# Patient Record
Sex: MALE | Race: OTHER | NOT HISPANIC OR LATINO | URBAN - METROPOLITAN AREA
[De-identification: names, ages, dates, MRNs, and addresses within clinical notes are randomized per-mention and may not be internally consistent; named-entity substitution may affect disease eponyms.]

---

## 2016-03-03 RX ORDER — CLONAZEPAM 1 MG
1 TABLET ORAL
Qty: 0 | Refills: 0 | COMMUNITY
Start: 2016-03-03

## 2017-01-03 ENCOUNTER — OUTPATIENT (OUTPATIENT)
Dept: OUTPATIENT SERVICES | Facility: HOSPITAL | Age: 72
LOS: 1 days | Discharge: HOME | End: 2017-01-03

## 2017-01-03 ENCOUNTER — RESULT REVIEW (OUTPATIENT)
Age: 72
End: 2017-01-03

## 2017-01-03 DIAGNOSIS — C91.11 CHRONIC LYMPHOCYTIC LEUKEMIA OF B-CELL TYPE IN REMISSION: ICD-10-CM

## 2017-01-03 DIAGNOSIS — Z98.89 OTHER SPECIFIED POSTPROCEDURAL STATES: Chronic | ICD-10-CM

## 2017-01-04 ENCOUNTER — LABORATORY RESULT (OUTPATIENT)
Age: 72
End: 2017-01-04

## 2017-01-04 ENCOUNTER — APPOINTMENT (OUTPATIENT)
Dept: HEMATOLOGY ONCOLOGY | Facility: CLINIC | Age: 72
End: 2017-01-04

## 2017-01-04 VITALS
BODY MASS INDEX: 30.3 KG/M2 | SYSTOLIC BLOOD PRESSURE: 149 MMHG | OXYGEN SATURATION: 97 % | TEMPERATURE: 98.3 F | DIASTOLIC BLOOD PRESSURE: 88 MMHG | RESPIRATION RATE: 16 BRPM | HEART RATE: 71 BPM | WEIGHT: 236 LBS

## 2017-01-04 LAB
BASOPHILS # BLD AUTO: 0.1 K/UL — SIGNIFICANT CHANGE UP (ref 0–0.2)
BASOPHILS NFR BLD AUTO: 0.8 % — SIGNIFICANT CHANGE UP (ref 0–2)
EOSINOPHIL # BLD AUTO: 0.2 K/UL — SIGNIFICANT CHANGE UP (ref 0–0.5)
EOSINOPHIL NFR BLD AUTO: 2.5 % — SIGNIFICANT CHANGE UP (ref 0–6)
HCT VFR BLD CALC: 39.1 % — SIGNIFICANT CHANGE UP (ref 39–50)
HGB BLD-MCNC: 12.6 G/DL — LOW (ref 13–17)
LYMPHOCYTES # BLD AUTO: 1.8 K/UL — SIGNIFICANT CHANGE UP (ref 1–3.3)
LYMPHOCYTES # BLD AUTO: 22.3 % — SIGNIFICANT CHANGE UP (ref 13–44)
MCHC RBC-ENTMCNC: 31.3 PG — SIGNIFICANT CHANGE UP (ref 27–34)
MCHC RBC-ENTMCNC: 32.1 GM/DL — SIGNIFICANT CHANGE UP (ref 32–36)
MCV RBC AUTO: 97.4 FL — SIGNIFICANT CHANGE UP (ref 80–100)
MONOCYTES # BLD AUTO: 0.6 K/UL — SIGNIFICANT CHANGE UP (ref 0–0.9)
MONOCYTES NFR BLD AUTO: 7.8 % — SIGNIFICANT CHANGE UP (ref 2–14)
NEUTROPHILS # BLD AUTO: 5.2 K/UL — SIGNIFICANT CHANGE UP (ref 1.8–7.4)
NEUTROPHILS NFR BLD AUTO: 66.5 % — SIGNIFICANT CHANGE UP (ref 43–77)
PLATELET # BLD AUTO: 191 K/UL — SIGNIFICANT CHANGE UP (ref 150–400)
RBC # BLD: 4.02 M/UL — LOW (ref 4.2–5.8)
RBC # FLD: 12.3 % — SIGNIFICANT CHANGE UP (ref 10.3–14.5)
WBC # BLD: 7.9 K/UL — SIGNIFICANT CHANGE UP (ref 3.8–10.5)
WBC # FLD AUTO: 7.9 K/UL — SIGNIFICANT CHANGE UP (ref 3.8–10.5)

## 2017-01-30 ENCOUNTER — RESULT REVIEW (OUTPATIENT)
Age: 72
End: 2017-01-30

## 2017-01-31 ENCOUNTER — LABORATORY RESULT (OUTPATIENT)
Age: 72
End: 2017-01-31

## 2017-01-31 ENCOUNTER — APPOINTMENT (OUTPATIENT)
Dept: HEMATOLOGY ONCOLOGY | Facility: CLINIC | Age: 72
End: 2017-01-31

## 2017-01-31 ENCOUNTER — APPOINTMENT (OUTPATIENT)
Dept: INFUSION THERAPY | Facility: HOSPITAL | Age: 72
End: 2017-01-31

## 2017-01-31 VITALS
WEIGHT: 236 LBS | BODY MASS INDEX: 30.3 KG/M2 | RESPIRATION RATE: 16 BRPM | OXYGEN SATURATION: 95 % | HEART RATE: 63 BPM | TEMPERATURE: 98.4 F | DIASTOLIC BLOOD PRESSURE: 75 MMHG | SYSTOLIC BLOOD PRESSURE: 139 MMHG

## 2017-01-31 LAB
BASOPHILS # BLD AUTO: 0.1 K/UL — SIGNIFICANT CHANGE UP (ref 0–0.2)
BASOPHILS NFR BLD AUTO: 0.7 % — SIGNIFICANT CHANGE UP (ref 0–2)
EOSINOPHIL # BLD AUTO: 0.2 K/UL — SIGNIFICANT CHANGE UP (ref 0–0.5)
EOSINOPHIL NFR BLD AUTO: 2.5 % — SIGNIFICANT CHANGE UP (ref 0–6)
HCT VFR BLD CALC: 42.3 % — SIGNIFICANT CHANGE UP (ref 39–50)
HGB BLD-MCNC: 13.7 G/DL — SIGNIFICANT CHANGE UP (ref 13–17)
LYMPHOCYTES # BLD AUTO: 2.1 K/UL — SIGNIFICANT CHANGE UP (ref 1–3.3)
LYMPHOCYTES # BLD AUTO: 22.6 % — SIGNIFICANT CHANGE UP (ref 13–44)
MCHC RBC-ENTMCNC: 31.3 PG — SIGNIFICANT CHANGE UP (ref 27–34)
MCHC RBC-ENTMCNC: 32.4 GM/DL — SIGNIFICANT CHANGE UP (ref 32–36)
MCV RBC AUTO: 96.8 FL — SIGNIFICANT CHANGE UP (ref 80–100)
MONOCYTES # BLD AUTO: 0.9 K/UL — SIGNIFICANT CHANGE UP (ref 0–0.9)
MONOCYTES NFR BLD AUTO: 10 % — SIGNIFICANT CHANGE UP (ref 2–14)
NEUTROPHILS # BLD AUTO: 6 K/UL — SIGNIFICANT CHANGE UP (ref 1.8–7.4)
NEUTROPHILS NFR BLD AUTO: 64.2 % — SIGNIFICANT CHANGE UP (ref 43–77)
PLATELET # BLD AUTO: 200 K/UL — SIGNIFICANT CHANGE UP (ref 150–400)
RBC # BLD: 4.37 M/UL — SIGNIFICANT CHANGE UP (ref 4.2–5.8)
RBC # FLD: 12.1 % — SIGNIFICANT CHANGE UP (ref 10.3–14.5)
WBC # BLD: 9.3 K/UL — SIGNIFICANT CHANGE UP (ref 3.8–10.5)
WBC # FLD AUTO: 9.3 K/UL — SIGNIFICANT CHANGE UP (ref 3.8–10.5)

## 2017-02-01 ENCOUNTER — APPOINTMENT (OUTPATIENT)
Dept: INFUSION THERAPY | Facility: HOSPITAL | Age: 72
End: 2017-02-01

## 2017-02-01 ENCOUNTER — OUTPATIENT (OUTPATIENT)
Dept: OUTPATIENT SERVICES | Facility: HOSPITAL | Age: 72
LOS: 1 days | Discharge: HOME | End: 2017-02-01

## 2017-02-01 DIAGNOSIS — C88.0 WALDENSTROM MACROGLOBULINEMIA: ICD-10-CM

## 2017-02-01 DIAGNOSIS — C91.11 CHRONIC LYMPHOCYTIC LEUKEMIA OF B-CELL TYPE IN REMISSION: ICD-10-CM

## 2017-02-01 DIAGNOSIS — Z98.89 OTHER SPECIFIED POSTPROCEDURAL STATES: Chronic | ICD-10-CM

## 2017-02-01 DIAGNOSIS — R11.2 NAUSEA WITH VOMITING, UNSPECIFIED: ICD-10-CM

## 2017-02-01 DIAGNOSIS — Z51.11 ENCOUNTER FOR ANTINEOPLASTIC CHEMOTHERAPY: ICD-10-CM

## 2017-02-02 DIAGNOSIS — R11.2 NAUSEA WITH VOMITING, UNSPECIFIED: ICD-10-CM

## 2017-02-02 DIAGNOSIS — Z51.11 ENCOUNTER FOR ANTINEOPLASTIC CHEMOTHERAPY: ICD-10-CM

## 2017-02-06 ENCOUNTER — RESULT REVIEW (OUTPATIENT)
Age: 72
End: 2017-02-06

## 2017-02-07 ENCOUNTER — APPOINTMENT (OUTPATIENT)
Dept: INFUSION THERAPY | Facility: HOSPITAL | Age: 72
End: 2017-02-07

## 2017-02-07 LAB
BASOPHILS # BLD AUTO: 0 K/UL — SIGNIFICANT CHANGE UP (ref 0–0.2)
BASOPHILS NFR BLD AUTO: 0.3 % — SIGNIFICANT CHANGE UP (ref 0–2)
EOSINOPHIL # BLD AUTO: 0.5 K/UL — SIGNIFICANT CHANGE UP (ref 0–0.5)
EOSINOPHIL NFR BLD AUTO: 4.8 % — SIGNIFICANT CHANGE UP (ref 0–6)
HCT VFR BLD CALC: 40 % — SIGNIFICANT CHANGE UP (ref 39–50)
HGB BLD-MCNC: 12.9 G/DL — LOW (ref 13–17)
LYMPHOCYTES # BLD AUTO: 1.6 K/UL — SIGNIFICANT CHANGE UP (ref 1–3.3)
LYMPHOCYTES # BLD AUTO: 17.7 % — SIGNIFICANT CHANGE UP (ref 13–44)
MCHC RBC-ENTMCNC: 31.3 PG — SIGNIFICANT CHANGE UP (ref 27–34)
MCHC RBC-ENTMCNC: 32.3 GM/DL — SIGNIFICANT CHANGE UP (ref 32–36)
MCV RBC AUTO: 96.8 FL — SIGNIFICANT CHANGE UP (ref 80–100)
MONOCYTES # BLD AUTO: 0.9 K/UL — SIGNIFICANT CHANGE UP (ref 0–0.9)
MONOCYTES NFR BLD AUTO: 9.3 % — SIGNIFICANT CHANGE UP (ref 2–14)
NEUTROPHILS # BLD AUTO: 6.3 K/UL — SIGNIFICANT CHANGE UP (ref 1.8–7.4)
NEUTROPHILS NFR BLD AUTO: 67.9 % — SIGNIFICANT CHANGE UP (ref 43–77)
PLATELET # BLD AUTO: 203 K/UL — SIGNIFICANT CHANGE UP (ref 150–400)
RBC # BLD: 4.14 M/UL — LOW (ref 4.2–5.8)
RBC # FLD: 12.3 % — SIGNIFICANT CHANGE UP (ref 10.3–14.5)
WBC # BLD: 9.3 K/UL — SIGNIFICANT CHANGE UP (ref 3.8–10.5)
WBC # FLD AUTO: 9.3 K/UL — SIGNIFICANT CHANGE UP (ref 3.8–10.5)

## 2017-02-08 DIAGNOSIS — C88.0 WALDENSTROM MACROGLOBULINEMIA: ICD-10-CM

## 2017-02-13 ENCOUNTER — RESULT REVIEW (OUTPATIENT)
Age: 72
End: 2017-02-13

## 2017-02-14 ENCOUNTER — APPOINTMENT (OUTPATIENT)
Dept: INFUSION THERAPY | Facility: HOSPITAL | Age: 72
End: 2017-02-14

## 2017-02-14 LAB
BASOPHILS # BLD AUTO: 0 K/UL — SIGNIFICANT CHANGE UP (ref 0–0.2)
BASOPHILS NFR BLD AUTO: 0.4 % — SIGNIFICANT CHANGE UP (ref 0–2)
EOSINOPHIL # BLD AUTO: 0.3 K/UL — SIGNIFICANT CHANGE UP (ref 0–0.5)
EOSINOPHIL NFR BLD AUTO: 2.5 % — SIGNIFICANT CHANGE UP (ref 0–6)
HCT VFR BLD CALC: 42.4 % — SIGNIFICANT CHANGE UP (ref 39–50)
HGB BLD-MCNC: 13.8 G/DL — SIGNIFICANT CHANGE UP (ref 13–17)
LYMPHOCYTES # BLD AUTO: 1.7 K/UL — SIGNIFICANT CHANGE UP (ref 1–3.3)
LYMPHOCYTES # BLD AUTO: 15.2 % — SIGNIFICANT CHANGE UP (ref 13–44)
MCHC RBC-ENTMCNC: 31.7 PG — SIGNIFICANT CHANGE UP (ref 27–34)
MCHC RBC-ENTMCNC: 32.6 G/DL — SIGNIFICANT CHANGE UP (ref 32–36)
MCV RBC AUTO: 97.2 FL — SIGNIFICANT CHANGE UP (ref 80–100)
MONOCYTES # BLD AUTO: 0.8 K/UL — SIGNIFICANT CHANGE UP (ref 0–0.9)
MONOCYTES NFR BLD AUTO: 7.2 % — SIGNIFICANT CHANGE UP (ref 2–14)
NEUTROPHILS # BLD AUTO: 8.2 K/UL — HIGH (ref 1.8–7.4)
NEUTROPHILS NFR BLD AUTO: 74.7 % — SIGNIFICANT CHANGE UP (ref 43–77)
PLATELET # BLD AUTO: 184 K/UL — SIGNIFICANT CHANGE UP (ref 150–400)
RBC # BLD: 4.36 M/UL — SIGNIFICANT CHANGE UP (ref 4.2–5.8)
RBC # FLD: 12.3 % — SIGNIFICANT CHANGE UP (ref 10.3–14.5)
WBC # BLD: 10.9 K/UL — HIGH (ref 3.8–10.5)
WBC # FLD AUTO: 10.9 K/UL — HIGH (ref 3.8–10.5)

## 2017-02-20 ENCOUNTER — RESULT REVIEW (OUTPATIENT)
Age: 72
End: 2017-02-20

## 2017-02-21 ENCOUNTER — APPOINTMENT (OUTPATIENT)
Dept: INFUSION THERAPY | Facility: HOSPITAL | Age: 72
End: 2017-02-21

## 2017-02-21 LAB
BASOPHILS # BLD AUTO: 0 K/UL — SIGNIFICANT CHANGE UP (ref 0–0.2)
BASOPHILS NFR BLD AUTO: 0.5 % — SIGNIFICANT CHANGE UP (ref 0–2)
EOSINOPHIL # BLD AUTO: 0.3 K/UL — SIGNIFICANT CHANGE UP (ref 0–0.5)
EOSINOPHIL NFR BLD AUTO: 3.6 % — SIGNIFICANT CHANGE UP (ref 0–6)
HCT VFR BLD CALC: 42 % — SIGNIFICANT CHANGE UP (ref 39–50)
HGB BLD-MCNC: 13.8 G/DL — SIGNIFICANT CHANGE UP (ref 13–17)
LYMPHOCYTES # BLD AUTO: 2.1 K/UL — SIGNIFICANT CHANGE UP (ref 1–3.3)
LYMPHOCYTES # BLD AUTO: 22.5 % — SIGNIFICANT CHANGE UP (ref 13–44)
MCHC RBC-ENTMCNC: 31.9 PG — SIGNIFICANT CHANGE UP (ref 27–34)
MCHC RBC-ENTMCNC: 32.7 G/DL — SIGNIFICANT CHANGE UP (ref 32–36)
MCV RBC AUTO: 97.5 FL — SIGNIFICANT CHANGE UP (ref 80–100)
MONOCYTES # BLD AUTO: 0.6 K/UL — SIGNIFICANT CHANGE UP (ref 0–0.9)
MONOCYTES NFR BLD AUTO: 7 % — SIGNIFICANT CHANGE UP (ref 2–14)
NEUTROPHILS # BLD AUTO: 6.1 K/UL — SIGNIFICANT CHANGE UP (ref 1.8–7.4)
NEUTROPHILS NFR BLD AUTO: 66.3 % — SIGNIFICANT CHANGE UP (ref 43–77)
PLATELET # BLD AUTO: 154 K/UL — SIGNIFICANT CHANGE UP (ref 150–400)
RBC # BLD: 4.31 M/UL — SIGNIFICANT CHANGE UP (ref 4.2–5.8)
RBC # FLD: 12.4 % — SIGNIFICANT CHANGE UP (ref 10.3–14.5)
WBC # BLD: 9.2 K/UL — SIGNIFICANT CHANGE UP (ref 3.8–10.5)
WBC # FLD AUTO: 9.2 K/UL — SIGNIFICANT CHANGE UP (ref 3.8–10.5)

## 2017-02-27 ENCOUNTER — RESULT REVIEW (OUTPATIENT)
Age: 72
End: 2017-02-27

## 2017-02-28 ENCOUNTER — LABORATORY RESULT (OUTPATIENT)
Age: 72
End: 2017-02-28

## 2017-02-28 ENCOUNTER — APPOINTMENT (OUTPATIENT)
Dept: INFUSION THERAPY | Facility: HOSPITAL | Age: 72
End: 2017-02-28

## 2017-02-28 LAB
BASOPHILS # BLD AUTO: 0 K/UL — SIGNIFICANT CHANGE UP (ref 0–0.2)
BASOPHILS NFR BLD AUTO: 0.2 % — SIGNIFICANT CHANGE UP (ref 0–2)
EOSINOPHIL # BLD AUTO: 0.3 K/UL — SIGNIFICANT CHANGE UP (ref 0–0.5)
EOSINOPHIL NFR BLD AUTO: 3.1 % — SIGNIFICANT CHANGE UP (ref 0–6)
HCT VFR BLD CALC: 40.7 % — SIGNIFICANT CHANGE UP (ref 39–50)
HGB BLD-MCNC: 13.8 G/DL — SIGNIFICANT CHANGE UP (ref 13–17)
LYMPHOCYTES # BLD AUTO: 1.7 K/UL — SIGNIFICANT CHANGE UP (ref 1–3.3)
LYMPHOCYTES # BLD AUTO: 18.1 % — SIGNIFICANT CHANGE UP (ref 13–44)
MCHC RBC-ENTMCNC: 32.5 PG — SIGNIFICANT CHANGE UP (ref 27–34)
MCHC RBC-ENTMCNC: 33.9 G/DL — SIGNIFICANT CHANGE UP (ref 32–36)
MCV RBC AUTO: 96 FL — SIGNIFICANT CHANGE UP (ref 80–100)
MONOCYTES # BLD AUTO: 0.7 K/UL — SIGNIFICANT CHANGE UP (ref 0–0.9)
MONOCYTES NFR BLD AUTO: 7.3 % — SIGNIFICANT CHANGE UP (ref 2–14)
NEUTROPHILS # BLD AUTO: 6.5 K/UL — SIGNIFICANT CHANGE UP (ref 1.8–7.4)
NEUTROPHILS NFR BLD AUTO: 71.3 % — SIGNIFICANT CHANGE UP (ref 43–77)
PLATELET # BLD AUTO: 139 K/UL — LOW (ref 150–400)
RBC # BLD: 4.24 M/UL — SIGNIFICANT CHANGE UP (ref 4.2–5.8)
RBC # FLD: 12.2 % — SIGNIFICANT CHANGE UP (ref 10.3–14.5)
WBC # BLD: 9.2 K/UL — SIGNIFICANT CHANGE UP (ref 3.8–10.5)
WBC # FLD AUTO: 9.2 K/UL — SIGNIFICANT CHANGE UP (ref 3.8–10.5)

## 2017-03-07 ENCOUNTER — APPOINTMENT (OUTPATIENT)
Dept: ELECTROPHYSIOLOGY | Facility: CLINIC | Age: 72
End: 2017-03-07

## 2017-03-07 ENCOUNTER — APPOINTMENT (OUTPATIENT)
Dept: CARDIOLOGY | Facility: CLINIC | Age: 72
End: 2017-03-07

## 2017-03-07 ENCOUNTER — NON-APPOINTMENT (OUTPATIENT)
Age: 72
End: 2017-03-07

## 2017-03-07 VITALS
HEART RATE: 81 BPM | BODY MASS INDEX: 30.29 KG/M2 | WEIGHT: 236 LBS | DIASTOLIC BLOOD PRESSURE: 77 MMHG | HEIGHT: 74 IN | SYSTOLIC BLOOD PRESSURE: 126 MMHG

## 2017-03-17 ENCOUNTER — APPOINTMENT (OUTPATIENT)
Dept: PULMONOLOGY | Facility: CLINIC | Age: 72
End: 2017-03-17

## 2017-04-25 ENCOUNTER — OUTPATIENT (OUTPATIENT)
Dept: OUTPATIENT SERVICES | Facility: HOSPITAL | Age: 72
LOS: 1 days | Discharge: HOME | End: 2017-04-25

## 2017-04-25 DIAGNOSIS — C91.11 CHRONIC LYMPHOCYTIC LEUKEMIA OF B-CELL TYPE IN REMISSION: ICD-10-CM

## 2017-04-25 DIAGNOSIS — Z98.89 OTHER SPECIFIED POSTPROCEDURAL STATES: Chronic | ICD-10-CM

## 2017-04-27 ENCOUNTER — RESULT REVIEW (OUTPATIENT)
Age: 72
End: 2017-04-27

## 2017-04-28 ENCOUNTER — LABORATORY RESULT (OUTPATIENT)
Age: 72
End: 2017-04-28

## 2017-04-28 ENCOUNTER — APPOINTMENT (OUTPATIENT)
Dept: HEMATOLOGY ONCOLOGY | Facility: CLINIC | Age: 72
End: 2017-04-28

## 2017-04-28 VITALS
RESPIRATION RATE: 16 BRPM | TEMPERATURE: 98.5 F | BODY MASS INDEX: 29.79 KG/M2 | WEIGHT: 231.99 LBS | SYSTOLIC BLOOD PRESSURE: 140 MMHG | DIASTOLIC BLOOD PRESSURE: 81 MMHG | HEART RATE: 90 BPM | OXYGEN SATURATION: 97 %

## 2017-04-28 LAB
ALBUMIN SERPL ELPH-MCNC: 4.4 G/DL
ALP BLD-CCNC: 147 U/L
ALT SERPL-CCNC: 18 U/L
ANION GAP SERPL CALC-SCNC: 18 MMOL/L
AST SERPL-CCNC: 17 U/L
BASOPHILS # BLD AUTO: 0.1 K/UL — SIGNIFICANT CHANGE UP (ref 0–0.2)
BASOPHILS NFR BLD AUTO: 0.9 % — SIGNIFICANT CHANGE UP (ref 0–2)
BILIRUB SERPL-MCNC: 0.6 MG/DL
BUN SERPL-MCNC: 45 MG/DL
CALCIUM SERPL-MCNC: 9.6 MG/DL
CHLORIDE SERPL-SCNC: 99 MMOL/L
CO2 SERPL-SCNC: 27 MMOL/L
CREAT SERPL-MCNC: 3.44 MG/DL
EOSINOPHIL # BLD AUTO: 0.2 K/UL — SIGNIFICANT CHANGE UP (ref 0–0.5)
EOSINOPHIL NFR BLD AUTO: 3 % — SIGNIFICANT CHANGE UP (ref 0–6)
GLUCOSE SERPL-MCNC: 134 MG/DL
HCT VFR BLD CALC: 42.3 % — SIGNIFICANT CHANGE UP (ref 39–50)
HGB BLD-MCNC: 14.3 G/DL — SIGNIFICANT CHANGE UP (ref 13–17)
INR PPP: 3.43 RATIO
LDH SERPL-CCNC: 153 U/L
LYMPHOCYTES # BLD AUTO: 1.8 K/UL — SIGNIFICANT CHANGE UP (ref 1–3.3)
LYMPHOCYTES # BLD AUTO: 22.6 % — SIGNIFICANT CHANGE UP (ref 13–44)
MCHC RBC-ENTMCNC: 31.7 PG — SIGNIFICANT CHANGE UP (ref 27–34)
MCHC RBC-ENTMCNC: 33.8 G/DL — SIGNIFICANT CHANGE UP (ref 32–36)
MCV RBC AUTO: 94 FL — SIGNIFICANT CHANGE UP (ref 80–100)
MONOCYTES # BLD AUTO: 0.7 K/UL — SIGNIFICANT CHANGE UP (ref 0–0.9)
MONOCYTES NFR BLD AUTO: 8.3 % — SIGNIFICANT CHANGE UP (ref 2–14)
NEUTROPHILS # BLD AUTO: 5.3 K/UL — SIGNIFICANT CHANGE UP (ref 1.8–7.4)
NEUTROPHILS NFR BLD AUTO: 65.3 % — SIGNIFICANT CHANGE UP (ref 43–77)
PLATELET # BLD AUTO: 157 K/UL — SIGNIFICANT CHANGE UP (ref 150–400)
POTASSIUM SERPL-SCNC: 4 MMOL/L
PROT SERPL-MCNC: 7.2 G/DL
PT BLD: 39.8 SEC
RBC # BLD: 4.5 M/UL — SIGNIFICANT CHANGE UP (ref 4.2–5.8)
RBC # FLD: 12.1 % — SIGNIFICANT CHANGE UP (ref 10.3–14.5)
SODIUM SERPL-SCNC: 144 MMOL/L
T3RU NFR SERPL: 0.76 INDEX
T4 SERPL-MCNC: 11.8 UG/DL
TSH SERPL-ACNC: 0.56 UIU/ML
WBC # BLD: 8.2 K/UL — SIGNIFICANT CHANGE UP (ref 3.8–10.5)
WBC # FLD AUTO: 8.2 K/UL — SIGNIFICANT CHANGE UP (ref 3.8–10.5)

## 2017-04-29 LAB
ALBUMIN MFR SERPL ELPH: 57.5 %
ALBUMIN SERPL-MCNC: 4.1 G/DL
ALBUMIN/GLOB SERPL: 1.3 RATIO
ALPHA1 GLOB MFR SERPL ELPH: 6.1 %
ALPHA1 GLOB SERPL ELPH-MCNC: 0.4 G/DL
ALPHA2 GLOB MFR SERPL ELPH: 11.3 %
ALPHA2 GLOB SERPL ELPH-MCNC: 0.8 G/DL
B-GLOBULIN MFR SERPL ELPH: 9.1 %
B-GLOBULIN SERPL ELPH-MCNC: 0.7 G/DL
GAMMA GLOB FLD ELPH-MCNC: 1.2 G/DL
GAMMA GLOB MFR SERPL ELPH: 16 %
INTERPRETATION SERPL IEP-IMP: NORMAL
M PROTEIN MFR SERPL ELPH: 11.2 %
MONOCLON BAND OBS SERPL: 0.8 G/DL
PROT SERPL-MCNC: 7.2 G/DL
PROT SERPL-MCNC: 7.2 G/DL

## 2017-04-30 LAB
DEPRECATED KAPPA LC FREE/LAMBDA SER: 0.02 RATIO
DEPRECATED KAPPA LC FREE/LAMBDA SER: 0.02 RATIO
IGA SER QL IEP: 32 MG/DL
IGG SER QL IEP: 629 MG/DL
IGM SER QL IEP: 927 MG/DL
KAPPA LC CSF-MCNC: 73.7 MG/DL
KAPPA LC CSF-MCNC: 73.7 MG/DL
KAPPA LC SERPL-MCNC: 1.14 MG/DL
KAPPA LC SERPL-MCNC: 1.14 MG/DL

## 2017-07-01 ENCOUNTER — INPATIENT (INPATIENT)
Facility: HOSPITAL | Age: 72
LOS: 9 days | Discharge: HOME | DRG: 92 | End: 2017-07-11
Attending: INTERNAL MEDICINE | Admitting: INTERNAL MEDICINE
Payer: COMMERCIAL

## 2017-07-01 VITALS
HEART RATE: 103 BPM | OXYGEN SATURATION: 100 % | WEIGHT: 216.05 LBS | SYSTOLIC BLOOD PRESSURE: 106 MMHG | DIASTOLIC BLOOD PRESSURE: 69 MMHG | HEIGHT: 74 IN | RESPIRATION RATE: 18 BRPM | TEMPERATURE: 99 F

## 2017-07-01 DIAGNOSIS — R47.81 SLURRED SPEECH: ICD-10-CM

## 2017-07-01 DIAGNOSIS — Z98.89 OTHER SPECIFIED POSTPROCEDURAL STATES: Chronic | ICD-10-CM

## 2017-07-01 LAB
ALBUMIN SERPL ELPH-MCNC: 4 G/DL — SIGNIFICANT CHANGE UP (ref 3.3–5)
ALP SERPL-CCNC: 106 U/L — SIGNIFICANT CHANGE UP (ref 40–120)
ALT FLD-CCNC: 53 U/L RC — HIGH (ref 10–45)
ANION GAP SERPL CALC-SCNC: 13 MMOL/L — SIGNIFICANT CHANGE UP (ref 5–17)
APPEARANCE UR: CLEAR — SIGNIFICANT CHANGE UP
APTT BLD: 61.3 SEC — HIGH (ref 27.5–37.4)
AST SERPL-CCNC: 27 U/L — SIGNIFICANT CHANGE UP (ref 10–40)
BASOPHILS # BLD AUTO: 0.1 K/UL — SIGNIFICANT CHANGE UP (ref 0–0.2)
BASOPHILS NFR BLD AUTO: 0.7 % — SIGNIFICANT CHANGE UP (ref 0–2)
BILIRUB SERPL-MCNC: 0.5 MG/DL — SIGNIFICANT CHANGE UP (ref 0.2–1.2)
BILIRUB UR-MCNC: NEGATIVE — SIGNIFICANT CHANGE UP
BLD GP AB SCN SERPL QL: NEGATIVE — SIGNIFICANT CHANGE UP
BUN SERPL-MCNC: 41 MG/DL — HIGH (ref 7–23)
CALCIUM SERPL-MCNC: 9.6 MG/DL — SIGNIFICANT CHANGE UP (ref 8.4–10.5)
CHLORIDE SERPL-SCNC: 105 MMOL/L — SIGNIFICANT CHANGE UP (ref 96–108)
CO2 SERPL-SCNC: 26 MMOL/L — SIGNIFICANT CHANGE UP (ref 22–31)
COLOR SPEC: YELLOW — SIGNIFICANT CHANGE UP
CREAT SERPL-MCNC: 3.27 MG/DL — HIGH (ref 0.5–1.3)
DIFF PNL FLD: NEGATIVE — SIGNIFICANT CHANGE UP
EOSINOPHIL # BLD AUTO: 0.1 K/UL — SIGNIFICANT CHANGE UP (ref 0–0.5)
EOSINOPHIL NFR BLD AUTO: 1.6 % — SIGNIFICANT CHANGE UP (ref 0–6)
GLUCOSE SERPL-MCNC: 109 MG/DL — HIGH (ref 70–99)
GLUCOSE UR QL: NEGATIVE — SIGNIFICANT CHANGE UP
HCT VFR BLD CALC: 37.8 % — LOW (ref 39–50)
HGB BLD-MCNC: 13.1 G/DL — SIGNIFICANT CHANGE UP (ref 13–17)
INR BLD: 9.44 RATIO — CRITICAL HIGH (ref 0.88–1.16)
KETONES UR-MCNC: NEGATIVE — SIGNIFICANT CHANGE UP
LEUKOCYTE ESTERASE UR-ACNC: NEGATIVE — SIGNIFICANT CHANGE UP
LYMPHOCYTES # BLD AUTO: 1.8 K/UL — SIGNIFICANT CHANGE UP (ref 1–3.3)
LYMPHOCYTES # BLD AUTO: 23.9 % — SIGNIFICANT CHANGE UP (ref 13–44)
MCHC RBC-ENTMCNC: 34 PG — SIGNIFICANT CHANGE UP (ref 27–34)
MCHC RBC-ENTMCNC: 34.7 GM/DL — SIGNIFICANT CHANGE UP (ref 32–36)
MCV RBC AUTO: 98 FL — SIGNIFICANT CHANGE UP (ref 80–100)
MONOCYTES # BLD AUTO: 0.7 K/UL — SIGNIFICANT CHANGE UP (ref 0–0.9)
MONOCYTES NFR BLD AUTO: 10.1 % — SIGNIFICANT CHANGE UP (ref 2–14)
NEUTROPHILS # BLD AUTO: 4.6 K/UL — SIGNIFICANT CHANGE UP (ref 1.8–7.4)
NEUTROPHILS NFR BLD AUTO: 63.7 % — SIGNIFICANT CHANGE UP (ref 43–77)
NITRITE UR-MCNC: NEGATIVE — SIGNIFICANT CHANGE UP
PH UR: 6 — SIGNIFICANT CHANGE UP (ref 5–8)
PLATELET # BLD AUTO: 154 K/UL — SIGNIFICANT CHANGE UP (ref 150–400)
POTASSIUM SERPL-MCNC: 3.7 MMOL/L — SIGNIFICANT CHANGE UP (ref 3.5–5.3)
POTASSIUM SERPL-SCNC: 3.7 MMOL/L — SIGNIFICANT CHANGE UP (ref 3.5–5.3)
PROT SERPL-MCNC: 7 G/DL — SIGNIFICANT CHANGE UP (ref 6–8.3)
PROT UR-MCNC: 30 MG/DL
PROTHROM AB SERPL-ACNC: 107.7 SEC — HIGH (ref 9.8–12.7)
RBC # BLD: 3.86 M/UL — LOW (ref 4.2–5.8)
RBC # FLD: 12.1 % — SIGNIFICANT CHANGE UP (ref 10.3–14.5)
RH IG SCN BLD-IMP: POSITIVE — SIGNIFICANT CHANGE UP
SODIUM SERPL-SCNC: 144 MMOL/L — SIGNIFICANT CHANGE UP (ref 135–145)
SP GR SPEC: 1.02 — SIGNIFICANT CHANGE UP (ref 1.01–1.02)
UROBILINOGEN FLD QL: NEGATIVE — SIGNIFICANT CHANGE UP
WBC # BLD: 7.3 K/UL — SIGNIFICANT CHANGE UP (ref 3.8–10.5)
WBC # FLD AUTO: 7.3 K/UL — SIGNIFICANT CHANGE UP (ref 3.8–10.5)
WBC UR QL: SIGNIFICANT CHANGE UP /HPF (ref 0–5)

## 2017-07-01 PROCEDURE — 70450 CT HEAD/BRAIN W/O DYE: CPT | Mod: 26

## 2017-07-01 PROCEDURE — 93010 ELECTROCARDIOGRAM REPORT: CPT

## 2017-07-01 PROCEDURE — 71010: CPT | Mod: 26

## 2017-07-01 PROCEDURE — 99285 EMERGENCY DEPT VISIT HI MDM: CPT | Mod: 25

## 2017-07-01 RX ORDER — LEVOTHYROXINE SODIUM 125 MCG
125 TABLET ORAL DAILY
Qty: 0 | Refills: 0 | Status: DISCONTINUED | OUTPATIENT
Start: 2017-07-01 | End: 2017-07-11

## 2017-07-01 RX ORDER — PHYTONADIONE (VIT K1) 5 MG
10 TABLET ORAL ONCE
Qty: 0 | Refills: 0 | Status: COMPLETED | OUTPATIENT
Start: 2017-07-01 | End: 2017-07-01

## 2017-07-01 RX ORDER — FUROSEMIDE 40 MG
40 TABLET ORAL DAILY
Qty: 0 | Refills: 0 | Status: DISCONTINUED | OUTPATIENT
Start: 2017-07-03 | End: 2017-07-11

## 2017-07-01 RX ORDER — SODIUM CHLORIDE 9 MG/ML
1000 INJECTION INTRAMUSCULAR; INTRAVENOUS; SUBCUTANEOUS
Qty: 0 | Refills: 0 | Status: DISCONTINUED | OUTPATIENT
Start: 2017-07-01 | End: 2017-07-02

## 2017-07-01 RX ORDER — DONEPEZIL HYDROCHLORIDE 10 MG/1
5 TABLET, FILM COATED ORAL AT BEDTIME
Qty: 0 | Refills: 0 | Status: DISCONTINUED | OUTPATIENT
Start: 2017-07-01 | End: 2017-07-11

## 2017-07-01 RX ORDER — FUROSEMIDE 40 MG
40 TABLET ORAL DAILY
Qty: 0 | Refills: 0 | Status: DISCONTINUED | OUTPATIENT
Start: 2017-07-01 | End: 2017-07-01

## 2017-07-01 RX ORDER — MEMANTINE HYDROCHLORIDE 10 MG/1
10 TABLET ORAL DAILY
Qty: 0 | Refills: 0 | Status: DISCONTINUED | OUTPATIENT
Start: 2017-07-01 | End: 2017-07-07

## 2017-07-01 RX ORDER — ALLOPURINOL 300 MG
100 TABLET ORAL DAILY
Qty: 0 | Refills: 0 | Status: DISCONTINUED | OUTPATIENT
Start: 2017-07-01 | End: 2017-07-11

## 2017-07-01 RX ORDER — TAMSULOSIN HYDROCHLORIDE 0.4 MG/1
0.4 CAPSULE ORAL AT BEDTIME
Qty: 0 | Refills: 0 | Status: DISCONTINUED | OUTPATIENT
Start: 2017-07-01 | End: 2017-07-11

## 2017-07-01 RX ORDER — MIRTAZAPINE 45 MG/1
15 TABLET, ORALLY DISINTEGRATING ORAL AT BEDTIME
Qty: 0 | Refills: 0 | Status: DISCONTINUED | OUTPATIENT
Start: 2017-07-01 | End: 2017-07-11

## 2017-07-01 RX ORDER — SODIUM BICARBONATE 1 MEQ/ML
650 SYRINGE (ML) INTRAVENOUS
Qty: 0 | Refills: 0 | Status: DISCONTINUED | OUTPATIENT
Start: 2017-07-01 | End: 2017-07-11

## 2017-07-01 RX ORDER — AMIODARONE HYDROCHLORIDE 400 MG/1
200 TABLET ORAL DAILY
Qty: 0 | Refills: 0 | Status: DISCONTINUED | OUTPATIENT
Start: 2017-07-01 | End: 2017-07-11

## 2017-07-01 RX ADMIN — SODIUM CHLORIDE 70 MILLILITER(S): 9 INJECTION INTRAMUSCULAR; INTRAVENOUS; SUBCUTANEOUS at 18:57

## 2017-07-01 RX ADMIN — SODIUM CHLORIDE 70 MILLILITER(S): 9 INJECTION INTRAMUSCULAR; INTRAVENOUS; SUBCUTANEOUS at 22:03

## 2017-07-01 RX ADMIN — Medication 102 MILLIGRAM(S): at 15:43

## 2017-07-01 NOTE — H&P ADULT - PSH
History of laparoscopic cholecystectomy  4/2014  Meniscus tear  s/p removal of Meniscus 8 months ago  S/P hernia repair  x2

## 2017-07-01 NOTE — ED PROVIDER NOTE - OBJECTIVE STATEMENT
Pt is 72M PMH dementia, hypothyroid, GERD, a-fib on coumadin, CKD4, SSS, CLL MDD p/w weakness slurred speech sent to ED by PMD after INR found to be 8.5. Pt's wife reports that pt has had slurred speech for three days, Pt is 72M PMH dementia, hypothyroid, GERD, a-fib on coumadin, CKD4, SSS, CLL MDD p/w weakness slurred speech sent to ED by PMD after INR found to be 8.5. Pt's wife reports that pt has had slurred speech for three days and generalized weakness.

## 2017-07-01 NOTE — CONSULT NOTE ADULT - ASSESSMENT
73 yearold man PMH dementia, hypothyroidism, GERD, Afib on coumadin, CKD stage 4, CLL, PPM presents with weakness and slurred speech for the past 3 days. Was sent to ED by PMD after INR found to be 8.5. Developed slurred speech after starting Lexapro and Abilify for depression. Low suspicion of stroke.    Plan:  Cannot do MRI brain due to PPM  Repeat CTH in 24 hours  stabilize INR level as per primary team  r/o infection with UA, CXR

## 2017-07-01 NOTE — ED ADULT NURSE NOTE - PMH
Anxiety disorder    Atrial fibrillation    Bradycardia, drug induced    CLL (chronic lymphocytic leukemia)  in remission  Diverticulitis    GERD (gastroesophageal reflux disease)    Waldenstrom macroglobulinemia

## 2017-07-01 NOTE — H&P ADULT - ASSESSMENT
pt  with  slurred  speech  for  past  few  days, per  wife,  seen in e r,  speech normal now, pt  coherent,    seen by  house  neuro, recommended  rpt  ct  head in am  afib on  coumadin, coagulopathy,  vitk,  and  ffp, inr  in  am   ppm, card  called   CLL,   not  on  chemo now pt  with  slurred  speech  for  past  few  days, per  wife,  seen in e r,  speech normal now, pt  coherent,    seen by  [place]  neuro, recommended  rpt  ct  head in am  afib on  coumadin, coagulopathy,  vitk,  and  ffp, inr  in  am   ppm, card  called   CLL,   not  on  chemo now,  CKD  4,  on iv n saline,  start Lasix on monday,  follow  crt in  am pt  with  slurred  speech  for  past  few  days, per  wife,  seen in e r,  speech normal now, pt  coherent,    seen by  house  neuro, recommended  rpt  ct  head in am  afib on  coumadin, coagulopathy,  vitk,  and  ffp, inr  in  am   ppm, card  called   CLL,   not  on  chemo now,  CKD  4,  now  with  LOUISE  ON CKD,   on iv n saline,  start Lasix on monday,  follow  crt in  am

## 2017-07-01 NOTE — H&P ADULT - HISTORY OF PRESENT ILLNESS
: Pt is 72M PMH dementia, hypothyroid, GERD, a-fib on coumadin, CKD4, SSS, CLL ,  p/w weakness slurred speech sent to ED by PMD after INR found to be 8.5. Pt's wife reports that pt has had slurred speech for three days and generalized weakness.,  also  it  appears  this  happened  after  starting  new  psych  drug for  depression	  ,  seen by  neuro  in  er,  no  acute  intervention,  in  er,  pt  with  normal  speech.  very  coherent,  wife  at bedside

## 2017-07-01 NOTE — ED PROVIDER NOTE - NOTES
Called neuro for slurred speech after negative CT head. Per neuro unable to perform MRI 2/2 PPM but will evaluate pt and provide further recs

## 2017-07-01 NOTE — H&P ADULT - NSHPLABSRESULTS_GEN_ALL_CORE
LABS:                        13.1   7.3   )-----------( 154      ( 2017 13:53 )             37.8         144  |  105  |  41<H>  ----------------------------<  109<H>  3.7   |  26  |  3.27<H>    Ca    9.6      2017 13:53    TPro  7.0  /  Alb  4.0  /  TBili  0.5  /  DBili  x   /  AST  27  /  ALT  53<H>  /  AlkPhos  106      PT/INR - ( 2017 13:53 )   PT: 107.7 sec;   INR: 9.44 ratio         PTT - ( 2017 13:53 )  PTT:61.3 sec  Urinalysis Basic - ( 2017 15:03 )    Color: Yellow / Appearance: Clear / S.017 / pH: x  Gluc: x / Ketone: Negative  / Bili: Negative / Urobili: Negative   Blood: x / Protein: 30 mg/dL / Nitrite: Negative   Leuk Esterase: Negative / RBC: x / WBC 0-2 /HPF   Sq Epi: x / Non Sq Epi: x / Bacteria: x          RADIOLOGY & ADDITIONAL TESTS:

## 2017-07-01 NOTE — ED PROVIDER NOTE - MEDICAL DECISION MAKING DETAILS
72M PMH CLL, Waldenstrom's macroglobulinemia, a-fib on coumadin, CAD s/p PPM, DMII, presenting to ED with weakness, fatigue, slurred speech, elevated INR 8 as outpt. Pt denies h/a, N/V, blood in stool, blood in urine. Will obtain bloodwork, repeat INR, CT head to r/o bleed and re-evaluate. ZR

## 2017-07-01 NOTE — ED ADULT NURSE NOTE - OBJECTIVE STATEMENT
71yo male walked into ED a+ox3, c/o elevated INR, slurred speech, and worsening generalized weakness over past 3 days. 71yo male walked into ED a+ox3, c/o elevated INR, slurred speech, and worsening generalized weakness over past 3 days. Pt had his monthly INR checked yesterday, and was told to come to ED today because INR was 8.5. Pt takes coumadin for Pacemaker. As per wife at bedside, pt has had slurred speech for past 3 days. Pt has generalized weakness, malaise for past 3 days. Pt had recent psych med change, and lowered his dosage of Clonazepam. Pt denies SOB, CP, HA, fevers, chills, n/v/d, abd pain, urinary burning/pain/frequency/hematuria, changes in vision, numbness/tingling to extremities. Pt denies abnormal bruising, recent falls/trauma/hitting head. Lung sounds clear. Abd soft nontender nondistended. Pupils 2mm bilaterally, had cataract surgery on both.

## 2017-07-01 NOTE — CONSULT NOTE ADULT - SUBJECTIVE AND OBJECTIVE BOX
Neurology Consult    Name  NICOLAS CIFUENTES    HPI:  73 yearold man PMH dementia, hypothyroidism, GERD, Afib on coumadin, CKD stage 4, CLL, PPM presents with weakness and slurred speech for the past 3 days. Was sent to ED by PMD after INR found to be 8.5. Pt's wife reports that pt has had slurred speech for three days and generalized weakness. Patient reports that he was on Abilify and Lexapro and developed slurred speech and other symptoms  such as tongue twitching and was subsequently taken off of those meds.    NIHSS-1 , MRS-0      MEDICATIONS  (STANDING):  amiodarone    Tablet 200 milliGRAM(s) Oral daily  memantine 10 milliGRAM(s) Oral daily  allopurinol 100 milliGRAM(s) Oral daily  sodium bicarbonate 650 milliGRAM(s) Oral two times a day  levothyroxine 125 MICROGram(s) Oral daily  tamsulosin 0.4 milliGRAM(s) Oral at bedtime  donepezil 5 milliGRAM(s) Oral at bedtime  mirtazapine 15 milliGRAM(s) Oral at bedtime  sodium chloride 0.9%. 1000 milliLiter(s) (70 mL/Hr) IV Continuous <Continuous>    MEDICATIONS  (PRN):      Allergies    amoxicillin (Rash)    Intolerances        Objective:   Vital Signs Last 24 Hrs  T(C): 36.8 (01 Jul 2017 18:55), Max: 37.3 (01 Jul 2017 13:03)  T(F): 98.3 (01 Jul 2017 18:55), Max: 99.2 (01 Jul 2017 13:03)  HR: 50 (01 Jul 2017 18:55) (50 - 103)  BP: 126/70 (01 Jul 2017 18:55) (106/69 - 126/70)  BP(mean): --  RR: 16 (01 Jul 2017 18:55) (16 - 18)  SpO2: 98% (01 Jul 2017 18:55) (98% - 100%)    General Exam:   General appearance: No acute distress                   Neurological Exam:  Mental Status: AAOx3, fluent speech, follows commands    Cranial Nerves: EOMI,  V1-V3 intact, facial symmetry intact, moderate dysarthria, tongue midline    Motor: No drift x4    Sensation: Intact to LT throughout    Coordination: FTN intact b/l    Labs:    07-01    144  |  105  |  41<H>  ----------------------------<  109<H>  3.7   |  26  |  3.27<H>    Ca    9.6      01 Jul 2017 13:53    TPro  7.0  /  Alb  4.0  /  TBili  0.5  /  DBili  x   /  AST  27  /  ALT  53<H>  /  AlkPhos  106  07-01    LIVER FUNCTIONS - ( 01 Jul 2017 13:53 )  Alb: 4.0 g/dL / Pro: 7.0 g/dL / ALK PHOS: 106 U/L / ALT: 53 U/L RC / AST: 27 U/L / GGT: x           CBC Full  -  ( 01 Jul 2017 13:53 )  WBC Count : 7.3 K/uL  Hemoglobin : 13.1 g/dL  Hematocrit : 37.8 %  Platelet Count - Automated : 154 K/uL  Mean Cell Volume : 98.0 fl  Mean Cell Hemoglobin : 34.0 pg  Mean Cell Hemoglobin Concentration : 34.7 gm/dL  Auto Neutrophil # : 4.6 K/uL  Auto Lymphocyte # : 1.8 K/uL  Auto Monocyte # : 0.7 K/uL  Auto Eosinophil # : 0.1 K/uL  Auto Basophil # : 0.1 K/uL  Auto Neutrophil % : 63.7 %  Auto Lymphocyte % : 23.9 %  Auto Monocyte % : 10.1 %  Auto Eosinophil % : 1.6 %  Auto Basophil % : 0.7 %      Radiology  CTH: No intracranial hemorrhage. No acute major distribution infarct.

## 2017-07-01 NOTE — H&P ADULT - NSHPPHYSICALEXAM_GEN_ALL_CORE
PHYSICAL EXAMINATION:  Vital Signs Last 24 Hrs  T(C): 36.9 (01 Jul 2017 13:40), Max: 37.3 (01 Jul 2017 13:03)  T(F): 98.4 (01 Jul 2017 13:40), Max: 99.2 (01 Jul 2017 13:03)  HR: 50 (01 Jul 2017 15:01) (50 - 103)  BP: 121/68 (01 Jul 2017 15:01) (106/69 - 121/68)  BP(mean): --  RR: 16 (01 Jul 2017 15:01) (16 - 18)  SpO2: 99% (01 Jul 2017 15:01) (99% - 100%)  CAPILLARY BLOOD GLUCOSE            GENERAL: NAD, well-groomed, well-developed  HEAD:  atraumatic, normocephalic  EYES: sclera anicteric  ENMT: mucous membranes moist  NECK: supple, No JVD  CHEST/LUNG: clear to auscultation bilaterally; no rales, rhonchi, or wheezing b/l  HEART: normal S1, S2  ABDOMEN: BS+, soft, ND, NT   EXTREMITIES:  pulses palpable; no clubbing, cyanosis, or edema b/l LEs  NEURO: awake, alert, interactive; moves all extremities  SKIN: no rashes or lesions

## 2017-07-02 LAB
ALBUMIN SERPL ELPH-MCNC: 3.4 G/DL — SIGNIFICANT CHANGE UP (ref 3.3–5)
ALP SERPL-CCNC: 99 U/L — SIGNIFICANT CHANGE UP (ref 40–120)
ALT FLD-CCNC: 52 U/L — HIGH (ref 10–45)
ANION GAP SERPL CALC-SCNC: 16 MMOL/L — SIGNIFICANT CHANGE UP (ref 5–17)
APTT BLD: 29.3 SEC — SIGNIFICANT CHANGE UP (ref 27.5–37.4)
AST SERPL-CCNC: 51 U/L — HIGH (ref 10–40)
BILIRUB DIRECT SERPL-MCNC: 0.2 MG/DL — SIGNIFICANT CHANGE UP (ref 0–0.2)
BILIRUB INDIRECT FLD-MCNC: 0.6 MG/DL — SIGNIFICANT CHANGE UP (ref 0.2–1)
BILIRUB SERPL-MCNC: 0.8 MG/DL — SIGNIFICANT CHANGE UP (ref 0.2–1.2)
BUN SERPL-MCNC: 40 MG/DL — HIGH (ref 7–23)
CALCIUM SERPL-MCNC: 9.4 MG/DL — SIGNIFICANT CHANGE UP (ref 8.4–10.5)
CHLORIDE SERPL-SCNC: 106 MMOL/L — SIGNIFICANT CHANGE UP (ref 96–108)
CO2 SERPL-SCNC: 22 MMOL/L — SIGNIFICANT CHANGE UP (ref 22–31)
CREAT SERPL-MCNC: 3.03 MG/DL — HIGH (ref 0.5–1.3)
GLUCOSE SERPL-MCNC: 76 MG/DL — SIGNIFICANT CHANGE UP (ref 70–99)
HCT VFR BLD CALC: 33.5 % — LOW (ref 39–50)
HGB BLD-MCNC: 11.1 G/DL — LOW (ref 13–17)
INR BLD: 1.39 RATIO — HIGH (ref 0.88–1.16)
INR BLD: 2.03 RATIO — HIGH (ref 0.88–1.16)
MCHC RBC-ENTMCNC: 31.5 PG — SIGNIFICANT CHANGE UP (ref 27–34)
MCHC RBC-ENTMCNC: 33.1 GM/DL — SIGNIFICANT CHANGE UP (ref 32–36)
MCV RBC AUTO: 95.2 FL — SIGNIFICANT CHANGE UP (ref 80–100)
PLATELET # BLD AUTO: 154 K/UL — SIGNIFICANT CHANGE UP (ref 150–400)
POTASSIUM SERPL-MCNC: 3.8 MMOL/L — SIGNIFICANT CHANGE UP (ref 3.5–5.3)
POTASSIUM SERPL-SCNC: 3.8 MMOL/L — SIGNIFICANT CHANGE UP (ref 3.5–5.3)
PROT SERPL-MCNC: 6.2 G/DL — SIGNIFICANT CHANGE UP (ref 6–8.3)
PROTHROM AB SERPL-ACNC: 15.8 SEC — HIGH (ref 10–13.1)
PROTHROM AB SERPL-ACNC: 22.5 SEC — HIGH (ref 9.8–12.7)
RBC # BLD: 3.52 M/UL — LOW (ref 4.2–5.8)
RBC # FLD: 13.8 % — SIGNIFICANT CHANGE UP (ref 10.3–14.5)
SODIUM SERPL-SCNC: 144 MMOL/L — SIGNIFICANT CHANGE UP (ref 135–145)
WBC # BLD: 6.09 K/UL — SIGNIFICANT CHANGE UP (ref 3.8–10.5)
WBC # FLD AUTO: 6.09 K/UL — SIGNIFICANT CHANGE UP (ref 3.8–10.5)

## 2017-07-02 PROCEDURE — 70450 CT HEAD/BRAIN W/O DYE: CPT | Mod: 26

## 2017-07-02 RX ORDER — WARFARIN SODIUM 2.5 MG/1
5 TABLET ORAL ONCE
Qty: 0 | Refills: 0 | Status: COMPLETED | OUTPATIENT
Start: 2017-07-02 | End: 2017-07-02

## 2017-07-02 RX ADMIN — Medication 100 MILLIGRAM(S): at 11:38

## 2017-07-02 RX ADMIN — MEMANTINE HYDROCHLORIDE 10 MILLIGRAM(S): 10 TABLET ORAL at 11:38

## 2017-07-02 RX ADMIN — AMIODARONE HYDROCHLORIDE 200 MILLIGRAM(S): 400 TABLET ORAL at 08:46

## 2017-07-02 RX ADMIN — SODIUM CHLORIDE 70 MILLILITER(S): 9 INJECTION INTRAMUSCULAR; INTRAVENOUS; SUBCUTANEOUS at 11:40

## 2017-07-02 RX ADMIN — DONEPEZIL HYDROCHLORIDE 5 MILLIGRAM(S): 10 TABLET, FILM COATED ORAL at 22:44

## 2017-07-02 RX ADMIN — MIRTAZAPINE 15 MILLIGRAM(S): 45 TABLET, ORALLY DISINTEGRATING ORAL at 00:45

## 2017-07-02 RX ADMIN — TAMSULOSIN HYDROCHLORIDE 0.4 MILLIGRAM(S): 0.4 CAPSULE ORAL at 00:06

## 2017-07-02 RX ADMIN — Medication 650 MILLIGRAM(S): at 11:38

## 2017-07-02 RX ADMIN — WARFARIN SODIUM 5 MILLIGRAM(S): 2.5 TABLET ORAL at 22:43

## 2017-07-02 RX ADMIN — Medication 650 MILLIGRAM(S): at 00:06

## 2017-07-02 RX ADMIN — MIRTAZAPINE 15 MILLIGRAM(S): 45 TABLET, ORALLY DISINTEGRATING ORAL at 22:44

## 2017-07-02 RX ADMIN — Medication 125 MICROGRAM(S): at 05:58

## 2017-07-02 RX ADMIN — Medication 650 MILLIGRAM(S): at 23:01

## 2017-07-02 RX ADMIN — DONEPEZIL HYDROCHLORIDE 5 MILLIGRAM(S): 10 TABLET, FILM COATED ORAL at 00:45

## 2017-07-02 RX ADMIN — TAMSULOSIN HYDROCHLORIDE 0.4 MILLIGRAM(S): 0.4 CAPSULE ORAL at 22:44

## 2017-07-02 NOTE — PROGRESS NOTE ADULT - ASSESSMENT
awaiting  rpt   ct head, today  per  neuro    speech  normal    dementia   will not   restart  trazadone,  symptoms  started  after this  drug,   pt    has  trouble  voiding   may  need  walker awaiting  rpt   ct head, today  per  neuro    speech  normal    dementia   will not   restart  trazadone,  symptoms  started  after this  drug,   pt    has  trouble  voiding   may  need  walker, ckd 4, chronic,  afib on  coumadin,  labs, pending

## 2017-07-02 NOTE — CONSULT NOTE ADULT - SUBJECTIVE AND OBJECTIVE BOX
HPI:  Mr. Olvera is a 72 year-old man, well-known to me, with history of multiple medical problems including dementia, CLL/Waldenstrom's macroglobulinemia, atrial fibrillation, and stage 4 chronic kidney disease. He underwent renal biopsy back in 2015 - the biopsy was notable for infiltration of his kidney tissue by CLL. I have followed him as an outpatient since then. His renal function since then has generally been stable, with fluctuations of his creatinine from 2.7-3.5mg/dL. He has a known history of nephrolithiasis, and has not suffered from renal colic of late.     Mr. Olvera was sent by his PMD to the [facility] ER yesterday, after being noted to have an INR of 8.5.     Per patient's wife, he was suffering from dysarthria and generalized weakness for 3 days prior to admission. Also of note, he recently started a new psychiatric medication.      PAST MEDICAL & SURGICAL HISTORY:  Bradycardia, drug induced  Waldenstrom macroglobulinemia  Diverticulitis  Atrial fibrillation  GERD (gastroesophageal reflux disease)  Anxiety disorder  CLL (chronic lymphocytic leukemia): in remission  History of laparoscopic cholecystectomy: 2014  S/P hernia repair: x2  Meniscus tear: s/p removal of Meniscus 8 months ago    Allergies  amoxicillin (Rash)    SOCIAL HISTORY:  Denies ETOh,Smoking,     FAMILY HISTORY:  No pertinent family history in first degree relatives      REVIEW OF SYSTEMS:  CONSTITUTIONAL: (+)generalized weakness; (+)fatigue; no fevers or chills  EYES/ENT: No visual changes;  No vertigo or throat pain   NECK: No pain or stiffness  RESPIRATORY: No cough, wheezing, hemoptysis; No shortness of breath  CARDIOVASCULAR: No chest pain or palpitations  GASTROINTESTINAL: No abdominal or epigastric pain. No nausea, vomiting, or hematemesis; No diarrhea or constipation. No melena or hematochezia.  GENITOURINARY: No dysuria, frequency or hematuria  NEUROLOGICAL: (+)dysarthria  SKIN: No itching, burning, rashes, or lesions   All other review of systems is negative unless indicated above.    VITAL:  T(C): , Max: 37.7 (17 @ 21:06)  T(F): , Max: 99.9 (17 @ 21:06)  HR: 59 (17 @ 11:51)  BP: 115/58 (17 @ 11:51)  BP(mean): --  RR: 18 (17 @ 11:51)  SpO2: 96% (17 @ 11:51)      PHYSICAL EXAM:  Constitutional: NAD, Alert  HEENT: NCAT, MMM  Neck: Supple, No JVD  Respiratory: CTA-b/l  Cardiovascular: RRR s1s2, no m/r/g  Gastrointestinal: BS+, soft, NT/ND  Extremities: No peripheral edema b/l  Neurological: no focal deficits; strength grossly intact  Psychiatric: Normal mood, normal affect  Back: no CVAT b/l  Skin: No rashes, no nevi    LABS:                        11.1   6.09  )-----------( 154      ( 2017 08:41 )             33.5     Na(144)/K(3.8)/Cl(106)/HCO3(22)/BUN(40)/Cr(3.03)Glu(76)/Ca(9.4)/Mg(--)/PO4(--)     @ 08:37  Na(144)/K(3.7)/Cl(105)/HCO3(26)/BUN(41)/Cr(3.27)Glu(109)/Ca(9.6)/Mg(--)/PO4(--)     @ 13:53    Urinalysis Basic - ( 2017 15:03 )    Color: Yellow / Appearance: Clear / S.017 / pH: x  Gluc: x / Ketone: Negative  / Bili: Negative / Urobili: Negative   Blood: x / Protein: 30 mg/dL / Nitrite: Negative   Leuk Esterase: Negative / RBC: x / WBC 0-2 /HPF   Sq Epi: x / Non Sq Epi: x / Bacteria: x        IMPRESSION:    (1)Renal - CKD stage 4 - GFR ~20ml/min. Stable function. 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     (2)Lytes - acceptable    (3)CV - normotensive/euvolemic    (4)Supratherapeutic INR    (5)Dysarthria -     (6)Generalized weakness - not from uremia.    RECOMMEND:    (1) HPI:  Mr. Olvera is a 72 year-old man, well-known to me, with history of multiple medical problems including dementia, CLL/Waldenstrom's macroglobulinemia, atrial fibrillation, and stage 4 chronic kidney disease. He underwent renal biopsy back in 2015 - the biopsy was notable for infiltration of his kidney tissue by CLL. I have followed him as an outpatient since then. His renal function since then has generally been stable, with fluctuations of his creatinine from 2.7-3.5mg/dL. He has a known history of nephrolithiasis, and has not suffered from renal colic of late.     Mr. Olvera was sent by his PMD to the [facility] ER yesterday, after being noted to have an INR of 8.5.     Per patient's wife, he was suffering from dysarthria and generalized weakness for 3 days prior to admission. Also of note, he was placed on Trazodone only a few days prior to admission.    In addition, he was noted to be retaining urine on admission. A walker was placed, with immediate return of 1000cc.    PAST MEDICAL & SURGICAL HISTORY:  Bradycardia, drug induced  Waldenstrom macroglobulinemia  Diverticulitis  Atrial fibrillation  GERD (gastroesophageal reflux disease)  Anxiety disorder  CLL (chronic lymphocytic leukemia): in remission  History of laparoscopic cholecystectomy: 2014  S/P hernia repair: x2  Meniscus tear: s/p removal of Meniscus 8 months ago    Allergies  amoxicillin (Rash)    SOCIAL HISTORY:  Denies ETOh,Smoking,     FAMILY HISTORY:  No pertinent family history in first degree relatives      REVIEW OF SYSTEMS:  CONSTITUTIONAL: (+)generalized weakness; (+)fatigue; no fevers or chills  EYES/ENT: No visual changes;  No vertigo or throat pain   NECK: No pain or stiffness  RESPIRATORY: No cough, wheezing, hemoptysis; No shortness of breath  CARDIOVASCULAR: No chest pain or palpitations  GASTROINTESTINAL: No abdominal or epigastric pain. No nausea, vomiting, or hematemesis; No diarrhea or constipation. No melena or hematochezia.  GENITOURINARY: No dysuria, frequency or hematuria  NEUROLOGICAL: (+)slurred speech  SKIN: No itching, burning, rashes, or lesions   All other review of systems is negative unless indicated above.    VITAL:  T(C): , Max: 37.7 (17 @ 21:06)  T(F): , Max: 99.9 (17 @ 21:06)  HR: 59 (17 @ 11:51)  BP: 115/58 (17 @ 11:51)  BP(mean): --  RR: 18 (17 @ 11:51)  SpO2: 96% (17 @ 11:51)      PHYSICAL EXAM:  Constitutional: NAD, Alert  HEENT: NCAT, MMM  Neck: Supple, No JVD  Respiratory: CTA-b/l  Cardiovascular: RRR s1s2, no m/r/g  Gastrointestinal: BS+, soft, NT/ND  Extremities: No peripheral edema b/l  Neurological: no focal deficits; strength grossly intact  Psychiatric: Normal mood, normal affect  Back: no CVAT b/l  Skin: No rashes, no nevi    LABS:                        11.1   6.09  )-----------( 154      ( 2017 08:41 )             33.5     Na(144)/K(3.8)/Cl(106)/HCO3(22)/BUN(40)/Cr(3.03)Glu(76)/Ca(9.4)/Mg(--)/PO4(--)     @ 08:37  Na(144)/K(3.7)/Cl(105)/HCO3(26)/BUN(41)/Cr(3.27)Glu(109)/Ca(9.6)/Mg(--)/PO4(--)     @ 13:53    Urinalysis Basic - ( 2017 15:03 )    Color: Yellow / Appearance: Clear / S.017 / pH: x  Gluc: x / Ketone: Negative  / Bili: Negative / Urobili: Negative   Blood: x / Protein: 30 mg/dL / Nitrite: Negative   Leuk Esterase: Negative / RBC: x / WBC 0-2 /HPF   Sq Epi: x / Non Sq Epi: x / Bacteria: x        IMPRESSION:    (1)Renal - CKD stage 4 - GFR ~20ml/min. Stable function. 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     (2)Lytes - acceptable    (3)CV - normotensive/euvolemic    (4)Supratherapeutic INR - resolved    (5)Dysarthria - Likely related to trazodone    (6)Generalized weakness - not from uremia. Likely due to Trazodone    (7) - urinary retention on admission - now with walker in place.      RECOMMEND:    (1)Meds as ordered; dose new meds for GFR 20ml/min  (2)Walker management per primary team  (3)No HD for now HPI:  Mr. Olvera is a 72 year-old man, well-known to me, with history of multiple medical problems including dementia, CLL/Waldenstrom's macroglobulinemia, atrial fibrillation, and stage 4 chronic kidney disease. He underwent renal biopsy back in 2015 - the biopsy was notable for infiltration of his kidney tissue by CLL. I have followed him as an outpatient since then. His renal function since then has generally been stable, with fluctuations of his creatinine from 2.7-3.5mg/dL. He has a known history of nephrolithiasis, and has not suffered from renal colic of late.     Mr. Olvera was sent by his PMD to the [facility] ER yesterday, after being noted to have an INR of 8.5.     Per patient's wife, he was suffering from dysarthria and generalized weakness for 3 days prior to admission. Also of note, he was placed on Trazodone only a few days prior to admission.    In addition, he was noted to be retaining urine on admission. A walker was placed, with immediate return of 1000cc.    PAST MEDICAL & SURGICAL HISTORY:  Bradycardia, drug induced  Waldenstrom macroglobulinemia  Diverticulitis  Atrial fibrillation  GERD (gastroesophageal reflux disease)  Anxiety disorder  CLL (chronic lymphocytic leukemia): in remission  History of laparoscopic cholecystectomy: 2014  S/P hernia repair: x2  Meniscus tear: s/p removal of Meniscus 8 months ago    Allergies  amoxicillin (Rash)    SOCIAL HISTORY:  Denies ETOh,Smoking,     FAMILY HISTORY:  No pertinent family history in first degree relatives      REVIEW OF SYSTEMS:  CONSTITUTIONAL: (+)generalized weakness; (+)fatigue; no fevers or chills  EYES/ENT: No visual changes;  No vertigo or throat pain   NECK: No pain or stiffness  RESPIRATORY: No cough, wheezing, hemoptysis; No shortness of breath  CARDIOVASCULAR: No chest pain or palpitations  GASTROINTESTINAL: No abdominal or epigastric pain. No nausea, vomiting, or hematemesis; No diarrhea or constipation. No melena or hematochezia.  GENITOURINARY: No dysuria, frequency or hematuria  NEUROLOGICAL: (+)slurred speech, (+)spasms of tongue  SKIN: No itching, burning, rashes, or lesions   All other review of systems is negative unless indicated above.    VITAL:  T(C): , Max: 37.7 (17 @ 21:06)  T(F): , Max: 99.9 (17 @ 21:06)  HR: 59 (17 @ 11:51)  BP: 115/58 (17 @ 11:51)  BP(mean): --  RR: 18 (17 @ 11:51)  SpO2: 96% (17 @ 11:51)      PHYSICAL EXAM:  Constitutional: NAD, Alert  HEENT: NCAT, MMM  Neck: Supple, No JVD  Respiratory: CTA-b/l  Cardiovascular: RRR s1s2, no m/r/g  Gastrointestinal: BS+, soft, NT/ND  Extremities: No peripheral edema b/l  Neurological: no focal deficits; strength grossly intact  Psychiatric: Normal mood, normal affect  Back: no CVAT b/l  Skin: No rashes, no nevi    LABS:                        11.1   6.09  )-----------( 154      ( 2017 08:41 )             33.5     Na(144)/K(3.8)/Cl(106)/HCO3(22)/BUN(40)/Cr(3.03)Glu(76)/Ca(9.4)/Mg(--)/PO4(--)     @ 08:37  Na(144)/K(3.7)/Cl(105)/HCO3(26)/BUN(41)/Cr(3.27)Glu(109)/Ca(9.6)/Mg(--)/PO4(--)     @ 13:53    Urinalysis Basic - ( 2017 15:03 )    Color: Yellow / Appearance: Clear / S.017 / pH: x  Gluc: x / Ketone: Negative  / Bili: Negative / Urobili: Negative   Blood: x / Protein: 30 mg/dL / Nitrite: Negative   Leuk Esterase: Negative / RBC: x / WBC 0-2 /HPF   Sq Epi: x / Non Sq Epi: x / Bacteria: x        IMPRESSION:    (1)Renal - CKD stage 4 - GFR ~20ml/min. Stable function. 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     (2)Lytes - acceptable    (3)CV - normotensive/euvolemic    (4)Supratherapeutic INR - resolved    (5)Dysarthria - Likely related to trazodone    (6)Generalized weakness - not from uremia. Likely due to Trazodone    (7) - urinary retention on admission - now with walker in place.      RECOMMEND:    (1)Meds as ordered; dose new meds for GFR 20ml/min  (2)Walker management per primary team  (3)No HD for now  (4)No IVF/No diuretics for now  (5)[place] diet.

## 2017-07-02 NOTE — PROGRESS NOTE ADULT - SUBJECTIVE AND OBJECTIVE BOX
SUBJECTIVE / OVERNIGHT EVENTS: No nausea, vomiting or diarrhea, no fever or chills, no dizziness or chest pain, no dysuria or hematuria .    Vital Signs Last 24 Hrs  T(C): 37.2 (17 @ 05:55), Max: 37.7 (17 @ 21:06)  HR: 54 (17 @ 05:55) (50 - 103)  BP: 124/73 (17 @ 05:55) (106/69 - 149/72)  RR: 18 (17 @ 05:55) (16 - 18)  SpO2: 95% (17 @ 05:55) (94% - 100%)  CAPILLARY BLOOD GLUCOSE        I&O's Summary      PHYSICAL EXAM:  HEAD:  Atraumatic, Normocephalic  EYES: EOMI, PERRLA, conjunctiva and sclera clear  NECK: Supple, No JVD  CHEST/LUNG: Clear to auscultation bilaterally; No wheeze  HEART: Regular rate and rhythm; No murmurs, rubs, or gallops  ABDOMEN: Soft, Nontender, Nondistended; Bowel sounds present  EXTREMITIES:  2+ Peripheral Pulses, No clubbing, cyanosis, or edema  PSYCH:  dementia  NEUROLOGY: non-focal,  speech normal  SKIN: No rashes or lesions    MEDICATIONS  (STANDING):  amiodarone    Tablet 200 milliGRAM(s) Oral daily  memantine 10 milliGRAM(s) Oral daily  allopurinol 100 milliGRAM(s) Oral daily  sodium bicarbonate 650 milliGRAM(s) Oral two times a day  levothyroxine 125 MICROGram(s) Oral daily  tamsulosin 0.4 milliGRAM(s) Oral at bedtime  donepezil 5 milliGRAM(s) Oral at bedtime  mirtazapine 15 milliGRAM(s) Oral at bedtime  sodium chloride 0.9%. 1000 milliLiter(s) (70 mL/Hr) IV Continuous <Continuous>    MEDICATIONS  (PRN):      LABS:                        13.1   7.3   )-----------( 154      ( 2017 13:53 )             37.8         144  |  105  |  41<H>  ----------------------------<  109<H>  3.7   |  26  |  3.27<H>    Ca    9.6      2017 13:53    TPro  7.0  /  Alb  4.0  /  TBili  0.5  /  DBili  x   /  AST  27  /  ALT  53<H>  /  AlkPhos  106  07-01    PT/INR - ( 2017 23:47 )   PT: 22.5 sec;   INR: 2.03 ratio         PTT - ( 2017 13:53 )  PTT:61.3 sec      Urinalysis Basic - ( 2017 15:03 )    Color: Yellow / Appearance: Clear / S.017 / pH: x  Gluc: x / Ketone: Negative  / Bili: Negative / Urobili: Negative   Blood: x / Protein: 30 mg/dL / Nitrite: Negative   Leuk Esterase: Negative / RBC: x / WBC 0-2 /HPF   Sq Epi: x / Non Sq Epi: x / Bacteria: x                  Cultures:    EKG:    Radiological Studies:    Consultant(s) Notes Reviewed:      Care Discussed with Consultants/Other Providers:

## 2017-07-02 NOTE — PROVIDER CONTACT NOTE (OTHER) - ASSESSMENT
pt is A + O x 4. pt has not voided since 2130 since 7/1/17. pt is running on continuous fluids. pt bladder scanned and 596 ml found. pt denies urge to void at this moment. pt refusing walker at this moment and requests " more time to go".
Bladder scan 590. distended abdomen, unable to pass Tom catheter
Patient alert x4. Patient feels the urge to go to the bathroom but cannot void.

## 2017-07-02 NOTE — PROVIDER CONTACT NOTE (OTHER) - SITUATION
abdomen distended, unable to void,needs Tom
pt refusing walker
Patient has not voided since he came to floor around 10:40 pm.

## 2017-07-02 NOTE — PROCEDURE NOTE - NSURITECHNIQUE_GU_A_CORE
A sterile drape was used to cover all adjacent areas/Proper hand hygiene was performed/The site was cleaned with soap/water and sterile solution (betadine)/The catheter was secured with a securement device (e.g. StatLock)/All applicable medical record documentation is completed/The collection bag is below the level of the patient and urinary bladder/The catheter was appropriately lubricated

## 2017-07-02 NOTE — PROVIDER CONTACT NOTE (OTHER) - RECOMMENDATIONS
pt be educated on risk/benefit of walker insertion and risks of fluid retention. pt verbalized understanding and continues to say no.
Continue plan of care.
Urology to place Tom

## 2017-07-02 NOTE — PROVIDER CONTACT NOTE (OTHER) - ACTION/TREATMENT ORDERED:
No orders at this time. will notify provider. will have RN reattempt at a later time.
Bladder scan patient.
Urology to place Tom

## 2017-07-03 LAB
APTT BLD: 125.8 SEC — CRITICAL HIGH (ref 27.5–37.4)
APTT BLD: 63.3 SEC — HIGH (ref 27.5–37.4)
HCT VFR BLD CALC: 37.9 % — LOW (ref 39–50)
HGB BLD-MCNC: 12.9 G/DL — LOW (ref 13–17)
INR BLD: 1.31 RATIO — HIGH (ref 0.88–1.16)
MCHC RBC-ENTMCNC: 33 PG — SIGNIFICANT CHANGE UP (ref 27–34)
MCHC RBC-ENTMCNC: 34 GM/DL — SIGNIFICANT CHANGE UP (ref 32–36)
MCV RBC AUTO: 97.3 FL — SIGNIFICANT CHANGE UP (ref 80–100)
PLATELET # BLD AUTO: 141 K/UL — LOW (ref 150–400)
PROTHROM AB SERPL-ACNC: 14.9 SEC — HIGH (ref 10–13.1)
RBC # BLD: 3.89 M/UL — LOW (ref 4.2–5.8)
RBC # FLD: 11.8 % — SIGNIFICANT CHANGE UP (ref 10.3–14.5)
WBC # BLD: 7.8 K/UL — SIGNIFICANT CHANGE UP (ref 3.8–10.5)
WBC # FLD AUTO: 7.8 K/UL — SIGNIFICANT CHANGE UP (ref 3.8–10.5)

## 2017-07-03 RX ORDER — HEPARIN SODIUM 5000 [USP'U]/ML
4000 INJECTION INTRAVENOUS; SUBCUTANEOUS EVERY 6 HOURS
Qty: 0 | Refills: 0 | Status: DISCONTINUED | OUTPATIENT
Start: 2017-07-03 | End: 2017-07-04

## 2017-07-03 RX ORDER — HEPARIN SODIUM 5000 [USP'U]/ML
8000 INJECTION INTRAVENOUS; SUBCUTANEOUS EVERY 6 HOURS
Qty: 0 | Refills: 0 | Status: DISCONTINUED | OUTPATIENT
Start: 2017-07-03 | End: 2017-07-04

## 2017-07-03 RX ORDER — HEPARIN SODIUM 5000 [USP'U]/ML
8000 INJECTION INTRAVENOUS; SUBCUTANEOUS ONCE
Qty: 0 | Refills: 0 | Status: COMPLETED | OUTPATIENT
Start: 2017-07-03 | End: 2017-07-03

## 2017-07-03 RX ORDER — WARFARIN SODIUM 2.5 MG/1
7 TABLET ORAL ONCE
Qty: 0 | Refills: 0 | Status: COMPLETED | OUTPATIENT
Start: 2017-07-03 | End: 2017-07-03

## 2017-07-03 RX ORDER — HEPARIN SODIUM 5000 [USP'U]/ML
INJECTION INTRAVENOUS; SUBCUTANEOUS
Qty: 25000 | Refills: 0 | Status: DISCONTINUED | OUTPATIENT
Start: 2017-07-03 | End: 2017-07-04

## 2017-07-03 RX ADMIN — Medication 40 MILLIGRAM(S): at 05:57

## 2017-07-03 RX ADMIN — Medication 650 MILLIGRAM(S): at 12:11

## 2017-07-03 RX ADMIN — HEPARIN SODIUM 1600 UNIT(S)/HR: 5000 INJECTION INTRAVENOUS; SUBCUTANEOUS at 16:37

## 2017-07-03 RX ADMIN — Medication 650 MILLIGRAM(S): at 23:30

## 2017-07-03 RX ADMIN — HEPARIN SODIUM 8000 UNIT(S): 5000 INJECTION INTRAVENOUS; SUBCUTANEOUS at 09:33

## 2017-07-03 RX ADMIN — TAMSULOSIN HYDROCHLORIDE 0.4 MILLIGRAM(S): 0.4 CAPSULE ORAL at 22:07

## 2017-07-03 RX ADMIN — HEPARIN SODIUM 1800 UNIT(S)/HR: 5000 INJECTION INTRAVENOUS; SUBCUTANEOUS at 09:33

## 2017-07-03 RX ADMIN — WARFARIN SODIUM 7 MILLIGRAM(S): 2.5 TABLET ORAL at 22:07

## 2017-07-03 RX ADMIN — MIRTAZAPINE 15 MILLIGRAM(S): 45 TABLET, ORALLY DISINTEGRATING ORAL at 22:07

## 2017-07-03 RX ADMIN — AMIODARONE HYDROCHLORIDE 200 MILLIGRAM(S): 400 TABLET ORAL at 06:24

## 2017-07-03 RX ADMIN — HEPARIN SODIUM 1600 UNIT(S)/HR: 5000 INJECTION INTRAVENOUS; SUBCUTANEOUS at 23:34

## 2017-07-03 RX ADMIN — Medication 125 MICROGRAM(S): at 05:57

## 2017-07-03 RX ADMIN — MEMANTINE HYDROCHLORIDE 10 MILLIGRAM(S): 10 TABLET ORAL at 12:11

## 2017-07-03 RX ADMIN — Medication 100 MILLIGRAM(S): at 12:11

## 2017-07-03 RX ADMIN — DONEPEZIL HYDROCHLORIDE 5 MILLIGRAM(S): 10 TABLET, FILM COATED ORAL at 22:07

## 2017-07-03 NOTE — PROGRESS NOTE ADULT - ASSESSMENT
afib, on coumadin,  subtherapeutic,  needs  bridging   with  heparin/  coumadin    ckd4,  seen by renal    bph,  with retention, walker placed  by  uro    rpt  ct  head, no  change,  doubt  pt had cva

## 2017-07-03 NOTE — PROGRESS NOTE ADULT - SUBJECTIVE AND OBJECTIVE BOX
No pain, no shortness of breath      MEDICATIONS  (STANDING):  amiodarone    Tablet 200 milliGRAM(s) Oral daily  memantine 10 milliGRAM(s) Oral daily  allopurinol 100 milliGRAM(s) Oral daily  sodium bicarbonate 650 milliGRAM(s) Oral two times a day  levothyroxine 125 MICROGram(s) Oral daily  tamsulosin 0.4 milliGRAM(s) Oral at bedtime  donepezil 5 milliGRAM(s) Oral at bedtime  mirtazapine 15 milliGRAM(s) Oral at bedtime  furosemide    Tablet 40 milliGRAM(s) Oral daily  warfarin 7 milliGRAM(s) Oral once  heparin  Infusion.  Unit(s)/Hr (18 mL/Hr) IV Continuous <Continuous>      VITAL:  T(C): , Max: 36.8 (07-03-17 @ 05:51)  T(F): , Max: 98.2 (07-03-17 @ 05:51)  HR: 52 (07-03-17 @ 05:51)  BP: 114/69 (07-03-17 @ 05:51)  RR: 18 (07-03-17 @ 05:51)  SpO2: 96% (07-03-17 @ 05:51)    PHYSICAL EXAM:  HEENTN: supple, no JVD  CHEST/LUNG: CTA-b/l  HEART:  RRR S1S2  ABDOMEN: soft NTND  EXTREMITIES:  no c/c/e    LABS:                        11.1   6.09  )-----------( 154      ( 02 Jul 2017 08:41 )             33.5     Na(144)/K(3.8)/Cl(106)/HCO3(22)/BUN(40)/Cr(3.03)Glu(76)/Ca(9.4)/Mg(--)/PO4(--)    07-02 @ 08:37  Na(144)/K(3.7)/Cl(105)/HCO3(26)/BUN(41)/Cr(3.27)Glu(109)/Ca(9.6)/Mg(--)/PO4(--)    07-01 @ 13:53        IMPRESSION:    (1)Renal - CKD stage 4 - GFR ~20ml/min. Stable function. 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     (2)Lytes - acceptable    (3)CV - normotensive/euvolemic    (4) - urinary retention on admission - now with walker in place.      RECOMMEND:    (1)Meds as ordered; dose new meds for GFR 20ml/min  (2)Walker management per primary team  (3)No IVF/No diuretics for now              Norman Ascencio MD  [facility]  [phone] No pain, no shortness of breath      MEDICATIONS  (STANDING):  amiodarone    Tablet 200 milliGRAM(s) Oral daily  memantine 10 milliGRAM(s) Oral daily  allopurinol 100 milliGRAM(s) Oral daily  sodium bicarbonate 650 milliGRAM(s) Oral two times a day  levothyroxine 125 MICROGram(s) Oral daily  tamsulosin 0.4 milliGRAM(s) Oral at bedtime  donepezil 5 milliGRAM(s) Oral at bedtime  mirtazapine 15 milliGRAM(s) Oral at bedtime  furosemide    Tablet 40 milliGRAM(s) Oral daily  warfarin 7 milliGRAM(s) Oral once  heparin  Infusion.  Unit(s)/Hr (18 mL/Hr) IV Continuous <Continuous>      VITAL:  T(C): , Max: 36.8 (07-03-17 @ 05:51)  T(F): , Max: 98.2 (07-03-17 @ 05:51)  HR: 52 (07-03-17 @ 05:51)  BP: 114/69 (07-03-17 @ 05:51)  RR: 18 (07-03-17 @ 05:51)  SpO2: 96% (07-03-17 @ 05:51)    PHYSICAL EXAM:  HEENTN: supple, no JVD  CHEST/LUNG: CTA-b/l  HEART:  RRR S1S2  ABDOMEN: soft NTND  EXTREMITIES:  no c/c/e    LABS:                        11.1   6.09  )-----------( 154      ( 02 Jul 2017 08:41 )             33.5     Na(144)/K(3.8)/Cl(106)/HCO3(22)/BUN(40)/Cr(3.03)Glu(76)/Ca(9.4)/Mg(--)/PO4(--)    07-02 @ 08:37  Na(144)/K(3.7)/Cl(105)/HCO3(26)/BUN(41)/Cr(3.27)Glu(109)/Ca(9.6)/Mg(--)/PO4(--)    07-01 @ 13:53        IMPRESSION:    (1)Renal - CKD stage 4 - GFR ~20ml/min. Stable function. 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     (2)Lytes - acceptable    (3)CV - normotensive/euvolemic    (4) - urinary retention on admission - now with walker in place.    (5)Afib - INR now subtherapeutic - on heparin gtt/coumadin    RECOMMEND:    (1)Meds as ordered; dose new meds for GFR 20ml/min  (2)Walker management per primary team  (3)No IVF/No diuretics for now  (4)A/C per primary team            Norman Ascencio MD  [facility]  [phone] Complains of tongue pain, complains of fatigue, complains of myalgias      VITAL:  T(C): , Max: 36.8 (07-03-17 @ 05:51)  T(F): , Max: 98.2 (07-03-17 @ 05:51)  HR: 52 (07-03-17 @ 05:51)  BP: 114/69 (07-03-17 @ 05:51)  RR: 18 (07-03-17 @ 05:51)  SpO2: 96% (07-03-17 @ 05:51)    PHYSICAL EXAM:  General: lethargic but alert, NAD  HEENTN: supple, no JVD  CHEST/LUNG: CTA-b/l  HEART:  RRR S1S2  ABDOMEN: soft NTND, (+)walker  EXTREMITIES:  no c/c/e    LABS:                        11.1   6.09  )-----------( 154      ( 02 Jul 2017 08:41 )             33.5     Na(144)/K(3.8)/Cl(106)/HCO3(22)/BUN(40)/Cr(3.03)Glu(76)/Ca(9.4)/Mg(--)/PO4(--)    07-02 @ 08:37  Na(144)/K(3.7)/Cl(105)/HCO3(26)/BUN(41)/Cr(3.27)Glu(109)/Ca(9.6)/Mg(--)/PO4(--)    07-01 @ 13:53        IMPRESSION:    (1)Renal - CKD stage 4 - GFR ~20ml/min. Stable function. 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     (2)Lytes - acceptable    (3)CV - normotensive/euvolemic    (4) - urinary retention on admission - now with walker in place.    (5)Afib - INR now subtherapeutic - on heparin gtt/coumadin    RECOMMEND:    (1)Meds as ordered; dose new meds for GFR 20ml/min  (2)Walker management per primary team- could get  invovled - I have no objection to a trial of void.  (3)A/C per primary team  (4)PT  (5)Follow up at my office 1-2 months after discharge          Norman Ascencio MD  [facility],   [phone]

## 2017-07-04 LAB
APTT BLD: 81.7 SEC — HIGH (ref 27.5–37.4)
HCT VFR BLD CALC: 35.5 % — LOW (ref 39–50)
HGB BLD-MCNC: 11.8 G/DL — LOW (ref 13–17)
INR BLD: 1.94 RATIO — HIGH (ref 0.88–1.16)
MCHC RBC-ENTMCNC: 31.1 PG — SIGNIFICANT CHANGE UP (ref 27–34)
MCHC RBC-ENTMCNC: 33.2 GM/DL — SIGNIFICANT CHANGE UP (ref 32–36)
MCV RBC AUTO: 93.7 FL — SIGNIFICANT CHANGE UP (ref 80–100)
PLATELET # BLD AUTO: 146 K/UL — LOW (ref 150–400)
PROTHROM AB SERPL-ACNC: 22.2 SEC — HIGH (ref 10–13.1)
RBC # BLD: 3.79 M/UL — LOW (ref 4.2–5.8)
RBC # FLD: 13.3 % — SIGNIFICANT CHANGE UP (ref 10.3–14.5)
WBC # BLD: 6.48 K/UL — SIGNIFICANT CHANGE UP (ref 3.8–10.5)
WBC # FLD AUTO: 6.48 K/UL — SIGNIFICANT CHANGE UP (ref 3.8–10.5)

## 2017-07-04 RX ORDER — WARFARIN SODIUM 2.5 MG/1
3 TABLET ORAL ONCE
Qty: 0 | Refills: 0 | Status: COMPLETED | OUTPATIENT
Start: 2017-07-04 | End: 2017-07-04

## 2017-07-04 RX ADMIN — AMIODARONE HYDROCHLORIDE 200 MILLIGRAM(S): 400 TABLET ORAL at 06:05

## 2017-07-04 RX ADMIN — Medication 650 MILLIGRAM(S): at 23:27

## 2017-07-04 RX ADMIN — HEPARIN SODIUM 1600 UNIT(S)/HR: 5000 INJECTION INTRAVENOUS; SUBCUTANEOUS at 06:46

## 2017-07-04 RX ADMIN — TAMSULOSIN HYDROCHLORIDE 0.4 MILLIGRAM(S): 0.4 CAPSULE ORAL at 21:18

## 2017-07-04 RX ADMIN — Medication 650 MILLIGRAM(S): at 12:23

## 2017-07-04 RX ADMIN — Medication 125 MICROGRAM(S): at 06:05

## 2017-07-04 RX ADMIN — DONEPEZIL HYDROCHLORIDE 5 MILLIGRAM(S): 10 TABLET, FILM COATED ORAL at 21:18

## 2017-07-04 RX ADMIN — Medication 40 MILLIGRAM(S): at 06:05

## 2017-07-04 RX ADMIN — MIRTAZAPINE 15 MILLIGRAM(S): 45 TABLET, ORALLY DISINTEGRATING ORAL at 21:18

## 2017-07-04 RX ADMIN — WARFARIN SODIUM 3 MILLIGRAM(S): 2.5 TABLET ORAL at 21:18

## 2017-07-04 RX ADMIN — Medication 100 MILLIGRAM(S): at 12:23

## 2017-07-04 RX ADMIN — MEMANTINE HYDROCHLORIDE 10 MILLIGRAM(S): 10 TABLET ORAL at 12:23

## 2017-07-04 NOTE — PROGRESS NOTE ADULT - SUBJECTIVE AND OBJECTIVE BOX
SUBJECTIVE / OVERNIGHT EVENTS: No nausea, vomiting or diarrhea, no fever or chills, no dizziness or chest pain, no dysuria or hematuria .    Vital Signs Last 24 Hrs  T(C): 37.3 (07-04-17 @ 05:24), Max: 37.4 (07-03-17 @ 20:12)  HR: 58 (07-04-17 @ 05:24) (52 - 65)  BP: 127/73 (07-04-17 @ 05:24) (116/66 - 130/71)  RR: 18 (07-04-17 @ 05:24) (18 - 18)  SpO2: 95% (07-04-17 @ 05:24) (94% - 97%)  CAPILLARY BLOOD GLUCOSE        I&O's Summary    03 Jul 2017 07:01  -  04 Jul 2017 07:00  --------------------------------------------------------  IN: 952 mL / OUT: 2100 mL / NET: -1148 mL        PHYSICAL EXAM:  HEAD:  Atraumatic, Normocephalic  EYES: EOMI, PERRLA, conjunctiva and sclera clear  NECK: Supple, No JVD  CHEST/LUNG: Clear to auscultation bilaterally; No wheeze  HEART: Regular rate and rhythm; No murmurs, rubs, or gallops  ABDOMEN: Soft, Nontender, Nondistended; Bowel sounds present  EXTREMITIES:  2+ Peripheral Pulses, No clubbing, cyanosis, or edema  PSYCH: AAOx3  NEUROLOGY: non-focal  SKIN: No rashes or lesions    MEDICATIONS  (STANDING):  amiodarone    Tablet 200 milliGRAM(s) Oral daily  memantine 10 milliGRAM(s) Oral daily  allopurinol 100 milliGRAM(s) Oral daily  sodium bicarbonate 650 milliGRAM(s) Oral two times a day  levothyroxine 125 MICROGram(s) Oral daily  tamsulosin 0.4 milliGRAM(s) Oral at bedtime  donepezil 5 milliGRAM(s) Oral at bedtime  mirtazapine 15 milliGRAM(s) Oral at bedtime  furosemide    Tablet 40 milliGRAM(s) Oral daily  heparin  Infusion.  Unit(s)/Hr (18 mL/Hr) IV Continuous <Continuous>    MEDICATIONS  (PRN):  heparin  Injectable 8000 Unit(s) IV Push every 6 hours PRN For aPTT less than 40  heparin  Injectable 4000 Unit(s) IV Push every 6 hours PRN For aPTT between 40 - 57      LABS:                        12.9   7.8   )-----------( 141      ( 03 Jul 2017 16:03 )             37.9           PT/INR - ( 03 Jul 2017 07:19 )   PT: 14.9 sec;   INR: 1.31 ratio         PTT - ( 04 Jul 2017 06:12 )  PTT:81.7 sec                    Cultures:    EKG:    Radiological Studies:    Consultant(s) Notes Reviewed:      Care Discussed with Consultants/Other Providers:

## 2017-07-04 NOTE — PHYSICAL THERAPY INITIAL EVALUATION ADULT - PERTINENT HX OF CURRENT PROBLEM, REHAB EVAL
Pt p/w weakness slurred speech sent to ED by PMD after INR found to be 8.5. Pt's wife reports that pt has had slurred speech for three days and generalized weakness.,  also  it  appears  this  happened  after  starting  new  psych  drug for  depression. CT head negative, CXR negative.

## 2017-07-04 NOTE — PHYSICAL THERAPY INITIAL EVALUATION ADULT - GAIT DEVIATIONS NOTED, PT EVAL
steady with walker, no LOB. reports B UE weakness/decreased step length/decreased eduardo/decreased stride length

## 2017-07-04 NOTE — PROGRESS NOTE ADULT - ASSESSMENT
dementia,  afib, awaiting  inr/  on  bridging  heparin/ coumadin       gout/  hypothyroid  htn,  inr  in  am

## 2017-07-04 NOTE — PHYSICAL THERAPY INITIAL EVALUATION ADULT - ADDITIONAL COMMENTS
pt lives in an apt with 13 steps and 1 railing. Pt was ambulatory at home with a cane occasionally PTA. Walks about 1/2 block, drives pt lives in an apt with 13 steps and 1 railing. Pt was ambulatory at home with a cane occasionally PTA. Walks about 1/2 block, drives. pt states there is a "hoarding situation" at home

## 2017-07-05 ENCOUNTER — TRANSCRIPTION ENCOUNTER (OUTPATIENT)
Age: 72
End: 2017-07-05

## 2017-07-05 DIAGNOSIS — R79.1 ABNORMAL COAGULATION PROFILE: ICD-10-CM

## 2017-07-05 DIAGNOSIS — I48.0 PAROXYSMAL ATRIAL FIBRILLATION: ICD-10-CM

## 2017-07-05 LAB
ANION GAP SERPL CALC-SCNC: 15 MMOL/L — SIGNIFICANT CHANGE UP (ref 5–17)
APTT BLD: 34.7 SEC — SIGNIFICANT CHANGE UP (ref 27.5–37.4)
BUN SERPL-MCNC: 38 MG/DL — HIGH (ref 7–23)
CALCIUM SERPL-MCNC: 9.6 MG/DL — SIGNIFICANT CHANGE UP (ref 8.4–10.5)
CHLORIDE SERPL-SCNC: 105 MMOL/L — SIGNIFICANT CHANGE UP (ref 96–108)
CO2 SERPL-SCNC: 23 MMOL/L — SIGNIFICANT CHANGE UP (ref 22–31)
CREAT SERPL-MCNC: 2.74 MG/DL — HIGH (ref 0.5–1.3)
GLUCOSE SERPL-MCNC: 95 MG/DL — SIGNIFICANT CHANGE UP (ref 70–99)
HCT VFR BLD CALC: 35.1 % — LOW (ref 39–50)
HGB BLD-MCNC: 11.6 G/DL — LOW (ref 13–17)
INR BLD: 3.21 RATIO — HIGH (ref 0.88–1.16)
MCHC RBC-ENTMCNC: 31.1 PG — SIGNIFICANT CHANGE UP (ref 27–34)
MCHC RBC-ENTMCNC: 33 GM/DL — SIGNIFICANT CHANGE UP (ref 32–36)
MCV RBC AUTO: 94.1 FL — SIGNIFICANT CHANGE UP (ref 80–100)
PLATELET # BLD AUTO: 154 K/UL — SIGNIFICANT CHANGE UP (ref 150–400)
POTASSIUM SERPL-MCNC: 3.9 MMOL/L — SIGNIFICANT CHANGE UP (ref 3.5–5.3)
POTASSIUM SERPL-SCNC: 3.9 MMOL/L — SIGNIFICANT CHANGE UP (ref 3.5–5.3)
PROTHROM AB SERPL-ACNC: 37.1 SEC — HIGH (ref 10–13.1)
RBC # BLD: 3.73 M/UL — LOW (ref 4.2–5.8)
RBC # FLD: 13.4 % — SIGNIFICANT CHANGE UP (ref 10.3–14.5)
SODIUM SERPL-SCNC: 143 MMOL/L — SIGNIFICANT CHANGE UP (ref 135–145)
WBC # BLD: 6.23 K/UL — SIGNIFICANT CHANGE UP (ref 3.8–10.5)
WBC # FLD AUTO: 6.23 K/UL — SIGNIFICANT CHANGE UP (ref 3.8–10.5)

## 2017-07-05 PROCEDURE — 99223 1ST HOSP IP/OBS HIGH 75: CPT | Mod: GC

## 2017-07-05 RX ORDER — WARFARIN SODIUM 2.5 MG/1
1 TABLET ORAL
Qty: 14 | Refills: 0 | COMMUNITY

## 2017-07-05 RX ORDER — MEGESTROL ACETATE 40 MG/ML
40 SUSPENSION ORAL
Qty: 0 | Refills: 0 | COMMUNITY

## 2017-07-05 RX ORDER — TRAZODONE HCL 50 MG
0.5 TABLET ORAL
Qty: 0 | Refills: 0 | COMMUNITY

## 2017-07-05 RX ORDER — MIRTAZAPINE 45 MG/1
0.5 TABLET, ORALLY DISINTEGRATING ORAL
Qty: 30 | Refills: 0 | COMMUNITY

## 2017-07-05 RX ORDER — L.ACIDOPH/B.ANIMALIS/B.LONGUM 15B CELL
1 CAPSULE ORAL
Qty: 0 | Refills: 0 | COMMUNITY

## 2017-07-05 RX ORDER — WARFARIN SODIUM 2.5 MG/1
2 TABLET ORAL ONCE
Qty: 0 | Refills: 0 | Status: DISCONTINUED | OUTPATIENT
Start: 2017-07-06 | End: 2017-07-06

## 2017-07-05 RX ORDER — WARFARIN SODIUM 2.5 MG/1
1 TABLET ORAL
Qty: 0 | Refills: 0 | COMMUNITY
Start: 2017-07-05

## 2017-07-05 RX ORDER — DONEPEZIL HYDROCHLORIDE 10 MG/1
1 TABLET, FILM COATED ORAL
Qty: 0 | Refills: 0 | COMMUNITY
Start: 2017-07-05

## 2017-07-05 RX ORDER — RAMELTEON 8 MG
1 TABLET ORAL
Qty: 0 | Refills: 0 | COMMUNITY

## 2017-07-05 RX ADMIN — Medication 100 MILLIGRAM(S): at 13:16

## 2017-07-05 RX ADMIN — Medication 125 MICROGRAM(S): at 05:43

## 2017-07-05 RX ADMIN — Medication 40 MILLIGRAM(S): at 05:43

## 2017-07-05 RX ADMIN — AMIODARONE HYDROCHLORIDE 200 MILLIGRAM(S): 400 TABLET ORAL at 05:43

## 2017-07-05 RX ADMIN — MEMANTINE HYDROCHLORIDE 10 MILLIGRAM(S): 10 TABLET ORAL at 13:17

## 2017-07-05 RX ADMIN — Medication 650 MILLIGRAM(S): at 13:16

## 2017-07-05 RX ADMIN — TAMSULOSIN HYDROCHLORIDE 0.4 MILLIGRAM(S): 0.4 CAPSULE ORAL at 19:48

## 2017-07-05 RX ADMIN — MIRTAZAPINE 15 MILLIGRAM(S): 45 TABLET, ORALLY DISINTEGRATING ORAL at 19:48

## 2017-07-05 RX ADMIN — DONEPEZIL HYDROCHLORIDE 5 MILLIGRAM(S): 10 TABLET, FILM COATED ORAL at 19:48

## 2017-07-05 NOTE — PROGRESS NOTE ADULT - PROBLEM SELECTOR PLAN 1
-S/P vitamin K and FFP upon admission   -Patient admits to change in his diet due to lack of appetite, decreased intake of green leafy vegetables   -INR 3.21 today, holding Coumadin today  -Given history of dementia, patient's wife advised to observe/ assist with medication intake

## 2017-07-05 NOTE — DISCHARGE NOTE ADULT - MEDICATION SUMMARY - MEDICATIONS TO CHANGE
I will SWITCH the dose or number of times a day I take the medications listed below when I get home from the hospital:    mirtazapine 45 mg oral tablet  -- 0.5 tab(s) by mouth once a day (in the evening)    warfarin 1 mg oral tablet  -- 1 tab(s) by mouth once a day I will SWITCH the dose or number of times a day I take the medications listed below when I get home from the hospital:    mirtazapine 45 mg oral tablet  -- 0.5 tab(s) by mouth once a day (in the evening)    warfarin 1 mg oral tablet  -- 1 tab(s) by mouth once a day    warfarin 2 mg oral tablet  -- 1 tab(s) by mouth once a day (at bedtime)  -- Do not take any coumadin tonight.  You will need to have your PT/INR drawn by friday to correctly dose your coumadin

## 2017-07-05 NOTE — PROCEDURE NOTE - ADDITIONAL PROCEDURE DETAILS
Patient with BPH, urinary retention, Nurses unable to place walker. 16F Coude placed
Normal pacemaker function. Not pacemaker dependent.   Normal sensing and pacing thresholds  Review of stored data did not reveal any episodes for review since last interrogation on 3/7/17  AT/AF [place]: 0%  A Paced 17%, V Paced <1%

## 2017-07-05 NOTE — DISCHARGE NOTE ADULT - HOME CARE AGENCY
[facility] [phone]  RN will call prior to visit  Services to start day following discharge  Physical therapy to follow

## 2017-07-05 NOTE — PROGRESS NOTE ADULT - ASSESSMENT
ptb  with  afib, inr  pending today   per  PT , PT CAN GO  HOME, HOPEFULLY  DC  HOME  TODAY, PMD,  2 DAYS

## 2017-07-05 NOTE — DISCHARGE NOTE ADULT - ADDITIONAL INSTRUCTIONS
You will need your PT/INR checked by Friday July8; Call your doctor for an appointment.  You have declined home physical therapy and request out-patient PT; A script has been given for out-patient PT  Follow up with your primary healthcare providers as instructed above. You will need your PT/INR checked by 7/13/17 Call your doctor for an appointment.  You have declined home physical therapy and request out-patient PT; A script has been given for out-patient PT  Follow up with your primary healthcare providers as instructed above.

## 2017-07-05 NOTE — CONSULT NOTE ADULT - SUBJECTIVE AND OBJECTIVE BOX
Date of Admission:    CHIEF COMPLAINT:  slurred speech and elevated INR     HISTORY OF PRESENT ILLNESS:  72 years old male with PMH of dementia, hypothyroidism, GERD, a-fib on coumadin, SSS, CLL(not on chemotherapy) presented with slurred speech , sent to ED by PMD after INR found to be 8.5. Pt's wife reports that pt had slurred speech for 3 days and generalized weakness. Patient was evaluated by neurology in ED, no acute intervention was recommended. Curently patient has normal speech. Cardiology evaluation was asked for anticoagulation     Allergies    amoxicillin (Rash)    Intolerances    	    MEDICATIONS:  amiodarone    Tablet 200 milliGRAM(s) Oral daily  tamsulosin 0.4 milliGRAM(s) Oral at bedtime  furosemide    Tablet 40 milliGRAM(s) Oral daily        memantine 10 milliGRAM(s) Oral daily  donepezil 5 milliGRAM(s) Oral at bedtime  mirtazapine 15 milliGRAM(s) Oral at bedtime      allopurinol 100 milliGRAM(s) Oral daily  levothyroxine 125 MICROGram(s) Oral daily    sodium bicarbonate 650 milliGRAM(s) Oral two times a day      PAST MEDICAL & SURGICAL HISTORY:  Bradycardia, drug induced  Waldenstrom macroglobulinemia  Diverticulitis  Atrial fibrillation  GERD (gastroesophageal reflux disease)  Anxiety disorder  CLL (chronic lymphocytic leukemia): in remission  History of laparoscopic cholecystectomy: 4/2014  S/P hernia repair: x2  Meniscus tear: s/p removal of Meniscus 8 months ago      FAMILY HISTORY:  No pertinent family history in first degree relatives      SOCIAL HISTORY:    [ ] Non-smoker  [ ] Smoker  [ ] Alcohol      REVIEW OF SYSTEMS:  General: no fatigue/malaise, weight loss/gain.  Skin: no rashes.  Ophthalmologic: no blurred vision, no loss of vision. 	  ENT: no sore throat, rhinorrhea, sinus congestion.  Respiratory: no SOB, cough or wheeze.  Gastrointestinal:  no N/V/D, no melena/hematemesis/hematochezia.  Genitourinary: no dysuria/hesitancy or hematuria.  Musculoskeletal: no myalgias or arthralgias.  Neurological: no changes in vision or hearing, no lightheadedness/dizziness, no syncope/near syncope	  Psychiatric: no unusual stress/anxiety.   Hematology/Lymphatics: no unusual bleeding, bruising and no lymphadenopathy.  Endocrine: no unusual thirst.   All others negative except as stated above and in HPI.    PHYSICAL EXAM:  T(C): 37 (07-05-17 @ 12:32), Max: 37.4 (07-04-17 @ 19:46)  HR: 56 (07-05-17 @ 12:32) (51 - 58)  BP: 152/82 (07-05-17 @ 12:32) (110/65 - 152/82)  RR: 18 (07-05-17 @ 12:32) (18 - 18)  SpO2: 99% (07-05-17 @ 12:32) (95% - 99%)  Wt(kg): --  I&O's Summary    04 Jul 2017 07:01  -  05 Jul 2017 07:00  --------------------------------------------------------  IN: 1225 mL / OUT: 1000 mL / NET: 225 mL    05 Jul 2017 07:01  -  05 Jul 2017 14:16  --------------------------------------------------------  IN: 240 mL / OUT: 600 mL / NET: -360 mL        Appearance: Normal	  HEENT:   Normal oral mucosa, PERRL, EOMI	  Lymphatic: No lymphadenopathy  Cardiovascular: Normal S1 S2, No JVD, No murmurs, No edema  Respiratory: Lungs clear to auscultation	  Psychiatry: A & O x 3, Mood & affect appropriate  Gastrointestinal:  Soft, Non-tender, + BS	  Skin: No rashes, No ecchymoses, No cyanosis	  Neurologic: Non-focal  Extremities: Normal range of motion, No clubbing, cyanosis or edema  Vascular: Peripheral pulses palpable 2+ bilaterally        LABS:	 	    CBC Full  -  ( 05 Jul 2017 08:50 )  WBC Count : 6.23 K/uL  Hemoglobin : 11.6 g/dL  Hematocrit : 35.1 %  Platelet Count - Automated : 154 K/uL  Mean Cell Volume : 94.1 fl  Mean Cell Hemoglobin : 31.1 pg  Mean Cell Hemoglobin Concentration : 33.0 gm/dL  Auto Neutrophil # : x  Auto Lymphocyte # : x  Auto Monocyte # : x  Auto Eosinophil # : x  Auto Basophil # : x  Auto Neutrophil % : x  Auto Lymphocyte % : x  Auto Monocyte % : x  Auto Eosinophil % : x  Auto Basophil % : x    07-05    143  |  105  |  38<H>  ----------------------------<  95  3.9   |  23  |  2.74<H>    Ca    9.6      05 Jul 2017 08:46        proBNP:   Lipid Profile:   HgA1c:   TSH:       CARDIAC MARKERS:            TELEMETRY: 	    ECG:  	  RADIOLOGY:  OTHER: 	    PREVIOUS DIAGNOSTIC TESTING:    [ ] Echocardiogram:  [ ]  Catheterization:  [ ] Stress Test:  	  	  ASSESSMENT/PLAN: Date of Admission:    CHIEF COMPLAINT:  slurred speech and elevated INR     HISTORY OF PRESENT ILLNESS:  72 years old male with PMH of dementia, hypothyroidism, GERD, a-fib on coumadin, SSS, CLL(not on chemotherapy) presented with slurred speech , sent to ED by PMD after INR found to be 9.1. Pt's wife reports that pt had slurred speech for 3 days and generalized weakness. Patient was evaluated by neurology in ED, no acute intervention was recommended. Currently patient has normal speech. Cardiology evaluation was asked for anticoagulation recommendation. Patient was on coumadin 1 mg daily as per patient but EMR show patient has been on 2mg daily. Patient received vitamin K while in patient and INR dropped to 1.3 .Later patient received 7,5 and 3 mg coumadin . INR today is 3.3 .  Patient denied any symptoms of chest pain, SOB, dizziness, palpitations , headache, leg edema or bleeding from any site of his body. However patient reported of having loss of appetite and was recently prescribed megos for appetite boost.    Allergies    amoxicillin (Rash)      MEDICATIONS:  amiodarone    Tablet 200 milliGRAM(s) Oral daily  tamsulosin 0.4 milliGRAM(s) Oral at bedtime  furosemide    Tablet 40 milliGRAM(s) Oral daily  memantine 10 milliGRAM(s) Oral daily  donepezil 5 milliGRAM(s) Oral at bedtime  mirtazapine 15 milliGRAM(s) Oral at bedtime  allopurinol 100 milliGRAM(s) Oral daily  levothyroxine 125 MICROGram(s) Oral daily  sodium bicarbonate 650 milliGRAM(s) Oral two times a day      PAST MEDICAL & SURGICAL HISTORY:  Bradycardia, drug induced  Waldenstrom macroglobulinemia  Diverticulitis  Atrial fibrillation  GERD (gastroesophageal reflux disease)  Anxiety disorder  CLL (chronic lymphocytic leukemia): in remission  History of laparoscopic cholecystectomy: 4/2014  S/P hernia repair: x2  Meniscus tear: s/p removal of Meniscus 8 months ago    FAMILY HISTORY:  No pertinent family history in first degree relatives    SOCIAL HISTORY:    [ ] Non-smoker  [ ] Smoker  [ ] Alcohol    REVIEW OF SYSTEMS:  General: no fatigue/malaise, weight loss/gain.  Skin: no rashes.  Ophthalmologic: no blurred vision, no loss of vision. 	  ENT: no sore throat, rhinorrhea, sinus congestion.  Respiratory: no SOB, cough or wheeze.  Gastrointestinal:  no N/V/D, no melena/hematemesis/hematochezia.  Genitourinary: no dysuria/hesitancy or hematuria.  Musculoskeletal: no myalgias or arthralgias.  Neurological: no changes in vision or hearing, no lightheadedness/dizziness, no syncope/near syncope	  Psychiatric: no unusual stress/anxiety.   Hematology/Lymphatics: no unusual bleeding, bruising and no lymphadenopathy.  Endocrine: no unusual thirst.   All others negative except as stated above and in HPI.    PHYSICAL EXAM:  T(C): 37 (07-05-17 @ 12:32), Max: 37.4 (07-04-17 @ 19:46)  HR: 56 (07-05-17 @ 12:32) (51 - 58)  BP: 152/82 (07-05-17 @ 12:32) (110/65 - 152/82)  RR: 18 (07-05-17 @ 12:32) (18 - 18)  SpO2: 99% (07-05-17 @ 12:32) (95% - 99%)  Wt(kg): --  I&O's Summary    04 Jul 2017 07:01  -  05 Jul 2017 07:00  --------------------------------------------------------  IN: 1225 mL / OUT: 1000 mL / NET: 225 mL    05 Jul 2017 07:01  -  05 Jul 2017 14:16  --------------------------------------------------------  IN: 240 mL / OUT: 600 mL / NET: -360 mL      Appearance: Normal	  HEENT:   Normal oral mucosa, PERRL, EOMI	  Lymphatic: No lymphadenopathy  Cardiovascular: Normal S1 S2, No JVD, No murmurs, No edema  Respiratory: Lungs clear to auscultation	  Psychiatry: A & O x 3, Mood & affect appropriate  Gastrointestinal:  Soft, Non-tender, + BS	  Skin: No rashes, No ecchymoses, No cyanosis	  Neurologic: Non-focal  Extremities: Normal range of motion, No clubbing, cyanosis or edema  Vascular: Peripheral pulses palpable 2+ bilaterally      LABS:	 	    CBC Full  -  ( 05 Jul 2017 08:50 )  WBC Count : 6.23 K/uL  Hemoglobin : 11.6 g/dL  Hematocrit : 35.1 %  Platelet Count - Automated : 154 K/uL  Mean Cell Volume : 94.1 fl  Mean Cell Hemoglobin : 31.1 pg  Mean Cell Hemoglobin Concentration : 33.0 gm/dL  Auto Neutrophil # : x  Auto Lymphocyte # : x  Auto Monocyte # : x  Auto Eosinophil # : x  Auto Basophil # : x  Auto Neutrophil % : x  Auto Lymphocyte % : x  Auto Monocyte % : x  Auto Eosinophil % : x  Auto Basophil % : x    07-05    143  |  105  |  38<H>  ----------------------------<  95  3.9   |  23  |  2.74<H>    Ca    9.6      05 Jul 2017 08:46        ASSESSMENT/PLAN: 	  72 years old male with PMH of dementia, hypothyroidism, GERD, a-fib on coumadin, SSS, CLL(not on chemotherapy) presented with slurred speech , sent to ED by PMD after INR found to be 9.1. Patient received 5,7 and 3 mg coumadin respectively in last 3 days . As patient has received higher doses of coumadin over last 3 days and INR is still fluctuating , patient will be evaluated inpatient till a stable dose of coumadin and with in therapeutic range INR has been maintained for atleast 24 -48 hours. CHIEF COMPLAINT:  slurred speech and elevated INR     HISTORY OF PRESENT ILLNESS:  72 years old male with PMH of dementia, hypothyroidism, GERD, a-fib on coumadinamio with SSS sp [place] sci dual chamber PPM, CLL(not on chemotherapy) presented with slurred speech , sent to ED by PMD after INR found to be 9.1.  At home, patient was on coumadin 1 mg daily per patient (despite EMR showing patient has been on 2mg daily.). Pt's wife reports that pt had slurred speech for 3 days and generalized weakness prior to presentation. Patient was evaluated by neurology in ED, had normal speech, no acute intervention was recommended and patient was admitted to medicine, and received vitamin K. INR dropped to 1.3 and then restarted on coumadin- 7,5 and 3 mg coumadin . INR today is 3.3 and is uprising.  Cardiology evaluation was asked for anticoagulation recommendation.   Patient denied any symptoms of chest pain, SOB, dizziness, palpitations , headache, leg edema or bleeding from any site of his body. However patient reported of having loss of appetite and was recently prescribed megos for appetite boost.    Allergies    amoxicillin (Rash)      MEDICATIONS:  amiodarone    Tablet 200 milliGRAM(s) Oral daily  tamsulosin 0.4 milliGRAM(s) Oral at bedtime  furosemide    Tablet 40 milliGRAM(s) Oral daily  memantine 10 milliGRAM(s) Oral daily  donepezil 5 milliGRAM(s) Oral at bedtime  mirtazapine 15 milliGRAM(s) Oral at bedtime  allopurinol 100 milliGRAM(s) Oral daily  levothyroxine 125 MICROGram(s) Oral daily  sodium bicarbonate 650 milliGRAM(s) Oral two times a day      PAST MEDICAL & SURGICAL HISTORY:  Bradycardia, drug induced  Waldenstrom macroglobulinemia  Diverticulitis  Atrial fibrillation  GERD (gastroesophageal reflux disease)  Anxiety disorder  CLL (chronic lymphocytic leukemia): in remission  History of laparoscopic cholecystectomy: 4/2014  S/P hernia repair: x2  Meniscus tear: s/p removal of Meniscus 8 months ago    FAMILY HISTORY:  No pertinent family history in first degree relatives    SOCIAL HISTORY:    [ ] Non-smoker  [ ] Smoker  [ ] Alcohol    REVIEW OF SYSTEMS:  General: no fatigue/malaise, weight loss/gain.  Skin: no rashes.  Ophthalmologic: no blurred vision, no loss of vision. 	  ENT: no sore throat, rhinorrhea, sinus congestion.  Respiratory: no SOB, cough or wheeze.  Gastrointestinal:  no N/V/D, no melena/hematemesis/hematochezia.  Genitourinary: no dysuria/hesitancy or hematuria.  Musculoskeletal: no myalgias or arthralgias.  Neurological: no changes in vision or hearing, no lightheadedness/dizziness, no syncope/near syncope	  Psychiatric: no unusual stress/anxiety.   Hematology/Lymphatics: no unusual bleeding, bruising and no lymphadenopathy.  Endocrine: no unusual thirst.   All others negative except as stated above and in HPI.    PHYSICAL EXAM:  T(C): 37 (07-05-17 @ 12:32), Max: 37.4 (07-04-17 @ 19:46)  HR: 56 (07-05-17 @ 12:32) (51 - 58)  BP: 152/82 (07-05-17 @ 12:32) (110/65 - 152/82)  RR: 18 (07-05-17 @ 12:32) (18 - 18)  SpO2: 99% (07-05-17 @ 12:32) (95% - 99%)  Wt(kg): --  I&O's Summary    04 Jul 2017 07:01  -  05 Jul 2017 07:00  --------------------------------------------------------  IN: 1225 mL / OUT: 1000 mL / NET: 225 mL    05 Jul 2017 07:01  -  05 Jul 2017 14:16  --------------------------------------------------------  IN: 240 mL / OUT: 600 mL / NET: -360 mL      Appearance: Normal	  HEENT:   Normal oral mucosa, PERRL, EOMI	  Lymphatic: No lymphadenopathy  Cardiovascular: Normal S1 S2, No JVD, No murmurs, No edema  Respiratory: Lungs clear to auscultation	  Psychiatry: A & O x 3, Mood & affect appropriate  Gastrointestinal:  Soft, Non-tender, + BS	  Skin: No rashes, No ecchymoses, No cyanosis	  Neurologic: Non-focal  Extremities: Normal range of motion, No clubbing, cyanosis or edema  Vascular: Peripheral pulses palpable 2+ bilaterally      LABS:	 	    CBC Full  -  ( 05 Jul 2017 08:50 )  WBC Count : 6.23 K/uL  Hemoglobin : 11.6 g/dL  Hematocrit : 35.1 %  Platelet Count - Automated : 154 K/uL  Mean Cell Volume : 94.1 fl  Mean Cell Hemoglobin : 31.1 pg  Mean Cell Hemoglobin Concentration : 33.0 gm/dL  Auto Neutrophil # : x  Auto Lymphocyte # : x  Auto Monocyte # : x  Auto Eosinophil # : x  Auto Basophil # : x  Auto Neutrophil % : x  Auto Lymphocyte % : x  Auto Monocyte % : x  Auto Eosinophil % : x  Auto Basophil % : x    07-05    143  |  105  |  38<H>  ----------------------------<  95  3.9   |  23  |  2.74<H>    Ca    9.6      05 Jul 2017 08:46        ASSESSMENT/PLAN: 	  72 years old male with PMH of dementia, hypothyroidism, GERD, a-fib on coumadinamio with SSS sp [place] sci dual chamber PPM, CLL(not on chemotherapy) presented with slurred speech , sent to ED by PMD after INR found to be 9.1.  INR was high likely 2/2 decreased PO intake and change in diet with vit K ingestion. At this time, since patient's baseline inr dosing is 1mg qd, and patient  received 5,7 and 3 mg coumadin respectively in last 3 days, we would expect INR to continues to rise tomorrow and likely the day afterwards. Would recommend inpatient monitoring of INR until a more stable regiment can be started. Agree with stoppping coumadin and monitoring for the next day. Unfortunately not a candidate for NOACS given CKD. Can discuss with EP re: watchman if patient interested as outpatient.     eli Steele MD [address] [place] [address] cardiology

## 2017-07-05 NOTE — PROGRESS NOTE ADULT - ASSESSMENT
72 year old Male with PMHX significant for SSS, s/p BSCI PPM (2016), dementia, hypothyroid, GERD, a-fib on coumadin, CKD4, Waldenstrom Macroglobulinemia, CLL, admitted with weakness, slurred speech sent to ED by PMD after INR found to be 8.5. Pt's wife reports that pt has had slurred speech for three days and generalized weakness which began after starting new psych drug for depression.

## 2017-07-05 NOTE — DISCHARGE NOTE ADULT - MEDICATION SUMMARY - MEDICATIONS TO TAKE
I will START or STAY ON the medications listed below when I get home from the hospital:    sodium bicarbonate 650 mg oral tablet  -- 1 tab(s) by mouth 2 times a day  -- Indication: For Chronic kidney disease stage 4    tamsulosin 0.4 mg oral capsule  -- 1 cap(s) by mouth once a day (at bedtime)  -- Indication: For Enlarged prostate    amiodarone 200 mg oral tablet  -- 1 tab(s) by mouth once a day  -- Indication: For Cardiac arrythmias    warfarin 2 mg oral tablet  -- 1 tab(s) by mouth once a day (at bedtime)  -- Do not take any coumadin tonight.  You will need to have your PT/INR drawn by friday to correctly dose your coumadin  -- Indication: For Atrial fibrillation    mirtazapine 15 mg oral tablet  -- 1 tab(s) by mouth once a day (at bedtime)  -- Indication: For Depression    allopurinol 100 mg oral tablet  -- 1 tab(s) by mouth once a day  -- Indication: For Gout    donepezil 5 mg oral tablet  -- 1 tab(s) by mouth once a day (at bedtime)  -- Indication: For Dementia    furosemide 40 mg oral tablet  -- 1 tab(s) by mouth once a day  -- Indication: For undefined    memantine 10 mg oral tablet  -- 1 tab(s) by mouth every 12 hours  -- Indication: For Dementia    levothyroxine 125 mcg (0.125 mg) oral tablet  -- 1 tab(s) by mouth once a day  -- Indication: For Hypothyroid I will START or STAY ON the medications listed below when I get home from the hospital:    predniSONE 5 mg oral tablet  -- 5 tab(s) by mouth once a day, then 4 tabs daily for 3 days,then  3 tabs daily for 3 days, 2 tabs daily for 3 days, then 1 tab daily for 3 days  -- It is very important that you take or use this exactly as directed.  Do not skip doses or discontinue unless directed by your doctor.  Obtain medical advice before taking any non-prescription drugs as some may affect the action of this medication.  Take with food or milk.    -- Indication: For Steroid    sodium bicarbonate 650 mg oral tablet  -- 1 tab(s) by mouth 2 times a day  -- Indication: For Supplement    tamsulosin 0.4 mg oral capsule  -- 1 cap(s) by mouth once a day (at bedtime)  -- Indication: For urinary retention    amiodarone 200 mg oral tablet  -- 1 tab(s) by mouth once a day  -- Indication: For Atrial fibrillation    Coumadin 1 mg oral tablet  -- 1 tab(s) by mouth every other day  -- follow up blood work- INR on 7/13/17 with your primary doctor  -- Indication: For Atrial fibrillation    clonazePAM 1 mg oral tablet  -- 1 tab(s) by mouth once a day (at bedtime)  -- Indication: For mood    mirtazapine 15 mg oral tablet  -- 1 tab(s) by mouth once a day (at bedtime)  -- Indication: For mood    allopurinol 100 mg oral tablet  -- 1 tab(s) by mouth once a day  -- Indication: For Antigout    donepezil 5 mg oral tablet  -- 1 tab(s) by mouth once a day (at bedtime)  -- Indication: For mood    furosemide 40 mg oral tablet  -- 1 tab(s) by mouth once a day  -- Indication: For diuretic    memantine 10 mg oral tablet  -- 1 tab(s) by mouth every 12 hours  -- Indication: For mood    levothyroxine 125 mcg (0.125 mg) oral tablet  -- 1 tab(s) by mouth once a day  -- Indication: For hypothyroid I will START or STAY ON the medications listed below when I get home from the hospital:    predniSONE 5 mg oral tablet  -- 5 tab(s) by mouth once a day for 2 days, 4 tabs daily for 3 days 3 tabs daily for 3 days, 2 tabs daily for 3 days, then 1 tab daily for 3 days  -- It is very important that you take or use this exactly as directed.  Do not skip doses or discontinue unless directed by your doctor.  Obtain medical advice before taking any non-prescription drugs as some may affect the action of this medication.  Take with food or milk.    -- Indication: For Steroids    sodium bicarbonate 650 mg oral tablet  -- 1 tab(s) by mouth 2 times a day  -- Indication: For Supplement    tamsulosin 0.4 mg oral capsule  -- 1 cap(s) by mouth once a day (at bedtime)  -- Indication: For urinary retention    amiodarone 200 mg oral tablet  -- 1 tab(s) by mouth once a day  -- Indication: For Atrial fibrillation    Coumadin 1 mg oral tablet  -- 1 tab(s) by mouth every other day  -- follow up blood work- INR on 7/13/17 with your primary doctor  -- Indication: For Atrial fibrillation    clonazePAM 1 mg oral tablet  -- 1 tab(s) by mouth once a day (at bedtime)  -- Indication: For mood    mirtazapine 15 mg oral tablet  -- 1 tab(s) by mouth once a day (at bedtime)  -- Indication: For mood    allopurinol 100 mg oral tablet  -- 1 tab(s) by mouth once a day  -- Indication: For Antigout    donepezil 5 mg oral tablet  -- 1 tab(s) by mouth once a day (at bedtime)  -- Indication: For mood    furosemide 40 mg oral tablet  -- 1 tab(s) by mouth once a day  -- Indication: For diuretic    memantine 10 mg oral tablet  -- 1 tab(s) by mouth every 12 hours  -- Indication: For mood    levothyroxine 125 mcg (0.125 mg) oral tablet  -- 1 tab(s) by mouth once a day  -- Indication: For hypothyroid

## 2017-07-05 NOTE — PROGRESS NOTE ADULT - SUBJECTIVE AND OBJECTIVE BOX
No pain, no shortness of breath      MEDICATIONS  (STANDING):  amiodarone    Tablet 200 milliGRAM(s) Oral daily  memantine 10 milliGRAM(s) Oral daily  allopurinol 100 milliGRAM(s) Oral daily  sodium bicarbonate 650 milliGRAM(s) Oral two times a day  levothyroxine 125 MICROGram(s) Oral daily  tamsulosin 0.4 milliGRAM(s) Oral at bedtime  donepezil 5 milliGRAM(s) Oral at bedtime  mirtazapine 15 milliGRAM(s) Oral at bedtime  furosemide    Tablet 40 milliGRAM(s) Oral daily      VITAL:  T(C): , Max: 37.4 (07-04-17 @ 19:46)  T(F): , Max: 99.3 (07-04-17 @ 19:46)  HR: 56 (07-05-17 @ 12:32)  BP: 152/82 (07-05-17 @ 12:32)  BP(mean): --  RR: 18 (07-05-17 @ 12:32)  SpO2: 99% (07-05-17 @ 12:32)      PHYSICAL EXAM:  General: lethargic but alert, NAD  HEENTN: supple, no JVD  CHEST/LUNG: CTA-b/l  HEART:  RRR S1S2  ABDOMEN: soft NTND, (+)walker  EXTREMITIES:  no c/c/e    LABS:                        11.6   6.23  )-----------( 154      ( 05 Jul 2017 08:50 )             35.1     Na(143)/K(3.9)/Cl(105)/HCO3(23)/BUN(38)/Cr(2.74)Glu(95)/Ca(9.6)/Mg(--)/PO4(--)    07-05 @ 08:46        IMPRESSION:    (1)Renal - CKD stage 4 - GFR ~20ml/min. Stable function. 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     (2) - urinary retention on admission - now with walker in place.    (3)Afib - INR now subtherapeutic - on heparin gtt/coumadin    RECOMMEND:    (1)Meds as ordered; dose new meds for GFR 20ml/min  (2)Walker management per primary team  (3)A/C per primary team  (4)PT  (5)Follow up at my office 1-2 months after discharge                Norman Ascencio MD  [facility]  [phone] No pain, no shortness of breath      MEDICATIONS  (STANDING):  amiodarone    Tablet 200 milliGRAM(s) Oral daily  memantine 10 milliGRAM(s) Oral daily  allopurinol 100 milliGRAM(s) Oral daily  sodium bicarbonate 650 milliGRAM(s) Oral two times a day  levothyroxine 125 MICROGram(s) Oral daily  tamsulosin 0.4 milliGRAM(s) Oral at bedtime  donepezil 5 milliGRAM(s) Oral at bedtime  mirtazapine 15 milliGRAM(s) Oral at bedtime  furosemide    Tablet 40 milliGRAM(s) Oral daily      VITAL:  T(C): , Max: 37.4 (07-04-17 @ 19:46)  T(F): , Max: 99.3 (07-04-17 @ 19:46)  HR: 56 (07-05-17 @ 12:32)  BP: 152/82 (07-05-17 @ 12:32)  BP(mean): --  RR: 18 (07-05-17 @ 12:32)  SpO2: 99% (07-05-17 @ 12:32)      PHYSICAL EXAM:  General: alert, pleasant, NAD  HEENTN: supple, no JVD  CHEST/LUNG: CTA-b/l  HEART:  RRR S1S2  ABDOMEN: soft NTND, no walker  EXTREMITIES:  no c/c/e    LABS:                        11.6   6.23  )-----------( 154      ( 05 Jul 2017 08:50 )             35.1     Na(143)/K(3.9)/Cl(105)/HCO3(23)/BUN(38)/Cr(2.74)Glu(95)/Ca(9.6)/Mg(--)/PO4(--)    07-05 @ 08:46        IMPRESSION:    (1)Renal - CKD stage 4 - GFR ~20ml/min. Stable function. 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     (2) - urinary retention on admission - now improved; stable renal function s/p removal of walker, and voiding well    (3)Afib - INR now subtherapeutic - on heparin gtt/coumadin    RECOMMEND:    (1)Meds as ordered; dose new meds for GFR 20-30ml/min  (2)A/C per primary team  (3)PT  (4)Follow up at my office 1-2 months after discharge                Norman Ascencio MD  [facility]  [phone]

## 2017-07-05 NOTE — PROGRESS NOTE ADULT - SUBJECTIVE AND OBJECTIVE BOX
INTERVAL HPI/OVERNIGHT EVENTS: Patient resting comfortably in bed, denies c/o CP, palpitations or SOB.     MEDICATIONS  (STANDING):  amiodarone    Tablet 200 milliGRAM(s) Oral daily  memantine 10 milliGRAM(s) Oral daily  allopurinol 100 milliGRAM(s) Oral daily  sodium bicarbonate 650 milliGRAM(s) Oral two times a day  levothyroxine 125 MICROGram(s) Oral daily  tamsulosin 0.4 milliGRAM(s) Oral at bedtime  donepezil 5 milliGRAM(s) Oral at bedtime  mirtazapine 15 milliGRAM(s) Oral at bedtime  furosemide    Tablet 40 milliGRAM(s) Oral daily    MEDICATIONS  (PRN):      Allergies    amoxicillin (Rash)      ROS:  General: Pt denies fever/chills    Cardiovascular: denies chest pain/palpitations/leg edema    Respiratory and Thorax: denies SOB/cough/wheezing    Gastrointestinal: denies abdominal pain/diarrhea/constipation/bloody stool    Musculoskeletal: denies joint pain or swelling, denies restricted motion    Skin: denies rashes/sores    Hematologic: denies abnormal bleeding    Vital Signs Last 24 Hrs  T(C): 37 (05 Jul 2017 12:32), Max: 37.4 (04 Jul 2017 19:46)  T(F): 98.6 (05 Jul 2017 12:32), Max: 99.3 (04 Jul 2017 19:46)  HR: 56 (05 Jul 2017 12:32) (51 - 58)  BP: 152/82 (05 Jul 2017 12:32) (110/65 - 152/82)  RR: 18 (05 Jul 2017 12:32) (18 - 18)  SpO2: 99% (05 Jul 2017 12:32) (95% - 99%)    Physical Exam:  Constitutional: well developed, well nourished,  and no acute distress    Neurological: Alert & Oriented x 3,  no focal deficits    HEENT:   Neck supple.    Respiratory: CTA B/L, No wheezing/crackles/rhonchi    Cardiovascular: (+) S1 & S2, RRR    Gastrointestinal: soft, NT, nondistended, (+) BS    Genitourinary: non distended bladder, voiding freely    Extremities: No pedal edema, No clubbing, No cyanosis    Skin:  normal skin color and pigmentation, no skin lesions      LABS:                        11.6   6.23  )-----------( 154      ( 05 Jul 2017 08:50 )             35.1     07-05    143  |  105  |  38<H>  ----------------------------<  95  3.9   |  23  |  2.74<H>    Ca    9.6      05 Jul 2017 08:46      PT/INR - ( 05 Jul 2017 08:52 )   PT: 37.1 sec;   INR: 3.21 ratio         PTT - ( 05 Jul 2017 08:52 )  PTT:34.7 sec      TELE: No tele

## 2017-07-05 NOTE — CONSULT NOTE ADULT - ATTENDING COMMENTS
VASCULAR NEUROLOGY ATTENDING  The patient is seen and examined the history and imaging are reviewed. I agree with the resident note unless otherwise noted. Doubt cerebrovascular etiology likely transient medication effect. Continue AC. No further inpatient neuro-work-up required.
Admitted with supra-therapeutic INR, recent decreasing warfarin requirement, possibly related to poor appetite, decreased nutritional status. INR declined, but observe INR again rising rapidly and likely supra therapeutic tomorrow after 2 doses of warfarin likely greater than his need. Observe in hospital, no Warfarin tonight and seek optimal dose which may be as little as 1 mg two or three days per week..

## 2017-07-05 NOTE — DISCHARGE NOTE ADULT - HOSPITAL COURSE
*** TO BE COMPLETED BY ATTENDING PHYSICIAN *** pt  admitted  with  slurred  speech per  wife,  none  noted  in  hospital,  had  ct scan head times 2m  no  cva,  seen by  neurology    afib, on  coumadin, pt  very  sensitive  to  coumadin, needs  close  f/p for inr,  s/p  left  olecranon bursitis  from  gourt, resolving, on prednisone,  htn,  mild  dementia,  lazheomers pt  admitted  with  slurred  speech per  wife,  none  noted  in  hospital,  had  ct scan head times 2m  no  cva,  seen by  neurology    afib, on  coumadin, pt  very  sensitive  to  coumadin, needs  close  f/p for inr,  s/p  left  olecranon bursitis  from  gourt, resolving, on prednisone,  htn,  mild  dementia,  lazheomers,  dc  today  on  coumadin  every  other day,  starting tomorrow,  needs  close  f/p  of inr

## 2017-07-05 NOTE — DISCHARGE NOTE ADULT - CARE PROVIDER_API CALL
PCP,   Phone: (   )    -  Fax: (   )    - PCP,   Phone: (   )    -  Fax: (   )    -    rheumatology clinic,   [phone]  Phone: (   )    -  Fax: (   )    -    Norman Ascencio), Internal Medicine; Nephrology  [address] [place] [id number]  Phone: [phone]  Fax: [phone] Norman Ascencio), Internal Medicine; Nephrology  [address] [facility] [address]  [place] [address]  Phone: [phone]  Fax: [phone]    rheumatology clinic,   [phone]  Phone: (   )    -  Fax: (   )    -    BROOKE lima  [phone]  Phone: (   )    -  Fax: (   )    -

## 2017-07-05 NOTE — DISCHARGE NOTE ADULT - PLAN OF CARE
Able to communicate appropriately You were evaluated by Neurology and no abnormalities found on CT scan of the brain.  Condition may have been related to medication or infection. However no infection found.  Some of your medications have been stopped as this may have played a role in the slurred speech that your had.  Follow up with your primary healthcare provider and discuss which medications have been stopped. Restart coumadin tomorrow.  Your INR was severely elevated on admission.  You will need blood work by Friday to check your PT/INR. Today's PT/INR 3.21/34.7 Continue steroid taper  If pain persists or worsens after discharge, please call [phone] to schedule a follow-up appointment in the Rheumatology Clinic You were evaluated by Neurology and no abnormalities found on CT scan of the brain.  Condition may have been related to medication or infection. However no infection found.  Follow up with your primary healthcare provider and discuss which medications have been stopped. Restart coumadin as prescribed  Your INR was severely elevated on admission.  You will need blood work by Friday 7/13/17 to check your PT/INR. Today's PT/INR 2.58/29.7 You were evaluated by Neurology and no abnormalities found on CT scan of the brain.  Condition may have been related to medication or infection. However no infection found.  Follow up with your primary healthcare provider and discuss which medications have been changed Restart coumadin as prescribed  Your INR was severely elevated on admission.  You will need blood work by Thursday 7/13/17 to check your PT/INR. Today's PT/INR 2.58/29.7

## 2017-07-05 NOTE — DISCHARGE NOTE ADULT - CARE PLAN
Principal Discharge DX:	Slurred speech  Goal:	Able to communicate appropriately  Instructions for follow-up, activity and diet:	You were evaluated by Neurology and no abnormalities found on CT scan of the brain.  Condition may have been related to medication or infection. However no infection found.  Some of your medications have been stopped as this may have played a role in the slurred speech that your had.  Follow up with your primary healthcare provider and discuss which medications have been stopped.  Secondary Diagnosis:	Atrial fibrillation  Instructions for follow-up, activity and diet:	Restart coumadin tomorrow.  Your INR was severely elevated on admission.  You will need blood work by Friday to check your PT/INR. Today's PT/INR 3.21/34.7 Principal Discharge DX:	Slurred speech  Goal:	Able to communicate appropriately  Instructions for follow-up, activity and diet:	You were evaluated by Neurology and no abnormalities found on CT scan of the brain.  Condition may have been related to medication or infection. However no infection found.  Some of your medications have been stopped as this may have played a role in the slurred speech that your had.  Follow up with your primary healthcare provider and discuss which medications have been stopped.  Secondary Diagnosis:	Atrial fibrillation  Instructions for follow-up, activity and diet:	Restart coumadin tomorrow.  Your INR was severely elevated on admission.  You will need blood work by Friday to check your PT/INR. Today's PT/INR 3.21/34.7  Secondary Diagnosis:	Olecranon bursitis  Instructions for follow-up, activity and diet:	Continue steroid taper  If pain persists or worsens after discharge, please call [phone] to schedule a follow-up appointment in the Rheumatology Clinic Principal Discharge DX:	Slurred speech  Goal:	Able to communicate appropriately  Instructions for follow-up, activity and diet:	You were evaluated by Neurology and no abnormalities found on CT scan of the brain.  Condition may have been related to medication or infection. However no infection found.  Some of your medications have been stopped as this may have played a role in the slurred speech that your had.  Follow up with your primary healthcare provider and discuss which medications have been stopped.  Secondary Diagnosis:	Atrial fibrillation  Instructions for follow-up, activity and diet:	Restart coumadin tomorrow.  Your INR was severely elevated on admission.  You will need blood work by Friday to check your PT/INR. Today's PT/INR 3.21/34.7  Secondary Diagnosis:	Olecranon bursitis  Instructions for follow-up, activity and diet:	Continue steroid taper  If pain persists or worsens after discharge, please call [phone] to schedule a follow-up appointment in the Rheumatology Clinic Principal Discharge DX:	Slurred speech  Goal:	Able to communicate appropriately  Instructions for follow-up, activity and diet:	You were evaluated by Neurology and no abnormalities found on CT scan of the brain.  Condition may have been related to medication or infection. However no infection found.  Follow up with your primary healthcare provider and discuss which medications have been stopped.  Secondary Diagnosis:	Atrial fibrillation  Instructions for follow-up, activity and diet:	Restart coumadin as prescribed  Your INR was severely elevated on admission.  You will need blood work by Friday 7/13/17 to check your PT/INR. Today's PT/INR 2.58/29.7  Secondary Diagnosis:	Olecranon bursitis  Instructions for follow-up, activity and diet:	Continue steroid taper  If pain persists or worsens after discharge, please call [phone] to schedule a follow-up appointment in the Rheumatology Clinic Principal Discharge DX:	Slurred speech  Goal:	Able to communicate appropriately  Instructions for follow-up, activity and diet:	You were evaluated by Neurology and no abnormalities found on CT scan of the brain.  Condition may have been related to medication or infection. However no infection found.  Follow up with your primary healthcare provider and discuss which medications have been stopped.  Secondary Diagnosis:	Atrial fibrillation  Instructions for follow-up, activity and diet:	Restart coumadin as prescribed  Your INR was severely elevated on admission.  You will need blood work by Friday 7/13/17 to check your PT/INR. Today's PT/INR 2.58/29.7  Secondary Diagnosis:	Olecranon bursitis  Instructions for follow-up, activity and diet:	Continue steroid taper  If pain persists or worsens after discharge, please call [phone] to schedule a follow-up appointment in the Rheumatology Clinic Principal Discharge DX:	Slurred speech  Goal:	Able to communicate appropriately  Instructions for follow-up, activity and diet:	You were evaluated by Neurology and no abnormalities found on CT scan of the brain.  Condition may have been related to medication or infection. However no infection found.  Follow up with your primary healthcare provider and discuss which medications have been stopped.  Secondary Diagnosis:	Atrial fibrillation  Instructions for follow-up, activity and diet:	Restart coumadin as prescribed  Your INR was severely elevated on admission.  You will need blood work by Friday 7/13/17 to check your PT/INR. Today's PT/INR 2.58/29.7  Secondary Diagnosis:	Olecranon bursitis  Instructions for follow-up, activity and diet:	Continue steroid taper  If pain persists or worsens after discharge, please call [phone] to schedule a follow-up appointment in the Rheumatology Clinic Principal Discharge DX:	Slurred speech  Goal:	Able to communicate appropriately  Instructions for follow-up, activity and diet:	You were evaluated by Neurology and no abnormalities found on CT scan of the brain.  Condition may have been related to medication or infection. However no infection found.  Follow up with your primary healthcare provider and discuss which medications have been changed  Secondary Diagnosis:	Atrial fibrillation  Instructions for follow-up, activity and diet:	Restart coumadin as prescribed  Your INR was severely elevated on admission.  You will need blood work by Thursday 7/13/17 to check your PT/INR. Today's PT/INR 2.58/29.7  Secondary Diagnosis:	Olecranon bursitis  Instructions for follow-up, activity and diet:	Continue steroid taper  If pain persists or worsens after discharge, please call [phone] to schedule a follow-up appointment in the Rheumatology Clinic Principal Discharge DX:	Slurred speech  Goal:	Able to communicate appropriately  Instructions for follow-up, activity and diet:	You were evaluated by Neurology and no abnormalities found on CT scan of the brain.  Condition may have been related to medication or infection. However no infection found.  Follow up with your primary healthcare provider and discuss which medications have been changed  Secondary Diagnosis:	Atrial fibrillation  Instructions for follow-up, activity and diet:	Restart coumadin as prescribed  Your INR was severely elevated on admission.  You will need blood work by Thursday 7/13/17 to check your PT/INR. Today's PT/INR 2.58/29.7  Secondary Diagnosis:	Olecranon bursitis  Instructions for follow-up, activity and diet:	Continue steroid taper  If pain persists or worsens after discharge, please call [phone] to schedule a follow-up appointment in the Rheumatology Clinic Principal Discharge DX:	Slurred speech  Goal:	Able to communicate appropriately  Instructions for follow-up, activity and diet:	You were evaluated by Neurology and no abnormalities found on CT scan of the brain.  Condition may have been related to medication or infection. However no infection found.  Follow up with your primary healthcare provider and discuss which medications have been changed  Secondary Diagnosis:	Atrial fibrillation  Instructions for follow-up, activity and diet:	Restart coumadin as prescribed  Your INR was severely elevated on admission.  You will need blood work by Thursday 7/13/17 to check your PT/INR. Today's PT/INR 2.58/29.7  Secondary Diagnosis:	Olecranon bursitis  Instructions for follow-up, activity and diet:	Continue steroid taper  If pain persists or worsens after discharge, please call [phone] to schedule a follow-up appointment in the Rheumatology Clinic

## 2017-07-05 NOTE — DISCHARGE NOTE ADULT - PROVIDER TOKENS
FREE:[LAST:[PCP],PHONE:[(   )    -],FAX:[(   )    -]] FREE:[LAST:[PCP],PHONE:[(   )    -],FAX:[(   )    -]],FREE:[LAST:[rheumatology clinic],PHONE:[(   )    -],FAX:[(   )    -],ADDRESS:[[phone]]],TOKEN:'[id number]' TOKEN:'[id number]',FREE:[LAST:[rheumatology clinic],PHONE:[(   )    -],FAX:[(   )    -],ADDRESS:[[phone]]],FREE:[LAST:[haidary],PHONE:[(   )    -],FAX:[(   )    -],ADDRESS:[[facility]  [phone]]]

## 2017-07-05 NOTE — PROGRESS NOTE ADULT - SUBJECTIVE AND OBJECTIVE BOX
SUBJECTIVE / OVERNIGHT EVENTS: No nausea, vomiting or diarrhea, no fever or chills, no dizziness or chest pain, no dysuria or hematuria .    Vital Signs Last 24 Hrs  T(C): 37.2 (07-05-17 @ 05:39), Max: 37.4 (07-04-17 @ 12:10)  HR: 54 (07-05-17 @ 05:39) (51 - 78)  BP: 110/65 (07-05-17 @ 05:39) (104/58 - 134/70)  RR: 18 (07-05-17 @ 05:39) (18 - 18)  SpO2: 95% (07-05-17 @ 05:39) (95% - 97%)  CAPILLARY BLOOD GLUCOSE        I&O's Summary    04 Jul 2017 07:01  -  05 Jul 2017 07:00  --------------------------------------------------------  IN: 1225 mL / OUT: 1000 mL / NET: 225 mL    05 Jul 2017 07:01  -  05 Jul 2017 08:46  --------------------------------------------------------  IN: 0 mL / OUT: 600 mL / NET: -600 mL        PHYSICAL EXAM:  HEAD:  Atraumatic, Normocephalic  EYES: EOMI, PERRLA, conjunctiva and sclera clear  NECK: Supple, No JVD  CHEST/LUNG: Clear to auscultation bilaterally; No wheeze  HEART: Regular rate and rhythm; No murmurs, rubs, or gallops  ABDOMEN: Soft, Nontender, Nondistended; Bowel sounds present  EXTREMITIES:  2+ Peripheral Pulses, No clubbing, cyanosis, or edema  PSYCH: AAOx3  NEUROLOGY: non-focal  SKIN: No rashes or lesions    MEDICATIONS  (STANDING):  amiodarone    Tablet 200 milliGRAM(s) Oral daily  memantine 10 milliGRAM(s) Oral daily  allopurinol 100 milliGRAM(s) Oral daily  sodium bicarbonate 650 milliGRAM(s) Oral two times a day  levothyroxine 125 MICROGram(s) Oral daily  tamsulosin 0.4 milliGRAM(s) Oral at bedtime  donepezil 5 milliGRAM(s) Oral at bedtime  mirtazapine 15 milliGRAM(s) Oral at bedtime  furosemide    Tablet 40 milliGRAM(s) Oral daily    MEDICATIONS  (PRN):      LABS:                        11.8   6.48  )-----------( 146      ( 04 Jul 2017 10:08 )             35.5           PT/INR - ( 04 Jul 2017 10:08 )   PT: 22.2 sec;   INR: 1.94 ratio         PTT - ( 04 Jul 2017 06:12 )  PTT:81.7 sec                    Cultures:    EKG:    Radiological Studies:    Consultant(s) Notes Reviewed:      Care Discussed with Consultants/Other Providers:

## 2017-07-05 NOTE — DISCHARGE NOTE ADULT - MEDICATION SUMMARY - MEDICATIONS TO STOP TAKING
I will STOP taking the medications listed below when I get home from the hospital:    acyclovir 400 mg oral tablet  -- 1 tab(s) by mouth once a day; prophylactic therapy while on chemo   -- follow up with your doctor duration    pantoprazole 40 mg oral delayed release tablet  -- 1 tab(s) by mouth once a day (before a meal)    clonazePAM 1 mg oral tablet  -- 1 tab(s) by mouth once a day (in the evening)    megestrol 40 mg/mL oral suspension  -- 40 milligram(s) by mouth 2 times a day    Probiotic Formula oral capsule  -- 1 cap(s) by mouth once a day    Percocet 10/325 oral tablet  -- 1 tab(s) by mouth , As Needed    traZODone 150 mg oral tablet  -- 0.5 tab(s) by mouth once a day (in the evening)    Rozerem 8 mg oral tablet  -- 1 tab(s) by mouth once a day (at bedtime) I will STOP taking the medications listed below when I get home from the hospital:    acyclovir 400 mg oral tablet  -- 1 tab(s) by mouth once a day; prophylactic therapy while on chemo   -- follow up with your doctor duration    megestrol 40 mg/mL oral suspension  -- 40 milligram(s) by mouth 2 times a day    Probiotic Formula oral capsule  -- 1 cap(s) by mouth once a day    Percocet 10/325 oral tablet  -- 1 tab(s) by mouth , As Needed    traZODone 150 mg oral tablet  -- 0.5 tab(s) by mouth once a day (in the evening)    Rozerem 8 mg oral tablet  -- 1 tab(s) by mouth once a day (at bedtime)

## 2017-07-06 LAB
ANION GAP SERPL CALC-SCNC: 14 MMOL/L — SIGNIFICANT CHANGE UP (ref 5–17)
BUN SERPL-MCNC: 40 MG/DL — HIGH (ref 7–23)
CALCIUM SERPL-MCNC: 9 MG/DL — SIGNIFICANT CHANGE UP (ref 8.4–10.5)
CHLORIDE SERPL-SCNC: 106 MMOL/L — SIGNIFICANT CHANGE UP (ref 96–108)
CO2 SERPL-SCNC: 22 MMOL/L — SIGNIFICANT CHANGE UP (ref 22–31)
CREAT SERPL-MCNC: 3.03 MG/DL — HIGH (ref 0.5–1.3)
GLUCOSE SERPL-MCNC: 96 MG/DL — SIGNIFICANT CHANGE UP (ref 70–99)
HCT VFR BLD CALC: 35.7 % — LOW (ref 39–50)
HGB BLD-MCNC: 11.8 G/DL — LOW (ref 13–17)
INR BLD: 3.92 RATIO — HIGH (ref 0.88–1.16)
MCHC RBC-ENTMCNC: 30.5 PG — SIGNIFICANT CHANGE UP (ref 27–34)
MCHC RBC-ENTMCNC: 33.1 GM/DL — SIGNIFICANT CHANGE UP (ref 32–36)
MCV RBC AUTO: 92.2 FL — SIGNIFICANT CHANGE UP (ref 80–100)
PLATELET # BLD AUTO: 164 K/UL — SIGNIFICANT CHANGE UP (ref 150–400)
POTASSIUM SERPL-MCNC: 3.9 MMOL/L — SIGNIFICANT CHANGE UP (ref 3.5–5.3)
POTASSIUM SERPL-SCNC: 3.9 MMOL/L — SIGNIFICANT CHANGE UP (ref 3.5–5.3)
PROTHROM AB SERPL-ACNC: 45.5 SEC — HIGH (ref 10–13.1)
RBC # BLD: 3.87 M/UL — LOW (ref 4.2–5.8)
RBC # FLD: 13.4 % — SIGNIFICANT CHANGE UP (ref 10.3–14.5)
SODIUM SERPL-SCNC: 142 MMOL/L — SIGNIFICANT CHANGE UP (ref 135–145)
URATE SERPL-MCNC: 8.7 MG/DL — SIGNIFICANT CHANGE UP (ref 3.4–8.8)
WBC # BLD: 6.55 K/UL — SIGNIFICANT CHANGE UP (ref 3.8–10.5)
WBC # FLD AUTO: 6.55 K/UL — SIGNIFICANT CHANGE UP (ref 3.8–10.5)

## 2017-07-06 PROCEDURE — 99232 SBSQ HOSP IP/OBS MODERATE 35: CPT | Mod: GC

## 2017-07-06 PROCEDURE — 99231 SBSQ HOSP IP/OBS SF/LOW 25: CPT

## 2017-07-06 PROCEDURE — 99222 1ST HOSP IP/OBS MODERATE 55: CPT | Mod: GC

## 2017-07-06 PROCEDURE — 73070 X-RAY EXAM OF ELBOW: CPT | Mod: 26,LT

## 2017-07-06 RX ORDER — ACETAMINOPHEN 500 MG
650 TABLET ORAL ONCE
Qty: 0 | Refills: 0 | Status: COMPLETED | OUTPATIENT
Start: 2017-07-06 | End: 2017-07-06

## 2017-07-06 RX ADMIN — MIRTAZAPINE 15 MILLIGRAM(S): 45 TABLET, ORALLY DISINTEGRATING ORAL at 22:12

## 2017-07-06 RX ADMIN — Medication 650 MILLIGRAM(S): at 01:50

## 2017-07-06 RX ADMIN — Medication 650 MILLIGRAM(S): at 22:12

## 2017-07-06 RX ADMIN — Medication 40 MILLIGRAM(S): at 06:46

## 2017-07-06 RX ADMIN — TAMSULOSIN HYDROCHLORIDE 0.4 MILLIGRAM(S): 0.4 CAPSULE ORAL at 22:12

## 2017-07-06 RX ADMIN — Medication 30 MILLIGRAM(S): at 18:27

## 2017-07-06 RX ADMIN — Medication 650 MILLIGRAM(S): at 00:50

## 2017-07-06 RX ADMIN — Medication 125 MICROGRAM(S): at 06:45

## 2017-07-06 RX ADMIN — AMIODARONE HYDROCHLORIDE 200 MILLIGRAM(S): 400 TABLET ORAL at 06:46

## 2017-07-06 RX ADMIN — Medication 650 MILLIGRAM(S): at 11:57

## 2017-07-06 RX ADMIN — Medication 100 MILLIGRAM(S): at 11:57

## 2017-07-06 RX ADMIN — DONEPEZIL HYDROCHLORIDE 5 MILLIGRAM(S): 10 TABLET, FILM COATED ORAL at 22:12

## 2017-07-06 RX ADMIN — MEMANTINE HYDROCHLORIDE 10 MILLIGRAM(S): 10 TABLET ORAL at 11:56

## 2017-07-06 RX ADMIN — Medication 650 MILLIGRAM(S): at 22:42

## 2017-07-06 NOTE — PROGRESS NOTE ADULT - SUBJECTIVE AND OBJECTIVE BOX
SUBJECTIVE / OVERNIGHT EVENTS: No nausea, vomiting or diarrhea, no fever or chills, no dizziness or chest pain, no dysuria or hematuria .    Vital Signs Last 24 Hrs  T(C): 37.3 (07-06-17 @ 06:42), Max: 37.3 (07-06-17 @ 06:42)  HR: 62 (07-06-17 @ 06:42) (56 - 62)  BP: 129/66 (07-06-17 @ 06:42) (121/72 - 152/82)  RR: 18 (07-06-17 @ 06:42) (18 - 18)  SpO2: 97% (07-06-17 @ 06:42) (96% - 99%)  CAPILLARY BLOOD GLUCOSE        I&O's Summary    05 Jul 2017 07:01  -  06 Jul 2017 07:00  --------------------------------------------------------  IN: 720 mL / OUT: 1700 mL / NET: -980 mL        PHYSICAL EXAM:  HEAD:  Atraumatic, Normocephalic  EYES: EOMI, PERRLA, conjunctiva and sclera clear  NECK: Supple, No JVD  CHEST/LUNG: Clear to auscultation bilaterally; No wheeze  HEART: Regular rate and rhythm; No murmurs, rubs, or gallops  ABDOMEN: Soft, Nontender, Nondistended; Bowel sounds present  EXTREMITIES:  2+ Peripheral Pulses, No clubbing, cyanosis, or edema  PSYCH: AAOx3  NEUROLOGY: non-focal  SKIN: No rashes or lesions    MEDICATIONS  (STANDING):  amiodarone    Tablet 200 milliGRAM(s) Oral daily  memantine 10 milliGRAM(s) Oral daily  allopurinol 100 milliGRAM(s) Oral daily  sodium bicarbonate 650 milliGRAM(s) Oral two times a day  levothyroxine 125 MICROGram(s) Oral daily  tamsulosin 0.4 milliGRAM(s) Oral at bedtime  donepezil 5 milliGRAM(s) Oral at bedtime  mirtazapine 15 milliGRAM(s) Oral at bedtime  furosemide    Tablet 40 milliGRAM(s) Oral daily    MEDICATIONS  (PRN):      LABS:                        11.6   6.23  )-----------( 154      ( 05 Jul 2017 08:50 )             35.1     07-05    143  |  105  |  38<H>  ----------------------------<  95  3.9   |  23  |  2.74<H>    Ca    9.6      05 Jul 2017 08:46      PT/INR - ( 05 Jul 2017 08:52 )   PT: 37.1 sec;   INR: 3.21 ratio         PTT - ( 05 Jul 2017 08:52 )  PTT:34.7 sec                    Cultures:    EKG:    Radiological Studies:    Consultant(s) Notes Reviewed:      Care Discussed with Consultants/Other Providers: SUBJECTIVE / OVERNIGHT EVENTS: No nausea, vomiting or diarrhea, no fever or chills, no dizziness or chest pain, no dysuria or hematuria .    Vital Signs Last 24 Hrs  T(C): 37.3 (07-06-17 @ 06:42), Max: 37.3 (07-06-17 @ 06:42)  HR: 62 (07-06-17 @ 06:42) (56 - 62)  BP: 129/66 (07-06-17 @ 06:42) (121/72 - 152/82)  RR: 18 (07-06-17 @ 06:42) (18 - 18)  SpO2: 97% (07-06-17 @ 06:42) (96% - 99%)  CAPILLARY BLOOD GLUCOSE        I&O's Summary    05 Jul 2017 07:01  -  06 Jul 2017 07:00  --------------------------------------------------------  IN: 720 mL / OUT: 1700 mL / NET: -980 mL        PHYSICAL EXAM:  HEAD:  Atraumatic, Normocephalic  EYES: EOMI, PERRLA, conjunctiva and sclera clear  NECK: Supple, No JVD  CHEST/LUNG: Clear to auscultation bilaterally; No wheeze  HEART: Regular rate and rhythm; No murmurs, rubs, or gallops  ABDOMEN: Soft, Nontender, Nondistended; Bowel sounds present  EXTREMITIES:  2+ Peripheral Pulses, No clubbing, cyanosis, or edema  PSYCH: AAOx3  NEUROLOGY: non-focal  , left elbow  with  swelling, mild  redness,  pt states  he ysually gets  it  and  it goes  away,  ,ild tenederness on toch  SKIN: No rashes or lesions    MEDICATIONS  (STANDING):  amiodarone    Tablet 200 milliGRAM(s) Oral daily  memantine 10 milliGRAM(s) Oral daily  allopurinol 100 milliGRAM(s) Oral daily  sodium bicarbonate 650 milliGRAM(s) Oral two times a day  levothyroxine 125 MICROGram(s) Oral daily  tamsulosin 0.4 milliGRAM(s) Oral at bedtime  donepezil 5 milliGRAM(s) Oral at bedtime  mirtazapine 15 milliGRAM(s) Oral at bedtime  furosemide    Tablet 40 milliGRAM(s) Oral daily    MEDICATIONS  (PRN):      LABS:                        11.6   6.23  )-----------( 154      ( 05 Jul 2017 08:50 )             35.1     07-05    143  |  105  |  38<H>  ----------------------------<  95  3.9   |  23  |  2.74<H>    Ca    9.6      05 Jul 2017 08:46      PT/INR - ( 05 Jul 2017 08:52 )   PT: 37.1 sec;   INR: 3.21 ratio         PTT - ( 05 Jul 2017 08:52 )  PTT:34.7 sec                    Cultures:    EKG:    Radiological Studies:    Consultant(s) Notes Reviewed:      Care Discussed with Consultants/Other Providers:

## 2017-07-06 NOTE — CONSULT NOTE ADULT - SUBJECTIVE AND OBJECTIVE BOX
HISTORY OF PRESENT ILLNESS:    PAST MEDICAL & SURGICAL HISTORY:  Bradycardia, drug induced  Waldenstrom macroglobulinemia  Diverticulitis  Atrial fibrillation  GERD (gastroesophageal reflux disease)  Anxiety disorder  CLL (chronic lymphocytic leukemia): in remission  History of laparoscopic cholecystectomy: 4/2014  S/P hernia repair: x2  Meniscus tear: s/p removal of Meniscus 8 months ago      REVIEW OF SYSTEMS      General:	    Skin/Breast:  	  Ophthalmologic:  	  ENMT:	    Respiratory and Thorax:  	  Cardiovascular:	    Gastrointestinal:	    Genitourinary:	    Musculoskeletal:	    Neurological:	    Psychiatric:	    Hematology/Lymphatics:	    Endocrine:	    Allergic/Immunologic:	    MEDICATIONS  (STANDING):  amiodarone    Tablet 200 milliGRAM(s) Oral daily  memantine 10 milliGRAM(s) Oral daily  allopurinol 100 milliGRAM(s) Oral daily  sodium bicarbonate 650 milliGRAM(s) Oral two times a day  levothyroxine 125 MICROGram(s) Oral daily  tamsulosin 0.4 milliGRAM(s) Oral at bedtime  donepezil 5 milliGRAM(s) Oral at bedtime  mirtazapine 15 milliGRAM(s) Oral at bedtime  furosemide    Tablet 40 milliGRAM(s) Oral daily    MEDICATIONS  (PRN):      Allergies    amoxicillin (Rash)    Intolerances        PERTINENT MEDICATION HISTORY:    SOCIAL HISTORY:    FAMILY HISTORY:  No pertinent family history in first degree relatives      Vital Signs Last 24 Hrs  T(C): 37.3 (06 Jul 2017 06:42), Max: 37.3 (06 Jul 2017 06:42)  T(F): 99.1 (06 Jul 2017 06:42), Max: 99.1 (06 Jul 2017 06:42)  HR: 62 (06 Jul 2017 06:42) (57 - 62)  BP: 129/66 (06 Jul 2017 06:42) (121/72 - 129/66)  BP(mean): --  RR: 18 (06 Jul 2017 06:42) (18 - 18)  SpO2: 97% (06 Jul 2017 06:42) (96% - 97%)    Daily     Daily     PHYSICAL EXAM:      Constitutional:    Eyes:    ENMT:    Neck:    Breasts:    Back:    Respiratory:    Cardiovascular:    Gastrointestinal:    Genitourinary:    Rectal:    Extremities:    Vascular:    Neurological:    Skin:    Lymph Nodes:    Musculoskeletal:    Psychiatric:        LABS:                        11.8   6.55  )-----------( 164      ( 06 Jul 2017 08:49 )             35.7     07-06    142  |  106  |  40<H>  ----------------------------<  96  3.9   |  22  |  3.03<H>    Ca    9.0      06 Jul 2017 08:49      PT/INR - ( 06 Jul 2017 08:49 )   PT: 45.5 sec;   INR: 3.92 ratio         PTT - ( 05 Jul 2017 08:52 )  PTT:34.7 sec      RADIOLOGY & ADDITIONAL STUDIES: HISTORY OF PRESENT ILLNESS:  72 M presenting on 7/1/17 with 3d h/o slurred speech, for TIA/CVA workup. Pt also with supratherapeutic INR (8.5) at PCP's office. On admission pt monitored, neuroimaging r/o acute events, no ICH. Slurred speech resolved on own and INR reversed.   Pt with 1 day h/o L elbow pain, swelling, warmth, redness and limitation of motion. Pt denies trauma or falls, however reports propping himself on his elbows while in bed. Pt reports that pain started shortly after eating shrimp for lunch yesterday. Pt with no other joint involvement, denies ever having similar symptoms in elbow. No fevers, chills, recent procudures, sick contacts.    Pt with long-standing h/o gout, manifesting only as podagra of R 1st MTP, managed in the past with NSAIDS. Last gout attack several years ago. No attacks since on ULT (allopurinol 100mg qd). Gout managed by PCP; pt never seen by rheumatologist. No arthrocentesis in past. Pt with h/o nephrolithiasis. Pt on chronic diuretic (Lasix); no recent medication changes.    Pt with dg CLL and Waldenstroms macroglobulinemia since 10/2015, managed with RTX/Velcade/Decadron, tx completed in 01/2017. Pt follows with hemonc and has been in remission.    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)  CLL  Waldenstrom macroglobulinemia  Diverticulitis  Atrial fibrillation on AC  GERD (gastroesophageal reflux disease)  Anxiety disorder    History of laparoscopic cholecystectomy: 4/2014  S/P hernia repair: x2  Meniscus tear: s/p removal of Meniscus 8 months ago    MEDICATIONS  (STANDING):  amiodarone    Tablet 200 milliGRAM(s) Oral daily  memantine 10 milliGRAM(s) Oral daily  allopurinol 100 milliGRAM(s) Oral daily  sodium bicarbonate 650 milliGRAM(s) Oral two times a day  levothyroxine 125 MICROGram(s) Oral daily  tamsulosin 0.4 milliGRAM(s) Oral at bedtime  donepezil 5 milliGRAM(s) Oral at bedtime  mirtazapine 15 milliGRAM(s) Oral at bedtime  furosemide    Tablet 40 milliGRAM(s) Oral daily    Allergies  amoxicillin (Rash)    PERTINENT MEDICATION HISTORY: Lasix 40mg qd, Allopurinol 100mg qd    SOCIAL HISTORY: NC    FAMILY HISTORY:  No pertinent family history in first degree relatives    Vital Signs Last 24 Hrs  T(C): 37.3 (06 Jul 2017 06:42), Max: 37.3 (06 Jul 2017 06:42)  T(F): 99.1 (06 Jul 2017 06:42), Max: 99.1 (06 Jul 2017 06:42)  HR: 62 (06 Jul 2017 06:42) (57 - 62)  BP: 129/66 (06 Jul 2017 06:42) (121/72 - 129/66)  BP(mean): --  RR: 18 (06 Jul 2017 06:42) (18 - 18)  SpO2: 97% (06 Jul 2017 06:42) (96% - 97%)     PHYSICAL EXAM:  Constitutional: NAD  Respiratory: CTA bl  Cardiovascular: RRR, S1 S2 normal  Musculoskeletal: olecranon bursitis of L elbow with overlying erythema and warmth, limited ROM; tophi within olecranon bursa    LABS:                        11.8   6.55  )-----------( 164      ( 06 Jul 2017 08:49 )             35.7     07-06    142  |  106  |  40<H>  ----------------------------<  96  3.9   |  22  |  3.03<H> (baseline)    Ca    9.0      06 Jul 2017 08:49    Uric acid 8.7    PT/INR - ( 06 Jul 2017 08:49 )   PT: 45.5 sec;   INR: 3.92 ratio    PTT - ( 05 Jul 2017 08:52 )  PTT:34.7 sec

## 2017-07-06 NOTE — PROGRESS NOTE ADULT - PROBLEM SELECTOR PLAN 1
-Awaiting PT/INR results today  -Dose coumadin to maintain goal INR 2.0- 3.0 (caution with dosing secondary to Coumadin/ Amiodarone interaction)

## 2017-07-06 NOTE — CONSULT NOTE ADULT - ASSESSMENT
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 Other differentials include hemarthrosis (given supratherapeutic INR) vs cellulitis/infection    Plan:  -Pt refusing diagnostic aspiration of bursa  -trial of Solumedrol 30mg today and tomorrow morning. Will reassess. If no improvement in symptoms will readdress bursa aspiration.  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 Other differentials include hemarthrosis (given supratherapeutic INR) vs cellulitis/infection    Plan:  -Pt refusing diagnostic aspiration of bursa  -trial of Solumedrol 30mg today and tomorrow morning. Will reassess. If no improvement in symptoms will readdress bursa aspiration.  -Once acute flare resolves, pt will need allopurinol to be uptitrated to goal uric acid < 6  -[facility] attending Dr Ramírez

## 2017-07-06 NOTE — DOWNTIME INTERRUPTION NOTE - WHICH MANUAL FORMS INITIATED?
paper documentation on chart, EMAR, Doctor's orders Vital signs; intake and output; Downtime Lab sheet; Medication administration; doctors orders; eMAR

## 2017-07-06 NOTE — PROGRESS NOTE ADULT - ASSESSMENT
INR has increased further from yesterday, but not severely elevated. Has painful olecranon bursitis, concerned that has bled into bursa. Necessary to hold warfarin at least one more day, consider dosing tomorrow if declining, but only 1 mg..

## 2017-07-06 NOTE — PROGRESS NOTE ADULT - SUBJECTIVE AND OBJECTIVE BOX
No pain, no shortness of breath      MEDICATIONS  (STANDING):  amiodarone    Tablet 200 milliGRAM(s) Oral daily  memantine 10 milliGRAM(s) Oral daily  allopurinol 100 milliGRAM(s) Oral daily  sodium bicarbonate 650 milliGRAM(s) Oral two times a day  levothyroxine 125 MICROGram(s) Oral daily  tamsulosin 0.4 milliGRAM(s) Oral at bedtime  donepezil 5 milliGRAM(s) Oral at bedtime  mirtazapine 15 milliGRAM(s) Oral at bedtime  furosemide    Tablet 40 milliGRAM(s) Oral daily      VITAL:  T(C): , Max: 37.3 (07-06-17 @ 06:42)  T(F): , Max: 99.1 (07-06-17 @ 06:42)  HR: 62 (07-06-17 @ 06:42)  BP: 129/66 (07-06-17 @ 06:42)  BP(mean): --  RR: 18 (07-06-17 @ 06:42)  SpO2: 97% (07-06-17 @ 06:42)        PHYSICAL EXAM:  General: alert, pleasant, NAD  HEENTN: supple, no JVD  CHEST/LUNG: CTA-b/l  HEART:  RRR S1S2  ABDOMEN: soft NTND, no walker  EXTREMITIES:  no c/c/e      LABS:                        11.8   6.55  )-----------( 164      ( 06 Jul 2017 08:49 )             35.7     Na(142)/K(3.9)/Cl(106)/HCO3(22)/BUN(40)/Cr(3.03)Glu(96)/Ca(9.0)/Mg(--)/PO4(--)    07-06 @ 08:49  Na(143)/K(3.9)/Cl(105)/HCO3(23)/BUN(38)/Cr(2.74)Glu(95)/Ca(9.6)/Mg(--)/PO4(--)    07-05 @ 08:46      IMPRESSION:    (1)Renal - CKD stage 4 - GFR ~20ml/min. Reasonably stable function. 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     (2) - urinary retention on admission; stable renal function s/p removal of walker    (3)Afib - on coumadin    RECOMMEND:    (1)Meds as ordered; dose new meds for GFR 20-30ml/min  (2)A/C per primary team  (3)PT  (4)Follow up at my office 1-2 months after discharge                      Norman Ascencio MD  [facility]  [phone] (+)L elbow pain      MEDICATIONS  (STANDING):  amiodarone    Tablet 200 milliGRAM(s) Oral daily  memantine 10 milliGRAM(s) Oral daily  allopurinol 100 milliGRAM(s) Oral daily  sodium bicarbonate 650 milliGRAM(s) Oral two times a day  levothyroxine 125 MICROGram(s) Oral daily  tamsulosin 0.4 milliGRAM(s) Oral at bedtime  donepezil 5 milliGRAM(s) Oral at bedtime  mirtazapine 15 milliGRAM(s) Oral at bedtime  furosemide    Tablet 40 milliGRAM(s) Oral daily      VITAL:  T(C): , Max: 37.3 (07-06-17 @ 06:42)  T(F): , Max: 99.1 (07-06-17 @ 06:42)  HR: 62 (07-06-17 @ 06:42)  BP: 129/66 (07-06-17 @ 06:42)  BP(mean): --  RR: 18 (07-06-17 @ 06:42)  SpO2: 97% (07-06-17 @ 06:42)        PHYSICAL EXAM:  General: alert, pleasant, NAD  HEENTN: supple, no JVD  CHEST/LUNG: CTA-b/l  HEART:  RRR S1S2  ABDOMEN: soft NTND, no walker  EXTREMITIES:  L elbow tender      LABS:                        11.8   6.55  )-----------( 164      ( 06 Jul 2017 08:49 )             35.7     Na(142)/K(3.9)/Cl(106)/HCO3(22)/BUN(40)/Cr(3.03)Glu(96)/Ca(9.0)/Mg(--)/PO4(--)    07-06 @ 08:49  Na(143)/K(3.9)/Cl(105)/HCO3(23)/BUN(38)/Cr(2.74)Glu(95)/Ca(9.6)/Mg(--)/PO4(--)    07-05 @ 08:46      IMPRESSION:    (1)Renal - CKD stage 4 - GFR ~20ml/min. Reasonably stable function. 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     (2) - urinary retention on admission; stable renal function s/p removal of walker    (3)Afib - on coumadin    (4)L elbow pain - gout?    RECOMMEND:    (1)Meds as ordered; dose new meds for GFR 20-30ml/min  (2)A/C per primary team  (3)Steroids as ordered  (4)Check serum uric acid                      Norman Ascencio MD  [facility]  [phone]

## 2017-07-06 NOTE — PROGRESS NOTE ADULT - ASSESSMENT
afib on coumadin, inr  pending   SEEN BY CARD   LEFT  ELBOW  SWELLING  NOTED    MILD  DEMENTIA, STABLE afib on coumadin, inr  pending   SEEN BY CARD   LEFT  ELBOW  SWELLING  NOTED;  left  olecranon bursitis ,   rheum called,  may  need  drainage    MILD  DEMENTIA, STABLE

## 2017-07-06 NOTE — PROGRESS NOTE ADULT - SUBJECTIVE AND OBJECTIVE BOX
INTERVAL HPI/OVERNIGHT EVENTS: Patient resting comfortably in chair, denies c/o CP, palpitations or SOB.     MEDICATIONS  (STANDING):  amiodarone    Tablet 200 milliGRAM(s) Oral daily  memantine 10 milliGRAM(s) Oral daily  allopurinol 100 milliGRAM(s) Oral daily  sodium bicarbonate 650 milliGRAM(s) Oral two times a day  levothyroxine 125 MICROGram(s) Oral daily  tamsulosin 0.4 milliGRAM(s) Oral at bedtime  donepezil 5 milliGRAM(s) Oral at bedtime  mirtazapine 15 milliGRAM(s) Oral at bedtime  furosemide    Tablet 40 milliGRAM(s) Oral daily    MEDICATIONS  (PRN):      Allergies    amoxicillin (Rash)      ROS:  General: Pt denies fever/chills    Cardiovascular: denies chest pain/palpitations/leg edema    Respiratory and Thorax: denies SOB/cough/wheezing    Gastrointestinal: denies abdominal pain/diarrhea/constipation/bloody stool    Musculoskeletal: denies joint pain or swelling, denies restricted motion    Skin: denies rashes/sores    Hematologic: denies abnormal bleeding    Vital Signs Last 24 Hrs  T(C): 37.3 (06 Jul 2017 06:42), Max: 37.3 (06 Jul 2017 06:42)  T(F): 99.1 (06 Jul 2017 06:42), Max: 99.1 (06 Jul 2017 06:42)  HR: 62 (06 Jul 2017 06:42) (57 - 62)  BP: 129/66 (06 Jul 2017 06:42) (121/72 - 129/66)  RR: 18 (06 Jul 2017 06:42) (18 - 18)  SpO2: 97% (06 Jul 2017 06:42) (96% - 97%)    Physical Exam:  Constitutional: well developed, well nourished,  and no acute distress    Neurological: Alert & Oriented x 2-3,  no focal deficits    HEENT:   Neck supple.    Respiratory: CTA B/L, No wheezing/crackles/rhonchi    Cardiovascular: (+) S1 & S2, RRR    Gastrointestinal: soft, NT, nondistended, (+) BS    Genitourinary: non distended bladder, voiding freely    Extremities: No pedal edema, No clubbing, No cyanosis    Skin:  normal skin color and pigmentation, no skin lesions          LABS:                        11.6   6.23  )-----------( 154      ( 05 Jul 2017 08:50 )             35.1     07-05    143  |  105  |  38<H>  ----------------------------<  95  3.9   |  23  |  2.74<H>    Ca    9.6      05 Jul 2017 08:46      PT/INR - ( 05 Jul 2017 08:52 )   PT: 37.1 sec;   INR: 3.21 ratio         PTT - ( 05 Jul 2017 08:52 )  PTT:34.7 sec      TELE: No tele

## 2017-07-06 NOTE — PROGRESS NOTE ADULT - SUBJECTIVE AND OBJECTIVE BOX
Anson Saeed  Progress note   Subjective:  72 years old male with PMH of dementia, hypothyroidism, GERD, a-fib on Coumadin/amio with SSS sp [place] sci dual chamber PPM, CLL(not on chemotherapy) presented with slurred speech , sent to ED by PMD after INR found to be 9.1. Monitoring while in patient was advised as INR was rising and a stable dose of Coumadin has yet to be established for a safe discharge. Patient was seen and evaluated at bedside today. Patient denied any symptoms. Denied any chest pain,SOB , abdominal pain, nausea, vomiting, dizziness, headache or bleeding from any site of the body. INR today is 3.92, patient did not receive any Coumadin last night.    Objective:    Vitals:    /66  Pulse 62 b/min  RR 18  Temp 99.1 F  O2 Sat: 97% on room air     Physical examination :    AXOX3, not in any acute distress    PERRLA, EOMI    No JVD    Chest b/l clear , no wheezing or crackles     S1 S2 no MGR     ABD soft NT BS+    No pedal edema     Assessment and plan:  72 years old male with PMH of dementia, hypothyroidism, GERD, a-fib on Coumadin/amio with SSS sp [place] sci dual chamber PPM, CLL(not on chemotherapy) presented with slurred speech , sent to ED by PMD after INR found to be 9.1. As INR is still elevated and Coumadin dose still needs adjustment , patient will benefit from in patient monitoring for atleast another day. Anson Saeed  Progress note   Subjective:  72 years old male with PMH of dementia, hypothyroidism, GERD, a-fib on Coumadin/amio with SSS sp [place] sci dual chamber PPM, CLL(not on chemotherapy) presented with slurred speech , sent to ED by PMD after INR found to be 9.1. Monitoring while in patient was advised as INR was rising and a stable dose of Coumadin has yet to be established for a safe discharge. Patient was seen and evaluated at bedside today. Patient denied any symptoms. Denied any chest pain,SOB , abdominal pain, nausea, vomiting, dizziness, headache or bleeding from any site of the body. INR today is 3.92, patient did not receive any Coumadin last night.    Objective:    Vitals:    /66  Pulse 62 b/min  RR 18  Temp 99.1 F  O2 Sat: 97% on room air     Physical examination :    AXOX3, not in any acute distress    PERRLA, EOMI    No JVD    Chest b/l clear , no wheezing or crackles     S1 S2 no MGR     ABD soft NT BS+  Left elbow tender, inflamed olecranon bursitis with possible bleed into sac.  No pedal edema     Assessment and plan:  72 years old male with PMH of dementia, hypothyroidism, GERD, a-fib on Coumadin/amio with SSS sp [place] sci dual chamber PPM, CLL(not on chemotherapy) presented with slurred speech , sent to ED by PMD after INR found to be 9.1. As INR is still elevated and Coumadin dose still needs adjustment , patient will benefit from in patient monitoring for atleast another day.

## 2017-07-06 NOTE — PROGRESS NOTE ADULT - SUBJECTIVE AND OBJECTIVE BOX
HPI:  72 years old male with mild dementia, hypothyroidism, GERD, afib on Coumadin/amio, SSS, dual chamber PPM, CLL(not on chemotherapy) presented with slurred speech, INR 9.1. Monitoring while in patient was advised as INR was rising and a stable dose of Coumadin has yet to be established. Today complains of severe pain at left elbow.    Medications:  amiodarone    Tablet 200 milliGRAM(s) Oral daily  memantine 10 milliGRAM(s) Oral daily  allopurinol 100 milliGRAM(s) Oral daily  sodium bicarbonate 650 milliGRAM(s) Oral two times a day  levothyroxine 125 MICROGram(s) Oral daily  tamsulosin 0.4 milliGRAM(s) Oral at bedtime  donepezil 5 milliGRAM(s) Oral at bedtime  mirtazapine 15 milliGRAM(s) Oral at bedtime  furosemide    Tablet 40 milliGRAM(s) Oral daily    PMH/PSH/FH/SH:  Unchanged    ROS:  General: no fatigue/malaise, weight loss/gain.  Skin: no rashes.  ENMT: no sore throat, rhinorrhea, sinus congestion.  Respiratory: no SOB, cough or wheeze.  Cardiovascular: no chest pain, dyspnea, palpitations, orthopnea or paroxysmal nocturnal dyspnea.  Gastrointestinal: no N/V/D, no melena/hematemesis/hematochezia.  Genitourinary: no dysuria/hesitancy or hematuria.  Musculoskeletal: inflamed left olecranon bursa.  Neurological: no changes in vision or hearing, no lightheadedness/dizziness, no syncope/near syncope	  Hematology/Lymphatics: no unusual bleeding, bruising and no lymphadenopathy.  Endocrine: no unusual thirst.   All others negative except as stated above and in HPI.     Vitals:  T(C): 37.3 (07-06-17 @ 06:42), Max: 37.3 (07-06-17 @ 06:42)  HR: 62 (07-06-17 @ 06:42) (57 - 62)  BP: 129/66 (07-06-17 @ 06:42) (121/72 - 129/66)  BP(mean): --  RR: 18 (07-06-17 @ 06:42) (18 - 18)  SpO2: 97% (07-06-17 @ 06:42) (96% - 97%)  Wt(kg): --  Daily     Daily     Physical exam:  AXOX3, not in any acute distress  PERRLA, EOMI  No JVD  Chest clear , no wheezing or crackles   S1 S2 no MGR   ABD soft NT BS+  Left elbow tender, inflamed olecranon bursitis with possible bleed into sac.  No pedal edema       I&O's Summary    05 Jul 2017 07:01  -  06 Jul 2017 07:00  --------------------------------------------------------  IN: 720 mL / OUT: 1700 mL / NET: -980 mL          07-06    142  |  106  |  40<H>  ----------------------------<  96  3.9   |  22  |  3.03<H>    Ca    9.0      06 Jul 2017 08:49      PT/INR - ( 06 Jul 2017 08:49 )   PT: 45.5 sec;   INR: 3.92 ratio         PTT - ( 05 Jul 2017 08:52 )  PTT:34.7 sec

## 2017-07-07 LAB
ANION GAP SERPL CALC-SCNC: 18 MMOL/L — HIGH (ref 5–17)
BUN SERPL-MCNC: 45 MG/DL — HIGH (ref 7–23)
CALCIUM SERPL-MCNC: 9.3 MG/DL — SIGNIFICANT CHANGE UP (ref 8.4–10.5)
CHLORIDE SERPL-SCNC: 104 MMOL/L — SIGNIFICANT CHANGE UP (ref 96–108)
CO2 SERPL-SCNC: 20 MMOL/L — LOW (ref 22–31)
CREAT SERPL-MCNC: 2.88 MG/DL — HIGH (ref 0.5–1.3)
GLUCOSE SERPL-MCNC: 144 MG/DL — HIGH (ref 70–99)
INR BLD: 4.33 RATIO — HIGH (ref 0.88–1.16)
POTASSIUM SERPL-MCNC: 4.9 MMOL/L — SIGNIFICANT CHANGE UP (ref 3.5–5.3)
POTASSIUM SERPL-SCNC: 4.9 MMOL/L — SIGNIFICANT CHANGE UP (ref 3.5–5.3)
PROTHROM AB SERPL-ACNC: 50.4 SEC — HIGH (ref 10–13.1)
SODIUM SERPL-SCNC: 142 MMOL/L — SIGNIFICANT CHANGE UP (ref 135–145)

## 2017-07-07 PROCEDURE — 99232 SBSQ HOSP IP/OBS MODERATE 35: CPT | Mod: GC

## 2017-07-07 PROCEDURE — 99231 SBSQ HOSP IP/OBS SF/LOW 25: CPT | Mod: GC

## 2017-07-07 RX ORDER — CLONAZEPAM 1 MG
1 TABLET ORAL AT BEDTIME
Qty: 0 | Refills: 0 | Status: DISCONTINUED | OUTPATIENT
Start: 2017-07-07 | End: 2017-07-11

## 2017-07-07 RX ORDER — MEMANTINE HYDROCHLORIDE 10 MG/1
10 TABLET ORAL
Qty: 0 | Refills: 0 | Status: DISCONTINUED | OUTPATIENT
Start: 2017-07-07 | End: 2017-07-11

## 2017-07-07 RX ADMIN — Medication 40 MILLIGRAM(S): at 05:06

## 2017-07-07 RX ADMIN — Medication 100 MILLIGRAM(S): at 11:49

## 2017-07-07 RX ADMIN — Medication 650 MILLIGRAM(S): at 23:04

## 2017-07-07 RX ADMIN — MIRTAZAPINE 15 MILLIGRAM(S): 45 TABLET, ORALLY DISINTEGRATING ORAL at 22:40

## 2017-07-07 RX ADMIN — Medication 650 MILLIGRAM(S): at 11:50

## 2017-07-07 RX ADMIN — DONEPEZIL HYDROCHLORIDE 5 MILLIGRAM(S): 10 TABLET, FILM COATED ORAL at 22:40

## 2017-07-07 RX ADMIN — MEMANTINE HYDROCHLORIDE 10 MILLIGRAM(S): 10 TABLET ORAL at 11:50

## 2017-07-07 RX ADMIN — Medication 30 MILLIGRAM(S): at 05:06

## 2017-07-07 RX ADMIN — Medication 1 MILLIGRAM(S): at 22:40

## 2017-07-07 RX ADMIN — AMIODARONE HYDROCHLORIDE 200 MILLIGRAM(S): 400 TABLET ORAL at 05:07

## 2017-07-07 RX ADMIN — MEMANTINE HYDROCHLORIDE 10 MILLIGRAM(S): 10 TABLET ORAL at 22:40

## 2017-07-07 RX ADMIN — Medication 125 MICROGRAM(S): at 05:06

## 2017-07-07 RX ADMIN — TAMSULOSIN HYDROCHLORIDE 0.4 MILLIGRAM(S): 0.4 CAPSULE ORAL at 22:40

## 2017-07-07 NOTE — PROGRESS NOTE ADULT - SUBJECTIVE AND OBJECTIVE BOX
VANE VALENZUELA     PROGRESS NOTE:      Subjective :  72 years old male with PMH of dementia, hypothyroidism, GERD, a-fib on Coumadin/amio with SSS sp [place] sci dual chamber PPM, CLL(not on chemotherapy) presented with slurred speech , sent to ED by PMD after INR found to be 9.1. Monitoring while in patient was advised as INR was rising and a stable dose of coumadin has yet to be established for a safe discharge. Patient was seen and evaluated at bedside today. Patient denied any symptoms. Denied any chest pain,SOB , abdominal pain, nausea, vomiting, dizziness, headache or bleeding from any site of the body. INR today is 4.33, patient did not receive any coumadin last night as well. As per patient his left elbow pain is much better than before and swelling has decreased .Patient was evaluated by rheumatology and was given methylprednisolone injections 30 mg IVP x2.       Objective:    /78  Pulse 56  RR 18  Temp 98.5  O2 saturation 96%    AXOX3, not in any acute distress    No JVD    Chest b/l clear, no wheezing or crackles    ABD soft NT BS+    No pedal edema     Decreased swelling , tenderness and erythema of the left elbow compared to yesterday

## 2017-07-07 NOTE — PROGRESS NOTE ADULT - ASSESSMENT
72 years old male with PMH of dementia, hypothyroidism, GERD, a-fib on Coumadin/amio with SSS sp [place] sci dual chamber PPM, CLL(not on chemotherapy) presented with slurred speech , sent to ED by PMD after INR found to be 9.1. Patient's INR is still rising and today's INR is higher than yesterday; 4.33 vs 3.92 and has been off coumadin for last 2 days.     - Continue to monitor INR   - Continue to hold coumadin for atleast another day   - Elbow swelling and erythema improved , less likely due to bleed and more likely due to gout associated bursitis.

## 2017-07-07 NOTE — PROGRESS NOTE ADULT - SUBJECTIVE AND OBJECTIVE BOX
24H hour events:   improved left elbow pain seen by rheum and started on steroids  states havnig more vivid dreams now off his benzo (which he is on for depression and anxiety) and sleep deprivations from his neighbor. Requesting a room change or dispo      MEDICATIONS:  amiodarone    Tablet 200 milliGRAM(s) Oral daily  tamsulosin 0.4 milliGRAM(s) Oral at bedtime  furosemide    Tablet 40 milliGRAM(s) Oral daily        memantine 10 milliGRAM(s) Oral daily  donepezil 5 milliGRAM(s) Oral at bedtime  mirtazapine 15 milliGRAM(s) Oral at bedtime      allopurinol 100 milliGRAM(s) Oral daily  levothyroxine 125 MICROGram(s) Oral daily    sodium bicarbonate 650 milliGRAM(s) Oral two times a day      REVIEW OF SYSTEMS:  General: no fatigue/malaise, weight loss/gain.  Skin: no rashes.  Ophthalmologic: no blurred vision, no loss of vision. 	  ENT: no sore throat, rhinorrhea, sinus congestion.  Respiratory: no SOB, cough or wheeze.  Gastrointestinal:  no N/V/D, no melena/hematemesis/hematochezia.  Genitourinary: no dysuria/hesitancy or hematuria.  Musculoskeletal: no myalgias or arthralgias.  Neurological: no changes in vision or hearing, no lightheadedness/dizziness, no syncope/near syncope	  Psychiatric: no unusual stress/anxiety.   Hematology/Lymphatics: no unusual bleeding, bruising and no lymphadenopathy.  Endocrine: no unusual thirst.   All others negative except as stated above and in HPI.    PHYSICAL EXAM:  T(C): 36.9 (07-07-17 @ 05:05), Max: 37.4 (07-06-17 @ 20:09)  HR: 56 (07-07-17 @ 05:05) (56 - 66)  BP: 134/78 (07-07-17 @ 05:05) (124/71 - 138/74)  RR: 18 (07-07-17 @ 05:05) (18 - 18)  SpO2: 96% (07-07-17 @ 05:05) (96% - 97%)  Wt(kg): --  I&O's Summary    06 Jul 2017 07:01  -  07 Jul 2017 07:00  --------------------------------------------------------  IN: 1440 mL / OUT: 2050 mL / NET: -610 mL    07 Jul 2017 07:01  -  07 Jul 2017 10:51  --------------------------------------------------------  IN: 360 mL / OUT: 0 mL / NET: 360 mL        Appearance: Normal	  HEENT:   Normal oral mucosa, PERRL, EOMI	  Lymphatic: No lymphadenopathy  Cardiovascular: Normal S1 S2, No JVD, No murmurs, No edema  Respiratory: Lungs clear to auscultation	  Psychiatry: A & O x 3, Mood & affect appropriate  Gastrointestinal:  Soft, Non-tender, + BS	  Skin: No rashes, No ecchymoses, No cyanosis	  Neurologic: Non-focal  Extremities: Normal range of motion, No clubbing, cyanosis or edema  Vascular: Peripheral pulses palpable 2+ bilaterally        LABS:	 	    CBC Full  -  ( 06 Jul 2017 08:49 )  WBC Count : 6.55 K/uL  Hemoglobin : 11.8 g/dL  Hematocrit : 35.7 %  Platelet Count - Automated : 164 K/uL  Mean Cell Volume : 92.2 fl  Mean Cell Hemoglobin : 30.5 pg  Mean Cell Hemoglobin Concentration : 33.1 gm/dL  Auto Neutrophil # : x  Auto Lymphocyte # : x  Auto Monocyte # : x  Auto Eosinophil # : x  Auto Basophil # : x  Auto Neutrophil % : x  Auto Lymphocyte % : x  Auto Monocyte % : x  Auto Eosinophil % : x  Auto Basophil % : x    07-07    142  |  104  |  45<H>  ----------------------------<  144<H>  4.9   |  20<L>  |  2.88<H>  07-06    142  |  106  |  40<H>  ----------------------------<  96  3.9   |  22  |  3.03<H>    Ca    9.3      07 Jul 2017 09:12  Ca    9.0      06 Jul 2017 08:49      72 years old male with PMH of dementia, hypothyroidism, GERD, a-fib on Coumadin/amio with SSS sp [place] sci dual chamber PPM, CLL(not on chemotherapy) presented with INr of 9.1 and slurred speech , CTH neg.  - home dose of coumadin is 1mg qd.   - INR is uptrending to 4.33. cont holding coumadin, when downtrends restart on 1mg qd      Lesley Steele MD [id number]  [facility] 24H hour events:   improved left elbow pain seen by rheum and started on steroids  states havnig more vivid dreams now off his benzo (which he is on for depression and anxiety) and sleep deprivations from his neighbor. Requesting a room change or dispo      MEDICATIONS:  amiodarone    Tablet 200 milliGRAM(s) Oral daily  tamsulosin 0.4 milliGRAM(s) Oral at bedtime  furosemide    Tablet 40 milliGRAM(s) Oral daily        memantine 10 milliGRAM(s) Oral daily  donepezil 5 milliGRAM(s) Oral at bedtime  mirtazapine 15 milliGRAM(s) Oral at bedtime      allopurinol 100 milliGRAM(s) Oral daily  levothyroxine 125 MICROGram(s) Oral daily    sodium bicarbonate 650 milliGRAM(s) Oral two times a day      REVIEW OF SYSTEMS:  General: no fatigue/malaise, weight loss/gain.  Skin: no rashes.  Ophthalmologic: no blurred vision, no loss of vision. 	  ENT: no sore throat, rhinorrhea, sinus congestion.  Respiratory: no SOB, cough or wheeze.  Gastrointestinal:  no N/V/D, no melena/hematemesis/hematochezia.  Genitourinary: no dysuria/hesitancy or hematuria.  Musculoskeletal: no myalgias or arthralgias.  Neurological: no changes in vision or hearing, no lightheadedness/dizziness, no syncope/near syncope	  Psychiatric: no unusual stress/anxiety.   Hematology/Lymphatics: no unusual bleeding, bruising and no lymphadenopathy.  Endocrine: no unusual thirst.   All others negative except as stated above and in HPI.    PHYSICAL EXAM:  T(C): 36.9 (07-07-17 @ 05:05), Max: 37.4 (07-06-17 @ 20:09)  HR: 56 (07-07-17 @ 05:05) (56 - 66)  BP: 134/78 (07-07-17 @ 05:05) (124/71 - 138/74)  RR: 18 (07-07-17 @ 05:05) (18 - 18)  SpO2: 96% (07-07-17 @ 05:05) (96% - 97%)  Wt(kg): --  I&O's Summary    06 Jul 2017 07:01  -  07 Jul 2017 07:00  --------------------------------------------------------  IN: 1440 mL / OUT: 2050 mL / NET: -610 mL    07 Jul 2017 07:01  -  07 Jul 2017 10:51  --------------------------------------------------------  IN: 360 mL / OUT: 0 mL / NET: 360 mL        Appearance: Normal	  HEENT:   Normal oral mucosa, PERRL, EOMI	  Lymphatic: No lymphadenopathy  Cardiovascular: Normal S1 S2, No JVD, No murmurs, No edema  Respiratory: Lungs clear to auscultation	  Psychiatry: A & O x 3, Mood & affect appropriate  Gastrointestinal:  Soft, Non-tender, + BS	  Skin: No rashes, No ecchymoses, No cyanosis	  Neurologic: Non-focal  Extremities: Normal range of motion, No clubbing, cyanosis or edema  Vascular: Peripheral pulses palpable 2+ bilaterally        LABS:	 	    CBC Full  -  ( 06 Jul 2017 08:49 )  WBC Count : 6.55 K/uL  Hemoglobin : 11.8 g/dL  Hematocrit : 35.7 %  Platelet Count - Automated : 164 K/uL  Mean Cell Volume : 92.2 fl  Mean Cell Hemoglobin : 30.5 pg  Mean Cell Hemoglobin Concentration : 33.1 gm/dL  Auto Neutrophil # : x  Auto Lymphocyte # : x  Auto Monocyte # : x  Auto Eosinophil # : x  Auto Basophil # : x  Auto Neutrophil % : x  Auto Lymphocyte % : x  Auto Monocyte % : x  Auto Eosinophil % : x  Auto Basophil % : x    07-07    142  |  104  |  45<H>  ----------------------------<  144<H>  4.9   |  20<L>  |  2.88<H>  07-06    142  |  106  |  40<H>  ----------------------------<  96  3.9   |  22  |  3.03<H>    Ca    9.3      07 Jul 2017 09:12  Ca    9.0      06 Jul 2017 08:49      72 years old man mild dementia, hypothyroidism, GERD, a-fib on Coumadin/amio with SSS [place] sci dual chamber PPM, CLL(not on chemotherapy) presented with INr of 9.1 and slurred speech , CTH neg.  - home dose of coumadin is 1mg qd.   - INR is uptrending to 4.33. cont holding coumadin, when downtrends restart on 1mg qd      Lesley Steele MD [id number]  [facility]

## 2017-07-07 NOTE — PROGRESS NOTE ADULT - SUBJECTIVE AND OBJECTIVE BOX
No pain, no shortness of breath        VITAL:  T(C): , Max: 37.4 (07-06-17 @ 20:09)  T(F): , Max: 99.3 (07-06-17 @ 20:09)  HR: 56 (07-07-17 @ 05:05)  BP: 134/78 (07-07-17 @ 05:05)  BP(mean): --  RR: 18 (07-07-17 @ 05:05)  SpO2: 96% (07-07-17 @ 05:05)    PHYSICAL EXAM:  General: alert, pleasant, NAD  HEENTN: supple, no JVD  CHEST/LUNG: CTA-b/l  HEART:  RRR S1S2  ABDOMEN: soft NTND, no walker  EXTREMITIES:  L elbow tender      LABS:                        11.8   6.55  )-----------( 164      ( 06 Jul 2017 08:49 )             35.7     Na(142)/K(4.9)/Cl(104)/HCO3(20)/BUN(45)/Cr(2.88)Glu(144)/Ca(9.3)/Mg(--)/PO4(--)    07-07 @ 09:12  Na(142)/K(3.9)/Cl(106)/HCO3(22)/BUN(40)/Cr(3.03)Glu(96)/Ca(9.0)/Mg(--)/PO4(--)    07-06 @ 08:49  Na(143)/K(3.9)/Cl(105)/HCO3(23)/BUN(38)/Cr(2.74)Glu(95)/Ca(9.6)/Mg(--)/PO4(--)    07-05 @ 08:46      IMPRESSION:    (1)Renal - CKD stage 4 - GFR ~20ml/min. Reasonably stable function. 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     (2) - urinary retention on admission; stable renal function s/p removal of walker    (3)Afib - on coumadin    (4)L elbow pain - gout? Rheum on board; on steroids    RECOMMEND:    (1)Meds as ordered; dose new meds for GFR 20-30ml/min  (2)A/C per primary team  (3)management of elbow pain per primary team/Rheum                Norman Ascencio MD  [facility]  [phone]

## 2017-07-07 NOTE — DIETITIAN INITIAL EVALUATION ADULT. - OTHER INFO
verbal nutrition request by lead  RN, Mili, to see patienyt about food preferences. Pt requesting spincah a high vitamin K food secondary to elevated INR. Pt is on coumadin at home, now on hold.  Pt requesting spinach lunch and dinner, consistent vitamin K intake to be maintained.

## 2017-07-07 NOTE — PROGRESS NOTE ADULT - ASSESSMENT
left  olecraonon  bursitis, improved  with  steroid,   still  with coagulopathy,  despite  holding  coumadin.  follow  inr  in  am,  seen by  rheum,  ,  inr  in  am

## 2017-07-07 NOTE — PROGRESS NOTE ADULT - ASSESSMENT
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     Plan:  - Left elbow pain is improved today on steroids. Starting tomorrow, can switch steroid regimen to Prednisone 30mg QD. Would perform steroid taper as follows:       1). Prednisone 30mg QD x3 days (starting tomorrow (7/8-7/10)       2). Prednisone 25mg QD x3 days (7/11-7/13)       3). Prednisone 20mg QD x3 days (7/14-7/16)       4). Prednisone 15mg QD x3 days (7/17-7/19)       5). Prednisone 10mg QD x3 days (7/20-7/22)       6). Prednisone 5mg QD x3 days (7/23-7/25)  - Would consider surgical evaluation for possible removal of L elbow soft tissue swelling.  - If pain persists or worsens after discharge, patient can call [phone] to schedule a follow-up appointment in the Rheumatology Clinic

## 2017-07-07 NOTE — PROGRESS NOTE ADULT - SUBJECTIVE AND OBJECTIVE BOX
SUBJECTIVE / OVERNIGHT EVENTS: No nausea, vomiting or diarrhea, no fever or chills, no dizziness or chest pain, no dysuria or hematuria .    Vital Signs Last 24 Hrs  T(C): 37.1 (07-07-17 @ 13:18), Max: 37.4 (07-06-17 @ 20:09)  HR: 76 (07-07-17 @ 13:18) (56 - 76)  BP: 138/77 (07-07-17 @ 13:18) (124/71 - 138/77)  RR: 18 (07-07-17 @ 13:18) (18 - 18)  SpO2: 96% (07-07-17 @ 13:18) (96% - 97%)  CAPILLARY BLOOD GLUCOSE        I&O's Summary    06 Jul 2017 07:01  -  07 Jul 2017 07:00  --------------------------------------------------------  IN: 1440 mL / OUT: 2050 mL / NET: -610 mL    07 Jul 2017 07:01  -  07 Jul 2017 13:20  --------------------------------------------------------  IN: 360 mL / OUT: 400 mL / NET: -40 mL        PHYSICAL EXAM:  HEAD:  Atraumatic, Normocephalic  EYES: EOMI, PERRLA, conjunctiva and sclera clear  NECK: Supple, No JVD  CHEST/LUNG: Clear to auscultation bilaterally; No wheeze  HEART: Regular rate and rhythm; No murmurs, rubs, or gallops  ABDOMEN: Soft, Nontender, Nondistended; Bowel sounds present  EXTREMITIES:  2+ Peripheral Pulses, No clubbing, cyanosis, or edema  PSYCH: AAOx3  NEUROLOGY: non-focal  SKIN: No rashes or lesions    MEDICATIONS  (STANDING):  amiodarone    Tablet 200 milliGRAM(s) Oral daily  allopurinol 100 milliGRAM(s) Oral daily  sodium bicarbonate 650 milliGRAM(s) Oral two times a day  levothyroxine 125 MICROGram(s) Oral daily  tamsulosin 0.4 milliGRAM(s) Oral at bedtime  donepezil 5 milliGRAM(s) Oral at bedtime  mirtazapine 15 milliGRAM(s) Oral at bedtime  furosemide    Tablet 40 milliGRAM(s) Oral daily  memantine 10 milliGRAM(s) Oral two times a day    MEDICATIONS  (PRN):      LABS:                        11.8   6.55  )-----------( 164      ( 06 Jul 2017 08:49 )             35.7     07-07    142  |  104  |  45<H>  ----------------------------<  144<H>  4.9   |  20<L>  |  2.88<H>    Ca    9.3      07 Jul 2017 09:12      PT/INR - ( 07 Jul 2017 07:32 )   PT: 50.4 sec;   INR: 4.33 ratio                             Cultures:    EKG:    Radiological Studies:    Consultant(s) Notes Reviewed:      Care Discussed with Consultants/Other Providers:

## 2017-07-07 NOTE — PROGRESS NOTE ADULT - SUBJECTIVE AND OBJECTIVE BOX
INTERVAL HPI/OVERNIGHT EVENTS:    MEDICATIONS  (STANDING):  amiodarone    Tablet 200 milliGRAM(s) Oral daily  memantine 10 milliGRAM(s) Oral daily  allopurinol 100 milliGRAM(s) Oral daily  sodium bicarbonate 650 milliGRAM(s) Oral two times a day  levothyroxine 125 MICROGram(s) Oral daily  tamsulosin 0.4 milliGRAM(s) Oral at bedtime  donepezil 5 milliGRAM(s) Oral at bedtime  mirtazapine 15 milliGRAM(s) Oral at bedtime  furosemide    Tablet 40 milliGRAM(s) Oral daily    Vital Signs Last 24 Hrs  T(C): 36.9 (07 Jul 2017 05:05), Max: 37.4 (06 Jul 2017 20:09)  T(F): 98.5 (07 Jul 2017 05:05), Max: 99.3 (06 Jul 2017 20:09)  HR: 56 (07 Jul 2017 05:05) (56 - 66)  BP: 134/78 (07 Jul 2017 05:05) (124/71 - 138/74)  BP(mean): --  RR: 18 (07 Jul 2017 05:05) (18 - 18)  SpO2: 96% (07 Jul 2017 05:05) (96% - 97%)    PHYSICAL EXAM:          LABS:                        11.8   6.55  )-----------( 164      ( 06 Jul 2017 08:49 )             35.7     07-06    142  |  106  |  40<H>  ----------------------------<  96  3.9   |  22  |  3.03<H>    Ca    9.0      06 Jul 2017 08:49      PT/INR - ( 07 Jul 2017 07:32 )   PT: 50.4 sec;   INR: 4.33 ratio        RADIOLOGY & ADDITIONAL TESTS: INTERVAL HPI/OVERNIGHT EVENTS:    MEDICATIONS  (STANDING):  Solumedrol 30mg     amiodarone    Tablet 200 milliGRAM(s) Oral daily  memantine 10 milliGRAM(s) Oral daily  allopurinol 100 milliGRAM(s) Oral daily  sodium bicarbonate 650 milliGRAM(s) Oral two times a day  levothyroxine 125 MICROGram(s) Oral daily  tamsulosin 0.4 milliGRAM(s) Oral at bedtime  donepezil 5 milliGRAM(s) Oral at bedtime  mirtazapine 15 milliGRAM(s) Oral at bedtime  furosemide    Tablet 40 milliGRAM(s) Oral daily    Vital Signs Last 24 Hrs  T(C): 36.9 (07 Jul 2017 05:05), Max: 37.4 (06 Jul 2017 20:09)  T(F): 98.5 (07 Jul 2017 05:05), Max: 99.3 (06 Jul 2017 20:09)  HR: 56 (07 Jul 2017 05:05) (56 - 66)  BP: 134/78 (07 Jul 2017 05:05) (124/71 - 138/74)  BP(mean): --  RR: 18 (07 Jul 2017 05:05) (18 - 18)  SpO2: 96% (07 Jul 2017 05:05) (96% - 97%)    PHYSICAL EXAM:          LABS:                        11.8   6.55  )-----------( 164      ( 06 Jul 2017 08:49 )             35.7     07-06    142  |  106  |  40<H>  ----------------------------<  96  3.9   |  22  |  3.03<H>    Ca    9.0      06 Jul 2017 08:49      PT/INR - ( 07 Jul 2017 07:32 )   PT: 50.4 sec;   INR: 4.33 ratio        RADIOLOGY & ADDITIONAL TESTS:  Elbow xray  No evidence of acute fracture or dislocation. There is marked soft tissue   swelling along the posterior aspect of the elbow over the olecranon   process. There are interspersed foci of high density raising the   possibility of gout. There is no elbow joint effusion. The joint spaces   are preserved. Enthesopathic change at the insertion of the triceps on   the olecranon.    IMPRESSION:     Marked soft tissue swelling overlying the olecranon consistent with   olecranon bursitis, possibly related to gout. INTERVAL HPI/OVERNIGHT EVENTS:  Patient reports that his left elbow feels better compared to yesterday, explaining that his pain improved from 6/10 yesterday to 4/10 today. He denies any pain present in his other joints. No fever or chills overnight.    MEDICATIONS  (STANDING):  Solumedrol 30mg     amiodarone    Tablet 200 milliGRAM(s) Oral daily  memantine 10 milliGRAM(s) Oral daily  allopurinol 100 milliGRAM(s) Oral daily  sodium bicarbonate 650 milliGRAM(s) Oral two times a day  levothyroxine 125 MICROGram(s) Oral daily  tamsulosin 0.4 milliGRAM(s) Oral at bedtime  donepezil 5 milliGRAM(s) Oral at bedtime  mirtazapine 15 milliGRAM(s) Oral at bedtime  furosemide    Tablet 40 milliGRAM(s) Oral daily    Vital Signs Last 24 Hrs  T(C): 36.9 (07 Jul 2017 05:05), Max: 37.4 (06 Jul 2017 20:09)  T(F): 98.5 (07 Jul 2017 05:05), Max: 99.3 (06 Jul 2017 20:09)  HR: 56 (07 Jul 2017 05:05) (56 - 66)  BP: 134/78 (07 Jul 2017 05:05) (124/71 - 138/74)  BP(mean): --  RR: 18 (07 Jul 2017 05:05) (18 - 18)  SpO2: 96% (07 Jul 2017 05:05) (96% - 97%)    PHYSICAL EXAM:    Constitutional: No acute distress  HEENT: NC/AT  Respiratory: CTAB  Cardiovascular: RRR  Gastrointestinal: + BS, Soft, Nontender  Genitourinary: No Tom  Musculoskeletal: Swelling, erythema, and some warmth of L olecranon bursa which is less tender to palpation, ROM of L elbow is slightly improved, Tophi within olecranon bursa is present  Skin: Erythema and some warmth of L olecranon bursa  Psych: Normal mood/affect    LABS:                        11.8   6.55  )-----------( 164      ( 06 Jul 2017 08:49 )             35.7     07-06    142  |  106  |  40<H>  ----------------------------<  96  3.9   |  22  |  3.03<H>    Ca    9.0      06 Jul 2017 08:49      PT/INR - ( 07 Jul 2017 07:32 )   PT: 50.4 sec;   INR: 4.33 ratio      Uric acid (7/6): 8.7    RADIOLOGY & ADDITIONAL TESTS:  Elbow xray  No evidence of acute fracture or dislocation. There is marked soft tissue   swelling along the posterior aspect of the elbow over the olecranon   process. There are interspersed foci of high density raising the   possibility of gout. There is no elbow joint effusion. The joint spaces   are preserved. Enthesopathic change at the insertion of the triceps on   the olecranon.    IMPRESSION:     Marked soft tissue swelling overlying the olecranon consistent with   olecranon bursitis, possibly related to gout.

## 2017-07-08 LAB
ANION GAP SERPL CALC-SCNC: 16 MMOL/L — SIGNIFICANT CHANGE UP (ref 5–17)
BUN SERPL-MCNC: 54 MG/DL — HIGH (ref 7–23)
CALCIUM SERPL-MCNC: 9.8 MG/DL — SIGNIFICANT CHANGE UP (ref 8.4–10.5)
CHLORIDE SERPL-SCNC: 105 MMOL/L — SIGNIFICANT CHANGE UP (ref 96–108)
CO2 SERPL-SCNC: 21 MMOL/L — LOW (ref 22–31)
CREAT SERPL-MCNC: 2.83 MG/DL — HIGH (ref 0.5–1.3)
GLUCOSE SERPL-MCNC: 104 MG/DL — HIGH (ref 70–99)
HCT VFR BLD CALC: 34.1 % — LOW (ref 39–50)
HGB BLD-MCNC: 11.3 G/DL — LOW (ref 13–17)
INR BLD: 4.39 RATIO — HIGH (ref 0.88–1.16)
MCHC RBC-ENTMCNC: 31 PG — SIGNIFICANT CHANGE UP (ref 27–34)
MCHC RBC-ENTMCNC: 33.1 GM/DL — SIGNIFICANT CHANGE UP (ref 32–36)
MCV RBC AUTO: 93.4 FL — SIGNIFICANT CHANGE UP (ref 80–100)
PLATELET # BLD AUTO: 154 K/UL — SIGNIFICANT CHANGE UP (ref 150–400)
POTASSIUM SERPL-MCNC: 4.2 MMOL/L — SIGNIFICANT CHANGE UP (ref 3.5–5.3)
POTASSIUM SERPL-SCNC: 4.2 MMOL/L — SIGNIFICANT CHANGE UP (ref 3.5–5.3)
PROTHROM AB SERPL-ACNC: 51.1 SEC — HIGH (ref 10–13.1)
RBC # BLD: 3.65 M/UL — LOW (ref 4.2–5.8)
RBC # FLD: 13.4 % — SIGNIFICANT CHANGE UP (ref 10.3–14.5)
SODIUM SERPL-SCNC: 142 MMOL/L — SIGNIFICANT CHANGE UP (ref 135–145)
WBC # BLD: 8.83 K/UL — SIGNIFICANT CHANGE UP (ref 3.8–10.5)
WBC # FLD AUTO: 8.83 K/UL — SIGNIFICANT CHANGE UP (ref 3.8–10.5)

## 2017-07-08 PROCEDURE — 99232 SBSQ HOSP IP/OBS MODERATE 35: CPT

## 2017-07-08 RX ADMIN — Medication 100 MILLIGRAM(S): at 12:54

## 2017-07-08 RX ADMIN — Medication 1 MILLIGRAM(S): at 21:17

## 2017-07-08 RX ADMIN — TAMSULOSIN HYDROCHLORIDE 0.4 MILLIGRAM(S): 0.4 CAPSULE ORAL at 21:17

## 2017-07-08 RX ADMIN — Medication 125 MICROGRAM(S): at 05:42

## 2017-07-08 RX ADMIN — MEMANTINE HYDROCHLORIDE 10 MILLIGRAM(S): 10 TABLET ORAL at 05:42

## 2017-07-08 RX ADMIN — Medication 650 MILLIGRAM(S): at 23:04

## 2017-07-08 RX ADMIN — DONEPEZIL HYDROCHLORIDE 5 MILLIGRAM(S): 10 TABLET, FILM COATED ORAL at 21:17

## 2017-07-08 RX ADMIN — MIRTAZAPINE 15 MILLIGRAM(S): 45 TABLET, ORALLY DISINTEGRATING ORAL at 21:17

## 2017-07-08 RX ADMIN — Medication 40 MILLIGRAM(S): at 05:42

## 2017-07-08 RX ADMIN — AMIODARONE HYDROCHLORIDE 200 MILLIGRAM(S): 400 TABLET ORAL at 05:42

## 2017-07-08 RX ADMIN — Medication 30 MILLIGRAM(S): at 05:42

## 2017-07-08 RX ADMIN — MEMANTINE HYDROCHLORIDE 10 MILLIGRAM(S): 10 TABLET ORAL at 17:58

## 2017-07-08 RX ADMIN — Medication 650 MILLIGRAM(S): at 12:54

## 2017-07-08 NOTE — PROGRESS NOTE ADULT - SUBJECTIVE AND OBJECTIVE BOX
24H hour events: No acute events overnight. Denies any bleeding, echymosis.     MEDICATIONS:  amiodarone    Tablet 200 milliGRAM(s) Oral daily  tamsulosin 0.4 milliGRAM(s) Oral at bedtime  furosemide    Tablet 40 milliGRAM(s) Oral daily        donepezil 5 milliGRAM(s) Oral at bedtime  mirtazapine 15 milliGRAM(s) Oral at bedtime  memantine 10 milliGRAM(s) Oral two times a day  clonazePAM Tablet 1 milliGRAM(s) Oral at bedtime      allopurinol 100 milliGRAM(s) Oral daily  levothyroxine 125 MICROGram(s) Oral daily  predniSONE   Tablet 30 milliGRAM(s) Oral daily    sodium bicarbonate 650 milliGRAM(s) Oral two times a day      REVIEW OF SYSTEMS:  Complete 10point ROS negative.    PHYSICAL EXAM:  T(C): 37 (07-08-17 @ 05:41), Max: 37.2 (07-07-17 @ 19:50)  HR: 60 (07-08-17 @ 05:41) (58 - 76)  BP: 123/64 (07-08-17 @ 05:41) (123/64 - 140/74)  RR: 16 (07-08-17 @ 05:41) (16 - 18)  SpO2: 95% (07-07-17 @ 19:50) (95% - 96%)  Wt(kg): --  I&O's Summary    07 Jul 2017 07:01  -  08 Jul 2017 07:00  --------------------------------------------------------  IN: 1480 mL / OUT: 1475 mL / NET: 5 mL        Appearance: Normal	  HEENT:   Normal oral mucosa, PERRL, EOMI	  Lymphatic: No lymphadenopathy  Cardiovascular: Normal S1 S2, No JVD, No murmurs, No edema  Respiratory: Lungs clear to auscultation	  Psychiatry: A & O x 3, Mood & affect appropriate  Gastrointestinal:  Soft, Non-tender, + BS	  Skin: No rashes, No ecchymoses, No cyanosis	  Neurologic: Non-focal  Extremities: Normal range of motion, No clubbing, cyanosis or edema  Vascular: Peripheral pulses palpable 2+ bilaterally        LABS:	 	    CBC Full  -  ( 08 Jul 2017 08:35 )  WBC Count : 8.83 K/uL  Hemoglobin : 11.3 g/dL  Hematocrit : 34.1 %  Platelet Count - Automated : 154 K/uL  Mean Cell Volume : 93.4 fl  Mean Cell Hemoglobin : 31.0 pg  Mean Cell Hemoglobin Concentration : 33.1 gm/dL  Auto Neutrophil # : x  Auto Lymphocyte # : x  Auto Monocyte # : x  Auto Eosinophil # : x  Auto Basophil # : x  Auto Neutrophil % : x  Auto Lymphocyte % : x  Auto Monocyte % : x  Auto Eosinophil % : x  Auto Basophil % : x    07-07    142  |  104  |  45<H>  ----------------------------<  144<H>  4.9   |  20<L>  |  2.88<H>    Ca    9.3      07 Jul 2017 09:12          TELEMETRY: 	  off tele    	  ASSESSMENT/PLAN: 	  72 year old man mild dementia, hypothyroidism, GERD, a-fib on Coumadin/amio with SSS [place] sci dual chamber PPM, CLL(not on chemotherapy) presented with INr of 9.1 and slurred speech , CTH neg.    - INR maintains supratherapeutic, cont to hold for now  - home dose of coumadin is 1mg qd  - restart when INR <3 (goal 2-3)    [id number]

## 2017-07-08 NOTE — PROGRESS NOTE ADULT - ASSESSMENT
htn, AFIB ON COUMADIN, STILL WITH  HIGH INR,  FOLLOW  INR  IN AM   LEFT  OLECRANON  BURSITIS  FROM  GOUT,  SEEN BY  RHEUM, ON TAPERING  PREDNISONE,  PT  STATES  HE  WILL  GO, WHEN INR  BRITO S   BEEN THERAPEUTIC  FOR  2  DAYS  IN A ROW

## 2017-07-08 NOTE — PROGRESS NOTE ADULT - SUBJECTIVE AND OBJECTIVE BOX
SUBJECTIVE / OVERNIGHT EVENTS: No nausea, vomiting or diarrhea, no fever or chills, no dizziness or chest pain, no dysuria or hematuria .    Vital Signs Last 24 Hrs  T(C): 37.2 (07-08-17 @ 10:34), Max: 37.2 (07-07-17 @ 19:50)  HR: 82 (07-08-17 @ 10:34) (58 - 82)  BP: 148/78 (07-08-17 @ 10:34) (123/64 - 148/78)  RR: 18 (07-08-17 @ 10:34) (16 - 18)  SpO2: 94% (07-08-17 @ 10:34) (94% - 96%)  CAPILLARY BLOOD GLUCOSE        I&O's Summary    07 Jul 2017 07:01  -  08 Jul 2017 07:00  --------------------------------------------------------  IN: 1480 mL / OUT: 1475 mL / NET: 5 mL    08 Jul 2017 07:01  -  08 Jul 2017 11:12  --------------------------------------------------------  IN: 360 mL / OUT: 0 mL / NET: 360 mL        PHYSICAL EXAM:  HEAD:  Atraumatic, Normocephalic  EYES: EOMI, PERRLA, conjunctiva and sclera clear  NECK: Supple, No JVD  CHEST/LUNG: Clear to auscultation bilaterally; No wheeze  HEART: Regular rate and rhythm; No murmurs, rubs, or gallops  ABDOMEN: Soft, Nontender, Nondistended; Bowel sounds present  EXTREMITIES:  2+ Peripheral Pulses, No clubbing, cyanosis, or edema.  left  olecranon bursistis  from goutm resolving  PSYCH: AAOx3  NEUROLOGY: non-focal  SKIN: No rashes or lesions    MEDICATIONS  (STANDING):  amiodarone    Tablet 200 milliGRAM(s) Oral daily  allopurinol 100 milliGRAM(s) Oral daily  sodium bicarbonate 650 milliGRAM(s) Oral two times a day  levothyroxine 125 MICROGram(s) Oral daily  tamsulosin 0.4 milliGRAM(s) Oral at bedtime  donepezil 5 milliGRAM(s) Oral at bedtime  mirtazapine 15 milliGRAM(s) Oral at bedtime  furosemide    Tablet 40 milliGRAM(s) Oral daily  memantine 10 milliGRAM(s) Oral two times a day  clonazePAM Tablet 1 milliGRAM(s) Oral at bedtime  predniSONE   Tablet 30 milliGRAM(s) Oral daily    MEDICATIONS  (PRN):      LABS:                        11.3   8.83  )-----------( 154      ( 08 Jul 2017 08:35 )             34.1     07-08    142  |  105  |  54<H>  ----------------------------<  104<H>  4.2   |  21<L>  |  2.83<H>    Ca    9.8      08 Jul 2017 08:46      PT/INR - ( 08 Jul 2017 08:35 )   PT: 51.1 sec;   INR: 4.39 ratio                             Cultures:    EKG:    Radiological Studies:    Consultant(s) Notes Reviewed:      Care Discussed with Consultants/Other Providers:

## 2017-07-09 LAB
ANION GAP SERPL CALC-SCNC: 14 MMOL/L — SIGNIFICANT CHANGE UP (ref 5–17)
APTT BLD: 33.1 SEC — SIGNIFICANT CHANGE UP (ref 27.5–37.4)
BUN SERPL-MCNC: 56 MG/DL — HIGH (ref 7–23)
CALCIUM SERPL-MCNC: 9.4 MG/DL — SIGNIFICANT CHANGE UP (ref 8.4–10.5)
CHLORIDE SERPL-SCNC: 105 MMOL/L — SIGNIFICANT CHANGE UP (ref 96–108)
CO2 SERPL-SCNC: 22 MMOL/L — SIGNIFICANT CHANGE UP (ref 22–31)
CREAT SERPL-MCNC: 2.83 MG/DL — HIGH (ref 0.5–1.3)
GLUCOSE SERPL-MCNC: 76 MG/DL — SIGNIFICANT CHANGE UP (ref 70–99)
HCT VFR BLD CALC: 34.8 % — LOW (ref 39–50)
HGB BLD-MCNC: 11.6 G/DL — LOW (ref 13–17)
INR BLD: 3.11 RATIO — HIGH (ref 0.88–1.16)
MCHC RBC-ENTMCNC: 31.4 PG — SIGNIFICANT CHANGE UP (ref 27–34)
MCHC RBC-ENTMCNC: 33.3 GM/DL — SIGNIFICANT CHANGE UP (ref 32–36)
MCV RBC AUTO: 94.1 FL — SIGNIFICANT CHANGE UP (ref 80–100)
PLATELET # BLD AUTO: 161 K/UL — SIGNIFICANT CHANGE UP (ref 150–400)
POTASSIUM SERPL-MCNC: 4.4 MMOL/L — SIGNIFICANT CHANGE UP (ref 3.5–5.3)
POTASSIUM SERPL-SCNC: 4.4 MMOL/L — SIGNIFICANT CHANGE UP (ref 3.5–5.3)
PROTHROM AB SERPL-ACNC: 36 SEC — HIGH (ref 10–13.1)
RBC # BLD: 3.7 M/UL — LOW (ref 4.2–5.8)
RBC # FLD: 13.5 % — SIGNIFICANT CHANGE UP (ref 10.3–14.5)
SODIUM SERPL-SCNC: 141 MMOL/L — SIGNIFICANT CHANGE UP (ref 135–145)
WBC # BLD: 8.52 K/UL — SIGNIFICANT CHANGE UP (ref 3.8–10.5)
WBC # FLD AUTO: 8.52 K/UL — SIGNIFICANT CHANGE UP (ref 3.8–10.5)

## 2017-07-09 RX ADMIN — Medication 1 MILLIGRAM(S): at 20:04

## 2017-07-09 RX ADMIN — MEMANTINE HYDROCHLORIDE 10 MILLIGRAM(S): 10 TABLET ORAL at 05:19

## 2017-07-09 RX ADMIN — Medication 650 MILLIGRAM(S): at 13:55

## 2017-07-09 RX ADMIN — Medication 650 MILLIGRAM(S): at 20:04

## 2017-07-09 RX ADMIN — Medication 100 MILLIGRAM(S): at 13:55

## 2017-07-09 RX ADMIN — TAMSULOSIN HYDROCHLORIDE 0.4 MILLIGRAM(S): 0.4 CAPSULE ORAL at 20:04

## 2017-07-09 RX ADMIN — MIRTAZAPINE 15 MILLIGRAM(S): 45 TABLET, ORALLY DISINTEGRATING ORAL at 20:05

## 2017-07-09 RX ADMIN — Medication 40 MILLIGRAM(S): at 05:19

## 2017-07-09 RX ADMIN — Medication 30 MILLIGRAM(S): at 05:19

## 2017-07-09 RX ADMIN — AMIODARONE HYDROCHLORIDE 200 MILLIGRAM(S): 400 TABLET ORAL at 05:19

## 2017-07-09 RX ADMIN — DONEPEZIL HYDROCHLORIDE 5 MILLIGRAM(S): 10 TABLET, FILM COATED ORAL at 20:04

## 2017-07-09 RX ADMIN — MEMANTINE HYDROCHLORIDE 10 MILLIGRAM(S): 10 TABLET ORAL at 18:04

## 2017-07-09 RX ADMIN — Medication 125 MICROGRAM(S): at 05:19

## 2017-07-09 NOTE — PROGRESS NOTE ADULT - ASSESSMENT
AFIB ON COUMADIN,  INR  STILL ABOVE  3'  CHECK  INR IN AM    LEFT OLECRANON BURSITIS ,  WITH MARKED  IMPROVEMENT, ON PREDNISONE,  CHECK INR  IN  AM

## 2017-07-09 NOTE — PROGRESS NOTE ADULT - SUBJECTIVE AND OBJECTIVE BOX
SUBJECTIVE / OVERNIGHT EVENTS: No nausea, vomiting or diarrhea, no fever or chills, no dizziness or chest pain, no dysuria or hematuria .    Vital Signs Last 24 Hrs  T(C): 37.1 (07-09-17 @ 05:16), Max: 37.4 (07-08-17 @ 13:53)  HR: 54 (07-09-17 @ 05:16) (54 - 82)  BP: 132/70 (07-09-17 @ 05:16) (132/70 - 148/78)  RR: 18 (07-09-17 @ 05:16) (18 - 18)  SpO2: 94% (07-09-17 @ 05:16) (94% - 98%)  CAPILLARY BLOOD GLUCOSE        I&O's Summary    08 Jul 2017 07:01  -  09 Jul 2017 07:00  --------------------------------------------------------  IN: 1080 mL / OUT: 2650 mL / NET: -1570 mL        PHYSICAL EXAM:  HEAD:  Atraumatic, Normocephalic  EYES: EOMI, PERRLA, conjunctiva and sclera clear  NECK: Supple, No JVD  CHEST/LUNG: Clear to auscultation bilaterally; No wheeze  HEART: Regular rate and rhythm; No murmurs, rubs, or gallops  ABDOMEN: Soft, Nontender, Nondistended; Bowel sounds present  EXTREMITIES:  2+ Peripheral Pulses, No clubbing, cyanosis, or edema  PSYCH: AAOx3  NEUROLOGY: non-focal  SKIN: No rashes or lesions    MEDICATIONS  (STANDING):  amiodarone    Tablet 200 milliGRAM(s) Oral daily  allopurinol 100 milliGRAM(s) Oral daily  sodium bicarbonate 650 milliGRAM(s) Oral two times a day  levothyroxine 125 MICROGram(s) Oral daily  tamsulosin 0.4 milliGRAM(s) Oral at bedtime  donepezil 5 milliGRAM(s) Oral at bedtime  mirtazapine 15 milliGRAM(s) Oral at bedtime  furosemide    Tablet 40 milliGRAM(s) Oral daily  memantine 10 milliGRAM(s) Oral two times a day  clonazePAM Tablet 1 milliGRAM(s) Oral at bedtime  predniSONE   Tablet 30 milliGRAM(s) Oral daily    MEDICATIONS  (PRN):      LABS:                        11.6   8.52  )-----------( 161      ( 09 Jul 2017 08:26 )             34.8     07-09    141  |  105  |  56<H>  ----------------------------<  76  4.4   |  22  |  2.83<H>    Ca    9.4      09 Jul 2017 08:55      PT/INR - ( 09 Jul 2017 08:18 )   PT: 36.0 sec;   INR: 3.11 ratio         PTT - ( 09 Jul 2017 08:18 )  PTT:33.1 sec                    Cultures:    EKG:    Radiological Studies:    Consultant(s) Notes Reviewed:      Care Discussed with Consultants/Other Providers:

## 2017-07-10 LAB
INR BLD: 2.67 RATIO — HIGH (ref 0.88–1.16)
PROTHROM AB SERPL-ACNC: 30.8 SEC — HIGH (ref 10–13.1)

## 2017-07-10 PROCEDURE — 99232 SBSQ HOSP IP/OBS MODERATE 35: CPT | Mod: GC

## 2017-07-10 RX ADMIN — MEMANTINE HYDROCHLORIDE 10 MILLIGRAM(S): 10 TABLET ORAL at 05:48

## 2017-07-10 RX ADMIN — Medication 125 MICROGRAM(S): at 05:48

## 2017-07-10 RX ADMIN — Medication 30 MILLIGRAM(S): at 05:48

## 2017-07-10 RX ADMIN — Medication 650 MILLIGRAM(S): at 20:58

## 2017-07-10 RX ADMIN — Medication 40 MILLIGRAM(S): at 05:48

## 2017-07-10 RX ADMIN — AMIODARONE HYDROCHLORIDE 200 MILLIGRAM(S): 400 TABLET ORAL at 05:48

## 2017-07-10 RX ADMIN — Medication 650 MILLIGRAM(S): at 12:01

## 2017-07-10 RX ADMIN — Medication 100 MILLIGRAM(S): at 12:01

## 2017-07-10 RX ADMIN — MEMANTINE HYDROCHLORIDE 10 MILLIGRAM(S): 10 TABLET ORAL at 18:47

## 2017-07-10 RX ADMIN — DONEPEZIL HYDROCHLORIDE 5 MILLIGRAM(S): 10 TABLET, FILM COATED ORAL at 20:58

## 2017-07-10 RX ADMIN — MIRTAZAPINE 15 MILLIGRAM(S): 45 TABLET, ORALLY DISINTEGRATING ORAL at 20:58

## 2017-07-10 RX ADMIN — TAMSULOSIN HYDROCHLORIDE 0.4 MILLIGRAM(S): 0.4 CAPSULE ORAL at 20:59

## 2017-07-10 RX ADMIN — Medication 1 MILLIGRAM(S): at 20:59

## 2017-07-10 NOTE — PROGRESS NOTE ADULT - PROBLEM SELECTOR PLAN 1
Awaiting PT/INR results today  Dose coumadin to maintain goal INR 2.0- 3.0 (caution with dosing secondary to Coumadin/ Amiodarone interaction) Awaiting PT/ INR results today  Dose coumadin to maintain goal INR 2.0- 3.0 (caution with dosing secondary to Coumadin/ Amiodarone interaction)

## 2017-07-10 NOTE — PROGRESS NOTE ADULT - PROBLEM SELECTOR PROBLEM 2
Paroxysmal atrial fibrillation
Supratherapeutic INR

## 2017-07-10 NOTE — PROGRESS NOTE ADULT - SUBJECTIVE AND OBJECTIVE BOX
Date of Admission:      24H hour events:       MEDICATIONS:  amiodarone    Tablet 200 milliGRAM(s) Oral daily  tamsulosin 0.4 milliGRAM(s) Oral at bedtime  furosemide    Tablet 40 milliGRAM(s) Oral daily        donepezil 5 milliGRAM(s) Oral at bedtime  mirtazapine 15 milliGRAM(s) Oral at bedtime  memantine 10 milliGRAM(s) Oral two times a day  clonazePAM Tablet 1 milliGRAM(s) Oral at bedtime      allopurinol 100 milliGRAM(s) Oral daily  levothyroxine 125 MICROGram(s) Oral daily  predniSONE   Tablet   Oral     sodium bicarbonate 650 milliGRAM(s) Oral two times a day      REVIEW OF SYSTEMS:  General: no fatigue/malaise, weight loss/gain.  Skin: no rashes.  Ophthalmologic: no blurred vision, no loss of vision. 	  ENT: no sore throat, rhinorrhea, sinus congestion.  Respiratory: no SOB, cough or wheeze.  Gastrointestinal:  no N/V/D, no melena/hematemesis/hematochezia.  Genitourinary: no dysuria/hesitancy or hematuria.  Musculoskeletal: no myalgias or arthralgias.  Neurological: no changes in vision or hearing, no lightheadedness/dizziness, no syncope/near syncope	  Psychiatric: no unusual stress/anxiety.   Hematology/Lymphatics: no unusual bleeding, bruising and no lymphadenopathy.  Endocrine: no unusual thirst.   All others negative except as stated above and in HPI.    PHYSICAL EXAM:  T(C): 36.9 (07-10-17 @ 05:47), Max: 37.2 (07-09-17 @ 16:36)  HR: 61 (07-10-17 @ 05:47) (53 - 61)  BP: 121/64 (07-10-17 @ 05:47) (121/64 - 131/76)  RR: 18 (07-10-17 @ 05:47) (18 - 18)  SpO2: 96% (07-10-17 @ 05:47) (92% - 98%)  Wt(kg): --  I&O's Summary    09 Jul 2017 07:01  -  10 Jul 2017 07:00  --------------------------------------------------------  IN: 1200 mL / OUT: 1375 mL / NET: -175 mL        Appearance: Normal	  HEENT:   Normal oral mucosa, PERRL, EOMI	  Lymphatic: No lymphadenopathy  Cardiovascular: Normal S1 S2, No JVD, No murmurs, No edema  Respiratory: Lungs clear to auscultation	  Psychiatry: A & O x 3, Mood & affect appropriate  Gastrointestinal:  Soft, Non-tender, + BS	  Skin: No rashes, No ecchymoses, No cyanosis	  Neurologic: Non-focal  Extremities: Normal range of motion, No clubbing, cyanosis or edema  Vascular: Peripheral pulses palpable 2+ bilaterally        LABS:	 	    CBC Full  -  ( 09 Jul 2017 08:26 )  WBC Count : 8.52 K/uL  Hemoglobin : 11.6 g/dL  Hematocrit : 34.8 %  Platelet Count - Automated : 161 K/uL  Mean Cell Volume : 94.1 fl  Mean Cell Hemoglobin : 31.4 pg  Mean Cell Hemoglobin Concentration : 33.3 gm/dL  Auto Neutrophil # : x  Auto Lymphocyte # : x  Auto Monocyte # : x  Auto Eosinophil # : x  Auto Basophil # : x  Auto Neutrophil % : x  Auto Lymphocyte % : x  Auto Monocyte % : x  Auto Eosinophil % : x  Auto Basophil % : x    07-09    141  |  105  |  56<H>  ----------------------------<  76  4.4   |  22  |  2.83<H>    Ca    9.4      09 Jul 2017 08:55      	  ASSESSMENT/PLAN: 	        Lesley Steele MD [id number]  [facility] follow up note:    Date of Admission:  7/1/2017    24H hour events:   No over night and 24 hours events reported . Patient was seen and evaluated at bedside. Denied any active complaints.     MEDICATIONS:  amiodarone    Tablet 200 milliGRAM(s) Oral daily  tamsulosin 0.4 milliGRAM(s) Oral at bedtime  furosemide    Tablet 40 milliGRAM(s) Oral daily        donepezil 5 milliGRAM(s) Oral at bedtime  mirtazapine 15 milliGRAM(s) Oral at bedtime  memantine 10 milliGRAM(s) Oral two times a day  clonazePAM Tablet 1 milliGRAM(s) Oral at bedtime      allopurinol 100 milliGRAM(s) Oral daily  levothyroxine 125 MICROGram(s) Oral daily  predniSONE   Tablet   Oral     sodium bicarbonate 650 milliGRAM(s) Oral two times a day      REVIEW OF SYSTEMS:  General: no fatigue/malaise, weight loss/gain.  Skin: no rashes.  Ophthalmologic: no blurred vision, no loss of vision. 	  ENT: no sore throat, rhinorrhea, sinus congestion.  Respiratory: no SOB, cough or wheeze.  Gastrointestinal:  no N/V/D, no melena/hematemesis/hematochezia.  Genitourinary: no dysuria/hesitancy or hematuria.  Musculoskeletal: no myalgias or arthralgias.  Neurological: no changes in vision or hearing, no lightheadedness/dizziness, no syncope/near syncope	  Psychiatric: no unusual stress/anxiety.   Hematology/Lymphatics: no unusual bleeding, bruising and no lymphadenopathy.  Endocrine: no unusual thirst.   All others negative except as stated above and in HPI.    PHYSICAL EXAM:  T(C): 36.9 (07-10-17 @ 05:47), Max: 37.2 (07-09-17 @ 16:36)  HR: 61 (07-10-17 @ 05:47) (53 - 61)  BP: 121/64 (07-10-17 @ 05:47) (121/64 - 131/76)  RR: 18 (07-10-17 @ 05:47) (18 - 18)  SpO2: 96% (07-10-17 @ 05:47) (92% - 98%)  Wt(kg): --  I&O's Summary    09 Jul 2017 07:01  -  10 Jul 2017 07:00  --------------------------------------------------------  IN: 1200 mL / OUT: 1375 mL / NET: -175 mL        Appearance: Normal	  HEENT:   Normal oral mucosa, PERRL, EOMI	  Lymphatic: No lymphadenopathy  Cardiovascular: Normal S1 S2, No JVD, No murmurs, No edema  Respiratory: Lungs clear to auscultation	  Psychiatry: A & O x 3, Mood & affect appropriate  Gastrointestinal:  Soft, Non-tender, + BS	  Skin: No rashes, No ecchymoses, No cyanosis	  Neurologic: Non-focal  Extremities: Normal range of motion, No clubbing, cyanosis or edema  Vascular: Peripheral pulses palpable 2+ bilaterally        LABS:	 	    CBC Full  -  ( 09 Jul 2017 08:26 )  WBC Count : 8.52 K/uL  Hemoglobin : 11.6 g/dL  Hematocrit : 34.8 %  Platelet Count - Automated : 161 K/uL  Mean Cell Volume : 94.1 fl  Mean Cell Hemoglobin : 31.4 pg  Mean Cell Hemoglobin Concentration : 33.3 gm/dL  Auto Neutrophil # : x  Auto Lymphocyte # : x  Auto Monocyte # : x  Auto Eosinophil # : x  Auto Basophil # : x  Auto Neutrophil % : x  Auto Lymphocyte % : x  Auto Monocyte % : x  Auto Eosinophil % : x  Auto Basophil % : x    07-09    141  |  105  |  56<H>  ----------------------------<  76  4.4   |  22  |  2.83<H>    Ca    9.4      09 Jul 2017 08:55      	  ASSESSMENT/PLAN: 	  72 years old male with PMH of dementia, hypothyroidism, GERD, a-fib on Coumadin/amio with SSS sp [place] sci dual chamber PPM, CLL(not on chemotherapy) presented with slurred speech , sent to ED by PMD after INR found to be 9.1. INR down trending from 4.39 to 4.33 and 3.11 today.     - start on coumadin 1mg tonight   - follow INR tomorrow   - can be discharged with coumadin 1mg 3 days a week with monitoring of INR with primary cardiologist as outpatient.        Lesley Steele MD [id number]  [facility] follow up note:    Date of Admission:  7/1/2017    24H hour events:   No over night and 24 hours events reported . Patient was seen and evaluated at bedside. Denied any active complaints.     MEDICATIONS:  amiodarone    Tablet 200 milliGRAM(s) Oral daily  tamsulosin 0.4 milliGRAM(s) Oral at bedtime  furosemide    Tablet 40 milliGRAM(s) Oral daily    donepezil 5 milliGRAM(s) Oral at bedtime  mirtazapine 15 milliGRAM(s) Oral at bedtime  memantine 10 milliGRAM(s) Oral two times a day  clonazePAM Tablet 1 milliGRAM(s) Oral at bedtime    allopurinol 100 milliGRAM(s) Oral daily  levothyroxine 125 MICROGram(s) Oral daily  predniSONE   Tablet   Oral     sodium bicarbonate 650 milliGRAM(s) Oral two times a day      REVIEW OF SYSTEMS:  General: no fatigue/malaise, weight loss/gain.  Skin: no rashes.  Ophthalmologic: no blurred vision, no loss of vision. 	  ENT: no sore throat, rhinorrhea, sinus congestion.  Respiratory: no SOB, cough or wheeze.  Gastrointestinal:  no N/V/D, no melena/hematemesis/hematochezia.  Genitourinary: no dysuria/hesitancy or hematuria.  Musculoskeletal: no myalgias or arthralgias.  Neurological: no changes in vision or hearing, no lightheadedness/dizziness, no syncope/near syncope	  Psychiatric: no unusual stress/anxiety.   Hematology/Lymphatics: no unusual bleeding, bruising and no lymphadenopathy.  Endocrine: no unusual thirst.   All others negative except as stated above and in HPI.    PHYSICAL EXAM:  T(C): 36.9 (07-10-17 @ 05:47), Max: 37.2 (07-09-17 @ 16:36)  HR: 61 (07-10-17 @ 05:47) (53 - 61)  BP: 121/64 (07-10-17 @ 05:47) (121/64 - 131/76)  RR: 18 (07-10-17 @ 05:47) (18 - 18)  SpO2: 96% (07-10-17 @ 05:47) (92% - 98%)  Wt(kg): --  I&O's Summary    09 Jul 2017 07:01  -  10 Jul 2017 07:00  --------------------------------------------------------  IN: 1200 mL / OUT: 1375 mL / NET: -175 mL        Appearance: Normal	  HEENT:   Normal oral mucosa, PERRL, EOMI	  Lymphatic: No lymphadenopathy  Cardiovascular: Normal S1 S2, No JVD, No murmurs, No edema  Respiratory: Lungs clear to auscultation	  Psychiatry: A & O x 3, Mood & affect appropriate  Gastrointestinal:  Soft, Non-tender, + BS	  Skin: No rashes, No ecchymoses, No cyanosis	  Neurologic: Non-focal  Extremities: Normal range of motion, No clubbing, cyanosis or edema  Vascular: Peripheral pulses palpable 2+ bilaterally        LABS:	 	    CBC Full  -  ( 09 Jul 2017 08:26 )  WBC Count : 8.52 K/uL  Hemoglobin : 11.6 g/dL  Hematocrit : 34.8 %  Platelet Count - Automated : 161 K/uL  Mean Cell Volume : 94.1 fl  Mean Cell Hemoglobin : 31.4 pg  Mean Cell Hemoglobin Concentration : 33.3 gm/dL  Auto Neutrophil # : x  Auto Lymphocyte # : x  Auto Monocyte # : x  Auto Eosinophil # : x  Auto Basophil # : x  Auto Neutrophil % : x  Auto Lymphocyte % : x  Auto Monocyte % : x  Auto Eosinophil % : x  Auto Basophil % : x    07-09    141  |  105  |  56<H>  ----------------------------<  76  4.4   |  22  |  2.83<H>    Ca    9.4      09 Jul 2017 08:55      	  ASSESSMENT/PLAN: 	  72 years old male with PMH of dementia, hypothyroidism, GERD, a-fib on Coumadin/amio with SSS sp [place] sci dual chamber PPM, CLL(not on chemotherapy) presented with slurred speech , sent to ED by PMD after INR found to be 9.1. INR down trending from 4.39 to 4.33 and 3.11 today.     - start on coumadin 1mg tonight   - follow INR tomorrow   - can be discharged with coumadin 1mg 3 days a week with monitoring of INR with primary cardiologist as outpatient.        Lesley Steele MD [id number]  [facility]

## 2017-07-10 NOTE — PROGRESS NOTE ADULT - SUBJECTIVE AND OBJECTIVE BOX
INTERVAL HPI/OVERNIGHT EVENTS: Resting comfortably in bed     MEDICATIONS  (STANDING):  amiodarone    Tablet 200 milliGRAM(s) Oral daily  allopurinol 100 milliGRAM(s) Oral daily  sodium bicarbonate 650 milliGRAM(s) Oral two times a day  levothyroxine 125 MICROGram(s) Oral daily  tamsulosin 0.4 milliGRAM(s) Oral at bedtime  donepezil 5 milliGRAM(s) Oral at bedtime  mirtazapine 15 milliGRAM(s) Oral at bedtime  furosemide    Tablet 40 milliGRAM(s) Oral daily  memantine 10 milliGRAM(s) Oral two times a day  clonazePAM Tablet 1 milliGRAM(s) Oral at bedtime  predniSONE   Tablet   Oral     MEDICATIONS  (PRN):      Allergies    amoxicillin (Rash)    Intolerances      ROS  General: Pt denies fever/chills  Cardiovascular: denies chest pain/palpitations/leg edema  Respiratory and Thorax: denies SOB/cough/wheezing  Gastrointestinal: denies abdominal pain/diarrhea/constipation/bloody stool  Musculoskeletal: + Left elbow tenderness  Skin: denies rashes/sores  Hematologic: denies abnormal bleeding    Vital Signs Last 24 Hrs  T(C): 36.9 (10 Jul 2017 05:47), Max: 37.2 (09 Jul 2017 16:36)  T(F): 98.5 (10 Jul 2017 05:47), Max: 98.9 (09 Jul 2017 16:36)  HR: 61 (10 Jul 2017 05:47) (53 - 61)  BP: 121/64 (10 Jul 2017 05:47) (121/64 - 131/76)  BP(mean): --  RR: 18 (10 Jul 2017 05:47) (18 - 18)  SpO2: 96% (10 Jul 2017 05:47) (92% - 98%)    Constitutional: well developed, well nourished, no deformities and no acute distress  Neurological: Alert & Oriented x 3, CARRIZALES, no focal deficits  Respiratory: CTA B/L, No wheezing/crackles/rhonchi  Cardiovascular: (+) S1 & S2, RRR  Gastrointestinal: soft, NT, nondistended, (+) BS  Extremities: No pedal edema, No clubbing, No cyanosis  Skin:  normal skin color and pigmentation, no skin lesions    LABS:                        11.6   8.52  )-----------( 161      ( 09 Jul 2017 08:26 )             34.8     07-09    141  |  105  |  56<H>  ----------------------------<  76  4.4   |  22  |  2.83<H>    Ca    9.4      09 Jul 2017 08:55      PT/INR - ( 09 Jul 2017 08:18 )   PT: 36.0 sec;   INR: 3.11 ratio         PTT - ( 09 Jul 2017 08:18 )  PTT:33.1 sec INTERVAL HPI/OVERNIGHT EVENTS: Resting comfortably in bed     MEDICATIONS  (STANDING):  amiodarone    Tablet 200 milliGRAM(s) Oral daily  allopurinol 100 milliGRAM(s) Oral daily  sodium bicarbonate 650 milliGRAM(s) Oral two times a day  levothyroxine 125 MICROGram(s) Oral daily  tamsulosin 0.4 milliGRAM(s) Oral at bedtime  donepezil 5 milliGRAM(s) Oral at bedtime  mirtazapine 15 milliGRAM(s) Oral at bedtime  furosemide    Tablet 40 milliGRAM(s) Oral daily  memantine 10 milliGRAM(s) Oral two times a day  clonazePAM Tablet 1 milliGRAM(s) Oral at bedtime  predniSONE   Tablet   Oral     MEDICATIONS  (PRN):      Allergies  amoxicillin (Rash)  Intolerances      ROS  General: Pt denies fever/chills  Cardiovascular: denies chest pain/palpitations/leg edema  Respiratory and Thorax: denies SOB/cough/wheezing  Gastrointestinal: denies abdominal pain/diarrhea/constipation/bloody stool  Musculoskeletal: + Left elbow tenderness  Skin: denies rashes/sores  Hematologic: denies abnormal bleeding    Vital Signs Last 24 Hrs  T(C): 36.9 (10 Jul 2017 05:47), Max: 37.2 (09 Jul 2017 16:36)  T(F): 98.5 (10 Jul 2017 05:47), Max: 98.9 (09 Jul 2017 16:36)  HR: 61 (10 Jul 2017 05:47) (53 - 61)  BP: 121/64 (10 Jul 2017 05:47) (121/64 - 131/76)  BP(mean): --  RR: 18 (10 Jul 2017 05:47) (18 - 18)  SpO2: 96% (10 Jul 2017 05:47) (92% - 98%)    Constitutional: well developed, well nourished, no deformities and no acute distress  Neurological: Alert & Oriented x 3, CARRIZALES, no focal deficits  Respiratory: CTA B/L, No wheezing/crackles/rhonchi  Cardiovascular: (+) S1 & S2, RRR  Gastrointestinal: soft, NT, nondistended, (+) BS  Extremities: No pedal edema, No clubbing, No cyanosis  Skin:  normal skin color and pigmentation, no skin lesions    LABS:                        11.6   8.52  )-----------( 161      ( 09 Jul 2017 08:26 )             34.8     07-09    141  |  105  |  56<H>  ----------------------------<  76  4.4   |  22  |  2.83<H>    Ca    9.4      09 Jul 2017 08:55      PT/INR - ( 09 Jul 2017 08:18 )   PT: 36.0 sec;   INR: 3.11 ratio         PTT - ( 09 Jul 2017 08:18 )  PTT:33.1 sec INTERVAL HPI/OVERNIGHT EVENTS: Resting comfortably in bed     MEDICATIONS  (STANDING):  amiodarone    Tablet 200 milliGRAM(s) Oral daily  allopurinol 100 milliGRAM(s) Oral daily  sodium bicarbonate 650 milliGRAM(s) Oral two times a day  levothyroxine 125 MICROGram(s) Oral daily  tamsulosin 0.4 milliGRAM(s) Oral at bedtime  donepezil 5 milliGRAM(s) Oral at bedtime  mirtazapine 15 milliGRAM(s) Oral at bedtime  furosemide    Tablet 40 milliGRAM(s) Oral daily  memantine 10 milliGRAM(s) Oral two times a day  clonazePAM Tablet 1 milliGRAM(s) Oral at bedtime  predniSONE   Tablet   Oral     MEDICATIONS  (PRN):      Allergies  amoxicillin (Rash)  Intolerances      ROS  General: Pt denies fever/chills  Cardiovascular: denies chest pain/palpitations/leg edema  Respiratory and Thorax: denies SOB/cough/wheezing  Gastrointestinal: denies abdominal pain/diarrhea/constipation/bloody stool  Musculoskeletal: + Left elbow tenderness  Skin: denies rashes/sores  Hematologic: denies abnormal bleeding    Vital Signs Last 24 Hrs  T(C): 36.9 (10 Jul 2017 05:47), Max: 37.2 (09 Jul 2017 16:36)  T(F): 98.5 (10 Jul 2017 05:47), Max: 98.9 (09 Jul 2017 16:36)  HR: 61 (10 Jul 2017 05:47) (53 - 61)  BP: 121/64 (10 Jul 2017 05:47) (121/64 - 131/76)  BP(mean): --  RR: 18 (10 Jul 2017 05:47) (18 - 18)  SpO2: 96% (10 Jul 2017 05:47) (92% - 98%)    Constitutional: well developed, well nourished, no deformities and no acute distress  Neurological: Alert & Oriented x 3, CARRIZALES, no focal deficits  Respiratory: CTA B/L, No wheezing/crackles/rhonchi  Cardiovascular: (+) S1 & S2, RRR  Gastrointestinal: soft, NT, nondistended, (+) BS  Extremities: No pedal edema, No clubbing, No cyanosis  Skin:  Left elbow slight swelling, no erythema  noted.     LABS:                        11.6   8.52  )-----------( 161      ( 09 Jul 2017 08:26 )             34.8     07-09    141  |  105  |  56<H>  ----------------------------<  76  4.4   |  22  |  2.83<H>    Ca    9.4      09 Jul 2017 08:55      PT/INR - ( 09 Jul 2017 08:18 )   PT: 36.0 sec;   INR: 3.11 ratio         PTT - ( 09 Jul 2017 08:18 )  PTT:33.1 sec

## 2017-07-10 NOTE — PROGRESS NOTE ADULT - SUBJECTIVE AND OBJECTIVE BOX
No pain, no shortness of breath      MEDICATIONS  (STANDING):  amiodarone    Tablet 200 milliGRAM(s) Oral daily  allopurinol 100 milliGRAM(s) Oral daily  sodium bicarbonate 650 milliGRAM(s) Oral two times a day  levothyroxine 125 MICROGram(s) Oral daily  tamsulosin 0.4 milliGRAM(s) Oral at bedtime  donepezil 5 milliGRAM(s) Oral at bedtime  mirtazapine 15 milliGRAM(s) Oral at bedtime  furosemide    Tablet 40 milliGRAM(s) Oral daily  memantine 10 milliGRAM(s) Oral two times a day  clonazePAM Tablet 1 milliGRAM(s) Oral at bedtime  predniSONE   Tablet   Oral       VITAL:  T(C): , Max: 37.2 (07-09-17 @ 16:36)  T(F): , Max: 98.9 (07-09-17 @ 16:36)  HR: 61 (07-10-17 @ 05:47)  BP: 121/64 (07-10-17 @ 05:47)  RR: 18 (07-10-17 @ 05:47)  SpO2: 96% (07-10-17 @ 05:47)      PHYSICAL EXAM:  General: alert, pleasant, NAD  HEENTN: supple, no JVD  CHEST/LUNG: CTA-b/l  HEART:  RRR S1S2  ABDOMEN: soft NTND, no walker  EXTREMITIES:  L elbow tender        LABS:                        11.6   8.52  )-----------( 161      ( 09 Jul 2017 08:26 )             34.8     Na(141)/K(4.4)/Cl(105)/HCO3(22)/BUN(56)/Cr(2.83)Glu(76)/Ca(9.4)/Mg(--)/PO4(--)    07-09 @ 08:55  Na(142)/K(4.2)/Cl(105)/HCO3(21)/BUN(54)/Cr(2.83)Glu(104)/Ca(9.8)/Mg(--)/PO4(--)    07-08 @ 08:46    IMPRESSION:    (1)Renal - CKD stage 4 - GFR ~20ml/min.  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 Stable.    (2)Afib - on coumadin    (3)L elbow pain - gout - improving - on prednisone taper    RECOMMEND:    (1)Meds as ordered; dose new meds for GFR 20-30ml/min  (2)No renal objection to discharge with f/u at my office in 1-2 months                Norman Ascencio MD  [facility]  [phone] Admits to malodorous urine; denies fever, chills, or dysuria. LUE pain much improved.      VITAL:  T(C): , Max: 37.2 (07-09-17 @ 16:36)  T(F): , Max: 98.9 (07-09-17 @ 16:36)  HR: 61 (07-10-17 @ 05:47)  BP: 121/64 (07-10-17 @ 05:47)  RR: 18 (07-10-17 @ 05:47)  SpO2: 96% (07-10-17 @ 05:47)      PHYSICAL EXAM:  General: alert, pleasant, NAD  HEENTN: supple, no JVD  CHEST/LUNG: CTA-b/l  HEART:  RRR S1S2  ABDOMEN: soft NTND, no walker  EXTREMITIES:  no edema; left elbow nontender        LABS:                        11.6   8.52  )-----------( 161      ( 09 Jul 2017 08:26 )             34.8     Na(141)/K(4.4)/Cl(105)/HCO3(22)/BUN(56)/Cr(2.83)Glu(76)/Ca(9.4)/Mg(--)/PO4(--)    07-09 @ 08:55  Na(142)/K(4.2)/Cl(105)/HCO3(21)/BUN(54)/Cr(2.83)Glu(104)/Ca(9.8)/Mg(--)/PO4(--)    07-08 @ 08:46    IMPRESSION:    (1)Renal - CKD stage 4 - GFR ~20ml/min.  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 Stable.    (2)Afib - on coumadin    (3)L elbow pain - gout - improved- on prednisone taper    (4)Malodorous urine -no other concerning symptoms/signs to suggest infection. No workup for UTI needed  RECOMMEND:    (1)Meds as ordered; dose new meds for GFR 20-30ml/min  (2)No renal objection to discharge with f/u at my office in 1-2 months                Norman Ascencio MD  [facility]  [phone]

## 2017-07-10 NOTE — PROGRESS NOTE ADULT - PROBLEM SELECTOR PLAN 2
-AT/AF [place] 0% on PPM interrogation  -Continue on Amiodarone 200mg daily
Continue on Amiodarone 200mg daily
Requires markedly reduced dose of warfarin from past. INR above range, slightly higher than prior day. Continue to hold warfarin until declining. May need to hold longer if inflamed left olecranon bursa proves to be hemorrhagic.
-AT/AF [place] 0% on PPM interrogation  -On Amiodarone 200mg daily  -Currently in NSR

## 2017-07-10 NOTE — PROGRESS NOTE ADULT - ASSESSMENT
left  olecranon bursitis, resolving on tapering prednisone   AFIB,  AWIATING  INR    ON AMIODARONE  SYNTHROID LASIX

## 2017-07-10 NOTE — PROGRESS NOTE ADULT - SUBJECTIVE AND OBJECTIVE BOX
SUBJECTIVE / OVERNIGHT EVENTS: No nausea, vomiting or diarrhea, no fever or chills, no dizziness or chest pain, no dysuria or hematuria .    Vital Signs Last 24 Hrs  T(C): 36.9 (07-10-17 @ 05:47), Max: 37.2 (07-09-17 @ 16:36)  HR: 61 (07-10-17 @ 05:47) (53 - 61)  BP: 121/64 (07-10-17 @ 05:47) (121/64 - 131/76)  RR: 18 (07-10-17 @ 05:47) (18 - 18)  SpO2: 96% (07-10-17 @ 05:47) (92% - 98%)  CAPILLARY BLOOD GLUCOSE        I&O's Summary    09 Jul 2017 07:01  -  10 Jul 2017 07:00  --------------------------------------------------------  IN: 1200 mL / OUT: 1375 mL / NET: -175 mL        PHYSICAL EXAM:  HEAD:  Atraumatic, Normocephalic  EYES: EOMI, PERRLA, conjunctiva and sclera clear  NECK: Supple, No JVD  CHEST/LUNG: Clear to auscultation bilaterally; No wheeze  HEART: Regular rate and rhythm; No murmurs, rubs, or gallops  ABDOMEN: Soft, Nontender, Nondistended; Bowel sounds present  EXTREMITIES:  2+ Peripheral Pulses, No clubbing, cyanosis, or edema  PSYCH: AAOx3  NEUROLOGY: non-focal  SKIN: No rashes or lesions    MEDICATIONS  (STANDING):  amiodarone    Tablet 200 milliGRAM(s) Oral daily  allopurinol 100 milliGRAM(s) Oral daily  sodium bicarbonate 650 milliGRAM(s) Oral two times a day  levothyroxine 125 MICROGram(s) Oral daily  tamsulosin 0.4 milliGRAM(s) Oral at bedtime  donepezil 5 milliGRAM(s) Oral at bedtime  mirtazapine 15 milliGRAM(s) Oral at bedtime  furosemide    Tablet 40 milliGRAM(s) Oral daily  memantine 10 milliGRAM(s) Oral two times a day  clonazePAM Tablet 1 milliGRAM(s) Oral at bedtime  predniSONE   Tablet   Oral     MEDICATIONS  (PRN):      LABS:                        11.6   8.52  )-----------( 161      ( 09 Jul 2017 08:26 )             34.8     07-09    141  |  105  |  56<H>  ----------------------------<  76  4.4   |  22  |  2.83<H>    Ca    9.4      09 Jul 2017 08:55      PT/INR - ( 09 Jul 2017 08:18 )   PT: 36.0 sec;   INR: 3.11 ratio         PTT - ( 09 Jul 2017 08:18 )  PTT:33.1 sec                    Cultures:    EKG:    Radiological Studies:    Consultant(s) Notes Reviewed:      Care Discussed with Consultants/Other Providers:

## 2017-07-11 ENCOUNTER — APPOINTMENT (OUTPATIENT)
Dept: ELECTROPHYSIOLOGY | Facility: CLINIC | Age: 72
End: 2017-07-11

## 2017-07-11 ENCOUNTER — APPOINTMENT (OUTPATIENT)
Dept: CARDIOLOGY | Facility: CLINIC | Age: 72
End: 2017-07-11

## 2017-07-11 VITALS
OXYGEN SATURATION: 98 % | SYSTOLIC BLOOD PRESSURE: 158 MMHG | HEART RATE: 68 BPM | RESPIRATION RATE: 18 BRPM | TEMPERATURE: 99 F | DIASTOLIC BLOOD PRESSURE: 82 MMHG

## 2017-07-11 LAB
INR BLD: 2.58 RATIO — HIGH (ref 0.88–1.16)
PROTHROM AB SERPL-ACNC: 29.7 SEC — HIGH (ref 10–13.1)

## 2017-07-11 PROCEDURE — 86850 RBC ANTIBODY SCREEN: CPT

## 2017-07-11 PROCEDURE — 99285 EMERGENCY DEPT VISIT HI MDM: CPT | Mod: 25

## 2017-07-11 PROCEDURE — 97116 GAIT TRAINING THERAPY: CPT

## 2017-07-11 PROCEDURE — 86901 BLOOD TYPING SEROLOGIC RH(D): CPT

## 2017-07-11 PROCEDURE — 73070 X-RAY EXAM OF ELBOW: CPT

## 2017-07-11 PROCEDURE — 93005 ELECTROCARDIOGRAM TRACING: CPT

## 2017-07-11 PROCEDURE — 85027 COMPLETE CBC AUTOMATED: CPT

## 2017-07-11 PROCEDURE — 97162 PT EVAL MOD COMPLEX 30 MIN: CPT

## 2017-07-11 PROCEDURE — 85730 THROMBOPLASTIN TIME PARTIAL: CPT

## 2017-07-11 PROCEDURE — 36430 TRANSFUSION BLD/BLD COMPNT: CPT

## 2017-07-11 PROCEDURE — 97530 THERAPEUTIC ACTIVITIES: CPT

## 2017-07-11 PROCEDURE — 81001 URINALYSIS AUTO W/SCOPE: CPT

## 2017-07-11 PROCEDURE — P9059: CPT

## 2017-07-11 PROCEDURE — 96374 THER/PROPH/DIAG INJ IV PUSH: CPT

## 2017-07-11 PROCEDURE — 70450 CT HEAD/BRAIN W/O DYE: CPT

## 2017-07-11 PROCEDURE — 80076 HEPATIC FUNCTION PANEL: CPT

## 2017-07-11 PROCEDURE — 80048 BASIC METABOLIC PNL TOTAL CA: CPT

## 2017-07-11 PROCEDURE — 85610 PROTHROMBIN TIME: CPT

## 2017-07-11 PROCEDURE — 71045 X-RAY EXAM CHEST 1 VIEW: CPT

## 2017-07-11 PROCEDURE — 80053 COMPREHEN METABOLIC PANEL: CPT

## 2017-07-11 PROCEDURE — 86900 BLOOD TYPING SEROLOGIC ABO: CPT

## 2017-07-11 PROCEDURE — 99232 SBSQ HOSP IP/OBS MODERATE 35: CPT | Mod: GC

## 2017-07-11 PROCEDURE — 84550 ASSAY OF BLOOD/URIC ACID: CPT

## 2017-07-11 RX ORDER — WARFARIN SODIUM 2.5 MG/1
1 TABLET ORAL
Qty: 0 | Refills: 0 | COMMUNITY

## 2017-07-11 RX ORDER — CLONAZEPAM 1 MG
1 TABLET ORAL
Qty: 0 | Refills: 0 | COMMUNITY
Start: 2017-07-11

## 2017-07-11 RX ADMIN — AMIODARONE HYDROCHLORIDE 200 MILLIGRAM(S): 400 TABLET ORAL at 05:21

## 2017-07-11 RX ADMIN — Medication 650 MILLIGRAM(S): at 09:29

## 2017-07-11 RX ADMIN — MEMANTINE HYDROCHLORIDE 10 MILLIGRAM(S): 10 TABLET ORAL at 05:21

## 2017-07-11 RX ADMIN — Medication 100 MILLIGRAM(S): at 12:22

## 2017-07-11 RX ADMIN — Medication 25 MILLIGRAM(S): at 05:21

## 2017-07-11 RX ADMIN — Medication 40 MILLIGRAM(S): at 05:21

## 2017-07-11 RX ADMIN — Medication 125 MICROGRAM(S): at 05:21

## 2017-07-11 NOTE — CHART NOTE - NSCHARTNOTEFT_GEN_A_CORE
Asked by attending MD to discharge pt home; d/w MD discharge medication; INR 2.58 today; reviewed with pt; spoke to [t PMD- DR. Salcedo [phone] and updated hospital course and pt to follow up with him on 7/13/17 for follow up blood work; pt will call for appointment and office will follow up on appointment;  Roxanna Gannon(NP)  3 [facility], [phone] /[id number]

## 2017-07-11 NOTE — PROGRESS NOTE ADULT - NSHPATTENDINGPLANDISCUSS_GEN_ALL_CORE
Medicine NP on providing warfarin information to internist. to reach Cardiology Attending call during weekdays Spectra [phone].
to reach Cardiology Attending call during weekdays Spectra [phone].
to reach Cardiology Attending call during weekdays Spectra [phone].
to reach Cardiology Attending call during weekdays Spectra [phone].
Nurse Practitioner. to reach Cardiology Attending call during weekdays Spectra [phone].

## 2017-07-11 NOTE — PROGRESS NOTE ADULT - PROVIDER SPECIALTY LIST ADULT
Cardiology
Electrophysiology
Internal Medicine
Nephrology
Rheumatology
Cardiology

## 2017-07-11 NOTE — PROGRESS NOTE ADULT - ASSESSMENT
on TAPERING PREDNISONE FOR  GOUT/ OLECRANON BURSITIS   AFIB ON COUMADIN,  AWAITING  INR TODAY, PT  DID NOT  WANT TO GO HOME  YESTERDAY.  HOPEFULLY DC PT  TODAY, ONCE INR  AVAILABLE

## 2017-07-11 NOTE — PROGRESS NOTE ADULT - SUBJECTIVE AND OBJECTIVE BOX
Cardiology daily progress note:     24H hour events:   Patient was seen and examined by the bedside. No events reported over last 24 hours. Patient waiting for dispo.    MEDICATIONS:  amiodarone    Tablet 200 milliGRAM(s) Oral daily  tamsulosin 0.4 milliGRAM(s) Oral at bedtime  furosemide    Tablet 40 milliGRAM(s) Oral daily        donepezil 5 milliGRAM(s) Oral at bedtime  mirtazapine 15 milliGRAM(s) Oral at bedtime  memantine 10 milliGRAM(s) Oral two times a day  clonazePAM Tablet 1 milliGRAM(s) Oral at bedtime      allopurinol 100 milliGRAM(s) Oral daily  levothyroxine 125 MICROGram(s) Oral daily  predniSONE   Tablet   Oral   predniSONE   Tablet 25 milliGRAM(s) Oral daily    sodium bicarbonate 650 milliGRAM(s) Oral two times a day      REVIEW OF SYSTEMS:  General: no fatigue/malaise, weight loss/gain.  Skin: no rashes.  Ophthalmologic: no blurred vision, no loss of vision. 	  ENT: no sore throat, rhinorrhea, sinus congestion.  Respiratory: no SOB, cough or wheeze.  Gastrointestinal:  no N/V/D, no melena/hematemesis/hematochezia.  Genitourinary: no dysuria/hesitancy or hematuria.  Musculoskeletal: no myalgias or arthralgias.  Neurological: no changes in vision or hearing, no lightheadedness/dizziness, no syncope/near syncope	  Psychiatric: no unusual stress/anxiety.   Hematology/Lymphatics: no unusual bleeding, bruising and no lymphadenopathy.  Endocrine: no unusual thirst.   All others negative except as stated above and in HPI.    PHYSICAL EXAM:  T(C): 36.8 (07-11-17 @ 05:13), Max: 37.2 (07-10-17 @ 16:07)  HR: 60 (07-11-17 @ 05:13) (55 - 93)  BP: 122/74 (07-11-17 @ 05:13) (120/68 - 134/77)  RR: 18 (07-11-17 @ 05:13) (18 - 18)  SpO2: 96% (07-11-17 @ 05:13) (94% - 98%)  Wt(kg): --  I&O's Summary    10 Jul 2017 07:01  -  11 Jul 2017 07:00  --------------------------------------------------------  IN: 360 mL / OUT: 1950 mL / NET: -1590 mL      Appearance: Normal	  HEENT:   Normal oral mucosa, PERRL, EOMI	  Lymphatic: No lymphadenopathy  Cardiovascular: Normal S1 S2, No JVD, No murmurs, No edema  Respiratory: Lungs clear to auscultation	  Psychiatry: A & O x 3, Mood & affect appropriate  Gastrointestinal:  Soft, Non-tender, + BS	  Skin: No rashes, No ecchymoses, No cyanosis	  Neurologic: Non-focal  Extremities: Normal range of motion, No clubbing, cyanosis or edema  Vascular: Peripheral pulses palpable 2+ bilaterally, no pedal edema       ASSESSMENT/PLAN: 	  72 years old man mild dementia, hypothyroidism, GERD, a-fib on Coumadin/amio with SSS Bernie sci dual chamber PPM, CLL(not on chemotherapy) presented with INr of 9.1 and slurred speech , CTH neg.   - INR continues to down trend, INR 2.58 today   - Can be discharged home on warfarin 1mg , 3 times a week , with follow up with primary cardiologist or PMD to monitor INR.      Lesley Steele [place] [id number]  [facility] Cardiology daily progress note:     24H hour events:   Patient was seen and examined by the bedside. No events reported over last 24 hours. Patient waiting for dispo.    MEDICATIONS:  amiodarone    Tablet 200 milliGRAM(s) Oral daily  tamsulosin 0.4 milliGRAM(s) Oral at bedtime  furosemide    Tablet 40 milliGRAM(s) Oral daily    donepezil 5 milliGRAM(s) Oral at bedtime  mirtazapine 15 milliGRAM(s) Oral at bedtime  memantine 10 milliGRAM(s) Oral two times a day  clonazePAM Tablet 1 milliGRAM(s) Oral at bedtime    allopurinol 100 milliGRAM(s) Oral daily  levothyroxine 125 MICROGram(s) Oral daily  predniSONE   Tablet   Oral   predniSONE   Tablet 25 milliGRAM(s) Oral daily    sodium bicarbonate 650 milliGRAM(s) Oral two times a day      REVIEW OF SYSTEMS:  General: no fatigue/malaise, weight loss/gain.  Skin: no rashes.  Ophthalmologic: no blurred vision, no loss of vision. 	  ENT: no sore throat, rhinorrhea, sinus congestion.  Respiratory: no SOB, cough or wheeze.  Gastrointestinal:  no N/V/D, no melena/hematemesis/hematochezia.  Genitourinary: no dysuria/hesitancy or hematuria.  Musculoskeletal: no myalgias or arthralgias.  Neurological: no changes in vision or hearing, no lightheadedness/dizziness, no syncope/near syncope	  Psychiatric: no unusual stress/anxiety.   Hematology/Lymphatics: no unusual bleeding, bruising and no lymphadenopathy.  Endocrine: no unusual thirst.   All others negative except as stated above and in HPI.    PHYSICAL EXAM:  T(C): 36.8 (07-11-17 @ 05:13), Max: 37.2 (07-10-17 @ 16:07)  HR: 60 (07-11-17 @ 05:13) (55 - 93)  BP: 122/74 (07-11-17 @ 05:13) (120/68 - 134/77)  RR: 18 (07-11-17 @ 05:13) (18 - 18)  SpO2: 96% (07-11-17 @ 05:13) (94% - 98%)  Wt(kg): --  I&O's Summary    10 Jul 2017 07:01  -  11 Jul 2017 07:00  --------------------------------------------------------  IN: 360 mL / OUT: 1950 mL / NET: -1590 mL      Appearance: Normal	  HEENT:   Normal oral mucosa, PERRL, EOMI	  Lymphatic: No lymphadenopathy  Cardiovascular: Normal S1 S2, No JVD, No murmurs, No edema  Respiratory: Lungs clear to auscultation	  Psychiatry: A & O x 3, Mood & affect appropriate  Gastrointestinal:  Soft, Non-tender, + BS	  Skin: No rashes, No ecchymoses, No cyanosis	  Neurologic: Non-focal  Extremities: Normal range of motion, No clubbing, cyanosis or edema  Vascular: Peripheral pulses palpable 2+ bilaterally, no pedal edema       ASSESSMENT/PLAN: 	  72 years old man mild dementia, hypothyroidism, GERD, a-fib on Coumadin/amio with SSS Matador sci dual chamber PPM, CLL(not on chemotherapy) presented with INr of 9.1 and slurred speech , CTH neg.   - INR continues to down trend, INR 2.58 today   - Can be discharged home on warfarin 1mg , 3 times a week , with follow up with primary cardiologist or PMD to monitor INR.      Lesley Steele MD [id number]  [facility]

## 2017-08-15 ENCOUNTER — NON-APPOINTMENT (OUTPATIENT)
Age: 72
End: 2017-08-15

## 2017-08-15 ENCOUNTER — APPOINTMENT (OUTPATIENT)
Dept: CARDIOLOGY | Facility: CLINIC | Age: 72
End: 2017-08-15
Payer: COMMERCIAL

## 2017-08-15 VITALS
HEART RATE: 61 BPM | SYSTOLIC BLOOD PRESSURE: 112 MMHG | DIASTOLIC BLOOD PRESSURE: 73 MMHG | BODY MASS INDEX: 27.6 KG/M2 | WEIGHT: 215 LBS | OXYGEN SATURATION: 99 %

## 2017-08-15 PROCEDURE — 99215 OFFICE O/P EST HI 40 MIN: CPT

## 2017-08-15 PROCEDURE — 93000 ELECTROCARDIOGRAM COMPLETE: CPT

## 2017-08-15 RX ORDER — VORTIOXETINE 5 MG/1
5 TABLET, FILM COATED ORAL
Refills: 0 | Status: DISCONTINUED | COMMUNITY
Start: 2017-04-28 | End: 2017-08-15

## 2017-09-15 ENCOUNTER — OUTPATIENT (OUTPATIENT)
Dept: OUTPATIENT SERVICES | Facility: HOSPITAL | Age: 72
LOS: 1 days | Discharge: HOME | End: 2017-09-15

## 2017-09-15 DIAGNOSIS — C91.11 CHRONIC LYMPHOCYTIC LEUKEMIA OF B-CELL TYPE IN REMISSION: ICD-10-CM

## 2017-09-15 DIAGNOSIS — Z98.89 OTHER SPECIFIED POSTPROCEDURAL STATES: Chronic | ICD-10-CM

## 2017-09-19 ENCOUNTER — APPOINTMENT (OUTPATIENT)
Dept: HEMATOLOGY ONCOLOGY | Facility: CLINIC | Age: 72
End: 2017-09-19
Payer: COMMERCIAL

## 2017-09-19 ENCOUNTER — RESULT REVIEW (OUTPATIENT)
Age: 72
End: 2017-09-19

## 2017-09-19 VITALS
DIASTOLIC BLOOD PRESSURE: 70 MMHG | HEART RATE: 78 BPM | WEIGHT: 216.05 LBS | RESPIRATION RATE: 16 BRPM | TEMPERATURE: 99.1 F | OXYGEN SATURATION: 98 % | SYSTOLIC BLOOD PRESSURE: 110 MMHG | BODY MASS INDEX: 27.74 KG/M2

## 2017-09-19 LAB
BASOPHILS # BLD AUTO: 0 K/UL — SIGNIFICANT CHANGE UP (ref 0–0.2)
BASOPHILS NFR BLD AUTO: 0.1 % — SIGNIFICANT CHANGE UP (ref 0–2)
EOSINOPHIL # BLD AUTO: 0.2 K/UL — SIGNIFICANT CHANGE UP (ref 0–0.5)
EOSINOPHIL NFR BLD AUTO: 1.8 % — SIGNIFICANT CHANGE UP (ref 0–6)
HCT VFR BLD CALC: 37.8 % — LOW (ref 39–50)
HGB BLD-MCNC: 12.7 G/DL — LOW (ref 13–17)
LYMPHOCYTES # BLD AUTO: 1.8 K/UL — SIGNIFICANT CHANGE UP (ref 1–3.3)
LYMPHOCYTES # BLD AUTO: 16.6 % — SIGNIFICANT CHANGE UP (ref 13–44)
MCHC RBC-ENTMCNC: 31.5 PG — SIGNIFICANT CHANGE UP (ref 27–34)
MCHC RBC-ENTMCNC: 33.5 G/DL — SIGNIFICANT CHANGE UP (ref 32–36)
MCV RBC AUTO: 94.1 FL — SIGNIFICANT CHANGE UP (ref 80–100)
MONOCYTES # BLD AUTO: 0.8 K/UL — SIGNIFICANT CHANGE UP (ref 0–0.9)
MONOCYTES NFR BLD AUTO: 7.6 % — SIGNIFICANT CHANGE UP (ref 2–14)
NEUTROPHILS # BLD AUTO: 8 K/UL — HIGH (ref 1.8–7.4)
NEUTROPHILS NFR BLD AUTO: 74 % — SIGNIFICANT CHANGE UP (ref 43–77)
PLATELET # BLD AUTO: 204 K/UL — SIGNIFICANT CHANGE UP (ref 150–400)
RBC # BLD: 4.02 M/UL — LOW (ref 4.2–5.8)
RBC # FLD: 12.2 % — SIGNIFICANT CHANGE UP (ref 10.3–14.5)
WBC # BLD: 10.8 K/UL — HIGH (ref 3.8–10.5)
WBC # FLD AUTO: 10.8 K/UL — HIGH (ref 3.8–10.5)

## 2017-09-19 PROCEDURE — 99214 OFFICE O/P EST MOD 30 MIN: CPT

## 2017-09-19 RX ORDER — ALLOPURINOL 300 MG/1
300 TABLET ORAL
Qty: 30 | Refills: 0 | Status: DISCONTINUED | COMMUNITY
Start: 2017-08-15

## 2017-10-09 LAB
ALBUMIN MFR SERPL ELPH: 49.1 %
ALBUMIN SERPL ELPH-MCNC: 4.3 G/DL
ALBUMIN SERPL-MCNC: 3.8 G/DL
ALBUMIN/GLOB SERPL: 1 RATIO
ALP BLD-CCNC: 93 U/L
ALPHA1 GLOB MFR SERPL ELPH: 7 %
ALPHA1 GLOB SERPL ELPH-MCNC: 0.5 G/DL
ALPHA2 GLOB MFR SERPL ELPH: 12.8 %
ALPHA2 GLOB SERPL ELPH-MCNC: 1 G/DL
ALT SERPL-CCNC: 9 U/L
ANION GAP SERPL CALC-SCNC: 19 MMOL/L
AST SERPL-CCNC: 10 U/L
B-GLOBULIN MFR SERPL ELPH: 9 %
B-GLOBULIN SERPL ELPH-MCNC: 0.7 G/DL
BILIRUB SERPL-MCNC: 0.6 MG/DL
BUN SERPL-MCNC: 29 MG/DL
CALCIUM SERPL-MCNC: 9.3 MG/DL
CHLORIDE SERPL-SCNC: 101 MMOL/L
CO2 SERPL-SCNC: 24 MMOL/L
CREAT SERPL-MCNC: 3.28 MG/DL
DEPRECATED KAPPA LC FREE/LAMBDA SER: 0.01 RATIO
DEPRECATED KAPPA LC FREE/LAMBDA SER: 0.01 RATIO
GAMMA GLOB FLD ELPH-MCNC: 1.7 G/DL
GAMMA GLOB MFR SERPL ELPH: 22.1 %
GLUCOSE SERPL-MCNC: 110 MG/DL
IGA SER QL IEP: 28 MG/DL
IGG SER QL IEP: 604 MG/DL
IGM SER QL IEP: 1610 MG/DL
INR PPP: 4.47 RATIO
INTERPRETATION SERPL IEP-IMP: NORMAL
KAPPA LC CSF-MCNC: 145 MG/DL
KAPPA LC CSF-MCNC: 145 MG/DL
KAPPA LC SERPL-MCNC: 1.35 MG/DL
KAPPA LC SERPL-MCNC: 1.35 MG/DL
LDH SERPL-CCNC: 171 U/L
M PROTEIN MFR SERPL ELPH: 17.1 %
MONOCLON BAND OBS SERPL: 1.3 G/DL
POTASSIUM SERPL-SCNC: 4.9 MMOL/L
PROT SERPL-MCNC: 7.7 G/DL
PT BLD: 52.1 SEC
SODIUM SERPL-SCNC: 144 MMOL/L
URATE SERPL-MCNC: 7.3 MG/DL

## 2017-11-06 ENCOUNTER — OUTPATIENT (OUTPATIENT)
Dept: OUTPATIENT SERVICES | Facility: HOSPITAL | Age: 72
LOS: 1 days | Discharge: HOME | End: 2017-11-06

## 2017-11-06 DIAGNOSIS — Z98.89 OTHER SPECIFIED POSTPROCEDURAL STATES: Chronic | ICD-10-CM

## 2017-11-06 DIAGNOSIS — C91.11 CHRONIC LYMPHOCYTIC LEUKEMIA OF B-CELL TYPE IN REMISSION: ICD-10-CM

## 2017-11-08 ENCOUNTER — RESULT REVIEW (OUTPATIENT)
Age: 72
End: 2017-11-08

## 2017-11-08 ENCOUNTER — APPOINTMENT (OUTPATIENT)
Dept: HEMATOLOGY ONCOLOGY | Facility: CLINIC | Age: 72
End: 2017-11-08
Payer: COMMERCIAL

## 2017-11-08 VITALS
DIASTOLIC BLOOD PRESSURE: 66 MMHG | BODY MASS INDEX: 27.46 KG/M2 | SYSTOLIC BLOOD PRESSURE: 110 MMHG | WEIGHT: 213.84 LBS | TEMPERATURE: 98.4 F | HEART RATE: 54 BPM | RESPIRATION RATE: 16 BRPM | OXYGEN SATURATION: 97 %

## 2017-11-08 LAB
BASOPHILS # BLD AUTO: 0.1 K/UL — SIGNIFICANT CHANGE UP (ref 0–0.2)
BASOPHILS NFR BLD AUTO: 1.2 % — SIGNIFICANT CHANGE UP (ref 0–2)
EOSINOPHIL # BLD AUTO: 0.2 K/UL — SIGNIFICANT CHANGE UP (ref 0–0.5)
EOSINOPHIL NFR BLD AUTO: 2.9 % — SIGNIFICANT CHANGE UP (ref 0–6)
HCT VFR BLD CALC: 37.3 % — LOW (ref 39–50)
HGB BLD-MCNC: 12.3 G/DL — LOW (ref 13–17)
LYMPHOCYTES # BLD AUTO: 1.8 K/UL — SIGNIFICANT CHANGE UP (ref 1–3.3)
LYMPHOCYTES # BLD AUTO: 26 % — SIGNIFICANT CHANGE UP (ref 13–44)
MCHC RBC-ENTMCNC: 30.8 PG — SIGNIFICANT CHANGE UP (ref 27–34)
MCHC RBC-ENTMCNC: 32.9 G/DL — SIGNIFICANT CHANGE UP (ref 32–36)
MCV RBC AUTO: 93.8 FL — SIGNIFICANT CHANGE UP (ref 80–100)
MONOCYTES # BLD AUTO: 0.7 K/UL — SIGNIFICANT CHANGE UP (ref 0–0.9)
MONOCYTES NFR BLD AUTO: 9.7 % — SIGNIFICANT CHANGE UP (ref 2–14)
NEUTROPHILS # BLD AUTO: 4.2 K/UL — SIGNIFICANT CHANGE UP (ref 1.8–7.4)
NEUTROPHILS NFR BLD AUTO: 60.3 % — SIGNIFICANT CHANGE UP (ref 43–77)
PLATELET # BLD AUTO: 189 K/UL — SIGNIFICANT CHANGE UP (ref 150–400)
RBC # BLD: 3.98 M/UL — LOW (ref 4.2–5.8)
RBC # FLD: 12.2 % — SIGNIFICANT CHANGE UP (ref 10.3–14.5)
WBC # BLD: 7 K/UL — SIGNIFICANT CHANGE UP (ref 3.8–10.5)
WBC # FLD AUTO: 7 K/UL — SIGNIFICANT CHANGE UP (ref 3.8–10.5)

## 2017-11-08 PROCEDURE — 99214 OFFICE O/P EST MOD 30 MIN: CPT

## 2017-11-08 RX ORDER — AMOXICILLIN AND CLAVULANATE POTASSIUM 500; 125 MG/1; 1/1
500-125 TABLET, FILM COATED ORAL
Refills: 0 | Status: DISCONTINUED | COMMUNITY
Start: 2017-09-19 | End: 2017-11-08

## 2017-11-11 ENCOUNTER — FORM ENCOUNTER (OUTPATIENT)
Age: 72
End: 2017-11-11

## 2017-11-12 ENCOUNTER — OUTPATIENT (OUTPATIENT)
Dept: OUTPATIENT SERVICES | Facility: HOSPITAL | Age: 72
LOS: 1 days | End: 2017-11-12
Payer: COMMERCIAL

## 2017-11-12 ENCOUNTER — APPOINTMENT (OUTPATIENT)
Dept: CT IMAGING | Facility: IMAGING CENTER | Age: 72
End: 2017-11-12
Payer: COMMERCIAL

## 2017-11-12 DIAGNOSIS — Z98.89 OTHER SPECIFIED POSTPROCEDURAL STATES: Chronic | ICD-10-CM

## 2017-11-12 DIAGNOSIS — C88.0 WALDENSTROM MACROGLOBULINEMIA: ICD-10-CM

## 2017-11-12 PROCEDURE — 74176 CT ABD & PELVIS W/O CONTRAST: CPT | Mod: 26

## 2017-11-12 PROCEDURE — 74176 CT ABD & PELVIS W/O CONTRAST: CPT

## 2017-12-12 ENCOUNTER — NON-APPOINTMENT (OUTPATIENT)
Age: 72
End: 2017-12-12

## 2017-12-12 ENCOUNTER — APPOINTMENT (OUTPATIENT)
Dept: CARDIOLOGY | Facility: CLINIC | Age: 72
End: 2017-12-12
Payer: COMMERCIAL

## 2017-12-12 ENCOUNTER — APPOINTMENT (OUTPATIENT)
Dept: ELECTROPHYSIOLOGY | Facility: CLINIC | Age: 72
End: 2017-12-12
Payer: COMMERCIAL

## 2017-12-12 VITALS — SYSTOLIC BLOOD PRESSURE: 125 MMHG | OXYGEN SATURATION: 98 % | HEART RATE: 71 BPM | DIASTOLIC BLOOD PRESSURE: 75 MMHG

## 2017-12-12 PROCEDURE — 99214 OFFICE O/P EST MOD 30 MIN: CPT

## 2017-12-12 PROCEDURE — 93280 PM DEVICE PROGR EVAL DUAL: CPT

## 2017-12-14 ENCOUNTER — RX RENEWAL (OUTPATIENT)
Age: 72
End: 2017-12-14

## 2018-01-09 ENCOUNTER — MEDICATION RENEWAL (OUTPATIENT)
Age: 73
End: 2018-01-09

## 2018-01-09 ENCOUNTER — INPATIENT (INPATIENT)
Facility: HOSPITAL | Age: 73
LOS: 8 days | Discharge: NURSING, REHAB OR LONG-TERM CARE | DRG: 392 | End: 2018-01-18
Attending: SURGERY | Admitting: SURGERY
Payer: MEDICARE

## 2018-01-09 VITALS
DIASTOLIC BLOOD PRESSURE: 62 MMHG | RESPIRATION RATE: 19 BRPM | OXYGEN SATURATION: 99 % | TEMPERATURE: 99 F | HEART RATE: 75 BPM | SYSTOLIC BLOOD PRESSURE: 110 MMHG

## 2018-01-09 DIAGNOSIS — C88.0 WALDENSTROM MACROGLOBULINEMIA: ICD-10-CM

## 2018-01-09 DIAGNOSIS — Z98.89 OTHER SPECIFIED POSTPROCEDURAL STATES: Chronic | ICD-10-CM

## 2018-01-09 DIAGNOSIS — K57.32 DIVERTICULITIS OF LARGE INTESTINE WITHOUT PERFORATION OR ABSCESS WITHOUT BLEEDING: ICD-10-CM

## 2018-01-09 LAB
ALBUMIN SERPL ELPH-MCNC: 4.1 G/DL — SIGNIFICANT CHANGE UP (ref 3.3–5)
ALP SERPL-CCNC: 102 U/L — SIGNIFICANT CHANGE UP (ref 40–120)
ALT FLD-CCNC: 10 U/L RC — SIGNIFICANT CHANGE UP (ref 10–45)
ANION GAP SERPL CALC-SCNC: 12 MMOL/L — SIGNIFICANT CHANGE UP (ref 5–17)
APTT BLD: 49.3 SEC — HIGH (ref 27.5–37.4)
AST SERPL-CCNC: 11 U/L — SIGNIFICANT CHANGE UP (ref 10–40)
BASE EXCESS BLDV CALC-SCNC: -1.2 MMOL/L — SIGNIFICANT CHANGE UP (ref -2–2)
BASOPHILS # BLD AUTO: 0 K/UL — SIGNIFICANT CHANGE UP (ref 0–0.2)
BASOPHILS NFR BLD AUTO: 0.2 % — SIGNIFICANT CHANGE UP (ref 0–2)
BILIRUB SERPL-MCNC: 0.7 MG/DL — SIGNIFICANT CHANGE UP (ref 0.2–1.2)
BUN SERPL-MCNC: 30 MG/DL — HIGH (ref 7–23)
CA-I SERPL-SCNC: 1.21 MMOL/L — SIGNIFICANT CHANGE UP (ref 1.12–1.3)
CALCIUM SERPL-MCNC: 9.4 MG/DL — SIGNIFICANT CHANGE UP (ref 8.4–10.5)
CHLORIDE BLDV-SCNC: 111 MMOL/L — HIGH (ref 96–108)
CHLORIDE SERPL-SCNC: 105 MMOL/L — SIGNIFICANT CHANGE UP (ref 96–108)
CO2 BLDV-SCNC: 26 MMOL/L — SIGNIFICANT CHANGE UP (ref 22–30)
CO2 SERPL-SCNC: 24 MMOL/L — SIGNIFICANT CHANGE UP (ref 22–31)
CREAT SERPL-MCNC: 3.27 MG/DL — HIGH (ref 0.5–1.3)
EOSINOPHIL # BLD AUTO: 0 K/UL — SIGNIFICANT CHANGE UP (ref 0–0.5)
EOSINOPHIL NFR BLD AUTO: 0.3 % — SIGNIFICANT CHANGE UP (ref 0–6)
GAS PNL BLDV: 137 MMOL/L — SIGNIFICANT CHANGE UP (ref 136–145)
GAS PNL BLDV: SIGNIFICANT CHANGE UP
GAS PNL BLDV: SIGNIFICANT CHANGE UP
GLUCOSE BLDV-MCNC: 126 MG/DL — HIGH (ref 70–99)
GLUCOSE SERPL-MCNC: 132 MG/DL — HIGH (ref 70–99)
HCO3 BLDV-SCNC: 25 MMOL/L — SIGNIFICANT CHANGE UP (ref 21–29)
HCT VFR BLD CALC: 37.6 % — LOW (ref 39–50)
HCT VFR BLDA CALC: 38 % — LOW (ref 39–50)
HGB BLD CALC-MCNC: 12.5 G/DL — LOW (ref 13–17)
HGB BLD-MCNC: 12.3 G/DL — LOW (ref 13–17)
INR BLD: 3.46 RATIO — HIGH (ref 0.88–1.16)
LACTATE BLDV-MCNC: 2.1 MMOL/L — HIGH (ref 0.7–2)
LYMPHOCYTES # BLD AUTO: 1.8 K/UL — SIGNIFICANT CHANGE UP (ref 1–3.3)
LYMPHOCYTES # BLD AUTO: 14.5 % — SIGNIFICANT CHANGE UP (ref 13–44)
MCHC RBC-ENTMCNC: 30.6 PG — SIGNIFICANT CHANGE UP (ref 27–34)
MCHC RBC-ENTMCNC: 32.8 GM/DL — SIGNIFICANT CHANGE UP (ref 32–36)
MCV RBC AUTO: 93.5 FL — SIGNIFICANT CHANGE UP (ref 80–100)
MONOCYTES # BLD AUTO: 0.8 K/UL — SIGNIFICANT CHANGE UP (ref 0–0.9)
MONOCYTES NFR BLD AUTO: 6.7 % — SIGNIFICANT CHANGE UP (ref 2–14)
NEUTROPHILS # BLD AUTO: 9.7 K/UL — HIGH (ref 1.8–7.4)
NEUTROPHILS NFR BLD AUTO: 78.3 % — HIGH (ref 43–77)
PCO2 BLDV: 48 MMHG — SIGNIFICANT CHANGE UP (ref 35–50)
PH BLDV: 7.33 — LOW (ref 7.35–7.45)
PLATELET # BLD AUTO: 166 K/UL — SIGNIFICANT CHANGE UP (ref 150–400)
PO2 BLDV: 33 MMHG — SIGNIFICANT CHANGE UP (ref 25–45)
POTASSIUM BLDV-SCNC: 5.1 MMOL/L — HIGH (ref 3.5–5)
POTASSIUM SERPL-MCNC: 4.9 MMOL/L — SIGNIFICANT CHANGE UP (ref 3.5–5.3)
POTASSIUM SERPL-SCNC: 4.9 MMOL/L — SIGNIFICANT CHANGE UP (ref 3.5–5.3)
PROT SERPL-MCNC: 7.7 G/DL — SIGNIFICANT CHANGE UP (ref 6–8.3)
PROTHROM AB SERPL-ACNC: 38.3 SEC — HIGH (ref 9.8–12.7)
RBC # BLD: 4.02 M/UL — LOW (ref 4.2–5.8)
RBC # FLD: 13.1 % — SIGNIFICANT CHANGE UP (ref 10.3–14.5)
SAO2 % BLDV: 57 % — LOW (ref 67–88)
SODIUM SERPL-SCNC: 141 MMOL/L — SIGNIFICANT CHANGE UP (ref 135–145)
WBC # BLD: 12.4 K/UL — HIGH (ref 3.8–10.5)
WBC # FLD AUTO: 12.4 K/UL — HIGH (ref 3.8–10.5)

## 2018-01-09 PROCEDURE — 99254 IP/OBS CNSLTJ NEW/EST MOD 60: CPT | Mod: GC

## 2018-01-09 PROCEDURE — 74176 CT ABD & PELVIS W/O CONTRAST: CPT | Mod: 26

## 2018-01-09 PROCEDURE — 99284 EMERGENCY DEPT VISIT MOD MDM: CPT | Mod: GC

## 2018-01-09 RX ORDER — CLONAZEPAM 1 MG
1 TABLET ORAL AT BEDTIME
Qty: 0 | Refills: 0 | Status: DISCONTINUED | OUTPATIENT
Start: 2018-01-09 | End: 2018-01-16

## 2018-01-09 RX ORDER — PIPERACILLIN AND TAZOBACTAM 4; .5 G/20ML; G/20ML
3.38 INJECTION, POWDER, LYOPHILIZED, FOR SOLUTION INTRAVENOUS EVERY 12 HOURS
Qty: 0 | Refills: 0 | Status: DISCONTINUED | OUTPATIENT
Start: 2018-01-09 | End: 2018-01-18

## 2018-01-09 RX ORDER — LEVOTHYROXINE SODIUM 125 MCG
125 TABLET ORAL DAILY
Qty: 0 | Refills: 0 | Status: DISCONTINUED | OUTPATIENT
Start: 2018-01-09 | End: 2018-01-18

## 2018-01-09 RX ORDER — MORPHINE SULFATE 50 MG/1
2 CAPSULE, EXTENDED RELEASE ORAL ONCE
Qty: 0 | Refills: 0 | Status: DISCONTINUED | OUTPATIENT
Start: 2018-01-09 | End: 2018-01-09

## 2018-01-09 RX ORDER — MEMANTINE HYDROCHLORIDE 10 MG/1
10 TABLET ORAL
Qty: 0 | Refills: 0 | Status: DISCONTINUED | OUTPATIENT
Start: 2018-01-09 | End: 2018-01-18

## 2018-01-09 RX ORDER — TAMSULOSIN HYDROCHLORIDE 0.4 MG/1
0.4 CAPSULE ORAL AT BEDTIME
Qty: 0 | Refills: 0 | Status: DISCONTINUED | OUTPATIENT
Start: 2018-01-09 | End: 2018-01-18

## 2018-01-09 RX ORDER — ALLOPURINOL 300 MG
200 TABLET ORAL DAILY
Qty: 0 | Refills: 0 | Status: DISCONTINUED | OUTPATIENT
Start: 2018-01-09 | End: 2018-01-18

## 2018-01-09 RX ORDER — PROPRANOLOL HCL 160 MG
10 CAPSULE, EXTENDED RELEASE 24HR ORAL
Qty: 0 | Refills: 0 | Status: DISCONTINUED | OUTPATIENT
Start: 2018-01-09 | End: 2018-01-18

## 2018-01-09 RX ORDER — DONEPEZIL HYDROCHLORIDE 10 MG/1
10 TABLET, FILM COATED ORAL AT BEDTIME
Qty: 0 | Refills: 0 | Status: DISCONTINUED | OUTPATIENT
Start: 2018-01-09 | End: 2018-01-18

## 2018-01-09 RX ORDER — SODIUM BICARBONATE 1 MEQ/ML
650 SYRINGE (ML) INTRAVENOUS
Qty: 0 | Refills: 0 | Status: DISCONTINUED | OUTPATIENT
Start: 2018-01-09 | End: 2018-01-18

## 2018-01-09 RX ORDER — PANTOPRAZOLE SODIUM 20 MG/1
40 TABLET, DELAYED RELEASE ORAL
Qty: 0 | Refills: 0 | Status: DISCONTINUED | OUTPATIENT
Start: 2018-01-09 | End: 2018-01-18

## 2018-01-09 RX ORDER — SODIUM CHLORIDE 9 MG/ML
1000 INJECTION INTRAMUSCULAR; INTRAVENOUS; SUBCUTANEOUS
Qty: 0 | Refills: 0 | Status: DISCONTINUED | OUTPATIENT
Start: 2018-01-09 | End: 2018-01-10

## 2018-01-09 RX ORDER — PIPERACILLIN AND TAZOBACTAM 4; .5 G/20ML; G/20ML
3.38 INJECTION, POWDER, LYOPHILIZED, FOR SOLUTION INTRAVENOUS ONCE
Qty: 0 | Refills: 0 | Status: COMPLETED | OUTPATIENT
Start: 2018-01-09 | End: 2018-01-09

## 2018-01-09 RX ORDER — SODIUM CHLORIDE 9 MG/ML
1000 INJECTION INTRAMUSCULAR; INTRAVENOUS; SUBCUTANEOUS ONCE
Qty: 0 | Refills: 0 | Status: COMPLETED | OUTPATIENT
Start: 2018-01-09 | End: 2018-01-09

## 2018-01-09 RX ORDER — MORPHINE SULFATE 50 MG/1
2 CAPSULE, EXTENDED RELEASE ORAL ONCE
Qty: 0 | Refills: 0 | Status: DISCONTINUED | OUTPATIENT
Start: 2018-01-09 | End: 2018-01-10

## 2018-01-09 RX ORDER — MIRTAZAPINE 45 MG/1
15 TABLET, ORALLY DISINTEGRATING ORAL AT BEDTIME
Qty: 0 | Refills: 0 | Status: DISCONTINUED | OUTPATIENT
Start: 2018-01-09 | End: 2018-01-18

## 2018-01-09 RX ORDER — AMIODARONE HYDROCHLORIDE 400 MG/1
200 TABLET ORAL DAILY
Qty: 0 | Refills: 0 | Status: DISCONTINUED | OUTPATIENT
Start: 2018-01-09 | End: 2018-01-18

## 2018-01-09 RX ADMIN — TAMSULOSIN HYDROCHLORIDE 0.4 MILLIGRAM(S): 0.4 CAPSULE ORAL at 23:20

## 2018-01-09 RX ADMIN — SODIUM CHLORIDE 1000 MILLILITER(S): 9 INJECTION INTRAMUSCULAR; INTRAVENOUS; SUBCUTANEOUS at 12:42

## 2018-01-09 RX ADMIN — MORPHINE SULFATE 2 MILLIGRAM(S): 50 CAPSULE, EXTENDED RELEASE ORAL at 17:55

## 2018-01-09 RX ADMIN — MORPHINE SULFATE 2 MILLIGRAM(S): 50 CAPSULE, EXTENDED RELEASE ORAL at 15:21

## 2018-01-09 RX ADMIN — SODIUM CHLORIDE 50 MILLILITER(S): 9 INJECTION INTRAMUSCULAR; INTRAVENOUS; SUBCUTANEOUS at 20:12

## 2018-01-09 RX ADMIN — Medication 200 MILLIGRAM(S): at 23:19

## 2018-01-09 RX ADMIN — PIPERACILLIN AND TAZOBACTAM 200 GRAM(S): 4; .5 INJECTION, POWDER, LYOPHILIZED, FOR SOLUTION INTRAVENOUS at 17:54

## 2018-01-09 RX ADMIN — MIRTAZAPINE 15 MILLIGRAM(S): 45 TABLET, ORALLY DISINTEGRATING ORAL at 23:19

## 2018-01-09 RX ADMIN — Medication 1 MILLIGRAM(S): at 23:19

## 2018-01-09 NOTE — H&P ADULT - ASSESSMENT
Assessment: Anson is a pleasant 72 year old male with a medical history significant for chronic diastolic heart failure, atrial fibrillation with PPM (on Coumadin), CLL with Waldenstrom's macroglobulinemia last chemotherapy was in Jan, 2017, CKD 4, multiple episodes of acute diverticulitis presents with acute diverticulitis.     Plan:   - Admit to the [facility] Team (Colorectal Surgery) under the care of Dr. Dulce Cabral  - Administer intravenous antibiotics   - Keep him Nil Per Os. Will plan to manage this episode conservatively. Since the patient had recurrent episodes of diverticulitis, he will require an elective colon resection after the inflammation subsides.    - Appreciate gastroenterology input   - Appreciate nephrology input   - Appreciate internal medicine input   - Appreciate infectious disease input   - Hold Coumadin, check coagulation profile tomorrow. Will continue home cardiac medication for rate control. Will hold Lasix. Will administer normal saline at rate of 50cc/h to prevent dehydration.   - Full support in place   - Discussed with Dr. Dulce Cabral Assessment: Anson is a pleasant 72 year old male with a medical history significant for chronic diastolic heart failure, atrial fibrillation with PPM (on Coumadin), CLL with Waldenstrom's macroglobulinemia last chemotherapy was in Jan, 2017, CKD 4, multiple episodes of acute diverticulitis presents with acute diverticulitis.     Plan:   - Admit to the [facility] Team (Colorectal Surgery) under the care of Dr. Dulce Cabral  - Administer intravenous antibiotics   - Keep him Nil Per Os. Will plan to manage this episode conservatively. Since the patient had recurrent episodes of diverticulitis, he will require an elective colon resection after the inflammation subsides.    - Appreciate gastroenterology input   - Appreciate nephrology input   - Appreciate internal medicine input   - Appreciate infectious disease input   - Appreciate hematology/oncology input   - Appreciate emergency medicine input   - Hold Coumadin, check coagulation profile tomorrow. Will continue home cardiac medication for rate control. Will hold Lasix. Will administer normal saline at rate of 50cc/h to prevent dehydration.   - Full support in place   - Discussed with Dr. Dulce Cabral

## 2018-01-09 NOTE — CONSULT NOTE ADULT - PROBLEM SELECTOR RECOMMENDATION 9
History and labs reviewed.  Patient known to hematology service, presenting with recurrent acute diverticulitis.  Agree with abx per primary team with likely colonic resection this admission.  At this time no acute hematologic issues.  Trend CBC daily.  Can repeat SPEP, Immunofix, Quant Immunoglobulins once acute episode resolved. History and labs reviewed.  Patient known to hematology service, presenting with recurrent acute diverticulitis.  Agree with abx per primary team with likely colonic resection this admission.  At this time no acute hematologic issues.  Trend CBC daily.  Can repeat SPEP, Immunofix, Quant Immunoglobulins with next blood draw to follow the IgM protein.

## 2018-01-09 NOTE — ED ADULT NURSE NOTE - OBJECTIVE STATEMENT
71 yo male reporting to ED complaining of abdominal pain. A&Ox3. Pt reporting to ED for intermittent 7/10 left lower quadrant pain. PMH of diverticulitis, Afib, CLL, GERD, Waldenstrom macroglobinemia, & anxiety.  PSH of appendectomy, gall bladder removal, & hernia repair. Pt has had diverticulitis over the past seven months and has been treated successfully with oral antibiotics. Pt was on ten day antibiotic for diverticulitis was getting better until he had increased left lower quadrant pain last night. Pt abdomen soft, non-distended, tender on LLQ on palpation. Pt last bowel movement, diarrhea, was three days ago. Pt has decreased oral intake and appetite over the past few days. Pt denies nausea or vomiting. Pt denies bloody in the stool. Pt denies any urinary symptoms (hematuria, increased frequency, dysuria, & burning on urination). Pt denies chest pain, SOB, fevers at home, and chills. Wife at the bedside. Will continue to monitor.

## 2018-01-09 NOTE — CONSULT NOTE ADULT - SUBJECTIVE AND OBJECTIVE BOX
HPI:  Mr. Olvera is a 72 year-old man, well-known to me, with history of multiple medical problems including mild dementia, atrial fibrillation, diverticulitis, nephrolithiasis, Waldenstrom's macroglobulinemia, and stage 4 chronic kidney disease. He had a renal biopsy a few years ago to determine the etiology of his CKD; his CKD was determined to be largely due to infiltration of his kidneys by Waldenstrom's. His creatinine has been fairly stable over the past few years, with creatinine fluctuating between 2.7-3.3mg/dL. He presented to the ER today with left lower quadrant pain for the past few days.    PAST MEDICAL & SURGICAL HISTORY:  Bradycardia, drug induced  Waldenstrom macroglobulinemia  Diverticulitis  Atrial fibrillation  GERD (gastroesophageal reflux disease)  Anxiety disorder  CLL (chronic lymphocytic leukemia): in remission  History of laparoscopic cholecystectomy: 4/2014  S/P hernia repair: x2  Meniscus tear: s/p removal of Meniscus 8 months ago    Allergies  No Known Allergies    SOCIAL HISTORY:  Denies ETOh,Smoking,     FAMILY HISTORY:  No pertinent family history in first degree relatives    REVIEW OF SYSTEMS:  CONSTITUTIONAL: No weakness, fevers or chills  EYES/ENT: No visual changes;  No vertigo or throat pain   NECK: No pain or stiffness  RESPIRATORY: No cough, wheezing, hemoptysis; No shortness of breath  CARDIOVASCULAR: No chest pain or palpitations  GASTROINTESTINAL: No abdominal or epigastric pain. No nausea, vomiting, or hematemesis; No diarrhea or constipation. No melena or hematochezia.  GENITOURINARY: No dysuria, frequency or hematuria  NEUROLOGICAL: No numbness or weakness  SKIN: No itching, burning, rashes, or lesions   All other review of systems is negative unless indicated above.    VITAL:  T(C): , Max: 37.3 (01-09-18 @ 11:51)  T(F): , Max: 99.1 (01-09-18 @ 11:51)  HR: 58 (01-09-18 @ 11:51)  BP: 123/63 (01-09-18 @ 11:51)  BP(mean): --  RR: 19 (01-09-18 @ 11:51)  SpO2: 100% (01-09-18 @ 11:51)  Wt(kg): --    PHYSICAL EXAM:  Constitutional: NAD, Alert  HEENT: NCAT, MMM  Neck: Supple, No JVD  Respiratory: CTA-b/l  Cardiovascular: RRR s1s2, no m/r/g  Gastrointestinal: BS+, soft, NT/ND  Extremities: No peripheral edema b/l  Neurological: no focal deficits; strength grossly intact  Psychiatric: Normal mood, normal affect  Back: no CVAT b/l  Skin: No rashes, no nevi    LABS:                        12.3   12.4  )-----------( 166      ( 09 Jan 2018 12:53 )             37.6     Na(141)/K(4.9)/Cl(105)/HCO3(24)/BUN(30)/Cr(3.27)Glu(132)/Ca(9.4)/Mg(--)/PO4(--)    01-09 @ 12:53      IMAGING:     < from: CT Abdomen and Pelvis w/ Oral Cont (11.12.17 @ 15:59) >  Acute uncomplicated diverticulitis.  Retrocrural, retroperitoneal, mesenteric, and pelvic lymphadenopathy, not   significantly changed 8/1/2016, and is consistent with the patient's   known history of chronic lymphocytic leukemia.      ASSESSMENT:  (1)Renal - CKD4 - stable function with GFR 17-22ml/min  (2)Lytes - acceptable at present  (3)CV - BP/volume acceptable for now  (4)Abdominal pain - likely from diverticulitis    RECOMMEND:      Thank you for involving [facility] in this patient's care.    With warm regards,    Norman Ascencio MD   [facility]  [phone] HPI:  Mr. Olvera is a 72 year-old man, well-known to me, with history of multiple medical problems including mild dementia, atrial fibrillation, diverticulitis, nephrolithiasis, Waldenstrom's macroglobulinemia, and stage 4 chronic kidney disease. He had a renal biopsy a few years ago to determine the etiology of his CKD; his CKD was determined to be largely due to infiltration of his kidneys by Waldenstrom's. His creatinine has been fairly stable over the past few years, with creatinine fluctuating between 2.7-3.3mg/dL. He presented to the ER today with left lower quadrant pain for the past few days. He also admits to an episode of diarrhea 3 days ago. He denies fever, chills, nausea, vomiting, or shortness of breath. He denies recent urinary changes.    PAST MEDICAL & SURGICAL HISTORY:  Bradycardia, drug induced  Waldenstrom macroglobulinemia  Diverticulitis  Atrial fibrillation  GERD (gastroesophageal reflux disease)  Anxiety disorder  CLL (chronic lymphocytic leukemia): in remission  History of laparoscopic cholecystectomy: 4/2014  S/P hernia repair: x2  Meniscus tear: s/p removal of Meniscus 8 months ago    Allergies  No Known Allergies    SOCIAL HISTORY:  Denies ETOh,Smoking,     FAMILY HISTORY:  No pertinent family history in first degree relatives    REVIEW OF SYSTEMS:  CONSTITUTIONAL: No weakness, fevers or chills  EYES/ENT: No visual changes;  No vertigo or throat pain   NECK: No pain or stiffness  RESPIRATORY: No cough, wheezing, hemoptysis; No shortness of breath  CARDIOVASCULAR: No chest pain or palpitations  GASTROINTESTINAL: (+)abdominal pain; (+)diarrhea; no nausea, no vomiting  GENITOURINARY: No dysuria, frequency or hematuria  NEUROLOGICAL: No numbness or weakness  SKIN: No itching, burning, rashes, or lesions   All other review of systems is negative unless indicated above.    VITAL:  T(C): , Max: 37.3 (01-09-18 @ 11:51)  T(F): , Max: 99.1 (01-09-18 @ 11:51)  HR: 55   BP: 123/63 (01-09-18 @ 11:51)  BP(mean): --  RR: 19 (01-09-18 @ 11:51)  SpO2: 100% (01-09-18 @ 11:51)  Wt(kg): --    PHYSICAL EXAM:  Constitutional: NAD, Alert  HEENT: NCAT, DMM  Neck: Supple, No JVD  Respiratory: CTA-b/l  Cardiovascular: sabina, reg s1s2  Gastrointestinal: BS+, soft, NT/ND  Extremities: No peripheral edema b/l  Neurological: no focal deficits; strength grossly intact  Psychiatric: Normal mood, normal affect  Back: no CVAT b/l  Skin: No rashes, no nevi    LABS:                        12.3   12.4  )-----------( 166      ( 09 Jan 2018 12:53 )             37.6     Na(141)/K(4.9)/Cl(105)/HCO3(24)/BUN(30)/Cr(3.27)Glu(132)/Ca(9.4)/Mg(--)/PO4(--)    01-09 @ 12:53      IMAGING:     < from: CT Abdomen and Pelvis w/ Oral Cont (11.12.17 @ 15:59) >  Acute uncomplicated diverticulitis.  Retrocrural, retroperitoneal, mesenteric, and pelvic lymphadenopathy, not   significantly changed 8/1/2016, and is consistent with the patient's   known history of chronic lymphocytic leukemia.      ASSESSMENT:  (1)Renal - CKD4 - stable function with GFR 17-22ml/min  (2)Lytes - acceptable at present  (3)CV - euvolemic to mildly hypovolemic  (4)Abdominal pain - likely from diverticulitis    RECOMMEND:  (1)No IV contrast with CT A/P  (2)Antibiotics for GFR 17-22ml/min  (3)Could give gentle NS - 50cc/h  (4)Pain control per primary team; no NSAIDs      Thank you for involving [facility] in this patient's care.    With warm regards,    Norman Ascencio MD   [facility]  [phone]

## 2018-01-09 NOTE — CONSULT NOTE ADULT - SUBJECTIVE AND OBJECTIVE BOX
HPI:  72M multiple comorbidities, hx CLL with transformation to Waldenstroms, currently in remission off therapy, recent hospitalization for acute diverticulitis presenting with worsening LLQ pain.  Patient reports multiple episodes of diverticulitis in the past and has been on augmentin.  States symptoms started a couple days ago and became severe last night.  Denies fevers, chills, nausea, vomitting, bloody stools.      MEDICATIONS  (STANDING):  allopurinol 200 milliGRAM(s) Oral daily  amiodarone    Tablet 200 milliGRAM(s) Oral daily  donepezil 10 milliGRAM(s) Oral at bedtime  levothyroxine 125 MICROGram(s) Oral daily  memantine 10 milliGRAM(s) Oral two times a day  pantoprazole    Tablet 40 milliGRAM(s) Oral before breakfast  piperacillin/tazobactam IVPB. 3.375 Gram(s) IV Intermittent once  piperacillin/tazobactam IVPB. 3.375 Gram(s) IV Intermittent every 12 hours  propranolol 10 milliGRAM(s) Oral two times a day  sodium bicarbonate 650 milliGRAM(s) Oral two times a day  sodium chloride 0.9%. 1000 milliLiter(s) (50 mL/Hr) IV Continuous <Continuous>  tamsulosin 0.4 milliGRAM(s) Oral at bedtime    MEDICATIONS  (PRN):      PAST MEDICAL & SURGICAL HISTORY:  Bradycardia, drug induced  Waldenstrom macroglobulinemia  Diverticulitis  Atrial fibrillation  GERD (gastroesophageal reflux disease)  Anxiety disorder  CLL (chronic lymphocytic leukemia): in remission  History of laparoscopic cholecystectomy: 4/2014  S/P hernia repair: x2  Meniscus tear: s/p removal of Meniscus 8 months ago      FAMILY HISTORY:  No pertinent family history in first degree relatives      SOCIAL HISTORY: No EtOH, no tobacco    REVIEW OF SYSTEMS:    CONSTITUTIONAL: No weakness, fevers or chills  EYES/ENT: No visual changes;  No vertigo or throat pain   NECK: No pain or stiffness  RESPIRATORY: No cough, wheezing, hemoptysis; No shortness of breath  CARDIOVASCULAR: No chest pain or palpitations  GASTROINTESTINAL: No abdominal or epigastric pain. No nausea, vomiting, or hematemesis; No diarrhea or constipation. No melena or hematochezia.  GENITOURINARY: No dysuria, frequency or hematuria  NEUROLOGICAL: No numbness or weakness  SKIN: No itching, burning, rashes, or lesions   All other review of systems is negative unless indicated above.        T(F): 99.1 (01-09-18 @ 11:51), Max: 99.1 (01-09-18 @ 11:51)  HR: 58 (01-09-18 @ 11:51)  BP: 123/63 (01-09-18 @ 11:51)  RR: 19 (01-09-18 @ 11:51)  SpO2: 100% (01-09-18 @ 11:51)  Wt(kg): --    GENERAL: NAD, well-developed  HEAD:  Atraumatic, Normocephalic  EYES: EOMI, PERRLA, conjunctiva and sclera clear  NECK: Supple, No JVD  CHEST/LUNG: Clear to auscultation bilaterally; No wheeze  HEART: Regular rate and rhythm; No murmurs, rubs, or gallops  ABDOMEN: Soft, LLQ tenderness, no rebound/guarding, Nondistended; Bowel sounds present  EXTREMITIES:  2+ Peripheral Pulses, No clubbing, cyanosis, or edema  NEUROLOGY: non-focal  SKIN: No rashes or lesions                          12.3   12.4  )-----------( 166      ( 09 Jan 2018 12:53 )             37.6       01-09    141  |  105  |  30<H>  ----------------------------<  132<H>  4.9   |  24  |  3.27<H>    Ca    9.4      09 Jan 2018 12:53    TPro  7.7  /  Alb  4.1  /  TBili  0.7  /  DBili  x   /  AST  11  /  ALT  10  /  AlkPhos  102  01-09

## 2018-01-09 NOTE — CONSULT NOTE ADULT - ASSESSMENT
72 male with CKD stage 4, CLL, Afib on coumadin, PPM, hypothyroidism, GERD here with abdominal pain. He had diverticulitis in Nov 2017.      1- Abdominal pain  - check CT abdomen/ pelvis  - check blood cultures  - 72 male with CKD stage 4, CLL, Afib on coumadin, PPM, hypothyroidism, GERD here with abdominal pain. He had diverticulitis in Nov 2017.      1- Abdominal pain  - concern for diverticulitis  - check CT abdomen/ pelvis  - check blood cultures  - start zosyn 72 male with CKD stage 4, CLL, Afib on coumadin, PPM, hypothyroidism, GERD here with abdominal pain. Recurrent diverticulitis, leucocytosis, s/p courses of abx.      Plan:  - check blood cultures  - started  zosyn 3.375 gm iv q12h, cr cl < 20  - surgical plan as per primary team.

## 2018-01-09 NOTE — CONSULT NOTE ADULT - ASSESSMENT
71 yo male well known to me from multiple prior admissions, with h/o CLL, waldenstroms macroglobulinemia, ckd, afib s/p ppm on AC with coumadin, diverticulitis, anxiety a/w abd pain and recurrent diverticulitis    pt with elevated wbc and very tender abd with rebound  possible perf diverticulitis +/- abscess  CT scan pending  ID consulted for abx  GI eval noted  check blood cultures  plan d/w Dr. Cabral from surg. pt to be admitted to surg svc    further plans pending CT scan    afib   pt with supratheraputic INR 3.4  hold coumadin tonight in anticipation of possible surgical intervention  cont home cardiac meds for rate control    CKD  with mild LOUISE likely prerenal  hold lasix  Dr. Ascencio contacted. to see pt  gentle iv hydration.    CLL/waldenstroms  stable  outpt onc f/u    anxiety  cont home psy meds    will follow

## 2018-01-09 NOTE — ED ADULT NURSE NOTE - CHPI ED SYMPTOMS NEG
no hematuria/no nausea/no blood in stool/no dysuria/no burning urination/no abdominal distension/no chills

## 2018-01-09 NOTE — CONSULT NOTE ADULT - ASSESSMENT
72 year old male known to our practice with multiple prior episodes of diverticulitis presents to ER on advice of primary GI attending (Shilpa).  Pt was started on PO Augmentin around [place] time by Dr Simons for presumed diverticulitis (similar presentation to multiple prior episodes) - reports initial improvement in pain then after ~6 days, developed worsening pain. LAst night reports "excruciating pain" in LLQ, states he was unable to walk due to pain    Given physical exam and clinical picture, high suspicion for possible perf. diverticulitis    PLAN  -check labs as ordered (including coags)  -urgent CT scan  -Surgery evaluating pt  -ID to see pt    Discussed with ER attending, pt and family, primary GI attending and surgical resident    Thank you for the courtesy of this consult.  Abdullahi Goncalves PA-C    [facility]  [phone] 72 year old male known to our practice with multiple prior episodes of diverticulitis presents to ER on advice of primary GI attending (Shilpa).  Pt was started on PO Augmentin around [place] time by Dr Simons for presumed diverticulitis (similar presentation to multiple prior episodes) - reports initial improvement in pain then after ~6 days, developed worsening pain.  Last night reports "excruciating pain" in LLQ, states he was unable to walk due to pain    Given physical exam and clinical picture, high suspicion for possible perf. diverticulitis    PLAN  -check labs as ordered (including coags)  -urgent CT scan  -Empiric antibiotics  -Surgery evaluating pt  -ID to see pt    Discussed with ER attending, pt and family, primary GI attending and surgical resident    Thank you for the courtesy of this consult.    Abdullahi Goncalves PA-C    [facility]  [phone]

## 2018-01-09 NOTE — ED PROVIDER NOTE - MEDICAL DECISION MAKING DETAILS
72M, PMH of recurrent diverticulitis presenting with worsening abdominal pain. concern for complicated diverticulitis vs failed treatment. plan for cbc, cmp, ct abdomen, vbg. likely admission.

## 2018-01-09 NOTE — CONSULT NOTE ADULT - SUBJECTIVE AND OBJECTIVE BOX
NICOLAS CIFUENTES  72y Male  MRN:[id number]    Patient is a 72y old  Male who presents with a chief complaint of abd pain    HPI:  72 male with  CKD stage 4, CLL, Afib on coumadin, PPM, hypothyroidism, GERD here with abdominal pain. He had diverticulitis in Nov 2017 and has had several recurrences of diverticulitis since that time. Patient was started on Augmentin by GI and has been taking it for about 10 days with no improvement.  reports +diarrhea. no vomiting. low grade fevers at home.  Last night reports "excruciating pain" in LLQ, states he was unable to walk due to pain.  Patient has been afebrile this admission. No sick contacts. No recent travel.     Patient seen and evaluated at bedside in ER.     PAST MEDICAL & SURGICAL HISTORY:  Bradycardia, s/p ppm  Waldenstrom macroglobulinemia  Diverticulitis  Atrial fibrillation on coumadin  GERD (gastroesophageal reflux disease)  Anxiety disorder  CLL (chronic lymphocytic leukemia): in remission  History of laparoscopic cholecystectomy: 4/2014  S/P hernia repair: x2  Meniscus tear: s/p removal of Meniscus 8 months ago    SOC:  non smoker  no alcohol abuse  no drug abuse  lives with wife    Fam Hx:  non cont    REVIEW OF SYSTEMS:  Constitutional: yes fever, chills, fatigue. no weight loss.  Skin: No rash.  Eyes: No recent vision problems or eye pain.  ENT: No congestion, ear pain, or sore throat.  Endocrine: No thyroid problems.  Cardiovascular: No chest pain or palpation.  Respiratory: No cough, shortness of breath, congestion, or wheezing.  Gastrointestinal: as per hpi  Genitourinary: No dysuria.  Musculoskeletal: No joint swelling.  Neurologic: No headache.    VITALS:  Vital Signs Last 24 Hrs  T(C): 37.3 (09 Jan 2018 11:51), Max: 37.3 (09 Jan 2018 11:51)  T(F): 99.1 (09 Jan 2018 11:51), Max: 99.1 (09 Jan 2018 11:51)  HR: 58 (09 Jan 2018 11:51) (58 - 75)  BP: 123/63 (09 Jan 2018 11:51) (110/62 - 123/63)  BP(mean): --  RR: 19 (09 Jan 2018 11:51) (19 - 19)  SpO2: 100% (09 Jan 2018 11:51) (99% - 100%)  CAPILLARY BLOOD GLUCOSE          PHYSICAL EXAM:  GENERAL: NAD, well-developed  HEAD:  Atraumatic, Normocephalic  EYES: EOMI, PERRLA, conjunctiva and sclera clear  NECK: Supple, No JVD  CHEST/LUNG: Clear to auscultation bilaterally; No wheeze  HEART: S1, S2; No murmurs, rubs, or gallops  ABDOMEN: Soft, very tender LLQ, Nondistended; Bowel sounds present  EXTREMITIES:  2+ Peripheral Pulses, No clubbing, cyanosis, or edema  PSYCH: Normal affect  NEUROLOGY: AAOX3; non-focal  SKIN: No rashes or lesions    Consultant(s) Notes Reviewed:  [x ] YES  [ ] NO  Care Discussed with Consultants/Other Providers [ x] YES  [ ] NO    MEDS:  home meds reviewed    ALLERGIES:  No Known Allergies      LABS:                        12.3   12.4  )-----------( 166      ( 09 Jan 2018 12:53 )             37.6     01-09    141  |  105  |  30<H>  ----------------------------<  132<H>  4.9   |  24  |  3.27<H>    Ca    9.4      09 Jan 2018 12:53    TPro  7.7  /  Alb  4.1  /  TBili  0.7  /  DBili  x   /  AST  11  /  ALT  10  /  AlkPhos  102  01-09    PT/INR - ( 09 Jan 2018 12:53 )   PT: 38.3 sec;   INR: 3.46 ratio         PTT - ( 09 Jan 2018 12:53 )  PTT:49.3 sec      LIVER FUNCTIONS - ( 09 Jan 2018 12:53 )  Alb: 4.1 g/dL / Pro: 7.7 g/dL / ALK PHOS: 102 U/L / ALT: 10 U/L RC / AST: 11 U/L / GGT: x             CT scan: pending

## 2018-01-09 NOTE — ED ADULT NURSE NOTE - CADM POA CENTRAL LINE
Provided patient with lunchbox and diet beverage per Dr. Ferreira.     Radha Vázquez, RN  09/20/17 2021     No

## 2018-01-09 NOTE — ED PROVIDER NOTE - PROGRESS NOTE DETAILS
ATTG: patient initially refusing a ct scan, stating he wants to get an MRI. I explained at length that this test takes longer and may not be appropriate for the differential diagnosis we are considering. Surgery was consulted and came to bedside and evaluated patient. GI discussed with patient as well and will agree to Ct a/p. patient and family updated on lab and imaging results. agree with plan for admission. - resident Arnold Roland

## 2018-01-09 NOTE — ED ADULT NURSE REASSESSMENT NOTE - NS ED NURSE REASSESS COMMENT FT1
Received report from previous shift RN. Patient laying in bed in hallway, reporting generalized abd pain, 7/10 and requesting pain medication and food.  Denies CO, SOB, n/v/d, fever, chills. Pt aware of plan  of care for admission, awaiting bed assignment. VSS. Received report from previous shift RN. Patient laying in bed in hallway, reporting generalized abd pain, 7/10 and requesting pain medication and food.  Denies CO, SOB, n/v/d, fever, chills. Pt aware of plan  of care for admission, awaiting bed assignment. VSS. Paged admitting team (#[id number]) about pain medication and PM home medications and diet order. Awaiting call  back

## 2018-01-09 NOTE — CONSULT NOTE ADULT - SUBJECTIVE AND OBJECTIVE BOX
Patient is a 72y old  Male who presents with a chief complaint of   HPI:    Tmax=    WBC=     ESR=   CRP=    Patient has been afebrile this admission with no leukocytosis, no hypotension and no tachycardia. Patient's UA has 5-10 cells, large leukocyte esterase and negative nitrate. RVP is negative. Urine legionella is negative. Normal Lactate. Normal Liver function tests. Normal renal clearance. CXR shows clear lungs. Blood cultures show no growth to date. Urine cultures show now growth to date. Sputum cultures showed no growth to date. Wounds cultures showed no growth to date.   Patient was started on   No sick contacts. No recent travel. No recent antibiotics.     REVIEW OF SYSTEMS  All as below unless noted otherwise    General:  Denies fever and chills. 	  Ophthalmologic: Denies any visual complaints. 	  ENMT: No throat pain.  Respiratory: No cough, sputum. No shortness of breath.   Cardiovascular: No chest pain.   Gastrointestinal: No nausea or vomiting. No abdominal pain or diarrhea.   Genitourinary: No burning urine, no frequency. No flank pain  Musculoskeletal: No joint swelling or pain.   Neurological: No confusion. 	  Skin: No rash    PAST MEDICAL & SURGICAL HISTORY:  Bradycardia, drug induced  Waldenstrom macroglobulinemia  Diverticulitis  Atrial fibrillation  GERD (gastroesophageal reflux disease)  Anxiety disorder  CLL (chronic lymphocytic leukemia): in remission  History of laparoscopic cholecystectomy: 4/2014  S/P hernia repair: x2  Meniscus tear: s/p removal of Meniscus 8 months ago    FAMILY HISTORY:  No pertinent family history in first degree relatives    SOCIAL HISTORY:  non smoker      ANTIMICROBIALS:      OTHER MEDS:      Allergies        Vital Signs Last 24 Hrs  T(C): 37.2 (09 Jan 2018 11:12), Max: 37.2 (09 Jan 2018 11:12)  T(F): 98.9 (09 Jan 2018 11:12), Max: 98.9 (09 Jan 2018 11:12)  HR: 75 (09 Jan 2018 11:12) (75 - 75)  BP: 110/62 (09 Jan 2018 11:12) (110/62 - 110/62)  RR: 19 (09 Jan 2018 11:12) (19 - 19)  SpO2: 99% (09 Jan 2018 11:12) (99% - 99%)      PHYSICAL EXAM:  patient in no acute respiratory distress.  Constitutional: Comfortable. Awake and alert  Eyes: PERRL EOMI. No discharge.  ENMT: No sinus tenderness.  No pharyngeal erythema.   Neck: Supple. No thyromegaly   Respiratory: Good air entry bilaterally, no wheezes, rhonchi, or crackles  Cardiovascular:S1 S2 wnl, No murmurs  Gastrointestinal: Soft, no tenderness , bowel sounds present,   Genitourinary: No suprapubic tenderness. no CVA tenderness   Musculoskeletal: No joint swelling. No edema.  Vascular: peripheral pulses felt  Neurological: No grossly focal deficits  Skin: No rash   Lymph Nodes: No palpable Lymphadenopathy   Psychiatric: Affect normal  Lines:                               MICROBIOLOGY:      none new    RADIOLOGY:    none new Patient is a 72y old  Male who presents with a chief complaint of   HPI:  72 male with  CKD stage 4, CLL, Afib on coumadin, PPM, hypothyroidism, GERD here with abdominal pain. He had diverticulitis in Nov 2017.    Tmax=    WBC=     ESR=   CRP=    Patient has been afebrile this admission with no leukocytosis, no hypotension and no tachycardia. Patient's UA has 5-10 cells, large leukocyte esterase and negative nitrate. RVP is negative. Urine legionella is negative. Normal Lactate. Normal Liver function tests. Normal renal clearance. CXR shows clear lungs. Blood cultures show no growth to date. Urine cultures show now growth to date. Sputum cultures showed no growth to date. Wounds cultures showed no growth to date.   Patient was started on   No sick contacts. No recent travel. No recent antibiotics.     REVIEW OF SYSTEMS  All as below unless noted otherwise    General:  Denies fever and chills. 	  Ophthalmologic: Denies any visual complaints. 	  ENMT: No throat pain.  Respiratory: No cough, sputum. No shortness of breath.   Cardiovascular: No chest pain.   Gastrointestinal: No nausea or vomiting. No abdominal pain or diarrhea.   Genitourinary: No burning urine, no frequency. No flank pain  Musculoskeletal: No joint swelling or pain.   Neurological: No confusion. 	  Skin: No rash    PAST MEDICAL & SURGICAL HISTORY:  Bradycardia, drug induced  Waldenstrom macroglobulinemia  Diverticulitis  Atrial fibrillation  GERD (gastroesophageal reflux disease)  Anxiety disorder  CLL (chronic lymphocytic leukemia): in remission  History of laparoscopic cholecystectomy: 4/2014  S/P hernia repair: x2  Meniscus tear: s/p removal of Meniscus 8 months ago    FAMILY HISTORY:  No pertinent family history in first degree relatives    SOCIAL HISTORY:  non smoker      ANTIMICROBIALS:      OTHER MEDS:      Allergies        Vital Signs Last 24 Hrs  T(C): 37.2 (09 Jan 2018 11:12), Max: 37.2 (09 Jan 2018 11:12)  T(F): 98.9 (09 Jan 2018 11:12), Max: 98.9 (09 Jan 2018 11:12)  HR: 75 (09 Jan 2018 11:12) (75 - 75)  BP: 110/62 (09 Jan 2018 11:12) (110/62 - 110/62)  RR: 19 (09 Jan 2018 11:12) (19 - 19)  SpO2: 99% (09 Jan 2018 11:12) (99% - 99%)      PHYSICAL EXAM:  patient in no acute respiratory distress.  Constitutional: Comfortable. Awake and alert  Eyes: PERRL EOMI. No discharge.  ENMT: No sinus tenderness.  No pharyngeal erythema.   Neck: Supple. No thyromegaly   Respiratory: Good air entry bilaterally, no wheezes, rhonchi, or crackles  Cardiovascular:S1 S2 wnl, No murmurs  Gastrointestinal: Soft, no tenderness , bowel sounds present,   Genitourinary: No suprapubic tenderness. no CVA tenderness   Musculoskeletal: No joint swelling. No edema.  Vascular: peripheral pulses felt  Neurological: No grossly focal deficits  Skin: No rash   Lymph Nodes: No palpable Lymphadenopathy   Psychiatric: Affect normal  Lines:                               MICROBIOLOGY:      none new    RADIOLOGY:    none new Patient is a 72y old  Male who presents with a chief complaint of   HPI:  72 male with  CKD stage 4, CLL, Afib on coumadin, PPM, hypothyroidism, GERD here with abdominal pain. He had diverticulitis in Nov 2017 and has had several recurrences of diverticulitis since that time. Patient was started on Augmentin in the last week of December 2017.   Patient has been afebrile this admission. No sick contacts. No recent travel.     REVIEW OF SYSTEMS  All as below unless noted otherwise    General:  Denies fever and chills. 	  Ophthalmologic: Denies any visual complaints. 	  ENMT: No throat pain.  Respiratory: No cough, sputum. No shortness of breath.   Cardiovascular: No chest pain.   Gastrointestinal: No nausea or vomiting. Has LLQ abdominal pain or diarrhea.   Genitourinary: No burning urine, no frequency. No flank pain  Musculoskeletal: No joint swelling or pain.   Neurological: No confusion. 	  Skin: No rash    PAST MEDICAL & SURGICAL HISTORY:  Bradycardia, drug induced  Waldenstrom macroglobulinemia  Diverticulitis  Atrial fibrillation  GERD (gastroesophageal reflux disease)  Anxiety disorder  CLL (chronic lymphocytic leukemia): in remission  History of laparoscopic cholecystectomy: 4/2014  S/P hernia repair: x2  Meniscus tear: s/p removal of Meniscus 8 months ago    FAMILY HISTORY:  No pertinent family history in first degree relatives    SOCIAL HISTORY:  non smoker      ANTIMICROBIALS:  none    OTHER MEDS:      Allergies        Vital Signs Last 24 Hrs  T(C): 37.2 (09 Jan 2018 11:12), Max: 37.2 (09 Jan 2018 11:12)  T(F): 98.9 (09 Jan 2018 11:12), Max: 98.9 (09 Jan 2018 11:12)  HR: 75 (09 Jan 2018 11:12) (75 - 75)  BP: 110/62 (09 Jan 2018 11:12) (110/62 - 110/62)  RR: 19 (09 Jan 2018 11:12) (19 - 19)  SpO2: 99% (09 Jan 2018 11:12) (99% - 99%)      PHYSICAL EXAM:  patient in no acute respiratory distress.  Constitutional: Comfortable. Awake and alert  Eyes: PERRL EOMI. No discharge.  ENMT: No sinus tenderness.  No pharyngeal erythema.   Neck: Supple. No thyromegaly   Respiratory: Good air entry bilaterally, no wheezes, rhonchi, or crackles  Cardiovascular:S1 S2 wnl, No murmurs  Gastrointestinal: Soft, no tenderness , bowel sounds present,   Genitourinary: No suprapubic tenderness. no CVA tenderness   Musculoskeletal: No joint swelling. No edema.  Vascular: peripheral pulses felt  Neurological: No grossly focal deficits  Skin: No rash   Lymph Nodes: No palpable Lymphadenopathy   Psychiatric: Affect normal  Lines:                               MICROBIOLOGY:  none new    RADIOLOGY:  none new Patient is a 72y old  Male who presents with a chief complaint of   HPI:  72 male with  CKD stage 4, CLL, Afib on coumadin, PPM, hypothyroidism, GERD here with LLQ abdominal pain. He had diverticulitis in Nov 2017 and has had several recurrences of diverticulitis since that time-= 7 times in th elast 8 months, has not always gotten antibiotics for these occurances. Patient was started on Augmentin in the last week of December 2017. Three days ago he had 3 bouts of loose bowel movements. he is often constipated. no fever or chills.   Patient has been afebrile this admission. No sick contacts. No recent travel.     REVIEW OF SYSTEMS  All as below unless noted otherwise    General:  Denies fever and chills. 	  Ophthalmologic: Denies any visual complaints. 	  ENMT: No throat pain.  Respiratory: No cough, sputum. No shortness of breath.   Cardiovascular: No chest pain.   Gastrointestinal: No nausea or vomiting. Has LLQ abdominal pain or diarrhea.   Genitourinary: No burning urine, no frequency. No flank pain  Musculoskeletal: No joint swelling or pain.   Neurological: No confusion. 	  Skin: No rash    PAST MEDICAL & SURGICAL HISTORY:  Bradycardia, drug induced  Waldenstrom macroglobulinemia  Diverticulitis  Atrial fibrillation  GERD (gastroesophageal reflux disease)  Anxiety disorder  CLL (chronic lymphocytic leukemia): in remission  History of laparoscopic cholecystectomy: 4/2014  S/P hernia repair: x2  Meniscus tear: s/p removal of Meniscus 8 months ago    FAMILY HISTORY:  No pertinent family history in first degree relatives    SOCIAL HISTORY:  non smoker      ANTIMICROBIALS:  none    OTHER MEDS:      Allergies        Vital Signs Last 24 Hrs  T(C): 37.2 (09 Jan 2018 11:12), Max: 37.2 (09 Jan 2018 11:12)  T(F): 98.9 (09 Jan 2018 11:12), Max: 98.9 (09 Jan 2018 11:12)  HR: 75 (09 Jan 2018 11:12) (75 - 75)  BP: 110/62 (09 Jan 2018 11:12) (110/62 - 110/62)  RR: 19 (09 Jan 2018 11:12) (19 - 19)  SpO2: 99% (09 Jan 2018 11:12) (99% - 99%)      PHYSICAL EXAM:  patient in no acute respiratory distress.  Constitutional: Comfortable. Awake and alert  Eyes: PERRL EOMI. No discharge.  ENMT: No sinus tenderness.  No pharyngeal erythema.   Neck: Supple. No thyromegaly   Respiratory: Good air entry bilaterally, no wheezes, rhonchi, or crackles  Cardiovascular:S1 S2 wnl, No murmurs  Gastrointestinal: Soft, LLQ and left flank tenderness , bowel sounds present,   Genitourinary: No suprapubic tenderness. no CVA tenderness   Musculoskeletal: No joint swelling. No edema.  Vascular: peripheral pulses felt  Neurological: No grossly focal deficits  Skin: No rash   Lymph Nodes: No palpable Lymphadenopathy   Psychiatric: Affect normal  Lines:                               12.3   12.4  )-----------( 166      ( 09 Jan 2018 12:53 )             37.6     01-09    141  |  105  |  30<H>  ----------------------------<  132<H>  4.9   |  24  |  3.27<H>    Ca    9.4      09 Jan 2018 12:53    TPro  7.7  /  Alb  4.1  /  TBili  0.7  /  DBili  x   /  AST  11  /  ALT  10  /  AlkPhos  102  01-09      MICROBIOLOGY:  none new    RADIOLOGY:  none new Patient is a 72y old  Male who presents with a chief complaint of   HPI:  72 male with  CKD stage 4, CLL, Afib on coumadin, PPM, hypothyroidism, GERD here with LLQ abdominal pain. He had diverticulitis in Nov 2017 and has had several recurrences of diverticulitis since that time,  7 times in the last 8 months, has not always gotten antibiotics for these occurrences. Patient was started on Augmentin in the last week of December 2017.  He felt better in the beginning but then three days ago he had 3 bouts of loose bowel movements and since then started having pain again and last night was the worse so came in to ER. He is often constipated, no fever or chills.   Patient has been afebrile this admission. No sick contacts. No recent travel.     REVIEW OF SYSTEMS  All as below unless noted otherwise    General:  Denies fever and chills. 	  Ophthalmologic: Denies any visual complaints. 	  ENMT: No throat pain.  Respiratory: No cough, sputum. No shortness of breath.   Cardiovascular: No chest pain.   Gastrointestinal: No nausea or vomiting. Has LLQ abdominal pain and diarrhea.   Genitourinary: No burning urine, no frequency. No flank pain  Musculoskeletal: No joint swelling or pain.   Neurological: No confusion. 	  Skin: No rash    PAST MEDICAL & SURGICAL HISTORY:  Bradycardia, drug induced  Waldenstrom macroglobulinemia  Diverticulitis  Atrial fibrillation  GERD (gastroesophageal reflux disease)  Anxiety disorder  CLL (chronic lymphocytic leukemia): in remission  History of laparoscopic cholecystectomy: 4/2014  S/P hernia repair: x2  Meniscus tear: s/p removal of Meniscus 8 months ago    FAMILY HISTORY:  No pertinent family history in first degree relatives    SOCIAL HISTORY:  non smoker, , lives with spouse.     ANTIMICROBIALS:  none      Allergies  NKDA.       Vital Signs Last 24 Hrs  T(C): 37.2 (09 Jan 2018 11:12), Max: 37.2 (09 Jan 2018 11:12)  T(F): 98.9 (09 Jan 2018 11:12), Max: 98.9 (09 Jan 2018 11:12)  HR: 75 (09 Jan 2018 11:12) (75 - 75)  BP: 110/62 (09 Jan 2018 11:12) (110/62 - 110/62)  RR: 19 (09 Jan 2018 11:12) (19 - 19)  SpO2: 99% (09 Jan 2018 11:12) (99% - 99%)      PHYSICAL EXAM:  patient in no acute respiratory distress.  Constitutional: Comfortable. Awake and alert  Eyes: PERRL EOMI. No discharge.  ENT: No sinus tenderness.  No pharyngeal erythema.   Neck: Supple. No thyromegaly   Respiratory: Good air entry bilaterally, no wheezes, rhonchi, or crackles  Cardiovascular:S1 S2 wnl, + murmur. lt sided ppm.   Gastrointestinal: Soft, LLQ and left flank tenderness , bowel sounds present,   Genitourinary: No suprapubic tenderness. no CVA tenderness   Musculoskeletal: No joint swelling. No edema.  Vascular: peripheral pulses felt  Neurological: No grossly focal deficits  Skin: No rash    Psychiatric: Affect normal                            12.3   12.4  )-----------( 166      ( 09 Jan 2018 12:53 )             37.6     01-09    141  |  105  |  30<H>  ----------------------------<  132<H>  4.9   |  24  |  3.27<H>    Ca    9.4      09 Jan 2018 12:53    TPro  7.7  /  Alb  4.1  /  TBili  0.7  /  DBili  x   /  AST  11  /  ALT  10  /  AlkPhos  102  01-09      MICROBIOLOGY:  none new    RADIOLOGY: Reviewed by me.   < from: CT Abdomen and Pelvis w/ Oral Cont (01.09.18 @ 15:51) >  IMPRESSION:   Uncomplicated diverticulitis involving the distal descending and proximal   sigmoid colon with progression of the pericolonic inflammatory change. No   formed abscess. This inflammatory process lies adjacent to the left   lateral urinary bladder wall. Continued follow-up can be performed.    Unchanged retrocrural, retroperitoneal, and pelvic lymphadenopathy.

## 2018-01-09 NOTE — H&P ADULT - ATTENDING COMMENTS
I saw and examined the pt and discussed the tx plan with the House Staff. I agree with the exam and plan as documented in the above H&P.  Pt with smoldering diverticulitis for more than one month. On exam, abdomen is soft, obese, with tenderness in the LLQ, no peritoneal signs.   Admit, continue IV ABX. He will need home IV ABX, as he failed more than 2 courses of po ABX.   Dulce Cabral MD

## 2018-01-09 NOTE — ED PROVIDER NOTE - OBJECTIVE STATEMENT
72M, PMH of Waldenstrom macroglobulinemia, AFIB, CLL, diverticulitis presenting with worsening abdominal pain. Patient is currently taking amoxicillin from previous diverticulitis exacerbation but has had increasing abdominal pain over the past few days. Denies any fever, dysuria, blood in urine or stool, chest pain or difficulty breathing.

## 2018-01-09 NOTE — ED PROVIDER NOTE - PHYSICAL EXAMINATION
General: well appearing male in no acute distress  Respiratory: normal work of breathing, lungs clear to auscultation bilaterally   Cardiac: regular rate and rhythm   Abdomen: soft, LLQ tenderness to palpation   Neuro: A&Ox3

## 2018-01-09 NOTE — CONSULT NOTE ADULT - ASSESSMENT
72M multiple comorbidities, hx CLL with transformation to Waldenstroms, currently in remission off therapy, recent hospitalization for acute diverticulitis presenting with worsening LLQ pain.

## 2018-01-09 NOTE — CONSULT NOTE ADULT - SUBJECTIVE AND OBJECTIVE BOX
Patient is a 72y old  Male who presents with a chief complaint of severe abdominal pain    HPI:  72 year old male known to our practice with multiple prior episodes of diverticulitis presents to ER on advice of primary GI attending (Shilpa).  Pt was started on PO Augmentin around [place] time by Dr Simons for presumed diverticulitis (similar presentation to multiple prior episodes) - reports initial improvement in pain then after ~6 days, developed worsening pain. LAst night reports "excruciating pain" in LLQ, states he was unable to walk due to pain    no reported fever, chills, nausea or vomiting  Pt is on AC - last dose last night  no dizziness, syncope, LOC, focal weakness    PAST MEDICAL & SURGICAL HISTORY:  Bradycardia, drug induced  Waldenstrom macroglobulinemia  Diverticulitis  Atrial fibrillation  GERD (gastroesophageal reflux disease)  Anxiety disorder  CLL (chronic lymphocytic leukemia): in remission  History of laparoscopic cholecystectomy: 4/2014  S/P hernia repair: x2  Meniscus tear: s/p removal of Meniscus 8 months ago  s/p petty  s/p PPM      Allergies  No Known Allergies        MEDICATIONS  (STANDING):  (see pt meds list - brought from home)  MEDICATIONS  (PRN):      Social History:    Marital Status:  ( X  )    (   ) Single    (   )    (  )   Lives with: (  ) alone  (  ) children   ( X ) spouse   (  ) parents  (  ) other    no ETOH or smoking    Family History   IBD (  ) Yes   (X  ) No  GI Malignancy (  )  Yes    ( X ) No    Health Managemen  Last Colonoscopy -  recent sigmoidoscopy - 2017      Advanced Directives: (   X  ) None    (      ) DNR    (     ) DNI    (     ) Health Care Proxy:     Review of Systems:    General:  No wt loss, fevers, chills, night sweats ,fatigue  CV:  No pain, palpitatioins, AF, s/p PPM  Resp:  No dyspnea, cough, tachypnea, wheezing  GI:  see HPI  :  No pain, bleeding, incontinence, nocturia  Muscle:  No pain, weakness  Neuro:  No weakness, tingling, memory problems  Psych:  No fatigue, insomnia, mood problems, depression  Endocrine:  No polyuria, polydypsia, cold/heat intolerance  Heme: +Waldenstrom Macroglobulinemia, on AC (AF)  Skin:  No rash, tattoos, scars, edema      Vital Signs Last 24 Hrs  T(C): 37.3 (09 Jan 2018 11:51), Max: 37.3 (09 Jan 2018 11:51)  T(F): 99.1 (09 Jan 2018 11:51), Max: 99.1 (09 Jan 2018 11:51)  HR: 58 (09 Jan 2018 11:51) (58 - 75)  BP: 123/63 (09 Jan 2018 11:51) (110/62 - 123/63)  BP(mean): --  RR: 19 (09 Jan 2018 11:51) (19 - 19)  SpO2: 100% (09 Jan 2018 11:51) (99% - 100%)    PHYSICAL EXAM:    Constitutional: Non-toxic appearing, appears uncomfortable lying in ER stretcher  Neck: No LAD, supple, no JVD  Respiratory: Clear anteriorly, Left CW PPM site clean and dry  Cardiovascular: S1 and S2, RRR  Gastrointestinal: BS+, soft ++tenderness LLQ +rebound no rigidity  Extremities: No peripheral edema, neg clubbing, cyanosis  Vascular: 2+ peripheral pulses  Neurological: A/O x 3, no focal deficits  Psychiatric: Normal mood, normal affect  Skin: No rashes        LABS:  Pending        RADIOLOGY & ADDITIONAL TESTS: Patient is a 72y old  Male who presents with severe abdominal pain    HPI:  72 year old male known to our practice with multiple prior episodes of diverticulitis presents to ER on advice of primary GI attending (Sami Massey).  Pt was started on PO Augmentin around [place] time by Dr Simons for presumed diverticulitis (similar presentation to multiple prior episodes) - reports initial improvement in pain then after ~6 days, developed worsening pain.  Last night reports "excruciating pain" in LLQ, states he was unable to walk due to pain.    No reported fever, chills, nausea or vomiting  Pt is on AC - last dose last night  No dizziness, syncope, LOC, focal weakness    PAST MEDICAL & SURGICAL HISTORY:  Bradycardia, drug induced  Waldenstrom macroglobulinemia  Diverticulitis  Atrial fibrillation  GERD (gastroesophageal reflux disease)  Anxiety disorder  CLL (chronic lymphocytic leukemia): in remission  History of laparoscopic cholecystectomy: 4/2014  S/P hernia repair: x2  Meniscus tear: s/p removal of Meniscus 8 months ago  s/p petty  s/p PPM    Allergies  No Known Allergies    MEDICATIONS  (STANDING):  (see pt meds list - brought from home)  MEDICATIONS  (PRN):    Social History:  Marital Status:  ( X  )    (   ) Single    (   )    (  )   Lives with: (  ) alone  (  ) children   ( X ) spouse   (  ) parents  (  ) other  no ETOH or smoking    Family History   IBD (  ) Yes   (X  ) No  GI Malignancy (  )  Yes    ( X ) No    Health Managemen  Last Colonoscopy -  recent sigmoidoscopy - 2017    Advanced Directives: (   X  ) None    (      ) DNR    (     ) DNI    (     ) Health Care Proxy:     Review of Systems:  General:  No wt loss, fevers, chills, night sweats ,fatigue  CV:  No pain, palpitatioins, AF, s/p PPM  Resp:  No dyspnea, cough, tachypnea, wheezing  GI:  see HPI  :  No pain, bleeding, incontinence, nocturia  Muscle:  No pain, weakness  Neuro:  No weakness, tingling, memory problems  Psych:  No fatigue, insomnia, mood problems, depression  Endocrine:  No polyuria, polydypsia, cold/heat intolerance  Heme: +Waldenstrom Macroglobulinemia, on AC (AF)  Skin:  No rash, tattoos, scars, edema    Vital Signs Last 24 Hrs  T(C): 37.3 (09 Jan 2018 11:51), Max: 37.3 (09 Jan 2018 11:51)  T(F): 99.1 (09 Jan 2018 11:51), Max: 99.1 (09 Jan 2018 11:51)  HR: 58 (09 Jan 2018 11:51) (58 - 75)  BP: 123/63 (09 Jan 2018 11:51) (110/62 - 123/63)  BP(mean): --  RR: 19 (09 Jan 2018 11:51) (19 - 19)  SpO2: 100% (09 Jan 2018 11:51) (99% - 100%)    PHYSICAL EXAM:  Constitutional: Non-toxic appearing, appears uncomfortable lying in ER stretcher  Neck: No LAD, supple, no JVD  Respiratory: Clear anteriorly, Left CW PPM site clean and dry  Cardiovascular: S1 and S2, RRR  Gastrointestinal: BS+, soft ++tenderness LLQ +rebound no rigidity  Extremities: No peripheral edema, neg clubbing, cyanosis  Vascular: 2+ peripheral pulses  Neurological: A/O x 3, no focal deficits  Psychiatric: Normal mood, normal affect  Skin: No rashes    LABS:  Pending    RADIOLOGY & ADDITIONAL TESTS:

## 2018-01-09 NOTE — H&P ADULT - HISTORY OF PRESENT ILLNESS
[facility] Colorectal Surgery (Green Team) Admission Note     HPI: Anson is a pleasant 72 year old male with a medical history significant for chronic diastolic heart failure AF with PPM (on Coumadin), CLL with Waldenstrom's macroglobulinemia last chemotherapy was in Jan, 2017, CKD 4, multiple episodes of acute diverticulitis presents with lower abdominal pain for 2 weeks. He was his gastroenterologist 2 weeks ago when his abdominal pain started, he was started on Augmentin but his pain persisted. His pain is in the LLQ, not associated with nausea, vomiting, diarrhea or fevers. His last episodes was a few months ago. Patient states he had 7 episodes in the last year but not all of them required hospitalization His last colonoscopy in November 2017, no masses seen.   OF note, the patient had CLL evolved to Waldenstrom's macroglobulinemia complicated by chronic renal failure (CKD 4) and paroxysmal AF (has PPM and on Coumadin). Patient has completed maintenance Rituxan/Velcade/decadron.      PAST MEDICAL & SURGICAL HISTORY:  Bradycardia, drug induced  Waldenstrom macroglobulinemia  Diverticulitis  Atrial fibrillation  GERD (gastroesophageal reflux disease)  Anxiety disorder  CLL (chronic lymphocytic leukemia): in remission  History of laparoscopic cholecystectomy: 4/2014  S/P hernia repair: x2  Open appendectomy   Meniscus tear: s/p removal of Meniscus 8 months ago  chronic diastolic heart failure  FAMILY HISTORY:  No pertinent family history in first degree relatives     SOCIAL HISTORY:  Former smoker, No alcohol use, Two children     MEDICATIONS (home):   Allopurinol 100 MG Oral 2 TABLETS DAILY  Amiodarone HCl - 200 MG Oral Tablet once a day   Propranolol 10 mg two times a day   Trazodone 50 mg Daily   Aricept 10 MG Oral Tablet DAILY   ClonazePAM 1 MG, 2 TABLETS AT BEDTIME  Levothyroxine Sodium 125 MCG 1 TABLET DAILY  Mirtazapine 15 MG Oral Tablet; TAKE 1 TABLET AT BEDTIME  Namenda 10 MG Oral Table TWICE DAILY   Pantoprazole 40 mg daily   Probiotic Acidophilus Oral Capsule; USE AS DIRECTED  Rozerem 8 MG Oral Tablet; 1 TABLET AT BEDTIME  Sodium Bicarbonate 650 MG Oral Tablet; 1 TABLET 2 TIMES DAILY WITH MEALS  Tamsulosin HCl - 0.4 MG Oral Capsule; TAKE 1 CAPSULE Daily  Warfarin Sodium 1.5 MG Oral Tablet; 1 TABLET DAILY   Memantine 10 mg twice a day      Allergies: No Known Allergies     Vital Signs Last 24 Hrs  T(C): 37.3 (09 Jan 2018 11:51), Max: 37.3 (09 Jan 2018 11:51)  T(F): 99.1 (09 Jan 2018 11:51), Max: 99.1 (09 Jan 2018 11:51)  HR: 58 (09 Jan 2018 11:51) (58 - 75)  BP: 123/63 (09 Jan 2018 11:51) (110/62 - 123/63)  BP(mean): --  RR: 19 (09 Jan 2018 11:51) (19 - 19)  SpO2: 100% (09 Jan 2018 11:51) (99% - 100%)    Gen: NAD  Resp: CTA b/l   CV: RRR  Abd: soft, not distended, mildly focally tender in LLQ, no guarding, no generalized peritonitis.   Ext: warm      Labs:               12.3   12.4  )-----------( 166      ( 09 Jan 2018 12:53 )             37.6     01-09    141  |  105  |  30<H>  ----------------------------<  132<H>  4.9   |  24  |  3.27<H>    Ca    9.4      09 Jan 2018 12:53  TPro  7.7  /  Alb  4.1  /  TBili  0.7  /  DBili  x   /  AST  11  /  ALT  10  /  AlkPhos  102  01-09  PT/INR - ( 09 Jan 2018 12:53 )   PT: 38.3 sec;   INR: 3.46 ratio    PTT - ( 09 Jan 2018 12:53 )  PTT:49.3 sec       IMAGING STUDIES:  CT abdomen/pelvis 01/09/2018:   Acute uncomplicated diverticulitis.       CT abdomen /pelvis 11/12/2017 : Acute uncomplicated diverticulitis. [facility] Colorectal Surgery (Green Team) Admission Note     HPI: Anson is a pleasant 72 year old male with a medical history significant for chronic diastolic heart failure AF with PPM (on Coumadin), CLL with Waldenstrom's macroglobulinemia last chemotherapy was in Jan, 2017, CKD 4, multiple episodes of acute diverticulitis presents with lower abdominal pain for 2 weeks. He was his gastroenterologist 2 weeks ago when his abdominal pain started, he was started on Augmentin but his pain persisted. His pain is in the LLQ, not associated with nausea, vomiting, diarrhea or fevers. His last episodes was a few months ago. Patient states he had 7 episodes in the last year but not all of them required hospitalization His last colonoscopy in November 2017, no masses seen.   OF note, the patient had CLL evolved to Waldenstrom's macroglobulinemia complicated by chronic renal failure (CKD 4) and paroxysmal AF (has PPM and on Coumadin). Patient has completed maintenance Rituxan/Velcade/decadron.      PAST MEDICAL & SURGICAL HISTORY:  Bradycardia, drug induced  Waldenstrom macroglobulinemia  Diverticulitis  Atrial fibrillation  GERD (gastroesophageal reflux disease)  Anxiety disorder  CLL (chronic lymphocytic leukemia): in remission  History of laparoscopic cholecystectomy: 4/2014  S/P hernia repair: x2  Open appendectomy   Meniscus tear: s/p removal of Meniscus 8 months ago  chronic diastolic heart failure  FAMILY HISTORY:  No pertinent family history in first degree relatives     SOCIAL HISTORY:  Former smoker, No alcohol use, Two children     MEDICATIONS (home):   Allopurinol 100 MG Oral 2 TABLETS DAILY  Amiodarone HCl - 200 MG Oral Tablet once a day   Propranolol 10 mg two times a day   Trazodone 50 mg Daily   Aricept 10 MG Oral Tablet DAILY   ClonazePAM 1 MG, 2 TABLETS AT BEDTIME  Levothyroxine Sodium 125 MCG 1 TABLET DAILY  Mirtazapine 15 MG Oral Tablet; TAKE 1 TABLET AT BEDTIME  Namenda 10 MG Oral Table TWICE DAILY   Pantoprazole 40 mg daily   Probiotic Acidophilus Oral Capsule; USE AS DIRECTED  Rozerem 8 MG Oral Tablet; 1 TABLET AT BEDTIME  Sodium Bicarbonate 650 MG Oral Tablet; 1 TABLET 2 TIMES DAILY WITH MEALS  Tamsulosin HCl - 0.4 MG Oral Capsule; TAKE 1 CAPSULE Daily  Warfarin Sodium 1.5 MG Oral Tablet; 1 TABLET DAILY   Memantine 10 mg twice a day      Allergies: No Known Allergies     Vital Signs Last 24 Hrs  T(C): 37.3 (09 Jan 2018 11:51), Max: 37.3 (09 Jan 2018 11:51)  T(F): 99.1 (09 Jan 2018 11:51), Max: 99.1 (09 Jan 2018 11:51)  HR: 58 (09 Jan 2018 11:51) (58 - 75)  BP: 123/63 (09 Jan 2018 11:51) (110/62 - 123/63)  BP(mean): --  RR: 19 (09 Jan 2018 11:51) (19 - 19)  SpO2: 100% (09 Jan 2018 11:51) (99% - 100%)    Gen: NAD  Resp: CTA b/l   CV: RRR  Abd: soft, not distended, mildly focally tender in LLQ, no guarding, no generalized peritonitis.   Ext: warm      Labs:               12.3   12.4  )-----------( 166      ( 09 Jan 2018 12:53 )             37.6     01-09    141  |  105  |  30<H>  ----------------------------<  132<H>  4.9   |  24  |  3.27<H>    Ca    9.4      09 Jan 2018 12:53  TPro  7.7  /  Alb  4.1  /  TBili  0.7  /  DBili  x   /  AST  11  /  ALT  10  /  AlkPhos  102  01-09  PT/INR - ( 09 Jan 2018 12:53 )   PT: 38.3 sec;   INR: 3.46 ratio    PTT - ( 09 Jan 2018 12:53 )  PTT:49.3 sec       IMAGING STUDIES:  CT abdomen/pelvis 01/09/2018:   Acute uncomplicated diverticulitis.       CT abdomen /pelvis 11/12/2017 : Acute uncomplicated diverticulitis. [facility] Colorectal Surgery (Green Team) Admission Note     HPI: Anson is a pleasant 72 year old male with a medical history significant for chronic diastolic heart failure AF with PPM (on Coumadin), CLL with Waldenstrom's macroglobulinemia last chemotherapy was in Jan, 2017, CKD 4, multiple episodes of acute diverticulitis presents with lower abdominal pain for 2 weeks. He was his gastroenterologist 2 weeks ago when his abdominal pain started, he was started on Augmentin but his pain persisted. His pain is in the LLQ, not associated with nausea, vomiting, diarrhea or fevers. His last episodes was a few months ago. Patient states he had 7 episodes in the last year but not all of them required hospitalization His last colonoscopy in November 2017, no masses seen.   OF note, the patient had CLL evolved to Waldenstrom's macroglobulinemia complicated by chronic renal failure (CKD 4) and paroxysmal AF (has PPM and on Coumadin). Patient has completed maintenance Rituxan/Velcade/decadron.      PAST MEDICAL & SURGICAL HISTORY:  Bradycardia, drug induced  Waldenstrom macroglobulinemia  Diverticulitis  Atrial fibrillation  GERD (gastroesophageal reflux disease)  Anxiety disorder  CLL (chronic lymphocytic leukemia): in remission  History of laparoscopic cholecystectomy: 4/2014  S/P hernia repair: x2  Open appendectomy   Meniscus tear: s/p removal of Meniscus 8 months ago  chronic diastolic heart failure  FAMILY HISTORY:  No pertinent family history in first degree relatives     SOCIAL HISTORY:  Former smoker, No alcohol use, Two children     MEDICATIONS (home):   Allopurinol 100 MG Oral 2 TABLETS DAILY  Amiodarone HCl - 200 MG Oral Tablet once a day   Propranolol 10 mg two times a day   Trazodone 50 mg Daily   Aricept 10 MG Oral Tablet DAILY   ClonazePAM 1 MG, 2 TABLETS AT BEDTIME  Levothyroxine Sodium 125 MCG 1 TABLET DAILY  Mirtazapine 15 MG Oral Tablet; TAKE 1 TABLET AT BEDTIME  Namenda 10 MG Oral Table TWICE DAILY   Pantoprazole 40 mg daily   Probiotic Acidophilus Oral Capsule; USE AS DIRECTED  Rozerem 8 MG Oral Tablet; 1 TABLET AT BEDTIME  Sodium Bicarbonate 650 MG Oral Tablet; 1 TABLET 2 TIMES DAILY WITH MEALS  Tamsulosin HCl - 0.4 MG Oral Capsule; TAKE 1 CAPSULE Daily  Warfarin Sodium 1.5 MG Oral Tablet; 1 TABLET DAILY   Memantine 10 mg twice a day      Allergies: No Known Allergies     Vital Signs Last 24 Hrs  T(C): 37.3 (09 Jan 2018 11:51), Max: 37.3 (09 Jan 2018 11:51)  T(F): 99.1 (09 Jan 2018 11:51), Max: 99.1 (09 Jan 2018 11:51)  HR: 58 (09 Jan 2018 11:51) (58 - 75)  BP: 123/63 (09 Jan 2018 11:51) (110/62 - 123/63)  BP(mean): --  RR: 19 (09 Jan 2018 11:51) (19 - 19)  SpO2: 100% (09 Jan 2018 11:51) (99% - 100%)    Gen: NAD  Resp: CTA b/l   CV: RRR  Abd: soft, not distended, mildly focally tender in LLQ, no guarding, no generalized peritonitis.   Ext: warm      Labs:               12.3   12.4  )-----------( 166      ( 09 Jan 2018 12:53 )             37.6     01-09    141  |  105  |  30<H>  ----------------------------<  132<H>  4.9   |  24  |  3.27<H>    Ca    9.4      09 Jan 2018 12:53  TPro  7.7  /  Alb  4.1  /  TBili  0.7  /  DBili  x   /  AST  11  /  ALT  10  /  AlkPhos  102  01-09  PT/INR - ( 09 Jan 2018 12:53 )   PT: 38.3 sec;   INR: 3.46 ratio    PTT - ( 09 Jan 2018 12:53 )  PTT:49.3 sec       IMAGING STUDIES:  CT abdomen/pelvis 01/09/2018:   Acute uncomplicated diverticulitis.       CT abdomen /pelvis 11/12/2017 : Acute uncomplicated diverticulitis.

## 2018-01-09 NOTE — ED ADULT TRIAGE NOTE - CHIEF COMPLAINT QUOTE
co abdominal pain was dx with diverticulitis in the past and this pain feels similar to his previous episodes

## 2018-01-09 NOTE — ED PROVIDER NOTE - ATTENDING CONTRIBUTION TO CARE
73 y/o m pmhx a fib, CLL, diverticulitis, GERD, Waldenstrom Macroglobulinemia presents for continued llq pain that began a few days ago. still on abx from last exacerbation. no fever no chills no bleeding. pain typical of prior exacerbation.

## 2018-01-10 LAB
ANION GAP SERPL CALC-SCNC: 11 MMOL/L — SIGNIFICANT CHANGE UP (ref 5–17)
APTT BLD: 42.1 SEC — HIGH (ref 27.5–37.4)
BUN SERPL-MCNC: 29 MG/DL — HIGH (ref 7–23)
CALCIUM SERPL-MCNC: 8.7 MG/DL — SIGNIFICANT CHANGE UP (ref 8.4–10.5)
CHLORIDE SERPL-SCNC: 107 MMOL/L — SIGNIFICANT CHANGE UP (ref 96–108)
CO2 SERPL-SCNC: 23 MMOL/L — SIGNIFICANT CHANGE UP (ref 22–31)
CREAT SERPL-MCNC: 3.13 MG/DL — HIGH (ref 0.5–1.3)
GLUCOSE SERPL-MCNC: 104 MG/DL — HIGH (ref 70–99)
HCT VFR BLD CALC: 31.5 % — LOW (ref 39–50)
HGB BLD-MCNC: 10.6 G/DL — LOW (ref 13–17)
INR BLD: 3.7 RATIO — HIGH (ref 0.88–1.16)
LACTATE SERPL-SCNC: 1.4 MMOL/L — SIGNIFICANT CHANGE UP (ref 0.7–2)
MCHC RBC-ENTMCNC: 31.4 PG — SIGNIFICANT CHANGE UP (ref 27–34)
MCHC RBC-ENTMCNC: 33.8 GM/DL — SIGNIFICANT CHANGE UP (ref 32–36)
MCV RBC AUTO: 92.9 FL — SIGNIFICANT CHANGE UP (ref 80–100)
PLATELET # BLD AUTO: 127 K/UL — LOW (ref 150–400)
POTASSIUM SERPL-MCNC: 4.2 MMOL/L — SIGNIFICANT CHANGE UP (ref 3.5–5.3)
POTASSIUM SERPL-SCNC: 4.2 MMOL/L — SIGNIFICANT CHANGE UP (ref 3.5–5.3)
PROT SERPL-MCNC: 6.3 G/DL — SIGNIFICANT CHANGE UP (ref 6–8.3)
PROT SERPL-MCNC: 6.3 G/DL — SIGNIFICANT CHANGE UP (ref 6–8.3)
PROTHROM AB SERPL-ACNC: 41.4 SEC — HIGH (ref 9.8–12.7)
RBC # BLD: 3.38 M/UL — LOW (ref 4.2–5.8)
RBC # FLD: 13 % — SIGNIFICANT CHANGE UP (ref 10.3–14.5)
SODIUM SERPL-SCNC: 141 MMOL/L — SIGNIFICANT CHANGE UP (ref 135–145)
WBC # BLD: 7.5 K/UL — SIGNIFICANT CHANGE UP (ref 3.8–10.5)
WBC # FLD AUTO: 7.5 K/UL — SIGNIFICANT CHANGE UP (ref 3.8–10.5)

## 2018-01-10 PROCEDURE — 99232 SBSQ HOSP IP/OBS MODERATE 35: CPT

## 2018-01-10 PROCEDURE — 99255 IP/OBS CONSLTJ NEW/EST HI 80: CPT | Mod: GC

## 2018-01-10 RX ORDER — SODIUM CHLORIDE 9 MG/ML
1000 INJECTION, SOLUTION INTRAVENOUS
Qty: 0 | Refills: 0 | Status: DISCONTINUED | OUTPATIENT
Start: 2018-01-10 | End: 2018-01-13

## 2018-01-10 RX ORDER — ACETAMINOPHEN 500 MG
1000 TABLET ORAL ONCE
Qty: 0 | Refills: 0 | Status: COMPLETED | OUTPATIENT
Start: 2018-01-10 | End: 2018-01-10

## 2018-01-10 RX ADMIN — Medication 650 MILLIGRAM(S): at 10:57

## 2018-01-10 RX ADMIN — PIPERACILLIN AND TAZOBACTAM 25 GRAM(S): 4; .5 INJECTION, POWDER, LYOPHILIZED, FOR SOLUTION INTRAVENOUS at 19:37

## 2018-01-10 RX ADMIN — Medication 1000 MILLIGRAM(S): at 22:42

## 2018-01-10 RX ADMIN — PIPERACILLIN AND TAZOBACTAM 25 GRAM(S): 4; .5 INJECTION, POWDER, LYOPHILIZED, FOR SOLUTION INTRAVENOUS at 05:23

## 2018-01-10 RX ADMIN — Medication 10 MILLIGRAM(S): at 18:55

## 2018-01-10 RX ADMIN — Medication 125 MICROGRAM(S): at 05:23

## 2018-01-10 RX ADMIN — MIRTAZAPINE 15 MILLIGRAM(S): 45 TABLET, ORALLY DISINTEGRATING ORAL at 21:34

## 2018-01-10 RX ADMIN — TAMSULOSIN HYDROCHLORIDE 0.4 MILLIGRAM(S): 0.4 CAPSULE ORAL at 21:34

## 2018-01-10 RX ADMIN — Medication 650 MILLIGRAM(S): at 18:55

## 2018-01-10 RX ADMIN — Medication 650 MILLIGRAM(S): at 00:16

## 2018-01-10 RX ADMIN — Medication 200 MILLIGRAM(S): at 12:55

## 2018-01-10 RX ADMIN — MEMANTINE HYDROCHLORIDE 10 MILLIGRAM(S): 10 TABLET ORAL at 05:23

## 2018-01-10 RX ADMIN — MEMANTINE HYDROCHLORIDE 10 MILLIGRAM(S): 10 TABLET ORAL at 18:55

## 2018-01-10 RX ADMIN — DONEPEZIL HYDROCHLORIDE 10 MILLIGRAM(S): 10 TABLET, FILM COATED ORAL at 21:34

## 2018-01-10 RX ADMIN — Medication 1 MILLIGRAM(S): at 21:35

## 2018-01-10 RX ADMIN — DONEPEZIL HYDROCHLORIDE 10 MILLIGRAM(S): 10 TABLET, FILM COATED ORAL at 00:16

## 2018-01-10 RX ADMIN — MEMANTINE HYDROCHLORIDE 10 MILLIGRAM(S): 10 TABLET ORAL at 00:16

## 2018-01-10 RX ADMIN — AMIODARONE HYDROCHLORIDE 200 MILLIGRAM(S): 400 TABLET ORAL at 05:23

## 2018-01-10 RX ADMIN — Medication 400 MILLIGRAM(S): at 22:12

## 2018-01-10 NOTE — PROGRESS NOTE ADULT - ASSESSMENT
73 yo male well known to me from multiple prior admissions, with h/o CLL, waldenstroms macroglobulinemia, ckd, afib s/p ppm on AC with coumadin, diverticulitis, anxiety a/w abd pain and recurrent diverticulitis    pt being followed by surg  reports improved abd pain today  wbc improved  cont abx as per ID  clears diet  possible OR once inflammation improves    afib   pt with supratheraputic INR 3.4  hold coumadin in anticipation of possible surgical intervention  no need for bridging as per cards once suptheraputic  cont home cardiac meds for rate control    CKD  renal following  monitor creat    CLL/waldenstroms  stable  onc eval noted    anxiety  cont home psy meds    will follow

## 2018-01-10 NOTE — PROGRESS NOTE ADULT - SUBJECTIVE AND OBJECTIVE BOX
F/u: Recurrent diverticulitis     Interval History/ROS:  no fever or chills.  no chest pain. no shortness of breath. no cough.   no nausea or vomiting.   no abdominal pain. no diarrhea. no burning urine.   no headache.     Allergies  No Known Allergies      ANTIMICROBIALS:    piperacillin/tazobactam IVPB. 3.375 every 12 hours    allopurinol 200 milliGRAM(s) Oral daily  amiodarone    Tablet 200 milliGRAM(s) Oral daily  clonazePAM Tablet 1 milliGRAM(s) Oral at bedtime  donepezil 10 milliGRAM(s) Oral at bedtime  levothyroxine 125 MICROGram(s) Oral daily  memantine 10 milliGRAM(s) Oral two times a day  mirtazapine 15 milliGRAM(s) Oral at bedtime  pantoprazole    Tablet 40 milliGRAM(s) Oral before breakfast  piperacillin/tazobactam IVPB. 3.375 Gram(s) IV Intermittent every 12 hours  propranolol 10 milliGRAM(s) Oral two times a day  sodium bicarbonate 650 milliGRAM(s) Oral two times a day  tamsulosin 0.4 milliGRAM(s) Oral at bedtime    Vital Signs Last 24 Hrs  T(C): 36.3 (10 Josh 2018 09:51), Max: 37.3 (09 Jan 2018 18:35)  T(F): 97.4 (10 Josh 2018 09:51), Max: 99.1 (09 Jan 2018 18:35)  HR: 51 (10 Josh 2018 09:51) (50 - 52)  BP: 122/69 (10 Josh 2018 09:51) (102/60 - 122/69)  RR: 18 (10 Josh 2018 09:51) (18 - 20)  SpO2: 95% (10 Josh 2018 09:51) (95% - 99%)    PHYSICAL EXAM:  General:  non-toxic  HEAD/EYES:  PERRL  white sclera  ENT:  normal  supple  Cardiovascular:   no murmur   Respiratory:   clear to ausculation bilaterally  GI:   soft, non-tender, normal bowel sounds  :   no CVA tenderness   Musculoskeletal:  no synovitis  Neurologic:   non-focal exam   Skin:   no rash  Lymph:  no lymphadenopathy  Psychiatric:   appropriate affect, alert & oriented  Lines:  no phlebitis                          10.6   7.5   )-----------( 127      ( 10 Josh 2018 07:07 )             31.5     01-10    141  |  107  |  29<H>  ----------------------------<  104<H>  4.2   |  23  |  3.13<H>    Ca    8.7      10 Josh 2018 07:08    TPro  7.7  /  Alb  4.1  /  TBili  0.7  /  DBili  x   /  AST  11  /  ALT  10  /  AlkPhos  102  01-09    Microbiology:     Blood cultures pending.    RADIOLOGY:    < from: CT Abdomen and Pelvis w/ Oral Cont (01.09.18 @ 15:51) >    LOWER CHEST: Unchanged retrocrural lymph nodes measuring up to 1.2 cm in   short axis. Tiny bilateral cardiophrenic lymph nodes.    LIVER: Within normal limits.  BILE DUCTS: Normal caliber.  GALLBLADDER: Status post cholecystectomy.  SPLEEN: Within normal limits. Splenule.  PANCREAS: Within normal limits.  ADRENALS: Within normal limits.  KIDNEYS/URETERS: Unchanged nonobstructing 7 mm right upper pole calculus   and bilateral renal cysts, largest measuring up to 4.9 cm and with a thin   peripheral calcification.    BLADDER: Within normal limits.  REPRODUCTIVE ORGANS: The prostate is within normal limits.    BOWEL: Colonic diverticulosis with short segment mural thickening and   pericolonic fat stranding at the distal descending to proximal sigmoid   consistent with diverticulitis. The pericolonic inflammatory change has   increased when compared with the previous study dated November 11, 2017.   This inflammatory process lies adjacent to the left lateral wall of the   urinary bladder. Appendix is not visualized. Unchanged multiple   subcentimeter mesenteric lymph nodes and haziness of the small bowel   mesentery.  PERITONEUM: No ascites. Denise mesentery with prominent mesenteric lymph   nodes. No free intraperitoneal air.  VESSELS:  Atheromatous changes.  RETROPERITONEUM: Unchanged periceliac lymphadenopathy. Unchanged   retroperitoneal and pelvic lymphadenopathy including an enlarged left   external iliac lymph node measuring 2.3 cm x 1.1 cm.  ABDOMINAL WALL: Within normal limits.  BONES: Degenerative spinal changes with unchanged area and bilateral   neural foraminal narrowing of L5-S1.    IMPRESSION:   Uncomplicated diverticulitis involving the distal descending and proximal   sigmoid colon with progression of the pericolonic inflammatory change. No   formed abscess. This inflammatory process lies adjacent to the left   lateral urinary bladder wall. Continued follow-up can be performed.    Unchanged retrocrural, retroperitoneal, and pelvic lymphadenopathy. F/u: Recurrent diverticulitis     Interval History/ROS:  no fever or chills.  no chest pain. no shortness of breath. no cough.   no nausea or vomiting.   abdominal pain improved, but persists. + diarrhea.   no burning urine.   no headache.     Allergies  No Known Allergies      ANTIMICROBIALS:    piperacillin/tazobactam IVPB. 3.375 every 12 hours    allopurinol 200 milliGRAM(s) Oral daily  amiodarone    Tablet 200 milliGRAM(s) Oral daily  clonazePAM Tablet 1 milliGRAM(s) Oral at bedtime  donepezil 10 milliGRAM(s) Oral at bedtime  levothyroxine 125 MICROGram(s) Oral daily  memantine 10 milliGRAM(s) Oral two times a day  mirtazapine 15 milliGRAM(s) Oral at bedtime  pantoprazole    Tablet 40 milliGRAM(s) Oral before breakfast  piperacillin/tazobactam IVPB. 3.375 Gram(s) IV Intermittent every 12 hours  propranolol 10 milliGRAM(s) Oral two times a day  sodium bicarbonate 650 milliGRAM(s) Oral two times a day  tamsulosin 0.4 milliGRAM(s) Oral at bedtime    Vital Signs Last 24 Hrs  T(C): 36.3 (10 Josh 2018 09:51), Max: 37.3 (09 Jan 2018 18:35)  T(F): 97.4 (10 Josh 2018 09:51), Max: 99.1 (09 Jan 2018 18:35)  HR: 51 (10 Josh 2018 09:51) (50 - 52)  BP: 122/69 (10 Josh 2018 09:51) (102/60 - 122/69)  RR: 18 (10 Josh 2018 09:51) (18 - 20)  SpO2: 95% (10 Josh 2018 09:51) (95% - 99%)    PHYSICAL EXAM:  General:  non-toxic  HEAD/EYES:  PERRL  white sclera  Cardiovascular:   + murmur , +ppm  Respiratory:   clear to ausculation bilaterally  GI:   soft, LLQ tenderness  :   no CVA tenderness   Skin:   no rash  Psychiatric:   appropriate affect, alert & oriented  Lines:  no phlebitis                          10.6   7.5   )-----------( 127      ( 10 Josh 2018 07:07 )             31.5     01-10    141  |  107  |  29<H>  ----------------------------<  104<H>  4.2   |  23  |  3.13<H>    Ca    8.7      10 Josh 2018 07:08    TPro  7.7  /  Alb  4.1  /  TBili  0.7  /  DBili  x   /  AST  11  /  ALT  10  /  AlkPhos  102  01-09    Microbiology:     Blood cultures pending.    RADIOLOGY:    < from: CT Abdomen and Pelvis w/ Oral Cont (01.09.18 @ 15:51) >    LOWER CHEST: Unchanged retrocrural lymph nodes measuring up to 1.2 cm in   short axis. Tiny bilateral cardiophrenic lymph nodes.    LIVER: Within normal limits.  BILE DUCTS: Normal caliber.  GALLBLADDER: Status post cholecystectomy.  SPLEEN: Within normal limits. Splenule.  PANCREAS: Within normal limits.  ADRENALS: Within normal limits.  KIDNEYS/URETERS: Unchanged nonobstructing 7 mm right upper pole calculus   and bilateral renal cysts, largest measuring up to 4.9 cm and with a thin   peripheral calcification.    BLADDER: Within normal limits.  REPRODUCTIVE ORGANS: The prostate is within normal limits.    BOWEL: Colonic diverticulosis with short segment mural thickening and   pericolonic fat stranding at the distal descending to proximal sigmoid   consistent with diverticulitis. The pericolonic inflammatory change has   increased when compared with the previous study dated November 11, 2017.   This inflammatory process lies adjacent to the left lateral wall of the   urinary bladder. Appendix is not visualized. Unchanged multiple   subcentimeter mesenteric lymph nodes and haziness of the small bowel   mesentery.  PERITONEUM: No ascites. Denise mesentery with prominent mesenteric lymph   nodes. No free intraperitoneal air.  VESSELS:  Atheromatous changes.  RETROPERITONEUM: Unchanged periceliac lymphadenopathy. Unchanged   retroperitoneal and pelvic lymphadenopathy including an enlarged left   external iliac lymph node measuring 2.3 cm x 1.1 cm.  ABDOMINAL WALL: Within normal limits.  BONES: Degenerative spinal changes with unchanged area and bilateral   neural foraminal narrowing of L5-S1.    IMPRESSION:   Uncomplicated diverticulitis involving the distal descending and proximal   sigmoid colon with progression of the pericolonic inflammatory change. No   formed abscess. This inflammatory process lies adjacent to the left   lateral urinary bladder wall. Continued follow-up can be performed.    Unchanged retrocrural, retroperitoneal, and pelvic lymphadenopathy.

## 2018-01-10 NOTE — PROGRESS NOTE ADULT - SUBJECTIVE AND OBJECTIVE BOX
Patient is a 72y old  Male who presented with a chief complaint of abdominal pain (09 Jan 2018 17:09)      INTERVAL HPI/OVERNIGHT EVENTS:  feeling "better"  no nausea or vomiting  feels hungry/thirsty  abdominal pain improved, now more localized in LLQ    MEDICATIONS  (STANDING):  allopurinol 200 milliGRAM(s) Oral daily  amiodarone    Tablet 200 milliGRAM(s) Oral daily  clonazePAM Tablet 1 milliGRAM(s) Oral at bedtime  dextrose 5% + sodium chloride 0.45%. 1000 milliLiter(s) (50 mL/Hr) IV Continuous <Continuous>  donepezil 10 milliGRAM(s) Oral at bedtime  levothyroxine 125 MICROGram(s) Oral daily  memantine 10 milliGRAM(s) Oral two times a day  mirtazapine 15 milliGRAM(s) Oral at bedtime  pantoprazole    Tablet 40 milliGRAM(s) Oral before breakfast  piperacillin/tazobactam IVPB. 3.375 Gram(s) IV Intermittent every 12 hours  propranolol 10 milliGRAM(s) Oral two times a day  sodium bicarbonate 650 milliGRAM(s) Oral two times a day  tamsulosin 0.4 milliGRAM(s) Oral at bedtime    MEDICATIONS  (PRN):      Allergies  No Known Allergies        Review of Systems:  General:  No wt loss, fevers, chills, night sweats ,fatigue  CV:  No pain, palpitatioins, AF, s/p PPM  Resp:  No dyspnea, cough, tachypnea, wheezing  :  No pain, bleeding, incontinence, nocturia  Muscle:  No pain, weakness  Neuro:  No weakness, tingling, memory problems  Heme: +Waldenstrom Macroglobulinemia, on AC (AF)  Skin:  No rash, tattoos, scars, edema    Vital Signs Last 24 Hrs  T(C): 36.3 (10 Josh 2018 09:51), Max: 37.3 (09 Jan 2018 11:51)  T(F): 97.4 (10 Josh 2018 09:51), Max: 99.1 (09 Jan 2018 11:51)  HR: 51 (10 Josh 2018 09:51) (50 - 75)  BP: 122/69 (10 Josh 2018 09:51) (102/60 - 123/63)  BP(mean): --  RR: 18 (10 Josh 2018 09:51) (18 - 20)  SpO2: 95% (10 Josh 2018 09:51) (95% - 100%)    PHYSICAL EXAM:  Constitutional: Non-toxic appearing, appears comfortable, AOx3  Neck: No LAD, supple, no JVD  Respiratory: Clear anteriorly, Left CW PPM site clean and dry  Cardiovascular: S1 and S2, RRR  Gastrointestinal: BS+, soft +tenderness LLQ (improved from prior exam)  NO rebound no rigidity  Extremities: No peripheral edema, neg clubbing, cyanosis  Vascular: 2+ peripheral pulses  Neurological: A/O x 3, no focal deficits  Psychiatric: Normal mood, normal affect  Skin: No rashes    LABS:                        10.6   7.5   )-----------( 127      ( 10 Josh 2018 07:07 )             31.5     01-10    141  |  107  |  29<H>  ----------------------------<  104<H>  4.2   |  23  |  3.13<H>    Ca    8.7      10 Josh 2018 07:08    TPro  7.7  /  Alb  4.1  /  TBili  0.7  /  DBili  x   /  AST  11  /  ALT  10  /  AlkPhos  102  01-09    PT/INR - ( 09 Jan 2018 12:53 )   PT: 38.3 sec;   INR: 3.46 ratio         PTT - ( 09 Jan 2018 12:53 )  PTT:49.3 sec        RADIOLOGY & ADDITIONAL TESTS:    EXAM:  CT ABDOMEN AND PELVIS OC                        PROCEDURE DATE:  01/09/2018      INTERPRETATION:  CLINICAL INFORMATION: Left-sided abdominal pain for 3   days. History of diverticulitis and chronic lymphocytic leukemia.    COMPARISON: CT abdomen pelvis 11/12/2017    PROCEDURE:   CT of the Abdomen and Pelvis was performed without intravenous contrast.   Intravenous contrast: None.  Oral contrast: positive contrast was administered.  Sagittal and coronal reformats were performed.    FINDINGS:    LOWER CHEST: Unchanged retrocrural lymph nodes measuring up to 1.2 cm in   short axis. Tiny bilateral cardiophrenic lymph nodes.    LIVER: Within normal limits.  BILE DUCTS: Normal caliber.  GALLBLADDER: Status post cholecystectomy.  SPLEEN: Within normal limits. Splenule.  PANCREAS: Within normal limits.  ADRENALS: Within normal limits.  KIDNEYS/URETERS: Unchanged nonobstructing 7 mm right upper pole calculus   and bilateral renal cysts, largest measuring up to 4.9 cm and with a thin   peripheral calcification.    BLADDER: Within normal limits.  REPRODUCTIVE ORGANS: The prostate is within normal limits.    BOWEL: Colonic diverticulosis with short segment mural thickening and   pericolonic fat stranding at the distal descending to proximal sigmoid   consistent with diverticulitis. The pericolonic inflammatory change has   increased when compared with the previous study dated November 11, 2017.   This inflammatory process lies adjacent to the left lateral wall of the   urinary bladder. Appendix is not visualized. Unchanged multiple   subcentimeter mesenteric lymph nodes and haziness of the small bowel   mesentery.  PERITONEUM: No ascites. Denise mesentery with prominent mesenteric lymph   nodes. No free intraperitoneal air.  VESSELS:  Atheromatous changes.  RETROPERITONEUM: Unchanged periceliac lymphadenopathy. Unchanged   retroperitoneal and pelvic lymphadenopathy including an enlarged left   external iliac lymph node measuring 2.3 cm x 1.1 cm.  ABDOMINAL WALL: Within normal limits.  BONES: Degenerative spinal changes with unchanged area and bilateral   neural foraminal narrowing of L5-S1.    IMPRESSION:   Uncomplicated diverticulitis involving the distal descending and proximal   sigmoid colon with progression of the pericolonic inflammatory change. No   formed abscess. This inflammatory process lies adjacent to the left   lateral urinary bladder wall. Continued follow-up can be performed.    Unchanged retrocrural, retroperitoneal, and pelvic lymphadenopathy.

## 2018-01-10 NOTE — CONSULT NOTE ADULT - CONSULT REQUESTED DATE/TIME
09-Jan-2018 11:40
09-Jan-2018 12:22
09-Jan-2018 14:01
09-Jan-2018 17:45
10-Josh-2018 13:04
09-Jan-2018

## 2018-01-10 NOTE — PROGRESS NOTE ADULT - SUBJECTIVE AND OBJECTIVE BOX
NICOLAS CIFUENTES  72y Male  MRN:[id number]    Patient is a 72y old  Male who presents with a chief complaint of abd pain    HPI:  72 male with  CKD stage 4, CLL, Afib on coumadin, PPM, hypothyroidism, GERD here with abdominal pain. He had diverticulitis in Nov 2017 and has had several recurrences of diverticulitis since that time. Patient was started on Augmentin by GI and has been taking it for about 10 days with no improvement.  reports +diarrhea. no vomiting. low grade fevers at home.  Last night reports "excruciating pain" in LLQ, states he was unable to walk due to pain.  Patient has been afebrile this admission. No sick contacts. No recent travel.     Patient seen and evaluated at bedside. no acute events o/n    Interval HPI:  pt reports dec abd pain.  +diarrhea      PAST MEDICAL & SURGICAL HISTORY:  Bradycardia, s/p ppm  Waldenstrom macroglobulinemia  Diverticulitis  Atrial fibrillation on coumadin  GERD (gastroesophageal reflux disease)  Anxiety disorder  CLL (chronic lymphocytic leukemia): in remission  History of laparoscopic cholecystectomy: 4/2014  S/P hernia repair: x2  Meniscus tear: s/p removal of Meniscus 8 months ago    SOC:  non smoker  no alcohol abuse  no drug abuse  lives with wife    Fam Hx:  non cont    REVIEW OF SYSTEMS:  Constitutional: yes fever, chills, fatigue. no weight loss.  Skin: No rash.  Eyes: No recent vision problems or eye pain.  ENT: No congestion, ear pain, or sore throat.  Endocrine: No thyroid problems.  Cardiovascular: No chest pain or palpation.  Respiratory: No cough, shortness of breath, congestion, or wheezing.  Gastrointestinal: as per hpi  Genitourinary: No dysuria.  Musculoskeletal: No joint swelling.  Neurologic: No headache.    VITALS:  Vital Signs Last 24 Hrs  T(C): 36.8 (10 Josh 2018 16:50), Max: 37.3 (09 Jan 2018 18:35)  T(F): 98.3 (10 Josh 2018 16:50), Max: 99.1 (09 Jan 2018 18:35)  HR: 58 (10 Josh 2018 16:50) (50 - 58)  BP: 133/67 (10 Josh 2018 16:50) (102/60 - 133/67)  BP(mean): --  RR: 18 (10 Josh 2018 16:50) (18 - 20)  SpO2: 96% (10 Josh 2018 16:50) (95% - 99%)      PHYSICAL EXAM:  GENERAL: NAD, well-developed  HEAD:  Atraumatic, Normocephalic  EYES: EOMI, PERRLA, conjunctiva and sclera clear  NECK: Supple, No JVD  CHEST/LUNG: Clear to auscultation bilaterally; No wheeze  HEART: S1, S2; No murmurs, rubs, or gallops  ABDOMEN: Soft, mild tender LLQ, Nondistended; Bowel sounds present  EXTREMITIES:  2+ Peripheral Pulses, No clubbing, cyanosis, or edema  PSYCH: Normal affect  NEUROLOGY: AAOX3; non-focal  SKIN: No rashes or lesions    Consultant(s) Notes Reviewed:  [x ] YES  [ ] NO  Care Discussed with Consultants/Other Providers [ x] YES  [ ] NO    MEDS:  MEDICATIONS  (STANDING):  allopurinol 200 milliGRAM(s) Oral daily  amiodarone    Tablet 200 milliGRAM(s) Oral daily  clonazePAM Tablet 1 milliGRAM(s) Oral at bedtime  dextrose 5% + sodium chloride 0.45%. 1000 milliLiter(s) (50 mL/Hr) IV Continuous <Continuous>  donepezil 10 milliGRAM(s) Oral at bedtime  levothyroxine 125 MICROGram(s) Oral daily  memantine 10 milliGRAM(s) Oral two times a day  mirtazapine 15 milliGRAM(s) Oral at bedtime  pantoprazole    Tablet 40 milliGRAM(s) Oral before breakfast  piperacillin/tazobactam IVPB. 3.375 Gram(s) IV Intermittent every 12 hours  propranolol 10 milliGRAM(s) Oral two times a day  sodium bicarbonate 650 milliGRAM(s) Oral two times a day  tamsulosin 0.4 milliGRAM(s) Oral at bedtime    MEDICATIONS  (PRN):      ALLERGIES:  No Known Allergies      LABS:                                       10.6   7.5   )-----------( 127      ( 10 Josh 2018 07:07 )             31.5   01-10    141  |  107  |  29<H>  ----------------------------<  104<H>  4.2   |  23  |  3.13<H>    Ca    8.7      10 Josh 2018 07:08    TPro  6.3  /  Alb  x   /  TBili  x   /  DBili  x   /  AST  x   /  ALT  x   /  AlkPhos  x   01-10    < from: CT Abdomen and Pelvis w/ Oral Cont (01.09.18 @ 15:51) >  IMPRESSION:   Uncomplicated diverticulitis involving the distal descending and proximal   sigmoid colon with progression of the pericolonic inflammatory change. No   formed abscess. This inflammatory process lies adjacent to the left   lateral urinary bladder wall. Continued follow-up can be performed.    Unchanged retrocrural, retroperitoneal, and pelvic lymphadenopathy.    < end of copied text >

## 2018-01-10 NOTE — PROGRESS NOTE ADULT - ASSESSMENT
A/P: 72M w/ hx of chronic diastolic heart failure, AFIB w/ PPM (on coumadin), CLL w/ Waldenstrom's macroglobulinemia, CKD Stage 4, and hx of multiple episodes of acute diverticulitis presenting w/ acute diverticulitis. Uncomplicated diverticulitis involving the distal descending and proximal sigmoid colon with progression of the pericolonic inflammatory change on CT.    - Cont IV abx  - Diet: NPO, will plan to advance if stable  - No chemical DVT prophylaxis indicated at this time as patient has supratherapeutic INR  - Appreciate care from consulted medical services  - OR planning once inflammation subsides  - Dispo: Floor

## 2018-01-10 NOTE — PROGRESS NOTE ADULT - SUBJECTIVE AND OBJECTIVE BOX
Green Team Progress Note    S: No acute events overnight. Patient resting comfortably in bed. Pain well controlled.    O:  T(C): 36.8 (01-10-18 @ 00:49), Max: 37.3 (01-09-18 @ 11:51)  HR: 51 (01-10-18 @ 00:49) (51 - 75)  BP: 102/60 (01-10-18 @ 00:49) (102/60 - 123/63)  RR: 18 (01-10-18 @ 00:49) (18 - 20)  SpO2: 95% (01-10-18 @ 00:49) (95% - 100%)  Wt(kg): --    CBC Full  -  ( 09 Jan 2018 12:53 )  WBC Count : 12.4 K/uL  Hemoglobin : 12.3 g/dL  Hematocrit : 37.6 %  Platelet Count - Automated : 166 K/uL  Mean Cell Volume : 93.5 fl  Mean Cell Hemoglobin : 30.6 pg  Mean Cell Hemoglobin Concentration : 32.8 gm/dL  Auto Neutrophil # : 9.7 K/uL  Auto Lymphocyte # : 1.8 K/uL  Auto Monocyte # : 0.8 K/uL  Auto Eosinophil # : 0.0 K/uL  Auto Basophil # : 0.0 K/uL  Auto Neutrophil % : 78.3 %  Auto Lymphocyte % : 14.5 %  Auto Monocyte % : 6.7 %  Auto Eosinophil % : 0.3 %  Auto Basophil % : 0.2 %    CAPILLARY BLOOD GLUCOSE        PHYSICAL EXAM:  General: NAD  Neurology: follows commands  Respiratory: Normal effort, no acute respiratory distress  CV: Normal rate regular rhythm  Abdominal: Soft, ND, mild focal tenderness in LLQ; no guarding, no generalized peritonitis  Extremities: No edema, + peripheral pulses, MAEx4

## 2018-01-10 NOTE — CONSULT NOTE ADULT - ASSESSMENT
78M with paroxsymal afib on coumadin, chronic bradycardia with PPM, HFpEF, CLL, Waldenstrom macroglobulinemia, CKD4, mild dementia, diverticulitis, presents with LLQ abdominal pain x2 weeks and admitted for uncomplicated diverticulitis. Cardiology consulted for management of anticoagulation for paroxysmal afib and need for bridging with a heparin drip in case surgery is needed on this admission. Pt has a [id number] score of 4 and does not require bridging with a heparin drip.    Paroxysmal afib: coumadin being held due to possible surgery and supratherapeutic INR  - Daily INR  - No need for bridging with heparin drip  - Continue amiodarone for rhythm control     HFpEF  - Continue to hold beta blocker for bradycardia. Beta blockade per home regimen with metoprolol tartrate 12.5 mg BID rather than propranolol. 78M with paroxsymal afib on coumadin, chronic bradycardia with PPM, HFpEF, CLL, Waldenstrom macroglobulinemia, CKD4, mild dementia, diverticulitis, presents with LLQ abdominal pain x2 weeks and admitted for uncomplicated diverticulitis. Cardiology consulted for management of anticoagulation for paroxysmal afib and need for bridging with a heparin drip in case surgery is needed on this admission. Pt has a [id number] score of 3 and does not require bridging with a heparin drip.    Paroxysmal afib: coumadin being held due to possible surgery and supratherapeutic INR  - Daily INR  - No need for bridging with heparin drip  - Continue amiodarone for rhythm control 78M with paroxsymal afib on coumadin, chronic bradycardia with PPM, HFpEF, CLL, Waldenstrom macroglobulinemia, CKD4, mild dementia, diverticulitis, presents with LLQ abdominal pain x2 weeks and admitted for uncomplicated diverticulitis. Cardiology consulted for management of anticoagulation for paroxysmal afib and need for bridging with a heparin drip in case surgery is needed on this admission. Pt has a [id number] score of 3 and does not require bridging with a heparin drip. INR still currently supratherapeutic. He will not require further cardiac testing prior to surgery, approximately 6.6% risk of major cardiac event based on RCRI.     Paroxysmal afib: coumadin being held due to possible surgery and supratherapeutic INR  - Daily INR  - No need for bridging with heparin drip  - Continue amiodarone for rhythm control

## 2018-01-10 NOTE — CONSULT NOTE ADULT - ATTENDING COMMENTS
Fely Serrato MD, FACP, FACG, AGAF  [facility]  [phone]
Patient interviewed, examined and chart reviewed.  Case discussed with fellow.  Agree w/ Assessment and Plan as outlined.  Will review recent PMI    Dragan Shore MD [facility]  P: [phone]  Spectra:  [phone]  Office: [phone]
Pt has been in remission but slowly increasing IgM. Currently no neurologic symptoms with stable blood count. Please repeat SPEP with next blood draw.
Remington Carrasco  Pager: [phone]. If no response or past 5 pm call [phone].

## 2018-01-10 NOTE — PROGRESS NOTE ADULT - ASSESSMENT
72 year old male known to our practice with multiple prior episodes of diverticulitis presents to ER on advice of primary GI attending (Shilpa).  Pt was started on PO Augmentin around [place] time by Dr Simons for presumed diverticulitis (similar presentation to multiple prior episodes) - reports initial improvement in pain then after ~6 days, developed worsening pain. prior to ER presentation, reports "excruciating pain" in LLQ, states he was unable to walk due to pain    CT - acute uncomplicated diverticulitis, distal Descending colon and proximal Sigmoid colon- no abscess    PLAN  -Continue IV antibiotics, IV fluids  -eventual OR as per Surgery team  -? possible trial of clears today, will discuss with Surgery team  - Supportive care  -Currently supra-therapeutic INR, monitor labs    Abdullahi Goncalves PA-C    [facility]  [phone]

## 2018-01-10 NOTE — PROGRESS NOTE ADULT - SUBJECTIVE AND OBJECTIVE BOX
No pain, no shortness of breath      VITAL:  T(C): , Max: 37.3 (01-09-18 @ 11:51)  T(F): , Max: 99.1 (01-09-18 @ 11:51)  HR: 50 (01-10-18 @ 05:14)  BP: 109/55 (01-10-18 @ 05:14)  BP(mean): --  RR: 18 (01-10-18 @ 05:14)  SpO2: 95% (01-10-18 @ 05:14)      PHYSICAL EXAM:  Constitutional: NAD, Alert  HEENT: NCAT, DMM  Neck: Supple, No JVD  Respiratory: CTA-b/l  Cardiovascular: sabina, reg s1s2  Gastrointestinal: BS+, soft, NT/ND  Extremities: No peripheral edema b/l  Neurological: no focal deficits; strength grossly intact  Psychiatric: Normal mood, normal affect  Back: no CVAT b/l  Skin: No rashes, no nevi    IMAGING:      LABS:                        10.6   7.5   )-----------( 127      ( 10 Josh 2018 07:07 )             31.5     Na(141)/K(4.2)/Cl(107)/HCO3(23)/BUN(29)/Cr(3.13)Glu(104)/Ca(8.7)/Mg(--)/PO4(--)    01-10 @ 07:08  Na(141)/K(4.9)/Cl(105)/HCO3(24)/BUN(30)/Cr(3.27)Glu(132)/Ca(9.4)/Mg(--)/PO4(--)    01-09 @ 12:53       72M w/ h/o dementia, atrial fibrillation, diverticulitis, nephrolithiasis, Waldenstrom's macroglobulinemia, and stage 4 chronic kidney disease, 1/9/18     ASSESSMENT:  (1)Renal - CKD4 - stable function with GFR 17-22ml/min  (2)Lytes - acceptable at present  (3)CV - euvolemic to mildly hypovolemic  (4)Abdominal pain - likely from diverticulitis    RECOMMEND:  (1)No IV contrast with CT A/P  (2)Antibiotics for GFR 17-22ml/min  (3)Could give gentle NS - 50cc/h  (4)Pain control per primary team; no NSAIDs            Norman Ascencio MD  [facility]  [phone] No pain, no shortness of breath      VITAL:  T(C): , Max: 37.3 (01-09-18 @ 11:51)  T(F): , Max: 99.1 (01-09-18 @ 11:51)  HR: 50 (01-10-18 @ 05:14)  BP: 109/55 (01-10-18 @ 05:14)  BP(mean): --  RR: 18 (01-10-18 @ 05:14)  SpO2: 95% (01-10-18 @ 05:14)      PHYSICAL EXAM:  Constitutional: NAD, Alert  HEENT: NCAT, DMM  Neck: Supple, No JVD  Respiratory: CTA-b/l  Cardiovascular: sabina, reg s1s2  Gastrointestinal: BS+, soft, NT/ND  Extremities: No peripheral edema b/l  Neurological: no focal deficits; strength grossly intact  Psychiatric: Normal mood, normal affect  Back: no CVAT b/l  Skin: No rashes, no nevi      IMAGING:  < from: CT Abdomen and Pelvis w/ Oral Cont (01.09.18 @ 15:51) >  KIDNEYS/URETERS: Unchanged nonobstructing 7 mm right upper pole calculus   and bilateral renal cysts, largest measuring up to 4.9 cm and with a thin   peripheral calcification.   Uncomplicated diverticulitis involving the distal descending and proximal   sigmoid colon with progression of the pericolonic inflammatory change. No   formed abscess. This inflammatory process lies adjacent to the left   lateral urinary bladder wall. Continued follow-up can be performed.      LABS:                        10.6   7.5   )-----------( 127      ( 10 Josh 2018 07:07 )             31.5     Na(141)/K(4.2)/Cl(107)/HCO3(23)/BUN(29)/Cr(3.13)Glu(104)/Ca(8.7)/Mg(--)/PO4(--)    01-10 @ 07:08  Na(141)/K(4.9)/Cl(105)/HCO3(24)/BUN(30)/Cr(3.27)Glu(132)/Ca(9.4)/Mg(--)/PO4(--)    01-09 @ 12:53          ASSESSMENT: 72M w/ h/o dementia, atrial fibrillation, diverticulitis, nephrolithiasis, Waldenstrom's macroglobulinemia, and stage 4 chronic kidney disease, 1/9/18 a/w acute diverticulitis    (1)Renal - CKD4 - stable function with GFR 17-22ml/min  (2)Lytes - acceptable at present  (3)CV - euvolemic to mildly hypovolemic  (4)Surgery - acute diverticulitis - Surgery input appreciated - planned for eventual surgical repair      RECOMMEND:  (1)Antibiotics for GFR 17-22ml/min  (2)IVF as ordered  (3)Pain control per primary team; no NSAIDs  (4)No renal objection to OR          Norman Ascencio MD  [facility]  [phone] (+)mild-moderate left abdominal pain. No fever; no shortness of breath      VITAL:  T(C): , Max: 37.3 (01-09-18 @ 11:51)  T(F): , Max: 99.1 (01-09-18 @ 11:51)  HR: 50 (01-10-18 @ 05:14)  BP: 109/55 (01-10-18 @ 05:14)  BP(mean): --  RR: 18 (01-10-18 @ 05:14)  SpO2: 95% (01-10-18 @ 05:14)      PHYSICAL EXAM:  Constitutional: NAD, Alert  HEENT: NCAT, DMM  Neck: Supple, No JVD  Respiratory: CTA-b/l  Cardiovascular: sabina, reg s1s2  Gastrointestinal: BS+, soft, NT/ND  Extremities: No peripheral edema b/l  Neurological: no focal deficits; strength grossly intact  Psychiatric: Normal mood, normal affect  Back: no CVAT b/l  Skin: No rashes, no nevi      IMAGING:  < from: CT Abdomen and Pelvis w/ Oral Cont (01.09.18 @ 15:51) >  KIDNEYS/URETERS: Unchanged nonobstructing 7 mm right upper pole calculus   and bilateral renal cysts, largest measuring up to 4.9 cm and with a thin   peripheral calcification.   Uncomplicated diverticulitis involving the distal descending and proximal   sigmoid colon with progression of the pericolonic inflammatory change. No   formed abscess. This inflammatory process lies adjacent to the left   lateral urinary bladder wall. Continued follow-up can be performed.      LABS:                        10.6   7.5   )-----------( 127      ( 10 Josh 2018 07:07 )             31.5     Na(141)/K(4.2)/Cl(107)/HCO3(23)/BUN(29)/Cr(3.13)Glu(104)/Ca(8.7)/Mg(--)/PO4(--)    01-10 @ 07:08  Na(141)/K(4.9)/Cl(105)/HCO3(24)/BUN(30)/Cr(3.27)Glu(132)/Ca(9.4)/Mg(--)/PO4(--)    01-09 @ 12:53          ASSESSMENT: 72M w/ h/o dementia, atrial fibrillation, diverticulitis, nephrolithiasis, Waldenstrom's macroglobulinemia, and stage 4 chronic kidney disease, 1/9/18 a/w acute diverticulitis    (1)Renal - CKD4 - stable function with GFR 17-22ml/min  (2)Lytes - acceptable at present  (3)CV - euvolemic to mildly hypovolemic  (4)Surgery - acute diverticulitis - Surgery input appreciated - planned for eventual surgical repair      RECOMMEND:  (1)Antibiotics for GFR 17-22ml/min  (2)IVF as ordered  (3)Pain control per primary team; no NSAIDs  (4)No renal objection to OR          Norman Ascencio MD  [facility]  [phone]

## 2018-01-10 NOTE — PROGRESS NOTE ADULT - ASSESSMENT
72 male with CKD stage 4, CLL, Afib on coumadin, PPM, hypothyroidism, GERD here with abdominal pain. Recurrent diverticulitis, leucocytosis, s/p courses of abx.  CT A/P shows diverticulitis involving the distal descending and proximal sigmoid colon with progression of the pericolonic inflammatory change. No formed abscess. This inflammatory process lies adjacent to the left lateral urinary bladder wall.     Plan:  - f/u blood cultures  - on zosyn 3.375 gm iv q12h, cr cl < 20- day 2  - surgical plan as per primary team 72 male with CKD stage 4, CLL, Afib on coumadin, PPM, hypothyroidism, GERD here with abdominal pain. Recurrent diverticulitis, leucocytosis, s/p courses of abx.  Resolved leucocytosis, clinically improved.     Plan:  - f/u blood cultures  - on zosyn 3.375 gm iv q12h, cr cl < 20- day 2  - surgical plan as per primary team

## 2018-01-10 NOTE — CONSULT NOTE ADULT - SUBJECTIVE AND OBJECTIVE BOX
Patient seen and evaluated at bedside    Reason for consult: Management of anticoagulation for paroxysmal afib    Chief Complaint: LLQ pain    HPI: 78M with paroxsymal afib on coumadin, chronic bradycardia with PPM, HFpEF, CLL, Waldenstrom macroglobulinemia, CKD4, mild dementia, diverticulitis, presents with LLQ abdominal pain x2 weeks and admitted for uncomplicated diverticulitis. Cardiology consulted for management of paroxysmal afib and need for bridging with a heparin drip in setting of possible surgery for diverticulitis. Per surgery team, pt is currently not planned for surgery at the present time but they are holding coumadin in case pt's abdominal exam worsens and a need for emergency surgery develops. Pt's INR is currently supratherapeutic to 3.46 but he denies any bleeding. No fever, chills, dizziness, nausea, vomiting, palpitations, chest pain, or dyspnea. Pt says his abdominal pain is much improved from yesterday.       PAST MEDICAL & SURGICAL HISTORY:  Bradycardia, drug induced  Waldenstrom macroglobulinemia  Diverticulitis  Atrial fibrillation  GERD (gastroesophageal reflux disease)  Anxiety disorder  CLL (chronic lymphocytic leukemia): in remission  History of laparoscopic cholecystectomy: 4/2014  S/P hernia repair: x2  Open appendectomy   Meniscus tear: s/p removal of Meniscus 8 months ago  chronic diastolic heart failure  FAMILY HISTORY:  No pertinent family history in first degree relatives     SOCIAL HISTORY:  Former smoker 5 py, No alcohol use, No illicit drug use Two children     MEDICATIONS (home):   Allopurinol 100 MG Oral 2 TABLETS DAILY  Amiodarone HCl - 200 MG Oral Tablet once a day   Propranolol 10 mg two times a day   Trazodone 50 mg Daily   Aricept 10 MG Oral Tablet DAILY   ClonazePAM 1 MG, 2 TABLETS AT BEDTIME  Levothyroxine Sodium 125 MCG 1 TABLET DAILY  Mirtazapine 15 MG Oral Tablet; TAKE 1 TABLET AT BEDTIME  Namenda 10 MG Oral Table TWICE DAILY   Pantoprazole 40 mg daily   Probiotic Acidophilus Oral Capsule; USE AS DIRECTED  Rozerem 8 MG Oral Tablet; 1 TABLET AT BEDTIME  Sodium Bicarbonate 650 MG Oral Tablet; 1 TABLET 2 TIMES DAILY WITH MEALS  Tamsulosin HCl - 0.4 MG Oral Capsule; TAKE 1 CAPSULE Daily  Warfarin Sodium 1.5 MG Oral Tablet; 1 TABLET DAILY   Memantine 10 mg twice a day      Allergies: No Known Allergies     Vital Signs Last 24 Hrs  T(C): 37.3 (09 Jan 2018 11:51), Max: 37.3 (09 Jan 2018 11:51)  T(F): 99.1 (09 Jan 2018 11:51), Max: 99.1 (09 Jan 2018 11:51)  HR: 58 (09 Jan 2018 11:51) (58 - 75)  BP: 123/63 (09 Jan 2018 11:51) (110/62 - 123/63)  BP(mean): --  RR: 19 (09 Jan 2018 11:51) (19 - 19)  SpO2: 100% (09 Jan 2018 11:51) (99% - 100%)    Gen: NAD  Resp: CTA b/l   CV: RRR  Abd: soft, not distended, mildly focally tender in LLQ, no guarding, no generalized peritonitis.   Ext: warm      Labs:               12.3   12.4  )-----------( 166      ( 09 Jan 2018 12:53 )             37.6     01-09    141  |  105  |  30<H>  ----------------------------<  132<H>  4.9   |  24  |  3.27<H>    Ca    9.4      09 Jan 2018 12:53  TPro  7.7  /  Alb  4.1  /  TBili  0.7  /  DBili  x   /  AST  11  /  ALT  10  /  AlkPhos  102  01-09  PT/INR - ( 09 Jan 2018 12:53 )   PT: 38.3 sec;   INR: 3.46 ratio    PTT - ( 09 Jan 2018 12:53 )  PTT:49.3 sec       IMAGING STUDIES:  CT abdomen/pelvis 01/09/2018:   Acute uncomplicated diverticulitis.       CT abdomen /pelvis 11/12/2017 : Acute uncomplicated diverticulitis. (09 Jan 2018 17:09)      PMHx:   Bradycardia, drug induced  Waldenstrom macroglobulinemia  Diverticulitis  Atrial fibrillation  GERD (gastroesophageal reflux disease)  Anxiety disorder  CLL (chronic lymphocytic leukemia)      PSHx:   History of laparoscopic cholecystectomy  S/P hernia repair  Meniscus tear      Allergies:  No Known Allergies      Home Meds:    Current Medications:   allopurinol 200 milliGRAM(s) Oral daily  amiodarone    Tablet 200 milliGRAM(s) Oral daily  clonazePAM Tablet 1 milliGRAM(s) Oral at bedtime  dextrose 5% + sodium chloride 0.45%. 1000 milliLiter(s) IV Continuous <Continuous>  donepezil 10 milliGRAM(s) Oral at bedtime  levothyroxine 125 MICROGram(s) Oral daily  memantine 10 milliGRAM(s) Oral two times a day  mirtazapine 15 milliGRAM(s) Oral at bedtime  pantoprazole    Tablet 40 milliGRAM(s) Oral before breakfast  piperacillin/tazobactam IVPB. 3.375 Gram(s) IV Intermittent every 12 hours  propranolol 10 milliGRAM(s) Oral two times a day  sodium bicarbonate 650 milliGRAM(s) Oral two times a day  tamsulosin 0.4 milliGRAM(s) Oral at bedtime      FAMILY HISTORY:  No pertinent family history in first degree relatives      Social History:  Smoking History:  Alcohol Use:  Drug Use:    Review of Systems:    CONSTITUTIONAL: No fever or chills  EYES/ENT: No visual changes, no dysphagia  NECK: No pain or stiffness  RESPIRATORY: No dyspnea or cough  CARDIOVASCULAR: No chest pain or palpitations  GASTROINTESTINAL: +LLQ pain. No nausea, vomiting, diarrhea, melena, or hematochezia  BACK: No back pain  GENITOURINARY: No dysuria, frequency, or hematuria  NEUROLOGICAL: No numbness or weakness  SKIN: No itching, burning, or rashes  All other review of systems is negative unless indicated above.    Physical Exam:  T(F): 97.4 (01-10), Max: 99.1 (01-09)  HR: 51 (01-10) (50 - 52)  BP: 122/69 (01-10) (102/60 - 122/69)  RR: 18 (01-10)  SpO2: 95% (01-10)    GENERAL: No acute distress  HEAD:  Atraumatic, normocephalic  ENT: EOMI, PERRLA, conjunctiva and sclera clear  NECK: Supple, No JVD  CHEST/LUNG: Clear to auscultation bilaterally, no wheezes, rales, or rhonchi  BACK: No spinal tenderness  HEART: Regular rhythm, no murmurs  ABDOMEN: Soft, nontender, nondistended, normoactive bowel sounds  EXTREMITIES: No clubbing, cyanosis, or edema  PSYCH: Normal mood and affect  NEUROLOGY: AAOx3, non-focal  SKIN: No rashes     LINES:    Cardiovascular Diagnostic Testing:    ECG: Personally reviewed:    Echo: Personally reviewed:    Stress Testing:    Cath:    Imaging:    CXR: Personally reviewed    Labs: Personally reviewed                        10.6   7.5   )-----------( 127      ( 10 Josh 2018 07:07 )             31.5     01-10    141  |  107  |  29<H>  ----------------------------<  104<H>  4.2   |  23  |  3.13<H>    Ca    8.7      10 Josh 2018 07:08    TPro  7.7  /  Alb  4.1  /  TBili  0.7  /  DBili  x   /  AST  11  /  ALT  10  /  AlkPhos  102  01-09    PT/INR - ( 09 Jan 2018 12:53 )   PT: 38.3 sec;   INR: 3.46 ratio         PTT - ( 09 Jan 2018 12:53 )  PTT:49.3 sec Patient seen and evaluated at bedside    Reason for consult: Management of anticoagulation for paroxysmal afib    Chief Complaint: LLQ pain    HPI: 78M with paroxsymal afib on coumadin, chronic bradycardia with PPM, HFpEF, CLL, Waldenstrom macroglobulinemia, CKD4, mild dementia, diverticulitis, presents with LLQ abdominal pain x2 weeks and admitted for uncomplicated diverticulitis. Cardiology consulted for management of anticoagulation for paroxysmal afib and need for bridging with a heparin drip in setting of possible surgery for diverticulitis. Per surgery team, pt is currently not planned for surgery at the present time but they are holding coumadin in case pt's abdominal exam worsens and a need for emergency surgery develops. Pt's INR is currently supratherapeutic to 3.46 but he denies any bleeding. No fever, chills, dizziness, nausea, vomiting, palpitations, chest pain, or dyspnea. Pt says his abdominal pain is much improved from yesterday.       PAST MEDICAL & SURGICAL HISTORY:  Bradycardia, drug induced  Waldenstrom macroglobulinemia  Diverticulitis  Atrial fibrillation  GERD (gastroesophageal reflux disease)  Anxiety disorder  CLL (chronic lymphocytic leukemia): in remission  History of laparoscopic cholecystectomy: 4/2014  S/P hernia repair: x2  Open appendectomy   Meniscus tear: s/p removal of Meniscus 8 months ago  chronic diastolic heart failure  FAMILY HISTORY:  No pertinent family history in first degree relatives     SOCIAL HISTORY:  Former smoker 5 py, No alcohol use, No illicit drug use Two children     MEDICATIONS (home):   Allopurinol 100 MG Oral 2 TABLETS DAILY  Amiodarone HCl - 200 MG Oral Tablet once a day   Propranolol 10 mg two times a day   Trazodone 50 mg Daily   Aricept 10 MG Oral Tablet DAILY   ClonazePAM 1 MG, 2 TABLETS AT BEDTIME  Levothyroxine Sodium 125 MCG 1 TABLET DAILY  Mirtazapine 15 MG Oral Tablet; TAKE 1 TABLET AT BEDTIME  Namenda 10 MG Oral Table TWICE DAILY   Pantoprazole 40 mg daily   Probiotic Acidophilus Oral Capsule; USE AS DIRECTED  Rozerem 8 MG Oral Tablet; 1 TABLET AT BEDTIME  Sodium Bicarbonate 650 MG Oral Tablet; 1 TABLET 2 TIMES DAILY WITH MEALS  Tamsulosin HCl - 0.4 MG Oral Capsule; TAKE 1 CAPSULE Daily  Warfarin Sodium 1.5 MG Oral Tablet; 1 TABLET DAILY   Memantine 10 mg twice a day      Allergies: No Known Allergies     Vital Signs Last 24 Hrs  T(C): 37.3 (09 Jan 2018 11:51), Max: 37.3 (09 Jan 2018 11:51)  T(F): 99.1 (09 Jan 2018 11:51), Max: 99.1 (09 Jan 2018 11:51)  HR: 58 (09 Jan 2018 11:51) (58 - 75)  BP: 123/63 (09 Jan 2018 11:51) (110/62 - 123/63)  BP(mean): --  RR: 19 (09 Jan 2018 11:51) (19 - 19)  SpO2: 100% (09 Jan 2018 11:51) (99% - 100%)    Gen: NAD  Resp: CTA b/l   CV: RRR  Abd: soft, not distended, mildly focally tender in LLQ, no guarding, no generalized peritonitis.   Ext: warm      Labs:               12.3   12.4  )-----------( 166      ( 09 Jan 2018 12:53 )             37.6     01-09    141  |  105  |  30<H>  ----------------------------<  132<H>  4.9   |  24  |  3.27<H>    Ca    9.4      09 Jan 2018 12:53  TPro  7.7  /  Alb  4.1  /  TBili  0.7  /  DBili  x   /  AST  11  /  ALT  10  /  AlkPhos  102  01-09  PT/INR - ( 09 Jan 2018 12:53 )   PT: 38.3 sec;   INR: 3.46 ratio    PTT - ( 09 Jan 2018 12:53 )  PTT:49.3 sec       IMAGING STUDIES:  CT abdomen/pelvis 01/09/2018:   Acute uncomplicated diverticulitis.       CT abdomen /pelvis 11/12/2017 : Acute uncomplicated diverticulitis. (09 Jan 2018 17:09)      PMHx:   Bradycardia, drug induced  Waldenstrom macroglobulinemia  Diverticulitis  Atrial fibrillation  GERD (gastroesophageal reflux disease)  Anxiety disorder  CLL (chronic lymphocytic leukemia)      PSHx:   History of laparoscopic cholecystectomy  S/P hernia repair  Meniscus tear      Allergies:  No Known Allergies      Home Meds:    Current Medications:   allopurinol 200 milliGRAM(s) Oral daily  amiodarone    Tablet 200 milliGRAM(s) Oral daily  clonazePAM Tablet 1 milliGRAM(s) Oral at bedtime  dextrose 5% + sodium chloride 0.45%. 1000 milliLiter(s) IV Continuous <Continuous>  donepezil 10 milliGRAM(s) Oral at bedtime  levothyroxine 125 MICROGram(s) Oral daily  memantine 10 milliGRAM(s) Oral two times a day  mirtazapine 15 milliGRAM(s) Oral at bedtime  pantoprazole    Tablet 40 milliGRAM(s) Oral before breakfast  piperacillin/tazobactam IVPB. 3.375 Gram(s) IV Intermittent every 12 hours  propranolol 10 milliGRAM(s) Oral two times a day  sodium bicarbonate 650 milliGRAM(s) Oral two times a day  tamsulosin 0.4 milliGRAM(s) Oral at bedtime      FAMILY HISTORY:  No pertinent family history in first degree relatives      Social History:  Smoking History:  Alcohol Use:  Drug Use:    Review of Systems:    CONSTITUTIONAL: No fever or chills  EYES/ENT: No visual changes, no dysphagia  NECK: No pain or stiffness  RESPIRATORY: No dyspnea or cough  CARDIOVASCULAR: No chest pain or palpitations  GASTROINTESTINAL: +LLQ pain. No nausea, vomiting, diarrhea, melena, or hematochezia  BACK: No back pain  GENITOURINARY: No dysuria, frequency, or hematuria  NEUROLOGICAL: No numbness or weakness  SKIN: No itching, burning, or rashes  All other review of systems is negative unless indicated above.      Physical Exam:  T(F): 97.4 (01-10), Max: 99.1 (01-09)  HR: 51 (01-10) (50 - 52)  BP: 122/69 (01-10) (102/60 - 122/69)  RR: 18 (01-10)  SpO2: 95% (01-10)      GENERAL: No acute distress  HEAD:  Atraumatic, normocephalic  ENT: EOMI, PERRLA, conjunctiva and sclera clear  NECK: Supple, No JVD  CHEST/LUNG: Clear to auscultation bilaterally, no wheezes, rales, or rhonchi  BACK: No spinal tenderness  HEART: Regular rhythm, no murmurs  ABDOMEN: Soft, nontender, nondistended, normoactive bowel sounds  EXTREMITIES: No clubbing, cyanosis, or edema  PSYCH: Normal mood and affect  NEUROLOGY: AAOx3, non-focal  SKIN: No rashes       Cardiovascular Diagnostic Testing:    ECG: NA    Echo: Personally reviewed:    Patient name: NICOLAS CIFUENTES  YOB: 1945   Age: 71 (M)   MR#: [id number]  Study Date: 8/2/2016  [facility] RDCS  Study quality: Technically fair  Referring Physician: Som Alexandra MD  Blood Pressure: 107/65 mmHg  Height: 188 cm  Weight: 107 kg  BSA: 2.3 m2  ------------------------------------------------------------------------  PROCEDURE: Transthoracic echocardiogram with 2-D, M-Mode  and complete spectral and color flow Doppler.  INDICATION: Abnormal electrocardiogram (ECG) (EKG) (R94.31)  ------------------------------------------------------------------------  Dimensions:    Normal Values:  LA:     4.1    2.0 - 4.0 cm  Ao:     3.8    2.0 - 3.8 cm  SEPTUM: 1.0    0.6 - 1.2 cm  PWT:    1.0    0.6 - 1.1 cm  LVIDd:  5.8    3.0 - 5.6 cm  LVIDs:  4.1    1.8 - 4.0 cm  Derived variables:  LVMI: 100 g/m2  RWT: 0.34  Fractional short: 29 %  EF (Visual Estimate): 65 %  Doppler Peak Velocity (m/sec): AoV=2.8  ------------------------------------------------------------------------  Observations:  Mitral Valve: Mitral annular calcification, otherwise  normal mitral valve. Minimal mitral regurgitation.  Aortic Valve/Aorta: Calcified trileaflet aortic valve with  a mildly decreased opening. Peak transaortic valve gradient  equals 31 mm Hg, mean transaortic valve gradient equals 16  mm Hg, estimated aortic valve area equals 1.9 sqcm (by  continuity equation), consistent with mild aortic stenosis.  Minimal aortic regurgitation.  Normal aortic root (Ao: 3.8 cm at the sinuses of Valsalva).  Left Atrium: Normal left atrium.  LA volume index = 28  cc/m2.  Left Ventricle: Endocardium not well visualized; grossly  normal left ventricular systolic function. Normal left  ventricular internal dimensions and wall thicknesses. Mild  diastolic dysfunction (Stage I).  Right Heart: Normal right atrium. Normal right ventricular  size and function. Normal tricuspid valve. Minimal  tricuspid regurgitation. Normal pulmonic valve. Minimal  pulmonic regurgitation.  Pericardium/Pleura: Normal pericardium with no pericardial  effusion.  Hemodynamic: Estimated right atrial pressure is 8 mm Hg.  Estimated right ventricular systolic pressure equals 26 mm  Hg, assuming right atrial pressure equals 8 mm Hg,  consistent with normal pulmonary pressures.  ------------------------------------------------------------------------  Conclusions:  1. Calcified trileaflet aortic valve with a mildly  decreased opening. Peak transaortic valve gradient equals  31 mm Hg, mean transaortic valve gradient equals 16 mm Hg,  estimated aortic valve area equals 1.9 sqcm (by continuity  equation), consistent with mild aortic stenosis. Minimal  aortic regurgitation.  2. Normal left ventricular internal dimensions and wall  thicknesses.  3. Endocardium not well visualized; grossly normal left  ventricular systolic function.  4. Mild diastolic dysfunction (Stage I).  5. Normal right ventricular size and function.  6. Estimated pulmonary artery systolic pressure equals 26  mm Hg, assuming right atrial pressure equals 8  mm Hg,  consistent with normal pulmonary pressures.  ------------------------------------------------------------------------  Confirmed on  8/2/2016 - 17:29:12 by Gwen Melton M.D.      Stress Testing:    Cath:    Imaging:     CT abdomen 1/9/17  Uncomplicated diverticulitis involving the distal descending and proximal   sigmoid colon with progression of the pericolonic inflammatory change. No   formed abscess. This inflammatory process lies adjacent to the left   lateral urinary bladder wall. Continued follow-up can be performed.    Unchanged retrocrural, retroperitoneal, and pelvic lymphadenopathy.    CXR: NA    Labs: Personally reviewed                        10.6   7.5   )-----------( 127      ( 10 Josh 2018 07:07 )             31.5     01-10    141  |  107  |  29<H>  ----------------------------<  104<H>  4.2   |  23  |  3.13<H>    Ca    8.7      10 Josh 2018 07:08    TPro  7.7  /  Alb  4.1  /  TBili  0.7  /  DBili  x   /  AST  11  /  ALT  10  /  AlkPhos  102  01-09    PT/INR - ( 09 Jan 2018 12:53 )   PT: 38.3 sec;   INR: 3.46 ratio         PTT - ( 09 Jan 2018 12:53 )  PTT:49.3 sec Patient seen and evaluated at bedside    Reason for consult: Management of anticoagulation for paroxysmal afib    Chief Complaint: LLQ pain    HPI: 78M with paroxsymal afib on coumadin, chronic bradycardia with PPM, HFpEF, CLL, Waldenstrom macroglobulinemia, CKD4, mild dementia, diverticulitis, presents with LLQ abdominal pain x2 weeks and admitted for uncomplicated diverticulitis. Cardiology consulted for management of anticoagulation for paroxysmal afib and need for bridging with a heparin drip in setting of possible surgery for diverticulitis. Per surgery team, pt is currently not planned for surgery at the present time but they are holding coumadin in case pt's abdominal exam worsens and a need for emergency surgery develops. Pt's INR is currently supratherapeutic to 3.46 but he denies any bleeding. No fever, chills, dizziness, nausea, vomiting, palpitations, chest pain, or dyspnea. Pt says his abdominal pain is much improved from yesterday.       PAST MEDICAL & SURGICAL HISTORY:  Bradycardia, drug induced  Waldenstrom macroglobulinemia  Diverticulitis  Atrial fibrillation  GERD (gastroesophageal reflux disease)  Anxiety disorder  CLL (chronic lymphocytic leukemia): in remission  History of laparoscopic cholecystectomy: 4/2014  S/P hernia repair: x2  Open appendectomy   Meniscus tear: s/p removal of Meniscus 8 months ago  chronic diastolic heart failure  FAMILY HISTORY:  No pertinent family history in first degree relatives     SOCIAL HISTORY:  Former smoker 5 py, No alcohol use, No illicit drug use Two children     MEDICATIONS (home):   Allopurinol 100 MG Oral 2 TABLETS DAILY  Amiodarone HCl - 200 MG Oral Tablet once a day   Propranolol 10 mg two times a day   Trazodone 50 mg Daily   Aricept 10 MG Oral Tablet DAILY   ClonazePAM 1 MG, 2 TABLETS AT BEDTIME  Levothyroxine Sodium 125 MCG 1 TABLET DAILY  Mirtazapine 15 MG Oral Tablet; TAKE 1 TABLET AT BEDTIME  Namenda 10 MG Oral Table TWICE DAILY   Pantoprazole 40 mg daily   Probiotic Acidophilus Oral Capsule; USE AS DIRECTED  Rozerem 8 MG Oral Tablet; 1 TABLET AT BEDTIME  Sodium Bicarbonate 650 MG Oral Tablet; 1 TABLET 2 TIMES DAILY WITH MEALS  Tamsulosin HCl - 0.4 MG Oral Capsule; TAKE 1 CAPSULE Daily  Warfarin Sodium 1.5 MG Oral Tablet; 1 TABLET DAILY   Memantine 10 mg twice a day      Allergies: No Known Allergies     Vital Signs Last 24 Hrs  T(C): 37.3 (09 Jan 2018 11:51), Max: 37.3 (09 Jan 2018 11:51)  T(F): 99.1 (09 Jan 2018 11:51), Max: 99.1 (09 Jan 2018 11:51)  HR: 58 (09 Jan 2018 11:51) (58 - 75)  BP: 123/63 (09 Jan 2018 11:51) (110/62 - 123/63)  BP(mean): --  RR: 19 (09 Jan 2018 11:51) (19 - 19)  SpO2: 100% (09 Jan 2018 11:51) (99% - 100%)    Gen: NAD  Resp: CTA b/l   CV: RRR  Abd: soft, not distended, mildly focally tender in LLQ, no guarding, no generalized peritonitis.   Ext: warm      Labs:               12.3   12.4  )-----------( 166      ( 09 Jan 2018 12:53 )             37.6     01-09    141  |  105  |  30<H>  ----------------------------<  132<H>  4.9   |  24  |  3.27<H>    Ca    9.4      09 Jan 2018 12:53  TPro  7.7  /  Alb  4.1  /  TBili  0.7  /  DBili  x   /  AST  11  /  ALT  10  /  AlkPhos  102  01-09  PT/INR - ( 09 Jan 2018 12:53 )   PT: 38.3 sec;   INR: 3.46 ratio    PTT - ( 09 Jan 2018 12:53 )  PTT:49.3 sec       IMAGING STUDIES:  CT abdomen/pelvis 01/09/2018:   Acute uncomplicated diverticulitis.       CT abdomen /pelvis 11/12/2017 : Acute uncomplicated diverticulitis. (09 Jan 2018 17:09)      PMHx:   Bradycardia, drug induced  Waldenstrom macroglobulinemia  Diverticulitis  Atrial fibrillation  GERD (gastroesophageal reflux disease)  Anxiety disorder  CLL (chronic lymphocytic leukemia)      PSHx:   History of laparoscopic cholecystectomy  S/P hernia repair  Meniscus tear      Allergies:  No Known Allergies      Home Meds:    Current Medications:   allopurinol 200 milliGRAM(s) Oral daily  amiodarone    Tablet 200 milliGRAM(s) Oral daily  clonazePAM Tablet 1 milliGRAM(s) Oral at bedtime  dextrose 5% + sodium chloride 0.45%. 1000 milliLiter(s) IV Continuous <Continuous>  donepezil 10 milliGRAM(s) Oral at bedtime  levothyroxine 125 MICROGram(s) Oral daily  memantine 10 milliGRAM(s) Oral two times a day  mirtazapine 15 milliGRAM(s) Oral at bedtime  pantoprazole    Tablet 40 milliGRAM(s) Oral before breakfast  piperacillin/tazobactam IVPB. 3.375 Gram(s) IV Intermittent every 12 hours  propranolol 10 milliGRAM(s) Oral two times a day  sodium bicarbonate 650 milliGRAM(s) Oral two times a day  tamsulosin 0.4 milliGRAM(s) Oral at bedtime      FAMILY HISTORY:  No pertinent family history in first degree relatives      Social History:  Smoking History:  Alcohol Use:  Drug Use:    Review of Systems:    CONSTITUTIONAL: No fever or chills  EYES/ENT: No visual changes, no dysphagia  NECK: No pain or stiffness  RESPIRATORY: No dyspnea or cough  CARDIOVASCULAR: No chest pain or palpitations  GASTROINTESTINAL: +LLQ pain. No nausea, vomiting, diarrhea, melena, or hematochezia  BACK: No back pain  GENITOURINARY: No dysuria, frequency, or hematuria  NEUROLOGICAL: No numbness or weakness  SKIN: No itching, burning, or rashes  All other review of systems is negative unless indicated above.      Physical Exam:  T(F): 97.4 (01-10), Max: 99.1 (01-09)  HR: 51 (01-10) (50 - 52)  BP: 122/69 (01-10) (102/60 - 122/69)  RR: 18 (01-10)  SpO2: 95% (01-10)      GENERAL: No acute distress  HEAD:  Atraumatic, normocephalic  ENT: EOMI, PERRLA, conjunctiva and sclera clear  NECK: Supple, No JVD  CHEST/LUNG: Clear to auscultation bilaterally, no wheezes, rales, or rhonchi  BACK: No spinal tenderness  HEART: Regular rhythm, no murmurs  ABDOMEN: Mild LLQ tenderness, soft, nondistended, normoactive bowel sounds  EXTREMITIES: No clubbing, cyanosis, or edema  PSYCH: Normal mood and affect  NEUROLOGY: AAOx3, non-focal  SKIN: No rashes       Cardiovascular Diagnostic Testing:    ECG: NA    Echo: Personally reviewed:    Patient name: NICOLAS CIFUENTES  YOB: 1945   Age: 71 (M)   MR#: [id number]  Study Date: 8/2/2016  [facility] RDCS  Study quality: Technically fair  Referring Physician: Som Alexandra MD  Blood Pressure: 107/65 mmHg  Height: 188 cm  Weight: 107 kg  BSA: 2.3 m2  ------------------------------------------------------------------------  PROCEDURE: Transthoracic echocardiogram with 2-D, M-Mode  and complete spectral and color flow Doppler.  INDICATION: Abnormal electrocardiogram (ECG) (EKG) (R94.31)  ------------------------------------------------------------------------  Dimensions:    Normal Values:  LA:     4.1    2.0 - 4.0 cm  Ao:     3.8    2.0 - 3.8 cm  SEPTUM: 1.0    0.6 - 1.2 cm  PWT:    1.0    0.6 - 1.1 cm  LVIDd:  5.8    3.0 - 5.6 cm  LVIDs:  4.1    1.8 - 4.0 cm  Derived variables:  LVMI: 100 g/m2  RWT: 0.34  Fractional short: 29 %  EF (Visual Estimate): 65 %  Doppler Peak Velocity (m/sec): AoV=2.8  ------------------------------------------------------------------------  Observations:  Mitral Valve: Mitral annular calcification, otherwise  normal mitral valve. Minimal mitral regurgitation.  Aortic Valve/Aorta: Calcified trileaflet aortic valve with  a mildly decreased opening. Peak transaortic valve gradient  equals 31 mm Hg, mean transaortic valve gradient equals 16  mm Hg, estimated aortic valve area equals 1.9 sqcm (by  continuity equation), consistent with mild aortic stenosis.  Minimal aortic regurgitation.  Normal aortic root (Ao: 3.8 cm at the sinuses of Valsalva).  Left Atrium: Normal left atrium.  LA volume index = 28  cc/m2.  Left Ventricle: Endocardium not well visualized; grossly  normal left ventricular systolic function. Normal left  ventricular internal dimensions and wall thicknesses. Mild  diastolic dysfunction (Stage I).  Right Heart: Normal right atrium. Normal right ventricular  size and function. Normal tricuspid valve. Minimal  tricuspid regurgitation. Normal pulmonic valve. Minimal  pulmonic regurgitation.  Pericardium/Pleura: Normal pericardium with no pericardial  effusion.  Hemodynamic: Estimated right atrial pressure is 8 mm Hg.  Estimated right ventricular systolic pressure equals 26 mm  Hg, assuming right atrial pressure equals 8 mm Hg,  consistent with normal pulmonary pressures.  ------------------------------------------------------------------------  Conclusions:  1. Calcified trileaflet aortic valve with a mildly  decreased opening. Peak transaortic valve gradient equals  31 mm Hg, mean transaortic valve gradient equals 16 mm Hg,  estimated aortic valve area equals 1.9 sqcm (by continuity  equation), consistent with mild aortic stenosis. Minimal  aortic regurgitation.  2. Normal left ventricular internal dimensions and wall  thicknesses.  3. Endocardium not well visualized; grossly normal left  ventricular systolic function.  4. Mild diastolic dysfunction (Stage I).  5. Normal right ventricular size and function.  6. Estimated pulmonary artery systolic pressure equals 26  mm Hg, assuming right atrial pressure equals 8  mm Hg,  consistent with normal pulmonary pressures.  ------------------------------------------------------------------------  Confirmed on  8/2/2016 - 17:29:12 by Gwen Melton M.D.      Stress Testing:    Cath:    Imaging:     CT abdomen 1/9/17  Uncomplicated diverticulitis involving the distal descending and proximal   sigmoid colon with progression of the pericolonic inflammatory change. No   formed abscess. This inflammatory process lies adjacent to the left   lateral urinary bladder wall. Continued follow-up can be performed.    Unchanged retrocrural, retroperitoneal, and pelvic lymphadenopathy.    CXR: NA    Labs: Personally reviewed                        10.6   7.5   )-----------( 127      ( 10 Josh 2018 07:07 )             31.5     01-10    141  |  107  |  29<H>  ----------------------------<  104<H>  4.2   |  23  |  3.13<H>    Ca    8.7      10 Josh 2018 07:08    TPro  7.7  /  Alb  4.1  /  TBili  0.7  /  DBili  x   /  AST  11  /  ALT  10  /  AlkPhos  102  01-09    PT/INR - ( 09 Jan 2018 12:53 )   PT: 38.3 sec;   INR: 3.46 ratio         PTT - ( 09 Jan 2018 12:53 )  PTT:49.3 sec

## 2018-01-11 LAB
ANION GAP SERPL CALC-SCNC: 11 MMOL/L — SIGNIFICANT CHANGE UP (ref 5–17)
APTT BLD: 43 SEC — HIGH (ref 27.5–37.4)
BUN SERPL-MCNC: 24 MG/DL — HIGH (ref 7–23)
CALCIUM SERPL-MCNC: 8.5 MG/DL — SIGNIFICANT CHANGE UP (ref 8.4–10.5)
CHLORIDE SERPL-SCNC: 107 MMOL/L — SIGNIFICANT CHANGE UP (ref 96–108)
CO2 SERPL-SCNC: 25 MMOL/L — SIGNIFICANT CHANGE UP (ref 22–31)
CREAT SERPL-MCNC: 2.76 MG/DL — HIGH (ref 0.5–1.3)
GLUCOSE SERPL-MCNC: 90 MG/DL — SIGNIFICANT CHANGE UP (ref 70–99)
HCT VFR BLD CALC: 31.1 % — LOW (ref 39–50)
HGB BLD-MCNC: 9.8 G/DL — LOW (ref 13–17)
INR BLD: 3.36 RATIO — HIGH (ref 0.88–1.16)
MAGNESIUM SERPL-MCNC: 1.8 MG/DL — SIGNIFICANT CHANGE UP (ref 1.6–2.6)
MCHC RBC-ENTMCNC: 28.8 PG — SIGNIFICANT CHANGE UP (ref 27–34)
MCHC RBC-ENTMCNC: 31.5 GM/DL — LOW (ref 32–36)
MCV RBC AUTO: 91.5 FL — SIGNIFICANT CHANGE UP (ref 80–100)
PHOSPHATE SERPL-MCNC: 2.7 MG/DL — SIGNIFICANT CHANGE UP (ref 2.5–4.5)
PLATELET # BLD AUTO: 150 K/UL — SIGNIFICANT CHANGE UP (ref 150–400)
POTASSIUM SERPL-MCNC: 4 MMOL/L — SIGNIFICANT CHANGE UP (ref 3.5–5.3)
POTASSIUM SERPL-SCNC: 4 MMOL/L — SIGNIFICANT CHANGE UP (ref 3.5–5.3)
PROTHROM AB SERPL-ACNC: 37.5 SEC — HIGH (ref 9.8–12.7)
RBC # BLD: 3.4 M/UL — LOW (ref 4.2–5.8)
RBC # FLD: 14.4 % — SIGNIFICANT CHANGE UP (ref 10.3–14.5)
SODIUM SERPL-SCNC: 143 MMOL/L — SIGNIFICANT CHANGE UP (ref 135–145)
WBC # BLD: 5.36 K/UL — SIGNIFICANT CHANGE UP (ref 3.8–10.5)
WBC # FLD AUTO: 5.36 K/UL — SIGNIFICANT CHANGE UP (ref 3.8–10.5)

## 2018-01-11 PROCEDURE — 93010 ELECTROCARDIOGRAM REPORT: CPT

## 2018-01-11 PROCEDURE — 99232 SBSQ HOSP IP/OBS MODERATE 35: CPT

## 2018-01-11 RX ORDER — MAGNESIUM SULFATE 500 MG/ML
2 VIAL (ML) INJECTION ONCE
Qty: 0 | Refills: 0 | Status: COMPLETED | OUTPATIENT
Start: 2018-01-11 | End: 2018-01-11

## 2018-01-11 RX ADMIN — PANTOPRAZOLE SODIUM 40 MILLIGRAM(S): 20 TABLET, DELAYED RELEASE ORAL at 05:34

## 2018-01-11 RX ADMIN — Medication 125 MICROGRAM(S): at 05:33

## 2018-01-11 RX ADMIN — MEMANTINE HYDROCHLORIDE 10 MILLIGRAM(S): 10 TABLET ORAL at 05:34

## 2018-01-11 RX ADMIN — PIPERACILLIN AND TAZOBACTAM 25 GRAM(S): 4; .5 INJECTION, POWDER, LYOPHILIZED, FOR SOLUTION INTRAVENOUS at 05:34

## 2018-01-11 RX ADMIN — Medication 650 MILLIGRAM(S): at 17:06

## 2018-01-11 RX ADMIN — PIPERACILLIN AND TAZOBACTAM 25 GRAM(S): 4; .5 INJECTION, POWDER, LYOPHILIZED, FOR SOLUTION INTRAVENOUS at 17:06

## 2018-01-11 RX ADMIN — MEMANTINE HYDROCHLORIDE 10 MILLIGRAM(S): 10 TABLET ORAL at 17:06

## 2018-01-11 RX ADMIN — Medication 650 MILLIGRAM(S): at 05:34

## 2018-01-11 RX ADMIN — Medication 50 GRAM(S): at 13:11

## 2018-01-11 RX ADMIN — Medication 1 MILLIGRAM(S): at 21:25

## 2018-01-11 RX ADMIN — MIRTAZAPINE 15 MILLIGRAM(S): 45 TABLET, ORALLY DISINTEGRATING ORAL at 21:25

## 2018-01-11 RX ADMIN — Medication 200 MILLIGRAM(S): at 13:11

## 2018-01-11 RX ADMIN — TAMSULOSIN HYDROCHLORIDE 0.4 MILLIGRAM(S): 0.4 CAPSULE ORAL at 21:25

## 2018-01-11 RX ADMIN — AMIODARONE HYDROCHLORIDE 200 MILLIGRAM(S): 400 TABLET ORAL at 06:47

## 2018-01-11 RX ADMIN — DONEPEZIL HYDROCHLORIDE 10 MILLIGRAM(S): 10 TABLET, FILM COATED ORAL at 21:24

## 2018-01-11 NOTE — PROGRESS NOTE ADULT - ASSESSMENT
72 year old male known to our practice with multiple prior episodes of diverticulitis presents to ER on advice of primary GI attending (Shilpa).  Pt was started on PO Augmentin around [place] time by Dr Simons for presumed diverticulitis (similar presentation to multiple prior episodes) - reports initial improvement in pain then after ~6 days, developed worsening pain. prior to ER presentation, reports "excruciating pain" in LLQ, states he was unable to walk due to pain    CT - acute uncomplicated diverticulitis, distal Descending colon and proximal Sigmoid colon- no abscess    PLAN  -Continue IV antibiotics, IV fluids  -eventual OR as per Surgery team  -? possible trial of clears today, will discuss with Surgery team  - Supportive care

## 2018-01-11 NOTE — PROGRESS NOTE ADULT - SUBJECTIVE AND OBJECTIVE BOX
72y old  Male who presents with a chief complaint of abdominal pain     Interval history:  Afebrile, abdominal pain better, feels weak as on clears only.       Antimicrobials:    piperacillin/tazobactam IVPB. 3.375 Gram(s) IV Intermittent every 12 hours    REVIEW OF SYSTEMS:    No chest pain or palpitations  No cough, no SOB  No N/V, + diarrhea.   No dysuria or frequency  No rash.     Vital Signs Last 24 Hrs  T(C): 36.7 (01-11-18 @ 10:25), Max: 37 (01-10-18 @ 22:40)  T(F): 98 (01-11-18 @ 10:25), Max: 98.6 (01-10-18 @ 22:40)  HR: 53 (01-11-18 @ 10:25) (49 - 58)  BP: 106/57 (01-11-18 @ 10:25) (106/57 - 136/70)  RR: 18 (01-11-18 @ 10:25) (18 - 18)  SpO2: 96% (01-11-18 @ 10:25) (95% - 97%)    PHYSICAL EXAM:  Patient in no acute distress. AAOX3. [employer]   No icterus, no oral ulcers.  Cardiovascular: S1S2 normal. + murmur, + PPM   Lungs: + air entry B/L lung fields.  Gastrointestinal: soft, mild discomfort LLQ  Extremities: no edema.  IV sites not inflamed.                           9.8    5.36  )-----------( 150      ( 11 Jan 2018 07:39 )             31.1   01-11    143  |  107  |  24<H>  ----------------------------<  90  4.0   |  25  |  2.76<H>    Ca    8.5      11 Jan 2018 09:12  Phos  2.7     01-11  Mg     1.8     01-11    TPro  6.3  /  Alb  x   /  TBili  x   /  DBili  x   /  AST  x   /  ALT  x   /  AlkPhos  x   01-10      LIVER FUNCTIONS - ( 10 Josh 2018 09:19 )  Alb: x     / Pro: 6.3 g/dL / ALK PHOS: x     / ALT: x     / AST: x     / GGT: x               Culture - Blood (collected 10 Josh 2018 00:35)  Source: .Blood Blood-Venous  Preliminary Report (11 Jan 2018 01:02):    No growth to date.    Culture - Blood (collected 10 Josh 2018 00:35)  Source: .Blood Blood-Peripheral  Preliminary Report (11 Jan 2018 01:02):    No growth to date. Statement Selected

## 2018-01-11 NOTE — PROGRESS NOTE ADULT - ATTENDING COMMENTS
I saw and examined the pt and discussed the tx plan with the House Staff. I agree with the exam and plan as documented in the surgery resident's note from today which I edited as indicated.  Pt with smoldering diverticulitis, failed oral ABX, he will need home IV ABX and picc.  Trial of clears.   Dulce Cabral MD

## 2018-01-11 NOTE — PROGRESS NOTE ADULT - ASSESSMENT
72 male with CKD stage 4, CLL, Afib on coumadin, PPM, hypothyroidism, GERD here with abdominal pain. Recurrent diverticulitis, leucocytosis, s/p courses of abx.  Resolved leucocytosis, clinically improved.     Plan:  - prelim blood cultures NTD   - on zosyn 3.375 gm iv q12h, cr cl < 20- day 3   - surgical plan as per primary team

## 2018-01-11 NOTE — PROGRESS NOTE ADULT - SUBJECTIVE AND OBJECTIVE BOX
NICOLAS CIFUENTES  72y Male  MRN:[id number]    Patient is a 72y old  Male who presents with a chief complaint of abd pain    HPI:  72 male with  CKD stage 4, CLL, Afib on coumadin, PPM, hypothyroidism, GERD here with abdominal pain. He had diverticulitis in Nov 2017 and has had several recurrences of diverticulitis since that time. Patient was started on Augmentin by GI and has been taking it for about 10 days with no improvement.  reports +diarrhea. no vomiting. low grade fevers at home.  Last night reports "excruciating pain" in LLQ, states he was unable to walk due to pain.  Patient has been afebrile this admission. No sick contacts. No recent travel.     Patient seen and evaluated at bedside. no acute events o/n    Interval HPI:  pt reports dec abd pain.  +diarrhea      PAST MEDICAL & SURGICAL HISTORY:  Bradycardia, s/p ppm  [place]denstrom macroglobulinemia  Diverticulitis  Atrial fibrillation on coumadin  GERD (gastroesophageal reflux disease)  Anxiety disorder  CLL (chronic lymphocytic leukemia): in remission  History of laparoscopic cholecystectomy: 4/2014  S/P hernia repair: x2  Meniscus tear: s/p removal of Meniscus 8 months ago    SOC:  non smoker  no alcohol abuse  no drug abuse  lives with wife    Fam Hx:  non cont    REVIEW OF SYSTEMS:  Constitutional: yes fever, chills, fatigue. no weight loss.  Skin: No rash.  Eyes: No recent vision problems or eye pain.  ENT: No congestion, ear pain, or sore throat.  Endocrine: No thyroid problems.  Cardiovascular: No chest pain or palpation.  Respiratory: No cough, shortness of breath, congestion, or wheezing.  Gastrointestinal: as per hpi  Genitourinary: No dysuria.  Musculoskeletal: No joint swelling.  Neurologic: No headache.    VITALS:  Vital Signs Last 24 Hrs  T(C): 36.9 (11 Jan 2018 14:06), Max: 37 (10 Josh 2018 22:40)  T(F): 98.4 (11 Jan 2018 14:06), Max: 98.6 (10 Josh 2018 22:40)  HR: 59 (11 Jan 2018 14:06) (49 - 59)  BP: 124/70 (11 Jan 2018 14:06) (106/57 - 136/70)  BP(mean): --  RR: 18 (11 Jan 2018 14:06) (18 - 18)  SpO2: 97% (11 Jan 2018 14:06) (96% - 97%)    PHYSICAL EXAM:  GENERAL: NAD, well-developed  HEAD:  Atraumatic, Normocephalic  EYES: EOMI, PERRLA, conjunctiva and sclera clear  NECK: Supple, No JVD  CHEST/LUNG: Clear to auscultation bilaterally; No wheeze  HEART: S1, S2; No murmurs, rubs, or gallops  ABDOMEN: Soft, mild tender LLQ, Nondistended; Bowel sounds present  EXTREMITIES:  2+ Peripheral Pulses, No clubbing, cyanosis, or edema  PSYCH: Normal affect  NEUROLOGY: AAOX3; non-focal  SKIN: No rashes or lesions    Consultant(s) Notes Reviewed:  [x ] YES  [ ] NO  Care Discussed with Consultants/Other Providers [ x] YES  [ ] NO    MEDS:  MEDICATIONS  (STANDING):  allopurinol 200 milliGRAM(s) Oral daily  amiodarone    Tablet 200 milliGRAM(s) Oral daily  clonazePAM Tablet 1 milliGRAM(s) Oral at bedtime  dextrose 5% + sodium chloride 0.45%. 1000 milliLiter(s) (50 mL/Hr) IV Continuous <Continuous>  donepezil 10 milliGRAM(s) Oral at bedtime  levothyroxine 125 MICROGram(s) Oral daily  memantine 10 milliGRAM(s) Oral two times a day  mirtazapine 15 milliGRAM(s) Oral at bedtime  pantoprazole    Tablet 40 milliGRAM(s) Oral before breakfast  piperacillin/tazobactam IVPB. 3.375 Gram(s) IV Intermittent every 12 hours  propranolol 10 milliGRAM(s) Oral two times a day  sodium bicarbonate 650 milliGRAM(s) Oral two times a day  tamsulosin 0.4 milliGRAM(s) Oral at bedtime    MEDICATIONS  (PRN):      ALLERGIES:  No Known Allergies      LABS:                                                  9.8    5.36  )-----------( 150      ( 11 Jan 2018 07:39 )             31.1   01-11    143  |  107  |  24<H>  ----------------------------<  90  4.0   |  25  |  2.76<H>    Ca    8.5      11 Jan 2018 09:12  Phos  2.7     01-11  Mg     1.8     01-11    TPro  6.3  /  Alb  x   /  TBili  x   /  DBili  x   /  AST  x   /  ALT  x   /  AlkPhos  x   01-10  PT/INR - ( 11 Jan 2018 06:51 )   PT: 37.5 sec;   INR: 3.36 ratio         PTT - ( 11 Jan 2018 06:51 )  PTT:43.0 sec      < from: CT Abdomen and Pelvis w/ Oral Cont (01.09.18 @ 15:51) >  IMPRESSION:   Uncomplicated diverticulitis involving the distal descending and proximal   sigmoid colon with progression of the pericolonic inflammatory change. No   formed abscess. This inflammatory process lies adjacent to the left   lateral urinary bladder wall. Continued follow-up can be performed.    Unchanged retrocrural, retroperitoneal, and pelvic lymphadenopathy.    < end of copied text >

## 2018-01-11 NOTE — PROGRESS NOTE ADULT - SUBJECTIVE AND OBJECTIVE BOX
Patient seen and examined at bedside.    Overnight Events: No events overnight. Pt says his abdominal pain is improving.    Review Of Systems: No chest pain, shortness of breath, or palpitations.            Current Meds:  allopurinol 200 milliGRAM(s) Oral daily  amiodarone    Tablet 200 milliGRAM(s) Oral daily  clonazePAM Tablet 1 milliGRAM(s) Oral at bedtime  dextrose 5% + sodium chloride 0.45%. 1000 milliLiter(s) IV Continuous <Continuous>  donepezil 10 milliGRAM(s) Oral at bedtime  levothyroxine 125 MICROGram(s) Oral daily  magnesium sulfate  IVPB 2 Gram(s) IV Intermittent once  memantine 10 milliGRAM(s) Oral two times a day  mirtazapine 15 milliGRAM(s) Oral at bedtime  pantoprazole    Tablet 40 milliGRAM(s) Oral before breakfast  piperacillin/tazobactam IVPB. 3.375 Gram(s) IV Intermittent every 12 hours  propranolol 10 milliGRAM(s) Oral two times a day  sodium bicarbonate 650 milliGRAM(s) Oral two times a day  tamsulosin 0.4 milliGRAM(s) Oral at bedtime      PAST MEDICAL & SURGICAL HISTORY:  Bradycardia, drug induced  Waldenstrom macroglobulinemia  Diverticulitis  Atrial fibrillation  GERD (gastroesophageal reflux disease)  Anxiety disorder  CLL (chronic lymphocytic leukemia): in remission  History of laparoscopic cholecystectomy: 4/2014  S/P hernia repair: x2  Meniscus tear: s/p removal of Meniscus 8 months ago      Vitals:  T(F): 98 (01-11), Max: 98.6 (01-10)  HR: 53 (01-11) (49 - 58)  BP: 106/57 (01-11) (106/57 - 136/70)  RR: 18 (01-11)  SpO2: 96% (01-11)  I&O's Summary    10 Josh 2018 07:01  -  11 Jan 2018 07:00  --------------------------------------------------------  IN: 2160 mL / OUT: 950 mL / NET: 1210 mL    11 Jan 2018 07:01  -  11 Jan 2018 13:01  --------------------------------------------------------  IN: 240 mL / OUT: 300 mL / NET: -60 mL        Physical Exam:  GENERAL: No acute distress  HEENT: EOMI, PERRLA, conjunctiva and sclera clear  NECK: Supple, No JVD  CHEST/LUNG: Clear to auscultation bilaterally, no wheezes, rales, or rhonchi  HEART: Regular rhythm, mild systolic murmur radiating to the carotids   ABDOMEN: Mild LLQ tenderness, soft, nondistended, no guarding or rebound  EXTREMITIES: No clubbing, cyanosis, or edema  PSYCH: Normal mood and affect  NEUROLOGY: AAOx3, non-focal  SKIN: warm and dry                             9.8    5.36  )-----------( 150      ( 11 Jan 2018 07:39 )             31.1     01-11    143  |  107  |  24<H>  ----------------------------<  90  4.0   |  25  |  2.76<H>    Ca    8.5      11 Jan 2018 09:12  Phos  2.7     01-11  Mg     1.8     01-11    TPro  6.3  /  Alb  x   /  TBili  x   /  DBili  x   /  AST  x   /  ALT  x   /  AlkPhos  x   01-10    PT/INR - ( 11 Jan 2018 06:51 )   PT: 37.5 sec;   INR: 3.36 ratio         PTT - ( 11 Jan 2018 06:51 )  PTT:43.0 sec      New ECG(s): Personally reviewed    Cardiovascular Diagnostic Testing:    ECG: NA    Echo: Personally reviewed:    Patient name: NICOLAS CIFUENTES  YOB: 1945   Age: 71 (M)   MR#: [id number]  Study Date: 8/2/2016  [facility] J[facility] RDCS  Study quality: Technically fair  Referring Physician: Som Alexandra MD  Blood Pressure: 107/65 mmHg  Height: 188 cm  Weight: 107 kg  BSA: 2.3 m2  ------------------------------------------------------------------------  PROCEDURE: Transthoracic echocardiogram with 2-D, M-Mode  and complete spectral and color flow Doppler.  INDICATION: Abnormal electrocardiogram (ECG) (EKG) (R94.31)  ------------------------------------------------------------------------  Dimensions:    Normal Values:  LA:     4.1    2.0 - 4.0 cm  Ao:     3.8    2.0 - 3.8 cm  SEPTUM: 1.0    0.6 - 1.2 cm  PWT:    1.0    0.6 - 1.1 cm  LVIDd:  5.8    3.0 - 5.6 cm  LVIDs:  4.1    1.8 - 4.0 cm  Derived variables:  LVMI: 100 g/m2  RWT: 0.34  Fractional short: 29 %  EF (Visual Estimate): 65 %  Doppler Peak Velocity (m/sec): AoV=2.8  ------------------------------------------------------------------------  Observations:  Mitral Valve: Mitral annular calcification, otherwise  normal mitral valve. Minimal mitral regurgitation.  Aortic Valve/Aorta: Calcified trileaflet aortic valve with  a mildly decreased opening. Peak transaortic valve gradient  equals 31 mm Hg, mean transaortic valve gradient equals 16  mm Hg, estimated aortic valve area equals 1.9 sqcm (by  continuity equation), consistent with mild aortic stenosis.  Minimal aortic regurgitation.  Normal aortic root (Ao: 3.8 cm at the sinuses of Valsalva).  Left Atrium: Normal left atrium.  LA volume index = 28  cc/m2.  Left Ventricle: Endocardium not well visualized; grossly  normal left ventricular systolic function. Normal left  ventricular internal dimensions and wall thicknesses. Mild  diastolic dysfunction (Stage I).  Right Heart: Normal right atrium. Normal right ventricular  size and function. Normal tricuspid valve. Minimal  tricuspid regurgitation. Normal pulmonic valve. Minimal  pulmonic regurgitation.  Pericardium/Pleura: Normal pericardium with no pericardial  effusion.  Hemodynamic: Estimated right atrial pressure is 8 mm Hg.  Estimated right ventricular systolic pressure equals 26 mm  Hg, assuming right atrial pressure equals 8 mm Hg,  consistent with normal pulmonary pressures.  ------------------------------------------------------------------------  Conclusions:  1. Calcified trileaflet aortic valve with a mildly  decreased opening. Peak transaortic valve gradient equals  31 mm Hg, mean transaortic valve gradient equals 16 mm Hg,  estimated aortic valve area equals 1.9 sqcm (by continuity  equation), consistent with mild aortic stenosis. Minimal  aortic regurgitation.  2. Normal left ventricular internal dimensions and wall  thicknesses.  3. Endocardium not well visualized; grossly normal left  ventricular systolic function.  4. Mild diastolic dysfunction (Stage I).  5. Normal right ventricular size and function.  6. Estimated pulmonary artery systolic pressure equals 26  mm Hg, assuming right atrial pressure equals 8  mm Hg,  consistent with normal pulmonary pressures.  ------------------------------------------------------------------------  Confirmed on  8/2/2016 - 17:29:12 by Gwen Melton M.D. Patient seen and examined at bedside.    Overnight Events: No events overnight. Pt says his abdominal pain is improving.    Review Of Systems: No chest pain, shortness of breath, or palpitations.            Current Meds:  allopurinol 200 milliGRAM(s) Oral daily  amiodarone    Tablet 200 milliGRAM(s) Oral daily  clonazePAM Tablet 1 milliGRAM(s) Oral at bedtime  dextrose 5% + sodium chloride 0.45%. 1000 milliLiter(s) IV Continuous <Continuous>  donepezil 10 milliGRAM(s) Oral at bedtime  levothyroxine 125 MICROGram(s) Oral daily  magnesium sulfate  IVPB 2 Gram(s) IV Intermittent once  memantine 10 milliGRAM(s) Oral two times a day  mirtazapine 15 milliGRAM(s) Oral at bedtime  pantoprazole    Tablet 40 milliGRAM(s) Oral before breakfast  piperacillin/tazobactam IVPB. 3.375 Gram(s) IV Intermittent every 12 hours  propranolol 10 milliGRAM(s) Oral two times a day  sodium bicarbonate 650 milliGRAM(s) Oral two times a day  tamsulosin 0.4 milliGRAM(s) Oral at bedtime      PAST MEDICAL & SURGICAL HISTORY:  Bradycardia, drug induced  Waldenstrom macroglobulinemia  Diverticulitis  Atrial fibrillation  GERD (gastroesophageal reflux disease)  Anxiety disorder  CLL (chronic lymphocytic leukemia): in remission  History of laparoscopic cholecystectomy: 4/2014  S/P hernia repair: x2  Meniscus tear: s/p removal of Meniscus 8 months ago      Vitals:  T(F): 98 (01-11), Max: 98.6 (01-10)  HR: 53 (01-11) (49 - 58)  BP: 106/57 (01-11) (106/57 - 136/70)  RR: 18 (01-11)  SpO2: 96% (01-11)  I&O's Summary    10 Josh 2018 07:01  -  11 Jan 2018 07:00  --------------------------------------------------------  IN: 2160 mL / OUT: 950 mL / NET: 1210 mL    11 Jan 2018 07:01  -  11 Jan 2018 13:01  --------------------------------------------------------  IN: 240 mL / OUT: 300 mL / NET: -60 mL        Physical Exam:  GENERAL: No acute distress  HEENT: EOMI, PERRLA, conjunctiva and sclera clear  NECK: Supple, No JVD  CHEST/LUNG: Clear to auscultation bilaterally, no wheezes, rales, or rhonchi  HEART: Regular rhythm, mild systolic murmur radiating to the carotids   ABDOMEN: Mild LLQ tenderness, soft, nondistended, no guarding or rebound  EXTREMITIES: No clubbing, cyanosis, or edema  PSYCH: Normal mood and affect  NEUROLOGY: AAOx3, non-focal  SKIN: warm and dry                             9.8    5.36  )-----------( 150      ( 11 Jan 2018 07:39 )             31.1     01-11    143  |  107  |  24<H>  ----------------------------<  90  4.0   |  25  |  2.76<H>    Ca    8.5      11 Jan 2018 09:12  Phos  2.7     01-11  Mg     1.8     01-11    TPro  6.3  /  Alb  x   /  TBili  x   /  DBili  x   /  AST  x   /  ALT  x   /  AlkPhos  x   01-10    PT/INR - ( 11 Jan 2018 06:51 )   PT: 37.5 sec;   INR: 3.36 ratio         PTT - ( 11 Jan 2018 06:51 )  PTT:43.0 sec      Cardiovascular Diagnostic Testing:    ECG: NA    Echo: Personally reviewed:    Patient name: NICOLAS CIFUENTES  YOB: 1945   Age: 71 (M)   MR#: [id number]  Study Date: 8/2/2016  [facility] J[facility] RDCS  Study quality: Technically fair  Referring Physician: Som Alexandra MD  Blood Pressure: 107/65 mmHg  Height: 188 cm  Weight: 107 kg  BSA: 2.3 m2  ------------------------------------------------------------------------  PROCEDURE: Transthoracic echocardiogram with 2-D, M-Mode  and complete spectral and color flow Doppler.  INDICATION: Abnormal electrocardiogram (ECG) (EKG) (R94.31)  ------------------------------------------------------------------------  Dimensions:    Normal Values:  LA:     4.1    2.0 - 4.0 cm  Ao:     3.8    2.0 - 3.8 cm  SEPTUM: 1.0    0.6 - 1.2 cm  PWT:    1.0    0.6 - 1.1 cm  LVIDd:  5.8    3.0 - 5.6 cm  LVIDs:  4.1    1.8 - 4.0 cm  Derived variables:  LVMI: 100 g/m2  RWT: 0.34  Fractional short: 29 %  EF (Visual Estimate): 65 %  Doppler Peak Velocity (m/sec): AoV=2.8  ------------------------------------------------------------------------  Observations:  Mitral Valve: Mitral annular calcification, otherwise  normal mitral valve. Minimal mitral regurgitation.  Aortic Valve/Aorta: Calcified trileaflet aortic valve with  a mildly decreased opening. Peak transaortic valve gradient  equals 31 mm Hg, mean transaortic valve gradient equals 16  mm Hg, estimated aortic valve area equals 1.9 sqcm (by  continuity equation), consistent with mild aortic stenosis.  Minimal aortic regurgitation.  Normal aortic root (Ao: 3.8 cm at the sinuses of Valsalva).  Left Atrium: Normal left atrium.  LA volume index = 28  cc/m2.  Left Ventricle: Endocardium not well visualized; grossly  normal left ventricular systolic function. Normal left  ventricular internal dimensions and wall thicknesses. Mild  diastolic dysfunction (Stage I).  Right Heart: Normal right atrium. Normal right ventricular  size and function. Normal tricuspid valve. Minimal  tricuspid regurgitation. Normal pulmonic valve. Minimal  pulmonic regurgitation.  Pericardium/Pleura: Normal pericardium with no pericardial  effusion.  Hemodynamic: Estimated right atrial pressure is 8 mm Hg.  Estimated right ventricular systolic pressure equals 26 mm  Hg, assuming right atrial pressure equals 8 mm Hg,  consistent with normal pulmonary pressures.  ------------------------------------------------------------------------  Conclusions:  1. Calcified trileaflet aortic valve with a mildly  decreased opening. Peak transaortic valve gradient equals  31 mm Hg, mean transaortic valve gradient equals 16 mm Hg,  estimated aortic valve area equals 1.9 sqcm (by continuity  equation), consistent with mild aortic stenosis. Minimal  aortic regurgitation.  2. Normal left ventricular internal dimensions and wall  thicknesses.  3. Endocardium not well visualized; grossly normal left  ventricular systolic function.  4. Mild diastolic dysfunction (Stage I).  5. Normal right ventricular size and function.  6. Estimated pulmonary artery systolic pressure equals 26  mm Hg, assuming right atrial pressure equals 8  mm Hg,  consistent with normal pulmonary pressures.  ------------------------------------------------------------------------  Confirmed on  8/2/2016 - 17:29:12 by Gwen Melton M.D. Patient seen and examined at bedside.    Overnight Events: No events overnight. Pt says his abdominal pain is improving.    Review Of Systems: No chest pain, shortness of breath, or palpitations.            Current Meds:  allopurinol 200 milliGRAM(s) Oral daily  amiodarone    Tablet 200 milliGRAM(s) Oral daily  clonazePAM Tablet 1 milliGRAM(s) Oral at bedtime  dextrose 5% + sodium chloride 0.45%. 1000 milliLiter(s) IV Continuous <Continuous>  donepezil 10 milliGRAM(s) Oral at bedtime  levothyroxine 125 MICROGram(s) Oral daily  magnesium sulfate  IVPB 2 Gram(s) IV Intermittent once  memantine 10 milliGRAM(s) Oral two times a day  mirtazapine 15 milliGRAM(s) Oral at bedtime  pantoprazole    Tablet 40 milliGRAM(s) Oral before breakfast  piperacillin/tazobactam IVPB. 3.375 Gram(s) IV Intermittent every 12 hours  propranolol 10 milliGRAM(s) Oral two times a day  sodium bicarbonate 650 milliGRAM(s) Oral two times a day  tamsulosin 0.4 milliGRAM(s) Oral at bedtime      PAST MEDICAL & SURGICAL HISTORY:  Bradycardia, drug induced  Waldenstrom macroglobulinemia  Diverticulitis  Atrial fibrillation  GERD (gastroesophageal reflux disease)  Anxiety disorder  CLL (chronic lymphocytic leukemia): in remission  History of laparoscopic cholecystectomy: 4/2014  S/P hernia repair: x2  Meniscus tear: s/p removal of Meniscus 8 months ago      Vitals:  T(F): 98 (01-11), Max: 98.6 (01-10)  HR: 53 (01-11) (49 - 58)  BP: 106/57 (01-11) (106/57 - 136/70)  RR: 18 (01-11)  SpO2: 96% (01-11)  I&O's Summary    10 Josh 2018 07:01  -  11 Jan 2018 07:00  --------------------------------------------------------  IN: 2160 mL / OUT: 950 mL / NET: 1210 mL    11 Jan 2018 07:01  -  11 Jan 2018 13:01  --------------------------------------------------------  IN: 240 mL / OUT: 300 mL / NET: -60 mL        Physical Exam:  GENERAL: No acute distress  HEENT: EOMI, PERRLA, conjunctiva and sclera clear  NECK: Supple, No JVD  CHEST/LUNG: Clear to auscultation bilaterally, no wheezes, rales, or rhonchi  HEART: Regular rhythm, mild systolic murmur radiating to the carotids   ABDOMEN: Mild LLQ tenderness, soft, nondistended, no guarding or rebound  EXTREMITIES: No clubbing, cyanosis, or edema  PSYCH: Normal mood and affect  NEUROLOGY: AAOx3, non-focal  SKIN: warm and dry                             9.8    5.36  )-----------( 150      ( 11 Jan 2018 07:39 )             31.1     01-11    143  |  107  |  24<H>  ----------------------------<  90  4.0   |  25  |  2.76<H>    Ca    8.5      11 Jan 2018 09:12  Phos  2.7     01-11  Mg     1.8     01-11    TPro  6.3  /  Alb  x   /  TBili  x   /  DBili  x   /  AST  x   /  ALT  x   /  AlkPhos  x   01-10    PT/INR - ( 11 Jan 2018 06:51 )   PT: 37.5 sec;   INR: 3.36 ratio         PTT - ( 11 Jan 2018 06:51 )  PTT:43.0 sec      Cardiovascular Diagnostic Testing:    ECG: Sinus bradycardia, right axis,    Echo: Personally reviewed:    Patient name: NICOLAS CIFUENTES  YOB: 1945   Age: 71 (M)   MR#: [id number]  Study Date: 8/2/2016  [facility] RDCS  Study quality: Technically fair  Referring Physician: Som Alexandra MD  Blood Pressure: 107/65 mmHg  Height: 188 cm  Weight: 107 kg  BSA: 2.3 m2  ------------------------------------------------------------------------  PROCEDURE: Transthoracic echocardiogram with 2-D, M-Mode  and complete spectral and color flow Doppler.  INDICATION: Abnormal electrocardiogram (ECG) (EKG) (R94.31)  ------------------------------------------------------------------------  Dimensions:    Normal Values:  LA:     4.1    2.0 - 4.0 cm  Ao:     3.8    2.0 - 3.8 cm  SEPTUM: 1.0    0.6 - 1.2 cm  PWT:    1.0    0.6 - 1.1 cm  LVIDd:  5.8    3.0 - 5.6 cm  LVIDs:  4.1    1.8 - 4.0 cm  Derived variables:  LVMI: 100 g/m2  RWT: 0.34  Fractional short: 29 %  EF (Visual Estimate): 65 %  Doppler Peak Velocity (m/sec): AoV=2.8  ------------------------------------------------------------------------  Observations:  Mitral Valve: Mitral annular calcification, otherwise  normal mitral valve. Minimal mitral regurgitation.  Aortic Valve/Aorta: Calcified trileaflet aortic valve with  a mildly decreased opening. Peak transaortic valve gradient  equals 31 mm Hg, mean transaortic valve gradient equals 16  mm Hg, estimated aortic valve area equals 1.9 sqcm (by  continuity equation), consistent with mild aortic stenosis.  Minimal aortic regurgitation.  Normal aortic root (Ao: 3.8 cm at the sinuses of Valsalva).  Left Atrium: Normal left atrium.  LA volume index = 28  cc/m2.  Left Ventricle: Endocardium not well visualized; grossly  normal left ventricular systolic function. Normal left  ventricular internal dimensions and wall thicknesses. Mild  diastolic dysfunction (Stage I).  Right Heart: Normal right atrium. Normal right ventricular  size and function. Normal tricuspid valve. Minimal  tricuspid regurgitation. Normal pulmonic valve. Minimal  pulmonic regurgitation.  Pericardium/Pleura: Normal pericardium with no pericardial  effusion.  Hemodynamic: Estimated right atrial pressure is 8 mm Hg.  Estimated right ventricular systolic pressure equals 26 mm  Hg, assuming right atrial pressure equals 8 mm Hg,  consistent with normal pulmonary pressures.  ------------------------------------------------------------------------  Conclusions:  1. Calcified trileaflet aortic valve with a mildly  decreased opening. Peak transaortic valve gradient equals  31 mm Hg, mean transaortic valve gradient equals 16 mm Hg,  estimated aortic valve area equals 1.9 sqcm (by continuity  equation), consistent with mild aortic stenosis. Minimal  aortic regurgitation.  2. Normal left ventricular internal dimensions and wall  thicknesses.  3. Endocardium not well visualized; grossly normal left  ventricular systolic function.  4. Mild diastolic dysfunction (Stage I).  5. Normal right ventricular size and function.  6. Estimated pulmonary artery systolic pressure equals 26  mm Hg, assuming right atrial pressure equals 8  mm Hg,  consistent with normal pulmonary pressures.  ------------------------------------------------------------------------  Confirmed on  8/2/2016 - 17:29:12 by Gwen Melton M.D. Patient seen and examined at bedside.    Overnight Events: No events overnight. Pt says his abdominal pain is improving.    Review Of Systems: No chest pain, shortness of breath, or palpitations.            Current Meds:  allopurinol 200 milliGRAM(s) Oral daily  amiodarone    Tablet 200 milliGRAM(s) Oral daily  clonazePAM Tablet 1 milliGRAM(s) Oral at bedtime  dextrose 5% + sodium chloride 0.45%. 1000 milliLiter(s) IV Continuous <Continuous>  donepezil 10 milliGRAM(s) Oral at bedtime  levothyroxine 125 MICROGram(s) Oral daily  magnesium sulfate  IVPB 2 Gram(s) IV Intermittent once  memantine 10 milliGRAM(s) Oral two times a day  mirtazapine 15 milliGRAM(s) Oral at bedtime  pantoprazole    Tablet 40 milliGRAM(s) Oral before breakfast  piperacillin/tazobactam IVPB. 3.375 Gram(s) IV Intermittent every 12 hours  propranolol 10 milliGRAM(s) Oral two times a day  sodium bicarbonate 650 milliGRAM(s) Oral two times a day  tamsulosin 0.4 milliGRAM(s) Oral at bedtime      PAST MEDICAL & SURGICAL HISTORY:  Bradycardia, drug induced  Waldenstrom macroglobulinemia  Diverticulitis  Atrial fibrillation  GERD (gastroesophageal reflux disease)  Anxiety disorder  CLL (chronic lymphocytic leukemia): in remission  History of laparoscopic cholecystectomy: 4/2014  S/P hernia repair: x2  Meniscus tear: s/p removal of Meniscus 8 months ago      Vitals:  T(F): 98 (01-11), Max: 98.6 (01-10)  HR: 53 (01-11) (49 - 58)  BP: 106/57 (01-11) (106/57 - 136/70)  RR: 18 (01-11)  SpO2: 96% (01-11)  I&O's Summary    10 Josh 2018 07:01  -  11 Jan 2018 07:00  --------------------------------------------------------  IN: 2160 mL / OUT: 950 mL / NET: 1210 mL    11 Jan 2018 07:01  -  11 Jan 2018 13:01  --------------------------------------------------------  IN: 240 mL / OUT: 300 mL / NET: -60 mL        Physical Exam:  GENERAL: No acute distress  HEENT: EOMI, PERRLA, conjunctiva and sclera clear  NECK: Supple, No JVD  CHEST/LUNG: Clear to auscultation bilaterally, no wheezes, rales, or rhonchi  HEART: Regular rhythm, mild systolic murmur radiating to the carotids   ABDOMEN: Mild LLQ tenderness, soft, nondistended, no guarding or rebound  EXTREMITIES: No clubbing, cyanosis, or edema  PSYCH: Normal mood and affect  NEUROLOGY: AAOx3, non-focal  SKIN: warm and dry                             9.8    5.36  )-----------( 150      ( 11 Jan 2018 07:39 )             31.1     01-11    143  |  107  |  24<H>  ----------------------------<  90  4.0   |  25  |  2.76<H>    Ca    8.5      11 Jan 2018 09:12  Phos  2.7     01-11  Mg     1.8     01-11    TPro  6.3  /  Alb  x   /  TBili  x   /  DBili  x   /  AST  x   /  ALT  x   /  AlkPhos  x   01-10    PT/INR - ( 11 Jan 2018 06:51 )   PT: 37.5 sec;   INR: 3.36 ratio         PTT - ( 11 Jan 2018 06:51 )  PTT:43.0 sec      Cardiovascular Diagnostic Testing:    ECG: Sinus bradycardia, no ST segment abnormalities    Echo: Personally reviewed:    Patient name: NICOLAS CIFUENTES  YOB: 1945   Age: 71 (M)   MR#: [id number]  Study Date: 8/2/2016  [facility]CS  Study quality: Technically fair  Referring Physician: Som Alexandra MD  Blood Pressure: 107/65 mmHg  Height: 188 cm  Weight: 107 kg  BSA: 2.3 m2  ------------------------------------------------------------------------  PROCEDURE: Transthoracic echocardiogram with 2-D, M-Mode  and complete spectral and color flow Doppler.  INDICATION: Abnormal electrocardiogram (ECG) (EKG) (R94.31)  ------------------------------------------------------------------------  Dimensions:    Normal Values:  LA:     4.1    2.0 - 4.0 cm  Ao:     3.8    2.0 - 3.8 cm  SEPTUM: 1.0    0.6 - 1.2 cm  PWT:    1.0    0.6 - 1.1 cm  LVIDd:  5.8    3.0 - 5.6 cm  LVIDs:  4.1    1.8 - 4.0 cm  Derived variables:  LVMI: 100 g/m2  RWT: 0.34  Fractional short: 29 %  EF (Visual Estimate): 65 %  Doppler Peak Velocity (m/sec): AoV=2.8  ------------------------------------------------------------------------  Observations:  Mitral Valve: Mitral annular calcification, otherwise  normal mitral valve. Minimal mitral regurgitation.  Aortic Valve/Aorta: Calcified trileaflet aortic valve with  a mildly decreased opening. Peak transaortic valve gradient  equals 31 mm Hg, mean transaortic valve gradient equals 16  mm Hg, estimated aortic valve area equals 1.9 sqcm (by  continuity equation), consistent with mild aortic stenosis.  Minimal aortic regurgitation.  Normal aortic root (Ao: 3.8 cm at the sinuses of Valsalva).  Left Atrium: Normal left atrium.  LA volume index = 28  cc/m2.  Left Ventricle: Endocardium not well visualized; grossly  normal left ventricular systolic function. Normal left  ventricular internal dimensions and wall thicknesses. Mild  diastolic dysfunction (Stage I).  Right Heart: Normal right atrium. Normal right ventricular  size and function. Normal tricuspid valve. Minimal  tricuspid regurgitation. Normal pulmonic valve. Minimal  pulmonic regurgitation.  Pericardium/Pleura: Normal pericardium with no pericardial  effusion.  Hemodynamic: Estimated right atrial pressure is 8 mm Hg.  Estimated right ventricular systolic pressure equals 26 mm  Hg, assuming right atrial pressure equals 8 mm Hg,  consistent with normal pulmonary pressures.  ------------------------------------------------------------------------  Conclusions:  1. Calcified trileaflet aortic valve with a mildly  decreased opening. Peak transaortic valve gradient equals  31 mm Hg, mean transaortic valve gradient equals 16 mm Hg,  estimated aortic valve area equals 1.9 sqcm (by continuity  equation), consistent with mild aortic stenosis. Minimal  aortic regurgitation.  2. Normal left ventricular internal dimensions and wall  thicknesses.  3. Endocardium not well visualized; grossly normal left  ventricular systolic function.  4. Mild diastolic dysfunction (Stage I).  5. Normal right ventricular size and function.  6. Estimated pulmonary artery systolic pressure equals 26  mm Hg, assuming right atrial pressure equals 8  mm Hg,  consistent with normal pulmonary pressures.  ------------------------------------------------------------------------  Confirmed on  8/2/2016 - 17:29:12 by Gwen Melton M.D.

## 2018-01-11 NOTE — PROGRESS NOTE ADULT - SUBJECTIVE AND OBJECTIVE BOX
(+)mild-moderate left abdominal pain. No fever; no shortness of breath      VITAL:  T(C): , Max: 37 (01-10-18 @ 22:40)  T(F): , Max: 98.6 (01-10-18 @ 22:40)  HR: 51 (01-11-18 @ 05:22)  BP: 136/70 (01-11-18 @ 05:22)  BP(mean): --  RR: 18 (01-11-18 @ 05:22)  SpO2: 97% (01-11-18 @ 05:22)  Wt(kg): --      PHYSICAL EXAM:  Constitutional: NAD, Alert  HEENT: NCAT, DMM  Neck: Supple, No JVD  Respiratory: CTA-b/l  Cardiovascular: sabina, reg s1s2  Gastrointestinal: BS+, soft, NT/ND  Extremities: No peripheral edema b/l  Neurological: no focal deficits; strength grossly intact  Psychiatric: Normal mood, normal affect  Back: no CVAT b/l  Skin: No rashes, no nevi      LABS:                        10.6   7.5   )-----------( 127      ( 10 Josh 2018 07:07 )             31.5     Na(141)/K(4.2)/Cl(107)/HCO3(23)/BUN(29)/Cr(3.13)Glu(104)/Ca(8.7)/Mg(--)/PO4(--)    01-10 @ 07:08  Na(141)/K(4.9)/Cl(105)/HCO3(24)/BUN(30)/Cr(3.27)Glu(132)/Ca(9.4)/Mg(--)/PO4(--)    01-09 @ 12:53      ASSESSMENT: 72M w/ h/o dementia, atrial fibrillation, diverticulitis, nephrolithiasis, Waldenstrom's macroglobulinemia, and stage 4 chronic kidney disease, 1/9/18 a/w acute diverticulitis    (1)Renal - CKD4 - stable function with GFR 17-22ml/min  (2)Lytes - acceptable at present. On standing PO NaHCO3  (3)CV - euvolemic to mildly hypovolemic - on gentle IVF for now  (4)Surgery - acute diverticulitis - on Zosyn      RECOMMEND:  (1)Antibiotics for GFR 17-22ml/min  (2)IVF as ordered  (3)Pain control per primary team; no NSAIDs              oNrman Ascencio MD  [facility]  [phone] (+)mild-left abdominal pain. (+)diarrhea. On a clear diet.      VITAL:  T(C): , Max: 37 (01-10-18 @ 22:40)  T(F): , Max: 98.6 (01-10-18 @ 22:40)  HR: 51 (01-11-18 @ 05:22)  BP: 136/70 (01-11-18 @ 05:22)  BP(mean): --  RR: 18 (01-11-18 @ 05:22)  SpO2: 97% (01-11-18 @ 05:22)  Wt(kg): --      PHYSICAL EXAM:  Constitutional: NAD; lethargic but alert  HEENT: NCAT, DMM  Neck: Supple, No JVD  Respiratory: CTA-b/l  Cardiovascular: sabina, reg s1s2  Gastrointestinal: BS+, soft, NT/ND  Extremities: No peripheral edema b/l  Neurological: no focal deficits; strength grossly intact  Psychiatric: Normal mood, normal affect  Back: no CVAT b/l  Skin: No rashes, no nevi      LABS:                        10.6   7.5   )-----------( 127      ( 10 Josh 2018 07:07 )             31.5     Na(141)/K(4.2)/Cl(107)/HCO3(23)/BUN(29)/Cr(3.13)Glu(104)/Ca(8.7)/Mg(--)/PO4(--)    01-10 @ 07:08  Na(141)/K(4.9)/Cl(105)/HCO3(24)/BUN(30)/Cr(3.27)Glu(132)/Ca(9.4)/Mg(--)/PO4(--)    01-09 @ 12:53      ASSESSMENT: 72M w/ h/o dementia, atrial fibrillation, diverticulitis, nephrolithiasis, Waldenstrom's macroglobulinemia, and stage 4 chronic kidney disease, 1/9/18 a/w acute diverticulitis    (1)Renal - CKD4 - stable function with GFR 17-22ml/min  (2)Lytes - acceptable at present. On standing PO NaHCO3  (3)CV - euvolemic to mildly hypovolemic - on gentle IVF for now  (4)Surgery - acute diverticulitis - on Zosyn      RECOMMEND:  (1)Antibiotics for GFR 17-22ml/min  (2)IVF as ordered  (3)Pain control per primary team; no NSAIDs              Norman Ascencio MD  [facility]  [phone]

## 2018-01-11 NOTE — PROGRESS NOTE ADULT - ASSESSMENT
78M with paroxsymal afib on coumadin, chronic bradycardia with PPM, HFpEF, CLL, Waldenstrom macroglobulinemia, CKD4, mild dementia, diverticulitis, presents with LLQ abdominal pain x2 weeks and admitted for uncomplicated diverticulitis. Cardiology consulted for management of anticoagulation for paroxysmal afib and need for bridging with a heparin drip in case surgery is needed on this admission. Pt has a [id number] score of 3 and does not require bridging with a heparin drip. INR still currently supratherapeutic. He will not require further cardiac testing prior to surgery if needed for diverticulitis, approximately 6.6% risk of major cardiac event based on RCRI.     Paroxysmal afib: coumadin being held due to possible surgery and supratherapeutic INR  - Daily INR  - No need for bridging with heparin drip, restart   - Continue amiodarone for rhythm control     Preoperative Clearance: Pending normalization of INR, pt's cardiovascular status is optimized for colonic resection if needed for diverticulosis.     Please page with any questions.     Terrance Day, PGY2  Pager: [phone] 78M with paroxsymal afib on coumadin, chronic bradycardia with PPM, HFpEF, CLL, Waldenstrom macroglobulinemia, CKD4, mild dementia, diverticulitis, presents with LLQ abdominal pain x2 weeks and admitted for uncomplicated diverticulitis. Cardiology consulted for management of anticoagulation for paroxysmal afib and need for bridging with a heparin drip in case surgery is needed on this admission. Pt has a [id number] score of 3 and does not require bridging with a heparin drip. INR still currently supratherapeutic. He will not require further cardiac testing prior to surgery if needed for diverticulitis, approximately 6.6% risk of major cardiac event based on RCRI.     Paroxysmal afib: coumadin being held due to possible surgery and supratherapeutic INR  - Daily INR  - No need for bridging with heparin drip, restart once no longer planning possible surgery and INR not supratherapeutic  - Continue amiodarone for rhythm control     Preoperative Clearance: Once INR is normalized, pt's cardiovascular status is optimized for colonic resection if needed for diverticulosis.     Please page with any questions.     Terrance Day, PGY2  Pager: [phone]

## 2018-01-11 NOTE — PROGRESS NOTE ADULT - SUBJECTIVE AND OBJECTIVE BOX
Patient is a 72y old  Male who presented with a chief complaint of abdominal pain (09 Jan 2018 17:09)    INTERVAL HPI/OVERNIGHT EVENTS:  feeling "better"  no nausea or vomiting  feels hungry/thirsty  abdominal pain improved, now more localized in LLQ    MEDICATIONS  (STANDING):  allopurinol 200 milliGRAM(s) Oral daily  amiodarone    Tablet 200 milliGRAM(s) Oral daily  clonazePAM Tablet 1 milliGRAM(s) Oral at bedtime  dextrose 5% + sodium chloride 0.45%. 1000 milliLiter(s) (50 mL/Hr) IV Continuous <Continuous>  donepezil 10 milliGRAM(s) Oral at bedtime  levothyroxine 125 MICROGram(s) Oral daily  memantine 10 milliGRAM(s) Oral two times a day  mirtazapine 15 milliGRAM(s) Oral at bedtime  pantoprazole    Tablet 40 milliGRAM(s) Oral before breakfast  piperacillin/tazobactam IVPB. 3.375 Gram(s) IV Intermittent every 12 hours  propranolol 10 milliGRAM(s) Oral two times a day  sodium bicarbonate 650 milliGRAM(s) Oral two times a day  tamsulosin 0.4 milliGRAM(s) Oral at bedtime    MEDICATIONS  (PRN):    Allergies  No Known Allergies    Review of Systems:  General:  No wt loss, fevers, chills, night sweats ,fatigue  CV:  No pain, palpitatioins, AF, s/p PPM  Resp:  No dyspnea, cough, tachypnea, wheezing  :  No pain, bleeding, incontinence, nocturia  Muscle:  No pain, weakness  Neuro:  No weakness, tingling, memory problems  Heme: +Waldenstrom Macroglobulinemia, on AC (AF)  Skin:  No rash, tattoos, scars, edema    T(F): 98 (01-11-18 @ 05:22), Max: 98.6 (01-10-18 @ 22:40)  HR: 51 (01-11-18 @ 05:22) (49 - 58)  BP: 136/70 (01-11-18 @ 05:22) (111/66 - 136/70)  RR: 18 (01-11-18 @ 05:22) (18 - 18)  SpO2: 97% (01-11-18 @ 05:22) (95% - 97%)  Wt(kg): --  ,   I&O's Summary    10 Josh 2018 07:01  -  11 Jan 2018 07:00  --------------------------------------------------------  IN: 2160 mL / OUT: 950 mL / NET: 1210 mL    PHYSICAL EXAM:  Constitutional: Non-toxic appearing, appears comfortable, AOx3  Neck: No LAD, supple, no JVD  Respiratory: Clear anteriorly, Left CW PPM site clean and dry  Cardiovascular: S1 and S2, RRR  Gastrointestinal: BS+, soft +tenderness LLQ (improved from prior exam)  NO rebound no rigidity  Extremities: No peripheral edema, neg clubbing, cyanosis  Vascular: 2+ peripheral pulses  Neurological: A/O x 3, no focal deficits  Psychiatric: Normal mood, normal affect  Skin: No rashes    LABS:                      10.6   7.5   )-----------( 127      ( 10 Josh 2018 07:07 )             31.5     01-10    141  |  107  |  29<H>  ----------------------------<  104<H>  4.2   |  23  |  3.13<H>    Ca    8.7      10 Josh 2018 07:08    TPro  7.7  /  Alb  4.1  /  TBili  0.7  /  DBili  x   /  AST  11  /  ALT  10  /  AlkPhos  102  01-09    PT/INR - ( 09 Jan 2018 12:53 )   PT: 38.3 sec;   INR: 3.46 ratio    PTT - ( 09 Jan 2018 12:53 )  PTT:49.3 sec    RADIOLOGY & ADDITIONAL TESTS:    EXAM:  CT ABDOMEN AND PELVIS OC                        PROCEDURE DATE:  01/09/2018      INTERPRETATION:  CLINICAL INFORMATION: Left-sided abdominal pain for 3   days. History of diverticulitis and chronic lymphocytic leukemia.    COMPARISON: CT abdomen pelvis 11/12/2017    PROCEDURE:   CT of the Abdomen and Pelvis was performed without intravenous contrast.   Intravenous contrast: None.  Oral contrast: positive contrast was administered.  Sagittal and coronal reformats were performed.    FINDINGS:    LOWER CHEST: Unchanged retrocrural lymph nodes measuring up to 1.2 cm in   short axis. Tiny bilateral cardiophrenic lymph nodes.    LIVER: Within normal limits.  BILE DUCTS: Normal caliber.  GALLBLADDER: Status post cholecystectomy.  SPLEEN: Within normal limits. Splenule.  PANCREAS: Within normal limits.  ADRENALS: Within normal limits.  KIDNEYS/URETERS: Unchanged nonobstructing 7 mm right upper pole calculus   and bilateral renal cysts, largest measuring up to 4.9 cm and with a thin   peripheral calcification.    BLADDER: Within normal limits.  REPRODUCTIVE ORGANS: The prostate is within normal limits.    BOWEL: Colonic diverticulosis with short segment mural thickening and   pericolonic fat stranding at the distal descending to proximal sigmoid   consistent with diverticulitis. The pericolonic inflammatory change has   increased when compared with the previous study dated November 11, 2017.   This inflammatory process lies adjacent to the left lateral wall of the   urinary bladder. Appendix is not visualized. Unchanged multiple   subcentimeter mesenteric lymph nodes and haziness of the small bowel   mesentery.  PERITONEUM: No ascites. Denise mesentery with prominent mesenteric lymph   nodes. No free intraperitoneal air.  VESSELS:  Atheromatous changes.  RETROPERITONEUM: Unchanged periceliac lymphadenopathy. Unchanged   retroperitoneal and pelvic lymphadenopathy including an enlarged left   external iliac lymph node measuring 2.3 cm x 1.1 cm.  ABDOMINAL WALL: Within normal limits.  BONES: Degenerative spinal changes with unchanged area and bilateral   neural foraminal narrowing of L5-S1.    IMPRESSION:   Uncomplicated diverticulitis involving the distal descending and proximal   sigmoid colon with progression of the pericolonic inflammatory change. No   formed abscess. This inflammatory process lies adjacent to the left   lateral urinary bladder wall. Continued follow-up can be performed.    Unchanged retrocrural, retroperitoneal, and pelvic lymphadenopathy.

## 2018-01-11 NOTE — PROCEDURE NOTE - ADDITIONAL PROCEDURE DETAILS
Indication: to assess AT/AF burden prior to GI surgery   - presenting rhythm; Sinus bradycardia at rate 50s   - measured data; WNL, normal PM function.   - pt is not  PM dependent   - A-paced: 68%, V-paced:< 1%, AT/AF burden: 0%, since last interrogation on 12/12/2017  - changes made: none     RONN Smith [facility] #[id number]

## 2018-01-11 NOTE — PROGRESS NOTE ADULT - ASSESSMENT
73 yo male well known to me from multiple prior admissions, with h/o CLL, waldenstroms macroglobulinemia, ckd, afib s/p ppm on AC with coumadin, diverticulitis, anxiety a/w abd pain and recurrent diverticulitis    pt being followed by surg  reports improved abd pain today  wbc improved  cont abx as per ID  clears diet  as per ID no plans for surg on this admission. will need long term abx first  pt states unable to get abx at home.   will need f/u with SW    afib   pt with supratheraputic INR 3.4  hold coumadin in anticipation of possible surgical intervention  no need for bridging as per cards once suptheraputic  cont home cardiac meds for rate control    CKD  renal following  monitor creat    CLL/waldenstroms  stable  onc eval noted    anxiety  cont home psy meds    will follow

## 2018-01-11 NOTE — PROGRESS NOTE ADULT - SUBJECTIVE AND OBJECTIVE BOX
[facility] PROGRESS NOTE    SUBJECTIVE:   Patient was seen and examined this morning. There was no acute event overnight. He is resting comfortably in bed. Pain is well controlled. He does not report pain, fever, n/v, chest pain, or shortness of breath.     Vital Signs Last 24 Hrs  T(C): 36.8 (11 Jan 2018 00:25), Max: 37 (10 Josh 2018 22:40)  T(F): 98.3 (11 Jan 2018 00:25), Max: 98.6 (10 Josh 2018 22:40)  HR: 54 (11 Jan 2018 01:23) (49 - 58)  BP: 111/66 (11 Jan 2018 00:25) (111/66 - 133/67)  BP(mean): --  RR: 18 (11 Jan 2018 00:25) (18 - 18)  SpO2: 97% (11 Jan 2018 00:25) (95% - 97%)    PHYSICAL EXAM:  Gen: Well-nourished, well-developed, A&O x3, resting in bed in no acute distress  CV: RRR  Resp: Patent airways, unlabored   Abdomen: Soft, nontender, LLQ tenderness  Extremities: All 4 extremities warm and well perfused, no edema     I&O's Summary    09 Jan 2018 07:01  -  10 Josh 2018 07:00  --------------------------------------------------------  IN: 550 mL / OUT: 0 mL / NET: 550 mL    10 Josh 2018 07:01  -  11 Jan 2018 05:19  --------------------------------------------------------  IN: 1460 mL / OUT: 550 mL / NET: 910 mL      I&O's Detail    09 Jan 2018 07:01  -  10 Josh 2018 07:00  --------------------------------------------------------  IN:    sodium chloride 0.9%: 450 mL    Solution: 100 mL  Total IN: 550 mL    OUT:  Total OUT: 0 mL    Total NET: 550 mL      10 Josh 2018 07:01  -  11 Jan 2018 05:19  --------------------------------------------------------  IN:    Oral Fluid: 1460 mL  Total IN: 1460 mL    OUT:    Voided: 550 mL  Total OUT: 550 mL    Total NET: 910 mL          MEDICATIONS  (STANDING):  allopurinol 200 milliGRAM(s) Oral daily  amiodarone    Tablet 200 milliGRAM(s) Oral daily  clonazePAM Tablet 1 milliGRAM(s) Oral at bedtime  dextrose 5% + sodium chloride 0.45%. 1000 milliLiter(s) (50 mL/Hr) IV Continuous <Continuous>  donepezil 10 milliGRAM(s) Oral at bedtime  levothyroxine 125 MICROGram(s) Oral daily  memantine 10 milliGRAM(s) Oral two times a day  mirtazapine 15 milliGRAM(s) Oral at bedtime  pantoprazole    Tablet 40 milliGRAM(s) Oral before breakfast  piperacillin/tazobactam IVPB. 3.375 Gram(s) IV Intermittent every 12 hours  propranolol 10 milliGRAM(s) Oral two times a day  sodium bicarbonate 650 milliGRAM(s) Oral two times a day  tamsulosin 0.4 milliGRAM(s) Oral at bedtime    MEDICATIONS  (PRN):      LABS:                        10.6   7.5   )-----------( 127      ( 10 Josh 2018 07:07 )             31.5     01-10    141  |  107  |  29<H>  ----------------------------<  104<H>  4.2   |  23  |  3.13<H>    Ca    8.7      10 Josh 2018 07:08    TPro  6.3  /  Alb  x   /  TBili  x   /  DBili  x   /  AST  x   /  ALT  x   /  AlkPhos  x   01-10    PT/INR - ( 10 Josh 2018 13:11 )   PT: 41.4 sec;   INR: 3.70 ratio         PTT - ( 10 Josh 2018 13:11 )  PTT:42.1 sec      RADIOLOGY & ADDITIONAL STUDIES:    [facility] #[id number] [facility] PROGRESS NOTE    SUBJECTIVE:   Patient was seen and examined this morning. There was no acute event overnight. He is resting comfortably in bed. He had abdominal pain which was well controlled with IV Tylenol. He does not report pain, fever, n/v, chest pain, or shortness of breath.     Vital Signs Last 24 Hrs  T(C): 36.8 (11 Jan 2018 00:25), Max: 37 (10 Josh 2018 22:40)  T(F): 98.3 (11 Jan 2018 00:25), Max: 98.6 (10 Josh 2018 22:40)  HR: 54 (11 Jan 2018 01:23) (49 - 58)  BP: 111/66 (11 Jan 2018 00:25) (111/66 - 133/67)  BP(mean): --  RR: 18 (11 Jan 2018 00:25) (18 - 18)  SpO2: 97% (11 Jan 2018 00:25) (95% - 97%)    PHYSICAL EXAM:  Gen: Well-nourished, well-developed, A&O x3, resting in bed in no acute distress  CV: RRR  Resp: Patent airways, unlabored   Abdomen: Soft, nontender, LLQ tenderness  Extremities: All 4 extremities warm and well perfused, no edema     I&O's Summary    09 Jan 2018 07:01  -  10 Josh 2018 07:00  --------------------------------------------------------  IN: 550 mL / OUT: 0 mL / NET: 550 mL    10 Josh 2018 07:01  -  11 Jan 2018 05:19  --------------------------------------------------------  IN: 1460 mL / OUT: 550 mL / NET: 910 mL      I&O's Detail    09 Jan 2018 07:01  -  10 Josh 2018 07:00  --------------------------------------------------------  IN:    sodium chloride 0.9%: 450 mL    Solution: 100 mL  Total IN: 550 mL    OUT:  Total OUT: 0 mL    Total NET: 550 mL      10 Josh 2018 07:01  -  11 Jan 2018 05:19  --------------------------------------------------------  IN:    Oral Fluid: 1460 mL  Total IN: 1460 mL    OUT:    Voided: 550 mL  Total OUT: 550 mL    Total NET: 910 mL          MEDICATIONS  (STANDING):  allopurinol 200 milliGRAM(s) Oral daily  amiodarone    Tablet 200 milliGRAM(s) Oral daily  clonazePAM Tablet 1 milliGRAM(s) Oral at bedtime  dextrose 5% + sodium chloride 0.45%. 1000 milliLiter(s) (50 mL/Hr) IV Continuous <Continuous>  donepezil 10 milliGRAM(s) Oral at bedtime  levothyroxine 125 MICROGram(s) Oral daily  memantine 10 milliGRAM(s) Oral two times a day  mirtazapine 15 milliGRAM(s) Oral at bedtime  pantoprazole    Tablet 40 milliGRAM(s) Oral before breakfast  piperacillin/tazobactam IVPB. 3.375 Gram(s) IV Intermittent every 12 hours  propranolol 10 milliGRAM(s) Oral two times a day  sodium bicarbonate 650 milliGRAM(s) Oral two times a day  tamsulosin 0.4 milliGRAM(s) Oral at bedtime    MEDICATIONS  (PRN):      LABS:                        10.6   7.5   )-----------( 127      ( 10 Josh 2018 07:07 )             31.5     01-10    141  |  107  |  29<H>  ----------------------------<  104<H>  4.2   |  23  |  3.13<H>    Ca    8.7      10 Josh 2018 07:08    TPro  6.3  /  Alb  x   /  TBili  x   /  DBili  x   /  AST  x   /  ALT  x   /  AlkPhos  x   01-10    PT/INR - ( 10 Josh 2018 13:11 )   PT: 41.4 sec;   INR: 3.70 ratio         PTT - ( 10 Josh 2018 13:11 )  PTT:42.1 sec      RADIOLOGY & ADDITIONAL STUDIES:    [facility] #[id number]

## 2018-01-11 NOTE — PROGRESS NOTE ADULT - ASSESSMENT
72M w/ hx of chronic diastolic heart failure, AFIB w/ PPM (on coumadin), CLL w/ Waldenstrom's macroglobulinemia, CKD Stage 4, and hx of multiple episodes of acute diverticulitis presenting w/ acute diverticulitis. Uncomplicated diverticulitis involving the distal descending and proximal sigmoid colon with progression of the pericolonic inflammatory change on CT He is HD3. He is hemodynamically stable. Labs are pending.    - DVT ppx: INR >3 , supratherapeutic, Itwas held for now  - Diet: NPO  - ABx regimen: Zosyn  - ordered home medication  - Monitor GI function  - will f/u with labs and replete electrolyte as necessary  - Activity: OOb as tolerated  - OR planning once inflammation subsides  - Dispo: Floor 72M w/ hx of chronic diastolic heart failure, AFIB w/ PPM (on coumadin), CLL w/ Waldenstrom's macroglobulinemia, CKD Stage 4, and hx of multiple episodes of acute diverticulitis presenting w/ acute diverticulitis. Uncomplicated diverticulitis involving the distal descending and proximal sigmoid colon with progression of the pericolonic inflammatory change on CT He is HD3. He is hemodynamically stable. Labs are pending.    - a-fib: INR >3 , supratherapeutic INR, coumadin isheld for now, will order daily INR  - appreciate cardiology recommendation: c/w amidarone  - Diet: NPO  - ABx regimen: Zosyn  - ordered home medication  - Monitor GI function  - f/u with blood cx  - will f/u with labs and replete electrolyte as necessary  - Activity: OOb as tolerated  - OR planning once inflammation subsides  - Dispo: Floor 72M w/ hx of chronic diastolic heart failure, AFIB w/ PPM (on coumadin), CLL w/ Waldenstrom's macroglobulinemia, CKD Stage 4, and hx of multiple episodes of acute diverticulitis presenting w/ acute diverticulitis. Uncomplicated diverticulitis involving the distal descending and proximal sigmoid colon with progression of the pericolonic inflammatory change on CT He is HD3. He is hemodynamically stable. Labs are pending.    - a-fib: INR >3 , supratherapeutic INR, coumadin isheld for now, will order daily INR  - appreciate cardiology recommendation: c/w amidarone  - Diet: NPO  - ABx regimen: Zosyn  - ordered home medication  - Monitor GI function  - f/u with blood cx  - will f/u with labs and replete electrolyte as necessary  - Activity: OOb as tolerated  - Dispo: Floor

## 2018-01-12 LAB
% ALBUMIN: 48 % — SIGNIFICANT CHANGE UP
% ALPHA 1: 7.4 % — SIGNIFICANT CHANGE UP
% ALPHA 2: 11.8 % — SIGNIFICANT CHANGE UP
% BETA: 8.5 % — SIGNIFICANT CHANGE UP
% GAMMA: 24.3 % — SIGNIFICANT CHANGE UP
% M SPIKE: SIGNIFICANT CHANGE UP %
ALBUMIN SERPL ELPH-MCNC: 3 G/DL — LOW (ref 3.6–5.5)
ALBUMIN/GLOB SERPL ELPH: 0.9 RATIO — SIGNIFICANT CHANGE UP
ALPHA1 GLOB SERPL ELPH-MCNC: 0.5 G/DL — HIGH (ref 0.1–0.4)
ALPHA2 GLOB SERPL ELPH-MCNC: 0.7 G/DL — SIGNIFICANT CHANGE UP (ref 0.5–1)
ANION GAP SERPL CALC-SCNC: 10 MMOL/L — SIGNIFICANT CHANGE UP (ref 5–17)
APTT BLD: 39.3 SEC — HIGH (ref 27.5–37.4)
B-GLOBULIN SERPL ELPH-MCNC: 0.5 G/DL — SIGNIFICANT CHANGE UP (ref 0.5–1)
BUN SERPL-MCNC: 20 MG/DL — SIGNIFICANT CHANGE UP (ref 7–23)
CALCIUM SERPL-MCNC: 8.5 MG/DL — SIGNIFICANT CHANGE UP (ref 8.4–10.5)
CHLORIDE SERPL-SCNC: 111 MMOL/L — HIGH (ref 96–108)
CO2 SERPL-SCNC: 25 MMOL/L — SIGNIFICANT CHANGE UP (ref 22–31)
CREAT SERPL-MCNC: 3.14 MG/DL — HIGH (ref 0.5–1.3)
GAMMA GLOBULIN: 1.5 G/DL — SIGNIFICANT CHANGE UP (ref 0.6–1.6)
GLUCOSE SERPL-MCNC: 88 MG/DL — SIGNIFICANT CHANGE UP (ref 70–99)
HCT VFR BLD CALC: 29.5 % — LOW (ref 39–50)
HGB BLD-MCNC: 9.5 G/DL — LOW (ref 13–17)
INR BLD: 3.19 RATIO — HIGH (ref 0.88–1.16)
INTERPRETATION SERPL IFE-IMP: SIGNIFICANT CHANGE UP
KAPPA LC SER QL IFE: 0.99 MG/DL — SIGNIFICANT CHANGE UP (ref 0.33–1.94)
KAPPA/LAMBDA FREE LIGHT CHAIN RATIO, SERUM: 0.01 RATIO — LOW (ref 0.26–1.65)
LAMBDA LC SER QL IFE: 127 MG/DL — HIGH (ref 0.57–2.63)
M-SPIKE: SIGNIFICANT CHANGE UP G/DL (ref 0–0)
MAGNESIUM SERPL-MCNC: 2.1 MG/DL — SIGNIFICANT CHANGE UP (ref 1.6–2.6)
MCHC RBC-ENTMCNC: 29.1 PG — SIGNIFICANT CHANGE UP (ref 27–34)
MCHC RBC-ENTMCNC: 32.2 GM/DL — SIGNIFICANT CHANGE UP (ref 32–36)
MCV RBC AUTO: 90.5 FL — SIGNIFICANT CHANGE UP (ref 80–100)
PHOSPHATE SERPL-MCNC: 2.6 MG/DL — SIGNIFICANT CHANGE UP (ref 2.5–4.5)
PLATELET # BLD AUTO: 161 K/UL — SIGNIFICANT CHANGE UP (ref 150–400)
POTASSIUM SERPL-MCNC: 4 MMOL/L — SIGNIFICANT CHANGE UP (ref 3.5–5.3)
POTASSIUM SERPL-SCNC: 4 MMOL/L — SIGNIFICANT CHANGE UP (ref 3.5–5.3)
PROT PATTERN SERPL ELPH-IMP: SIGNIFICANT CHANGE UP
PROT PATTERN SERPL ELPH-IMP: SIGNIFICANT CHANGE UP
PROTHROM AB SERPL-ACNC: 36.9 SEC — HIGH (ref 10–13.1)
RBC # BLD: 3.26 M/UL — LOW (ref 4.2–5.8)
RBC # FLD: 14.3 % — SIGNIFICANT CHANGE UP (ref 10.3–14.5)
SODIUM SERPL-SCNC: 146 MMOL/L — HIGH (ref 135–145)
WBC # BLD: 5.6 K/UL — SIGNIFICANT CHANGE UP (ref 3.8–10.5)
WBC # FLD AUTO: 5.6 K/UL — SIGNIFICANT CHANGE UP (ref 3.8–10.5)

## 2018-01-12 PROCEDURE — 99232 SBSQ HOSP IP/OBS MODERATE 35: CPT

## 2018-01-12 RX ORDER — LANOLIN ALCOHOL/MO/W.PET/CERES
3 CREAM (GRAM) TOPICAL AT BEDTIME
Qty: 0 | Refills: 0 | Status: DISCONTINUED | OUTPATIENT
Start: 2018-01-12 | End: 2018-01-13

## 2018-01-12 RX ORDER — LACTOBACILLUS ACIDOPHILUS 100MM CELL
1 CAPSULE ORAL DAILY
Qty: 0 | Refills: 0 | Status: DISCONTINUED | OUTPATIENT
Start: 2018-01-12 | End: 2018-01-18

## 2018-01-12 RX ADMIN — DONEPEZIL HYDROCHLORIDE 10 MILLIGRAM(S): 10 TABLET, FILM COATED ORAL at 21:46

## 2018-01-12 RX ADMIN — Medication 650 MILLIGRAM(S): at 18:33

## 2018-01-12 RX ADMIN — Medication 1 TABLET(S): at 18:33

## 2018-01-12 RX ADMIN — PANTOPRAZOLE SODIUM 40 MILLIGRAM(S): 20 TABLET, DELAYED RELEASE ORAL at 05:44

## 2018-01-12 RX ADMIN — PIPERACILLIN AND TAZOBACTAM 25 GRAM(S): 4; .5 INJECTION, POWDER, LYOPHILIZED, FOR SOLUTION INTRAVENOUS at 18:34

## 2018-01-12 RX ADMIN — Medication 1 MILLIGRAM(S): at 21:46

## 2018-01-12 RX ADMIN — MEMANTINE HYDROCHLORIDE 10 MILLIGRAM(S): 10 TABLET ORAL at 18:33

## 2018-01-12 RX ADMIN — Medication 125 MICROGRAM(S): at 05:45

## 2018-01-12 RX ADMIN — PIPERACILLIN AND TAZOBACTAM 25 GRAM(S): 4; .5 INJECTION, POWDER, LYOPHILIZED, FOR SOLUTION INTRAVENOUS at 05:45

## 2018-01-12 RX ADMIN — TAMSULOSIN HYDROCHLORIDE 0.4 MILLIGRAM(S): 0.4 CAPSULE ORAL at 21:46

## 2018-01-12 RX ADMIN — Medication 200 MILLIGRAM(S): at 12:25

## 2018-01-12 RX ADMIN — MIRTAZAPINE 15 MILLIGRAM(S): 45 TABLET, ORALLY DISINTEGRATING ORAL at 21:46

## 2018-01-12 RX ADMIN — SODIUM CHLORIDE 50 MILLILITER(S): 9 INJECTION, SOLUTION INTRAVENOUS at 05:49

## 2018-01-12 RX ADMIN — AMIODARONE HYDROCHLORIDE 200 MILLIGRAM(S): 400 TABLET ORAL at 06:56

## 2018-01-12 RX ADMIN — Medication 650 MILLIGRAM(S): at 05:45

## 2018-01-12 RX ADMIN — Medication 10 MILLIGRAM(S): at 18:33

## 2018-01-12 RX ADMIN — MEMANTINE HYDROCHLORIDE 10 MILLIGRAM(S): 10 TABLET ORAL at 05:45

## 2018-01-12 NOTE — PROGRESS NOTE ADULT - SUBJECTIVE AND OBJECTIVE BOX
No pain, no shortness of breath      VITAL:  T(C): , Max: 37.2 (01-11-18 @ 17:26)  T(F): , Max: 99 (01-11-18 @ 17:26)  HR: 60 (01-12-18 @ 09:10)  BP: 138/75 (01-12-18 @ 09:10)  BP(mean): --  RR: 18 (01-12-18 @ 09:10)  SpO2: 97% (01-12-18 @ 09:10)      PHYSICAL EXAM:  Constitutional: NAD; lethargic but alert  HEENT: NCAT, DMM  Neck: Supple, No JVD  Respiratory: CTA-b/l  Cardiovascular: sabina, reg s1s2  Gastrointestinal: BS+, soft, NT/ND  Extremities: No peripheral edema b/l  Neurological: no focal deficits; strength grossly intact  Psychiatric: Normal mood, normal affect  Back: no CVAT b/l  Skin: No rashes, no nevi      LABS:                        9.5    5.60  )-----------( 161      ( 12 Jan 2018 09:19 )             29.5     Na(143)/K(4.0)/Cl(107)/HCO3(25)/BUN(24)/Cr(2.76)Glu(90)/Ca(8.5)/Mg(1.8)/PO4(2.7)    01-11 @ 09:12  Na(141)/K(4.2)/Cl(107)/HCO3(23)/BUN(29)/Cr(3.13)Glu(104)/Ca(8.7)/Mg(--)/PO4(--)    01-10 @ 07:08  Na(141)/K(4.9)/Cl(105)/HCO3(24)/BUN(30)/Cr(3.27)Glu(132)/Ca(9.4)/Mg(--)/PO4(--)    01-09 @ 12:53      ASSESSMENT: 72M w/ h/o dementia, atrial fibrillation, diverticulitis, nephrolithiasis, Waldenstrom's macroglobulinemia, and stage 4 chronic kidney disease, 1/9/18 a/w acute diverticulitis    (1)Renal - CKD4 - resolved mild LOUISE from prerenal azotemia, in association with volume repletion  (2)Lytes - acceptable at present. On standing PO NaHCO3  (3)CV - euvolemic to mildly hypovolemic - on gentle IVF for now  (4)Surgery - acute diverticulitis - on Zosyn. Clinically improving      RECOMMEND:  (1)Antibiotics for GFR 20-25ml/min; could increase Zosyn to q8 hour dosing  (2)Could d/c IVF at this point  (3)Pain control per primary team; no NSAIDs            Norman Ascencio MD  [facility]  [phone] LLQ pain improving. No shortness of breath    VITAL:  T(C): , Max: 37.2 (01-11-18 @ 17:26)  T(F): , Max: 99 (01-11-18 @ 17:26)  HR: 60 (01-12-18 @ 09:10)  BP: 138/75 (01-12-18 @ 09:10)  BP(mean): --  RR: 18 (01-12-18 @ 09:10)  SpO2: 97% (01-12-18 @ 09:10)      PHYSICAL EXAM:  Constitutional: NAD; lethargic but alert  HEENT: NCAT, DMM  Neck: Supple, No JVD  Respiratory: CTA-b/l  Cardiovascular: sabina, reg s1s2  Gastrointestinal: BS+, soft, NT/ND  Extremities: No peripheral edema b/l  Neurological: no focal deficits; strength grossly intact  Psychiatric: Normal mood, normal affect  Back: no CVAT b/l  Skin: No rashes, no nevi      LABS:                        9.5    5.60  )-----------( 161      ( 12 Jan 2018 09:19 )             29.5     Na(143)/K(4.0)/Cl(107)/HCO3(25)/BUN(24)/Cr(2.76)Glu(90)/Ca(8.5)/Mg(1.8)/PO4(2.7)    01-11 @ 09:12  Na(141)/K(4.2)/Cl(107)/HCO3(23)/BUN(29)/Cr(3.13)Glu(104)/Ca(8.7)/Mg(--)/PO4(--)    01-10 @ 07:08  Na(141)/K(4.9)/Cl(105)/HCO3(24)/BUN(30)/Cr(3.27)Glu(132)/Ca(9.4)/Mg(--)/PO4(--)    01-09 @ 12:53      ASSESSMENT: 72M w/ h/o dementia, atrial fibrillation, diverticulitis, nephrolithiasis, Waldenstrom's macroglobulinemia, and stage 4 chronic kidney disease, 1/9/18 a/w acute diverticulitis    (1)Renal - CKD4 - resolved mild LOUISE from prerenal azotemia, in association with volume repletion  (2)Lytes - acceptable at present. On standing PO NaHCO3  (3)CV - euvolemic to mildly hypovolemic - on gentle IVF for now  (4)Surgery - acute diverticulitis - on Zosyn. Clinically improving      RECOMMEND:  (1)Antibiotics for GFR 20-25ml/min; could increase Zosyn to q8 hour dosing  (2)Could d/c IVF at this point  (3)Pain control per primary team; no NSAIDs            Norman Ascencio MD  [facility]  [phone]

## 2018-01-12 NOTE — PROGRESS NOTE ADULT - SUBJECTIVE AND OBJECTIVE BOX
72y old  Male who presents with a chief complaint of abdominal pain (09 Jan 2018 17:09)      Interval history:        Antimicrobials:    piperacillin/tazobactam IVPB. 3.375 Gram(s) IV Intermittent every 12 hours    REVIEW OF SYSTEMS:    No chest pain or palpitations  No cough, no SOB  No N/V/D, no abdominal pain  No dysuria or frequency  No rash.     Vital Signs Last 24 Hrs  T(C): 36.7 (01-12-18 @ 09:10), Max: 37.2 (01-11-18 @ 17:26)  T(F): 98 (01-12-18 @ 09:10), Max: 99 (01-11-18 @ 17:26)  HR: 60 (01-12-18 @ 09:10) (52 - 60)  BP: 138/75 (01-12-18 @ 09:10) (108/62 - 152/74)  BP(mean): --  RR: 18 (01-12-18 @ 09:10) (18 - 18)  SpO2: 97% (01-12-18 @ 09:10) (95% - 98%)    PHYSICAL EXAM:                            9.5    5.60  )-----------( 161      ( 12 Jan 2018 09:19 )             29.5   01-12    146<H>  |  111<H>  |  20  ----------------------------<  88  4.0   |  25  |  3.14<H>    Ca    8.5      12 Jan 2018 09:25  Phos  2.6     01-12  Mg     2.1     01-12              Culture - Blood (collected 10 Josh 2018 00:35)  Source: .Blood Blood-Venous  Preliminary Report (11 Jan 2018 01:02):    No growth to date.    Culture - Blood (collected 10 Josh 2018 00:35)  Source: .Blood Blood-Peripheral  Preliminary Report (11 Jan 2018 01:02):    No growth to date.        Radiology: 72y old  Male who presents with a chief complaint of abdominal pain     Interval history:  Afebrile, abdominal pain better, still on clears.       Antimicrobials:    piperacillin/tazobactam IVPB. 3.375 Gram(s) IV Intermittent every 12 hours    REVIEW OF SYSTEMS:  No N/V, still diarrhea  No cough or SOB  No dysuria.     Vital Signs Last 24 Hrs  T(C): 36.7 (01-12-18 @ 09:10), Max: 37.2 (01-11-18 @ 17:26)  T(F): 98 (01-12-18 @ 09:10), Max: 99 (01-11-18 @ 17:26)  HR: 60 (01-12-18 @ 09:10) (52 - 60)  BP: 138/75 (01-12-18 @ 09:10) (108/62 - 152/74)  RR: 18 (01-12-18 @ 09:10) (18 - 18)  SpO2: 97% (01-12-18 @ 09:10) (95% - 98%)    PHYSICAL EXAM:  Patient in no acute distress. Alert, oriented.   No icterus, no oral ulcers.  Cardiovascular: S1S2 normal. + ppm  Lungs: + air entry B/L lung fields.  Gastrointestinal: soft, nondistended, minimal tenderness on palpation.  Extremities: no edema.  IV sites not inflamed.                           9.5    5.60  )-----------( 161      ( 12 Jan 2018 09:19 )             29.5   01-12    146<H>  |  111<H>  |  20  ----------------------------<  88  4.0   |  25  |  3.14<H>    Ca    8.5      12 Jan 2018 09:25  Phos  2.6     01-12  Mg     2.1     01-12          Culture - Blood (collected 10 Josh 2018 00:35)  Source: .Blood Blood-Venous  Preliminary Report (11 Jan 2018 01:02):    No growth to date.    Culture - Blood (collected 10 Josh 2018 00:35)  Source: .Blood Blood-Peripheral  Preliminary Report (11 Jan 2018 01:02):    No growth to date.

## 2018-01-12 NOTE — PROGRESS NOTE ADULT - SUBJECTIVE AND OBJECTIVE BOX
NICOLAS CIFUENTES  72y Male  MRN:[id number]    Patient is a 72y old  Male who presents with a chief complaint of abd pain    HPI:  72 male with  CKD stage 4, CLL, Afib on coumadin, PPM, hypothyroidism, GERD here with abdominal pain. He had diverticulitis in Nov 2017 and has had several recurrences of diverticulitis since that time. Patient was started on Augmentin by GI and has been taking it for about 10 days with no improvement.  reports +diarrhea. no vomiting. low grade fevers at home.  Last night reports "excruciating pain" in LLQ, states he was unable to walk due to pain.  Patient has been afebrile this admission. No sick contacts. No recent travel.     Patient seen and evaluated at bedside. no acute events o/n    Interval HPI:  reports pain improved. still with diarrhea      PAST MEDICAL & SURGICAL HISTORY:  Bradycardia, s/p ppm  Waldenstrom macroglobulinemia  Diverticulitis  Atrial fibrillation on coumadin  GERD (gastroesophageal reflux disease)  Anxiety disorder  CLL (chronic lymphocytic leukemia): in remission  History of laparoscopic cholecystectomy: 4/2014  S/P hernia repair: x2  Meniscus tear: s/p removal of Meniscus 8 months ago    SOC:  non smoker  no alcohol abuse  no drug abuse  lives with wife    Fam Hx:  non cont    REVIEW OF SYSTEMS:  Constitutional: yes fever, chills, fatigue. no weight loss.  Skin: No rash.  Eyes: No recent vision problems or eye pain.  ENT: No congestion, ear pain, or sore throat.  Endocrine: No thyroid problems.  Cardiovascular: No chest pain or palpation.  Respiratory: No cough, shortness of breath, congestion, or wheezing.  Gastrointestinal: as per hpi  Genitourinary: No dysuria.  Musculoskeletal: No joint swelling.  Neurologic: No headache.    VITALS:  Vital Signs Last 24 Hrs  T(C): 37.2 (12 Jan 2018 14:22), Max: 37.2 (11 Jan 2018 17:26)  T(F): 99 (12 Jan 2018 14:22), Max: 99 (11 Jan 2018 17:26)  HR: 61 (12 Jan 2018 14:22) (52 - 61)  BP: 139/65 (12 Jan 2018 14:22) (108/62 - 152/74)  BP(mean): --  RR: 18 (12 Jan 2018 14:22) (18 - 18)  SpO2: 96% (12 Jan 2018 14:22) (95% - 98%)    PHYSICAL EXAM:  GENERAL: NAD, well-developed  HEAD:  Atraumatic, Normocephalic  EYES: EOMI, PERRLA, conjunctiva and sclera clear  NECK: Supple, No JVD  CHEST/LUNG: Clear to auscultation bilaterally; No wheeze  HEART: S1, S2; No murmurs, rubs, or gallops  ABDOMEN: Soft, mild tender LLQ, Nondistended; Bowel sounds present  EXTREMITIES:  2+ Peripheral Pulses, No clubbing, cyanosis, or edema  PSYCH: Normal affect  NEUROLOGY: AAOX3; non-focal  SKIN: No rashes or lesions    Consultant(s) Notes Reviewed:  [x ] YES  [ ] NO  Care Discussed with Consultants/Other Providers [ x] YES  [ ] NO    MEDS:  MEDICATIONS  (STANDING):  allopurinol 200 milliGRAM(s) Oral daily  amiodarone    Tablet 200 milliGRAM(s) Oral daily  clonazePAM Tablet 1 milliGRAM(s) Oral at bedtime  dextrose 5% + sodium chloride 0.45%. 1000 milliLiter(s) (50 mL/Hr) IV Continuous <Continuous>  donepezil 10 milliGRAM(s) Oral at bedtime  lactobacillus acidophilus 1 Tablet(s) Oral daily  levothyroxine 125 MICROGram(s) Oral daily  memantine 10 milliGRAM(s) Oral two times a day  mirtazapine 15 milliGRAM(s) Oral at bedtime  pantoprazole    Tablet 40 milliGRAM(s) Oral before breakfast  piperacillin/tazobactam IVPB. 3.375 Gram(s) IV Intermittent every 12 hours  propranolol 10 milliGRAM(s) Oral two times a day  sodium bicarbonate 650 milliGRAM(s) Oral two times a day  tamsulosin 0.4 milliGRAM(s) Oral at bedtime    MEDICATIONS  (PRN):        ALLERGIES:  No Known Allergies      LABS:                                           9.5    5.60  )-----------( 161      ( 12 Jan 2018 09:19 )             29.5   01-12    146<H>  |  111<H>  |  20  ----------------------------<  88  4.0   |  25  |  3.14<H>    Ca    8.5      12 Jan 2018 09:25  Phos  2.6     01-12  Mg     2.1     01-12    PT/INR - ( 12 Jan 2018 09:19 )   PT: 36.9 sec;   INR: 3.19 ratio         PTT - ( 12 Jan 2018 09:19 )  PTT:39.3 sec    < from: CT Abdomen and Pelvis w/ Oral Cont (01.09.18 @ 15:51) >  IMPRESSION:   Uncomplicated diverticulitis involving the distal descending and proximal   sigmoid colon with progression of the pericolonic inflammatory change. No   formed abscess. This inflammatory process lies adjacent to the left   lateral urinary bladder wall. Continued follow-up can be performed.    Unchanged retrocrural, retroperitoneal, and pelvic lymphadenopathy.    < end of copied text >

## 2018-01-12 NOTE — PROGRESS NOTE ADULT - ASSESSMENT
71 yo male well known to me from multiple prior admissions, with h/o CLL, waldenstroms macroglobulinemia, ckd, afib s/p ppm on AC with coumadin, diverticulitis, anxiety a/w abd pain and recurrent diverticulitis    pt being followed by surg  reports improved abd pain    wbc improved  cont abx as per ID  clears diet  as per ID no plans for surg on this admission. will need long term abx first  pt states unable to get abx at home.   will need f/u with SW    afib   pt with supratheraputic INR  cont to hold coumadin for now  no need for bridging as per cards once suptheraputic  cont home cardiac meds for rate control    CKD  renal following  monitor creat    CLL/waldenstroms  stable  onc eval noted    anxiety  cont home psy meds    will follow

## 2018-01-12 NOTE — PROGRESS NOTE ADULT - ATTENDING COMMENTS
I saw and examined the pt and discussed the tx plan with the House Staff. I agree with the exam and plan as documented in the surgery resident's note from today.  Continues with LLQ tenderness, improved, continue clears and ABX.   Monitor clinical progress.  Dulce Cabral MD I saw and examined the pt and discussed the tx plan with the House Staff. I agree with the exam and plan as documented in the surgery resident's note from today.  Continues with LLQ tenderness, improved, continue clears and ABX.   Monitor clinical progress.  If pain resolves, will plan for home IV ABX and return to office to discuss elective surgery down the road.  If pain persists and he does not tolerate po, we will plan for surgery in this admission after short course of IV ABX.  Dulce Cabral MD

## 2018-01-12 NOTE — PROGRESS NOTE ADULT - ASSESSMENT
72 male with CKD stage 4, CLL, Afib on coumadin, PPM, hypothyroidism, GERD here with abdominal pain. Recurrent diverticulitis, leucocytosis, s/p courses of abx.  Resolved leucocytosis, clinically improved.     Plan:  - prelim blood cultures NTD   - Continue with zosyn 3.375 gm iv q12h, cr cl < 20- day 4   - will need PICC to complete 14 days therapy, day 4/14 of therapy today.   - surgical plan as per primary team

## 2018-01-12 NOTE — PROGRESS NOTE ADULT - SUBJECTIVE AND OBJECTIVE BOX
[facility] PROGRESS NOTE    SUBJECTIVE:  Patient was seen and examined this morning. There was no acute event overnight. He is resting comfortably in bed. He has mild LLQ pain which is improved compare to yeaterday . He does not report fever, n/v, chest pain, or shortness of breath.     Vital Signs Last 24 Hrs  T(C): 36.8 (12 Jan 2018 01:10), Max: 37.2 (11 Jan 2018 17:26)  T(F): 98.2 (12 Jan 2018 01:10), Max: 99 (11 Jan 2018 17:26)  HR: 52 (12 Jan 2018 01:10) (51 - 59)  BP: 150/71 (12 Jan 2018 01:10) (106/57 - 152/74)  BP(mean): --  RR: 18 (12 Jan 2018 01:10) (18 - 18)  SpO2: 98% (12 Jan 2018 01:10) (96% - 98%)    PHYSICAL EXAM:  Gen: Well-nourished, well-developed, A&O x3, resting in bed in no acute distress  CV: RRR  Resp: Patent airways, unlabored   Abdomen: Soft, nontender, LLQ tenderness  Extremities: All 4 extremities warm and well perfused, no edema     I&O's Summary    10 Josh 2018 07:01  -  11 Jan 2018 07:00  --------------------------------------------------------  IN: 2160 mL / OUT: 950 mL / NET: 1210 mL    11 Jan 2018 07:01  -  12 Jan 2018 03:30  --------------------------------------------------------  IN: 360 mL / OUT: 950 mL / NET: -590 mL      I&O's Detail    10 Josh 2018 07:01  -  11 Jan 2018 07:00  --------------------------------------------------------  IN:    dextrose 5% + sodium chloride 0.45%.: 600 mL    Oral Fluid: 1460 mL    Solution: 100 mL  Total IN: 2160 mL    OUT:    Voided: 950 mL  Total OUT: 950 mL    Total NET: 1210 mL      11 Jan 2018 07:01  -  12 Jan 2018 03:30  --------------------------------------------------------  IN:    Oral Fluid: 360 mL  Total IN: 360 mL    OUT:    Voided: 950 mL  Total OUT: 950 mL    Total NET: -590 mL          MEDICATIONS  (STANDING):  allopurinol 200 milliGRAM(s) Oral daily  amiodarone    Tablet 200 milliGRAM(s) Oral daily  clonazePAM Tablet 1 milliGRAM(s) Oral at bedtime  dextrose 5% + sodium chloride 0.45%. 1000 milliLiter(s) (50 mL/Hr) IV Continuous <Continuous>  donepezil 10 milliGRAM(s) Oral at bedtime  levothyroxine 125 MICROGram(s) Oral daily  memantine 10 milliGRAM(s) Oral two times a day  mirtazapine 15 milliGRAM(s) Oral at bedtime  pantoprazole    Tablet 40 milliGRAM(s) Oral before breakfast  piperacillin/tazobactam IVPB. 3.375 Gram(s) IV Intermittent every 12 hours  propranolol 10 milliGRAM(s) Oral two times a day  sodium bicarbonate 650 milliGRAM(s) Oral two times a day  tamsulosin 0.4 milliGRAM(s) Oral at bedtime    MEDICATIONS  (PRN):      LABS:                        9.8    5.36  )-----------( 150      ( 11 Jan 2018 07:39 )             31.1     01-11    143  |  107  |  24<H>  ----------------------------<  90  4.0   |  25  |  2.76<H>    Ca    8.5      11 Jan 2018 09:12  Phos  2.7     01-11  Mg     1.8     01-11    TPro  6.3  /  Alb  x   /  TBili  x   /  DBili  x   /  AST  x   /  ALT  x   /  AlkPhos  x   01-10    PT/INR - ( 11 Jan 2018 06:51 )   PT: 37.5 sec;   INR: 3.36 ratio         PTT - ( 11 Jan 2018 06:51 )  PTT:43.0 sec      RADIOLOGY & ADDITIONAL STUDIES:    [facility] #[id number]

## 2018-01-12 NOTE — PROGRESS NOTE ADULT - ASSESSMENT
72M w/ hx of chronic diastolic heart failure, AFIB w/ PPM (on coumadin), CLL w/ Waldenstrom's macroglobulinemia, CKD Stage 4, and hx of multiple episodes of acute diverticulitis presenting w/ acute diverticulitis. Uncomplicated diverticulitis involving the distal descending and proximal sigmoid colon with progression of the pericolonic inflammatory change on CT He is HD4. He is hemodynamically stable. Labs are pending.    - a-fib: INR >3 , supratherapeutic INR, coumadin is held for now, will order daily INR  - PICC placement tiday per ID recommendation  - appreciate GI, nephrology, and ID recommendations   - appreciate cardiology recommendation: c/w amidarone  - Diet: CLD, tolerating well  - ABx regimen: Zosyn  - ordered home medication  - Monitor GI function  - f/u with blood cx, prelim results show no growth to date,   - will f/u with labs and replete electrolyte as necessary  - Activity: OOb as tolerated  - Dispo: Floor

## 2018-01-12 NOTE — PROGRESS NOTE ADULT - SUBJECTIVE AND OBJECTIVE BOX
Patient is a 72y old  Male who presented with a chief complaint of abdominal pain (09 Jan 2018 17:09)    INTERVAL HPI/OVERNIGHT EVENTS:  feeling "better"  no nausea or vomiting  Tolerating PO without pain    MEDICATIONS  (STANDING):  allopurinol 200 milliGRAM(s) Oral daily  amiodarone    Tablet 200 milliGRAM(s) Oral daily  clonazePAM Tablet 1 milliGRAM(s) Oral at bedtime  dextrose 5% + sodium chloride 0.45%. 1000 milliLiter(s) (50 mL/Hr) IV Continuous <Continuous>  donepezil 10 milliGRAM(s) Oral at bedtime  levothyroxine 125 MICROGram(s) Oral daily  memantine 10 milliGRAM(s) Oral two times a day  mirtazapine 15 milliGRAM(s) Oral at bedtime  pantoprazole    Tablet 40 milliGRAM(s) Oral before breakfast  piperacillin/tazobactam IVPB. 3.375 Gram(s) IV Intermittent every 12 hours  propranolol 10 milliGRAM(s) Oral two times a day  sodium bicarbonate 650 milliGRAM(s) Oral two times a day  tamsulosin 0.4 milliGRAM(s) Oral at bedtime    MEDICATIONS  (PRN):    Allergies  No Known Allergies    Review of Systems:  General:  No wt loss, fevers, chills, night sweats ,fatigue  CV:  No pain, palpitatioins, AF, s/p PPM  Resp:  No dyspnea, cough, tachypnea, wheezing  :  No pain, bleeding, incontinence, nocturia  Muscle:  No pain, weakness  Neuro:  No weakness, tingling, memory problems  Heme: +Waldenstrom Macroglobulinemia, on AC (AF)  Skin:  No rash, tattoos, scars, edema    T(F): 98 (01-12-18 @ 09:10), Max: 99 (01-11-18 @ 17:26)  HR: 60 (01-12-18 @ 09:10) (52 - 60)  BP: 138/75 (01-12-18 @ 09:10) (108/62 - 152/74)  RR: 18 (01-12-18 @ 09:10) (18 - 18)  SpO2: 97% (01-12-18 @ 09:10) (95% - 98%)  Wt(kg): --  ,   I&O's Summary    11 Jan 2018 07:01  -  12 Jan 2018 07:00  --------------------------------------------------------  IN: 1060 mL / OUT: 1350 mL / NET: -290 mL    12 Jan 2018 07:01  -  12 Jan 2018 11:30  --------------------------------------------------------  IN: 640 mL / OUT: 200 mL / NET: 440 mL    PHYSICAL EXAM:  Constitutional: Non-toxic appearing, appears comfortable, AOx3  Neck: No LAD, supple, no JVD  Respiratory: Clear anteriorly, Left CW PPM site clean and dry  Cardiovascular: S1 and S2, RRR  Gastrointestinal: BS+, soft +tenderness LLQ (improved from prior exam)  NO rebound no rigidity  Extremities: No peripheral edema, neg clubbing, cyanosis  Vascular: 2+ peripheral pulses  Neurological: A/O x 3, no focal deficits  Psychiatric: Normal mood, normal affect  Skin: No rashes    LABS:                               9.5    5.60  )-----------( 161      ( 12 Jan 2018 09:19 )             29.5               01-12    146<H>  |  111<H>  |  20  ----------------------------<  88  4.0   |  25  |  3.14<H>    Ca    8.5      12 Jan 2018 09:25  Phos  2.6     01-12  Mg     2.1     01-12    PT/INR - ( 12 Jan 2018 09:19 )   PT: 36.9 sec;   INR: 3.19 ratio    PTT - ( 12 Jan 2018 09:19 )  PTT:39.3 sec         RADIOLOGY & ADDITIONAL TESTS:    EXAM:  CT ABDOMEN AND PELVIS OC                        PROCEDURE DATE:  01/09/2018      INTERPRETATION:  CLINICAL INFORMATION: Left-sided abdominal pain for 3   days. History of diverticulitis and chronic lymphocytic leukemia.    COMPARISON: CT abdomen pelvis 11/12/2017    PROCEDURE:   CT of the Abdomen and Pelvis was performed without intravenous contrast.   Intravenous contrast: None.  Oral contrast: positive contrast was administered.  Sagittal and coronal reformats were performed.    FINDINGS:    LOWER CHEST: Unchanged retrocrural lymph nodes measuring up to 1.2 cm in   short axis. Tiny bilateral cardiophrenic lymph nodes.    LIVER: Within normal limits.  BILE DUCTS: Normal caliber.  GALLBLADDER: Status post cholecystectomy.  SPLEEN: Within normal limits. Splenule.  PANCREAS: Within normal limits.  ADRENALS: Within normal limits.  KIDNEYS/URETERS: Unchanged nonobstructing 7 mm right upper pole calculus   and bilateral renal cysts, largest measuring up to 4.9 cm and with a thin   peripheral calcification.    BLADDER: Within normal limits.  REPRODUCTIVE ORGANS: The prostate is within normal limits.    BOWEL: Colonic diverticulosis with short segment mural thickening and   pericolonic fat stranding at the distal descending to proximal sigmoid   consistent with diverticulitis. The pericolonic inflammatory change has   increased when compared with the previous study dated November 11, 2017.   This inflammatory process lies adjacent to the left lateral wall of the   urinary bladder. Appendix is not visualized. Unchanged multiple   subcentimeter mesenteric lymph nodes and haziness of the small bowel   mesentery.  PERITONEUM: No ascites. Denise mesentery with prominent mesenteric lymph   nodes. No free intraperitoneal air.  VESSELS:  Atheromatous changes.  RETROPERITONEUM: Unchanged periceliac lymphadenopathy. Unchanged   retroperitoneal and pelvic lymphadenopathy including an enlarged left   external iliac lymph node measuring 2.3 cm x 1.1 cm.  ABDOMINAL WALL: Within normal limits.  BONES: Degenerative spinal changes with unchanged area and bilateral   neural foraminal narrowing of L5-S1.    IMPRESSION:   Uncomplicated diverticulitis involving the distal descending and proximal   sigmoid colon with progression of the pericolonic inflammatory change. No   formed abscess. This inflammatory process lies adjacent to the left   lateral urinary bladder wall. Continued follow-up can be performed.    Unchanged retrocrural, retroperitoneal, and pelvic lymphadenopathy.

## 2018-01-12 NOTE — PROGRESS NOTE ADULT - ASSESSMENT
72 year old male known to our practice with multiple prior episodes of diverticulitis presents to ER on advice of primary GI attending (Shilpa).  Pt was started on PO Augmentin around Leonore time by Dr Simons for presumed diverticulitis (similar presentation to multiple prior episodes) - reports initial improvement in pain then after ~6 days, developed worsening pain. prior to ER presentation, reports "excruciating pain" in LLQ, states he was unable to walk due to pain    CT - acute uncomplicated diverticulitis, distal Descending colon and proximal Sigmoid colon- no abscess    PLAN  -Continue IV antibiotics, IV fluids  -Eventual OR as per Surgery team  -Advance diet as tolerated.

## 2018-01-13 LAB
ANION GAP SERPL CALC-SCNC: 4 MMOL/L — LOW (ref 5–17)
APTT BLD: 36.3 SEC — SIGNIFICANT CHANGE UP (ref 27.5–37.4)
BUN SERPL-MCNC: 11 MG/DL — SIGNIFICANT CHANGE UP (ref 7–23)
CALCIUM SERPL-MCNC: 8.5 MG/DL — SIGNIFICANT CHANGE UP (ref 8.4–10.5)
CHLORIDE SERPL-SCNC: 108 MMOL/L — SIGNIFICANT CHANGE UP (ref 96–108)
CO2 SERPL-SCNC: 29 MMOL/L — SIGNIFICANT CHANGE UP (ref 22–31)
CREAT SERPL-MCNC: 2.72 MG/DL — HIGH (ref 0.5–1.3)
GLUCOSE SERPL-MCNC: 78 MG/DL — SIGNIFICANT CHANGE UP (ref 70–99)
HCT VFR BLD CALC: 29.4 % — LOW (ref 39–50)
HGB BLD-MCNC: 9.2 G/DL — LOW (ref 13–17)
INR BLD: 2.82 RATIO — HIGH (ref 0.88–1.16)
MCHC RBC-ENTMCNC: 27.9 PG — SIGNIFICANT CHANGE UP (ref 27–34)
MCHC RBC-ENTMCNC: 31.3 GM/DL — LOW (ref 32–36)
MCV RBC AUTO: 89.1 FL — SIGNIFICANT CHANGE UP (ref 80–100)
PLATELET # BLD AUTO: 164 K/UL — SIGNIFICANT CHANGE UP (ref 150–400)
POTASSIUM SERPL-MCNC: 4 MMOL/L — SIGNIFICANT CHANGE UP (ref 3.5–5.3)
POTASSIUM SERPL-SCNC: 4 MMOL/L — SIGNIFICANT CHANGE UP (ref 3.5–5.3)
PROTHROM AB SERPL-ACNC: 32.6 SEC — HIGH (ref 10–13.1)
RBC # BLD: 3.3 M/UL — LOW (ref 4.2–5.8)
RBC # FLD: 14.1 % — SIGNIFICANT CHANGE UP (ref 10.3–14.5)
SODIUM SERPL-SCNC: 141 MMOL/L — SIGNIFICANT CHANGE UP (ref 135–145)
WBC # BLD: 5.77 K/UL — SIGNIFICANT CHANGE UP (ref 3.8–10.5)
WBC # FLD AUTO: 5.77 K/UL — SIGNIFICANT CHANGE UP (ref 3.8–10.5)

## 2018-01-13 RX ORDER — LANOLIN ALCOHOL/MO/W.PET/CERES
3 CREAM (GRAM) TOPICAL AT BEDTIME
Qty: 0 | Refills: 0 | Status: DISCONTINUED | OUTPATIENT
Start: 2018-01-13 | End: 2018-01-18

## 2018-01-13 RX ADMIN — Medication 650 MILLIGRAM(S): at 05:30

## 2018-01-13 RX ADMIN — AMIODARONE HYDROCHLORIDE 200 MILLIGRAM(S): 400 TABLET ORAL at 05:30

## 2018-01-13 RX ADMIN — Medication 3 MILLIGRAM(S): at 01:11

## 2018-01-13 RX ADMIN — PIPERACILLIN AND TAZOBACTAM 25 GRAM(S): 4; .5 INJECTION, POWDER, LYOPHILIZED, FOR SOLUTION INTRAVENOUS at 05:31

## 2018-01-13 RX ADMIN — MIRTAZAPINE 15 MILLIGRAM(S): 45 TABLET, ORALLY DISINTEGRATING ORAL at 21:53

## 2018-01-13 RX ADMIN — TAMSULOSIN HYDROCHLORIDE 0.4 MILLIGRAM(S): 0.4 CAPSULE ORAL at 21:53

## 2018-01-13 RX ADMIN — MEMANTINE HYDROCHLORIDE 10 MILLIGRAM(S): 10 TABLET ORAL at 05:34

## 2018-01-13 RX ADMIN — Medication 1 TABLET(S): at 12:37

## 2018-01-13 RX ADMIN — Medication 3 MILLIGRAM(S): at 21:53

## 2018-01-13 RX ADMIN — PIPERACILLIN AND TAZOBACTAM 25 GRAM(S): 4; .5 INJECTION, POWDER, LYOPHILIZED, FOR SOLUTION INTRAVENOUS at 18:54

## 2018-01-13 RX ADMIN — PANTOPRAZOLE SODIUM 40 MILLIGRAM(S): 20 TABLET, DELAYED RELEASE ORAL at 05:30

## 2018-01-13 RX ADMIN — Medication 650 MILLIGRAM(S): at 18:55

## 2018-01-13 RX ADMIN — MEMANTINE HYDROCHLORIDE 10 MILLIGRAM(S): 10 TABLET ORAL at 18:55

## 2018-01-13 RX ADMIN — DONEPEZIL HYDROCHLORIDE 10 MILLIGRAM(S): 10 TABLET, FILM COATED ORAL at 21:53

## 2018-01-13 RX ADMIN — Medication 125 MICROGRAM(S): at 05:31

## 2018-01-13 RX ADMIN — Medication 1 MILLIGRAM(S): at 21:53

## 2018-01-13 RX ADMIN — Medication 200 MILLIGRAM(S): at 12:37

## 2018-01-13 NOTE — PHYSICAL THERAPY INITIAL EVALUATION ADULT - PERTINENT HX OF CURRENT PROBLEM, REHAB EVAL
2M w/ hx of chronic diastolic heart failure, AFIB w/ PPM (on coumadin), CLL w/ Waldenstrom's macroglobulinemia, CKD Stage 4, and hx of multiple episodes of acute diverticulitis presenting w/ acute diverticulitis. Uncomplicated diverticulitis involving the distal descending and proximal sigmoid colon with progression of the pericolonic inflammatory change on CT suprainterparietal INR before PICC line placement.

## 2018-01-13 NOTE — PROGRESS NOTE ADULT - ASSESSMENT
73 yo male well known to me from multiple prior admissions, with h/o CLL, waldenstroms macroglobulinemia, ckd, afib s/p ppm on AC with coumadin, diverticulitis, anxiety a/w abd pain and recurrent diverticulitis    pt being followed by surg  reports improved abd pain    wbc improved  cont abx as per ID.  plan for picc line as per surg   advance diet as tolerated  as per ID no plans for surg on this admission. will need long term abx first  pt states unable to get abx at home.   will need f/u with SW    afib   cont to hold coumadin for now  monitor INR daily  no need for bridging as per cards once suptheraputic  cont home cardiac meds for rate control    CKD  renal following  monitor creat    CLL/waldenstroms  stable  onc eval noted    anxiety  cont home psy meds    will follow

## 2018-01-13 NOTE — PHYSICAL THERAPY INITIAL EVALUATION ADULT - ADDITIONAL COMMENTS
PTA pt was living in an apartment supposed cluttered 2/2 hoarding and was independent in all functional mobility and ADL's. no AD for gait.. wife works during the day and pt was independent in BADL's and IADL's.

## 2018-01-13 NOTE — PROGRESS NOTE ADULT - ATTENDING COMMENTS
Patient seen/examined.  Agree w above note and plan and have discussed plan w house staff.  Feels better, passing gas, start LRD    Sami Briceno MD

## 2018-01-13 NOTE — PROGRESS NOTE ADULT - SUBJECTIVE AND OBJECTIVE BOX
General Surgery Progress Note  NICOLAS CIFUENTES    S: No acute events overnight. Patient has adequate pain control. Denies N/V and fevers/chills. Requesting vitamins    O:  T(C): 36.8 (01-13-18 @ 05:26), Max: 37.2 (01-12-18 @ 14:22)  HR: 54 (01-13-18 @ 05:26) (51 - 61)  BP: 114/70 (01-13-18 @ 05:26) (113/63 - 139/65)  RR: 16 (01-13-18 @ 05:26) (16 - 18)  SpO2: 97% (01-13-18 @ 05:26) (96% - 97%)        PHYSICAL EXAM:  Gen: Well-nourished, well-developed, A&O x3, resting in bed in no acute distress  CV: RRR  Resp: Patent airways, unlabored   Abdomen: Soft, nontender, LLQ tenderness  Extremities: All 4 extremities warm and well perfused, no edema       72M w/ hx of chronic diastolic heart failure, AFIB w/ PPM (on coumadin), CLL w/ Waldenstrom's macroglobulinemia, CKD Stage 4, and hx of multiple episodes of acute diverticulitis presenting w/ acute diverticulitis. Uncomplicated diverticulitis involving the distal descending and proximal sigmoid colon with progression of the pericolonic inflammatory change on CT He is HD4. He is hemodynamically stable. Labs are pending.    - a-fib: INR >3 , supratherapeutic INR, coumadin is held for now, will order daily INR  - PICC placement pending  - appreciate cardiology recommendation: c/w amidarone  - Diet: CLD, tolerating well  - ABx regimen: Zosyn  - will f/u with labs and replete electrolyte as necessary  - Activity: OOb as tolerated  - Dispo: Floor General Surgery Progress Note  NICOLAS CIFUENTES    S: No acute events overnight. Patient has adequate pain control. Denies N/V and fevers/chills. Requesting vitamins    O:  T(C): 36.8 (01-13-18 @ 05:26), Max: 37.2 (01-12-18 @ 14:22)  HR: 54 (01-13-18 @ 05:26) (51 - 61)  BP: 114/70 (01-13-18 @ 05:26) (113/63 - 139/65)  RR: 16 (01-13-18 @ 05:26) (16 - 18)  SpO2: 97% (01-13-18 @ 05:26) (96% - 97%)        PHYSICAL EXAM:  Gen: Well-nourished, well-developed, A&O x3, resting in bed in no acute distress  CV: RRR  Resp: Patent airways, unlabored   Abdomen: Soft, nontender, LLQ tenderness  Extremities: All 4 extremities warm and well perfused, no edema       72M w/ hx of chronic diastolic heart failure, AFIB w/ PPM (on coumadin), CLL w/ Waldenstrom's macroglobulinemia, CKD Stage 4, and hx of multiple episodes of acute diverticulitis presenting w/ acute diverticulitis. Uncomplicated diverticulitis involving the distal descending and proximal sigmoid colon with progression of the pericolonic inflammatory change on CT     - AFIB: Supratherapeutic INR, coumadin is held for now, will order daily INR  - PICC placement pending INR  - D/c abx plan pending  - c/w amidarone  - Diet: CLD, tolerating well  - ABx regimen: Zosyn  - Dispo: Floor

## 2018-01-13 NOTE — PROGRESS NOTE ADULT - SUBJECTIVE AND OBJECTIVE BOX
NICOLAS CIFUENTES  72y Male  MRN:[id number]    Patient is a 72y old  Male who presents with a chief complaint of abd pain    HPI:  72 male with  CKD stage 4, CLL, Afib on coumadin, PPM, hypothyroidism, GERD here with abdominal pain. He had diverticulitis in Nov 2017 and has had several recurrences of diverticulitis since that time. Patient was started on Augmentin by GI and has been taking it for about 10 days with no improvement.  reports +diarrhea. no vomiting. low grade fevers at home.  Last night reports "excruciating pain" in LLQ, states he was unable to walk due to pain.  Patient has been afebrile this admission. No sick contacts. No recent travel.     Patient seen and evaluated at bedside. no acute events o/n    Interval HPI:  reports pain improved. still with diarrhea      PAST MEDICAL & SURGICAL HISTORY:  Bradycardia, s/p ppm  Waldenstrom macroglobulinemia  Diverticulitis  Atrial fibrillation on coumadin  GERD (gastroesophageal reflux disease)  Anxiety disorder  CLL (chronic lymphocytic leukemia): in remission  History of laparoscopic cholecystectomy: 4/2014  S/P hernia repair: x2  Meniscus tear: s/p removal of Meniscus 8 months ago    SOC:  non smoker  no alcohol abuse  no drug abuse  lives with wife    Fam Hx:  non cont    REVIEW OF SYSTEMS:  Constitutional: yes fever, chills, fatigue. no weight loss.  Skin: No rash.  Eyes: No recent vision problems or eye pain.  ENT: No congestion, ear pain, or sore throat.  Endocrine: No thyroid problems.  Cardiovascular: No chest pain or palpation.  Respiratory: No cough, shortness of breath, congestion, or wheezing.  Gastrointestinal: as per hpi  Genitourinary: No dysuria.  Musculoskeletal: No joint swelling.  Neurologic: No headache.    VITALS:  Vital Signs Last 24 Hrs  T(C): 37 (13 Jan 2018 17:16), Max: 37.1 (12 Jan 2018 21:30)  T(F): 98.6 (13 Jan 2018 17:16), Max: 98.8 (12 Jan 2018 21:30)  HR: 56 (13 Jan 2018 17:16) (51 - 60)  BP: 132/68 (13 Jan 2018 17:16) (113/63 - 150/74)  BP(mean): --  RR: 18 (13 Jan 2018 17:16) (16 - 18)  SpO2: 95% (13 Jan 2018 17:16) (95% - 97%)    PHYSICAL EXAM:  GENERAL: NAD, well-developed  HEAD:  Atraumatic, Normocephalic  EYES: EOMI, PERRLA, conjunctiva and sclera clear  NECK: Supple, No JVD  CHEST/LUNG: Clear to auscultation bilaterally; No wheeze  HEART: S1, S2; No murmurs, rubs, or gallops  ABDOMEN: Soft, minimal tender LLQ, Nondistended; Bowel sounds present  EXTREMITIES:  2+ Peripheral Pulses, No clubbing, cyanosis, or edema  PSYCH: Normal affect  NEUROLOGY: AAOX3; non-focal  SKIN: No rashes or lesions    Consultant(s) Notes Reviewed:  [x ] YES  [ ] NO  Care Discussed with Consultants/Other Providers [ x] YES  [ ] NO    MEDS:  MEDICATIONS  (STANDING):  allopurinol 200 milliGRAM(s) Oral daily  amiodarone    Tablet 200 milliGRAM(s) Oral daily  clonazePAM Tablet 1 milliGRAM(s) Oral at bedtime  donepezil 10 milliGRAM(s) Oral at bedtime  lactobacillus acidophilus 1 Tablet(s) Oral daily  levothyroxine 125 MICROGram(s) Oral daily  melatonin 3 milliGRAM(s) Oral at bedtime  memantine 10 milliGRAM(s) Oral two times a day  mirtazapine 15 milliGRAM(s) Oral at bedtime  multivitamin 1 Tablet(s) Oral daily  pantoprazole    Tablet 40 milliGRAM(s) Oral before breakfast  piperacillin/tazobactam IVPB. 3.375 Gram(s) IV Intermittent every 12 hours  propranolol 10 milliGRAM(s) Oral two times a day  sodium bicarbonate 650 milliGRAM(s) Oral two times a day  tamsulosin 0.4 milliGRAM(s) Oral at bedtime    MEDICATIONS  (PRN):          ALLERGIES:  No Known Allergies      LABS:                                           9.2    5.77  )-----------( 164      ( 13 Jan 2018 08:37 )             29.4   01-13    141  |  108  |  11  ----------------------------<  78  4.0   |  29  |  2.72<H>    Ca    8.5      13 Jan 2018 08:55  Phos  2.6     01-12  Mg     2.1     01-12    PT/INR - ( 13 Jan 2018 08:37 )   PT: 32.6 sec;   INR: 2.82 ratio         PTT - ( 13 Jan 2018 08:37 )  PTT:36.3 sec    < from: CT Abdomen and Pelvis w/ Oral Cont (01.09.18 @ 15:51) >  IMPRESSION:   Uncomplicated diverticulitis involving the distal descending and proximal   sigmoid colon with progression of the pericolonic inflammatory change. No   formed abscess. This inflammatory process lies adjacent to the left   lateral urinary bladder wall. Continued follow-up can be performed.    Unchanged retrocrural, retroperitoneal, and pelvic lymphadenopathy.    < end of copied text >

## 2018-01-13 NOTE — PROGRESS NOTE ADULT - ASSESSMENT
ASSESSMENT: 72M w/ h/o dementia, atrial fibrillation, diverticulitis, nephrolithiasis, Waldenstrom's macroglobulinemia, and stage 4 chronic kidney disease, 1/9/18 a/w acute diverticulitis    (1)Renal - CKD4 - resolved mild LOUISE from prerenal azotemia, in association with volume repletion  (2)GI- tolerating PO diet but with episode of diarrhea  (3)CV - appears euvolemic   (4)Surgery - acute diverticulitis - on Zosyn.     RECOMMEND:  (1)Antibiotics for GFR 20-25ml/min. No NSAIDs.  (2)Encourage PO LRD   (3)BMP in AM

## 2018-01-13 NOTE — PHYSICAL THERAPY INITIAL EVALUATION ADULT - CRITERIA FOR SKILLED THERAPEUTIC INTERVENTIONS
rehab potential/impairments found/predicted duration of therapy intervention/anticipated equipment needs at discharge/functional limitations in following categories/risk reduction/prevention/therapy frequency/anticipated discharge recommendation

## 2018-01-13 NOTE — PROGRESS NOTE ADULT - SUBJECTIVE AND OBJECTIVE BOX
[facility]-Desire Luna NP- PROGRESS NOTE    Patient seen and examined while in bed. Patient c/o diarrhea earlier today after eating breakfast.      Hospital Medications:   MEDICATIONS  (STANDING):  allopurinol 200 milliGRAM(s) Oral daily  amiodarone    Tablet 200 milliGRAM(s) Oral daily  clonazePAM Tablet 1 milliGRAM(s) Oral at bedtime  donepezil 10 milliGRAM(s) Oral at bedtime  lactobacillus acidophilus 1 Tablet(s) Oral daily  levothyroxine 125 MICROGram(s) Oral daily  melatonin 3 milliGRAM(s) Oral at bedtime  memantine 10 milliGRAM(s) Oral two times a day  mirtazapine 15 milliGRAM(s) Oral at bedtime  multivitamin 1 Tablet(s) Oral daily  pantoprazole    Tablet 40 milliGRAM(s) Oral before breakfast  piperacillin/tazobactam IVPB. 3.375 Gram(s) IV Intermittent every 12 hours  propranolol 10 milliGRAM(s) Oral two times a day  sodium bicarbonate 650 milliGRAM(s) Oral two times a day  tamsulosin 0.4 milliGRAM(s) Oral at bedtime      REVIEW OF SYSTEMS:  As per HPI, 8 out of 10 ROS were unremarkable    VITALS:  T(F): 98.5 (01-13-18 @ 21:23), Max: 98.8 (01-12-18 @ 21:30)  HR: 56 (01-13-18 @ 21:23)  BP: 124/64 (01-13-18 @ 21:23)  RR: 18 (01-13-18 @ 21:23)  SpO2: 96% (01-13-18 @ 21:23)  Wt(kg): --    01-12 @ 07:01 - 01-13 @ 07:00  --------------------------------------------------------  IN: 2640 mL / OUT: 1300 mL / NET: 1340 mL    01-13 @ 07:01 - 01-13 @ 21:26  --------------------------------------------------------  IN: 1180 mL / OUT: 600 mL / NET: 580 mL          PHYSICAL EXAM:  Constitutional: NAD, awake and appears more energetic  HEENT: Normocephalic  Neck: No JVD  Respiratory: CTAB, no wheezes, rales or rhonchi  Cardiovascular: S1, S2, RRR  Gastrointestinal: BS+, soft, NT/ND  Extremities: No peripheral edema  Neurological: A/O x 3, no focal deficits  Psychiatric: Normal mood, normal affect  : No CVA tenderness. No walker.   Skin: No rashes      LABS:  eGFR if Non : 22: Interpretative comment  The units for eGFR are ml/min/1.73m2 (normalized body surface area). The  eGFR is calculated from a serum creatinine using the CKD-EPI equation.  Other variables required for calculation are race, age and sex. Among  patients with chronic kidney disease (CKD), the eGFR is useful in  determining the stage of disease according to KDOQI CKD classification.  All eGFR results are reported numerically with the following  interpretation.          GFR                    With                 Without     (ml/min/1.73 m2)    Kidney Damage       Kidney Damage        >= 90                    Stage 1                     Normal        60-89                    Stage 2                     Decreased GFR        30-59     Stage 3                     Stage 3        15-29                    Stage 4                     Stage 4        < 15                      Stage 5                     Stage 5  Each stage of CKD assumes that the associated GFR level has been in  effect for at least 3 months. Determination of stages one and two (with  eGFR > 59 ml/min/m2) requires estimation of kidney damage for at least 3  months as defined by structural or functional abnormalities.  Limitations: All estimates of GFR will be less accurate for patients at  extremes of muscle mass (including but not limited to frail elderly,  critically ill, or cancer patients), those with unusual diets, and those  with conditions associated with reduced secretion or extrarenal  elimination of creatinine. The eGFR equation is not recommended for use  in patients with unstable creatinine levels. mL/min/1.73M2 (01.13.18 @ 08:55)        01-13    141  |  108  |  11  ----------------------------<  78  4.0   |  29  |  2.72<H>  01-12    146<H>  |  111<H>  |  20  ----------------------------<  88  4.0   |  25  |  3.14<H>  01-11    143  |  107  |  24<H>  ----------------------------<  90  4.0   |  25  |  2.76<H>    Ca    8.5      13 Jan 2018 08:55  Phos  2.6     01-12  Mg     2.1     01-12  Mg     1.8     01-11                              9.2    5.77  )-----------( 164      ( 13 Jan 2018 08:37 )             29.4

## 2018-01-14 LAB
ANION GAP SERPL CALC-SCNC: 8 MMOL/L — SIGNIFICANT CHANGE UP (ref 5–17)
APTT BLD: 36.1 SEC — SIGNIFICANT CHANGE UP (ref 27.5–37.4)
APTT BLD: > 200 SEC (ref 27.5–37.4)
BUN SERPL-MCNC: 15 MG/DL — SIGNIFICANT CHANGE UP (ref 7–23)
CALCIUM SERPL-MCNC: 8.6 MG/DL — SIGNIFICANT CHANGE UP (ref 8.4–10.5)
CHLORIDE SERPL-SCNC: 106 MMOL/L — SIGNIFICANT CHANGE UP (ref 96–108)
CO2 SERPL-SCNC: 27 MMOL/L — SIGNIFICANT CHANGE UP (ref 22–31)
CREAT SERPL-MCNC: 2.83 MG/DL — HIGH (ref 0.5–1.3)
GLUCOSE SERPL-MCNC: 74 MG/DL — SIGNIFICANT CHANGE UP (ref 70–99)
INR BLD: 2.19 RATIO — HIGH (ref 0.88–1.16)
INR BLD: 2.48 RATIO — HIGH (ref 0.88–1.16)
POTASSIUM SERPL-MCNC: 4.3 MMOL/L — SIGNIFICANT CHANGE UP (ref 3.5–5.3)
POTASSIUM SERPL-SCNC: 4.3 MMOL/L — SIGNIFICANT CHANGE UP (ref 3.5–5.3)
PROTHROM AB SERPL-ACNC: 24.3 SEC — HIGH (ref 9.8–12.7)
PROTHROM AB SERPL-ACNC: 28.5 SEC — HIGH (ref 10–13.1)
SODIUM SERPL-SCNC: 141 MMOL/L — SIGNIFICANT CHANGE UP (ref 135–145)

## 2018-01-14 RX ADMIN — MEMANTINE HYDROCHLORIDE 10 MILLIGRAM(S): 10 TABLET ORAL at 05:44

## 2018-01-14 RX ADMIN — TAMSULOSIN HYDROCHLORIDE 0.4 MILLIGRAM(S): 0.4 CAPSULE ORAL at 21:40

## 2018-01-14 RX ADMIN — PIPERACILLIN AND TAZOBACTAM 25 GRAM(S): 4; .5 INJECTION, POWDER, LYOPHILIZED, FOR SOLUTION INTRAVENOUS at 19:27

## 2018-01-14 RX ADMIN — Medication 1 TABLET(S): at 13:03

## 2018-01-14 RX ADMIN — Medication 650 MILLIGRAM(S): at 05:44

## 2018-01-14 RX ADMIN — DONEPEZIL HYDROCHLORIDE 10 MILLIGRAM(S): 10 TABLET, FILM COATED ORAL at 21:40

## 2018-01-14 RX ADMIN — PANTOPRAZOLE SODIUM 40 MILLIGRAM(S): 20 TABLET, DELAYED RELEASE ORAL at 06:58

## 2018-01-14 RX ADMIN — MIRTAZAPINE 15 MILLIGRAM(S): 45 TABLET, ORALLY DISINTEGRATING ORAL at 21:40

## 2018-01-14 RX ADMIN — Medication 125 MICROGRAM(S): at 05:44

## 2018-01-14 RX ADMIN — Medication 3 MILLIGRAM(S): at 21:40

## 2018-01-14 RX ADMIN — AMIODARONE HYDROCHLORIDE 200 MILLIGRAM(S): 400 TABLET ORAL at 05:44

## 2018-01-14 RX ADMIN — Medication 10 MILLIGRAM(S): at 19:16

## 2018-01-14 RX ADMIN — Medication 650 MILLIGRAM(S): at 19:16

## 2018-01-14 RX ADMIN — MEMANTINE HYDROCHLORIDE 10 MILLIGRAM(S): 10 TABLET ORAL at 19:16

## 2018-01-14 RX ADMIN — PIPERACILLIN AND TAZOBACTAM 25 GRAM(S): 4; .5 INJECTION, POWDER, LYOPHILIZED, FOR SOLUTION INTRAVENOUS at 05:44

## 2018-01-14 RX ADMIN — Medication 200 MILLIGRAM(S): at 13:03

## 2018-01-14 RX ADMIN — Medication 1 MILLIGRAM(S): at 21:40

## 2018-01-14 NOTE — PROGRESS NOTE ADULT - SUBJECTIVE AND OBJECTIVE BOX
NICOLAS CIFUENTES  72y Male  MRN:[id number]    Patient is a 72y old  Male who presents with a chief complaint of abd pain    HPI:  72 male with  CKD stage 4, CLL, Afib on coumadin, PPM, hypothyroidism, GERD here with abdominal pain. He had diverticulitis in Nov 2017 and has had several recurrences of diverticulitis since that time. Patient was started on Augmentin by GI and has been taking it for about 10 days with no improvement.  reports +diarrhea. no vomiting. low grade fevers at home.  Last night reports "excruciating pain" in LLQ, states he was unable to walk due to pain.  Patient has been afebrile this admission. No sick contacts. No recent travel.     Patient seen and evaluated at bedside. no acute events o/n    Interval HPI:  reports pain improved. still with diarrhea      PAST MEDICAL & SURGICAL HISTORY:  Bradycardia, s/p ppm  Waldenstrom macroglobulinemia  Diverticulitis  Atrial fibrillation on coumadin  GERD (gastroesophageal reflux disease)  Anxiety disorder  CLL (chronic lymphocytic leukemia): in remission  History of laparoscopic cholecystectomy: 4/2014  S/P hernia repair: x2  Meniscus tear: s/p removal of Meniscus 8 months ago    SOC:  non smoker  no alcohol abuse  no drug abuse  lives with wife    Fam Hx:  non cont    REVIEW OF SYSTEMS:  Constitutional: yes fever, chills, fatigue. no weight loss.  Skin: No rash.  Eyes: No recent vision problems or eye pain.  ENT: No congestion, ear pain, or sore throat.  Endocrine: No thyroid problems.  Cardiovascular: No chest pain or palpation.  Respiratory: No cough, shortness of breath, congestion, or wheezing.  Gastrointestinal: as per hpi  Genitourinary: No dysuria.  Musculoskeletal: No joint swelling.  Neurologic: No headache.    VITALS:  Vital Signs Last 24 Hrs  T(C): 36.8 (14 Jan 2018 14:13), Max: 37.1 (14 Jan 2018 00:33)  T(F): 98.3 (14 Jan 2018 14:13), Max: 98.7 (14 Jan 2018 00:33)  HR: 55 (14 Jan 2018 14:13) (52 - 58)  BP: 146/72 (14 Jan 2018 14:13) (124/64 - 146/72)  BP(mean): --  RR: 18 (14 Jan 2018 14:13) (18 - 18)  SpO2: 98% (14 Jan 2018 14:13) (95% - 98%)    PHYSICAL EXAM:  GENERAL: NAD, well-developed  HEAD:  Atraumatic, Normocephalic  EYES: EOMI, PERRLA, conjunctiva and sclera clear  NECK: Supple, No JVD  CHEST/LUNG: Clear to auscultation bilaterally; No wheeze  HEART: S1, S2; No murmurs, rubs, or gallops  ABDOMEN: Soft, minimal tender LLQ, Nondistended; Bowel sounds present  EXTREMITIES:  2+ Peripheral Pulses, No clubbing, cyanosis, or edema  PSYCH: Normal affect  NEUROLOGY: AAOX3; non-focal  SKIN: No rashes or lesions    Consultant(s) Notes Reviewed:  [x ] YES  [ ] NO  Care Discussed with Consultants/Other Providers [ x] YES  [ ] NO    MEDS:  MEDICATIONS  (STANDING):  allopurinol 200 milliGRAM(s) Oral daily  amiodarone    Tablet 200 milliGRAM(s) Oral daily  clonazePAM Tablet 1 milliGRAM(s) Oral at bedtime  donepezil 10 milliGRAM(s) Oral at bedtime  lactobacillus acidophilus 1 Tablet(s) Oral daily  levothyroxine 125 MICROGram(s) Oral daily  melatonin 3 milliGRAM(s) Oral at bedtime  memantine 10 milliGRAM(s) Oral two times a day  mirtazapine 15 milliGRAM(s) Oral at bedtime  multivitamin 1 Tablet(s) Oral daily  pantoprazole    Tablet 40 milliGRAM(s) Oral before breakfast  piperacillin/tazobactam IVPB. 3.375 Gram(s) IV Intermittent every 12 hours  propranolol 10 milliGRAM(s) Oral two times a day  sodium bicarbonate 650 milliGRAM(s) Oral two times a day  tamsulosin 0.4 milliGRAM(s) Oral at bedtime            ALLERGIES:  No Known Allergies      LABS:                                                   9.2    5.77  )-----------( 164      ( 13 Jan 2018 08:37 )             29.4   01-14    141  |  106  |  15  ----------------------------<  74  4.3   |  27  |  2.83<H>    Ca    8.6      14 Jan 2018 09:47    PT/INR - ( 14 Jan 2018 09:37 )   PT: 28.5 sec;   INR: 2.48 ratio         PTT - ( 14 Jan 2018 09:37 )  PTT:> 200 sec    < from: CT Abdomen and Pelvis w/ Oral Cont (01.09.18 @ 15:51) >  IMPRESSION:   Uncomplicated diverticulitis involving the distal descending and proximal   sigmoid colon with progression of the pericolonic inflammatory change. No   formed abscess. This inflammatory process lies adjacent to the left   lateral urinary bladder wall. Continued follow-up can be performed.    Unchanged retrocrural, retroperitoneal, and pelvic lymphadenopathy.    < end of copied text >

## 2018-01-14 NOTE — PROGRESS NOTE ADULT - SUBJECTIVE AND OBJECTIVE BOX
[facility] PROGRESS NOTE    SUBJECTIVE:   Patient was seen and examined this morning. There was no acute event overnight. He is resting comfortably in bed. Pain is well controlled. He does not report pain, fever, n/v, chest pain, or shortness of breath.     Vital Signs Last 24 Hrs  T(C): 36.9 (14 Jan 2018 06:12), Max: 37.1 (14 Jan 2018 00:33)  T(F): 98.5 (14 Jan 2018 06:12), Max: 98.7 (14 Jan 2018 00:33)  HR: 58 (14 Jan 2018 06:12) (52 - 60)  BP: 125/68 (14 Jan 2018 06:12) (124/64 - 150/74)  BP(mean): --  RR: 18 (14 Jan 2018 06:12) (18 - 18)  SpO2: 97% (14 Jan 2018 06:12) (95% - 97%)    PHYSICAL EXAM:  Gen: Well-nourished, well-developed, A&O x3, resting in bed in no acute distress  CV: RRR  Resp: Patent airways, unlabored   Abdomen: Soft, nontender, LLQ tenderness  Extremities: All 4 extremities warm and well perfused, no edema     I&O's Summary    12 Jan 2018 07:01  -  13 Jan 2018 07:00  --------------------------------------------------------  IN: 2640 mL / OUT: 1300 mL / NET: 1340 mL    13 Jan 2018 07:01  -  14 Jan 2018 06:41  --------------------------------------------------------  IN: 1280 mL / OUT: 1250 mL / NET: 30 mL      I&O's Detail    12 Jan 2018 07:01  -  13 Jan 2018 07:00  --------------------------------------------------------  IN:    dextrose 5% + sodium chloride 0.45%.: 1200 mL    Oral Fluid: 1440 mL  Total IN: 2640 mL    OUT:    Voided: 1300 mL  Total OUT: 1300 mL    Total NET: 1340 mL      13 Jan 2018 07:01  -  14 Jan 2018 06:41  --------------------------------------------------------  IN:    Oral Fluid: 1080 mL    Solution: 200 mL  Total IN: 1280 mL    OUT:    Voided: 1250 mL  Total OUT: 1250 mL    Total NET: 30 mL          MEDICATIONS  (STANDING):  allopurinol 200 milliGRAM(s) Oral daily  amiodarone    Tablet 200 milliGRAM(s) Oral daily  clonazePAM Tablet 1 milliGRAM(s) Oral at bedtime  donepezil 10 milliGRAM(s) Oral at bedtime  lactobacillus acidophilus 1 Tablet(s) Oral daily  levothyroxine 125 MICROGram(s) Oral daily  melatonin 3 milliGRAM(s) Oral at bedtime  memantine 10 milliGRAM(s) Oral two times a day  mirtazapine 15 milliGRAM(s) Oral at bedtime  multivitamin 1 Tablet(s) Oral daily  pantoprazole    Tablet 40 milliGRAM(s) Oral before breakfast  piperacillin/tazobactam IVPB. 3.375 Gram(s) IV Intermittent every 12 hours  propranolol 10 milliGRAM(s) Oral two times a day  sodium bicarbonate 650 milliGRAM(s) Oral two times a day  tamsulosin 0.4 milliGRAM(s) Oral at bedtime    MEDICATIONS  (PRN):      LABS:                        9.2    5.77  )-----------( 164      ( 13 Jan 2018 08:37 )             29.4     01-13    141  |  108  |  11  ----------------------------<  78  4.0   |  29  |  2.72<H>    Ca    8.5      13 Jan 2018 08:55  Phos  2.6     01-12  Mg     2.1     01-12      PT/INR - ( 13 Jan 2018 08:37 )   PT: 32.6 sec;   INR: 2.82 ratio         PTT - ( 13 Jan 2018 08:37 )  PTT:36.3 sec      RADIOLOGY & ADDITIONAL STUDIES:    [facility][id number] [facility] PROGRESS NOTE    SUBJECTIVE:   Patient was seen and examined this morning. There was no acute event overnight. He is resting comfortably in bed. Pain is well controlled. He does not report pain, fever, n/v, chest pain, or shortness of breath.     Vital Signs Last 24 Hrs  T(C): 36.9 (14 Jan 2018 06:12), Max: 37.1 (14 Jan 2018 00:33)  T(F): 98.5 (14 Jan 2018 06:12), Max: 98.7 (14 Jan 2018 00:33)  HR: 58 (14 Jan 2018 06:12) (52 - 60)  BP: 125/68 (14 Jan 2018 06:12) (124/64 - 150/74)  BP(mean): --  RR: 18 (14 Jan 2018 06:12) (18 - 18)  SpO2: 97% (14 Jan 2018 06:12) (95% - 97%)    PHYSICAL EXAM:  Gen: Well-nourished, well-developed, A&O x3, resting in bed in no acute distress  CV: RRR  Resp: Patent airways, unlabored   Abdomen: Soft, nondistended, mild LLQ tenderness  Extremities: All 4 extremities warm and well perfused, no edema     I&O's Summary    12 Jan 2018 07:01  -  13 Jan 2018 07:00  --------------------------------------------------------  IN: 2640 mL / OUT: 1300 mL / NET: 1340 mL    13 Jan 2018 07:01  -  14 Jan 2018 06:41  --------------------------------------------------------  IN: 1280 mL / OUT: 1250 mL / NET: 30 mL      I&O's Detail    12 Jan 2018 07:01  -  13 Jan 2018 07:00  --------------------------------------------------------  IN:    dextrose 5% + sodium chloride 0.45%.: 1200 mL    Oral Fluid: 1440 mL  Total IN: 2640 mL    OUT:    Voided: 1300 mL  Total OUT: 1300 mL    Total NET: 1340 mL      13 Jan 2018 07:01  -  14 Jan 2018 06:41  --------------------------------------------------------  IN:    Oral Fluid: 1080 mL    Solution: 200 mL  Total IN: 1280 mL    OUT:    Voided: 1250 mL  Total OUT: 1250 mL    Total NET: 30 mL          MEDICATIONS  (STANDING):  allopurinol 200 milliGRAM(s) Oral daily  amiodarone    Tablet 200 milliGRAM(s) Oral daily  clonazePAM Tablet 1 milliGRAM(s) Oral at bedtime  donepezil 10 milliGRAM(s) Oral at bedtime  lactobacillus acidophilus 1 Tablet(s) Oral daily  levothyroxine 125 MICROGram(s) Oral daily  melatonin 3 milliGRAM(s) Oral at bedtime  memantine 10 milliGRAM(s) Oral two times a day  mirtazapine 15 milliGRAM(s) Oral at bedtime  multivitamin 1 Tablet(s) Oral daily  pantoprazole    Tablet 40 milliGRAM(s) Oral before breakfast  piperacillin/tazobactam IVPB. 3.375 Gram(s) IV Intermittent every 12 hours  propranolol 10 milliGRAM(s) Oral two times a day  sodium bicarbonate 650 milliGRAM(s) Oral two times a day  tamsulosin 0.4 milliGRAM(s) Oral at bedtime    MEDICATIONS  (PRN):      LABS:                        9.2    5.77  )-----------( 164      ( 13 Jan 2018 08:37 )             29.4     01-13    141  |  108  |  11  ----------------------------<  78  4.0   |  29  |  2.72<H>    Ca    8.5      13 Jan 2018 08:55  Phos  2.6     01-12  Mg     2.1     01-12      PT/INR - ( 13 Jan 2018 08:37 )   PT: 32.6 sec;   INR: 2.82 ratio         PTT - ( 13 Jan 2018 08:37 )  PTT:36.3 sec      RADIOLOGY & ADDITIONAL STUDIES:    [facility][id number]

## 2018-01-14 NOTE — PROGRESS NOTE ADULT - ASSESSMENT
INCOMPLETE:  72M w/ hx of chronic diastolic heart failure, AFIB w/ PPM (on coumadin), CLL w/ Waldenstrom's macroglobulinemia, CKD Stage 4, and hx of multiple episodes of acute diverticulitis presenting w/ acute diverticulitis. Uncomplicated diverticulitis involving the distal descending and proximal sigmoid colon with progression of the pericolonic inflammatory change on CT     - AFIB: Supratherapeutic INR, coumadin is held for now, will order daily INR  - PICC placement pending INR  - D/c abx plan pending  - c/w amidarone  - Diet: CLD, tolerating well  - ABx regimen: Zosyn  - Dispo: Floor Patient is a 72-year-old man  w/ hx of chronic diastolic heart failure, AFIB w/ PPM (on coumadin), CLL w/ Waldenstrom's macroglobulinemia, CKD Stage 4, and hx of multiple episodes of acute diverticulitis presenting w/ acute diverticulitis, HD6. Uncomplicated diverticulitis involving the distal descending and proximal sigmoid colon with progression of the pericolonic inflammatory change on CT. He is hemodynamically stable. Labs are pendiong.     - AFIB: Supratherapeutic INR, coumadin is held for now, will order daily INR  - PICC placement pending INR  - c/w amidarone  - Diet: on LRD, tolerating well  - ABx regimen: Zosyn  - on home med  - Dispo: Floor

## 2018-01-14 NOTE — PROGRESS NOTE ADULT - ASSESSMENT
71 yo male well known to me from multiple prior admissions, with h/o CLL, waldenstroms macroglobulinemia, ckd, afib s/p ppm on AC with coumadin, diverticulitis, anxiety a/w abd pain and recurrent diverticulitis    pt being followed by surg  reports improved abd pain    wbc improved  cont abx as per ID.  plan for picc line as per surg   advance diet as tolerated  as per ID no plans for surg on this admission. will need long term abx first  pt states unable to get abx at home.   will need f/u with SW    afib   cont to hold coumadin for now  monitor INR daily  no need for bridging as per cards once suptheraputic  cont home cardiac meds for rate control    CKD  renal following  monitor creat    CLL/waldenstroms  stable  onc eval noted    anxiety  cont home psy meds    will follow

## 2018-01-15 LAB
ANION GAP SERPL CALC-SCNC: 7 MMOL/L — SIGNIFICANT CHANGE UP (ref 5–17)
APTT BLD: 33.3 SEC — SIGNIFICANT CHANGE UP (ref 27.5–37.4)
BUN SERPL-MCNC: 14 MG/DL — SIGNIFICANT CHANGE UP (ref 7–23)
CALCIUM SERPL-MCNC: 8.5 MG/DL — SIGNIFICANT CHANGE UP (ref 8.4–10.5)
CHLORIDE SERPL-SCNC: 108 MMOL/L — SIGNIFICANT CHANGE UP (ref 96–108)
CO2 SERPL-SCNC: 27 MMOL/L — SIGNIFICANT CHANGE UP (ref 22–31)
CREAT SERPL-MCNC: 2.62 MG/DL — HIGH (ref 0.5–1.3)
CULTURE RESULTS: SIGNIFICANT CHANGE UP
CULTURE RESULTS: SIGNIFICANT CHANGE UP
GLUCOSE SERPL-MCNC: 80 MG/DL — SIGNIFICANT CHANGE UP (ref 70–99)
HCT VFR BLD CALC: 31.8 % — LOW (ref 39–50)
HGB BLD-MCNC: 9.8 G/DL — LOW (ref 13–17)
INR BLD: 1.79 RATIO — HIGH (ref 0.88–1.16)
MAGNESIUM SERPL-MCNC: 1.7 MG/DL — SIGNIFICANT CHANGE UP (ref 1.6–2.6)
MCHC RBC-ENTMCNC: 28.2 PG — SIGNIFICANT CHANGE UP (ref 27–34)
MCHC RBC-ENTMCNC: 30.8 GM/DL — LOW (ref 32–36)
MCV RBC AUTO: 91.6 FL — SIGNIFICANT CHANGE UP (ref 80–100)
PHOSPHATE SERPL-MCNC: 3 MG/DL — SIGNIFICANT CHANGE UP (ref 2.5–4.5)
PLATELET # BLD AUTO: 158 K/UL — SIGNIFICANT CHANGE UP (ref 150–400)
POTASSIUM SERPL-MCNC: 4.1 MMOL/L — SIGNIFICANT CHANGE UP (ref 3.5–5.3)
POTASSIUM SERPL-SCNC: 4.1 MMOL/L — SIGNIFICANT CHANGE UP (ref 3.5–5.3)
PROTHROM AB SERPL-ACNC: 20.5 SEC — HIGH (ref 10–13.1)
RBC # BLD: 3.47 M/UL — LOW (ref 4.2–5.8)
RBC # FLD: 14.7 % — HIGH (ref 10.3–14.5)
SODIUM SERPL-SCNC: 142 MMOL/L — SIGNIFICANT CHANGE UP (ref 135–145)
SPECIMEN SOURCE: SIGNIFICANT CHANGE UP
SPECIMEN SOURCE: SIGNIFICANT CHANGE UP
WBC # BLD: 5.86 K/UL — SIGNIFICANT CHANGE UP (ref 3.8–10.5)
WBC # FLD AUTO: 5.86 K/UL — SIGNIFICANT CHANGE UP (ref 3.8–10.5)

## 2018-01-15 RX ORDER — HEPARIN SODIUM 5000 [USP'U]/ML
5000 INJECTION INTRAVENOUS; SUBCUTANEOUS EVERY 12 HOURS
Qty: 0 | Refills: 0 | Status: DISCONTINUED | OUTPATIENT
Start: 2018-01-15 | End: 2018-01-18

## 2018-01-15 RX ORDER — MAGNESIUM SULFATE 500 MG/ML
2 VIAL (ML) INJECTION ONCE
Qty: 0 | Refills: 0 | Status: COMPLETED | OUTPATIENT
Start: 2018-01-15 | End: 2018-01-15

## 2018-01-15 RX ADMIN — PIPERACILLIN AND TAZOBACTAM 25 GRAM(S): 4; .5 INJECTION, POWDER, LYOPHILIZED, FOR SOLUTION INTRAVENOUS at 18:29

## 2018-01-15 RX ADMIN — MIRTAZAPINE 15 MILLIGRAM(S): 45 TABLET, ORALLY DISINTEGRATING ORAL at 21:08

## 2018-01-15 RX ADMIN — Medication 200 MILLIGRAM(S): at 14:11

## 2018-01-15 RX ADMIN — Medication 1 TABLET(S): at 14:10

## 2018-01-15 RX ADMIN — DONEPEZIL HYDROCHLORIDE 10 MILLIGRAM(S): 10 TABLET, FILM COATED ORAL at 21:08

## 2018-01-15 RX ADMIN — MEMANTINE HYDROCHLORIDE 10 MILLIGRAM(S): 10 TABLET ORAL at 06:22

## 2018-01-15 RX ADMIN — Medication 3 MILLIGRAM(S): at 21:09

## 2018-01-15 RX ADMIN — MEMANTINE HYDROCHLORIDE 10 MILLIGRAM(S): 10 TABLET ORAL at 18:30

## 2018-01-15 RX ADMIN — AMIODARONE HYDROCHLORIDE 200 MILLIGRAM(S): 400 TABLET ORAL at 06:22

## 2018-01-15 RX ADMIN — PANTOPRAZOLE SODIUM 40 MILLIGRAM(S): 20 TABLET, DELAYED RELEASE ORAL at 06:22

## 2018-01-15 RX ADMIN — TAMSULOSIN HYDROCHLORIDE 0.4 MILLIGRAM(S): 0.4 CAPSULE ORAL at 21:08

## 2018-01-15 RX ADMIN — Medication 125 MICROGRAM(S): at 06:22

## 2018-01-15 RX ADMIN — HEPARIN SODIUM 5000 UNIT(S): 5000 INJECTION INTRAVENOUS; SUBCUTANEOUS at 18:29

## 2018-01-15 RX ADMIN — Medication 650 MILLIGRAM(S): at 18:34

## 2018-01-15 RX ADMIN — Medication 1 MILLIGRAM(S): at 21:08

## 2018-01-15 RX ADMIN — PIPERACILLIN AND TAZOBACTAM 25 GRAM(S): 4; .5 INJECTION, POWDER, LYOPHILIZED, FOR SOLUTION INTRAVENOUS at 06:22

## 2018-01-15 RX ADMIN — Medication 650 MILLIGRAM(S): at 06:21

## 2018-01-15 RX ADMIN — Medication 50 GRAM(S): at 18:29

## 2018-01-15 NOTE — PROGRESS NOTE ADULT - SUBJECTIVE AND OBJECTIVE BOX
[facility] PROGRESS NOTE    SUBJECTIVE:  Patient was seen and examined this morning. There was no acute event overnight. He is resting comfortably in bed. Pain is well controlled. He does not report pain, fever, n/v, chest pain, or shortness of breath. He ambulates and voides with no difficulty. patient has flatus and bowel movement.     Vital Signs Last 24 Hrs  T(C): 36.8 (15 Josh 2018 01:19), Max: 36.9 (14 Jan 2018 06:12)  T(F): 98.3 (15 Josh 2018 01:19), Max: 98.5 (14 Jan 2018 06:12)  HR: 55 (15 Josh 2018 01:19) (52 - 61)  BP: 117/60 (15 Josh 2018 01:19) (117/60 - 163/78)  BP(mean): --  RR: 18 (15 Josh 2018 01:19) (18 - 18)  SpO2: 97% (15 Josh 2018 01:19) (96% - 99%)    PHYSICAL EXAM:  Gen: Well-nourished, well-developed, A&O x3, resting in bed in no acute distress  CV: RRR  Resp: Patent airways, unlabored   Abdomen: Soft, nondistended, mild LLQ tenderness  Extremities: All 4 extremities warm and well perfused, no edema     I&O's Summary    13 Jan 2018 07:01  -  14 Jan 2018 07:00  --------------------------------------------------------  IN: 1280 mL / OUT: 1250 mL / NET: 30 mL    14 Jan 2018 07:01  -  15 Josh 2018 04:06  --------------------------------------------------------  IN: 1360 mL / OUT: 1100 mL / NET: 260 mL      I&O's Detail    13 Jan 2018 07:01  -  14 Jan 2018 07:00  --------------------------------------------------------  IN:    Oral Fluid: 1080 mL    Solution: 200 mL  Total IN: 1280 mL    OUT:    Voided: 1250 mL  Total OUT: 1250 mL    Total NET: 30 mL      14 Jan 2018 07:01  -  15 Josh 2018 04:06  --------------------------------------------------------  IN:    Oral Fluid: 1260 mL    Solution: 100 mL  Total IN: 1360 mL    OUT:    Voided: 1100 mL  Total OUT: 1100 mL    Total NET: 260 mL          MEDICATIONS  (STANDING):  allopurinol 200 milliGRAM(s) Oral daily  amiodarone    Tablet 200 milliGRAM(s) Oral daily  clonazePAM Tablet 1 milliGRAM(s) Oral at bedtime  donepezil 10 milliGRAM(s) Oral at bedtime  lactobacillus acidophilus 1 Tablet(s) Oral daily  levothyroxine 125 MICROGram(s) Oral daily  melatonin 3 milliGRAM(s) Oral at bedtime  memantine 10 milliGRAM(s) Oral two times a day  mirtazapine 15 milliGRAM(s) Oral at bedtime  multivitamin 1 Tablet(s) Oral daily  pantoprazole    Tablet 40 milliGRAM(s) Oral before breakfast  piperacillin/tazobactam IVPB. 3.375 Gram(s) IV Intermittent every 12 hours  propranolol 10 milliGRAM(s) Oral two times a day  sodium bicarbonate 650 milliGRAM(s) Oral two times a day  tamsulosin 0.4 milliGRAM(s) Oral at bedtime    MEDICATIONS  (PRN):      LABS:                        9.2    5.77  )-----------( 164      ( 13 Jan 2018 08:37 )             29.4     01-14    141  |  106  |  15  ----------------------------<  74  4.3   |  27  |  2.83<H>    Ca    8.6      14 Jan 2018 09:47      PT/INR - ( 14 Jan 2018 19:12 )   PT: 24.3 sec;   INR: 2.19 ratio         PTT - ( 14 Jan 2018 19:12 )  PTT:36.1 sec      RADIOLOGY & ADDITIONAL STUDIES:    [facility] #[id number]

## 2018-01-15 NOTE — PROGRESS NOTE ADULT - ASSESSMENT
71 yo male well known to me from multiple prior admissions, with h/o CLL, waldenstroms macroglobulinemia, ckd, afib s/p ppm on AC with coumadin, diverticulitis, anxiety a/w abd pain and recurrent diverticulitis    pt being followed by surg  reports improved abd pain    wbc improved  cont abx as per ID.  awaiting picc line  advance diet as tolerated  as per ID no plans for surg on this admission. will need long term abx first  pt states unable to get abx at home.  plan to dc to rehab   will need f/u with SW    afib   cont to hold coumadin for now  monitor INR daily  no need for bridging as per cards   ok to place picc line now as INR <2  cont home cardiac meds for rate control    CKD  renal following  monitor creat    CLL/waldenstroms  stable  onc eval noted    anxiety  cont home psy meds    will follow

## 2018-01-15 NOTE — PROGRESS NOTE ADULT - ASSESSMENT
72 year old male known to our practice with multiple prior episodes of diverticulitis presents to ER on advice of primary GI attending (Shilpa).  Pt was started on PO Augmentin around [place] time by Dr Simons for presumed diverticulitis (similar presentation to multiple prior episodes) - reports initial improvement in pain then after ~6 days, developed worsening pain. prior to ER presentation, reports "excruciating pain" in LLQ, states he was unable to walk due to pain    CT - acute uncomplicated diverticulitis, distal Descending colon and proximal Sigmoid colon- no abscess    PLAN  -Continue IV antibiotics, IV fluids  -Eventual OR as per Surgery team  -Awaiting PICC line     Discussed with Surgery attending  Abdullahi Goncalves PA-C    [facility]  [phone]

## 2018-01-15 NOTE — PROGRESS NOTE ADULT - ASSESSMENT
Patient is a 72-year-old man  w/ hx of chronic diastolic heart failure, AFIB w/ PPM (on coumadin), CLL w/ Waldenstrom's macroglobulinemia, CKD Stage 4, and hx of multiple episodes of acute diverticulitis presenting w/ acute diverticulitis, HD7. Uncomplicated diverticulitis involving the distal descending and proximal sigmoid colon with progression of the pericolonic inflammatory change on CT. He is hemodynamically stable. Labs are pendiong.     - AFIB: Supratherapeutic INR, coumadin is held for now, will order daily INR ( yesterday INR: 2.19 trending down)  - PICC placement pending INR  - c/w amidarone  - Diet: on LRD, tolerating well  - ABx regimen: Zosyn  - on home med  - Dispo: [facility]

## 2018-01-15 NOTE — PROGRESS NOTE ADULT - SUBJECTIVE AND OBJECTIVE BOX
NICOLAS CIFUENTES  72y Male  MRN:[id number]    Patient is a 72y old  Male who presents with a chief complaint of abd pain    HPI:  72 male with  CKD stage 4, CLL, Afib on coumadin, PPM, hypothyroidism, GERD here with abdominal pain. He had diverticulitis in Nov 2017 and has had several recurrences of diverticulitis since that time. Patient was started on Augmentin by GI and has been taking it for about 10 days with no improvement.  reports +diarrhea. no vomiting. low grade fevers at home.  Last night reports "excruciating pain" in LLQ, states he was unable to walk due to pain.  Patient has been afebrile this admission. No sick contacts. No recent travel.     Patient seen and evaluated at bedside. no acute events o/n    Interval HPI:  reports pain improved. still with diarrhea      PAST MEDICAL & SURGICAL HISTORY:  Bradycardia, s/p ppm  Waldenstrom macroglobulinemia  Diverticulitis  Atrial fibrillation on coumadin  GERD (gastroesophageal reflux disease)  Anxiety disorder  CLL (chronic lymphocytic leukemia): in remission  History of laparoscopic cholecystectomy: 4/2014  S/P hernia repair: x2  Meniscus tear: s/p removal of Meniscus 8 months ago    SOC:  non smoker  no alcohol abuse  no drug abuse  lives with wife    Fam Hx:  non cont    REVIEW OF SYSTEMS:  Constitutional: yes fever, chills, fatigue. no weight loss.  Skin: No rash.  Eyes: No recent vision problems or eye pain.  ENT: No congestion, ear pain, or sore throat.  Endocrine: No thyroid problems.  Cardiovascular: No chest pain or palpation.  Respiratory: No cough, shortness of breath, congestion, or wheezing.  Gastrointestinal: as per hpi  Genitourinary: No dysuria.  Musculoskeletal: No joint swelling.  Neurologic: No headache.    VITALS:  Vital Signs Last 24 Hrs  T(C): 36.7 (15 Josh 2018 14:03), Max: 36.9 (14 Jan 2018 20:59)  T(F): 98.1 (15 Josh 2018 14:03), Max: 98.5 (14 Jan 2018 20:59)  HR: 59 (15 Josh 2018 14:03) (50 - 61)  BP: 157/74 (15 Josh 2018 14:03) (117/60 - 163/78)  BP(mean): --  RR: 18 (15 Josh 2018 14:03) (18 - 18)  SpO2: 97% (15 Josh 2018 14:03) (96% - 99%)    PHYSICAL EXAM:  GENERAL: NAD, well-developed  HEAD:  Atraumatic, Normocephalic  EYES: EOMI, PERRLA, conjunctiva and sclera clear  NECK: Supple, No JVD  CHEST/LUNG: Clear to auscultation bilaterally; No wheeze  HEART: S1, S2; No murmurs, rubs, or gallops  ABDOMEN: Soft, minimal tender LLQ, Nondistended; Bowel sounds present  EXTREMITIES:  2+ Peripheral Pulses, No clubbing, cyanosis, or edema  PSYCH: Normal affect  NEUROLOGY: AAOX3; non-focal  SKIN: No rashes or lesions    Consultant(s) Notes Reviewed:  [x ] YES  [ ] NO  Care Discussed with Consultants/Other Providers [ x] YES  [ ] NO    MEDS:  MEDICATIONS  (STANDING):  allopurinol 200 milliGRAM(s) Oral daily  amiodarone    Tablet 200 milliGRAM(s) Oral daily  clonazePAM Tablet 1 milliGRAM(s) Oral at bedtime  donepezil 10 milliGRAM(s) Oral at bedtime  lactobacillus acidophilus 1 Tablet(s) Oral daily  levothyroxine 125 MICROGram(s) Oral daily  melatonin 3 milliGRAM(s) Oral at bedtime  memantine 10 milliGRAM(s) Oral two times a day  mirtazapine 15 milliGRAM(s) Oral at bedtime  multivitamin 1 Tablet(s) Oral daily  pantoprazole    Tablet 40 milliGRAM(s) Oral before breakfast  piperacillin/tazobactam IVPB. 3.375 Gram(s) IV Intermittent every 12 hours  propranolol 10 milliGRAM(s) Oral two times a day  sodium bicarbonate 650 milliGRAM(s) Oral two times a day  tamsulosin 0.4 milliGRAM(s) Oral at bedtime    MEDICATIONS  (PRN):          ALLERGIES:  No Known Allergies      LABS:                                                            9.8    5.86  )-----------( 158      ( 15 Josh 2018 08:55 )             31.8   01-15    142  |  108  |  14  ----------------------------<  80  4.1   |  27  |  2.62<H>    Ca    8.5      15 Josh 2018 08:46  Phos  3.0     01-15  Mg     1.7     01-15    PT/INR - ( 15 Josh 2018 08:51 )   PT: 20.5 sec;   INR: 1.79 ratio         PTT - ( 15 Josh 2018 08:51 )  PTT:33.3 sec    < from: CT Abdomen and Pelvis w/ Oral Cont (01.09.18 @ 15:51) >  IMPRESSION:   Uncomplicated diverticulitis involving the distal descending and proximal   sigmoid colon with progression of the pericolonic inflammatory change. No   formed abscess. This inflammatory process lies adjacent to the left   lateral urinary bladder wall. Continued follow-up can be performed.    Unchanged retrocrural, retroperitoneal, and pelvic lymphadenopathy.    < end of copied text >

## 2018-01-15 NOTE — PROGRESS NOTE ADULT - SUBJECTIVE AND OBJECTIVE BOX
Patient is a 72y old  Male who presents with a chief complaint of abdominal pain (09 Jan 2018 17:09)      INTERVAL HPI/OVERNIGHT EVENTS:  1 BM today  no fever or chills  tolerating PO diet    MEDICATIONS  (STANDING):  allopurinol 200 milliGRAM(s) Oral daily  amiodarone    Tablet 200 milliGRAM(s) Oral daily  clonazePAM Tablet 1 milliGRAM(s) Oral at bedtime  donepezil 10 milliGRAM(s) Oral at bedtime  lactobacillus acidophilus 1 Tablet(s) Oral daily  levothyroxine 125 MICROGram(s) Oral daily  melatonin 3 milliGRAM(s) Oral at bedtime  memantine 10 milliGRAM(s) Oral two times a day  mirtazapine 15 milliGRAM(s) Oral at bedtime  multivitamin 1 Tablet(s) Oral daily  pantoprazole    Tablet 40 milliGRAM(s) Oral before breakfast  piperacillin/tazobactam IVPB. 3.375 Gram(s) IV Intermittent every 12 hours  propranolol 10 milliGRAM(s) Oral two times a day  sodium bicarbonate 650 milliGRAM(s) Oral two times a day  tamsulosin 0.4 milliGRAM(s) Oral at bedtime    MEDICATIONS  (PRN):      Allergies  No Known Allergies      Review of Systems:  General:  No wt loss, fevers, chills, night sweats ,fatigue  CV:  No pain, palpitations, AF, s/p PPM  Resp:  No dyspnea, cough, tachypnea, wheezing  :  No pain, bleeding, incontinence, nocturia  Muscle:  No pain, weakness  Neuro:  No weakness, tingling, memory problems  Heme: +Waldenstrom Macroglobulinemia, on AC (AF)  Skin:  No rash, tattoos, scars, edema    Vital Signs Last 24 Hrs  T(C): 36.6 (15 Josh 2018 06:21), Max: 36.9 (14 Jan 2018 20:59)  T(F): 97.8 (15 Josh 2018 06:21), Max: 98.5 (14 Jan 2018 20:59)  HR: 50 (15 Josh 2018 06:21) (50 - 61)  BP: 122/67 (15 Josh 2018 06:21) (117/60 - 163/78)  BP(mean): --  RR: 18 (15 Josh 2018 06:21) (18 - 18)  SpO2: 96% (15 Josh 2018 06:21) (96% - 99%)    PHYSICAL EXAM:  Constitutional: Non-toxic appearing, appears comfortable, AOx3  Neck: No LAD, supple, no JVD  Respiratory: Clear anteriorly, Left CW PPM site clean and dry  Cardiovascular: S1 and S2, RRR  Gastrointestinal: BS+, soft +tenderness LLQ (improved from prior exam)  NO rebound no rigidity  Extremities: No peripheral edema, neg clubbing, cyanosis  Vascular: 2+ peripheral pulses  Neurological: A/O x 3, no focal deficits  Psychiatric: Normal mood, normal affect  Skin: No rashes    LABS:                        9.8    5.86  )-----------( 158      ( 15 Josh 2018 08:55 )             31.8     01-15    142  |  108  |  14  ----------------------------<  80  4.1   |  27  |  2.62<H>    Ca    8.5      15 Josh 2018 08:46  Phos  3.0     01-15  Mg     1.7     01-15      PT/INR - ( 15 Josh 2018 08:51 )   PT: 20.5 sec;   INR: 1.79 ratio      PTT - ( 15 Josh 2018 08:51 )  PTT:33.3 sec        RADIOLOGY & ADDITIONAL TESTS:

## 2018-01-16 LAB
ANION GAP SERPL CALC-SCNC: 9 MMOL/L — SIGNIFICANT CHANGE UP (ref 5–17)
APTT BLD: 32.3 SEC — SIGNIFICANT CHANGE UP (ref 27.5–37.4)
BUN SERPL-MCNC: 16 MG/DL — SIGNIFICANT CHANGE UP (ref 7–23)
CALCIUM SERPL-MCNC: 8.3 MG/DL — LOW (ref 8.4–10.5)
CHLORIDE SERPL-SCNC: 105 MMOL/L — SIGNIFICANT CHANGE UP (ref 96–108)
CO2 SERPL-SCNC: 28 MMOL/L — SIGNIFICANT CHANGE UP (ref 22–31)
CREAT SERPL-MCNC: 2.84 MG/DL — HIGH (ref 0.5–1.3)
GLUCOSE SERPL-MCNC: 84 MG/DL — SIGNIFICANT CHANGE UP (ref 70–99)
HCT VFR BLD CALC: 29.1 % — LOW (ref 39–50)
HGB BLD-MCNC: 9.3 G/DL — LOW (ref 13–17)
INR BLD: 1.47 RATIO — HIGH (ref 0.88–1.16)
MAGNESIUM SERPL-MCNC: 2.2 MG/DL — SIGNIFICANT CHANGE UP (ref 1.6–2.6)
MCHC RBC-ENTMCNC: 29 PG — SIGNIFICANT CHANGE UP (ref 27–34)
MCHC RBC-ENTMCNC: 32 GM/DL — SIGNIFICANT CHANGE UP (ref 32–36)
MCV RBC AUTO: 90.7 FL — SIGNIFICANT CHANGE UP (ref 80–100)
PHOSPHATE SERPL-MCNC: 2.9 MG/DL — SIGNIFICANT CHANGE UP (ref 2.5–4.5)
PLATELET # BLD AUTO: 163 K/UL — SIGNIFICANT CHANGE UP (ref 150–400)
POTASSIUM SERPL-MCNC: 3.8 MMOL/L — SIGNIFICANT CHANGE UP (ref 3.5–5.3)
POTASSIUM SERPL-SCNC: 3.8 MMOL/L — SIGNIFICANT CHANGE UP (ref 3.5–5.3)
PROTHROM AB SERPL-ACNC: 16.8 SEC — HIGH (ref 10–13.1)
RBC # BLD: 3.21 M/UL — LOW (ref 4.2–5.8)
RBC # FLD: 14.4 % — SIGNIFICANT CHANGE UP (ref 10.3–14.5)
SODIUM SERPL-SCNC: 142 MMOL/L — SIGNIFICANT CHANGE UP (ref 135–145)
WBC # BLD: 6.34 K/UL — SIGNIFICANT CHANGE UP (ref 3.8–10.5)
WBC # FLD AUTO: 6.34 K/UL — SIGNIFICANT CHANGE UP (ref 3.8–10.5)

## 2018-01-16 PROCEDURE — 99232 SBSQ HOSP IP/OBS MODERATE 35: CPT

## 2018-01-16 RX ORDER — SODIUM CHLORIDE 9 MG/ML
1000 INJECTION INTRAMUSCULAR; INTRAVENOUS; SUBCUTANEOUS
Qty: 0 | Refills: 0 | Status: DISCONTINUED | OUTPATIENT
Start: 2018-01-16 | End: 2018-01-18

## 2018-01-16 RX ADMIN — HEPARIN SODIUM 5000 UNIT(S): 5000 INJECTION INTRAVENOUS; SUBCUTANEOUS at 06:04

## 2018-01-16 RX ADMIN — HEPARIN SODIUM 5000 UNIT(S): 5000 INJECTION INTRAVENOUS; SUBCUTANEOUS at 17:41

## 2018-01-16 RX ADMIN — Medication 200 MILLIGRAM(S): at 13:53

## 2018-01-16 RX ADMIN — MEMANTINE HYDROCHLORIDE 10 MILLIGRAM(S): 10 TABLET ORAL at 21:59

## 2018-01-16 RX ADMIN — Medication 1 TABLET(S): at 13:52

## 2018-01-16 RX ADMIN — DONEPEZIL HYDROCHLORIDE 10 MILLIGRAM(S): 10 TABLET, FILM COATED ORAL at 21:59

## 2018-01-16 RX ADMIN — Medication 3 MILLIGRAM(S): at 21:59

## 2018-01-16 RX ADMIN — MEMANTINE HYDROCHLORIDE 10 MILLIGRAM(S): 10 TABLET ORAL at 06:04

## 2018-01-16 RX ADMIN — Medication 1 MILLIGRAM(S): at 22:03

## 2018-01-16 RX ADMIN — PIPERACILLIN AND TAZOBACTAM 25 GRAM(S): 4; .5 INJECTION, POWDER, LYOPHILIZED, FOR SOLUTION INTRAVENOUS at 06:04

## 2018-01-16 RX ADMIN — AMIODARONE HYDROCHLORIDE 200 MILLIGRAM(S): 400 TABLET ORAL at 06:04

## 2018-01-16 RX ADMIN — Medication 125 MICROGRAM(S): at 06:04

## 2018-01-16 RX ADMIN — PIPERACILLIN AND TAZOBACTAM 25 GRAM(S): 4; .5 INJECTION, POWDER, LYOPHILIZED, FOR SOLUTION INTRAVENOUS at 17:43

## 2018-01-16 RX ADMIN — Medication 10 MILLIGRAM(S): at 17:42

## 2018-01-16 RX ADMIN — TAMSULOSIN HYDROCHLORIDE 0.4 MILLIGRAM(S): 0.4 CAPSULE ORAL at 21:59

## 2018-01-16 RX ADMIN — Medication 650 MILLIGRAM(S): at 17:42

## 2018-01-16 RX ADMIN — Medication 650 MILLIGRAM(S): at 06:04

## 2018-01-16 RX ADMIN — PANTOPRAZOLE SODIUM 40 MILLIGRAM(S): 20 TABLET, DELAYED RELEASE ORAL at 06:04

## 2018-01-16 RX ADMIN — MIRTAZAPINE 15 MILLIGRAM(S): 45 TABLET, ORALLY DISINTEGRATING ORAL at 21:59

## 2018-01-16 RX ADMIN — Medication 1 TABLET(S): at 13:53

## 2018-01-16 NOTE — PROGRESS NOTE ADULT - SUBJECTIVE AND OBJECTIVE BOX
NICOLAS CIFUENTES  72y Male  MRN:[id number]    Patient is a 72y old  Male who presents with a chief complaint of abd pain    HPI:  72 male with  CKD stage 4, CLL, Afib on coumadin, PPM, hypothyroidism, GERD here with abdominal pain. He had diverticulitis in Nov 2017 and has had several recurrences of diverticulitis since that time. Patient was started on Augmentin by GI and has been taking it for about 10 days with no improvement.  reports +diarrhea. no vomiting. low grade fevers at home.  Last night reports "excruciating pain" in LLQ, states he was unable to walk due to pain.  Patient has been afebrile this admission. No sick contacts. No recent travel.     Patient seen and evaluated at bedside. no acute events o/n    Interval HPI:  no new events     PAST MEDICAL & SURGICAL HISTORY:  Bradycardia, s/p ppm  Waldenstrom macroglobulinemia  Diverticulitis  Atrial fibrillation on coumadin  GERD (gastroesophageal reflux disease)  Anxiety disorder  CLL (chronic lymphocytic leukemia): in remission  History of laparoscopic cholecystectomy: 4/2014  S/P hernia repair: x2  Meniscus tear: s/p removal of Meniscus 8 months ago    SOC:  non smoker  no alcohol abuse  no drug abuse  lives with wife    Fam Hx:  non cont    REVIEW OF SYSTEMS:  Constitutional: yes fever, chills, fatigue. no weight loss.  Skin: No rash.  Eyes: No recent vision problems or eye pain.  ENT: No congestion, ear pain, or sore throat.  Endocrine: No thyroid problems.  Cardiovascular: No chest pain or palpation.  Respiratory: No cough, shortness of breath, congestion, or wheezing.  Gastrointestinal: as per hpi  Genitourinary: No dysuria.  Musculoskeletal: No joint swelling.  Neurologic: No headache.    VITALS:  Vital Signs Last 24 Hrs  T(C): 37.3 (16 Jan 2018 22:29), Max: 37.3 (16 Jan 2018 22:29)  T(F): 99.2 (16 Jan 2018 22:29), Max: 99.2 (16 Jan 2018 22:29)  HR: 54 (16 Jan 2018 22:29) (53 - 61)  BP: 124/67 (16 Jan 2018 22:29) (112/60 - 148/79)  BP(mean): --  RR: 18 (16 Jan 2018 22:29) (16 - 18)  SpO2: 96% (16 Jan 2018 22:29) (96% - 98%)    PHYSICAL EXAM:  GENERAL: NAD, well-developed  HEAD:  Atraumatic, Normocephalic  EYES: EOMI, PERRLA, conjunctiva and sclera clear  NECK: Supple, No JVD  CHEST/LUNG: Clear to auscultation bilaterally; No wheeze  HEART: S1, S2; No murmurs, rubs, or gallops  ABDOMEN: Soft, minimal tender LLQ, Nondistended; Bowel sounds present  EXTREMITIES:  2+ Peripheral Pulses, No clubbing, cyanosis, or edema  PSYCH: Normal affect  NEUROLOGY: AAOX3; non-focal  SKIN: No rashes or lesions    Consultant(s) Notes Reviewed:  [x ] YES  [ ] NO  Care Discussed with Consultants/Other Providers [ x] YES  [ ] NO    MEDS:  MEDICATIONS  (STANDING):  allopurinol 200 milliGRAM(s) Oral daily  amiodarone    Tablet 200 milliGRAM(s) Oral daily  clonazePAM Tablet 1 milliGRAM(s) Oral at bedtime  donepezil 10 milliGRAM(s) Oral at bedtime  lactobacillus acidophilus 1 Tablet(s) Oral daily  levothyroxine 125 MICROGram(s) Oral daily  melatonin 3 milliGRAM(s) Oral at bedtime  memantine 10 milliGRAM(s) Oral two times a day  mirtazapine 15 milliGRAM(s) Oral at bedtime  multivitamin 1 Tablet(s) Oral daily  pantoprazole    Tablet 40 milliGRAM(s) Oral before breakfast  piperacillin/tazobactam IVPB. 3.375 Gram(s) IV Intermittent every 12 hours  propranolol 10 milliGRAM(s) Oral two times a day  sodium bicarbonate 650 milliGRAM(s) Oral two times a day  tamsulosin 0.4 milliGRAM(s) Oral at bedtime    MEDICATIONS  (PRN):          ALLERGIES:  No Known Allergies      LABS:                                                                       9.3    6.34  )-----------( 163      ( 16 Jan 2018 08:56 )             29.1          01-16    142  |  105  |  16  ----------------------------<  84  3.8   |  28  |  2.84<H>    Ca    8.3<L>      16 Jan 2018 09:07  PT/INR - ( 16 Jan 2018 08:56 )   PT: 16.8 sec;   INR: 1.47 ratio         PTT - ( 16 Jan 2018 08:56 )  PTT:32.3 secPhos  2.9     01-16  Mg     2.2     01-16      < from: CT Abdomen and Pelvis w/ Oral Cont (01.09.18 @ 15:51) >  IMPRESSION:   Uncomplicated diverticulitis involving the distal descending and proximal   sigmoid colon with progression of the pericolonic inflammatory change. No   formed abscess. This inflammatory process lies adjacent to the left   lateral urinary bladder wall. Continued follow-up can be performed.    Unchanged retrocrural, retroperitoneal, and pelvic lymphadenopathy.    < end of copied text >

## 2018-01-16 NOTE — PROGRESS NOTE ADULT - ASSESSMENT
72 year old male known to our practice with multiple prior episodes of diverticulitis presents to ER on advice of primary GI attending (Shilpa).  Pt was started on PO Augmentin around [place] time by Dr Simons for presumed diverticulitis (similar presentation to multiple prior episodes) - reports initial improvement in pain then after ~6 days, developed worsening pain. prior to ER presentation, reports "excruciating pain" in LLQ, states he was unable to walk due to pain    CT - acute uncomplicated diverticulitis, distal Descending colon and proximal Sigmoid colon- no abscess    PLAN  -Continue IV antibiotics, IV fluids  -Eventual OR as per Surgery team  -Awaiting PICC line

## 2018-01-16 NOTE — PROGRESS NOTE ADULT - SUBJECTIVE AND OBJECTIVE BOX
No pain, no shortness of breath      VITAL:  T(C): , Max: 37.1 (01-15-18 @ 21:10)  T(F): , Max: 98.8 (01-15-18 @ 21:10)  HR: 60 (01-16-18 @ 09:24)  BP: 114/68 (01-16-18 @ 09:24)  BP(mean): --  RR: 17 (01-16-18 @ 09:24)  SpO2: 96% (01-16-18 @ 09:24)  PHYSICAL EXAM:  Constitutional: NAD; lethargic but alert  HEENT: NCAT, DMM  Neck: Supple, No JVD  Respiratory: CTA-b/l  Cardiovascular: sabina, reg s1s2  Gastrointestinal: BS+, soft, NT/ND  Extremities: No peripheral edema b/l  Neurological: no focal deficits; strength grossly intact  Psychiatric: Normal mood, normal affect  Back: no CVAT b/l  Skin: No rashes, no nevi      LABS:                        9.3    6.34  )-----------( 163      ( 16 Jan 2018 08:56 )             29.1     Na(142)/K(3.8)/Cl(105)/HCO3(28)/BUN(16)/Cr(2.84)Glu(84)/Ca(8.3)/Mg(2.2)/PO4(2.9)    01-16 @ 09:07  Na(142)/K(4.1)/Cl(108)/HCO3(27)/BUN(14)/Cr(2.62)Glu(80)/Ca(8.5)/Mg(1.7)/PO4(3.0)    01-15 @ 08:46  Na(141)/K(4.3)/Cl(106)/HCO3(27)/BUN(15)/Cr(2.83)Glu(74)/Ca(8.6)/Mg(--)/PO4(--)    01-14 @ 09:47        ASSESSMENT: 72M w/ h/o dementia, atrial fibrillation, diverticulitis, nephrolithiasis, Waldenstrom's macroglobulinemia, and stage 4 chronic kidney disease, 1/9/18 a/w acute diverticulitis    (1)Renal - CKD4 - resolved mild LOUISE from prerenal azotemia, in association with volume repletion  (2)Lytes - acceptable at present. On standing PO NaHCO3  (3)CV - euvolemic to mildly hypovolemic - on gentle IVF for now  (4)Surgery - acute diverticulitis - on Zosyn. Clinically improving      RECOMMEND:  (1)Antibiotics as ordered  (2)D/C planning per primary team              Norman Ascencio MD  [facility]  [phone] No pain, no shortness of breath      VITAL:  T(C): , Max: 37.1 (01-15-18 @ 21:10)  T(F): , Max: 98.8 (01-15-18 @ 21:10)  HR: 60 (01-16-18 @ 09:24)  BP: 114/68 (01-16-18 @ 09:24)  BP(mean): --  RR: 17 (01-16-18 @ 09:24)  SpO2: 96% (01-16-18 @ 09:24)      PHYSICAL EXAM:  Constitutional: A+Ox3, NAD  HEENT: NCAT, DMM  Neck: Supple, No JVD  Respiratory: CTA-b/l  Cardiovascular: RRR s1s2  Gastrointestinal: BS+, soft, NT/ND  Extremities: No peripheral edema b/l  Neurological: no focal deficits; strength grossly intact  Psychiatric: Normal mood, normal affect  Back: no CVAT b/l  Skin: No rashes, no nevi      LABS:                        9.3    6.34  )-----------( 163      ( 16 Jan 2018 08:56 )             29.1     Na(142)/K(3.8)/Cl(105)/HCO3(28)/BUN(16)/Cr(2.84)Glu(84)/Ca(8.3)/Mg(2.2)/PO4(2.9)    01-16 @ 09:07  Na(142)/K(4.1)/Cl(108)/HCO3(27)/BUN(14)/Cr(2.62)Glu(80)/Ca(8.5)/Mg(1.7)/PO4(3.0)    01-15 @ 08:46  Na(141)/K(4.3)/Cl(106)/HCO3(27)/BUN(15)/Cr(2.83)Glu(74)/Ca(8.6)/Mg(--)/PO4(--)    01-14 @ 09:47        ASSESSMENT: 72M w/ h/o dementia, atrial fibrillation, diverticulitis, nephrolithiasis, Waldenstrom's macroglobulinemia, and stage 4 chronic kidney disease, 1/9/18 a/w acute diverticulitis    (1)Renal - CKD4 - resolved mild LOUISE from prerenal azotemia, in association with volume repletion  (2)Lytes - acceptable at present. On standing PO NaHCO3  (3)CV - euvolemic to mildly hypovolemic - on gentle IVF for now  (4)Surgery - acute diverticulitis - on Zosyn. Clinically improving. Needs IV access for outpatient antibiotics. Mr. Olvera has had stage 4 CKD with a GFR of ~20ml/min for the past 3 years (ie for as long as I have known him). He is not likely to need HD anytime soon. Even if he were to need HD, the nondominant left arm could be used for AV access. I believe benefit of RUE PICC placement outweighs the risks.      RECOMMEND:  (1)No objection to placement of RUE PICC.              Norman Ascencio MD  [facility]  [phone]

## 2018-01-16 NOTE — PROGRESS NOTE ADULT - SUBJECTIVE AND OBJECTIVE BOX
General Surgery Progress Note  NICOLAS CIFUENTES    S: No acute events overnight.     O:  T(C): 37.1 (01-15-18 @ 21:10), Max: 37.1 (01-15-18 @ 21:10)  HR: 53 (01-15-18 @ 21:10) (50 - 59)  BP: 123/64 (01-15-18 @ 21:10) (117/60 - 157/74)  RR: 18 (01-15-18 @ 21:10) (18 - 18)  SpO2: 96% (01-15-18 @ 21:10) (96% - 98%)        PHYSICAL EXAM:  Gen: Well-nourished, well-developed, A&O x3, resting in bed in no acute distress  CV: RRR  Resp: Patent airways, unlabored   Abdomen: Soft, nondistended, mild LLQ tenderness  Extremities: All 4 extremities warm and well perfused, no edema       Patient is a 72-year-old man  w/ hx of chronic diastolic heart failure, AFIB w/ PPM (on coumadin), CLL w/ Waldenstrom's macroglobulinemia, CKD Stage 4, and hx of multiple episodes of acute diverticulitis presenting w/ acute diverticulitis, HD7. Uncomplicated diverticulitis involving the distal descending and proximal sigmoid colon with progression of the pericolonic inflammatory change on CT. He is hemodynamically stable.    - AFIB: Patient has had supratherapeutic INR. Now within appropriate range for PICC  - PICC placement pending INR  - c/w amidarone  - Diet: on LRD, tolerating well  - ABx regimen: Zosyn  - On home med  - Dispo: [facility] General Surgery Progress Note  NICOLAS CIFUENTES    S: No acute events overnight. Pt sill pending PICC placement with 1 more week of abx. Pt does not wish to go home with a PICC.     O:  T(C): 37.1 (01-15-18 @ 21:10), Max: 37.1 (01-15-18 @ 21:10)  HR: 53 (01-15-18 @ 21:10) (50 - 59)  BP: 123/64 (01-15-18 @ 21:10) (117/60 - 157/74)  RR: 18 (01-15-18 @ 21:10) (18 - 18)  SpO2: 96% (01-15-18 @ 21:10) (96% - 98%)        PHYSICAL EXAM:  Gen: Well-nourished, well-developed, A&O x3, resting in bed in no acute distress  CV: RRR  Resp: Patent airways, unlabored   Abdomen: Soft, nondistended, mild LLQ tenderness  Extremities: All 4 extremities warm and well perfused, no edema       Patient is a 72-year-old man  w/ hx of chronic diastolic heart failure, AFIB w/ PPM (on coumadin), CLL w/ Waldenstrom's macroglobulinemia, CKD Stage 4, and hx of multiple episodes of acute diverticulitis presenting w/ acute diverticulitis, HD7. Uncomplicated diverticulitis involving the distal descending and proximal sigmoid colon with progression of the pericolonic inflammatory change on CT. He is hemodynamically stable.    - AFIB: Patient has had supratherapeutic INR. Now within appropriate range for PICC  - PICC placement pending INR  - c/w amiodarone  - Diet: on LRD, tolerating well  - ABx regimen: Zosyn  - On home med  - Dispo: order PT, pt wishes to go to [facility] General Surgery Progress Note  NICOLAS CIFUENTES    S: No acute events overnight. Pt sill pending PICC placement with 7-14 days of IV abx. Pt does not wish to go home with a PICC.     O:  T(C): 37.1 (01-15-18 @ 21:10), Max: 37.1 (01-15-18 @ 21:10)  HR: 53 (01-15-18 @ 21:10) (50 - 59)  BP: 123/64 (01-15-18 @ 21:10) (117/60 - 157/74)  RR: 18 (01-15-18 @ 21:10) (18 - 18)  SpO2: 96% (01-15-18 @ 21:10) (96% - 98%)        PHYSICAL EXAM:  Gen: Well-nourished, well-developed, A&O x3, resting in bed in no acute distress  CV: RRR  Resp: Patent airways, unlabored   Abdomen: Soft, nondistended, mild LLQ tenderness  Extremities: All 4 extremities warm and well perfused, no edema       Patient is a 72-year-old man  w/ hx of chronic diastolic heart failure, AFIB w/ PPM (on coumadin), CLL w/ Waldenstrom's macroglobulinemia, CKD Stage 4, and hx of multiple episodes of acute diverticulitis presenting w/ acute diverticulitis, HD7. Uncomplicated diverticulitis involving the distal descending and proximal sigmoid colon with progression of the pericolonic inflammatory change on CT. He is hemodynamically stable.    - AFIB: Patient has had supratherapeutic INR. Now within appropriate range for PICC  - PICC placement pending INR  - c/w amiodarone  - Diet: on LRD, tolerating well  - ABx regimen: Zosyn  - On home med  - Dispo: order PT, pt wishes to go to [facility]

## 2018-01-16 NOTE — PROGRESS NOTE ADULT - SUBJECTIVE AND OBJECTIVE BOX
72y old  Male who presents with a chief complaint of abdominal pain       Interval history:  Afebrile, abdominal pain resolved, tolerating regular food.       Antimicrobials:    piperacillin/tazobactam IVPB. 3.375 Gram(s) IV Intermittent every 12 hours    REVIEW OF SYSTEMS:    No chest pain or palpitations  No cough, no SOB  No N/V/D, no abdominal pain  No dysuria or frequency  No rash.     Vital Signs Last 24 Hrs  T(C): 36.9 (01-16-18 @ 17:02), Max: 37.1 (01-15-18 @ 21:10)  T(F): 98.4 (01-16-18 @ 17:02), Max: 98.8 (01-15-18 @ 21:10)  HR: 57 (01-16-18 @ 17:02) (53 - 61)  BP: 134/72 (01-16-18 @ 17:02) (112/60 - 148/79)  RR: 18 (01-16-18 @ 17:02) (16 - 18)  SpO2: 96% (01-16-18 @ 17:02) (96% - 98%)    PHYSICAL EXAM:  Patient in no acute distress. AAOX3.  No icterus, no oral ulcers.  Cardiovascular: S1S2 normal.  Lungs: + air entry B/L lung fields.  Gastrointestinal: soft, nontender, nondistended.  Extremities: no edema.  IV sites not inflamed.                           9.3    6.34  )-----------( 163      ( 16 Jan 2018 08:56 )             29.1   01-16    142  |  105  |  16  ----------------------------<  84  3.8   |  28  |  2.84<H>    Ca    8.3<L>      16 Jan 2018 09:07  Phos  2.9     01-16  Mg     2.2     01-16

## 2018-01-16 NOTE — PROGRESS NOTE ADULT - ASSESSMENT
72 male with CKD stage 4, CLL, Afib on coumadin, PPM, hypothyroidism, GERD here with abdominal pain. Recurrent diverticulitis, leucocytosis, s/p courses of abx.  Resolved leucocytosis, clinically improved.     Plan:  - blood cultures NTD   - Continue with zosyn 3.375 gm iv q12h, cr cl < 20- day 7   - will need PICC to complete 14 days therapy, day 7/14 of therapy today until 1/23/18.   - surgical plan as per primary team

## 2018-01-16 NOTE — PROGRESS NOTE ADULT - SUBJECTIVE AND OBJECTIVE BOX
Patient is a 72y old  Male who presented with abdominal pain (09 Jan 2018 17:09)    INTERVAL HPI/OVERNIGHT EVENTS:  Tolerating PO without abd pain    MEDICATIONS  (STANDING):  allopurinol 200 milliGRAM(s) Oral daily  amiodarone    Tablet 200 milliGRAM(s) Oral daily  clonazePAM Tablet 1 milliGRAM(s) Oral at bedtime  donepezil 10 milliGRAM(s) Oral at bedtime  lactobacillus acidophilus 1 Tablet(s) Oral daily  levothyroxine 125 MICROGram(s) Oral daily  melatonin 3 milliGRAM(s) Oral at bedtime  memantine 10 milliGRAM(s) Oral two times a day  mirtazapine 15 milliGRAM(s) Oral at bedtime  multivitamin 1 Tablet(s) Oral daily  pantoprazole    Tablet 40 milliGRAM(s) Oral before breakfast  piperacillin/tazobactam IVPB. 3.375 Gram(s) IV Intermittent every 12 hours  propranolol 10 milliGRAM(s) Oral two times a day  sodium bicarbonate 650 milliGRAM(s) Oral two times a day  tamsulosin 0.4 milliGRAM(s) Oral at bedtime    MEDICATIONS  (PRN):    Allergies  No Known Allergies    Review of Systems:  General:  No wt loss, fevers, chills, night sweats ,fatigue  CV:  No pain, palpitations, AF, s/p PPM  Resp:  No dyspnea, cough, tachypnea, wheezing  :  No pain, bleeding, incontinence, nocturia  Muscle:  No pain, weakness  Neuro:  No weakness, tingling, memory problems  Heme: +Waldenstrom Macroglobulinemia, on AC (AF)  Skin:  No rash, tattoos, scars, edema    T(F): 97.9 (01-16-18 @ 05:51), Max: 98.8 (01-15-18 @ 21:10)  HR: 53 (01-16-18 @ 05:51) (51 - 59)  BP: 112/60 (01-16-18 @ 05:51) (112/60 - 157/74)  RR: 16 (01-16-18 @ 05:51) (16 - 18)  SpO2: 96% (01-16-18 @ 05:51) (96% - 98%)  Wt(kg): --  ,   I&O's Summary    15 Josh 2018 07:01  -  16 Jan 2018 07:00  --------------------------------------------------------  IN: 1520 mL / OUT: 1725 mL / NET: -205 mL    PHYSICAL EXAM:  Constitutional: Non-toxic appearing, appears comfortable, AOx3  Neck: No LAD, supple, no JVD  Respiratory: Clear anteriorly, Left CW PPM site clean and dry  Cardiovascular: S1 and S2, RRR  Gastrointestinal: BS+, soft minimal tenderness LLQ (improved from prior exams)  NO rebound no rigidity  Extremities: No peripheral edema, neg clubbing, cyanosis  Vascular: 2+ peripheral pulses  Neurological: A/O x 3, no focal deficits  Psychiatric: Normal mood, normal affect  Skin: No rashes    LABS:                      9.8    5.86  )-----------( 158      ( 15 Josh 2018 08:55 )             31.8     01-15    142  |  108  |  14  ----------------------------<  80  4.1   |  27  |  2.62<H>    Ca    8.5      15 Josh 2018 08:46  Phos  3.0     01-15  Mg     1.7     01-15    PT/INR - ( 15 Josh 2018 08:51 )   PT: 20.5 sec;   INR: 1.79 ratio    PTT - ( 15 Josh 2018 08:51 )  PTT:33.3 sec    RADIOLOGY & ADDITIONAL TESTS:

## 2018-01-16 NOTE — PROGRESS NOTE ADULT - ATTENDING COMMENTS
I saw and examined the pt and discussed the tx plan with the House Staff. I agree with the exam and plan as documented in the surgery resident's note from today which I edited as indicated.  D/c planning.  Picc.  Will d/w ID total duration of ABX.  Dulce Cabral MD

## 2018-01-17 LAB
ANION GAP SERPL CALC-SCNC: 9 MMOL/L — SIGNIFICANT CHANGE UP (ref 5–17)
APTT BLD: 30.2 SEC — SIGNIFICANT CHANGE UP (ref 27.5–37.4)
BUN SERPL-MCNC: 21 MG/DL — SIGNIFICANT CHANGE UP (ref 7–23)
CALCIUM SERPL-MCNC: 8 MG/DL — LOW (ref 8.4–10.5)
CHLORIDE SERPL-SCNC: 109 MMOL/L — HIGH (ref 96–108)
CO2 SERPL-SCNC: 26 MMOL/L — SIGNIFICANT CHANGE UP (ref 22–31)
CREAT SERPL-MCNC: 2.74 MG/DL — HIGH (ref 0.5–1.3)
GLUCOSE SERPL-MCNC: 98 MG/DL — SIGNIFICANT CHANGE UP (ref 70–99)
HCT VFR BLD CALC: 29.7 % — LOW (ref 39–50)
HGB BLD-MCNC: 9.3 G/DL — LOW (ref 13–17)
INR BLD: 1.47 RATIO — HIGH (ref 0.88–1.16)
MAGNESIUM SERPL-MCNC: 2.1 MG/DL — SIGNIFICANT CHANGE UP (ref 1.6–2.6)
MCHC RBC-ENTMCNC: 28.9 PG — SIGNIFICANT CHANGE UP (ref 27–34)
MCHC RBC-ENTMCNC: 31.3 GM/DL — LOW (ref 32–36)
MCV RBC AUTO: 92.2 FL — SIGNIFICANT CHANGE UP (ref 80–100)
PHOSPHATE SERPL-MCNC: 3.5 MG/DL — SIGNIFICANT CHANGE UP (ref 2.5–4.5)
PLATELET # BLD AUTO: 167 K/UL — SIGNIFICANT CHANGE UP (ref 150–400)
POTASSIUM SERPL-MCNC: 4 MMOL/L — SIGNIFICANT CHANGE UP (ref 3.5–5.3)
POTASSIUM SERPL-SCNC: 4 MMOL/L — SIGNIFICANT CHANGE UP (ref 3.5–5.3)
PROTHROM AB SERPL-ACNC: 16.7 SEC — HIGH (ref 10–13.1)
RBC # BLD: 3.22 M/UL — LOW (ref 4.2–5.8)
RBC # FLD: 14.9 % — HIGH (ref 10.3–14.5)
SODIUM SERPL-SCNC: 144 MMOL/L — SIGNIFICANT CHANGE UP (ref 135–145)
WBC # BLD: 6.52 K/UL — SIGNIFICANT CHANGE UP (ref 3.8–10.5)
WBC # FLD AUTO: 6.52 K/UL — SIGNIFICANT CHANGE UP (ref 3.8–10.5)

## 2018-01-17 PROCEDURE — 36558 INSERT TUNNELED CV CATH: CPT | Mod: 53

## 2018-01-17 PROCEDURE — 93010 ELECTROCARDIOGRAM REPORT: CPT

## 2018-01-17 PROCEDURE — 76937 US GUIDE VASCULAR ACCESS: CPT | Mod: 26

## 2018-01-17 PROCEDURE — 99232 SBSQ HOSP IP/OBS MODERATE 35: CPT

## 2018-01-17 PROCEDURE — 77001 FLUOROGUIDE FOR VEIN DEVICE: CPT | Mod: 26

## 2018-01-17 RX ADMIN — PIPERACILLIN AND TAZOBACTAM 25 GRAM(S): 4; .5 INJECTION, POWDER, LYOPHILIZED, FOR SOLUTION INTRAVENOUS at 05:59

## 2018-01-17 RX ADMIN — Medication 3 MILLIGRAM(S): at 21:34

## 2018-01-17 RX ADMIN — MEMANTINE HYDROCHLORIDE 10 MILLIGRAM(S): 10 TABLET ORAL at 05:58

## 2018-01-17 RX ADMIN — TAMSULOSIN HYDROCHLORIDE 0.4 MILLIGRAM(S): 0.4 CAPSULE ORAL at 21:34

## 2018-01-17 RX ADMIN — PIPERACILLIN AND TAZOBACTAM 25 GRAM(S): 4; .5 INJECTION, POWDER, LYOPHILIZED, FOR SOLUTION INTRAVENOUS at 18:37

## 2018-01-17 RX ADMIN — Medication 125 MICROGRAM(S): at 05:58

## 2018-01-17 RX ADMIN — Medication 650 MILLIGRAM(S): at 05:58

## 2018-01-17 RX ADMIN — AMIODARONE HYDROCHLORIDE 200 MILLIGRAM(S): 400 TABLET ORAL at 05:58

## 2018-01-17 RX ADMIN — MIRTAZAPINE 15 MILLIGRAM(S): 45 TABLET, ORALLY DISINTEGRATING ORAL at 21:34

## 2018-01-17 RX ADMIN — DONEPEZIL HYDROCHLORIDE 10 MILLIGRAM(S): 10 TABLET, FILM COATED ORAL at 21:34

## 2018-01-17 RX ADMIN — Medication 200 MILLIGRAM(S): at 21:33

## 2018-01-17 RX ADMIN — MEMANTINE HYDROCHLORIDE 10 MILLIGRAM(S): 10 TABLET ORAL at 18:21

## 2018-01-17 RX ADMIN — HEPARIN SODIUM 5000 UNIT(S): 5000 INJECTION INTRAVENOUS; SUBCUTANEOUS at 05:59

## 2018-01-17 RX ADMIN — Medication 650 MILLIGRAM(S): at 18:21

## 2018-01-17 RX ADMIN — HEPARIN SODIUM 5000 UNIT(S): 5000 INJECTION INTRAVENOUS; SUBCUTANEOUS at 18:21

## 2018-01-17 RX ADMIN — PANTOPRAZOLE SODIUM 40 MILLIGRAM(S): 20 TABLET, DELAYED RELEASE ORAL at 05:59

## 2018-01-17 NOTE — PROCEDURE NOTE - NSPOSTPRCRAD_GEN_A_CORE
central line located in the superior vena cava/post-procedure radiography performed/line adjusted to depth of insertion

## 2018-01-17 NOTE — PROGRESS NOTE ADULT - ASSESSMENT
72 year old male known to our practice with multiple prior episodes of diverticulitis presents to ER on advice of primary GI attending (Shilpa).  Pt was started on PO Augmentin around Brush time by Dr Simons for presumed diverticulitis (similar presentation to multiple prior episodes) - reports initial improvement in pain then after ~6 days, developed worsening pain. prior to ER presentation, reports "excruciating pain" in LLQ, states he was unable to walk due to pain    CT - acute uncomplicated diverticulitis, distal Descending colon and proximal Sigmoid colon- no abscess    PLAN  -Continue IV antibiotics, IV fluids  -Eventual OR as per Surgery team  -Awaiting PICC line    Abdullahi Goncalves PA-C    [facility]  [phone]

## 2018-01-17 NOTE — PROGRESS NOTE ADULT - SUBJECTIVE AND OBJECTIVE BOX
pt s/p tunneled CVC placement. Called to see pt for chest pain. Reports 3/10 chest pressure non radiating started after the procedure.  Also, reports shallow breathing. v/s BP :140/72. HR: 50. 3 L NC with 100% O 2 saturation. Pt is resting in bed without any discomfort. EKG looks unchanged from previous EKG. Primary team called and made aware. Primary team to follow. Pt reports improvement in pressure after the interview. D/w Dr. Hernandez.

## 2018-01-17 NOTE — DIETITIAN INITIAL EVALUATION ADULT. - PERTINENT LABORATORY DATA
01-17 Na144 mmol/L Glu 98 mg/dL K+ 4.0 mmol/L Cr  2.74 mg/dL<H> BUN 21 mg/dL Phos 3.5 mg/dL, 1/17 calcium 8.0, 1/17 GFR=22

## 2018-01-17 NOTE — DIETITIAN INITIAL EVALUATION ADULT. - PHYSICAL APPEARANCE
BMI=24.0; pt appears to weigh closer to his stated az=[phone] pounds; recommend reweigh. Mild to moderate temporal muscle wasting/overweight

## 2018-01-17 NOTE — DIETITIAN INITIAL EVALUATION ADULT. - ORAL INTAKE PTA
good/Pt reports good appetite and intake PTA; consumes 2x/day, usually skips lunch. Pt takes MVI PTA. Pt on Coumadin at home; currently on hold in house.

## 2018-01-17 NOTE — DIETITIAN INITIAL EVALUATION ADULT. - NS AS NUTRI INTERV MEDICAL AND FOOD SUPPLEMENTS
1) Ensure Enlive x1/day (provides 350 kcals, 20 g protein)/Commercial beverage 1) Ensure Enlive x1/day (provides 350 kcals, 20 g protein) with diet advancement/Commercial beverage

## 2018-01-17 NOTE — DIETITIAN INITIAL EVALUATION ADULT. - NS AS NUTRI INTERV MEALS SNACK
Fiber - modified diet/1) Consider advancing to low fiber diet as medically appropriate 2) pt food preferences provided for when pt is no longer NPO

## 2018-01-17 NOTE — PROGRESS NOTE ADULT - ASSESSMENT
72 male with CKD stage 4, CLL, Afib on coumadin, PPM, hypothyroidism, GERD here with abdominal pain. Recurrent diverticulitis, leucocytosis, s/p courses of abx.  Resolved leucocytosis, clinically improved.     Plan:  - blood cultures NTD   - Continue with zosyn 3.375 gm iv q12h, cr cl < 20- day 8  - Plan for valles catheter   - Need to complete 14 days therapy, day 8/14 of therapy today until 1/23/18.   - surgical plan as per primary team

## 2018-01-17 NOTE — PROGRESS NOTE ADULT - SUBJECTIVE AND OBJECTIVE BOX
72y old  Male who presents with a chief complaint of abdominal pain      Interval history:  Afebrile, no abdominal pain, no N/V, wants to eat.       Antimicrobials:    piperacillin/tazobactam IVPB. 3.375 Gram(s) IV Intermittent every 12 hours    REVIEW OF SYSTEMS:    No chest pain or palpitations  No cough, no SOB  No N/V/D  No dysuria or frequency  No rash.     Vital Signs Last 24 Hrs  T(C): 36.9 (01-17-18 @ 13:16), Max: 37.3 (01-16-18 @ 22:29)  T(F): 98.5 (01-17-18 @ 13:16), Max: 99.2 (01-16-18 @ 22:29)  HR: 58 (01-17-18 @ 13:16) (51 - 58)  BP: 150/75 (01-17-18 @ 13:16) (104/56 - 150/75)  RR: 18 (01-17-18 @ 13:16) (18 - 18)  SpO2: 97% (01-17-18 @ 13:16) (96% - 97%)    PHYSICAL EXAM:  Patient in no acute distress. Alert, oriented.  No icterus, no oral ulcers.  Cardiovascular: S1S2 normal, + PPM  Lungs: + air entry B/L lung fields.  Gastrointestinal: soft, nontender, nondistended.  Extremities: no edema.  IV sites not inflamed.                           9.3    6.52  )-----------( 167      ( 17 Jan 2018 09:02 )             29.7   01-17    144  |  109<H>  |  21  ----------------------------<  98  4.0   |  26  |  2.74<H>    Ca    8.0<L>      17 Jan 2018 09:06  Phos  3.5     01-17  Mg     2.1     01-17

## 2018-01-17 NOTE — PROGRESS NOTE ADULT - ATTENDING COMMENTS
I saw and examined the pt and discussed the tx plan with the House Staff. I agree with the exam and plan as documented in the surgery resident's note from today which I edited as indicated.  D/c planning.  Picc.  Will d/w ID total duration of ABX.  Dulce Cabral MD I saw and examined the pt and discussed the tx plan with the House Staff. I agree with the exam and plan as documented in the surgery resident's note from today.  D/c planning.  Expect he will need closer to 3 weeks total of IV ABX given the chronicity of his symptoms.  To get White.  He will need f/u apt with me 5-7 days after he completes his ABX course to confirm he remains symptom free and decide on future surgical tx as needed (he needs to keep the White until his apt with me).  Dulce Cabral MD

## 2018-01-17 NOTE — PROGRESS NOTE ADULT - SUBJECTIVE AND OBJECTIVE BOX
Patient is a 72y old  Male who presented with a chief complaint of abdominal pain (09 Jan 2018 17:09)      INTERVAL HPI/OVERNIGHT EVENTS:  no overnight events  awaiting PICC line    MEDICATIONS  (STANDING):  allopurinol 200 milliGRAM(s) Oral daily  amiodarone    Tablet 200 milliGRAM(s) Oral daily  donepezil 10 milliGRAM(s) Oral at bedtime  heparin  Injectable 5000 Unit(s) SubCutaneous every 12 hours  lactobacillus acidophilus 1 Tablet(s) Oral daily  levothyroxine 125 MICROGram(s) Oral daily  melatonin 3 milliGRAM(s) Oral at bedtime  memantine 10 milliGRAM(s) Oral two times a day  mirtazapine 15 milliGRAM(s) Oral at bedtime  multivitamin 1 Tablet(s) Oral daily  pantoprazole    Tablet 40 milliGRAM(s) Oral before breakfast  piperacillin/tazobactam IVPB. 3.375 Gram(s) IV Intermittent every 12 hours  propranolol 10 milliGRAM(s) Oral two times a day  sodium bicarbonate 650 milliGRAM(s) Oral two times a day  sodium chloride 0.9%. 1000 milliLiter(s) (75 mL/Hr) IV Continuous <Continuous>  tamsulosin 0.4 milliGRAM(s) Oral at bedtime        Allergies  No Known Allergies      Review of Systems:  General:  No wt loss, fevers, chills, night sweats ,fatigue  CV:  No pain, palpitations, AF, s/p PPM  Resp:  No dyspnea, cough, tachypnea, wheezing  :  No pain, bleeding, incontinence, nocturia  Muscle:  No pain, weakness  Neuro:  No weakness, tingling, memory problems  Heme: +Waldenstrom Macroglobulinemia  Skin:  No rash, tattoos, scars, edema    Vital Signs Last 24 Hrs  T(C): 36.9 (17 Jan 2018 13:16), Max: 37.3 (16 Jan 2018 22:29)  T(F): 98.5 (17 Jan 2018 13:16), Max: 99.2 (16 Jan 2018 22:29)  HR: 58 (17 Jan 2018 13:16) (51 - 58)  BP: 150/75 (17 Jan 2018 13:16) (104/56 - 150/75)  BP(mean): --  RR: 18 (17 Jan 2018 13:16) (18 - 18)  SpO2: 97% (17 Jan 2018 13:16) (96% - 97%)    PHYSICAL EXAM:  Constitutional: Non-toxic appearing, appears comfortable, AOx3  Neck: No LAD, supple, no JVD  Respiratory: Clear anteriorly, Left CW PPM site clean and dry  Cardiovascular: S1 and S2, RRR  Gastrointestinal: BS+, soft minimal tenderness LLQ to deep palpation NO rebound no rigidity  Extremities: No peripheral edema, neg clubbing, cyanosis  Vascular: 2+ peripheral pulses  Neurological: A/O x 3, no focal deficits  Psychiatric: Normal mood, normal affect  Skin: No rashes    LABS:                        9.3    6.52  )-----------( 167      ( 17 Jan 2018 09:02 )             29.7     01-17    144  |  109<H>  |  21  ----------------------------<  98  4.0   |  26  |  2.74<H>    Ca    8.0<L>      17 Jan 2018 09:06  Phos  3.5     01-17  Mg     2.1     01-17      PT/INR - ( 17 Jan 2018 09:01 )   PT: 16.7 sec;   INR: 1.47 ratio         PTT - ( 17 Jan 2018 09:01 )  PTT:30.2 sec        RADIOLOGY & ADDITIONAL TESTS:

## 2018-01-17 NOTE — CHART NOTE - NSCHARTNOTEFT_GEN_A_CORE
Surgery Post-Op Note    Pre-Op Dx: perforated diverticulitis   Procedure: White placement   Surgeon: David    Subjective:   Pt seen and examined at the bedside. Pt w/ no complaints. He complained of CP earlier, no resolved. EKG unchanged. Denies fever, SOB and NV.     Vital Signs Last 24 Hrs  T(C): 36.8 (17 Jan 2018 19:00), Max: 37.3 (16 Jan 2018 22:29)  T(F): 98.2 (17 Jan 2018 19:00), Max: 99.2 (16 Jan 2018 22:29)  HR: 55 (17 Jan 2018 19:00) (51 - 58)  BP: 148/67 (17 Jan 2018 19:00) (104/56 - 159/77)  BP(mean): --  RR: 18 (17 Jan 2018 19:00) (18 - 18)  SpO2: 98% (17 Jan 2018 19:00) (96% - 98%)    Physical Exam:  General: NAD, resting comfortably in bed  Neuro: A/O x 3, no focal deficits  Pulmonary: Nonlabored breathing, no respiratory distress  Cardiovascular: NSR  Abdominal: soft, ATTP, ND  Incision: right chest C/D/I   Extremities: WWP        LABS:                        9.3    6.52  )-----------( 167      ( 17 Jan 2018 09:02 )             29.7     01-17    144  |  109<H>  |  21  ----------------------------<  98  4.0   |  26  |  2.74<H>    Ca    8.0<L>      17 Jan 2018 09:06  Phos  3.5     01-17  Mg     2.1     01-17      PT/INR - ( 17 Jan 2018 09:01 )   PT: 16.7 sec;   INR: 1.47 ratio         PTT - ( 17 Jan 2018 09:01 )  PTT:30.2 sec  CAPILLARY BLOOD GLUCOSE                  Radiology and Additional Studies:    Assessment:72y Male s/p above procedure White placed in right IJ    Plan:  IVF, Diet: LRD  Pain/nausea control PRN  Incentive spirometer/OOB/Ambulate

## 2018-01-17 NOTE — DIETITIAN INITIAL EVALUATION ADULT. - ENERGY NEEDS
ht: 74 inches, wt: ?186.5 pounds, BMI: 24.0, IBW: 190 pounds (+/- 10%),  %IBW: 98%  Edema: no edema noted Skin: intact, no pressure ulcers noted  Other pertinent information: Pt admitted for abdominal pain x2 weeks and acute diverticulitis. PMHx: chronic diastolic CHF, Afib with PPM on Coumadin, CLL with Waldenstrom's macroglobulinemia; last chemo 1/2017; in remission. CKD stage 4, and multiple episodes of acute diverticulitis. ht: 74 inches, wt: ?186.5 pounds, BMI: 24.0, IBW: 190 pounds (+/- 10%),  %IBW: 98%  Edema: no edema noted Skin: intact, no pressure ulcers noted  Other pertinent information: Pt admitted for abdominal pain x2 weeks and acute diverticulitis. PMHx: chronic diastolic CHF, Afib with PPM on Coumadin, CLL with Waldenstrom's macroglobulinemia; last chemo 1/2017; in remission. CKD stage 4, GERD, anxiety, and multiple episodes of acute diverticulitis.

## 2018-01-17 NOTE — PROGRESS NOTE ADULT - ASSESSMENT
73 yo male well known to me from multiple prior admissions, with h/o CLL, waldenstroms macroglobulinemia, ckd, afib s/p ppm on AC with coumadin, diverticulitis, anxiety a/w abd pain and recurrent diverticulitis    pt being followed by surg  reports improved abd pain    wbc improved  cont abx as per ID.  to get picc line today by IR  advance diet as tolerated  as per ID no plans for surg on this admission. will need long term abx first  pt states unable to get abx at home.  plan to dc to rehab   will need f/u with SW    afib   would restart coumadin tonight if no plans for surg on this admission  monitor INR daily  no need for bridging as per cards   cont home cardiac meds for rate control    CKD  renal following  monitor creat    CLL/waldenstroms  stable  onc eval noted    anxiety  cont home psy meds    will follow

## 2018-01-17 NOTE — PROGRESS NOTE ADULT - SUBJECTIVE AND OBJECTIVE BOX
NICOLAS CIFUENTES  72y Male  MRN:[id number]    Patient is a 72y old  Male who presents with a chief complaint of abd pain    HPI:  72 male with  CKD stage 4, CLL, Afib on coumadin, PPM, hypothyroidism, GERD here with abdominal pain. He had diverticulitis in Nov 2017 and has had several recurrences of diverticulitis since that time. Patient was started on Augmentin by GI and has been taking it for about 10 days with no improvement.  reports +diarrhea. no vomiting. low grade fevers at home.  Last night reports "excruciating pain" in LLQ, states he was unable to walk due to pain.  Patient has been afebrile this admission. No sick contacts. No recent travel.     Patient seen and evaluated at bedside. no acute events o/n    Interval HPI:  no new events     PAST MEDICAL & SURGICAL HISTORY:  Bradycardia, s/p ppm  Waldenstrom macroglobulinemia  Diverticulitis  Atrial fibrillation on coumadin  GERD (gastroesophageal reflux disease)  Anxiety disorder  CLL (chronic lymphocytic leukemia): in remission  History of laparoscopic cholecystectomy: 4/2014  S/P hernia repair: x2  Meniscus tear: s/p removal of Meniscus 8 months ago    SOC:  non smoker  no alcohol abuse  no drug abuse  lives with wife    Fam Hx:  non cont    REVIEW OF SYSTEMS:  Constitutional: yes fever, chills, fatigue. no weight loss.  Skin: No rash.  Eyes: No recent vision problems or eye pain.  ENT: No congestion, ear pain, or sore throat.  Endocrine: No thyroid problems.  Cardiovascular: No chest pain or palpation.  Respiratory: No cough, shortness of breath, congestion, or wheezing.  Gastrointestinal: as per hpi  Genitourinary: No dysuria.  Musculoskeletal: No joint swelling.  Neurologic: No headache.    VITALS:  Vital Signs Last 24 Hrs  T(C): 36.9 (17 Jan 2018 13:16), Max: 37.3 (16 Jan 2018 22:29)  T(F): 98.5 (17 Jan 2018 13:16), Max: 99.2 (16 Jan 2018 22:29)  HR: 58 (17 Jan 2018 13:16) (51 - 58)  BP: 150/75 (17 Jan 2018 13:16) (104/56 - 150/75)  BP(mean): --  RR: 18 (17 Jan 2018 13:16) (18 - 18)  SpO2: 97% (17 Jan 2018 13:16) (96% - 97%)    PHYSICAL EXAM:  GENERAL: NAD, well-developed  HEAD:  Atraumatic, Normocephalic  EYES: EOMI, PERRLA, conjunctiva and sclera clear  NECK: Supple, No JVD  CHEST/LUNG: Clear to auscultation bilaterally; No wheeze  HEART: S1, S2; No murmurs, rubs, or gallops  ABDOMEN: Soft, minimal tender LLQ, Nondistended; Bowel sounds present  EXTREMITIES:  2+ Peripheral Pulses, No clubbing, cyanosis, or edema  PSYCH: Normal affect  NEUROLOGY: AAOX3; non-focal  SKIN: No rashes or lesions    Consultant(s) Notes Reviewed:  [x ] YES  [ ] NO  Care Discussed with Consultants/Other Providers [ x] YES  [ ] NO    MEDS:  MEDICATIONS  (STANDING):  allopurinol 200 milliGRAM(s) Oral daily  amiodarone    Tablet 200 milliGRAM(s) Oral daily  donepezil 10 milliGRAM(s) Oral at bedtime  heparin  Injectable 5000 Unit(s) SubCutaneous every 12 hours  lactobacillus acidophilus 1 Tablet(s) Oral daily  levothyroxine 125 MICROGram(s) Oral daily  melatonin 3 milliGRAM(s) Oral at bedtime  memantine 10 milliGRAM(s) Oral two times a day  mirtazapine 15 milliGRAM(s) Oral at bedtime  multivitamin 1 Tablet(s) Oral daily  pantoprazole    Tablet 40 milliGRAM(s) Oral before breakfast  piperacillin/tazobactam IVPB. 3.375 Gram(s) IV Intermittent every 12 hours  propranolol 10 milliGRAM(s) Oral two times a day  sodium bicarbonate 650 milliGRAM(s) Oral two times a day  sodium chloride 0.9%. 1000 milliLiter(s) (75 mL/Hr) IV Continuous <Continuous>  tamsulosin 0.4 milliGRAM(s) Oral at bedtime    MEDICATIONS  (PRN):            ALLERGIES:  No Known Allergies      LABS:                                                                                  9.3    6.52  )-----------( 167      ( 17 Jan 2018 09:02 )             29.7   01-17    144  |  109<H>  |  21  ----------------------------<  98  4.0   |  26  |  2.74<H>    Ca    8.0<L>      17 Jan 2018 09:06  Phos  3.5     01-17  Mg     2.1     01-17    PT/INR - ( 17 Jan 2018 09:01 )   PT: 16.7 sec;   INR: 1.47 ratio         PTT - ( 17 Jan 2018 09:01 )  PTT:30.2 sec    < from: CT Abdomen and Pelvis w/ Oral Cont (01.09.18 @ 15:51) >  IMPRESSION:   Uncomplicated diverticulitis involving the distal descending and proximal   sigmoid colon with progression of the pericolonic inflammatory change. No   formed abscess. This inflammatory process lies adjacent to the left   lateral urinary bladder wall. Continued follow-up can be performed.    Unchanged retrocrural, retroperitoneal, and pelvic lymphadenopathy.    < end of copied text >

## 2018-01-17 NOTE — PROGRESS NOTE ADULT - SUBJECTIVE AND OBJECTIVE BOX
Interventional Radiology     72y Male with PMH Bradycardia, Waldenstrom macroglobulinemia, Diverticulitis, Atrial fibrillation, GERD (gastroesophageal reflux disease), Anxiety disorder, CLL (chronic lymphocytic leukemia): in remission, with stage 4 CKD now with recurrent Diverticulitis requiring long term IV Antibiotics.  IR requested to place CV catheter for long term antibiotics.	    PSH:  History of laparoscopic cholecystectomy: 4/2014  S/P hernia repair: x2  Meniscus tear: s/p removal of Meniscus 8 months ago    Allergies    No Known Allergies      Labs:                        9.3    6.52  )-----------( 167      ( 17 Jan 2018 09:02 )             29.7     01-17    144  |  109<H>  |  21  ----------------------------<  98  4.0   |  26  |  2.74<H>    Ca    8.0<L>      17 Jan 2018 09:06  Phos  3.5     01-17  Mg     2.1     01-17      PT/INR - ( 17 Jan 2018 09:01 )   PT: 16.7 sec;   INR: 1.47 ratio    PTT - ( 17 Jan 2018 09:01 )  PTT:30.2 sec    Culture - Blood (01.10.18 @ 00:35)    Specimen Source: .Blood Blood-Venous    Culture Results:   No growth at 5 days.    Culture - Blood (01.10.18 @ 00:35)    Specimen Source: .Blood Blood-Peripheral    Culture Results:   No growth at 5 days.    73 y/o M with recurrent diverticulitis requiring long term IV antibiotics    After risks, benefits, alternatives discussion the pt verbalized understanding and signed informed consent.  Will plan for Tunnelled CV catheter placement.    REGINALDO Guillermo  [facility] [id number]  Ext [phone]

## 2018-01-17 NOTE — PROGRESS NOTE ADULT - SUBJECTIVE AND OBJECTIVE BOX
General Surgery Progress Note  NICOLAS HIGGINBOTHAMO    S: No acute events overnight. Pt sill pending PICC placement with 7-14 days of IV abx. Pt does not wish to go home with a PICC.     O:  PHYSICAL EXAM:  Gen: Well-nourished, well-developed, A&O x3, resting in bed in no acute distress  CV: RRR  Resp: Patent airways, unlabored   Abdomen: Soft, nondistended, mild LLQ tenderness  Extremities: All 4 extremities warm and well perfused, no edema       Vital Signs Last 24 Hrs  T(C): 37 (17 Jan 2018 00:16), Max: 37.3 (16 Jan 2018 22:29)  T(F): 98.6 (17 Jan 2018 00:16), Max: 99.2 (16 Jan 2018 22:29)  HR: 51 (17 Jan 2018 00:16) (51 - 61)  BP: 104/58 (17 Jan 2018 00:16) (104/58 - 148/79)  BP(mean): --  RR: 18 (17 Jan 2018 00:16) (16 - 18)  SpO2: 96% (17 Jan 2018 00:16) (96% - 98%)    I&O's Detail    15 Josh 2018 07:01  -  16 Jan 2018 07:00  --------------------------------------------------------  IN:    Oral Fluid: 1420 mL    Solution: 100 mL  Total IN: 1520 mL    OUT:    Voided: 1725 mL  Total OUT: 1725 mL    Total NET: -205 mL      16 Jan 2018 07:01  -  17 Jan 2018 03:18  --------------------------------------------------------  IN:    Oral Fluid: 1160 mL    sodium chloride 0.9%.: 900 mL    Solution: 100 mL  Total IN: 2160 mL    OUT:    Voided: 1450 mL  Total OUT: 1450 mL    Total NET: 710 mL          MEDICATIONS  (STANDING):  allopurinol 200 milliGRAM(s) Oral daily  amiodarone    Tablet 200 milliGRAM(s) Oral daily  donepezil 10 milliGRAM(s) Oral at bedtime  heparin  Injectable 5000 Unit(s) SubCutaneous every 12 hours  lactobacillus acidophilus 1 Tablet(s) Oral daily  levothyroxine 125 MICROGram(s) Oral daily  melatonin 3 milliGRAM(s) Oral at bedtime  memantine 10 milliGRAM(s) Oral two times a day  mirtazapine 15 milliGRAM(s) Oral at bedtime  multivitamin 1 Tablet(s) Oral daily  pantoprazole    Tablet 40 milliGRAM(s) Oral before breakfast  piperacillin/tazobactam IVPB. 3.375 Gram(s) IV Intermittent every 12 hours  propranolol 10 milliGRAM(s) Oral two times a day  sodium bicarbonate 650 milliGRAM(s) Oral two times a day  sodium chloride 0.9%. 1000 milliLiter(s) (75 mL/Hr) IV Continuous <Continuous>  tamsulosin 0.4 milliGRAM(s) Oral at bedtime    MEDICATIONS  (PRN):      LABS:                        9.3    6.34  )-----------( 163      ( 16 Jan 2018 08:56 )             29.1       01-16    142  |  105  |  16  ----------------------------<  84  3.8   |  28  |  2.84<H>    Ca    8.3<L>      16 Jan 2018 09:07  Phos  2.9     01-16  Mg     2.2     01-16 General Surgery Progress Note  NICOLAS VANE    S: No acute events overnight. Pt sill pending central line placement. Tolerating PO. Denies pain. Passing flatus and BM. No fevers or chills overnight. Pending [facility] placement.     O:  PHYSICAL EXAM:  Gen: Well-nourished, well-developed, A&O x3, resting in bed in no acute distress  CV: RRR  Resp: Patent airways, unlabored   Abdomen: Soft, nondistended, mild LLQ tenderness  Extremities: All 4 extremities warm and well perfused, no edema       Vital Signs Last 24 Hrs  T(C): 37 (17 Jan 2018 00:16), Max: 37.3 (16 Jan 2018 22:29)  T(F): 98.6 (17 Jan 2018 00:16), Max: 99.2 (16 Jan 2018 22:29)  HR: 51 (17 Jan 2018 00:16) (51 - 61)  BP: 104/58 (17 Jan 2018 00:16) (104/58 - 148/79)  BP(mean): --  RR: 18 (17 Jan 2018 00:16) (16 - 18)  SpO2: 96% (17 Jan 2018 00:16) (96% - 98%)    I&O's Detail    15 Josh 2018 07:01  -  16 Jan 2018 07:00  --------------------------------------------------------  IN:    Oral Fluid: 1420 mL    Solution: 100 mL  Total IN: 1520 mL    OUT:    Voided: 1725 mL  Total OUT: 1725 mL    Total NET: -205 mL      16 Jan 2018 07:01  -  17 Jan 2018 03:18  --------------------------------------------------------  IN:    Oral Fluid: 1160 mL    sodium chloride 0.9%.: 900 mL    Solution: 100 mL  Total IN: 2160 mL    OUT:    Voided: 1450 mL  Total OUT: 1450 mL    Total NET: 710 mL          MEDICATIONS  (STANDING):  allopurinol 200 milliGRAM(s) Oral daily  amiodarone    Tablet 200 milliGRAM(s) Oral daily  donepezil 10 milliGRAM(s) Oral at bedtime  heparin  Injectable 5000 Unit(s) SubCutaneous every 12 hours  lactobacillus acidophilus 1 Tablet(s) Oral daily  levothyroxine 125 MICROGram(s) Oral daily  melatonin 3 milliGRAM(s) Oral at bedtime  memantine 10 milliGRAM(s) Oral two times a day  mirtazapine 15 milliGRAM(s) Oral at bedtime  multivitamin 1 Tablet(s) Oral daily  pantoprazole    Tablet 40 milliGRAM(s) Oral before breakfast  piperacillin/tazobactam IVPB. 3.375 Gram(s) IV Intermittent every 12 hours  propranolol 10 milliGRAM(s) Oral two times a day  sodium bicarbonate 650 milliGRAM(s) Oral two times a day  sodium chloride 0.9%. 1000 milliLiter(s) (75 mL/Hr) IV Continuous <Continuous>  tamsulosin 0.4 milliGRAM(s) Oral at bedtime    MEDICATIONS  (PRN):      LABS:                        9.3    6.34  )-----------( 163      ( 16 Jan 2018 08:56 )             29.1       01-16    142  |  105  |  16  ----------------------------<  84  3.8   |  28  |  2.84<H>    Ca    8.3<L>      16 Jan 2018 09:07  Phos  2.9     01-16  Mg     2.2     01-16

## 2018-01-17 NOTE — DIETITIAN INITIAL EVALUATION ADULT. - OTHER INFO
Pt seen for LOS. Pt reports good appetite and intake in house on low fiber diet 1/14-1/16; reports eating >75% meals; consistent with documentation (1/15). Pt's highest qr=655 pounds and reported wt loss over 4 months 2/2 diarrhea and decreased PO intake. ?Documented ha=308.5 pounds, however pt stated wt NGG=039-053 pounds; recommend reweigh. As per previous RD eval, [id number], stated ZWJ=473 pounds and 7pound wt loss in 11 months (7/2017). Will monitor wt trends. Pt denies N/V constipation, or chewing/swallowing difficulties. C/o diarrhea x for weeks PTA and in house until 2 days ago; currently resolved. NFKA. Education not appropriate at this time 2/2 pt is NPO. Will follow up with diet education as diet advances. Pt requests additional nutritional supplement when diet advances. Pt seen for LOS. Pt reports good appetite and intake in house on low fiber diet 1/14-1/16; reports eating >75% meals; consistent with documentation (1/15). Pt's highest th=598 pounds and reported wt loss over 4 months 2/2 diarrhea and decreased PO intake. ?Documented xm=464.5 pounds, however pt stated wt KJA=911-625 pounds; recommend reweigh. As per previous RD eval, [id number], stated DIM=218 pounds and 7pound wt loss in 11 months (7/2017). Will monitor wt trends. Pt denies N/V constipation, or chewing/swallowing difficulties. C/o diarrhea for weeks PTA and in house until 2 days ago; currently resolved. NFKA. Education not appropriate at this time 2/2 pt is NPO. Will follow up with diet education as diet advances. Pt requests additional nutritional supplement when diet advances.

## 2018-01-17 NOTE — DIETITIAN INITIAL EVALUATION ADULT. - NS FNS REASON FOR WEIGHT CHANG
other (specify)/reported decreased PO intake and diarrhea for prolonged period of time; currently resolved

## 2018-01-17 NOTE — PROGRESS NOTE ADULT - ASSESSMENT
ASSESSMENT: Patient is a 72-year-old man  w/ hx of chronic diastolic heart failure, AFIB w/ PPM (on coumadin), CLL w/ Waldenstrom's macroglobulinemia, CKD Stage 4, and hx of multiple episodes of acute diverticulitis presenting w/ acute diverticulitis, HD7. Uncomplicated diverticulitis involving the distal descending and proximal sigmoid colon with progression of the pericolonic inflammatory change on CT. He is hemodynamically stable.    PLAN:  - AFIB: Patient has had supratherapeutic INR. Now within appropriate range for PICC  - White to be placed today  - c/w amiodarone  - Diet: on LRD, tolerating well  - ABx regimen: Zosyn  - On home med  - Dispo: order PT, pt wishes to go to [facility] ASSESSMENT: Patient is a 72-year-old man  w/ hx of chronic diastolic heart failure, AFIB w/ PPM (on coumadin), CLL w/ Waldenstrom's macroglobulinemia, CKD Stage 4, and hx of multiple episodes of acute diverticulitis presenting w/ acute diverticulitis, HD7. Uncomplicated diverticulitis involving the distal descending and proximal sigmoid colon with progression of the pericolonic inflammatory change on CT. He is hemodynamically stable.    PLAN:  - AFIB: Hold coumadin until line placement performed  - White to be placed today  - c/w amiodarone  - Diet: on LRD, tolerating well  - ABx regimen: Zosyn  -FU ID regarding duration  - On home med  - Dispo: order PT, pt wishes to go to [facility]

## 2018-01-17 NOTE — PROCEDURE NOTE - NSPROCDETAILS_GEN_ALL_CORE
guidewire recovered/lumen(s) aspirated and flushed/sterile technique, catheter placed/ultrasound guidance/sterile dressing applied

## 2018-01-18 ENCOUNTER — TRANSCRIPTION ENCOUNTER (OUTPATIENT)
Age: 73
End: 2018-01-18

## 2018-01-18 VITALS
OXYGEN SATURATION: 97 % | RESPIRATION RATE: 18 BRPM | DIASTOLIC BLOOD PRESSURE: 65 MMHG | TEMPERATURE: 99 F | HEART RATE: 64 BPM | SYSTOLIC BLOOD PRESSURE: 149 MMHG

## 2018-01-18 LAB
ANION GAP SERPL CALC-SCNC: 10 MMOL/L — SIGNIFICANT CHANGE UP (ref 5–17)
APTT BLD: 29.5 SEC — SIGNIFICANT CHANGE UP (ref 27.5–37.4)
BUN SERPL-MCNC: 20 MG/DL — SIGNIFICANT CHANGE UP (ref 7–23)
CALCIUM SERPL-MCNC: 7.5 MG/DL — LOW (ref 8.4–10.5)
CHLORIDE SERPL-SCNC: 109 MMOL/L — HIGH (ref 96–108)
CO2 SERPL-SCNC: 25 MMOL/L — SIGNIFICANT CHANGE UP (ref 22–31)
CREAT SERPL-MCNC: 2.57 MG/DL — HIGH (ref 0.5–1.3)
GLUCOSE SERPL-MCNC: 103 MG/DL — HIGH (ref 70–99)
HCT VFR BLD CALC: 26 % — LOW (ref 39–50)
HGB BLD-MCNC: 8.4 G/DL — LOW (ref 13–17)
IGA FLD-MCNC: 21 MG/DL — LOW (ref 68–378)
IGG FLD-MCNC: 477 MG/DL — LOW (ref 694–1618)
IGM SERPL-MCNC: 1350 MG/DL — HIGH (ref 40–230)
INR BLD: 1.33 RATIO — HIGH (ref 0.88–1.16)
KAPPA LC SER QL IFE: 0.83 MG/DL — SIGNIFICANT CHANGE UP (ref 0.33–1.94)
KAPPA/LAMBDA FREE LIGHT CHAIN RATIO, SERUM: 0.01 RATIO — LOW (ref 0.26–1.65)
LAMBDA LC SER QL IFE: 120 MG/DL — HIGH (ref 0.57–2.63)
MAGNESIUM SERPL-MCNC: 1.8 MG/DL — SIGNIFICANT CHANGE UP (ref 1.6–2.6)
MCHC RBC-ENTMCNC: 29.6 PG — SIGNIFICANT CHANGE UP (ref 27–34)
MCHC RBC-ENTMCNC: 32.3 GM/DL — SIGNIFICANT CHANGE UP (ref 32–36)
MCV RBC AUTO: 91.5 FL — SIGNIFICANT CHANGE UP (ref 80–100)
PHOSPHATE SERPL-MCNC: 2.9 MG/DL — SIGNIFICANT CHANGE UP (ref 2.5–4.5)
PLATELET # BLD AUTO: 153 K/UL — SIGNIFICANT CHANGE UP (ref 150–400)
POTASSIUM SERPL-MCNC: 3.8 MMOL/L — SIGNIFICANT CHANGE UP (ref 3.5–5.3)
POTASSIUM SERPL-SCNC: 3.8 MMOL/L — SIGNIFICANT CHANGE UP (ref 3.5–5.3)
PROTHROM AB SERPL-ACNC: 15.1 SEC — HIGH (ref 10–13.1)
RBC # BLD: 2.84 M/UL — LOW (ref 4.2–5.8)
RBC # FLD: 14.8 % — HIGH (ref 10.3–14.5)
SODIUM SERPL-SCNC: 144 MMOL/L — SIGNIFICANT CHANGE UP (ref 135–145)
WBC # BLD: 6.63 K/UL — SIGNIFICANT CHANGE UP (ref 3.8–10.5)
WBC # FLD AUTO: 6.63 K/UL — SIGNIFICANT CHANGE UP (ref 3.8–10.5)

## 2018-01-18 PROCEDURE — 82435 ASSAY OF BLOOD CHLORIDE: CPT

## 2018-01-18 PROCEDURE — 84132 ASSAY OF SERUM POTASSIUM: CPT

## 2018-01-18 PROCEDURE — 97116 GAIT TRAINING THERAPY: CPT

## 2018-01-18 PROCEDURE — 84100 ASSAY OF PHOSPHORUS: CPT

## 2018-01-18 PROCEDURE — 80048 BASIC METABOLIC PNL TOTAL CA: CPT

## 2018-01-18 PROCEDURE — 85014 HEMATOCRIT: CPT

## 2018-01-18 PROCEDURE — 87040 BLOOD CULTURE FOR BACTERIA: CPT

## 2018-01-18 PROCEDURE — 96374 THER/PROPH/DIAG INJ IV PUSH: CPT

## 2018-01-18 PROCEDURE — 85610 PROTHROMBIN TIME: CPT

## 2018-01-18 PROCEDURE — 99285 EMERGENCY DEPT VISIT HI MDM: CPT | Mod: 25

## 2018-01-18 PROCEDURE — 83605 ASSAY OF LACTIC ACID: CPT

## 2018-01-18 PROCEDURE — 86334 IMMUNOFIX E-PHORESIS SERUM: CPT

## 2018-01-18 PROCEDURE — 77001 FLUOROGUIDE FOR VEIN DEVICE: CPT

## 2018-01-18 PROCEDURE — C1751: CPT

## 2018-01-18 PROCEDURE — 85027 COMPLETE CBC AUTOMATED: CPT

## 2018-01-18 PROCEDURE — C1769: CPT

## 2018-01-18 PROCEDURE — 82784 ASSAY IGA/IGD/IGG/IGM EACH: CPT

## 2018-01-18 PROCEDURE — 74176 CT ABD & PELVIS W/O CONTRAST: CPT

## 2018-01-18 PROCEDURE — 99232 SBSQ HOSP IP/OBS MODERATE 35: CPT

## 2018-01-18 PROCEDURE — 84165 PROTEIN E-PHORESIS SERUM: CPT

## 2018-01-18 PROCEDURE — 84295 ASSAY OF SERUM SODIUM: CPT

## 2018-01-18 PROCEDURE — 97530 THERAPEUTIC ACTIVITIES: CPT

## 2018-01-18 PROCEDURE — C1894: CPT

## 2018-01-18 PROCEDURE — 83521 IG LIGHT CHAINS FREE EACH: CPT

## 2018-01-18 PROCEDURE — 96375 TX/PRO/DX INJ NEW DRUG ADDON: CPT

## 2018-01-18 PROCEDURE — 84155 ASSAY OF PROTEIN SERUM: CPT

## 2018-01-18 PROCEDURE — 76937 US GUIDE VASCULAR ACCESS: CPT

## 2018-01-18 PROCEDURE — 85730 THROMBOPLASTIN TIME PARTIAL: CPT

## 2018-01-18 PROCEDURE — 82803 BLOOD GASES ANY COMBINATION: CPT

## 2018-01-18 PROCEDURE — 80053 COMPREHEN METABOLIC PANEL: CPT

## 2018-01-18 PROCEDURE — 82947 ASSAY GLUCOSE BLOOD QUANT: CPT

## 2018-01-18 PROCEDURE — 97161 PT EVAL LOW COMPLEX 20 MIN: CPT

## 2018-01-18 PROCEDURE — 83735 ASSAY OF MAGNESIUM: CPT

## 2018-01-18 PROCEDURE — 82330 ASSAY OF CALCIUM: CPT

## 2018-01-18 PROCEDURE — 93005 ELECTROCARDIOGRAM TRACING: CPT

## 2018-01-18 PROCEDURE — 36558 INSERT TUNNELED CV CATH: CPT

## 2018-01-18 RX ORDER — PROPRANOLOL HCL 160 MG
1 CAPSULE, EXTENDED RELEASE 24HR ORAL
Qty: 0 | Refills: 0 | COMMUNITY
Start: 2018-01-18

## 2018-01-18 RX ORDER — WARFARIN SODIUM 2.5 MG/1
1 TABLET ORAL
Qty: 0 | Refills: 0 | COMMUNITY
Start: 2018-01-18

## 2018-01-18 RX ORDER — PIPERACILLIN AND TAZOBACTAM 4; .5 G/20ML; G/20ML
3.38 INJECTION, POWDER, LYOPHILIZED, FOR SOLUTION INTRAVENOUS
Qty: 0 | Refills: 0 | COMMUNITY
Start: 2018-01-18

## 2018-01-18 RX ORDER — WARFARIN SODIUM 2.5 MG/1
1 TABLET ORAL
Qty: 0 | Refills: 0 | COMMUNITY

## 2018-01-18 RX ORDER — WARFARIN SODIUM 2.5 MG/1
3 TABLET ORAL
Qty: 0 | Refills: 0 | COMMUNITY
Start: 2018-01-18

## 2018-01-18 RX ORDER — PANTOPRAZOLE SODIUM 20 MG/1
1 TABLET, DELAYED RELEASE ORAL
Qty: 0 | Refills: 0 | COMMUNITY
Start: 2018-01-18

## 2018-01-18 RX ADMIN — Medication 1 TABLET(S): at 11:33

## 2018-01-18 RX ADMIN — PIPERACILLIN AND TAZOBACTAM 25 GRAM(S): 4; .5 INJECTION, POWDER, LYOPHILIZED, FOR SOLUTION INTRAVENOUS at 05:19

## 2018-01-18 RX ADMIN — MEMANTINE HYDROCHLORIDE 10 MILLIGRAM(S): 10 TABLET ORAL at 05:15

## 2018-01-18 RX ADMIN — Medication 650 MILLIGRAM(S): at 05:15

## 2018-01-18 RX ADMIN — Medication 200 MILLIGRAM(S): at 11:33

## 2018-01-18 RX ADMIN — Medication 125 MICROGRAM(S): at 05:15

## 2018-01-18 RX ADMIN — HEPARIN SODIUM 5000 UNIT(S): 5000 INJECTION INTRAVENOUS; SUBCUTANEOUS at 05:18

## 2018-01-18 RX ADMIN — AMIODARONE HYDROCHLORIDE 200 MILLIGRAM(S): 400 TABLET ORAL at 05:15

## 2018-01-18 RX ADMIN — PANTOPRAZOLE SODIUM 40 MILLIGRAM(S): 20 TABLET, DELAYED RELEASE ORAL at 05:15

## 2018-01-18 NOTE — PROGRESS NOTE ADULT - SUBJECTIVE AND OBJECTIVE BOX
Patient is a 72y old  Male who presented with a chief complaint of abdominal pain (09 Jan 2018 17:09)    INTERVAL HPI/OVERNIGHT EVENTS:  no overnight events    MEDICATIONS  (STANDING):  allopurinol 200 milliGRAM(s) Oral daily  amiodarone    Tablet 200 milliGRAM(s) Oral daily  donepezil 10 milliGRAM(s) Oral at bedtime  heparin  Injectable 5000 Unit(s) SubCutaneous every 12 hours  lactobacillus acidophilus 1 Tablet(s) Oral daily  levothyroxine 125 MICROGram(s) Oral daily  melatonin 3 milliGRAM(s) Oral at bedtime  memantine 10 milliGRAM(s) Oral two times a day  mirtazapine 15 milliGRAM(s) Oral at bedtime  multivitamin 1 Tablet(s) Oral daily  pantoprazole    Tablet 40 milliGRAM(s) Oral before breakfast  piperacillin/tazobactam IVPB. 3.375 Gram(s) IV Intermittent every 12 hours  propranolol 10 milliGRAM(s) Oral two times a day  sodium bicarbonate 650 milliGRAM(s) Oral two times a day  sodium chloride 0.9%. 1000 milliLiter(s) (75 mL/Hr) IV Continuous <Continuous>  tamsulosin 0.4 milliGRAM(s) Oral at bedtime    Allergies  No Known Allergies    Review of Systems:  General:  No wt loss, fevers, chills, night sweats ,fatigue  CV:  No pain, palpitations, AF, s/p PPM  Resp:  No dyspnea, cough, tachypnea, wheezing  :  No pain, bleeding, incontinence, nocturia  Muscle:  No pain, weakness  Neuro:  No weakness, tingling, memory problems  Heme: +Waldenstrom Macroglobulinemia  Skin:  No rash, tattoos, scars, edema    T(F): 98.9 (01-18-18 @ 05:01), Max: 99 (01-17-18 @ 21:27)  HR: 58 (01-18-18 @ 05:01) (52 - 58)  BP: 107/55 (01-18-18 @ 05:01) (91/49 - 159/77)  RR: 16 (01-18-18 @ 05:01) (16 - 18)  SpO2: 95% (01-18-18 @ 05:01) (95% - 98%)  Wt(kg): --  ,   I&O's Summary    17 Jan 2018 07:01  -  18 Jan 2018 07:00  --------------------------------------------------------  IN: 1140 mL / OUT: 1550 mL / NET: -410 mL    PHYSICAL EXAM:  Constitutional: Non-toxic appearing, appears comfortable, AOx3  Neck: No LAD, supple, no JVD  Respiratory: Clear anteriorly, Left CW PPM site clean and dry  Cardiovascular: S1 and S2, RRR  Gastrointestinal: BS+, soft minimal tenderness LLQ to deep palpation NO rebound no rigidity  Extremities: No peripheral edema, neg clubbing, cyanosis  Vascular: 2+ peripheral pulses  Neurological: A/O x 3, no focal deficits  Psychiatric: Normal mood, normal affect  Skin: No rashes    LABS:                      9.3    6.52  )-----------( 167      ( 17 Jan 2018 09:02 )             29.7   144  |  109<H>  |  21  ----------------------------<  98  4.0   |  26  |  2.74<H>    Ca    8.0<L>      17 Jan 2018 09:06  Phos  3.5     01-17  Mg     2.1     01-17    PT/INR - ( 17 Jan 2018 09:01 )   PT: 16.7 sec;   INR: 1.47 ratio    PTT - ( 17 Jan 2018 09:01 )  PTT:30.2 sec    RADIOLOGY & ADDITIONAL TESTS:

## 2018-01-18 NOTE — DISCHARGE NOTE ADULT - MEDICATION SUMMARY - MEDICATIONS TO TAKE
I will START or STAY ON the medications listed below when I get home from the hospital:    sodium bicarbonate 650 mg oral tablet  -- 1 tab(s) by mouth 2 times a day  -- Indication: For Reflux    tamsulosin 0.4 mg oral capsule  -- 1 cap(s) by mouth once a day (at bedtime)  -- Indication: For BPH    amiodarone 200 mg oral tablet  -- 1 tab(s) by mouth once a day  -- Indication: For A fib    propranolol 10 mg oral tablet  -- 1 tab(s) by mouth 2 times a day (Hold for HR <60 and SBP <100)  -- Indication: For A fib    warfarin 1 mg oral tablet  -- 1 tab(s) by mouth every other day  -- Indication: For A fib    clonazePAM 1 mg oral tablet  -- 1 tab(s) by mouth once a day (at bedtime)  -- Indication: For Anxiety    mirtazapine 15 mg oral tablet  -- 1 tab(s) by mouth once a day (at bedtime)  -- Indication: For Depression    allopurinol 100 mg oral tablet  -- 1 tab(s) by mouth once a day  -- Indication: For Gout    donepezil 5 mg oral tablet  -- 1 tab(s) by mouth once a day (at bedtime)  -- Indication: For Insomnia    furosemide 40 mg oral tablet  -- 1 tab(s) by mouth once a day  -- Indication: For Monitor BMP, HTN    memantine 10 mg oral tablet  -- 1 tab(s) by mouth every 12 hours  -- Indication: For Home med    piperacillin-tazobactam 2 g-0.25 g intravenous injection  -- 3.375 gram(s) intravenous every 12 hours through 1/29/18  -- Indication: For Diverticulitis large intestine    lactobacillus acidophilus oral capsule  -- 1 tab(s) by mouth once a day  -- Indication: For probiotic    pantoprazole 40 mg oral delayed release tablet  -- 1 tab(s) by mouth once a day (before a meal)  -- Indication: For Reflux    levothyroxine 125 mcg (0.125 mg) oral tablet  -- 1 tab(s) by mouth once a day  -- Indication: For hypothyroidism    Multiple Vitamins oral tablet  -- 1 tab(s) by mouth once a day  -- Indication: For nutrition

## 2018-01-18 NOTE — PROGRESS NOTE ADULT - NSHPATTENDINGPLANDISCUSS_GEN_ALL_CORE
GI, surg, renal
surgery team
GI, surg, renal
surgery pa

## 2018-01-18 NOTE — DISCHARGE NOTE ADULT - INSTRUCTIONS
Low fiber diet  Activity as tolerated  White catheter care, please flush with 10cc sterile normal saline daily.   Zosyn through 1/29/18  Monitor BMP on home dose lasix  NOTIFY YOUR SURGEON IF: You have any bleeding that does not stop, any fever (over 100.4 F) or chills, persistent nausea/vomiting, persistent diarrhea, or if your pain is not controlled on your discharge pain medications.

## 2018-01-18 NOTE — DISCHARGE NOTE ADULT - HOSPITAL COURSE
72 year old male with a medical history significant for chronic diastolic heart failure AF with PPM (on Coumadin), CLL with Waldenstrom's macroglobulinemia last chemotherapy was in Jan, 2017, CKD 4, multiple episodes of acute diverticulitis presents with lower abdominal pain for 2 weeks. He was his gastroenterologist 2 weeks ago when his abdominal pain started, he was started on Augmentin but his pain persisted. His pain is in the LLQ, not associated with nausea, vomiting, diarrhea or fevers. His last episodes was a few months ago. Patient states he had 7 episodes in the last year but not all of them required hospitalization His last colonoscopy in November 2017, no masses seen. OF note, the patient had CLL evolved to Waldenstrom's macroglobulinemia complicated by chronic renal failure (CKD 4) and paroxysmal AF (has PPM and on Coumadin). Patient has completed maintenance Rituxan/Velcade/decadron.     CT A/P revealed Uncomplicated diverticulitis involving the distal descending and proximal sigmoid colon with progression of the pericolonic inflammatory change. No   formed abscess. This inflammatory process lies adjacent to the left lateral urinary bladder wall. Continued follow-up can be performed.Unchanged retrocrural, retroperitoneal, and pelvic lymphadenopathy. Blood cultures negative.     Patient was admitted for medical management. Treated with IVF, IV ABX (Zosyn), and kept NPO. As ABD exam improved, diet was advanced as tolerated. Patient improving on IV ABX. ID followed. Surgery extended the total days of ABX recommended to 20 days. IR placed White catheter for access as outpatient. PICC was not able to be placed due to GFR <40. PT recommended [facility] and patient stable for DC HD # 10. Nephrology followed. GI followed. Patient supratherapeutic on admission, so coumadin was held for line placement. EP interrogated PPM ad cards cleared him in the even he needed surgery.     At the time of discharge, the patient was hemodynamically stable, was tolerating PO diet, was voiding urine and passing stool, was ambulating, and was comfortable with adequate pain control. The patient was instructed to follow up with Dr. Cabral within 1-2 weeks after discharge from the hospital. The patient felt comfortable with discharge. The patient was discharged to rehab. The patient had no other issues.

## 2018-01-18 NOTE — DISCHARGE NOTE ADULT - CARE PROVIDERS DIRECT ADDRESSES
,DirectAddress_Unknown,[email or web address]

## 2018-01-18 NOTE — PROGRESS NOTE ADULT - ASSESSMENT
ASSESSMENT: Patient is a 72-year-old man  w/ hx of chronic diastolic heart failure, AFIB w/ PPM (on coumadin), CLL w/ Waldenstrom's macroglobulinemia, CKD Stage 4, and hx of multiple episodes of acute diverticulitis presenting w/ acute diverticulitis, HD9. Uncomplicated diverticulitis involving the distal descending and proximal sigmoid colon with progression of the pericolonic inflammatory change on CT. He is hemodynamically stable.    PLAN:  - AFIB: Hold coumadin until line placement performed  - c/w amiodarone  - Diet: on LRD, tolerating well  - ABx regimen: Zosyn  - FU ID regarding duration (so far rec is for 14 days, but a longer course might be necessary)  - On home med  - Dispo: order PT, pt wishes to go to [facility] ASSESSMENT: Patient is a 72-year-old man  w/ hx of chronic diastolic heart failure, AFIB w/ PPM (on coumadin), CLL w/ Waldenstrom's macroglobulinemia, CKD Stage 4, and hx of multiple episodes of acute diverticulitis presenting w/ acute diverticulitis, HD9. Uncomplicated diverticulitis involving the distal descending and proximal sigmoid colon with progression of the pericolonic inflammatory change on CT. He is hemodynamically stable.    PLAN:  - AFIB: Hold coumadin until line placement performed  - c/w amiodarone  - Diet: on LRD, tolerating well  - ABx regimen: Zosyn  - FU ID regarding duration (so far rec is for 14 days, but a longer course might be necessary)  - On home med  - Dispo: pending [facility] placement with IV abx through White catheter

## 2018-01-18 NOTE — PROGRESS NOTE ADULT - SUBJECTIVE AND OBJECTIVE BOX
General Surgery Progress Note  NICOLAS CIFUENTES    SUBJECTIVE: Had White placed yesterday. No acute events overnight. Tolerating PO.   Denies pain passing flatus and BM. No fevers or chills overnight. Pending [facility] placement.     OBJECTIVE:    Vital Signs Last 24 Hrs  T(C): 37.2 (18 Jan 2018 05:01), Max: 37.2 (17 Jan 2018 21:27)  T(F): 98.9 (18 Jan 2018 05:01), Max: 99 (17 Jan 2018 21:27)  HR: 58 (18 Jan 2018 05:01) (52 - 58)  BP: 107/55 (18 Jan 2018 05:01) (91/49 - 159/77)  BP(mean): --  RR: 16 (18 Jan 2018 05:01) (16 - 18)  SpO2: 95% (18 Jan 2018 05:01) (95% - 98%)    I&O's Detail    16 Jan 2018 07:01  -  17 Jan 2018 07:00  --------------------------------------------------------  IN:    Oral Fluid: 1160 mL    sodium chloride 0.9%.: 1425 mL    Solution: 200 mL  Total IN: 2785 mL    OUT:    Voided: 1450 mL  Total OUT: 1450 mL    Total NET: 1335 mL      17 Jan 2018 07:01  -  18 Jan 2018 05:48  --------------------------------------------------------  IN:    Oral Fluid: 240 mL    sodium chloride 0.9%.: 800 mL    Solution: 100 mL  Total IN: 1140 mL    OUT:    Voided: 1550 mL  Total OUT: 1550 mL    Total NET: -410 mL    PHYSICAL EXAM:  Gen: Well-nourished, well-developed, A&O x3, resting in bed in no acute distress  Resp: Patent airways, unlabored   Abdomen: Soft, nondistended, mild LLQ tenderness  Extremities: All 4 extremities warm and well perfused, no edema       MEDICATIONS  (STANDING):  allopurinol 200 milliGRAM(s) Oral daily  amiodarone    Tablet 200 milliGRAM(s) Oral daily  donepezil 10 milliGRAM(s) Oral at bedtime  heparin  Injectable 5000 Unit(s) SubCutaneous every 12 hours  lactobacillus acidophilus 1 Tablet(s) Oral daily  levothyroxine 125 MICROGram(s) Oral daily  melatonin 3 milliGRAM(s) Oral at bedtime  memantine 10 milliGRAM(s) Oral two times a day  mirtazapine 15 milliGRAM(s) Oral at bedtime  multivitamin 1 Tablet(s) Oral daily  pantoprazole    Tablet 40 milliGRAM(s) Oral before breakfast  piperacillin/tazobactam IVPB. 3.375 Gram(s) IV Intermittent every 12 hours  propranolol 10 milliGRAM(s) Oral two times a day  sodium bicarbonate 650 milliGRAM(s) Oral two times a day  sodium chloride 0.9%. 1000 milliLiter(s) (75 mL/Hr) IV Continuous <Continuous>  tamsulosin 0.4 milliGRAM(s) Oral at bedtime    MEDICATIONS  (PRN):      LABS:                        9.3    6.52  )-----------( 167      ( 17 Jan 2018 09:02 )             29.7       01-17    144  |  109<H>  |  21  ----------------------------<  98  4.0   |  26  |  2.74<H>    Ca    8.0<L>      17 Jan 2018 09:06  Phos  3.5     01-17  Mg     2.1     01-17 General Surgery Progress Note  NICOLAS CIFUENTES    SUBJECTIVE: Had White placed yesterday. No acute events overnight. Tolerating PO.   No fevers or chills overnight. Pending TWYLA placement.     OBJECTIVE:    Vital Signs Last 24 Hrs  T(C): 37.2 (18 Jan 2018 05:01), Max: 37.2 (17 Jan 2018 21:27)  T(F): 98.9 (18 Jan 2018 05:01), Max: 99 (17 Jan 2018 21:27)  HR: 58 (18 Jan 2018 05:01) (52 - 58)  BP: 107/55 (18 Jan 2018 05:01) (91/49 - 159/77)  BP(mean): --  RR: 16 (18 Jan 2018 05:01) (16 - 18)  SpO2: 95% (18 Jan 2018 05:01) (95% - 98%)    I&O's Detail    16 Jan 2018 07:01  -  17 Jan 2018 07:00  --------------------------------------------------------  IN:    Oral Fluid: 1160 mL    sodium chloride 0.9%.: 1425 mL    Solution: 200 mL  Total IN: 2785 mL    OUT:    Voided: 1450 mL  Total OUT: 1450 mL    Total NET: 1335 mL      17 Jan 2018 07:01  -  18 Jan 2018 05:48  --------------------------------------------------------  IN:    Oral Fluid: 240 mL    sodium chloride 0.9%.: 800 mL    Solution: 100 mL  Total IN: 1140 mL    OUT:    Voided: 1550 mL  Total OUT: 1550 mL    Total NET: -410 mL    PHYSICAL EXAM:  Gen: Well-nourished, well-developed, A&O x3, resting in bed in no acute distress  Resp: Patent airways, unlabored   Abdomen: Soft, nondistended, mild LLQ tenderness  Extremities: All 4 extremities warm and well perfused, no edema       MEDICATIONS  (STANDING):  allopurinol 200 milliGRAM(s) Oral daily  amiodarone    Tablet 200 milliGRAM(s) Oral daily  donepezil 10 milliGRAM(s) Oral at bedtime  heparin  Injectable 5000 Unit(s) SubCutaneous every 12 hours  lactobacillus acidophilus 1 Tablet(s) Oral daily  levothyroxine 125 MICROGram(s) Oral daily  melatonin 3 milliGRAM(s) Oral at bedtime  memantine 10 milliGRAM(s) Oral two times a day  mirtazapine 15 milliGRAM(s) Oral at bedtime  multivitamin 1 Tablet(s) Oral daily  pantoprazole    Tablet 40 milliGRAM(s) Oral before breakfast  piperacillin/tazobactam IVPB. 3.375 Gram(s) IV Intermittent every 12 hours  propranolol 10 milliGRAM(s) Oral two times a day  sodium bicarbonate 650 milliGRAM(s) Oral two times a day  sodium chloride 0.9%. 1000 milliLiter(s) (75 mL/Hr) IV Continuous <Continuous>  tamsulosin 0.4 milliGRAM(s) Oral at bedtime    MEDICATIONS  (PRN):      LABS:                        9.3    6.52  )-----------( 167      ( 17 Jan 2018 09:02 )             29.7       01-17    144  |  109<H>  |  21  ----------------------------<  98  4.0   |  26  |  2.74<H>    Ca    8.0<L>      17 Jan 2018 09:06  Phos  3.5     01-17  Mg     2.1     01-17

## 2018-01-18 NOTE — DISCHARGE NOTE ADULT - MEDICATION SUMMARY - MEDICATIONS TO STOP TAKING
I will STOP taking the medications listed below when I get home from the hospital:    predniSONE 5 mg oral tablet  -- 5 tab(s) by mouth once a day for 2 days, 4 tabs daily for 3 days 3 tabs daily for 3 days, 2 tabs daily for 3 days, then 1 tab daily for 3 days  -- It is very important that you take or use this exactly as directed.  Do not skip doses or discontinue unless directed by your doctor.  Obtain medical advice before taking any non-prescription drugs as some may affect the action of this medication.  Take with food or milk.

## 2018-01-18 NOTE — DISCHARGE NOTE ADULT - CARE PLAN
Principal Discharge DX:	Diverticulitis of large intestine without perforation or abscess without bleeding  Goal:	improving on IV ABX.  Assessment and plan of treatment:	Zosyn as prescribed. Please follow up with Dr. Cabral in 1-2 weeks after antibiotics completed. Dr. Castrejon will further determine operative plan from there, and if further antibiotics are needed. Based on that she will inform you if catheter can be removed. If needed you may follow up with Dr. Hernandez for your catheter. If needed you may follow up with your infectious disease doctor Dr. Gustafson in 2 weeks. Contact info below.  Secondary Diagnosis:	Atrial fibrillation, unspecified type  Assessment and plan of treatment:	Continue home medications and follow up with your primary care doctor.  Secondary Diagnosis:	Waldenstrom macroglobulinemia  Assessment and plan of treatment:	Monitor Cr. Hold lasix if needed. Please follow up with your nephrologist Stacie Ascencio in 1-2 weeks.

## 2018-01-18 NOTE — DISCHARGE NOTE ADULT - PATIENT PORTAL LINK FT
“You can access the Follow[facility] Patient Portal, offered by [facility], by registering with the following website: [email or web address]”

## 2018-01-18 NOTE — PROGRESS NOTE ADULT - ASSESSMENT
72 year old male known to our practice with multiple prior episodes of diverticulitis presents to ER on advice of primary GI attending (Shilpa).  Pt was started on PO Augmentin around Goodman time by Dr Simons for presumed diverticulitis (similar presentation to multiple prior episodes) - reports initial improvement in pain then after ~6 days, developed worsening pain. prior to ER presentation, reports "excruciating pain" in LLQ, states he was unable to walk due to pain    CT - acute uncomplicated diverticulitis, distal Descending colon and proximal Sigmoid colon- no abscess    PLAN  -Continue IV antibiotics, IV fluids  -Eventual OR as per Surgery team

## 2018-01-18 NOTE — PROGRESS NOTE ADULT - ASSESSMENT
72 male with CKD stage 4, CLL, Afib on coumadin, PPM, hypothyroidism, GERD here with abdominal pain. Recurrent diverticulitis, leucocytosis, s/p courses of abx.  Clinically improved.     Plan:  - blood cultures Negative.  - Continue with zosyn 3.375 gm iv q12h, cr cl < 20- day 9/14 of therapy today until 1/23/18.   - surgical plan as per primary team

## 2018-01-18 NOTE — PROGRESS NOTE ADULT - PROVIDER SPECIALTY LIST ADULT
Cardiology
Gastroenterology
Infectious Disease
Internal Medicine
Intervent Radiology
Intervent Radiology
Nephrology
Surgery
Gastroenterology
Internal Medicine

## 2018-01-18 NOTE — DISCHARGE NOTE ADULT - CARE PROVIDER_API CALL
Norman Ascencio), Internal Medicine; Nephrology  [address]  [place] [address]  Phone: [phone]  Fax: [phone]    Dulce Cabral), ColonRectal Surgery; Surgery  [address]  [address]  [place] [address]  Phone: [phone]  Fax: [phone]    Kit Hernandez), Diagnostic Radiology; VascularIntervent Radiology  [address] [place] [address]  Phone: [phone]  Fax: [phone]    Remington Shields), Infectious Disease; Internal Medicine  [address] [place] [address]  Phone: [phone]  Fax: [phone]

## 2018-01-18 NOTE — PROGRESS NOTE ADULT - SUBJECTIVE AND OBJECTIVE BOX
72y old  Male who presents with a chief complaint of abdominal pain      Interval history:  Afebrile, no abdominal pain, tolerating regular diet.       Antimicrobials:    piperacillin/tazobactam IVPB. 3.375 Gram(s) IV Intermittent every 12 hours    REVIEW OF SYSTEMS:    No chest pain or palpitations  No cough, no SOB  No N/V/D, no abdominal pain  No dysuria or frequency  No rash.     Vital Signs Last 24 Hrs  T(C): 37.1 (01-18-18 @ 13:45), Max: 37.2 (01-17-18 @ 21:27)  T(F): 98.7 (01-18-18 @ 13:45), Max: 99 (01-17-18 @ 21:27)  HR: 64 (01-18-18 @ 13:45) (54 - 64)  BP: 149/65 (01-18-18 @ 13:45) (91/49 - 159/77)  RR: 18 (01-18-18 @ 13:45) (16 - 18)  SpO2: 97% (01-18-18 @ 13:45) (95% - 98%)    PHYSICAL EXAM:  Patient in no acute distress. AAOX3.  Rt chest valles catheter.  Cardiovascular: S1S2 normal, + murmur, lt sided chest PPM.   Lungs: + air entry B/L lung fields.  Gastrointestinal: soft, nontender, nondistended.  Extremities: no edema.                     8.4    6.63  )-----------( 153      ( 18 Jan 2018 09:02 )             26.0   01-18    144  |  109<H>  |  20  ----------------------------<  103<H>  3.8   |  25  |  2.57<H>    Ca    7.5<L>      18 Jan 2018 08:57  Phos  2.9     01-18  Mg     1.8     01-18

## 2018-01-18 NOTE — PROGRESS NOTE ADULT - ATTENDING COMMENTS
I saw and examined the pt and discussed the tx plan with the House Staff. I agree with the exam and plan as documented in the surgery resident's note from today.  Today he reports LLQ "soreness" after a BM. Not significant pain as before.  Abdomen soft, ND, with mild LLQ tenderness.  Pt with smoldering diverticulitis, some minimal persistent symptoms but overall better, I feel that the total duration of ABX would be best 20 days given overall picture. Expect he will need surgery down the road, most likely shortly after the completion of the ABX course. The ABX will help to optimize him for the surgery as well. He will also benefit from PT as he is overall quite debilitated.   Dulce Cabral MD I saw and examined the pt and discussed the tx plan with the House Staff. I agree with the exam and plan as documented in the surgery resident's note from today which I edited as indicated.  Today he reports LLQ "soreness" after a BM. Not significant pain as before.  Abdomen soft, ND, with mild LLQ tenderness.  Pt with smoldering diverticulitis, some minimal persistent symptoms but overall better, I feel that the total duration of ABX would be best 20 days given overall picture. Expect he will need surgery down the road, most likely shortly after the completion of the ABX course. The ABX will help to optimize him for the surgery as well. He will also benefit from PT as he is overall quite debilitated.   Dulce Cabral MD

## 2018-01-18 NOTE — DISCHARGE NOTE ADULT - PLAN OF CARE
improving on IV ABX. Zosyn as prescribed. Please follow up with Dr. Cabral in 1-2 weeks after antibiotics completed. Dr. Castrejon will further determine operative plan from there, and if further antibiotics are needed. Based on that she will inform you if catheter can be removed. If needed you may follow up with Dr. Hernandez for your catheter. If needed you may follow up with your infectious disease doctor Dr. Gustafson in 2 weeks. Contact info below. Continue home medications and follow up with your primary care doctor. Monitor Cr. Hold lasix if needed. Please follow up with your nephrologist Stacie Ascencio in 1-2 weeks.

## 2018-01-18 NOTE — PROGRESS NOTE ADULT - SUBJECTIVE AND OBJECTIVE BOX
NICOLAS CIFUENTES  72y Male  MRN:[id number]    Patient is a 72y old  Male who presents with a chief complaint of abd pain    HPI:  72 male with  CKD stage 4, CLL, Afib on coumadin, PPM, hypothyroidism, GERD here with abdominal pain. He had diverticulitis in Nov 2017 and has had several recurrences of diverticulitis since that time. Patient was started on Augmentin by GI and has been taking it for about 10 days with no improvement.  reports +diarrhea. no vomiting. low grade fevers at home.  Last night reports "excruciating pain" in LLQ, states he was unable to walk due to pain.  Patient has been afebrile this admission. No sick contacts. No recent travel.     Patient seen and evaluated at bedside. no acute events o/n    Interval HPI:  s/p juanitakmann    PAST MEDICAL & SURGICAL HISTORY:  Bradycardia, s/p ppm  Waldenstrom macroglobulinemia  Diverticulitis  Atrial fibrillation on coumadin  GERD (gastroesophageal reflux disease)  Anxiety disorder  CLL (chronic lymphocytic leukemia): in remission  History of laparoscopic cholecystectomy: 4/2014  S/P hernia repair: x2  Meniscus tear: s/p removal of Meniscus 8 months ago    SOC:  non smoker  no alcohol abuse  no drug abuse  lives with wife    Fam Hx:  non cont    REVIEW OF SYSTEMS:  Constitutional: yes fever, chills, fatigue. no weight loss.  Skin: No rash.  Eyes: No recent vision problems or eye pain.  ENT: No congestion, ear pain, or sore throat.  Endocrine: No thyroid problems.  Cardiovascular: No chest pain or palpation.  Respiratory: No cough, shortness of breath, congestion, or wheezing.  Gastrointestinal: as per hpi  Genitourinary: No dysuria.  Musculoskeletal: No joint swelling.  Neurologic: No headache.    VITALS:  Vital Signs Last 24 Hrs  T(C): 37.1 (18 Jan 2018 13:45), Max: 37.2 (17 Jan 2018 21:27)  T(F): 98.7 (18 Jan 2018 13:45), Max: 99 (17 Jan 2018 21:27)  HR: 64 (18 Jan 2018 13:45) (54 - 64)  BP: 149/65 (18 Jan 2018 13:45) (91/49 - 159/77)  BP(mean): --  RR: 18 (18 Jan 2018 13:45) (16 - 18)  SpO2: 97% (18 Jan 2018 13:45) (95% - 98%)      PHYSICAL EXAM:  GENERAL: NAD, well-developed  HEAD:  Atraumatic, Normocephalic  EYES: EOMI, PERRLA, conjunctiva and sclera clear  NECK: Supple, No JVD  CHEST/LUNG: Clear to auscultation bilaterally; No wheeze.  +valles in place  HEART: S1, S2; No murmurs, rubs, or gallops  ABDOMEN: Soft, minimal tender LLQ, Nondistended; Bowel sounds present  EXTREMITIES:  2+ Peripheral Pulses, No clubbing, cyanosis, or edema  PSYCH: Normal affect  NEUROLOGY: AAOX3; non-focal  SKIN: No rashes or lesions    Consultant(s) Notes Reviewed:  [x ] YES  [ ] NO  Care Discussed with Consultants/Other Providers [ x] YES  [ ] NO    MEDS:  MEDICATIONS  (STANDING):  allopurinol 200 milliGRAM(s) Oral daily  amiodarone    Tablet 200 milliGRAM(s) Oral daily  donepezil 10 milliGRAM(s) Oral at bedtime  heparin  Injectable 5000 Unit(s) SubCutaneous every 12 hours  lactobacillus acidophilus 1 Tablet(s) Oral daily  levothyroxine 125 MICROGram(s) Oral daily  melatonin 3 milliGRAM(s) Oral at bedtime  memantine 10 milliGRAM(s) Oral two times a day  mirtazapine 15 milliGRAM(s) Oral at bedtime  multivitamin 1 Tablet(s) Oral daily  pantoprazole    Tablet 40 milliGRAM(s) Oral before breakfast  piperacillin/tazobactam IVPB. 3.375 Gram(s) IV Intermittent every 12 hours  propranolol 10 milliGRAM(s) Oral two times a day  sodium bicarbonate 650 milliGRAM(s) Oral two times a day  sodium chloride 0.9%. 1000 milliLiter(s) (75 mL/Hr) IV Continuous <Continuous>  tamsulosin 0.4 milliGRAM(s) Oral at bedtime    MEDICATIONS  (PRN):          ALLERGIES:  No Known Allergies      LABS:                                                                                  8.4    6.63  )-----------( 153      ( 18 Jan 2018 09:02 )             26.0   01-18    144  |  109<H>  |  20  ----------------------------<  103<H>  3.8   |  25  |  2.57<H>    Ca    7.5<L>      18 Jan 2018 08:57  Phos  2.9     01-18  Mg     1.8     01-18    PT/INR - ( 18 Jan 2018 09:04 )   PT: 15.1 sec;   INR: 1.33 ratio         PTT - ( 18 Jan 2018 09:04 )  PTT:29.5 sec    < from: CT Abdomen and Pelvis w/ Oral Cont (01.09.18 @ 15:51) >  IMPRESSION:   Uncomplicated diverticulitis involving the distal descending and proximal   sigmoid colon with progression of the pericolonic inflammatory change. No   formed abscess. This inflammatory process lies adjacent to the left   lateral urinary bladder wall. Continued follow-up can be performed.    Unchanged retrocrural, retroperitoneal, and pelvic lymphadenopathy.    < end of copied text >

## 2018-01-18 NOTE — PROGRESS NOTE ADULT - ASSESSMENT
71 yo male well known to me from multiple prior admissions, with h/o CLL, waldenstroms macroglobulinemia, ckd, afib s/p ppm on AC with coumadin, diverticulitis, anxiety a/w abd pain and recurrent diverticulitis    pt being followed by surg  reports improved abd pain    wbc improved  cont abx as per ID.  s/p valles cath  advance diet as tolerated  as per ID no plans for surg on this admission. will need long term abx first  pt states unable to get abx at home.  plan to dc to rehab     afib   on coumadin  monitor inr dialy  no need for bridging as per cards   cont home cardiac meds for rate control    CKD  renal following  monitor creat    CLL/waldenstroms  stable       anxiety  cont home psy meds    will follow

## 2018-02-05 ENCOUNTER — APPOINTMENT (OUTPATIENT)
Dept: SURGERY | Facility: CLINIC | Age: 73
End: 2018-02-05
Payer: COMMERCIAL

## 2018-02-05 VITALS
DIASTOLIC BLOOD PRESSURE: 65 MMHG | HEIGHT: 74 IN | RESPIRATION RATE: 16 BRPM | TEMPERATURE: 98.6 F | SYSTOLIC BLOOD PRESSURE: 107 MMHG | WEIGHT: 204 LBS | BODY MASS INDEX: 26.18 KG/M2 | HEART RATE: 78 BPM | OXYGEN SATURATION: 97 %

## 2018-02-05 DIAGNOSIS — Z83.3 FAMILY HISTORY OF DIABETES MELLITUS: ICD-10-CM

## 2018-02-05 DIAGNOSIS — Z82.49 FAMILY HISTORY OF ISCHEMIC HEART DISEASE AND OTHER DISEASES OF THE CIRCULATORY SYSTEM: ICD-10-CM

## 2018-02-05 DIAGNOSIS — Z83.79 FAMILY HISTORY OF OTHER DISEASES OF THE DIGESTIVE SYSTEM: ICD-10-CM

## 2018-02-05 DIAGNOSIS — F32.9 MAJOR DEPRESSIVE DISORDER, SINGLE EPISODE, UNSPECIFIED: ICD-10-CM

## 2018-02-05 PROCEDURE — 99214 OFFICE O/P EST MOD 30 MIN: CPT

## 2018-02-09 ENCOUNTER — OUTPATIENT (OUTPATIENT)
Dept: OUTPATIENT SERVICES | Facility: HOSPITAL | Age: 73
LOS: 1 days | End: 2018-02-09
Payer: COMMERCIAL

## 2018-02-09 DIAGNOSIS — N18.9 CHRONIC KIDNEY DISEASE, UNSPECIFIED: ICD-10-CM

## 2018-02-09 DIAGNOSIS — Z98.89 OTHER SPECIFIED POSTPROCEDURAL STATES: Chronic | ICD-10-CM

## 2018-02-09 PROCEDURE — 36593 DECLOT VASCULAR DEVICE: CPT

## 2018-02-09 PROCEDURE — 36598 INJ W/FLUOR EVAL CV DEVICE: CPT

## 2018-02-13 ENCOUNTER — NON-APPOINTMENT (OUTPATIENT)
Age: 73
End: 2018-02-13

## 2018-02-13 ENCOUNTER — APPOINTMENT (OUTPATIENT)
Dept: CARDIOLOGY | Facility: CLINIC | Age: 73
End: 2018-02-13
Payer: COMMERCIAL

## 2018-02-13 VITALS
HEIGHT: 74 IN | SYSTOLIC BLOOD PRESSURE: 125 MMHG | HEART RATE: 62 BPM | WEIGHT: 207 LBS | OXYGEN SATURATION: 97 % | BODY MASS INDEX: 26.56 KG/M2 | DIASTOLIC BLOOD PRESSURE: 73 MMHG

## 2018-02-13 PROCEDURE — 93000 ELECTROCARDIOGRAM COMPLETE: CPT

## 2018-02-13 PROCEDURE — 99215 OFFICE O/P EST HI 40 MIN: CPT

## 2018-02-13 RX ORDER — TRAZODONE HYDROCHLORIDE 50 MG/1
50 TABLET ORAL
Refills: 0 | Status: DISCONTINUED | COMMUNITY
Start: 2017-09-19 | End: 2018-02-13

## 2018-02-13 RX ORDER — ELECTROLYTES/DEXTROSE
SOLUTION, ORAL ORAL
Refills: 0 | Status: ACTIVE | COMMUNITY

## 2018-02-13 RX ORDER — RAMELTEON 8 MG/1
8 TABLET, FILM COATED ORAL
Refills: 0 | Status: DISCONTINUED | COMMUNITY
Start: 2017-09-19 | End: 2018-02-13

## 2018-02-14 ENCOUNTER — APPOINTMENT (OUTPATIENT)
Dept: UROLOGY | Facility: CLINIC | Age: 73
End: 2018-02-14
Payer: COMMERCIAL

## 2018-02-14 DIAGNOSIS — T82.898A OTHER SPECIFIED COMPLICATION OF VASCULAR PROSTHETIC DEVICES, IMPLANTS AND GRAFTS, INITIAL ENCOUNTER: ICD-10-CM

## 2018-02-14 PROCEDURE — 99214 OFFICE O/P EST MOD 30 MIN: CPT

## 2018-02-15 ENCOUNTER — OUTPATIENT (OUTPATIENT)
Dept: OUTPATIENT SERVICES | Facility: HOSPITAL | Age: 73
LOS: 1 days | End: 2018-02-15
Payer: COMMERCIAL

## 2018-02-15 ENCOUNTER — OUTPATIENT (OUTPATIENT)
Dept: OUTPATIENT SERVICES | Facility: HOSPITAL | Age: 73
LOS: 1 days | Discharge: HOME | End: 2018-02-15

## 2018-02-15 VITALS
WEIGHT: 205.25 LBS | HEART RATE: 59 BPM | HEIGHT: 74 IN | OXYGEN SATURATION: 97 % | SYSTOLIC BLOOD PRESSURE: 118 MMHG | RESPIRATION RATE: 16 BRPM | DIASTOLIC BLOOD PRESSURE: 75 MMHG | TEMPERATURE: 97 F

## 2018-02-15 DIAGNOSIS — Z98.41 CATARACT EXTRACTION STATUS, RIGHT EYE: Chronic | ICD-10-CM

## 2018-02-15 DIAGNOSIS — N40.0 BENIGN PROSTATIC HYPERPLASIA WITHOUT LOWER URINARY TRACT SYMPTOMS: ICD-10-CM

## 2018-02-15 DIAGNOSIS — K57.32 DIVERTICULITIS OF LARGE INTESTINE WITHOUT PERFORATION OR ABSCESS WITHOUT BLEEDING: ICD-10-CM

## 2018-02-15 DIAGNOSIS — Z98.890 OTHER SPECIFIED POSTPROCEDURAL STATES: Chronic | ICD-10-CM

## 2018-02-15 DIAGNOSIS — C91.11 CHRONIC LYMPHOCYTIC LEUKEMIA OF B-CELL TYPE IN REMISSION: ICD-10-CM

## 2018-02-15 DIAGNOSIS — F03.90 UNSPECIFIED DEMENTIA WITHOUT BEHAVIORAL DISTURBANCE: ICD-10-CM

## 2018-02-15 DIAGNOSIS — M10.9 GOUT, UNSPECIFIED: ICD-10-CM

## 2018-02-15 DIAGNOSIS — K57.92 DIVERTICULITIS OF INTESTINE, PART UNSPECIFIED, WITHOUT PERFORATION OR ABSCESS WITHOUT BLEEDING: ICD-10-CM

## 2018-02-15 DIAGNOSIS — C88.0 WALDENSTROM MACROGLOBULINEMIA: ICD-10-CM

## 2018-02-15 DIAGNOSIS — Z98.89 OTHER SPECIFIED POSTPROCEDURAL STATES: Chronic | ICD-10-CM

## 2018-02-15 DIAGNOSIS — Z95.0 PRESENCE OF CARDIAC PACEMAKER: Chronic | ICD-10-CM

## 2018-02-15 DIAGNOSIS — Z01.818 ENCOUNTER FOR OTHER PREPROCEDURAL EXAMINATION: ICD-10-CM

## 2018-02-15 DIAGNOSIS — I48.91 UNSPECIFIED ATRIAL FIBRILLATION: ICD-10-CM

## 2018-02-15 DIAGNOSIS — K21.9 GASTRO-ESOPHAGEAL REFLUX DISEASE WITHOUT ESOPHAGITIS: ICD-10-CM

## 2018-02-15 LAB
ANION GAP SERPL CALC-SCNC: 9 MMOL/L — SIGNIFICANT CHANGE UP (ref 5–17)
BLD GP AB SCN SERPL QL: NEGATIVE — SIGNIFICANT CHANGE UP
BUN SERPL-MCNC: 43 MG/DL — HIGH (ref 7–23)
CALCIUM SERPL-MCNC: 9.1 MG/DL — SIGNIFICANT CHANGE UP (ref 8.4–10.5)
CHLORIDE SERPL-SCNC: 105 MMOL/L — SIGNIFICANT CHANGE UP (ref 96–108)
CO2 SERPL-SCNC: 26 MMOL/L — SIGNIFICANT CHANGE UP (ref 22–31)
CREAT SERPL-MCNC: 3.69 MG/DL — HIGH (ref 0.5–1.3)
GLUCOSE SERPL-MCNC: 91 MG/DL — SIGNIFICANT CHANGE UP (ref 70–99)
HBA1C BLD-MCNC: 5.1 % — SIGNIFICANT CHANGE UP (ref 4–5.6)
HCT VFR BLD CALC: 35.7 % — LOW (ref 39–50)
HGB BLD-MCNC: 11.3 G/DL — LOW (ref 13–17)
MCHC RBC-ENTMCNC: 29 PG — SIGNIFICANT CHANGE UP (ref 27–34)
MCHC RBC-ENTMCNC: 31.7 GM/DL — LOW (ref 32–36)
MCV RBC AUTO: 91.8 FL — SIGNIFICANT CHANGE UP (ref 80–100)
PLATELET # BLD AUTO: 195 K/UL — SIGNIFICANT CHANGE UP (ref 150–400)
POTASSIUM SERPL-MCNC: 5.3 MMOL/L — SIGNIFICANT CHANGE UP (ref 3.5–5.3)
POTASSIUM SERPL-SCNC: 5.3 MMOL/L — SIGNIFICANT CHANGE UP (ref 3.5–5.3)
RBC # BLD: 3.89 M/UL — LOW (ref 4.2–5.8)
RBC # FLD: 15.2 % — HIGH (ref 10.3–14.5)
RH IG SCN BLD-IMP: POSITIVE — SIGNIFICANT CHANGE UP
SODIUM SERPL-SCNC: 140 MMOL/L — SIGNIFICANT CHANGE UP (ref 135–145)
WBC # BLD: 8.82 K/UL — SIGNIFICANT CHANGE UP (ref 3.8–10.5)
WBC # FLD AUTO: 8.82 K/UL — SIGNIFICANT CHANGE UP (ref 3.8–10.5)

## 2018-02-15 PROCEDURE — 86901 BLOOD TYPING SEROLOGIC RH(D): CPT

## 2018-02-15 PROCEDURE — 86900 BLOOD TYPING SEROLOGIC ABO: CPT

## 2018-02-15 PROCEDURE — 86850 RBC ANTIBODY SCREEN: CPT

## 2018-02-15 PROCEDURE — 87086 URINE CULTURE/COLONY COUNT: CPT

## 2018-02-15 PROCEDURE — 85027 COMPLETE CBC AUTOMATED: CPT

## 2018-02-15 PROCEDURE — 83036 HEMOGLOBIN GLYCOSYLATED A1C: CPT

## 2018-02-15 PROCEDURE — G0463: CPT

## 2018-02-15 PROCEDURE — 80048 BASIC METABOLIC PNL TOTAL CA: CPT

## 2018-02-15 RX ORDER — FUROSEMIDE 40 MG
1 TABLET ORAL
Qty: 0 | Refills: 0 | COMMUNITY

## 2018-02-15 RX ORDER — GABAPENTIN 400 MG/1
600 CAPSULE ORAL ONCE
Qty: 0 | Refills: 0 | Status: COMPLETED | OUTPATIENT
Start: 2018-02-22 | End: 2018-02-22

## 2018-02-15 RX ORDER — CEFOTETAN DISODIUM 1 G
2 VIAL (EA) INJECTION ONCE
Qty: 0 | Refills: 0 | Status: DISCONTINUED | OUTPATIENT
Start: 2018-02-22 | End: 2018-02-22

## 2018-02-15 NOTE — H&P PST ADULT - PMH
Anxiety disorder    Atrial fibrillation    Bradycardia, drug induced    CKD (chronic kidney disease)    CLL (chronic lymphocytic leukemia)  in remission  Dementia    Diverticulitis    GERD (gastroesophageal reflux disease)    Gout    [place] (hard of hearing)    Hypothyroid    Nephrolithiasis    Waldenstrom macroglobulinemia Anxiety disorder    Atrial fibrillation    BPH (benign prostatic hyperplasia)    Bradycardia, drug induced    CKD (chronic kidney disease)    CLL (chronic lymphocytic leukemia)  in remission  Dementia    Diverticulitis    GERD (gastroesophageal reflux disease)    Gout    [place] (hard of hearing)    Hypothyroid    Nephrolithiasis    Waldenstrom macroglobulinemia

## 2018-02-15 NOTE — H&P PST ADULT - NSANTHOSAYNRD_GEN_A_CORE
No. CHARLINE screening performed.  STOP BANG Legend: 0-2 = LOW Risk; 3-4 = INTERMEDIATE Risk; 5-8 = HIGH Risk

## 2018-02-15 NOTE — H&P PST ADULT - NS MD HP INPLANTS MED DEV
central line right chest wall inserted Jan 2018/Lens implant Lens implant/Pacemaker/central line right chest wall inserted Jan 2018; PPM [employer] [id number]

## 2018-02-15 NOTE — H&P PST ADULT - HISTORY OF PRESENT ILLNESS
72 year old male PMH of Afib on coumadin, gout, hypothyroid, 72 year old male PMH of Afib on coumadin, PPM  gout, hypothyroid, Waldenstrom's macroglobinemia ( recent hem-onc visit Nov 2017 suggest possible relapse) ,s/p chemo, GERD, dementia , CLL; Pt known to have diverticulitis however pt getting frequent exacerbations; Pt recently hospitalized in January 2018  for diverticulitis; Pt was then discharge to rehab with Single lumen right chest wall central line for Zosyn infusion x 10 days; Pt sill has catheter; He presents to PST today for laparoscopic sigmoid resection possible open cystoscopy insertion of ureteral catheters 72 year old male PMH of Afib on coumadin, PPM  gout, hypothyroid, Waldenstrom's macroglobinemia ( recent hem-onc visit Nov 2017 suggest possible relapse; Pt seeing hem-onc 2-16-18) ,s/p chemo, GERD, dementia , CLL; Pt known to have diverticulitis however pt getting frequent exacerbations; Pt recently hospitalized in January 2018  for diverticulitis; Pt was then discharge to rehab with Single lumen right chest wall central line for Zosyn infusion x 10 days; Pt sill has catheter; He presents to PST today for laparoscopic sigmoid resection possible open cystoscopy insertion of ureteral catheters

## 2018-02-15 NOTE — H&P PST ADULT - PROBLEM SELECTOR PLAN 2
hem-onc note in nov 2017 suggested possible relapse   wife awaiting call back from Dr Simons and informing him of upcoming surgery  will e-mail Dr Simons

## 2018-02-15 NOTE — H&P PST ADULT - PSH
History of appendectomy    History of cataract surgery, right  IOL  History of laparoscopic cholecystectomy  4/2014  Meniscus tear  s/p removal of Meniscus 8 months ago  S/P hernia repair  x2 History of appendectomy    History of cardiac pacemaker in situ    History of cataract surgery, right  IOL  History of laparoscopic cholecystectomy  4/2014  Meniscus tear  s/p removal of Meniscus 8 months ago  S/P hernia repair  x2

## 2018-02-16 ENCOUNTER — LABORATORY RESULT (OUTPATIENT)
Age: 73
End: 2018-02-16

## 2018-02-16 ENCOUNTER — RESULT REVIEW (OUTPATIENT)
Age: 73
End: 2018-02-16

## 2018-02-16 ENCOUNTER — APPOINTMENT (OUTPATIENT)
Dept: HEMATOLOGY ONCOLOGY | Facility: CLINIC | Age: 73
End: 2018-02-16
Payer: COMMERCIAL

## 2018-02-16 VITALS
DIASTOLIC BLOOD PRESSURE: 72 MMHG | SYSTOLIC BLOOD PRESSURE: 114 MMHG | WEIGHT: 206.99 LBS | TEMPERATURE: 98.1 F | OXYGEN SATURATION: 97 % | HEART RATE: 55 BPM | BODY MASS INDEX: 26.58 KG/M2 | RESPIRATION RATE: 16 BRPM

## 2018-02-16 LAB
BASOPHILS # BLD AUTO: 0.1 K/UL — SIGNIFICANT CHANGE UP (ref 0–0.2)
BASOPHILS NFR BLD AUTO: 0.8 % — SIGNIFICANT CHANGE UP (ref 0–2)
CULTURE RESULTS: NO GROWTH — SIGNIFICANT CHANGE UP
EOSINOPHIL # BLD AUTO: 0.2 K/UL — SIGNIFICANT CHANGE UP (ref 0–0.5)
EOSINOPHIL NFR BLD AUTO: 3 % — SIGNIFICANT CHANGE UP (ref 0–6)
HCT VFR BLD CALC: 33.6 % — LOW (ref 39–50)
HGB BLD-MCNC: 11.4 G/DL — LOW (ref 13–17)
LYMPHOCYTES # BLD AUTO: 2.3 K/UL — SIGNIFICANT CHANGE UP (ref 1–3.3)
LYMPHOCYTES # BLD AUTO: 29.5 % — SIGNIFICANT CHANGE UP (ref 13–44)
MCHC RBC-ENTMCNC: 30.8 PG — SIGNIFICANT CHANGE UP (ref 27–34)
MCHC RBC-ENTMCNC: 33.8 G/DL — SIGNIFICANT CHANGE UP (ref 32–36)
MCV RBC AUTO: 91.1 FL — SIGNIFICANT CHANGE UP (ref 80–100)
MONOCYTES # BLD AUTO: 0.8 K/UL — SIGNIFICANT CHANGE UP (ref 0–0.9)
MONOCYTES NFR BLD AUTO: 9.5 % — SIGNIFICANT CHANGE UP (ref 2–14)
NEUTROPHILS # BLD AUTO: 4.5 K/UL — SIGNIFICANT CHANGE UP (ref 1.8–7.4)
NEUTROPHILS NFR BLD AUTO: 57.1 % — SIGNIFICANT CHANGE UP (ref 43–77)
PLATELET # BLD AUTO: 163 K/UL — SIGNIFICANT CHANGE UP (ref 150–400)
RBC # BLD: 3.69 M/UL — LOW (ref 4.2–5.8)
RBC # FLD: 13.3 % — SIGNIFICANT CHANGE UP (ref 10.3–14.5)
SPECIMEN SOURCE: SIGNIFICANT CHANGE UP
WBC # BLD: 7.9 K/UL — SIGNIFICANT CHANGE UP (ref 3.8–10.5)
WBC # FLD AUTO: 7.9 K/UL — SIGNIFICANT CHANGE UP (ref 3.8–10.5)

## 2018-02-16 PROCEDURE — 99214 OFFICE O/P EST MOD 30 MIN: CPT

## 2018-02-21 ENCOUNTER — TRANSCRIPTION ENCOUNTER (OUTPATIENT)
Age: 73
End: 2018-02-21

## 2018-02-22 ENCOUNTER — APPOINTMENT (OUTPATIENT)
Dept: SURGERY | Facility: HOSPITAL | Age: 73
End: 2018-02-22
Payer: COMMERCIAL

## 2018-02-22 ENCOUNTER — TRANSCRIPTION ENCOUNTER (OUTPATIENT)
Age: 73
End: 2018-02-22

## 2018-02-22 ENCOUNTER — INPATIENT (INPATIENT)
Facility: HOSPITAL | Age: 73
LOS: 5 days | Discharge: HOME | DRG: 330 | End: 2018-02-28
Attending: SURGERY | Admitting: SURGERY
Payer: COMMERCIAL

## 2018-02-22 ENCOUNTER — RESULT REVIEW (OUTPATIENT)
Age: 73
End: 2018-02-22

## 2018-02-22 VITALS
WEIGHT: 205.25 LBS | DIASTOLIC BLOOD PRESSURE: 71 MMHG | RESPIRATION RATE: 16 BRPM | HEART RATE: 81 BPM | OXYGEN SATURATION: 98 % | HEIGHT: 74 IN | SYSTOLIC BLOOD PRESSURE: 121 MMHG | TEMPERATURE: 98 F

## 2018-02-22 DIAGNOSIS — Z98.41 CATARACT EXTRACTION STATUS, RIGHT EYE: Chronic | ICD-10-CM

## 2018-02-22 DIAGNOSIS — Z98.890 OTHER SPECIFIED POSTPROCEDURAL STATES: Chronic | ICD-10-CM

## 2018-02-22 DIAGNOSIS — K57.32 DIVERTICULITIS OF LARGE INTESTINE WITHOUT PERFORATION OR ABSCESS WITHOUT BLEEDING: ICD-10-CM

## 2018-02-22 DIAGNOSIS — Z95.0 PRESENCE OF CARDIAC PACEMAKER: Chronic | ICD-10-CM

## 2018-02-22 DIAGNOSIS — Z98.89 OTHER SPECIFIED POSTPROCEDURAL STATES: Chronic | ICD-10-CM

## 2018-02-22 LAB
ANION GAP SERPL CALC-SCNC: 12 MMOL/L — SIGNIFICANT CHANGE UP (ref 5–17)
APTT BLD: 32.1 SEC — SIGNIFICANT CHANGE UP (ref 27.5–37.4)
BASE EXCESS BLDA CALC-SCNC: -5.7 MMOL/L — LOW (ref -2–2)
BLD GP AB SCN SERPL QL: NEGATIVE — SIGNIFICANT CHANGE UP
BUN SERPL-MCNC: 30 MG/DL — HIGH (ref 7–23)
CALCIUM SERPL-MCNC: 8.1 MG/DL — LOW (ref 8.4–10.5)
CHLORIDE SERPL-SCNC: 107 MMOL/L — SIGNIFICANT CHANGE UP (ref 96–108)
CO2 BLDA-SCNC: 20 MMOL/L — LOW (ref 22–30)
CO2 SERPL-SCNC: 18 MMOL/L — LOW (ref 22–31)
CREAT SERPL-MCNC: 3.1 MG/DL — HIGH (ref 0.5–1.3)
GAS PNL BLDA: SIGNIFICANT CHANGE UP
GLUCOSE BLDC GLUCOMTR-MCNC: 139 MG/DL — HIGH (ref 70–99)
GLUCOSE SERPL-MCNC: 160 MG/DL — HIGH (ref 70–99)
HCO3 BLDA-SCNC: 19 MMOL/L — LOW (ref 21–29)
HCT VFR BLD CALC: 32.9 % — LOW (ref 39–50)
HGB BLD-MCNC: 10.4 G/DL — LOW (ref 13–17)
INR BLD: 1.55 RATIO — HIGH (ref 0.88–1.16)
INR BLD: 1.58 RATIO — HIGH (ref 0.88–1.16)
INR BLD: 1.7 RATIO — HIGH (ref 0.88–1.16)
MAGNESIUM SERPL-MCNC: 1.5 MG/DL — LOW (ref 1.6–2.6)
MCHC RBC-ENTMCNC: 29.2 PG — SIGNIFICANT CHANGE UP (ref 27–34)
MCHC RBC-ENTMCNC: 31.5 GM/DL — LOW (ref 32–36)
MCV RBC AUTO: 92.6 FL — SIGNIFICANT CHANGE UP (ref 80–100)
PCO2 BLDA: 37 MMHG — SIGNIFICANT CHANGE UP (ref 32–46)
PH BLDA: 7.34 — LOW (ref 7.35–7.45)
PHOSPHATE SERPL-MCNC: 3.5 MG/DL — SIGNIFICANT CHANGE UP (ref 2.5–4.5)
PLATELET # BLD AUTO: 136 K/UL — LOW (ref 150–400)
PO2 BLDA: 143 MMHG — HIGH (ref 74–108)
POTASSIUM SERPL-MCNC: 5.4 MMOL/L — HIGH (ref 3.5–5.3)
POTASSIUM SERPL-SCNC: 5.4 MMOL/L — HIGH (ref 3.5–5.3)
PROTHROM AB SERPL-ACNC: 17 SEC — HIGH (ref 9.8–12.7)
PROTHROM AB SERPL-ACNC: 17.4 SEC — HIGH (ref 9.8–12.7)
PROTHROM AB SERPL-ACNC: 18.6 SEC — HIGH (ref 9.8–12.7)
RBC # BLD: 3.55 M/UL — LOW (ref 4.2–5.8)
RBC # FLD: 13.1 % — SIGNIFICANT CHANGE UP (ref 10.3–14.5)
RH IG SCN BLD-IMP: POSITIVE — SIGNIFICANT CHANGE UP
SAO2 % BLDA: 99 % — HIGH (ref 92–96)
SODIUM SERPL-SCNC: 137 MMOL/L — SIGNIFICANT CHANGE UP (ref 135–145)
WBC # BLD: 11.5 K/UL — HIGH (ref 3.8–10.5)
WBC # FLD AUTO: 11.5 K/UL — HIGH (ref 3.8–10.5)

## 2018-02-22 PROCEDURE — 52005 CYSTO W/URTRL CATHJ: CPT

## 2018-02-22 PROCEDURE — 44207 L COLECTOMY/COLOPROCTOSTOMY: CPT | Mod: 22

## 2018-02-22 PROCEDURE — 93010 ELECTROCARDIOGRAM REPORT: CPT

## 2018-02-22 PROCEDURE — 44213 LAP MOBIL SPLENIC FL ADD-ON: CPT

## 2018-02-22 PROCEDURE — 74018 RADEX ABDOMEN 1 VIEW: CPT | Mod: 26

## 2018-02-22 RX ORDER — ONDANSETRON 8 MG/1
4 TABLET, FILM COATED ORAL EVERY 6 HOURS
Qty: 0 | Refills: 0 | Status: DISCONTINUED | OUTPATIENT
Start: 2018-02-22 | End: 2018-02-25

## 2018-02-22 RX ORDER — MAGNESIUM SULFATE 500 MG/ML
2 VIAL (ML) INJECTION ONCE
Qty: 0 | Refills: 0 | Status: COMPLETED | OUTPATIENT
Start: 2018-02-22 | End: 2018-02-23

## 2018-02-22 RX ORDER — TAMSULOSIN HYDROCHLORIDE 0.4 MG/1
0.4 CAPSULE ORAL AT BEDTIME
Qty: 0 | Refills: 0 | Status: DISCONTINUED | OUTPATIENT
Start: 2018-02-22 | End: 2018-02-28

## 2018-02-22 RX ORDER — MIRTAZAPINE 45 MG/1
15 TABLET, ORALLY DISINTEGRATING ORAL AT BEDTIME
Qty: 0 | Refills: 0 | Status: DISCONTINUED | OUTPATIENT
Start: 2018-02-22 | End: 2018-02-28

## 2018-02-22 RX ORDER — ALLOPURINOL 300 MG
100 TABLET ORAL DAILY
Qty: 0 | Refills: 0 | Status: DISCONTINUED | OUTPATIENT
Start: 2018-02-22 | End: 2018-02-24

## 2018-02-22 RX ORDER — NALOXONE HYDROCHLORIDE 4 MG/.1ML
0.1 SPRAY NASAL
Qty: 0 | Refills: 0 | Status: DISCONTINUED | OUTPATIENT
Start: 2018-02-22 | End: 2018-02-26

## 2018-02-22 RX ORDER — ALVIMOPAN 12 MG/1
12 CAPSULE ORAL
Qty: 0 | Refills: 0 | Status: DISCONTINUED | OUTPATIENT
Start: 2018-02-22 | End: 2018-02-25

## 2018-02-22 RX ORDER — LIDOCAINE HCL 20 MG/ML
0.2 VIAL (ML) INJECTION ONCE
Qty: 0 | Refills: 0 | Status: DISCONTINUED | OUTPATIENT
Start: 2018-02-22 | End: 2018-02-22

## 2018-02-22 RX ORDER — ACETAMINOPHEN 500 MG
1000 TABLET ORAL ONCE
Qty: 0 | Refills: 0 | Status: COMPLETED | OUTPATIENT
Start: 2018-02-23 | End: 2018-02-23

## 2018-02-22 RX ORDER — SODIUM CHLORIDE 9 MG/ML
1000 INJECTION, SOLUTION INTRAVENOUS
Qty: 0 | Refills: 0 | Status: DISCONTINUED | OUTPATIENT
Start: 2018-02-22 | End: 2018-02-23

## 2018-02-22 RX ORDER — ALVIMOPAN 12 MG/1
12 CAPSULE ORAL ONCE
Qty: 0 | Refills: 0 | Status: COMPLETED | OUTPATIENT
Start: 2018-02-22 | End: 2018-02-22

## 2018-02-22 RX ORDER — PROPRANOLOL HCL 160 MG
10 CAPSULE, EXTENDED RELEASE 24HR ORAL
Qty: 0 | Refills: 0 | Status: DISCONTINUED | OUTPATIENT
Start: 2018-02-22 | End: 2018-02-23

## 2018-02-22 RX ORDER — SODIUM CHLORIDE 9 MG/ML
3 INJECTION INTRAMUSCULAR; INTRAVENOUS; SUBCUTANEOUS EVERY 8 HOURS
Qty: 0 | Refills: 0 | Status: DISCONTINUED | OUTPATIENT
Start: 2018-02-22 | End: 2018-02-22

## 2018-02-22 RX ORDER — SODIUM BICARBONATE 1 MEQ/ML
650 SYRINGE (ML) INTRAVENOUS
Qty: 0 | Refills: 0 | Status: DISCONTINUED | OUTPATIENT
Start: 2018-02-22 | End: 2018-02-25

## 2018-02-22 RX ORDER — ONDANSETRON 8 MG/1
4 TABLET, FILM COATED ORAL ONCE
Qty: 0 | Refills: 0 | Status: DISCONTINUED | OUTPATIENT
Start: 2018-02-22 | End: 2018-02-23

## 2018-02-22 RX ORDER — DONEPEZIL HYDROCHLORIDE 10 MG/1
10 TABLET, FILM COATED ORAL AT BEDTIME
Qty: 0 | Refills: 0 | Status: DISCONTINUED | OUTPATIENT
Start: 2018-02-22 | End: 2018-02-28

## 2018-02-22 RX ORDER — HYDROMORPHONE HYDROCHLORIDE 2 MG/ML
0.25 INJECTION INTRAMUSCULAR; INTRAVENOUS; SUBCUTANEOUS
Qty: 0 | Refills: 0 | Status: DISCONTINUED | OUTPATIENT
Start: 2018-02-22 | End: 2018-02-23

## 2018-02-22 RX ORDER — HYDROMORPHONE HYDROCHLORIDE 2 MG/ML
30 INJECTION INTRAMUSCULAR; INTRAVENOUS; SUBCUTANEOUS
Qty: 0 | Refills: 0 | Status: DISCONTINUED | OUTPATIENT
Start: 2018-02-22 | End: 2018-02-24

## 2018-02-22 RX ORDER — PANTOPRAZOLE SODIUM 20 MG/1
40 TABLET, DELAYED RELEASE ORAL DAILY
Qty: 0 | Refills: 0 | Status: DISCONTINUED | OUTPATIENT
Start: 2018-02-22 | End: 2018-02-23

## 2018-02-22 RX ORDER — LEVOTHYROXINE SODIUM 125 MCG
125 TABLET ORAL DAILY
Qty: 0 | Refills: 0 | Status: DISCONTINUED | OUTPATIENT
Start: 2018-02-22 | End: 2018-02-24

## 2018-02-22 RX ORDER — RAMELTEON 8 MG
1 TABLET ORAL
Qty: 0 | Refills: 0 | COMMUNITY

## 2018-02-22 RX ORDER — MEPERIDINE HYDROCHLORIDE 50 MG/ML
12.5 INJECTION INTRAMUSCULAR; INTRAVENOUS; SUBCUTANEOUS ONCE
Qty: 0 | Refills: 0 | Status: DISCONTINUED | OUTPATIENT
Start: 2018-02-22 | End: 2018-02-22

## 2018-02-22 RX ORDER — AMIODARONE HYDROCHLORIDE 400 MG/1
200 TABLET ORAL DAILY
Qty: 0 | Refills: 0 | Status: DISCONTINUED | OUTPATIENT
Start: 2018-02-22 | End: 2018-02-28

## 2018-02-22 RX ORDER — ACETAMINOPHEN 500 MG
1000 TABLET ORAL ONCE
Qty: 0 | Refills: 0 | Status: COMPLETED | OUTPATIENT
Start: 2018-02-22 | End: 2018-02-22

## 2018-02-22 RX ORDER — HEPARIN SODIUM 5000 [USP'U]/ML
5000 INJECTION INTRAVENOUS; SUBCUTANEOUS ONCE
Qty: 0 | Refills: 0 | Status: COMPLETED | OUTPATIENT
Start: 2018-02-22 | End: 2018-02-22

## 2018-02-22 RX ORDER — HEPARIN SODIUM 5000 [USP'U]/ML
5000 INJECTION INTRAVENOUS; SUBCUTANEOUS EVERY 8 HOURS
Qty: 0 | Refills: 0 | Status: DISCONTINUED | OUTPATIENT
Start: 2018-02-22 | End: 2018-02-28

## 2018-02-22 RX ORDER — MEMANTINE HYDROCHLORIDE 10 MG/1
10 TABLET ORAL EVERY 12 HOURS
Qty: 0 | Refills: 0 | Status: DISCONTINUED | OUTPATIENT
Start: 2018-02-22 | End: 2018-02-24

## 2018-02-22 RX ADMIN — HYDROMORPHONE HYDROCHLORIDE 30 MILLILITER(S): 2 INJECTION INTRAMUSCULAR; INTRAVENOUS; SUBCUTANEOUS at 23:52

## 2018-02-22 RX ADMIN — HYDROMORPHONE HYDROCHLORIDE 0.25 MILLIGRAM(S): 2 INJECTION INTRAMUSCULAR; INTRAVENOUS; SUBCUTANEOUS at 20:45

## 2018-02-22 RX ADMIN — Medication 400 MILLIGRAM(S): at 22:45

## 2018-02-22 RX ADMIN — HEPARIN SODIUM 5000 UNIT(S): 5000 INJECTION INTRAVENOUS; SUBCUTANEOUS at 06:06

## 2018-02-22 RX ADMIN — MIRTAZAPINE 15 MILLIGRAM(S): 45 TABLET, ORALLY DISINTEGRATING ORAL at 22:45

## 2018-02-22 RX ADMIN — DONEPEZIL HYDROCHLORIDE 10 MILLIGRAM(S): 10 TABLET, FILM COATED ORAL at 22:45

## 2018-02-22 RX ADMIN — HYDROMORPHONE HYDROCHLORIDE 0.25 MILLIGRAM(S): 2 INJECTION INTRAMUSCULAR; INTRAVENOUS; SUBCUTANEOUS at 20:30

## 2018-02-22 RX ADMIN — MEPERIDINE HYDROCHLORIDE 12.5 MILLIGRAM(S): 50 INJECTION INTRAMUSCULAR; INTRAVENOUS; SUBCUTANEOUS at 21:00

## 2018-02-22 RX ADMIN — ALVIMOPAN 12 MILLIGRAM(S): 12 CAPSULE ORAL at 07:28

## 2018-02-22 RX ADMIN — Medication 1000 MILLIGRAM(S): at 23:15

## 2018-02-22 RX ADMIN — HYDROMORPHONE HYDROCHLORIDE 30 MILLILITER(S): 2 INJECTION INTRAMUSCULAR; INTRAVENOUS; SUBCUTANEOUS at 22:47

## 2018-02-22 RX ADMIN — MEMANTINE HYDROCHLORIDE 10 MILLIGRAM(S): 10 TABLET ORAL at 22:45

## 2018-02-22 RX ADMIN — TAMSULOSIN HYDROCHLORIDE 0.4 MILLIGRAM(S): 0.4 CAPSULE ORAL at 22:45

## 2018-02-22 RX ADMIN — SODIUM CHLORIDE 75 MILLILITER(S): 9 INJECTION, SOLUTION INTRAVENOUS at 19:28

## 2018-02-22 RX ADMIN — GABAPENTIN 600 MILLIGRAM(S): 400 CAPSULE ORAL at 06:06

## 2018-02-22 RX ADMIN — MEPERIDINE HYDROCHLORIDE 12.5 MILLIGRAM(S): 50 INJECTION INTRAMUSCULAR; INTRAVENOUS; SUBCUTANEOUS at 21:15

## 2018-02-22 RX ADMIN — HEPARIN SODIUM 5000 UNIT(S): 5000 INJECTION INTRAVENOUS; SUBCUTANEOUS at 22:46

## 2018-02-22 RX ADMIN — SODIUM CHLORIDE 3 MILLILITER(S): 9 INJECTION INTRAMUSCULAR; INTRAVENOUS; SUBCUTANEOUS at 06:16

## 2018-02-22 NOTE — PATIENT PROFILE ADULT. - NS MD HP INPLANTS MED DEV
central line right chest wall inserted Jan 2018; PPM [employer] [id number]/Lens implant/Pacemaker

## 2018-02-22 NOTE — PROGRESS NOTE ADULT - ASSESSMENT
73 yo gentleman s/p lap assisted sigmoid resection, cystoscopy and ucath placement:  - NPO/IVF  - Pain control w PCA  - Strict I/O's; Tom in place  - F/U GI fxn  - OOB/DVT ppx  - F/u labs; f/u post OP EKG  - Holding coumadin for now  - SQH

## 2018-02-22 NOTE — PATIENT PROFILE ADULT. - PSH
History of appendectomy    History of cardiac pacemaker in situ    History of cataract surgery, right  IOL  History of laparoscopic cholecystectomy  4/2014  Meniscus tear  s/p removal of Meniscus 8 months ago  S/P hernia repair  x2

## 2018-02-22 NOTE — BRIEF OPERATIVE NOTE - PROCEDURE
<<-----Click on this checkbox to enter Procedure Laparoscopic assisted sigmoid colectomy  02/22/2018    Active  [id number]

## 2018-02-22 NOTE — PROGRESS NOTE ADULT - SUBJECTIVE AND OBJECTIVE BOX
[facility] GENERAL SURGERY POST-OP NOTE    SUBJECTIVE: Pt seen and evaluated at bedside. Resting comfortably in bed. Pain controlled w PCA. Denies nausea/vomiting, CP, palpitations, SOB, lightheaded, dizziness. Voiding to walker. NPO.    Objective:  Gen: NAD   Pulm: b/l chest rise. No work on breathing  Card: RRR  Abd: soft, appropriately tender, ND. Dressings CDI   : Walker in place    Vital Signs Last 24 Hrs  T(C): 36.6 (22 Feb 2018 18:35), Max: 36.6 (22 Feb 2018 06:17)  T(F): 97.9 (22 Feb 2018 18:35), Max: 97.9 (22 Feb 2018 06:17)  HR: 71 (22 Feb 2018 20:30) (69 - 81)  BP: 148/68 (22 Feb 2018 20:30) (121/71 - 162/76)  BP(mean): 98 (22 Feb 2018 20:30) (90 - 114)  RR: 18 (22 Feb 2018 20:15) (16 - 18)  SpO2: 100% (22 Feb 2018 20:30) (98% - 100%)  I&O's Summary    22 Feb 2018 07:01  -  22 Feb 2018 22:19  --------------------------------------------------------  IN: 225 mL / OUT: 275 mL / NET: -50 mL      I&O's Detail    22 Feb 2018 07:01  -  22 Feb 2018 22:19  --------------------------------------------------------  IN:    lactated ringers.: 225 mL  Total IN: 225 mL    OUT:    Indwelling Catheter - Urethral: 275 mL  Total OUT: 275 mL    Total NET: -50 mL          MEDICATIONS  (STANDING):  acetaminophen  IVPB. 1000 milliGRAM(s) IV Intermittent once  allopurinol 100 milliGRAM(s) Oral daily  alvimopan 12 milliGRAM(s) Oral two times a day  amiodarone    Tablet 200 milliGRAM(s) Oral daily  donepezil 10 milliGRAM(s) Oral at bedtime  heparin  Injectable 5000 Unit(s) SubCutaneous every 8 hours  HYDROmorphone PCA (1 mG/mL) 30 milliLiter(s) PCA Continuous PCA Continuous  lactated ringers. 1000 milliLiter(s) (75 mL/Hr) IV Continuous <Continuous>  levothyroxine 125 MICROGram(s) Oral daily  memantine 10 milliGRAM(s) Oral every 12 hours  meperidine     Injectable 12.5 milliGRAM(s) IV Push once  mirtazapine 15 milliGRAM(s) Oral at bedtime  pantoprazole  Injectable 40 milliGRAM(s) IV Push daily  propranolol 10 milliGRAM(s) Oral two times a day  sodium bicarbonate 650 milliGRAM(s) Oral two times a day  tamsulosin 0.4 milliGRAM(s) Oral at bedtime    MEDICATIONS  (PRN):  HYDROmorphone  Injectable 0.25 milliGRAM(s) IV Push every 10 minutes PRN Severe Pain (7 - 10)  naloxone Injectable 0.1 milliGRAM(s) IV Push every 3 minutes PRN For ANY of the following changes in patient status:  A. RR LESS THAN 10 breaths per minute, B. Oxygen saturation LESS THAN 90%, C. Sedation score of 6  ondansetron Injectable 4 milliGRAM(s) IV Push every 6 hours PRN Nausea  ondansetron Injectable 4 milliGRAM(s) IV Push once PRN Nausea and/or Vomiting      LABS:                        10.4   11.5  )-----------( 136      ( 22 Feb 2018 19:52 )             32.9     02-22    137  |  107  |  30<H>  ----------------------------<  160<H>  5.4<H>   |  18<L>  |  3.10<H>    Ca    8.1<L>      22 Feb 2018 19:52  Phos  3.5     02-22  Mg     1.5     02-22      PT/INR - ( 22 Feb 2018 19:52 )   PT: 17.0 sec;   INR: 1.55 ratio         PTT - ( 22 Feb 2018 19:52 )  PTT:32.1 sec      RADIOLOGY & ADDITIONAL STUDIES:

## 2018-02-22 NOTE — PATIENT PROFILE ADULT. - PMH
Anxiety disorder    Atrial fibrillation    BPH (benign prostatic hyperplasia)    Bradycardia, drug induced    CKD (chronic kidney disease)    CLL (chronic lymphocytic leukemia)  in remission  Dementia    Diverticulitis    GERD (gastroesophageal reflux disease)    Gout    Robinson (hard of hearing)    Hypothyroid    Nephrolithiasis    Waldenstrom macroglobulinemia

## 2018-02-22 NOTE — AIRWAY REMOVAL NOTE  ADULT & PEDS - ARTIFICAL AIRWAY REMOVAL COMMENTS
Pt on CPAp 5 PS 5 40% ETCO2 32mmhg ABG after 1 hours on CPAP7.34/37/143/20/99% D/W Dr Gallagher. AFter giving 10  airway recruitment breaths and suctioning out oropharynx pt extubated to 40% AM. ETco2  30-34 P2 sat 100% Pt talks easily resting comfortably

## 2018-02-22 NOTE — BRIEF OPERATIVE NOTE - OPERATION/FINDINGS
omentum was stuck to the umbilical hernia repair mesh, requiring lysis of adhesions. A lower midline handport was used after adhesions were lysed. The splenic flexure was taken down and the colon was mobilized laparoscopically. The sigmoid was resected with purple load, multple gold loads for the mesentery, and a contour for the distal margin. The left branch of the middle colic was taken to mobilize the colon down to the rectum. The initial anastomosis (31 EEA) had a leak test omentum was stuck to the umbilical hernia repair mesh, requiring lysis of adhesions. A lower midline handport was used after adhesions were lysed. The splenic flexure was taken down and the colon was mobilized laparoscopically. The sigmoid was resected with purple load, multple gold loads for the mesentery, and a contour for the distal margin. The left branch of the middle colic was taken to mobilize the colon down to the rectum. The initial anastomosis (31 EEA) had a positive leak test upon insuflation. The was oversewn with 4 3-0 silks. A second leak test was negative. There was no tension on the anastomosis. It was pink upon closing the abdomen (vicryl for the port sites and looped PDS for the midline, staples for the skin, one wick in the hand port)

## 2018-02-23 DIAGNOSIS — D68.9 COAGULATION DEFECT, UNSPECIFIED: ICD-10-CM

## 2018-02-23 LAB
ANION GAP SERPL CALC-SCNC: 10 MMOL/L — SIGNIFICANT CHANGE UP (ref 5–17)
ANION GAP SERPL CALC-SCNC: 14 MMOL/L — SIGNIFICANT CHANGE UP (ref 5–17)
APTT BLD: 31.7 SEC — SIGNIFICANT CHANGE UP (ref 27.5–37.4)
BACTERIA UR CULT: NORMAL
BASOPHILS # BLD AUTO: 0 K/UL — SIGNIFICANT CHANGE UP (ref 0–0.2)
BASOPHILS NFR BLD AUTO: 0.1 % — SIGNIFICANT CHANGE UP (ref 0–2)
BUN SERPL-MCNC: 29 MG/DL — HIGH (ref 7–23)
BUN SERPL-MCNC: 30 MG/DL — HIGH (ref 7–23)
CALCIUM SERPL-MCNC: 8 MG/DL — LOW (ref 8.4–10.5)
CALCIUM SERPL-MCNC: 8.1 MG/DL — LOW (ref 8.4–10.5)
CHLORIDE SERPL-SCNC: 104 MMOL/L — SIGNIFICANT CHANGE UP (ref 96–108)
CHLORIDE SERPL-SCNC: 108 MMOL/L — SIGNIFICANT CHANGE UP (ref 96–108)
CO2 SERPL-SCNC: 18 MMOL/L — LOW (ref 22–31)
CO2 SERPL-SCNC: 19 MMOL/L — LOW (ref 22–31)
CREAT SERPL-MCNC: 3.11 MG/DL — HIGH (ref 0.5–1.3)
CREAT SERPL-MCNC: 3.21 MG/DL — HIGH (ref 0.5–1.3)
EOSINOPHIL # BLD AUTO: 0 K/UL — SIGNIFICANT CHANGE UP (ref 0–0.5)
EOSINOPHIL NFR BLD AUTO: 0.2 % — SIGNIFICANT CHANGE UP (ref 0–6)
GLUCOSE SERPL-MCNC: 134 MG/DL — HIGH (ref 70–99)
GLUCOSE SERPL-MCNC: 153 MG/DL — HIGH (ref 70–99)
HCT VFR BLD CALC: 29 % — LOW (ref 39–50)
HCT VFR BLD CALC: 29.5 % — LOW (ref 39–50)
HGB BLD-MCNC: 9.6 G/DL — LOW (ref 13–17)
HGB BLD-MCNC: 9.7 G/DL — LOW (ref 13–17)
INR BLD: 1.52 RATIO — HIGH (ref 0.88–1.16)
LYMPHOCYTES # BLD AUTO: 1.2 K/UL — SIGNIFICANT CHANGE UP (ref 1–3.3)
LYMPHOCYTES # BLD AUTO: 13 % — SIGNIFICANT CHANGE UP (ref 13–44)
MAGNESIUM SERPL-MCNC: 2 MG/DL — SIGNIFICANT CHANGE UP (ref 1.6–2.6)
MCHC RBC-ENTMCNC: 30.4 PG — SIGNIFICANT CHANGE UP (ref 27–34)
MCHC RBC-ENTMCNC: 31.1 PG — SIGNIFICANT CHANGE UP (ref 27–34)
MCHC RBC-ENTMCNC: 32.6 GM/DL — SIGNIFICANT CHANGE UP (ref 32–36)
MCHC RBC-ENTMCNC: 33.3 GM/DL — SIGNIFICANT CHANGE UP (ref 32–36)
MCV RBC AUTO: 93.1 FL — SIGNIFICANT CHANGE UP (ref 80–100)
MCV RBC AUTO: 93.5 FL — SIGNIFICANT CHANGE UP (ref 80–100)
MONOCYTES # BLD AUTO: 0.5 K/UL — SIGNIFICANT CHANGE UP (ref 0–0.9)
MONOCYTES NFR BLD AUTO: 5.7 % — SIGNIFICANT CHANGE UP (ref 2–14)
NEUTROPHILS # BLD AUTO: 7.2 K/UL — SIGNIFICANT CHANGE UP (ref 1.8–7.4)
NEUTROPHILS NFR BLD AUTO: 81.1 % — HIGH (ref 43–77)
PHOSPHATE SERPL-MCNC: 3.4 MG/DL — SIGNIFICANT CHANGE UP (ref 2.5–4.5)
PLATELET # BLD AUTO: 127 K/UL — LOW (ref 150–400)
PLATELET # BLD AUTO: 129 K/UL — LOW (ref 150–400)
POTASSIUM SERPL-MCNC: 4.4 MMOL/L — SIGNIFICANT CHANGE UP (ref 3.5–5.3)
POTASSIUM SERPL-MCNC: 5.1 MMOL/L — SIGNIFICANT CHANGE UP (ref 3.5–5.3)
POTASSIUM SERPL-SCNC: 4.4 MMOL/L — SIGNIFICANT CHANGE UP (ref 3.5–5.3)
POTASSIUM SERPL-SCNC: 5.1 MMOL/L — SIGNIFICANT CHANGE UP (ref 3.5–5.3)
PROTHROM AB SERPL-ACNC: 16.7 SEC — HIGH (ref 9.8–12.7)
RBC # BLD: 3.11 M/UL — LOW (ref 4.2–5.8)
RBC # BLD: 3.17 M/UL — LOW (ref 4.2–5.8)
RBC # FLD: 13.1 % — SIGNIFICANT CHANGE UP (ref 10.3–14.5)
RBC # FLD: 13.2 % — SIGNIFICANT CHANGE UP (ref 10.3–14.5)
SODIUM SERPL-SCNC: 136 MMOL/L — SIGNIFICANT CHANGE UP (ref 135–145)
SODIUM SERPL-SCNC: 137 MMOL/L — SIGNIFICANT CHANGE UP (ref 135–145)
WBC # BLD: 8.1 K/UL — SIGNIFICANT CHANGE UP (ref 3.8–10.5)
WBC # BLD: 8.9 K/UL — SIGNIFICANT CHANGE UP (ref 3.8–10.5)
WBC # FLD AUTO: 8.1 K/UL — SIGNIFICANT CHANGE UP (ref 3.8–10.5)
WBC # FLD AUTO: 8.9 K/UL — SIGNIFICANT CHANGE UP (ref 3.8–10.5)

## 2018-02-23 PROCEDURE — 71045 X-RAY EXAM CHEST 1 VIEW: CPT | Mod: 26

## 2018-02-23 PROCEDURE — 99222 1ST HOSP IP/OBS MODERATE 55: CPT | Mod: GC

## 2018-02-23 RX ORDER — PROPRANOLOL HCL 160 MG
10 CAPSULE, EXTENDED RELEASE 24HR ORAL DAILY
Qty: 0 | Refills: 0 | Status: DISCONTINUED | OUTPATIENT
Start: 2018-02-23 | End: 2018-02-23

## 2018-02-23 RX ORDER — SODIUM CHLORIDE 9 MG/ML
1000 INJECTION, SOLUTION INTRAVENOUS
Qty: 0 | Refills: 0 | Status: DISCONTINUED | OUTPATIENT
Start: 2018-02-23 | End: 2018-02-26

## 2018-02-23 RX ORDER — PROPRANOLOL HCL 160 MG
1 CAPSULE, EXTENDED RELEASE 24HR ORAL
Qty: 0 | Refills: 0 | COMMUNITY

## 2018-02-23 RX ORDER — PANTOPRAZOLE SODIUM 20 MG/1
40 TABLET, DELAYED RELEASE ORAL
Qty: 0 | Refills: 0 | Status: DISCONTINUED | OUTPATIENT
Start: 2018-02-23 | End: 2018-02-24

## 2018-02-23 RX ADMIN — HYDROMORPHONE HYDROCHLORIDE 30 MILLILITER(S): 2 INJECTION INTRAMUSCULAR; INTRAVENOUS; SUBCUTANEOUS at 07:42

## 2018-02-23 RX ADMIN — MEMANTINE HYDROCHLORIDE 10 MILLIGRAM(S): 10 TABLET ORAL at 05:26

## 2018-02-23 RX ADMIN — PANTOPRAZOLE SODIUM 40 MILLIGRAM(S): 20 TABLET, DELAYED RELEASE ORAL at 11:34

## 2018-02-23 RX ADMIN — Medication 125 MICROGRAM(S): at 05:26

## 2018-02-23 RX ADMIN — Medication 50 GRAM(S): at 00:04

## 2018-02-23 RX ADMIN — HEPARIN SODIUM 5000 UNIT(S): 5000 INJECTION INTRAVENOUS; SUBCUTANEOUS at 21:12

## 2018-02-23 RX ADMIN — Medication 100 MILLIGRAM(S): at 11:34

## 2018-02-23 RX ADMIN — Medication 1000 MILLIGRAM(S): at 06:00

## 2018-02-23 RX ADMIN — Medication 650 MILLIGRAM(S): at 05:26

## 2018-02-23 RX ADMIN — Medication 1000 MILLIGRAM(S): at 12:03

## 2018-02-23 RX ADMIN — HEPARIN SODIUM 5000 UNIT(S): 5000 INJECTION INTRAVENOUS; SUBCUTANEOUS at 13:39

## 2018-02-23 RX ADMIN — Medication 1000 MILLIGRAM(S): at 18:11

## 2018-02-23 RX ADMIN — HYDROMORPHONE HYDROCHLORIDE 30 MILLILITER(S): 2 INJECTION INTRAMUSCULAR; INTRAVENOUS; SUBCUTANEOUS at 19:42

## 2018-02-23 RX ADMIN — DONEPEZIL HYDROCHLORIDE 10 MILLIGRAM(S): 10 TABLET, FILM COATED ORAL at 21:12

## 2018-02-23 RX ADMIN — MIRTAZAPINE 15 MILLIGRAM(S): 45 TABLET, ORALLY DISINTEGRATING ORAL at 21:12

## 2018-02-23 RX ADMIN — TAMSULOSIN HYDROCHLORIDE 0.4 MILLIGRAM(S): 0.4 CAPSULE ORAL at 23:49

## 2018-02-23 RX ADMIN — HYDROMORPHONE HYDROCHLORIDE 30 MILLILITER(S): 2 INJECTION INTRAMUSCULAR; INTRAVENOUS; SUBCUTANEOUS at 02:27

## 2018-02-23 RX ADMIN — HYDROMORPHONE HYDROCHLORIDE 30 MILLILITER(S): 2 INJECTION INTRAMUSCULAR; INTRAVENOUS; SUBCUTANEOUS at 23:53

## 2018-02-23 RX ADMIN — Medication 400 MILLIGRAM(S): at 17:41

## 2018-02-23 RX ADMIN — HEPARIN SODIUM 5000 UNIT(S): 5000 INJECTION INTRAVENOUS; SUBCUTANEOUS at 05:26

## 2018-02-23 RX ADMIN — MEMANTINE HYDROCHLORIDE 10 MILLIGRAM(S): 10 TABLET ORAL at 17:42

## 2018-02-23 RX ADMIN — Medication 400 MILLIGRAM(S): at 05:26

## 2018-02-23 RX ADMIN — Medication 650 MILLIGRAM(S): at 17:42

## 2018-02-23 RX ADMIN — HYDROMORPHONE HYDROCHLORIDE 30 MILLILITER(S): 2 INJECTION INTRAMUSCULAR; INTRAVENOUS; SUBCUTANEOUS at 03:13

## 2018-02-23 RX ADMIN — SODIUM CHLORIDE 75 MILLILITER(S): 9 INJECTION, SOLUTION INTRAVENOUS at 13:40

## 2018-02-23 RX ADMIN — ALVIMOPAN 12 MILLIGRAM(S): 12 CAPSULE ORAL at 17:42

## 2018-02-23 RX ADMIN — ALVIMOPAN 12 MILLIGRAM(S): 12 CAPSULE ORAL at 05:26

## 2018-02-23 RX ADMIN — Medication 400 MILLIGRAM(S): at 11:33

## 2018-02-23 NOTE — PHYSICAL THERAPY INITIAL EVALUATION ADULT - PERTINENT HX OF CURRENT PROBLEM, REHAB EVAL
Pt is a 72 y.o. female with PMH of Afib on coumadin, PPM  gout, hypothyroid, Waldenstrom's macroglobinemia, s/p chemo, GERD, dementia , CLL; Pt known to have diverticulitis. Pt recently hospitalized in Jan 2018  for diverticulitis; Pt was then D/C to rehab with Single lumen right chest wall central line for Zosyn infusion x 10 days; Pt still has catheter; He presents to PST for laparoscopic sigmoid resection possible open cystoscopy insertion of ureteral catheters. +CXR 2/23/18: Pulmonary edema

## 2018-02-23 NOTE — PROVIDER CONTACT NOTE (OTHER) - ACTION/TREATMENT ORDERED:
PA aware & stated that dizziness & mucus is expected since he was under anesthesia/intubated for 9hrs yesterday, encouraged incentive spirometer & hydration, will continue to monitor.

## 2018-02-23 NOTE — PROGRESS NOTE ADULT - SUBJECTIVE AND OBJECTIVE BOX
BLUE TEAM GENERAL SURGERY DAILY PROGRESS NOTE:       Subjective:  Patient seen this AM. POD1 s/p lap assisted sigmoid colectomy, cystoscopy and Ucath         Objective:    PE:  Gen: NAD  Resp: Respirations unlabored  CVS: RRR  Abd:        Vital Signs Last 24 Hrs  T(C): 37.1 (23 Feb 2018 03:06), Max: 37.4 (22 Feb 2018 21:00)  T(F): 98.7 (23 Feb 2018 03:06), Max: 99.3 (22 Feb 2018 21:00)  HR: 63 (23 Feb 2018 03:06) (59 - 81)  BP: 110/64 (23 Feb 2018 03:06) (102/53 - 162/76)  BP(mean): 74 (23 Feb 2018 02:00) (74 - 114)  RR: 18 (23 Feb 2018 03:06) (16 - 18)  SpO2: 97% (23 Feb 2018 03:06) (93% - 100%)    I&O's Detail    22 Feb 2018 07:01  -  23 Feb 2018 03:59  --------------------------------------------------------  IN:    IV PiggyBack: 50 mL    lactated ringers.: 675 mL  Total IN: 725 mL    OUT:    Indwelling Catheter - Urethral: 660 mL  Total OUT: 660 mL    Total NET: 65 mL          Daily Height in cm: 187.96 (22 Feb 2018 06:17)    Daily     MEDICATIONS  (STANDING):  acetaminophen  IVPB. 1000 milliGRAM(s) IV Intermittent once  acetaminophen  IVPB. 1000 milliGRAM(s) IV Intermittent once  acetaminophen  IVPB. 1000 milliGRAM(s) IV Intermittent once  allopurinol 100 milliGRAM(s) Oral daily  alvimopan 12 milliGRAM(s) Oral two times a day  amiodarone    Tablet 200 milliGRAM(s) Oral daily  donepezil 10 milliGRAM(s) Oral at bedtime  heparin  Injectable 5000 Unit(s) SubCutaneous every 8 hours  HYDROmorphone PCA (1 mG/mL) 30 milliLiter(s) PCA Continuous PCA Continuous  lactated ringers. 1000 milliLiter(s) (75 mL/Hr) IV Continuous <Continuous>  levothyroxine 125 MICROGram(s) Oral daily  memantine 10 milliGRAM(s) Oral every 12 hours  mirtazapine 15 milliGRAM(s) Oral at bedtime  pantoprazole  Injectable 40 milliGRAM(s) IV Push daily  propranolol 10 milliGRAM(s) Oral two times a day  sodium bicarbonate 650 milliGRAM(s) Oral two times a day  tamsulosin 0.4 milliGRAM(s) Oral at bedtime    MEDICATIONS  (PRN):  naloxone Injectable 0.1 milliGRAM(s) IV Push every 3 minutes PRN For ANY of the following changes in patient status:  A. RR LESS THAN 10 breaths per minute, B. Oxygen saturation LESS THAN 90%, C. Sedation score of 6  ondansetron Injectable 4 milliGRAM(s) IV Push every 6 hours PRN Nausea      LABS:                        9.6    8.9   )-----------( 129      ( 23 Feb 2018 02:47 )             29.5     02-23    136  |  108  |  29<H>  ----------------------------<  134<H>  5.1   |  18<L>  |  3.21<H>    Ca    8.0<L>      23 Feb 2018 02:47  Phos  3.5     02-22  Mg     1.5     02-22      PT/INR - ( 23 Feb 2018 02:47 )   PT: 16.7 sec;   INR: 1.52 ratio         PTT - ( 23 Feb 2018 02:47 )  PTT:31.7 sec      RADIOLOGY & ADDITIONAL STUDIES: BLUE TEAM GENERAL SURGERY DAILY PROGRESS NOTE:       Subjective:  Patient seen this AM. POD1 s/p lap assisted sigmoid colectomy, cystoscopy and Ucath. Pt  reports minimal pain, controlled with IV tylenol and PCA. Denies nausea. Has not passed flatus or BM yet.       Objective:    PE:  Gen: NAD  Resp: Respirations unlabored  CVS: RRR  Abd:  Soft, ND mild tenderness around incisions  Incisions C/D/I with dressing intact.       Vital Signs Last 24 Hrs  T(C): 37.1 (23 Feb 2018 03:06), Max: 37.4 (22 Feb 2018 21:00)  T(F): 98.7 (23 Feb 2018 03:06), Max: 99.3 (22 Feb 2018 21:00)  HR: 63 (23 Feb 2018 03:06) (59 - 81)  BP: 110/64 (23 Feb 2018 03:06) (102/53 - 162/76)  BP(mean): 74 (23 Feb 2018 02:00) (74 - 114)  RR: 18 (23 Feb 2018 03:06) (16 - 18)  SpO2: 97% (23 Feb 2018 03:06) (93% - 100%)    I&O's Detail    22 Feb 2018 07:01  -  23 Feb 2018 03:59  --------------------------------------------------------  IN:    IV PiggyBack: 50 mL    lactated ringers.: 675 mL  Total IN: 725 mL    OUT:    Indwelling Catheter - Urethral: 660 mL  Total OUT: 660 mL    Total NET: 65 mL          Daily Height in cm: 187.96 (22 Feb 2018 06:17)    Daily     MEDICATIONS  (STANDING):  acetaminophen  IVPB. 1000 milliGRAM(s) IV Intermittent once  acetaminophen  IVPB. 1000 milliGRAM(s) IV Intermittent once  acetaminophen  IVPB. 1000 milliGRAM(s) IV Intermittent once  allopurinol 100 milliGRAM(s) Oral daily  alvimopan 12 milliGRAM(s) Oral two times a day  amiodarone    Tablet 200 milliGRAM(s) Oral daily  donepezil 10 milliGRAM(s) Oral at bedtime  heparin  Injectable 5000 Unit(s) SubCutaneous every 8 hours  HYDROmorphone PCA (1 mG/mL) 30 milliLiter(s) PCA Continuous PCA Continuous  lactated ringers. 1000 milliLiter(s) (75 mL/Hr) IV Continuous <Continuous>  levothyroxine 125 MICROGram(s) Oral daily  memantine 10 milliGRAM(s) Oral every 12 hours  mirtazapine 15 milliGRAM(s) Oral at bedtime  pantoprazole  Injectable 40 milliGRAM(s) IV Push daily  propranolol 10 milliGRAM(s) Oral two times a day  sodium bicarbonate 650 milliGRAM(s) Oral two times a day  tamsulosin 0.4 milliGRAM(s) Oral at bedtime    MEDICATIONS  (PRN):  naloxone Injectable 0.1 milliGRAM(s) IV Push every 3 minutes PRN For ANY of the following changes in patient status:  A. RR LESS THAN 10 breaths per minute, B. Oxygen saturation LESS THAN 90%, C. Sedation score of 6  ondansetron Injectable 4 milliGRAM(s) IV Push every 6 hours PRN Nausea      LABS:                        9.6    8.9   )-----------( 129      ( 23 Feb 2018 02:47 )             29.5     02-23    136  |  108  |  29<H>  ----------------------------<  134<H>  5.1   |  18<L>  |  3.21<H>    Ca    8.0<L>      23 Feb 2018 02:47  Phos  3.5     02-22  Mg     1.5     02-22      PT/INR - ( 23 Feb 2018 02:47 )   PT: 16.7 sec;   INR: 1.52 ratio         PTT - ( 23 Feb 2018 02:47 )  PTT:31.7 sec      RADIOLOGY & ADDITIONAL STUDIES: BLUE TEAM GENERAL SURGERY DAILY PROGRESS NOTE:       Subjective:  Patient seen this AM. POD1 s/p lap assisted sigmoid colectomy, cystoscopy and Ucath. Pt  reports pain that worsens with cough and dizziness. Denies nausea. Has not passed flatus or BM yet.       Objective:    PE:  Gen: NAD  Resp: Respirations unlabored  CVS: RRR  Abd:  Soft, ND appropriate tenderness around incisions  Incisions C/D/I with dressing intact, midline dressing with minimal soilage and changed on rounds this AM      Vital Signs Last 24 Hrs  T(C): 37.1 (23 Feb 2018 03:06), Max: 37.4 (22 Feb 2018 21:00)  T(F): 98.7 (23 Feb 2018 03:06), Max: 99.3 (22 Feb 2018 21:00)  HR: 63 (23 Feb 2018 03:06) (59 - 81)  BP: 110/64 (23 Feb 2018 03:06) (102/53 - 162/76)  BP(mean): 74 (23 Feb 2018 02:00) (74 - 114)  RR: 18 (23 Feb 2018 03:06) (16 - 18)  SpO2: 97% (23 Feb 2018 03:06) (93% - 100%)    I&O's Detail    22 Feb 2018 07:01  -  23 Feb 2018 03:59  --------------------------------------------------------  IN:    IV PiggyBack: 50 mL    lactated ringers.: 675 mL  Total IN: 725 mL    OUT:    Indwelling Catheter - Urethral: 660 mL  Total OUT: 660 mL    Total NET: 65 mL          Daily Height in cm: 187.96 (22 Feb 2018 06:17)    Daily     MEDICATIONS  (STANDING):  acetaminophen  IVPB. 1000 milliGRAM(s) IV Intermittent once  acetaminophen  IVPB. 1000 milliGRAM(s) IV Intermittent once  acetaminophen  IVPB. 1000 milliGRAM(s) IV Intermittent once  allopurinol 100 milliGRAM(s) Oral daily  alvimopan 12 milliGRAM(s) Oral two times a day  amiodarone    Tablet 200 milliGRAM(s) Oral daily  donepezil 10 milliGRAM(s) Oral at bedtime  heparin  Injectable 5000 Unit(s) SubCutaneous every 8 hours  HYDROmorphone PCA (1 mG/mL) 30 milliLiter(s) PCA Continuous PCA Continuous  lactated ringers. 1000 milliLiter(s) (75 mL/Hr) IV Continuous <Continuous>  levothyroxine 125 MICROGram(s) Oral daily  memantine 10 milliGRAM(s) Oral every 12 hours  mirtazapine 15 milliGRAM(s) Oral at bedtime  pantoprazole  Injectable 40 milliGRAM(s) IV Push daily  propranolol 10 milliGRAM(s) Oral two times a day  sodium bicarbonate 650 milliGRAM(s) Oral two times a day  tamsulosin 0.4 milliGRAM(s) Oral at bedtime    MEDICATIONS  (PRN):  naloxone Injectable 0.1 milliGRAM(s) IV Push every 3 minutes PRN For ANY of the following changes in patient status:  A. RR LESS THAN 10 breaths per minute, B. Oxygen saturation LESS THAN 90%, C. Sedation score of 6  ondansetron Injectable 4 milliGRAM(s) IV Push every 6 hours PRN Nausea      LABS:                        9.6    8.9   )-----------( 129      ( 23 Feb 2018 02:47 )             29.5     02-23    136  |  108  |  29<H>  ----------------------------<  134<H>  5.1   |  18<L>  |  3.21<H>    Ca    8.0<L>      23 Feb 2018 02:47  Phos  3.5     02-22  Mg     1.5     02-22      PT/INR - ( 23 Feb 2018 02:47 )   PT: 16.7 sec;   INR: 1.52 ratio         PTT - ( 23 Feb 2018 02:47 )  PTT:31.7 sec      RADIOLOGY & ADDITIONAL STUDIES: [facility] TEAM GENERAL SURGERY DAILY PROGRESS NOTE:       Subjective:  Patient seen this AM. POD1 s/p lap assisted sigmoid colectomy, cystoscopy and Ucath. Pt  reports pain that worsens with cough and dizziness. Denies nausea. Has not passed flatus or BM yet.       Objective:    PE:  Gen: NAD  Resp: Respirations unlabored  CVS: RRR  Abd:  Soft, ND appropriate tenderness around incisions  Incisions C/D/I with dressing intact, midline dressing with minimal soilage and changed on rounds this AM      Vital Signs Last 24 Hrs  T(C): 37.1 (23 Feb 2018 03:06), Max: 37.4 (22 Feb 2018 21:00)  T(F): 98.7 (23 Feb 2018 03:06), Max: 99.3 (22 Feb 2018 21:00)  HR: 63 (23 Feb 2018 03:06) (59 - 81)  BP: 110/64 (23 Feb 2018 03:06) (102/53 - 162/76)  BP(mean): 74 (23 Feb 2018 02:00) (74 - 114)  RR: 18 (23 Feb 2018 03:06) (16 - 18)  SpO2: 97% (23 Feb 2018 03:06) (93% - 100%)    I&O's Detail    22 Feb 2018 07:01  -  23 Feb 2018 03:59  --------------------------------------------------------  IN:    IV PiggyBack: 50 mL    lactated ringers.: 675 mL  Total IN: 725 mL    OUT:    Indwelling Catheter - Urethral: 660 mL  Total OUT: 660 mL    Total NET: 65 mL          Daily Height in cm: 187.96 (22 Feb 2018 06:17)    Daily     MEDICATIONS  (STANDING):  acetaminophen  IVPB. 1000 milliGRAM(s) IV Intermittent once  acetaminophen  IVPB. 1000 milliGRAM(s) IV Intermittent once  acetaminophen  IVPB. 1000 milliGRAM(s) IV Intermittent once  allopurinol 100 milliGRAM(s) Oral daily  alvimopan 12 milliGRAM(s) Oral two times a day  amiodarone    Tablet 200 milliGRAM(s) Oral daily  donepezil 10 milliGRAM(s) Oral at bedtime  heparin  Injectable 5000 Unit(s) SubCutaneous every 8 hours  HYDROmorphone PCA (1 mG/mL) 30 milliLiter(s) PCA Continuous PCA Continuous  lactated ringers. 1000 milliLiter(s) (75 mL/Hr) IV Continuous <Continuous>  levothyroxine 125 MICROGram(s) Oral daily  memantine 10 milliGRAM(s) Oral every 12 hours  mirtazapine 15 milliGRAM(s) Oral at bedtime  pantoprazole  Injectable 40 milliGRAM(s) IV Push daily  propranolol 10 milliGRAM(s) Oral two times a day  sodium bicarbonate 650 milliGRAM(s) Oral two times a day  tamsulosin 0.4 milliGRAM(s) Oral at bedtime    MEDICATIONS  (PRN):  naloxone Injectable 0.1 milliGRAM(s) IV Push every 3 minutes PRN For ANY of the following changes in patient status:  A. RR LESS THAN 10 breaths per minute, B. Oxygen saturation LESS THAN 90%, C. Sedation score of 6  ondansetron Injectable 4 milliGRAM(s) IV Push every 6 hours PRN Nausea      LABS:                        9.6    8.9   )-----------( 129      ( 23 Feb 2018 02:47 )             29.5     02-23    136  |  108  |  29<H>  ----------------------------<  134<H>  5.1   |  18<L>  |  3.21<H>    Ca    8.0<L>      23 Feb 2018 02:47  Phos  3.5     02-22  Mg     1.5     02-22      PT/INR - ( 23 Feb 2018 02:47 )   PT: 16.7 sec;   INR: 1.52 ratio         PTT - ( 23 Feb 2018 02:47 )  PTT:31.7 sec      RADIOLOGY & ADDITIONAL STUDIES: [facility] TEAM GENERAL SURGERY DAILY PROGRESS NOTE:       Subjective:  Patient seen this AM. POD1 s/p lap assisted left colectomy, cystoscopy and Ucaths. Pt  reports pain that worsens with cough and dizziness. Denies nausea. Has not passed flatus or BM yet.       Objective:    PE:  Gen: NAD  Resp: Respirations unlabored  CVS: RRR  Abd:  Soft, ND appropriate tenderness around incisions  Incisions C/D/I with dressing intact, midline dressing with minimal soilage and changed on rounds this AM      Vital Signs Last 24 Hrs  T(C): 37.1 (23 Feb 2018 03:06), Max: 37.4 (22 Feb 2018 21:00)  T(F): 98.7 (23 Feb 2018 03:06), Max: 99.3 (22 Feb 2018 21:00)  HR: 63 (23 Feb 2018 03:06) (59 - 81)  BP: 110/64 (23 Feb 2018 03:06) (102/53 - 162/76)  BP(mean): 74 (23 Feb 2018 02:00) (74 - 114)  RR: 18 (23 Feb 2018 03:06) (16 - 18)  SpO2: 97% (23 Feb 2018 03:06) (93% - 100%)    I&O's Detail    22 Feb 2018 07:01  -  23 Feb 2018 03:59  --------------------------------------------------------  IN:    IV PiggyBack: 50 mL    lactated ringers.: 675 mL  Total IN: 725 mL    OUT:    Indwelling Catheter - Urethral: 660 mL  Total OUT: 660 mL    Total NET: 65 mL          Daily Height in cm: 187.96 (22 Feb 2018 06:17)    Daily     MEDICATIONS  (STANDING):  acetaminophen  IVPB. 1000 milliGRAM(s) IV Intermittent once  acetaminophen  IVPB. 1000 milliGRAM(s) IV Intermittent once  acetaminophen  IVPB. 1000 milliGRAM(s) IV Intermittent once  allopurinol 100 milliGRAM(s) Oral daily  alvimopan 12 milliGRAM(s) Oral two times a day  amiodarone    Tablet 200 milliGRAM(s) Oral daily  donepezil 10 milliGRAM(s) Oral at bedtime  heparin  Injectable 5000 Unit(s) SubCutaneous every 8 hours  HYDROmorphone PCA (1 mG/mL) 30 milliLiter(s) PCA Continuous PCA Continuous  lactated ringers. 1000 milliLiter(s) (75 mL/Hr) IV Continuous <Continuous>  levothyroxine 125 MICROGram(s) Oral daily  memantine 10 milliGRAM(s) Oral every 12 hours  mirtazapine 15 milliGRAM(s) Oral at bedtime  pantoprazole  Injectable 40 milliGRAM(s) IV Push daily  propranolol 10 milliGRAM(s) Oral two times a day  sodium bicarbonate 650 milliGRAM(s) Oral two times a day  tamsulosin 0.4 milliGRAM(s) Oral at bedtime    MEDICATIONS  (PRN):  naloxone Injectable 0.1 milliGRAM(s) IV Push every 3 minutes PRN For ANY of the following changes in patient status:  A. RR LESS THAN 10 breaths per minute, B. Oxygen saturation LESS THAN 90%, C. Sedation score of 6  ondansetron Injectable 4 milliGRAM(s) IV Push every 6 hours PRN Nausea      LABS:                        9.6    8.9   )-----------( 129      ( 23 Feb 2018 02:47 )             29.5     02-23    136  |  108  |  29<H>  ----------------------------<  134<H>  5.1   |  18<L>  |  3.21<H>    Ca    8.0<L>      23 Feb 2018 02:47  Phos  3.5     02-22  Mg     1.5     02-22      PT/INR - ( 23 Feb 2018 02:47 )   PT: 16.7 sec;   INR: 1.52 ratio         PTT - ( 23 Feb 2018 02:47 )  PTT:31.7 sec      RADIOLOGY & ADDITIONAL STUDIES:

## 2018-02-23 NOTE — PROGRESS NOTE ADULT - ASSESSMENT
73 yo gentleman s/p lap assisted sigmoid resection, cystoscopy and ucath placement:    - plan to follow 71 yo gentleman s/p lap assisted sigmoid resection, cystoscopy and ucath placement, awaiting return of GI function.    Preliminary plan:   - Continue entereg  - Possible advancement of diet to clears  - Pain control with around the clock IV tylenol and PCA until tolerating PO  - Tom until POD 2. Continue flomax  - Awaiting GI function  - Hold Coumadin   - Will discuss with attending and update plans accordingly.     Anjelica Lopez   [id number] 73 yo gentleman s/p lap assisted sigmoid resection, cystoscopy and ucath placement, awaiting return of GI function.    Preliminary plan:   - Continue entereg  -CLD  -mIVF  - Pain control with around the clock IV tylenol and PCA until tolerating PO  - Tom until POD 2. Continue flomax, (U cath removed this AM)  - Awaiting GI function  - Hold Coumadin   -PT eval pending    Anjelica Lopez   [id number] 73 yo gentleman s/p lap assisted sigmoid resection, cystoscopy and ucath placement, awaiting return of GI function.    Preliminary plan:   - Continue entereg  - CLD  - mIVF  - Pain control with around the clock IV tylenol and PCA until tolerating PO  - Tom until POD 2. Continue flomax, (U cath removed this AM)  - Awaiting GI function  - Hold Coumadin   -PT eval pending    Anjelica Lopez   [id number] 73 yo gentleman s/p lap assisted left colectomy, cystoscopy and ucaths placement, awaiting return of GI function.    Preliminary plan:   - Continue entereg  - CLD  - mIVF  - Pain control with around the clock IV tylenol and PCA until tolerating PO  - Tom until POD 2. Continue flomax, (U cath removed this AM)  - Awaiting GI function  - Hold Coumadin   -PT eval pending    Anjelica Lopez   [id number]

## 2018-02-23 NOTE — PHYSICAL THERAPY INITIAL EVALUATION ADULT - ADDITIONAL COMMENTS
Pt states he lives with his wife in a apt with 3 steps to enter and 18 steps inside to the 2nd floor, +HR. Pt states he is independent with ADLs and ambulation except he requires assistance from wife for getting in and out of bathroom. Pt states his wife is available to assist when needed. Pt states he uses a cane for ambulation.

## 2018-02-23 NOTE — PROVIDER CONTACT NOTE (OTHER) - SITUATION
All morning, patient complaining of dizziness & "room spinning" when he moves his head. Pt also coughed & had a dark brown mucus plug- pt stated that its "blood"

## 2018-02-23 NOTE — CONSULT NOTE ADULT - SUBJECTIVE AND OBJECTIVE BOX
HPI:  Mr. 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 He was electively admitted yesterday for colon resection, and is now POD#1 s/p successful resection.    PAST MEDICAL & SURGICAL HISTORY:  BPH (benign prostatic hyperplasia)  [place] (hard of hearing)  Gout  CKD (chronic kidney disease)  Nephrolithiasis  Hypothyroid  Dementia  Bradycardia, drug induced  Waldenstrom macroglobulinemia  Diverticulitis  Atrial fibrillation  GERD (gastroesophageal reflux disease)  Anxiety disorder  CLL (chronic lymphocytic leukemia): in remission  History of cardiac pacemaker in situ  History of appendectomy  History of cataract surgery, right: IOL  History of laparoscopic cholecystectomy: 4/2014  S/P hernia repair: x2  Meniscus tear: s/p removal of Meniscus 8 months ago    Allergies  No Known Allergies    SOCIAL HISTORY:  Denies ETOh,Smoking,     FAMILY HISTORY:  No pertinent family history in first degree relatives    REVIEW OF SYSTEMS:  CONSTITUTIONAL: No weakness, fevers or chills  EYES/ENT: No visual changes;  No vertigo or throat pain   NECK: No pain or stiffness  RESPIRATORY: No cough, wheezing, hemoptysis; No shortness of breath  CARDIOVASCULAR: No chest pain or palpitations; (+)dizziness  GASTROINTESTINAL: No abdominal or epigastric pain. No nausea, vomiting, or hematemesis; No diarrhea or constipation. No melena or hematochezia.  GENITOURINARY: No dysuria, frequency or hematuria  NEUROLOGICAL: No numbness or weakness  SKIN: No itching, burning, rashes, or lesions   All other review of systems is negative unless indicated above.    VITAL:  T(C): , Max: 37.4 (02-22-18 @ 21:00)  T(F): , Max: 99.3 (02-22-18 @ 21:00)  HR: 59 (02-23-18 @ 12:27)  BP: 118/62 (02-23-18 @ 12:27)  BP(mean): 74 (02-23-18 @ 02:00)  RR: 18 (02-23-18 @ 12:27)  SpO2: 97% (02-23-18 @ 12:27)      PHYSICAL EXAM:  Constitutional: NAD, Alert  HEENT: NCAT, MMM  Neck: Supple, No JVD  Respiratory: CTA-b/l  Cardiovascular: RRR s1s2, no m/r/g  Gastrointestinal: BS+, soft, NT/ND  Extremities: No peripheral edema b/l  Neurological: no focal deficits; strength grossly intact  Psychiatric: Normal mood, normal affect  Back: no CVAT b/l  Skin: No rashes, no nevi    LABS:                        9.6    8.9   )-----------( 129      ( 23 Feb 2018 02:47 )             29.5     Na(136)/K(5.1)/Cl(108)/HCO3(18)/BUN(29)/Cr(3.21)Glu(134)/Ca(8.0)/Mg(--)/PO4(--)    02-23 @ 02:47  Na(137)/K(5.4)/Cl(107)/HCO3(18)/BUN(30)/Cr(3.10)Glu(160)/Ca(8.1)/Mg(1.5)/PO4(3.5)    02-22 @ 19:52      IMAGING:  < from: Xray Chest 1 View- PORTABLE-Routine (02.23.18 @ 09:08) >  IMPRESSION:   Likely underlying pulmonary edema.      ASSESSMENT:  (1)Renal - CKD4 - at baseline GFR (~20ml/min)  (2)Hyperkalemia - mild/acceptable for now  (3)Metabolic acidosis - mild/acceptable for now; CKD-associated. On NaHCO3 PO as taken at home    RECOMMEND:  (1)Continue PO NaHCO3 as taken at home  (2)No Kayexalate (given s/p recent colon resection/risk of bowel necrosis)  (3)Low-potassium diet for now  (4)Dose new meds for GFR ~20ml/min    Thank you for involving [facility] in this patient's care.    With warm regards,    Norman Ascencio MD   [facility]  [phone] HPI:  Mr. 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 He was electively admitted yesterday for colon resection, and is now POD#1 s/p successful resection. He generally feels well at present. He does admit to mild dizziness.    PAST MEDICAL & SURGICAL HISTORY:  BPH (benign prostatic hyperplasia)  [place] (hard of hearing)  Gout  CKD (chronic kidney disease)  Nephrolithiasis  Hypothyroid  Dementia  Bradycardia, drug induced  Waldenstrom macroglobulinemia  Diverticulitis  Atrial fibrillation  GERD (gastroesophageal reflux disease)  Anxiety disorder  CLL (chronic lymphocytic leukemia): in remission  History of cardiac pacemaker in situ  History of appendectomy  History of cataract surgery, right: IOL  History of laparoscopic cholecystectomy: 4/2014  S/P hernia repair: x2  Meniscus tear: s/p removal of Meniscus 8 months ago    Allergies  No Known Allergies    SOCIAL HISTORY:  Denies ETOh,Smoking,     FAMILY HISTORY:  No pertinent family history in first degree relatives    REVIEW OF SYSTEMS:  CONSTITUTIONAL: No weakness, fevers or chills  EYES/ENT: No visual changes;  No vertigo or throat pain   NECK: No pain or stiffness  RESPIRATORY: No cough, wheezing, hemoptysis; No shortness of breath  CARDIOVASCULAR: No chest pain or palpitations; (+)dizziness  GASTROINTESTINAL: No abdominal or epigastric pain. No nausea, vomiting, or hematemesis; No diarrhea or constipation. No melena or hematochezia.  GENITOURINARY: No dysuria, frequency or hematuria  NEUROLOGICAL: No numbness or weakness  SKIN: No itching, burning, rashes, or lesions   All other review of systems is negative unless indicated above.    VITAL:  T(C): , Max: 37.4 (02-22-18 @ 21:00)  T(F): , Max: 99.3 (02-22-18 @ 21:00)  HR: 59 (02-23-18 @ 12:27)  BP: 118/62 (02-23-18 @ 12:27)  BP(mean): 74 (02-23-18 @ 02:00)  RR: 18 (02-23-18 @ 12:27)  SpO2: 97% (02-23-18 @ 12:27)      PHYSICAL EXAM:  Constitutional: NAD, Alert  HEENT: NCAT, DMM  Neck: Supple, No JVD  Respiratory: CTA-b/l  Cardiovascular: RRR s1s2, 1/6 TESS  Gastrointestinal: (+)incisional tenderness; hypoactive BS  Extremities: 1+ b/l LE edema  Neurological: no focal deficits; strength grossly intact  Back: no CVAT b/l  Skin: No rashes, no nevi    LABS:                        9.6    8.9   )-----------( 129      ( 23 Feb 2018 02:47 )             29.5     Na(136)/K(5.1)/Cl(108)/HCO3(18)/BUN(29)/Cr(3.21)Glu(134)/Ca(8.0)/Mg(--)/PO4(--)    02-23 @ 02:47  Na(137)/K(5.4)/Cl(107)/HCO3(18)/BUN(30)/Cr(3.10)Glu(160)/Ca(8.1)/Mg(1.5)/PO4(3.5)    02-22 @ 19:52      IMAGING:  < from: Xray Chest 1 View- PORTABLE-Routine (02.23.18 @ 09:08) >  IMPRESSION:   Likely underlying pulmonary edema.      ASSESSMENT:  (1)Renal - CKD4 - at baseline GFR (~20ml/min)  (2)Hyperkalemia - mild/acceptable for now  (3)Metabolic acidosis - mild/acceptable for now; CKD-associated. On NaHCO3 PO as taken at home  (4)CV - dizziness - hypovolemic? On IVF    RECOMMEND:  (1)Continue PO NaHCO3 as taken at home  (2)No Kayexalate (given s/p recent colon resection/risk of bowel necrosis)  (3)Low-potassium diet for now  (4)Dose new meds for GFR ~20ml/min  (5)IVF as ordered    Thank you for involving [facility] in this patient's care.    With warm regards,    Norman Ascencio MD   [facility]  [phone]

## 2018-02-23 NOTE — PROVIDER CONTACT NOTE (OTHER) - ASSESSMENT
/62, HR 59, RR 18, O2 97% on room air, afebrile, pt c/o of dizziness when he moves his head, pt also coughed up dark mucus plug-pt thinks it blood, no distress noted.

## 2018-02-23 NOTE — CONSULT NOTE ADULT - ASSESSMENT
73 yo male h/o afib on AC with coumadin, s/p ppm, CKD, Waldenstrom's macroglobulinemia, CLL, anxiety, diverticulisits, admitted for laparoscopic sigmoid resection.    POD #1  post op care per surg  pain control  clears  awaiting gi function  entereg as per GI  incentive spirometry    afib  cont propranolol and amio  holding coumadin until ok to restart by surg  will not need briding    CKD  stable  monitor  ivf  follows with Dr. Sudheer Ascencio    CLL/waldenstroms  stable  outpt onc f/u    cont other current meds    DVT ppx  PT
72 year old male PMH of Afib on coumadin, CLL, Waldenstrom's macroglobinemia s/p chemo, known episodes of diverticulitis admitted for elective laparoscopic sigmoid resection done on 2/22.  INR 1.5 today.  Hematology called to monitor INR levels and for assistance in management.

## 2018-02-23 NOTE — CONSULT NOTE ADULT - SUBJECTIVE AND OBJECTIVE BOX
HPI:  72 year old male PMH of Afib on coumadin, PPM  gout, hypothyroid, Waldenstrom's macroglobinemia (recent hem-onc visit Nov 2017 suggest possible relapse; Pt seeing hem-onc 2-16-18) ,s/p chemo, GERD, dementia , CLL; Pt known to have diverticulitis however pt getting frequent exacerbations; Pt recently hospitalized in January 2018  for diverticulitis; Pt was then discharge to rehab with Single lumen right chest wall central line for Zosyn infusion x 10 days; Pt sill has catheter; He presents to [facility] today for laparoscopic sigmoid resection possible open cystoscopy insertion of ureteral catheters.    Allergies  No Known Allergies    MEDICATIONS  (STANDING):  allopurinol 100 milliGRAM(s) Oral daily  alvimopan 12 milliGRAM(s) Oral two times a day  amiodarone    Tablet 200 milliGRAM(s) Oral daily  dextrose 5% + sodium chloride 0.45%. 1000 milliLiter(s) (75 mL/Hr) IV Continuous <Continuous>  donepezil 10 milliGRAM(s) Oral at bedtime  heparin  Injectable 5000 Unit(s) SubCutaneous every 8 hours  HYDROmorphone PCA (1 mG/mL) 30 milliLiter(s) PCA Continuous PCA Continuous  levothyroxine 125 MICROGram(s) Oral daily  memantine 10 milliGRAM(s) Oral every 12 hours  mirtazapine 15 milliGRAM(s) Oral at bedtime  pantoprazole    Tablet 40 milliGRAM(s) Oral before breakfast  sodium bicarbonate 650 milliGRAM(s) Oral two times a day  tamsulosin 0.4 milliGRAM(s) Oral at bedtime    MEDICATIONS  (PRN):  naloxone Injectable 0.1 milliGRAM(s) IV Push every 3 minutes PRN For ANY of the following changes in patient status:  A. RR LESS THAN 10 breaths per minute, B. Oxygen saturation LESS THAN 90%, C. Sedation score of 6  ondansetron Injectable 4 milliGRAM(s) IV Push every 6 hours PRN Nausea    PAST MEDICAL & SURGICAL HISTORY:  BPH (benign prostatic hyperplasia)  [place] (hard of hearing)  Gout  CKD (chronic kidney disease)  Nephrolithiasis  Hypothyroid  Dementia  Bradycardia, drug induced  Waldenstrom macroglobulinemia  Diverticulitis  Atrial fibrillation  GERD (gastroesophageal reflux disease)  Anxiety disorder  CLL (chronic lymphocytic leukemia): in remission  History of cardiac pacemaker in situ  History of appendectomy  History of cataract surgery, right: IOL  History of laparoscopic cholecystectomy: 4/2014  S/P hernia repair: x2  Meniscus tear: s/p removal of Meniscus 8 months ago    FAMILY HISTORY:  No pertinent family history in first degree relatives    SOCIAL HISTORY: No EtOH, no tobacco    REVIEW OF SYSTEMS:  All other review of systems is negative unless indicated above.      T(F): 98.5 (02-23-18 @ 17:27), Max: 99.3 (02-22-18 @ 21:00)  HR: 62 (02-23-18 @ 17:27)  BP: 115/67 (02-23-18 @ 17:27)  RR: 18 (02-23-18 @ 17:27)  SpO2: 98% (02-23-18 @ 17:27)  Wt(kg): --    GENERAL: NAD, well-developed  HEAD:  Atraumatic, Normocephalic  EYES: EOMI, PERRLA, conjunctiva and sclera clear  NECK: Supple, No JVD  CHEST/LUNG: Clear to auscultation bilaterally; No wheeze  HEART: Regular rate and rhythm; No murmurs, rubs, or gallops  ABDOMEN: Soft, Nontender, Nondistended; Bowel sounds present  EXTREMITIES:  2+ Peripheral Pulses, No clubbing, cyanosis, or edema  NEUROLOGY: non-focal  SKIN: No rashes or lesions                          9.7    8.1   )-----------( 127      ( 23 Feb 2018 15:25 )             29.0       02-23    137  |  104  |  30<H>  ----------------------------<  153<H>  4.4   |  19<L>  |  3.11<H>    Ca    8.1<L>      23 Feb 2018 15:25  Phos  3.4     02-23  Mg     2.0     02-23        Magnesium, Serum: 2.0 mg/dL (02-23 @ 15:25)  Phosphorus Level, Serum: 3.4 mg/dL (02-23 @ 15:25)  Magnesium, Serum: 1.5 mg/dL (02-22 @ 19:52)  Phosphorus Level, Serum: 3.5 mg/dL (02-22 @ 19:52) HPI:  72 year old male PMH of Afib on coumadin, CLL, Waldenstrom's macroglobinemia s/p chemo, known episodes of diverticulitis admitted for elective laparoscopic sigmoid resection done on 2/22.   No complications from sx.  Had 200cc blood loss, received 1 unit of cryoprecipitate.  INR 1.5 today.  Hematology called to monitor INR levels and for assistance in management.  Currently denies any bleeding or oozing from surgical site.  Has abdominal soreness otherwise has not passed gas yet and reporting he is hungry.     Allergies  No Known Allergies    MEDICATIONS  (STANDING):  allopurinol 100 milliGRAM(s) Oral daily  alvimopan 12 milliGRAM(s) Oral two times a day  amiodarone    Tablet 200 milliGRAM(s) Oral daily  dextrose 5% + sodium chloride 0.45%. 1000 milliLiter(s) (75 mL/Hr) IV Continuous <Continuous>  donepezil 10 milliGRAM(s) Oral at bedtime  heparin  Injectable 5000 Unit(s) SubCutaneous every 8 hours  HYDROmorphone PCA (1 mG/mL) 30 milliLiter(s) PCA Continuous PCA Continuous  levothyroxine 125 MICROGram(s) Oral daily  memantine 10 milliGRAM(s) Oral every 12 hours  mirtazapine 15 milliGRAM(s) Oral at bedtime  pantoprazole    Tablet 40 milliGRAM(s) Oral before breakfast  sodium bicarbonate 650 milliGRAM(s) Oral two times a day  tamsulosin 0.4 milliGRAM(s) Oral at bedtime    MEDICATIONS  (PRN):  naloxone Injectable 0.1 milliGRAM(s) IV Push every 3 minutes PRN For ANY of the following changes in patient status:  A. RR LESS THAN 10 breaths per minute, B. Oxygen saturation LESS THAN 90%, C. Sedation score of 6  ondansetron Injectable 4 milliGRAM(s) IV Push every 6 hours PRN Nausea    PAST MEDICAL & SURGICAL HISTORY:  BPH (benign prostatic hyperplasia)  [place] (hard of hearing)  Gout  CKD (chronic kidney disease)  Nephrolithiasis  Hypothyroid  Dementia  Bradycardia, drug induced  Waldenstrom macroglobulinemia  Diverticulitis  Atrial fibrillation  GERD (gastroesophageal reflux disease)  Anxiety disorder  CLL (chronic lymphocytic leukemia): in remission  History of cardiac pacemaker in situ  History of appendectomy  History of cataract surgery, right: IOL  History of laparoscopic cholecystectomy: 4/2014  S/P hernia repair: x2  Meniscus tear: s/p removal of Meniscus 8 months ago    FAMILY HISTORY:  No pertinent family history in first degree relatives    SOCIAL HISTORY: No EtOH, no tobacco    REVIEW OF SYSTEMS:  All other review of systems is negative unless indicated above.      T(F): 98.5 (02-23-18 @ 17:27), Max: 99.3 (02-22-18 @ 21:00)  HR: 62 (02-23-18 @ 17:27)  BP: 115/67 (02-23-18 @ 17:27)  RR: 18 (02-23-18 @ 17:27)  SpO2: 98% (02-23-18 @ 17:27)  Wt(kg): --    GENERAL: NAD, well-developed  HEAD:  Atraumatic, Normocephalic  EYES: EOMI, PERRLA, conjunctiva and sclera clear  NECK: Supple, No JVD  CHEST/LUNG: rales in lower bases  HEART: Regular rate and rhythm; No murmurs, rubs, or gallops  ABDOMEN: Soft, Nontender, Nondistended; Bowel sounds present, surgical site clean  EXTREMITIES:  2+ Peripheral Pulses, No clubbing, cyanosis, or edema  NEUROLOGY: non-focal  SKIN: No rashes or lesions                          9.7    8.1   )-----------( 127      ( 23 Feb 2018 15:25 )             29.0       02-23    137  |  104  |  30<H>  ----------------------------<  153<H>  4.4   |  19<L>  |  3.11<H>    Ca    8.1<L>      23 Feb 2018 15:25  Phos  3.4     02-23  Mg     2.0     02-23        Magnesium, Serum: 2.0 mg/dL (02-23 @ 15:25)  Phosphorus Level, Serum: 3.4 mg/dL (02-23 @ 15:25)  Magnesium, Serum: 1.5 mg/dL (02-22 @ 19:52)  Phosphorus Level, Serum: 3.5 mg/dL (02-22 @ 19:52)

## 2018-02-23 NOTE — CONSULT NOTE ADULT - ATTENDING COMMENTS
72 year old male PMH of Afib on coumadin, CLL, Waldenstrom's macroglobinemia s/p chemo, known episodes of diverticulitis s/p elective laparoscopic sigmoid resection c/b oozing at surgical site post op, currently stable s/p infusion of cryoppt.  - cont monitor for bleeding  - trend coags  - monitor Hb  - ok to give FFP and vit K if rebleed  - dispo as per surgery  - no inpt chemo planned 72 year old male PMH of Afib on coumadin, CLL, Waldenstrom's macroglobinemia s/p chemo, known episodes of diverticulitis s/p elective laparoscopic sigmoid resection, currently stable s/p FFP silvana-op.  - monitor for bleeding  - trend coags  - monitor Hb  - ok to give FFP and vit K if bleed  - dispo as per surgery  - no inpt chemo planned    case d/w primary surgery team 72 year old male PMH of Afib on coumadin, CLL, Waldenstrom's macroglobinemia s/p chemo, known episodes of diverticulitis s/p elective laparoscopic sigmoid resection, currently stable.  - monitor for bleeding  - trend coags  - monitor Hb  - ok to give FFP and vit K if bleed  - dispo as per surgery  - no inpt chemo planned    case d/w primary surgery team

## 2018-02-23 NOTE — CONSULT NOTE ADULT - PROBLEM SELECTOR RECOMMENDATION 2
Received tx 10/2015 - 1/2017 w Rituxan/Velcade/Decadron.  Last IgM levels 1740 from 1650.  He can fu with Dr. Simons at [facility] upon discharge for further management..

## 2018-02-23 NOTE — PHYSICAL THERAPY INITIAL EVALUATION ADULT - GENERAL OBSERVATIONS, REHAB EVAL
Pt received sitting in recliner with +walker, +PCA pump and +IV. Pt agreeable to physical therapy eval.

## 2018-02-23 NOTE — CONSULT NOTE ADULT - PROBLEM SELECTOR RECOMMENDATION 9
Prior to surgery, was on coumadin for Afib.  There was some oozing at the site and received cryoprecipitate during procedure.  Would continue to monitor INR and CBC daily.  If any bleeding or worsening of INR while not on anticoagulation, please give FFP and order mixing studies.  Otherwise, when able from surgery standpoint, can restart coumadin for Afib. Prior to surgery, was on coumadin for Afib.  Would continue to monitor INR and CBC daily.  If any bleeding or worsening of INR while not on anticoagulation, please give FFP and order mixing studies.  Otherwise, when able from surgery standpoint, can restart coumadin for Afib.

## 2018-02-23 NOTE — CONSULT NOTE ADULT - SUBJECTIVE AND OBJECTIVE BOX
NICOLAS CIFUENTES  72y Male  MRN:[id number]    Patient is a 72y old  Male who presents with a chief complaint of diverticulitis (22 Feb 2018 06:19)    HPI:   72 year old male PMH of Afib on coumadin, PPM  gout, hypothyroid, Waldenstrom's macroglobinemia ( recent hem-onc visit Nov 2017 suggest possible relapse; Pt seeing hem-onc 2-16-18) ,s/p chemo, GERD, dementia , CLL; Pt known to have diverticulitis however pt getting frequent exacerbations; Pt recently hospitalized in January 2018  for diverticulitis; Pt was then discharge to rehab with Single lumen right chest wall central line for Zosyn infusion x 10 days; Pt sill has catheter; He presents to [facility] today for laparoscopic sigmoid resection possible open cystoscopy insertion of ureteral catheters (15 Feb 2018 15:54)      Patient seen and evaluated at bedside. No acute events overnight except as noted.    Interval HPI: s/p laparoscopic sigmoid resection. POD #1    PAST MEDICAL & SURGICAL HISTORY:  BPH (benign prostatic hyperplasia)  [place] (hard of hearing)  Gout  CKD (chronic kidney disease)  Nephrolithiasis  Hypothyroid  Dementia  Bradycardia, drug induced  Waldenstrom macroglobulinemia  Diverticulitis  Atrial fibrillation  GERD (gastroesophageal reflux disease)  Anxiety disorder  CLL (chronic lymphocytic leukemia): in remission  History of cardiac pacemaker in situ  History of appendectomy  History of cataract surgery, right: IOL  History of laparoscopic cholecystectomy: 4/2014  S/P hernia repair: x2  Meniscus tear: s/p removal of Meniscus 8 months ago    SOC:  non smoker  no drug abuse  no alcohol abuse    lives with wife    Fam Hx:  non cont    REVIEW OF SYSTEMS:  Constitutional: No fever, fatigue or weight loss.  Skin: No rash.  Eyes: No recent vision problems or eye pain.  ENT: No congestion, ear pain, or sore throat.  Endocrine: No thyroid problems.  Cardiovascular: No chest pain or palpation.  Respiratory: No cough, shortness of breath, congestion, or wheezing.  Gastrointestinal: No abdominal pain, nausea, vomiting, or diarrhea.  Genitourinary: No dysuria.  Musculoskeletal: No joint swelling.  Neurologic: No headache.      VITALS:  Vital Signs Last 24 Hrs  T(C): 36.8 (23 Feb 2018 09:37), Max: 37.4 (22 Feb 2018 21:00)  T(F): 98.2 (23 Feb 2018 09:37), Max: 99.3 (22 Feb 2018 21:00)  HR: 56 (23 Feb 2018 09:37) (56 - 81)  BP: 129/63 (23 Feb 2018 09:37) (102/53 - 162/76)  BP(mean): 74 (23 Feb 2018 02:00) (74 - 114)  RR: 18 (23 Feb 2018 09:37) (16 - 18)  SpO2: 98% (23 Feb 2018 09:37) (93% - 100%)  CAPILLARY BLOOD GLUCOSE        I&O's Summary    22 Feb 2018 07:01  -  23 Feb 2018 07:00  --------------------------------------------------------  IN: 1050 mL / OUT: 910 mL / NET: 140 mL    23 Feb 2018 07:01  -  23 Feb 2018 11:33  --------------------------------------------------------  IN: 780 mL / OUT: 300 mL / NET: 480 mL        PHYSICAL EXAM:  GENERAL: NAD, well-developed  HEAD:  Atraumatic, Normocephalic  EYES: EOMI, PERRLA, conjunctiva and sclera clear  NECK: Supple, No JVD  CHEST/LUNG: Clear to auscultation bilaterally; No wheeze  HEART: S1, S2; No murmurs, rubs, or gallops  ABDOMEN: Soft, non dist. dressing in place  EXTREMITIES:  2+ Peripheral Pulses, No clubbing, cyanosis, or edema  PSYCH: Normal affect  NEUROLOGY: AAOX3; non-focal  SKIN: No rashes or lesions    Consultant(s) Notes Reviewed:  [x ] YES  [ ] NO  Care Discussed with Consultants/Other Providers [ x] YES  [ ] NO    MEDS:  MEDICATIONS  (STANDING):  acetaminophen  IVPB. 1000 milliGRAM(s) IV Intermittent once  acetaminophen  IVPB. 1000 milliGRAM(s) IV Intermittent once  allopurinol 100 milliGRAM(s) Oral daily  alvimopan 12 milliGRAM(s) Oral two times a day  amiodarone    Tablet 200 milliGRAM(s) Oral daily  dextrose 5% + sodium chloride 0.45%. 1000 milliLiter(s) (75 mL/Hr) IV Continuous <Continuous>  donepezil 10 milliGRAM(s) Oral at bedtime  heparin  Injectable 5000 Unit(s) SubCutaneous every 8 hours  HYDROmorphone PCA (1 mG/mL) 30 milliLiter(s) PCA Continuous PCA Continuous  levothyroxine 125 MICROGram(s) Oral daily  memantine 10 milliGRAM(s) Oral every 12 hours  mirtazapine 15 milliGRAM(s) Oral at bedtime  pantoprazole  Injectable 40 milliGRAM(s) IV Push daily  propranolol 10 milliGRAM(s) Oral daily  sodium bicarbonate 650 milliGRAM(s) Oral two times a day  tamsulosin 0.4 milliGRAM(s) Oral at bedtime    MEDICATIONS  (PRN):  naloxone Injectable 0.1 milliGRAM(s) IV Push every 3 minutes PRN For ANY of the following changes in patient status:  A. RR LESS THAN 10 breaths per minute, B. Oxygen saturation LESS THAN 90%, C. Sedation score of 6  ondansetron Injectable 4 milliGRAM(s) IV Push every 6 hours PRN Nausea    ALLERGIES:  No Known Allergies      LABS:                        9.6    8.9   )-----------( 129      ( 23 Feb 2018 02:47 )             29.5     02-23    136  |  108  |  29<H>  ----------------------------<  134<H>  5.1   |  18<L>  |  3.21<H>    Ca    8.0<L>      23 Feb 2018 02:47  Phos  3.5     02-22  Mg     1.5     02-22      PT/INR - ( 23 Feb 2018 02:47 )   PT: 16.7 sec;   INR: 1.52 ratio         PTT - ( 23 Feb 2018 02:47 )  PTT:31.7 sec

## 2018-02-23 NOTE — PROGRESS NOTE ADULT - SUBJECTIVE AND OBJECTIVE BOX
Day 1 of Anesthesia Pain Management Service    SUBJECTIVE: Patient is doing well with IV PCA    Pain Scale Score:	[X] Refer to charted pain scores    THERAPY:    [ ] IV PCA Morphine		[ ] 5 mg/mL	[ ] 1 mg/mL  [X] IV PCA Hydromorphone	[ ] 5 mg/mL	[X] 1 mg/mL  [ ] IV PCA Fentanyl		[ ] 50 micrograms/mL    Demand dose: 0.2 mg     Lockout: 6 minutes   Continuous Rate: 0 mg/hr  4 Hour Limit: 4 mg    MEDICATIONS  (STANDING):  acetaminophen  IVPB. 1000 milliGRAM(s) IV Intermittent once  acetaminophen  IVPB. 1000 milliGRAM(s) IV Intermittent once  allopurinol 100 milliGRAM(s) Oral daily  alvimopan 12 milliGRAM(s) Oral two times a day  amiodarone    Tablet 200 milliGRAM(s) Oral daily  dextrose 5% + sodium chloride 0.45%. 1000 milliLiter(s) (75 mL/Hr) IV Continuous <Continuous>  donepezil 10 milliGRAM(s) Oral at bedtime  heparin  Injectable 5000 Unit(s) SubCutaneous every 8 hours  HYDROmorphone PCA (1 mG/mL) 30 milliLiter(s) PCA Continuous PCA Continuous  levothyroxine 125 MICROGram(s) Oral daily  memantine 10 milliGRAM(s) Oral every 12 hours  mirtazapine 15 milliGRAM(s) Oral at bedtime  pantoprazole  Injectable 40 milliGRAM(s) IV Push daily  propranolol 10 milliGRAM(s) Oral daily  sodium bicarbonate 650 milliGRAM(s) Oral two times a day  tamsulosin 0.4 milliGRAM(s) Oral at bedtime    MEDICATIONS  (PRN):  naloxone Injectable 0.1 milliGRAM(s) IV Push every 3 minutes PRN For ANY of the following changes in patient status:  A. RR LESS THAN 10 breaths per minute, B. Oxygen saturation LESS THAN 90%, C. Sedation score of 6  ondansetron Injectable 4 milliGRAM(s) IV Push every 6 hours PRN Nausea      OBJECTIVE:    Sedation Score:	[ X] Alert	[ ] Drowsy 	[ ] Arousable	[ ] Asleep	[ ] Unresponsive    Side Effects:	[X ] None	[ ] Nausea	[ ] Vomiting	[ ] Pruritus  		[ ] Other:    Vital Signs Last 24 Hrs  T(C): 36.8 (23 Feb 2018 09:37), Max: 37.4 (22 Feb 2018 21:00)  T(F): 98.2 (23 Feb 2018 09:37), Max: 99.3 (22 Feb 2018 21:00)  HR: 56 (23 Feb 2018 09:37) (56 - 81)  BP: 129/63 (23 Feb 2018 09:37) (102/53 - 162/76)  BP(mean): 74 (23 Feb 2018 02:00) (74 - 114)  RR: 18 (23 Feb 2018 09:37) (16 - 18)  SpO2: 98% (23 Feb 2018 09:37) (93% - 100%)    ASSESSMENT/ PLAN    Therapy to  be:               [X] Continued   [ ] Discontinued   [ ] Changed to PRN Analgesics    Documentation and Verification of current medications:   [X] Done	[ ] Not done, not eligible    Comments:

## 2018-02-24 LAB
ANION GAP SERPL CALC-SCNC: 11 MMOL/L — SIGNIFICANT CHANGE UP (ref 5–17)
APTT BLD: 27.9 SEC — SIGNIFICANT CHANGE UP (ref 27.5–37.4)
BUN SERPL-MCNC: 24 MG/DL — HIGH (ref 7–23)
CALCIUM SERPL-MCNC: 8.3 MG/DL — LOW (ref 8.4–10.5)
CHLORIDE SERPL-SCNC: 103 MMOL/L — SIGNIFICANT CHANGE UP (ref 96–108)
CO2 SERPL-SCNC: 23 MMOL/L — SIGNIFICANT CHANGE UP (ref 22–31)
CREAT SERPL-MCNC: 2.87 MG/DL — HIGH (ref 0.5–1.3)
GLUCOSE SERPL-MCNC: 95 MG/DL — SIGNIFICANT CHANGE UP (ref 70–99)
HCT VFR BLD CALC: 26.3 % — LOW (ref 39–50)
HCT VFR BLD CALC: 26.6 % — LOW (ref 39–50)
HGB BLD-MCNC: 8.4 G/DL — LOW (ref 13–17)
HGB BLD-MCNC: 8.9 G/DL — LOW (ref 13–17)
INR BLD: 1.2 RATIO — HIGH (ref 0.88–1.16)
MAGNESIUM SERPL-MCNC: 1.9 MG/DL — SIGNIFICANT CHANGE UP (ref 1.6–2.6)
MCHC RBC-ENTMCNC: 29.8 PG — SIGNIFICANT CHANGE UP (ref 27–34)
MCHC RBC-ENTMCNC: 31.3 PG — SIGNIFICANT CHANGE UP (ref 27–34)
MCHC RBC-ENTMCNC: 31.9 GM/DL — LOW (ref 32–36)
MCHC RBC-ENTMCNC: 33.5 GM/DL — SIGNIFICANT CHANGE UP (ref 32–36)
MCV RBC AUTO: 93.3 FL — SIGNIFICANT CHANGE UP (ref 80–100)
MCV RBC AUTO: 93.4 FL — SIGNIFICANT CHANGE UP (ref 80–100)
PHOSPHATE SERPL-MCNC: 3.1 MG/DL — SIGNIFICANT CHANGE UP (ref 2.5–4.5)
PLATELET # BLD AUTO: 109 K/UL — LOW (ref 150–400)
PLATELET # BLD AUTO: 122 K/UL — LOW (ref 150–400)
POTASSIUM SERPL-MCNC: 4.2 MMOL/L — SIGNIFICANT CHANGE UP (ref 3.5–5.3)
POTASSIUM SERPL-SCNC: 4.2 MMOL/L — SIGNIFICANT CHANGE UP (ref 3.5–5.3)
PROTHROM AB SERPL-ACNC: 13.6 SEC — HIGH (ref 10–13.1)
RBC # BLD: 2.82 M/UL — LOW (ref 4.2–5.8)
RBC # BLD: 2.85 M/UL — LOW (ref 4.2–5.8)
RBC # FLD: 13.1 % — SIGNIFICANT CHANGE UP (ref 10.3–14.5)
RBC # FLD: 14.9 % — HIGH (ref 10.3–14.5)
SODIUM SERPL-SCNC: 137 MMOL/L — SIGNIFICANT CHANGE UP (ref 135–145)
WBC # BLD: 6 K/UL — SIGNIFICANT CHANGE UP (ref 3.8–10.5)
WBC # BLD: 6.31 K/UL — SIGNIFICANT CHANGE UP (ref 3.8–10.5)
WBC # FLD AUTO: 6 K/UL — SIGNIFICANT CHANGE UP (ref 3.8–10.5)
WBC # FLD AUTO: 6.31 K/UL — SIGNIFICANT CHANGE UP (ref 3.8–10.5)

## 2018-02-24 PROCEDURE — 74018 RADEX ABDOMEN 1 VIEW: CPT | Mod: 26

## 2018-02-24 RX ORDER — PANTOPRAZOLE SODIUM 20 MG/1
40 TABLET, DELAYED RELEASE ORAL
Qty: 0 | Refills: 0 | Status: DISCONTINUED | OUTPATIENT
Start: 2018-02-24 | End: 2018-02-25

## 2018-02-24 RX ORDER — HYDROMORPHONE HYDROCHLORIDE 2 MG/ML
30 INJECTION INTRAMUSCULAR; INTRAVENOUS; SUBCUTANEOUS
Qty: 0 | Refills: 0 | Status: DISCONTINUED | OUTPATIENT
Start: 2018-02-24 | End: 2018-02-25

## 2018-02-24 RX ORDER — LEVOTHYROXINE SODIUM 125 MCG
72 TABLET ORAL AT BEDTIME
Qty: 0 | Refills: 0 | Status: DISCONTINUED | OUTPATIENT
Start: 2018-02-24 | End: 2018-02-25

## 2018-02-24 RX ORDER — ACETAMINOPHEN 500 MG
1000 TABLET ORAL ONCE
Qty: 0 | Refills: 0 | Status: COMPLETED | OUTPATIENT
Start: 2018-02-24 | End: 2018-02-24

## 2018-02-24 RX ORDER — MAGNESIUM SULFATE 500 MG/ML
2 VIAL (ML) INJECTION ONCE
Qty: 0 | Refills: 0 | Status: COMPLETED | OUTPATIENT
Start: 2018-02-24 | End: 2018-02-24

## 2018-02-24 RX ADMIN — HEPARIN SODIUM 5000 UNIT(S): 5000 INJECTION INTRAVENOUS; SUBCUTANEOUS at 21:25

## 2018-02-24 RX ADMIN — HEPARIN SODIUM 5000 UNIT(S): 5000 INJECTION INTRAVENOUS; SUBCUTANEOUS at 13:13

## 2018-02-24 RX ADMIN — HYDROMORPHONE HYDROCHLORIDE 30 MILLILITER(S): 2 INJECTION INTRAMUSCULAR; INTRAVENOUS; SUBCUTANEOUS at 17:42

## 2018-02-24 RX ADMIN — Medication 72 MICROGRAM(S): at 21:26

## 2018-02-24 RX ADMIN — Medication 650 MILLIGRAM(S): at 18:27

## 2018-02-24 RX ADMIN — ALVIMOPAN 12 MILLIGRAM(S): 12 CAPSULE ORAL at 05:00

## 2018-02-24 RX ADMIN — Medication 400 MILLIGRAM(S): at 12:21

## 2018-02-24 RX ADMIN — Medication 125 MICROGRAM(S): at 05:00

## 2018-02-24 RX ADMIN — DONEPEZIL HYDROCHLORIDE 10 MILLIGRAM(S): 10 TABLET, FILM COATED ORAL at 21:26

## 2018-02-24 RX ADMIN — Medication 1000 MILLIGRAM(S): at 12:21

## 2018-02-24 RX ADMIN — SODIUM CHLORIDE 90 MILLILITER(S): 9 INJECTION, SOLUTION INTRAVENOUS at 12:21

## 2018-02-24 RX ADMIN — MEMANTINE HYDROCHLORIDE 10 MILLIGRAM(S): 10 TABLET ORAL at 04:59

## 2018-02-24 RX ADMIN — Medication 50 GRAM(S): at 12:21

## 2018-02-24 RX ADMIN — HYDROMORPHONE HYDROCHLORIDE 30 MILLILITER(S): 2 INJECTION INTRAMUSCULAR; INTRAVENOUS; SUBCUTANEOUS at 19:07

## 2018-02-24 RX ADMIN — PANTOPRAZOLE SODIUM 40 MILLIGRAM(S): 20 TABLET, DELAYED RELEASE ORAL at 05:00

## 2018-02-24 RX ADMIN — Medication 650 MILLIGRAM(S): at 05:00

## 2018-02-24 RX ADMIN — AMIODARONE HYDROCHLORIDE 200 MILLIGRAM(S): 400 TABLET ORAL at 05:00

## 2018-02-24 RX ADMIN — ALVIMOPAN 12 MILLIGRAM(S): 12 CAPSULE ORAL at 18:26

## 2018-02-24 RX ADMIN — HEPARIN SODIUM 5000 UNIT(S): 5000 INJECTION INTRAVENOUS; SUBCUTANEOUS at 05:00

## 2018-02-24 RX ADMIN — MIRTAZAPINE 15 MILLIGRAM(S): 45 TABLET, ORALLY DISINTEGRATING ORAL at 21:26

## 2018-02-24 RX ADMIN — PANTOPRAZOLE SODIUM 40 MILLIGRAM(S): 20 TABLET, DELAYED RELEASE ORAL at 18:27

## 2018-02-24 RX ADMIN — HYDROMORPHONE HYDROCHLORIDE 30 MILLILITER(S): 2 INJECTION INTRAMUSCULAR; INTRAVENOUS; SUBCUTANEOUS at 07:25

## 2018-02-24 RX ADMIN — TAMSULOSIN HYDROCHLORIDE 0.4 MILLIGRAM(S): 0.4 CAPSULE ORAL at 21:26

## 2018-02-24 NOTE — PROGRESS NOTE ADULT - ASSESSMENT
73 yo man s/p lap assisted left colectomy, cystoscopy and ucaths placement, awaiting return of GI function.    Preliminary plan:   - Continue entereg  - c/w sips of clears  - mIVF  - Pain control with around the clock IV tylenol and PCA until tolerating PO  - d/c Tom . Continue flomax,   - Awaiting GI function  - Hold Coumadin   -PT eval  reccomend TWYLA , but patient currently wants to go home    Anjelica Lopez   [id number] 73 yo man s/p lap assisted left colectomy, cystoscopy and ucaths placement, awaiting return of GI function.    Plan:   - Continue entereg  - c/w sips of clears  - mIVF  - Pain control with around the clock IV tylenol and PCA until tolerating PO  - d/c Tom . Continue flomax,   - Awaiting GI function  - Hold Coumadin   -PT eval  reccomend TWYLA , but patient currently wants to go home    [place]  [id number]

## 2018-02-24 NOTE — PROGRESS NOTE ADULT - ASSESSMENT
73 yo male h/o afib on AC with coumadin, s/p ppm, CKD, Waldenstrom's macroglobulinemia, CLL, anxiety, diverticulisits, admitted for laparoscopic sigmoid resection.    POD #2  post op care per surg  pain control  clears  awaiting gi function  entereg as per GI  incentive spirometry    afib  cont propranolol and amio  holding coumadin until ok to restart by surg  will not need briding    CKD  stable  monitor  ivf  follows with Dr. Sudheer Ascencio    CLL/waldenstroms  stable    onc f/u    cont other current meds    DVT ppx  PT  ambulation

## 2018-02-24 NOTE — PROGRESS NOTE ADULT - SUBJECTIVE AND OBJECTIVE BOX
NICOLAS CIFUENTES  72y Male  MRN:[id number]    Patient is a 72y old  Male who presents with a chief complaint of diverticulitis (22 Feb 2018 06:19)    HPI:   72 year old male PMH of Afib on coumadin, PPM  gout, hypothyroid, Waldenstrom's macroglobinemia ( recent hem-onc visit Nov 2017 suggest possible relapse; Pt seeing hem-onc 2-16-18) ,s/p chemo, GERD, dementia , CLL; Pt known to have diverticulitis however pt getting frequent exacerbations; Pt recently hospitalized in January 2018  for diverticulitis; Pt was then discharge to rehab with Single lumen right chest wall central line for Zosyn infusion x 10 days; Pt sill has catheter; He presents to [facility] today for laparoscopic sigmoid resection possible open cystoscopy insertion of ureteral catheters (15 Feb 2018 15:54)      Patient seen and evaluated at bedside. No acute events overnight except as noted.    Interval HPI: no acute c/o. POD #2    PAST MEDICAL & SURGICAL HISTORY:  BPH (benign prostatic hyperplasia)  [place] (hard of hearing)  Gout  CKD (chronic kidney disease)  Nephrolithiasis  Hypothyroid  Dementia  Bradycardia, drug induced  Waldenstrom macroglobulinemia  Diverticulitis  Atrial fibrillation  GERD (gastroesophageal reflux disease)  Anxiety disorder  CLL (chronic lymphocytic leukemia): in remission  History of cardiac pacemaker in situ  History of appendectomy  History of cataract surgery, right: IOL  History of laparoscopic cholecystectomy: 4/2014  S/P hernia repair: x2  Meniscus tear: s/p removal of Meniscus 8 months ago    SOC:  non smoker  no drug abuse  no alcohol abuse    lives with wife    Fam Hx:  non cont           VITALS:  Vital Signs Last 24 Hrs  T(C): 36.8 (24 Feb 2018 21:37), Max: 37.1 (24 Feb 2018 04:53)  T(F): 98.3 (24 Feb 2018 21:37), Max: 98.8 (24 Feb 2018 04:53)  HR: 62 (24 Feb 2018 21:37) (53 - 78)  BP: 137/67 (24 Feb 2018 21:37) (132/61 - 166/76)  BP(mean): --  RR: 18 (24 Feb 2018 21:37) (18 - 18)  SpO2: 98% (24 Feb 2018 21:37) (95% - 98%)      PHYSICAL EXAM:  GENERAL: NAD, well-developed  HEAD:  Atraumatic, Normocephalic  EYES: EOMI, PERRLA, conjunctiva and sclera clear  NECK: Supple, No JVD  CHEST/LUNG: Clear to auscultation bilaterally; No wheeze  HEART: S1, S2; No murmurs, rubs, or gallops  ABDOMEN: Soft, non dist. dressing in place  EXTREMITIES:  2+ Peripheral Pulses, No clubbing, cyanosis, or edema  PSYCH: Normal affect  NEUROLOGY: AAOX3; non-focal  SKIN: No rashes or lesions    Consultant(s) Notes Reviewed:  [x ] YES  [ ] NO  Care Discussed with Consultants/Other Providers [ x] YES  [ ] NO    MEDS:  MEDICATIONS  (STANDING):  alvimopan 12 milliGRAM(s) Oral two times a day  amiodarone    Tablet 200 milliGRAM(s) Oral daily  dextrose 5% + sodium chloride 0.45%. 1000 milliLiter(s) (90 mL/Hr) IV Continuous <Continuous>  donepezil 10 milliGRAM(s) Oral at bedtime  heparin  Injectable 5000 Unit(s) SubCutaneous every 8 hours  HYDROmorphone PCA (1 mG/mL) 30 milliLiter(s) PCA Continuous PCA Continuous  levothyroxine Injectable 72 MICROGram(s) IV Push at bedtime  mirtazapine 15 milliGRAM(s) Oral at bedtime  pantoprazole  Injectable 40 milliGRAM(s) IV Push two times a day  sodium bicarbonate 650 milliGRAM(s) Oral two times a day  tamsulosin 0.4 milliGRAM(s) Oral at bedtime    MEDICATIONS  (PRN):  naloxone Injectable 0.1 milliGRAM(s) IV Push every 3 minutes PRN For ANY of the following changes in patient status:  A. RR LESS THAN 10 breaths per minute, B. Oxygen saturation LESS THAN 90%, C. Sedation score of 6  ondansetron Injectable 4 milliGRAM(s) IV Push every 6 hours PRN Nausea    ALLERGIES:  No Known Allergies      LABS:                                      8.9    6.0   )-----------( 109      ( 24 Feb 2018 14:32 )             26.6   02-24    137  |  103  |  24<H>  ----------------------------<  95  4.2   |  23  |  2.87<H>    Ca    8.3<L>      24 Feb 2018 08:48  Phos  3.1     02-24  Mg     1.9     02-24

## 2018-02-24 NOTE — PROGRESS NOTE ADULT - SUBJECTIVE AND OBJECTIVE BOX
Day __2_ of Anesthesia Pain Management Service    SUBJECTIVE:    Pain Scale Score	At rest: _4__ 	With Activity: ___ 	[ ] Refer to charted pain scores    THERAPY:    [ ] IV PCA Morphine		[ ] 5 mg/mL	[ ] 1 mg/mL  [ x] IV PCA Hydromorphone	[ ] 5 mg/mL	[ ] 1 mg/mL  [ ] IV PCA Fentanyl		[ ] 50 micrograms/mL    Demand dose0.2    lockout  6  (minutes) Continuous Rate 0   Total:     Daily      MEDICATIONS  (STANDING):  acetaminophen  IVPB. 1000 milliGRAM(s) IV Intermittent once  allopurinol 100 milliGRAM(s) Oral daily  alvimopan 12 milliGRAM(s) Oral two times a day  amiodarone    Tablet 200 milliGRAM(s) Oral daily  dextrose 5% + sodium chloride 0.45%. 1000 milliLiter(s) (75 mL/Hr) IV Continuous <Continuous>  donepezil 10 milliGRAM(s) Oral at bedtime  heparin  Injectable 5000 Unit(s) SubCutaneous every 8 hours  HYDROmorphone PCA (1 mG/mL) 30 milliLiter(s) PCA Continuous PCA Continuous  levothyroxine 125 MICROGram(s) Oral daily  magnesium sulfate  IVPB 2 Gram(s) IV Intermittent once  memantine 10 milliGRAM(s) Oral every 12 hours  mirtazapine 15 milliGRAM(s) Oral at bedtime  pantoprazole    Tablet 40 milliGRAM(s) Oral before breakfast  sodium bicarbonate 650 milliGRAM(s) Oral two times a day  tamsulosin 0.4 milliGRAM(s) Oral at bedtime    MEDICATIONS  (PRN):  naloxone Injectable 0.1 milliGRAM(s) IV Push every 3 minutes PRN For ANY of the following changes in patient status:  A. RR LESS THAN 10 breaths per minute, B. Oxygen saturation LESS THAN 90%, C. Sedation score of 6  ondansetron Injectable 4 milliGRAM(s) IV Push every 6 hours PRN Nausea      OBJECTIVE:    Sedation Score:	[x ] Alert	[ ] Drowsy 	[ ] Arousable	[ ] Asleep	[ ] Unresponsive    Side Effects:	[x ] None	[ ] Nausea	[ ] Vomiting	[ ] Pruritus  		[ ] Other:    Vital Signs Last 24 Hrs  T(C): 37 (24 Feb 2018 10:21), Max: 37.1 (24 Feb 2018 04:53)  T(F): 98.6 (24 Feb 2018 10:21), Max: 98.8 (24 Feb 2018 04:53)  HR: 78 (24 Feb 2018 10:21) (57 - 78)  BP: 162/70 (24 Feb 2018 10:21) (112/61 - 162/70)  BP(mean): --  RR: 18 (24 Feb 2018 10:21) (18 - 18)  SpO2: 95% (24 Feb 2018 10:21) (95% - 98%)    ASSESSMENT/ PLAN    Therapy to  be:	[x ] Continue   [ ] Discontinued   [ ] Change to prn Analgesics    Documentation and Verification of current medications:   [X] Done	[ ] Not done, not elligible    Comments:

## 2018-02-24 NOTE — PROGRESS NOTE ADULT - SUBJECTIVE AND OBJECTIVE BOX
[facility] PROGRESS NOTE    POST OPERATIVE DAY #: 2      SUBJECTIVE: Pt seen and examined at bedside. No acute events overnight. Pain well controlled. Tolerating sips of clears. Voiding well with Tom in place. Not passing flatus or BM.  Denied n/v, fever, chill, CP or SOB.     Vital Signs Last 24 Hrs  T(C): 37.1 (24 Feb 2018 04:53), Max: 37.1 (24 Feb 2018 04:53)  T(F): 98.8 (24 Feb 2018 04:53), Max: 98.8 (24 Feb 2018 04:53)  HR: 60 (24 Feb 2018 04:53) (56 - 64)  BP: 134/55 (24 Feb 2018 04:53) (112/61 - 134/55)  BP(mean): --  RR: 18 (24 Feb 2018 04:53) (18 - 18)  SpO2: 95% (24 Feb 2018 04:53) (95% - 98%)    Physical Exam  General: awake, alert,    Pulm: respirations unlabored, no increased WOB  Abdomen: soft, mildly distended, appropriately tender, incision c/d/i  Extremities: Grossly symmetric    I&O's Summary    22 Feb 2018 07:01  -  23 Feb 2018 07:00  --------------------------------------------------------  IN: 1050 mL / OUT: 910 mL / NET: 140 mL    23 Feb 2018 07:01  -  24 Feb 2018 06:35  --------------------------------------------------------  IN: 2720 mL / OUT: 2000 mL / NET: 720 mL      I&O's Detail    22 Feb 2018 07:01  -  23 Feb 2018 07:00  --------------------------------------------------------  IN:    IV PiggyBack: 150 mL    lactated ringers.: 900 mL  Total IN: 1050 mL    OUT:    Indwelling Catheter - Urethral: 910 mL  Total OUT: 910 mL    Total NET: 140 mL      23 Feb 2018 07:01  -  24 Feb 2018 06:35  --------------------------------------------------------  IN:    dextrose 5% + sodium chloride 0.45%.: 1800 mL    IV PiggyBack: 200 mL    Oral Fluid: 720 mL  Total IN: 2720 mL    OUT:    Indwelling Catheter - Urethral: 2000 mL  Total OUT: 2000 mL    Total NET: 720 mL          MEDICATIONS  (STANDING):  allopurinol 100 milliGRAM(s) Oral daily  alvimopan 12 milliGRAM(s) Oral two times a day  amiodarone    Tablet 200 milliGRAM(s) Oral daily  dextrose 5% + sodium chloride 0.45%. 1000 milliLiter(s) (75 mL/Hr) IV Continuous <Continuous>  donepezil 10 milliGRAM(s) Oral at bedtime  heparin  Injectable 5000 Unit(s) SubCutaneous every 8 hours  HYDROmorphone PCA (1 mG/mL) 30 milliLiter(s) PCA Continuous PCA Continuous  levothyroxine 125 MICROGram(s) Oral daily  memantine 10 milliGRAM(s) Oral every 12 hours  mirtazapine 15 milliGRAM(s) Oral at bedtime  pantoprazole    Tablet 40 milliGRAM(s) Oral before breakfast  sodium bicarbonate 650 milliGRAM(s) Oral two times a day  tamsulosin 0.4 milliGRAM(s) Oral at bedtime    MEDICATIONS  (PRN):  naloxone Injectable 0.1 milliGRAM(s) IV Push every 3 minutes PRN For ANY of the following changes in patient status:  A. RR LESS THAN 10 breaths per minute, B. Oxygen saturation LESS THAN 90%, C. Sedation score of 6  ondansetron Injectable 4 milliGRAM(s) IV Push every 6 hours PRN Nausea      LABS:                        9.7    8.1   )-----------( 127      ( 23 Feb 2018 15:25 )             29.0     02-23    137  |  104  |  30<H>  ----------------------------<  153<H>  4.4   |  19<L>  |  3.11<H>    Ca    8.1<L>      23 Feb 2018 15:25  Phos  3.4     02-23  Mg     2.0     02-23      PT/INR - ( 23 Feb 2018 02:47 )   PT: 16.7 sec;   INR: 1.52 ratio         PTT - ( 23 Feb 2018 02:47 )  PTT:31.7 sec      RADIOLOGY & ADDITIONAL STUDIES:

## 2018-02-25 LAB
ANION GAP SERPL CALC-SCNC: 6 MMOL/L — SIGNIFICANT CHANGE UP (ref 5–17)
APPEARANCE UR: CLEAR — SIGNIFICANT CHANGE UP
BACTERIA # UR AUTO: ABNORMAL /HPF
BILIRUB UR-MCNC: NEGATIVE — SIGNIFICANT CHANGE UP
BUN SERPL-MCNC: 17 MG/DL — SIGNIFICANT CHANGE UP (ref 7–23)
CALCIUM SERPL-MCNC: 8.5 MG/DL — SIGNIFICANT CHANGE UP (ref 8.4–10.5)
CHLORIDE SERPL-SCNC: 106 MMOL/L — SIGNIFICANT CHANGE UP (ref 96–108)
CO2 SERPL-SCNC: 26 MMOL/L — SIGNIFICANT CHANGE UP (ref 22–31)
COLOR SPEC: COLORLESS — SIGNIFICANT CHANGE UP
COMMENT - URINE: SIGNIFICANT CHANGE UP
CREAT SERPL-MCNC: 2.51 MG/DL — HIGH (ref 0.5–1.3)
DIFF PNL FLD: ABNORMAL
GLUCOSE SERPL-MCNC: 88 MG/DL — SIGNIFICANT CHANGE UP (ref 70–99)
GLUCOSE UR QL: NEGATIVE — SIGNIFICANT CHANGE UP
HCT VFR BLD CALC: 27.6 % — LOW (ref 39–50)
HGB BLD-MCNC: 8.3 G/DL — LOW (ref 13–17)
KETONES UR-MCNC: NEGATIVE — SIGNIFICANT CHANGE UP
LEUKOCYTE ESTERASE UR-ACNC: NEGATIVE — SIGNIFICANT CHANGE UP
MAGNESIUM SERPL-MCNC: 2.2 MG/DL — SIGNIFICANT CHANGE UP (ref 1.6–2.6)
MCHC RBC-ENTMCNC: 27.9 PG — SIGNIFICANT CHANGE UP (ref 27–34)
MCHC RBC-ENTMCNC: 30.1 GM/DL — LOW (ref 32–36)
MCV RBC AUTO: 92.6 FL — SIGNIFICANT CHANGE UP (ref 80–100)
NITRITE UR-MCNC: NEGATIVE — SIGNIFICANT CHANGE UP
PH UR: 6 — SIGNIFICANT CHANGE UP (ref 5–8)
PHOSPHATE SERPL-MCNC: 1.8 MG/DL — LOW (ref 2.5–4.5)
PLATELET # BLD AUTO: 128 K/UL — LOW (ref 150–400)
POTASSIUM SERPL-MCNC: 4.4 MMOL/L — SIGNIFICANT CHANGE UP (ref 3.5–5.3)
POTASSIUM SERPL-SCNC: 4.4 MMOL/L — SIGNIFICANT CHANGE UP (ref 3.5–5.3)
PROT UR-MCNC: SIGNIFICANT CHANGE UP
RBC # BLD: 2.98 M/UL — LOW (ref 4.2–5.8)
RBC # FLD: 14.8 % — HIGH (ref 10.3–14.5)
RBC CASTS # UR COMP ASSIST: SIGNIFICANT CHANGE UP /HPF (ref 0–2)
SODIUM SERPL-SCNC: 138 MMOL/L — SIGNIFICANT CHANGE UP (ref 135–145)
SP GR SPEC: 1.01 — LOW (ref 1.01–1.02)
UROBILINOGEN FLD QL: NEGATIVE — SIGNIFICANT CHANGE UP
WBC # BLD: 5.54 K/UL — SIGNIFICANT CHANGE UP (ref 3.8–10.5)
WBC # FLD AUTO: 5.54 K/UL — SIGNIFICANT CHANGE UP (ref 3.8–10.5)
WBC UR QL: SIGNIFICANT CHANGE UP /HPF (ref 0–5)

## 2018-02-25 RX ORDER — ACETAMINOPHEN 500 MG
1000 TABLET ORAL ONCE
Qty: 0 | Refills: 0 | Status: COMPLETED | OUTPATIENT
Start: 2018-02-25 | End: 2018-02-26

## 2018-02-25 RX ORDER — LEVOTHYROXINE SODIUM 125 MCG
125 TABLET ORAL DAILY
Qty: 0 | Refills: 0 | Status: DISCONTINUED | OUTPATIENT
Start: 2018-02-25 | End: 2018-02-28

## 2018-02-25 RX ORDER — ACETAMINOPHEN 500 MG
1000 TABLET ORAL ONCE
Qty: 0 | Refills: 0 | Status: COMPLETED | OUTPATIENT
Start: 2018-02-26 | End: 2018-02-26

## 2018-02-25 RX ORDER — PANTOPRAZOLE SODIUM 20 MG/1
40 TABLET, DELAYED RELEASE ORAL
Qty: 0 | Refills: 0 | Status: DISCONTINUED | OUTPATIENT
Start: 2018-02-25 | End: 2018-02-28

## 2018-02-25 RX ORDER — ALLOPURINOL 300 MG
100 TABLET ORAL DAILY
Qty: 0 | Refills: 0 | Status: DISCONTINUED | OUTPATIENT
Start: 2018-02-25 | End: 2018-02-28

## 2018-02-25 RX ORDER — SODIUM BICARBONATE 1 MEQ/ML
325 SYRINGE (ML) INTRAVENOUS
Qty: 0 | Refills: 0 | Status: DISCONTINUED | OUTPATIENT
Start: 2018-02-25 | End: 2018-02-26

## 2018-02-25 RX ORDER — MECLIZINE HCL 12.5 MG
25 TABLET ORAL
Qty: 0 | Refills: 0 | Status: DISCONTINUED | OUTPATIENT
Start: 2018-02-25 | End: 2018-02-26

## 2018-02-25 RX ORDER — OXYCODONE HYDROCHLORIDE 5 MG/1
10 TABLET ORAL EVERY 4 HOURS
Qty: 0 | Refills: 0 | Status: DISCONTINUED | OUTPATIENT
Start: 2018-02-25 | End: 2018-02-26

## 2018-02-25 RX ORDER — ACETAMINOPHEN 500 MG
1000 TABLET ORAL ONCE
Qty: 0 | Refills: 0 | Status: COMPLETED | OUTPATIENT
Start: 2018-02-25 | End: 2018-02-25

## 2018-02-25 RX ORDER — OXYCODONE HYDROCHLORIDE 5 MG/1
5 TABLET ORAL EVERY 4 HOURS
Qty: 0 | Refills: 0 | Status: DISCONTINUED | OUTPATIENT
Start: 2018-02-25 | End: 2018-02-27

## 2018-02-25 RX ORDER — MORPHINE SULFATE 50 MG/1
2 CAPSULE, EXTENDED RELEASE ORAL EVERY 6 HOURS
Qty: 0 | Refills: 0 | Status: DISCONTINUED | OUTPATIENT
Start: 2018-02-25 | End: 2018-02-26

## 2018-02-25 RX ORDER — MEMANTINE HYDROCHLORIDE 10 MG/1
10 TABLET ORAL
Qty: 0 | Refills: 0 | Status: DISCONTINUED | OUTPATIENT
Start: 2018-02-25 | End: 2018-02-28

## 2018-02-25 RX ADMIN — Medication 1000 MILLIGRAM(S): at 11:46

## 2018-02-25 RX ADMIN — PANTOPRAZOLE SODIUM 40 MILLIGRAM(S): 20 TABLET, DELAYED RELEASE ORAL at 05:07

## 2018-02-25 RX ADMIN — ALVIMOPAN 12 MILLIGRAM(S): 12 CAPSULE ORAL at 05:05

## 2018-02-25 RX ADMIN — Medication 62.5 MILLIMOLE(S): at 11:17

## 2018-02-25 RX ADMIN — Medication 100 MILLIGRAM(S): at 11:17

## 2018-02-25 RX ADMIN — HEPARIN SODIUM 5000 UNIT(S): 5000 INJECTION INTRAVENOUS; SUBCUTANEOUS at 21:12

## 2018-02-25 RX ADMIN — HYDROMORPHONE HYDROCHLORIDE 30 MILLILITER(S): 2 INJECTION INTRAMUSCULAR; INTRAVENOUS; SUBCUTANEOUS at 00:23

## 2018-02-25 RX ADMIN — HYDROMORPHONE HYDROCHLORIDE 30 MILLILITER(S): 2 INJECTION INTRAMUSCULAR; INTRAVENOUS; SUBCUTANEOUS at 06:14

## 2018-02-25 RX ADMIN — OXYCODONE HYDROCHLORIDE 10 MILLIGRAM(S): 5 TABLET ORAL at 23:04

## 2018-02-25 RX ADMIN — Medication 325 MILLIGRAM(S): at 17:28

## 2018-02-25 RX ADMIN — Medication 25 MILLIGRAM(S): at 09:38

## 2018-02-25 RX ADMIN — HEPARIN SODIUM 5000 UNIT(S): 5000 INJECTION INTRAVENOUS; SUBCUTANEOUS at 13:40

## 2018-02-25 RX ADMIN — Medication 1000 MILLIGRAM(S): at 17:58

## 2018-02-25 RX ADMIN — DONEPEZIL HYDROCHLORIDE 10 MILLIGRAM(S): 10 TABLET, FILM COATED ORAL at 21:13

## 2018-02-25 RX ADMIN — Medication 400 MILLIGRAM(S): at 11:16

## 2018-02-25 RX ADMIN — MORPHINE SULFATE 2 MILLIGRAM(S): 50 CAPSULE, EXTENDED RELEASE ORAL at 16:30

## 2018-02-25 RX ADMIN — OXYCODONE HYDROCHLORIDE 10 MILLIGRAM(S): 5 TABLET ORAL at 23:34

## 2018-02-25 RX ADMIN — AMIODARONE HYDROCHLORIDE 200 MILLIGRAM(S): 400 TABLET ORAL at 05:05

## 2018-02-25 RX ADMIN — OXYCODONE HYDROCHLORIDE 10 MILLIGRAM(S): 5 TABLET ORAL at 18:53

## 2018-02-25 RX ADMIN — HYDROMORPHONE HYDROCHLORIDE 30 MILLILITER(S): 2 INJECTION INTRAMUSCULAR; INTRAVENOUS; SUBCUTANEOUS at 07:12

## 2018-02-25 RX ADMIN — OXYCODONE HYDROCHLORIDE 10 MILLIGRAM(S): 5 TABLET ORAL at 13:05

## 2018-02-25 RX ADMIN — MEMANTINE HYDROCHLORIDE 10 MILLIGRAM(S): 10 TABLET ORAL at 17:28

## 2018-02-25 RX ADMIN — MIRTAZAPINE 15 MILLIGRAM(S): 45 TABLET, ORALLY DISINTEGRATING ORAL at 21:12

## 2018-02-25 RX ADMIN — Medication 650 MILLIGRAM(S): at 05:05

## 2018-02-25 RX ADMIN — PANTOPRAZOLE SODIUM 40 MILLIGRAM(S): 20 TABLET, DELAYED RELEASE ORAL at 11:17

## 2018-02-25 RX ADMIN — Medication 400 MILLIGRAM(S): at 17:28

## 2018-02-25 RX ADMIN — OXYCODONE HYDROCHLORIDE 10 MILLIGRAM(S): 5 TABLET ORAL at 19:23

## 2018-02-25 RX ADMIN — MORPHINE SULFATE 2 MILLIGRAM(S): 50 CAPSULE, EXTENDED RELEASE ORAL at 17:00

## 2018-02-25 RX ADMIN — HEPARIN SODIUM 5000 UNIT(S): 5000 INJECTION INTRAVENOUS; SUBCUTANEOUS at 05:05

## 2018-02-25 RX ADMIN — Medication 25 MILLIGRAM(S): at 17:27

## 2018-02-25 RX ADMIN — TAMSULOSIN HYDROCHLORIDE 0.4 MILLIGRAM(S): 0.4 CAPSULE ORAL at 21:12

## 2018-02-25 NOTE — PROGRESS NOTE ADULT - ASSESSMENT
71 yo man s/p lap assisted left colectomy, cystoscopy and ucaths placement, awaiting return of GI function.    - plan to follow 73 yo man s/p lap assisted left colectomy, cystoscopy and ucaths placement, overnight had flatus and BM     - NPO/IVF, likely Adv to Clears given return of GI function  - c/w home meds, restart memantine and allopurinol in PM is tolerates CLD  - OOB, ambulate  - f/u AM labs, replete as needed  - PT rec appreciated: TWYLA Londono [phone] 71 yo man s/p lap assisted left colectomy, cystoscopy and ucaths placement, overnight had flatus and BM     - NPO/IVF, likely Adv to Clears given return of GI function  - meclizine prn for vertigo symptoms  - c/w home meds, restart memantine and allopurinol in PM is tolerates CLD  - OOB, ambulate  - f/u AM labs, replete as needed  - PT rec appreciated: TWYLA Londono [phone]

## 2018-02-25 NOTE — PROGRESS NOTE ADULT - SUBJECTIVE AND OBJECTIVE BOX
GENERAL SURGERY DAILY PROGRESS NOTE:       Subjective:  NAOE. AVSS overnight.         Objective:    PE:          Vital Signs Last 24 Hrs  T(C): 36.8 (24 Feb 2018 21:37), Max: 37.1 (24 Feb 2018 04:53)  T(F): 98.3 (24 Feb 2018 21:37), Max: 98.8 (24 Feb 2018 04:53)  HR: 62 (24 Feb 2018 21:37) (53 - 78)  BP: 137/67 (24 Feb 2018 21:37) (132/61 - 166/76)  BP(mean): --  RR: 18 (24 Feb 2018 21:37) (18 - 18)  SpO2: 98% (24 Feb 2018 21:37) (95% - 98%)    I&O's Detail    23 Feb 2018 07:01  -  24 Feb 2018 07:00  --------------------------------------------------------  IN:    dextrose 5% + sodium chloride 0.45%.: 1800 mL    IV PiggyBack: 200 mL    Oral Fluid: 720 mL  Total IN: 2720 mL    OUT:    Indwelling Catheter - Urethral: 2000 mL  Total OUT: 2000 mL    Total NET: 720 mL      24 Feb 2018 07:01  -  25 Feb 2018 00:33  --------------------------------------------------------  IN:    dextrose 5% + sodium chloride 0.45%.: 1080 mL    IV PiggyBack: 150 mL  Total IN: 1230 mL    OUT:    Indwelling Catheter - Urethral: 700 mL    Voided: 1200 mL  Total OUT: 1900 mL    Total NET: -670 mL          Daily     Daily     MEDICATIONS  (STANDING):  alvimopan 12 milliGRAM(s) Oral two times a day  amiodarone    Tablet 200 milliGRAM(s) Oral daily  dextrose 5% + sodium chloride 0.45%. 1000 milliLiter(s) (90 mL/Hr) IV Continuous <Continuous>  donepezil 10 milliGRAM(s) Oral at bedtime  heparin  Injectable 5000 Unit(s) SubCutaneous every 8 hours  HYDROmorphone PCA (1 mG/mL) 30 milliLiter(s) PCA Continuous PCA Continuous  levothyroxine Injectable 72 MICROGram(s) IV Push at bedtime  mirtazapine 15 milliGRAM(s) Oral at bedtime  pantoprazole  Injectable 40 milliGRAM(s) IV Push two times a day  sodium bicarbonate 650 milliGRAM(s) Oral two times a day  tamsulosin 0.4 milliGRAM(s) Oral at bedtime    MEDICATIONS  (PRN):  naloxone Injectable 0.1 milliGRAM(s) IV Push every 3 minutes PRN For ANY of the following changes in patient status:  A. RR LESS THAN 10 breaths per minute, B. Oxygen saturation LESS THAN 90%, C. Sedation score of 6  ondansetron Injectable 4 milliGRAM(s) IV Push every 6 hours PRN Nausea      LABS:                        8.9    6.0   )-----------( 109      ( 24 Feb 2018 14:32 )             26.6     02-24    137  |  103  |  24<H>  ----------------------------<  95  4.2   |  23  |  2.87<H>    Ca    8.3<L>      24 Feb 2018 08:48  Phos  3.1     02-24  Mg     1.9     02-24      PT/INR - ( 24 Feb 2018 08:44 )   PT: 13.6 sec;   INR: 1.20 ratio         PTT - ( 24 Feb 2018 08:44 )  PTT:27.9 sec      RADIOLOGY & ADDITIONAL STUDIES: GENERAL SURGERY DAILY PROGRESS NOTE:       Subjective:  NAOE. AVSS overnight. Had flatus and BM early this morning. Pain well controlled. Voiding and ambulating without issue. Denied n/v, fever, chills, CP or SOB.         Objective:    PE:  Gen: Elderly gentleman sitting upright in bed, NAD  Resp: Breathing comfortably with NC in place  GI: soft, distended but improved from yesterday, appropriate silvana incisional pain. Incision CDI with on wick in place. No rebound or guarding.  Ext: Moving all 4 ext, no edema appraciated      Vital Signs Last 24 Hrs  T(C): 36.8 (24 Feb 2018 21:37), Max: 37.1 (24 Feb 2018 04:53)  T(F): 98.3 (24 Feb 2018 21:37), Max: 98.8 (24 Feb 2018 04:53)  HR: 62 (24 Feb 2018 21:37) (53 - 78)  BP: 137/67 (24 Feb 2018 21:37) (132/61 - 166/76)  BP(mean): --  RR: 18 (24 Feb 2018 21:37) (18 - 18)  SpO2: 98% (24 Feb 2018 21:37) (95% - 98%)    I&O's Detail    23 Feb 2018 07:01  -  24 Feb 2018 07:00  --------------------------------------------------------  IN:    dextrose 5% + sodium chloride 0.45%.: 1800 mL    IV PiggyBack: 200 mL    Oral Fluid: 720 mL  Total IN: 2720 mL    OUT:    Indwelling Catheter - Urethral: 2000 mL  Total OUT: 2000 mL    Total NET: 720 mL      24 Feb 2018 07:01  -  25 Feb 2018 00:33  --------------------------------------------------------  IN:    dextrose 5% + sodium chloride 0.45%.: 1080 mL    IV PiggyBack: 150 mL  Total IN: 1230 mL    OUT:    Indwelling Catheter - Urethral: 700 mL    Voided: 1200 mL  Total OUT: 1900 mL    Total NET: -670 mL          Daily     Daily     MEDICATIONS  (STANDING):  alvimopan 12 milliGRAM(s) Oral two times a day  amiodarone    Tablet 200 milliGRAM(s) Oral daily  dextrose 5% + sodium chloride 0.45%. 1000 milliLiter(s) (90 mL/Hr) IV Continuous <Continuous>  donepezil 10 milliGRAM(s) Oral at bedtime  heparin  Injectable 5000 Unit(s) SubCutaneous every 8 hours  HYDROmorphone PCA (1 mG/mL) 30 milliLiter(s) PCA Continuous PCA Continuous  levothyroxine Injectable 72 MICROGram(s) IV Push at bedtime  mirtazapine 15 milliGRAM(s) Oral at bedtime  pantoprazole  Injectable 40 milliGRAM(s) IV Push two times a day  sodium bicarbonate 650 milliGRAM(s) Oral two times a day  tamsulosin 0.4 milliGRAM(s) Oral at bedtime    MEDICATIONS  (PRN):  naloxone Injectable 0.1 milliGRAM(s) IV Push every 3 minutes PRN For ANY of the following changes in patient status:  A. RR LESS THAN 10 breaths per minute, B. Oxygen saturation LESS THAN 90%, C. Sedation score of 6  ondansetron Injectable 4 milliGRAM(s) IV Push every 6 hours PRN Nausea      LABS:                        8.9    6.0   )-----------( 109      ( 24 Feb 2018 14:32 )             26.6     02-24    137  |  103  |  24<H>  ----------------------------<  95  4.2   |  23  |  2.87<H>    Ca    8.3<L>      24 Feb 2018 08:48  Phos  3.1     02-24  Mg     1.9     02-24      PT/INR - ( 24 Feb 2018 08:44 )   PT: 13.6 sec;   INR: 1.20 ratio         PTT - ( 24 Feb 2018 08:44 )  PTT:27.9 sec      RADIOLOGY & ADDITIONAL STUDIES: GENERAL SURGERY DAILY PROGRESS NOTE:       Subjective:  NAOE. AVSS overnight. Had flatus and BM early this morning. Pain well controlled. Voiding and ambulating without issue. Denied n/v, fever, chills, CP or SOB. Today complaining of a "floating in space" feeling whenever he closes his eyes or moves his head.     Objective:    PE:  Gen: Elderly gentleman sitting upright in bed, NAD  Resp: Breathing comfortably with NC in place  GI: soft, distended but improved from yesterday, appropriate silvana incisional pain. Incision CDI with one wick in place. No rebound or guarding.  Ext: Moving all 4 ext, no edema appreciated      Vital Signs Last 24 Hrs  T(C): 36.8 (24 Feb 2018 21:37), Max: 37.1 (24 Feb 2018 04:53)  T(F): 98.3 (24 Feb 2018 21:37), Max: 98.8 (24 Feb 2018 04:53)  HR: 62 (24 Feb 2018 21:37) (53 - 78)  BP: 137/67 (24 Feb 2018 21:37) (132/61 - 166/76)  BP(mean): --  RR: 18 (24 Feb 2018 21:37) (18 - 18)  SpO2: 98% (24 Feb 2018 21:37) (95% - 98%)    I&O's Detail    23 Feb 2018 07:01  -  24 Feb 2018 07:00  --------------------------------------------------------  IN:    dextrose 5% + sodium chloride 0.45%.: 1800 mL    IV PiggyBack: 200 mL    Oral Fluid: 720 mL  Total IN: 2720 mL    OUT:    Indwelling Catheter - Urethral: 2000 mL  Total OUT: 2000 mL    Total NET: 720 mL      24 Feb 2018 07:01  -  25 Feb 2018 00:33  --------------------------------------------------------  IN:    dextrose 5% + sodium chloride 0.45%.: 1080 mL    IV PiggyBack: 150 mL  Total IN: 1230 mL    OUT:    Indwelling Catheter - Urethral: 700 mL    Voided: 1200 mL  Total OUT: 1900 mL    Total NET: -670 mL          Daily     Daily     MEDICATIONS  (STANDING):  alvimopan 12 milliGRAM(s) Oral two times a day  amiodarone    Tablet 200 milliGRAM(s) Oral daily  dextrose 5% + sodium chloride 0.45%. 1000 milliLiter(s) (90 mL/Hr) IV Continuous <Continuous>  donepezil 10 milliGRAM(s) Oral at bedtime  heparin  Injectable 5000 Unit(s) SubCutaneous every 8 hours  HYDROmorphone PCA (1 mG/mL) 30 milliLiter(s) PCA Continuous PCA Continuous  levothyroxine Injectable 72 MICROGram(s) IV Push at bedtime  mirtazapine 15 milliGRAM(s) Oral at bedtime  pantoprazole  Injectable 40 milliGRAM(s) IV Push two times a day  sodium bicarbonate 650 milliGRAM(s) Oral two times a day  tamsulosin 0.4 milliGRAM(s) Oral at bedtime    MEDICATIONS  (PRN):  naloxone Injectable 0.1 milliGRAM(s) IV Push every 3 minutes PRN For ANY of the following changes in patient status:  A. RR LESS THAN 10 breaths per minute, B. Oxygen saturation LESS THAN 90%, C. Sedation score of 6  ondansetron Injectable 4 milliGRAM(s) IV Push every 6 hours PRN Nausea      LABS:                        8.9    6.0   )-----------( 109      ( 24 Feb 2018 14:32 )             26.6     02-24    137  |  103  |  24<H>  ----------------------------<  95  4.2   |  23  |  2.87<H>    Ca    8.3<L>      24 Feb 2018 08:48  Phos  3.1     02-24  Mg     1.9     02-24      PT/INR - ( 24 Feb 2018 08:44 )   PT: 13.6 sec;   INR: 1.20 ratio         PTT - ( 24 Feb 2018 08:44 )  PTT:27.9 sec GENERAL SURGERY DAILY PROGRESS NOTE:       Subjective:  NAOE. AVSS overnight. Had flatus and BM early this morning. Pain well controlled. Voiding and ambulating without issue. Denied n/v, fever, chills, CP or SOB. Today complaining of a "floating in space" feeling whenever he closes his eyes or moves his head.  This occurred after narcotic intake    Objective:    PE:  Gen: Elderly gentleman sitting upright in bed, NAD  Resp: Breathing comfortably with NC in place  GI: soft, distended but improved from yesterday, appropriate silvana incisional pain. Incision CDI with one wick in place. No rebound or guarding.  Ext: Moving all 4 ext, no edema appreciated      Vital Signs Last 24 Hrs  T(C): 36.8 (24 Feb 2018 21:37), Max: 37.1 (24 Feb 2018 04:53)  T(F): 98.3 (24 Feb 2018 21:37), Max: 98.8 (24 Feb 2018 04:53)  HR: 62 (24 Feb 2018 21:37) (53 - 78)  BP: 137/67 (24 Feb 2018 21:37) (132/61 - 166/76)  BP(mean): --  RR: 18 (24 Feb 2018 21:37) (18 - 18)  SpO2: 98% (24 Feb 2018 21:37) (95% - 98%)    I&O's Detail    23 Feb 2018 07:01  -  24 Feb 2018 07:00  --------------------------------------------------------  IN:    dextrose 5% + sodium chloride 0.45%.: 1800 mL    IV PiggyBack: 200 mL    Oral Fluid: 720 mL  Total IN: 2720 mL    OUT:    Indwelling Catheter - Urethral: 2000 mL  Total OUT: 2000 mL    Total NET: 720 mL      24 Feb 2018 07:01  -  25 Feb 2018 00:33  --------------------------------------------------------  IN:    dextrose 5% + sodium chloride 0.45%.: 1080 mL    IV PiggyBack: 150 mL  Total IN: 1230 mL    OUT:    Indwelling Catheter - Urethral: 700 mL    Voided: 1200 mL  Total OUT: 1900 mL    Total NET: -670 mL          Daily     Daily     MEDICATIONS  (STANDING):  alvimopan 12 milliGRAM(s) Oral two times a day  amiodarone    Tablet 200 milliGRAM(s) Oral daily  dextrose 5% + sodium chloride 0.45%. 1000 milliLiter(s) (90 mL/Hr) IV Continuous <Continuous>  donepezil 10 milliGRAM(s) Oral at bedtime  heparin  Injectable 5000 Unit(s) SubCutaneous every 8 hours  HYDROmorphone PCA (1 mG/mL) 30 milliLiter(s) PCA Continuous PCA Continuous  levothyroxine Injectable 72 MICROGram(s) IV Push at bedtime  mirtazapine 15 milliGRAM(s) Oral at bedtime  pantoprazole  Injectable 40 milliGRAM(s) IV Push two times a day  sodium bicarbonate 650 milliGRAM(s) Oral two times a day  tamsulosin 0.4 milliGRAM(s) Oral at bedtime    MEDICATIONS  (PRN):  naloxone Injectable 0.1 milliGRAM(s) IV Push every 3 minutes PRN For ANY of the following changes in patient status:  A. RR LESS THAN 10 breaths per minute, B. Oxygen saturation LESS THAN 90%, C. Sedation score of 6  ondansetron Injectable 4 milliGRAM(s) IV Push every 6 hours PRN Nausea      LABS:                        8.9    6.0   )-----------( 109      ( 24 Feb 2018 14:32 )             26.6     02-24    137  |  103  |  24<H>  ----------------------------<  95  4.2   |  23  |  2.87<H>    Ca    8.3<L>      24 Feb 2018 08:48  Phos  3.1     02-24  Mg     1.9     02-24      PT/INR - ( 24 Feb 2018 08:44 )   PT: 13.6 sec;   INR: 1.20 ratio         PTT - ( 24 Feb 2018 08:44 )  PTT:27.9 sec

## 2018-02-25 NOTE — PROGRESS NOTE ADULT - SUBJECTIVE AND OBJECTIVE BOX
NEPHROLOGY - NSN    Patient seen and examined. Complaining of abdominal discomfort.     MEDICATIONS  (STANDING):  acetaminophen  IVPB. 1000 milliGRAM(s) IV Intermittent once  acetaminophen  IVPB. 1000 milliGRAM(s) IV Intermittent once  allopurinol 100 milliGRAM(s) Oral daily  amiodarone    Tablet 200 milliGRAM(s) Oral daily  dextrose 5% + sodium chloride 0.45%. 1000 milliLiter(s) (75 mL/Hr) IV Continuous <Continuous>  donepezil 10 milliGRAM(s) Oral at bedtime  heparin  Injectable 5000 Unit(s) SubCutaneous every 8 hours  levothyroxine 125 MICROGram(s) Oral daily  meclizine 25 milliGRAM(s) Oral two times a day  memantine 10 milliGRAM(s) Oral two times a day  mirtazapine 15 milliGRAM(s) Oral at bedtime  pantoprazole    Tablet 40 milliGRAM(s) Oral before breakfast  sodium bicarbonate 650 milliGRAM(s) Oral two times a day  tamsulosin 0.4 milliGRAM(s) Oral at bedtime    VITALS:  T(C): , Max: 37.1 (02-25-18 @ 08:00)  T(F): , Max: 98.7 (02-25-18 @ 08:00)  HR: 57 (02-25-18 @ 09:22)  BP: 162/69 (02-25-18 @ 09:22)  BP(mean): --  RR: 17 (02-25-18 @ 09:22)  SpO2: 96% (02-25-18 @ 09:22)  Wt(kg): --  I and O's:    02-24 @ 07:01  -  02-25 @ 07:00  --------------------------------------------------------  IN: 2310 mL / OUT: 3276 mL / NET: -966 mL    02-25 @ 07:01  -  02-25 @ 14:04  --------------------------------------------------------  IN: 240 mL / OUT: 802 mL / NET: -562 mL          REVIEW OF SYSTEMS:  As per HPI    PHYSICAL EXAM:  Constitutional: NAD  Respiratory: CTA B/L  Cardiovascular: S1 and S2  Gastrointestinal: BS+, soft, + tender  Extremities: + edema  Neurological: AAO x 3  : No Tom  Access: Not applicable    LABS:                        8.3    5.54  )-----------( 128      ( 25 Feb 2018 08:20 )             27.6     02-25    138  |  106  |  17  ----------------------------<  88  4.4   |  26  |  2.51<H>    Ca    8.5      25 Feb 2018 09:09  Phos  1.8     02-25  Mg     2.2     02-25

## 2018-02-25 NOTE — PROGRESS NOTE ADULT - ASSESSMENT
(1)Renal - CKD4 - renal fxn stable  Hypophosphatemia - NPO  (3)Metabolic acidosis - resolved  (4)CV - clinically euvolemic    RECOMMEND:  (1)decrease sodium bicarbonate tabs to 325 mg po bid  (2)continue IVF as ordered  (3)Dose new meds for GFR ~20-25 ml/min  (4)avoid NSAIDs and nephrotoxins as able  (5)a/w IV phos supplementation    Clair Simpson NP  [facility]  [phone] (1)Renal - CKD4 - renal fxn stable  Hypophosphatemia - NPO  (3)Metabolic acidosis - resolved  (4)CV - clinically euvolemic - HTN - pain related?	    RECOMMEND:  (1)decrease sodium bicarbonate tabs to 325 mg po bid  (2)continue IVF as ordered  (3)Dose new meds for GFR ~20-25 ml/min  (4)avoid NSAIDs and nephrotoxins as able  (5)a/w IV phos supplementation    Clair Simpson NP  [facility]  [phone]

## 2018-02-25 NOTE — PROGRESS NOTE ADULT - SUBJECTIVE AND OBJECTIVE BOX
NICOLAS CIFUENTES  72y Male  MRN:[id number]    Patient is a 72y old  Male who presents with a chief complaint of diverticulitis (22 Feb 2018 06:19)    HPI:   72 year old male PMH of Afib on coumadin, PPM  gout, hypothyroid, Waldenstrom's macroglobinemia ( recent hem-onc visit Nov 2017 suggest possible relapse; Pt seeing hem-onc 2-16-18) ,s/p chemo, GERD, dementia , CLL; Pt known to have diverticulitis however pt getting frequent exacerbations; Pt recently hospitalized in January 2018  for diverticulitis; Pt was then discharge to rehab with Single lumen right chest wall central line for Zosyn infusion x 10 days; Pt sill has catheter; He presents to [facility] today for laparoscopic sigmoid resection possible open cystoscopy insertion of ureteral catheters (15 Feb 2018 15:54)      Patient seen and evaluated at bedside. No acute events overnight except as noted.    Interval HPI: c/o dizziness/lightheadedness with narcotics.  POD #3    PAST MEDICAL & SURGICAL HISTORY:  BPH (benign prostatic hyperplasia)  [place] (hard of hearing)  Gout  CKD (chronic kidney disease)  Nephrolithiasis  Hypothyroid  Dementia  Bradycardia, drug induced  Waldenstrom macroglobulinemia  Diverticulitis  Atrial fibrillation  GERD (gastroesophageal reflux disease)  Anxiety disorder  CLL (chronic lymphocytic leukemia): in remission  History of cardiac pacemaker in situ  History of appendectomy  History of cataract surgery, right: IOL  History of laparoscopic cholecystectomy: 4/2014  S/P hernia repair: x2  Meniscus tear: s/p removal of Meniscus 8 months ago    SOC:  non smoker  no drug abuse  no alcohol abuse    lives with wife    Fam Hx:  non cont           VITALS:  Vital Signs Last 24 Hrs  T(C): 36.8 (25 Feb 2018 09:22), Max: 37.1 (25 Feb 2018 08:00)  T(F): 98.3 (25 Feb 2018 09:22), Max: 98.7 (25 Feb 2018 08:00)  HR: 57 (25 Feb 2018 09:22) (53 - 71)  BP: 162/69 (25 Feb 2018 09:22) (137/67 - 173/69)  BP(mean): --  RR: 17 (25 Feb 2018 09:22) (17 - 18)  SpO2: 96% (25 Feb 2018 09:22) (96% - 98%)      PHYSICAL EXAM:  GENERAL: NAD, well-developed  HEAD:  Atraumatic, Normocephalic  EYES: EOMI, PERRLA, conjunctiva and sclera clear  NECK: Supple, No JVD  CHEST/LUNG: Clear to auscultation bilaterally; No wheeze  HEART: S1, S2; No murmurs, rubs, or gallops  ABDOMEN: Soft, non dist. dressing in place  EXTREMITIES:  2+ Peripheral Pulses, No clubbing, cyanosis, or edema  PSYCH: Normal affect  NEUROLOGY: AAOX3; non-focal  SKIN: No rashes or lesions    Consultant(s) Notes Reviewed:  [x ] YES  [ ] NO  Care Discussed with Consultants/Other Providers [ x] YES  [ ] NO    MEDS:  MEDICATIONS  (STANDING):  acetaminophen  IVPB. 1000 milliGRAM(s) IV Intermittent once  acetaminophen  IVPB. 1000 milliGRAM(s) IV Intermittent once  allopurinol 100 milliGRAM(s) Oral daily  amiodarone    Tablet 200 milliGRAM(s) Oral daily  dextrose 5% + sodium chloride 0.45%. 1000 milliLiter(s) (75 mL/Hr) IV Continuous <Continuous>  donepezil 10 milliGRAM(s) Oral at bedtime  heparin  Injectable 5000 Unit(s) SubCutaneous every 8 hours  levothyroxine 125 MICROGram(s) Oral daily  meclizine 25 milliGRAM(s) Oral two times a day  memantine 10 milliGRAM(s) Oral two times a day  mirtazapine 15 milliGRAM(s) Oral at bedtime  pantoprazole    Tablet 40 milliGRAM(s) Oral before breakfast  sodium bicarbonate 650 milliGRAM(s) Oral two times a day  tamsulosin 0.4 milliGRAM(s) Oral at bedtime    MEDICATIONS  (PRN):  naloxone Injectable 0.1 milliGRAM(s) IV Push every 3 minutes PRN For ANY of the following changes in patient status:  A. RR LESS THAN 10 breaths per minute, B. Oxygen saturation LESS THAN 90%, C. Sedation score of 6  ondansetron Injectable 4 milliGRAM(s) IV Push every 6 hours PRN Nausea  oxyCODONE    IR 5 milliGRAM(s) Oral every 4 hours PRN Moderate Pain (4 - 6)  oxyCODONE    IR 10 milliGRAM(s) Oral every 4 hours PRN Severe Pain (7 - 10)    ALLERGIES:  No Known Allergies      LABS:                                   8.3    5.54  )-----------( 128      ( 25 Feb 2018 08:20 )             27.6   02-25    138  |  106  |  17  ----------------------------<  88  4.4   |  26  |  2.51<H>    Ca    8.5      25 Feb 2018 09:09  Phos  1.8     02-25  Mg     2.2     02-25

## 2018-02-25 NOTE — PROGRESS NOTE ADULT - SUBJECTIVE AND OBJECTIVE BOX
Day _3__ of Anesthesia Pain Management Service    SUBJECTIVE:    Pain Scale Score	At rest: ___ 	With Activity: ___ 	[x ] Refer to charted pain scores    THERAPY:    [ ] IV PCA Morphine		[ ] 5 mg/mL	[ ] 1 mg/mL  [x ] IV PCA Hydromorphone	[ ] 5 mg/mL	[x ] 1 mg/mL  [ ] IV PCA Fentanyl		[ ] 50 micrograms/mL    Demand dose 0.25    lockout 6   (minutes) Continuous Rate 0      MEDICATIONS  (STANDING):  acetaminophen  IVPB. 1000 milliGRAM(s) IV Intermittent once  acetaminophen  IVPB. 1000 milliGRAM(s) IV Intermittent once  allopurinol 100 milliGRAM(s) Oral daily  amiodarone    Tablet 200 milliGRAM(s) Oral daily  dextrose 5% + sodium chloride 0.45%. 1000 milliLiter(s) (75 mL/Hr) IV Continuous <Continuous>  donepezil 10 milliGRAM(s) Oral at bedtime  heparin  Injectable 5000 Unit(s) SubCutaneous every 8 hours  levothyroxine 125 MICROGram(s) Oral daily  meclizine 25 milliGRAM(s) Oral two times a day  memantine 10 milliGRAM(s) Oral two times a day  mirtazapine 15 milliGRAM(s) Oral at bedtime  pantoprazole    Tablet 40 milliGRAM(s) Oral before breakfast  sodium bicarbonate 650 milliGRAM(s) Oral two times a day  tamsulosin 0.4 milliGRAM(s) Oral at bedtime    MEDICATIONS  (PRN):  naloxone Injectable 0.1 milliGRAM(s) IV Push every 3 minutes PRN For ANY of the following changes in patient status:  A. RR LESS THAN 10 breaths per minute, B. Oxygen saturation LESS THAN 90%, C. Sedation score of 6  ondansetron Injectable 4 milliGRAM(s) IV Push every 6 hours PRN Nausea      OBJECTIVE:    Sedation Score:	[x ] Alert	[ ] Drowsy 	[ ] Arousable	[ ] Asleep	[ ] Unresponsive    Side Effects:	[x ] None	[ ] Nausea	[ ] Vomiting	[ ] Pruritus  		[ ] Other:    Vital Signs Last 24 Hrs  T(C): 36.8 (25 Feb 2018 09:22), Max: 37.1 (25 Feb 2018 08:00)  T(F): 98.3 (25 Feb 2018 09:22), Max: 98.7 (25 Feb 2018 08:00)  HR: 57 (25 Feb 2018 09:22) (53 - 71)  BP: 162/69 (25 Feb 2018 09:22) (137/67 - 173/69)  BP(mean): --  RR: 17 (25 Feb 2018 09:22) (17 - 18)  SpO2: 96% (25 Feb 2018 09:22) (96% - 98%)    ASSESSMENT/ PLAN    Therapy to  be:	[x ] Continue   [ ] Discontinued   [ ] Change to prn Analgesics    Documentation and Verification of current medications:   [X] Done	[ ] Not done, not eligible    Comments:

## 2018-02-25 NOTE — PROGRESS NOTE ADULT - ASSESSMENT
71 yo male h/o afib on AC with coumadin, s/p ppm, CKD, Waldenstrom's macroglobulinemia, CLL, anxiety, diverticulisits, admitted for laparoscopic sigmoid resection.    POD #3  post op care per surg  pain control  advance diet as tolerated  incentive spirometry    afib  cont propranolol and amio  holding coumadin until ok to restart by surg  will not need briding    CKD  stable  monitor  follows with Dr. Sudheer Ascencio    CLL/waldenstroms  stable  onc f/u    dizziness/lightheadedness  minimize narcotic use  check orthostatics  meclizine prn    cont other current meds    DVT ppx  PT  ambulation

## 2018-02-26 LAB
ANION GAP SERPL CALC-SCNC: 6 MMOL/L — SIGNIFICANT CHANGE UP (ref 5–17)
BUN SERPL-MCNC: 12 MG/DL — SIGNIFICANT CHANGE UP (ref 7–23)
CALCIUM SERPL-MCNC: 8.2 MG/DL — LOW (ref 8.4–10.5)
CHLORIDE SERPL-SCNC: 109 MMOL/L — HIGH (ref 96–108)
CO2 SERPL-SCNC: 27 MMOL/L — SIGNIFICANT CHANGE UP (ref 22–31)
CREAT SERPL-MCNC: 2.36 MG/DL — HIGH (ref 0.5–1.3)
GLUCOSE SERPL-MCNC: 79 MG/DL — SIGNIFICANT CHANGE UP (ref 70–99)
HCT VFR BLD CALC: 25.8 % — LOW (ref 39–50)
HGB BLD-MCNC: 8.2 G/DL — LOW (ref 13–17)
INR BLD: 1.29 RATIO — HIGH (ref 0.88–1.16)
MAGNESIUM SERPL-MCNC: 1.9 MG/DL — SIGNIFICANT CHANGE UP (ref 1.6–2.6)
MCHC RBC-ENTMCNC: 29.2 PG — SIGNIFICANT CHANGE UP (ref 27–34)
MCHC RBC-ENTMCNC: 31.8 GM/DL — LOW (ref 32–36)
MCV RBC AUTO: 91.8 FL — SIGNIFICANT CHANGE UP (ref 80–100)
PHOSPHATE SERPL-MCNC: 2.1 MG/DL — LOW (ref 2.5–4.5)
PLATELET # BLD AUTO: 128 K/UL — LOW (ref 150–400)
POTASSIUM SERPL-MCNC: 3.9 MMOL/L — SIGNIFICANT CHANGE UP (ref 3.5–5.3)
POTASSIUM SERPL-SCNC: 3.9 MMOL/L — SIGNIFICANT CHANGE UP (ref 3.5–5.3)
PROTHROM AB SERPL-ACNC: 14.1 SEC — HIGH (ref 9.8–12.7)
RBC # BLD: 2.81 M/UL — LOW (ref 4.2–5.8)
RBC # FLD: 15 % — HIGH (ref 10.3–14.5)
SODIUM SERPL-SCNC: 142 MMOL/L — SIGNIFICANT CHANGE UP (ref 135–145)
WBC # BLD: 4.11 K/UL — SIGNIFICANT CHANGE UP (ref 3.8–10.5)
WBC # FLD AUTO: 4.11 K/UL — SIGNIFICANT CHANGE UP (ref 3.8–10.5)

## 2018-02-26 PROCEDURE — 36589 REMOVAL TUNNELED CV CATH: CPT

## 2018-02-26 RX ORDER — WARFARIN SODIUM 2.5 MG/1
2 TABLET ORAL ONCE
Qty: 0 | Refills: 0 | Status: COMPLETED | OUTPATIENT
Start: 2018-02-26 | End: 2018-02-26

## 2018-02-26 RX ORDER — DOCUSATE SODIUM 100 MG
100 CAPSULE ORAL THREE TIMES A DAY
Qty: 0 | Refills: 0 | Status: DISCONTINUED | OUTPATIENT
Start: 2018-02-26 | End: 2018-02-28

## 2018-02-26 RX ORDER — OXYCODONE HYDROCHLORIDE 5 MG/1
5 TABLET ORAL ONCE
Qty: 0 | Refills: 0 | Status: DISCONTINUED | OUTPATIENT
Start: 2018-02-26 | End: 2018-02-26

## 2018-02-26 RX ORDER — ACETAMINOPHEN 500 MG
650 TABLET ORAL EVERY 6 HOURS
Qty: 0 | Refills: 0 | Status: DISCONTINUED | OUTPATIENT
Start: 2018-02-26 | End: 2018-02-28

## 2018-02-26 RX ORDER — POTASSIUM PHOSPHATE, MONOBASIC POTASSIUM PHOSPHATE, DIBASIC 236; 224 MG/ML; MG/ML
15 INJECTION, SOLUTION INTRAVENOUS ONCE
Qty: 0 | Refills: 0 | Status: COMPLETED | OUTPATIENT
Start: 2018-02-26 | End: 2018-02-26

## 2018-02-26 RX ADMIN — DONEPEZIL HYDROCHLORIDE 10 MILLIGRAM(S): 10 TABLET, FILM COATED ORAL at 21:05

## 2018-02-26 RX ADMIN — Medication 400 MILLIGRAM(S): at 05:52

## 2018-02-26 RX ADMIN — TAMSULOSIN HYDROCHLORIDE 0.4 MILLIGRAM(S): 0.4 CAPSULE ORAL at 21:05

## 2018-02-26 RX ADMIN — Medication 650 MILLIGRAM(S): at 19:03

## 2018-02-26 RX ADMIN — HEPARIN SODIUM 5000 UNIT(S): 5000 INJECTION INTRAVENOUS; SUBCUTANEOUS at 21:05

## 2018-02-26 RX ADMIN — POTASSIUM PHOSPHATE, MONOBASIC POTASSIUM PHOSPHATE, DIBASIC 62.5 MILLIMOLE(S): 236; 224 INJECTION, SOLUTION INTRAVENOUS at 12:48

## 2018-02-26 RX ADMIN — Medication 100 MILLIGRAM(S): at 13:37

## 2018-02-26 RX ADMIN — Medication 325 MILLIGRAM(S): at 05:54

## 2018-02-26 RX ADMIN — OXYCODONE HYDROCHLORIDE 10 MILLIGRAM(S): 5 TABLET ORAL at 04:00

## 2018-02-26 RX ADMIN — Medication 650 MILLIGRAM(S): at 13:21

## 2018-02-26 RX ADMIN — OXYCODONE HYDROCHLORIDE 10 MILLIGRAM(S): 5 TABLET ORAL at 04:30

## 2018-02-26 RX ADMIN — OXYCODONE HYDROCHLORIDE 10 MILLIGRAM(S): 5 TABLET ORAL at 13:22

## 2018-02-26 RX ADMIN — Medication 125 MICROGRAM(S): at 05:56

## 2018-02-26 RX ADMIN — MEMANTINE HYDROCHLORIDE 10 MILLIGRAM(S): 10 TABLET ORAL at 05:56

## 2018-02-26 RX ADMIN — OXYCODONE HYDROCHLORIDE 10 MILLIGRAM(S): 5 TABLET ORAL at 12:52

## 2018-02-26 RX ADMIN — OXYCODONE HYDROCHLORIDE 5 MILLIGRAM(S): 5 TABLET ORAL at 20:29

## 2018-02-26 RX ADMIN — Medication 100 MILLIGRAM(S): at 21:05

## 2018-02-26 RX ADMIN — Medication 400 MILLIGRAM(S): at 00:20

## 2018-02-26 RX ADMIN — Medication 650 MILLIGRAM(S): at 18:33

## 2018-02-26 RX ADMIN — OXYCODONE HYDROCHLORIDE 10 MILLIGRAM(S): 5 TABLET ORAL at 08:18

## 2018-02-26 RX ADMIN — Medication 1000 MILLIGRAM(S): at 06:22

## 2018-02-26 RX ADMIN — OXYCODONE HYDROCHLORIDE 5 MILLIGRAM(S): 5 TABLET ORAL at 18:32

## 2018-02-26 RX ADMIN — OXYCODONE HYDROCHLORIDE 5 MILLIGRAM(S): 5 TABLET ORAL at 19:03

## 2018-02-26 RX ADMIN — Medication 25 MILLIGRAM(S): at 05:58

## 2018-02-26 RX ADMIN — Medication 1000 MILLIGRAM(S): at 00:50

## 2018-02-26 RX ADMIN — Medication 100 MILLIGRAM(S): at 12:50

## 2018-02-26 RX ADMIN — HEPARIN SODIUM 5000 UNIT(S): 5000 INJECTION INTRAVENOUS; SUBCUTANEOUS at 05:57

## 2018-02-26 RX ADMIN — PANTOPRAZOLE SODIUM 40 MILLIGRAM(S): 20 TABLET, DELAYED RELEASE ORAL at 05:56

## 2018-02-26 RX ADMIN — HEPARIN SODIUM 5000 UNIT(S): 5000 INJECTION INTRAVENOUS; SUBCUTANEOUS at 13:36

## 2018-02-26 RX ADMIN — MIRTAZAPINE 15 MILLIGRAM(S): 45 TABLET, ORALLY DISINTEGRATING ORAL at 21:05

## 2018-02-26 RX ADMIN — OXYCODONE HYDROCHLORIDE 10 MILLIGRAM(S): 5 TABLET ORAL at 08:48

## 2018-02-26 RX ADMIN — WARFARIN SODIUM 2 MILLIGRAM(S): 2.5 TABLET ORAL at 21:05

## 2018-02-26 RX ADMIN — MEMANTINE HYDROCHLORIDE 10 MILLIGRAM(S): 10 TABLET ORAL at 18:34

## 2018-02-26 RX ADMIN — Medication 650 MILLIGRAM(S): at 12:51

## 2018-02-26 RX ADMIN — AMIODARONE HYDROCHLORIDE 200 MILLIGRAM(S): 400 TABLET ORAL at 05:56

## 2018-02-26 RX ADMIN — OXYCODONE HYDROCHLORIDE 5 MILLIGRAM(S): 5 TABLET ORAL at 20:59

## 2018-02-26 NOTE — PROGRESS NOTE ADULT - ASSESSMENT
71 yo man s/p lap assisted left colectomy, cystoscopy and ucaths placement, continues to have bowel function and now tolerating diet    - Tolerating diet, likely Adv to LRD this AM  - Pain control: change IV tylenol to PO , c/w  Oxycodone 5mg/ 10 mg Q 4H for mod/ severe pain, 2 mg morphine IV push Q6 H for breakthrough  - d/c wick POD 5 or before d/c  - c/w home meds  - OOB, ambulate  - Monitor for symptoms of UTI or further sensation of gas when urinating  - PT rec appreciated: TWYLA, but patient would rather go home  - possible d/c later today if tolerating diet     Green [phone] 71 yo man s/p lap assisted left colectomy, cystoscopy and ucaths placement, continues to have bowel function and now tolerating diet    - Tolerating diet, likely Adv to LRD this AM  - Pain control: change IV tylenol to PO , c/w  Oxycodone 5mg/ 10 mg Q 4H for mod/ severe pain, 2 mg morphine IV push Q6 H for breakthrough  - d/c wick POD 5 or before d/c  - c/w home meds  - OOB, ambulate  - Monitor for symptoms of UTI or further sensation of gas when urinating  - PT rec appreciated: TWYLA [place]o [phone]

## 2018-02-26 NOTE — PROGRESS NOTE ADULT - ASSESSMENT
73 yo male h/o afib on AC with coumadin, s/p ppm, CKD, Waldenstrom's macroglobulinemia, CLL, anxiety, diverticulisits, admitted for laparoscopic sigmoid resection.    POD #4  post op care per surg  pain control  advance diet as tolerated  incentive spirometry    afib  cont propranolol and amio  restart coumadin tonight  goal inr 2-3  will not need bridging    CKD  stable  monitor     CLL/waldenstroms  stable  onc f/u    dizziness/lightheadedness  minimize narcotic use  check orthostatics  meclizine prn    cont other current meds    DVT ppx  PT  ambulation

## 2018-02-26 NOTE — PROGRESS NOTE ADULT - SUBJECTIVE AND OBJECTIVE BOX
NICOLAS CIFUENTES  72y Male  MRN:[id number]    Patient is a 72y old  Male who presents with a chief complaint of diverticulitis (22 Feb 2018 06:19)    HPI:   72 year old male PMH of Afib on coumadin, PPM  gout, hypothyroid, Waldenstrom's macroglobinemia ( recent hem-onc visit Nov 2017 suggest possible relapse; Pt seeing hem-onc 2-16-18) ,s/p chemo, GERD, dementia , CLL; Pt known to have diverticulitis however pt getting frequent exacerbations; Pt recently hospitalized in January 2018  for diverticulitis; Pt was then discharge to rehab with Single lumen right chest wall central line for Zosyn infusion x 10 days; Pt sill has catheter; He presents to [facility] today for laparoscopic sigmoid resection possible open cystoscopy insertion of ureteral catheters (15 Feb 2018 15:54)      Patient seen and evaluated at bedside. No acute events overnight except as noted.    Interval HPI: feeling well. no acute c/o.  POD #4.  +BM    PAST MEDICAL & SURGICAL HISTORY:  BPH (benign prostatic hyperplasia)  [place] (hard of hearing)  Gout  CKD (chronic kidney disease)  Nephrolithiasis  Hypothyroid  Dementia  Bradycardia, drug induced  Waldenstrom macroglobulinemia  Diverticulitis  Atrial fibrillation  GERD (gastroesophageal reflux disease)  Anxiety disorder  CLL (chronic lymphocytic leukemia): in remission  History of cardiac pacemaker in situ  History of appendectomy  History of cataract surgery, right: IOL  History of laparoscopic cholecystectomy: 4/2014  S/P hernia repair: x2  Meniscus tear: s/p removal of Meniscus 8 months ago    SOC:  non smoker  no drug abuse  no alcohol abuse    lives with wife    Fam Hx:  non cont           VITALS:  Vital Signs Last 24 Hrs  T(C): 36.9 (26 Feb 2018 09:36), Max: 36.9 (25 Feb 2018 17:03)  T(F): 98.4 (26 Feb 2018 09:36), Max: 98.4 (25 Feb 2018 17:03)  HR: 60 (26 Feb 2018 09:36) (50 - 78)  BP: 129/78 (26 Feb 2018 09:36) (119/66 - 159/68)  BP(mean): --  RR: 19 (26 Feb 2018 09:36) (17 - 19)  SpO2: 97% (26 Feb 2018 09:36) (97% - 98%)      PHYSICAL EXAM:  GENERAL: NAD, well-developed  HEAD:  Atraumatic, Normocephalic  EYES: EOMI, PERRLA, conjunctiva and sclera clear  NECK: Supple, No JVD  CHEST/LUNG: Clear to auscultation bilaterally; No wheeze  HEART: S1, S2; No murmurs, rubs, or gallops  ABDOMEN: Soft, non dist. dressing in place  EXTREMITIES:  2+ Peripheral Pulses, No clubbing, cyanosis, or edema  PSYCH: Normal affect  NEUROLOGY: AAOX3; non-focal  SKIN: No rashes or lesions    Consultant(s) Notes Reviewed:  [x ] YES  [ ] NO  Care Discussed with Consultants/Other Providers [ x] YES  [ ] NO    MEDS:  MEDICATIONS  (STANDING):  acetaminophen   Tablet. 650 milliGRAM(s) Oral every 6 hours  allopurinol 100 milliGRAM(s) Oral daily  amiodarone    Tablet 200 milliGRAM(s) Oral daily  docusate sodium 100 milliGRAM(s) Oral three times a day  donepezil 10 milliGRAM(s) Oral at bedtime  heparin  Injectable 5000 Unit(s) SubCutaneous every 8 hours  levothyroxine 125 MICROGram(s) Oral daily  meclizine 25 milliGRAM(s) Oral two times a day  memantine 10 milliGRAM(s) Oral two times a day  mirtazapine 15 milliGRAM(s) Oral at bedtime  pantoprazole    Tablet 40 milliGRAM(s) Oral before breakfast  tamsulosin 0.4 milliGRAM(s) Oral at bedtime  warfarin 2 milliGRAM(s) Oral once    MEDICATIONS  (PRN):  oxyCODONE    IR 5 milliGRAM(s) Oral every 4 hours PRN Moderate Pain (4 - 6)  oxyCODONE    IR 10 milliGRAM(s) Oral every 4 hours PRN Severe Pain (7 - 10)    ALLERGIES:  No Known Allergies      LABS:                                                       8.2    4.11  )-----------( 128      ( 26 Feb 2018 09:15 )             25.8   02-26    142  |  109<H>  |  12  ----------------------------<  79  3.9   |  27  |  2.36<H>    Ca    8.2<L>      26 Feb 2018 09:24  Phos  2.1     02-26  Mg     1.9     02-26    PT/INR - ( 26 Feb 2018 08:48 )   PT: 14.1 sec;   INR: 1.29 ratio

## 2018-02-26 NOTE — PROGRESS NOTE ADULT - SUBJECTIVE AND OBJECTIVE BOX
Pt presented to IR for Tunneled cvc removal. S/p tunneled CVC on 1/17 for bacteriemia. Pt finished with abx here for catheter removal. Removed 24 CM intact catheter

## 2018-02-26 NOTE — PROGRESS NOTE ADULT - SUBJECTIVE AND OBJECTIVE BOX
No pain, no shortness of breath      VITAL:  T(C): , Max: 36.9 (02-25-18 @ 17:03)  T(F): , Max: 98.4 (02-25-18 @ 17:03)  HR: 60 (02-26-18 @ 09:36)  BP: 129/78 (02-26-18 @ 09:36)  BP(mean): --  RR: 19 (02-26-18 @ 09:36)  SpO2: 97% (02-26-18 @ 09:36)      PHYSICAL EXAM:  Constitutional: NAD, Alert  HEENT: NCAT, DMM  Neck: Supple, No JVD  Respiratory: CTA-b/l  Cardiovascular: RRR s1s2, 1/6 TESS  Gastrointestinal: (+)incisional tenderness; hypoactive BS  Extremities: 1+ b/l LE edema  Neurological: no focal deficits; strength grossly intact  Back: no CVAT b/l  Skin: No rashes, no nevi      LABS:                        8.2    4.11  )-----------( 128      ( 26 Feb 2018 09:15 )             25.8     Na(142)/K(3.9)/Cl(109)/HCO3(27)/BUN(12)/Cr(2.36)Glu(79)/Ca(8.2)/Mg(1.9)/PO4(2.1)    02-26 @ 09:24  Na(138)/K(4.4)/Cl(106)/HCO3(26)/BUN(17)/Cr(2.51)Glu(88)/Ca(8.5)/Mg(2.2)/PO4(1.8)    02-25 @ 09:09  Na(137)/K(4.2)/Cl(103)/HCO3(23)/BUN(24)/Cr(2.87)Glu(95)/Ca(8.3)/Mg(1.9)/PO4(3.1)    02-24 @ 08:48  Na(137)/K(4.4)/Cl(104)/HCO3(19)/BUN(30)/Cr(3.11)Glu(153)/Ca(8.1)/Mg(2.0)/PO4(3.4)    02-23 @ 15:25    IMPRESSION: 72M w/ AFib, nephrolithiasis, Waldenstrom's macroglobulinemia, CKD4, and recurrent diverticulitis, now s/p colon resection 2/22/18.    (1)Renal - CKD4 - renal function surprisingly good at present time (usually creatinine >3 as outpatient)  (2)CV - BP/volume acceptable  (3)Metabolic acidosis - chronic - resolved at present time - appears at least for now not to require NaHCO3 tabs      RECOMMEND:  (1)D/C NaHCO3 PO          Norman Ascencio MD  [facility],   [phone] No pain, no shortness of breath. Urine "perculating".      VITAL:  T(C): , Max: 36.9 (02-25-18 @ 17:03)  T(F): , Max: 98.4 (02-25-18 @ 17:03)  HR: 60 (02-26-18 @ 09:36)  BP: 129/78 (02-26-18 @ 09:36)  BP(mean): --  RR: 19 (02-26-18 @ 09:36)  SpO2: 97% (02-26-18 @ 09:36)      PHYSICAL EXAM:  Constitutional: NAD, Alert  HEENT: NCAT, DMM  Neck: Supple, No JVD  Respiratory: CTA-b/l  Cardiovascular: RRR s1s2, 1/6 TESS  Gastrointestinal: (+)incisional tenderness; hypoactive BS  Extremities: 1+ b/l LE edema  Neurological: no focal deficits; strength grossly intact  Back: no CVAT b/l  Skin: No rashes, no nevi      LABS:                        8.2    4.11  )-----------( 128      ( 26 Feb 2018 09:15 )             25.8     Na(142)/K(3.9)/Cl(109)/HCO3(27)/BUN(12)/Cr(2.36)Glu(79)/Ca(8.2)/Mg(1.9)/PO4(2.1)    02-26 @ 09:24  Na(138)/K(4.4)/Cl(106)/HCO3(26)/BUN(17)/Cr(2.51)Glu(88)/Ca(8.5)/Mg(2.2)/PO4(1.8)    02-25 @ 09:09  Na(137)/K(4.2)/Cl(103)/HCO3(23)/BUN(24)/Cr(2.87)Glu(95)/Ca(8.3)/Mg(1.9)/PO4(3.1)    02-24 @ 08:48  Na(137)/K(4.4)/Cl(104)/HCO3(19)/BUN(30)/Cr(3.11)Glu(153)/Ca(8.1)/Mg(2.0)/PO4(3.4)    02-23 @ 15:25    IMPRESSION: 72M w/ AFib, nephrolithiasis, Waldenstrom's macroglobulinemia, CKD4, and recurrent diverticulitis, now s/p colon resection 2/22/18.    (1)Renal - CKD4 - renal function surprisingly good at present time (usually creatinine >3 as outpatient)  (2)CV - BP/volume acceptable  (3)Metabolic acidosis - chronic - resolved at present time - appears at least for now not to require NaHCO3 tabs      RECOMMEND:  (1)D/C NaHCO3 PO          Norman Ascencio MD  [facility]  [phone]

## 2018-02-26 NOTE — PROGRESS NOTE ADULT - SUBJECTIVE AND OBJECTIVE BOX
[facility] PROGRESS NOTE    POST OPERATIVE DAY #: 4      SUBJECTIVE: Pt seen and examined at bedside. Overnight, complained of feeling like he was "passing gas from his penis" , UA sent and was negative. Pain well controlled. Tolerating clear liquid diet. Voiding and ambulating without issue. Otherwise, denied n/v, fever, chills, CP , SOB, or any further feelings of dizziness.       Vital Signs Last 24 Hrs  T(C): 36.5 (26 Feb 2018 04:43), Max: 37.1 (25 Feb 2018 08:00)  T(F): 97.7 (26 Feb 2018 04:43), Max: 98.7 (25 Feb 2018 08:00)  HR: 54 (26 Feb 2018 04:43) (50 - 78)  BP: 124/62 (26 Feb 2018 04:43) (119/66 - 162/69)  BP(mean): --  RR: 18 (26 Feb 2018 04:43) (17 - 18)  SpO2: 97% (26 Feb 2018 04:43) (96% - 98%)    Physical Exam  General: awake, alert, NAD   Pulm: breathing comfortably on RA  Abdomen: soft, nondistended, nontender. Incision CDI with lower midline Incision with wick in place.    Extremities: Grossly symmetric    I&O's Summary    24 Feb 2018 07:01  -  25 Feb 2018 07:00  --------------------------------------------------------  IN: 2310 mL / OUT: 3276 mL / NET: -966 mL    25 Feb 2018 07:01  -  26 Feb 2018 05:09  --------------------------------------------------------  IN: 2130 mL / OUT: 1852 mL / NET: 278 mL      I&O's Detail    24 Feb 2018 07:01  -  25 Feb 2018 07:00  --------------------------------------------------------  IN:    dextrose 5% + sodium chloride 0.45%.: 2160 mL    IV PiggyBack: 150 mL  Total IN: 2310 mL    OUT:    Indwelling Catheter - Urethral: 700 mL    Stool: 1 mL    Voided: 2575 mL  Total OUT: 3276 mL    Total NET: -966 mL      25 Feb 2018 07:01  -  26 Feb 2018 05:09  --------------------------------------------------------  IN:    dextrose 5% + sodium chloride 0.45%.: 900 mL    IV PiggyBack: 250 mL    Oral Fluid: 680 mL    Solution: 300 mL  Total IN: 2130 mL    OUT:    Stool: 2 mL    Voided: 1850 mL  Total OUT: 1852 mL    Total NET: 278 mL          MEDICATIONS  (STANDING):  acetaminophen  IVPB. 1000 milliGRAM(s) IV Intermittent once  allopurinol 100 milliGRAM(s) Oral daily  amiodarone    Tablet 200 milliGRAM(s) Oral daily  dextrose 5% + sodium chloride 0.45%. 1000 milliLiter(s) (75 mL/Hr) IV Continuous <Continuous>  donepezil 10 milliGRAM(s) Oral at bedtime  heparin  Injectable 5000 Unit(s) SubCutaneous every 8 hours  levothyroxine 125 MICROGram(s) Oral daily  meclizine 25 milliGRAM(s) Oral two times a day  memantine 10 milliGRAM(s) Oral two times a day  mirtazapine 15 milliGRAM(s) Oral at bedtime  pantoprazole    Tablet 40 milliGRAM(s) Oral before breakfast  sodium bicarbonate 325 milliGRAM(s) Oral two times a day before meals  tamsulosin 0.4 milliGRAM(s) Oral at bedtime    MEDICATIONS  (PRN):  morphine  - Injectable 2 milliGRAM(s) IV Push every 6 hours PRN breakthrough pain  naloxone Injectable 0.1 milliGRAM(s) IV Push every 3 minutes PRN For ANY of the following changes in patient status:  A. RR LESS THAN 10 breaths per minute, B. Oxygen saturation LESS THAN 90%, C. Sedation score of 6  oxyCODONE    IR 5 milliGRAM(s) Oral every 4 hours PRN Moderate Pain (4 - 6)  oxyCODONE    IR 10 milliGRAM(s) Oral every 4 hours PRN Severe Pain (7 - 10)      LABS:                        8.3    5.54  )-----------( 128      ( 25 Feb 2018 08:20 )             27.6     02-25    138  |  106  |  17  ----------------------------<  88  4.4   |  26  |  2.51<H>    Ca    8.5      25 Feb 2018 09:09  Phos  1.8     02-25  Mg     2.2     02-25      PT/INR - ( 24 Feb 2018 08:44 )   PT: 13.6 sec;   INR: 1.20 ratio         PTT - ( 24 Feb 2018 08:44 )  PTT:27.9 sec  Urinalysis Basic - ( 25 Feb 2018 22:43 )    Color: x / Appearance: x / SG: x / pH: x  Gluc: x / Ketone: x  / Bili: x / Urobili: x   Blood: x / Protein: x / Nitrite: x   Leuk Esterase: x / RBC: 3-5 /HPF / WBC 0-2 /HPF   Sq Epi: x / Non Sq Epi: x / Bacteria: Few /HPF        RADIOLOGY & ADDITIONAL STUDIES:

## 2018-02-27 ENCOUNTER — TRANSCRIPTION ENCOUNTER (OUTPATIENT)
Age: 73
End: 2018-02-27

## 2018-02-27 LAB
ANION GAP SERPL CALC-SCNC: 11 MMOL/L — SIGNIFICANT CHANGE UP (ref 5–17)
APPEARANCE UR: CLEAR — SIGNIFICANT CHANGE UP
BILIRUB UR-MCNC: NEGATIVE — SIGNIFICANT CHANGE UP
BUN SERPL-MCNC: 13 MG/DL — SIGNIFICANT CHANGE UP (ref 7–23)
CALCIUM SERPL-MCNC: 8.5 MG/DL — SIGNIFICANT CHANGE UP (ref 8.4–10.5)
CHLORIDE SERPL-SCNC: 109 MMOL/L — HIGH (ref 96–108)
CO2 SERPL-SCNC: 24 MMOL/L — SIGNIFICANT CHANGE UP (ref 22–31)
COLOR SPEC: YELLOW — SIGNIFICANT CHANGE UP
CREAT SERPL-MCNC: 2.38 MG/DL — HIGH (ref 0.5–1.3)
DIFF PNL FLD: NEGATIVE — SIGNIFICANT CHANGE UP
GLUCOSE SERPL-MCNC: 83 MG/DL — SIGNIFICANT CHANGE UP (ref 70–99)
GLUCOSE UR QL: NEGATIVE MG/DL — SIGNIFICANT CHANGE UP
HCT VFR BLD CALC: 26.5 % — LOW (ref 39–50)
HGB BLD-MCNC: 8.5 G/DL — LOW (ref 13–17)
INR BLD: 1.24 RATIO — HIGH (ref 0.88–1.16)
KETONES UR-MCNC: NEGATIVE — SIGNIFICANT CHANGE UP
LEUKOCYTE ESTERASE UR-ACNC: NEGATIVE — SIGNIFICANT CHANGE UP
MAGNESIUM SERPL-MCNC: 1.7 MG/DL — SIGNIFICANT CHANGE UP (ref 1.6–2.6)
MCHC RBC-ENTMCNC: 29.5 PG — SIGNIFICANT CHANGE UP (ref 27–34)
MCHC RBC-ENTMCNC: 32.1 GM/DL — SIGNIFICANT CHANGE UP (ref 32–36)
MCV RBC AUTO: 92 FL — SIGNIFICANT CHANGE UP (ref 80–100)
NITRITE UR-MCNC: NEGATIVE — SIGNIFICANT CHANGE UP
PH UR: 6 — SIGNIFICANT CHANGE UP (ref 5–8)
PHOSPHATE SERPL-MCNC: 2.1 MG/DL — LOW (ref 2.5–4.5)
PLATELET # BLD AUTO: 134 K/UL — LOW (ref 150–400)
POTASSIUM SERPL-MCNC: 4.1 MMOL/L — SIGNIFICANT CHANGE UP (ref 3.5–5.3)
POTASSIUM SERPL-SCNC: 4.1 MMOL/L — SIGNIFICANT CHANGE UP (ref 3.5–5.3)
PROT UR-MCNC: 30 MG/DL
PROTHROM AB SERPL-ACNC: 14.1 SEC — HIGH (ref 10–13.1)
RBC # BLD: 2.88 M/UL — LOW (ref 4.2–5.8)
RBC # FLD: 14.8 % — HIGH (ref 10.3–14.5)
SODIUM SERPL-SCNC: 144 MMOL/L — SIGNIFICANT CHANGE UP (ref 135–145)
SP GR SPEC: 1.01 — SIGNIFICANT CHANGE UP (ref 1.01–1.02)
UROBILINOGEN FLD QL: NEGATIVE MG/DL — SIGNIFICANT CHANGE UP
WBC # BLD: 5.05 K/UL — SIGNIFICANT CHANGE UP (ref 3.8–10.5)
WBC # FLD AUTO: 5.05 K/UL — SIGNIFICANT CHANGE UP (ref 3.8–10.5)

## 2018-02-27 RX ORDER — POTASSIUM PHOSPHATE, MONOBASIC POTASSIUM PHOSPHATE, DIBASIC 236; 224 MG/ML; MG/ML
15 INJECTION, SOLUTION INTRAVENOUS ONCE
Qty: 0 | Refills: 0 | Status: COMPLETED | OUTPATIENT
Start: 2018-02-27 | End: 2018-02-27

## 2018-02-27 RX ORDER — HYDROMORPHONE HYDROCHLORIDE 2 MG/ML
2 INJECTION INTRAMUSCULAR; INTRAVENOUS; SUBCUTANEOUS EVERY 4 HOURS
Qty: 0 | Refills: 0 | Status: DISCONTINUED | OUTPATIENT
Start: 2018-02-27 | End: 2018-02-28

## 2018-02-27 RX ORDER — MAGNESIUM SULFATE 500 MG/ML
2 VIAL (ML) INJECTION ONCE
Qty: 0 | Refills: 0 | Status: COMPLETED | OUTPATIENT
Start: 2018-02-27 | End: 2018-02-27

## 2018-02-27 RX ORDER — FAMOTIDINE 10 MG/ML
20 INJECTION INTRAVENOUS ONCE
Qty: 0 | Refills: 0 | Status: COMPLETED | OUTPATIENT
Start: 2018-02-27 | End: 2018-02-27

## 2018-02-27 RX ORDER — HYDROMORPHONE HYDROCHLORIDE 2 MG/ML
2 INJECTION INTRAMUSCULAR; INTRAVENOUS; SUBCUTANEOUS EVERY 4 HOURS
Qty: 0 | Refills: 0 | Status: DISCONTINUED | OUTPATIENT
Start: 2018-02-27 | End: 2018-02-27

## 2018-02-27 RX ORDER — WARFARIN SODIUM 2.5 MG/1
2 TABLET ORAL ONCE
Qty: 0 | Refills: 0 | Status: COMPLETED | OUTPATIENT
Start: 2018-02-27 | End: 2018-02-27

## 2018-02-27 RX ORDER — OXYCODONE HYDROCHLORIDE 5 MG/1
5 TABLET ORAL EVERY 4 HOURS
Qty: 0 | Refills: 0 | Status: DISCONTINUED | OUTPATIENT
Start: 2018-02-27 | End: 2018-02-28

## 2018-02-27 RX ORDER — OXYCODONE HYDROCHLORIDE 5 MG/1
10 TABLET ORAL EVERY 4 HOURS
Qty: 0 | Refills: 0 | Status: DISCONTINUED | OUTPATIENT
Start: 2018-02-27 | End: 2018-02-28

## 2018-02-27 RX ADMIN — PANTOPRAZOLE SODIUM 40 MILLIGRAM(S): 20 TABLET, DELAYED RELEASE ORAL at 05:19

## 2018-02-27 RX ADMIN — Medication 650 MILLIGRAM(S): at 05:19

## 2018-02-27 RX ADMIN — Medication 650 MILLIGRAM(S): at 11:45

## 2018-02-27 RX ADMIN — OXYCODONE HYDROCHLORIDE 5 MILLIGRAM(S): 5 TABLET ORAL at 15:59

## 2018-02-27 RX ADMIN — OXYCODONE HYDROCHLORIDE 5 MILLIGRAM(S): 5 TABLET ORAL at 20:07

## 2018-02-27 RX ADMIN — Medication 650 MILLIGRAM(S): at 11:15

## 2018-02-27 RX ADMIN — HEPARIN SODIUM 5000 UNIT(S): 5000 INJECTION INTRAVENOUS; SUBCUTANEOUS at 21:11

## 2018-02-27 RX ADMIN — OXYCODONE HYDROCHLORIDE 5 MILLIGRAM(S): 5 TABLET ORAL at 00:47

## 2018-02-27 RX ADMIN — AMIODARONE HYDROCHLORIDE 200 MILLIGRAM(S): 400 TABLET ORAL at 05:19

## 2018-02-27 RX ADMIN — HYDROMORPHONE HYDROCHLORIDE 2 MILLIGRAM(S): 2 INJECTION INTRAMUSCULAR; INTRAVENOUS; SUBCUTANEOUS at 10:06

## 2018-02-27 RX ADMIN — Medication 650 MILLIGRAM(S): at 00:47

## 2018-02-27 RX ADMIN — Medication 100 MILLIGRAM(S): at 11:15

## 2018-02-27 RX ADMIN — POTASSIUM PHOSPHATE, MONOBASIC POTASSIUM PHOSPHATE, DIBASIC 62.5 MILLIMOLE(S): 236; 224 INJECTION, SOLUTION INTRAVENOUS at 13:22

## 2018-02-27 RX ADMIN — OXYCODONE HYDROCHLORIDE 5 MILLIGRAM(S): 5 TABLET ORAL at 15:29

## 2018-02-27 RX ADMIN — DONEPEZIL HYDROCHLORIDE 10 MILLIGRAM(S): 10 TABLET, FILM COATED ORAL at 21:11

## 2018-02-27 RX ADMIN — HYDROMORPHONE HYDROCHLORIDE 2 MILLIGRAM(S): 2 INJECTION INTRAMUSCULAR; INTRAVENOUS; SUBCUTANEOUS at 09:36

## 2018-02-27 RX ADMIN — Medication 100 MILLIGRAM(S): at 21:11

## 2018-02-27 RX ADMIN — WARFARIN SODIUM 2 MILLIGRAM(S): 2.5 TABLET ORAL at 21:11

## 2018-02-27 RX ADMIN — HEPARIN SODIUM 5000 UNIT(S): 5000 INJECTION INTRAVENOUS; SUBCUTANEOUS at 05:20

## 2018-02-27 RX ADMIN — Medication 650 MILLIGRAM(S): at 17:45

## 2018-02-27 RX ADMIN — Medication 650 MILLIGRAM(S): at 05:49

## 2018-02-27 RX ADMIN — OXYCODONE HYDROCHLORIDE 5 MILLIGRAM(S): 5 TABLET ORAL at 00:18

## 2018-02-27 RX ADMIN — TAMSULOSIN HYDROCHLORIDE 0.4 MILLIGRAM(S): 0.4 CAPSULE ORAL at 21:11

## 2018-02-27 RX ADMIN — HEPARIN SODIUM 5000 UNIT(S): 5000 INJECTION INTRAVENOUS; SUBCUTANEOUS at 13:23

## 2018-02-27 RX ADMIN — MEMANTINE HYDROCHLORIDE 10 MILLIGRAM(S): 10 TABLET ORAL at 17:15

## 2018-02-27 RX ADMIN — Medication 100 MILLIGRAM(S): at 13:23

## 2018-02-27 RX ADMIN — Medication 650 MILLIGRAM(S): at 00:17

## 2018-02-27 RX ADMIN — OXYCODONE HYDROCHLORIDE 5 MILLIGRAM(S): 5 TABLET ORAL at 20:37

## 2018-02-27 RX ADMIN — Medication 125 MICROGRAM(S): at 05:19

## 2018-02-27 RX ADMIN — MEMANTINE HYDROCHLORIDE 10 MILLIGRAM(S): 10 TABLET ORAL at 05:19

## 2018-02-27 RX ADMIN — Medication 100 MILLIGRAM(S): at 05:19

## 2018-02-27 RX ADMIN — Medication 50 GRAM(S): at 13:22

## 2018-02-27 RX ADMIN — FAMOTIDINE 20 MILLIGRAM(S): 10 INJECTION INTRAVENOUS at 15:29

## 2018-02-27 RX ADMIN — MIRTAZAPINE 15 MILLIGRAM(S): 45 TABLET, ORALLY DISINTEGRATING ORAL at 21:11

## 2018-02-27 RX ADMIN — Medication 650 MILLIGRAM(S): at 17:15

## 2018-02-27 NOTE — PROGRESS NOTE ADULT - SUBJECTIVE AND OBJECTIVE BOX
[facility] PROGRESS NOTE    POST OPERATIVE DAY #: 5      SUBJECTIVE: Pt seen and examined at bedside. No acute events overnight. Tolerating diet. Voiding and ambulating without issue. Passing flatus and having BM. Denied n/v, fever, chills, CP or SOB.     Vital Signs Last 24 Hrs  T(C): 36.3 (27 Feb 2018 05:10), Max: 37.2 (27 Feb 2018 00:26)  T(F): 97.3 (27 Feb 2018 05:10), Max: 98.9 (27 Feb 2018 00:26)  HR: 63 (27 Feb 2018 05:10) (57 - 66)  BP: 138/67 (27 Feb 2018 05:10) (125/61 - 161/70)  BP(mean): --  RR: 18 (27 Feb 2018 05:10) (18 - 19)  SpO2: 97% (27 Feb 2018 05:10) (95% - 99%)    Physical Exam  General: awake, alert,    Pulm: respirations unlabored, no increased WOB  Abdomen: soft, mildly distended, nontender. Incision CDI.   Extremities: Grossly symmetric    I&O's Summary    25 Feb 2018 07:01  -  26 Feb 2018 07:00  --------------------------------------------------------  IN: 3055 mL / OUT: 2252 mL / NET: 803 mL    26 Feb 2018 07:01 - 27 Feb 2018 05:40  --------------------------------------------------------  IN: 830 mL / OUT: 1260 mL / NET: -430 mL      I&O's Detail    25 Feb 2018 07:01  -  26 Feb 2018 07:00  --------------------------------------------------------  IN:    dextrose 5% + sodium chloride 0.45%.: 1725 mL    IV PiggyBack: 250 mL    Oral Fluid: 680 mL    Solution: 400 mL  Total IN: 3055 mL    OUT:    Stool: 2 mL    Voided: 2250 mL  Total OUT: 2252 mL    Total NET: 803 mL      26 Feb 2018 07:01  -  27 Feb 2018 05:40  --------------------------------------------------------  IN:    IV PiggyBack: 250 mL    Oral Fluid: 580 mL  Total IN: 830 mL    OUT:    Voided: 1260 mL  Total OUT: 1260 mL    Total NET: -430 mL          MEDICATIONS  (STANDING):  acetaminophen   Tablet. 650 milliGRAM(s) Oral every 6 hours  allopurinol 100 milliGRAM(s) Oral daily  amiodarone    Tablet 200 milliGRAM(s) Oral daily  docusate sodium 100 milliGRAM(s) Oral three times a day  donepezil 10 milliGRAM(s) Oral at bedtime  heparin  Injectable 5000 Unit(s) SubCutaneous every 8 hours  levothyroxine 125 MICROGram(s) Oral daily  memantine 10 milliGRAM(s) Oral two times a day  mirtazapine 15 milliGRAM(s) Oral at bedtime  pantoprazole    Tablet 40 milliGRAM(s) Oral before breakfast  tamsulosin 0.4 milliGRAM(s) Oral at bedtime    MEDICATIONS  (PRN):  oxyCODONE    IR 5 milliGRAM(s) Oral every 4 hours PRN Moderate Pain (4 - 6)      LABS:                        8.2    4.11  )-----------( 128      ( 26 Feb 2018 09:15 )             25.8     02-26    142  |  109<H>  |  12  ----------------------------<  79  3.9   |  27  |  2.36<H>    Ca    8.2<L>      26 Feb 2018 09:24  Phos  2.1     02-26  Mg     1.9     02-26      PT/INR - ( 26 Feb 2018 08:48 )   PT: 14.1 sec;   INR: 1.29 ratio           Urinalysis Basic - ( 25 Feb 2018 22:43 )    Color: x / Appearance: x / SG: x / pH: x  Gluc: x / Ketone: x  / Bili: x / Urobili: x   Blood: x / Protein: x / Nitrite: x   Leuk Esterase: x / RBC: 3-5 /HPF / WBC 0-2 /HPF   Sq Epi: x / Non Sq Epi: x / Bacteria: Few /HPF        RADIOLOGY & ADDITIONAL STUDIES:

## 2018-02-27 NOTE — DISCHARGE NOTE ADULT - ADDITIONAL INSTRUCTIONS
Dr Simpson [phone]  Dr Audra Clemente cardio   Dr Valdo Simons hem-onc Dr Simpson [phone]  Dr Audra Clemente cardio   Dr Valdo Simons hem-onc  Dr. Norman Ascencio nephrology

## 2018-02-27 NOTE — DISCHARGE NOTE ADULT - CARE PROVIDER_API CALL
Dulce Cabral), ColonRectal Surgery; Surgery  [address] [facility]  [address] [place] [address]  Phone: [phone]  Fax: [phone] Dulce Cabral), ColonRectal Surgery; Surgery  [address] [facility]  [address]  [place] [address]  Phone: [phone]  Fax: [phone]    Norman Ascencio), Internal Medicine; Nephrology  [address] [facility] [address]  [place] [address]  Phone: [phone]  Fax: [phone]

## 2018-02-27 NOTE — PROGRESS NOTE ADULT - ASSESSMENT
73 yo male h/o afib on AC with coumadin, s/p ppm, CKD, Waldenstrom's macroglobulinemia, CLL, anxiety, diverticulisits, admitted for laparoscopic sigmoid resection.    POD #5  post op care per surg  pain control  advance diet as tolerated  incentive spirometry  valles line removed    afib  cont propranolol and amio  restarted on coumadin  dose daily  goal inr 2-3  will not need bridging    CKD  stable  monitor     CLL/waldenstroms  stable  onc f/u    dizziness/lightheadedness  minimize narcotic use  check orthostatics  meclizine prn    cont other current meds    DVT ppx  PT  ambulation

## 2018-02-27 NOTE — PROGRESS NOTE ADULT - SUBJECTIVE AND OBJECTIVE BOX
NEPHROLOGY-NSN        Patient seen and examined   Notes that his urine is "percolating"  Fair PO intake    REVIEW OF SYSTEMS:  As above, otherwise 8 full 10 ROS were unremarkable    MEDICATIONS  (STANDING):  acetaminophen   Tablet. 650 milliGRAM(s) Oral every 6 hours  allopurinol 100 milliGRAM(s) Oral daily  amiodarone    Tablet 200 milliGRAM(s) Oral daily  docusate sodium 100 milliGRAM(s) Oral three times a day  donepezil 10 milliGRAM(s) Oral at bedtime  heparin  Injectable 5000 Unit(s) SubCutaneous every 8 hours  levothyroxine 125 MICROGram(s) Oral daily  memantine 10 milliGRAM(s) Oral two times a day  mirtazapine 15 milliGRAM(s) Oral at bedtime  pantoprazole    Tablet 40 milliGRAM(s) Oral before breakfast  tamsulosin 0.4 milliGRAM(s) Oral at bedtime  warfarin 2 milliGRAM(s) Oral once      VITAL:  T(C): , Max: 37.2 (02-27-18 @ 00:26)  T(F): , Max: 98.9 (02-27-18 @ 00:26)  HR: 59 (02-27-18 @ 13:31)  BP: 150/66 (02-27-18 @ 13:31)  BP(mean): --  RR: 18 (02-27-18 @ 13:31)  SpO2: 97% (02-27-18 @ 13:31)  Wt(kg): --    I and O's:    02-26 @ 07:01  -  02-27 @ 07:00  --------------------------------------------------------  IN: 830 mL / OUT: 1260 mL / NET: -430 mL    02-27 @ 07:01  -  02-27 @ 15:47  --------------------------------------------------------  IN: 540 mL / OUT: 675 mL / NET: -135 mL          PHYSICAL EXAM:    Constitutional: NAD  Respiratory: CTAB/L  Cardiovascular: S1 and S2  Gastrointestinal: BS+, soft, NT/ND  Extremities: No peripheral edema  Neurological: A/O x 3, no focal deficits      LABS:                        8.5    5.05  )-----------( 134      ( 27 Feb 2018 07:47 )             26.5     02-27    144  |  109<H>  |  13  ----------------------------<  83  4.1   |  24  |  2.38<H>    Ca    8.5      27 Feb 2018 09:26  Phos  2.1     02-27  Mg     1.7     02-27            Urine Studies:  Urinalysis Basic - ( 25 Feb 2018 22:43 )    Color: x / Appearance: x / SG: x / pH: x  Gluc: x / Ketone: x  / Bili: x / Urobili: x   Blood: x / Protein: x / Nitrite: x   Leuk Esterase: x / RBC: 3-5 /HPF / WBC 0-2 /HPF   Sq Epi: x / Non Sq Epi: x / Bacteria: Few /HPF            RADIOLOGY & ADDITIONAL STUDIES:

## 2018-02-27 NOTE — PROGRESS NOTE ADULT - SUBJECTIVE AND OBJECTIVE BOX
NICOLAS CIFUENTES  72y Male  MRN:[id number]    Patient is a 72y old  Male who presents with a chief complaint of diverticulitis (22 Feb 2018 06:19)    HPI:   72 year old male PMH of Afib on coumadin, PPM  gout, hypothyroid, Waldenstrom's macroglobinemia ( recent hem-onc visit Nov 2017 suggest possible relapse; Pt seeing hem-onc 2-16-18) ,s/p chemo, GERD, dementia , CLL; Pt known to have diverticulitis however pt getting frequent exacerbations; Pt recently hospitalized in January 2018  for diverticulitis; Pt was then discharge to rehab with Single lumen right chest wall central line for Zosyn infusion x 10 days; Pt sill has catheter; He presents to PST today for laparoscopic sigmoid resection possible open cystoscopy insertion of ureteral catheters (15 Feb 2018 15:54)      Patient seen and evaluated at bedside. No acute events overnight except as noted.    Interval HPI: feeling well. no acute c/o.  POD #5.  +BM  s/p removal of valles by IR yesterday    PAST MEDICAL & SURGICAL HISTORY:  BPH (benign prostatic hyperplasia)  [place] (hard of hearing)  Gout  CKD (chronic kidney disease)  Nephrolithiasis  Hypothyroid  Dementia  Bradycardia, drug induced  Waldenstrom macroglobulinemia  Diverticulitis  Atrial fibrillation  GERD (gastroesophageal reflux disease)  Anxiety disorder  CLL (chronic lymphocytic leukemia): in remission  History of cardiac pacemaker in situ  History of appendectomy  History of cataract surgery, right: IOL  History of laparoscopic cholecystectomy: 4/2014  S/P hernia repair: x2  Meniscus tear: s/p removal of Meniscus 8 months ago    SOC:  non smoker  no drug abuse  no alcohol abuse    lives with wife    Fam Hx:  non cont           VITALS:  Vital Signs Last 24 Hrs  T(C): 36.8 (27 Feb 2018 21:19), Max: 37.2 (27 Feb 2018 00:26)  T(F): 98.2 (27 Feb 2018 21:19), Max: 98.9 (27 Feb 2018 00:26)  HR: 56 (27 Feb 2018 21:19) (56 - 66)  BP: 129/66 (27 Feb 2018 21:19) (129/66 - 161/72)  BP(mean): --  RR: 18 (27 Feb 2018 21:19) (18 - 18)  SpO2: 96% (27 Feb 2018 21:19) (95% - 97%)      PHYSICAL EXAM:  GENERAL: NAD, well-developed  HEAD:  Atraumatic, Normocephalic  EYES: EOMI, PERRLA, conjunctiva and sclera clear  NECK: Supple, No JVD  CHEST/LUNG: Clear to auscultation bilaterally; No wheeze  HEART: S1, S2; No murmurs, rubs, or gallops  ABDOMEN: Soft, non dist. dressing in place  EXTREMITIES:  2+ Peripheral Pulses, No clubbing, cyanosis, or edema  PSYCH: Normal affect  NEUROLOGY: AAOX3; non-focal  SKIN: No rashes or lesions    Consultant(s) Notes Reviewed:  [x ] YES  [ ] NO  Care Discussed with Consultants/Other Providers [ x] YES  [ ] NO    MEDS:  MEDICATIONS  (STANDING):  acetaminophen   Tablet. 650 milliGRAM(s) Oral every 6 hours  allopurinol 100 milliGRAM(s) Oral daily  amiodarone    Tablet 200 milliGRAM(s) Oral daily  docusate sodium 100 milliGRAM(s) Oral three times a day  donepezil 10 milliGRAM(s) Oral at bedtime  heparin  Injectable 5000 Unit(s) SubCutaneous every 8 hours  levothyroxine 125 MICROGram(s) Oral daily  memantine 10 milliGRAM(s) Oral two times a day  mirtazapine 15 milliGRAM(s) Oral at bedtime  pantoprazole    Tablet 40 milliGRAM(s) Oral before breakfast  tamsulosin 0.4 milliGRAM(s) Oral at bedtime    MEDICATIONS  (PRN):  HYDROmorphone   Tablet 2 milliGRAM(s) Oral every 4 hours PRN Severe Pain (7 - 10)  oxyCODONE    IR 5 milliGRAM(s) Oral every 4 hours PRN Mild Pain (1 - 3)  oxyCODONE    IR 10 milliGRAM(s) Oral every 4 hours PRN Moderate Pain (4 - 6)    ALLERGIES:  No Known Allergies      LABS:                              8.5    5.05  )-----------( 134      ( 27 Feb 2018 07:47 )             26.5   02-27    144  |  109<H>  |  13  ----------------------------<  83  4.1   |  24  |  2.38<H>    Ca    8.5      27 Feb 2018 09:26  Phos  2.1     02-27  Mg     1.7     02-27

## 2018-02-27 NOTE — DISCHARGE NOTE ADULT - MEDICATION SUMMARY - MEDICATIONS TO STOP TAKING
I will STOP taking the medications listed below when I get home from the hospital:    sodium bicarbonate 650 mg oral tablet  -- 1 tab(s) by mouth 2 times a day

## 2018-02-27 NOTE — DISCHARGE NOTE ADULT - CARE PROVIDERS DIRECT ADDRESSES
,[email or web address]

## 2018-02-27 NOTE — PROGRESS NOTE ADULT - ASSESSMENT
71 yo man s/p lap assisted left colectomy, cystoscopy and ucaths placement, with continued bowel function and  tolerating diet, valles catheter removed by IR without issue    - c/w LRD  - Pain control  - Awaiting TWYLA placement    Green [phone]

## 2018-02-27 NOTE — DISCHARGE NOTE ADULT - PLAN OF CARE
Wound Healing Activity- No heavy lifting or straining over 15 lbs for the next two weeks;  Driving- Please do not drive until your pain is well controlled and you do not need to take pain medications.  You may shower-Do not submerge or scrub incision sites.  Please pat dry incisions/dressings.  Leave the white steri strips in place, they will fall off on their own in approximately 5-7 days. no plan for Chemotherapy while at Rehab Activity- No heavy lifting or straining over 15 lbs for the next two weeks;  Driving- Please do not drive until your pain is well controlled and you do not need to take pain medications.  You may shower-Do not submerge or scrub incision sites.  Please pat dry incisions/dressings.  Leave the white steri strips in place, they will fall off on their own in approximately 5-7 days.    Follow up with Dr. Castrejon within 1 week of discharge. Call to make an appointment. Hold sodium bicarb tablets until discussion with nephrology at follow up appt in 1-2 months.

## 2018-02-27 NOTE — PROVIDER CONTACT NOTE (OTHER) - SITUATION
Patient BP this evening is 161/72, HR 56. Patient BP has been systolically above 150 throughout shift. Pt received 200mg Amiodarone at 0600.

## 2018-02-27 NOTE — DISCHARGE NOTE ADULT - HOSPITAL COURSE
72 year old male PMH of Afib on coumadin, PPM  gout, hypothyroid, Waldenstrom's macroglobinemia ( recent hem-onc visit Nov 2017 suggest possible relapse) ,s/p chemo, GERD, dementia, CLL;  Pt known to have diverticulitis however pt getting frequent exacerbations; Pt recently hospitalized in January 2018  for diverticulitis; Pt was then discharge to rehab with Single lumen right chest wall central line for Zosyn infusion x 10 days; Pt sill has catheter.  On 2/22/18 patient underwent a Laparoscopic assisted sigmoid colectomy . The patient tolerated the procedure well. There were no complications. The patient was extubated in the OR and transferred to the PACU in stable condition and transferred to a surgical floor. The patient had daily wound care and was seen by physical therapy which recommended discharge to rehab.  Once bowel function returned and diet was advanced as tolerated. The patient's pain was controlled by IV pain medications and then by PO pain medications. The patient was placed back on home medications. White was removed on this admission. POD # 5, At the time of discharge, the patient was hemodynamically stable, was tolerating PO diet, was voiding urine and passing stool, was ambulating, and was comfortable with adequate pain control. The patient was instructed to follow up with Dr. Castrejon within 1-2 weeks after discharge from the hospital. The patient felt comfortable with discharge. The patient was discharged to rehab. The patient had no other issues.    During admission Nephrology saw patient who recommended ______________   and Heme Onc ___________________. 72 year old male PMH of Afib on coumadin, PPM  gout, hypothyroid, Waldenstrom's macroglobinemia ( recent hem-onc visit Nov 2017 suggest possible relapse) ,s/p chemo, GERD, dementia, CLL;  Pt known to have diverticulitis however pt getting frequent exacerbations; Pt recently hospitalized in January 2018  for diverticulitis; Pt was then discharge to rehab with Single lumen right chest wall central line for Zosyn infusion x 10 days; Pt sill has catheter.  On 2/22/18 patient underwent a Laparoscopic assisted sigmoid colectomy . The patient tolerated the procedure well. There were no complications. The patient was extubated in the OR and transferred to the PACU in stable condition and transferred to a surgical floor. The patient had daily wound care and was seen by physical therapy which recommended discharge to rehab.  Once bowel function returned and diet was advanced as tolerated. The patient's pain was controlled by IV pain medications and then by PO pain medications. The patient was placed back on home medications. White was removed on this admission. POD # 5, At the time of discharge, the patient was hemodynamically stable, was tolerating PO diet, was voiding urine and passing stool, was ambulating, and was comfortable with adequate pain control. The patient was instructed to follow up with Dr. Castrejon within 1 week after discharge from the hospital. The patient felt comfortable with discharge. The patient was discharged to rehab. The patient had no other issues.    During admission Nephrology saw patient who recommended UA, repeated 2x, pt states air bubbles in urine has since resolved.  and Heme Onc recommended no in-pt chemo, follow up outpt. 72 year old male PMH of Afib on coumadin, PPM  gout, hypothyroid, Waldenstrom's macroglobinemia ( recent hem-onc visit Nov 2017 suggest possible relapse) ,s/p chemo, GERD, dementia, CLL;  Pt known to have diverticulitis however pt getting frequent exacerbations; Pt recently hospitalized in January 2018  for diverticulitis; Pt was then discharge to rehab with Single lumen right chest wall central line for Zosyn infusion x 10 days; Pt sill has catheter.  On 2/22/18 patient underwent a Laparoscopic assisted sigmoid colectomy . The patient tolerated the procedure well. There were no complications. The patient was extubated in the OR and transferred to the PACU in stable condition and transferred to a surgical floor. The patient had daily wound care and was seen by physical therapy which recommended discharge to rehab.  Once bowel function returned and diet was advanced as tolerated. The patient's pain was controlled by IV pain medications and then by PO pain medications. The patient was placed back on home medications. White was removed on this admission. POD # 5, At the time of discharge, the patient was hemodynamically stable, was tolerating PO diet, was voiding urine and passing stool, was ambulating, and was comfortable with adequate pain control. The patient was instructed to follow up with Dr. Castrejon within 1 week after discharge from the hospital. The patient felt comfortable with discharge. The patient was discharged to rehab. The patient had no other issues.    During admission Nephrology saw patient who recommended UA, repeated 2x, pt states air bubbles in urine has since resolved.  Pt to stay off the sodium bicarb tablets and follow up outpt in1-2 months and Heme Onc recommended no in-pt chemo, follow up outpt.

## 2018-02-27 NOTE — DISCHARGE NOTE ADULT - PATIENT PORTAL LINK FT
You can access the [facility] Patient Portal, offered by [facility], by registering with the following website: [email or web address]

## 2018-02-27 NOTE — PROVIDER CONTACT NOTE (OTHER) - BACKGROUND
2/22 lap sigmoid resection w/ b/l ucath  2/27 Adjusted pain medication management, complaining of abdominal pain, denies chest pain or SOB  Hx: HTN, pacemaker

## 2018-02-27 NOTE — PROGRESS NOTE ADULT - ASSESSMENT
72M w/ AFib, nephrolithiasis, Waldenstrom's macroglobulinemia, CKD4, and recurrent diverticulitis, now s/p colon resection 2/22/18.    CKD4 - renal function is stable, electrolytes within range including bicarbonate and he appears euvolemic  He notes urine is "percolating" unclear what he means, prior UA with moderate blood but only 3-5 rbcs and no wbcs. Will repeat today    PLAN  Recheck urinalysis  Encourage intake as able  Avoid NSAIDs and nephrotoxins

## 2018-02-27 NOTE — DISCHARGE NOTE ADULT - SECONDARY DIAGNOSIS.
Atrial fibrillation, unspecified type CLL (chronic lymphocytic leukemia) Gastroesophageal reflux disease, esophagitis presence not specified Stage 3 chronic kidney disease

## 2018-02-27 NOTE — DISCHARGE NOTE ADULT - CARE PLAN
Principal Discharge DX:	Diverticulitis  Goal:	Wound Healing  Assessment and plan of treatment:	Activity- No heavy lifting or straining over 15 lbs for the next two weeks;  Driving- Please do not drive until your pain is well controlled and you do not need to take pain medications.  You may shower-Do not submerge or scrub incision sites.  Please pat dry incisions/dressings.  Leave the white steri strips in place, they will fall off on their own in approximately 5-7 days.  Secondary Diagnosis:	Atrial fibrillation, unspecified type  Secondary Diagnosis:	CLL (chronic lymphocytic leukemia)  Assessment and plan of treatment:	no plan for Chemotherapy while at Rehab  Secondary Diagnosis:	Gastroesophageal reflux disease, esophagitis presence not specified Principal Discharge DX:	Diverticulitis  Goal:	Wound Healing  Assessment and plan of treatment:	Activity- No heavy lifting or straining over 15 lbs for the next two weeks;  Driving- Please do not drive until your pain is well controlled and you do not need to take pain medications.  You may shower-Do not submerge or scrub incision sites.  Please pat dry incisions/dressings.  Leave the white steri strips in place, they will fall off on their own in approximately 5-7 days.    Follow up with Dr. Castrejon within 1 week of discharge. Call to make an appointment.  Secondary Diagnosis:	Atrial fibrillation, unspecified type  Secondary Diagnosis:	CLL (chronic lymphocytic leukemia)  Assessment and plan of treatment:	no plan for Chemotherapy while at Rehab  Secondary Diagnosis:	Gastroesophageal reflux disease, esophagitis presence not specified Principal Discharge DX:	Diverticulitis  Goal:	Wound Healing  Assessment and plan of treatment:	Activity- No heavy lifting or straining over 15 lbs for the next two weeks;  Driving- Please do not drive until your pain is well controlled and you do not need to take pain medications.  You may shower-Do not submerge or scrub incision sites.  Please pat dry incisions/dressings.  Leave the white steri strips in place, they will fall off on their own in approximately 5-7 days.    Follow up with Dr. Castrejon within 1 week of discharge. Call to make an appointment.  Secondary Diagnosis:	Atrial fibrillation, unspecified type  Secondary Diagnosis:	CLL (chronic lymphocytic leukemia)  Assessment and plan of treatment:	no plan for Chemotherapy while at Rehab  Secondary Diagnosis:	Gastroesophageal reflux disease, esophagitis presence not specified  Secondary Diagnosis:	Stage 3 chronic kidney disease  Assessment and plan of treatment:	Hold sodium bicarb tablets until discussion with nephrology at follow up appt in 1-2 months.

## 2018-02-27 NOTE — DISCHARGE NOTE ADULT - MEDICATION SUMMARY - MEDICATIONS TO TAKE
I will START or STAY ON the medications listed below when I get home from the hospital:    acetaminophen 325 mg oral tablet  -- 2 tab(s) by mouth every 6 hours  -- Indication: For pain    oxyCODONE 5 mg oral tablet  -- 1 tab(s) by mouth every 4 hours, As needed, Moderate Pain (4 - 6)  -- Indication: For pain    oxyCODONE 10 mg oral tablet  -- 1 tab(s) by mouth every 6 hours, As needed, Severe Pain (7 - 10)  -- Indication: For pain    tamsulosin 0.4 mg oral capsule  -- 1 cap(s) by mouth once a day (at bedtime)  -- Indication: For BPH (benign prostatic hyperplasia)    amiodarone 200 mg oral tablet  -- 1 tab(s) by mouth once a day  -- Indication: For A fib    warfarin 2 mg oral tablet  -- 1 tab(s) by mouth once a day  -- Indication: For A fib    clonazePAM 1 mg oral tablet  -- 1 tab(s) by mouth once a day (at bedtime)  -- Indication: For psych    mirtazapine 15 mg oral tablet  -- 1 tab(s) by mouth once a day (at bedtime)  -- Indication: For psych    allopurinol 100 mg oral tablet  -- 2 tab(s) by mouth once a day  -- Indication: For Gout    donepezil 10 mg oral tablet  -- 1 tab(s) by mouth once a day (at bedtime)  -- Indication: For Dementia    memantine 10 mg oral tablet  -- 1 tab(s) by mouth every 12 hours  -- Indication: For Dementia    Melatonin 10 mg oral capsule  -- 1 cap(s) by mouth once a day (at bedtime)  -- Indication: For supplement    lactobacillus acidophilus oral capsule  -- 1 tab(s) by mouth once a day  -- Indication: For probiotic    Protonix 40 mg oral delayed release tablet  -- 1 tab(s) by mouth once a day  -- Indication: For GERD    levothyroxine 125 mcg (0.125 mg) oral tablet  -- 1 tab(s) by mouth once a day  -- Indication: For Hypothyroid    Multiple Vitamins oral tablet  -- 1 tab(s) by mouth once a day  -- Indication: For supplement

## 2018-02-27 NOTE — PROVIDER CONTACT NOTE (OTHER) - ASSESSMENT
/72, HR 56, RR 18, O2 95% on room air, afebrile, pt c/o of abdominal 8 out of 10 pain- standing dose of Tylenol give, denies chest pain or SOB, no distress noted.

## 2018-02-28 VITALS — WEIGHT: 205.25 LBS

## 2018-02-28 LAB
BACTERIA # UR AUTO: NEGATIVE — SIGNIFICANT CHANGE UP
EPI CELLS # UR: 0 /HPF — SIGNIFICANT CHANGE UP (ref 0–5)
HYALINE CASTS # UR AUTO: 1 /LPF — SIGNIFICANT CHANGE UP (ref 0–7)
INR BLD: 1.28 RATIO — HIGH (ref 0.88–1.16)
PROTHROM AB SERPL-ACNC: 14.5 SEC — HIGH (ref 10–13.1)
RBC CASTS # UR COMP ASSIST: 0 /HPF — SIGNIFICANT CHANGE UP (ref 0–4)
SURGICAL PATHOLOGY STUDY: SIGNIFICANT CHANGE UP
WBC UR QL: 1 /HPF — SIGNIFICANT CHANGE UP (ref 0–5)

## 2018-02-28 PROCEDURE — 36589 REMOVAL TUNNELED CV CATH: CPT

## 2018-02-28 PROCEDURE — 85027 COMPLETE CBC AUTOMATED: CPT

## 2018-02-28 PROCEDURE — 86900 BLOOD TYPING SEROLOGIC ABO: CPT

## 2018-02-28 PROCEDURE — 84100 ASSAY OF PHOSPHORUS: CPT

## 2018-02-28 PROCEDURE — C1889: CPT

## 2018-02-28 PROCEDURE — 85730 THROMBOPLASTIN TIME PARTIAL: CPT

## 2018-02-28 PROCEDURE — 82962 GLUCOSE BLOOD TEST: CPT

## 2018-02-28 PROCEDURE — 85610 PROTHROMBIN TIME: CPT

## 2018-02-28 PROCEDURE — 97162 PT EVAL MOD COMPLEX 30 MIN: CPT

## 2018-02-28 PROCEDURE — 86901 BLOOD TYPING SEROLOGIC RH(D): CPT

## 2018-02-28 PROCEDURE — 36430 TRANSFUSION BLD/BLD COMPNT: CPT

## 2018-02-28 PROCEDURE — 81001 URINALYSIS AUTO W/SCOPE: CPT

## 2018-02-28 PROCEDURE — P9011: CPT

## 2018-02-28 PROCEDURE — C1758: CPT

## 2018-02-28 PROCEDURE — 93005 ELECTROCARDIOGRAM TRACING: CPT

## 2018-02-28 PROCEDURE — 74018 RADEX ABDOMEN 1 VIEW: CPT

## 2018-02-28 PROCEDURE — 97110 THERAPEUTIC EXERCISES: CPT

## 2018-02-28 PROCEDURE — 71045 X-RAY EXAM CHEST 1 VIEW: CPT

## 2018-02-28 PROCEDURE — 97116 GAIT TRAINING THERAPY: CPT

## 2018-02-28 PROCEDURE — 80048 BASIC METABOLIC PNL TOTAL CA: CPT

## 2018-02-28 PROCEDURE — 86850 RBC ANTIBODY SCREEN: CPT

## 2018-02-28 PROCEDURE — 82803 BLOOD GASES ANY COMBINATION: CPT

## 2018-02-28 PROCEDURE — 83735 ASSAY OF MAGNESIUM: CPT

## 2018-02-28 RX ORDER — OXYCODONE HYDROCHLORIDE 5 MG/1
10 TABLET ORAL EVERY 6 HOURS
Qty: 0 | Refills: 0 | Status: DISCONTINUED | OUTPATIENT
Start: 2018-02-28 | End: 2018-02-28

## 2018-02-28 RX ORDER — WARFARIN SODIUM 2.5 MG/1
2 TABLET ORAL ONCE
Qty: 0 | Refills: 0 | Status: DISCONTINUED | OUTPATIENT
Start: 2018-02-28 | End: 2018-02-28

## 2018-02-28 RX ORDER — ACETAMINOPHEN 500 MG
2 TABLET ORAL
Qty: 0 | Refills: 0 | COMMUNITY
Start: 2018-02-28

## 2018-02-28 RX ORDER — OXYCODONE HYDROCHLORIDE 5 MG/1
1 TABLET ORAL
Qty: 0 | Refills: 0 | COMMUNITY
Start: 2018-02-28

## 2018-02-28 RX ORDER — SODIUM BICARBONATE 1 MEQ/ML
1 SYRINGE (ML) INTRAVENOUS
Qty: 60 | Refills: 0 | COMMUNITY

## 2018-02-28 RX ORDER — OXYCODONE HYDROCHLORIDE 5 MG/1
5 TABLET ORAL EVERY 4 HOURS
Qty: 0 | Refills: 0 | Status: DISCONTINUED | OUTPATIENT
Start: 2018-02-28 | End: 2018-02-28

## 2018-02-28 RX ORDER — ACETAMINOPHEN 500 MG
650 TABLET ORAL EVERY 6 HOURS
Qty: 0 | Refills: 0 | Status: DISCONTINUED | OUTPATIENT
Start: 2018-02-28 | End: 2018-02-28

## 2018-02-28 RX ADMIN — MEMANTINE HYDROCHLORIDE 10 MILLIGRAM(S): 10 TABLET ORAL at 05:47

## 2018-02-28 RX ADMIN — Medication 650 MILLIGRAM(S): at 00:16

## 2018-02-28 RX ADMIN — Medication 650 MILLIGRAM(S): at 11:28

## 2018-02-28 RX ADMIN — Medication 100 MILLIGRAM(S): at 05:47

## 2018-02-28 RX ADMIN — OXYCODONE HYDROCHLORIDE 5 MILLIGRAM(S): 5 TABLET ORAL at 00:46

## 2018-02-28 RX ADMIN — Medication 125 MICROGRAM(S): at 05:48

## 2018-02-28 RX ADMIN — Medication 650 MILLIGRAM(S): at 05:47

## 2018-02-28 RX ADMIN — Medication 650 MILLIGRAM(S): at 00:46

## 2018-02-28 RX ADMIN — PANTOPRAZOLE SODIUM 40 MILLIGRAM(S): 20 TABLET, DELAYED RELEASE ORAL at 05:50

## 2018-02-28 RX ADMIN — AMIODARONE HYDROCHLORIDE 200 MILLIGRAM(S): 400 TABLET ORAL at 05:47

## 2018-02-28 RX ADMIN — HEPARIN SODIUM 5000 UNIT(S): 5000 INJECTION INTRAVENOUS; SUBCUTANEOUS at 05:48

## 2018-02-28 RX ADMIN — OXYCODONE HYDROCHLORIDE 5 MILLIGRAM(S): 5 TABLET ORAL at 00:16

## 2018-02-28 RX ADMIN — OXYCODONE HYDROCHLORIDE 5 MILLIGRAM(S): 5 TABLET ORAL at 05:47

## 2018-02-28 RX ADMIN — Medication 100 MILLIGRAM(S): at 11:29

## 2018-02-28 NOTE — CHART NOTE - NSCHARTNOTEFT_GEN_A_CORE
Spoke with Nephrology, Dr. Ascencio, pt to stay off sodium bicarb tabs for now, follow up in 1-2 months outpatient

## 2018-02-28 NOTE — PROGRESS NOTE ADULT - ATTENDING COMMENTS
I reviewed the consult note from Hematology.   The pt received 2 units of FFP at the beginning of his surgery given preop INR of 1.7. Repeat INR intraop was 1.5, and the pt was given vit K as well. EBL was 200 mL.   The Heme consult reports "oozing postop at the surgical site" for which the pt received cryoprecipitate - this is incorrect, the pt never received cryoprecipitate and the only drainage noted is the expected postop discharge from the skin gap left open at the lower midline extraction wound which has Telfa immanuel.  I will also discuss with the Hematology Attending.
I saw and examined the pt and discussed the tx plan with the House Staff. I agree with the exam and plan as documented in the surgery resident's note from today with comments/changes below.  C/o mild nausea, continues to feel bloated. Dizziness much better. No flatus. Belching some.  Abdomen softly distended, NT.  Incisions dressed, dry.  H/H with mild down trend, creat trending down.  AXR with mild to moderate SB dil.  Pt with ileus.  NPO except meds.   Repeat CBC this afternoon.  If more distended, emesis, worsening nausea, he will need an NGT.  Continue to ambulate.  Trial of voiding underway.  Dulce Cabral MD
I saw and examined the pt and discussed the tx plan with the House Staff. I agree with the exam and plan as documented in the surgery resident's note from today.  C/o incisional pain, oxycodone 5 mg is suboptimal for pain control, 10 mg made him dizzy.  + flatus, BMs.  Abdomen soft, ND, with mild incisional tenderness.   Labs stable.  Stable overall. Trial of Dilaudid 2 mg prn pain. Continue efforts for better pain control.   Dulce Cabral MD
I saw and examined the pt and discussed the tx plan with the House Staff. I agree with the exam and plan as documented in the surgery resident's note from today.  Pain better controlled. + flatus, last BM yesterday morning.  Afebrile, VSS.  Comofrtable.  Abdomen soft, NT, ND.  Incisions c/d/i.  Stable overall.  D/c to Rehab on Colace tid, po Tylenol around the clock, oxycodone prn.  F/u with me next week.  Dulce Cabral MD
I saw and examined the pt this morning at 7:40 am and discussed the tx plan with the House Staff. I agree with the exam and plan as documented in the surgery resident's note from today which I edited as indicated.  Had incisional pain yesterday after PCA was d/c'ed, now comofrtable with new bowel regimen.  + flatus, BMs x2, brown.  Reports passing air with urine, which is likely to be related to the recent Tom - doubt it is of clinical significance, nevertheless we'll monitor.  Abdomen soft, NT, ND.  Incisions c/d/i.  Trial of LRD.  Colace.  Start Coumadin tonight.   Pt agreeable for Rehab.  Will d/w IR regarding removing the White tomorrow if possible.  Dulce Cabral MD
I have seen and evaluated patient and discussed with team.  Dizzy and nauseated after narcotic use.  Had BM and flatus.  DC narcotics and entereg.  Clears when nausea passes.
I saw and examined the pt and discussed the tx plan with the House Staff. I agree with the exam and plan as documented in the surgery resident's note from today which I edited as indicated.  C/o dizziness.  + bloating.  Appears comfortable. Sitting up in chair.  Abdomen soft, with appropriate mild incisional tenderness, mild to moderate distention.  Incisions dressed, dry.  Labs similar to baseline in January.  Stable overall.  He did not receive intrathecal Duramorph as his INR was 1.7. Therefore not eligible for our Enhanced Recovery Protocol. Using Entereg and had filed block with [employer] for assistance with pain control and avoid ileus.  Would allow water only until he ambulates and less distended.  Repeat CBC given dizziness (suspect it is d/t anesthesia lingering given his CKD, as long as labs are stable).  Dr Simons from [facility] is seeing as outpt for Waldenstrom's (reported presence of IgM may prolong clotting tests), continue to monitor labs and clinically and will request [facility] f/u.  PT for ambulation with walker.    Dulce Cabral MD

## 2018-02-28 NOTE — DIETITIAN INITIAL EVALUATION ADULT. - ORAL INTAKE PTA
Pt with good appetite and intake PTA. Pt typically eats two meals daily, skips lunch. Pt takes MVI PTA. NKFA./good

## 2018-02-28 NOTE — PROGRESS NOTE ADULT - PROVIDER SPECIALTY LIST ADULT
Anesthesia
Colorectal Surgery
Internal Medicine
Intervent Radiology
Nephrology
Surgery
Internal Medicine
Internal Medicine

## 2018-02-28 NOTE — PROGRESS NOTE ADULT - ASSESSMENT
73 yo man s/p lap assisted left colectomy, cystoscopy and ucaths placement, with continued bowel function and  tolerating diet, valles catheter removed by IR without issue, awaiting rehab placement     - c/w LRD  - Pain control  - Nephrology : f/u repeat UA   - Awaiting TWYLA placement    Green [phone]

## 2018-02-28 NOTE — PROGRESS NOTE ADULT - SUBJECTIVE AND OBJECTIVE BOX
GREEN SURGERY PROGRESS NOTE    POST OPERATIVE DAY #: 6      SUBJECTIVE: Pt seen and examined at bedside. Overnight SBP elevated to 150s, patient otherwise asymptomatic. Continues to tolerate diet , pain well controlled. Voiding and ambulating without issue. Denied n/v, fever, chills, CP or SOB.     Vital Signs Last 24 Hrs  T(C): 37.1 (2018 05:29), Max: 37.1 (2018 05:29)  T(F): 98.7 (2018 05:29), Max: 98.7 (2018 05:29)  HR: 58 (2018 05:29) (56 - 62)  BP: 145/63 (2018 05:29) (129/66 - 161/72)  BP(mean): --  RR: 18 (2018 05:29) (18 - 18)  SpO2: 96% (2018 05:29) (95% - 97%)    Physical Exam  General: awake, alert,  NAD lying bed  Pulm: breathing comfortably on RA  Abdomen: soft, nondistended, minimal silvana incisional tenderness Incisions CDI.   Extremities: Grossly symmetric    I&O's Summary    2018 07:  -  2018 07:00  --------------------------------------------------------  IN: 830 mL / OUT: 1260 mL / NET: -430 mL    2018 07:  -  2018 05:50  --------------------------------------------------------  IN: 930 mL / OUT: 1550 mL / NET: -620 mL      I&O's Detail    2018 07:  -  2018 07:00  --------------------------------------------------------  IN:    IV PiggyBack: 250 mL    Oral Fluid: 580 mL  Total IN: 830 mL    OUT:    Voided: 1260 mL  Total OUT: 1260 mL    Total NET: -430 mL      2018 07:01  -  2018 05:50  --------------------------------------------------------  IN:    IV PiggyBack: 300 mL    Oral Fluid: 630 mL  Total IN: 930 mL    OUT:    Voided: 1550 mL  Total OUT: 1550 mL    Total NET: -620 mL          MEDICATIONS  (STANDING):  acetaminophen   Tablet. 650 milliGRAM(s) Oral every 6 hours  allopurinol 100 milliGRAM(s) Oral daily  amiodarone    Tablet 200 milliGRAM(s) Oral daily  docusate sodium 100 milliGRAM(s) Oral three times a day  donepezil 10 milliGRAM(s) Oral at bedtime  heparin  Injectable 5000 Unit(s) SubCutaneous every 8 hours  levothyroxine 125 MICROGram(s) Oral daily  memantine 10 milliGRAM(s) Oral two times a day  mirtazapine 15 milliGRAM(s) Oral at bedtime  pantoprazole    Tablet 40 milliGRAM(s) Oral before breakfast  tamsulosin 0.4 milliGRAM(s) Oral at bedtime  warfarin 2 milliGRAM(s) Oral once    MEDICATIONS  (PRN):  HYDROmorphone   Tablet 2 milliGRAM(s) Oral every 4 hours PRN Severe Pain (7 - 10)  oxyCODONE    IR 5 milliGRAM(s) Oral every 4 hours PRN Mild Pain (1 - 3)  oxyCODONE    IR 10 milliGRAM(s) Oral every 4 hours PRN Moderate Pain (4 - 6)      LABS:                        8.5    5.05  )-----------( 134      ( 2018 07:47 )             26.5     02-    144  |  109<H>  |  13  ----------------------------<  83  4.1   |  24  |  2.38<H>    Ca    8.5      2018 09:26  Phos  2.1       Mg     1.7           PT/INR - ( 2018 09:33 )   PT: 14.1 sec;   INR: 1.24 ratio           Urinalysis Basic - ( 2018 22:36 )    Color: Yellow / Appearance: Clear / S.015 / pH: x  Gluc: x / Ketone: Negative  / Bili: Negative / Urobili: Negative mg/dL   Blood: x / Protein: 30 mg/dL / Nitrite: Negative   Leuk Esterase: Negative / RBC: 0 /HPF / WBC 1 /HPF   Sq Epi: x / Non Sq Epi: 0 /HPF / Bacteria: Negative        RADIOLOGY & ADDITIONAL STUDIES:

## 2018-02-28 NOTE — DIETITIAN INITIAL EVALUATION ADULT. - OTHER INFO
Pt seen for length of stay. Per chart, 73 yo man s/p lap assisted left colectomy, cystoscopy and ucaths placement. Pt reports good appetite and intake inhouse. Pt denies any history of chewing/swallowing difficulty or GI distress at this time. Last BM yesterday. Pt reports stable weight,  pounds consistent with dosing weight 205.3 pounds . Pt amenable to low fiber nutrition education review and written materials.

## 2018-02-28 NOTE — DIETITIAN INITIAL EVALUATION ADULT. - NS AS NUTRI INTERV ED CONTENT
Provided low-fiber nutrition therapy including importance of avoiding  fiber rich foods, fresh fruits/vegetables, whole grains, and added fiber in processed foods. Discussed chewing foods well and adequate hydration. Pt verbalized understanding and accepted written handout.

## 2018-02-28 NOTE — PROGRESS NOTE ADULT - SUBJECTIVE AND OBJECTIVE BOX
NICOLAS CIFUENTES  72y Male  MRN:[id number]    Patient is a 72y old  Male who presents with a chief complaint of diverticulitis (22 Feb 2018 06:19)    HPI:   72 year old male PMH of Afib on coumadin, PPM  gout, hypothyroid, Waldenstrom's macroglobinemia ( recent hem-onc visit Nov 2017 suggest possible relapse; Pt seeing hem-onc 2-16-18) ,s/p chemo, GERD, dementia , CLL; Pt known to have diverticulitis however pt getting frequent exacerbations; Pt recently hospitalized in January 2018  for diverticulitis; Pt was then discharge to rehab with Single lumen right chest wall central line for Zosyn infusion x 10 days; Pt sill has catheter; He presents to PST today for laparoscopic sigmoid resection possible open cystoscopy insertion of ureteral catheters (15 Feb 2018 15:54)      Patient seen and evaluated at bedside. No acute events overnight except as noted.    Interval HPI: feeling well. no acute c/o.  POD #6.  +BM       PAST MEDICAL & SURGICAL HISTORY:  BPH (benign prostatic hyperplasia)  [place] (hard of hearing)  Gout  CKD (chronic kidney disease)  Nephrolithiasis  Hypothyroid  Dementia  Bradycardia, drug induced  Waldenstrom macroglobulinemia  Diverticulitis  Atrial fibrillation  GERD (gastroesophageal reflux disease)  Anxiety disorder  CLL (chronic lymphocytic leukemia): in remission  History of cardiac pacemaker in situ  History of appendectomy  History of cataract surgery, right: IOL  History of laparoscopic cholecystectomy: 4/2014  S/P hernia repair: x2  Meniscus tear: s/p removal of Meniscus 8 months ago    SOC:  non smoker  no drug abuse  no alcohol abuse    lives with wife    Fam Hx:  non cont           VITALS:  Vital Signs Last 24 Hrs  T(C): 37 (28 Feb 2018 09:48), Max: 37.1 (28 Feb 2018 05:29)  T(F): 98.6 (28 Feb 2018 09:48), Max: 98.7 (28 Feb 2018 05:29)  HR: 64 (28 Feb 2018 09:48) (56 - 64)  BP: 128/76 (28 Feb 2018 09:48) (128/76 - 161/72)  BP(mean): --  RR: 18 (28 Feb 2018 09:48) (18 - 18)  SpO2: 97% (28 Feb 2018 09:48) (95% - 97%)    PHYSICAL EXAM:  GENERAL: NAD, well-developed  HEAD:  Atraumatic, Normocephalic  EYES: EOMI, PERRLA, conjunctiva and sclera clear  NECK: Supple, No JVD  CHEST/LUNG: Clear to auscultation bilaterally; No wheeze  HEART: S1, S2; No murmurs, rubs, or gallops  ABDOMEN: Soft, non dist. dressing in place  EXTREMITIES:  2+ Peripheral Pulses, No clubbing, cyanosis, or edema  PSYCH: Normal affect  NEUROLOGY: AAOX3; non-focal  SKIN: No rashes or lesions    Consultant(s) Notes Reviewed:  [x ] YES  [ ] NO  Care Discussed with Consultants/Other Providers [ x] YES  [ ] NO    MEDS:  MEDICATIONS  (STANDING):  acetaminophen   Tablet. 650 milliGRAM(s) Oral every 6 hours  allopurinol 100 milliGRAM(s) Oral daily  amiodarone    Tablet 200 milliGRAM(s) Oral daily  docusate sodium 100 milliGRAM(s) Oral three times a day  donepezil 10 milliGRAM(s) Oral at bedtime  heparin  Injectable 5000 Unit(s) SubCutaneous every 8 hours  levothyroxine 125 MICROGram(s) Oral daily  memantine 10 milliGRAM(s) Oral two times a day  mirtazapine 15 milliGRAM(s) Oral at bedtime  pantoprazole    Tablet 40 milliGRAM(s) Oral before breakfast  tamsulosin 0.4 milliGRAM(s) Oral at bedtime  warfarin 2 milliGRAM(s) Oral once    MEDICATIONS  (PRN):  oxyCODONE    IR 5 milliGRAM(s) Oral every 4 hours PRN Moderate Pain (4 - 6)  oxyCODONE    IR 10 milliGRAM(s) Oral every 6 hours PRN Severe Pain (7 - 10)    ALLERGIES:  No Known Allergies      LABS:                                                  8.5    5.05  )-----------( 134      ( 27 Feb 2018 07:47 )             26.5   02-27    144  |  109<H>  |  13  ----------------------------<  83  4.1   |  24  |  2.38<H>    Ca    8.5      27 Feb 2018 09:26  Phos  2.1     02-27  Mg     1.7     02-27    PT/INR - ( 28 Feb 2018 09:23 )   PT: 14.5 sec;   INR: 1.28 ratio

## 2018-02-28 NOTE — PROGRESS NOTE ADULT - ASSESSMENT
71 yo male h/o afib on AC with coumadin, s/p ppm, CKD, Waldenstrom's macroglobulinemia, CLL, anxiety, diverticulisits, admitted for laparoscopic sigmoid resection.    POD #6  post op care per surg  pain control  tolerating diet  incentive spirometry  valles line removed    afib  cont propranolol and amio  restarted on coumadin  dose daily  goal inr 2-3  will not need bridging    CKD  stable  monitor     CLL/waldenstroms  stable  onc f/u    dizziness/lightheadedness  minimize narcotic use  check orthostatics  meclizine prn    cont other current meds    DVT ppx  PT  ambulation    dc planning rehab

## 2018-02-28 NOTE — DIETITIAN INITIAL EVALUATION ADULT. - ENERGY NEEDS
Ht: 74 Wt: 205 pounds BMI: 26.4 kg/m2 IBW:  190 pounds(+/-10%) %%  No edema. No pressure ulcers documented.

## 2018-03-07 ENCOUNTER — APPOINTMENT (OUTPATIENT)
Dept: SURGERY | Facility: CLINIC | Age: 73
End: 2018-03-07
Payer: COMMERCIAL

## 2018-03-07 VITALS
TEMPERATURE: 98.1 F | BODY MASS INDEX: 25.67 KG/M2 | SYSTOLIC BLOOD PRESSURE: 126 MMHG | HEIGHT: 74 IN | HEART RATE: 60 BPM | WEIGHT: 200 LBS | RESPIRATION RATE: 16 BRPM | DIASTOLIC BLOOD PRESSURE: 75 MMHG | OXYGEN SATURATION: 99 %

## 2018-03-07 DIAGNOSIS — Z87.19 PERSONAL HISTORY OF OTHER DISEASES OF THE DIGESTIVE SYSTEM: ICD-10-CM

## 2018-03-07 PROCEDURE — 99024 POSTOP FOLLOW-UP VISIT: CPT

## 2018-03-21 ENCOUNTER — APPOINTMENT (OUTPATIENT)
Dept: SURGERY | Facility: CLINIC | Age: 73
End: 2018-03-21
Payer: COMMERCIAL

## 2018-03-26 ENCOUNTER — APPOINTMENT (OUTPATIENT)
Dept: SURGERY | Facility: CLINIC | Age: 73
End: 2018-03-26
Payer: COMMERCIAL

## 2018-03-26 VITALS
BODY MASS INDEX: 25.67 KG/M2 | TEMPERATURE: 98.4 F | SYSTOLIC BLOOD PRESSURE: 103 MMHG | OXYGEN SATURATION: 98 % | WEIGHT: 200 LBS | HEART RATE: 60 BPM | HEIGHT: 74 IN | RESPIRATION RATE: 16 BRPM | DIASTOLIC BLOOD PRESSURE: 69 MMHG

## 2018-03-26 DIAGNOSIS — Z98.890 OTHER SPECIFIED POSTPROCEDURAL STATES: ICD-10-CM

## 2018-03-26 PROCEDURE — 99024 POSTOP FOLLOW-UP VISIT: CPT

## 2018-03-26 RX ORDER — OXYCODONE AND ACETAMINOPHEN 10; 325 MG/1; MG/1
10-325 TABLET ORAL
Qty: 90 | Refills: 0 | Status: ACTIVE | COMMUNITY
Start: 2018-02-16

## 2018-04-02 ENCOUNTER — OUTPATIENT (OUTPATIENT)
Dept: OUTPATIENT SERVICES | Facility: HOSPITAL | Age: 73
LOS: 1 days | Discharge: HOME | End: 2018-04-02

## 2018-04-02 DIAGNOSIS — Z98.89 OTHER SPECIFIED POSTPROCEDURAL STATES: Chronic | ICD-10-CM

## 2018-04-02 DIAGNOSIS — Z95.0 PRESENCE OF CARDIAC PACEMAKER: Chronic | ICD-10-CM

## 2018-04-02 DIAGNOSIS — C91.11 CHRONIC LYMPHOCYTIC LEUKEMIA OF B-CELL TYPE IN REMISSION: ICD-10-CM

## 2018-04-02 DIAGNOSIS — Z98.890 OTHER SPECIFIED POSTPROCEDURAL STATES: Chronic | ICD-10-CM

## 2018-04-02 DIAGNOSIS — Z98.41 CATARACT EXTRACTION STATUS, RIGHT EYE: Chronic | ICD-10-CM

## 2018-04-03 ENCOUNTER — RESULT REVIEW (OUTPATIENT)
Age: 73
End: 2018-04-03

## 2018-04-03 ENCOUNTER — LABORATORY RESULT (OUTPATIENT)
Age: 73
End: 2018-04-03

## 2018-04-03 ENCOUNTER — APPOINTMENT (OUTPATIENT)
Dept: HEMATOLOGY ONCOLOGY | Facility: CLINIC | Age: 73
End: 2018-04-03
Payer: COMMERCIAL

## 2018-04-03 VITALS
DIASTOLIC BLOOD PRESSURE: 70 MMHG | SYSTOLIC BLOOD PRESSURE: 114 MMHG | TEMPERATURE: 98.2 F | HEART RATE: 59 BPM | RESPIRATION RATE: 16 BRPM | WEIGHT: 202.8 LBS | BODY MASS INDEX: 26.04 KG/M2 | OXYGEN SATURATION: 99 %

## 2018-04-03 LAB
BASOPHILS # BLD AUTO: 0.1 K/UL — SIGNIFICANT CHANGE UP (ref 0–0.2)
BASOPHILS NFR BLD AUTO: 0.8 % — SIGNIFICANT CHANGE UP (ref 0–2)
EOSINOPHIL # BLD AUTO: 0.3 K/UL — SIGNIFICANT CHANGE UP (ref 0–0.5)
EOSINOPHIL NFR BLD AUTO: 4.2 % — SIGNIFICANT CHANGE UP (ref 0–6)
HCT VFR BLD CALC: 33 % — LOW (ref 39–50)
HGB BLD-MCNC: 11.2 G/DL — LOW (ref 13–17)
LYMPHOCYTES # BLD AUTO: 2.4 K/UL — SIGNIFICANT CHANGE UP (ref 1–3.3)
LYMPHOCYTES # BLD AUTO: 30.1 % — SIGNIFICANT CHANGE UP (ref 13–44)
MCHC RBC-ENTMCNC: 30.8 PG — SIGNIFICANT CHANGE UP (ref 27–34)
MCHC RBC-ENTMCNC: 33.9 G/DL — SIGNIFICANT CHANGE UP (ref 32–36)
MCV RBC AUTO: 90.9 FL — SIGNIFICANT CHANGE UP (ref 80–100)
MONOCYTES # BLD AUTO: 0.7 K/UL — SIGNIFICANT CHANGE UP (ref 0–0.9)
MONOCYTES NFR BLD AUTO: 9.4 % — SIGNIFICANT CHANGE UP (ref 2–14)
NEUTROPHILS # BLD AUTO: 4.4 K/UL — SIGNIFICANT CHANGE UP (ref 1.8–7.4)
NEUTROPHILS NFR BLD AUTO: 55.5 % — SIGNIFICANT CHANGE UP (ref 43–77)
PLATELET # BLD AUTO: 138 K/UL — LOW (ref 150–400)
RBC # BLD: 3.63 M/UL — LOW (ref 4.2–5.8)
RBC # FLD: 12.8 % — SIGNIFICANT CHANGE UP (ref 10.3–14.5)
WBC # BLD: 7.9 K/UL — SIGNIFICANT CHANGE UP (ref 3.8–10.5)
WBC # FLD AUTO: 7.9 K/UL — SIGNIFICANT CHANGE UP (ref 3.8–10.5)

## 2018-04-03 PROCEDURE — 99214 OFFICE O/P EST MOD 30 MIN: CPT

## 2018-04-03 RX ORDER — CIPROFLOXACIN HYDROCHLORIDE 250 MG/1
250 TABLET, FILM COATED ORAL
Qty: 3 | Refills: 0 | Status: DISCONTINUED | COMMUNITY
Start: 2018-02-13 | End: 2018-04-03

## 2018-04-03 RX ORDER — METRONIDAZOLE 250 MG/1
250 TABLET ORAL
Qty: 3 | Refills: 0 | Status: DISCONTINUED | COMMUNITY
Start: 2018-02-13 | End: 2018-04-03

## 2018-04-05 LAB
ALBUMIN SERPL ELPH-MCNC: 4 G/DL
ALP BLD-CCNC: 119 U/L
ALT SERPL-CCNC: 7 U/L
ANION GAP SERPL CALC-SCNC: 15 MMOL/L
APPEARANCE: CLEAR
AST SERPL-CCNC: 11 U/L
BACTERIA: NEGATIVE
BILIRUB SERPL-MCNC: 0.4 MG/DL
BILIRUBIN URINE: NEGATIVE
BLOOD URINE: NEGATIVE
BUN SERPL-MCNC: 35 MG/DL
CALCIUM SERPL-MCNC: 9.1 MG/DL
CHLORIDE SERPL-SCNC: 105 MMOL/L
CO2 SERPL-SCNC: 22 MMOL/L
COLOR: YELLOW
CREAT SERPL-MCNC: 2.86 MG/DL
GLUCOSE QUALITATIVE U: NEGATIVE MG/DL
GLUCOSE SERPL-MCNC: 103 MG/DL
HYALINE CASTS: 11 /LPF
INR PPP: 1.66 RATIO
KETONES URINE: NEGATIVE
LDH SERPL-CCNC: 132 U/L
LEUKOCYTE ESTERASE URINE: NEGATIVE
MICROSCOPIC-UA: NORMAL
NITRITE URINE: NEGATIVE
PH URINE: 5
POTASSIUM SERPL-SCNC: 5.2 MMOL/L
PROT SERPL-MCNC: 7.6 G/DL
PROTEIN URINE: 100 MG/DL
PT BLD: 18.9 SEC
RED BLOOD CELLS URINE: 2 /HPF
SODIUM SERPL-SCNC: 142 MMOL/L
SPECIFIC GRAVITY URINE: 1.02
SQUAMOUS EPITHELIAL CELLS: 2 /HPF
UROBILINOGEN URINE: NEGATIVE MG/DL
WHITE BLOOD CELLS URINE: 1 /HPF

## 2018-04-09 LAB — BACTERIA BLD CULT: NORMAL

## 2018-04-16 ENCOUNTER — APPOINTMENT (OUTPATIENT)
Dept: SURGERY | Facility: CLINIC | Age: 73
End: 2018-04-16

## 2018-04-27 ENCOUNTER — APPOINTMENT (OUTPATIENT)
Dept: HEMATOLOGY ONCOLOGY | Facility: CLINIC | Age: 73
End: 2018-04-27

## 2018-05-15 ENCOUNTER — LABORATORY RESULT (OUTPATIENT)
Age: 73
End: 2018-05-15

## 2018-05-15 ENCOUNTER — OUTPATIENT (OUTPATIENT)
Dept: OUTPATIENT SERVICES | Facility: HOSPITAL | Age: 73
LOS: 1 days | Discharge: HOME | End: 2018-05-15

## 2018-05-15 ENCOUNTER — APPOINTMENT (OUTPATIENT)
Dept: HEMATOLOGY ONCOLOGY | Facility: CLINIC | Age: 73
End: 2018-05-15

## 2018-05-15 ENCOUNTER — RESULT REVIEW (OUTPATIENT)
Age: 73
End: 2018-05-15

## 2018-05-15 ENCOUNTER — APPOINTMENT (OUTPATIENT)
Dept: ELECTROPHYSIOLOGY | Facility: CLINIC | Age: 73
End: 2018-05-15
Payer: COMMERCIAL

## 2018-05-15 ENCOUNTER — APPOINTMENT (OUTPATIENT)
Dept: CARDIOLOGY | Facility: CLINIC | Age: 73
End: 2018-05-15
Payer: COMMERCIAL

## 2018-05-15 VITALS
SYSTOLIC BLOOD PRESSURE: 114 MMHG | BODY MASS INDEX: 25.94 KG/M2 | HEART RATE: 60 BPM | OXYGEN SATURATION: 98 % | DIASTOLIC BLOOD PRESSURE: 70 MMHG | WEIGHT: 202 LBS

## 2018-05-15 DIAGNOSIS — Z95.0 PRESENCE OF CARDIAC PACEMAKER: Chronic | ICD-10-CM

## 2018-05-15 DIAGNOSIS — Z98.890 OTHER SPECIFIED POSTPROCEDURAL STATES: Chronic | ICD-10-CM

## 2018-05-15 DIAGNOSIS — Z98.89 OTHER SPECIFIED POSTPROCEDURAL STATES: Chronic | ICD-10-CM

## 2018-05-15 DIAGNOSIS — Z98.41 CATARACT EXTRACTION STATUS, RIGHT EYE: Chronic | ICD-10-CM

## 2018-05-15 DIAGNOSIS — C91.11 CHRONIC LYMPHOCYTIC LEUKEMIA OF B-CELL TYPE IN REMISSION: ICD-10-CM

## 2018-05-15 LAB
BASOPHILS # BLD AUTO: 0.1 K/UL — SIGNIFICANT CHANGE UP (ref 0–0.2)
BASOPHILS NFR BLD AUTO: 0.9 % — SIGNIFICANT CHANGE UP (ref 0–2)
EOSINOPHIL # BLD AUTO: 0.2 K/UL — SIGNIFICANT CHANGE UP (ref 0–0.5)
EOSINOPHIL NFR BLD AUTO: 2.8 % — SIGNIFICANT CHANGE UP (ref 0–6)
HCT VFR BLD CALC: 32.7 % — LOW (ref 39–50)
HGB BLD-MCNC: 10.8 G/DL — LOW (ref 13–17)
LYMPHOCYTES # BLD AUTO: 2.1 K/UL — SIGNIFICANT CHANGE UP (ref 1–3.3)
LYMPHOCYTES # BLD AUTO: 30.3 % — SIGNIFICANT CHANGE UP (ref 13–44)
MCHC RBC-ENTMCNC: 30.1 PG — SIGNIFICANT CHANGE UP (ref 27–34)
MCHC RBC-ENTMCNC: 33 G/DL — SIGNIFICANT CHANGE UP (ref 32–36)
MCV RBC AUTO: 91.1 FL — SIGNIFICANT CHANGE UP (ref 80–100)
MONOCYTES # BLD AUTO: 0.6 K/UL — SIGNIFICANT CHANGE UP (ref 0–0.9)
MONOCYTES NFR BLD AUTO: 8.8 % — SIGNIFICANT CHANGE UP (ref 2–14)
NEUTROPHILS # BLD AUTO: 4 K/UL — SIGNIFICANT CHANGE UP (ref 1.8–7.4)
NEUTROPHILS NFR BLD AUTO: 57.2 % — SIGNIFICANT CHANGE UP (ref 43–77)
PLATELET # BLD AUTO: 190 K/UL — SIGNIFICANT CHANGE UP (ref 150–400)
RBC # BLD: 3.59 M/UL — LOW (ref 4.2–5.8)
RBC # FLD: 12.7 % — SIGNIFICANT CHANGE UP (ref 10.3–14.5)
WBC # BLD: 7 K/UL — SIGNIFICANT CHANGE UP (ref 3.8–10.5)
WBC # FLD AUTO: 7 K/UL — SIGNIFICANT CHANGE UP (ref 3.8–10.5)

## 2018-05-15 PROCEDURE — 99214 OFFICE O/P EST MOD 30 MIN: CPT

## 2018-05-15 PROCEDURE — 93280 PM DEVICE PROGR EVAL DUAL: CPT

## 2018-05-15 RX ORDER — AMOXICILLIN AND CLAVULANATE POTASSIUM 875; 125 MG/1; MG/1
875-125 TABLET, COATED ORAL
Qty: 28 | Refills: 0 | Status: DISCONTINUED | COMMUNITY
Start: 2017-12-26

## 2018-05-17 ENCOUNTER — RESULT REVIEW (OUTPATIENT)
Age: 73
End: 2018-05-17

## 2018-05-17 ENCOUNTER — APPOINTMENT (OUTPATIENT)
Dept: HEMATOLOGY ONCOLOGY | Facility: CLINIC | Age: 73
End: 2018-05-17

## 2018-05-17 LAB
BASOPHILS # BLD AUTO: 0 K/UL — SIGNIFICANT CHANGE UP (ref 0–0.2)
BASOPHILS NFR BLD AUTO: 0.5 % — SIGNIFICANT CHANGE UP (ref 0–2)
BUN SERPL-MCNC: 41 MG/DL — HIGH (ref 7–23)
CA-I BLDA-SCNC: 1.21 MMOL/L — SIGNIFICANT CHANGE UP (ref 1.12–1.3)
CHLORIDE SERPL-SCNC: 108 MMOL/L — SIGNIFICANT CHANGE UP (ref 96–108)
CO2 SERPL-SCNC: 24 MMOL/L — SIGNIFICANT CHANGE UP (ref 22–31)
CREAT SERPL-MCNC: 3.5 MG/DL — HIGH (ref 0.5–1.3)
EOSINOPHIL # BLD AUTO: 0.1 K/UL — SIGNIFICANT CHANGE UP (ref 0–0.5)
EOSINOPHIL NFR BLD AUTO: 1.9 % — SIGNIFICANT CHANGE UP (ref 0–6)
GLUCOSE SERPL-MCNC: 101 MG/DL — HIGH (ref 70–99)
HCT VFR BLD CALC: 33.5 % — LOW (ref 39–50)
HGB BLD-MCNC: 11.2 G/DL — LOW (ref 13–17)
LYMPHOCYTES # BLD AUTO: 1.2 K/UL — SIGNIFICANT CHANGE UP (ref 1–3.3)
LYMPHOCYTES # BLD AUTO: 17.3 % — SIGNIFICANT CHANGE UP (ref 13–44)
MCHC RBC-ENTMCNC: 30.4 PG — SIGNIFICANT CHANGE UP (ref 27–34)
MCHC RBC-ENTMCNC: 33.6 G/DL — SIGNIFICANT CHANGE UP (ref 32–36)
MCV RBC AUTO: 90.6 FL — SIGNIFICANT CHANGE UP (ref 80–100)
MONOCYTES # BLD AUTO: 0.9 K/UL — SIGNIFICANT CHANGE UP (ref 0–0.9)
MONOCYTES NFR BLD AUTO: 12.4 % — SIGNIFICANT CHANGE UP (ref 2–14)
NEUTROPHILS # BLD AUTO: 4.8 K/UL — SIGNIFICANT CHANGE UP (ref 1.8–7.4)
NEUTROPHILS NFR BLD AUTO: 67.9 % — SIGNIFICANT CHANGE UP (ref 43–77)
PLATELET # BLD AUTO: 187 K/UL — SIGNIFICANT CHANGE UP (ref 150–400)
POTASSIUM SERPL-MCNC: 5.2 MMOL/L — SIGNIFICANT CHANGE UP (ref 3.5–5.3)
POTASSIUM SERPL-SCNC: 5.2 MMOL/L — SIGNIFICANT CHANGE UP (ref 3.5–5.3)
RBC # BLD: 3.7 M/UL — LOW (ref 4.2–5.8)
RBC # FLD: 12.8 % — SIGNIFICANT CHANGE UP (ref 10.3–14.5)
SODIUM SERPL-SCNC: 141 MMOL/L — SIGNIFICANT CHANGE UP (ref 135–145)
WBC # BLD: 7 K/UL — SIGNIFICANT CHANGE UP (ref 3.8–10.5)
WBC # FLD AUTO: 7 K/UL — SIGNIFICANT CHANGE UP (ref 3.8–10.5)

## 2018-05-30 DIAGNOSIS — K59.00 CONSTIPATION, UNSPECIFIED: ICD-10-CM

## 2018-06-05 ENCOUNTER — FORM ENCOUNTER (OUTPATIENT)
Age: 73
End: 2018-06-05

## 2018-06-06 ENCOUNTER — APPOINTMENT (OUTPATIENT)
Dept: CT IMAGING | Facility: IMAGING CENTER | Age: 73
End: 2018-06-06
Payer: COMMERCIAL

## 2018-06-06 ENCOUNTER — OUTPATIENT (OUTPATIENT)
Dept: OUTPATIENT SERVICES | Facility: HOSPITAL | Age: 73
LOS: 1 days | End: 2018-06-06
Payer: COMMERCIAL

## 2018-06-06 DIAGNOSIS — Z98.89 OTHER SPECIFIED POSTPROCEDURAL STATES: Chronic | ICD-10-CM

## 2018-06-06 DIAGNOSIS — Z98.890 OTHER SPECIFIED POSTPROCEDURAL STATES: Chronic | ICD-10-CM

## 2018-06-06 DIAGNOSIS — Z95.0 PRESENCE OF CARDIAC PACEMAKER: Chronic | ICD-10-CM

## 2018-06-06 DIAGNOSIS — K59.00 CONSTIPATION, UNSPECIFIED: ICD-10-CM

## 2018-06-06 DIAGNOSIS — Z98.41 CATARACT EXTRACTION STATUS, RIGHT EYE: Chronic | ICD-10-CM

## 2018-06-06 PROCEDURE — 74176 CT ABD & PELVIS W/O CONTRAST: CPT

## 2018-06-06 PROCEDURE — 74176 CT ABD & PELVIS W/O CONTRAST: CPT | Mod: 26

## 2018-06-07 LAB
ALBUMIN MFR SERPL ELPH: 48.4 %
ALBUMIN SERPL ELPH-MCNC: 3.6 G/DL
ALBUMIN SERPL-MCNC: 3.3 G/DL
ALBUMIN/GLOB SERPL: 0.9 RATIO
ALP BLD-CCNC: 99 U/L
ALPHA1 GLOB MFR SERPL ELPH: 6.7 %
ALPHA1 GLOB SERPL ELPH-MCNC: 0.5 G/DL
ALPHA2 GLOB MFR SERPL ELPH: 12.6 %
ALPHA2 GLOB SERPL ELPH-MCNC: 0.9 G/DL
ALT SERPL-CCNC: 11 U/L
ANION GAP SERPL CALC-SCNC: 19 MMOL/L
AST SERPL-CCNC: 9 U/L
B-GLOBULIN MFR SERPL ELPH: 8.5 %
B-GLOBULIN SERPL ELPH-MCNC: 0.6 G/DL
BILIRUB SERPL-MCNC: 0.4 MG/DL
BUN SERPL-MCNC: 25 MG/DL
CALCIUM SERPL-MCNC: 9 MG/DL
CHLORIDE SERPL-SCNC: 107 MMOL/L
CO2 SERPL-SCNC: 20 MMOL/L
CREAT SERPL-MCNC: 3.1 MG/DL
DEPRECATED KAPPA LC FREE/LAMBDA SER: 0.01 RATIO
DEPRECATED KAPPA LC FREE/LAMBDA SER: 0.01 RATIO
GAMMA GLOB FLD ELPH-MCNC: 1.6 G/DL
GAMMA GLOB MFR SERPL ELPH: 23.8 %
GLUCOSE SERPL-MCNC: 107 MG/DL
IGA SER QL IEP: 26 MG/DL
IGG SER QL IEP: 569 MG/DL
IGM SER QL IEP: 1650 MG/DL
INR PPP: 3.99 RATIO
INTERPRETATION SERPL IEP-IMP: NORMAL
KAPPA LC CSF-MCNC: 180 MG/DL
KAPPA LC CSF-MCNC: 180 MG/DL
KAPPA LC SERPL-MCNC: 1.83 MG/DL
KAPPA LC SERPL-MCNC: 1.83 MG/DL
LDH SERPL-CCNC: 138 U/L
M PROTEIN MFR SERPL ELPH: NORMAL %
M PROTEIN SPEC IFE-MCNC: NORMAL
MONOCLON BAND OBS SERPL: NORMAL G/DL
POTASSIUM SERPL-SCNC: 4.6 MMOL/L
PROT SERPL-MCNC: 6.9 G/DL
PT BLD: 46.4 SEC
SODIUM SERPL-SCNC: 146 MMOL/L

## 2018-06-11 ENCOUNTER — RESULT REVIEW (OUTPATIENT)
Age: 73
End: 2018-06-11

## 2018-06-11 ENCOUNTER — APPOINTMENT (OUTPATIENT)
Age: 73
End: 2018-06-11

## 2018-06-11 LAB
BASOPHILS # BLD AUTO: 0 K/UL — SIGNIFICANT CHANGE UP (ref 0–0.2)
BASOPHILS NFR BLD AUTO: 0.4 % — SIGNIFICANT CHANGE UP (ref 0–2)
EOSINOPHIL # BLD AUTO: 0.2 K/UL — SIGNIFICANT CHANGE UP (ref 0–0.5)
EOSINOPHIL NFR BLD AUTO: 2.3 % — SIGNIFICANT CHANGE UP (ref 0–6)
HCT VFR BLD CALC: 32.6 % — LOW (ref 39–50)
HGB BLD-MCNC: 10.8 G/DL — LOW (ref 13–17)
LYMPHOCYTES # BLD AUTO: 2 K/UL — SIGNIFICANT CHANGE UP (ref 1–3.3)
LYMPHOCYTES # BLD AUTO: 26.1 % — SIGNIFICANT CHANGE UP (ref 13–44)
MCHC RBC-ENTMCNC: 29.4 PG — SIGNIFICANT CHANGE UP (ref 27–34)
MCHC RBC-ENTMCNC: 33.1 G/DL — SIGNIFICANT CHANGE UP (ref 32–36)
MCV RBC AUTO: 88.8 FL — SIGNIFICANT CHANGE UP (ref 80–100)
MONOCYTES # BLD AUTO: 0.8 K/UL — SIGNIFICANT CHANGE UP (ref 0–0.9)
MONOCYTES NFR BLD AUTO: 10.2 % — SIGNIFICANT CHANGE UP (ref 2–14)
NEUTROPHILS # BLD AUTO: 4.7 K/UL — SIGNIFICANT CHANGE UP (ref 1.8–7.4)
NEUTROPHILS NFR BLD AUTO: 61.1 % — SIGNIFICANT CHANGE UP (ref 43–77)
PLATELET # BLD AUTO: 186 K/UL — SIGNIFICANT CHANGE UP (ref 150–400)
RBC # BLD: 3.68 M/UL — LOW (ref 4.2–5.8)
RBC # FLD: 12.4 % — SIGNIFICANT CHANGE UP (ref 10.3–14.5)
WBC # BLD: 7.8 K/UL — SIGNIFICANT CHANGE UP (ref 3.8–10.5)
WBC # FLD AUTO: 7.8 K/UL — SIGNIFICANT CHANGE UP (ref 3.8–10.5)
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
## 2018-06-20 NOTE — H&P PST ADULT - NSANTHNECKRD_ENT_A_CORE
Conveyed results and recommendations from Dr Colorado in a phone call.  Patient verbally understands.     No

## 2018-06-24 ENCOUNTER — FORM ENCOUNTER (OUTPATIENT)
Age: 73
End: 2018-06-24

## 2018-06-25 ENCOUNTER — APPOINTMENT (OUTPATIENT)
Dept: RADIOLOGY | Facility: HOSPITAL | Age: 73
End: 2018-06-25
Payer: COMMERCIAL

## 2018-06-25 ENCOUNTER — OUTPATIENT (OUTPATIENT)
Dept: OUTPATIENT SERVICES | Facility: HOSPITAL | Age: 73
LOS: 1 days | End: 2018-06-25
Payer: COMMERCIAL

## 2018-06-25 DIAGNOSIS — Z98.890 OTHER SPECIFIED POSTPROCEDURAL STATES: Chronic | ICD-10-CM

## 2018-06-25 DIAGNOSIS — Z00.00 ENCOUNTER FOR GENERAL ADULT MEDICAL EXAMINATION WITHOUT ABNORMAL FINDINGS: ICD-10-CM

## 2018-06-25 DIAGNOSIS — Z98.89 OTHER SPECIFIED POSTPROCEDURAL STATES: Chronic | ICD-10-CM

## 2018-06-25 DIAGNOSIS — Z95.0 PRESENCE OF CARDIAC PACEMAKER: Chronic | ICD-10-CM

## 2018-06-25 DIAGNOSIS — Z98.41 CATARACT EXTRACTION STATUS, RIGHT EYE: Chronic | ICD-10-CM

## 2018-06-25 DIAGNOSIS — K59.00 CONSTIPATION, UNSPECIFIED: ICD-10-CM

## 2018-06-25 PROCEDURE — 74270 X-RAY XM COLON 1CNTRST STD: CPT | Mod: 26

## 2018-06-25 PROCEDURE — 74270 X-RAY XM COLON 1CNTRST STD: CPT

## 2018-06-26 ENCOUNTER — OUTPATIENT (OUTPATIENT)
Dept: OUTPATIENT SERVICES | Facility: HOSPITAL | Age: 73
LOS: 1 days | Discharge: HOME | End: 2018-06-26

## 2018-06-26 ENCOUNTER — APPOINTMENT (OUTPATIENT)
Dept: UROLOGY | Facility: CLINIC | Age: 73
End: 2018-06-26
Payer: COMMERCIAL

## 2018-06-26 DIAGNOSIS — Z98.89 OTHER SPECIFIED POSTPROCEDURAL STATES: Chronic | ICD-10-CM

## 2018-06-26 DIAGNOSIS — Z98.41 CATARACT EXTRACTION STATUS, RIGHT EYE: Chronic | ICD-10-CM

## 2018-06-26 DIAGNOSIS — N52.01 ERECTILE DYSFUNCTION DUE TO ARTERIAL INSUFFICIENCY: ICD-10-CM

## 2018-06-26 DIAGNOSIS — Z95.0 PRESENCE OF CARDIAC PACEMAKER: Chronic | ICD-10-CM

## 2018-06-26 DIAGNOSIS — C91.11 CHRONIC LYMPHOCYTIC LEUKEMIA OF B-CELL TYPE IN REMISSION: ICD-10-CM

## 2018-06-26 DIAGNOSIS — Z98.890 OTHER SPECIFIED POSTPROCEDURAL STATES: Chronic | ICD-10-CM

## 2018-06-26 DIAGNOSIS — N53.19 OTHER EJACULATORY DYSFUNCTION: ICD-10-CM

## 2018-06-26 PROCEDURE — 99213 OFFICE O/P EST LOW 20 MIN: CPT

## 2018-06-27 PROBLEM — N53.19 EJACULATORY DISORDER: Status: ACTIVE | Noted: 2018-02-15

## 2018-06-27 PROBLEM — N52.01 ERECTILE DYSFUNCTION DUE TO ARTERIAL INSUFFICIENCY: Status: ACTIVE | Noted: 2018-02-15

## 2018-06-28 LAB
INR PPP: 2.19 RATIO
PT BLD: 25.1 SEC

## 2018-06-29 ENCOUNTER — RESULT REVIEW (OUTPATIENT)
Age: 73
End: 2018-06-29

## 2018-06-29 ENCOUNTER — APPOINTMENT (OUTPATIENT)
Dept: HEMATOLOGY ONCOLOGY | Facility: CLINIC | Age: 73
End: 2018-06-29
Payer: COMMERCIAL

## 2018-06-29 VITALS
HEART RATE: 61 BPM | TEMPERATURE: 99.1 F | DIASTOLIC BLOOD PRESSURE: 70 MMHG | OXYGEN SATURATION: 98 % | RESPIRATION RATE: 16 BRPM | SYSTOLIC BLOOD PRESSURE: 110 MMHG | WEIGHT: 203.99 LBS | BODY MASS INDEX: 26.19 KG/M2

## 2018-06-29 LAB
BASOPHILS # BLD AUTO: 0 K/UL — SIGNIFICANT CHANGE UP (ref 0–0.2)
BASOPHILS NFR BLD AUTO: 0.6 % — SIGNIFICANT CHANGE UP (ref 0–2)
EOSINOPHIL # BLD AUTO: 0.2 K/UL — SIGNIFICANT CHANGE UP (ref 0–0.5)
EOSINOPHIL NFR BLD AUTO: 3 % — SIGNIFICANT CHANGE UP (ref 0–6)
HCT VFR BLD CALC: 31.8 % — LOW (ref 39–50)
HGB BLD-MCNC: 10.5 G/DL — LOW (ref 13–17)
LYMPHOCYTES # BLD AUTO: 1.8 K/UL — SIGNIFICANT CHANGE UP (ref 1–3.3)
LYMPHOCYTES # BLD AUTO: 28.6 % — SIGNIFICANT CHANGE UP (ref 13–44)
MCHC RBC-ENTMCNC: 29.3 PG — SIGNIFICANT CHANGE UP (ref 27–34)
MCHC RBC-ENTMCNC: 32.9 G/DL — SIGNIFICANT CHANGE UP (ref 32–36)
MCV RBC AUTO: 88.9 FL — SIGNIFICANT CHANGE UP (ref 80–100)
MONOCYTES # BLD AUTO: 0.9 K/UL — SIGNIFICANT CHANGE UP (ref 0–0.9)
MONOCYTES NFR BLD AUTO: 14 % — SIGNIFICANT CHANGE UP (ref 2–14)
NEUTROPHILS # BLD AUTO: 3.4 K/UL — SIGNIFICANT CHANGE UP (ref 1.8–7.4)
NEUTROPHILS NFR BLD AUTO: 53.7 % — SIGNIFICANT CHANGE UP (ref 43–77)
PLATELET # BLD AUTO: 184 K/UL — SIGNIFICANT CHANGE UP (ref 150–400)
RBC # BLD: 3.58 M/UL — LOW (ref 4.2–5.8)
RBC # FLD: 13 % — SIGNIFICANT CHANGE UP (ref 10.3–14.5)
WBC # BLD: 6.3 K/UL — SIGNIFICANT CHANGE UP (ref 3.8–10.5)
WBC # FLD AUTO: 6.3 K/UL — SIGNIFICANT CHANGE UP (ref 3.8–10.5)

## 2018-06-29 PROCEDURE — 99214 OFFICE O/P EST MOD 30 MIN: CPT

## 2018-07-05 ENCOUNTER — RESULT REVIEW (OUTPATIENT)
Age: 73
End: 2018-07-05

## 2018-07-07 ENCOUNTER — RESULT REVIEW (OUTPATIENT)
Age: 73
End: 2018-07-07

## 2018-07-08 ENCOUNTER — RESULT REVIEW (OUTPATIENT)
Age: 73
End: 2018-07-08

## 2018-07-09 ENCOUNTER — OUTPATIENT (OUTPATIENT)
Dept: OUTPATIENT SERVICES | Facility: HOSPITAL | Age: 73
LOS: 1 days | Discharge: HOME | End: 2018-07-09

## 2018-07-09 ENCOUNTER — RESULT REVIEW (OUTPATIENT)
Age: 73
End: 2018-07-09

## 2018-07-09 DIAGNOSIS — Z98.89 OTHER SPECIFIED POSTPROCEDURAL STATES: Chronic | ICD-10-CM

## 2018-07-09 DIAGNOSIS — Z98.890 OTHER SPECIFIED POSTPROCEDURAL STATES: Chronic | ICD-10-CM

## 2018-07-09 DIAGNOSIS — C91.11 CHRONIC LYMPHOCYTIC LEUKEMIA OF B-CELL TYPE IN REMISSION: ICD-10-CM

## 2018-07-09 DIAGNOSIS — Z95.0 PRESENCE OF CARDIAC PACEMAKER: Chronic | ICD-10-CM

## 2018-07-09 DIAGNOSIS — Z98.41 CATARACT EXTRACTION STATUS, RIGHT EYE: Chronic | ICD-10-CM

## 2018-07-09 LAB
ALBUMIN MFR SERPL ELPH: 47.4 %
ALBUMIN SERPL ELPH-MCNC: 3.9 G/DL
ALBUMIN SERPL-MCNC: 3.8 G/DL
ALBUMIN/GLOB SERPL: 0.9 RATIO
ALP BLD-CCNC: 102 U/L
ALPHA1 GLOB MFR SERPL ELPH: 6.2 %
ALPHA1 GLOB SERPL ELPH-MCNC: 0.5 G/DL
ALPHA2 GLOB MFR SERPL ELPH: 11.4 %
ALPHA2 GLOB SERPL ELPH-MCNC: 0.9 G/DL
ALT SERPL-CCNC: 15 U/L
ANION GAP SERPL CALC-SCNC: 16 MMOL/L
AST SERPL-CCNC: 17 U/L
B-GLOBULIN MFR SERPL ELPH: 7.9 %
B-GLOBULIN SERPL ELPH-MCNC: 0.6 G/DL
BILIRUB SERPL-MCNC: 0.4 MG/DL
BUN SERPL-MCNC: 49 MG/DL
CALCIUM SERPL-MCNC: 9.2 MG/DL
CHLORIDE SERPL-SCNC: 106 MMOL/L
CO2 SERPL-SCNC: 21 MMOL/L
CREAT SERPL-MCNC: 3.36 MG/DL
DEPRECATED KAPPA LC FREE/LAMBDA SER: 0 RATIO
DEPRECATED KAPPA LC FREE/LAMBDA SER: 0 RATIO
FERRITIN SERPL-MCNC: 118 NG/ML
GAMMA GLOB FLD ELPH-MCNC: 2.2 G/DL
GAMMA GLOB MFR SERPL ELPH: 27.1 %
GLUCOSE SERPL-MCNC: 105 MG/DL
IGA SER QL IEP: 24 MG/DL
IGG SER QL IEP: 699 MG/DL
IGM SER QL IEP: 2412 MG/DL
INTERPRETATION SERPL IEP-IMP: NORMAL
IRON SATN MFR SERPL: 12 %
IRON SERPL-MCNC: 26 UG/DL
KAPPA LC CSF-MCNC: 230.89 MG/DL
KAPPA LC CSF-MCNC: 230.89 MG/DL
KAPPA LC SERPL-MCNC: 1.03 MG/DL
KAPPA LC SERPL-MCNC: 1.03 MG/DL
LDH SERPL-CCNC: 181 U/L
M PROTEIN MFR SERPL ELPH: NORMAL %
M PROTEIN SPEC IFE-MCNC: NORMAL
MONOCLON BAND OBS SERPL: NORMAL G/DL
OB PNL STL: NEGATIVE — SIGNIFICANT CHANGE UP
POTASSIUM SERPL-SCNC: 5.7 MMOL/L
PROT SERPL-MCNC: 8 G/DL
SODIUM SERPL-SCNC: 143 MMOL/L
TIBC SERPL-MCNC: 221 UG/DL
UIBC SERPL-MCNC: 195 UG/DL

## 2018-07-17 PROBLEM — H91.90 UNSPECIFIED HEARING LOSS, UNSPECIFIED EAR: Chronic | Status: ACTIVE | Noted: 2018-02-15

## 2018-07-17 PROBLEM — N18.9 CHRONIC KIDNEY DISEASE, UNSPECIFIED: Chronic | Status: ACTIVE | Noted: 2018-02-15

## 2018-07-17 PROBLEM — N40.0 BENIGN PROSTATIC HYPERPLASIA WITHOUT LOWER URINARY TRACT SYMPTOMS: Chronic | Status: ACTIVE | Noted: 2018-02-15

## 2018-07-17 PROBLEM — M10.9 GOUT, UNSPECIFIED: Chronic | Status: ACTIVE | Noted: 2018-02-15

## 2018-07-17 PROBLEM — E03.9 HYPOTHYROIDISM, UNSPECIFIED: Chronic | Status: ACTIVE | Noted: 2018-02-15

## 2018-07-17 PROBLEM — N20.0 CALCULUS OF KIDNEY: Chronic | Status: ACTIVE | Noted: 2018-02-15

## 2018-07-18 ENCOUNTER — FORM ENCOUNTER (OUTPATIENT)
Age: 73
End: 2018-07-18

## 2018-07-19 ENCOUNTER — APPOINTMENT (OUTPATIENT)
Dept: NUCLEAR MEDICINE | Facility: IMAGING CENTER | Age: 73
End: 2018-07-19
Payer: MEDICARE

## 2018-07-19 ENCOUNTER — OUTPATIENT (OUTPATIENT)
Dept: OUTPATIENT SERVICES | Facility: HOSPITAL | Age: 73
LOS: 1 days | End: 2018-07-19
Payer: MEDICARE

## 2018-07-19 DIAGNOSIS — Z98.890 OTHER SPECIFIED POSTPROCEDURAL STATES: Chronic | ICD-10-CM

## 2018-07-19 DIAGNOSIS — Z98.89 OTHER SPECIFIED POSTPROCEDURAL STATES: Chronic | ICD-10-CM

## 2018-07-19 DIAGNOSIS — Z98.41 CATARACT EXTRACTION STATUS, RIGHT EYE: Chronic | ICD-10-CM

## 2018-07-19 DIAGNOSIS — Z95.0 PRESENCE OF CARDIAC PACEMAKER: Chronic | ICD-10-CM

## 2018-07-19 DIAGNOSIS — C88.0 WALDENSTROM MACROGLOBULINEMIA: ICD-10-CM

## 2018-07-19 PROCEDURE — 78816 PET IMAGE W/CT FULL BODY: CPT | Mod: 26,PS

## 2018-07-19 PROCEDURE — 78816 PET IMAGE W/CT FULL BODY: CPT

## 2018-07-19 PROCEDURE — A9552: CPT

## 2018-07-24 ENCOUNTER — LABORATORY RESULT (OUTPATIENT)
Age: 73
End: 2018-07-24

## 2018-07-25 ENCOUNTER — LABORATORY RESULT (OUTPATIENT)
Age: 73
End: 2018-07-25

## 2018-08-06 ENCOUNTER — RX RENEWAL (OUTPATIENT)
Age: 73
End: 2018-08-06

## 2018-08-08 ENCOUNTER — LABORATORY RESULT (OUTPATIENT)
Age: 73
End: 2018-08-08

## 2018-08-08 ENCOUNTER — APPOINTMENT (OUTPATIENT)
Dept: HEMATOLOGY ONCOLOGY | Facility: CLINIC | Age: 73
End: 2018-08-08
Payer: MEDICARE

## 2018-08-08 ENCOUNTER — RESULT REVIEW (OUTPATIENT)
Age: 73
End: 2018-08-08

## 2018-08-08 VITALS
DIASTOLIC BLOOD PRESSURE: 70 MMHG | HEART RATE: 70 BPM | TEMPERATURE: 98.9 F | BODY MASS INDEX: 26.32 KG/M2 | OXYGEN SATURATION: 99 % | WEIGHT: 205.03 LBS | RESPIRATION RATE: 16 BRPM | SYSTOLIC BLOOD PRESSURE: 120 MMHG

## 2018-08-08 LAB
BASOPHILS # BLD AUTO: 0 K/UL — SIGNIFICANT CHANGE UP (ref 0–0.2)
BASOPHILS NFR BLD AUTO: 0.4 % — SIGNIFICANT CHANGE UP (ref 0–2)
EOSINOPHIL # BLD AUTO: 0.2 K/UL — SIGNIFICANT CHANGE UP (ref 0–0.5)
EOSINOPHIL NFR BLD AUTO: 2.5 % — SIGNIFICANT CHANGE UP (ref 0–6)
HCT VFR BLD CALC: 33.5 % — LOW (ref 39–50)
HGB BLD-MCNC: 10.7 G/DL — LOW (ref 13–17)
LYMPHOCYTES # BLD AUTO: 2.1 K/UL — SIGNIFICANT CHANGE UP (ref 1–3.3)
LYMPHOCYTES # BLD AUTO: 28.8 % — SIGNIFICANT CHANGE UP (ref 13–44)
MCHC RBC-ENTMCNC: 28.1 PG — SIGNIFICANT CHANGE UP (ref 27–34)
MCHC RBC-ENTMCNC: 32 G/DL — SIGNIFICANT CHANGE UP (ref 32–36)
MCV RBC AUTO: 87.7 FL — SIGNIFICANT CHANGE UP (ref 80–100)
MONOCYTES # BLD AUTO: 0.7 K/UL — SIGNIFICANT CHANGE UP (ref 0–0.9)
MONOCYTES NFR BLD AUTO: 9.8 % — SIGNIFICANT CHANGE UP (ref 2–14)
NEUTROPHILS # BLD AUTO: 4.2 K/UL — SIGNIFICANT CHANGE UP (ref 1.8–7.4)
NEUTROPHILS NFR BLD AUTO: 58.4 % — SIGNIFICANT CHANGE UP (ref 43–77)
PLATELET # BLD AUTO: 204 K/UL — SIGNIFICANT CHANGE UP (ref 150–400)
RBC # BLD: 3.82 M/UL — LOW (ref 4.2–5.8)
RBC # FLD: 13.4 % — SIGNIFICANT CHANGE UP (ref 10.3–14.5)
WBC # BLD: 7.1 K/UL — SIGNIFICANT CHANGE UP (ref 3.8–10.5)
WBC # FLD AUTO: 7.1 K/UL — SIGNIFICANT CHANGE UP (ref 3.8–10.5)

## 2018-08-08 PROCEDURE — 99214 OFFICE O/P EST MOD 30 MIN: CPT

## 2018-08-15 ENCOUNTER — OUTPATIENT (OUTPATIENT)
Dept: OUTPATIENT SERVICES | Facility: HOSPITAL | Age: 73
LOS: 1 days | Discharge: HOME | End: 2018-08-15

## 2018-08-15 DIAGNOSIS — Z98.890 OTHER SPECIFIED POSTPROCEDURAL STATES: Chronic | ICD-10-CM

## 2018-08-15 DIAGNOSIS — C91.11 CHRONIC LYMPHOCYTIC LEUKEMIA OF B-CELL TYPE IN REMISSION: ICD-10-CM

## 2018-08-15 DIAGNOSIS — Z98.41 CATARACT EXTRACTION STATUS, RIGHT EYE: Chronic | ICD-10-CM

## 2018-08-15 DIAGNOSIS — Z95.0 PRESENCE OF CARDIAC PACEMAKER: Chronic | ICD-10-CM

## 2018-08-15 DIAGNOSIS — Z98.89 OTHER SPECIFIED POSTPROCEDURAL STATES: Chronic | ICD-10-CM

## 2018-08-21 ENCOUNTER — APPOINTMENT (OUTPATIENT)
Dept: ELECTROPHYSIOLOGY | Facility: CLINIC | Age: 73
End: 2018-08-21
Payer: MEDICARE

## 2018-08-21 ENCOUNTER — NON-APPOINTMENT (OUTPATIENT)
Age: 73
End: 2018-08-21

## 2018-08-21 ENCOUNTER — APPOINTMENT (OUTPATIENT)
Dept: CARDIOLOGY | Facility: CLINIC | Age: 73
End: 2018-08-21
Payer: MEDICARE

## 2018-08-21 VITALS
HEART RATE: 58 BPM | SYSTOLIC BLOOD PRESSURE: 116 MMHG | HEIGHT: 74 IN | OXYGEN SATURATION: 99 % | DIASTOLIC BLOOD PRESSURE: 73 MMHG | WEIGHT: 204 LBS | BODY MASS INDEX: 26.18 KG/M2

## 2018-08-21 PROCEDURE — 99214 OFFICE O/P EST MOD 30 MIN: CPT

## 2018-08-21 PROCEDURE — 93280 PM DEVICE PROGR EVAL DUAL: CPT

## 2018-08-27 ENCOUNTER — APPOINTMENT (OUTPATIENT)
Dept: HEMATOLOGY ONCOLOGY | Facility: CLINIC | Age: 73
End: 2018-08-27
Payer: MEDICARE

## 2018-08-27 ENCOUNTER — RESULT REVIEW (OUTPATIENT)
Age: 73
End: 2018-08-27

## 2018-08-27 VITALS
DIASTOLIC BLOOD PRESSURE: 72 MMHG | BODY MASS INDEX: 26.19 KG/M2 | OXYGEN SATURATION: 98 % | WEIGHT: 204 LBS | TEMPERATURE: 99.1 F | RESPIRATION RATE: 16 BRPM | HEART RATE: 56 BPM | SYSTOLIC BLOOD PRESSURE: 126 MMHG

## 2018-08-27 LAB
ALBUMIN SERPL ELPH-MCNC: 4.6 G/DL
ALP BLD-CCNC: 101 U/L
ALT SERPL-CCNC: 11 U/L
ANION GAP SERPL CALC-SCNC: 12 MMOL/L
AST SERPL-CCNC: 13 U/L
BASOPHILS # BLD AUTO: 0.1 K/UL — SIGNIFICANT CHANGE UP (ref 0–0.2)
BASOPHILS NFR BLD AUTO: 0.6 % — SIGNIFICANT CHANGE UP (ref 0–2)
BILIRUB SERPL-MCNC: 0.4 MG/DL
BUN SERPL-MCNC: 57 MG/DL
CALCIUM SERPL-MCNC: 9.5 MG/DL
CHLORIDE SERPL-SCNC: 106 MMOL/L
CO2 SERPL-SCNC: 24 MMOL/L
CREAT SERPL-MCNC: 2.79 MG/DL
EOSINOPHIL # BLD AUTO: 0.2 K/UL — SIGNIFICANT CHANGE UP (ref 0–0.5)
EOSINOPHIL NFR BLD AUTO: 1.5 % — SIGNIFICANT CHANGE UP (ref 0–6)
GLUCOSE SERPL-MCNC: 110 MG/DL
HCT VFR BLD CALC: 34.8 % — LOW (ref 39–50)
HGB BLD-MCNC: 11.2 G/DL — LOW (ref 13–17)
LYMPHOCYTES # BLD AUTO: 26 % — SIGNIFICANT CHANGE UP (ref 13–44)
LYMPHOCYTES # BLD AUTO: 3 K/UL — SIGNIFICANT CHANGE UP (ref 1–3.3)
MCHC RBC-ENTMCNC: 28.8 PG — SIGNIFICANT CHANGE UP (ref 27–34)
MCHC RBC-ENTMCNC: 32.3 G/DL — SIGNIFICANT CHANGE UP (ref 32–36)
MCV RBC AUTO: 89.1 FL — SIGNIFICANT CHANGE UP (ref 80–100)
MONOCYTES # BLD AUTO: 0.9 K/UL — SIGNIFICANT CHANGE UP (ref 0–0.9)
MONOCYTES NFR BLD AUTO: 8 % — SIGNIFICANT CHANGE UP (ref 2–14)
NEUTROPHILS # BLD AUTO: 7.3 K/UL — SIGNIFICANT CHANGE UP (ref 1.8–7.4)
NEUTROPHILS NFR BLD AUTO: 63.9 % — SIGNIFICANT CHANGE UP (ref 43–77)
PLATELET # BLD AUTO: 148 K/UL — LOW (ref 150–400)
POTASSIUM SERPL-SCNC: 5.5 MMOL/L
PROT SERPL-MCNC: 7.7 G/DL
RBC # BLD: 3.9 M/UL — LOW (ref 4.2–5.8)
RBC # FLD: 14.8 % — HIGH (ref 10.3–14.5)
SODIUM SERPL-SCNC: 142 MMOL/L
WBC # BLD: 11.4 K/UL — HIGH (ref 3.8–10.5)
WBC # FLD AUTO: 11.4 K/UL — HIGH (ref 3.8–10.5)

## 2018-08-27 PROCEDURE — 99214 OFFICE O/P EST MOD 30 MIN: CPT

## 2018-09-12 ENCOUNTER — APPOINTMENT (OUTPATIENT)
Dept: HEMATOLOGY ONCOLOGY | Facility: CLINIC | Age: 73
End: 2018-09-12
Payer: MEDICARE

## 2018-09-12 ENCOUNTER — RESULT REVIEW (OUTPATIENT)
Age: 73
End: 2018-09-12

## 2018-09-12 LAB
ALBUMIN SERPL ELPH-MCNC: 4.5 G/DL
ALP BLD-CCNC: 103 U/L
ALT SERPL-CCNC: 9 U/L
ANION GAP SERPL CALC-SCNC: 14 MMOL/L
AST SERPL-CCNC: 12 U/L
BASOPHILS # BLD AUTO: 0 K/UL — SIGNIFICANT CHANGE UP (ref 0–0.2)
BASOPHILS NFR BLD AUTO: 0.5 % — SIGNIFICANT CHANGE UP (ref 0–2)
BILIRUB SERPL-MCNC: 0.2 MG/DL
BUN SERPL-MCNC: 45 MG/DL
CALCIUM SERPL-MCNC: 9.4 MG/DL
CHLORIDE SERPL-SCNC: 105 MMOL/L
CO2 SERPL-SCNC: 24 MMOL/L
CREAT SERPL-MCNC: 2.57 MG/DL
EOSINOPHIL # BLD AUTO: 0.2 K/UL — SIGNIFICANT CHANGE UP (ref 0–0.5)
EOSINOPHIL NFR BLD AUTO: 2.6 % — SIGNIFICANT CHANGE UP (ref 0–6)
GLUCOSE SERPL-MCNC: 107 MG/DL
HCT VFR BLD CALC: 37.5 % — LOW (ref 39–50)
HGB BLD-MCNC: 12 G/DL — LOW (ref 13–17)
LDH SERPL-CCNC: 128 U/L
LYMPHOCYTES # BLD AUTO: 2.3 K/UL — SIGNIFICANT CHANGE UP (ref 1–3.3)
LYMPHOCYTES # BLD AUTO: 26.4 % — SIGNIFICANT CHANGE UP (ref 13–44)
MCHC RBC-ENTMCNC: 29.4 PG — SIGNIFICANT CHANGE UP (ref 27–34)
MCHC RBC-ENTMCNC: 32.1 G/DL — SIGNIFICANT CHANGE UP (ref 32–36)
MCV RBC AUTO: 91.5 FL — SIGNIFICANT CHANGE UP (ref 80–100)
MONOCYTES # BLD AUTO: 0.8 K/UL — SIGNIFICANT CHANGE UP (ref 0–0.9)
MONOCYTES NFR BLD AUTO: 9.9 % — SIGNIFICANT CHANGE UP (ref 2–14)
NEUTROPHILS # BLD AUTO: 5.2 K/UL — SIGNIFICANT CHANGE UP (ref 1.8–7.4)
NEUTROPHILS NFR BLD AUTO: 60.6 % — SIGNIFICANT CHANGE UP (ref 43–77)
PLATELET # BLD AUTO: 122 K/UL — LOW (ref 150–400)
POTASSIUM SERPL-SCNC: 5.1 MMOL/L
PROT SERPL-MCNC: 7.3 G/DL
RBC # BLD: 4.1 M/UL — LOW (ref 4.2–5.8)
RBC # FLD: 15.9 % — HIGH (ref 10.3–14.5)
SODIUM SERPL-SCNC: 143 MMOL/L
WBC # BLD: 8.5 K/UL — SIGNIFICANT CHANGE UP (ref 3.8–10.5)
WBC # FLD AUTO: 8.5 K/UL — SIGNIFICANT CHANGE UP (ref 3.8–10.5)

## 2018-09-12 PROCEDURE — 99214 OFFICE O/P EST MOD 30 MIN: CPT

## 2018-09-13 LAB
ALBUMIN MFR SERPL ELPH: 54.6 %
ALBUMIN SERPL-MCNC: 4 G/DL
ALBUMIN/GLOB SERPL: 1.2 RATIO
ALPHA1 GLOB MFR SERPL ELPH: 5.8 %
ALPHA1 GLOB SERPL ELPH-MCNC: 0.4 G/DL
ALPHA2 GLOB MFR SERPL ELPH: 10.7 %
ALPHA2 GLOB SERPL ELPH-MCNC: 0.8 G/DL
B-GLOBULIN MFR SERPL ELPH: 9 %
B-GLOBULIN SERPL ELPH-MCNC: 0.7 G/DL
DEPRECATED KAPPA LC FREE/LAMBDA SER: 0 RATIO
DEPRECATED KAPPA LC FREE/LAMBDA SER: 0 RATIO
GAMMA GLOB FLD ELPH-MCNC: 1.5 G/DL
GAMMA GLOB MFR SERPL ELPH: 19.9 %
IGA SER QL IEP: 21 MG/DL
IGG SER QL IEP: 685 MG/DL
IGM SER QL IEP: 1288 MG/DL
INTERPRETATION SERPL IEP-IMP: NORMAL
KAPPA LC CSF-MCNC: 142.42 MG/DL
KAPPA LC CSF-MCNC: 142.42 MG/DL
KAPPA LC SERPL-MCNC: 0.67 MG/DL
KAPPA LC SERPL-MCNC: 0.67 MG/DL
M PROTEIN MFR SERPL ELPH: NORMAL %
M PROTEIN SPEC IFE-MCNC: NORMAL
MONOCLON BAND OBS SERPL: NORMAL G/DL
PROT SERPL-MCNC: 7.3 G/DL
PROT SERPL-MCNC: 7.3 G/DL

## 2018-09-15 LAB — VISCOSITY, SERUM: 1.8

## 2018-09-17 ENCOUNTER — OUTPATIENT (OUTPATIENT)
Dept: OUTPATIENT SERVICES | Facility: HOSPITAL | Age: 73
LOS: 1 days | Discharge: HOME | End: 2018-09-17

## 2018-09-17 DIAGNOSIS — Z98.890 OTHER SPECIFIED POSTPROCEDURAL STATES: Chronic | ICD-10-CM

## 2018-09-17 DIAGNOSIS — Z98.41 CATARACT EXTRACTION STATUS, RIGHT EYE: Chronic | ICD-10-CM

## 2018-09-17 DIAGNOSIS — C91.11 CHRONIC LYMPHOCYTIC LEUKEMIA OF B-CELL TYPE IN REMISSION: ICD-10-CM

## 2018-09-17 DIAGNOSIS — Z95.0 PRESENCE OF CARDIAC PACEMAKER: Chronic | ICD-10-CM

## 2018-09-17 DIAGNOSIS — Z98.89 OTHER SPECIFIED POSTPROCEDURAL STATES: Chronic | ICD-10-CM

## 2018-09-24 ENCOUNTER — APPOINTMENT (OUTPATIENT)
Dept: HEMATOLOGY ONCOLOGY | Facility: CLINIC | Age: 73
End: 2018-09-24
Payer: MEDICARE

## 2018-09-24 ENCOUNTER — RESULT REVIEW (OUTPATIENT)
Age: 73
End: 2018-09-24

## 2018-09-24 VITALS
WEIGHT: 204 LBS | SYSTOLIC BLOOD PRESSURE: 123 MMHG | HEART RATE: 109 BPM | OXYGEN SATURATION: 97 % | TEMPERATURE: 98 F | BODY MASS INDEX: 26.19 KG/M2 | DIASTOLIC BLOOD PRESSURE: 81 MMHG | RESPIRATION RATE: 17 BRPM

## 2018-09-24 LAB
BASOPHILS # BLD AUTO: 0 K/UL — SIGNIFICANT CHANGE UP (ref 0–0.2)
BASOPHILS NFR BLD AUTO: 0.5 % — SIGNIFICANT CHANGE UP (ref 0–2)
EOSINOPHIL # BLD AUTO: 0.1 K/UL — SIGNIFICANT CHANGE UP (ref 0–0.5)
EOSINOPHIL NFR BLD AUTO: 1.4 % — SIGNIFICANT CHANGE UP (ref 0–6)
HCT VFR BLD CALC: 37.9 % — LOW (ref 39–50)
HGB BLD-MCNC: 12.4 G/DL — LOW (ref 13–17)
LYMPHOCYTES # BLD AUTO: 1.9 K/UL — SIGNIFICANT CHANGE UP (ref 1–3.3)
LYMPHOCYTES # BLD AUTO: 21.6 % — SIGNIFICANT CHANGE UP (ref 13–44)
MCHC RBC-ENTMCNC: 30.2 PG — SIGNIFICANT CHANGE UP (ref 27–34)
MCHC RBC-ENTMCNC: 32.8 G/DL — SIGNIFICANT CHANGE UP (ref 32–36)
MCV RBC AUTO: 91.9 FL — SIGNIFICANT CHANGE UP (ref 80–100)
MONOCYTES # BLD AUTO: 1 K/UL — HIGH (ref 0–0.9)
MONOCYTES NFR BLD AUTO: 10.6 % — SIGNIFICANT CHANGE UP (ref 2–14)
NEUTROPHILS # BLD AUTO: 5.9 K/UL — SIGNIFICANT CHANGE UP (ref 1.8–7.4)
NEUTROPHILS NFR BLD AUTO: 65.8 % — SIGNIFICANT CHANGE UP (ref 43–77)
PLATELET # BLD AUTO: 119 K/UL — LOW (ref 150–400)
RBC # BLD: 4.12 M/UL — LOW (ref 4.2–5.8)
RBC # FLD: 16.2 % — HIGH (ref 10.3–14.5)
WBC # BLD: 8.9 K/UL — SIGNIFICANT CHANGE UP (ref 3.8–10.5)
WBC # FLD AUTO: 8.9 K/UL — SIGNIFICANT CHANGE UP (ref 3.8–10.5)

## 2018-09-24 PROCEDURE — 99214 OFFICE O/P EST MOD 30 MIN: CPT

## 2018-10-08 LAB
ALBUMIN MFR SERPL ELPH: 46.7 %
ALBUMIN SERPL ELPH-MCNC: 4.3 G/DL
ALBUMIN SERPL-MCNC: 3.8 G/DL
ALBUMIN/GLOB SERPL: 0.9 RATIO
ALP BLD-CCNC: 116 U/L
ALPHA1 GLOB MFR SERPL ELPH: 6.2 %
ALPHA1 GLOB SERPL ELPH-MCNC: 0.5 G/DL
ALPHA2 GLOB MFR SERPL ELPH: 10.7 %
ALPHA2 GLOB SERPL ELPH-MCNC: 0.9 G/DL
ALT SERPL-CCNC: 11 U/L
ANION GAP SERPL CALC-SCNC: 15 MMOL/L
AST SERPL-CCNC: 13 U/L
B-GLOBULIN MFR SERPL ELPH: 7.4 %
B-GLOBULIN SERPL ELPH-MCNC: 0.6 G/DL
BILIRUB SERPL-MCNC: 0.3 MG/DL
BUN SERPL-MCNC: 54 MG/DL
CALCIUM SERPL-MCNC: 9.1 MG/DL
CHLORIDE SERPL-SCNC: 107 MMOL/L
CO2 SERPL-SCNC: 20 MMOL/L
CREAT SERPL-MCNC: 3.04 MG/DL
DEPRECATED KAPPA LC FREE/LAMBDA SER: 0 RATIO
DEPRECATED KAPPA LC FREE/LAMBDA SER: 0 RATIO
GAMMA GLOB FLD ELPH-MCNC: 2.4 G/DL
GAMMA GLOB MFR SERPL ELPH: 29 %
GLUCOSE SERPL-MCNC: 120 MG/DL
HBV CORE IGG+IGM SER QL: NONREACTIVE
HBV SURFACE AB SER QL: NONREACTIVE
HBV SURFACE AG SER QL: NONREACTIVE
HCV AB SER QL: NONREACTIVE
HCV S/CO RATIO: 0.1 S/CO
IGA SER QL IEP: 25 MG/DL
IGG SER QL IEP: 727 MG/DL
IGM SER QL IEP: 2762 MG/DL
INTERPRETATION SERPL IEP-IMP: NORMAL
KAPPA LC CSF-MCNC: 255.96 MG/DL
KAPPA LC CSF-MCNC: 255.96 MG/DL
KAPPA LC SERPL-MCNC: 1 MG/DL
KAPPA LC SERPL-MCNC: 1 MG/DL
LDH SERPL-CCNC: 141 U/L
M PROTEIN MFR SERPL ELPH: NORMAL %
M PROTEIN SPEC IFE-MCNC: NORMAL
MONOCLON BAND OBS SERPL: NORMAL G/DL
POTASSIUM SERPL-SCNC: 5.7 MMOL/L
PROT SERPL-MCNC: 8.2 G/DL
SODIUM SERPL-SCNC: 142 MMOL/L

## 2018-10-15 ENCOUNTER — RESULT REVIEW (OUTPATIENT)
Age: 73
End: 2018-10-15

## 2018-10-15 ENCOUNTER — LABORATORY RESULT (OUTPATIENT)
Age: 73
End: 2018-10-15

## 2018-10-15 ENCOUNTER — APPOINTMENT (OUTPATIENT)
Dept: INFUSION THERAPY | Facility: HOSPITAL | Age: 73
End: 2018-10-15

## 2018-10-15 LAB
BASOPHILS # BLD AUTO: 0 K/UL — SIGNIFICANT CHANGE UP (ref 0–0.2)
BASOPHILS NFR BLD AUTO: 0.6 % — SIGNIFICANT CHANGE UP (ref 0–2)
EOSINOPHIL # BLD AUTO: 0.1 K/UL — SIGNIFICANT CHANGE UP (ref 0–0.5)
EOSINOPHIL NFR BLD AUTO: 2 % — SIGNIFICANT CHANGE UP (ref 0–6)
HCT VFR BLD CALC: 37.1 % — LOW (ref 39–50)
HGB BLD-MCNC: 12.1 G/DL — LOW (ref 13–17)
LYMPHOCYTES # BLD AUTO: 1.6 K/UL — SIGNIFICANT CHANGE UP (ref 1–3.3)
LYMPHOCYTES # BLD AUTO: 24.7 % — SIGNIFICANT CHANGE UP (ref 13–44)
MCHC RBC-ENTMCNC: 30.4 PG — SIGNIFICANT CHANGE UP (ref 27–34)
MCHC RBC-ENTMCNC: 32.6 G/DL — SIGNIFICANT CHANGE UP (ref 32–36)
MCV RBC AUTO: 93.1 FL — SIGNIFICANT CHANGE UP (ref 80–100)
MONOCYTES # BLD AUTO: 1.1 K/UL — HIGH (ref 0–0.9)
MONOCYTES NFR BLD AUTO: 16.3 % — HIGH (ref 2–14)
NEUTROPHILS # BLD AUTO: 3.7 K/UL — SIGNIFICANT CHANGE UP (ref 1.8–7.4)
NEUTROPHILS NFR BLD AUTO: 56.4 % — SIGNIFICANT CHANGE UP (ref 43–77)
PLATELET # BLD AUTO: 118 K/UL — LOW (ref 150–400)
RBC # BLD: 3.98 M/UL — LOW (ref 4.2–5.8)
RBC # FLD: 15.7 % — HIGH (ref 10.3–14.5)
WBC # BLD: 6.5 K/UL — SIGNIFICANT CHANGE UP (ref 3.8–10.5)
WBC # FLD AUTO: 6.5 K/UL — SIGNIFICANT CHANGE UP (ref 3.8–10.5)

## 2018-10-16 ENCOUNTER — OUTPATIENT (OUTPATIENT)
Dept: OUTPATIENT SERVICES | Facility: HOSPITAL | Age: 73
LOS: 1 days | Discharge: HOME | End: 2018-10-16

## 2018-10-16 DIAGNOSIS — C88.0 WALDENSTROM MACROGLOBULINEMIA: ICD-10-CM

## 2018-10-16 DIAGNOSIS — Z51.11 ENCOUNTER FOR ANTINEOPLASTIC CHEMOTHERAPY: ICD-10-CM

## 2018-10-16 DIAGNOSIS — Z95.0 PRESENCE OF CARDIAC PACEMAKER: Chronic | ICD-10-CM

## 2018-10-16 DIAGNOSIS — Z98.89 OTHER SPECIFIED POSTPROCEDURAL STATES: Chronic | ICD-10-CM

## 2018-10-16 DIAGNOSIS — C91.11 CHRONIC LYMPHOCYTIC LEUKEMIA OF B-CELL TYPE IN REMISSION: ICD-10-CM

## 2018-10-16 DIAGNOSIS — Z98.41 CATARACT EXTRACTION STATUS, RIGHT EYE: Chronic | ICD-10-CM

## 2018-10-16 DIAGNOSIS — R11.2 NAUSEA WITH VOMITING, UNSPECIFIED: ICD-10-CM

## 2018-10-16 DIAGNOSIS — Z98.890 OTHER SPECIFIED POSTPROCEDURAL STATES: Chronic | ICD-10-CM

## 2018-10-23 ENCOUNTER — RESULT REVIEW (OUTPATIENT)
Age: 73
End: 2018-10-23

## 2018-10-23 ENCOUNTER — APPOINTMENT (OUTPATIENT)
Dept: HEMATOLOGY ONCOLOGY | Facility: CLINIC | Age: 73
End: 2018-10-23
Payer: MEDICARE

## 2018-10-23 VITALS
RESPIRATION RATE: 17 BRPM | OXYGEN SATURATION: 97 % | WEIGHT: 209 LBS | SYSTOLIC BLOOD PRESSURE: 134 MMHG | DIASTOLIC BLOOD PRESSURE: 85 MMHG | BODY MASS INDEX: 26.83 KG/M2 | TEMPERATURE: 99.1 F | HEART RATE: 106 BPM

## 2018-10-23 LAB
BASOPHILS # BLD AUTO: 0 K/UL — SIGNIFICANT CHANGE UP (ref 0–0.2)
BASOPHILS NFR BLD AUTO: 0.6 % — SIGNIFICANT CHANGE UP (ref 0–2)
EOSINOPHIL # BLD AUTO: 0 K/UL — SIGNIFICANT CHANGE UP (ref 0–0.5)
EOSINOPHIL NFR BLD AUTO: 0.7 % — SIGNIFICANT CHANGE UP (ref 0–6)
HCT VFR BLD CALC: 38.3 % — LOW (ref 39–50)
HGB BLD-MCNC: 12.8 G/DL — LOW (ref 13–17)
INR PPP: 2.33 RATIO
LYMPHOCYTES # BLD AUTO: 1 K/UL — SIGNIFICANT CHANGE UP (ref 1–3.3)
LYMPHOCYTES # BLD AUTO: 18.7 % — SIGNIFICANT CHANGE UP (ref 13–44)
MCHC RBC-ENTMCNC: 31.2 PG — SIGNIFICANT CHANGE UP (ref 27–34)
MCHC RBC-ENTMCNC: 33.5 G/DL — SIGNIFICANT CHANGE UP (ref 32–36)
MCV RBC AUTO: 92.9 FL — SIGNIFICANT CHANGE UP (ref 80–100)
MONOCYTES # BLD AUTO: 0.7 K/UL — SIGNIFICANT CHANGE UP (ref 0–0.9)
MONOCYTES NFR BLD AUTO: 12.8 % — SIGNIFICANT CHANGE UP (ref 2–14)
NEUTROPHILS # BLD AUTO: 3.5 K/UL — SIGNIFICANT CHANGE UP (ref 1.8–7.4)
NEUTROPHILS NFR BLD AUTO: 67.1 % — SIGNIFICANT CHANGE UP (ref 43–77)
PLATELET # BLD AUTO: 111 K/UL — LOW (ref 150–400)
PT BLD: 26.8 SEC
RBC # BLD: 4.12 M/UL — LOW (ref 4.2–5.8)
RBC # FLD: 15.4 % — HIGH (ref 10.3–14.5)
WBC # BLD: 5.2 K/UL — SIGNIFICANT CHANGE UP (ref 3.8–10.5)
WBC # FLD AUTO: 5.2 K/UL — SIGNIFICANT CHANGE UP (ref 3.8–10.5)

## 2018-10-23 PROCEDURE — 99214 OFFICE O/P EST MOD 30 MIN: CPT

## 2018-10-23 RX ORDER — PROPRANOLOL HYDROCHLORIDE 10 MG/1
10 TABLET ORAL TWICE DAILY
Refills: 0 | Status: DISCONTINUED | COMMUNITY
End: 2018-10-23

## 2018-10-24 LAB
ALBUMIN SERPL ELPH-MCNC: 4.2 G/DL
ALP BLD-CCNC: 90 U/L
ALT SERPL-CCNC: 11 U/L
ANION GAP SERPL CALC-SCNC: 14 MMOL/L
AST SERPL-CCNC: 13 U/L
BILIRUB SERPL-MCNC: 0.4 MG/DL
BUN SERPL-MCNC: 44 MG/DL
CALCIUM SERPL-MCNC: 9.6 MG/DL
CHLORIDE SERPL-SCNC: 106 MMOL/L
CO2 SERPL-SCNC: 22 MMOL/L
CREAT SERPL-MCNC: 2.33 MG/DL
GLUCOSE SERPL-MCNC: 111 MG/DL
LDH SERPL-CCNC: 154 U/L
POTASSIUM SERPL-SCNC: 4.8 MMOL/L
PROT SERPL-MCNC: 6.8 G/DL
SODIUM SERPL-SCNC: 142 MMOL/L

## 2018-11-12 ENCOUNTER — LABORATORY RESULT (OUTPATIENT)
Age: 73
End: 2018-11-12

## 2018-11-12 ENCOUNTER — APPOINTMENT (OUTPATIENT)
Dept: INFUSION THERAPY | Facility: HOSPITAL | Age: 73
End: 2018-11-12
Payer: MEDICARE

## 2018-11-12 ENCOUNTER — RESULT REVIEW (OUTPATIENT)
Age: 73
End: 2018-11-12

## 2018-11-12 ENCOUNTER — APPOINTMENT (OUTPATIENT)
Dept: HEMATOLOGY ONCOLOGY | Facility: CLINIC | Age: 73
End: 2018-11-12

## 2018-11-12 LAB
BASOPHILS # BLD AUTO: 0 K/UL — SIGNIFICANT CHANGE UP (ref 0–0.2)
BASOPHILS NFR BLD AUTO: 0.4 % — SIGNIFICANT CHANGE UP (ref 0–2)
EOSINOPHIL # BLD AUTO: 0 K/UL — SIGNIFICANT CHANGE UP (ref 0–0.5)
EOSINOPHIL NFR BLD AUTO: 0 % — SIGNIFICANT CHANGE UP (ref 0–6)
HCT VFR BLD CALC: 39.2 % — SIGNIFICANT CHANGE UP (ref 39–50)
HGB BLD-MCNC: 13 G/DL — SIGNIFICANT CHANGE UP (ref 13–17)
LYMPHOCYTES # BLD AUTO: 1.9 K/UL — SIGNIFICANT CHANGE UP (ref 1–3.3)
LYMPHOCYTES # BLD AUTO: 20.8 % — SIGNIFICANT CHANGE UP (ref 13–44)
MCHC RBC-ENTMCNC: 31.4 PG — SIGNIFICANT CHANGE UP (ref 27–34)
MCHC RBC-ENTMCNC: 33.2 G/DL — SIGNIFICANT CHANGE UP (ref 32–36)
MCV RBC AUTO: 94.7 FL — SIGNIFICANT CHANGE UP (ref 80–100)
MONOCYTES # BLD AUTO: 1.2 K/UL — HIGH (ref 0–0.9)
MONOCYTES NFR BLD AUTO: 12.5 % — SIGNIFICANT CHANGE UP (ref 2–14)
NEUTROPHILS # BLD AUTO: 6.1 K/UL — SIGNIFICANT CHANGE UP (ref 1.8–7.4)
NEUTROPHILS NFR BLD AUTO: 66.3 % — SIGNIFICANT CHANGE UP (ref 43–77)
PLATELET # BLD AUTO: 125 K/UL — LOW (ref 150–400)
RBC # BLD: 4.14 M/UL — LOW (ref 4.2–5.8)
RBC # FLD: 15.1 % — HIGH (ref 10.3–14.5)
WBC # BLD: 9.2 K/UL — SIGNIFICANT CHANGE UP (ref 3.8–10.5)
WBC # FLD AUTO: 9.2 K/UL — SIGNIFICANT CHANGE UP (ref 3.8–10.5)

## 2018-11-12 PROCEDURE — 99213 OFFICE O/P EST LOW 20 MIN: CPT

## 2018-11-13 DIAGNOSIS — R11.2 NAUSEA WITH VOMITING, UNSPECIFIED: ICD-10-CM

## 2018-11-13 DIAGNOSIS — Z51.11 ENCOUNTER FOR ANTINEOPLASTIC CHEMOTHERAPY: ICD-10-CM

## 2018-12-03 ENCOUNTER — OUTPATIENT (OUTPATIENT)
Dept: OUTPATIENT SERVICES | Facility: HOSPITAL | Age: 73
LOS: 1 days | Discharge: HOME | End: 2018-12-03

## 2018-12-03 DIAGNOSIS — Z95.0 PRESENCE OF CARDIAC PACEMAKER: Chronic | ICD-10-CM

## 2018-12-03 DIAGNOSIS — C91.11 CHRONIC LYMPHOCYTIC LEUKEMIA OF B-CELL TYPE IN REMISSION: ICD-10-CM

## 2018-12-03 DIAGNOSIS — Z98.890 OTHER SPECIFIED POSTPROCEDURAL STATES: Chronic | ICD-10-CM

## 2018-12-03 DIAGNOSIS — Z98.89 OTHER SPECIFIED POSTPROCEDURAL STATES: Chronic | ICD-10-CM

## 2018-12-03 DIAGNOSIS — Z98.41 CATARACT EXTRACTION STATUS, RIGHT EYE: Chronic | ICD-10-CM

## 2018-12-10 ENCOUNTER — RESULT REVIEW (OUTPATIENT)
Age: 73
End: 2018-12-10

## 2018-12-10 ENCOUNTER — APPOINTMENT (OUTPATIENT)
Dept: HEMATOLOGY ONCOLOGY | Facility: CLINIC | Age: 73
End: 2018-12-10
Payer: MEDICARE

## 2018-12-10 ENCOUNTER — APPOINTMENT (OUTPATIENT)
Dept: INFUSION THERAPY | Facility: HOSPITAL | Age: 73
End: 2018-12-10

## 2018-12-10 VITALS
DIASTOLIC BLOOD PRESSURE: 87 MMHG | WEIGHT: 210 LBS | SYSTOLIC BLOOD PRESSURE: 151 MMHG | BODY MASS INDEX: 26.96 KG/M2 | OXYGEN SATURATION: 97 % | HEART RATE: 81 BPM | RESPIRATION RATE: 16 BRPM | TEMPERATURE: 98 F

## 2018-12-10 DIAGNOSIS — L40.9 PSORIASIS, UNSPECIFIED: ICD-10-CM

## 2018-12-10 DIAGNOSIS — M11.20 OTHER CHONDROCALCINOSIS, UNSPECIFIED SITE: ICD-10-CM

## 2018-12-10 LAB
ALBUMIN SERPL ELPH-MCNC: 4.1 G/DL
ALP BLD-CCNC: 86 U/L
ALT SERPL-CCNC: 13 U/L
ANION GAP SERPL CALC-SCNC: 12 MMOL/L
AST SERPL-CCNC: 15 U/L
BASOPHILS # BLD AUTO: 0 K/UL — SIGNIFICANT CHANGE UP (ref 0–0.2)
BASOPHILS NFR BLD AUTO: 0.5 % — SIGNIFICANT CHANGE UP (ref 0–2)
BILIRUB SERPL-MCNC: 0.2 MG/DL
BUN SERPL-MCNC: 50 MG/DL
CALCIUM SERPL-MCNC: 9.4 MG/DL
CHLORIDE SERPL-SCNC: 105 MMOL/L
CO2 SERPL-SCNC: 24 MMOL/L
CREAT SERPL-MCNC: 2.77 MG/DL
DEPRECATED KAPPA LC FREE/LAMBDA SER: 0.01 RATIO
EOSINOPHIL # BLD AUTO: 0.2 K/UL — SIGNIFICANT CHANGE UP (ref 0–0.5)
EOSINOPHIL NFR BLD AUTO: 2.4 % — SIGNIFICANT CHANGE UP (ref 0–6)
GLUCOSE SERPL-MCNC: 122 MG/DL
HCT VFR BLD CALC: 38.5 % — LOW (ref 39–50)
HGB BLD-MCNC: 12.5 G/DL — LOW (ref 13–17)
KAPPA LC CSF-MCNC: 86.15 MG/DL
KAPPA LC SERPL-MCNC: 0.88 MG/DL
LDH SERPL-CCNC: 179 U/L
LYMPHOCYTES # BLD AUTO: 2 K/UL — SIGNIFICANT CHANGE UP (ref 1–3.3)
LYMPHOCYTES # BLD AUTO: 26.1 % — SIGNIFICANT CHANGE UP (ref 13–44)
MCHC RBC-ENTMCNC: 31 PG — SIGNIFICANT CHANGE UP (ref 27–34)
MCHC RBC-ENTMCNC: 32.5 G/DL — SIGNIFICANT CHANGE UP (ref 32–36)
MCV RBC AUTO: 95.3 FL — SIGNIFICANT CHANGE UP (ref 80–100)
MONOCYTES # BLD AUTO: 1.1 K/UL — HIGH (ref 0–0.9)
MONOCYTES NFR BLD AUTO: 13.9 % — SIGNIFICANT CHANGE UP (ref 2–14)
NEUTROPHILS # BLD AUTO: 4.4 K/UL — SIGNIFICANT CHANGE UP (ref 1.8–7.4)
NEUTROPHILS NFR BLD AUTO: 57.1 % — SIGNIFICANT CHANGE UP (ref 43–77)
PLATELET # BLD AUTO: 158 K/UL — SIGNIFICANT CHANGE UP (ref 150–400)
POTASSIUM SERPL-SCNC: 4.6 MMOL/L
PROT SERPL-MCNC: 6.8 G/DL
RBC # BLD: 4.04 M/UL — LOW (ref 4.2–5.8)
RBC # FLD: 13.9 % — SIGNIFICANT CHANGE UP (ref 10.3–14.5)
SODIUM SERPL-SCNC: 141 MMOL/L
WBC # BLD: 7.7 K/UL — SIGNIFICANT CHANGE UP (ref 3.8–10.5)
WBC # FLD AUTO: 7.7 K/UL — SIGNIFICANT CHANGE UP (ref 3.8–10.5)

## 2018-12-10 PROCEDURE — 99214 OFFICE O/P EST MOD 30 MIN: CPT

## 2018-12-10 NOTE — RESULTS/DATA
[FreeTextEntry1] : WBC 7700 Hgb 12.5 Hct 38.5 Platelets 158,000 Diff normal\par \par 11/12/18:\par CMP creat 2.26, urate 5.4, \par SPEP M 0.7 IgMlambda, unmeasurable lambda\par SFLC k 0.9, lambda 81.74\par IgG 594, A 23, M 698

## 2018-12-10 NOTE — PHYSICAL EXAM
[Restricted in physically strenuous activity but ambulatory and able to carry out work of a light or sedentary nature] : Status 1- Restricted in physically strenuous activity but ambulatory and able to carry out work of a light or sedentary nature, e.g., light house work, office work [Normal] : affect appropriate [de-identified] :  Pacemaker left anterior chest wall.  [de-identified] : 5 x 5 cm lipoma left lower back. Psoriasis on limbs, all surfaces, and trunk.

## 2018-12-10 NOTE — ASSESSMENT
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 He had progressive anemia with rising creatinine and relapse of his Waldenstrom's. He was not a candidate for ibrutinib due to anticoagulation with Warfarin. He has  achieved a partial remission with marked improvement in his hemoglobin, creatinine and IgM. He has developed psoriasis. He has not tolerated rituxan well and is loathe to receive it again. He appears to have had a significant rash last cycle. His neuropathy appears to have worsened as well.\par \par Plan:\par Decrease Ixazomib dose to 2.3 mg days 1, 8, 15. Continue dexamethasone. \par D/C Rituximab \par CMP, LDH, SPEP, Quant Ig, SFLC\par Acyclovir\par Omeprazole\par Warfarin per Cardiology\par Dermatology consult\par RTC 1 month\par

## 2018-12-10 NOTE — CONSULT LETTER
[Dear  ___] : Dear  [unfilled], [Courtesy Letter:] : I had the pleasure of seeing your patient, [unfilled], in my office today. [Please see my note below.] : Please see my note below. [Sincerely,] : Sincerely, [DrIbeth  ___] : Dr. DENG [DrIbeth ___] : Dr. DENG [___] : [unfilled] [FreeTextEntry2] : Dr. Simpson [FreeTextEntry3] : Cesar Simons MD

## 2018-12-10 NOTE — HISTORY OF PRESENT ILLNESS
[Disease:__________________________] : Disease: [unfilled] [Rosales Stage: ___] : Rosales Stage: [unfilled] [Cycle: ___] : Cycle: [unfilled] [Day: ___] : Day: [unfilled] [de-identified] : PAF\par 10/2015 Waldenstrom's macroglobulinemia ; + dim lambda, CD 19, 23, FMC-7; negative CD 5, 10, 20\par Renal biopsy: lymphocytic infiltrate of renal cortex\par 7/2018: Nephrotic syndrome with lambda light chains\par  [de-identified] : +lambda, CD5, CD 20; CD 38 --; FISH del 13q14.3\par SPEP gamma paraprotein M 0.4\par SPIEP IgM lambda; 10/17 IgGk, lambda [FreeTextEntry1] : 10/2015 - 1/2017  Rituxan/Velcade/Decadron; 8/18 - present Ixazomib/Decadron/Rituxan [de-identified] : Feels fair. Developed severe psoriaform rash during last Rituxan infusion. Has improved. Has dry desquamation with mild pruritus. Hydrocortisone cream not helping. Right arm and leg now swollen, not tender. Saw Rheum and felt to have pseudogout. Medrol did not help. Feet are more numb than usual, feels  like walking on sponges. Ambulates well. No fevers, night sweats, swollen glands, chest pain, abdominal pain, SOB, bleeding, bruising,  HA, visual problems, melena, BRBPR, leg pain. Had flu vaccine.\par \par \par

## 2018-12-11 ENCOUNTER — NON-APPOINTMENT (OUTPATIENT)
Age: 73
End: 2018-12-11

## 2018-12-11 ENCOUNTER — APPOINTMENT (OUTPATIENT)
Dept: ELECTROPHYSIOLOGY | Facility: CLINIC | Age: 73
End: 2018-12-11
Payer: MEDICARE

## 2018-12-11 ENCOUNTER — APPOINTMENT (OUTPATIENT)
Dept: CARDIOLOGY | Facility: CLINIC | Age: 73
End: 2018-12-11
Payer: MEDICARE

## 2018-12-11 VITALS
BODY MASS INDEX: 26.95 KG/M2 | WEIGHT: 210 LBS | SYSTOLIC BLOOD PRESSURE: 119 MMHG | HEART RATE: 106 BPM | HEIGHT: 74 IN | OXYGEN SATURATION: 96 % | DIASTOLIC BLOOD PRESSURE: 76 MMHG

## 2018-12-11 LAB
ALBUMIN MFR SERPL ELPH: 55 %
ALBUMIN SERPL-MCNC: 3.7 G/DL
ALBUMIN/GLOB SERPL: 1.2 RATIO
ALPHA1 GLOB MFR SERPL ELPH: 6 %
ALPHA1 GLOB SERPL ELPH-MCNC: 0.4 G/DL
ALPHA2 GLOB MFR SERPL ELPH: 12.4 %
ALPHA2 GLOB SERPL ELPH-MCNC: 0.8 G/DL
B-GLOBULIN MFR SERPL ELPH: 9.7 %
B-GLOBULIN SERPL ELPH-MCNC: 0.7 G/DL
DEPRECATED KAPPA LC FREE/LAMBDA SER: 0.01 RATIO
GAMMA GLOB FLD ELPH-MCNC: 1.1 G/DL
GAMMA GLOB MFR SERPL ELPH: 16.9 %
IGA SER QL IEP: 25 MG/DL
IGG SER QL IEP: 606 MG/DL
IGM SER QL IEP: 791 MG/DL
INTERPRETATION SERPL IEP-IMP: NORMAL
KAPPA LC CSF-MCNC: 86.15 MG/DL
KAPPA LC SERPL-MCNC: 0.88 MG/DL
M PROTEIN MFR SERPL ELPH: 12.1 %
M PROTEIN SPEC IFE-MCNC: NORMAL
MONOCLON BAND OBS SERPL: 0.8 G/DL
PROT SERPL-MCNC: 6.8 G/DL
PROT SERPL-MCNC: 6.8 G/DL

## 2018-12-11 PROCEDURE — 93280 PM DEVICE PROGR EVAL DUAL: CPT

## 2018-12-11 PROCEDURE — 99215 OFFICE O/P EST HI 40 MIN: CPT

## 2018-12-11 NOTE — DISCUSSION/SUMMARY
[___ Month(s)] : [unfilled] month(s) [FreeTextEntry1] : The patient is a 73-year-old gentleman history mild dementia, paroxysmal  atrial fibrillation, lower extremity edema , CLL, CKD stage IV, Waldenström's macroglobulinemia, hypothyroidism, PPM , chronic diastolic heart failure s/p sigmoid resection for diverticulitis now receiving oral chemo for his CLL. [employer] pacemaker check good battery life, nondependent and good sensing and pacing thresholds. INR has been therapeutic. Heart rate high. Perhaps secondary to chemo and reactions. Closer monitoring.Encouraged to get out of the house more often and walk for exercise.

## 2018-12-11 NOTE — REASON FOR VISIT
[Follow-Up - Clinic] : a clinic follow-up of [Pacemaker Evaluation] : pacemaker ~T evaluation ~C was performed

## 2018-12-11 NOTE — PHYSICAL EXAM
[General Appearance - Well Developed] : well developed [Normal Appearance] : normal appearance [Well Groomed] : well groomed [General Appearance - Well Nourished] : well nourished [No Deformities] : no deformities [General Appearance - In No Acute Distress] : no acute distress [Normal Conjunctiva] : the conjunctiva exhibited no abnormalities [Eyelids - No Xanthelasma] : the eyelids demonstrated no xanthelasmas [Normal Oral Mucosa] : normal oral mucosa [No Oral Pallor] : no oral pallor [No Oral Cyanosis] : no oral cyanosis [Normal Jugular Venous A Waves Present] : normal jugular venous A waves present [Normal Jugular Venous V Waves Present] : normal jugular venous V waves present [No Jugular Venous Rodriguez A Waves] : no jugular venous rodriguez A waves [Respiration, Rhythm And Depth] : normal respiratory rhythm and effort [Exaggerated Use Of Accessory Muscles For Inspiration] : no accessory muscle use [Auscultation Breath Sounds / Voice Sounds] : lungs were clear to auscultation bilaterally [Heart Sounds] : normal S1 and S2 [Normal] : the heart rate was normal [Regular] : the rhythm was regular [Systolic grade ___/6] : A grade [unfilled]/6 systolic murmur was heard. [Abdomen Soft] : soft [Abdomen Tenderness] : non-tender [Abdomen Mass (___ Cm)] : no abdominal mass palpated [Abnormal Walk] : normal gait [Gait - Sufficient For Exercise Testing] : the gait was sufficient for exercise testing [Nail Clubbing] : no clubbing of the fingernails [Cyanosis, Localized] : no localized cyanosis [Petechial Hemorrhages (___cm)] : no petechial hemorrhages [Skin Color & Pigmentation] : normal skin color and pigmentation [] : no rash [No Venous Stasis] : no venous stasis [Skin Lesions] : no skin lesions [No Skin Ulcers] : no skin ulcer [No Xanthoma] : no  xanthoma was observed [Oriented To Time, Place, And Person] : oriented to person, place, and time [Affect] : the affect was normal [Mood] : the mood was normal [No Anxiety] : not feeling anxious

## 2018-12-11 NOTE — REVIEW OF SYSTEMS
[Shortness Of Breath] : no shortness of breath [Dyspnea on exertion] : not dyspnea during exertion [Chest Pain] : no chest pain [Lower Ext Edema] : no extremity edema [Palpitations] : no palpitations [Negative] : Heme/Lymph

## 2018-12-11 NOTE — HISTORY OF PRESENT ILLNESS
[FreeTextEntry1] : Anson started his oral chemo regimen. He has had trouble sleeping with steroids and every few days he "crashes" and sleeps a lot.He is upset about diffuse psoriasis. Heart rate is high.  Denies any chest pain, palpitations, dizziness or shortness of breath.

## 2018-12-22 ENCOUNTER — INPATIENT (INPATIENT)
Facility: HOSPITAL | Age: 73
LOS: 5 days | Discharge: NURSING, REHAB OR LONG-TERM CARE | DRG: 536 | End: 2018-12-28
Attending: INTERNAL MEDICINE | Admitting: INTERNAL MEDICINE
Payer: MEDICARE

## 2018-12-22 VITALS
OXYGEN SATURATION: 95 % | DIASTOLIC BLOOD PRESSURE: 82 MMHG | WEIGHT: 205.91 LBS | HEIGHT: 74 IN | TEMPERATURE: 98 F | HEART RATE: 106 BPM | SYSTOLIC BLOOD PRESSURE: 139 MMHG | RESPIRATION RATE: 18 BRPM

## 2018-12-22 DIAGNOSIS — S32.599A OTHER SPECIFIED FRACTURE OF UNSPECIFIED PUBIS, INITIAL ENCOUNTER FOR CLOSED FRACTURE: ICD-10-CM

## 2018-12-22 DIAGNOSIS — Z98.89 OTHER SPECIFIED POSTPROCEDURAL STATES: Chronic | ICD-10-CM

## 2018-12-22 DIAGNOSIS — Z98.890 OTHER SPECIFIED POSTPROCEDURAL STATES: Chronic | ICD-10-CM

## 2018-12-22 DIAGNOSIS — Z95.0 PRESENCE OF CARDIAC PACEMAKER: Chronic | ICD-10-CM

## 2018-12-22 DIAGNOSIS — Z98.41 CATARACT EXTRACTION STATUS, RIGHT EYE: Chronic | ICD-10-CM

## 2018-12-22 LAB
ALBUMIN SERPL ELPH-MCNC: 4.2 G/DL — SIGNIFICANT CHANGE UP (ref 3.3–5)
ALP SERPL-CCNC: 90 U/L — SIGNIFICANT CHANGE UP (ref 40–120)
ALT FLD-CCNC: 15 U/L — SIGNIFICANT CHANGE UP (ref 10–45)
ANION GAP SERPL CALC-SCNC: 16 MMOL/L — SIGNIFICANT CHANGE UP (ref 5–17)
APPEARANCE UR: CLEAR — SIGNIFICANT CHANGE UP
APTT BLD: 37.3 SEC — HIGH (ref 27.5–36.3)
AST SERPL-CCNC: 26 U/L — SIGNIFICANT CHANGE UP (ref 10–40)
BACTERIA # UR AUTO: NEGATIVE — SIGNIFICANT CHANGE UP
BASOPHILS # BLD AUTO: 0 K/UL — SIGNIFICANT CHANGE UP (ref 0–0.2)
BASOPHILS NFR BLD AUTO: 0.1 % — SIGNIFICANT CHANGE UP (ref 0–2)
BILIRUB SERPL-MCNC: 0.3 MG/DL — SIGNIFICANT CHANGE UP (ref 0.2–1.2)
BILIRUB UR-MCNC: NEGATIVE — SIGNIFICANT CHANGE UP
BLD GP AB SCN SERPL QL: NEGATIVE — SIGNIFICANT CHANGE UP
BUN SERPL-MCNC: 40 MG/DL — HIGH (ref 7–23)
CALCIUM SERPL-MCNC: 9.1 MG/DL — SIGNIFICANT CHANGE UP (ref 8.4–10.5)
CHLORIDE SERPL-SCNC: 103 MMOL/L — SIGNIFICANT CHANGE UP (ref 96–108)
CO2 SERPL-SCNC: 21 MMOL/L — LOW (ref 22–31)
COLOR SPEC: YELLOW — SIGNIFICANT CHANGE UP
CREAT SERPL-MCNC: 2.58 MG/DL — HIGH (ref 0.5–1.3)
DIFF PNL FLD: ABNORMAL
EOSINOPHIL # BLD AUTO: 0.1 K/UL — SIGNIFICANT CHANGE UP (ref 0–0.5)
EOSINOPHIL NFR BLD AUTO: 0.7 % — SIGNIFICANT CHANGE UP (ref 0–6)
EPI CELLS # UR: 1 /HPF — SIGNIFICANT CHANGE UP
GLUCOSE SERPL-MCNC: 134 MG/DL — HIGH (ref 70–99)
GLUCOSE UR QL: NEGATIVE — SIGNIFICANT CHANGE UP
HCT VFR BLD CALC: 36.8 % — LOW (ref 39–50)
HGB BLD-MCNC: 12.4 G/DL — LOW (ref 13–17)
HYALINE CASTS # UR AUTO: 2 /LPF — SIGNIFICANT CHANGE UP (ref 0–2)
INR BLD: 2.32 RATIO — HIGH (ref 0.88–1.16)
KETONES UR-MCNC: NEGATIVE — SIGNIFICANT CHANGE UP
LEUKOCYTE ESTERASE UR-ACNC: NEGATIVE — SIGNIFICANT CHANGE UP
LYMPHOCYTES # BLD AUTO: 1 K/UL — SIGNIFICANT CHANGE UP (ref 1–3.3)
LYMPHOCYTES # BLD AUTO: 4.9 % — LOW (ref 13–44)
MCHC RBC-ENTMCNC: 32.6 PG — SIGNIFICANT CHANGE UP (ref 27–34)
MCHC RBC-ENTMCNC: 33.8 GM/DL — SIGNIFICANT CHANGE UP (ref 32–36)
MCV RBC AUTO: 96.6 FL — SIGNIFICANT CHANGE UP (ref 80–100)
MONOCYTES # BLD AUTO: 1.2 K/UL — HIGH (ref 0–0.9)
MONOCYTES NFR BLD AUTO: 5.7 % — SIGNIFICANT CHANGE UP (ref 2–14)
NEUTROPHILS # BLD AUTO: 19 K/UL — HIGH (ref 1.8–7.4)
NEUTROPHILS NFR BLD AUTO: 88.7 % — HIGH (ref 43–77)
NITRITE UR-MCNC: NEGATIVE — SIGNIFICANT CHANGE UP
PH UR: 6 — SIGNIFICANT CHANGE UP (ref 5–8)
PLATELET # BLD AUTO: 134 K/UL — LOW (ref 150–400)
POTASSIUM SERPL-MCNC: 4.5 MMOL/L — SIGNIFICANT CHANGE UP (ref 3.5–5.3)
POTASSIUM SERPL-SCNC: 4.5 MMOL/L — SIGNIFICANT CHANGE UP (ref 3.5–5.3)
PROT SERPL-MCNC: 6.8 G/DL — SIGNIFICANT CHANGE UP (ref 6–8.3)
PROT UR-MCNC: ABNORMAL
PROTHROM AB SERPL-ACNC: 27.4 SEC — HIGH (ref 10–12.9)
RBC # BLD: 3.81 M/UL — LOW (ref 4.2–5.8)
RBC # FLD: 13.8 % — SIGNIFICANT CHANGE UP (ref 10.3–14.5)
RBC CASTS # UR COMP ASSIST: 56 /HPF — HIGH (ref 0–4)
RH IG SCN BLD-IMP: POSITIVE — SIGNIFICANT CHANGE UP
SODIUM SERPL-SCNC: 140 MMOL/L — SIGNIFICANT CHANGE UP (ref 135–145)
SP GR SPEC: 1.02 — SIGNIFICANT CHANGE UP (ref 1.01–1.02)
UROBILINOGEN FLD QL: NEGATIVE — SIGNIFICANT CHANGE UP
WBC # BLD: 21.5 K/UL — HIGH (ref 3.8–10.5)
WBC # FLD AUTO: 21.5 K/UL — HIGH (ref 3.8–10.5)
WBC UR QL: 6 /HPF — HIGH (ref 0–5)

## 2018-12-22 PROCEDURE — 73552 X-RAY EXAM OF FEMUR 2/>: CPT | Mod: 26,RT

## 2018-12-22 PROCEDURE — 93010 ELECTROCARDIOGRAM REPORT: CPT

## 2018-12-22 PROCEDURE — 71045 X-RAY EXAM CHEST 1 VIEW: CPT | Mod: 26

## 2018-12-22 PROCEDURE — 99223 1ST HOSP IP/OBS HIGH 75: CPT

## 2018-12-22 PROCEDURE — 73502 X-RAY EXAM HIP UNI 2-3 VIEWS: CPT | Mod: 26,RT

## 2018-12-22 PROCEDURE — 99285 EMERGENCY DEPT VISIT HI MDM: CPT | Mod: 25

## 2018-12-22 RX ORDER — SODIUM CHLORIDE 9 MG/ML
1000 INJECTION INTRAMUSCULAR; INTRAVENOUS; SUBCUTANEOUS
Qty: 0 | Refills: 0 | Status: DISCONTINUED | OUTPATIENT
Start: 2018-12-22 | End: 2018-12-23

## 2018-12-22 RX ORDER — MORPHINE SULFATE 50 MG/1
4 CAPSULE, EXTENDED RELEASE ORAL ONCE
Qty: 0 | Refills: 0 | Status: DISCONTINUED | OUTPATIENT
Start: 2018-12-22 | End: 2018-12-22

## 2018-12-22 RX ORDER — HYDROMORPHONE HYDROCHLORIDE 2 MG/ML
0.5 INJECTION INTRAMUSCULAR; INTRAVENOUS; SUBCUTANEOUS ONCE
Qty: 0 | Refills: 0 | Status: DISCONTINUED | OUTPATIENT
Start: 2018-12-22 | End: 2018-12-22

## 2018-12-22 RX ADMIN — MORPHINE SULFATE 4 MILLIGRAM(S): 50 CAPSULE, EXTENDED RELEASE ORAL at 20:49

## 2018-12-22 RX ADMIN — SODIUM CHLORIDE 100 MILLILITER(S): 9 INJECTION INTRAMUSCULAR; INTRAVENOUS; SUBCUTANEOUS at 21:01

## 2018-12-22 RX ADMIN — MORPHINE SULFATE 4 MILLIGRAM(S): 50 CAPSULE, EXTENDED RELEASE ORAL at 21:01

## 2018-12-22 RX ADMIN — HYDROMORPHONE HYDROCHLORIDE 0.5 MILLIGRAM(S): 2 INJECTION INTRAMUSCULAR; INTRAVENOUS; SUBCUTANEOUS at 23:19

## 2018-12-22 RX ADMIN — MORPHINE SULFATE 4 MILLIGRAM(S): 50 CAPSULE, EXTENDED RELEASE ORAL at 19:15

## 2018-12-22 NOTE — ED PROVIDER NOTE - ATTENDING CONTRIBUTION TO CARE
Attending note (Marc): 73M presenting after mechanical fall w/o head injury, c/o right hip pain; with RLE externally rotated any unable to range at right hip; concerning for hip fracture: plan for cxr, R hip/femur xray and presurgical labs; morphine prn for pain.  Ortho consult pending initial xrays.

## 2018-12-22 NOTE — CONSULT NOTE ADULT - SUBJECTIVE AND OBJECTIVE BOX
73y Male community ambulator presents c/o R hip pain and difficulty with ambulation s/p mechanical fall. Denies HS/LOC. Denies numbness/tingling. Denies fever/chills. Denies pain/injury elsewhere.     PAST MEDICAL & SURGICAL HISTORY:  BPH (benign prostatic hyperplasia)  [place] (hard of hearing)  Gout  CKD (chronic kidney disease)  Nephrolithiasis  Hypothyroid  Dementia  Bradycardia, drug induced  Waldenstrom macroglobulinemia  Diverticulitis  Atrial fibrillation  GERD (gastroesophageal reflux disease)  Anxiety disorder  CLL (chronic lymphocytic leukemia): in remission  History of cardiac pacemaker in situ  History of appendectomy  History of cataract surgery, right: IOL  History of laparoscopic cholecystectomy: 4/2014  S/P hernia repair: x2  Meniscus tear: s/p removal of Meniscus 8 months ago    MEDICATIONS  (STANDING):    Allergies    No Known Allergies    Intolerances                          12.4   21.5  )-----------( 134      ( 22 Dec 2018 19:25 )             36.8     22 Dec 2018 19:25    140    |  103    |  40     ----------------------------<  134    4.5     |  21     |  2.58     Ca    9.1        22 Dec 2018 19:25    TPro  6.8    /  Alb  4.2    /  TBili  0.3    /  DBili  x      /  AST  26     /  ALT  15     /  AlkPhos  90     22 Dec 2018 19:25    PT/INR - ( 22 Dec 2018 19:25 )   PT: 27.4 sec;   INR: 2.32 ratio         PTT - ( 22 Dec 2018 19:25 )  PTT:37.3 sec  Vital Signs Last 24 Hrs  T(C): 36.9 (12-22-18 @ 19:00), Max: 36.9 (12-22-18 @ 19:00)  T(F): 98.5 (12-22-18 @ 19:00), Max: 98.5 (12-22-18 @ 19:00)  HR: 96 (12-22-18 @ 19:00) (96 - 106)  BP: 142/77 (12-22-18 @ 19:00) (139/82 - 142/77)  BP(mean): --  RR: 18 (12-22-18 @ 19:00) (18 - 18)  SpO2: 96% (12-22-18 @ 19:00) (95% - 96%)    Imaging:   XR Pelvis shows R superior and inferior pubic rami fractures. No evidence of hip fx/dislocation    Physical Exam  Gen: NAD  RLE:   skin intact, unable to SLR due to pain, neg log roll, +ttp over pubic rami/symphysis  motor intact ehl/fhl/ta/gs function, no calf ttp, dp/pt pulse intact, compartments soft    A/P: 73y Male with R superior and inferior pubic rami fractures  - Pain control  - WBAT/PT  - No orthopedic surgical intervention at this time  - FU with Dr. Mariano in 1-2 weeks after discharge [phone]

## 2018-12-22 NOTE — ED PROVIDER NOTE - PMH
Anxiety disorder    Atrial fibrillation    BPH (benign prostatic hyperplasia)    Bradycardia, drug induced    CKD (chronic kidney disease)    CLL (chronic lymphocytic leukemia)  in remission  Dementia    Diverticulitis    GERD (gastroesophageal reflux disease)    Gout    [place] (hard of hearing)    Hypothyroid    Nephrolithiasis    Waldenstrom macroglobulinemia

## 2018-12-22 NOTE — ED ADULT NURSE NOTE - OBJECTIVE STATEMENT
1850 73 yr old WM brought [facility] on stretcher for further eval and tx of severe right hip pain 10/10. s/p fall at home 45 minutes ago. No LOC. A&Ox4. Not sure why  he fell. states he felt weak prior to fall. Denies chest pain, palp, SOB, fever or chills. PMH CLL On chemo. Right leg weakness with slight shortening and external rotation. +pedal pulses. Dr Marc cordon pt. IVL inserted. Blood work drawn

## 2018-12-22 NOTE — ED ADULT NURSE REASSESSMENT NOTE - NS ED NURSE REASSESS COMMENT FT1
Contacted Dr Vaughan by phone, medicated for pain as per order. Dr Vaughan to be coming to ED to see patient.

## 2018-12-22 NOTE — ED PROVIDER NOTE - OBJECTIVE STATEMENT
Attending note (Marc): 72 y/o M with h/o Afib (on coumadin, PPM), CKD, CLL, waldenstrom macroglobulinemia, presenting with right hip pain after slip / fall today walking up steps (did not fall down steps); denies head injury; wife with him during fall and corroborates story.  No chest pain, no palpitations, no shortness of breath.  No loc, no n/v.  severe pain in right hip when trying to move or stand; EMS called and brought patient to ED.  No right knee/ankle/foot pain.  No numbness at present (though reports h/o peripheral neuropathy and sometimes/intermittently has decreased sensation in the feet).

## 2018-12-22 NOTE — ED PROVIDER NOTE - CONSTITUTIONAL, MLM
normal... Well appearing, well nourished, awake, alert, oriented to person, place, time/situation and in no apparent distress.  In significant pain if right leg is moved.

## 2018-12-22 NOTE — ED ADULT NURSE REASSESSMENT NOTE - NS ED NURSE REASSESS COMMENT FT1
Report received from BRANDON Pizarro. Pt a&o x3, resting on stretcher, vital signs stable. Medicated as per order. Pt to Xray now. Stretcher locked in low position. Advised of plan of care.

## 2018-12-22 NOTE — ED ADULT NURSE NOTE - NSIMPLEMENTINTERV_GEN_ALL_ED
Implemented All Fall with Harm Risk Interventions:  [place] to call system. Call bell, personal items and telephone within reach. Instruct patient to call for assistance. Room bathroom lighting operational. Non-slip footwear when patient is off stretcher. Physically safe environment: no spills, clutter or unnecessary equipment. Stretcher in lowest position, wheels locked, appropriate side rails in place. Provide visual cue, wrist band, yellow gown, etc. Monitor gait and stability. Monitor for mental status changes and reorient to person, place, and time. Review medications for side effects contributing to fall risk. Reinforce activity limits and safety measures with patient and family. Provide visual clues: red socks.

## 2018-12-23 DIAGNOSIS — S32.591A OTHER SPECIFIED FRACTURE OF RIGHT PUBIS, INITIAL ENCOUNTER FOR CLOSED FRACTURE: ICD-10-CM

## 2018-12-23 DIAGNOSIS — M1A.9XX0 CHRONIC GOUT, UNSPECIFIED, WITHOUT TOPHUS (TOPHI): ICD-10-CM

## 2018-12-23 DIAGNOSIS — N18.3 CHRONIC KIDNEY DISEASE, STAGE 3 (MODERATE): ICD-10-CM

## 2018-12-23 DIAGNOSIS — E03.9 HYPOTHYROIDISM, UNSPECIFIED: ICD-10-CM

## 2018-12-23 DIAGNOSIS — I48.0 PAROXYSMAL ATRIAL FIBRILLATION: ICD-10-CM

## 2018-12-23 DIAGNOSIS — N40.0 BENIGN PROSTATIC HYPERPLASIA WITHOUT LOWER URINARY TRACT SYMPTOMS: ICD-10-CM

## 2018-12-23 DIAGNOSIS — Z29.9 ENCOUNTER FOR PROPHYLACTIC MEASURES, UNSPECIFIED: ICD-10-CM

## 2018-12-23 PROBLEM — F03.90 UNSPECIFIED DEMENTIA, UNSPECIFIED SEVERITY, WITHOUT BEHAVIORAL DISTURBANCE, PSYCHOTIC DISTURBANCE, MOOD DISTURBANCE, AND ANXIETY: Chronic | Status: INACTIVE | Noted: 2018-02-15 | Resolved: 2018-12-23

## 2018-12-23 PROBLEM — F03.90 UNSPECIFIED DEMENTIA WITHOUT BEHAVIORAL DISTURBANCE: Chronic | Status: INACTIVE | Noted: 2018-02-15 | Resolved: 2018-12-23

## 2018-12-23 LAB
ANION GAP SERPL CALC-SCNC: 12 MMOL/L — SIGNIFICANT CHANGE UP (ref 5–17)
BUN SERPL-MCNC: 39 MG/DL — HIGH (ref 7–23)
CALCIUM SERPL-MCNC: 8.5 MG/DL — SIGNIFICANT CHANGE UP (ref 8.4–10.5)
CHLORIDE SERPL-SCNC: 104 MMOL/L — SIGNIFICANT CHANGE UP (ref 96–108)
CK SERPL-CCNC: 66 U/L — SIGNIFICANT CHANGE UP (ref 30–200)
CO2 SERPL-SCNC: 23 MMOL/L — SIGNIFICANT CHANGE UP (ref 22–31)
CREAT SERPL-MCNC: 2.55 MG/DL — HIGH (ref 0.5–1.3)
GLUCOSE SERPL-MCNC: 131 MG/DL — HIGH (ref 70–99)
HCT VFR BLD CALC: 32.1 % — LOW (ref 39–50)
HGB BLD-MCNC: 10.6 G/DL — LOW (ref 13–17)
INR BLD: 2.55 RATIO — HIGH (ref 0.88–1.16)
MAGNESIUM SERPL-MCNC: 1.7 MG/DL — SIGNIFICANT CHANGE UP (ref 1.6–2.6)
MCHC RBC-ENTMCNC: 32 PG — SIGNIFICANT CHANGE UP (ref 27–34)
MCHC RBC-ENTMCNC: 33 GM/DL — SIGNIFICANT CHANGE UP (ref 32–36)
MCV RBC AUTO: 97 FL — SIGNIFICANT CHANGE UP (ref 80–100)
PHOSPHATE SERPL-MCNC: 3.1 MG/DL — SIGNIFICANT CHANGE UP (ref 2.5–4.5)
PLATELET # BLD AUTO: 122 K/UL — LOW (ref 150–400)
POTASSIUM SERPL-MCNC: 4.5 MMOL/L — SIGNIFICANT CHANGE UP (ref 3.5–5.3)
POTASSIUM SERPL-SCNC: 4.5 MMOL/L — SIGNIFICANT CHANGE UP (ref 3.5–5.3)
PROTHROM AB SERPL-ACNC: 30.2 SEC — HIGH (ref 10–12.9)
RBC # BLD: 3.31 M/UL — LOW (ref 4.2–5.8)
RBC # FLD: 14.1 % — SIGNIFICANT CHANGE UP (ref 10.3–14.5)
SODIUM SERPL-SCNC: 139 MMOL/L — SIGNIFICANT CHANGE UP (ref 135–145)
WBC # BLD: 8.2 K/UL — SIGNIFICANT CHANGE UP (ref 3.8–10.5)
WBC # FLD AUTO: 8.2 K/UL — SIGNIFICANT CHANGE UP (ref 3.8–10.5)

## 2018-12-23 RX ORDER — CLONAZEPAM 1 MG
1.5 TABLET ORAL AT BEDTIME
Qty: 0 | Refills: 0 | Status: DISCONTINUED | OUTPATIENT
Start: 2018-12-23 | End: 2018-12-28

## 2018-12-23 RX ORDER — TAMSULOSIN HYDROCHLORIDE 0.4 MG/1
0.4 CAPSULE ORAL AT BEDTIME
Qty: 0 | Refills: 0 | Status: DISCONTINUED | OUTPATIENT
Start: 2018-12-23 | End: 2018-12-28

## 2018-12-23 RX ORDER — WARFARIN SODIUM 2.5 MG/1
3 TABLET ORAL ONCE
Qty: 0 | Refills: 0 | Status: COMPLETED | OUTPATIENT
Start: 2018-12-23 | End: 2018-12-23

## 2018-12-23 RX ORDER — LANOLIN ALCOHOL/MO/W.PET/CERES
1 CREAM (GRAM) TOPICAL AT BEDTIME
Qty: 0 | Refills: 0 | Status: DISCONTINUED | OUTPATIENT
Start: 2018-12-23 | End: 2018-12-23

## 2018-12-23 RX ORDER — MIRTAZAPINE 45 MG/1
15 TABLET, ORALLY DISINTEGRATING ORAL AT BEDTIME
Qty: 0 | Refills: 0 | Status: DISCONTINUED | OUTPATIENT
Start: 2018-12-23 | End: 2018-12-28

## 2018-12-23 RX ORDER — FLUTICASONE PROPIONATE 0.5 MG/G
0 CREAM TOPICAL
Qty: 30 | Refills: 0 | COMMUNITY

## 2018-12-23 RX ORDER — ALLOPURINOL 300 MG
0 TABLET ORAL
Qty: 90 | Refills: 0 | COMMUNITY

## 2018-12-23 RX ORDER — OXYCODONE AND ACETAMINOPHEN 5; 325 MG/1; MG/1
2 TABLET ORAL EVERY 6 HOURS
Qty: 0 | Refills: 0 | Status: DISCONTINUED | OUTPATIENT
Start: 2018-12-23 | End: 2018-12-24

## 2018-12-23 RX ORDER — PANTOPRAZOLE SODIUM 20 MG/1
1 TABLET, DELAYED RELEASE ORAL
Qty: 0 | Refills: 0 | COMMUNITY

## 2018-12-23 RX ORDER — WARFARIN SODIUM 2.5 MG/1
3 TABLET ORAL ONCE
Qty: 0 | Refills: 0 | Status: DISCONTINUED | OUTPATIENT
Start: 2018-12-23 | End: 2018-12-23

## 2018-12-23 RX ORDER — LACTOBACILLUS ACIDOPHILUS 100MM CELL
1 CAPSULE ORAL DAILY
Qty: 0 | Refills: 0 | Status: DISCONTINUED | OUTPATIENT
Start: 2018-12-23 | End: 2018-12-28

## 2018-12-23 RX ORDER — PANTOPRAZOLE SODIUM 20 MG/1
40 TABLET, DELAYED RELEASE ORAL
Qty: 0 | Refills: 0 | Status: DISCONTINUED | OUTPATIENT
Start: 2018-12-23 | End: 2018-12-28

## 2018-12-23 RX ORDER — DOCUSATE SODIUM 100 MG
100 CAPSULE ORAL THREE TIMES A DAY
Qty: 0 | Refills: 0 | Status: DISCONTINUED | OUTPATIENT
Start: 2018-12-23 | End: 2018-12-28

## 2018-12-23 RX ORDER — LANOLIN ALCOHOL/MO/W.PET/CERES
3 CREAM (GRAM) TOPICAL AT BEDTIME
Qty: 0 | Refills: 0 | Status: DISCONTINUED | OUTPATIENT
Start: 2018-12-23 | End: 2018-12-28

## 2018-12-23 RX ORDER — MIRTAZAPINE 45 MG/1
15 TABLET, ORALLY DISINTEGRATING ORAL AT BEDTIME
Qty: 0 | Refills: 0 | Status: DISCONTINUED | OUTPATIENT
Start: 2018-12-23 | End: 2018-12-23

## 2018-12-23 RX ORDER — LEVOTHYROXINE SODIUM 125 MCG
125 TABLET ORAL DAILY
Qty: 0 | Refills: 0 | Status: DISCONTINUED | OUTPATIENT
Start: 2018-12-23 | End: 2018-12-28

## 2018-12-23 RX ORDER — DONEPEZIL HYDROCHLORIDE 10 MG/1
10 TABLET, FILM COATED ORAL AT BEDTIME
Qty: 0 | Refills: 0 | Status: DISCONTINUED | OUTPATIENT
Start: 2018-12-23 | End: 2018-12-28

## 2018-12-23 RX ORDER — MEMANTINE HYDROCHLORIDE 10 MG/1
10 TABLET ORAL EVERY 12 HOURS
Qty: 0 | Refills: 0 | Status: DISCONTINUED | OUTPATIENT
Start: 2018-12-23 | End: 2018-12-28

## 2018-12-23 RX ORDER — AMIODARONE HYDROCHLORIDE 400 MG/1
200 TABLET ORAL DAILY
Qty: 0 | Refills: 0 | Status: DISCONTINUED | OUTPATIENT
Start: 2018-12-23 | End: 2018-12-28

## 2018-12-23 RX ORDER — ACETAMINOPHEN 500 MG
650 TABLET ORAL EVERY 6 HOURS
Qty: 0 | Refills: 0 | Status: DISCONTINUED | OUTPATIENT
Start: 2018-12-23 | End: 2018-12-26

## 2018-12-23 RX ORDER — POLYETHYLENE GLYCOL 3350 17 G/17G
17 POWDER, FOR SOLUTION ORAL DAILY
Qty: 0 | Refills: 0 | Status: DISCONTINUED | OUTPATIENT
Start: 2018-12-23 | End: 2018-12-28

## 2018-12-23 RX ORDER — ALLOPURINOL 300 MG
300 TABLET ORAL DAILY
Qty: 0 | Refills: 0 | Status: DISCONTINUED | OUTPATIENT
Start: 2018-12-23 | End: 2018-12-28

## 2018-12-23 RX ORDER — SENNA PLUS 8.6 MG/1
2 TABLET ORAL AT BEDTIME
Qty: 0 | Refills: 0 | Status: DISCONTINUED | OUTPATIENT
Start: 2018-12-23 | End: 2018-12-28

## 2018-12-23 RX ORDER — HYDROMORPHONE HYDROCHLORIDE 2 MG/ML
1 INJECTION INTRAMUSCULAR; INTRAVENOUS; SUBCUTANEOUS EVERY 4 HOURS
Qty: 0 | Refills: 0 | Status: DISCONTINUED | OUTPATIENT
Start: 2018-12-23 | End: 2018-12-24

## 2018-12-23 RX ADMIN — WARFARIN SODIUM 3 MILLIGRAM(S): 2.5 TABLET ORAL at 22:59

## 2018-12-23 RX ADMIN — Medication 100 MILLIGRAM(S): at 22:58

## 2018-12-23 RX ADMIN — MEMANTINE HYDROCHLORIDE 10 MILLIGRAM(S): 10 TABLET ORAL at 09:47

## 2018-12-23 RX ADMIN — MORPHINE SULFATE 4 MILLIGRAM(S): 50 CAPSULE, EXTENDED RELEASE ORAL at 00:45

## 2018-12-23 RX ADMIN — TAMSULOSIN HYDROCHLORIDE 0.4 MILLIGRAM(S): 0.4 CAPSULE ORAL at 00:40

## 2018-12-23 RX ADMIN — AMIODARONE HYDROCHLORIDE 200 MILLIGRAM(S): 400 TABLET ORAL at 09:47

## 2018-12-23 RX ADMIN — OXYCODONE AND ACETAMINOPHEN 2 TABLET(S): 5; 325 TABLET ORAL at 14:22

## 2018-12-23 RX ADMIN — Medication 100 MILLIGRAM(S): at 14:21

## 2018-12-23 RX ADMIN — PANTOPRAZOLE SODIUM 40 MILLIGRAM(S): 20 TABLET, DELAYED RELEASE ORAL at 09:47

## 2018-12-23 RX ADMIN — HYDROMORPHONE HYDROCHLORIDE 1 MILLIGRAM(S): 2 INJECTION INTRAMUSCULAR; INTRAVENOUS; SUBCUTANEOUS at 16:47

## 2018-12-23 RX ADMIN — OXYCODONE AND ACETAMINOPHEN 2 TABLET(S): 5; 325 TABLET ORAL at 02:25

## 2018-12-23 RX ADMIN — OXYCODONE AND ACETAMINOPHEN 2 TABLET(S): 5; 325 TABLET ORAL at 04:03

## 2018-12-23 RX ADMIN — Medication 1 TABLET(S): at 12:35

## 2018-12-23 RX ADMIN — Medication 125 MICROGRAM(S): at 12:34

## 2018-12-23 RX ADMIN — HYDROMORPHONE HYDROCHLORIDE 1 MILLIGRAM(S): 2 INJECTION INTRAMUSCULAR; INTRAVENOUS; SUBCUTANEOUS at 07:21

## 2018-12-23 RX ADMIN — SENNA PLUS 2 TABLET(S): 8.6 TABLET ORAL at 22:58

## 2018-12-23 RX ADMIN — Medication 1 MILLIGRAM(S): at 02:27

## 2018-12-23 RX ADMIN — WARFARIN SODIUM 3 MILLIGRAM(S): 2.5 TABLET ORAL at 03:36

## 2018-12-23 RX ADMIN — HYDROMORPHONE HYDROCHLORIDE 1 MILLIGRAM(S): 2 INJECTION INTRAMUSCULAR; INTRAVENOUS; SUBCUTANEOUS at 10:59

## 2018-12-23 RX ADMIN — Medication 3 MILLIGRAM(S): at 22:58

## 2018-12-23 RX ADMIN — HYDROMORPHONE HYDROCHLORIDE 1 MILLIGRAM(S): 2 INJECTION INTRAMUSCULAR; INTRAVENOUS; SUBCUTANEOUS at 00:40

## 2018-12-23 RX ADMIN — HYDROMORPHONE HYDROCHLORIDE 1 MILLIGRAM(S): 2 INJECTION INTRAMUSCULAR; INTRAVENOUS; SUBCUTANEOUS at 23:31

## 2018-12-23 RX ADMIN — Medication 300 MILLIGRAM(S): at 12:34

## 2018-12-23 RX ADMIN — POLYETHYLENE GLYCOL 3350 17 GRAM(S): 17 POWDER, FOR SOLUTION ORAL at 14:21

## 2018-12-23 RX ADMIN — TAMSULOSIN HYDROCHLORIDE 0.4 MILLIGRAM(S): 0.4 CAPSULE ORAL at 22:59

## 2018-12-23 RX ADMIN — MIRTAZAPINE 15 MILLIGRAM(S): 45 TABLET, ORALLY DISINTEGRATING ORAL at 23:00

## 2018-12-23 RX ADMIN — HYDROMORPHONE HYDROCHLORIDE 1 MILLIGRAM(S): 2 INJECTION INTRAMUSCULAR; INTRAVENOUS; SUBCUTANEOUS at 23:01

## 2018-12-23 RX ADMIN — Medication 1.5 MILLIGRAM(S): at 22:57

## 2018-12-23 RX ADMIN — MIRTAZAPINE 15 MILLIGRAM(S): 45 TABLET, ORALLY DISINTEGRATING ORAL at 03:36

## 2018-12-23 RX ADMIN — MEMANTINE HYDROCHLORIDE 10 MILLIGRAM(S): 10 TABLET ORAL at 23:00

## 2018-12-23 RX ADMIN — Medication 1 APPLICATION(S): at 14:21

## 2018-12-23 RX ADMIN — HYDROMORPHONE HYDROCHLORIDE 1 MILLIGRAM(S): 2 INJECTION INTRAMUSCULAR; INTRAVENOUS; SUBCUTANEOUS at 06:46

## 2018-12-23 RX ADMIN — DONEPEZIL HYDROCHLORIDE 10 MILLIGRAM(S): 10 TABLET, FILM COATED ORAL at 23:00

## 2018-12-23 RX ADMIN — HYDROMORPHONE HYDROCHLORIDE 1 MILLIGRAM(S): 2 INJECTION INTRAMUSCULAR; INTRAVENOUS; SUBCUTANEOUS at 11:33

## 2018-12-23 RX ADMIN — Medication 1.5 MILLIGRAM(S): at 00:42

## 2018-12-23 NOTE — PROGRESS NOTE ADULT - ASSESSMENT
72 y/o M with h/o Afib (on coumadin and s/p PPM), CKD, Chronic back and knee pain 2/2 arthritis s/p meniscal tears, Insomnia, anxiety, mild memory deficits but A&Ox4 @ baseline, CLL+waldenstrom macroglobulinemia on Ninzaro s/p rash from rituxan, now presenting with right hip pain after fall today after walking up steps (did not fall down steps).

## 2018-12-23 NOTE — H&P ADULT - ASSESSMENT
74 y/o M with h/o Afib (on coumadin and s/p PPM), CKD, Chronic back and knee pain 2/2 arthritis s/p meniscal tears, Insomnia, anxiety, mild memory deficits but A&Ox4 @ baseline, CLL+waldenstrom macroglobulinemia on Ninzaro s/p rash from rituxan, now presenting with right hip pain after fall today after walking up steps (did not fall down steps)

## 2018-12-23 NOTE — PROGRESS NOTE ADULT - SUBJECTIVE AND OBJECTIVE BOX
Brayden Harvey  PGY-2 Resident Physician   Pager [phone] or [phone] from 7am to 7pm, after 7pm    Patient is a 73y old  Male who presents with a chief complaint of Fall, pubic ramus fractures (23 Dec 2018 11:42)    SUBJECTIVE / OVERNIGHT EVENTS:  No acute overnight events.     MEDICATIONS  (STANDING):  allopurinol 300 milliGRAM(s) Oral daily  amiodarone    Tablet 200 milliGRAM(s) Oral daily  betamethasone valerate 0.1% Ointment 1 Application(s) Topical daily  clonazePAM Tablet 1.5 milliGRAM(s) Oral at bedtime  docusate sodium 100 milliGRAM(s) Oral three times a day  donepezil 10 milliGRAM(s) Oral at bedtime  lactobacillus acidophilus 1 Tablet(s) Oral daily  levothyroxine 125 MICROGram(s) Oral daily  melatonin 3 milliGRAM(s) Oral at bedtime  memantine 10 milliGRAM(s) Oral every 12 hours  mirtazapine 15 milliGRAM(s) Oral at bedtime  multivitamin 1 Tablet(s) Oral daily  pantoprazole    Tablet 40 milliGRAM(s) Oral before breakfast  polyethylene glycol 3350 17 Gram(s) Oral daily  senna 2 Tablet(s) Oral at bedtime  tamsulosin 0.4 milliGRAM(s) Oral at bedtime  warfarin 3 milliGRAM(s) Oral once    MEDICATIONS  (PRN):  acetaminophen   Tablet .. 650 milliGRAM(s) Oral every 6 hours PRN Mild Pain (1 - 3)  bisacodyl 5 milliGRAM(s) Oral every 12 hours PRN Constipation  HYDROmorphone  Injectable 1 milliGRAM(s) IV Push every 4 hours PRN Severe Pain (7 - 10)  oxyCODONE    5 mG/acetaminophen 325 mG 2 Tablet(s) Oral every 6 hours PRN Moderate Pain (4 - 6)    Allergies:  No Known Allergies  Intolerances  rituximab (Rash)    Vital Signs Last 24 Hrs  T(C): 36.8 (23 Dec 2018 14:48), Max: 37.3 (23 Dec 2018 01:52)  T(F): 98.3 (23 Dec 2018 14:48), Max: 99.1 (23 Dec 2018 01:52)  HR: 72 (23 Dec 2018 14:48) (72 - 106)  BP: 130/71 (23 Dec 2018 14:48) (130/71 - 146/82)  RR: 158 (23 Dec 2018 14:48) (18 - 158)  SpO2: 98% (23 Dec 2018 14:48) (95% - 98%)  Daily Height in cm: 187.96 (23 Dec 2018 01:52)    CAPILLARY BLOOD GLUCOSE    I&O's Summary    22 Dec 2018 07:  -  23 Dec 2018 07:00  --------------------------------------------------------  IN: 600 mL / OUT: 300 mL / NET: 300 mL    23 Dec 2018 07:  -  23 Dec 2018 16:09  --------------------------------------------------------  IN: 0 mL / OUT: 320 mL / NET: -320 mL    PHYSICAL EXAM:  GENERAL: NAD, well-developed  HEAD:  Atraumatic, Normocephalic  EYES: EOMI, PERRLA, conjunctiva and sclera clear  NECK: Supple, No JVD  CHEST/LUNG: Clear to auscultation bilaterally; No wheeze  HEART: Regular rate and rhythm; Normal S1 S2, No murmurs, rubs, or gallops  ABDOMEN: Soft, Nontender, Nondistended; Bowel sounds present  EXTREMITIES:  2+ Peripheral Pulses, No clubbing, cyanosis, or edema  PSYCH: AAOx3  SKIN: No rashes or lesions    LABS:             10.6   8.2   )-----------( 122      ( 23 Dec 2018 07:14 )             32.1     Hgb Trend: 10.6<--, 12.4<--  12    139  |  104  |  39<H>  ----------------------------<  131<H>  4.5   |  23  |  2.55<H>    Ca    8.5      23 Dec 2018 07:14  Phos  3.1       Mg     1.7         TPro  6.8  /  Alb  4.2  /  TBili  0.3  /  DBili  x   /  AST  26  /  ALT  15  /  AlkPhos  90      Creatinine Trend: 2.55<--, 2.58<--  LIVER FUNCTIONS - ( 22 Dec 2018 19:25 )  Alb: 4.2 g/dL / Pro: 6.8 g/dL / ALK PHOS: 90 U/L / ALT: 15 U/L / AST: 26 U/L / GGT: x           PT/INR - ( 23 Dec 2018 07:14 )   PT: 30.2 sec;   INR: 2.55 ratio    PTT - ( 22 Dec 2018 19:25 )  PTT:37.3 sec  CARDIAC MARKERS ( 23 Dec 2018 07:14 )  x     / x     / 66 U/L / x     / x      CARDIAC MARKERS ( 22 Dec 2018 19:25 )  x     / x     / 88 U/L / x     / x        Urinalysis Basic - ( 22 Dec 2018 22:41 )  Color: Yellow / Appearance: Clear / S.022 / pH: x  Gluc: x / Ketone: Negative  / Bili: Negative / Urobili: Negative   Blood: x / Protein: 100 mg/dL / Nitrite: Negative   Leuk Esterase: Negative / RBC: 56 /hpf / WBC 6 /hpf   Sq Epi: x / Non Sq Epi: 1 /hpf / Bacteria: Negative        RADIOLOGY & ADDITIONAL TESTS:    Imaging Personally Reviewed:    Consultant(s) Notes Reviewed:      Care Discussed with Consultants/Other Providers:

## 2018-12-23 NOTE — PROGRESS NOTE ADULT - PROBLEM SELECTOR PLAN 1
Pain control with dilaudid and percocent and tylenol for severe/moderate/mild pain. Currently still in severe pain, will uptitrate dilaudid to 1mg q4h prn severe pain  Will need interrogation of PPM in AM to ensure no events though currently in sinus rhythm. Did have brief feeling of weakness immediately prior to fall that is now resolved though patient reports that he had taken his heartrate which was within expected range at that time.  Do also have some concern about polypharmacy of multiple insomnia medications including remeron, klonopin, intermezzo, melantonin. However patient reports feeling alert and well prior to fall. Will hold intermezzo (was recently started), lower melatonin dose here (melatonin is effective at much lower doses than patient's normal 10mg dose).  - Added bowel regimen.

## 2018-12-23 NOTE — H&P ADULT - NSHPLABSRESULTS_GEN_ALL_CORE
Labs personally reviewed:                        12.4   21.5  )-----------( 134      ( 22 Dec 2018 19:25 )             36.8           140  |  103  |  40<H>  ----------------------------<  134<H>  4.5   |  21<L>  |  2.58<H>    Ca    9.1      22 Dec 2018 19:25    TPro  6.8  /  Alb  4.2  /  TBili  0.3  /  DBili  x   /  AST  26  /  ALT  15  /  AlkPhos  90              LIVER FUNCTIONS - ( 22 Dec 2018 19:25 )  Alb: 4.2 g/dL / Pro: 6.8 g/dL / ALK PHOS: 90 U/L / ALT: 15 U/L / AST: 26 U/L / GGT: x             PT/INR - ( 22 Dec 2018 19:25 )   PT: 27.4 sec;   INR: 2.32 ratio         PTT - ( 22 Dec 2018 19:25 )  PTT:37.3 sec    Urinalysis Basic - ( 22 Dec 2018 22:41 )    Color: Yellow / Appearance: Clear / S.022 / pH: x  Gluc: x / Ketone: Negative  / Bili: Negative / Urobili: Negative   Blood: x / Protein: 100 mg/dL / Nitrite: Negative   Leuk Esterase: Negative / RBC: 56 /hpf / WBC 6 /hpf   Sq Epi: x / Non Sq Epi: 1 /hpf / Bacteria: Negative    CXR personally reviewed-grossly clear lungs  Xray of R femur with acute superior and inferior right pubic rami fractures    EKG personally reviewed-NSR Labs personally reviewed:                        12.4   21.5  )-----------( 134      ( 22 Dec 2018 19:25 )             36.8           140  |  103  |  40<H>  ----------------------------<  134<H>  4.5   |  21<L>  |  2.58<H>    Ca    9.1      22 Dec 2018 19:25    TPro  6.8  /  Alb  4.2  /  TBili  0.3  /  DBili  x   /  AST  26  /  ALT  15  /  AlkPhos  90      LIVER FUNCTIONS - ( 22 Dec 2018 19:25 )  Alb: 4.2 g/dL / Pro: 6.8 g/dL / ALK PHOS: 90 U/L / ALT: 15 U/L / AST: 26 U/L / GGT: x           PT/INR - ( 22 Dec 2018 19:25 )   PT: 27.4 sec;   INR: 2.32 ratio       PTT - ( 22 Dec 2018 19:25 )  PTT:37.3 sec    Urinalysis Basic - ( 22 Dec 2018 22:41 )    Color: Yellow / Appearance: Clear / S.022 / pH: x  Gluc: x / Ketone: Negative  / Bili: Negative / Urobili: Negative   Blood: x / Protein: 100 mg/dL / Nitrite: Negative   Leuk Esterase: Negative / RBC: 56 /hpf / WBC 6 /hpf   Sq Epi: x / Non Sq Epi: 1 /hpf / Bacteria: Negative    CXR personally reviewed-grossly clear lungs  Xray of R femur with acute superior and inferior right pubic rami fractures    EKG personally reviewed-NSR

## 2018-12-23 NOTE — H&P ADULT - HISTORY OF PRESENT ILLNESS
74 y/o M with h/o Afib (on coumadin and s/p PPM), CKD, Chronic back and knee pain 2/2 arthritis s/p meniscal tears, Insomnia, anxiety, mild memory deficits but A&Ox4 @ baseline, CLL+waldenstrom macroglobulinemia on Ninzaro s/p rash from rituxan, now presenting with right hip pain after fall today after walking up steps (did not fall down steps). Patient reports that he had been feeling "great" in the morning and had gone out to get lunch with his kids and was heading up the stairs to go home. After getting to the top step he reports suddenly feeling "weak" in his legs, and then he fell onto his right hip. Patient denies falling down the stairs. Denies hitting his head. Denies chest pain, denies palpitations, denies SOB. Denies fevers or chills, denies dysuria, denies nausea/vomiting, denies diarrhea. Denies focal neurologic symptoms. After fall patient with 10/10 pain in right hip/groin area especially with attempts to move right leg. s/p IV medications patient reports that pain is more "tolerable" but still scores pain at 10/10. Patient seen and examined and care provided late in evening 12/22/2018 though documentation begun for services provided 12/22/2018 on 12/23/2018.     72 y/o M with h/o Afib (on coumadin and s/p PPM), CKD, Chronic back and knee pain 2/2 arthritis s/p meniscal tears, Insomnia, anxiety, mild memory deficits but A&Ox4 @ baseline, CLL+waldenstrom macroglobulinemia on Ninzaro s/p rash from rituxan, now presenting with right hip pain after fall today after walking up steps (did not fall down steps). Patient reports that he had been feeling "great" in the morning and had gone out to get lunch with his kids and was heading up the stairs to go home. After getting to the top step he reports suddenly feeling "weak" in his legs, and then he fell onto his right hip. Patient denies falling down the stairs. Denies hitting his head. Denies chest pain, denies palpitations, denies SOB. Denies fevers or chills, denies dysuria, denies nausea/vomiting, denies diarrhea. Denies focal neurologic symptoms. After fall patient with 10/10 pain in right hip/groin area especially with attempts to move right leg. s/p IV medications patient reports that pain is more "tolerable" but still scores pain at 10/10.

## 2018-12-23 NOTE — H&P ADULT - PROBLEM SELECTOR PLAN 1
Pain control with dilaudid and percocent and tylenol for severe/moderate/mild pain. Currently still in severe pain, will uptitrate dilaudid to 1mg q4h prn severe pain  Will need interrogation of PPM in AM to ensure no events though currently in sinus rhythm. Did have brief feeling of weakness immediately prior to fall that is now resolved though patient reports that he had taken his heartrate which was within expected range at that time.  Do also have some concern about polypharmacy of multiple insomnia medications including remeron, klonopin, intermezzo, melantonin. However patient reports feeling alert and well prior to fall. Will hold intermezzo (was recently started), lower melatonin dose here (melatonin is effective at much lower doses than patient's normal 10mg dose).

## 2018-12-23 NOTE — CONSULT NOTE ADULT - SUBJECTIVE AND OBJECTIVE BOX
HPI:    74 y/o M with h/o Afib (on coumadin and s/p PPM), CKD, Chronic back and knee pain 2/2 arthritis s/p meniscal tears, Insomnia, anxiety, mild memory deficits but A&Ox4 @ baseline, CLL+waldenstrom macroglobulinemia on Ninzaro s/p rash from rituxan, now presenting with right hip pain after falling walking up steps (did not fall down steps). Patient reports that he had been feeling "great" in the morning and had gone out to get lunch with his kids and was heading up the stairs to go home. After getting to the top step he reports suddenly feeling "weak" in his legs, and then he fell onto his right hip. Patient denies falling down the stairs. Denies hitting his head. Denies chest pain, denies palpitations, denies SOB. Denies fevers or chills, denies dysuria, denies nausea/vomiting, denies diarrhea. Denies focal neurologic symptoms. Nephrology was consulted for elevated serrum creatinine. He has hx of CKD III and sees my partner Dr. Norman Ascencio as outpatient. Denied any NSAIDs use. No urinary sx.       PAST MEDICAL & SURGICAL HISTORY:  Memory deficit  BPH (benign prostatic hyperplasia)  [place] (hard of hearing)  Gout  CKD (chronic kidney disease)  Nephrolithiasis  Hypothyroid  Bradycardia, drug induced  Waldenstrom macroglobulinemia  Diverticulitis  Atrial fibrillation  GERD (gastroesophageal reflux disease)  Anxiety disorder  CLL (chronic lymphocytic leukemia): in remission  History of cardiac pacemaker in situ  History of appendectomy  History of cataract surgery, right: IOL  History of laparoscopic cholecystectomy: 2014  S/P hernia repair: x2  Meniscus tear: s/p removal of Meniscus 8 months ago      MEDICATIONS  (STANDING):  allopurinol 300 milliGRAM(s) Oral daily  amiodarone    Tablet 200 milliGRAM(s) Oral daily  betamethasone valerate 0.1% Ointment 1 Application(s) Topical daily  clonazePAM Tablet 1.5 milliGRAM(s) Oral at bedtime  docusate sodium 100 milliGRAM(s) Oral three times a day  donepezil 10 milliGRAM(s) Oral at bedtime  lactobacillus acidophilus 1 Tablet(s) Oral daily  levothyroxine 125 MICROGram(s) Oral daily  melatonin 3 milliGRAM(s) Oral at bedtime  memantine 10 milliGRAM(s) Oral every 12 hours  mirtazapine 15 milliGRAM(s) Oral at bedtime  multivitamin 1 Tablet(s) Oral daily  pantoprazole    Tablet 40 milliGRAM(s) Oral before breakfast  polyethylene glycol 3350 17 Gram(s) Oral daily  senna 2 Tablet(s) Oral at bedtime  tamsulosin 0.4 milliGRAM(s) Oral at bedtime  warfarin 3 milliGRAM(s) Oral once      Allergies    No Known Allergies    Intolerances    rituximab (Rash)      SOCIAL HISTORY:  Denies ETOh,Smoking,     FAMILY HISTORY:  No pertinent family history in first degree relatives      REVIEW OF SYSTEMS:      All other review of systems is negative unless indicated above.    VITAL:  T(C): , Max: 37.3 (18 @ 01:52)  T(F): , Max: 99.1 (18:52)  HR: 74 (18 @ 04:51)  BP: 133/70 (18 @ 04:51)  BP(mean): --  RR: 18 (18 @ 04:51)  SpO2: 95% (18 04:51)  Wt(kg): --    I and O's:     @ 07:01  -   @ 07:00  --------------------------------------------------------  IN: 600 mL / OUT: 300 mL / NET: 300 mL     07:01  -   @ 11:42  --------------------------------------------------------  IN: 0 mL / OUT: 320 mL / NET: -320 mL      Height (cm): 187.96 (:52)  Weight (kg): 103 (:52)  BMI (kg/m2): 29.2 (:52)  BSA (m2): 2.29 (12-23 @ 01:52)    PHYSICAL EXAM:    Constitutional: NAD  HEENT: PERRLA, EOMI,  MMM  Neck: No LAD, No JVD  Respiratory: CTAB  Cardiovascular: S1 and S2  Gastrointestinal: BS+, soft, NT/ND  Extremities: No peripheral edema  Neurological: A/O x 3, no focal deficits  Psychiatric: Normal mood, normal affect  : No Tom      LABS:                        10.6   8.2   )-----------( 122      ( 23 Dec 2018 07:14 )             32.1         139  |  104  |  39<H>  ----------------------------<  131<H>  4.5   |  23  |  2.55<H>    Ca    8.5      23 Dec 2018 07:14  Phos  3.1       Mg     1.7         TPro  6.8  /  Alb  4.2  /  TBili  0.3  /  DBili  x   /  AST  26  /  ALT  15  /  AlkPhos  90        Urine Studies:  Urinalysis Basic - ( 22 Dec 2018 22:41 )    Color: Yellow / Appearance: Clear / S.022 / pH: x  Gluc: x / Ketone: Negative  / Bili: Negative / Urobili: Negative   Blood: x / Protein: 100 mg/dL / Nitrite: Negative   Leuk Esterase: Negative / RBC: 56 /hpf / WBC 6 /hpf   Sq Epi: x / Non Sq Epi: 1 /hpf / Bacteria: Negative            RADIOLOGY & ADDITIONAL STUDIES:        ASSESSMENT:  HPI:  Patient seen and examined and care provided late in evening 2018 though documentation begun for services provided 2018 on 2018.     74 y/o M with h/o Afib (on coumadin and s/p PPM), CKD, Chronic back and knee pain 2/2 arthritis s/p meniscal tears, Insomnia, anxiety, mild memory deficits but A&Ox4 @ baseline, CLL+waldenstrom macroglobulinemia on Ninzaro s/p rash from rituxan, now presenting with closed fracture of right pubic ramus      - CKD Stage III with baseline serum creatinine of  ~ 2.5 mg/dl- stable  - Hypertension controlled  - Euvolemic  - Electrolytes controlled  - Anemia      PLAN:   - Urine Protein, Urine Creatinine  - Phosphorus  - DC IVF- has good po intake   - Post void bladder scan   - Renal dosing of meds to creatinine clearance of 30 ml/min  - Avoid nephrotoxins as able      Thank you for the courtesy of the consultation    Ysabel Herrera MD  [facility]  [phone]

## 2018-12-23 NOTE — H&P ADULT - REASON FOR ADMISSION
[facility]  Location: [place] [address] [address]  Patient #: [id number]  : 1964   / Language: Shruti Phlegm / Race: Black or   Male      History of Present Illness   The patient is a 49year old male who presents to the practice today for a transition into care. Additional reasons for visit:    Knee Pain is described as the following: The onset of the knee pain has been gradual and has been occurring in an intermittent pattern. The course has been gradually worsening. The knee pain is moderate to severe. The knee pain is characterized as a sharp stabbing. The knee pain is described as being located in the entire knee (left knee). The knee pain is aggravated by physical activity. The knee pain is relieved by rest. Previous diagnostic tests include plain radiographs and MRI. Assistive devices include bracing. Note for \"Knee Pain\": Patient's chief complaint is left knee pain. He's had knee pain for about a year. He has been fully worked up by another orthopedic surgeon who had him scheduled for surgery next Friday. The orthopedic group he was seeing stopped taking his insurance and his surgery was canceled. He states that his chief complaint is left knee pain with catching and locking. His MRI showed a torn lateral meniscus with displacement meniscal tissue and some patellofemoral joint osteoarthritis. His x-rays still showed maintained joint space. He is here today for an opinion regarding surgery. 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 He's tried anti-inflammatory medications without much relief. His main symptom is the catching and locking. Allergies  Shellfish      Family History   No pertinent family history      Social History   Alcohol use   3 times, Drinks hard liquor, Drinks wine, Occasionally drinks more than 5 drinks per occasion, per month.   Caffeine use   Carbonated beverages, Tea. Current work status   Full-time. Exercise   cycling, Occasionally, Other, walking. Marital status   . No drug use    Seat Belt Use   Always uses seat belts. Sun Exposure   Frequently. Tobacco / smoke exposure   Family members smoke outdoors only. Tobacco use   Former smoker. Medication History  No Current Medications   Medications Reconciled     Past Surgical History   Arthroscopy of Knee   left  Shoulder Surgery   left        Review of Systems   General Not Present- Appetite Loss, Chills, Fatigue, Fever, HIV Exposure, Night Sweats, Persistent Infections, Seasonal Allergies, Weight Gain and Weight Loss. Skin Not Present- Itching, Nail Changes, Poor Wound Healing, Rash, Skin Color Changes, Suspicious Lesions and Yellowish Skin Color. HEENT Not Present- Decreased Hearing, Earache, Hoarseness, Loose Teeth, Nose Bleed, Ringing in the Ears and Sore Throat. Respiratory Not Present- Bloody sputum, Chronic Cough, Difficulty Breathing, Snoring, Wakes up from Sleep Wheezing or Short of Breath and Wheezing. Gastrointestinal Not Present- Abdominal Pain, Black, Tarry Stool, Change in Bowel Habits, Cirrhosis, Constipation, Diarrhea, Difficulty Swallowing, Nausea and Vomiting. Male Genitourinary Not Present- Blood in Urine, Frequency, Painful Urination, Pelvic Pain, Trouble Starting Urinary Stream and Urgency. Musculoskeletal Present- Joint Swelling. Not Present- Back Pain, Joint Pain and Joint Stiffness. Neurological Not Present- Fainting, Headaches, Memory Loss, Numbness, Seizures, Tingling, Tremor, Unsteadiness and Weakness. Psychiatric Not Present- Anxiety, Bipolar and Depression. Endocrine Not Present- Cold Intolerance, Excessive Hunger, Excessive Thirst, Excessive Urination and Heat Intolerance. Hematology Not Present- Abnormal Bruising , Enlarged Lymph Nodes, Excessive bleeding and Skin Discoloration.     Vitals   Weight: 200 lb   Height: 71 in   Weight was reported by Fall, pubic ramus fractures patient. Height was reported by patient. Body Surface Area: 2.11 m²   Body Mass Index: 27.89 kg/m²          Physical Exam   Musculoskeletal  Global Assessment  Examination of related systems reveals - well-developed, well-nourished, in no acute distress, alert and oriented x 3, no rashes or ulcers of bilateral upper and lower extremities, head or trunk, no generalized swelling or edema of extremities, no digital clubbing or cyanosis, neurovascularly intact globally with normal deep tendon reflexes and normal coordination. Gait and Station - normal posture. Right Lower Extremity - Note: Hip was examined and has painless range of motion with negative impingement and apprehension sign. Straight leg raise and femoral nerve stretch test are negative. Lower extremity has palpable dorsalis pedis pulse. Skin is intact and foot is sensate and well-perfused. Knee was examined and is ligamentously stable x4. Knee range of motion 0-120+ degrees. There is no effusion. Randolph's testing is negative. Patella is tracking centrally. Motor testing reveals 5 out of 5 strength of the hip flexors, quads, ankle plantar flexors, and ankle dorsiflexors. Left Lower Extremity - Note: Hip was examined and has painless range of motion with negative impingement and apprehension sign. Straight leg raise and femoral nerve stretch test are negative. Lower extremity has palpable dorsalis pedis pulse. Skin is intact and foot is sensate and well-perfused. Knee was examined and is ligamentously stable x4. Patient has significant pain along the lateral joint line with a positive lateral Randolph's test. Alignment is slightly lateral. Patellofemoral joint has some crepitation. Knee range of motion is 0° to 115°. Small effusion is present. Motor testing reveals 5 out of 5 strength of the hip flexors, quads, ankle plantar flexors, and ankle dorsiflexors.         Assessment & Plan   Degenerative tear of lateral meniscus, left (728.40 M23.301)  Impression: Discussed options with the patient. He's had injections. He's tried medications. Symptoms have been present for a long period time. After long discussion he has decided to proceed with arthroscopic partial lateral meniscectomy. Risks and benefits were discussed. Current Plans  Pt Education - How to access health information online: discussed with patient and provided information. Pt Education - Educational materials were provided.: discussed with patient and provided information. X-RAY EXAM OF KNEE 3 VIEWS (75044) (left 3 views of the patient's left knee were obtained today and the joint spaces are well maintained.  Slight valgus overall alignment.)  REVIEW OF SYSTEMS: Systems were reviewed by the provider.(V49.9)        Signed by Sherryle Burden, MD

## 2018-12-23 NOTE — H&P ADULT - PMH
Anxiety disorder    Atrial fibrillation    BPH (benign prostatic hyperplasia)    Bradycardia, drug induced    CKD (chronic kidney disease)    CLL (chronic lymphocytic leukemia)  in remission  Diverticulitis    GERD (gastroesophageal reflux disease)    Gout    [place] (hard of hearing)    Hypothyroid    Memory deficit    Nephrolithiasis    Waldenstrom macroglobulinemia

## 2018-12-23 NOTE — H&P ADULT - NSHPPHYSICALEXAM_GEN_ALL_CORE
Vital Signs Last 24 Hrs  T(C): 36.8 (23 Dec 2018 00:36), Max: 37.1 (22 Dec 2018 22:54)  T(F): 98.3 (23 Dec 2018 00:36), Max: 98.8 (22 Dec 2018 22:54)  HR: 76 (23 Dec 2018 00:36) (76 - 106)  BP: 130/80 (23 Dec 2018 00:36) (130/80 - 146/82)  BP(mean): --  RR: 18 (23 Dec 2018 00:36) (18 - 20)  SpO2: 96% (23 Dec 2018 00:36) (95% - 98%)    GENERAL: No acute distress, well-developed  HEAD:  Atraumatic, Normocephalic  EYES: EOMI, PERRLA, conjunctiva and sclera clear  ENT: Oral mucosa moist  NECK: Neck supple  CHEST/LUNG: Clear to auscultation bilaterally; No wheeze, no rales, no rhonchi.    HEART: Regular rate and rhythm; No murmurs, rubs, or gallops  ABDOMEN: Soft, Nontender, Nondistended; Bowel sounds present  EXTREMITIES:  Significant right hip area pain with attempts at hip flexion. Able to rotate right leg without pain. Legs even in length. No TTP of lower leg. Some mild TTP over bones of right groin area.   VASCULAR: Posterior tibialis pulses intact bilaterally  PSYCH: Normal behavior, normal affect  NEUROLOGY: AAOx3, good recall of recent events, excellent recall of home medications (verifiable against pharmacy records)  SKIN: grossly warm and dry

## 2018-12-23 NOTE — PATIENT PROFILE ADULT - LIVES WITH
8yo male with n/v. No diarrhea, no tenderness on exam. No RLQ tenderness on exam. Will give zofran, CXR (bilateral rhonchi on exam) r/o infection. Reassess.
spouse

## 2018-12-24 DIAGNOSIS — Z29.9 ENCOUNTER FOR PROPHYLACTIC MEASURES, UNSPECIFIED: ICD-10-CM

## 2018-12-24 LAB
ANION GAP SERPL CALC-SCNC: 12 MMOL/L — SIGNIFICANT CHANGE UP (ref 5–17)
BASOPHILS # BLD AUTO: 0 K/UL — SIGNIFICANT CHANGE UP (ref 0–0.2)
BASOPHILS NFR BLD AUTO: 0.3 % — SIGNIFICANT CHANGE UP (ref 0–2)
BLD GP AB SCN SERPL QL: NEGATIVE — SIGNIFICANT CHANGE UP
BUN SERPL-MCNC: 33 MG/DL — HIGH (ref 7–23)
CALCIUM SERPL-MCNC: 8.7 MG/DL — SIGNIFICANT CHANGE UP (ref 8.4–10.5)
CHLORIDE SERPL-SCNC: 104 MMOL/L — SIGNIFICANT CHANGE UP (ref 96–108)
CO2 SERPL-SCNC: 23 MMOL/L — SIGNIFICANT CHANGE UP (ref 22–31)
CREAT SERPL-MCNC: 2.39 MG/DL — HIGH (ref 0.5–1.3)
CULTURE RESULTS: SIGNIFICANT CHANGE UP
EOSINOPHIL # BLD AUTO: 0.3 K/UL — SIGNIFICANT CHANGE UP (ref 0–0.5)
EOSINOPHIL NFR BLD AUTO: 5.5 % — SIGNIFICANT CHANGE UP (ref 0–6)
GLUCOSE SERPL-MCNC: 114 MG/DL — HIGH (ref 70–99)
HCT VFR BLD CALC: 29.8 % — LOW (ref 39–50)
HGB BLD-MCNC: 10 G/DL — LOW (ref 13–17)
INR BLD: 2.61 RATIO — HIGH (ref 0.88–1.16)
LYMPHOCYTES # BLD AUTO: 1.4 K/UL — SIGNIFICANT CHANGE UP (ref 1–3.3)
LYMPHOCYTES # BLD AUTO: 23.5 % — SIGNIFICANT CHANGE UP (ref 13–44)
MAGNESIUM SERPL-MCNC: 1.8 MG/DL — SIGNIFICANT CHANGE UP (ref 1.6–2.6)
MCHC RBC-ENTMCNC: 32.4 PG — SIGNIFICANT CHANGE UP (ref 27–34)
MCHC RBC-ENTMCNC: 33.6 GM/DL — SIGNIFICANT CHANGE UP (ref 32–36)
MCV RBC AUTO: 96.5 FL — SIGNIFICANT CHANGE UP (ref 80–100)
MONOCYTES # BLD AUTO: 0.8 K/UL — SIGNIFICANT CHANGE UP (ref 0–0.9)
MONOCYTES NFR BLD AUTO: 13 % — SIGNIFICANT CHANGE UP (ref 2–14)
NEUTROPHILS # BLD AUTO: 3.6 K/UL — SIGNIFICANT CHANGE UP (ref 1.8–7.4)
NEUTROPHILS NFR BLD AUTO: 57.7 % — SIGNIFICANT CHANGE UP (ref 43–77)
PHOSPHATE SERPL-MCNC: 3.2 MG/DL — SIGNIFICANT CHANGE UP (ref 2.5–4.5)
PLATELET # BLD AUTO: 110 K/UL — LOW (ref 150–400)
POTASSIUM SERPL-MCNC: 4.2 MMOL/L — SIGNIFICANT CHANGE UP (ref 3.5–5.3)
POTASSIUM SERPL-SCNC: 4.2 MMOL/L — SIGNIFICANT CHANGE UP (ref 3.5–5.3)
PROTHROM AB SERPL-ACNC: 30.6 SEC — HIGH (ref 10–12.9)
RBC # BLD: 3.09 M/UL — LOW (ref 4.2–5.8)
RBC # FLD: 13.8 % — SIGNIFICANT CHANGE UP (ref 10.3–14.5)
RH IG SCN BLD-IMP: POSITIVE — SIGNIFICANT CHANGE UP
SODIUM SERPL-SCNC: 139 MMOL/L — SIGNIFICANT CHANGE UP (ref 135–145)
SPECIMEN SOURCE: SIGNIFICANT CHANGE UP
WBC # BLD: 6.2 K/UL — SIGNIFICANT CHANGE UP (ref 3.8–10.5)
WBC # FLD AUTO: 6.2 K/UL — SIGNIFICANT CHANGE UP (ref 3.8–10.5)

## 2018-12-24 PROCEDURE — 99223 1ST HOSP IP/OBS HIGH 75: CPT | Mod: GC

## 2018-12-24 RX ORDER — POLYETHYLENE GLYCOL 3350 17 G/17G
17 POWDER, FOR SOLUTION ORAL ONCE
Qty: 0 | Refills: 0 | Status: COMPLETED | OUTPATIENT
Start: 2018-12-24 | End: 2018-12-24

## 2018-12-24 RX ORDER — HYDROMORPHONE HYDROCHLORIDE 2 MG/ML
0.5 INJECTION INTRAMUSCULAR; INTRAVENOUS; SUBCUTANEOUS EVERY 4 HOURS
Qty: 0 | Refills: 0 | Status: DISCONTINUED | OUTPATIENT
Start: 2018-12-24 | End: 2018-12-25

## 2018-12-24 RX ORDER — WARFARIN SODIUM 2.5 MG/1
3 TABLET ORAL ONCE
Qty: 0 | Refills: 0 | Status: COMPLETED | OUTPATIENT
Start: 2018-12-24 | End: 2018-12-24

## 2018-12-24 RX ORDER — OXYCODONE AND ACETAMINOPHEN 5; 325 MG/1; MG/1
1 TABLET ORAL EVERY 6 HOURS
Qty: 0 | Refills: 0 | Status: DISCONTINUED | OUTPATIENT
Start: 2018-12-24 | End: 2018-12-26

## 2018-12-24 RX ADMIN — Medication 100 MILLIGRAM(S): at 22:36

## 2018-12-24 RX ADMIN — HYDROMORPHONE HYDROCHLORIDE 0.5 MILLIGRAM(S): 2 INJECTION INTRAMUSCULAR; INTRAVENOUS; SUBCUTANEOUS at 15:30

## 2018-12-24 RX ADMIN — HYDROMORPHONE HYDROCHLORIDE 0.5 MILLIGRAM(S): 2 INJECTION INTRAMUSCULAR; INTRAVENOUS; SUBCUTANEOUS at 15:08

## 2018-12-24 RX ADMIN — SENNA PLUS 2 TABLET(S): 8.6 TABLET ORAL at 22:36

## 2018-12-24 RX ADMIN — Medication 3 MILLIGRAM(S): at 22:36

## 2018-12-24 RX ADMIN — HYDROMORPHONE HYDROCHLORIDE 1 MILLIGRAM(S): 2 INJECTION INTRAMUSCULAR; INTRAVENOUS; SUBCUTANEOUS at 10:06

## 2018-12-24 RX ADMIN — Medication 125 MICROGRAM(S): at 06:05

## 2018-12-24 RX ADMIN — Medication 1.5 MILLIGRAM(S): at 22:36

## 2018-12-24 RX ADMIN — TAMSULOSIN HYDROCHLORIDE 0.4 MILLIGRAM(S): 0.4 CAPSULE ORAL at 22:38

## 2018-12-24 RX ADMIN — MIRTAZAPINE 15 MILLIGRAM(S): 45 TABLET, ORALLY DISINTEGRATING ORAL at 23:26

## 2018-12-24 RX ADMIN — Medication 1 APPLICATION(S): at 11:22

## 2018-12-24 RX ADMIN — Medication 1 TABLET(S): at 11:22

## 2018-12-24 RX ADMIN — OXYCODONE AND ACETAMINOPHEN 1 TABLET(S): 5; 325 TABLET ORAL at 14:42

## 2018-12-24 RX ADMIN — HYDROMORPHONE HYDROCHLORIDE 0.5 MILLIGRAM(S): 2 INJECTION INTRAMUSCULAR; INTRAVENOUS; SUBCUTANEOUS at 15:38

## 2018-12-24 RX ADMIN — HYDROMORPHONE HYDROCHLORIDE 1 MILLIGRAM(S): 2 INJECTION INTRAMUSCULAR; INTRAVENOUS; SUBCUTANEOUS at 19:11

## 2018-12-24 RX ADMIN — WARFARIN SODIUM 3 MILLIGRAM(S): 2.5 TABLET ORAL at 22:36

## 2018-12-24 RX ADMIN — OXYCODONE AND ACETAMINOPHEN 1 TABLET(S): 5; 325 TABLET ORAL at 23:26

## 2018-12-24 RX ADMIN — AMIODARONE HYDROCHLORIDE 200 MILLIGRAM(S): 400 TABLET ORAL at 06:04

## 2018-12-24 RX ADMIN — MEMANTINE HYDROCHLORIDE 10 MILLIGRAM(S): 10 TABLET ORAL at 11:21

## 2018-12-24 RX ADMIN — HYDROMORPHONE HYDROCHLORIDE 1 MILLIGRAM(S): 2 INJECTION INTRAMUSCULAR; INTRAVENOUS; SUBCUTANEOUS at 06:06

## 2018-12-24 RX ADMIN — Medication 100 MILLIGRAM(S): at 06:05

## 2018-12-24 RX ADMIN — PANTOPRAZOLE SODIUM 40 MILLIGRAM(S): 20 TABLET, DELAYED RELEASE ORAL at 06:05

## 2018-12-24 RX ADMIN — Medication 300 MILLIGRAM(S): at 11:21

## 2018-12-24 RX ADMIN — OXYCODONE AND ACETAMINOPHEN 1 TABLET(S): 5; 325 TABLET ORAL at 17:45

## 2018-12-24 RX ADMIN — DONEPEZIL HYDROCHLORIDE 10 MILLIGRAM(S): 10 TABLET, FILM COATED ORAL at 22:37

## 2018-12-24 RX ADMIN — POLYETHYLENE GLYCOL 3350 17 GRAM(S): 17 POWDER, FOR SOLUTION ORAL at 22:37

## 2018-12-24 RX ADMIN — MEMANTINE HYDROCHLORIDE 10 MILLIGRAM(S): 10 TABLET ORAL at 18:02

## 2018-12-24 RX ADMIN — HYDROMORPHONE HYDROCHLORIDE 1 MILLIGRAM(S): 2 INJECTION INTRAMUSCULAR; INTRAVENOUS; SUBCUTANEOUS at 06:36

## 2018-12-24 NOTE — CONSULT NOTE ADULT - SUBJECTIVE AND OBJECTIVE BOX
HPI:  74 y/o M with h/o Afib (on coumadin and s/p PPM), CKD, Chronic back and knee pain 2/2 arthritis s/p meniscal tears, mild memory deficits, CLL transformed to waldenstrom macroglobulinemia on ninlaro/dex who presented with right hip pain after fall. He tripped on stairs after getting toe caught, resulting in mechanical fall. After getting to the top step he reports suddenly feeling "weak" in his legs, and then he fell onto his right hip. Patient denies falling down the stairs. Denies hitting his head. Denies chest pain, denies palpitations, denies SOB. Denies fevers or chills, denies dysuria, denies nausea/vomiting, denies diarrhea. Denies focal neurologic symptoms. After fall patient with 10/10 pain in right hip/groin area especially with attempts to move right leg. s/p IV medications patient reports that pain is more "tolerable" but still scores pain at 10/10. (23 Dec 2018 00:37)      PAST MEDICAL & SURGICAL HISTORY:  Memory deficit  BPH (benign prostatic hyperplasia)  [place] (hard of hearing)  Gout  CKD (chronic kidney disease)  Nephrolithiasis  Hypothyroid  Bradycardia, drug induced  Waldenstrom macroglobulinemia  Diverticulitis  Atrial fibrillation  GERD (gastroesophageal reflux disease)  Anxiety disorder  CLL (chronic lymphocytic leukemia): in remission  History of cardiac pacemaker in situ  History of appendectomy  History of cataract surgery, right: IOL  History of laparoscopic cholecystectomy: 4/2014  S/P hernia repair: x2  Meniscus tear: s/p removal of Meniscus 8 months ago      Allergies    No Known Allergies    Intolerances    rituximab (Rash)      MEDICATIONS  (STANDING):  allopurinol 300 milliGRAM(s) Oral daily  amiodarone    Tablet 200 milliGRAM(s) Oral daily  betamethasone valerate 0.1% Ointment 1 Application(s) Topical daily  clonazePAM Tablet 1.5 milliGRAM(s) Oral at bedtime  docusate sodium 100 milliGRAM(s) Oral three times a day  donepezil 10 milliGRAM(s) Oral at bedtime  lactobacillus acidophilus 1 Tablet(s) Oral daily  levothyroxine 125 MICROGram(s) Oral daily  melatonin 3 milliGRAM(s) Oral at bedtime  memantine 10 milliGRAM(s) Oral every 12 hours  mirtazapine 15 milliGRAM(s) Oral at bedtime  multivitamin 1 Tablet(s) Oral daily  pantoprazole    Tablet 40 milliGRAM(s) Oral before breakfast  polyethylene glycol 3350 17 Gram(s) Oral daily  senna 2 Tablet(s) Oral at bedtime  tamsulosin 0.4 milliGRAM(s) Oral at bedtime  warfarin 3 milliGRAM(s) Oral once    MEDICATIONS  (PRN):  acetaminophen   Tablet .. 650 milliGRAM(s) Oral every 6 hours PRN Mild Pain (1 - 3)  bisacodyl 5 milliGRAM(s) Oral every 12 hours PRN Constipation  HYDROmorphone  Injectable 0.5 milliGRAM(s) IV Push every 4 hours PRN Severe Pain (7 - 10)  oxyCODONE    5 mG/acetaminophen 325 mG 1 Tablet(s) Oral every 6 hours PRN Moderate Pain (4 - 6)      FAMILY HISTORY:  No pertinent family history in first degree relatives      SOCIAL HISTORY: No EtOH, no tobacco        VITALS:   T(F): 98.4 (12-24-18 @ 14:18), Max: 98.4 (12-24-18 @ 14:18)  HR: 81 (12-24-18 @ 14:18)  BP: 153/77 (12-24-18 @ 14:18)  RR: 18 (12-24-18 @ 14:18)  SpO2: 94% (12-24-18 @ 14:18)  Wt(kg): --    PHYSICAL EXAM    GENERAL: NAD, well-developed  HEAD:  Atraumatic, Normocephalic  EYES: EOMI, PERRLA, conjunctiva and sclera clear  NECK: Supple, No JVD  CHEST/LUNG: Clear to auscultation bilaterally; No wheeze  HEART: Regular rate and rhythm; No murmurs, rubs, or gallops  ABDOMEN: Soft, Nontender, Nondistended; Bowel sounds present  EXTREMITIES:  2+ Peripheral Pulses, No clubbing, cyanosis, or edema  NEUROLOGY: non-focal  SKIN: No rashes or lesions    LABS:                         10.0   6.2   )-----------( 110      ( 24 Dec 2018 07:14 )             29.8     12-24    139  |  104  |  33<H>  ----------------------------<  114<H>  4.2   |  23  |  2.39<H>    Ca    8.7      24 Dec 2018 07:12  Phos  3.2     12-24  Mg     1.8     12-24    TPro  6.8  /  Alb  4.2  /  TBili  0.3  /  DBili  x   /  AST  26  /  ALT  15  /  AlkPhos  90  12-22    Magnesium, Serum: 1.8 mg/dL (12-24 @ 07:12)  Phosphorus Level, Serum: 3.2 mg/dL (12-24 @ 07:12)    PT/INR - ( 24 Dec 2018 07:14 )   PT: 30.6 sec;   INR: 2.61 ratio         PTT - ( 22 Dec 2018 19:25 )  PTT:37.3 sec  .Urine Clean Catch (Midstream)  12-23 @ 15:03   <10,000 CFU/ml  Normal Urogenital libby present  --  --          IMAGING: HPI:  72 y/o M with h/o Afib (on coumadin and s/p PPM), CKD, Chronic back and knee pain 2/2 arthritis s/p meniscal tears, mild memory deficits, CLL transformed to waldenstrom macroglobulinemia on ninlaro/dex who presented with right hip pain after fall. He tripped on stairs after getting toe caught, resulting in mechanical fall. Also bruised shoulder and back, but did not hit head. No preceding dizziness, dyspnea, chest pain, or lightheadedness. After fall patient noted severe 10/10 pain in right hip/groin. Decreased ROM due to pain. Found to have closed pubic rami fractures. Ortho evaluated pt and no plans for surgical intervention.     In terms of heme disease, initially diagnosed with CLL which evolved to WM. He was started on rituxan, dex, and velcade however relapsed on this regimen. Then transitioned to ninlaro/dex/rituximab since the summer time. He has not tolerated rituxan infusions so is maintained on weekly dex/ninlaro. Denies any new bleeding, bruising, weight changes, fatigue, loss of appetite.     PAST MEDICAL & SURGICAL HISTORY:  Memory deficit  BPH (benign prostatic hyperplasia)  [place] (hard of hearing)  Gout  CKD (chronic kidney disease)  Nephrolithiasis  Hypothyroid  Bradycardia, drug induced  Waldenstrom macroglobulinemia  Diverticulitis  Atrial fibrillation  GERD (gastroesophageal reflux disease)  Anxiety disorder  CLL (chronic lymphocytic leukemia): in remission  History of cardiac pacemaker in situ  History of appendectomy  History of cataract surgery, right: IOL  History of laparoscopic cholecystectomy: 4/2014  S/P hernia repair: x2  Meniscus tear: s/p removal of Meniscus 8 months ago      Allergies    No Known Allergies    Intolerances    rituximab (Rash)      MEDICATIONS  (STANDING):  allopurinol 300 milliGRAM(s) Oral daily  amiodarone    Tablet 200 milliGRAM(s) Oral daily  betamethasone valerate 0.1% Ointment 1 Application(s) Topical daily  clonazePAM Tablet 1.5 milliGRAM(s) Oral at bedtime  docusate sodium 100 milliGRAM(s) Oral three times a day  donepezil 10 milliGRAM(s) Oral at bedtime  lactobacillus acidophilus 1 Tablet(s) Oral daily  levothyroxine 125 MICROGram(s) Oral daily  melatonin 3 milliGRAM(s) Oral at bedtime  memantine 10 milliGRAM(s) Oral every 12 hours  mirtazapine 15 milliGRAM(s) Oral at bedtime  multivitamin 1 Tablet(s) Oral daily  pantoprazole    Tablet 40 milliGRAM(s) Oral before breakfast  polyethylene glycol 3350 17 Gram(s) Oral daily  senna 2 Tablet(s) Oral at bedtime  tamsulosin 0.4 milliGRAM(s) Oral at bedtime  warfarin 3 milliGRAM(s) Oral once    MEDICATIONS  (PRN):  acetaminophen   Tablet .. 650 milliGRAM(s) Oral every 6 hours PRN Mild Pain (1 - 3)  bisacodyl 5 milliGRAM(s) Oral every 12 hours PRN Constipation  HYDROmorphone  Injectable 0.5 milliGRAM(s) IV Push every 4 hours PRN Severe Pain (7 - 10)  oxyCODONE    5 mG/acetaminophen 325 mG 1 Tablet(s) Oral every 6 hours PRN Moderate Pain (4 - 6)      FAMILY HISTORY:  No pertinent family history in first degree relatives      SOCIAL HISTORY: No EtOH, no tobacco        VITALS:   T(F): 98.4 (12-24-18 @ 14:18), Max: 98.4 (12-24-18 @ 14:18)  HR: 81 (12-24-18 @ 14:18)  BP: 153/77 (12-24-18 @ 14:18)  RR: 18 (12-24-18 @ 14:18)  SpO2: 94% (12-24-18 @ 14:18)  Wt(kg): --    PHYSICAL EXAM    GENERAL: NAD, appears comfortable   HEAD:  Atraumatic, Normocephalic  EYES: EOMI, PERRLA, conjunctiva and sclera clear  NECK: Supple, No JVD  CHEST/LUNG: Clear to auscultation bilaterally; No wheeze  HEART: Irregular; No murmurs, rubs, or gallops  ABDOMEN: Soft, Nontender, Nondistended; Bowel sounds present  EXTREMITIES: Decreased ROM due to pain   NEUROLOGY: non-focal    LABS:                         10.0   6.2   )-----------( 110      ( 24 Dec 2018 07:14 )             29.8     12-24    139  |  104  |  33<H>  ----------------------------<  114<H>  4.2   |  23  |  2.39<H>    Ca    8.7      24 Dec 2018 07:12  Phos  3.2     12-24  Mg     1.8     12-24    TPro  6.8  /  Alb  4.2  /  TBili  0.3  /  DBili  x   /  AST  26  /  ALT  15  /  AlkPhos  90  12-22    Magnesium, Serum: 1.8 mg/dL (12-24 @ 07:12)  Phosphorus Level, Serum: 3.2 mg/dL (12-24 @ 07:12)    PT/INR - ( 24 Dec 2018 07:14 )   PT: 30.6 sec;   INR: 2.61 ratio         PTT - ( 22 Dec 2018 19:25 )  PTT:37.3 sec  .Urine Clean Catch (Midstream)  12-23 @ 15:03   <10,000 CFU/ml  Normal Urogenital libby present  --  --          IMAGING:

## 2018-12-24 NOTE — PHYSICAL THERAPY INITIAL EVALUATION ADULT - PRECAUTIONS/LIMITATIONS, REHAB EVAL
XR Pelvis shows R superior and inferior pubic rami fractures. No evidence of hip fx/dislocation XR Pelvis shows R superior and inferior pubic rami fractures. No evidence of hip fx/dislocation/fall precautions

## 2018-12-24 NOTE — PROGRESS NOTE ADULT - SUBJECTIVE AND OBJECTIVE BOX
Patient is a 73y old  Male who presents with a chief complaint of Fall, pubic ramus fractures (23 Dec 2018 16:08)      SUBJECTIVE / OVERNIGHT EVENTS:          MEDICATIONS  (STANDING):  allopurinol 300 milliGRAM(s) Oral daily  amiodarone    Tablet 200 milliGRAM(s) Oral daily  betamethasone valerate 0.1% Ointment 1 Application(s) Topical daily  clonazePAM Tablet 1.5 milliGRAM(s) Oral at bedtime  docusate sodium 100 milliGRAM(s) Oral three times a day  donepezil 10 milliGRAM(s) Oral at bedtime  lactobacillus acidophilus 1 Tablet(s) Oral daily  levothyroxine 125 MICROGram(s) Oral daily  melatonin 3 milliGRAM(s) Oral at bedtime  memantine 10 milliGRAM(s) Oral every 12 hours  mirtazapine 15 milliGRAM(s) Oral at bedtime  multivitamin 1 Tablet(s) Oral daily  pantoprazole    Tablet 40 milliGRAM(s) Oral before breakfast  polyethylene glycol 3350 17 Gram(s) Oral daily  senna 2 Tablet(s) Oral at bedtime  tamsulosin 0.4 milliGRAM(s) Oral at bedtime    MEDICATIONS  (PRN):  acetaminophen   Tablet .. 650 milliGRAM(s) Oral every 6 hours PRN Mild Pain (1 - 3)  bisacodyl 5 milliGRAM(s) Oral every 12 hours PRN Constipation  HYDROmorphone  Injectable 1 milliGRAM(s) IV Push every 4 hours PRN Severe Pain (7 - 10)  oxyCODONE    5 mG/acetaminophen 325 mG 2 Tablet(s) Oral every 6 hours PRN Moderate Pain (4 - 6)      Vital Signs Last 24 Hrs  T(C): 36.6 (24 Dec 2018 05:10), Max: 36.8 (23 Dec 2018 14:48)  T(F): 97.9 (24 Dec 2018 05:10), Max: 98.3 (23 Dec 2018 14:48)  HR: 64 (24 Dec 2018 05:10) (64 - 72)  BP: 151/74 (24 Dec 2018 05:10) (129/65 - 155/65)  BP(mean): --  RR: 18 (24 Dec 2018 05:10) (18 - 18)  SpO2: 96% (24 Dec 2018 05:10) (96% - 98%)      PHYSICAL EXAM  GENERAL: NAD, well-developed  HEAD:  Atraumatic, Normocephalic  EYES: EOMI, PERRLA, conjunctiva and sclera clear  NECK: Supple, No JVD  CHEST/LUNG: Clear to auscultation bilaterally; No wheeze  HEART: Regular rate and rhythm; No murmurs, rubs, or gallops  ABDOMEN: Soft, Nontender, Nondistended; Bowel sounds present  EXTREMITIES:  2+ Peripheral Pulses, No clubbing, cyanosis, or edema  PSYCH: AAOx3  SKIN: No rashes or lesions    CAPILLARY BLOOD GLUCOSE        I&O's Summary    23 Dec 2018 07:01  -  24 Dec 2018 07:00  --------------------------------------------------------  IN: 0 mL / OUT: 620 mL / NET: -620 mL        LABS:                        10.0   6.2   )-----------( 110      ( 24 Dec 2018 07:14 )             29.8     12-    139  |  104  |  33<H>  ----------------------------<  114<H>  4.2   |  23  |  2.39<H>    Ca    8.7      24 Dec 2018 07:12  Phos  3.2     12-  Mg     1.8         TPro  6.8  /  Alb  4.2  /  TBili  0.3  /  DBili  x   /  AST  26  /  ALT  15  /  AlkPhos  90  12-    PT/INR - ( 24 Dec 2018 07:14 )   PT: 30.6 sec;   INR: 2.61 ratio         PTT - ( 22 Dec 2018 19:25 )  PTT:37.3 sec  CARDIAC MARKERS ( 23 Dec 2018 07:14 )  x     / x     / 66 U/L / x     / x      CARDIAC MARKERS ( 22 Dec 2018 19:25 )  x     / x     / 88 U/L / x     / x          Urinalysis Basic - ( 22 Dec 2018 22:41 )    Color: Yellow / Appearance: Clear / S.022 / pH: x  Gluc: x / Ketone: Negative  / Bili: Negative / Urobili: Negative   Blood: x / Protein: 100 mg/dL / Nitrite: Negative   Leuk Esterase: Negative / RBC: 56 /hpf / WBC 6 /hpf   Sq Epi: x / Non Sq Epi: 1 /hpf / Bacteria: Negative        RADIOLOGY & ADDITIONAL TESTS:     MICROBIOLOGY:    ANTIMICROBIALS:    CONSULTS: Patient is a 73y old  Male who presents with a chief complaint of Fall, pubic ramus fractures (23 Dec 2018 16:08)      SUBJECTIVE / OVERNIGHT EVENTS:    No acute events overnight. Pt continues to endorse R hip pain that worsens with movement. He tried to get up and walk yesterday but was not able to because of the pain. His pain is controlled with current pain regimen. Otherwise, denies fever, chills, nausea, vomiting, abdominal pain, diarrhea, constipation, CP, palpitations, SOB, or LE edema.       MEDICATIONS  (STANDING):  allopurinol 300 milliGRAM(s) Oral daily  amiodarone    Tablet 200 milliGRAM(s) Oral daily  betamethasone valerate 0.1% Ointment 1 Application(s) Topical daily  clonazePAM Tablet 1.5 milliGRAM(s) Oral at bedtime  docusate sodium 100 milliGRAM(s) Oral three times a day  donepezil 10 milliGRAM(s) Oral at bedtime  lactobacillus acidophilus 1 Tablet(s) Oral daily  levothyroxine 125 MICROGram(s) Oral daily  melatonin 3 milliGRAM(s) Oral at bedtime  memantine 10 milliGRAM(s) Oral every 12 hours  mirtazapine 15 milliGRAM(s) Oral at bedtime  multivitamin 1 Tablet(s) Oral daily  pantoprazole    Tablet 40 milliGRAM(s) Oral before breakfast  polyethylene glycol 3350 17 Gram(s) Oral daily  senna 2 Tablet(s) Oral at bedtime  tamsulosin 0.4 milliGRAM(s) Oral at bedtime    MEDICATIONS  (PRN):  acetaminophen   Tablet .. 650 milliGRAM(s) Oral every 6 hours PRN Mild Pain (1 - 3)  bisacodyl 5 milliGRAM(s) Oral every 12 hours PRN Constipation  HYDROmorphone  Injectable 1 milliGRAM(s) IV Push every 4 hours PRN Severe Pain (7 - 10)  oxyCODONE    5 mG/acetaminophen 325 mG 2 Tablet(s) Oral every 6 hours PRN Moderate Pain (4 - 6)      Vital Signs Last 24 Hrs  T(C): 36.6 (24 Dec 2018 05:10), Max: 36.8 (23 Dec 2018 14:48)  T(F): 97.9 (24 Dec 2018 05:10), Max: 98.3 (23 Dec 2018 14:48)  HR: 64 (24 Dec 2018 05:10) (64 - 72)  BP: 151/74 (24 Dec 2018 05:10) (129/65 - 155/65)  BP(mean): --  RR: 18 (24 Dec 2018 05:10) (18 - 18)  SpO2: 96% (24 Dec 2018 05:10) (96% - 98%)      PHYSICAL EXAM  GENERAL: NAD, well-developed  HEAD:  Atraumatic, Normocephalic  EYES: Conjunctiva and sclera clear  NECK: Supple, No JVD  CHEST/LUNG: Clear to auscultation bilaterally; No wheeze  HEART: Irregular rate and rhythm; No murmurs, rubs, or gallops  ABDOMEN: Soft, Nontender, Nondistended; Bowel sounds present  Musculoskeletal: limited ROM, tenderness to palpation over R hip. Motor strength 3/5 on the R and 5/5 on the Left. Sensation grossly intact.        CAPILLARY BLOOD GLUCOSE        I&O's Summary    23 Dec 2018 07:01  -  24 Dec 2018 07:00  --------------------------------------------------------  IN: 0 mL / OUT: 620 mL / NET: -620 mL        LABS:                        10.0   6.2   )-----------( 110      ( 24 Dec 2018 07:14 )             29.8     12-    139  |  104  |  33<H>  ----------------------------<  114<H>  4.2   |  23  |  2.39<H>    Ca    8.7      24 Dec 2018 07:12  Phos  3.2     12-24  Mg     1.8     12-    TPro  6.8  /  Alb  4.2  /  TBili  0.3  /  DBili  x   /  AST  26  /  ALT  15  /  AlkPhos  90  12-    PT/INR - ( 24 Dec 2018 07:14 )   PT: 30.6 sec;   INR: 2.61 ratio         PTT - ( 22 Dec 2018 19:25 )  PTT:37.3 sec  CARDIAC MARKERS ( 23 Dec 2018 07:14 )  x     / x     / 66 U/L / x     / x      CARDIAC MARKERS ( 22 Dec 2018 19:25 )  x     / x     / 88 U/L / x     / x          Urinalysis Basic - ( 22 Dec 2018 22:41 )    Color: Yellow / Appearance: Clear / S.022 / pH: x  Gluc: x / Ketone: Negative  / Bili: Negative / Urobili: Negative   Blood: x / Protein: 100 mg/dL / Nitrite: Negative   Leuk Esterase: Negative / RBC: 56 /hpf / WBC 6 /hpf   Sq Epi: x / Non Sq Epi: 1 /hpf / Bacteria: Negative        RADIOLOGY & ADDITIONAL TESTS:     MICROBIOLOGY:    ANTIMICROBIALS:    CONSULTS:

## 2018-12-24 NOTE — CONSULT NOTE ADULT - ATTENDING COMMENTS
Patient examined. Chart and X-rays reviewed. Agree with above note.    Ezra Mariano MD
Patient admitted with pelvic fracture after a fall.  No plan for surgery so will plan to continue treatment for Waldenstrom's as scheduled.  Follow up with Dr. Simons at [facility] [place], [place] as scheduled.

## 2018-12-24 NOTE — PHYSICAL THERAPY INITIAL EVALUATION ADULT - IMPAIRMENTS CONTRIBUTING TO GAIT DEVIATIONS, PT EVAL
unable to clear RLE off floor; RLE with knee buckling during weightbearing/impaired balance/decreased strength/pain

## 2018-12-24 NOTE — PROGRESS NOTE ADULT - PROBLEM SELECTOR PLAN 6
On coumadine  trend pt/inr  coumadin 3mg now -hx of Afib   -On coumadin 3 mg, dosing tonight   -f/u with INR tomorrow.

## 2018-12-24 NOTE — CONSULT NOTE ADULT - ASSESSMENT
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 Found to have fracture ramus of right pubis, no surgical intervention planned.     1) Pelvic Fracture- ortho following, conservative management for now  - continue pain control  - will need rehab/aggressive PT    2) Waldenstrom's Macroglobulinemia  - evolved from underlying CLL  - currently receiving ninlaro/dexamethasone D1, D8, D15, then one week off  - is due to receive next treatment this Friday 12/28; pt is to continue treatment  - Wife if going to  medicine from pharmacy; pt can continue from own meds if patient is still in hospital   - no need to repeat labs (SPEP, immunoglobulins done as outpatient)   - can f/u with Dr. Simons as outpatient    Discussed with attending,  Tammy Westfall, PGY-4  Hematology-Oncology Fellow  [phone] ([place]) [phone] ([facility])

## 2018-12-24 NOTE — PROGRESS NOTE ADULT - PROBLEM SELECTOR PLAN 1
Pain control with dilaudid and percocent and tylenol for severe/moderate/mild pain. Currently still in severe pain, will uptitrate dilaudid to 1mg q4h prn severe pain  Will need interrogation of PPM in AM to ensure no events though currently in sinus rhythm. Did have brief feeling of weakness immediately prior to fall that is now resolved though patient reports that he had taken his heartrate which was within expected range at that time.  Do also have some concern about polypharmacy of multiple insomnia medications including remeron, klonopin, intermezzo, melantonin. However patient reports feeling alert and well prior to fall. Will hold intermezzo (was recently started), lower melatonin dose here (melatonin is effective at much lower doses than patient's normal 10mg dose). Pain control with dilaudid and percocent and tylenol for severe/moderate/mild pain. Currently still in severe pain, will uptitrate dilaudid to 1mg q4h prn severe pain.

## 2018-12-24 NOTE — PROGRESS NOTE ADULT - SUBJECTIVE AND OBJECTIVE BOX
No pain, no shortness of breath      VITAL:  T(C): , Max: 36.8 (18 @ 14:48)  T(F): , Max: 98.3 (18 @ 14:48)  HR: 64 (18 @ 05:10)  BP: 151/74 (18 @ 05:10)  RR: 18 (18 @ 05:10)  SpO2: 96% (18 @ 05:10)      PHYSICAL EXAM:    Constitutional: NAD; Alert  HEENT:  NCAT; DMM  Neck: No JVD; supple  Respiratory: CTA-b/l  Cardiac: RRR s1s2  Gastrointestinal: BS+, soft, NT/ND  Urologic: No walker  Extremities: No peripheral edema  Back: No CVAT b/l    LABS:                        10.0   6.2   )-----------( 110      ( 24 Dec 2018 07:14 )             29.8     Na(139)/K(4.2)/Cl(104)/HCO3(23)/BUN(33)/Cr(2.39)Glu(114)/Ca(8.7)/Mg(1.8)/PO4(3.2)     @ 07:12  Na(139)/K(4.5)/Cl(104)/HCO3(23)/BUN(39)/Cr(2.55)Glu(131)/Ca(8.5)/Mg(1.7)/PO4(3.1)     @ 07:14  Na(140)/K(4.5)/Cl(103)/HCO3(21)/BUN(40)/Cr(2.58)Glu(134)/Ca(9.1)/Mg(--)/PO4(--)     @ 19:25    Urinalysis Basic - ( 22 Dec 2018 22:41 )  Color: Yellow / Appearance: Clear / S.022 / pH: x  Gluc: x / Ketone: Negative  / Bili: Negative / Urobili: Negative   Blood: x / Protein: 100 mg/dL / Nitrite: Negative   Leuk Esterase: Negative / RBC: 56 /hpf / WBC 6 /hpf   Sq Epi: x / Non Sq Epi: 1 /hpf / Bacteria: Negative      IMPRESSION: 73M w/ AFib, nephrolithiasis, CKD4, arthritis, and Waldenstrom's Macroglobulinemia, 18 admitted s/p fall with right pubic ramus fracture    (1)Renal - stable CKD IV  (2)HTN - BP mildly elevated, but acceptable for now  (3)Lytes - highly acceptable  (4)Ortho - pubic ramus fracture - not for surgical intervention. On prn Dilaudid for pain control      RECOMMEND:  (1)No NSAIDs for pain  (2)No objection to use of any opiates (including Morphine) here if needed  (3)Dose new meds for GFR 20-25ml/min      Norman Ascencio MD  [facility]  [phone] (+)intermittent pelvic pain. No shortness of breath      VITAL:  T(C): , Max: 36.8 (18 @ 14:48)  T(F): , Max: 98.3 (18 @ 14:48)  HR: 64 (18 @ 05:10)  BP: 151/74 (18 @ 05:10)  RR: 18 (18 @ 05:10)  SpO2: 96% (18 @ 05:10)      PHYSICAL EXAM:  Constitutional: NAD; Alert  HEENT:  NCAT; DMM  Neck: No JVD; supple  Respiratory: CTA-b/l  Cardiac: RRR s1s2  Gastrointestinal: BS+, soft, NT/ND  Urologic: No walker  Extremities: No peripheral edema  Back: No CVAT b/l    LABS:                        10.0   6.2   )-----------( 110      ( 24 Dec 2018 07:14 )             29.8     Na(139)/K(4.2)/Cl(104)/HCO3(23)/BUN(33)/Cr(2.39)Glu(114)/Ca(8.7)/Mg(1.8)/PO4(3.2)     @ 07:12  Na(139)/K(4.5)/Cl(104)/HCO3(23)/BUN(39)/Cr(2.55)Glu(131)/Ca(8.5)/Mg(1.7)/PO4(3.1)     @ 07:14  Na(140)/K(4.5)/Cl(103)/HCO3(21)/BUN(40)/Cr(2.58)Glu(134)/Ca(9.1)/Mg(--)/PO4(--)     @ 19:25    Urinalysis Basic - ( 22 Dec 2018 22:41 )  Color: Yellow / Appearance: Clear / S.022 / pH: x  Gluc: x / Ketone: Negative  / Bili: Negative / Urobili: Negative   Blood: x / Protein: 100 mg/dL / Nitrite: Negative   Leuk Esterase: Negative / RBC: 56 /hpf / WBC 6 /hpf   Sq Epi: x / Non Sq Epi: 1 /hpf / Bacteria: Negative      IMPRESSION: 73M w/ AFib, nephrolithiasis, CKD4, arthritis, and Waldenstrom's Macroglobulinemia, 18 admitted s/p fall with right pubic ramus fracture    (1)Renal - stable CKD IV  (2)HTN - BP mildly elevated, but acceptable for now  (3)Lytes - highly acceptable  (4)Ortho - pubic ramus fracture - not for surgical intervention. On prn Dilaudid for pain control      RECOMMEND:  (1)No NSAIDs for pain  (2)No objection to use of any opiates (including Morphine) here if needed  (3)Dose new meds for GFR 20-25ml/min      Norman Ascencio MD  [facility]  [phone]

## 2018-12-24 NOTE — PROGRESS NOTE ADULT - ASSESSMENT
74 y/o M with h/o Afib (on coumadin and s/p PPM), CKD, Chronic back and knee pain 2/2 arthritis s/p meniscal tears, Insomnia, anxiety, mild memory deficits but A&Ox4 @ baseline, CLL+waldenstrom macroglobulinemia on Ninzaro s/p rash from rituxan, now presenting with right hip pain after fall today after walking up steps (did not fall down steps) 72 y/o M with h/o Afib (on coumadin and s/p PPM), CKD, Chronic back and knee pain 2/2 arthritis s/p meniscal tears, Insomnia, anxiety, mild memory deficits but A&Ox4 @ baseline, CLL+waldenstrom macroglobulinemia on Ninzaro s/p rash from rituxan, now presenting with right hip pain s/p mechanical fall. Ortho consulted, no intervention required

## 2018-12-24 NOTE — PROGRESS NOTE ADULT - PROBLEM SELECTOR PLAN 4
Renally dose medications, avoid further morphine and use would prefer dilaudid. -LOUISE on CKD   -Cr downtrending    -Renally dose medications, avoid further morphine and use would prefer dilaudid.

## 2018-12-24 NOTE — PHYSICAL THERAPY INITIAL EVALUATION ADULT - PERTINENT HX OF CURRENT PROBLEM, REHAB EVAL
74 y/o M with h/o Afib, CKD, Chronic back and knee pain 2/2 arthritis s/p meniscal tears, Insomnia, anxiety, mild memory deficits but A&Ox4 @ baseline, CLL+waldenstrom macroglobulinemia on Ninzaro s/p rash from rituxan, now presenting with right hip pain after fall today after walking up steps. R superior and inferior pubic rami fractures.

## 2018-12-24 NOTE — PHYSICAL THERAPY INITIAL EVALUATION ADULT - ADDITIONAL COMMENTS
pt lives in an apartment with his wife with 18 steps to enter. prior to admission he was amb Independently without AD and negotiating steps Independently. he has assist from his wife for bathing and dressing. he owns a straight cane and shower chair.

## 2018-12-25 LAB
ANION GAP SERPL CALC-SCNC: 12 MMOL/L — SIGNIFICANT CHANGE UP (ref 5–17)
BUN SERPL-MCNC: 30 MG/DL — HIGH (ref 7–23)
CALCIUM SERPL-MCNC: 9.2 MG/DL — SIGNIFICANT CHANGE UP (ref 8.4–10.5)
CHLORIDE SERPL-SCNC: 104 MMOL/L — SIGNIFICANT CHANGE UP (ref 96–108)
CO2 SERPL-SCNC: 24 MMOL/L — SIGNIFICANT CHANGE UP (ref 22–31)
CREAT SERPL-MCNC: 2.52 MG/DL — HIGH (ref 0.5–1.3)
GLUCOSE SERPL-MCNC: 122 MG/DL — HIGH (ref 70–99)
HCT VFR BLD CALC: 29.5 % — LOW (ref 39–50)
HGB BLD-MCNC: 10.2 G/DL — LOW (ref 13–17)
INR BLD: 2.8 RATIO — HIGH (ref 0.88–1.16)
MAGNESIUM SERPL-MCNC: 1.8 MG/DL — SIGNIFICANT CHANGE UP (ref 1.6–2.6)
MCHC RBC-ENTMCNC: 33.3 PG — SIGNIFICANT CHANGE UP (ref 27–34)
MCHC RBC-ENTMCNC: 34.6 GM/DL — SIGNIFICANT CHANGE UP (ref 32–36)
MCV RBC AUTO: 96 FL — SIGNIFICANT CHANGE UP (ref 80–100)
PHOSPHATE SERPL-MCNC: 3.3 MG/DL — SIGNIFICANT CHANGE UP (ref 2.5–4.5)
PLATELET # BLD AUTO: 124 K/UL — LOW (ref 150–400)
POTASSIUM SERPL-MCNC: 4.1 MMOL/L — SIGNIFICANT CHANGE UP (ref 3.5–5.3)
POTASSIUM SERPL-SCNC: 4.1 MMOL/L — SIGNIFICANT CHANGE UP (ref 3.5–5.3)
PROTHROM AB SERPL-ACNC: 33.2 SEC — HIGH (ref 10–12.9)
RBC # BLD: 3.07 M/UL — LOW (ref 4.2–5.8)
RBC # FLD: 13.7 % — SIGNIFICANT CHANGE UP (ref 10.3–14.5)
SODIUM SERPL-SCNC: 140 MMOL/L — SIGNIFICANT CHANGE UP (ref 135–145)
WBC # BLD: 6.3 K/UL — SIGNIFICANT CHANGE UP (ref 3.8–10.5)
WBC # FLD AUTO: 6.3 K/UL — SIGNIFICANT CHANGE UP (ref 3.8–10.5)

## 2018-12-25 RX ORDER — HYDROMORPHONE HYDROCHLORIDE 2 MG/ML
1 INJECTION INTRAMUSCULAR; INTRAVENOUS; SUBCUTANEOUS EVERY 4 HOURS
Qty: 0 | Refills: 0 | Status: DISCONTINUED | OUTPATIENT
Start: 2018-12-25 | End: 2018-12-26

## 2018-12-25 RX ORDER — WARFARIN SODIUM 2.5 MG/1
1 TABLET ORAL ONCE
Qty: 0 | Refills: 0 | Status: COMPLETED | OUTPATIENT
Start: 2018-12-25 | End: 2018-12-25

## 2018-12-25 RX ORDER — HYDROMORPHONE HYDROCHLORIDE 2 MG/ML
0.5 INJECTION INTRAMUSCULAR; INTRAVENOUS; SUBCUTANEOUS ONCE
Qty: 0 | Refills: 0 | Status: DISCONTINUED | OUTPATIENT
Start: 2018-12-25 | End: 2018-12-25

## 2018-12-25 RX ORDER — HYDROMORPHONE HYDROCHLORIDE 2 MG/ML
1 INJECTION INTRAMUSCULAR; INTRAVENOUS; SUBCUTANEOUS EVERY 6 HOURS
Qty: 0 | Refills: 0 | Status: DISCONTINUED | OUTPATIENT
Start: 2018-12-25 | End: 2018-12-25

## 2018-12-25 RX ORDER — POLYETHYLENE GLYCOL 3350 17 G/17G
17 POWDER, FOR SOLUTION ORAL AT BEDTIME
Qty: 0 | Refills: 0 | Status: DISCONTINUED | OUTPATIENT
Start: 2018-12-25 | End: 2018-12-28

## 2018-12-25 RX ADMIN — WARFARIN SODIUM 1 MILLIGRAM(S): 2.5 TABLET ORAL at 21:43

## 2018-12-25 RX ADMIN — Medication 1.5 MILLIGRAM(S): at 21:43

## 2018-12-25 RX ADMIN — MEMANTINE HYDROCHLORIDE 10 MILLIGRAM(S): 10 TABLET ORAL at 17:05

## 2018-12-25 RX ADMIN — OXYCODONE AND ACETAMINOPHEN 1 TABLET(S): 5; 325 TABLET ORAL at 09:43

## 2018-12-25 RX ADMIN — MEMANTINE HYDROCHLORIDE 10 MILLIGRAM(S): 10 TABLET ORAL at 05:15

## 2018-12-25 RX ADMIN — Medication 100 MILLIGRAM(S): at 21:44

## 2018-12-25 RX ADMIN — Medication 1 TABLET(S): at 11:41

## 2018-12-25 RX ADMIN — Medication 100 MILLIGRAM(S): at 14:43

## 2018-12-25 RX ADMIN — Medication 125 MICROGRAM(S): at 05:15

## 2018-12-25 RX ADMIN — Medication 300 MILLIGRAM(S): at 11:42

## 2018-12-25 RX ADMIN — OXYCODONE AND ACETAMINOPHEN 1 TABLET(S): 5; 325 TABLET ORAL at 00:00

## 2018-12-25 RX ADMIN — Medication 1 APPLICATION(S): at 11:41

## 2018-12-25 RX ADMIN — TAMSULOSIN HYDROCHLORIDE 0.4 MILLIGRAM(S): 0.4 CAPSULE ORAL at 21:43

## 2018-12-25 RX ADMIN — POLYETHYLENE GLYCOL 3350 17 GRAM(S): 17 POWDER, FOR SOLUTION ORAL at 11:41

## 2018-12-25 RX ADMIN — SENNA PLUS 2 TABLET(S): 8.6 TABLET ORAL at 21:43

## 2018-12-25 RX ADMIN — AMIODARONE HYDROCHLORIDE 200 MILLIGRAM(S): 400 TABLET ORAL at 05:15

## 2018-12-25 RX ADMIN — OXYCODONE AND ACETAMINOPHEN 1 TABLET(S): 5; 325 TABLET ORAL at 21:10

## 2018-12-25 RX ADMIN — PANTOPRAZOLE SODIUM 40 MILLIGRAM(S): 20 TABLET, DELAYED RELEASE ORAL at 05:16

## 2018-12-25 RX ADMIN — HYDROMORPHONE HYDROCHLORIDE 0.5 MILLIGRAM(S): 2 INJECTION INTRAMUSCULAR; INTRAVENOUS; SUBCUTANEOUS at 13:30

## 2018-12-25 RX ADMIN — DONEPEZIL HYDROCHLORIDE 10 MILLIGRAM(S): 10 TABLET, FILM COATED ORAL at 21:43

## 2018-12-25 RX ADMIN — MIRTAZAPINE 15 MILLIGRAM(S): 45 TABLET, ORALLY DISINTEGRATING ORAL at 21:44

## 2018-12-25 RX ADMIN — HYDROMORPHONE HYDROCHLORIDE 0.5 MILLIGRAM(S): 2 INJECTION INTRAMUSCULAR; INTRAVENOUS; SUBCUTANEOUS at 11:38

## 2018-12-25 RX ADMIN — OXYCODONE AND ACETAMINOPHEN 1 TABLET(S): 5; 325 TABLET ORAL at 21:55

## 2018-12-25 RX ADMIN — HYDROMORPHONE HYDROCHLORIDE 0.5 MILLIGRAM(S): 2 INJECTION INTRAMUSCULAR; INTRAVENOUS; SUBCUTANEOUS at 10:55

## 2018-12-25 RX ADMIN — Medication 1 TABLET(S): at 11:42

## 2018-12-25 RX ADMIN — Medication 100 MILLIGRAM(S): at 05:15

## 2018-12-25 RX ADMIN — Medication 3 MILLIGRAM(S): at 21:43

## 2018-12-25 RX ADMIN — HYDROMORPHONE HYDROCHLORIDE 0.5 MILLIGRAM(S): 2 INJECTION INTRAMUSCULAR; INTRAVENOUS; SUBCUTANEOUS at 12:44

## 2018-12-25 RX ADMIN — OXYCODONE AND ACETAMINOPHEN 1 TABLET(S): 5; 325 TABLET ORAL at 10:46

## 2018-12-25 NOTE — PROGRESS NOTE ADULT - ASSESSMENT
74 y/o M with h/o Afib (on coumadin and s/p PPM), CKD, Chronic back and knee pain 2/2 arthritis s/p meniscal tears, Insomnia, anxiety, mild memory deficits but A&Ox4 @ baseline, CLL+waldenstrom macroglobulinemia on Ninzaro s/p rash from rituxan, now presenting with right hip pain s/p mechanical fall. Ortho consulted, no intervention required 72 y/o M with h/o Afib (on coumadin and s/p PPM), CKD, Chronic back and knee pain 2/2 arthritis s/p meniscal tears, Insomnia, anxiety, mild memory deficits but A&Ox4 @ baseline, CLL+waldenstrom macroglobulinemia on Ninzaro s/p rash from rituxan, now presenting with right hip pain s/p mechanical fall. Ortho consulted, no intervention required. His course complicated by LOUISE.

## 2018-12-25 NOTE — PROGRESS NOTE ADULT - SUBJECTIVE AND OBJECTIVE BOX
Patient is a 73y old  Male who presents with a chief complaint of Fall, pubic ramus fractures (24 Dec 2018 15:37)      SUBJECTIVE / OVERNIGHT EVENTS:    No acute events overnight. Pt continues to experience hip pain and reports that pain is not well controlled with current regimen. Otherwise, he denies fever, chills, nausea, vomiting, abdominal pain, diarrhea, constipation, CP, palpitations, SOB, or LE edema.       MEDICATIONS  (STANDING):  allopurinol 300 milliGRAM(s) Oral daily  amiodarone    Tablet 200 milliGRAM(s) Oral daily  betamethasone valerate 0.1% Ointment 1 Application(s) Topical daily  clonazePAM Tablet 1.5 milliGRAM(s) Oral at bedtime  docusate sodium 100 milliGRAM(s) Oral three times a day  donepezil 10 milliGRAM(s) Oral at bedtime  lactobacillus acidophilus 1 Tablet(s) Oral daily  levothyroxine 125 MICROGram(s) Oral daily  melatonin 3 milliGRAM(s) Oral at bedtime  memantine 10 milliGRAM(s) Oral every 12 hours  mirtazapine 15 milliGRAM(s) Oral at bedtime  multivitamin 1 Tablet(s) Oral daily  pantoprazole    Tablet 40 milliGRAM(s) Oral before breakfast  polyethylene glycol 3350 17 Gram(s) Oral daily  senna 2 Tablet(s) Oral at bedtime  tamsulosin 0.4 milliGRAM(s) Oral at bedtime  warfarin 1 milliGRAM(s) Oral once    MEDICATIONS  (PRN):  acetaminophen   Tablet .. 650 milliGRAM(s) Oral every 6 hours PRN Mild Pain (1 - 3)  bisacodyl 5 milliGRAM(s) Oral every 12 hours PRN Constipation  HYDROmorphone  Injectable 0.5 milliGRAM(s) IV Push every 4 hours PRN Severe Pain (7 - 10)  oxyCODONE    5 mG/acetaminophen 325 mG 1 Tablet(s) Oral every 6 hours PRN Moderate Pain (4 - 6)      Vital Signs Last 24 Hrs  T(C): 36.4 (25 Dec 2018 13:39), Max: 37.1 (24 Dec 2018 22:01)  T(F): 97.6 (25 Dec 2018 13:39), Max: 98.7 (24 Dec 2018 22:01)  HR: 74 (25 Dec 2018 13:39) (71 - 74)  BP: 151/72 (25 Dec 2018 13:39) (151/72 - 160/77)  BP(mean): --  RR: 18 (25 Dec 2018 13:39) (18 - 18)  SpO2: 96% (25 Dec 2018 13:39) (95% - 96%)      PHYSICAL EXAM  GENERAL: NAD, well-developed  HEAD:  Atraumatic, Normocephalic  EYES: Conjunctiva and sclera clear  NECK: Supple, No JVD  CHEST/LUNG: Clear to auscultation bilaterally; No wheeze  HEART: Regular rate and rhythm; No murmurs, rubs, or gallops  ABDOMEN: Soft, Nontender, Nondistended; Bowel sounds present  MUSCULOSKELETAL: tenderness to palpation over R hip. Limited ROM in the R LE due pain. sensation grossly intact.     CAPILLARY BLOOD GLUCOSE        I&O's Summary    25 Dec 2018 07:01  -  25 Dec 2018 15:20  --------------------------------------------------------  IN: 560 mL / OUT: 0 mL / NET: 560 mL        LABS:                        10.2   6.3   )-----------( 124      ( 25 Dec 2018 06:59 )             29.5     12-25    140  |  104  |  30<H>  ----------------------------<  122<H>  4.1   |  24  |  2.52<H>    Ca    9.2      25 Dec 2018 06:59  Phos  3.3     12-25  Mg     1.8     12-25      PT/INR - ( 25 Dec 2018 06:59 )   PT: 33.2 sec;   INR: 2.80 ratio                   RADIOLOGY & ADDITIONAL TESTS:     MICROBIOLOGY:    ANTIMICROBIALS:    CONSULTS:

## 2018-12-25 NOTE — PROGRESS NOTE ADULT - PROBLEM SELECTOR PLAN 1
Pain control with dilaudid and percocent and tylenol for severe/moderate/mild pain. Currently still in severe pain, will uptitrate dilaudid to 1mg q4h prn severe pain.

## 2018-12-25 NOTE — PROGRESS NOTE ADULT - PROBLEM SELECTOR PLAN 4
-LOUISE on CKD   -Cr downtrending    -Renally dose medications, avoid further morphine and use would prefer dilaudid. -Stable   -Allopurinol 300mg po daily

## 2018-12-25 NOTE — PROGRESS NOTE ADULT - PROBLEM SELECTOR PLAN 2
-Stable   -tamsulosin 0.4mg qhs LOUISE on CKD. Uptrending Crt, most likely pre-renal  -urine Na, creatinine, and bladder scan   -Started on IVF at 75 cc/hr for 8 hours   -F/u with BMP tomorrow  -Avoid nephrotoxic meds   -renally dose meds

## 2018-12-26 DIAGNOSIS — N17.9 ACUTE KIDNEY FAILURE, UNSPECIFIED: ICD-10-CM

## 2018-12-26 LAB
ANION GAP SERPL CALC-SCNC: 14 MMOL/L — SIGNIFICANT CHANGE UP (ref 5–17)
BUN SERPL-MCNC: 33 MG/DL — HIGH (ref 7–23)
CALCIUM SERPL-MCNC: 9.3 MG/DL — SIGNIFICANT CHANGE UP (ref 8.4–10.5)
CHLORIDE SERPL-SCNC: 102 MMOL/L — SIGNIFICANT CHANGE UP (ref 96–108)
CO2 SERPL-SCNC: 23 MMOL/L — SIGNIFICANT CHANGE UP (ref 22–31)
CREAT ?TM UR-MCNC: 150 MG/DL — SIGNIFICANT CHANGE UP
CREAT SERPL-MCNC: 2.76 MG/DL — HIGH (ref 0.5–1.3)
GLUCOSE SERPL-MCNC: 108 MG/DL — HIGH (ref 70–99)
INR BLD: 3.11 RATIO — HIGH (ref 0.88–1.16)
MAGNESIUM SERPL-MCNC: 1.7 MG/DL — SIGNIFICANT CHANGE UP (ref 1.6–2.6)
PHOSPHATE SERPL-MCNC: 3.3 MG/DL — SIGNIFICANT CHANGE UP (ref 2.5–4.5)
POTASSIUM SERPL-MCNC: 4.3 MMOL/L — SIGNIFICANT CHANGE UP (ref 3.5–5.3)
POTASSIUM SERPL-SCNC: 4.3 MMOL/L — SIGNIFICANT CHANGE UP (ref 3.5–5.3)
PROTHROM AB SERPL-ACNC: 36.7 SEC — HIGH (ref 10–12.9)
SODIUM SERPL-SCNC: 139 MMOL/L — SIGNIFICANT CHANGE UP (ref 135–145)
SODIUM UR-SCNC: 65 MMOL/L — SIGNIFICANT CHANGE UP

## 2018-12-26 RX ORDER — SODIUM CHLORIDE 9 MG/ML
1000 INJECTION INTRAMUSCULAR; INTRAVENOUS; SUBCUTANEOUS
Qty: 0 | Refills: 0 | Status: DISCONTINUED | OUTPATIENT
Start: 2018-12-26 | End: 2018-12-27

## 2018-12-26 RX ORDER — HYDROMORPHONE HYDROCHLORIDE 2 MG/ML
1 INJECTION INTRAMUSCULAR; INTRAVENOUS; SUBCUTANEOUS EVERY 4 HOURS
Qty: 0 | Refills: 0 | Status: DISCONTINUED | OUTPATIENT
Start: 2018-12-26 | End: 2018-12-28

## 2018-12-26 RX ORDER — HYDROMORPHONE HYDROCHLORIDE 2 MG/ML
0.5 INJECTION INTRAMUSCULAR; INTRAVENOUS; SUBCUTANEOUS EVERY 4 HOURS
Qty: 0 | Refills: 0 | Status: DISCONTINUED | OUTPATIENT
Start: 2018-12-26 | End: 2018-12-28

## 2018-12-26 RX ORDER — ACETAMINOPHEN 500 MG
650 TABLET ORAL EVERY 6 HOURS
Qty: 0 | Refills: 0 | Status: DISCONTINUED | OUTPATIENT
Start: 2018-12-26 | End: 2018-12-28

## 2018-12-26 RX ADMIN — HYDROMORPHONE HYDROCHLORIDE 1 MILLIGRAM(S): 2 INJECTION INTRAMUSCULAR; INTRAVENOUS; SUBCUTANEOUS at 18:58

## 2018-12-26 RX ADMIN — Medication 1 TABLET(S): at 12:22

## 2018-12-26 RX ADMIN — POLYETHYLENE GLYCOL 3350 17 GRAM(S): 17 POWDER, FOR SOLUTION ORAL at 12:22

## 2018-12-26 RX ADMIN — MEMANTINE HYDROCHLORIDE 10 MILLIGRAM(S): 10 TABLET ORAL at 18:22

## 2018-12-26 RX ADMIN — HYDROMORPHONE HYDROCHLORIDE 1 MILLIGRAM(S): 2 INJECTION INTRAMUSCULAR; INTRAVENOUS; SUBCUTANEOUS at 12:21

## 2018-12-26 RX ADMIN — HYDROMORPHONE HYDROCHLORIDE 0.5 MILLIGRAM(S): 2 INJECTION INTRAMUSCULAR; INTRAVENOUS; SUBCUTANEOUS at 16:08

## 2018-12-26 RX ADMIN — HYDROMORPHONE HYDROCHLORIDE 1 MILLIGRAM(S): 2 INJECTION INTRAMUSCULAR; INTRAVENOUS; SUBCUTANEOUS at 06:00

## 2018-12-26 RX ADMIN — Medication 1.5 MILLIGRAM(S): at 21:39

## 2018-12-26 RX ADMIN — Medication 650 MILLIGRAM(S): at 18:22

## 2018-12-26 RX ADMIN — Medication 100 MILLIGRAM(S): at 21:39

## 2018-12-26 RX ADMIN — Medication 650 MILLIGRAM(S): at 23:18

## 2018-12-26 RX ADMIN — DONEPEZIL HYDROCHLORIDE 10 MILLIGRAM(S): 10 TABLET, FILM COATED ORAL at 21:39

## 2018-12-26 RX ADMIN — Medication 125 MICROGRAM(S): at 06:00

## 2018-12-26 RX ADMIN — HYDROMORPHONE HYDROCHLORIDE 1 MILLIGRAM(S): 2 INJECTION INTRAMUSCULAR; INTRAVENOUS; SUBCUTANEOUS at 18:21

## 2018-12-26 RX ADMIN — MIRTAZAPINE 15 MILLIGRAM(S): 45 TABLET, ORALLY DISINTEGRATING ORAL at 21:38

## 2018-12-26 RX ADMIN — HYDROMORPHONE HYDROCHLORIDE 0.5 MILLIGRAM(S): 2 INJECTION INTRAMUSCULAR; INTRAVENOUS; SUBCUTANEOUS at 16:44

## 2018-12-26 RX ADMIN — MEMANTINE HYDROCHLORIDE 10 MILLIGRAM(S): 10 TABLET ORAL at 06:00

## 2018-12-26 RX ADMIN — Medication 3 MILLIGRAM(S): at 21:38

## 2018-12-26 RX ADMIN — Medication 300 MILLIGRAM(S): at 12:22

## 2018-12-26 RX ADMIN — AMIODARONE HYDROCHLORIDE 200 MILLIGRAM(S): 400 TABLET ORAL at 06:00

## 2018-12-26 RX ADMIN — HYDROMORPHONE HYDROCHLORIDE 1 MILLIGRAM(S): 2 INJECTION INTRAMUSCULAR; INTRAVENOUS; SUBCUTANEOUS at 13:19

## 2018-12-26 RX ADMIN — SODIUM CHLORIDE 75 MILLILITER(S): 9 INJECTION INTRAMUSCULAR; INTRAVENOUS; SUBCUTANEOUS at 16:08

## 2018-12-26 RX ADMIN — OXYCODONE AND ACETAMINOPHEN 1 TABLET(S): 5; 325 TABLET ORAL at 15:09

## 2018-12-26 RX ADMIN — OXYCODONE AND ACETAMINOPHEN 1 TABLET(S): 5; 325 TABLET ORAL at 13:45

## 2018-12-26 RX ADMIN — PANTOPRAZOLE SODIUM 40 MILLIGRAM(S): 20 TABLET, DELAYED RELEASE ORAL at 06:00

## 2018-12-26 RX ADMIN — HYDROMORPHONE HYDROCHLORIDE 1 MILLIGRAM(S): 2 INJECTION INTRAMUSCULAR; INTRAVENOUS; SUBCUTANEOUS at 06:29

## 2018-12-26 RX ADMIN — TAMSULOSIN HYDROCHLORIDE 0.4 MILLIGRAM(S): 0.4 CAPSULE ORAL at 21:38

## 2018-12-26 RX ADMIN — Medication 100 MILLIGRAM(S): at 13:45

## 2018-12-26 RX ADMIN — SENNA PLUS 2 TABLET(S): 8.6 TABLET ORAL at 21:38

## 2018-12-26 RX ADMIN — Medication 100 MILLIGRAM(S): at 06:00

## 2018-12-26 RX ADMIN — Medication 1 APPLICATION(S): at 12:22

## 2018-12-26 NOTE — PROGRESS NOTE ADULT - PROBLEM SELECTOR PLAN 2
LOUISE on CKD. Uptrending Crt, most likely pre-renal  -urine Na, creatinine, and bladder scan   -Started on IVF at 75 cc/hr for 8 hours   -F/u with BMP tomorrow  -Avoid nephrotoxic meds   -renally dose meds

## 2018-12-26 NOTE — PROGRESS NOTE ADULT - SUBJECTIVE AND OBJECTIVE BOX
Patient seen and examined. +pelvic pain, no SOB.      MEDICATIONS  (STANDING):  allopurinol 300 milliGRAM(s) Oral daily  amiodarone    Tablet 200 milliGRAM(s) Oral daily  betamethasone valerate 0.1% Ointment 1 Application(s) Topical daily  clonazePAM Tablet 1.5 milliGRAM(s) Oral at bedtime  docusate sodium 100 milliGRAM(s) Oral three times a day  donepezil 10 milliGRAM(s) Oral at bedtime  lactobacillus acidophilus 1 Tablet(s) Oral daily  levothyroxine 125 MICROGram(s) Oral daily  melatonin 3 milliGRAM(s) Oral at bedtime  memantine 10 milliGRAM(s) Oral every 12 hours  mirtazapine 15 milliGRAM(s) Oral at bedtime  multivitamin 1 Tablet(s) Oral daily  pantoprazole    Tablet 40 milliGRAM(s) Oral before breakfast  polyethylene glycol 3350 17 Gram(s) Oral at bedtime  polyethylene glycol 3350 17 Gram(s) Oral daily  senna 2 Tablet(s) Oral at bedtime  sodium chloride 0.9%. 1000 milliLiter(s) (75 mL/Hr) IV Continuous <Continuous>  tamsulosin 0.4 milliGRAM(s) Oral at bedtime      VITAL:  T(C): , Max: 37 (12-25-18 @ 21:57)  T(F): , Max: 98.6 (12-25-18 @ 21:57)  HR: 67 (12-26-18 @ 04:35)  BP: 135/72 (12-26-18 @ 04:35)  RR: 18 (12-26-18 @ 04:35)  SpO2: 93% (12-26-18 @ 04:35)    I and O's:    12-25 @ 07:01  -  12-26 @ 07:00  --------------------------------------------------------  IN: 560 mL / OUT: 0 mL / NET: 560 mL    12-26 @ 07:01  -  12-26 @ 14:12  --------------------------------------------------------  IN: 0 mL / OUT: 400 mL / NET: -400 mL          PHYSICAL EXAM:    Constitutional: NAD  Neck:  No JVD  Respiratory: CTAB/L  Cardiovascular: S1 and S2  Gastrointestinal: BS+, soft, NT/ND  Extremities: No peripheral edema  Neurological: A/O x 3, no focal deficits  Psychiatric: Normal mood, normal affect  : No Tom  Skin: No rashes  Access: Not applicable    LABS:                        10.2   6.3   )-----------( 124      ( 25 Dec 2018 06:59 )             29.5     12-26    139  |  102  |  33<H>  ----------------------------<  108<H>  4.3   |  23  |  2.76<H>    Ca    9.3      26 Dec 2018 07:33  Phos  3.3     12-26  Mg     1.7     12-26      IMPRESSION: 73M w/ AFib, nephrolithiasis, CKD4, arthritis, and Waldenstrom's Macroglobulinemia, 12/22/18 admitted s/p fall with right pubic ramus fracture    (1)Renal - LOUISE on CKD IV - creatinine up a bit today  (2)HTN - BP acceptable  (3)Lytes - highly acceptable  (4)Ortho - pubic ramus fracture - not for surgical intervention. On prn Dilaudid for pain control    RECOMMEND:  (1)No NSAIDs for pain  (2)No objection to use of any opiates here if needed  (3)Dose new meds for GFR 20-25ml/min  (4)Agree with IVF as ordered      KIRSTEN MayesC  [facility]  [phone] Patient seen and examined. +pelvic pain, no SOB.      MEDICATIONS  (STANDING):  allopurinol 300 milliGRAM(s) Oral daily  amiodarone    Tablet 200 milliGRAM(s) Oral daily  betamethasone valerate 0.1% Ointment 1 Application(s) Topical daily  clonazePAM Tablet 1.5 milliGRAM(s) Oral at bedtime  docusate sodium 100 milliGRAM(s) Oral three times a day  donepezil 10 milliGRAM(s) Oral at bedtime  lactobacillus acidophilus 1 Tablet(s) Oral daily  levothyroxine 125 MICROGram(s) Oral daily  melatonin 3 milliGRAM(s) Oral at bedtime  memantine 10 milliGRAM(s) Oral every 12 hours  mirtazapine 15 milliGRAM(s) Oral at bedtime  multivitamin 1 Tablet(s) Oral daily  pantoprazole    Tablet 40 milliGRAM(s) Oral before breakfast  polyethylene glycol 3350 17 Gram(s) Oral at bedtime  polyethylene glycol 3350 17 Gram(s) Oral daily  senna 2 Tablet(s) Oral at bedtime  sodium chloride 0.9%. 1000 milliLiter(s) (75 mL/Hr) IV Continuous <Continuous>  tamsulosin 0.4 milliGRAM(s) Oral at bedtime      VITAL:  T(C): , Max: 37 (12-25-18 @ 21:57)  T(F): , Max: 98.6 (12-25-18 @ 21:57)  HR: 67 (12-26-18 @ 04:35)  BP: 135/72 (12-26-18 @ 04:35)  RR: 18 (12-26-18 @ 04:35)  SpO2: 93% (12-26-18 @ 04:35)    I and O's:    12-25 @ 07:01  -  12-26 @ 07:00  --------------------------------------------------------  IN: 560 mL / OUT: 0 mL / NET: 560 mL    12-26 @ 07:01  -  12-26 @ 14:12  --------------------------------------------------------  IN: 0 mL / OUT: 400 mL / NET: -400 mL          PHYSICAL EXAM:    Constitutional: NAD  Neck:  No JVD  Respiratory: CTAB/L  Cardiovascular: S1 and S2  Gastrointestinal: BS+, soft, NT/ND  Extremities: No peripheral edema  Neurological: A/O x 3, no focal deficits  Psychiatric: Normal mood, normal affect  : No Tom  Skin: No rashes  Access: Not applicable    LABS:                        10.2   6.3   )-----------( 124      ( 25 Dec 2018 06:59 )             29.5     12-26    139  |  102  |  33<H>  ----------------------------<  108<H>  4.3   |  23  |  2.76<H>    Ca    9.3      26 Dec 2018 07:33  Phos  3.3     12-26  Mg     1.7     12-26      IMPRESSION: 73M w/ AFib, nephrolithiasis, CKD4, arthritis, and Waldenstrom's Macroglobulinemia, 12/22/18 admitted s/p fall with right pubic ramus fracture    (1)Renal - CKD IV - creatinine up a bit today  (2)HTN - BP acceptable  (3)Lytes - highly acceptable  (4)Ortho - pubic ramus fracture - not for surgical intervention. On prn Dilaudid for pain control    RECOMMEND:  (1)No NSAIDs for pain  (2)No objection to use of any opiates here if needed  (3)Dose new meds for GFR 20-25ml/min  (4)Agree with IVF as ordered      KIRSTEN MayesC  [facility]  [phone] Patient seen and examined. +pelvic pain, no SOB.      MEDICATIONS  (STANDING):  allopurinol 300 milliGRAM(s) Oral daily  amiodarone    Tablet 200 milliGRAM(s) Oral daily  betamethasone valerate 0.1% Ointment 1 Application(s) Topical daily  clonazePAM Tablet 1.5 milliGRAM(s) Oral at bedtime  docusate sodium 100 milliGRAM(s) Oral three times a day  donepezil 10 milliGRAM(s) Oral at bedtime  lactobacillus acidophilus 1 Tablet(s) Oral daily  levothyroxine 125 MICROGram(s) Oral daily  melatonin 3 milliGRAM(s) Oral at bedtime  memantine 10 milliGRAM(s) Oral every 12 hours  mirtazapine 15 milliGRAM(s) Oral at bedtime  multivitamin 1 Tablet(s) Oral daily  pantoprazole    Tablet 40 milliGRAM(s) Oral before breakfast  polyethylene glycol 3350 17 Gram(s) Oral at bedtime  polyethylene glycol 3350 17 Gram(s) Oral daily  senna 2 Tablet(s) Oral at bedtime  sodium chloride 0.9%. 1000 milliLiter(s) (75 mL/Hr) IV Continuous <Continuous>  tamsulosin 0.4 milliGRAM(s) Oral at bedtime      VITAL:  T(C): , Max: 37 (12-25-18 @ 21:57)  T(F): , Max: 98.6 (12-25-18 @ 21:57)  HR: 67 (12-26-18 @ 04:35)  BP: 135/72 (12-26-18 @ 04:35)  RR: 18 (12-26-18 @ 04:35)  SpO2: 93% (12-26-18 @ 04:35)    I and O's:    12-25 @ 07:01  -  12-26 @ 07:00  --------------------------------------------------------  IN: 560 mL / OUT: 0 mL / NET: 560 mL    12-26 @ 07:01  -  12-26 @ 14:12  --------------------------------------------------------  IN: 0 mL / OUT: 400 mL / NET: -400 mL          PHYSICAL EXAM:    Constitutional: NAD  Neck:  No JVD  Respiratory: CTAB/L  Cardiovascular: S1 and S2  Gastrointestinal: BS+, soft, NT/ND  Extremities: No peripheral edema  Neurological: A/O x 3, no focal deficits  Psychiatric: Normal mood, normal affect  : No Tom  Skin: No rashes  Access: Not applicable    LABS:                        10.2   6.3   )-----------( 124      ( 25 Dec 2018 06:59 )             29.5     12-26    139  |  102  |  33<H>  ----------------------------<  108<H>  4.3   |  23  |  2.76<H>    Ca    9.3      26 Dec 2018 07:33  Phos  3.3     12-26  Mg     1.7     12-26      IMPRESSION: 73M w/ AFib, nephrolithiasis, CKD4, arthritis, and Waldenstrom's Macroglobulinemia, 12/22/18 admitted s/p fall with right pubic ramus fracture    (1)Renal - CKD IV - creatinine up a bit today  (2)HTN - BP acceptable  (3)Lytes - highly acceptable  (4)Ortho - pubic ramus fracture - not for surgical intervention. On prn Dilaudid for pain control    RECOMMEND:  (1)No NSAIDs for pain  (2)No objection to use of any opiates here if needed  (3)Dose new meds for GFR 20-25ml/min  (4)Agree with IVF as ordered      INNA Mayes  [facility]  [phone]    *******************RENAL ATTENDING**********************  Seen/examined with NP. I agree with NP assessment as above.    VS as above; NAD; CTA-B/L; RRR; no edema b/l    IMPRESSION: 73M w/ AFib, nephrolithiasis, CKD4, arthritis, and Waldenstrom's Macroglobulinemia, 12/22/18 admitted s/p fall with right pubic ramus fracture  (1)Renal - CKD IV - creatinine up a bit today - prerenal  (2)HTN - BP acceptable  (3)Lytes - highly acceptable  (4)Ortho - pubic ramus fracture - not for surgical intervention. On prn Dilaudid for pain control    RECOMMEND:  (1)No NSAIDs for pain  (2)No objection to use of any opiates here if needed  (3)Dose new meds for GFR 20-25ml/min  (4)Agree with IVF as ordered  (5)No further workup needed for rise in creatinine for now  (6)BMP daily          Norman Ascencio MD  [facility]  [phone]

## 2018-12-26 NOTE — PROGRESS NOTE ADULT - SUBJECTIVE AND OBJECTIVE BOX
Patient is a 73y old  Male who presents with a chief complaint of Fall, pubic ramus fractures (26 Dec 2018 14:12)      SUBJECTIVE / OVERNIGHT EVENTS:    No acute events overnight. Pt continues to endorse R hip pain. Otherwise, denies fever, chills, nausea, vomiting, abdominal pain, diarrhea, constipation, CP, palpitations, SOB, or LE edema.       MEDICATIONS  (STANDING):  acetaminophen   Tablet .. 650 milliGRAM(s) Oral every 6 hours  allopurinol 300 milliGRAM(s) Oral daily  amiodarone    Tablet 200 milliGRAM(s) Oral daily  betamethasone valerate 0.1% Ointment 1 Application(s) Topical daily  clonazePAM Tablet 1.5 milliGRAM(s) Oral at bedtime  docusate sodium 100 milliGRAM(s) Oral three times a day  donepezil 10 milliGRAM(s) Oral at bedtime  lactobacillus acidophilus 1 Tablet(s) Oral daily  levothyroxine 125 MICROGram(s) Oral daily  melatonin 3 milliGRAM(s) Oral at bedtime  memantine 10 milliGRAM(s) Oral every 12 hours  mirtazapine 15 milliGRAM(s) Oral at bedtime  multivitamin 1 Tablet(s) Oral daily  pantoprazole    Tablet 40 milliGRAM(s) Oral before breakfast  polyethylene glycol 3350 17 Gram(s) Oral at bedtime  polyethylene glycol 3350 17 Gram(s) Oral daily  senna 2 Tablet(s) Oral at bedtime  sodium chloride 0.9%. 1000 milliLiter(s) (75 mL/Hr) IV Continuous <Continuous>  tamsulosin 0.4 milliGRAM(s) Oral at bedtime    MEDICATIONS  (PRN):  bisacodyl 5 milliGRAM(s) Oral every 12 hours PRN Constipation  HYDROmorphone  Injectable 1 milliGRAM(s) IV Push every 4 hours PRN Moderate and/or Severe Pain  HYDROmorphone  Injectable 0.5 milliGRAM(s) IV Push every 4 hours PRN Breakthrough Pain      Vital Signs Last 24 Hrs  T(C): 36.8 (26 Dec 2018 14:27), Max: 37 (25 Dec 2018 21:57)  T(F): 98.2 (26 Dec 2018 14:27), Max: 98.6 (25 Dec 2018 21:57)  HR: 104 (26 Dec 2018 14:27) (67 - 104)  BP: 112/74 (26 Dec 2018 14:27) (112/74 - 135/72)  BP(mean): --  RR: 18 (26 Dec 2018 14:27) (18 - 18)  SpO2: 95% (26 Dec 2018 14:27) (93% - 95%)      PHYSICAL EXAM  GENERAL: NAD, well-developed  HEAD:  Atraumatic, Normocephalic  EYES: Conjunctiva and sclera clear  NECK: Supple, No JVD  CHEST/LUNG: Clear to auscultation bilaterally; No wheeze  HEART: Regular rate and rhythm; No murmurs, rubs, or gallops  ABDOMEN: Soft, Nontender, Nondistended; Bowel sounds present  EXTREMITIES:  Limited ROM in the R hip due to pain. Sensation grossly intact    CAPILLARY BLOOD GLUCOSE        I&O's Summary    25 Dec 2018 07:01  -  26 Dec 2018 07:00  --------------------------------------------------------  IN: 560 mL / OUT: 0 mL / NET: 560 mL    26 Dec 2018 07:01  -  26 Dec 2018 15:15  --------------------------------------------------------  IN: 0 mL / OUT: 400 mL / NET: -400 mL        LABS:                        10.2   6.3   )-----------( 124      ( 25 Dec 2018 06:59 )             29.5     12-26    139  |  102  |  33<H>  ----------------------------<  108<H>  4.3   |  23  |  2.76<H>    Ca    9.3      26 Dec 2018 07:33  Phos  3.3     12-26  Mg     1.7     12-26      PT/INR - ( 26 Dec 2018 07:33 )   PT: 36.7 sec;   INR: 3.11 ratio                   RADIOLOGY & ADDITIONAL TESTS:     MICROBIOLOGY:    ANTIMICROBIALS:    CONSULTS:

## 2018-12-26 NOTE — PROGRESS NOTE ADULT - ASSESSMENT
72 y/o M with h/o Afib (on coumadin and s/p PPM), CKD, Chronic back and knee pain 2/2 arthritis s/p meniscal tears, Insomnia, anxiety, mild memory deficits but A&Ox4 @ baseline, CLL+waldenstrom macroglobulinemia on Ninzaro s/p rash from rituxan, now presenting with right hip pain s/p mechanical fall. Ortho consulted, no intervention required. His course complicated by LOUISE.

## 2018-12-27 LAB
ANION GAP SERPL CALC-SCNC: 12 MMOL/L — SIGNIFICANT CHANGE UP (ref 5–17)
BUN SERPL-MCNC: 37 MG/DL — HIGH (ref 7–23)
CALCIUM SERPL-MCNC: 9.1 MG/DL — SIGNIFICANT CHANGE UP (ref 8.4–10.5)
CHLORIDE SERPL-SCNC: 103 MMOL/L — SIGNIFICANT CHANGE UP (ref 96–108)
CO2 SERPL-SCNC: 24 MMOL/L — SIGNIFICANT CHANGE UP (ref 22–31)
CREAT SERPL-MCNC: 2.74 MG/DL — HIGH (ref 0.5–1.3)
GLUCOSE SERPL-MCNC: 130 MG/DL — HIGH (ref 70–99)
INR BLD: 3.5 RATIO — HIGH (ref 0.88–1.16)
MAGNESIUM SERPL-MCNC: 1.8 MG/DL — SIGNIFICANT CHANGE UP (ref 1.6–2.6)
PHOSPHATE SERPL-MCNC: 3.6 MG/DL — SIGNIFICANT CHANGE UP (ref 2.5–4.5)
POTASSIUM SERPL-MCNC: 4.2 MMOL/L — SIGNIFICANT CHANGE UP (ref 3.5–5.3)
POTASSIUM SERPL-SCNC: 4.2 MMOL/L — SIGNIFICANT CHANGE UP (ref 3.5–5.3)
PROTHROM AB SERPL-ACNC: 41.5 SEC — HIGH (ref 10–12.9)
SODIUM SERPL-SCNC: 139 MMOL/L — SIGNIFICANT CHANGE UP (ref 135–145)

## 2018-12-27 RX ADMIN — HYDROMORPHONE HYDROCHLORIDE 1 MILLIGRAM(S): 2 INJECTION INTRAMUSCULAR; INTRAVENOUS; SUBCUTANEOUS at 16:59

## 2018-12-27 RX ADMIN — SENNA PLUS 2 TABLET(S): 8.6 TABLET ORAL at 22:03

## 2018-12-27 RX ADMIN — HYDROMORPHONE HYDROCHLORIDE 1 MILLIGRAM(S): 2 INJECTION INTRAMUSCULAR; INTRAVENOUS; SUBCUTANEOUS at 06:23

## 2018-12-27 RX ADMIN — Medication 300 MILLIGRAM(S): at 12:19

## 2018-12-27 RX ADMIN — HYDROMORPHONE HYDROCHLORIDE 1 MILLIGRAM(S): 2 INJECTION INTRAMUSCULAR; INTRAVENOUS; SUBCUTANEOUS at 21:24

## 2018-12-27 RX ADMIN — Medication 650 MILLIGRAM(S): at 05:07

## 2018-12-27 RX ADMIN — MEMANTINE HYDROCHLORIDE 10 MILLIGRAM(S): 10 TABLET ORAL at 18:01

## 2018-12-27 RX ADMIN — HYDROMORPHONE HYDROCHLORIDE 1 MILLIGRAM(S): 2 INJECTION INTRAMUSCULAR; INTRAVENOUS; SUBCUTANEOUS at 11:00

## 2018-12-27 RX ADMIN — HYDROMORPHONE HYDROCHLORIDE 1 MILLIGRAM(S): 2 INJECTION INTRAMUSCULAR; INTRAVENOUS; SUBCUTANEOUS at 02:20

## 2018-12-27 RX ADMIN — Medication 125 MICROGRAM(S): at 05:08

## 2018-12-27 RX ADMIN — Medication 650 MILLIGRAM(S): at 12:19

## 2018-12-27 RX ADMIN — Medication 1.5 MILLIGRAM(S): at 22:03

## 2018-12-27 RX ADMIN — Medication 100 MILLIGRAM(S): at 13:16

## 2018-12-27 RX ADMIN — HYDROMORPHONE HYDROCHLORIDE 0.5 MILLIGRAM(S): 2 INJECTION INTRAMUSCULAR; INTRAVENOUS; SUBCUTANEOUS at 18:00

## 2018-12-27 RX ADMIN — HYDROMORPHONE HYDROCHLORIDE 1 MILLIGRAM(S): 2 INJECTION INTRAMUSCULAR; INTRAVENOUS; SUBCUTANEOUS at 10:36

## 2018-12-27 RX ADMIN — HYDROMORPHONE HYDROCHLORIDE 1 MILLIGRAM(S): 2 INJECTION INTRAMUSCULAR; INTRAVENOUS; SUBCUTANEOUS at 16:30

## 2018-12-27 RX ADMIN — HYDROMORPHONE HYDROCHLORIDE 1 MILLIGRAM(S): 2 INJECTION INTRAMUSCULAR; INTRAVENOUS; SUBCUTANEOUS at 21:04

## 2018-12-27 RX ADMIN — HYDROMORPHONE HYDROCHLORIDE 0.5 MILLIGRAM(S): 2 INJECTION INTRAMUSCULAR; INTRAVENOUS; SUBCUTANEOUS at 13:45

## 2018-12-27 RX ADMIN — MIRTAZAPINE 15 MILLIGRAM(S): 45 TABLET, ORALLY DISINTEGRATING ORAL at 22:03

## 2018-12-27 RX ADMIN — HYDROMORPHONE HYDROCHLORIDE 0.5 MILLIGRAM(S): 2 INJECTION INTRAMUSCULAR; INTRAVENOUS; SUBCUTANEOUS at 08:35

## 2018-12-27 RX ADMIN — Medication 3 MILLIGRAM(S): at 22:03

## 2018-12-27 RX ADMIN — PANTOPRAZOLE SODIUM 40 MILLIGRAM(S): 20 TABLET, DELAYED RELEASE ORAL at 05:08

## 2018-12-27 RX ADMIN — AMIODARONE HYDROCHLORIDE 200 MILLIGRAM(S): 400 TABLET ORAL at 05:08

## 2018-12-27 RX ADMIN — HYDROMORPHONE HYDROCHLORIDE 0.5 MILLIGRAM(S): 2 INJECTION INTRAMUSCULAR; INTRAVENOUS; SUBCUTANEOUS at 13:14

## 2018-12-27 RX ADMIN — POLYETHYLENE GLYCOL 3350 17 GRAM(S): 17 POWDER, FOR SOLUTION ORAL at 22:03

## 2018-12-27 RX ADMIN — Medication 1 TABLET(S): at 12:19

## 2018-12-27 RX ADMIN — Medication 100 MILLIGRAM(S): at 22:03

## 2018-12-27 RX ADMIN — POLYETHYLENE GLYCOL 3350 17 GRAM(S): 17 POWDER, FOR SOLUTION ORAL at 12:18

## 2018-12-27 RX ADMIN — TAMSULOSIN HYDROCHLORIDE 0.4 MILLIGRAM(S): 0.4 CAPSULE ORAL at 22:03

## 2018-12-27 RX ADMIN — HYDROMORPHONE HYDROCHLORIDE 1 MILLIGRAM(S): 2 INJECTION INTRAMUSCULAR; INTRAVENOUS; SUBCUTANEOUS at 06:04

## 2018-12-27 RX ADMIN — HYDROMORPHONE HYDROCHLORIDE 0.5 MILLIGRAM(S): 2 INJECTION INTRAMUSCULAR; INTRAVENOUS; SUBCUTANEOUS at 10:14

## 2018-12-27 RX ADMIN — HYDROMORPHONE HYDROCHLORIDE 0.5 MILLIGRAM(S): 2 INJECTION INTRAMUSCULAR; INTRAVENOUS; SUBCUTANEOUS at 18:29

## 2018-12-27 RX ADMIN — Medication 650 MILLIGRAM(S): at 18:01

## 2018-12-27 RX ADMIN — HYDROMORPHONE HYDROCHLORIDE 1 MILLIGRAM(S): 2 INJECTION INTRAMUSCULAR; INTRAVENOUS; SUBCUTANEOUS at 01:46

## 2018-12-27 RX ADMIN — MEMANTINE HYDROCHLORIDE 10 MILLIGRAM(S): 10 TABLET ORAL at 05:08

## 2018-12-27 RX ADMIN — DONEPEZIL HYDROCHLORIDE 10 MILLIGRAM(S): 10 TABLET, FILM COATED ORAL at 22:04

## 2018-12-27 RX ADMIN — Medication 100 MILLIGRAM(S): at 05:09

## 2018-12-27 RX ADMIN — Medication 1 APPLICATION(S): at 12:19

## 2018-12-27 NOTE — PROGRESS NOTE ADULT - PROBLEM SELECTOR PLAN 1
Pain control with dilaudid and percocent and tylenol for severe/moderate/mild pain. Currently still in severe pain, will uptitrate dilaudid to 1mg q4h prn severe pain. Pain control with dilaudid and percocent and tylenol for severe/moderate/mild pain. Currently still in severe pain, will uptitrate dilaudid to 1mg q4h prn severe pain.  -Ortho consult appreciated. No intervention at this point   -Pending dc to rehab

## 2018-12-27 NOTE — PROGRESS NOTE ADULT - SUBJECTIVE AND OBJECTIVE BOX
Patient seen and examined. No new complaints.      MEDICATIONS  (STANDING):  acetaminophen   Tablet .. 650 milliGRAM(s) Oral every 6 hours  allopurinol 300 milliGRAM(s) Oral daily  amiodarone    Tablet 200 milliGRAM(s) Oral daily  betamethasone valerate 0.1% Ointment 1 Application(s) Topical daily  clonazePAM Tablet 1.5 milliGRAM(s) Oral at bedtime  docusate sodium 100 milliGRAM(s) Oral three times a day  donepezil 10 milliGRAM(s) Oral at bedtime  lactobacillus acidophilus 1 Tablet(s) Oral daily  levothyroxine 125 MICROGram(s) Oral daily  melatonin 3 milliGRAM(s) Oral at bedtime  memantine 10 milliGRAM(s) Oral every 12 hours  mirtazapine 15 milliGRAM(s) Oral at bedtime  multivitamin 1 Tablet(s) Oral daily  pantoprazole    Tablet 40 milliGRAM(s) Oral before breakfast  polyethylene glycol 3350 17 Gram(s) Oral at bedtime  polyethylene glycol 3350 17 Gram(s) Oral daily  senna 2 Tablet(s) Oral at bedtime  tamsulosin 0.4 milliGRAM(s) Oral at bedtime      VITAL:  T(C): , Max: 36.8 (12-26-18 @ 14:27)  T(F): , Max: 98.3 (12-26-18 @ 21:19)  HR: 55 (12-27-18 @ 04:43)  BP: 132/67 (12-27-18 @ 04:43)  RR: 18 (12-27-18 @ 04:43)  SpO2: 95% (12-27-18 @ 04:43)    I and O's:    12-26 @ 07:01  -  12-27 @ 07:00  --------------------------------------------------------  IN: 400 mL / OUT: 600 mL / NET: -200 mL          PHYSICAL EXAM:    Constitutional: NAD  Neck:  No JVD  Respiratory: CTAB/L  Cardiovascular: S1 and S2  Gastrointestinal: BS+, soft, NT/ND  Extremities: No peripheral edema  Neurological: A/O x 3, no focal deficits  Psychiatric: Normal mood, normal affect  : No Tom  Skin: No rashes  Access: Not applicable    LABS:    12-27    139  |  103  |  37<H>  ----------------------------<  130<H>  4.2   |  24  |  2.74<H>    Ca    9.1      27 Dec 2018 10:52  Phos  3.6     12-27  Mg     1.8     12-27        Urine Studies:    Sodium, Random Urine: 65 mmol/L (12-26 @ 17:35)  Creatinine, Random Urine: 150 mg/dL (12-26 @ 17:35)        IMPRESSION: 73M w/ AFib, nephrolithiasis, CKD4, arthritis, and Waldenstrom's Macroglobulinemia, 12/22/18 admitted s/p fall with right pubic ramus fracture    (1)Renal - CKD IV - creatinine same as yesterday  (2)HTN - BP acceptable  (3)Lytes - highly acceptable  (4)Ortho - pubic ramus fracture - not for surgical intervention. On prn Dilaudid for pain control    RECOMMEND:  (1)No NSAIDs for pain  (2)No objection to use of any opiates here if needed  (3)Dose new meds for GFR 20-25ml/min  (4)No further workup needed for rise in creatinine for now  (5)BMP daily        Trinh Gabriel NP-C  [facility]  [phone] Patient seen and examined. No new complaints.      MEDICATIONS  (STANDING):  acetaminophen   Tablet .. 650 milliGRAM(s) Oral every 6 hours  allopurinol 300 milliGRAM(s) Oral daily  amiodarone    Tablet 200 milliGRAM(s) Oral daily  betamethasone valerate 0.1% Ointment 1 Application(s) Topical daily  clonazePAM Tablet 1.5 milliGRAM(s) Oral at bedtime  docusate sodium 100 milliGRAM(s) Oral three times a day  donepezil 10 milliGRAM(s) Oral at bedtime  lactobacillus acidophilus 1 Tablet(s) Oral daily  levothyroxine 125 MICROGram(s) Oral daily  melatonin 3 milliGRAM(s) Oral at bedtime  memantine 10 milliGRAM(s) Oral every 12 hours  mirtazapine 15 milliGRAM(s) Oral at bedtime  multivitamin 1 Tablet(s) Oral daily  pantoprazole    Tablet 40 milliGRAM(s) Oral before breakfast  polyethylene glycol 3350 17 Gram(s) Oral at bedtime  polyethylene glycol 3350 17 Gram(s) Oral daily  senna 2 Tablet(s) Oral at bedtime  tamsulosin 0.4 milliGRAM(s) Oral at bedtime      VITAL:  T(C): , Max: 36.8 (12-26-18 @ 14:27)  T(F): , Max: 98.3 (12-26-18 @ 21:19)  HR: 55 (12-27-18 @ 04:43)  BP: 132/67 (12-27-18 @ 04:43)  RR: 18 (12-27-18 @ 04:43)  SpO2: 95% (12-27-18 @ 04:43)    I and O's:    12-26 @ 07:01  -  12-27 @ 07:00  --------------------------------------------------------  IN: 400 mL / OUT: 600 mL / NET: -200 mL          PHYSICAL EXAM:    Constitutional: NAD  Neck:  No JVD  Respiratory: CTAB/L  Cardiovascular: S1 and S2, sabina  Gastrointestinal: BS+, soft, NT/ND  Extremities: No peripheral edema  Neurological: A/O x 3, no focal deficits  Psychiatric: Normal mood, normal affect  : No Tom  Skin: No rashes  Access: Not applicable    LABS:    12-27    139  |  103  |  37<H>  ----------------------------<  130<H>  4.2   |  24  |  2.74<H>    Ca    9.1      27 Dec 2018 10:52  Phos  3.6     12-27  Mg     1.8     12-27        Urine Studies:    Sodium, Random Urine: 65 mmol/L (12-26 @ 17:35)  Creatinine, Random Urine: 150 mg/dL (12-26 @ 17:35)        IMPRESSION: 73M w/ AFib, nephrolithiasis, CKD4, arthritis, and Waldenstrom's Macroglobulinemia, 12/22/18 admitted s/p fall with right pubic ramus fracture    (1)Renal - CKD IV - creatinine same as yesterday  (2)HTN - BP acceptable  (3)Lytes - highly acceptable  (4)Ortho - pubic ramus fracture - not for surgical intervention. On prn Dilaudid for pain control    RECOMMEND:  (1)No NSAIDs for pain  (2)No objection to use of any opiates here if needed  (3)Dose new meds for GFR 20-25ml/min  (4)No further workup needed for rise in creatinine for now  (5)BMP daily        Trinh Gabriel, NP-C  [facility]  [phone] Patient seen and examined. No new complaints.      MEDICATIONS  (STANDING):  acetaminophen   Tablet .. 650 milliGRAM(s) Oral every 6 hours  allopurinol 300 milliGRAM(s) Oral daily  amiodarone    Tablet 200 milliGRAM(s) Oral daily  betamethasone valerate 0.1% Ointment 1 Application(s) Topical daily  clonazePAM Tablet 1.5 milliGRAM(s) Oral at bedtime  docusate sodium 100 milliGRAM(s) Oral three times a day  donepezil 10 milliGRAM(s) Oral at bedtime  lactobacillus acidophilus 1 Tablet(s) Oral daily  levothyroxine 125 MICROGram(s) Oral daily  melatonin 3 milliGRAM(s) Oral at bedtime  memantine 10 milliGRAM(s) Oral every 12 hours  mirtazapine 15 milliGRAM(s) Oral at bedtime  multivitamin 1 Tablet(s) Oral daily  pantoprazole    Tablet 40 milliGRAM(s) Oral before breakfast  polyethylene glycol 3350 17 Gram(s) Oral at bedtime  polyethylene glycol 3350 17 Gram(s) Oral daily  senna 2 Tablet(s) Oral at bedtime  tamsulosin 0.4 milliGRAM(s) Oral at bedtime      VITAL:  T(C): , Max: 36.8 (12-26-18 @ 14:27)  T(F): , Max: 98.3 (12-26-18 @ 21:19)  HR: 55 (12-27-18 @ 04:43)  BP: 132/67 (12-27-18 @ 04:43)  RR: 18 (12-27-18 @ 04:43)  SpO2: 95% (12-27-18 @ 04:43)    I and O's:    12-26 @ 07:01  -  12-27 @ 07:00  --------------------------------------------------------  IN: 400 mL / OUT: 600 mL / NET: -200 mL          PHYSICAL EXAM:    Constitutional: NAD  Neck:  No JVD  Respiratory: CTAB/L  Cardiovascular: S1 and S2, sabina  Gastrointestinal: BS+, soft, NT/ND  Extremities: No peripheral edema  Neurological: A/O x 3, no focal deficits  Psychiatric: Normal mood, normal affect  : No Tom  Skin: No rashes  Access: Not applicable    LABS:    12-27    139  |  103  |  37<H>  ----------------------------<  130<H>  4.2   |  24  |  2.74<H>    Ca    9.1      27 Dec 2018 10:52  Phos  3.6     12-27  Mg     1.8     12-27        Urine Studies:    Sodium, Random Urine: 65 mmol/L (12-26 @ 17:35)  Creatinine, Random Urine: 150 mg/dL (12-26 @ 17:35)        IMPRESSION: 73M w/ AFib, nephrolithiasis, CKD4, arthritis, and Waldenstrom's Macroglobulinemia, 12/22/18 admitted s/p fall with right pubic ramus fracture    (1)Renal - CKD IV - creatinine same as yesterday  (2)HTN - BP acceptable  (3)Lytes - highly acceptable  (4)Ortho - pubic ramus fracture - not for surgical intervention. On prn Dilaudid for pain control    RECOMMEND:  (1)No NSAIDs for pain  (2)No objection to use of any opiates here if needed  (3)Dose new meds for GFR 20-25ml/min  (4)No further workup needed for rise in creatinine for now  (5)BMP daily        Trinh Gabriel NP-C  [facility]  [phone]      *******************RENAL ATTENDING**********************  Seen/examined with NP. I agree with NP assessment as above.    VS as above; NAD; CTA-B/L; RRR; no edema b/l      IMPRESSION: 73M w/ AFib, nephrolithiasis, CKD4, arthritis, and Waldenstrom's Macroglobulinemia, 12/22/18 admitted s/p fall with right pubic ramus fracture    (1)Renal - CKD IV -  stable  (2)Ortho - pubic ramus fracture - not for surgical intervention. On prn Dilaudid for pain control      RECOMMEND:  (1)No NSAIDs for pain  (2)Dose new meds for GFR 20-25ml/min        Norman Ascencio MD  [facility],   [phone]

## 2018-12-27 NOTE — PROGRESS NOTE ADULT - SUBJECTIVE AND OBJECTIVE BOX
Patient is a 73y old  Male who presents with a chief complaint of Fall, pubic ramus fractures (26 Dec 2018 15:14)      SUBJECTIVE / OVERNIGHT EVENTS:          MEDICATIONS  (STANDING):  acetaminophen   Tablet .. 650 milliGRAM(s) Oral every 6 hours  allopurinol 300 milliGRAM(s) Oral daily  amiodarone    Tablet 200 milliGRAM(s) Oral daily  betamethasone valerate 0.1% Ointment 1 Application(s) Topical daily  clonazePAM Tablet 1.5 milliGRAM(s) Oral at bedtime  docusate sodium 100 milliGRAM(s) Oral three times a day  donepezil 10 milliGRAM(s) Oral at bedtime  lactobacillus acidophilus 1 Tablet(s) Oral daily  levothyroxine 125 MICROGram(s) Oral daily  melatonin 3 milliGRAM(s) Oral at bedtime  memantine 10 milliGRAM(s) Oral every 12 hours  mirtazapine 15 milliGRAM(s) Oral at bedtime  multivitamin 1 Tablet(s) Oral daily  pantoprazole    Tablet 40 milliGRAM(s) Oral before breakfast  polyethylene glycol 3350 17 Gram(s) Oral at bedtime  polyethylene glycol 3350 17 Gram(s) Oral daily  senna 2 Tablet(s) Oral at bedtime  tamsulosin 0.4 milliGRAM(s) Oral at bedtime    MEDICATIONS  (PRN):  bisacodyl 5 milliGRAM(s) Oral every 12 hours PRN Constipation  HYDROmorphone  Injectable 1 milliGRAM(s) IV Push every 4 hours PRN Moderate and/or Severe Pain  HYDROmorphone  Injectable 0.5 milliGRAM(s) IV Push every 4 hours PRN Breakthrough Pain      Vital Signs Last 24 Hrs  T(C): 36.7 (27 Dec 2018 04:43), Max: 36.8 (26 Dec 2018 14:27)  T(F): 98.1 (27 Dec 2018 04:43), Max: 98.3 (26 Dec 2018 21:19)  HR: 55 (27 Dec 2018 04:43) (55 - 104)  BP: 132/67 (27 Dec 2018 04:43) (112/74 - 132/67)  BP(mean): --  RR: 18 (27 Dec 2018 04:43) (18 - 18)  SpO2: 95% (27 Dec 2018 04:43) (94% - 95%)      PHYSICAL EXAM  GENERAL: NAD, well-developed  HEAD:  Atraumatic, Normocephalic  EYES: EOMI, PERRLA, conjunctiva and sclera clear  NECK: Supple, No JVD  CHEST/LUNG: Clear to auscultation bilaterally; No wheeze  HEART: Regular rate and rhythm; No murmurs, rubs, or gallops  ABDOMEN: Soft, Nontender, Nondistended; Bowel sounds present  EXTREMITIES:  2+ Peripheral Pulses, No clubbing, cyanosis, or edema  PSYCH: AAOx3  SKIN: No rashes or lesions    CAPILLARY BLOOD GLUCOSE        I&O's Summary    26 Dec 2018 07:01  -  27 Dec 2018 07:00  --------------------------------------------------------  IN: 400 mL / OUT: 600 mL / NET: -200 mL        LABS:    12-26    139  |  102  |  33<H>  ----------------------------<  108<H>  4.3   |  23  |  2.76<H>    Ca    9.3      26 Dec 2018 07:33  Phos  3.3     12-26  Mg     1.7     12-26      PT/INR - ( 26 Dec 2018 07:33 )   PT: 36.7 sec;   INR: 3.11 ratio                   RADIOLOGY & ADDITIONAL TESTS:     MICROBIOLOGY:    ANTIMICROBIALS:    CONSULTS: Patient is a 73y old  Male who presents with a chief complaint of Fall, pubic ramus fractures (26 Dec 2018 15:14)      SUBJECTIVE / OVERNIGHT EVENTS:    No acute events overnight. Pt continues to endorse in pain but it is better controlled with current regimen. Denies fever, chills, nausea, vomiting, abdominal pain, diarrhea, constipation, CP, palpitations, SOB, or LE edema.       MEDICATIONS  (STANDING):  acetaminophen   Tablet .. 650 milliGRAM(s) Oral every 6 hours  allopurinol 300 milliGRAM(s) Oral daily  amiodarone    Tablet 200 milliGRAM(s) Oral daily  betamethasone valerate 0.1% Ointment 1 Application(s) Topical daily  clonazePAM Tablet 1.5 milliGRAM(s) Oral at bedtime  docusate sodium 100 milliGRAM(s) Oral three times a day  donepezil 10 milliGRAM(s) Oral at bedtime  lactobacillus acidophilus 1 Tablet(s) Oral daily  levothyroxine 125 MICROGram(s) Oral daily  melatonin 3 milliGRAM(s) Oral at bedtime  memantine 10 milliGRAM(s) Oral every 12 hours  mirtazapine 15 milliGRAM(s) Oral at bedtime  multivitamin 1 Tablet(s) Oral daily  pantoprazole    Tablet 40 milliGRAM(s) Oral before breakfast  polyethylene glycol 3350 17 Gram(s) Oral at bedtime  polyethylene glycol 3350 17 Gram(s) Oral daily  senna 2 Tablet(s) Oral at bedtime  tamsulosin 0.4 milliGRAM(s) Oral at bedtime    MEDICATIONS  (PRN):  bisacodyl 5 milliGRAM(s) Oral every 12 hours PRN Constipation  HYDROmorphone  Injectable 1 milliGRAM(s) IV Push every 4 hours PRN Moderate and/or Severe Pain  HYDROmorphone  Injectable 0.5 milliGRAM(s) IV Push every 4 hours PRN Breakthrough Pain      Vital Signs Last 24 Hrs  T(C): 36.7 (27 Dec 2018 04:43), Max: 36.8 (26 Dec 2018 14:27)  T(F): 98.1 (27 Dec 2018 04:43), Max: 98.3 (26 Dec 2018 21:19)  HR: 55 (27 Dec 2018 04:43) (55 - 104)  BP: 132/67 (27 Dec 2018 04:43) (112/74 - 132/67)  BP(mean): --  RR: 18 (27 Dec 2018 04:43) (18 - 18)  SpO2: 95% (27 Dec 2018 04:43) (94% - 95%)      PHYSICAL EXAM  GENERAL: NAD, well-developed  HEAD:  Atraumatic, Normocephalic  EYES: EOMI, PERRLA, conjunctiva and sclera clear  NECK: Supple, No JVD  CHEST/LUNG: Clear to auscultation bilaterally; No wheeze  HEART: Regular rate and rhythm; No murmurs, rubs, or gallops  ABDOMEN: Soft, Nontender, Nondistended; Bowel sounds present  EXTREMITIES: Limited ROM in R LE. Sensation grossly intact.     CAPILLARY BLOOD GLUCOSE        I&O's Summary    26 Dec 2018 07:01  -  27 Dec 2018 07:00  --------------------------------------------------------  IN: 400 mL / OUT: 600 mL / NET: -200 mL        LABS:    12-26    139  |  102  |  33<H>  ----------------------------<  108<H>  4.3   |  23  |  2.76<H>    Ca    9.3      26 Dec 2018 07:33  Phos  3.3     12-26  Mg     1.7     12-26      PT/INR - ( 26 Dec 2018 07:33 )   PT: 36.7 sec;   INR: 3.11 ratio                   RADIOLOGY & ADDITIONAL TESTS:     MICROBIOLOGY:    ANTIMICROBIALS:    CONSULTS:

## 2018-12-27 NOTE — PROVIDER CONTACT NOTE (OTHER) - ASSESSMENT
Pt requiring 3 breakthrough pain medication during shift. Pt has increased periods of pain when moving.
Temp 98.1  HR 69  /65  RR 18  O2 98

## 2018-12-27 NOTE — PROVIDER CONTACT NOTE (OTHER) - ACTION/TREATMENT ORDERED:
MD aware-will reassess pt's pain regimen. Heat applied-pt appears comfortable at this time. Will continue to monitor pt closely.

## 2018-12-27 NOTE — PROGRESS NOTE ADULT - PROBLEM SELECTOR PLAN 6
-hx of Afib   -On coumadin 3 mg, dosing tonight   -f/u with INR tomorrow. -hx of Afib   -INR increased to 3. Holding coumadin for now   -f/u with INR tomorrow.

## 2018-12-27 NOTE — PROGRESS NOTE ADULT - PROBLEM SELECTOR PLAN 2
LOUISE on CKD. Uptrending Crt, most likely pre-renal  -urine Na, creatinine, and bladder scan   -Started on IVF at 75 cc/hr for 8 hours   -F/u with BMP tomorrow  -Avoid nephrotoxic meds   -renally dose meds Crt at 2.7. Will continue with PO hydration   -Nephrology recs appreciated.  -Trend BMP  -Avoid nephrotoxic meds   -renally dose meds

## 2018-12-28 ENCOUNTER — TRANSCRIPTION ENCOUNTER (OUTPATIENT)
Age: 73
End: 2018-12-28

## 2018-12-28 VITALS
TEMPERATURE: 98 F | OXYGEN SATURATION: 95 % | HEART RATE: 74 BPM | DIASTOLIC BLOOD PRESSURE: 66 MMHG | RESPIRATION RATE: 16 BRPM | SYSTOLIC BLOOD PRESSURE: 117 MMHG

## 2018-12-28 DIAGNOSIS — K59.00 CONSTIPATION, UNSPECIFIED: ICD-10-CM

## 2018-12-28 PROBLEM — R41.3 OTHER AMNESIA: Chronic | Status: ACTIVE | Noted: 2018-12-23

## 2018-12-28 LAB
ANION GAP SERPL CALC-SCNC: 12 MMOL/L — SIGNIFICANT CHANGE UP (ref 5–17)
BUN SERPL-MCNC: 38 MG/DL — HIGH (ref 7–23)
CALCIUM SERPL-MCNC: 9.1 MG/DL — SIGNIFICANT CHANGE UP (ref 8.4–10.5)
CHLORIDE SERPL-SCNC: 103 MMOL/L — SIGNIFICANT CHANGE UP (ref 96–108)
CO2 SERPL-SCNC: 25 MMOL/L — SIGNIFICANT CHANGE UP (ref 22–31)
CREAT SERPL-MCNC: 2.79 MG/DL — HIGH (ref 0.5–1.3)
GLUCOSE SERPL-MCNC: 109 MG/DL — HIGH (ref 70–99)
INR BLD: 3.04 RATIO — HIGH (ref 0.88–1.16)
MAGNESIUM SERPL-MCNC: 2 MG/DL — SIGNIFICANT CHANGE UP (ref 1.6–2.6)
PHOSPHATE SERPL-MCNC: 3.2 MG/DL — SIGNIFICANT CHANGE UP (ref 2.5–4.5)
POTASSIUM SERPL-MCNC: 4.4 MMOL/L — SIGNIFICANT CHANGE UP (ref 3.5–5.3)
POTASSIUM SERPL-SCNC: 4.4 MMOL/L — SIGNIFICANT CHANGE UP (ref 3.5–5.3)
PROTHROM AB SERPL-ACNC: 35.9 SEC — HIGH (ref 10–12.9)
SODIUM SERPL-SCNC: 140 MMOL/L — SIGNIFICANT CHANGE UP (ref 135–145)

## 2018-12-28 PROCEDURE — 80048 BASIC METABOLIC PNL TOTAL CA: CPT

## 2018-12-28 PROCEDURE — 97161 PT EVAL LOW COMPLEX 20 MIN: CPT

## 2018-12-28 PROCEDURE — 82570 ASSAY OF URINE CREATININE: CPT

## 2018-12-28 PROCEDURE — 85610 PROTHROMBIN TIME: CPT

## 2018-12-28 PROCEDURE — 85027 COMPLETE CBC AUTOMATED: CPT

## 2018-12-28 PROCEDURE — 86850 RBC ANTIBODY SCREEN: CPT

## 2018-12-28 PROCEDURE — 97530 THERAPEUTIC ACTIVITIES: CPT

## 2018-12-28 PROCEDURE — 81001 URINALYSIS AUTO W/SCOPE: CPT

## 2018-12-28 PROCEDURE — 71045 X-RAY EXAM CHEST 1 VIEW: CPT

## 2018-12-28 PROCEDURE — 96374 THER/PROPH/DIAG INJ IV PUSH: CPT

## 2018-12-28 PROCEDURE — 82550 ASSAY OF CK (CPK): CPT

## 2018-12-28 PROCEDURE — 73502 X-RAY EXAM HIP UNI 2-3 VIEWS: CPT

## 2018-12-28 PROCEDURE — 86901 BLOOD TYPING SEROLOGIC RH(D): CPT

## 2018-12-28 PROCEDURE — 97116 GAIT TRAINING THERAPY: CPT

## 2018-12-28 PROCEDURE — 87086 URINE CULTURE/COLONY COUNT: CPT

## 2018-12-28 PROCEDURE — 99285 EMERGENCY DEPT VISIT HI MDM: CPT | Mod: 25

## 2018-12-28 PROCEDURE — 83735 ASSAY OF MAGNESIUM: CPT

## 2018-12-28 PROCEDURE — 86900 BLOOD TYPING SEROLOGIC ABO: CPT

## 2018-12-28 PROCEDURE — 80053 COMPREHEN METABOLIC PANEL: CPT

## 2018-12-28 PROCEDURE — 73552 X-RAY EXAM OF FEMUR 2/>: CPT

## 2018-12-28 PROCEDURE — 96376 TX/PRO/DX INJ SAME DRUG ADON: CPT

## 2018-12-28 PROCEDURE — 84100 ASSAY OF PHOSPHORUS: CPT

## 2018-12-28 PROCEDURE — 85730 THROMBOPLASTIN TIME PARTIAL: CPT

## 2018-12-28 PROCEDURE — 93005 ELECTROCARDIOGRAM TRACING: CPT

## 2018-12-28 PROCEDURE — 84300 ASSAY OF URINE SODIUM: CPT

## 2018-12-28 RX ORDER — IXAZOMIB 3 MG/1
0 CAPSULE ORAL
Qty: 3 | Refills: 0 | COMMUNITY

## 2018-12-28 RX ORDER — LACTULOSE 10 G/15ML
10 SOLUTION ORAL EVERY 4 HOURS
Qty: 0 | Refills: 0 | Status: DISCONTINUED | OUTPATIENT
Start: 2018-12-28 | End: 2018-12-28

## 2018-12-28 RX ORDER — DEXAMETHASONE 0.5 MG/5ML
20 ELIXIR ORAL ONCE
Qty: 0 | Refills: 0 | Status: COMPLETED | OUTPATIENT
Start: 2018-12-28 | End: 2018-12-28

## 2018-12-28 RX ORDER — DOCUSATE SODIUM 100 MG
1 CAPSULE ORAL
Qty: 0 | Refills: 0 | COMMUNITY
Start: 2018-12-28

## 2018-12-28 RX ORDER — HYDROMORPHONE HYDROCHLORIDE 2 MG/ML
2 INJECTION INTRAMUSCULAR; INTRAVENOUS; SUBCUTANEOUS
Qty: 360 | Refills: 0 | OUTPATIENT
Start: 2018-12-28 | End: 2019-01-26

## 2018-12-28 RX ORDER — HYDROMORPHONE HYDROCHLORIDE 2 MG/ML
4 INJECTION INTRAMUSCULAR; INTRAVENOUS; SUBCUTANEOUS EVERY 4 HOURS
Qty: 0 | Refills: 0 | Status: DISCONTINUED | OUTPATIENT
Start: 2018-12-28 | End: 2018-12-28

## 2018-12-28 RX ORDER — WARFARIN SODIUM 2.5 MG/1
2 TABLET ORAL ONCE
Qty: 0 | Refills: 0 | Status: DISCONTINUED | OUTPATIENT
Start: 2018-12-28 | End: 2018-12-28

## 2018-12-28 RX ORDER — IXAZOMIB 3 MG/1
2.3 CAPSULE ORAL ONCE
Qty: 0 | Refills: 0 | Status: COMPLETED | OUTPATIENT
Start: 2018-12-28 | End: 2018-12-28

## 2018-12-28 RX ORDER — SENNA PLUS 8.6 MG/1
2 TABLET ORAL
Qty: 0 | Refills: 0 | COMMUNITY
Start: 2018-12-28

## 2018-12-28 RX ADMIN — Medication 650 MILLIGRAM(S): at 12:33

## 2018-12-28 RX ADMIN — PANTOPRAZOLE SODIUM 40 MILLIGRAM(S): 20 TABLET, DELAYED RELEASE ORAL at 05:26

## 2018-12-28 RX ADMIN — Medication 20 MILLIGRAM(S): at 08:47

## 2018-12-28 RX ADMIN — Medication 1 TABLET(S): at 12:32

## 2018-12-28 RX ADMIN — LACTULOSE 10 GRAM(S): 10 SOLUTION ORAL at 15:28

## 2018-12-28 RX ADMIN — MEMANTINE HYDROCHLORIDE 10 MILLIGRAM(S): 10 TABLET ORAL at 05:26

## 2018-12-28 RX ADMIN — HYDROMORPHONE HYDROCHLORIDE 1 MILLIGRAM(S): 2 INJECTION INTRAMUSCULAR; INTRAVENOUS; SUBCUTANEOUS at 01:57

## 2018-12-28 RX ADMIN — HYDROMORPHONE HYDROCHLORIDE 1 MILLIGRAM(S): 2 INJECTION INTRAMUSCULAR; INTRAVENOUS; SUBCUTANEOUS at 09:00

## 2018-12-28 RX ADMIN — HYDROMORPHONE HYDROCHLORIDE 0.5 MILLIGRAM(S): 2 INJECTION INTRAMUSCULAR; INTRAVENOUS; SUBCUTANEOUS at 20:46

## 2018-12-28 RX ADMIN — Medication 650 MILLIGRAM(S): at 18:15

## 2018-12-28 RX ADMIN — Medication 125 MICROGRAM(S): at 05:26

## 2018-12-28 RX ADMIN — AMIODARONE HYDROCHLORIDE 200 MILLIGRAM(S): 400 TABLET ORAL at 05:26

## 2018-12-28 RX ADMIN — HYDROMORPHONE HYDROCHLORIDE 0.5 MILLIGRAM(S): 2 INJECTION INTRAMUSCULAR; INTRAVENOUS; SUBCUTANEOUS at 16:40

## 2018-12-28 RX ADMIN — HYDROMORPHONE HYDROCHLORIDE 4 MILLIGRAM(S): 2 INJECTION INTRAMUSCULAR; INTRAVENOUS; SUBCUTANEOUS at 12:30

## 2018-12-28 RX ADMIN — HYDROMORPHONE HYDROCHLORIDE 1 MILLIGRAM(S): 2 INJECTION INTRAMUSCULAR; INTRAVENOUS; SUBCUTANEOUS at 08:47

## 2018-12-28 RX ADMIN — POLYETHYLENE GLYCOL 3350 17 GRAM(S): 17 POWDER, FOR SOLUTION ORAL at 12:33

## 2018-12-28 RX ADMIN — HYDROMORPHONE HYDROCHLORIDE 4 MILLIGRAM(S): 2 INJECTION INTRAMUSCULAR; INTRAVENOUS; SUBCUTANEOUS at 18:14

## 2018-12-28 RX ADMIN — HYDROMORPHONE HYDROCHLORIDE 0.5 MILLIGRAM(S): 2 INJECTION INTRAMUSCULAR; INTRAVENOUS; SUBCUTANEOUS at 05:21

## 2018-12-28 RX ADMIN — HYDROMORPHONE HYDROCHLORIDE 0.5 MILLIGRAM(S): 2 INJECTION INTRAMUSCULAR; INTRAVENOUS; SUBCUTANEOUS at 16:09

## 2018-12-28 RX ADMIN — HYDROMORPHONE HYDROCHLORIDE 1 MILLIGRAM(S): 2 INJECTION INTRAMUSCULAR; INTRAVENOUS; SUBCUTANEOUS at 02:20

## 2018-12-28 RX ADMIN — Medication 1 APPLICATION(S): at 12:32

## 2018-12-28 RX ADMIN — Medication 300 MILLIGRAM(S): at 12:32

## 2018-12-28 RX ADMIN — HYDROMORPHONE HYDROCHLORIDE 0.5 MILLIGRAM(S): 2 INJECTION INTRAMUSCULAR; INTRAVENOUS; SUBCUTANEOUS at 05:45

## 2018-12-28 RX ADMIN — MEMANTINE HYDROCHLORIDE 10 MILLIGRAM(S): 10 TABLET ORAL at 18:14

## 2018-12-28 RX ADMIN — Medication 650 MILLIGRAM(S): at 01:20

## 2018-12-28 RX ADMIN — Medication 10 MILLIGRAM(S): at 13:58

## 2018-12-28 RX ADMIN — Medication 100 MILLIGRAM(S): at 13:59

## 2018-12-28 RX ADMIN — Medication 100 MILLIGRAM(S): at 05:26

## 2018-12-28 RX ADMIN — HYDROMORPHONE HYDROCHLORIDE 4 MILLIGRAM(S): 2 INJECTION INTRAMUSCULAR; INTRAVENOUS; SUBCUTANEOUS at 14:00

## 2018-12-28 RX ADMIN — Medication 650 MILLIGRAM(S): at 05:26

## 2018-12-28 RX ADMIN — HYDROMORPHONE HYDROCHLORIDE 4 MILLIGRAM(S): 2 INJECTION INTRAMUSCULAR; INTRAVENOUS; SUBCUTANEOUS at 19:24

## 2018-12-28 RX ADMIN — IXAZOMIB 2.3 MILLIGRAM(S): 3 CAPSULE ORAL at 10:58

## 2018-12-28 NOTE — PROGRESS NOTE ADULT - SUBJECTIVE AND OBJECTIVE BOX
No pain, no shortness of breath      VITAL:  T(C): , Max: 36.9 (12-27-18 @ 21:55)  T(F): , Max: 98.4 (12-27-18 @ 21:55)  HR: 61 (12-28-18 @ 04:33)  BP: 129/67 (12-28-18 @ 04:33)  RR: 18 (12-28-18 @ 04:33)  SpO2: 94% (12-28-18 @ 04:33)      PHYSICAL EXAM:  Constitutional: NAD; Alert  HEENT:  NCAT; DMM  Neck: No JVD; supple  Respiratory: CTA-b/l  Cardiac: RRR s1s2  Gastrointestinal: BS+, soft, NT/ND  Urologic: No walker  Extremities: No peripheral edema  Back: No CVAT b/l    LABS:    Na(140)/K(4.4)/Cl(103)/HCO3(25)/BUN(38)/Cr(2.79)Glu(109)/Ca(9.1)/Mg(2.0)/PO4(3.2)    12-28 @ 06:32  Na(139)/K(4.2)/Cl(103)/HCO3(24)/BUN(37)/Cr(2.74)Glu(130)/Ca(9.1)/Mg(1.8)/PO4(3.6)    12-27 @ 10:52  Na(139)/K(4.3)/Cl(102)/HCO3(23)/BUN(33)/Cr(2.76)Glu(108)/Ca(9.3)/Mg(1.7)/PO4(3.3)    12-26 @ 07:33      IMPRESSION: 73M w/ AFib, nephrolithiasis, CKD4, arthritis, and Waldenstrom's Macroglobulinemia, 12/22/18 admitted s/p fall with right pubic ramus fracture  (1)Renal - CKD IV - stable  (2)Ortho - pubic ramus fracture - not for surgical intervention. On opiates for pain control    RECOMMEND:  (1)Dose new meds for GFR 20-25ml/min  (1)D/C planning per primary team          Norman Ascencio MD  [facility]  [phone] (+)pelvic pain. No SOB.      VITAL:  T(C): , Max: 36.9 (12-27-18 @ 21:55)  T(F): , Max: 98.4 (12-27-18 @ 21:55)  HR: 61 (12-28-18 @ 04:33)  BP: 129/67 (12-28-18 @ 04:33)  RR: 18 (12-28-18 @ 04:33)  SpO2: 94% (12-28-18 @ 04:33)      PHYSICAL EXAM:  Constitutional: NAD; Alert  HEENT:  NCAT; DMM  Neck: No JVD; supple  Respiratory: CTA-b/l  Cardiac: RRR s1s2  Gastrointestinal: BS+, soft, NT/ND  Urologic: No walker  Extremities: No peripheral edema  Back: No CVAT b/l    LABS:    Na(140)/K(4.4)/Cl(103)/HCO3(25)/BUN(38)/Cr(2.79)Glu(109)/Ca(9.1)/Mg(2.0)/PO4(3.2)    12-28 @ 06:32  Na(139)/K(4.2)/Cl(103)/HCO3(24)/BUN(37)/Cr(2.74)Glu(130)/Ca(9.1)/Mg(1.8)/PO4(3.6)    12-27 @ 10:52  Na(139)/K(4.3)/Cl(102)/HCO3(23)/BUN(33)/Cr(2.76)Glu(108)/Ca(9.3)/Mg(1.7)/PO4(3.3)    12-26 @ 07:33      IMPRESSION: 73M w/ AFib, nephrolithiasis, CKD4, arthritis, and Waldenstrom's Macroglobulinemia, 12/22/18 admitted s/p fall with right pubic ramus fracture  (1)Renal - CKD IV - stable  (2)Ortho - pubic ramus fracture - not for surgical intervention. On opiates for pain control    RECOMMEND:  (1)Dose new meds for GFR 20-25ml/min  (2)D/C planning per primary team          Norman Ascencio MD  [facility]  [phone]

## 2018-12-28 NOTE — PROGRESS NOTE ADULT - REASON FOR ADMISSION
Fall, pubic ramus fractures

## 2018-12-28 NOTE — PROGRESS NOTE ADULT - ASSESSMENT
74 y/o M with h/o Afib (on coumadin and s/p PPM), CKD, Chronic back and knee pain 2/2 arthritis s/p meniscal tears, Insomnia, anxiety, mild memory deficits but A&Ox4 @ baseline, CLL+waldenstrom macroglobulinemia on Ninzaro s/p rash from rituxan, now presenting with right hip pain s/p mechanical fall. Ortho consulted, no intervention required. His course complicated by LOUISE.

## 2018-12-28 NOTE — DISCHARGE NOTE ADULT - PLAN OF CARE
To medically manage your pain and treat your fracture You presented to the hospital after falling down. You were found to have a fracture on imaging. orthopedic surgery was consulted and no intervention was recommended. You should follow up with orthopedic surgeons as outpatient. You should continue to take your pain medications and ambulate as per rehab. To monitor your kidney disease You were found to have worsening kidney function due to dehydration. Please follow up with your nephrologist as outpatient.

## 2018-12-28 NOTE — PROGRESS NOTE ADULT - PROBLEM SELECTOR PROBLEM 1
Closed fracture of ramus of right pubis, initial encounter

## 2018-12-28 NOTE — PROGRESS NOTE ADULT - PROBLEM SELECTOR PLAN 6
-hx of Afib   -INR increased to 3. Holding coumadin for now   -f/u with INR tomorrow. -levothyroxine 125mc po daily

## 2018-12-28 NOTE — DISCHARGE NOTE ADULT - CARE PROVIDER_API CALL
Ezra Mariano (MD), Orthopaedic Surgery  [address] [place] [address]  Phone: [phone]  Fax: [phone]

## 2018-12-28 NOTE — PROGRESS NOTE ADULT - PROBLEM SELECTOR PLAN 1
Pain control with dilaudid and percocent and tylenol for severe/moderate/mild pain. Currently still in severe pain, will uptitrate dilaudid to 1mg q4h prn severe pain.  -Ortho consult appreciated. No intervention at this point   -Pending dc to rehab

## 2018-12-28 NOTE — DISCHARGE NOTE ADULT - MEDICATION SUMMARY - MEDICATIONS TO STOP TAKING
I will STOP taking the medications listed below when I get home from the hospital:    oxyCODONE 5 mg oral tablet  -- 1 tab(s) by mouth every 4 hours, As needed, Moderate Pain (4 - 6)    oxyCODONE 10 mg oral tablet  -- 1 tab(s) by mouth every 6 hours, As needed, Severe Pain (7 - 10)

## 2018-12-28 NOTE — DISCHARGE NOTE ADULT - PATIENT PORTAL LINK FT
You can access the [facility] Patient Portal, offered by [facility], by registering with the following website: [email or web address]

## 2018-12-28 NOTE — PROGRESS NOTE ADULT - PROBLEM SELECTOR PROBLEM 4
Chronic gout without tophus, unspecified cause, unspecified site Benign prostatic hyperplasia without lower urinary tract symptoms

## 2018-12-28 NOTE — PROGRESS NOTE ADULT - PROBLEM SELECTOR PLAN 7
-DVT ppx-on coumadin   -PT eval -hx of Afib   -INR increased to 3. Holding coumadin for now   -f/u with INR tomorrow.

## 2018-12-28 NOTE — PROGRESS NOTE ADULT - PROBLEM SELECTOR PLAN 2
Crt at 2.7. Will continue with PO hydration   -Nephrology recs appreciated.  -Trend BMP  -Avoid nephrotoxic meds   -renally dose meds Crt stable at 2.7. Will continue with PO hydration   -Nephrology recs appreciated.  -Trend BMP  -Avoid nephrotoxic meds   -renally dose meds In the setting of pain meds   -Continue with colace and senna   -Started on lactulose 15 mg syrup  -Will monitor for improvement

## 2018-12-28 NOTE — PROGRESS NOTE ADULT - ATTENDING COMMENTS
pt seen and evalaute on 12/23  agree with resident note as above  mechanical fall with pubic rami fx  pain control, PT  renal eval apprec  cont other meds as ordered
pt seen and evaluated  agree with resident note  pt stable. no new c/o  cont current rx  awaiting rehab
pt seen and evaluated  agree with resident note  rising creat. likely pre-renal. ivf as ordered  cont other meds  pending rehab
pt seen and evaluated  no acute c/o. pain improved  cont current rx  dc planning rehab  pending auth
pt seen and evaluated  sitting in chair  pain slightly improved  cont current rx  dc planning rehab
pt seen and evaluated  agree with resident note above  cont current meds  dc planning for rehab

## 2018-12-28 NOTE — DISCHARGE NOTE ADULT - ADDITIONAL INSTRUCTIONS
Please give 4 mg of Dilaudid PRN q4hrs   Please give 2 mg of Dilaudid PRN q4hrs for breath through pain   Please given laxatives if constipated   Follow up hematology for his chemo drugs.

## 2018-12-28 NOTE — PROGRESS NOTE ADULT - SUBJECTIVE AND OBJECTIVE BOX
Patient is a 73y old  Male who presents with a chief complaint of Fall, pubic ramus fractures (27 Dec 2018 14:09)      SUBJECTIVE / OVERNIGHT EVENTS:          MEDICATIONS  (STANDING):  acetaminophen   Tablet .. 650 milliGRAM(s) Oral every 6 hours  allopurinol 300 milliGRAM(s) Oral daily  amiodarone    Tablet 200 milliGRAM(s) Oral daily  betamethasone valerate 0.1% Ointment 1 Application(s) Topical daily  clonazePAM Tablet 1.5 milliGRAM(s) Oral at bedtime  docusate sodium 100 milliGRAM(s) Oral three times a day  donepezil 10 milliGRAM(s) Oral at bedtime  ixazomib 2.3 milliGRAM(s) Oral once  lactobacillus acidophilus 1 Tablet(s) Oral daily  levothyroxine 125 MICROGram(s) Oral daily  melatonin 3 milliGRAM(s) Oral at bedtime  memantine 10 milliGRAM(s) Oral every 12 hours  mirtazapine 15 milliGRAM(s) Oral at bedtime  multivitamin 1 Tablet(s) Oral daily  pantoprazole    Tablet 40 milliGRAM(s) Oral before breakfast  polyethylene glycol 3350 17 Gram(s) Oral at bedtime  polyethylene glycol 3350 17 Gram(s) Oral daily  senna 2 Tablet(s) Oral at bedtime  tamsulosin 0.4 milliGRAM(s) Oral at bedtime  warfarin 2 milliGRAM(s) Oral once    MEDICATIONS  (PRN):  bisacodyl 5 milliGRAM(s) Oral every 12 hours PRN Constipation  guaiFENesin    Syrup 100 milliGRAM(s) Oral every 6 hours PRN Cough  HYDROmorphone   Tablet 4 milliGRAM(s) Oral every 4 hours PRN Moderate and/or Severe Pain  HYDROmorphone  Injectable 0.5 milliGRAM(s) IV Push every 4 hours PRN Breakthrough Pain      Vital Signs Last 24 Hrs  T(C): 36.6 (28 Dec 2018 04:33), Max: 36.9 (27 Dec 2018 21:55)  T(F): 97.9 (28 Dec 2018 04:33), Max: 98.4 (27 Dec 2018 21:55)  HR: 61 (28 Dec 2018 04:33) (61 - 77)  BP: 129/67 (28 Dec 2018 04:33) (125/72 - 136/69)  BP(mean): --  RR: 18 (28 Dec 2018 04:33) (18 - 18)  SpO2: 94% (28 Dec 2018 04:33) (94% - 99%)      PHYSICAL EXAM  GENERAL: NAD, well-developed  HEAD:  Atraumatic, Normocephalic  EYES: EOMI, PERRLA, conjunctiva and sclera clear  NECK: Supple, No JVD  CHEST/LUNG: Clear to auscultation bilaterally; No wheeze  HEART: Regular rate and rhythm; No murmurs, rubs, or gallops  ABDOMEN: Soft, Nontender, Nondistended; Bowel sounds present  EXTREMITIES:  2+ Peripheral Pulses, No clubbing, cyanosis, or edema  PSYCH: AAOx3  SKIN: No rashes or lesions    CAPILLARY BLOOD GLUCOSE        I&O's Summary    27 Dec 2018 07:01  -  28 Dec 2018 07:00  --------------------------------------------------------  IN: 1300 mL / OUT: 1150 mL / NET: 150 mL        LABS:    12-28    140  |  103  |  38<H>  ----------------------------<  109<H>  4.4   |  25  |  2.79<H>    Ca    9.1      28 Dec 2018 06:32  Phos  3.2     12-28  Mg     2.0     12-28      PT/INR - ( 28 Dec 2018 06:32 )   PT: 35.9 sec;   INR: 3.04 ratio                   RADIOLOGY & ADDITIONAL TESTS:     MICROBIOLOGY:    ANTIMICROBIALS:    CONSULTS: Patient is a 73y old  Male who presents with a chief complaint of Fall, pubic ramus fractures (27 Dec 2018 14:09)      SUBJECTIVE / OVERNIGHT EVENTS:  No acute events overnight. Pt continues to endorse R hip pain that worsens with movement. Otherwise, denies fever, chills, nausea, vomiting, abdominal pain, diarrhea, constipation, CP, palpitations, SOB, or LE edema.         MEDICATIONS  (STANDING):  acetaminophen   Tablet .. 650 milliGRAM(s) Oral every 6 hours  allopurinol 300 milliGRAM(s) Oral daily  amiodarone    Tablet 200 milliGRAM(s) Oral daily  betamethasone valerate 0.1% Ointment 1 Application(s) Topical daily  clonazePAM Tablet 1.5 milliGRAM(s) Oral at bedtime  docusate sodium 100 milliGRAM(s) Oral three times a day  donepezil 10 milliGRAM(s) Oral at bedtime  ixazomib 2.3 milliGRAM(s) Oral once  lactobacillus acidophilus 1 Tablet(s) Oral daily  levothyroxine 125 MICROGram(s) Oral daily  melatonin 3 milliGRAM(s) Oral at bedtime  memantine 10 milliGRAM(s) Oral every 12 hours  mirtazapine 15 milliGRAM(s) Oral at bedtime  multivitamin 1 Tablet(s) Oral daily  pantoprazole    Tablet 40 milliGRAM(s) Oral before breakfast  polyethylene glycol 3350 17 Gram(s) Oral at bedtime  polyethylene glycol 3350 17 Gram(s) Oral daily  senna 2 Tablet(s) Oral at bedtime  tamsulosin 0.4 milliGRAM(s) Oral at bedtime  warfarin 2 milliGRAM(s) Oral once    MEDICATIONS  (PRN):  bisacodyl 5 milliGRAM(s) Oral every 12 hours PRN Constipation  guaiFENesin    Syrup 100 milliGRAM(s) Oral every 6 hours PRN Cough  HYDROmorphone   Tablet 4 milliGRAM(s) Oral every 4 hours PRN Moderate and/or Severe Pain  HYDROmorphone  Injectable 0.5 milliGRAM(s) IV Push every 4 hours PRN Breakthrough Pain      Vital Signs Last 24 Hrs  T(C): 36.6 (28 Dec 2018 04:33), Max: 36.9 (27 Dec 2018 21:55)  T(F): 97.9 (28 Dec 2018 04:33), Max: 98.4 (27 Dec 2018 21:55)  HR: 61 (28 Dec 2018 04:33) (61 - 77)  BP: 129/67 (28 Dec 2018 04:33) (125/72 - 136/69)  BP(mean): --  RR: 18 (28 Dec 2018 04:33) (18 - 18)  SpO2: 94% (28 Dec 2018 04:33) (94% - 99%)      PHYSICAL EXAM  GENERAL: NAD, well-developed  HEAD:  Atraumatic, Normocephalic  EYES: Conjunctiva and sclera clear  NECK: Supple, No JVD  CHEST/LUNG: Clear to auscultation bilaterally; No wheeze  HEART: Regular rate and rhythm; No murmurs, rubs, or gallops  ABDOMEN: Soft, Nontender, Nondistended; Bowel sounds present  EXTREMITIES:  limited ROM on the R LE due to pain. sensation grossly intact.   PSYCH: AAOx3  SKIN: No rashes or lesions    CAPILLARY BLOOD GLUCOSE        I&O's Summary    27 Dec 2018 07:01  -  28 Dec 2018 07:00  --------------------------------------------------------  IN: 1300 mL / OUT: 1150 mL / NET: 150 mL        LABS:    12-28    140  |  103  |  38<H>  ----------------------------<  109<H>  4.4   |  25  |  2.79<H>    Ca    9.1      28 Dec 2018 06:32  Phos  3.2     12-28  Mg     2.0     12-28      PT/INR - ( 28 Dec 2018 06:32 )   PT: 35.9 sec;   INR: 3.04 ratio                   RADIOLOGY & ADDITIONAL TESTS:     MICROBIOLOGY:    ANTIMICROBIALS:    CONSULTS: Patient is a 73y old  Male who presents with a chief complaint of Fall, pubic ramus fractures (27 Dec 2018 14:09)      SUBJECTIVE / OVERNIGHT EVENTS:  No acute events overnight. Pt continues to endorse R hip pain that worsens with movement. he is also complaining about constipation. He has not had BM in 2 days. Otherwise, denies fever, chills, nausea, vomiting, abdominal pain, diarrhea, CP, palpitations, SOB, or LE edema.         MEDICATIONS  (STANDING):  acetaminophen   Tablet .. 650 milliGRAM(s) Oral every 6 hours  allopurinol 300 milliGRAM(s) Oral daily  amiodarone    Tablet 200 milliGRAM(s) Oral daily  betamethasone valerate 0.1% Ointment 1 Application(s) Topical daily  clonazePAM Tablet 1.5 milliGRAM(s) Oral at bedtime  docusate sodium 100 milliGRAM(s) Oral three times a day  donepezil 10 milliGRAM(s) Oral at bedtime  ixazomib 2.3 milliGRAM(s) Oral once  lactobacillus acidophilus 1 Tablet(s) Oral daily  levothyroxine 125 MICROGram(s) Oral daily  melatonin 3 milliGRAM(s) Oral at bedtime  memantine 10 milliGRAM(s) Oral every 12 hours  mirtazapine 15 milliGRAM(s) Oral at bedtime  multivitamin 1 Tablet(s) Oral daily  pantoprazole    Tablet 40 milliGRAM(s) Oral before breakfast  polyethylene glycol 3350 17 Gram(s) Oral at bedtime  polyethylene glycol 3350 17 Gram(s) Oral daily  senna 2 Tablet(s) Oral at bedtime  tamsulosin 0.4 milliGRAM(s) Oral at bedtime  warfarin 2 milliGRAM(s) Oral once    MEDICATIONS  (PRN):  bisacodyl 5 milliGRAM(s) Oral every 12 hours PRN Constipation  guaiFENesin    Syrup 100 milliGRAM(s) Oral every 6 hours PRN Cough  HYDROmorphone   Tablet 4 milliGRAM(s) Oral every 4 hours PRN Moderate and/or Severe Pain  HYDROmorphone  Injectable 0.5 milliGRAM(s) IV Push every 4 hours PRN Breakthrough Pain      Vital Signs Last 24 Hrs  T(C): 36.6 (28 Dec 2018 04:33), Max: 36.9 (27 Dec 2018 21:55)  T(F): 97.9 (28 Dec 2018 04:33), Max: 98.4 (27 Dec 2018 21:55)  HR: 61 (28 Dec 2018 04:33) (61 - 77)  BP: 129/67 (28 Dec 2018 04:33) (125/72 - 136/69)  BP(mean): --  RR: 18 (28 Dec 2018 04:33) (18 - 18)  SpO2: 94% (28 Dec 2018 04:33) (94% - 99%)      PHYSICAL EXAM  GENERAL: NAD, well-developed  HEAD:  Atraumatic, Normocephalic  EYES: Conjunctiva and sclera clear  NECK: Supple, No JVD  CHEST/LUNG: Clear to auscultation bilaterally; No wheeze  HEART: Regular rate and rhythm; No murmurs, rubs, or gallops  ABDOMEN: Soft, Nontender, Nondistended; Bowel sounds present  EXTREMITIES:  limited ROM on the R LE due to pain. sensation grossly intact.   PSYCH: AAOx3  SKIN: No rashes or lesions    CAPILLARY BLOOD GLUCOSE        I&O's Summary    27 Dec 2018 07:01  -  28 Dec 2018 07:00  --------------------------------------------------------  IN: 1300 mL / OUT: 1150 mL / NET: 150 mL        LABS:    12-28    140  |  103  |  38<H>  ----------------------------<  109<H>  4.4   |  25  |  2.79<H>    Ca    9.1      28 Dec 2018 06:32  Phos  3.2     12-28  Mg     2.0     12-28      PT/INR - ( 28 Dec 2018 06:32 )   PT: 35.9 sec;   INR: 3.04 ratio                   RADIOLOGY & ADDITIONAL TESTS:     MICROBIOLOGY:    ANTIMICROBIALS:    CONSULTS:

## 2018-12-28 NOTE — DISCHARGE NOTE ADULT - HOSPITAL COURSE
74 y/o M with h/o Afib (on coumadin and s/p PPM), CKD, Chronic back and knee pain 2/2 arthritis s/p meniscal tears, Insomnia, anxiety, CLL+waldenstrom macroglobulinemia on Ninzaro s/p rash from rituxan, presented after a mechanical fall and found to have a R pubic rami fracture on Xray. Orthopedic was consulted and no acute surgical intervention was recommended. Pt's pain was controlled with 1 mg of IV dilaudid and 0.5 mg of dilaudid for breakthrough pain. Currently, his pain has improved and he is stable to be discharged to rehab. he was advised to follow up with orthopedic surgery as outpatient. Of note, he received his chemotherapy medication Ninlaro 2.3 mg and 20 mg of decadron PO on 12/28/2018. He was advised to follow up with oncology as outpatient.

## 2018-12-28 NOTE — PROGRESS NOTE ADULT - PROBLEM SELECTOR PLAN 3
-Stable   -tamsulosin 0.4mg qhs Crt stable at 2.7. Will continue with PO hydration   -Nephrology recs appreciated.  -Trend BMP  -Avoid nephrotoxic meds   -renally dose meds

## 2018-12-28 NOTE — DISCHARGE NOTE ADULT - CARE PLAN
Principal Discharge DX:	Closed fracture of ramus of right pubis, initial encounter  Goal:	To medically manage your pain and treat your fracture  Assessment and plan of treatment:	You presented to the hospital after falling down. You were found to have a fracture on imaging. orthopedic surgery was consulted and no intervention was recommended. You should follow up with orthopedic surgeons as outpatient. You should continue to take your pain medications and ambulate as per rehab.  Secondary Diagnosis:	Stage 3 chronic kidney disease  Goal:	To monitor your kidney disease  Assessment and plan of treatment:	You were found to have worsening kidney function due to dehydration. Please follow up with your nephrologist as outpatient.

## 2018-12-28 NOTE — DISCHARGE NOTE ADULT - MEDICATION SUMMARY - MEDICATIONS TO TAKE
I will START or STAY ON the medications listed below when I get home from the hospital:    acetaminophen 325 mg oral tablet  -- 2 tab(s) by mouth every 6 hours  -- Indication: For Pain    Dilaudid 4 mg oral tablet  -- 1 tab(s) by mouth every 4 hours, As needed, Moderate and/or Severe Pain  -- Indication: For Pain    HYDROmorphone 2 mg oral tablet  -- 2 milligram(s) by mouth every 4 hours, As Needed MDD:Not more than 12 mg  -- Indication: For Pain    tamsulosin 0.4 mg oral capsule  -- 1 cap(s) by mouth once a day (at bedtime)  -- Indication: For BPH    amiodarone 200 mg oral tablet  -- 1 tab(s) by mouth once a day  -- Indication: For Hypertension    warfarin  -- 3 milligram(s) by mouth once a day (at bedtime) (combines 1mg and 2mg tablet to make 3mg)  -- Indication: For Prophylactic measure    clonazePAM 1 mg oral tablet  -- 1.5 tab(s) by mouth once a day (at bedtime)  -- Indication: For Prophylactic measure    mirtazapine 15 mg oral tablet  -- 1 tab(s) by mouth once a day (at bedtime)  -- Indication: For Prophylactic measure    ALLOPURINOL  TAB 300MG  -- 1 tab(s) by mouth once a day  -- Indication: For Prophylactic measure    Intermezzo 3.5 mg sublingual tablet  -- 1 tab(s) under tongue once a day (at bedtime)  -- Indication: For Prophylactic measure    donepezil 10 mg oral tablet  -- 1 tab(s) by mouth once a day (at bedtime)  -- Indication: For dementia    AUG BETAMET  CRE 0.05%  -- Apply on skin to affected area once a day  -- Indication: For Prophylactic measure    polyethylene glycol 3350 oral powder for reconstitution  -- 17 gram(s) by mouth once a day  -- Indication: For Prophylactic measure    docusate sodium 100 mg oral capsule  -- 1 cap(s) by mouth 3 times a day  -- Indication: For Prophylactic measure    senna oral tablet  -- 2 tab(s) by mouth once a day (at bedtime)  -- Indication: For Prophylactic measure    memantine 10 mg oral tablet  -- 1 tab(s) by mouth every 12 hours  -- Indication: For dementia    Melatonin 10 mg oral capsule  -- 1 cap(s) by mouth once a day (at bedtime)  -- Indication: For Prophylactic measure    lactobacillus acidophilus oral capsule  -- 1 tab(s) by mouth once a day  -- Indication: For Prophylactic measure    omeprazole 20 mg oral delayed release tablet  -- 1 tab(s) by mouth once a day  -- Indication: For Prophylactic measure    levothyroxine 125 mcg (0.125 mg) oral tablet  -- 1 tab(s) by mouth once a day  -- Indication: For Hypothyroidism, unspecified type    Multiple Vitamins oral tablet  -- 1 tab(s) by mouth once a day  -- Indication: For Prophylactic measure I will START or STAY ON the medications listed below when I get home from the hospital:    Dilaudid 4 mg oral tablet  -- 1 tab(s) by mouth every 4 hours, As needed, Moderate and/or Severe Pain  -- Indication: For Pain    HYDROmorphone 2 mg oral tablet  -- 2 milligram(s) by mouth every 4 hours, As Needed MDD:Not more than 12 mg  -- Indication: For Pain    acetaminophen 325 mg oral tablet  -- 2 tab(s) by mouth every 6 hours, As Needed  -- Indication: For Pain    tamsulosin 0.4 mg oral capsule  -- 1 cap(s) by mouth once a day (at bedtime)  -- Indication: For BPH    amiodarone 200 mg oral tablet  -- 1 tab(s) by mouth once a day  -- Indication: For Hypertension    warfarin  -- 3 milligram(s) by mouth once a day (at bedtime) (combines 1mg and 2mg tablet to make 3mg)  -- Indication: For Prophylactic measure    clonazePAM 1 mg oral tablet  -- 1.5 tab(s) by mouth once a day (at bedtime)  -- Indication: For Prophylactic measure    mirtazapine 15 mg oral tablet  -- 1 tab(s) by mouth once a day (at bedtime)  -- Indication: For Prophylactic measure    ALLOPURINOL  TAB 300MG  -- 1 tab(s) by mouth once a day  -- Indication: For Prophylactic measure    Intermezzo 3.5 mg sublingual tablet  -- 1 tab(s) under tongue once a day (at bedtime)  -- Indication: For Prophylactic measure    donepezil 10 mg oral tablet  -- 1 tab(s) by mouth once a day (at bedtime)  -- Indication: For dementia    AUG BETAMET  CRE 0.05%  -- Apply on skin to affected area once a day  -- Indication: For Prophylactic measure    polyethylene glycol 3350 oral powder for reconstitution  -- 17 gram(s) by mouth once a day  -- Indication: For Prophylactic measure    docusate sodium 100 mg oral capsule  -- 1 cap(s) by mouth 3 times a day  -- Indication: For Prophylactic measure    senna oral tablet  -- 2 tab(s) by mouth once a day (at bedtime)  -- Indication: For Prophylactic measure    memantine 10 mg oral tablet  -- 1 tab(s) by mouth every 12 hours  -- Indication: For dementia    Melatonin 10 mg oral capsule  -- 1 cap(s) by mouth once a day (at bedtime)  -- Indication: For Prophylactic measure    lactobacillus acidophilus oral capsule  -- 1 tab(s) by mouth once a day  -- Indication: For Prophylactic measure    omeprazole 20 mg oral delayed release tablet  -- 1 tab(s) by mouth once a day  -- Indication: For Prophylactic measure    levothyroxine 125 mcg (0.125 mg) oral tablet  -- 1 tab(s) by mouth once a day  -- Indication: For Hypothyroidism, unspecified type    Multiple Vitamins oral tablet  -- 1 tab(s) by mouth once a day  -- Indication: For Prophylactic measure

## 2018-12-29 RX ORDER — CLONAZEPAM 1 MG
1.5 TABLET ORAL
Qty: 0 | Refills: 0 | COMMUNITY
Start: 2018-12-29 | End: 2019-01-07

## 2019-01-02 RX ORDER — VANCOMYCIN HCL 1 G
1 VIAL (EA) INTRAVENOUS
Qty: 0 | Refills: 0 | COMMUNITY
Start: 2019-01-02 | End: 2019-01-13

## 2019-01-04 ENCOUNTER — INPATIENT (INPATIENT)
Facility: HOSPITAL | Age: 74
LOS: 5 days | Discharge: HOME | DRG: 872 | End: 2019-01-10
Attending: INTERNAL MEDICINE | Admitting: INTERNAL MEDICINE
Payer: MEDICARE

## 2019-01-04 VITALS
OXYGEN SATURATION: 95 % | WEIGHT: 216.05 LBS | DIASTOLIC BLOOD PRESSURE: 73 MMHG | HEIGHT: 74 IN | SYSTOLIC BLOOD PRESSURE: 138 MMHG | RESPIRATION RATE: 16 BRPM | HEART RATE: 64 BPM | TEMPERATURE: 99 F

## 2019-01-04 DIAGNOSIS — Z98.41 CATARACT EXTRACTION STATUS, RIGHT EYE: Chronic | ICD-10-CM

## 2019-01-04 DIAGNOSIS — R78.81 BACTEREMIA: ICD-10-CM

## 2019-01-04 DIAGNOSIS — Z98.89 OTHER SPECIFIED POSTPROCEDURAL STATES: Chronic | ICD-10-CM

## 2019-01-04 DIAGNOSIS — Z98.890 OTHER SPECIFIED POSTPROCEDURAL STATES: Chronic | ICD-10-CM

## 2019-01-04 DIAGNOSIS — Z95.0 PRESENCE OF CARDIAC PACEMAKER: Chronic | ICD-10-CM

## 2019-01-04 LAB
ALBUMIN SERPL ELPH-MCNC: 3.7 G/DL — SIGNIFICANT CHANGE UP (ref 3.3–5)
ALP SERPL-CCNC: 92 U/L — SIGNIFICANT CHANGE UP (ref 40–120)
ALT FLD-CCNC: 17 U/L — SIGNIFICANT CHANGE UP (ref 10–45)
ANION GAP SERPL CALC-SCNC: 11 MMOL/L — SIGNIFICANT CHANGE UP (ref 5–17)
AST SERPL-CCNC: 12 U/L — SIGNIFICANT CHANGE UP (ref 10–40)
BASOPHILS # BLD AUTO: 0 K/UL — SIGNIFICANT CHANGE UP (ref 0–0.2)
BASOPHILS NFR BLD AUTO: 0.6 % — SIGNIFICANT CHANGE UP (ref 0–2)
BILIRUB SERPL-MCNC: 0.4 MG/DL — SIGNIFICANT CHANGE UP (ref 0.2–1.2)
BUN SERPL-MCNC: 35 MG/DL — HIGH (ref 7–23)
CALCIUM SERPL-MCNC: 9.3 MG/DL — SIGNIFICANT CHANGE UP (ref 8.4–10.5)
CHLORIDE SERPL-SCNC: 103 MMOL/L — SIGNIFICANT CHANGE UP (ref 96–108)
CO2 SERPL-SCNC: 24 MMOL/L — SIGNIFICANT CHANGE UP (ref 22–31)
CREAT SERPL-MCNC: 2.62 MG/DL — HIGH (ref 0.5–1.3)
EOSINOPHIL # BLD AUTO: 0.1 K/UL — SIGNIFICANT CHANGE UP (ref 0–0.5)
EOSINOPHIL NFR BLD AUTO: 2.6 % — SIGNIFICANT CHANGE UP (ref 0–6)
GLUCOSE SERPL-MCNC: 101 MG/DL — HIGH (ref 70–99)
HCT VFR BLD CALC: 30.5 % — LOW (ref 39–50)
HGB BLD-MCNC: 10.1 G/DL — LOW (ref 13–17)
LYMPHOCYTES # BLD AUTO: 1.3 K/UL — SIGNIFICANT CHANGE UP (ref 1–3.3)
LYMPHOCYTES # BLD AUTO: 23.8 % — SIGNIFICANT CHANGE UP (ref 13–44)
MCHC RBC-ENTMCNC: 32.3 PG — SIGNIFICANT CHANGE UP (ref 27–34)
MCHC RBC-ENTMCNC: 33.2 GM/DL — SIGNIFICANT CHANGE UP (ref 32–36)
MCV RBC AUTO: 97.4 FL — SIGNIFICANT CHANGE UP (ref 80–100)
MONOCYTES # BLD AUTO: 0.6 K/UL — SIGNIFICANT CHANGE UP (ref 0–0.9)
MONOCYTES NFR BLD AUTO: 11.2 % — SIGNIFICANT CHANGE UP (ref 2–14)
NEUTROPHILS # BLD AUTO: 3.5 K/UL — SIGNIFICANT CHANGE UP (ref 1.8–7.4)
NEUTROPHILS NFR BLD AUTO: 61.9 % — SIGNIFICANT CHANGE UP (ref 43–77)
PHOSPHATE SERPL-MCNC: 3 MG/DL — SIGNIFICANT CHANGE UP (ref 2.5–4.5)
PLATELET # BLD AUTO: 158 K/UL — SIGNIFICANT CHANGE UP (ref 150–400)
POTASSIUM SERPL-MCNC: 4.7 MMOL/L — SIGNIFICANT CHANGE UP (ref 3.5–5.3)
POTASSIUM SERPL-SCNC: 4.7 MMOL/L — SIGNIFICANT CHANGE UP (ref 3.5–5.3)
PROT SERPL-MCNC: 6.3 G/DL — SIGNIFICANT CHANGE UP (ref 6–8.3)
RBC # BLD: 3.13 M/UL — LOW (ref 4.2–5.8)
RBC # FLD: 13.1 % — SIGNIFICANT CHANGE UP (ref 10.3–14.5)
SODIUM SERPL-SCNC: 138 MMOL/L — SIGNIFICANT CHANGE UP (ref 135–145)
VANCOMYCIN FLD-MCNC: 13.4 UG/ML — SIGNIFICANT CHANGE UP
WBC # BLD: 5.6 K/UL — SIGNIFICANT CHANGE UP (ref 3.8–10.5)
WBC # FLD AUTO: 5.6 K/UL — SIGNIFICANT CHANGE UP (ref 3.8–10.5)

## 2019-01-04 PROCEDURE — 99285 EMERGENCY DEPT VISIT HI MDM: CPT

## 2019-01-04 PROCEDURE — 93971 EXTREMITY STUDY: CPT | Mod: 26

## 2019-01-04 PROCEDURE — 99222 1ST HOSP IP/OBS MODERATE 55: CPT | Mod: GC

## 2019-01-04 RX ORDER — LANOLIN ALCOHOL/MO/W.PET/CERES
5 CREAM (GRAM) TOPICAL AT BEDTIME
Qty: 0 | Refills: 0 | Status: DISCONTINUED | OUTPATIENT
Start: 2019-01-04 | End: 2019-01-10

## 2019-01-04 RX ORDER — SENNA PLUS 8.6 MG/1
2 TABLET ORAL AT BEDTIME
Qty: 0 | Refills: 0 | Status: DISCONTINUED | OUTPATIENT
Start: 2019-01-04 | End: 2019-01-10

## 2019-01-04 RX ORDER — VANCOMYCIN HCL 1 G
1000 VIAL (EA) INTRAVENOUS ONCE
Qty: 0 | Refills: 0 | Status: COMPLETED | OUTPATIENT
Start: 2019-01-04 | End: 2019-01-04

## 2019-01-04 RX ORDER — LANOLIN ALCOHOL/MO/W.PET/CERES
1 CREAM (GRAM) TOPICAL
Qty: 0 | Refills: 0 | COMMUNITY

## 2019-01-04 RX ORDER — POLYETHYLENE GLYCOL 3350 17 G/17G
17 POWDER, FOR SOLUTION ORAL DAILY
Qty: 0 | Refills: 0 | Status: DISCONTINUED | OUTPATIENT
Start: 2019-01-04 | End: 2019-01-10

## 2019-01-04 RX ORDER — CLONAZEPAM 1 MG
1.5 TABLET ORAL AT BEDTIME
Qty: 0 | Refills: 0 | Status: DISCONTINUED | OUTPATIENT
Start: 2019-01-04 | End: 2019-01-10

## 2019-01-04 RX ORDER — WARFARIN SODIUM 2.5 MG/1
1 TABLET ORAL
Qty: 0 | Refills: 0 | COMMUNITY

## 2019-01-04 RX ORDER — PANTOPRAZOLE SODIUM 20 MG/1
40 TABLET, DELAYED RELEASE ORAL
Qty: 0 | Refills: 0 | Status: DISCONTINUED | OUTPATIENT
Start: 2019-01-04 | End: 2019-01-10

## 2019-01-04 RX ORDER — ALLOPURINOL 300 MG
300 TABLET ORAL DAILY
Qty: 0 | Refills: 0 | Status: DISCONTINUED | OUTPATIENT
Start: 2019-01-04 | End: 2019-01-10

## 2019-01-04 RX ORDER — OMEPRAZOLE 10 MG/1
1 CAPSULE, DELAYED RELEASE ORAL
Qty: 0 | Refills: 0 | COMMUNITY

## 2019-01-04 RX ORDER — LEVOTHYROXINE SODIUM 125 MCG
125 TABLET ORAL DAILY
Qty: 0 | Refills: 0 | Status: DISCONTINUED | OUTPATIENT
Start: 2019-01-04 | End: 2019-01-10

## 2019-01-04 RX ORDER — DOCUSATE SODIUM 100 MG
100 CAPSULE ORAL THREE TIMES A DAY
Qty: 0 | Refills: 0 | Status: DISCONTINUED | OUTPATIENT
Start: 2019-01-04 | End: 2019-01-10

## 2019-01-04 RX ORDER — HYDROMORPHONE HYDROCHLORIDE 2 MG/ML
4 INJECTION INTRAMUSCULAR; INTRAVENOUS; SUBCUTANEOUS EVERY 4 HOURS
Qty: 0 | Refills: 0 | Status: DISCONTINUED | OUTPATIENT
Start: 2019-01-04 | End: 2019-01-10

## 2019-01-04 RX ORDER — ZOLPIDEM TARTRATE 10 MG/1
1 TABLET ORAL
Qty: 0 | Refills: 0 | COMMUNITY

## 2019-01-04 RX ORDER — HYDROMORPHONE HYDROCHLORIDE 2 MG/ML
2 INJECTION INTRAMUSCULAR; INTRAVENOUS; SUBCUTANEOUS EVERY 4 HOURS
Qty: 0 | Refills: 0 | Status: DISCONTINUED | OUTPATIENT
Start: 2019-01-04 | End: 2019-01-10

## 2019-01-04 RX ORDER — MIRTAZAPINE 45 MG/1
15 TABLET, ORALLY DISINTEGRATING ORAL AT BEDTIME
Qty: 0 | Refills: 0 | Status: DISCONTINUED | OUTPATIENT
Start: 2019-01-04 | End: 2019-01-07

## 2019-01-04 RX ORDER — VANCOMYCIN HCL 1 G
1000 VIAL (EA) INTRAVENOUS EVERY 24 HOURS
Qty: 0 | Refills: 0 | Status: DISCONTINUED | OUTPATIENT
Start: 2019-01-04 | End: 2019-01-10

## 2019-01-04 RX ORDER — WARFARIN SODIUM 2.5 MG/1
4 TABLET ORAL ONCE
Qty: 0 | Refills: 0 | Status: COMPLETED | OUTPATIENT
Start: 2019-01-04 | End: 2019-01-04

## 2019-01-04 RX ORDER — AMIODARONE HYDROCHLORIDE 400 MG/1
200 TABLET ORAL DAILY
Qty: 0 | Refills: 0 | Status: DISCONTINUED | OUTPATIENT
Start: 2019-01-04 | End: 2019-01-10

## 2019-01-04 RX ORDER — MEMANTINE HYDROCHLORIDE 10 MG/1
10 TABLET ORAL EVERY 12 HOURS
Qty: 0 | Refills: 0 | Status: DISCONTINUED | OUTPATIENT
Start: 2019-01-04 | End: 2019-01-10

## 2019-01-04 RX ORDER — TAMSULOSIN HYDROCHLORIDE 0.4 MG/1
0.4 CAPSULE ORAL AT BEDTIME
Qty: 0 | Refills: 0 | Status: DISCONTINUED | OUTPATIENT
Start: 2019-01-04 | End: 2019-01-10

## 2019-01-04 RX ORDER — DONEPEZIL HYDROCHLORIDE 10 MG/1
10 TABLET, FILM COATED ORAL AT BEDTIME
Qty: 0 | Refills: 0 | Status: DISCONTINUED | OUTPATIENT
Start: 2019-01-04 | End: 2019-01-10

## 2019-01-04 RX ORDER — LACTOBACILLUS ACIDOPHILUS 100MM CELL
1 CAPSULE ORAL DAILY
Qty: 0 | Refills: 0 | Status: DISCONTINUED | OUTPATIENT
Start: 2019-01-04 | End: 2019-01-10

## 2019-01-04 RX ADMIN — DONEPEZIL HYDROCHLORIDE 10 MILLIGRAM(S): 10 TABLET, FILM COATED ORAL at 23:06

## 2019-01-04 RX ADMIN — HYDROMORPHONE HYDROCHLORIDE 4 MILLIGRAM(S): 2 INJECTION INTRAMUSCULAR; INTRAVENOUS; SUBCUTANEOUS at 23:30

## 2019-01-04 RX ADMIN — HYDROMORPHONE HYDROCHLORIDE 4 MILLIGRAM(S): 2 INJECTION INTRAMUSCULAR; INTRAVENOUS; SUBCUTANEOUS at 22:32

## 2019-01-04 RX ADMIN — HYDROMORPHONE HYDROCHLORIDE 4 MILLIGRAM(S): 2 INJECTION INTRAMUSCULAR; INTRAVENOUS; SUBCUTANEOUS at 19:18

## 2019-01-04 RX ADMIN — MIRTAZAPINE 15 MILLIGRAM(S): 45 TABLET, ORALLY DISINTEGRATING ORAL at 22:18

## 2019-01-04 RX ADMIN — HYDROMORPHONE HYDROCHLORIDE 4 MILLIGRAM(S): 2 INJECTION INTRAMUSCULAR; INTRAVENOUS; SUBCUTANEOUS at 18:31

## 2019-01-04 RX ADMIN — WARFARIN SODIUM 4 MILLIGRAM(S): 2.5 TABLET ORAL at 22:15

## 2019-01-04 RX ADMIN — Medication 1 TABLET(S): at 18:30

## 2019-01-04 RX ADMIN — Medication 1 TABLET(S): at 18:33

## 2019-01-04 RX ADMIN — TAMSULOSIN HYDROCHLORIDE 0.4 MILLIGRAM(S): 0.4 CAPSULE ORAL at 22:15

## 2019-01-04 RX ADMIN — Medication 1.5 MILLIGRAM(S): at 22:18

## 2019-01-04 RX ADMIN — Medication 250 MILLIGRAM(S): at 17:57

## 2019-01-04 RX ADMIN — MEMANTINE HYDROCHLORIDE 10 MILLIGRAM(S): 10 TABLET ORAL at 18:33

## 2019-01-04 RX ADMIN — Medication 100 MILLIGRAM(S): at 22:19

## 2019-01-04 RX ADMIN — Medication 5 MILLIGRAM(S): at 22:15

## 2019-01-04 NOTE — H&P ADULT - PMH
Anxiety disorder    Atrial fibrillation    BPH (benign prostatic hyperplasia)    Bradycardia, drug induced    CKD (chronic kidney disease)    CLL (chronic lymphocytic leukemia)  in remission  Diverticulitis    GERD (gastroesophageal reflux disease)    Gout    [place] (hard of hearing)    Hypothyroid    Memory deficit    Nephrolithiasis    Waldenstrom macroglobulinemia

## 2019-01-04 NOTE — CONSULT NOTE ADULT - ATTENDING COMMENTS
74 yo M w/ PMH CLL and waldenstroms macroglobulinemia (on ninlaro and dexamethasone), gout, afib (with pacemaker, on amio and coumadin), CKD, dementia who presents from [facility] rehab for pos blood culture.  Recent hospitalization, developed RUE thrombophlebitis  At rehab facility developed BCX with MRSA bacteremia in the setting of fever/leukocytosis  Given PO antibiotic, then changed to Vancomycin--now sent to [facility] for further eval  Likely MRSA bacteremia 2/2 thrombophlebitis  Overall, MRSA bacteremia, thrombophlebitis, fever, leukocytosis, sepsis  - Vancomycin by level (unclear when rehab gave last dose vanco; recheck new vanco level--give 1g x1 if <15)  - F/U pending repeat BCXs  - Check TTE  - Monitor RUE forearm thrombophlebitis site    Prosper Mejia MD  Pager [phone]  After 5pm and on weekends call [phone]

## 2019-01-04 NOTE — H&P ADULT - NSHPREVIEWOFSYSTEMS_GEN_ALL_CORE
Constitutional: yes fever. no wt change  Skin: No rash.  Eyes: No recent vision problems or eye pain.  ENT: No congestion, ear pain, or sore throat.  Endocrine: No thyroid problems.  Cardiovascular: No chest pain or palpation.  Respiratory: No cough, shortness of breath, congestion, or wheezing.  Gastrointestinal: No abdominal pain, nausea, vomiting, or diarrhea.  Genitourinary: No dysuria.  Musculoskeletal: No joint swelling.  Neurologic: No headache.

## 2019-01-04 NOTE — ED ADULT NURSE NOTE - PMH
Anxiety disorder    Atrial fibrillation    BPH (benign prostatic hyperplasia)    Bradycardia, drug induced    CKD (chronic kidney disease)    CLL (chronic lymphocytic leukemia)  in remission  Diverticulitis    GERD (gastroesophageal reflux disease)    Gout    [place] (hard of hearing)    Hypothyroid    Memory deficit    Nephrolithiasis    Waldenstrom macroglobulinemia

## 2019-01-04 NOTE — ED ADULT NURSE NOTE - OBJECTIVE STATEMENT
73 year old male presents to ED via EMS from rehab for positive blood cultures. History of CLL, gout, afib, PPM, dementia. Hospitalized the end of December for fall with a non surgical pubic ramus fracture and discharged to rehab 12/28. Blood cultures drawn while in rehab after having fever & was complaining of painful redness to arm at prior IV site. Denies HA, CP, SOB, abdominal pain, NVD, chills. Patient undressed and placed into gown, call bell in hand and side rails up with bed in lowest position for safety. blanket provided. Comfort and safety provided.

## 2019-01-04 NOTE — CONSULT NOTE ADULT - ASSESSMENT
72 yo M w/ PMH CLL and waldenstroms macroglobulinemia (on ninlaro and dexamethasone), gout, afib (with pacemaker, on amio and coumadin), CKD, dementia presented from rehab with MRSA bacteremia.     # MRSA Bacteremia  - cx (+) with MRSA in one set from 12/31, repeat cx from 1/4 in lab.   - started on unknown oral abx on Monday, has been on vanco since Jan 2, troughs approach therapeutic dose.   - would check US of RUE to assess for abscess  - needs TTE to assess for endocarditis  - cont w/ vanco, follow up repeat cx  - of note, pt is scheduled to have chemo today and reports that his oncologist is aware of infection and wants to proceed with chemo. Would confirm that he oncologist is aware of infection before giving pt his chemo and steroids, but overall would defer to oncology's team comfort level with cont'ing with chemo at this time.     Jose Antonio Mclean, PGY 3  [facility] Pager: [phone]  [facility] Pager: [phone]

## 2019-01-04 NOTE — ED PROVIDER NOTE - ATTENDING CONTRIBUTION TO CARE
Attending MD [place]:  I personally have seen and examined this patient.  Resident note reviewed and agree on plan of care and except where noted.  See MDM for details.

## 2019-01-04 NOTE — CONSULT NOTE ADULT - SUBJECTIVE AND OBJECTIVE BOX
HPI:  72 yo M w/ PMH CLL and waldenstroms macroglobulinemia (on ninlaro and dexamethasone), gout, afib (with pacemaker, on coumadin), dementia who presents from [facility] rehab for pos blood culture. Pt states had a mechanical fall, was hospitalized with d/c on saturday 12/19. At that time, pt states his right upper arm was red and swollen at prior IV site. On Sunday, he had a fever, and had bcx taken in rehab, found to have MRSA baceremia. Transferred to ED to r/o endocarditis. Pt denies complaints at this time, no cough, chest pain, sob.  Nephrology was consulted for elevated serum creatinine. The patient sees my partner, Dr. Norman Ascencio. He has CKD IV. Reports some dysuria. No SOB, CP, LE edema      PAST MEDICAL & SURGICAL HISTORY:  Memory deficit  BPH (benign prostatic hyperplasia)  [place] (hard of hearing)  Gout  CKD (chronic kidney disease)  Nephrolithiasis  Hypothyroid  Bradycardia, drug induced  Waldenstrom macroglobulinemia  Diverticulitis  Atrial fibrillation  GERD (gastroesophageal reflux disease)  Anxiety disorder  CLL (chronic lymphocytic leukemia): in remission  History of cardiac pacemaker in situ  History of appendectomy  History of cataract surgery, right: IOL  History of laparoscopic cholecystectomy: 4/2014  S/P hernia repair: x2  Meniscus tear: s/p removal of Meniscus 8 months ago      MEDICATIONS  (STANDING):  allopurinol 300 milliGRAM(s) Oral daily  amiodarone    Tablet 200 milliGRAM(s) Oral daily  betamethasone valerate 0.1% Cream 1 Application(s) Topical daily  clonazePAM Tablet 1.5 milliGRAM(s) Oral at bedtime  docusate sodium 100 milliGRAM(s) Oral three times a day  donepezil 10 milliGRAM(s) Oral at bedtime  lactobacillus acidophilus 1 Tablet(s) Oral daily  levothyroxine 125 MICROGram(s) Oral daily  melatonin 5 milliGRAM(s) Oral at bedtime  memantine 10 milliGRAM(s) Oral every 12 hours  mirtazapine 15 milliGRAM(s) Oral at bedtime  multivitamin 1 Tablet(s) Oral daily  pantoprazole    Tablet 40 milliGRAM(s) Oral before breakfast  polyethylene glycol 3350 17 Gram(s) Oral daily  senna 2 Tablet(s) Oral at bedtime  tamsulosin 0.4 milliGRAM(s) Oral at bedtime  warfarin 4 milliGRAM(s) Oral once      Allergies    No Known Allergies    Intolerances    rituximab (Rash)      SOCIAL HISTORY:  Denies ETOh,Smoking,     FAMILY HISTORY:  No pertinent family history in first degree relatives      REVIEW OF SYSTEMS:    CONSTITUTIONAL: No weakness, fevers or chills  EYES/ENT: No visual changes;  No vertigo or throat pain   NECK: No pain or stiffness  RESPIRATORY: No cough, wheezing, hemoptysis; No shortness of breath  CARDIOVASCULAR: No chest pain or palpitations  GASTROINTESTINAL: No abdominal or epigastric pain. No nausea, vomiting, or hematemesis; No diarrhea or constipation. No melena or hematochezia.  GENITOURINARY: No dysuria, frequency or hematuria  NEUROLOGICAL: No numbness or weakness  SKIN: No itching, burning, rashes, or lesions   All other review of systems is negative unless indicated above.    VITAL:  T(C): , Max: 37.1 (01-04-19 @ 11:45)  T(F): , Max: 98.7 (01-04-19 @ 11:45)  HR: 64 (01-04-19 @ 11:45)  BP: 138/73 (01-04-19 @ 11:45)  BP(mean): --  RR: 16 (01-04-19 @ 11:45)  SpO2: 95% (01-04-19 @ 11:45)  Wt(kg): --    I and O's:    Height (cm): 187.96 (01-04 @ 11:45)  Weight (kg): 98 (01-04 @ 11:45)  BMI (kg/m2): 27.7 (01-04 @ 11:45)  BSA (m2): 2.25 (01-04 @ 11:45)    PHYSICAL EXAM:    Constitutional: NAD  HEENT: PERRLA, EOMI,  MMM  Neck: No LAD, No JVD  Respiratory: CTAB  Cardiovascular: S1 and S2  Gastrointestinal: BS+, soft, NT/ND  Extremities: No peripheral edema  Neurological: A/O x 3, no focal deficits      LABS:                        10.1   5.6   )-----------( 158      ( 04 Jan 2019 13:17 )             30.5     01-04    138  |  103  |  35<H>  ----------------------------<  101<H>  4.7   |  24  |  2.62<H>    Ca    9.3      04 Jan 2019 13:17    TPro  6.3  /  Alb  3.7  /  TBili  0.4  /  DBili  x   /  AST  12  /  ALT  17  /  AlkPhos  92  01-04      Urine Studies:          RADIOLOGY & ADDITIONAL STUDIES:        ASSESSMENT:  HPI:  72 yo M w/ PMH CLL and waldenstroms macroglobulinemia (on ninlaro and dexamethasone), gout, afib (with pacemaker, on coumadin), dementia who presents from [facility] rehab for MRSA + bacteremia      - CKD Stage IV with baseline serum creatinine of  2.6-2.8 mg/dl likely dt renovascular disease- at baseline   - Hypertension controlled  - Volume status- euvolemic  - Electrolytes controlled  - Anemia    PLAN:   - Urinalysis, Urine Protein, Urine Creatinine  - Iron studies  - Phosphorus  - Post void bladder scan  - Defer diuretics/ IVF  - Renal dosing of meds to creatinine clearance of 20-25 ml/min  - Avoid nephrotoxins as able      Thank you for the courtesy of the referral    Ysabel Herrera MD  [facility]  [phone]

## 2019-01-04 NOTE — ED PROVIDER NOTE - CARE PLAN
Principal Discharge DX:	Positive blood culture  Assessment and plan of treatment:	Based on patient's history and physical exam, as well as the results of today's workup, I feel that patient warrants admission to the hospital for further workup/evaluation and continued management. I discussed the findings of today's workup with the patient and addressed the patient's questions and concerns. The patient was agreeable with admission. I spoke with the hospitalist who accepted the patient for admission and subsequently took over the patient's care. I also text paged the MAR.

## 2019-01-04 NOTE — ED PROVIDER NOTE - OBJECTIVE STATEMENT
Ning Escobar MD PGY-1 Pt is a 74 yo M w/ PMH CLL and waldenstroms macroglobulinemia (on ninlaro and dexamethasone), gout, afib (with pacemaker, on coumadin), dementia who [facility] blood culture. Pt states had a mechanical fall, was hospitalized with d/c on saturday 12/19. At that time, pt states his right upper arm was red and swollen at prior IV site. On Sunday, he had a fever, and had bcx taken in rehab, found to have MRSA baceremia. Transferred to ED to r/o endocarditis. Pt denies complaints at this time, no cough, chest pain, sob. Ning Escobar MD PGY-1 Pt is a 74 yo M w/ PMH CLL and waldenstroms macroglobulinemia (on ninlaro and dexamethasone), gout, afib (with pacemaker, on coumadin), dementia who presents from [facility] for pos blood culture. Pt states had a mechanical fall, was hospitalized with d/c on saturday 12/19. At that time, pt states his right upper arm was red and swollen at prior IV site. On Sunday, he had a fever, and had bcx taken in rehab, found to have MRSA baceremia. Transferred to ED to r/o endocarditis. Pt denies complaints at this time, no cough, chest pain, sob.    Cardiologist: Dr. koehler Ning Escobar MD PGY-1 Pt is a 74 yo M w/ PMH CLL and waldenstroms macroglobulinemia (on ninlaro and dexamethasone), gout, afib (with pacemaker, on coumadin), dementia who presents from [facility] for pos blood culture. Pt states had a mechanical fall, was hospitalized with d/c on saturday 12/19. At that time, pt states his right upper arm was red and swollen at prior IV site. On Sunday, he had a fever, and had bcx taken in rehab, found to have MRSA baceremia. Transferred to ED to r/o endocarditis. Pt denies complaints at this time, no cough, chest pain, sob. Has been treated with vancomycin 500 mg BID then changed to vancomycin 1 g q12    Cardiologist: Dr. koehler

## 2019-01-04 NOTE — H&P ADULT - NSHPPHYSICALEXAM_GEN_ALL_CORE
Vital Signs Last 24 Hrs  T(C): 37.1 (04 Jan 2019 11:45), Max: 37.1 (04 Jan 2019 11:45)  T(F): 98.7 (04 Jan 2019 11:45), Max: 98.7 (04 Jan 2019 11:45)  HR: 64 (04 Jan 2019 11:45) (64 - 64)  BP: 138/73 (04 Jan 2019 11:45) (138/73 - 138/73)  BP(mean): --  RR: 16 (04 Jan 2019 11:45) (16 - 16)  SpO2: 95% (04 Jan 2019 11:45) (95% - 95%)    PHYSICAL EXAM:  GENERAL: NAD, well-developed  HEAD:  Atraumatic, Normocephalic  EYES: EOMI, PERRLA, conjunctiva and sclera clear  NECK: Supple, No JVD  CHEST/LUNG: Clear to auscultation bilaterally; No wheeze  HEART: S1/S2, +murmur systolic   ABDOMEN: Soft, Nontender, Nondistended; Bowel sounds present  EXTREMITIES:  2+ Peripheral Pulses, No clubbing, cyanosis, or edema  PSYCH: AAOx3  NEUROLOGY: non-focal  SKIN: No rashes or lesions

## 2019-01-04 NOTE — ED PROVIDER NOTE - PHYSICAL EXAMINATION
PHYSICAL EXAM:   General: well-appearing, appears stated age, in no acute distress  HEENT: NC/AT,   Cardiovascular: regular rate, +systolic sternal border across precordium  Respiratory: clear to auscultation bilaterally, good aeration bilaterally, nonlabored respirations  Abdominal: soft, nontender, nondistended, no rebound, guarding or rigidity, +bowel sounds  Extremities: no LE edema b/l. +R upper arm medial erythema and ttp, ttp in antecubital fossa with small 1cm x 1 cm area of induration (improved per patient). Distally neurovascularly intact  Neuro: Alert and oriented x3. Nonfocal neurologic exam  Psychiatric: appropriate mood and affect.   Skin: appropriate color, warm, dry except as aforementioned  -Ning Escobar PGY-1 PHYSICAL EXAM:   General: well-appearing, appears stated age, in no acute distress  HEENT: NC/AT,   Cardiovascular: regular rate, +systolic sternal border across precordium  Respiratory: clear to auscultation bilaterally, good aeration bilaterally, nonlabored respirations  Abdominal: soft, nontender, nondistended, no rebound, guarding or rigidity, +bowel sounds  Extremities: no LE edema b/l. +R upper arm medial erythema and ttp, ttp in antecubital fossa with small 1cm x 1 cm area of induration (improved per patient). Distally neurovascularly intact. No gross splinter hemorrhages, osler nodes or janeway lesions  Neuro: Alert and oriented x3. Nonfocal neurologic exam  Psychiatric: appropriate mood and affect.   Skin: appropriate color, warm, dry except as aforementioned  -Ning Escobar PGY-1

## 2019-01-04 NOTE — ED PROVIDER NOTE - MEDICAL DECISION MAKING DETAILS
Attending MD [place]: 74 yo male with PMH for CLL, Waldenstroms macroglobulinemia, on Ninlaro and dexamethasone, hypothyroid, dementia, afib on coumadin.  Presents for positive blood cultures and fever, found to have MRSA bacteremia.  On exam the is a systolic ejection murmur, lungs clear, extremities with right medial arm red and tender at old IV site.  Plan; repeat blood cultures, labs, UA and urine culture.

## 2019-01-04 NOTE — ED PROVIDER NOTE - PLAN OF CARE
Based on patient's history and physical exam, as well as the results of today's workup, I feel that patient warrants admission to the hospital for further workup/evaluation and continued management. I discussed the findings of today's workup with the patient and addressed the patient's questions and concerns. The patient was agreeable with admission. I spoke with the hospitalist who accepted the patient for admission and subsequently took over the patient's care. I also text paged the MAR.

## 2019-01-04 NOTE — ED PROVIDER NOTE - PMH
Anxiety disorder    Atrial fibrillation    BPH (benign prostatic hyperplasia)    Bradycardia, drug induced    CKD (chronic kidney disease)    CLL (chronic lymphocytic leukemia)  in remission  Diverticulitis    GERD (gastroesophageal reflux disease)    Gout    [place] (hard of hearing)    Hypothyroid    Memory deficit    Nephrolithiasis    Waldenstrom macroglobulinemia

## 2019-01-04 NOTE — ED PROVIDER NOTE - NS ED ROS FT
REVIEW OF SYSTEMS:  General:  +hx fever on sunday  HEENT: no headache  Cardiac: no chest pain  Respiratory: no cough, no shortness of breath  Gastrointestinal: no abdominal pain  Extremities: +RUE redness and pain  Neuro: no focal weakness, no numbness/tingling of the extremities, no decreased sensation  Endo: no diabetes  Skin: +as mentioned in extremities section, otherwise appt color, warm and dry  -Ning Escobar PGY-1

## 2019-01-04 NOTE — ED ADULT NURSE NOTE - NSIMPLEMENTINTERV_GEN_ALL_ED
Implemented All Fall Risk Interventions:  [place] to call system. Call bell, personal items and telephone within reach. Instruct patient to call for assistance. Room bathroom lighting operational. Non-slip footwear when patient is off stretcher. Physically safe environment: no spills, clutter or unnecessary equipment. Stretcher in lowest position, wheels locked, appropriate side rails in place. Provide visual cue, wrist band, yellow gown, etc. Monitor gait and stability. Monitor for mental status changes and reorient to person, place, and time. Review medications for side effects contributing to fall risk. Reinforce activity limits and safety measures with patient and family.

## 2019-01-04 NOTE — H&P ADULT - ASSESSMENT
72 yo male h/o CLL, waldenstroms, afib s/p ppm on coumadin, ckd, recent admission for mechanical fall with pubic rami fx, now sent from rehab with bacteremia and RUE cellulitis    ID  bacteremia / cellulites with MRSA  vanco as per id  repeat blood cultures  echo  RUE duplex    afib  AC with coumadin  rate controlled    ckd  stable  renal f/u  avoid nephrotoxins    CLL  stable   heme onc f/u    pubic rami fx  no sx intervention  pain control  PT    cont other home meds

## 2019-01-04 NOTE — H&P ADULT - HISTORY OF PRESENT ILLNESS
74 yo M w/ PMH CLL and waldenstroms macroglobulinemia (on ninlaro and dexamethasone), gout, afib (with pacemaker, on coumadin), dementia who [facility] from [facility] for pos blood culture. Pt states had a mechanical fall, was hospitalized with d/c on saturday 12/19. At that time, pt states his right upper arm was red and swollen at prior IV site. On Sunday, he had a fever, and had bcx taken in rehab, found to have MRSA baceremia. Transferred to ED to r/o endocarditis. Pt denies complaints at this time, no cough, chest pain, sob. 74 yo M w/ PMH CLL and waldenstroms macroglobulinemia (on ninlaro and dexamethasone), gout, afib (with pacemaker, on coumadin), dementia who presents from [facility] for pos blood culture. Pt states had a mechanical fall, was hospitalized with d/c on saturday 12/19. At that time, pt states his right upper arm was red and swollen at prior IV site. On Sunday, he had a fever, and had bcx taken in rehab, found to have MRSA baceremia. Transferred to ED to r/o endocarditis. Pt denies complaints at this time, no cough, chest pain, sob.  pt did not have fever while in hospital last week.  also denies right arm swelling while in hospital   reports prior h/o cardiac murmur

## 2019-01-05 LAB
ANION GAP SERPL CALC-SCNC: 17 MMOL/L — SIGNIFICANT CHANGE UP (ref 5–17)
APPEARANCE UR: CLEAR — SIGNIFICANT CHANGE UP
BACTERIA # UR AUTO: NEGATIVE — SIGNIFICANT CHANGE UP
BILIRUB UR-MCNC: NEGATIVE — SIGNIFICANT CHANGE UP
BUN SERPL-MCNC: 48 MG/DL — HIGH (ref 7–23)
CALCIUM SERPL-MCNC: 9.4 MG/DL — SIGNIFICANT CHANGE UP (ref 8.4–10.5)
CHLORIDE SERPL-SCNC: 101 MMOL/L — SIGNIFICANT CHANGE UP (ref 96–108)
CO2 SERPL-SCNC: 21 MMOL/L — LOW (ref 22–31)
COLOR SPEC: YELLOW — SIGNIFICANT CHANGE UP
CREAT ?TM UR-MCNC: 130 MG/DL — SIGNIFICANT CHANGE UP
CREAT SERPL-MCNC: 2.65 MG/DL — HIGH (ref 0.5–1.3)
DIFF PNL FLD: NEGATIVE — SIGNIFICANT CHANGE UP
EPI CELLS # UR: 1 /HPF — SIGNIFICANT CHANGE UP
FERRITIN SERPL-MCNC: 425 NG/ML — HIGH (ref 30–400)
GLUCOSE SERPL-MCNC: 174 MG/DL — HIGH (ref 70–99)
GLUCOSE UR QL: NEGATIVE — SIGNIFICANT CHANGE UP
HCT VFR BLD CALC: 29.5 % — LOW (ref 39–50)
HGB BLD-MCNC: 9.4 G/DL — LOW (ref 13–17)
HYALINE CASTS # UR AUTO: 2 /LPF — SIGNIFICANT CHANGE UP (ref 0–2)
INR BLD: 1.76 RATIO — HIGH (ref 0.88–1.16)
IRON SATN MFR SERPL: 12 % — LOW (ref 16–55)
IRON SATN MFR SERPL: 24 UG/DL — LOW (ref 45–165)
KETONES UR-MCNC: NEGATIVE — SIGNIFICANT CHANGE UP
LEUKOCYTE ESTERASE UR-ACNC: ABNORMAL
MCHC RBC-ENTMCNC: 30.9 PG — SIGNIFICANT CHANGE UP (ref 27–34)
MCHC RBC-ENTMCNC: 31.9 GM/DL — LOW (ref 32–36)
MCV RBC AUTO: 97 FL — SIGNIFICANT CHANGE UP (ref 80–100)
NITRITE UR-MCNC: NEGATIVE — SIGNIFICANT CHANGE UP
PH UR: 6 — SIGNIFICANT CHANGE UP (ref 5–8)
PLATELET # BLD AUTO: 216 K/UL — SIGNIFICANT CHANGE UP (ref 150–400)
POTASSIUM SERPL-MCNC: 5 MMOL/L — SIGNIFICANT CHANGE UP (ref 3.5–5.3)
POTASSIUM SERPL-SCNC: 5 MMOL/L — SIGNIFICANT CHANGE UP (ref 3.5–5.3)
PROT ?TM UR-MCNC: 108 MG/DL — HIGH (ref 0–12)
PROT UR-MCNC: ABNORMAL
PROTHROM AB SERPL-ACNC: 20.4 SEC — HIGH (ref 10–12.9)
RBC # BLD: 3.04 M/UL — LOW (ref 4.2–5.8)
RBC # FLD: 14.3 % — SIGNIFICANT CHANGE UP (ref 10.3–14.5)
RBC CASTS # UR COMP ASSIST: 1 /HPF — SIGNIFICANT CHANGE UP (ref 0–4)
SODIUM SERPL-SCNC: 139 MMOL/L — SIGNIFICANT CHANGE UP (ref 135–145)
SP GR SPEC: 1.02 — SIGNIFICANT CHANGE UP (ref 1.01–1.02)
TIBC SERPL-MCNC: 202 UG/DL — LOW (ref 220–430)
UIBC SERPL-MCNC: 178 UG/DL — SIGNIFICANT CHANGE UP (ref 110–370)
UROBILINOGEN FLD QL: NEGATIVE — SIGNIFICANT CHANGE UP
VANCOMYCIN FLD-MCNC: 17.7 UG/ML — SIGNIFICANT CHANGE UP
WBC # BLD: 4.81 K/UL — SIGNIFICANT CHANGE UP (ref 3.8–10.5)
WBC # FLD AUTO: 4.81 K/UL — SIGNIFICANT CHANGE UP (ref 3.8–10.5)
WBC UR QL: 7 /HPF — HIGH (ref 0–5)

## 2019-01-05 PROCEDURE — 99221 1ST HOSP IP/OBS SF/LOW 40: CPT | Mod: GC

## 2019-01-05 RX ORDER — WARFARIN SODIUM 2.5 MG/1
5 TABLET ORAL ONCE
Qty: 0 | Refills: 0 | Status: DISCONTINUED | OUTPATIENT
Start: 2019-01-05 | End: 2019-01-05

## 2019-01-05 RX ADMIN — MEMANTINE HYDROCHLORIDE 10 MILLIGRAM(S): 10 TABLET ORAL at 06:01

## 2019-01-05 RX ADMIN — HYDROMORPHONE HYDROCHLORIDE 4 MILLIGRAM(S): 2 INJECTION INTRAMUSCULAR; INTRAVENOUS; SUBCUTANEOUS at 15:43

## 2019-01-05 RX ADMIN — Medication 125 MICROGRAM(S): at 05:50

## 2019-01-05 RX ADMIN — Medication 1 TABLET(S): at 12:18

## 2019-01-05 RX ADMIN — Medication 1 APPLICATION(S): at 17:13

## 2019-01-05 RX ADMIN — Medication 100 MILLIGRAM(S): at 21:22

## 2019-01-05 RX ADMIN — HYDROMORPHONE HYDROCHLORIDE 4 MILLIGRAM(S): 2 INJECTION INTRAMUSCULAR; INTRAVENOUS; SUBCUTANEOUS at 02:32

## 2019-01-05 RX ADMIN — HYDROMORPHONE HYDROCHLORIDE 4 MILLIGRAM(S): 2 INJECTION INTRAMUSCULAR; INTRAVENOUS; SUBCUTANEOUS at 20:51

## 2019-01-05 RX ADMIN — Medication 5 MILLIGRAM(S): at 21:21

## 2019-01-05 RX ADMIN — Medication 1.5 MILLIGRAM(S): at 21:21

## 2019-01-05 RX ADMIN — TAMSULOSIN HYDROCHLORIDE 0.4 MILLIGRAM(S): 0.4 CAPSULE ORAL at 21:21

## 2019-01-05 RX ADMIN — Medication 300 MILLIGRAM(S): at 12:18

## 2019-01-05 RX ADMIN — PANTOPRAZOLE SODIUM 40 MILLIGRAM(S): 20 TABLET, DELAYED RELEASE ORAL at 05:51

## 2019-01-05 RX ADMIN — HYDROMORPHONE HYDROCHLORIDE 4 MILLIGRAM(S): 2 INJECTION INTRAMUSCULAR; INTRAVENOUS; SUBCUTANEOUS at 11:20

## 2019-01-05 RX ADMIN — DONEPEZIL HYDROCHLORIDE 10 MILLIGRAM(S): 10 TABLET, FILM COATED ORAL at 21:24

## 2019-01-05 RX ADMIN — HYDROMORPHONE HYDROCHLORIDE 4 MILLIGRAM(S): 2 INJECTION INTRAMUSCULAR; INTRAVENOUS; SUBCUTANEOUS at 19:51

## 2019-01-05 RX ADMIN — Medication 250 MILLIGRAM(S): at 17:13

## 2019-01-05 RX ADMIN — HYDROMORPHONE HYDROCHLORIDE 4 MILLIGRAM(S): 2 INJECTION INTRAMUSCULAR; INTRAVENOUS; SUBCUTANEOUS at 03:36

## 2019-01-05 RX ADMIN — SENNA PLUS 2 TABLET(S): 8.6 TABLET ORAL at 21:26

## 2019-01-05 RX ADMIN — HYDROMORPHONE HYDROCHLORIDE 4 MILLIGRAM(S): 2 INJECTION INTRAMUSCULAR; INTRAVENOUS; SUBCUTANEOUS at 14:42

## 2019-01-05 RX ADMIN — Medication 100 MILLIGRAM(S): at 05:51

## 2019-01-05 RX ADMIN — MIRTAZAPINE 15 MILLIGRAM(S): 45 TABLET, ORALLY DISINTEGRATING ORAL at 21:24

## 2019-01-05 RX ADMIN — AMIODARONE HYDROCHLORIDE 200 MILLIGRAM(S): 400 TABLET ORAL at 05:50

## 2019-01-05 RX ADMIN — MEMANTINE HYDROCHLORIDE 10 MILLIGRAM(S): 10 TABLET ORAL at 17:14

## 2019-01-05 RX ADMIN — HYDROMORPHONE HYDROCHLORIDE 4 MILLIGRAM(S): 2 INJECTION INTRAMUSCULAR; INTRAVENOUS; SUBCUTANEOUS at 10:12

## 2019-01-05 RX ADMIN — Medication 100 MILLIGRAM(S): at 14:43

## 2019-01-05 NOTE — CONSULT NOTE ADULT - SUBJECTIVE AND OBJECTIVE BOX
HPI:  72 y/o M with h/o Afib (on coumadin and s/p PPM), CKD, Chronic back and knee pain 2/2 arthritis s/p meniscal tears, mild memory deficits, CLL transformed to waldenstrom macroglobulinemia on ninlaro/dex, right ramus fracture after mechanical fall, went to [facility] and had RUE cellulitis and MRSA bacteremia on Vancomycin since 1/2.  Transferred to ED to r/o endocarditis. Pt denies fevers, chills, complaints at this time, no cough, chest pain, sob.  Improved erythema, swelling and right arm pain since abx.    Oncology History:  Initially diagnosed with CLL which evolved to WM in 10/2015. He was started on rituxan, dex, and velcade (10/2015-7/2017) however relapsed on this regimen. Then transitioned to ninlaro/dex/rituximab 10/2018. He has not tolerated rituxan infusions so is maintained on weekly dex/ninlaro. Denies any new bleeding, bruising, weight changes, fatigue, loss of appetite.     Allergies  No Known Allergies    Intolerances  rituximab (Rash)    MEDICATIONS  (STANDING):  allopurinol 300 milliGRAM(s) Oral daily  amiodarone    Tablet 200 milliGRAM(s) Oral daily  betamethasone valerate 0.1% Cream 1 Application(s) Topical daily  clonazePAM Tablet 1.5 milliGRAM(s) Oral at bedtime  docusate sodium 100 milliGRAM(s) Oral three times a day  donepezil 10 milliGRAM(s) Oral at bedtime  lactobacillus acidophilus 1 Tablet(s) Oral daily  levothyroxine 125 MICROGram(s) Oral daily  melatonin 5 milliGRAM(s) Oral at bedtime  memantine 10 milliGRAM(s) Oral every 12 hours  mirtazapine 15 milliGRAM(s) Oral at bedtime  multivitamin 1 Tablet(s) Oral daily  pantoprazole    Tablet 40 milliGRAM(s) Oral before breakfast  polyethylene glycol 3350 17 Gram(s) Oral daily  senna 2 Tablet(s) Oral at bedtime  tamsulosin 0.4 milliGRAM(s) Oral at bedtime  vancomycin  IVPB 1000 milliGRAM(s) IV Intermittent every 24 hours    MEDICATIONS  (PRN):  HYDROmorphone   Tablet 4 milliGRAM(s) Oral every 4 hours PRN Moderate and/or Severe Pain  HYDROmorphone   Tablet 2 milliGRAM(s) Oral every 4 hours PRN Mild Pain (1 - 3)      PAST MEDICAL & SURGICAL HISTORY:  Memory deficit  BPH (benign prostatic hyperplasia)  [place] (hard of hearing)  Gout  CKD (chronic kidney disease)  Nephrolithiasis  Hypothyroid  Bradycardia, drug induced  Waldenstrom macroglobulinemia  Diverticulitis  Atrial fibrillation  GERD (gastroesophageal reflux disease)  Anxiety disorder  CLL (chronic lymphocytic leukemia): in remission  History of cardiac pacemaker in situ  History of appendectomy  History of cataract surgery, right: IOL  History of laparoscopic cholecystectomy: 4/2014  S/P hernia repair: x2  Meniscus tear: s/p removal of Meniscus 8 months ago      FAMILY HISTORY:  No pertinent family history in first degree relatives      SOCIAL HISTORY: No EtOH, no tobacco    REVIEW OF SYSTEMS:  All other review of systems is negative unless indicated above.    T(F): 98.4 (01-05-19 @ 14:33), Max: 98.8 (01-04-19 @ 18:29)  HR: 63 (01-05-19 @ 14:33)  BP: 154/67 (01-05-19 @ 14:33)  RR: 18 (01-05-19 @ 14:33)  SpO2: 96% (01-05-19 @ 14:33)  Wt(kg): --    GENERAL: NAD, well-developed  HEAD:  Atraumatic, Normocephalic  EYES: EOMI, PERRLA, conjunctiva and sclera clear  NECK: Supple, No JVD  CHEST/LUNG: Clear to auscultation bilaterally; No wheeze  HEART: Regular rate and rhythm; No murmurs, rubs, or gallops  ABDOMEN: Soft, Nontender, Nondistended; Bowel sounds present  EXTREMITIES:  RUE mild erythema, improving swelling  NEUROLOGY: non-focal  SKIN: No rashes or lesions                          9.4    4.81  )-----------( 216      ( 05 Jan 2019 09:13 )             29.5       01-05    139  |  101  |  48<H>  ----------------------------<  174<H>  5.0   |  21<L>  |  2.65<H>    Ca    9.4      05 Jan 2019 07:22  Phos  3.0     01-04    TPro  6.3  /  Alb  3.7  /  TBili  0.4  /  DBili  x   /  AST  12  /  ALT  17  /  AlkPhos  92  01-04      Phosphorus Level, Serum: 3.0 mg/dL (01-04 @ 18:02)

## 2019-01-05 NOTE — PROGRESS NOTE ADULT - SUBJECTIVE AND OBJECTIVE BOX
NEPHROLOGY - NSN      Patient seen and examined. Pt laying in bed, reports feeling fine, offered no complaints.     MEDICATIONS  (STANDING):  allopurinol 300 milliGRAM(s) Oral daily  amiodarone    Tablet 200 milliGRAM(s) Oral daily  betamethasone valerate 0.1% Cream 1 Application(s) Topical daily  clonazePAM Tablet 1.5 milliGRAM(s) Oral at bedtime  docusate sodium 100 milliGRAM(s) Oral three times a day  donepezil 10 milliGRAM(s) Oral at bedtime  lactobacillus acidophilus 1 Tablet(s) Oral daily  levothyroxine 125 MICROGram(s) Oral daily  melatonin 5 milliGRAM(s) Oral at bedtime  memantine 10 milliGRAM(s) Oral every 12 hours  mirtazapine 15 milliGRAM(s) Oral at bedtime  multivitamin 1 Tablet(s) Oral daily  pantoprazole    Tablet 40 milliGRAM(s) Oral before breakfast  polyethylene glycol 3350 17 Gram(s) Oral daily  senna 2 Tablet(s) Oral at bedtime  tamsulosin 0.4 milliGRAM(s) Oral at bedtime  vancomycin  IVPB 1000 milliGRAM(s) IV Intermittent every 24 hours    VITALS:  T(C): , Max: 37.1 (19 @ 18:29)  T(F): , Max: 98.8 (19 @ 18:29)  HR: 59 (19 @ 05:46)  BP: 128/68 (19 @ 05:46)  RR: 18 (19 @ 05:46)  SpO2: 95% (19 @ 05:46)    I and O's:     07:  -   @ 07:00  --------------------------------------------------------  IN: 0 mL / OUT: 525 mL / NET: -525 mL     @ 07:  -   @ 13:50  --------------------------------------------------------  IN: 240 mL / OUT: 150 mL / NET: 90 mL      REVIEW OF SYSTEMS:  Full ROS done and were negative unless otherwise indicated in HPI/assessment.     PHYSICAL EXAM:  Constitutional: NAD  Respiratory: CTA B/L  Cardiovascular: S1 and S2, RRR  Gastrointestinal: + BS, soft, NT, ND  Extremities: No peripheral edema  Neurological: AAO x 3, CN 2-12 intact  : No Tom  Access: Not applicable    LABS:                        9.4    4.81  )-----------( 216      ( 2019 09:13 )             29.5         139  |  101  |  48<H>  ----------------------------<  174<H>  5.0   |  21<L>  |  2.65<H>    Ca    9.4      2019 07:22  Phos  3.0         TPro  6.3  /  Alb  3.7  /  TBili  0.4  /  DBili  x   /  AST  12  /  ALT  17  /  AlkPhos  92        Urine Studies:  Urinalysis Basic - ( 2019 10:34 )    Color: Yellow / Appearance: Clear / S.023 / pH: x  Gluc: x / Ketone: Negative  / Bili: Negative / Urobili: Negative   Blood: x / Protein: 30 mg/dL / Nitrite: Negative   Leuk Esterase: Small / RBC: 1 /hpf / WBC 7 /hpf   Sq Epi: x / Non Sq Epi: 1 /hpf / Bacteria: Negative  Creatinine, Random Urine: 130 mg/dL ( @ 10:35)

## 2019-01-05 NOTE — PROGRESS NOTE ADULT - ASSESSMENT
72 yo male h/o CLL, waldenstroms, afib s/p ppm on coumadin, ckd, recent admission for mechanical fall with pubic rami fx, now sent from rehab with bacteremia and RUE cellulitis    ID  bacteremia / cellulites with MRSA  vanco as per id  repeat blood cultures - ngtd  echo pending  RUE duplex noted    afib  AC with coumadin - on hold pending picc line  rate controlled    ckd  stable  renal f/u  avoid nephrotoxins    CLL  stable   heme onc f/u    pubic rami fx  no sx intervention  pain control  PT    cont other home meds

## 2019-01-05 NOTE — CONSULT NOTE ADULT - ASSESSMENT
72 y/o M with h/o Afib (on coumadin and s/p PPM), CKD, Chronic back and knee pain 2/2 arthritis s/p meniscal tears, mild memory deficits, CLL transformed to waldenstrom macroglobulinemia on ninlaro/dex, recent hip fx, nonsurgical tx, RUE cellulitis, MRSA bacteremia on Vanc transferred to ED for 2D echo to r/o endocarditis.    1) Pelvic Fracture- ortho following, conservative management for now  - continue pain control  - will need rehab/aggressive PT    2) Waldenstrom's Macroglobulinemia  - evolved from underlying CLL  - currently receiving ninlaro/dexamethasone D1, D8, D15, then one week off  - is due to receive next treatment this Friday 12/28; pt is to continue treatment  - Wife if going to  medicine from pharmacy; pt can continue from own meds if patient is still in hospital   - no need to repeat labs (SPEP, immunoglobulins done as outpatient)   - can f/u with Dr. Simons as outpatient 72 y/o M with h/o Afib (on coumadin and s/p PPM), CKD, Chronic back and knee pain 2/2 arthritis s/p meniscal tears, mild memory deficits, CLL transformed to waldenstrom macroglobulinemia on ninlaro/dex, recent hip fx, nonsurgical tx, RUE cellulitis, MRSA bacteremia on Vanc transferred to ED for 2D echo to r/o endocarditis.    1) MRSA bacteremia  -ID recs appreciated  -c/w vanc, fu repeat blood cx  -pending 2D echo to r/o endocarditis  -PICC based on length of abx    2) Pelvic Fracture- ortho following, conservative management   -continue pain control  -c/w rehab    3) Waldenstrom's Macroglobulinemia  -evolved from underlying CLL  -currently receiving ninlaro/dexamethasone D1, D8, D15, then one week off  -due to receive next treatment 1/4; pt is to continue treatment (discussed with primary oncologist)  -Wife if going to  medicine from pharmacy; pt can continue from own meds if patient is still in hospital   -no need to repeat labs (SPEP, immunoglobulins done as outpatient)   -can f/u with Dr. Simons as outpatient    Lesley Alarcon  Hematology Fellow  [phone]

## 2019-01-05 NOTE — PROGRESS NOTE ADULT - ASSESSMENT
ASSESSMENT:  74 yo M w/ PMH CLL and waldenstroms macroglobulinemia (on ninlaro and dexamethasone), gout, afib (with pacemaker, on coumadin), dementia who presents from [facility] rehab for MRSA + bacteremia    - CKD Stage IV with baseline serum creatinine of  2.6-2.8 mg/dl likely dt renovascular disease- at baseline   - Hypertension controlled  - Volume status- euvolemic  - Electrolytes controlled  - Anemia    PLAN:   - Urinalysis, Urine Protein, Urine Creatinine  - Iron studies  - Phosphorus  - Post void bladder scan  - Defer diuretics/ IVF  - Renal dosing of meds to creatinine clearance of 20-25 ml/min  - Avoid nephrotoxins as able    Marcella Bowden NP  [facility]  [phone] ASSESSMENT:  74 yo M w/ PMH CLL and waldenstroms macroglobulinemia (on ninlaro and dexamethasone), gout, afib (with pacemaker, on coumadin), dementia who presents from [facility] rehab for MRSA + bacteremia    - CKD Stage IV with baseline serum creatinine of  2.6-2.8 mg/dl likely dt renovascular disease- at baseline   - Hypertension controlled  - Volume status- euvolemic  - Electrolytes controlled  - Anemia-trend H/H    PLAN:   - CBC/ BMP  - Post void bladder scan  - Defer diuretics/ IVF  - Renal dosing of meds to creatinine clearance of 20-25 ml/min  - Avoid nephrotoxins as able    Marcella Bowden NP  [facility]  [phone]

## 2019-01-05 NOTE — CONSULT NOTE ADULT - ATTENDING COMMENTS
Patient interviewed/examined.  Agree with history, ROS, PE, A/P as above.    Patient took his Ninlaro in ER.      Willie Henry MD (Weekend Coverage)  [phone]

## 2019-01-05 NOTE — PROGRESS NOTE ADULT - SUBJECTIVE AND OBJECTIVE BOX
NICOLAS CIFUENTES  73y Male  MRN:[id number]    Patient is a 73y old  Male who presents with a chief complaint of positive blood cult (2019 16:15)    HPI:  74 yo M w/ PMH CLL and waldenstroms macroglobulinemia (on ninlaro and dexamethasone), gout, afib (with pacemaker, on coumadin), dementia who [facility] for pos blood culture. Pt states had a mechanical fall, was hospitalized with d/c on . At that time, pt states his right upper arm was red and swollen at prior IV site. On , he had a fever, and had bcx taken in rehab, found to have MRSA baceremia. Transferred to ED to r/o endocarditis. Pt denies complaints at this time, no cough, chest pain, sob.  pt did not have fever while in hospital last week.  also denies right arm swelling while in hospital   reports prior h/o cardiac murmur (2019 13:34)      Patient seen and evaluated at bedside. No acute events overnight except as noted.    Interval HPI: no events o/n    PAST MEDICAL & SURGICAL HISTORY:  Memory deficit  BPH (benign prostatic hyperplasia)  [place] (hard of hearing)  Gout  CKD (chronic kidney disease)  Nephrolithiasis  Hypothyroid  Bradycardia, drug induced  Waldenstrom macroglobulinemia  Diverticulitis  Atrial fibrillation  GERD (gastroesophageal reflux disease)  Anxiety disorder  CLL (chronic lymphocytic leukemia): in remission  History of cardiac pacemaker in situ  History of appendectomy  History of cataract surgery, right: IOL  History of laparoscopic cholecystectomy: 2014  S/P hernia repair: x2  Meniscus tear: s/p removal of Meniscus 8 months ago      REVIEW OF SYSTEMS:  as per hpi    VITALS:  Vital Signs Last 24 Hrs  T(C): 36.9 (2019 14:33), Max: 37.1 (2019 20:06)  T(F): 98.4 (2019 14:33), Max: 98.7 (2019 20:06)  HR: 63 (2019 14:33) (59 - 70)  BP: 154/67 (2019 14:33) (114/66 - 154/67)  BP(mean): --  RR: 18 (2019 14:33) (18 - 18)  SpO2: 96% (2019 14:33) (94% - 96%)  CAPILLARY BLOOD GLUCOSE        I&O's Summary    2019 07:  -  2019 07:00  --------------------------------------------------------  IN: 0 mL / OUT: 525 mL / NET: -525 mL    2019 07:  -  2019 18:49  --------------------------------------------------------  IN: 490 mL / OUT: 600 mL / NET: -110 mL        PHYSICAL EXAM:  GENERAL: NAD, well-developed  HEAD:  Atraumatic, Normocephalic  EYES: EOMI, PERRLA, conjunctiva and sclera clear  NECK: Supple, No JVD  CHEST/LUNG: Clear to auscultation bilaterally; No wheeze  HEART: S1, S2; +murmur  ABDOMEN: Soft, Nontender, Nondistended; Bowel sounds present  EXTREMITIES:  2+ Peripheral Pulses, No clubbing, cyanosis, or edema.  +RUE erythema   PSYCH: Normal affect  NEUROLOGY: AAOX3; non-focal  SKIN: No rashes or lesions    Consultant(s) Notes Reviewed:  [x ] YES  [ ] NO  Care Discussed with Consultants/Other Providers [ x] YES  [ ] NO    MEDS:  MEDICATIONS  (STANDING):  allopurinol 300 milliGRAM(s) Oral daily  amiodarone    Tablet 200 milliGRAM(s) Oral daily  betamethasone valerate 0.1% Cream 1 Application(s) Topical daily  clonazePAM Tablet 1.5 milliGRAM(s) Oral at bedtime  docusate sodium 100 milliGRAM(s) Oral three times a day  donepezil 10 milliGRAM(s) Oral at bedtime  lactobacillus acidophilus 1 Tablet(s) Oral daily  levothyroxine 125 MICROGram(s) Oral daily  melatonin 5 milliGRAM(s) Oral at bedtime  memantine 10 milliGRAM(s) Oral every 12 hours  mirtazapine 15 milliGRAM(s) Oral at bedtime  multivitamin 1 Tablet(s) Oral daily  pantoprazole    Tablet 40 milliGRAM(s) Oral before breakfast  polyethylene glycol 3350 17 Gram(s) Oral daily  senna 2 Tablet(s) Oral at bedtime  tamsulosin 0.4 milliGRAM(s) Oral at bedtime  vancomycin  IVPB 1000 milliGRAM(s) IV Intermittent every 24 hours    MEDICATIONS  (PRN):  HYDROmorphone   Tablet 4 milliGRAM(s) Oral every 4 hours PRN Moderate and/or Severe Pain  HYDROmorphone   Tablet 2 milliGRAM(s) Oral every 4 hours PRN Mild Pain (1 - 3)    ALLERGIES:  No Known Allergies  rituximab (Rash)      LABS:                        9.4    4.81  )-----------( 216      ( 2019 09:13 )             29.5     -    139  |  101  |  48<H>  ----------------------------<  174<H>  5.0   |  21<L>  |  2.65<H>    Ca    9.4      2019 07:22  Phos  3.0     -    TPro  6.3  /  Alb  3.7  /  TBili  0.4  /  DBili  x   /  AST  12  /  ALT  17  /  AlkPhos  92  -    PT/INR - ( 2019 14:24 )   PT: 20.4 sec;   INR: 1.76 ratio               LIVER FUNCTIONS - ( 2019 13:17 )  Alb: 3.7 g/dL / Pro: 6.3 g/dL / ALK PHOS: 92 U/L / ALT: 17 U/L / AST: 12 U/L / GGT: x           Urinalysis Basic - ( 2019 10:34 )    Color: Yellow / Appearance: Clear / S.023 / pH: x  Gluc: x / Ketone: Negative  / Bili: Negative / Urobili: Negative   Blood: x / Protein: 30 mg/dL / Nitrite: Negative   Leuk Esterase: Small / RBC: 1 /hpf / WBC 7 /hpf   Sq Epi: x / Non Sq Epi: 1 /hpf / Bacteria: Negative       Culture - Blood (19 @ 15:13)    Specimen Source: .Blood Blood-Peripheral    Culture Results:   No growth to date.    < from: [facility] Duplex Upper Ext Vein Scan, Right (19 @ 17:09) >  IMPRESSION:     No evidence of right upper extremity deep venous thrombosis.    Superficial vein thrombosis of the right median cubital vein.    < end of copied text >

## 2019-01-06 LAB
ANION GAP SERPL CALC-SCNC: 13 MMOL/L — SIGNIFICANT CHANGE UP (ref 5–17)
BASOPHILS # BLD AUTO: 0.01 K/UL — SIGNIFICANT CHANGE UP (ref 0–0.2)
BASOPHILS NFR BLD AUTO: 0.1 % — SIGNIFICANT CHANGE UP (ref 0–2)
BUN SERPL-MCNC: 57 MG/DL — HIGH (ref 7–23)
CALCIUM SERPL-MCNC: 9.3 MG/DL — SIGNIFICANT CHANGE UP (ref 8.4–10.5)
CHLORIDE SERPL-SCNC: 104 MMOL/L — SIGNIFICANT CHANGE UP (ref 96–108)
CO2 SERPL-SCNC: 22 MMOL/L — SIGNIFICANT CHANGE UP (ref 22–31)
CREAT SERPL-MCNC: 2.88 MG/DL — HIGH (ref 0.5–1.3)
EOSINOPHIL # BLD AUTO: 0.04 K/UL — SIGNIFICANT CHANGE UP (ref 0–0.5)
EOSINOPHIL NFR BLD AUTO: 0.5 % — SIGNIFICANT CHANGE UP (ref 0–6)
GLUCOSE SERPL-MCNC: 140 MG/DL — HIGH (ref 70–99)
HCT VFR BLD CALC: 28.9 % — LOW (ref 39–50)
HGB BLD-MCNC: 9.2 G/DL — LOW (ref 13–17)
IMM GRANULOCYTES NFR BLD AUTO: 0.7 % — SIGNIFICANT CHANGE UP (ref 0–1.5)
LYMPHOCYTES # BLD AUTO: 2.37 K/UL — SIGNIFICANT CHANGE UP (ref 1–3.3)
LYMPHOCYTES # BLD AUTO: 31.6 % — SIGNIFICANT CHANGE UP (ref 13–44)
MCHC RBC-ENTMCNC: 30.9 PG — SIGNIFICANT CHANGE UP (ref 27–34)
MCHC RBC-ENTMCNC: 31.8 GM/DL — LOW (ref 32–36)
MCV RBC AUTO: 97 FL — SIGNIFICANT CHANGE UP (ref 80–100)
MONOCYTES # BLD AUTO: 0.67 K/UL — SIGNIFICANT CHANGE UP (ref 0–0.9)
MONOCYTES NFR BLD AUTO: 8.9 % — SIGNIFICANT CHANGE UP (ref 2–14)
NEUTROPHILS # BLD AUTO: 4.36 K/UL — SIGNIFICANT CHANGE UP (ref 1.8–7.4)
NEUTROPHILS NFR BLD AUTO: 58.2 % — SIGNIFICANT CHANGE UP (ref 43–77)
PLATELET # BLD AUTO: 189 K/UL — SIGNIFICANT CHANGE UP (ref 150–400)
POTASSIUM SERPL-MCNC: 4.2 MMOL/L — SIGNIFICANT CHANGE UP (ref 3.5–5.3)
POTASSIUM SERPL-SCNC: 4.2 MMOL/L — SIGNIFICANT CHANGE UP (ref 3.5–5.3)
RBC # BLD: 2.98 M/UL — LOW (ref 4.2–5.8)
RBC # FLD: 14.4 % — SIGNIFICANT CHANGE UP (ref 10.3–14.5)
SODIUM SERPL-SCNC: 139 MMOL/L — SIGNIFICANT CHANGE UP (ref 135–145)
VANCOMYCIN FLD-MCNC: 18.8 UG/ML — SIGNIFICANT CHANGE UP
WBC # BLD: 7.5 K/UL — SIGNIFICANT CHANGE UP (ref 3.8–10.5)
WBC # FLD AUTO: 7.5 K/UL — SIGNIFICANT CHANGE UP (ref 3.8–10.5)

## 2019-01-06 PROCEDURE — 99232 SBSQ HOSP IP/OBS MODERATE 35: CPT

## 2019-01-06 RX ADMIN — MEMANTINE HYDROCHLORIDE 10 MILLIGRAM(S): 10 TABLET ORAL at 17:02

## 2019-01-06 RX ADMIN — Medication 1.5 MILLIGRAM(S): at 21:20

## 2019-01-06 RX ADMIN — Medication 250 MILLIGRAM(S): at 17:02

## 2019-01-06 RX ADMIN — DONEPEZIL HYDROCHLORIDE 10 MILLIGRAM(S): 10 TABLET, FILM COATED ORAL at 21:21

## 2019-01-06 RX ADMIN — POLYETHYLENE GLYCOL 3350 17 GRAM(S): 17 POWDER, FOR SOLUTION ORAL at 12:49

## 2019-01-06 RX ADMIN — Medication 100 MILLIGRAM(S): at 21:20

## 2019-01-06 RX ADMIN — Medication 300 MILLIGRAM(S): at 12:32

## 2019-01-06 RX ADMIN — HYDROMORPHONE HYDROCHLORIDE 2 MILLIGRAM(S): 2 INJECTION INTRAMUSCULAR; INTRAVENOUS; SUBCUTANEOUS at 23:10

## 2019-01-06 RX ADMIN — Medication 1 TABLET(S): at 12:32

## 2019-01-06 RX ADMIN — HYDROMORPHONE HYDROCHLORIDE 4 MILLIGRAM(S): 2 INJECTION INTRAMUSCULAR; INTRAVENOUS; SUBCUTANEOUS at 12:37

## 2019-01-06 RX ADMIN — HYDROMORPHONE HYDROCHLORIDE 2 MILLIGRAM(S): 2 INJECTION INTRAMUSCULAR; INTRAVENOUS; SUBCUTANEOUS at 22:53

## 2019-01-06 RX ADMIN — HYDROMORPHONE HYDROCHLORIDE 4 MILLIGRAM(S): 2 INJECTION INTRAMUSCULAR; INTRAVENOUS; SUBCUTANEOUS at 19:30

## 2019-01-06 RX ADMIN — PANTOPRAZOLE SODIUM 40 MILLIGRAM(S): 20 TABLET, DELAYED RELEASE ORAL at 05:13

## 2019-01-06 RX ADMIN — HYDROMORPHONE HYDROCHLORIDE 4 MILLIGRAM(S): 2 INJECTION INTRAMUSCULAR; INTRAVENOUS; SUBCUTANEOUS at 00:10

## 2019-01-06 RX ADMIN — HYDROMORPHONE HYDROCHLORIDE 4 MILLIGRAM(S): 2 INJECTION INTRAMUSCULAR; INTRAVENOUS; SUBCUTANEOUS at 06:07

## 2019-01-06 RX ADMIN — Medication 100 MILLIGRAM(S): at 05:07

## 2019-01-06 RX ADMIN — Medication 125 MICROGRAM(S): at 05:07

## 2019-01-06 RX ADMIN — HYDROMORPHONE HYDROCHLORIDE 4 MILLIGRAM(S): 2 INJECTION INTRAMUSCULAR; INTRAVENOUS; SUBCUTANEOUS at 01:10

## 2019-01-06 RX ADMIN — HYDROMORPHONE HYDROCHLORIDE 4 MILLIGRAM(S): 2 INJECTION INTRAMUSCULAR; INTRAVENOUS; SUBCUTANEOUS at 13:40

## 2019-01-06 RX ADMIN — Medication 100 MILLIGRAM(S): at 14:15

## 2019-01-06 RX ADMIN — AMIODARONE HYDROCHLORIDE 200 MILLIGRAM(S): 400 TABLET ORAL at 05:07

## 2019-01-06 RX ADMIN — TAMSULOSIN HYDROCHLORIDE 0.4 MILLIGRAM(S): 0.4 CAPSULE ORAL at 21:20

## 2019-01-06 RX ADMIN — MEMANTINE HYDROCHLORIDE 10 MILLIGRAM(S): 10 TABLET ORAL at 05:08

## 2019-01-06 RX ADMIN — HYDROMORPHONE HYDROCHLORIDE 4 MILLIGRAM(S): 2 INJECTION INTRAMUSCULAR; INTRAVENOUS; SUBCUTANEOUS at 05:07

## 2019-01-06 RX ADMIN — Medication 1 APPLICATION(S): at 12:33

## 2019-01-06 RX ADMIN — HYDROMORPHONE HYDROCHLORIDE 4 MILLIGRAM(S): 2 INJECTION INTRAMUSCULAR; INTRAVENOUS; SUBCUTANEOUS at 18:25

## 2019-01-06 RX ADMIN — Medication 5 MILLIGRAM(S): at 21:20

## 2019-01-06 RX ADMIN — MIRTAZAPINE 15 MILLIGRAM(S): 45 TABLET, ORALLY DISINTEGRATING ORAL at 21:21

## 2019-01-06 NOTE — PROGRESS NOTE ADULT - SUBJECTIVE AND OBJECTIVE BOX
CC: F/U for MRSA    Saw/spoke to patient. Patient well. No new complaints. Decreasing pain at RUE.    Allergies  No Known Allergies    ANTIMICROBIALS:  vancomycin  IVPB 1000 every 24 hours    PE:    Vital Signs Last 24 Hrs  T(C): 36.7 (2019 05:06), Max: 37.1 (2019 21:19)  T(F): 98 (2019 05:06), Max: 98.8 (2019 21:19)  HR: 53 (2019 05:06) (53 - 65)  BP: 131/70 (2019 05:06) (114/58 - 154/67)  RR: 18 (2019 05:06) (18 - 18)  SpO2: 95% (2019 05:06) (94% - 96%)    Gen: AOx3, NAD, non-toxic  CV: S1+S2 normal, nontachycardic  Resp: Clear bilat, no resp distress, no crackles/wheezes  Abd: Soft, nontender, +BS  Ext: RUE still palpable thrombosis, but no inflammatory component--no erythema, no purulence (improving)    LABS:                        9.2    7.50  )-----------( 189      ( 2019 08:22 )             28.9         139  |  104  |  57<H>  ----------------------------<  140<H>  4.2   |  22  |  2.88<H>    Ca    9.3      2019 06:43  Phos  3.0         TPro  6.3  /  Alb  3.7  /  TBili  0.4  /  DBili  x   /  AST  12  /  ALT  17  /  AlkPhos  92      Urinalysis Basic - ( 2019 10:34 )    Color: Yellow / Appearance: Clear / S.023 / pH: x  Gluc: x / Ketone: Negative  / Bili: Negative / Urobili: Negative   Blood: x / Protein: 30 mg/dL / Nitrite: Negative   Leuk Esterase: Small / RBC: 1 /hpf / WBC 7 /hpf   Sq Epi: x / Non Sq Epi: 1 /hpf / Bacteria: Negative    MICROBIOLOGY:  Vancomycin Level, Random: 18.8 ug/mL (19 @ 06:43)    .Blood Blood-Peripheral  19   No growth to date.     .Urine CL CATCH MDSTRM  19   <10,000 CFU/ml Normal Urogenital libby present     .Blood PERCUTANEOUS (BLOOD)  18   Growth in aerobic and anaerobic bottles: Methicillin resistant  Staphylococcus aureus    .Urine Clean Catch (Midstream)  18   <10,000 CFU/ml  Normal Urogenital libby present     (otherwise reviewed)    RADIOLOGY:     USG:    IMPRESSION:     No evidence of right upper extremity deep venous thrombosis.    Superficial vein thrombosis of the right median cubital vein.

## 2019-01-06 NOTE — PHYSICAL THERAPY INITIAL EVALUATION ADULT - BALANCE TRAINING, PT EVAL
GOAL: Pt will improve  balance during (static/dynamic) (sitting/standing) activities by at least 1 balance grade within 3-4 weeks to assist with greater independence during functional mobility and ADL's.

## 2019-01-06 NOTE — PHYSICAL THERAPY INITIAL EVALUATION ADULT - PERTINENT HX OF CURRENT PROBLEM, REHAB EVAL
72 y/o male had a mechanical fall, with pubic rami fx was hospitalized with d/c on saturday 12/19. At that time, his right upper arm was red and swollen at prior IV site. On Sunday, he had a fever, and had bcx taken in rehab, found to have MRSA baceremia. Transferred to ED to r/o endocarditis.

## 2019-01-06 NOTE — PROVIDER CONTACT NOTE (OTHER) - BACKGROUND
Pt 73yoM,admit for MRSA bacteremia, w/ PMH of pelvic fracture s/p fall, CLL and waldenstroms macroglobulinemia, gout, afib (PPM,coumadin),CKD,dementia.

## 2019-01-06 NOTE — PROGRESS NOTE ADULT - SUBJECTIVE AND OBJECTIVE BOX
NICOLAS CIFUENTES  73y Male  MRN:[id number]    Patient is a 73y old  Male who presents with a chief complaint of positive blood cult (05 Jan 2019 16:15)    HPI:  72 yo M w/ PMH CLL and waldenstroms macroglobulinemia (on ninlaro and dexamethasone), gout, afib (with pacemaker, on coumadin), dementia who [facility]. Pt states had a mechanical fall, was hospitalized with d/c on saturday 12/19. At that time, pt states his right upper arm was red and swollen at prior IV site. On Sunday, he had a fever, and had bcx taken in rehab, found to have MRSA baceremia. Transferred to ED to r/o endocarditis. Pt denies complaints at this time, no cough, chest pain, sob.  pt did not have fever while in hospital last week.  also denies right arm swelling while in hospital   reports prior h/o cardiac murmur (04 Jan 2019 13:34)      Patient seen and evaluated at bedside. No acute events overnight except as noted.    Interval HPI: no events o/n    PAST MEDICAL & SURGICAL HISTORY:  Memory deficit  BPH (benign prostatic hyperplasia)  [place] (hard of hearing)  Gout  CKD (chronic kidney disease)  Nephrolithiasis  Hypothyroid  Bradycardia, drug induced  Waldenstrom macroglobulinemia  Diverticulitis  Atrial fibrillation  GERD (gastroesophageal reflux disease)  Anxiety disorder  CLL (chronic lymphocytic leukemia): in remission  History of cardiac pacemaker in situ  History of appendectomy  History of cataract surgery, right: IOL  History of laparoscopic cholecystectomy: 4/2014  S/P hernia repair: x2  Meniscus tear: s/p removal of Meniscus 8 months ago      REVIEW OF SYSTEMS:  as per hpi    VITALS:  Vital Signs Last 24 Hrs  T(C): 36.9 (06 Jan 2019 14:29), Max: 37.1 (05 Jan 2019 21:19)  T(F): 98.4 (06 Jan 2019 14:29), Max: 98.8 (05 Jan 2019 21:19)  HR: 59 (06 Jan 2019 14:29) (53 - 65)  BP: 144/70 (06 Jan 2019 14:29) (114/58 - 157/70)  BP(mean): --  RR: 18 (06 Jan 2019 14:29) (18 - 18)  SpO2: 96% (06 Jan 2019 14:29) (94% - 96%)        PHYSICAL EXAM:  GENERAL: NAD, well-developed  HEAD:  Atraumatic, Normocephalic  EYES: EOMI, PERRLA, conjunctiva and sclera clear  NECK: Supple, No JVD  CHEST/LUNG: Clear to auscultation bilaterally; No wheeze  HEART: S1, S2; +murmur  ABDOMEN: Soft, Nontender, Nondistended; Bowel sounds present  EXTREMITIES:  2+ Peripheral Pulses, No clubbing, cyanosis, or edema.  +RUE erythema   PSYCH: Normal affect  NEUROLOGY: AAOX3; non-focal  SKIN: No rashes or lesions    Consultant(s) Notes Reviewed:  [x ] YES  [ ] NO  Care Discussed with Consultants/Other Providers [ x] YES  [ ] NO    MEDS:  MEDICATIONS  (STANDING):  allopurinol 300 milliGRAM(s) Oral daily  amiodarone    Tablet 200 milliGRAM(s) Oral daily  betamethasone valerate 0.1% Cream 1 Application(s) Topical daily  clonazePAM Tablet 1.5 milliGRAM(s) Oral at bedtime  docusate sodium 100 milliGRAM(s) Oral three times a day  donepezil 10 milliGRAM(s) Oral at bedtime  lactobacillus acidophilus 1 Tablet(s) Oral daily  levothyroxine 125 MICROGram(s) Oral daily  melatonin 5 milliGRAM(s) Oral at bedtime  memantine 10 milliGRAM(s) Oral every 12 hours  mirtazapine 15 milliGRAM(s) Oral at bedtime  multivitamin 1 Tablet(s) Oral daily  pantoprazole    Tablet 40 milliGRAM(s) Oral before breakfast  polyethylene glycol 3350 17 Gram(s) Oral daily  senna 2 Tablet(s) Oral at bedtime  tamsulosin 0.4 milliGRAM(s) Oral at bedtime  vancomycin  IVPB 1000 milliGRAM(s) IV Intermittent every 24 hours    MEDICATIONS  (PRN):  HYDROmorphone   Tablet 4 milliGRAM(s) Oral every 4 hours PRN Moderate and/or Severe Pain  HYDROmorphone   Tablet 2 milliGRAM(s) Oral every 4 hours PRN Mild Pain (1 - 3)      ALLERGIES:  No Known Allergies  rituximab (Rash)      LABS:                                           9.2    7.50  )-----------( 189      ( 06 Jan 2019 08:22 )             28.9   01-06    139  |  104  |  57<H>  ----------------------------<  140<H>  4.2   |  22  |  2.88<H>    Ca    9.3      06 Jan 2019 06:43  Phos  3.0     01-04         Culture - Blood (01.04.19 @ 15:13)    Specimen Source: .Blood Blood-Peripheral    Culture Results:   No growth to date.    < from: VA Duplex Upper Ext Vein Scan, Right (01.04.19 @ 17:09) >  IMPRESSION:     No evidence of right upper extremity deep venous thrombosis.    Superficial vein thrombosis of the right median cubital vein.    < end of copied text >

## 2019-01-06 NOTE — PHYSICAL THERAPY INITIAL EVALUATION ADULT - ADDITIONAL COMMENTS
Pt. lives in apt. with wife,18 steps to get in. Patient ambulated without AD independent. Pt. lives in apt. with wife,18 steps to get in. Patient ambulated without AD independent. Currently admitted from rehab.

## 2019-01-06 NOTE — PROVIDER CONTACT NOTE (OTHER) - ASSESSMENT
Pt A&Ox4,no OOB this shift,w/ VS as charted. Pt c/o R-hip pain,given oxycodone 4mg PO as ordered for partial relief. Pt on vancomycin Q24hr,vancomycin level random drawn w/ AM labs.

## 2019-01-06 NOTE — PROGRESS NOTE ADULT - ASSESSMENT
72 yo M w/ PMH CLL and waldenstroms macroglobulinemia (on ninlaro and dexamethasone), gout, afib (with pacemaker, on amio and coumadin), CKD, dementia who presents from [facility] rehab for pos blood culture.  Recent hospitalization, developed RUE thrombophlebitis  At rehab facility developed BCX with MRSA bacteremia in the setting of fever/leukocytosis  Given PO antibiotic, then changed to Vancomycin--now sent to [facility] for further eval  Likely MRSA bacteremia 2/2 thrombophlebitis; clear as of 1/4  Well appearing, no fevers, no leukocytosis  Overall, MRSA bacteremia, thrombophlebitis, fever, leukocytosis, sepsis  - Continue Vanco, monitor levels  - F/U pending repeat BCXs--NGTD  - Check TTE  - Anticipate will need long term IV abx, but await neg BCX prior to consideration for PICC    Prosper Mejia MD  Pager [phone]  After 5pm and on weekends call [phone]

## 2019-01-06 NOTE — PROVIDER CONTACT NOTE (OTHER) - ACTION/TREATMENT ORDERED:
No need to do vancomycin trough pre-3rd dose since vancomycin level random drawn this morning. May order vancomycin trough for tomorrow.

## 2019-01-07 LAB
ANION GAP SERPL CALC-SCNC: 13 MMOL/L — SIGNIFICANT CHANGE UP (ref 5–17)
BASOPHILS # BLD AUTO: 0.02 K/UL — SIGNIFICANT CHANGE UP (ref 0–0.2)
BASOPHILS NFR BLD AUTO: 0.3 % — SIGNIFICANT CHANGE UP (ref 0–2)
BUN SERPL-MCNC: 55 MG/DL — HIGH (ref 7–23)
CALCIUM SERPL-MCNC: 9.1 MG/DL — SIGNIFICANT CHANGE UP (ref 8.4–10.5)
CHLORIDE SERPL-SCNC: 102 MMOL/L — SIGNIFICANT CHANGE UP (ref 96–108)
CO2 SERPL-SCNC: 23 MMOL/L — SIGNIFICANT CHANGE UP (ref 22–31)
CREAT SERPL-MCNC: 2.5 MG/DL — HIGH (ref 0.5–1.3)
EOSINOPHIL # BLD AUTO: 0.13 K/UL — SIGNIFICANT CHANGE UP (ref 0–0.5)
EOSINOPHIL NFR BLD AUTO: 1.7 % — SIGNIFICANT CHANGE UP (ref 0–6)
GLUCOSE SERPL-MCNC: 110 MG/DL — HIGH (ref 70–99)
HCT VFR BLD CALC: 30.2 % — LOW (ref 39–50)
HGB BLD-MCNC: 9.4 G/DL — LOW (ref 13–17)
IMM GRANULOCYTES NFR BLD AUTO: 1.4 % — SIGNIFICANT CHANGE UP (ref 0–1.5)
INR BLD: 1.9 RATIO — HIGH (ref 0.88–1.16)
LYMPHOCYTES # BLD AUTO: 2.43 K/UL — SIGNIFICANT CHANGE UP (ref 1–3.3)
LYMPHOCYTES # BLD AUTO: 30.9 % — SIGNIFICANT CHANGE UP (ref 13–44)
MCHC RBC-ENTMCNC: 30.4 PG — SIGNIFICANT CHANGE UP (ref 27–34)
MCHC RBC-ENTMCNC: 31.1 GM/DL — LOW (ref 32–36)
MCV RBC AUTO: 97.7 FL — SIGNIFICANT CHANGE UP (ref 80–100)
MONOCYTES # BLD AUTO: 0.8 K/UL — SIGNIFICANT CHANGE UP (ref 0–0.9)
MONOCYTES NFR BLD AUTO: 10.2 % — SIGNIFICANT CHANGE UP (ref 2–14)
NEUTROPHILS # BLD AUTO: 4.37 K/UL — SIGNIFICANT CHANGE UP (ref 1.8–7.4)
NEUTROPHILS NFR BLD AUTO: 55.5 % — SIGNIFICANT CHANGE UP (ref 43–77)
PLATELET # BLD AUTO: 187 K/UL — SIGNIFICANT CHANGE UP (ref 150–400)
POTASSIUM SERPL-MCNC: 4.2 MMOL/L — SIGNIFICANT CHANGE UP (ref 3.5–5.3)
POTASSIUM SERPL-SCNC: 4.2 MMOL/L — SIGNIFICANT CHANGE UP (ref 3.5–5.3)
PROTHROM AB SERPL-ACNC: 21.7 SEC — HIGH (ref 10–13.1)
RBC # BLD: 3.09 M/UL — LOW (ref 4.2–5.8)
RBC # FLD: 14.6 % — HIGH (ref 10.3–14.5)
SODIUM SERPL-SCNC: 138 MMOL/L — SIGNIFICANT CHANGE UP (ref 135–145)
VANCOMYCIN TROUGH SERPL-MCNC: 18 UG/ML — SIGNIFICANT CHANGE UP (ref 10–20)
WBC # BLD: 7.86 K/UL — SIGNIFICANT CHANGE UP (ref 3.8–10.5)
WBC # FLD AUTO: 7.86 K/UL — SIGNIFICANT CHANGE UP (ref 3.8–10.5)

## 2019-01-07 PROCEDURE — 93306 TTE W/DOPPLER COMPLETE: CPT | Mod: 26

## 2019-01-07 PROCEDURE — 99232 SBSQ HOSP IP/OBS MODERATE 35: CPT

## 2019-01-07 RX ORDER — MIRTAZAPINE 45 MG/1
30 TABLET, ORALLY DISINTEGRATING ORAL AT BEDTIME
Qty: 0 | Refills: 0 | Status: DISCONTINUED | OUTPATIENT
Start: 2019-01-07 | End: 2019-01-10

## 2019-01-07 RX ORDER — ACETAMINOPHEN 500 MG
1000 TABLET ORAL ONCE
Qty: 0 | Refills: 0 | Status: DISCONTINUED | OUTPATIENT
Start: 2019-01-07 | End: 2019-01-07

## 2019-01-07 RX ADMIN — PANTOPRAZOLE SODIUM 40 MILLIGRAM(S): 20 TABLET, DELAYED RELEASE ORAL at 05:49

## 2019-01-07 RX ADMIN — Medication 1 TABLET(S): at 12:46

## 2019-01-07 RX ADMIN — DONEPEZIL HYDROCHLORIDE 10 MILLIGRAM(S): 10 TABLET, FILM COATED ORAL at 21:45

## 2019-01-07 RX ADMIN — SENNA PLUS 2 TABLET(S): 8.6 TABLET ORAL at 21:44

## 2019-01-07 RX ADMIN — HYDROMORPHONE HYDROCHLORIDE 4 MILLIGRAM(S): 2 INJECTION INTRAMUSCULAR; INTRAVENOUS; SUBCUTANEOUS at 02:32

## 2019-01-07 RX ADMIN — Medication 1.5 MILLIGRAM(S): at 21:44

## 2019-01-07 RX ADMIN — Medication 250 MILLIGRAM(S): at 18:45

## 2019-01-07 RX ADMIN — Medication 100 MILLIGRAM(S): at 05:49

## 2019-01-07 RX ADMIN — AMIODARONE HYDROCHLORIDE 200 MILLIGRAM(S): 400 TABLET ORAL at 05:49

## 2019-01-07 RX ADMIN — POLYETHYLENE GLYCOL 3350 17 GRAM(S): 17 POWDER, FOR SOLUTION ORAL at 12:47

## 2019-01-07 RX ADMIN — HYDROMORPHONE HYDROCHLORIDE 4 MILLIGRAM(S): 2 INJECTION INTRAMUSCULAR; INTRAVENOUS; SUBCUTANEOUS at 22:51

## 2019-01-07 RX ADMIN — TAMSULOSIN HYDROCHLORIDE 0.4 MILLIGRAM(S): 0.4 CAPSULE ORAL at 21:45

## 2019-01-07 RX ADMIN — MIRTAZAPINE 30 MILLIGRAM(S): 45 TABLET, ORALLY DISINTEGRATING ORAL at 21:45

## 2019-01-07 RX ADMIN — HYDROMORPHONE HYDROCHLORIDE 4 MILLIGRAM(S): 2 INJECTION INTRAMUSCULAR; INTRAVENOUS; SUBCUTANEOUS at 10:10

## 2019-01-07 RX ADMIN — HYDROMORPHONE HYDROCHLORIDE 4 MILLIGRAM(S): 2 INJECTION INTRAMUSCULAR; INTRAVENOUS; SUBCUTANEOUS at 18:07

## 2019-01-07 RX ADMIN — HYDROMORPHONE HYDROCHLORIDE 4 MILLIGRAM(S): 2 INJECTION INTRAMUSCULAR; INTRAVENOUS; SUBCUTANEOUS at 10:40

## 2019-01-07 RX ADMIN — Medication 5 MILLIGRAM(S): at 21:44

## 2019-01-07 RX ADMIN — Medication 300 MILLIGRAM(S): at 12:47

## 2019-01-07 RX ADMIN — Medication 1 APPLICATION(S): at 12:47

## 2019-01-07 RX ADMIN — HYDROMORPHONE HYDROCHLORIDE 4 MILLIGRAM(S): 2 INJECTION INTRAMUSCULAR; INTRAVENOUS; SUBCUTANEOUS at 21:45

## 2019-01-07 RX ADMIN — MEMANTINE HYDROCHLORIDE 10 MILLIGRAM(S): 10 TABLET ORAL at 05:49

## 2019-01-07 RX ADMIN — MEMANTINE HYDROCHLORIDE 10 MILLIGRAM(S): 10 TABLET ORAL at 17:38

## 2019-01-07 RX ADMIN — Medication 100 MILLIGRAM(S): at 21:44

## 2019-01-07 RX ADMIN — HYDROMORPHONE HYDROCHLORIDE 4 MILLIGRAM(S): 2 INJECTION INTRAMUSCULAR; INTRAVENOUS; SUBCUTANEOUS at 17:37

## 2019-01-07 RX ADMIN — HYDROMORPHONE HYDROCHLORIDE 4 MILLIGRAM(S): 2 INJECTION INTRAMUSCULAR; INTRAVENOUS; SUBCUTANEOUS at 01:32

## 2019-01-07 RX ADMIN — Medication 125 MICROGRAM(S): at 05:49

## 2019-01-07 NOTE — PROGRESS NOTE ADULT - ASSESSMENT
72 yo male h/o CLL, waldenstroms, afib s/p ppm on coumadin, ckd, recent admission for mechanical fall with pubic rami fx, now sent from rehab with bacteremia and RUE cellulitis    ID  bacteremia / cellulites with MRSA  vanco as per id  repeat blood cultures - ngtd  echo pending  RUE duplex noted  will need picc line for long term abx     afib  AC with coumadin - on hold pending picc line  rate controlled    ckd  stable  renal f/u  avoid nephrotoxins    CLL  stable   heme onc f/u    pubic rami fx  no sx intervention  pain control  PT    cont other home meds

## 2019-01-07 NOTE — PROGRESS NOTE ADULT - ASSESSMENT
72 yo M w/ PMH CLL and waldenstroms macroglobulinemia (on ninlaro and dexamethasone), gout, afib (with pacemaker, on amio and coumadin), CKD, dementia who presents from [facility] rehab for pos blood culture.  Recent hospitalization, developed RUE thrombophlebitis  At rehab facility developed BCX with MRSA bacteremia in the setting of fever/leukocytosis  Given PO antibiotic, then changed to Vancomycin--now sent to [facility] for further eval  Likely MRSA bacteremia 2/2 thrombophlebitis; clear as of 1/4  Well appearing, no fevers, no leukocytosis  TTE to rule out large veg or signs valvular failure  Overall, MRSA bacteremia, thrombophlebitis, fever, leukocytosis, sepsis  - Continue Vanco, monitor levels  - F/U pending repeat BCXs--NGTD  - F/U TTE  - Anticipate will need PICC line for prolonged course IV abx    Prosper Mejia MD  Pager [phone]  After 5pm and on weekends call [phone]

## 2019-01-07 NOTE — PROGRESS NOTE ADULT - SUBJECTIVE AND OBJECTIVE BOX
No pain, no shortness of breath      VITAL:  T(C): , Max: 37.2 (19 @ 21:20)  T(F): , Max: 98.9 (19 @ 21:20)  HR: 60 (19 @ 05:45)  BP: 145/71 (19 @ 05:45)  RR: 17 (19 @ 05:45)  SpO2: 97% (19 @ 05:45)      PHYSICAL EXAM:  Constitutional: NAD; Alert  HEENT:  NCAT; DMM  Neck: No JVD; supple  Respiratory: CTA-b/l  Cardiac: RRR s1s2  Gastrointestinal: BS+, soft, NT/ND  Urologic: No walker  Extremities: No peripheral edema  Back: No CVAT b/l    LABS:                        9.4    7.86  )-----------( 187      ( 2019 08:02 )             30.2     Na(138)/K(4.2)/Cl(102)/HCO3(23)/BUN(55)/Cr(2.50)Glu(110)/Ca(9.1)/Mg(--)/PO4(--)     @ 07:23  Na(139)/K(4.2)/Cl(104)/HCO3(22)/BUN(57)/Cr(2.88)Glu(140)/Ca(9.3)/Mg(--)/PO4(--)     @ 06:43  Na(139)/K(5.0)/Cl(101)/HCO3(21)/BUN(48)/Cr(2.65)Glu(174)/Ca(9.4)/Mg(--)/PO4(--)     @ 07:22  Na(--)/K(--)/Cl(--)/HCO3(--)/BUN(--)/Cr(--)Glu(--)/Ca(--)/Mg(--)/PO4(3.0)     @ 18:02  Na(138)/K(4.7)/Cl(103)/HCO3(24)/BUN(35)/Cr(2.62)Glu(101)/Ca(9.3)/Mg(--)/PO4(--)     @ 13:17    Urinalysis Basic - ( 2019 10:34 )  Color: Yellow / Appearance: Clear / S.023 / pH: x  Gluc: x / Ketone: Negative  / Bili: Negative / Urobili: Negative   Blood: x / Protein: 30 mg/dL / Nitrite: Negative   Leuk Esterase: Small / RBC: 1 /hpf / WBC 7 /hpf   Sq Epi: x / Non Sq Epi: 1 /hpf / Bacteria: Negative  Creatinine, Random Urine: 130 mg/dL ( @ 10:35)      IMPRESSION: 72 y/o M w/ HTN, AFib, mild dementia, OA, waldonstrom's macroglobulinemia, and 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 No fever/chills  States that he takes a higher dose of Remeron at home (30 rather than 15mg). States that he takes NaHCO3 at home but not receiving here.    VITAL:  T(C): , Max: 37.2 (19 @ 21:20)  T(F): , Max: 98.9 (19 @ 21:20)  HR: 60 (19 @ 05:45)  BP: 145/71 (19 @ 05:45)  RR: 17 (19 @ 05:45)  SpO2: 97% (19 @ 05:45)      PHYSICAL EXAM:  Constitutional: NAD; Alert  HEENT:  NCAT; DMM  Neck: No JVD; supple  Respiratory: CTA-b/l  Cardiac: RRR s1s2  Gastrointestinal: BS+, soft, NT/ND  Urologic: No walker  Extremities: No peripheral edema  Back: No CVAT b/l    LABS:                        9.4    7.86  )-----------( 187      ( 2019 08:02 )             30.2     Na(138)/K(4.2)/Cl(102)/HCO3(23)/BUN(55)/Cr(2.50)Glu(110)/Ca(9.1)/Mg(--)/PO4(--)     @ 07:23  Na(139)/K(4.2)/Cl(104)/HCO3(22)/BUN(57)/Cr(2.88)Glu(140)/Ca(9.3)/Mg(--)/PO4(--)     @ 06:43  Na(139)/K(5.0)/Cl(101)/HCO3(21)/BUN(48)/Cr(2.65)Glu(174)/Ca(9.4)/Mg(--)/PO4(--)     @ 07:22  Na(--)/K(--)/Cl(--)/HCO3(--)/BUN(--)/Cr(--)Glu(--)/Ca(--)/Mg(--)/PO4(3.0)     @ 18:02  Na(138)/K(4.7)/Cl(103)/HCO3(24)/BUN(35)/Cr(2.62)Glu(101)/Ca(9.3)/Mg(--)/PO4(--)     @ 13:17    Urinalysis Basic - ( 2019 10:34 )  Color: Yellow / Appearance: Clear / S.023 / pH: x  Gluc: x / Ketone: Negative  / Bili: Negative / Urobili: Negative   Blood: x / Protein: 30 mg/dL / Nitrite: Negative   Leuk Esterase: Small / RBC: 1 /hpf / WBC 7 /hpf   Sq Epi: x / Non Sq Epi: 1 /hpf / Bacteria: Negative  Creatinine, Random Urine: 130 mg/dL ( @ 10:35)      IMPRESSION: 72 y/o M w/ HTN, AFib, mild dementia, OA, waldonstrom's macroglobulinemia, and 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 Taking NaHCO3 at home?  (3)CV - BP/volume acceptable  (4)ID - MRSA cellulitis/bacteremia; on IV Vanco  (5)Sleep - Remeron 30? 15?    RECOMMEND:  (1)Agree with Vanco 1gm IV q24h for now; trend levels  (2)Dose new meds for GFR 20-30ml/min  (3)Medication reconciliation with outside pharmacy    Norman Ascencio MD  [facility]  [phone]

## 2019-01-07 NOTE — PROGRESS NOTE ADULT - SUBJECTIVE AND OBJECTIVE BOX
CC: F/U for MRSA    Saw/spoke to patient. No fevers, no chills. Overall well. No new complaints.    Allergies  No Known Allergies    ANTIMICROBIALS:  vancomycin  IVPB 1000 every 24 hours    PE:    Vital Signs Last 24 Hrs  T(C): 36.9 (07 Jan 2019 05:45), Max: 37.2 (06 Jan 2019 21:20)  T(F): 98.4 (07 Jan 2019 05:45), Max: 98.9 (06 Jan 2019 21:20)  HR: 60 (07 Jan 2019 05:45) (54 - 60)  BP: 145/71 (07 Jan 2019 05:45) (122/64 - 145/71)  RR: 17 (07 Jan 2019 05:45) (17 - 18)  SpO2: 97% (07 Jan 2019 05:45) (95% - 97%)    Gen: AOx3, NAD, non-toxic, pleasant  CV: S1+S2 normal, nontachycardic  Resp: Clear bilat, no resp distress, no crackles/wheezes  Abd: Soft, nontender, +BS  Ext: RUE thrombophlebitis, no inflammatory component    LABS:                        9.4    7.86  )-----------( 187      ( 07 Jan 2019 08:02 )             30.2     01-07    138  |  102  |  55<H>  ----------------------------<  110<H>  4.2   |  23  |  2.50<H>    Ca    9.1      07 Jan 2019 07:23    MICROBIOLOGY:    .Blood Blood-Peripheral  01-04-19   No growth to date.    .Urine CL CATCH [facility]  01-01-19   <10,000 CFU/ml Normal Urogenital libby present     .Blood PERCUTANEOUS (BLOOD)  12-31-18   Growth in aerobic and anaerobic bottles: Methicillin resistant  Staphylococcus aureus    .Urine Clean Catch (Midstream)  12-23-18   <10,000 CFU/ml  Normal Urogenital libby present    RADIOLOGY:    1/4 USG:    IMPRESSION:     No evidence of right upper extremity deep venous thrombosis.    Superficial vein thrombosis of the right median cubital vein.

## 2019-01-07 NOTE — PROGRESS NOTE ADULT - SUBJECTIVE AND OBJECTIVE BOX
NICOLAS CIFUENTES  73y Male  MRN:[id number]    Patient is a 73y old  Male who presents with a chief complaint of positive blood cult (05 Jan 2019 16:15)    HPI:  72 yo M w/ PMH CLL and waldenstroms macroglobulinemia (on ninlaro and dexamethasone), gout, afib (with pacemaker, on coumadin), dementia who [facility]. Pt states had a mechanical fall, was hospitalized with d/c on saturday 12/19. At that time, pt states his right upper arm was red and swollen at prior IV site. On Sunday, he had a fever, and had bcx taken in rehab, found to have MRSA baceremia. Transferred to ED to r/o endocarditis. Pt denies complaints at this time, no cough, chest pain, sob.  pt did not have fever while in hospital last week.  also denies right arm swelling while in hospital   reports prior h/o cardiac murmur (04 Jan 2019 13:34)      Patient seen and evaluated at bedside. No acute events overnight except as noted.    Interval HPI: no events o/n    PAST MEDICAL & SURGICAL HISTORY:  Memory deficit  BPH (benign prostatic hyperplasia)  [place] (hard of hearing)  Gout  CKD (chronic kidney disease)  Nephrolithiasis  Hypothyroid  Bradycardia, drug induced  Waldenstrom macroglobulinemia  Diverticulitis  Atrial fibrillation  GERD (gastroesophageal reflux disease)  Anxiety disorder  CLL (chronic lymphocytic leukemia): in remission  History of cardiac pacemaker in situ  History of appendectomy  History of cataract surgery, right: IOL  History of laparoscopic cholecystectomy: 4/2014  S/P hernia repair: x2  Meniscus tear: s/p removal of Meniscus 8 months ago      REVIEW OF SYSTEMS:  as per hpi    VITALS:  Vital Signs Last 24 Hrs  T(C): 36.9 (07 Jan 2019 05:45), Max: 37.2 (06 Jan 2019 21:20)  T(F): 98.4 (07 Jan 2019 05:45), Max: 98.9 (06 Jan 2019 21:20)  HR: 60 (07 Jan 2019 05:45) (54 - 60)  BP: 145/71 (07 Jan 2019 05:45) (122/64 - 145/71)  BP(mean): --  RR: 17 (07 Jan 2019 05:45) (17 - 18)  SpO2: 97% (07 Jan 2019 05:45) (95% - 97%)      PHYSICAL EXAM:  GENERAL: NAD, well-developed  HEAD:  Atraumatic, Normocephalic  EYES: EOMI, PERRLA, conjunctiva and sclera clear  NECK: Supple, No JVD  CHEST/LUNG: Clear to auscultation bilaterally; No wheeze  HEART: S1, S2; +murmur  ABDOMEN: Soft, Nontender, Nondistended; Bowel sounds present  EXTREMITIES:  2+ Peripheral Pulses, No clubbing, cyanosis, or edema.  +RUE erythema   PSYCH: Normal affect  NEUROLOGY: AAOX3; non-focal  SKIN: No rashes or lesions    Consultant(s) Notes Reviewed:  [x ] YES  [ ] NO  Care Discussed with Consultants/Other Providers [ x] YES  [ ] NO    MEDS:  MEDICATIONS  (STANDING):  allopurinol 300 milliGRAM(s) Oral daily  amiodarone    Tablet 200 milliGRAM(s) Oral daily  betamethasone valerate 0.1% Cream 1 Application(s) Topical daily  clonazePAM Tablet 1.5 milliGRAM(s) Oral at bedtime  docusate sodium 100 milliGRAM(s) Oral three times a day  donepezil 10 milliGRAM(s) Oral at bedtime  lactobacillus acidophilus 1 Tablet(s) Oral daily  levothyroxine 125 MICROGram(s) Oral daily  melatonin 5 milliGRAM(s) Oral at bedtime  memantine 10 milliGRAM(s) Oral every 12 hours  mirtazapine 30 milliGRAM(s) Oral at bedtime  multivitamin 1 Tablet(s) Oral daily  pantoprazole    Tablet 40 milliGRAM(s) Oral before breakfast  polyethylene glycol 3350 17 Gram(s) Oral daily  senna 2 Tablet(s) Oral at bedtime  tamsulosin 0.4 milliGRAM(s) Oral at bedtime  vancomycin  IVPB 1000 milliGRAM(s) IV Intermittent every 24 hours    MEDICATIONS  (PRN):  HYDROmorphone   Tablet 4 milliGRAM(s) Oral every 4 hours PRN Moderate and/or Severe Pain  HYDROmorphone   Tablet 2 milliGRAM(s) Oral every 4 hours PRN Mild Pain (1 - 3)        ALLERGIES:  No Known Allergies  rituximab (Rash)      LABS:                                                 9.4    7.86  )-----------( 187      ( 07 Jan 2019 08:02 )             30.2   01-07    138  |  102  |  55<H>  ----------------------------<  110<H>  4.2   |  23  |  2.50<H>    Ca    9.1      07 Jan 2019 07:23           Culture - Blood (01.04.19 @ 15:13)    Specimen Source: .Blood Blood-Peripheral    Culture Results:   No growth to date.    < from: VA Duplex Upper Ext Vein Scan, Right (01.04.19 @ 17:09) >  IMPRESSION:     No evidence of right upper extremity deep venous thrombosis.    Superficial vein thrombosis of the right median cubital vein.    < end of copied text >

## 2019-01-08 ENCOUNTER — TRANSCRIPTION ENCOUNTER (OUTPATIENT)
Age: 74
End: 2019-01-08

## 2019-01-08 LAB
ANION GAP SERPL CALC-SCNC: 12 MMOL/L — SIGNIFICANT CHANGE UP (ref 5–17)
BUN SERPL-MCNC: 49 MG/DL — HIGH (ref 7–23)
CALCIUM SERPL-MCNC: 9.4 MG/DL — SIGNIFICANT CHANGE UP (ref 8.4–10.5)
CHLORIDE SERPL-SCNC: 106 MMOL/L — SIGNIFICANT CHANGE UP (ref 96–108)
CO2 SERPL-SCNC: 21 MMOL/L — LOW (ref 22–31)
CREAT SERPL-MCNC: 2.56 MG/DL — HIGH (ref 0.5–1.3)
GLUCOSE SERPL-MCNC: 128 MG/DL — HIGH (ref 70–99)
HCT VFR BLD CALC: 30 % — LOW (ref 39–50)
HGB BLD-MCNC: 9.6 G/DL — LOW (ref 13–17)
INR BLD: 1.75 RATIO — HIGH (ref 0.88–1.16)
MCHC RBC-ENTMCNC: 30.8 PG — SIGNIFICANT CHANGE UP (ref 27–34)
MCHC RBC-ENTMCNC: 32 GM/DL — SIGNIFICANT CHANGE UP (ref 32–36)
MCV RBC AUTO: 96.2 FL — SIGNIFICANT CHANGE UP (ref 80–100)
PLATELET # BLD AUTO: 179 K/UL — SIGNIFICANT CHANGE UP (ref 150–400)
POTASSIUM SERPL-MCNC: 4.1 MMOL/L — SIGNIFICANT CHANGE UP (ref 3.5–5.3)
POTASSIUM SERPL-SCNC: 4.1 MMOL/L — SIGNIFICANT CHANGE UP (ref 3.5–5.3)
PROTHROM AB SERPL-ACNC: 19.9 SEC — HIGH (ref 10–13.1)
RBC # BLD: 3.12 M/UL — LOW (ref 4.2–5.8)
RBC # FLD: 14.7 % — HIGH (ref 10.3–14.5)
SODIUM SERPL-SCNC: 139 MMOL/L — SIGNIFICANT CHANGE UP (ref 135–145)
WBC # BLD: 7.95 K/UL — SIGNIFICANT CHANGE UP (ref 3.8–10.5)
WBC # FLD AUTO: 7.95 K/UL — SIGNIFICANT CHANGE UP (ref 3.8–10.5)

## 2019-01-08 PROCEDURE — 99232 SBSQ HOSP IP/OBS MODERATE 35: CPT

## 2019-01-08 RX ADMIN — TAMSULOSIN HYDROCHLORIDE 0.4 MILLIGRAM(S): 0.4 CAPSULE ORAL at 21:38

## 2019-01-08 RX ADMIN — HYDROMORPHONE HYDROCHLORIDE 4 MILLIGRAM(S): 2 INJECTION INTRAMUSCULAR; INTRAVENOUS; SUBCUTANEOUS at 14:35

## 2019-01-08 RX ADMIN — HYDROMORPHONE HYDROCHLORIDE 4 MILLIGRAM(S): 2 INJECTION INTRAMUSCULAR; INTRAVENOUS; SUBCUTANEOUS at 10:55

## 2019-01-08 RX ADMIN — Medication 100 MILLIGRAM(S): at 05:34

## 2019-01-08 RX ADMIN — MEMANTINE HYDROCHLORIDE 10 MILLIGRAM(S): 10 TABLET ORAL at 05:34

## 2019-01-08 RX ADMIN — Medication 1.5 MILLIGRAM(S): at 21:38

## 2019-01-08 RX ADMIN — Medication 1 TABLET(S): at 12:11

## 2019-01-08 RX ADMIN — Medication 100 MILLIGRAM(S): at 21:38

## 2019-01-08 RX ADMIN — DONEPEZIL HYDROCHLORIDE 10 MILLIGRAM(S): 10 TABLET, FILM COATED ORAL at 21:38

## 2019-01-08 RX ADMIN — Medication 100 MILLIGRAM(S): at 14:35

## 2019-01-08 RX ADMIN — MEMANTINE HYDROCHLORIDE 10 MILLIGRAM(S): 10 TABLET ORAL at 17:24

## 2019-01-08 RX ADMIN — Medication 250 MILLIGRAM(S): at 17:22

## 2019-01-08 RX ADMIN — PANTOPRAZOLE SODIUM 40 MILLIGRAM(S): 20 TABLET, DELAYED RELEASE ORAL at 05:34

## 2019-01-08 RX ADMIN — AMIODARONE HYDROCHLORIDE 200 MILLIGRAM(S): 400 TABLET ORAL at 05:34

## 2019-01-08 RX ADMIN — Medication 1 TABLET(S): at 11:25

## 2019-01-08 RX ADMIN — MIRTAZAPINE 30 MILLIGRAM(S): 45 TABLET, ORALLY DISINTEGRATING ORAL at 21:38

## 2019-01-08 RX ADMIN — HYDROMORPHONE HYDROCHLORIDE 4 MILLIGRAM(S): 2 INJECTION INTRAMUSCULAR; INTRAVENOUS; SUBCUTANEOUS at 15:05

## 2019-01-08 RX ADMIN — Medication 5 MILLIGRAM(S): at 21:38

## 2019-01-08 RX ADMIN — HYDROMORPHONE HYDROCHLORIDE 4 MILLIGRAM(S): 2 INJECTION INTRAMUSCULAR; INTRAVENOUS; SUBCUTANEOUS at 18:57

## 2019-01-08 RX ADMIN — HYDROMORPHONE HYDROCHLORIDE 4 MILLIGRAM(S): 2 INJECTION INTRAMUSCULAR; INTRAVENOUS; SUBCUTANEOUS at 01:46

## 2019-01-08 RX ADMIN — Medication 300 MILLIGRAM(S): at 11:25

## 2019-01-08 RX ADMIN — Medication 125 MICROGRAM(S): at 05:34

## 2019-01-08 RX ADMIN — HYDROMORPHONE HYDROCHLORIDE 4 MILLIGRAM(S): 2 INJECTION INTRAMUSCULAR; INTRAVENOUS; SUBCUTANEOUS at 02:51

## 2019-01-08 RX ADMIN — HYDROMORPHONE HYDROCHLORIDE 4 MILLIGRAM(S): 2 INJECTION INTRAMUSCULAR; INTRAVENOUS; SUBCUTANEOUS at 19:54

## 2019-01-08 RX ADMIN — POLYETHYLENE GLYCOL 3350 17 GRAM(S): 17 POWDER, FOR SOLUTION ORAL at 11:25

## 2019-01-08 RX ADMIN — HYDROMORPHONE HYDROCHLORIDE 4 MILLIGRAM(S): 2 INJECTION INTRAMUSCULAR; INTRAVENOUS; SUBCUTANEOUS at 10:25

## 2019-01-08 RX ADMIN — Medication 1 APPLICATION(S): at 11:26

## 2019-01-08 RX ADMIN — SENNA PLUS 2 TABLET(S): 8.6 TABLET ORAL at 21:38

## 2019-01-08 RX ADMIN — HYDROMORPHONE HYDROCHLORIDE 4 MILLIGRAM(S): 2 INJECTION INTRAMUSCULAR; INTRAVENOUS; SUBCUTANEOUS at 07:19

## 2019-01-08 RX ADMIN — HYDROMORPHONE HYDROCHLORIDE 4 MILLIGRAM(S): 2 INJECTION INTRAMUSCULAR; INTRAVENOUS; SUBCUTANEOUS at 06:00

## 2019-01-08 NOTE — PROGRESS NOTE ADULT - SUBJECTIVE AND OBJECTIVE BOX
CC: F/U MRSA Bacteremia    Saw/spoke to patient. No fevers, no chills. Overall unchanged.    Allergies  No Known Allergies    ANTIMICROBIALS:  vancomycin  IVPB 1000 every 24 hours    PE:    Vital Signs Last 24 Hrs  T(C): 36.4 (08 Jan 2019 05:01), Max: 37.1 (07 Jan 2019 21:52)  T(F): 97.6 (08 Jan 2019 05:01), Max: 98.8 (07 Jan 2019 21:52)  HR: 59 (08 Jan 2019 05:01) (59 - 64)  BP: 147/70 (08 Jan 2019 05:01) (136/67 - 154/57)  RR: 18 (08 Jan 2019 05:01) (18 - 18)  SpO2: 96% (08 Jan 2019 05:01) (94% - 96%)    Gen: AOx3, NAD, non-toxic, pleasant  CV: S1+S2 normal, nontachycardic  Resp: Clear bilat, no resp distress, no crackles/wheezes  Abd: Soft, nontender, +BS  Ext: No LE edema, no wounds    LABS:                        9.6    7.95  )-----------( 179      ( 08 Jan 2019 08:08 )             30.0     01-08    139  |  106  |  49<H>  ----------------------------<  128<H>  4.1   |  21<L>  |  2.56<H>    Ca    9.4      08 Jan 2019 07:20    MICROBIOLOGY:  Vancomycin Level, Trough: 18.0 ug/mL (01-07-19 @ 17:49)    .Blood Blood-Peripheral  01-04-19   No growth to date.      .Urine CL CATCH [facility]  01-01-19   <10,000 CFU/ml Normal Urogenital libby present     .Blood PERCUTANEOUS (BLOOD)  12-31-18   Growth in aerobic and anaerobic bottles: Methicillin resistant  Staphylococcus aureus    .Urine Clean Catch (Midstream)  12-23-18   <10,000 CFU/ml  Normal Urogenital libby present      (otherwise reviewed)    RADIOLOGY:    1/4 USG:    FINDINGS:    The right internal jugular, subclavian, axillary, brachial, basilic and   cephalic veins are patent and compressible where applicable.     There is thrombosis of the median cubital vein. The visualized segments   of the basilic and cephalic veins are patent.    Doppler examination shows normal spontaneous and phasic flow.    IMPRESSION:     No evidence of right upper extremity deep venous thrombosis.    Superficial vein thrombosis of the right median cubital vein.    1/7 TTE:    Conclusions:  1. Mild mitral regurgitation.  2. Peak transaortic valve gradient equals 27 mm Hg, mean  transaortic valve gradient equals 12 mm Hg, estimated  aortic valve area equals 1.6 sqcm (by continuity equation),  aortic valve velocity time integral equals 57 cm,  consistent with mild aortic stenosis. Mild to moderate  aortic regurgitation.  3. Normal left ventricular internal dimensions and wall  thicknesses.  4. Normal left ventricular systolic function. No segmental  wall motion abnormalities.  5. Mild diastolic dysfunction (Stage I).  6. Normal right ventricular size and function. A device  wire is noted in the right heart.  7. Mild tricuspid regurgitation.  8. Estimated pulmonary artery systolic pressure equals 46  mm Hg, assuming right atrial pressure equals 8 mm Hg,  consistent with mild pulmonary pressures.  9. No obvious evidence of endocarditis. Consider KT for  further evaluation if clinically indicated.

## 2019-01-08 NOTE — DISCHARGE NOTE ADULT - HOSPITAL COURSE
74 yo male h/o CLL, waldenstroms, afib s/p ppm on coumadin, ckd, recent admission for mechanical fall with pubic rami fx, now sent from rehab with MRSA bacteremia and RUE cellulitis. s/p ID eval, Treated with Vancomycin. repeat blood culture negative, echo negative for endocarditis, RUE doppler with   No evidence of right upper extremity deep venous thrombosis, Superficial vein thrombosis of the right median cubital vein.  History of A.fib, currently sinus rhythm, Coumadin held for PICC line placement, s/p PICC 1/9, Coumadin restarted after PICC  Recent fall with pubic rami fracture, no surgical intervention required, pain management and PT eval  pt to continue Vancomycin through 1/31/2019 as per ID  DCP to [facility] 72 yo male h/o CLL, waldenstroms, afib s/p ppm on coumadin, ckd, recent admission for mechanical fall with pubic rami fx, now sent from rehab with MRSA bacteremia and RUE cellulitis. s/p ID eval, Treated with Vancomycin. repeat blood culture negative, echo negative for endocarditis, RUE doppler with   No evidence of right upper extremity deep venous thrombosis, Superficial vein thrombosis of the right median cubital vein.  History of A.fib, currently sinus rhythm, Coumadin held for PICC line placement, s/p PICC 1/9, Coumadin restarted after PICC  Recent fall with pubic rami fracture, no surgical intervention required, pain management and PT eval  pt to continue Vancomycin through 1/31/2019 as per ID  DCP to [facility] today.

## 2019-01-08 NOTE — DISCHARGE NOTE ADULT - MEDICATION SUMMARY - MEDICATIONS TO TAKE
I will START or STAY ON the medications listed below when I get home from the hospital:    INR MONITORING  -- INR MONITORING DAILY - TO MAINTAIN INR BETWEEN 2-3.  ** PT DOES NOT NEED TO BE BRIDGED TO COUMADIN.   -- Indication: For A Fib    HYDROmorphone 4 mg oral tablet  -- 1 tab(s) by mouth every 4 hours, As needed, Moderate and/or Severe Pain  -- Indication: For Mod Pain    HYDROmorphone 2 mg oral tablet  -- 1 tab(s) by mouth every 4 hours, As needed, Mild Pain (1 - 3)  -- Indication: For Mild  Pain    tamsulosin 0.4 mg oral capsule  -- 1 cap(s) by mouth once a day (at bedtime)  -- Indication: For BPH    amiodarone 200 mg oral tablet  -- 1 tab(s) by mouth once a day  -- Indication: For Atrial fibrillation    warfarin 5 mg oral tablet  -- 1 tab(s) by mouth once a day  -- Indication: For Atrial fibrillation    clonazePAM 0.5 mg oral tablet  -- 3 tab(s) by mouth once a day (at bedtime)  -- Indication: For Mood    mirtazapine 30 mg oral tablet  -- 1 tab(s) by mouth once a day (at bedtime)  -- Indication: For Mood    allopurinol 300 mg oral tablet  -- 1 tab(s) by mouth once a day  -- Indication: For Gout    donepezil 10 mg oral tablet  -- 1 tab(s) by mouth once a day (at bedtime)  -- Indication: For Mood    betamethasone valerate 0.1% topical cream  -- 1 application on skin once a day  -- Indication: For Skin ointment    vancomycin 1 g intravenous injection  -- 1 gram(s) intravenous every 24 hours  -- Indication: For Bacteremia    senna oral tablet  -- 2 tab(s) by mouth once a day (at bedtime)  -- Indication: For Laxative    docusate sodium 100 mg oral capsule  -- 1 cap(s) by mouth 3 times a day  -- Indication: For Stool softener    polyethylene glycol 3350 oral powder for reconstitution  -- 17 gram(s) by mouth once a day  -- Indication: For Laxative    memantine 10 mg oral tablet  -- 1 tab(s) by mouth every 12 hours  -- Indication: For Mood    melatonin 5 mg oral tablet  -- 1 tab(s) by mouth once a day (at bedtime)  -- Indication: For Sleep aid    lactobacillus acidophilus oral capsule  -- 1 tab(s) by mouth once a day  -- Indication: For Probiotic    pantoprazole 40 mg oral delayed release tablet  -- 1 tab(s) by mouth once a day (before a meal)  -- Indication: For Stomach acid    levothyroxine 125 mcg (0.125 mg) oral tablet  -- 1 tab(s) by mouth once a day  -- Indication: For Hypothyroid    Multiple Vitamins oral tablet  -- 1 tab(s) by mouth once a day  -- Indication: For Supplement I will START or STAY ON the medications listed below when I get home from the hospital:    INR MONITORING  -- INR MONITORING DAILY - TO MAINTAIN INR BETWEEN 2-3.  ** PT DOES NOT NEED TO BE BRIDGED TO COUMADIN.   -- Indication: For A Fib    ninlaro/dexamethasone  -- DUE ON JAN 11TH.   ** PT TO TAKE HIS OWN  MEDICATION  FROM [facility].   -- Indication: For Chemotherapy    HYDROmorphone 4 mg oral tablet  -- 1 tab(s) by mouth every 4 hours, As needed, Moderate and/or Severe Pain  -- Indication: For Mod Pain    HYDROmorphone 2 mg oral tablet  -- 1 tab(s) by mouth every 4 hours, As needed, Mild Pain (1 - 3)  -- Indication: For Mild  Pain    tamsulosin 0.4 mg oral capsule  -- 1 cap(s) by mouth once a day (at bedtime)  -- Indication: For BPH    amiodarone 200 mg oral tablet  -- 1 tab(s) by mouth once a day  -- Indication: For Atrial fibrillation    warfarin 5 mg oral tablet  -- 1 tab(s) by mouth once a day  -- Indication: For Atrial fibrillation    clonazePAM 0.5 mg oral tablet  -- 3 tab(s) by mouth once a day (at bedtime)  -- Indication: For Mood    mirtazapine 30 mg oral tablet  -- 1 tab(s) by mouth once a day (at bedtime)  -- Indication: For Mood    allopurinol 300 mg oral tablet  -- 1 tab(s) by mouth once a day  -- Indication: For Gout    donepezil 10 mg oral tablet  -- 1 tab(s) by mouth once a day (at bedtime)  -- Indication: For Mood    betamethasone valerate 0.1% topical cream  -- 1 application on skin once a day  -- Indication: For Skin ointment    vancomycin 1 g intravenous injection  -- 1 gram(s) intravenous every 24 hours  -- Indication: For Bacteremia    senna oral tablet  -- 2 tab(s) by mouth once a day (at bedtime)  -- Indication: For Laxative    docusate sodium 100 mg oral capsule  -- 1 cap(s) by mouth 3 times a day  -- Indication: For Stool softener    polyethylene glycol 3350 oral powder for reconstitution  -- 17 gram(s) by mouth once a day  -- Indication: For Laxative    memantine 10 mg oral tablet  -- 1 tab(s) by mouth every 12 hours  -- Indication: For Mood    melatonin 5 mg oral tablet  -- 1 tab(s) by mouth once a day (at bedtime)  -- Indication: For Sleep aid    lactobacillus acidophilus oral capsule  -- 1 tab(s) by mouth once a day  -- Indication: For Probiotic    pantoprazole 40 mg oral delayed release tablet  -- 1 tab(s) by mouth once a day (before a meal)  -- Indication: For Stomach acid    levothyroxine 125 mcg (0.125 mg) oral tablet  -- 1 tab(s) by mouth once a day  -- Indication: For Hypothyroid    Multiple Vitamins oral tablet  -- 1 tab(s) by mouth once a day  -- Indication: For Supplement I will START or STAY ON the medications listed below when I get home from the hospital:    INR MONITORING  -- INR MONITORING DAILY - TO MAINTAIN INR BETWEEN 2-3.  ** PT DOES NOT NEED TO BE BRIDGED TO COUMADIN.   -- Indication: For A Fib    dexamethasone 4 mg oral tablet  -- 5 tab(s) by mouth once a week.  DUE ON JAN 11TH.   ** PT TO TAKE HIS OWN  MEDICATION  FROM [facility].   -- Indication: For Steroid with Chemotherapy    HYDROmorphone 4 mg oral tablet  -- 1 tab(s) by mouth every 4 hours, As needed, Moderate and/or Severe Pain  -- Indication: For Mod Pain    HYDROmorphone 2 mg oral tablet  -- 1 tab(s) by mouth every 4 hours, As needed, Mild Pain (1 - 3)  -- Indication: For Mild  Pain    tamsulosin 0.4 mg oral capsule  -- 1 cap(s) by mouth once a day (at bedtime)  -- Indication: For BPH    amiodarone 200 mg oral tablet  -- 1 tab(s) by mouth once a day  -- Indication: For Atrial fibrillation    warfarin 5 mg oral tablet  -- 1 tab(s) by mouth once a day  -- Indication: For Atrial fibrillation    clonazePAM 0.5 mg oral tablet  -- 3 tab(s) by mouth once a day (at bedtime)  -- Indication: For Mood    mirtazapine 30 mg oral tablet  -- 1 tab(s) by mouth once a day (at bedtime)  -- Indication: For Mood    allopurinol 300 mg oral tablet  -- 1 tab(s) by mouth once a day  -- Indication: For Gout    donepezil 10 mg oral tablet  -- 1 tab(s) by mouth once a day (at bedtime)  -- Indication: For Mood    betamethasone valerate 0.1% topical cream  -- 1 application on skin once a day  -- Indication: For Skin ointment    vancomycin 1 g intravenous injection  -- 1 gram(s) intravenous every 24 hours  -- Indication: For Bacteremia    senna oral tablet  -- 2 tab(s) by mouth once a day (at bedtime)  -- Indication: For Laxative    docusate sodium 100 mg oral capsule  -- 1 cap(s) by mouth 3 times a day  -- Indication: For Stool softener    polyethylene glycol 3350 oral powder for reconstitution  -- 17 gram(s) by mouth once a day  -- Indication: For Laxative    memantine 10 mg oral tablet  -- 1 tab(s) by mouth every 12 hours  -- Indication: For Mood    melatonin 5 mg oral tablet  -- 1 tab(s) by mouth once a day (at bedtime)  -- Indication: For Sleep aid    lactobacillus acidophilus oral capsule  -- 1 tab(s) by mouth once a day  -- Indication: For Probiotic    Ninlaro 2.3 mg oral capsule  -- 1 cap(s) by mouth every 7 days.  DUE ON JAN 11TH.   ** PT TO TAKE HIS OWN  MEDICATION  FROM [facility].   -- Indication: For Chemotherapy    pantoprazole 40 mg oral delayed release tablet  -- 1 tab(s) by mouth once a day (before a meal)  -- Indication: For Stomach acid    levothyroxine 125 mcg (0.125 mg) oral tablet  -- 1 tab(s) by mouth once a day  -- Indication: For Hypothyroid    Multiple Vitamins oral tablet  -- 1 tab(s) by mouth once a day  -- Indication: For Supplement

## 2019-01-08 NOTE — DISCHARGE NOTE ADULT - ADDITIONAL INSTRUCTIONS
- Continue Vanco through 1/31/19  - Rehab facility to monitor abx, recommend CBC/CMP/Vanco trough weekly  - Check surveillance cultures 1 week after abx completed to ensure no recurrence/evidence endovascular.  Follow up with Dr Valdo Simons and Dr Mejia  within 1 week  after discharge from Rehab.  Follow up with Dr Valdo Simons and Dr Mejia  within 1 week  after discharge from Rehab.  Follow up with Dr Valdo Simons and Dr Mejia  within 1 week  after discharge from Rehab.  Follow up with Dr Valdo Simons and Dr Mejia  within 1 to 2 months. - Continue Vanco through 1/31/19  - Rehab facility to monitor abx, recommend CBC/CMP/Vanco trough weekly  - Check surveillance cultures 1 week after abx completed to ensure no recurrence/evidence endovascular.  Follow up with Dr Valdo Simons and Dr Mejia  within 1 week  after discharge from Rehab.  Follow up with Dr Valdo Simons and Dr Mejia  within 1 week  after discharge from Rehab.  Follow up with Dr Valdo Simons and Dr Mejia  within 1 week  after discharge from Rehab.  Follow up with Dr Valdo Simons and Dr Mejia  within 1 to 2 months.  VERY IMPORTANT INSTRUCTIONS FOR CHEMOTHERAPY:  Pt has CLL/Waldenstrom's Macroglobulinemia  -currently receiving ninlaro/dexamethasone D1, D8, D15 for 3 weeks and  then one week off  -due to receive  medications on Friday, Jan 11th. WIFE WILL BRING IN MEDICATION FROM HOME AND PT WILL TAKE HIS OWN  MEDICATION.

## 2019-01-08 NOTE — DISCHARGE NOTE ADULT - PLAN OF CARE
MRSA bacteremia, thrombophlebitis   Continue Vancomycin through 1/31/19  PICC as long as no signs sepsis (BCX negative)  Rehab facility to monitor antibiotic, CBC/CMP/Vanco trough WEEKLY  Check surveillance cultures 1 WEEK after antibiotic completed to ensure no recurrence/evidence endovascular Continue Coumadin   Atrial fibrillation is the most common heart rhythm problem.  The condition puts you at risk for has stroke and heart attack  It helps if you control your blood pressure, not drink more than 1-2 alcohol drinks per day, cut down on caffeine, getting treatment for over active thyroid gland, and get regular exercise  Call your doctor if you feel your heart racing or beating unusually, chest tightness or pain, lightheaded, faint, shortness of breath especially with exercise  It is important to take your heart medication as prescribed  You may be on anticoagulation which is very important to take as directed - you may need blood work to monitor drug levels Avoid taking (NSAIDs) - (ex: Ibuprofen, Advil, Celebrex, Naprosyn)  Avoid taking any nephrotoxic agents (can harm kidneys) - Intravenous contrast for diagnostic testing, combination cold medications.  Have all medications adjusted for your renal function by your Health Care Provider.  Blood pressure control is important.  Take all medication as prescribed. Resolution Continue chemotherapy medication weekly as directed by hematologist Avoid taking (NSAIDs) - (ex: Ibuprofen, Advil, Celebrex, Naprosyn)  Avoid taking any nephrotoxic agents (can harm kidneys) - Intravenous contrast for diagnostic testing, combination cold medications.  Have all medications adjusted for your renal function by your Health Care Provider.  Blood pressure control is important.  Take all medication as prescribed.  Follow up with your nephrologist Continue chemotherapy medication weekly as directed by hematologist  Follow up with hematologist Dr. Cesar Simons MRSA bacteremia, thrombophlebitis   Continue Vancomycin through 1/31/19  PICC as long as no signs sepsis (BCX negative)  Rehab facility to monitor antibiotic, CBC/CMP/Vanco trough WEEKLY  Check surveillance cultures 1 WEEK after antibiotic completed 3) Waldenstrom's Macroglobulinemia  -evolved from underlying CLL  -currently receiving ninlaro/dexamethasone D1, D8, D15, then one week off  -due to receiv

## 2019-01-08 NOTE — DISCHARGE NOTE ADULT - CARE PROVIDERS DIRECT ADDRESSES
[email or web address]

## 2019-01-08 NOTE — DISCHARGE NOTE ADULT - CARE PROVIDER_API CALL
Cesar Simons), Hematology; Internal Medicine; Medical Oncology  [address] [place] [address]  Phone: [phone]  Fax: [phone]    Prosper Mejia), Infectious Disease; Internal Medicine  [address] [place] [address]  Phone: [phone]  Fax: [phone]    Norman Ascencio), Internal Medicine; Nephrology  [address] [place] [id number]  Phone: [phone]  Fax: [phone]

## 2019-01-08 NOTE — DISCHARGE NOTE ADULT - CARE PLAN
Principal Discharge DX:	Positive blood culture  Assessment and plan of treatment:	MRSA bacteremia, thrombophlebitis   Continue Vancomycin through 1/31/19  PICC as long as no signs sepsis (BCX negative)  Rehab facility to monitor antibiotic, CBC/CMP/Vanco trough WEEKLY  Check surveillance cultures 1 WEEK after antibiotic completed to ensure no recurrence/evidence endovascular  Secondary Diagnosis:	Atrial fibrillation  Assessment and plan of treatment:	Continue Coumadin   Atrial fibrillation is the most common heart rhythm problem.  The condition puts you at risk for has stroke and heart attack  It helps if you control your blood pressure, not drink more than 1-2 alcohol drinks per day, cut down on caffeine, getting treatment for over active thyroid gland, and get regular exercise  Call your doctor if you feel your heart racing or beating unusually, chest tightness or pain, lightheaded, faint, shortness of breath especially with exercise  It is important to take your heart medication as prescribed  You may be on anticoagulation which is very important to take as directed - you may need blood work to monitor drug levels  Secondary Diagnosis:	CKD (chronic kidney disease)  Assessment and plan of treatment:	Avoid taking (NSAIDs) - (ex: Ibuprofen, Advil, Celebrex, Naprosyn)  Avoid taking any nephrotoxic agents (can harm kidneys) - Intravenous contrast for diagnostic testing, combination cold medications.  Have all medications adjusted for your renal function by your Health Care Provider.  Blood pressure control is important.  Take all medication as prescribed. Principal Discharge DX:	Positive blood culture  Goal:	Resolution  Assessment and plan of treatment:	MRSA bacteremia, thrombophlebitis   Continue Vancomycin through 1/31/19  PICC as long as no signs sepsis (BCX negative)  Rehab facility to monitor antibiotic, CBC/CMP/Vanco trough WEEKLY  Check surveillance cultures 1 WEEK after antibiotic completed to ensure no recurrence/evidence endovascular  Secondary Diagnosis:	Atrial fibrillation  Assessment and plan of treatment:	Continue Coumadin   Atrial fibrillation is the most common heart rhythm problem.  The condition puts you at risk for has stroke and heart attack  It helps if you control your blood pressure, not drink more than 1-2 alcohol drinks per day, cut down on caffeine, getting treatment for over active thyroid gland, and get regular exercise  Call your doctor if you feel your heart racing or beating unusually, chest tightness or pain, lightheaded, faint, shortness of breath especially with exercise  It is important to take your heart medication as prescribed  You may be on anticoagulation which is very important to take as directed - you may need blood work to monitor drug levels  Secondary Diagnosis:	CKD (chronic kidney disease)  Assessment and plan of treatment:	Avoid taking (NSAIDs) - (ex: Ibuprofen, Advil, Celebrex, Naprosyn)  Avoid taking any nephrotoxic agents (can harm kidneys) - Intravenous contrast for diagnostic testing, combination cold medications.  Have all medications adjusted for your renal function by your Health Care Provider.  Blood pressure control is important.  Take all medication as prescribed.  Secondary Diagnosis:	CLL (chronic lymphocytic leukemia)  Assessment and plan of treatment:	Continue chemotherapy medication weekly as directed by hematologist Principal Discharge DX:	Positive blood culture  Goal:	Resolution  Assessment and plan of treatment:	MRSA bacteremia, thrombophlebitis   Continue Vancomycin through 1/31/19  PICC as long as no signs sepsis (BCX negative)  Rehab facility to monitor antibiotic, CBC/CMP/Vanco trough WEEKLY  Check surveillance cultures 1 WEEK after antibiotic completed  Secondary Diagnosis:	Atrial fibrillation  Assessment and plan of treatment:	Continue Coumadin   Atrial fibrillation is the most common heart rhythm problem.  The condition puts you at risk for has stroke and heart attack  It helps if you control your blood pressure, not drink more than 1-2 alcohol drinks per day, cut down on caffeine, getting treatment for over active thyroid gland, and get regular exercise  Call your doctor if you feel your heart racing or beating unusually, chest tightness or pain, lightheaded, faint, shortness of breath especially with exercise  It is important to take your heart medication as prescribed  You may be on anticoagulation which is very important to take as directed - you may need blood work to monitor drug levels  Secondary Diagnosis:	CKD (chronic kidney disease)  Assessment and plan of treatment:	Avoid taking (NSAIDs) - (ex: Ibuprofen, Advil, Celebrex, Naprosyn)  Avoid taking any nephrotoxic agents (can harm kidneys) - Intravenous contrast for diagnostic testing, combination cold medications.  Have all medications adjusted for your renal function by your Health Care Provider.  Blood pressure control is important.  Take all medication as prescribed.  Follow up with your nephrologist  Secondary Diagnosis:	CLL (chronic lymphocytic leukemia)  Assessment and plan of treatment:	Continue chemotherapy medication weekly as directed by hematologist  Follow up with hematologist Dr. Cesar Simons Principal Discharge DX:	Positive blood culture  Goal:	Resolution  Assessment and plan of treatment:	MRSA bacteremia, thrombophlebitis   Continue Vancomycin through 1/31/19  PICC as long as no signs sepsis (BCX negative)  Rehab facility to monitor antibiotic, CBC/CMP/Vanco trough WEEKLY  Check surveillance cultures 1 WEEK after antibiotic completed  Secondary Diagnosis:	Atrial fibrillation  Assessment and plan of treatment:	Continue Coumadin   Atrial fibrillation is the most common heart rhythm problem.  The condition puts you at risk for has stroke and heart attack  It helps if you control your blood pressure, not drink more than 1-2 alcohol drinks per day, cut down on caffeine, getting treatment for over active thyroid gland, and get regular exercise  Call your doctor if you feel your heart racing or beating unusually, chest tightness or pain, lightheaded, faint, shortness of breath especially with exercise  It is important to take your heart medication as prescribed  You may be on anticoagulation which is very important to take as directed - you may need blood work to monitor drug levels  Secondary Diagnosis:	CKD (chronic kidney disease)  Assessment and plan of treatment:	Avoid taking (NSAIDs) - (ex: Ibuprofen, Advil, Celebrex, Naprosyn)  Avoid taking any nephrotoxic agents (can harm kidneys) - Intravenous contrast for diagnostic testing, combination cold medications.  Have all medications adjusted for your renal function by your Health Care Provider.  Blood pressure control is important.  Take all medication as prescribed.  Follow up with your nephrologist  Secondary Diagnosis:	CLL (chronic lymphocytic leukemia)  Assessment and plan of treatment:	Continue chemotherapy medication weekly as directed by hematologist  Follow up with hematologist Dr. Cesar Simons  Assessment and plan of treatment:	3) Waldenstrom's Macroglobulinemia  -evolved from underlying CLL  -currently receiving ninlaro/dexamethasone D1, D8, D15, then one week off  -due to receiv

## 2019-01-08 NOTE — PROGRESS NOTE ADULT - SUBJECTIVE AND OBJECTIVE BOX
NICOLAS CIFUENTES  73y Male  MRN:[id number]    Patient is a 73y old  Male who presents with a chief complaint of positive blood cult (05 Jan 2019 16:15)    HPI:  72 yo M w/ PMH CLL and waldenstroms macroglobulinemia (on ninlaro and dexamethasone), gout, afib (with pacemaker, on coumadin), dementia who [facility]. Pt states had a mechanical fall, was hospitalized with d/c on saturday 12/19. At that time, pt states his right upper arm was red and swollen at prior IV site. On Sunday, he had a fever, and had bcx taken in rehab, found to have MRSA baceremia. Transferred to ED to r/o endocarditis. Pt denies complaints at this time, no cough, chest pain, sob.  pt did not have fever while in hospital last week.  also denies right arm swelling while in hospital   reports prior h/o cardiac murmur (04 Jan 2019 13:34)      Patient seen and evaluated at bedside. No acute events overnight except as noted.    Interval HPI: no events o/n    PAST MEDICAL & SURGICAL HISTORY:  Memory deficit  BPH (benign prostatic hyperplasia)  [place] (hard of hearing)  Gout  CKD (chronic kidney disease)  Nephrolithiasis  Hypothyroid  Bradycardia, drug induced  Waldenstrom macroglobulinemia  Diverticulitis  Atrial fibrillation  GERD (gastroesophageal reflux disease)  Anxiety disorder  CLL (chronic lymphocytic leukemia): in remission  History of cardiac pacemaker in situ  History of appendectomy  History of cataract surgery, right: IOL  History of laparoscopic cholecystectomy: 4/2014  S/P hernia repair: x2  Meniscus tear: s/p removal of Meniscus 8 months ago      REVIEW OF SYSTEMS:  as per hpi    VITALS:  Vital Signs Last 24 Hrs  T(C): 36.7 (08 Jan 2019 13:44), Max: 37.1 (07 Jan 2019 21:52)  T(F): 98 (08 Jan 2019 13:44), Max: 98.8 (07 Jan 2019 21:52)  HR: 62 (08 Jan 2019 13:44) (59 - 64)  BP: 138/64 (08 Jan 2019 13:44) (136/67 - 154/57)  BP(mean): --  RR: 18 (08 Jan 2019 13:44) (18 - 18)  SpO2: 96% (08 Jan 2019 13:44) (94% - 96%)      PHYSICAL EXAM:  GENERAL: NAD, well-developed  HEAD:  Atraumatic, Normocephalic  EYES: EOMI, PERRLA, conjunctiva and sclera clear  NECK: Supple, No JVD  CHEST/LUNG: Clear to auscultation bilaterally; No wheeze  HEART: S1, S2; +murmur  ABDOMEN: Soft, Nontender, Nondistended; Bowel sounds present  EXTREMITIES:  2+ Peripheral Pulses, No clubbing, cyanosis, or edema.  +RUE erythema   PSYCH: Normal affect  NEUROLOGY: AAOX3; non-focal  SKIN: No rashes or lesions    Consultant(s) Notes Reviewed:  [x ] YES  [ ] NO  Care Discussed with Consultants/Other Providers [ x] YES  [ ] NO    MEDS:  MEDICATIONS  (STANDING):  allopurinol 300 milliGRAM(s) Oral daily  amiodarone    Tablet 200 milliGRAM(s) Oral daily  betamethasone valerate 0.1% Cream 1 Application(s) Topical daily  clonazePAM Tablet 1.5 milliGRAM(s) Oral at bedtime  docusate sodium 100 milliGRAM(s) Oral three times a day  donepezil 10 milliGRAM(s) Oral at bedtime  lactobacillus acidophilus 1 Tablet(s) Oral daily  levothyroxine 125 MICROGram(s) Oral daily  melatonin 5 milliGRAM(s) Oral at bedtime  memantine 10 milliGRAM(s) Oral every 12 hours  mirtazapine 30 milliGRAM(s) Oral at bedtime  multivitamin 1 Tablet(s) Oral daily  pantoprazole    Tablet 40 milliGRAM(s) Oral before breakfast  polyethylene glycol 3350 17 Gram(s) Oral daily  senna 2 Tablet(s) Oral at bedtime  tamsulosin 0.4 milliGRAM(s) Oral at bedtime  vancomycin  IVPB 1000 milliGRAM(s) IV Intermittent every 24 hours    MEDICATIONS  (PRN):  HYDROmorphone   Tablet 4 milliGRAM(s) Oral every 4 hours PRN Moderate and/or Severe Pain  HYDROmorphone   Tablet 2 milliGRAM(s) Oral every 4 hours PRN Mild Pain (1 - 3)        ALLERGIES:  No Known Allergies  rituximab (Rash)      LABS:                                                    9.6    7.95  )-----------( 179      ( 08 Jan 2019 08:08 )             30.0   01-08    139  |  106  |  49<H>  ----------------------------<  128<H>  4.1   |  21<L>  |  2.56<H>    Ca    9.4      08 Jan 2019 07:20    PT/INR - ( 08 Jan 2019 07:59 )   PT: 19.9 sec;   INR: 1.75 ratio         < from: Transthoracic Echocardiogram (01.07.19 @ 07:46) >  -------------------------------  Conclusions:  1. Mild mitral regurgitation.  2. Peak transaortic valve gradient equals 27 mm Hg, mean  transaortic valve gradient equals 12 mm Hg, estimated  aortic valve area equals 1.6 sqcm (by continuity equation),  aortic valve velocity time integral equals 57 cm,  consistent with mild aortic stenosis. Mild to moderate  aortic regurgitation.  3. Normal left ventricular internal dimensions and wall  thicknesses.  4. Normal left ventricular systolic function. No segmental  wall motion abnormalities.  5. Mild diastolic dysfunction (Stage I).  6. Normal right ventricular size and function. A device  wire is noted in the right heart.  7. Mild tricuspid regurgitation.  8. Estimated pulmonary artery systolic pressure equals 46  mm Hg, assuming right atrial pressure equals 8 mm Hg,  consistent with mild pulmonary pressures.  9. No obvious evidence of endocarditis. Consider KT for  further evaluation if clinically indicated.  *** Compared with echocardiogram of 9/6/2016, the aortic  insufficiency has progressed mildly.  -----------------------------------------------    < end of copied text >         Culture - Blood (01.04.19 @ 15:13)    Specimen Source: .Blood Blood-Peripheral    Culture Results:   No growth to date.    < from: [facility] Duplex Upper Ext Vein Scan, Right (01.04.19 @ 17:09) >  IMPRESSION:     No evidence of right upper extremity deep venous thrombosis.    Superficial vein thrombosis of the right median cubital vein.    < end of copied text >

## 2019-01-08 NOTE — PROGRESS NOTE ADULT - ASSESSMENT
72 yo male h/o CLL, waldenstroms, afib s/p ppm on coumadin, ckd, recent admission for mechanical fall with pubic rami fx, now sent from rehab with bacteremia and RUE cellulitis    ID  bacteremia / cellulites with MRSA  vanco as per id  repeat blood cultures - ngtd  echo no evid of veg  RUE duplex noted  will need picc line for long term abx     afib  AC with coumadin - on hold pending picc line  rate controlled    ckd  stable  renal f/u  avoid nephrotoxins    CLL  stable   heme onc f/u    pubic rami fx  no sx intervention  pain control  PT    cont other home meds

## 2019-01-08 NOTE — PROGRESS NOTE ADULT - SUBJECTIVE AND OBJECTIVE BOX
No pain, no shortness of breath      VITAL:  T(C): , Max: 37.1 (01-07-19 @ 21:52)  T(F): , Max: 98.8 (01-07-19 @ 21:52)  HR: 59 (01-08-19 @ 05:01)  BP: 147/70 (01-08-19 @ 05:01)  RR: 18 (01-08-19 @ 05:01)  SpO2: 96% (01-08-19 @ 05:01)      PHYSICAL EXAM:  Constitutional: NAD; Alert  HEENT:  NCAT; DMM  Neck: No JVD; supple  Respiratory: CTA-b/l  Cardiac: RRR s1s2  Gastrointestinal: BS+, soft, NT/ND  Urologic: No walker  Extremities: No peripheral edema  Back: No CVAT b/l      LABS:                        9.6    7.95  )-----------( 179      ( 08 Jan 2019 08:08 )             30.0     Na(139)/K(4.1)/Cl(106)/HCO3(21)/BUN(49)/Cr(2.56)Glu(128)/Ca(9.4)/Mg(--)/PO4(--)    01-08 @ 07:20  Na(138)/K(4.2)/Cl(102)/HCO3(23)/BUN(55)/Cr(2.50)Glu(110)/Ca(9.1)/Mg(--)/PO4(--)    01-07 @ 07:23  Na(139)/K(4.2)/Cl(104)/HCO3(22)/BUN(57)/Cr(2.88)Glu(140)/Ca(9.3)/Mg(--)/PO4(--)    01-06 @ 06:43    Vanco trough 18.0      IMPRESSION: 74 y/o M w/ HTN, AFib, mild dementia, OA, waldenstrom's macroglobulinemia, and 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   (3)CV - BP/volume acceptable  (4)ID - MRSA cellulitis/bacteremia; on IV Vanco. Appropriate trough.      RECOMMEND:  (1)Continue Vanco 1gm IV q24h  (2)Dose new meds for GFR 20-30ml/min          Norman Ascencio MD  [facility],   [phone] No pain, no shortness of breath      VITAL:  T(C): , Max: 37.1 (01-07-19 @ 21:52)  T(F): , Max: 98.8 (01-07-19 @ 21:52)  HR: 59 (01-08-19 @ 05:01)  BP: 147/70 (01-08-19 @ 05:01)  RR: 18 (01-08-19 @ 05:01)  SpO2: 96% (01-08-19 @ 05:01)      PHYSICAL EXAM:  Constitutional: NAD; Alert  HEENT:  NCAT; DMM  Neck: No JVD; supple  Respiratory: CTA-b/l  Cardiac: RRR s1s2  Gastrointestinal: BS+, soft, NT/ND  Urologic: No walker  Extremities: No peripheral edema  Back: No CVAT b/l      LABS:                        9.6    7.95  )-----------( 179      ( 08 Jan 2019 08:08 )             30.0     Na(139)/K(4.1)/Cl(106)/HCO3(21)/BUN(49)/Cr(2.56)Glu(128)/Ca(9.4)/Mg(--)/PO4(--)    01-08 @ 07:20  Na(138)/K(4.2)/Cl(102)/HCO3(23)/BUN(55)/Cr(2.50)Glu(110)/Ca(9.1)/Mg(--)/PO4(--)    01-07 @ 07:23  Na(139)/K(4.2)/Cl(104)/HCO3(22)/BUN(57)/Cr(2.88)Glu(140)/Ca(9.3)/Mg(--)/PO4(--)    01-06 @ 06:43    Vanco trough 18.0      IMPRESSION: 74 y/o M w/ HTN, AFib, mild dementia, OA, waldenstrom's macroglobulinemia, and 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   (3)CV - BP/volume acceptable  (4)ID - MRSA cellulitis/bacteremia; on IV Vanco. Appropriate trough. Benefit>risk of PICC for prolonged abx course, in my opinion.      RECOMMEND:  (1)Continue Vanco 1gm IV q24h  (2)Dose new meds for GFR 20-30ml/min  (3)No renal objection to PICC        Norman Ascencio MD  [facility]  [phone]

## 2019-01-08 NOTE — DISCHARGE NOTE ADULT - PATIENT PORTAL LINK FT
You can access the [facility], offered by [facility], by registering with the following website: [email or web address]

## 2019-01-09 LAB
ANION GAP SERPL CALC-SCNC: 13 MMOL/L — SIGNIFICANT CHANGE UP (ref 5–17)
BUN SERPL-MCNC: 48 MG/DL — HIGH (ref 7–23)
CALCIUM SERPL-MCNC: 9.5 MG/DL — SIGNIFICANT CHANGE UP (ref 8.4–10.5)
CHLORIDE SERPL-SCNC: 105 MMOL/L — SIGNIFICANT CHANGE UP (ref 96–108)
CO2 SERPL-SCNC: 21 MMOL/L — LOW (ref 22–31)
CREAT SERPL-MCNC: 2.59 MG/DL — HIGH (ref 0.5–1.3)
CULTURE RESULTS: SIGNIFICANT CHANGE UP
CULTURE RESULTS: SIGNIFICANT CHANGE UP
GLUCOSE SERPL-MCNC: 117 MG/DL — HIGH (ref 70–99)
HCT VFR BLD CALC: 31.9 % — LOW (ref 39–50)
HGB BLD-MCNC: 10.2 G/DL — LOW (ref 13–17)
INR BLD: 1.49 RATIO — HIGH (ref 0.88–1.16)
MCHC RBC-ENTMCNC: 30.9 PG — SIGNIFICANT CHANGE UP (ref 27–34)
MCHC RBC-ENTMCNC: 32 GM/DL — SIGNIFICANT CHANGE UP (ref 32–36)
MCV RBC AUTO: 96.7 FL — SIGNIFICANT CHANGE UP (ref 80–100)
PLATELET # BLD AUTO: 195 K/UL — SIGNIFICANT CHANGE UP (ref 150–400)
POTASSIUM SERPL-MCNC: 4.3 MMOL/L — SIGNIFICANT CHANGE UP (ref 3.5–5.3)
POTASSIUM SERPL-SCNC: 4.3 MMOL/L — SIGNIFICANT CHANGE UP (ref 3.5–5.3)
PROTHROM AB SERPL-ACNC: 17.2 SEC — HIGH (ref 10–13.1)
RBC # BLD: 3.3 M/UL — LOW (ref 4.2–5.8)
RBC # FLD: 14.4 % — SIGNIFICANT CHANGE UP (ref 10.3–14.5)
SODIUM SERPL-SCNC: 139 MMOL/L — SIGNIFICANT CHANGE UP (ref 135–145)
SPECIMEN SOURCE: SIGNIFICANT CHANGE UP
SPECIMEN SOURCE: SIGNIFICANT CHANGE UP
WBC # BLD: 9.19 K/UL — SIGNIFICANT CHANGE UP (ref 3.8–10.5)
WBC # FLD AUTO: 9.19 K/UL — SIGNIFICANT CHANGE UP (ref 3.8–10.5)

## 2019-01-09 PROCEDURE — 99232 SBSQ HOSP IP/OBS MODERATE 35: CPT

## 2019-01-09 PROCEDURE — 71045 X-RAY EXAM CHEST 1 VIEW: CPT | Mod: 26

## 2019-01-09 RX ORDER — WARFARIN SODIUM 2.5 MG/1
5 TABLET ORAL ONCE
Qty: 0 | Refills: 0 | Status: COMPLETED | OUTPATIENT
Start: 2019-01-09 | End: 2019-01-09

## 2019-01-09 RX ADMIN — Medication 1.5 MILLIGRAM(S): at 21:10

## 2019-01-09 RX ADMIN — MIRTAZAPINE 30 MILLIGRAM(S): 45 TABLET, ORALLY DISINTEGRATING ORAL at 21:11

## 2019-01-09 RX ADMIN — Medication 1 TABLET(S): at 13:02

## 2019-01-09 RX ADMIN — Medication 1 APPLICATION(S): at 13:03

## 2019-01-09 RX ADMIN — Medication 125 MICROGRAM(S): at 05:56

## 2019-01-09 RX ADMIN — MEMANTINE HYDROCHLORIDE 10 MILLIGRAM(S): 10 TABLET ORAL at 06:17

## 2019-01-09 RX ADMIN — HYDROMORPHONE HYDROCHLORIDE 4 MILLIGRAM(S): 2 INJECTION INTRAMUSCULAR; INTRAVENOUS; SUBCUTANEOUS at 10:42

## 2019-01-09 RX ADMIN — AMIODARONE HYDROCHLORIDE 200 MILLIGRAM(S): 400 TABLET ORAL at 05:56

## 2019-01-09 RX ADMIN — HYDROMORPHONE HYDROCHLORIDE 2 MILLIGRAM(S): 2 INJECTION INTRAMUSCULAR; INTRAVENOUS; SUBCUTANEOUS at 15:33

## 2019-01-09 RX ADMIN — HYDROMORPHONE HYDROCHLORIDE 2 MILLIGRAM(S): 2 INJECTION INTRAMUSCULAR; INTRAVENOUS; SUBCUTANEOUS at 16:30

## 2019-01-09 RX ADMIN — MEMANTINE HYDROCHLORIDE 10 MILLIGRAM(S): 10 TABLET ORAL at 18:42

## 2019-01-09 RX ADMIN — DONEPEZIL HYDROCHLORIDE 10 MILLIGRAM(S): 10 TABLET, FILM COATED ORAL at 21:11

## 2019-01-09 RX ADMIN — HYDROMORPHONE HYDROCHLORIDE 4 MILLIGRAM(S): 2 INJECTION INTRAMUSCULAR; INTRAVENOUS; SUBCUTANEOUS at 11:30

## 2019-01-09 RX ADMIN — Medication 100 MILLIGRAM(S): at 13:02

## 2019-01-09 RX ADMIN — WARFARIN SODIUM 5 MILLIGRAM(S): 2.5 TABLET ORAL at 21:10

## 2019-01-09 RX ADMIN — Medication 250 MILLIGRAM(S): at 19:40

## 2019-01-09 RX ADMIN — Medication 5 MILLIGRAM(S): at 21:11

## 2019-01-09 RX ADMIN — TAMSULOSIN HYDROCHLORIDE 0.4 MILLIGRAM(S): 0.4 CAPSULE ORAL at 21:11

## 2019-01-09 RX ADMIN — Medication 100 MILLIGRAM(S): at 21:10

## 2019-01-09 RX ADMIN — Medication 100 MILLIGRAM(S): at 05:56

## 2019-01-09 RX ADMIN — SENNA PLUS 2 TABLET(S): 8.6 TABLET ORAL at 21:11

## 2019-01-09 RX ADMIN — POLYETHYLENE GLYCOL 3350 17 GRAM(S): 17 POWDER, FOR SOLUTION ORAL at 13:01

## 2019-01-09 RX ADMIN — HYDROMORPHONE HYDROCHLORIDE 4 MILLIGRAM(S): 2 INJECTION INTRAMUSCULAR; INTRAVENOUS; SUBCUTANEOUS at 20:10

## 2019-01-09 RX ADMIN — HYDROMORPHONE HYDROCHLORIDE 2 MILLIGRAM(S): 2 INJECTION INTRAMUSCULAR; INTRAVENOUS; SUBCUTANEOUS at 23:39

## 2019-01-09 RX ADMIN — HYDROMORPHONE HYDROCHLORIDE 4 MILLIGRAM(S): 2 INJECTION INTRAMUSCULAR; INTRAVENOUS; SUBCUTANEOUS at 05:55

## 2019-01-09 RX ADMIN — HYDROMORPHONE HYDROCHLORIDE 4 MILLIGRAM(S): 2 INJECTION INTRAMUSCULAR; INTRAVENOUS; SUBCUTANEOUS at 19:40

## 2019-01-09 RX ADMIN — HYDROMORPHONE HYDROCHLORIDE 4 MILLIGRAM(S): 2 INJECTION INTRAMUSCULAR; INTRAVENOUS; SUBCUTANEOUS at 06:44

## 2019-01-09 RX ADMIN — PANTOPRAZOLE SODIUM 40 MILLIGRAM(S): 20 TABLET, DELAYED RELEASE ORAL at 05:56

## 2019-01-09 RX ADMIN — Medication 300 MILLIGRAM(S): at 13:02

## 2019-01-09 NOTE — PROGRESS NOTE ADULT - SUBJECTIVE AND OBJECTIVE BOX
NICOLAS CIFUENTES  73y Male  MRN:[id number]    Patient is a 73y old  Male who presents with a chief complaint of positive blood cult (05 Jan 2019 16:15)    HPI:  74 yo M w/ PMH CLL and waldenstroms macroglobulinemia (on ninlaro and dexamethasone), gout, afib (with pacemaker, on coumadin), dementia who [facility]. Pt states had a mechanical fall, was hospitalized with d/c on saturday 12/19. At that time, pt states his right upper arm was red and swollen at prior IV site. On Sunday, he had a fever, and had bcx taken in rehab, found to have MRSA baceremia. Transferred to ED to r/o endocarditis. Pt denies complaints at this time, no cough, chest pain, sob.  pt did not have fever while in hospital last week.  also denies right arm swelling while in hospital   reports prior h/o cardiac murmur (04 Jan 2019 13:34)      Patient seen and evaluated at bedside. No acute events overnight except as noted.    Interval HPI: no events o/n    PAST MEDICAL & SURGICAL HISTORY:  Memory deficit  BPH (benign prostatic hyperplasia)  [place] (hard of hearing)  Gout  CKD (chronic kidney disease)  Nephrolithiasis  Hypothyroid  Bradycardia, drug induced  Waldenstrom macroglobulinemia  Diverticulitis  Atrial fibrillation  GERD (gastroesophageal reflux disease)  Anxiety disorder  CLL (chronic lymphocytic leukemia): in remission  History of cardiac pacemaker in situ  History of appendectomy  History of cataract surgery, right: IOL  History of laparoscopic cholecystectomy: 4/2014  S/P hernia repair: x2  Meniscus tear: s/p removal of Meniscus 8 months ago      REVIEW OF SYSTEMS:  as per hpi    VITALS:  Vital Signs Last 24 Hrs  T(C): 36.7 (09 Jan 2019 06:19), Max: 36.7 (08 Jan 2019 13:44)  T(F): 98.1 (09 Jan 2019 06:19), Max: 98.1 (09 Jan 2019 06:19)  HR: 61 (09 Jan 2019 06:19) (57 - 62)  BP: 148/69 (09 Jan 2019 06:19) (131/61 - 148/69)  BP(mean): --  RR: 18 (09 Jan 2019 06:19) (18 - 18)  SpO2: 94% (09 Jan 2019 06:19) (94% - 96%)    PHYSICAL EXAM:  GENERAL: NAD, well-developed  HEAD:  Atraumatic, Normocephalic  EYES: EOMI, PERRLA, conjunctiva and sclera clear  NECK: Supple, No JVD  CHEST/LUNG: Clear to auscultation bilaterally; No wheeze  HEART: S1, S2; +murmur  ABDOMEN: Soft, Nontender, Nondistended; Bowel sounds present  EXTREMITIES:  2+ Peripheral Pulses, No clubbing, cyanosis, or edema.  +RUE erythema   PSYCH: Normal affect  NEUROLOGY: AAOX3; non-focal  SKIN: No rashes or lesions    Consultant(s) Notes Reviewed:  [x ] YES  [ ] NO  Care Discussed with Consultants/Other Providers [ x] YES  [ ] NO    MEDS:  MEDICATIONS  (STANDING):  allopurinol 300 milliGRAM(s) Oral daily  amiodarone    Tablet 200 milliGRAM(s) Oral daily  betamethasone valerate 0.1% Cream 1 Application(s) Topical daily  clonazePAM Tablet 1.5 milliGRAM(s) Oral at bedtime  docusate sodium 100 milliGRAM(s) Oral three times a day  donepezil 10 milliGRAM(s) Oral at bedtime  lactobacillus acidophilus 1 Tablet(s) Oral daily  levothyroxine 125 MICROGram(s) Oral daily  melatonin 5 milliGRAM(s) Oral at bedtime  memantine 10 milliGRAM(s) Oral every 12 hours  mirtazapine 30 milliGRAM(s) Oral at bedtime  multivitamin 1 Tablet(s) Oral daily  pantoprazole    Tablet 40 milliGRAM(s) Oral before breakfast  polyethylene glycol 3350 17 Gram(s) Oral daily  senna 2 Tablet(s) Oral at bedtime  tamsulosin 0.4 milliGRAM(s) Oral at bedtime  vancomycin  IVPB 1000 milliGRAM(s) IV Intermittent every 24 hours    MEDICATIONS  (PRN):  HYDROmorphone   Tablet 4 milliGRAM(s) Oral every 4 hours PRN Moderate and/or Severe Pain  HYDROmorphone   Tablet 2 milliGRAM(s) Oral every 4 hours PRN Mild Pain (1 - 3)        ALLERGIES:  No Known Allergies  rituximab (Rash)      LABS:                                         10.2   9.19  )-----------( 195      ( 09 Jan 2019 08:07 )             31.9   01-09    139  |  105  |  48<H>  ----------------------------<  117<H>  4.3   |  21<L>  |  2.59<H>    Ca    9.5      09 Jan 2019 06:26           < from: Transthoracic Echocardiogram (01.07.19 @ 07:46) >  -------------------------------  Conclusions:  1. Mild mitral regurgitation.  2. Peak transaortic valve gradient equals 27 mm Hg, mean  transaortic valve gradient equals 12 mm Hg, estimated  aortic valve area equals 1.6 sqcm (by continuity equation),  aortic valve velocity time integral equals 57 cm,  consistent with mild aortic stenosis. Mild to moderate  aortic regurgitation.  3. Normal left ventricular internal dimensions and wall  thicknesses.  4. Normal left ventricular systolic function. No segmental  wall motion abnormalities.  5. Mild diastolic dysfunction (Stage I).  6. Normal right ventricular size and function. A device  wire is noted in the right heart.  7. Mild tricuspid regurgitation.  8. Estimated pulmonary artery systolic pressure equals 46  mm Hg, assuming right atrial pressure equals 8 mm Hg,  consistent with mild pulmonary pressures.  9. No obvious evidence of endocarditis. Consider KT for  further evaluation if clinically indicated.  *** Compared with echocardiogram of 9/6/2016, the aortic  insufficiency has progressed mildly.  -----------------------------------------------    < end of copied text >         Culture - Blood (01.04.19 @ 15:13)    Specimen Source: .Blood Blood-Peripheral    Culture Results:   No growth to date.    < from: VA Duplex Upper Ext Vein Scan, Right (01.04.19 @ 17:09) >  IMPRESSION:     No evidence of right upper extremity deep venous thrombosis.    Superficial vein thrombosis of the right median cubital vein.    < end of copied text >

## 2019-01-09 NOTE — PROGRESS NOTE ADULT - ASSESSMENT
74 yo M w/ PMH CLL and waldenstroms macroglobulinemia (on ninlaro and dexamethasone), gout, afib (with pacemaker, on amio and coumadin), CKD, dementia who [facility] for [facility] blood [facility].  Recent hospitalization, developed RUE thrombophlebitis  At rehab facility developed BCX with MRSA bacteremia in the setting of fever/leukocytosis  Given PO antibiotic, then changed to Vancomycin--now sent to [facility] for further eval  Likely MRSA bacteremia 2/2 thrombophlebitis; clear as of 1/4  Well appearing, no fevers, no leukocytosis  TTE overall reassuring--predictable start to bacteremia, known source, rapidly clearing--reasonable to defer on KT  Overall, MRSA bacteremia, thrombophlebitis, fever, leukocytosis, sepsis  - Continue Vanco through 1/31/19  - Okay to place PICC as long as no signs sepsis (BCX negative)  - Rehab facility to monitor abx, recommend CBC/CMP/Vanco trough weekly  - Check surveillance cultures 1 week after abx completed to ensure no recurrence/evidence endovascular  - Will sign off. Please call with further questions or change in status.    Prosper Mejia MD  Pager [phone]  After 5pm and on weekends call [phone]

## 2019-01-09 NOTE — PROGRESS NOTE ADULT - SUBJECTIVE AND OBJECTIVE BOX
No pain, no shortness of breath      VITAL:  T(C): , Max: 36.7 (01-08-19 @ 13:44)  T(F): , Max: 98.1 (01-09-19 @ 06:19)  HR: 61 (01-09-19 @ 06:19)  BP: 148/69 (01-09-19 @ 06:19)  RR: 18 (01-09-19 @ 06:19)  SpO2: 94% (01-09-19 @ 06:19)      PHYSICAL EXAM:    Constitutional: NAD; Alert  HEENT:  NCAT; DMM  Neck: No JVD; supple  Respiratory: CTA-b/l  Cardiac: RRR s1s2  Gastrointestinal: BS+, soft, NT/ND  Urologic: No walker  Extremities: No peripheral edema  Back: No CVAT b/l    LABS:                        9.6    7.95  )-----------( 179      ( 08 Jan 2019 08:08 )             30.0     Na(139)/K(4.3)/Cl(105)/HCO3(21)/BUN(48)/Cr(2.59)Glu(117)/Ca(9.5)/Mg(--)/PO4(--)    01-09 @ 06:26  Na(139)/K(4.1)/Cl(106)/HCO3(21)/BUN(49)/Cr(2.56)Glu(128)/Ca(9.4)/Mg(--)/PO4(--)    01-08 @ 07:20  Na(138)/K(4.2)/Cl(102)/HCO3(23)/BUN(55)/Cr(2.50)Glu(110)/Ca(9.1)/Mg(--)/PO4(--)    01-07 @ 07:23      IMPRESSION: 74 y/o M w/ HTN, AFib, mild dementia, OA, waldenstrom's macroglobulinemia, and 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   (3)CV - BP/volume acceptable  (4)ID - MRSA cellulitis/bacteremia; on IV Vanco. Appropriate trough. Benefit>risk of PICC for prolonged abx course, in my opinion.      RECOMMEND:  (1)Continue Vanco 1gm IV q24h  (2)Dose new meds for GFR 20-30ml/min  (3)No renal objection to PICC              Norman Ascencio MD  [facility],   [phone]

## 2019-01-09 NOTE — PROGRESS NOTE ADULT - SUBJECTIVE AND OBJECTIVE BOX
CC: F/U for MRSA    Saw/spoke to patient. No fevers, no chills. Overall well. No new complaints.    Allergies  No Known Allergies    ANTIMICROBIALS:  vancomycin  IVPB 1000 every 24 hours    PE:    Vital Signs Last 24 Hrs  T(C): 36.7 (09 Jan 2019 06:19), Max: 36.7 (09 Jan 2019 06:19)  T(F): 98.1 (09 Jan 2019 06:19), Max: 98.1 (09 Jan 2019 06:19)  HR: 61 (09 Jan 2019 06:19) (57 - 61)  BP: 148/69 (09 Jan 2019 06:19) (131/61 - 148/69)  RR: 18 (09 Jan 2019 06:19) (18 - 18)  SpO2: 94% (09 Jan 2019 06:19) (94% - 95%)    Gen: AOx3, NAD, non-toxic, pleasant  CV: S1+S2 normal, nontachycardic  Resp: Clear bilat, no resp distress, no crackles/wheezes  Abd: Soft, nontender, +BS  Ext: No LE edema, no wounds    LABS:                        10.2   9.19  )-----------( 195      ( 09 Jan 2019 08:07 )             31.9     01-09    139  |  105  |  48<H>  ----------------------------<  117<H>  4.3   |  21<L>  |  2.59<H>    Ca    9.5      09 Jan 2019 06:26    MICROBIOLOGY:    .Blood Blood-Peripheral  01-04-19   No growth to date.    .Urine CL CATCH [facility]  01-01-19   <10,000 CFU/ml Normal Urogenital libby present    .Blood PERCUTANEOUS (BLOOD)  12-31-18   Growth in aerobic and anaerobic bottles: Methicillin resistant  Staphylococcus aureus    .Urine Clean Catch (Midstream)  12-23-18   <10,000 CFU/ml  Normal Urogenital libby present    RADIOLOGY:    1/9 CXR:    FINDINGS/  IMPRESSION:    Tip of the right PICC within cavoatrial junction. The left pacer is   intact.    No consolidation, pleural effusion or pneumothorax. No pulmonary edema.

## 2019-01-09 NOTE — PROGRESS NOTE ADULT - ASSESSMENT
72 yo male h/o CLL, waldenstroms, afib s/p ppm on coumadin, ckd, recent admission for mechanical fall with pubic rami fx, now sent from rehab with bacteremia and RUE cellulitis    ID  bacteremia / cellulites with MRSA  vanco as per id  repeat blood cultures - ngtd  echo no evid of veg  RUE duplex noted  awaiting picc line for long term abx     afib  AC with coumadin - on hold pending picc line  rate controlled    ckd  stable  renal f/u  avoid nephrotoxins    CLL  stable   heme onc f/u    pubic rami fx  no sx intervention  pain control  PT    cont other home meds    dc to rehab after picc line placed

## 2019-01-10 VITALS
TEMPERATURE: 98 F | HEART RATE: 63 BPM | OXYGEN SATURATION: 94 % | DIASTOLIC BLOOD PRESSURE: 70 MMHG | RESPIRATION RATE: 18 BRPM | SYSTOLIC BLOOD PRESSURE: 127 MMHG

## 2019-01-10 LAB
ANION GAP SERPL CALC-SCNC: 12 MMOL/L — SIGNIFICANT CHANGE UP (ref 5–17)
BUN SERPL-MCNC: 45 MG/DL — HIGH (ref 7–23)
CALCIUM SERPL-MCNC: 9.5 MG/DL — SIGNIFICANT CHANGE UP (ref 8.4–10.5)
CHLORIDE SERPL-SCNC: 105 MMOL/L — SIGNIFICANT CHANGE UP (ref 96–108)
CO2 SERPL-SCNC: 23 MMOL/L — SIGNIFICANT CHANGE UP (ref 22–31)
CREAT SERPL-MCNC: 2.6 MG/DL — HIGH (ref 0.5–1.3)
GLUCOSE SERPL-MCNC: 113 MG/DL — HIGH (ref 70–99)
HCT VFR BLD CALC: 29.7 % — LOW (ref 39–50)
HGB BLD-MCNC: 9.5 G/DL — LOW (ref 13–17)
INR BLD: 1.37 RATIO — HIGH (ref 0.88–1.16)
MCHC RBC-ENTMCNC: 30.7 PG — SIGNIFICANT CHANGE UP (ref 27–34)
MCHC RBC-ENTMCNC: 32 GM/DL — SIGNIFICANT CHANGE UP (ref 32–36)
MCV RBC AUTO: 96.1 FL — SIGNIFICANT CHANGE UP (ref 80–100)
PLATELET # BLD AUTO: 172 K/UL — SIGNIFICANT CHANGE UP (ref 150–400)
POTASSIUM SERPL-MCNC: 4.1 MMOL/L — SIGNIFICANT CHANGE UP (ref 3.5–5.3)
POTASSIUM SERPL-SCNC: 4.1 MMOL/L — SIGNIFICANT CHANGE UP (ref 3.5–5.3)
PROTHROM AB SERPL-ACNC: 15.8 SEC — HIGH (ref 10–13.1)
RBC # BLD: 3.09 M/UL — LOW (ref 4.2–5.8)
RBC # FLD: 14.7 % — HIGH (ref 10.3–14.5)
SODIUM SERPL-SCNC: 140 MMOL/L — SIGNIFICANT CHANGE UP (ref 135–145)
WBC # BLD: 7.87 K/UL — SIGNIFICANT CHANGE UP (ref 3.8–10.5)
WBC # FLD AUTO: 7.87 K/UL — SIGNIFICANT CHANGE UP (ref 3.8–10.5)

## 2019-01-10 PROCEDURE — 84100 ASSAY OF PHOSPHORUS: CPT

## 2019-01-10 PROCEDURE — 82570 ASSAY OF URINE CREATININE: CPT

## 2019-01-10 PROCEDURE — 87040 BLOOD CULTURE FOR BACTERIA: CPT

## 2019-01-10 PROCEDURE — 83540 ASSAY OF IRON: CPT

## 2019-01-10 PROCEDURE — 71045 X-RAY EXAM CHEST 1 VIEW: CPT

## 2019-01-10 PROCEDURE — 83550 IRON BINDING TEST: CPT

## 2019-01-10 PROCEDURE — 99285 EMERGENCY DEPT VISIT HI MDM: CPT

## 2019-01-10 PROCEDURE — 93306 TTE W/DOPPLER COMPLETE: CPT

## 2019-01-10 PROCEDURE — 80048 BASIC METABOLIC PNL TOTAL CA: CPT

## 2019-01-10 PROCEDURE — 97162 PT EVAL MOD COMPLEX 30 MIN: CPT

## 2019-01-10 PROCEDURE — 84156 ASSAY OF PROTEIN URINE: CPT

## 2019-01-10 PROCEDURE — 85610 PROTHROMBIN TIME: CPT

## 2019-01-10 PROCEDURE — 80202 ASSAY OF VANCOMYCIN: CPT

## 2019-01-10 PROCEDURE — 82728 ASSAY OF FERRITIN: CPT

## 2019-01-10 PROCEDURE — 97161 PT EVAL LOW COMPLEX 20 MIN: CPT

## 2019-01-10 PROCEDURE — 81001 URINALYSIS AUTO W/SCOPE: CPT

## 2019-01-10 PROCEDURE — 36569 INSJ PICC 5 YR+ W/O IMAGING: CPT

## 2019-01-10 PROCEDURE — 80053 COMPREHEN METABOLIC PANEL: CPT

## 2019-01-10 PROCEDURE — 85027 COMPLETE CBC AUTOMATED: CPT

## 2019-01-10 PROCEDURE — C1751: CPT

## 2019-01-10 PROCEDURE — 93971 EXTREMITY STUDY: CPT

## 2019-01-10 RX ORDER — PANTOPRAZOLE SODIUM 20 MG/1
1 TABLET, DELAYED RELEASE ORAL
Qty: 0 | Refills: 0 | DISCHARGE
Start: 2019-01-10

## 2019-01-10 RX ORDER — OXYCODONE HYDROCHLORIDE 5 MG/1
1 TABLET ORAL
Qty: 0 | Refills: 0 | COMMUNITY

## 2019-01-10 RX ORDER — CLONAZEPAM 1 MG
3 TABLET ORAL
Qty: 0 | Refills: 0 | DISCHARGE
Start: 2019-01-10

## 2019-01-10 RX ORDER — LACTOBACILLUS ACIDOPHILUS 100MM CELL
1 CAPSULE ORAL
Qty: 0 | Refills: 0 | DISCHARGE
Start: 2019-01-10

## 2019-01-10 RX ORDER — POLYETHYLENE GLYCOL 3350 17 G/17G
17 POWDER, FOR SOLUTION ORAL
Qty: 0 | Refills: 0 | DISCHARGE
Start: 2019-01-10

## 2019-01-10 RX ORDER — LEVOTHYROXINE SODIUM 125 MCG
1 TABLET ORAL
Qty: 0 | Refills: 0 | COMMUNITY

## 2019-01-10 RX ORDER — HYDROMORPHONE HYDROCHLORIDE 2 MG/ML
1 INJECTION INTRAMUSCULAR; INTRAVENOUS; SUBCUTANEOUS
Qty: 0 | Refills: 0 | DISCHARGE
Start: 2019-01-10

## 2019-01-10 RX ORDER — DOCUSATE SODIUM 100 MG
3 CAPSULE ORAL
Qty: 0 | Refills: 0 | COMMUNITY

## 2019-01-10 RX ORDER — HYDROMORPHONE HYDROCHLORIDE 2 MG/ML
1 INJECTION INTRAMUSCULAR; INTRAVENOUS; SUBCUTANEOUS
Qty: 0 | Refills: 0 | COMMUNITY

## 2019-01-10 RX ORDER — SENNA PLUS 8.6 MG/1
2 TABLET ORAL
Qty: 0 | Refills: 0 | COMMUNITY

## 2019-01-10 RX ORDER — TAMSULOSIN HYDROCHLORIDE 0.4 MG/1
1 CAPSULE ORAL
Qty: 0 | Refills: 0 | COMMUNITY

## 2019-01-10 RX ORDER — LACTOBACILLUS ACIDOPHILUS 100MM CELL
1 CAPSULE ORAL
Qty: 0 | Refills: 0 | COMMUNITY

## 2019-01-10 RX ORDER — VANCOMYCIN HCL 1 G
1 VIAL (EA) INTRAVENOUS
Qty: 0 | Refills: 0 | DISCHARGE
Start: 2019-01-10

## 2019-01-10 RX ORDER — SENNA PLUS 8.6 MG/1
2 TABLET ORAL
Qty: 0 | Refills: 0 | DISCHARGE
Start: 2019-01-10

## 2019-01-10 RX ORDER — PANTOPRAZOLE SODIUM 20 MG/1
1 TABLET, DELAYED RELEASE ORAL
Qty: 0 | Refills: 0 | COMMUNITY

## 2019-01-10 RX ORDER — POLYETHYLENE GLYCOL 3350 17 G/17G
17 POWDER, FOR SOLUTION ORAL
Qty: 0 | Refills: 0 | COMMUNITY

## 2019-01-10 RX ORDER — DONEPEZIL HYDROCHLORIDE 10 MG/1
1 TABLET, FILM COATED ORAL
Qty: 0 | Refills: 0 | DISCHARGE
Start: 2019-01-10

## 2019-01-10 RX ORDER — ACETAMINOPHEN 500 MG
3 TABLET ORAL
Qty: 0 | Refills: 0 | COMMUNITY

## 2019-01-10 RX ORDER — WARFARIN SODIUM 2.5 MG/1
1.5 TABLET ORAL
Qty: 0 | Refills: 0 | COMMUNITY

## 2019-01-10 RX ORDER — ALLOPURINOL 300 MG
1 TABLET ORAL
Qty: 0 | Refills: 0 | DISCHARGE
Start: 2019-01-10

## 2019-01-10 RX ORDER — MEMANTINE HYDROCHLORIDE 10 MG/1
1 TABLET ORAL
Qty: 0 | Refills: 0 | DISCHARGE
Start: 2019-01-10

## 2019-01-10 RX ORDER — DONEPEZIL HYDROCHLORIDE 10 MG/1
1 TABLET, FILM COATED ORAL
Qty: 0 | Refills: 0 | COMMUNITY

## 2019-01-10 RX ORDER — DOCUSATE SODIUM 100 MG
1 CAPSULE ORAL
Qty: 0 | Refills: 0 | DISCHARGE
Start: 2019-01-10

## 2019-01-10 RX ORDER — MICONAZOLE NITRATE 2 %
1 CREAM (GRAM) TOPICAL
Qty: 0 | Refills: 0 | COMMUNITY

## 2019-01-10 RX ORDER — LANOLIN ALCOHOL/MO/W.PET/CERES
1 CREAM (GRAM) TOPICAL
Qty: 0 | Refills: 0 | DISCHARGE
Start: 2019-01-10

## 2019-01-10 RX ORDER — HYDROMORPHONE HYDROCHLORIDE 2 MG/ML
4 INJECTION INTRAMUSCULAR; INTRAVENOUS; SUBCUTANEOUS ONCE
Qty: 0 | Refills: 0 | Status: DISCONTINUED | OUTPATIENT
Start: 2019-01-10 | End: 2019-01-10

## 2019-01-10 RX ORDER — TAMSULOSIN HYDROCHLORIDE 0.4 MG/1
1 CAPSULE ORAL
Qty: 0 | Refills: 0 | DISCHARGE
Start: 2019-01-10

## 2019-01-10 RX ORDER — ALLOPURINOL 300 MG
1 TABLET ORAL
Qty: 0 | Refills: 0 | COMMUNITY

## 2019-01-10 RX ORDER — MIRTAZAPINE 45 MG/1
1 TABLET, ORALLY DISINTEGRATING ORAL
Qty: 0 | Refills: 0 | DISCHARGE
Start: 2019-01-10

## 2019-01-10 RX ORDER — LEVOTHYROXINE SODIUM 125 MCG
1 TABLET ORAL
Qty: 0 | Refills: 0 | DISCHARGE
Start: 2019-01-10

## 2019-01-10 RX ORDER — LANOLIN ALCOHOL/MO/W.PET/CERES
1 CREAM (GRAM) TOPICAL
Qty: 0 | Refills: 0 | COMMUNITY

## 2019-01-10 RX ORDER — MIRTAZAPINE 45 MG/1
1 TABLET, ORALLY DISINTEGRATING ORAL
Qty: 0 | Refills: 0 | COMMUNITY

## 2019-01-10 RX ORDER — AMIODARONE HYDROCHLORIDE 400 MG/1
1 TABLET ORAL
Qty: 0 | Refills: 0 | DISCHARGE
Start: 2019-01-10

## 2019-01-10 RX ADMIN — PANTOPRAZOLE SODIUM 40 MILLIGRAM(S): 20 TABLET, DELAYED RELEASE ORAL at 05:49

## 2019-01-10 RX ADMIN — Medication 1 TABLET(S): at 12:10

## 2019-01-10 RX ADMIN — HYDROMORPHONE HYDROCHLORIDE 4 MILLIGRAM(S): 2 INJECTION INTRAMUSCULAR; INTRAVENOUS; SUBCUTANEOUS at 09:16

## 2019-01-10 RX ADMIN — Medication 300 MILLIGRAM(S): at 12:11

## 2019-01-10 RX ADMIN — AMIODARONE HYDROCHLORIDE 200 MILLIGRAM(S): 400 TABLET ORAL at 05:49

## 2019-01-10 RX ADMIN — Medication 100 MILLIGRAM(S): at 05:49

## 2019-01-10 RX ADMIN — Medication 125 MICROGRAM(S): at 05:49

## 2019-01-10 RX ADMIN — Medication 1 APPLICATION(S): at 12:12

## 2019-01-10 RX ADMIN — Medication 1 TABLET(S): at 12:11

## 2019-01-10 RX ADMIN — HYDROMORPHONE HYDROCHLORIDE 4 MILLIGRAM(S): 2 INJECTION INTRAMUSCULAR; INTRAVENOUS; SUBCUTANEOUS at 10:25

## 2019-01-10 RX ADMIN — HYDROMORPHONE HYDROCHLORIDE 4 MILLIGRAM(S): 2 INJECTION INTRAMUSCULAR; INTRAVENOUS; SUBCUTANEOUS at 04:31

## 2019-01-10 RX ADMIN — MEMANTINE HYDROCHLORIDE 10 MILLIGRAM(S): 10 TABLET ORAL at 05:49

## 2019-01-10 RX ADMIN — HYDROMORPHONE HYDROCHLORIDE 4 MILLIGRAM(S): 2 INJECTION INTRAMUSCULAR; INTRAVENOUS; SUBCUTANEOUS at 04:01

## 2019-01-10 RX ADMIN — HYDROMORPHONE HYDROCHLORIDE 2 MILLIGRAM(S): 2 INJECTION INTRAMUSCULAR; INTRAVENOUS; SUBCUTANEOUS at 00:09

## 2019-01-10 RX ADMIN — HYDROMORPHONE HYDROCHLORIDE 4 MILLIGRAM(S): 2 INJECTION INTRAMUSCULAR; INTRAVENOUS; SUBCUTANEOUS at 12:11

## 2019-01-10 NOTE — PROGRESS NOTE ADULT - SUBJECTIVE AND OBJECTIVE BOX
No pain, no shortness of breath      VITAL:  T(C): , Max: 37.1 (01-09-19 @ 21:06)  T(F): , Max: 98.7 (01-09-19 @ 21:06)  HR: 63 (01-10-19 @ 04:49)  BP: 127/70 (01-10-19 @ 04:49)  RR: 18 (01-10-19 @ 04:49)  SpO2: 94% (01-10-19 @ 04:49)      PHYSICAL EXAM:    Constitutional: NAD; Alert  HEENT:  NCAT; DMM  Neck: No JVD; supple  Respiratory: CTA-b/l  Cardiac: RRR s1s2  Gastrointestinal: BS+, soft, NT/ND  Urologic: No walker  Extremities: No peripheral edema  Back: No CVAT b/l    LABS:                        9.5    7.87  )-----------( 172      ( 10 Josh 2019 07:31 )             29.7     Na(140)/K(4.1)/Cl(105)/HCO3(23)/BUN(45)/Cr(2.60)Glu(113)/Ca(9.5)/Mg(--)/PO4(--)    01-10 @ 06:12  Na(139)/K(4.3)/Cl(105)/HCO3(21)/BUN(48)/Cr(2.59)Glu(117)/Ca(9.5)/Mg(--)/PO4(--)    01-09 @ 06:26  Na(139)/K(4.1)/Cl(106)/HCO3(21)/BUN(49)/Cr(2.56)Glu(128)/Ca(9.4)/Mg(--)/PO4(--)    01-08 @ 07:20      IMPRESSION: 72 y/o M w/ HTN, AFib, mild dementia, OA, waldenstrom's macroglobulinemia, and 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   (3)CV - BP/volume acceptable  (4)ID - MRSA cellulitis/bacteremia; on IV Vanco. Appropriate trough, earlier this week. Benefit>risk of PICC for prolonged abx course, in my opinion.      RECOMMEND:  (1)Continue Vanco 1gm IV q24h  (2)Dose new meds for GFR 20-30ml/min  (3)D/C planning per primary team; could f/u at my office in 1-2 months              Norman Ascencio MD  [facility]  [phone] No pain, no shortness of breath  S/P LUE PICC placement yesterday    VITAL:  T(C): , Max: 37.1 (01-09-19 @ 21:06)  T(F): , Max: 98.7 (01-09-19 @ 21:06)  HR: 63 (01-10-19 @ 04:49)  BP: 127/70 (01-10-19 @ 04:49)  RR: 18 (01-10-19 @ 04:49)  SpO2: 94% (01-10-19 @ 04:49)      PHYSICAL EXAM:    Constitutional: NAD; Alert  HEENT:  NCAT; DMM  Neck: No JVD; supple  Respiratory: CTA-b/l  Cardiac: RRR s1s2  Gastrointestinal: BS+, soft, NT/ND  Urologic: No walker  Extremities: No peripheral edema; (+)LUE PICC  Back: No CVAT b/l    LABS:                        9.5    7.87  )-----------( 172      ( 10 Josh 2019 07:31 )             29.7     Na(140)/K(4.1)/Cl(105)/HCO3(23)/BUN(45)/Cr(2.60)Glu(113)/Ca(9.5)/Mg(--)/PO4(--)    01-10 @ 06:12  Na(139)/K(4.3)/Cl(105)/HCO3(21)/BUN(48)/Cr(2.59)Glu(117)/Ca(9.5)/Mg(--)/PO4(--)    01-09 @ 06:26  Na(139)/K(4.1)/Cl(106)/HCO3(21)/BUN(49)/Cr(2.56)Glu(128)/Ca(9.4)/Mg(--)/PO4(--)    01-08 @ 07:20      IMPRESSION: 74 y/o M w/ HTN, AFib, mild dementia, OA, waldenstrom's macroglobulinemia, and 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   (3)CV - BP/volume acceptable  (4)ID - MRSA cellulitis/bacteremia; on IV Vanco. Appropriate trough, earlier this week. Benefit>risk of PICC for prolonged abx course, in my opinion.      RECOMMEND:  (1)Continue Vanco 1gm IV q24h  (2)Dose new meds for GFR 20-30ml/min  (3)D/C planning per primary team; could f/u at my office in 1-2 months              Norman Ascencio MD  [facility]  [phone]

## 2019-01-10 NOTE — PROGRESS NOTE ADULT - PROVIDER SPECIALTY LIST ADULT
Infectious Disease
Internal Medicine
Nephrology

## 2019-01-10 NOTE — PROGRESS NOTE ADULT - REASON FOR ADMISSION
positive blood cult

## 2019-01-16 ENCOUNTER — OUTPATIENT (OUTPATIENT)
Dept: OUTPATIENT SERVICES | Facility: HOSPITAL | Age: 74
LOS: 1 days | Discharge: HOME | End: 2019-01-16

## 2019-01-16 DIAGNOSIS — C91.11 CHRONIC LYMPHOCYTIC LEUKEMIA OF B-CELL TYPE IN REMISSION: ICD-10-CM

## 2019-01-16 DIAGNOSIS — Z98.890 OTHER SPECIFIED POSTPROCEDURAL STATES: Chronic | ICD-10-CM

## 2019-01-16 DIAGNOSIS — Z98.41 CATARACT EXTRACTION STATUS, RIGHT EYE: Chronic | ICD-10-CM

## 2019-01-16 DIAGNOSIS — Z95.0 PRESENCE OF CARDIAC PACEMAKER: Chronic | ICD-10-CM

## 2019-01-16 DIAGNOSIS — Z98.89 OTHER SPECIFIED POSTPROCEDURAL STATES: Chronic | ICD-10-CM

## 2019-01-18 ENCOUNTER — INBOUND DOCUMENT (OUTPATIENT)
Age: 74
End: 2019-01-18

## 2019-01-22 ENCOUNTER — APPOINTMENT (OUTPATIENT)
Dept: HEMATOLOGY ONCOLOGY | Facility: CLINIC | Age: 74
End: 2019-01-22

## 2019-02-06 ENCOUNTER — RESULT REVIEW (OUTPATIENT)
Age: 74
End: 2019-02-06

## 2019-02-06 ENCOUNTER — APPOINTMENT (OUTPATIENT)
Dept: HEMATOLOGY ONCOLOGY | Facility: CLINIC | Age: 74
End: 2019-02-06
Payer: MEDICARE

## 2019-02-06 VITALS
SYSTOLIC BLOOD PRESSURE: 123 MMHG | RESPIRATION RATE: 16 BRPM | OXYGEN SATURATION: 98 % | TEMPERATURE: 97.9 F | DIASTOLIC BLOOD PRESSURE: 69 MMHG | HEART RATE: 70 BPM

## 2019-02-06 DIAGNOSIS — I80.8 PHLEBITIS AND THROMBOPHLEBITIS OF OTHER SITES: ICD-10-CM

## 2019-02-06 DIAGNOSIS — S32.599A OTHER SPECIFIED FRACTURE OF UNSPECIFIED PUBIS, INITIAL ENCOUNTER FOR CLOSED FRACTURE: ICD-10-CM

## 2019-02-06 LAB
BASOPHILS # BLD AUTO: 0 K/UL — SIGNIFICANT CHANGE UP (ref 0–0.2)
BASOPHILS NFR BLD AUTO: 0.7 % — SIGNIFICANT CHANGE UP (ref 0–2)
EOSINOPHIL # BLD AUTO: 0.4 K/UL — SIGNIFICANT CHANGE UP (ref 0–0.5)
EOSINOPHIL NFR BLD AUTO: 5.9 % — SIGNIFICANT CHANGE UP (ref 0–6)
HCT VFR BLD CALC: 32.8 % — LOW (ref 39–50)
HGB BLD-MCNC: 10.8 G/DL — LOW (ref 13–17)
LYMPHOCYTES # BLD AUTO: 1.6 K/UL — SIGNIFICANT CHANGE UP (ref 1–3.3)
LYMPHOCYTES # BLD AUTO: 25.4 % — SIGNIFICANT CHANGE UP (ref 13–44)
MCHC RBC-ENTMCNC: 31.5 PG — SIGNIFICANT CHANGE UP (ref 27–34)
MCHC RBC-ENTMCNC: 33.1 G/DL — SIGNIFICANT CHANGE UP (ref 32–36)
MCV RBC AUTO: 95.2 FL — SIGNIFICANT CHANGE UP (ref 80–100)
MONOCYTES # BLD AUTO: 0.6 K/UL — SIGNIFICANT CHANGE UP (ref 0–0.9)
MONOCYTES NFR BLD AUTO: 9.1 % — SIGNIFICANT CHANGE UP (ref 2–14)
NEUTROPHILS # BLD AUTO: 3.8 K/UL — SIGNIFICANT CHANGE UP (ref 1.8–7.4)
NEUTROPHILS NFR BLD AUTO: 58.9 % — SIGNIFICANT CHANGE UP (ref 43–77)
PLATELET # BLD AUTO: 151 K/UL — SIGNIFICANT CHANGE UP (ref 150–400)
RBC # BLD: 3.44 M/UL — LOW (ref 4.2–5.8)
RBC # FLD: 13.3 % — SIGNIFICANT CHANGE UP (ref 10.3–14.5)
WBC # BLD: 6.5 K/UL — SIGNIFICANT CHANGE UP (ref 3.8–10.5)
WBC # FLD AUTO: 6.5 K/UL — SIGNIFICANT CHANGE UP (ref 3.8–10.5)

## 2019-02-06 PROCEDURE — 99214 OFFICE O/P EST MOD 30 MIN: CPT

## 2019-02-06 RX ORDER — DEXAMETHASONE 4 MG/1
4 TABLET ORAL
Qty: 15 | Refills: 5 | Status: DISCONTINUED | COMMUNITY
Start: 2018-08-06 | End: 2019-02-06

## 2019-02-06 RX ORDER — IXAZOMIB 2.3 MG/1
2.3 CAPSULE ORAL
Qty: 3 | Refills: 5 | Status: DISCONTINUED | COMMUNITY
Start: 2018-08-06 | End: 2019-02-06

## 2019-02-06 NOTE — ASSESSMENT
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 He had progressive anemia with rising creatinine and relapse of his Waldenstrom's. He was not a candidate for ibrutinib due to anticoagulation with Warfarin. He has  achieved a partial remission with marked improvement in his hemoglobin, creatinine and IgM. He has developed psoriasis. He has not tolerated rituxan well and is loathe to receive it again.\par \par Plan:\par Observe\par Consider maintenance ninlaro/dexamethasone\par CMP, LDH, SPEP, SPIEP, Quant Ig, SFLC, viscosity\par Acyclovir\par Omeprazole\par Warfarin per Cardiology\par RTC 1 month\par

## 2019-02-06 NOTE — ADDENDUM
[FreeTextEntry1] : Documented by Tyra Castro acting as a scribe for Dr. Cesar Simons on 02/06/2019. \par \par All medical record entries made by the Scribe were at my, Dr. Cesar Simons's, direction and personally dictated by me on 02/06/2019. I have reviewed the chart and agree that the record accurately reflects my personal performance of the history, physical exam, results, assessment and plan. I have also personally directed, reviewed, and agree with the discharge instructions.\par

## 2019-02-06 NOTE — HISTORY OF PRESENT ILLNESS
[Disease:__________________________] : Disease: [unfilled] [Rosales Stage: ___] : Rosales Stage: [unfilled] [Cycle: ___] : Cycle: [unfilled] [Day: ___] : Day: [unfilled] [de-identified] : PAF\par 10/2015 Waldenstrom's macroglobulinemia ; + dim lambda, CD 19, 23, FMC-7; negative CD 5, 10, 20\par Renal biopsy: lymphocytic infiltrate of renal cortex\par 7/2018: Nephrotic syndrome with lambda light chains\par  [de-identified] : +lambda, CD5, CD 20; CD 38 --; FISH del 13q14.3\par SPEP gamma paraprotein M 0.4\par SPIEP IgM lambda; 10/17 IgGk, lambda [FreeTextEntry1] : 10/2015 - 1/2017  Rituxan/Velcade/Decadron; 8/18 - 1/19 Ixazomib/Decadron/Rituxan x 6 cycles [de-identified] : Just discharged from rehab after right pubic ramus fracture complicated by sepsis.Feels tired. Ambulates 40-50 feet with a walker, but has to stop due to right hip pain secondary to a fractured pelvis. Treating psoriasis rash with hydrocortisone cream. Continues to have joint pain. Neuropathy slightly worse. No fevers, night sweats, swollen glands, chest pain, abdominal pain, SOB, bleeding, bruising, HA, visual problems, melena, BRBPR. Had flu vaccine.\par \par \par

## 2019-02-06 NOTE — RESULTS/DATA
[FreeTextEntry1] : WBC 6,500, Hgb 10.8, Hct 32.8 Platelets 151,000 Diff normal\par \par 12/10/18:\par CMP: glucose 122, BUN 50, creat 2.77, eGFR 22\par LDH: 179\par SPEP M 0.78\par One IgM lambda and one weak lambda light chains identified \par SFLC k 0.88, lambda 86.15\par IgG 606, A 25, M 791\par \par \par \par

## 2019-02-06 NOTE — REVIEW OF SYSTEMS
[Skin Rash] : skin rash [Negative] : Allergic/Immunologic [Joint Pain] : joint pain [Fatigue] : fatigue [FreeTextEntry9] : Right hip pain  [de-identified] : Neuropathy

## 2019-02-06 NOTE — PHYSICAL EXAM
[Normal] : affect appropriate [Capable of only limited self care, confined to bed or chair more than 50% of waking hours] : Status 3- Capable of only limited self care, confined to bed or chair more than 50% of waking hours [de-identified] :  Pacemaker left anterior chest wall.  [de-identified] : 5 x 5 cm lipoma left lower back. Psoriasis on limbs..

## 2019-02-25 ENCOUNTER — OUTPATIENT (OUTPATIENT)
Dept: OUTPATIENT SERVICES | Facility: HOSPITAL | Age: 74
LOS: 1 days | Discharge: HOME | End: 2019-02-25

## 2019-02-25 DIAGNOSIS — Z98.41 CATARACT EXTRACTION STATUS, RIGHT EYE: Chronic | ICD-10-CM

## 2019-02-25 DIAGNOSIS — Z98.89 OTHER SPECIFIED POSTPROCEDURAL STATES: Chronic | ICD-10-CM

## 2019-02-25 DIAGNOSIS — Z98.890 OTHER SPECIFIED POSTPROCEDURAL STATES: Chronic | ICD-10-CM

## 2019-02-25 DIAGNOSIS — Z95.0 PRESENCE OF CARDIAC PACEMAKER: Chronic | ICD-10-CM

## 2019-02-25 DIAGNOSIS — C91.11 CHRONIC LYMPHOCYTIC LEUKEMIA OF B-CELL TYPE IN REMISSION: ICD-10-CM

## 2019-02-25 LAB
ALBUMIN MFR SERPL ELPH: 58.3 %
ALBUMIN SERPL ELPH-MCNC: 4.4 G/DL
ALBUMIN SERPL-MCNC: 3.9 G/DL
ALBUMIN/GLOB SERPL: 1.4 RATIO
ALP BLD-CCNC: 135 U/L
ALPHA1 GLOB MFR SERPL ELPH: 6.7 %
ALPHA1 GLOB SERPL ELPH-MCNC: 0.4 G/DL
ALPHA2 GLOB MFR SERPL ELPH: 12.2 %
ALPHA2 GLOB SERPL ELPH-MCNC: 0.8 G/DL
ALT SERPL-CCNC: 11 U/L
ANION GAP SERPL CALC-SCNC: 16 MMOL/L
AST SERPL-CCNC: 13 U/L
B-GLOBULIN MFR SERPL ELPH: 8.8 %
B-GLOBULIN SERPL ELPH-MCNC: 0.6 G/DL
BILIRUB SERPL-MCNC: 0.2 MG/DL
BUN SERPL-MCNC: 35 MG/DL
CALCIUM SERPL-MCNC: 9.5 MG/DL
CHLORIDE SERPL-SCNC: 105 MMOL/L
CO2 SERPL-SCNC: 25 MMOL/L
CREAT SERPL-MCNC: 2.73 MG/DL
DEPRECATED KAPPA LC FREE/LAMBDA SER: 0.01 RATIO
DEPRECATED KAPPA LC FREE/LAMBDA SER: 0.01 RATIO
GAMMA GLOB FLD ELPH-MCNC: 0.9 G/DL
GAMMA GLOB MFR SERPL ELPH: 14 %
GLUCOSE SERPL-MCNC: 86 MG/DL
IGA SER QL IEP: 21 MG/DL
IGG SER QL IEP: 529 MG/DL
IGM SER QL IEP: 672 MG/DL
INTERPRETATION SERPL IEP-IMP: NORMAL
KAPPA LC CSF-MCNC: 122.11 MG/DL
KAPPA LC CSF-MCNC: 122.11 MG/DL
KAPPA LC SERPL-MCNC: 0.83 MG/DL
KAPPA LC SERPL-MCNC: 0.83 MG/DL
LDH SERPL-CCNC: 171 U/L
M PROTEIN MFR SERPL ELPH: 9.4 %
M PROTEIN SPEC IFE-MCNC: NORMAL
MONOCLON BAND OBS SERPL: 0.6 G/DL
POTASSIUM SERPL-SCNC: 4.7 MMOL/L
PROT SERPL-MCNC: 6.7 G/DL
SODIUM SERPL-SCNC: 146 MMOL/L
VISCOSITY, SERUM: 1.6

## 2019-03-06 ENCOUNTER — APPOINTMENT (OUTPATIENT)
Dept: HEMATOLOGY ONCOLOGY | Facility: CLINIC | Age: 74
End: 2019-03-06
Payer: MEDICARE

## 2019-03-06 ENCOUNTER — RESULT REVIEW (OUTPATIENT)
Age: 74
End: 2019-03-06

## 2019-03-06 VITALS
WEIGHT: 222.66 LBS | HEART RATE: 83 BPM | BODY MASS INDEX: 28.59 KG/M2 | TEMPERATURE: 97.9 F | SYSTOLIC BLOOD PRESSURE: 133 MMHG | OXYGEN SATURATION: 98 % | DIASTOLIC BLOOD PRESSURE: 77 MMHG | RESPIRATION RATE: 16 BRPM

## 2019-03-06 LAB
BASOPHILS # BLD AUTO: 0.1 K/UL — SIGNIFICANT CHANGE UP (ref 0–0.2)
BASOPHILS NFR BLD AUTO: 0.7 % — SIGNIFICANT CHANGE UP (ref 0–2)
EOSINOPHIL # BLD AUTO: 0.3 K/UL — SIGNIFICANT CHANGE UP (ref 0–0.5)
EOSINOPHIL NFR BLD AUTO: 3.5 % — SIGNIFICANT CHANGE UP (ref 0–6)
HCT VFR BLD CALC: 35.6 % — LOW (ref 39–50)
HGB BLD-MCNC: 12.1 G/DL — LOW (ref 13–17)
LYMPHOCYTES # BLD AUTO: 1.8 K/UL — SIGNIFICANT CHANGE UP (ref 1–3.3)
LYMPHOCYTES # BLD AUTO: 23.4 % — SIGNIFICANT CHANGE UP (ref 13–44)
MCHC RBC-ENTMCNC: 32.4 PG — SIGNIFICANT CHANGE UP (ref 27–34)
MCHC RBC-ENTMCNC: 33.9 G/DL — SIGNIFICANT CHANGE UP (ref 32–36)
MCV RBC AUTO: 95.4 FL — SIGNIFICANT CHANGE UP (ref 80–100)
MONOCYTES # BLD AUTO: 0.7 K/UL — SIGNIFICANT CHANGE UP (ref 0–0.9)
MONOCYTES NFR BLD AUTO: 9.4 % — SIGNIFICANT CHANGE UP (ref 2–14)
NEUTROPHILS # BLD AUTO: 4.8 K/UL — SIGNIFICANT CHANGE UP (ref 1.8–7.4)
NEUTROPHILS NFR BLD AUTO: 63 % — SIGNIFICANT CHANGE UP (ref 43–77)
PLATELET # BLD AUTO: 190 K/UL — SIGNIFICANT CHANGE UP (ref 150–400)
RBC # BLD: 3.73 M/UL — LOW (ref 4.2–5.8)
RBC # FLD: 13.2 % — SIGNIFICANT CHANGE UP (ref 10.3–14.5)
WBC # BLD: 7.7 K/UL — SIGNIFICANT CHANGE UP (ref 3.8–10.5)
WBC # FLD AUTO: 7.7 K/UL — SIGNIFICANT CHANGE UP (ref 3.8–10.5)

## 2019-03-06 PROCEDURE — 99214 OFFICE O/P EST MOD 30 MIN: CPT

## 2019-03-06 NOTE — PHYSICAL EXAM
[Normal] : affect appropriate [Ambulatory and capable of all self care but unable to carry out any work activities] : Status 2- Ambulatory and capable of all self care but unable to carry out any work activities. Up and about more than 50% of waking hours [de-identified] :  Pacemaker left anterior chest wall.  [de-identified] : 5 x 5 cm lipoma left lower back. Psoriasis on limbs..

## 2019-03-06 NOTE — ASSESSMENT
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 He had progressive anemia with rising creatinine and relapse of his Waldenstrom's. He was not a candidate for ibrutinib due to anticoagulation with Warfarin. He has  achieved a partial remission with marked improvement in his hemoglobin, creatinine and IgM. He has developed psoriasis. He has not tolerated rituxan well and is loathe to receive it again.\par \par Plan:\par Observe\par Consider maintenance ninlaro/dexamethasone\par Acyclovir\par Omeprazole\par Renal f/u\par Warfarin per Cardiology\par RTC 1 month

## 2019-03-06 NOTE — ADDENDUM
[FreeTextEntry1] : Documented by David De Guzman acting as a scribe for Dr. Cesar Simons on 03/06/2019 \par \par All medical record entries made by the Scribe were at my, Dr. Cesar Simons's, direction and personally dictated by me on 03/06/2019 . I have reviewed the chart and agree that the record accurately reflects my personal performance of the history, physical exam, results, assessment and plan. I have also personally directed, reviewed, and agree with the discharge instructions.

## 2019-03-06 NOTE — HISTORY OF PRESENT ILLNESS
[Disease:__________________________] : Disease: [unfilled] [Rosales Stage: ___] : Rosales Stage: [unfilled] [Cycle: ___] : Cycle: [unfilled] [Day: ___] : Day: [unfilled] [de-identified] : PAF\par 10/2015 Waldenstrom's macroglobulinemia ; + dim lambda, CD 19, 23, FMC-7; negative CD 5, 10, 20\par Renal biopsy: lymphocytic infiltrate of renal cortex\par 7/2018: Nephrotic syndrome with lambda light chains\par  [de-identified] : +lambda, CD5, CD 20; CD 38 --; FISH del 13q14.3\par SPEP gamma paraprotein M 0.4\par SPIEP IgM lambda; 10/17 IgGk, lambda [FreeTextEntry1] : 10/2015 - 1/2017  Rituxan/Velcade/Decadron; 8/18 - 1/19 Ixazomib/Decadron/Rituxan x 6 cycles [de-identified] : Feels fatigued. Doing physical therapy everyday for right pubic ramus fracture. Treating psoriasis with hydrocortisone cream. Continues to have joint pain. Neuropathy is stable. Has occasional diarrhea, loose. No fevers, night sweats, swollen glands, chest pain, abdominal pain, SOB, bleeding, bruising, HA, visual problems, melena, BRBPR. Has gained 10 pounds recently. \par \par \par

## 2019-03-06 NOTE — RESULTS/DATA
[FreeTextEntry1] : WBC 7,700 , Hgb 12.1 , Hct 35.6, Plts 190,000, Diff normal\par \par 2/6/19\par CMP: sodium 146, BUN 35, creatinine 2.73, globulins 2.3, ALK PHOS 135, eGFR 22\par LDH: 171\par SPEP: M-Toi 0.6, one gamma migrating and one weak gamma migrating paraprotein, one IgM lambda and one weak lambda light chain identified No IgD or Ige bands previously identified\par SFLC k 0.83, Lambda 122.11\par IgG 529, A 21, M 672\par Viscosity: 1.6

## 2019-03-06 NOTE — REVIEW OF SYSTEMS
[Fatigue] : fatigue [Joint Pain] : joint pain [Skin Rash] : skin rash [Negative] : Allergic/Immunologic [Recent Change In Weight] : ~T recent weight change [Diarrhea] : diarrhea [FreeTextEntry9] : Right hip pain  [de-identified] : Neuropathy

## 2019-03-08 ENCOUNTER — MEDICATION RENEWAL (OUTPATIENT)
Age: 74
End: 2019-03-08

## 2019-03-12 ENCOUNTER — APPOINTMENT (OUTPATIENT)
Dept: ELECTROPHYSIOLOGY | Facility: CLINIC | Age: 74
End: 2019-03-12

## 2019-03-12 ENCOUNTER — APPOINTMENT (OUTPATIENT)
Dept: CARDIOLOGY | Facility: CLINIC | Age: 74
End: 2019-03-12

## 2019-03-28 ENCOUNTER — OUTPATIENT (OUTPATIENT)
Dept: OUTPATIENT SERVICES | Facility: HOSPITAL | Age: 74
LOS: 1 days | Discharge: HOME | End: 2019-03-28

## 2019-03-28 DIAGNOSIS — Z98.890 OTHER SPECIFIED POSTPROCEDURAL STATES: Chronic | ICD-10-CM

## 2019-03-28 DIAGNOSIS — Z98.41 CATARACT EXTRACTION STATUS, RIGHT EYE: Chronic | ICD-10-CM

## 2019-03-28 DIAGNOSIS — Z95.0 PRESENCE OF CARDIAC PACEMAKER: Chronic | ICD-10-CM

## 2019-03-28 DIAGNOSIS — C91.11 CHRONIC LYMPHOCYTIC LEUKEMIA OF B-CELL TYPE IN REMISSION: ICD-10-CM

## 2019-03-28 DIAGNOSIS — Z98.89 OTHER SPECIFIED POSTPROCEDURAL STATES: Chronic | ICD-10-CM

## 2019-04-02 ENCOUNTER — NON-APPOINTMENT (OUTPATIENT)
Age: 74
End: 2019-04-02

## 2019-04-02 ENCOUNTER — APPOINTMENT (OUTPATIENT)
Dept: ELECTROPHYSIOLOGY | Facility: CLINIC | Age: 74
End: 2019-04-02
Payer: MEDICARE

## 2019-04-02 ENCOUNTER — APPOINTMENT (OUTPATIENT)
Dept: CARDIOLOGY | Facility: CLINIC | Age: 74
End: 2019-04-02
Payer: MEDICARE

## 2019-04-02 VITALS
DIASTOLIC BLOOD PRESSURE: 77 MMHG | WEIGHT: 214 LBS | OXYGEN SATURATION: 96 % | SYSTOLIC BLOOD PRESSURE: 131 MMHG | BODY MASS INDEX: 27.48 KG/M2 | HEART RATE: 88 BPM

## 2019-04-02 PROCEDURE — 93000 ELECTROCARDIOGRAM COMPLETE: CPT

## 2019-04-02 PROCEDURE — 99215 OFFICE O/P EST HI 40 MIN: CPT

## 2019-04-02 PROCEDURE — 93280 PM DEVICE PROGR EVAL DUAL: CPT

## 2019-04-02 NOTE — REASON FOR VISIT
[Follow-Up - From Hospitalization] : follow-up of a recent hospitalization for [Pacemaker Evaluation] : pacemaker ~T evaluation ~C was performed

## 2019-04-02 NOTE — HISTORY OF PRESENT ILLNESS
[FreeTextEntry1] : Anson was admitted for one month in January after falling up the stairs and fracture of pelvis. Now with PT and he can stand and some walking. Denies any chest pain, palpitations, dizziness or shortness of breath.

## 2019-04-02 NOTE — DISCUSSION/SUMMARY
[___ Month(s)] : [unfilled] month(s) [FreeTextEntry1] : The patient is a 73-year-old gentleman history mild dementia, paroxysmal  atrial fibrillation, lower extremity edema , CLL, CKD stage IV, Waldenstrom's macroglobulinemia, hypothyroidism, PPM , chronic diastolic heart failure s/p sigmoid resection for diverticulitis now receiving oral chemo for his CLL. [employer] pacemaker check good battery life, nondependent and good sensing and pacing thresholds. INR has been therapeutic. Now s/p fall with pelvic fracture.

## 2019-04-05 ENCOUNTER — APPOINTMENT (OUTPATIENT)
Dept: HEMATOLOGY ONCOLOGY | Facility: CLINIC | Age: 74
End: 2019-04-05
Payer: MEDICARE

## 2019-04-05 ENCOUNTER — RESULT REVIEW (OUTPATIENT)
Age: 74
End: 2019-04-05

## 2019-04-05 VITALS
SYSTOLIC BLOOD PRESSURE: 147 MMHG | WEIGHT: 220 LBS | DIASTOLIC BLOOD PRESSURE: 71 MMHG | HEART RATE: 67 BPM | BODY MASS INDEX: 28.25 KG/M2 | OXYGEN SATURATION: 97 % | RESPIRATION RATE: 16 BRPM | TEMPERATURE: 97.8 F

## 2019-04-05 LAB
BASOPHILS # BLD AUTO: 0.1 K/UL — SIGNIFICANT CHANGE UP (ref 0–0.2)
BASOPHILS NFR BLD AUTO: 0.9 % — SIGNIFICANT CHANGE UP (ref 0–2)
EOSINOPHIL # BLD AUTO: 0.2 K/UL — SIGNIFICANT CHANGE UP (ref 0–0.5)
EOSINOPHIL NFR BLD AUTO: 3 % — SIGNIFICANT CHANGE UP (ref 0–6)
HCT VFR BLD CALC: 35.2 % — LOW (ref 39–50)
HGB BLD-MCNC: 12.1 G/DL — LOW (ref 13–17)
LYMPHOCYTES # BLD AUTO: 1.9 K/UL — SIGNIFICANT CHANGE UP (ref 1–3.3)
LYMPHOCYTES # BLD AUTO: 27.5 % — SIGNIFICANT CHANGE UP (ref 13–44)
MCHC RBC-ENTMCNC: 32.3 PG — SIGNIFICANT CHANGE UP (ref 27–34)
MCHC RBC-ENTMCNC: 34.4 G/DL — SIGNIFICANT CHANGE UP (ref 32–36)
MCV RBC AUTO: 93.8 FL — SIGNIFICANT CHANGE UP (ref 80–100)
MONOCYTES # BLD AUTO: 0.6 K/UL — SIGNIFICANT CHANGE UP (ref 0–0.9)
MONOCYTES NFR BLD AUTO: 8.9 % — SIGNIFICANT CHANGE UP (ref 2–14)
NEUTROPHILS # BLD AUTO: 4.2 K/UL — SIGNIFICANT CHANGE UP (ref 1.8–7.4)
NEUTROPHILS NFR BLD AUTO: 59.7 % — SIGNIFICANT CHANGE UP (ref 43–77)
PLATELET # BLD AUTO: 178 K/UL — SIGNIFICANT CHANGE UP (ref 150–400)
RBC # BLD: 3.75 M/UL — LOW (ref 4.2–5.8)
RBC # FLD: 12.8 % — SIGNIFICANT CHANGE UP (ref 10.3–14.5)
WBC # BLD: 7.1 K/UL — SIGNIFICANT CHANGE UP (ref 3.8–10.5)
WBC # FLD AUTO: 7.1 K/UL — SIGNIFICANT CHANGE UP (ref 3.8–10.5)

## 2019-04-05 PROCEDURE — 99214 OFFICE O/P EST MOD 30 MIN: CPT

## 2019-04-08 LAB
ALBUMIN SERPL ELPH-MCNC: 4.2 G/DL
ALP BLD-CCNC: 147 U/L
ALT SERPL-CCNC: 14 U/L
ANION GAP SERPL CALC-SCNC: 15 MMOL/L
AST SERPL-CCNC: 16 U/L
BILIRUB SERPL-MCNC: 0.2 MG/DL
BUN SERPL-MCNC: 35 MG/DL
CALCIUM SERPL-MCNC: 9.3 MG/DL
CHLORIDE SERPL-SCNC: 107 MMOL/L
CO2 SERPL-SCNC: 22 MMOL/L
CREAT SERPL-MCNC: 3 MG/DL
DEPRECATED KAPPA LC FREE/LAMBDA SER: 0.01 RATIO
GLUCOSE SERPL-MCNC: 107 MG/DL
KAPPA LC CSF-MCNC: 153.95 MG/DL
KAPPA LC SERPL-MCNC: 1.03 MG/DL
LDH SERPL-CCNC: 143 U/L
POTASSIUM SERPL-SCNC: 5.1 MMOL/L
PROT SERPL-MCNC: 7.2 G/DL
SODIUM SERPL-SCNC: 144 MMOL/L

## 2019-04-11 ENCOUNTER — RX RENEWAL (OUTPATIENT)
Age: 74
End: 2019-04-11

## 2019-04-18 NOTE — PHYSICAL EXAM
[Ambulatory and capable of all self care but unable to carry out any work activities] : Status 2- Ambulatory and capable of all self care but unable to carry out any work activities. Up and about more than 50% of waking hours [Normal] : affect appropriate [de-identified] :  Pacemaker left anterior chest wall. RRR. S1S2 normal. Gr 2/6 TESS RUSB. No gallops. [de-identified] : 5 x 5 cm lipoma left lower back.

## 2019-04-18 NOTE — ASSESSMENT
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 He had progressive anemia with rising creatinine and relapse of his Waldenstrom's. He was not a candidate for Ibrutinib due to anticoagulation with Warfarin. He has achieved a partial remission with marked improvement in his hemoglobin, creatinine and IgM. He has developed psoriasis. He has not tolerated Rituxan well and is loathe to receive it again. He previously tolerated Ninlaro well and is willing to initiate maintenance therapy with Ninlaro and Dexamethasone.\par \par Plan:\par Initiate maintenance with Ninlaro/Dexamethasone weekly x 3, then 5 weeks off\par CMP, LDH SPEP, SPIEP, Quant Ig, SFLC, viscosity\par Acyclovir\par Omeprazole\par Renal f/u\par Warfarin per Cardiology\par RTC 1 month

## 2019-04-18 NOTE — RESULTS/DATA
[FreeTextEntry1] : WBC 7,100  , Hgb 12.1 , Hct 35.2 , Plts 178,000, Diff normal\par \par 2/6/19\par CMP: sodium 146, BUN 35, creatinine 2.73, globulins 2.3, ALK PHOS 135, eGFR 22\par LDH: 171\par SPEP: M-Toi 0.6, one gamma migrating and one weak gamma migrating paraprotein, one IgM lambda and one weak lambda light chain identified. No IgD or IgE bands \par SFLC k 0.83, Lambda 122.11\par IgG 529, A 21, M 672\par Viscosity: 1.6. \par

## 2019-04-18 NOTE — REVIEW OF SYSTEMS
[Fatigue] : fatigue [Diarrhea] : diarrhea [Joint Pain] : joint pain [Skin Rash] : skin rash [Negative] : Allergic/Immunologic [de-identified] : Neuropathy [Recent Change In Weight] : ~T no recent weight change

## 2019-04-18 NOTE — ADDENDUM
[FreeTextEntry1] : Documented by David De Guzman acting as a scribe for Dr. Cesar Simons on 04/05/2019 \par \par All medical record entries made by the Scribe were at my, Dr. Cesar Simons's, direction and personally dictated by me on 04/05/2019 . I have reviewed the chart and agree that the record accurately reflects my personal performance of the history, physical exam, results, assessment and plan. I have also personally directed, reviewed, and agree with the discharge instructions.

## 2019-04-18 NOTE — HISTORY OF PRESENT ILLNESS
[Disease:__________________________] : Disease: [unfilled] [Rosales Stage: ___] : Rosales Stage: [unfilled] [Cycle: ___] : Cycle: [unfilled] [Day: ___] : Day: [unfilled] [de-identified] : +lambda, CD5, CD 20; CD 38 --; FISH del 13q14.3\par SPEP gamma paraprotein M 0.4\par SPIEP IgM lambda; 10/17 IgGk, lambda [de-identified] : PAF\par 10/2015 Waldenstrom's macroglobulinemia ; + dim lambda, CD 19, 23, FMC-7; negative CD 5, 10, 20\par Renal biopsy: lymphocytic infiltrate of renal cortex\par 10/17 IgGk and lambda BGUS\par 7/2018: Nephrotic syndrome with lambda light chains\par  [FreeTextEntry1] : 10/2015 - 1/2017  Rituxan/Velcade/Decadron; 8/18 - 1/19 Ixazomib/Decadron/Rituxan x 6 cycles [de-identified] : Feels fatigued, not improving. Doing physical therapy for right pubic ramus fracture. Currently walking well and without a cane. Psoriasis mostly resolved, but still present on his right elbow. Continues to have chronic back pain. Neuropathy is stable. Still has occasional diarrhea, usually after having a protein drink (has milk). Reports blurry vision when looking at his phone for a prolonged period of time. No fevers, night sweats, swollen glands, chest pain, palpitations, abdominal pain, SOB, bleeding, bruising, HA, visual problems, melena, BRBPR. His weight is stable. \par \par \par

## 2019-04-25 LAB
ALBUMIN MFR SERPL ELPH: 54.6 %
ALBUMIN SERPL-MCNC: 3.9 G/DL
ALBUMIN/GLOB SERPL: 1.2 RATIO
ALPHA1 GLOB MFR SERPL ELPH: 6 %
ALPHA1 GLOB SERPL ELPH-MCNC: 0.4 G/DL
ALPHA2 GLOB MFR SERPL ELPH: 10.8 %
ALPHA2 GLOB SERPL ELPH-MCNC: 0.8 G/DL
B-GLOBULIN MFR SERPL ELPH: 8.8 %
B-GLOBULIN SERPL ELPH-MCNC: 0.6 G/DL
DEPRECATED KAPPA LC FREE/LAMBDA SER: 0.01 RATIO
GAMMA GLOB FLD ELPH-MCNC: 1.4 G/DL
GAMMA GLOB MFR SERPL ELPH: 19.8 %
IGA SER QL IEP: 24 MG/DL
IGG SER QL IEP: 470 MG/DL
IGM SER QL IEP: 1355 MG/DL
INTERPRETATION SERPL IEP-IMP: NORMAL
KAPPA LC CSF-MCNC: 153.95 MG/DL
KAPPA LC SERPL-MCNC: 1.03 MG/DL
M PROTEIN MFR SERPL ELPH: 15.3 %
M PROTEIN SPEC IFE-MCNC: NORMAL
MONOCLON BAND OBS SERPL: 1.1 G/DL
PROT SERPL-MCNC: 7.2 G/DL
PROT SERPL-MCNC: 7.2 G/DL
VISCOSITY, SERUM: 1.8

## 2019-05-03 NOTE — PHYSICAL THERAPY INITIAL EVALUATION ADULT - GAIT PATTERN USED, PT EVAL
Message left for patient introducing my role as Gyn Oncology Cancer Nurse Navigator at [facility] - contact # provided [phone]. I told her in the message that she would also be meeting Cancer Nurse Navigator Heather Barajas on Monday 5/6 during her appointment with Dr. Pat in [place]. I encouraged her to call me with any questions, and told her I would be mailing some resource information to her.   
3-point gait

## 2019-05-09 ENCOUNTER — OUTPATIENT (OUTPATIENT)
Dept: OUTPATIENT SERVICES | Facility: HOSPITAL | Age: 74
LOS: 1 days | Discharge: HOME | End: 2019-05-09

## 2019-05-09 DIAGNOSIS — Z98.89 OTHER SPECIFIED POSTPROCEDURAL STATES: Chronic | ICD-10-CM

## 2019-05-09 DIAGNOSIS — Z98.890 OTHER SPECIFIED POSTPROCEDURAL STATES: Chronic | ICD-10-CM

## 2019-05-09 DIAGNOSIS — C91.11 CHRONIC LYMPHOCYTIC LEUKEMIA OF B-CELL TYPE IN REMISSION: ICD-10-CM

## 2019-05-09 DIAGNOSIS — Z98.41 CATARACT EXTRACTION STATUS, RIGHT EYE: Chronic | ICD-10-CM

## 2019-05-09 DIAGNOSIS — Z95.0 PRESENCE OF CARDIAC PACEMAKER: Chronic | ICD-10-CM

## 2019-05-13 ENCOUNTER — RESULT REVIEW (OUTPATIENT)
Age: 74
End: 2019-05-13

## 2019-05-13 ENCOUNTER — APPOINTMENT (OUTPATIENT)
Dept: HEMATOLOGY ONCOLOGY | Facility: CLINIC | Age: 74
End: 2019-05-13
Payer: MEDICARE

## 2019-05-13 VITALS
SYSTOLIC BLOOD PRESSURE: 137 MMHG | HEART RATE: 68 BPM | RESPIRATION RATE: 16 BRPM | OXYGEN SATURATION: 97 % | DIASTOLIC BLOOD PRESSURE: 74 MMHG | TEMPERATURE: 97.9 F | BODY MASS INDEX: 28.79 KG/M2 | WEIGHT: 224.21 LBS

## 2019-05-13 LAB
BASOPHILS # BLD AUTO: 0 K/UL — SIGNIFICANT CHANGE UP (ref 0–0.2)
BASOPHILS NFR BLD AUTO: 0.4 % — SIGNIFICANT CHANGE UP (ref 0–2)
EOSINOPHIL # BLD AUTO: 0.2 K/UL — SIGNIFICANT CHANGE UP (ref 0–0.5)
EOSINOPHIL NFR BLD AUTO: 3.5 % — SIGNIFICANT CHANGE UP (ref 0–6)
HCT VFR BLD CALC: 34.2 % — LOW (ref 39–50)
HGB BLD-MCNC: 11.9 G/DL — LOW (ref 13–17)
LYMPHOCYTES # BLD AUTO: 2 K/UL — SIGNIFICANT CHANGE UP (ref 1–3.3)
LYMPHOCYTES # BLD AUTO: 28.1 % — SIGNIFICANT CHANGE UP (ref 13–44)
MCHC RBC-ENTMCNC: 33.2 PG — SIGNIFICANT CHANGE UP (ref 27–34)
MCHC RBC-ENTMCNC: 34.9 G/DL — SIGNIFICANT CHANGE UP (ref 32–36)
MCV RBC AUTO: 95.3 FL — SIGNIFICANT CHANGE UP (ref 80–100)
MONOCYTES # BLD AUTO: 0.8 K/UL — SIGNIFICANT CHANGE UP (ref 0–0.9)
MONOCYTES NFR BLD AUTO: 11.8 % — SIGNIFICANT CHANGE UP (ref 2–14)
NEUTROPHILS # BLD AUTO: 4 K/UL — SIGNIFICANT CHANGE UP (ref 1.8–7.4)
NEUTROPHILS NFR BLD AUTO: 56.2 % — SIGNIFICANT CHANGE UP (ref 43–77)
PLATELET # BLD AUTO: 148 K/UL — LOW (ref 150–400)
RBC # BLD: 3.59 M/UL — LOW (ref 4.2–5.8)
RBC # FLD: 13.7 % — SIGNIFICANT CHANGE UP (ref 10.3–14.5)
WBC # BLD: 7 K/UL — SIGNIFICANT CHANGE UP (ref 3.8–10.5)
WBC # FLD AUTO: 7 K/UL — SIGNIFICANT CHANGE UP (ref 3.8–10.5)

## 2019-05-13 PROCEDURE — 99214 OFFICE O/P EST MOD 30 MIN: CPT

## 2019-05-13 NOTE — ASSESSMENT
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 He had progressive anemia with rising creatinine and relapse of his Waldenstrom's. He was not a candidate for Ibrutinib due to anticoagulation with Warfarin. He achieved a partial remission with marked improvement in his hemoglobin, creatinine and IgM wit IRd, but had not tolerated Rituxan well and is loathe to receive it again. He previously tolerated  the Ninlaro well and has now initiated maintenance therapy with Ninlaro and Dexamethasone.\par \par Plan:\par Ninlaro/Dexamethasone weekly x 3, then 5 weeks off\par CMP, LDH\par Acyclovir\par Omeprazole\par Renal f/u\par Warfarin per Cardiology\par RTC 1 month [Palliative Care Plan] : not applicable at this time

## 2019-05-13 NOTE — REASON FOR VISIT
[Follow-Up Visit] : a follow-up visit for [CLL] : chronic lymphocytic leukemia [Lymphoma] : lymphoma [Monoclonal Gammopathy] : monoclonal gammopathy [Spouse] : spouse Unknown

## 2019-05-13 NOTE — PHYSICAL EXAM
[Restricted in physically strenuous activity but ambulatory and able to carry out work of a light or sedentary nature] : Status 1- Restricted in physically strenuous activity but ambulatory and able to carry out work of a light or sedentary nature, e.g., light house work, office work [Normal] : affect appropriate [de-identified] :  Pacemaker left anterior chest wall. RRR. S1S2 normal. Gr 2/6 TESS RUSB to LLSB. No gallops. [de-identified] : 5 x 5 cm lipoma left lower back.

## 2019-05-13 NOTE — HISTORY OF PRESENT ILLNESS
[Disease:__________________________] : Disease: [unfilled] [Rosales Stage: ___] : Rosales Stage: [unfilled] [Cycle: ___] : Cycle: [unfilled] [Day: ___] : Day: [unfilled] [de-identified] : PAF\par 10/2015 Waldenstrom's macroglobulinemia ; + dim lambda, CD 19, 23, FMC-7; negative CD 5, 10, 20\par Renal biopsy: lymphocytic infiltrate of renal cortex\par 10/17 IgGk and lambda BGUS\par 7/2018: Nephrotic syndrome with lambda light chains\par  [de-identified] : +lambda, CD5, CD 20; CD 38 --; FISH del 13q14.3\par SPEP gamma paraprotein M 0.4\par SPIEP IgM lambda; 10/17 IgGk, lambda [FreeTextEntry1] : 10/2015 - 1/2017  Rituxan/Velcade/Decadron; 8/18 - 1/19 Ixazomib/Decadron/Rituxan x 6 cycles [de-identified] : Feels fatigued, not improving. Tolerated ninlaro/dex well. Dex did make him sleepless for 2 days after. Briefly had diarrhea when first started this cycle. Currently walking well and without a cane. Psoriasis mostly resolved. Continues to have stable chronic back pain. Neuropathy is stable. Reports occasional blurry vision when looking at his phone for a prolonged period of time. No fevers, night sweats, swollen glands, chest pain, palpitations, abdominal pain, SOB, bleeding, bruising, HA, visual problems, melena, BRBPR. His weight is stable. \par \par \par

## 2019-05-13 NOTE — RESULTS/DATA
[FreeTextEntry1] : WBC 7000 Hgb 11.9 Hct 34.2 Platelets 148,000 Diff normal\par \par 4/5/19 \par CMP creatinine 3, alk phos 147\par \par SPEP M 1.1\par SFL k 1.03, lambda 153.95\par Quant IgG 470, A 24, M 1355\par Viscosity 1.8\par

## 2019-05-13 NOTE — REVIEW OF SYSTEMS
[Fatigue] : fatigue [Vision Problems] : vision problems [Diarrhea] : diarrhea [Joint Pain] : joint pain [Skin Rash] : skin rash [Negative] : Allergic/Immunologic [de-identified] : Neuropathy

## 2019-05-13 NOTE — CONSULT LETTER
[Dear  ___] : Dear  [unfilled], [Courtesy Letter:] : I had the pleasure of seeing your patient, [unfilled], in my office today. [Please see my note below.] : Please see my note below. [Sincerely,] : Sincerely, [FreeTextEntry2] : Dr. Simpson [FreeTextEntry3] : Cesar Simons MD [DrIbeth  ___] : Dr. DENG [DrIbeth ___] : Dr. DENG [___] : [unfilled]

## 2019-06-29 ENCOUNTER — OUTPATIENT (OUTPATIENT)
Dept: OUTPATIENT SERVICES | Facility: HOSPITAL | Age: 74
LOS: 1 days | Discharge: HOME | End: 2019-06-29

## 2019-06-29 DIAGNOSIS — Z95.0 PRESENCE OF CARDIAC PACEMAKER: Chronic | ICD-10-CM

## 2019-06-29 DIAGNOSIS — Z98.890 OTHER SPECIFIED POSTPROCEDURAL STATES: Chronic | ICD-10-CM

## 2019-06-29 DIAGNOSIS — C91.11 CHRONIC LYMPHOCYTIC LEUKEMIA OF B-CELL TYPE IN REMISSION: ICD-10-CM

## 2019-06-29 DIAGNOSIS — Z98.89 OTHER SPECIFIED POSTPROCEDURAL STATES: Chronic | ICD-10-CM

## 2019-06-29 DIAGNOSIS — Z98.41 CATARACT EXTRACTION STATUS, RIGHT EYE: Chronic | ICD-10-CM

## 2019-07-02 ENCOUNTER — RESULT REVIEW (OUTPATIENT)
Age: 74
End: 2019-07-02

## 2019-07-02 ENCOUNTER — APPOINTMENT (OUTPATIENT)
Dept: HEMATOLOGY ONCOLOGY | Facility: CLINIC | Age: 74
End: 2019-07-02
Payer: MEDICARE

## 2019-07-02 VITALS
SYSTOLIC BLOOD PRESSURE: 109 MMHG | BODY MASS INDEX: 28.76 KG/M2 | WEIGHT: 224 LBS | HEART RATE: 81 BPM | RESPIRATION RATE: 16 BRPM | DIASTOLIC BLOOD PRESSURE: 67 MMHG | TEMPERATURE: 98.6 F | OXYGEN SATURATION: 96 %

## 2019-07-02 LAB
BASOPHILS # BLD AUTO: 0 K/UL — SIGNIFICANT CHANGE UP (ref 0–0.2)
BASOPHILS NFR BLD AUTO: 0.6 % — SIGNIFICANT CHANGE UP (ref 0–2)
EOSINOPHIL # BLD AUTO: 0.2 K/UL — SIGNIFICANT CHANGE UP (ref 0–0.5)
EOSINOPHIL NFR BLD AUTO: 2.4 % — SIGNIFICANT CHANGE UP (ref 0–6)
HCT VFR BLD CALC: 40.5 % — SIGNIFICANT CHANGE UP (ref 39–50)
HGB BLD-MCNC: 13 G/DL — SIGNIFICANT CHANGE UP (ref 13–17)
LYMPHOCYTES # BLD AUTO: 2.4 K/UL — SIGNIFICANT CHANGE UP (ref 1–3.3)
LYMPHOCYTES # BLD AUTO: 29.8 % — SIGNIFICANT CHANGE UP (ref 13–44)
MCHC RBC-ENTMCNC: 31.2 PG — SIGNIFICANT CHANGE UP (ref 27–34)
MCHC RBC-ENTMCNC: 32.1 G/DL — SIGNIFICANT CHANGE UP (ref 32–36)
MCV RBC AUTO: 97.3 FL — SIGNIFICANT CHANGE UP (ref 80–100)
MONOCYTES # BLD AUTO: 0.7 K/UL — SIGNIFICANT CHANGE UP (ref 0–0.9)
MONOCYTES NFR BLD AUTO: 9.4 % — SIGNIFICANT CHANGE UP (ref 2–14)
NEUTROPHILS # BLD AUTO: 4.6 K/UL — SIGNIFICANT CHANGE UP (ref 1.8–7.4)
NEUTROPHILS NFR BLD AUTO: 57.8 % — SIGNIFICANT CHANGE UP (ref 43–77)
PLATELET # BLD AUTO: 164 K/UL — SIGNIFICANT CHANGE UP (ref 150–400)
RBC # BLD: 4.16 M/UL — LOW (ref 4.2–5.8)
RBC # FLD: 14.6 % — HIGH (ref 10.3–14.5)
WBC # BLD: 8 K/UL — SIGNIFICANT CHANGE UP (ref 3.8–10.5)
WBC # FLD AUTO: 8 K/UL — SIGNIFICANT CHANGE UP (ref 3.8–10.5)

## 2019-07-02 PROCEDURE — 99214 OFFICE O/P EST MOD 30 MIN: CPT

## 2019-07-02 NOTE — REVIEW OF SYSTEMS
[Fatigue] : fatigue [Diarrhea] : diarrhea [Joint Pain] : joint pain [Skin Rash] : skin rash [de-identified] : Neuropathy [Negative] : Eyes

## 2019-07-02 NOTE — HISTORY OF PRESENT ILLNESS
[Disease:__________________________] : Disease: [unfilled] [Rosales Stage: ___] : Rosales Stage: [unfilled] [Day: ___] : Day: [unfilled] [Cycle: ___] : Cycle: [unfilled] [de-identified] : PAF\par 10/2015 Waldenstrom's macroglobulinemia ; + dim lambda, CD 19, 23, FMC-7; negative CD 5, 10, 20\par Renal biopsy: lymphocytic infiltrate of renal cortex\par 10/17 IgGk and lambda BGUS\par 7/2018: Nephrotic syndrome with lambda light chains\par  [FreeTextEntry1] : 10/2015 - 1/2017  Rituxan/Velcade/Decadron; 8/18 - 1/19 Ixazomib/Decadron/Rituxan x 6 cycles to maintenance [de-identified] : +lambda, CD5, CD 20; CD 38 --; FISH del 13q14.3\par SPEP gamma paraprotein M 0.4\par SPIEP IgM lambda; 10/17 IgGk, lambda [de-identified] : Feels well. Reports recent tick bite while vacationing [facility]. Followed up with PCP few days later. Tested negative for Lyme disease. Treated with 2 week course of Doxycycline. Pt discontinued doxycycline after 1 week. Reports intermittent fatigue. Reports 3 days of insomnia due to Decadron 20 mg..Reports an episode of A-fib. Resolved on its own. He has episodes of loose stools for 2-3 days. Treated with Imodium.  Ambulating well without a cane. Psoriasis stable. Continues to have stable chronic back pain. Neuropathy is stable. He notes no fevers, night sweats, swollen glands, chest pain, palpitations, abdominal pain, SOB, bleeding, bruising, HA, visual problems, melena, BRBPR. His weight is stable. \par \par \par

## 2019-07-02 NOTE — RESULTS/DATA
[FreeTextEntry1] : WBC 8,000, Hgb 13, Hct 40.5, Plts 164,000, Diff normal , ANC 4,600. \par  \par 05/13/19\par CMP: Glu 110, BUN 38, Creatinine 2.79, eGFR 22\par \par

## 2019-07-02 NOTE — REASON FOR VISIT
[Follow-Up Visit] : a follow-up visit for [Lymphoma] : lymphoma [CLL] : chronic lymphocytic leukemia [Spouse] : spouse [Monoclonal Gammopathy] : monoclonal gammopathy

## 2019-07-02 NOTE — ADDENDUM
[FreeTextEntry1] : Documented by Patrick Mckinney acting as a scribe for Dr. Cesar Simons on 07/02/2019 \par \par All medical record entries made by the Scribe were at my, Dr. Cesar Simons's, direction and personally dictated by me on 07/02/2019. I have reviewed the chart and agree that the record accurately reflects my personal performance of the history, physical exam, results, assessment and plan. I have also personally directed, reviewed, and agree with the discharge instructions.\par

## 2019-07-02 NOTE — PHYSICAL EXAM
[Restricted in physically strenuous activity but ambulatory and able to carry out work of a light or sedentary nature] : Status 1- Restricted in physically strenuous activity but ambulatory and able to carry out work of a light or sedentary nature, e.g., light house work, office work [Normal] : grossly intact [de-identified] :  Pacemaker left anterior chest wall. RRR. S1S2 normal. Gr 2/6 TESS RUSB to LLSB. No gallops. [de-identified] : 5 x 5 cm lipoma left lower back.

## 2019-07-02 NOTE — ASSESSMENT
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 He had progressive anemia with rising creatinine and relapse of his Waldenstrom's. He was not a candidate for Ibrutinib due to anticoagulation with Warfarin. He achieved a partial remission with marked improvement in his hemoglobin, creatinine and IgM wit IRd, but had not tolerated Rituxan well and is loathe to receive it again. He previously tolerated  the Ninlaro well and has now initiated maintenance therapy with Ninlaro and Dexamethasone.\par \par Plan:\par Ninlaro/Dexamethasone weekly x 3, then 5 weeks off\par Taper Decadron to 12 mg weekly\par CMP, LDH, SPEP, Quantitative immunoglobulins, SFLC, viscosity\par Acyclovir\par Omeprazole\par Renal f/u\par Warfarin per Cardiology\par RTC 2 months

## 2019-07-23 LAB
ALBUMIN MFR SERPL ELPH: 56 %
ALBUMIN SERPL ELPH-MCNC: 4 G/DL
ALBUMIN SERPL ELPH-MCNC: 4.3 G/DL
ALBUMIN SERPL-MCNC: 4 G/DL
ALBUMIN/GLOB SERPL: 1.3 RATIO
ALP BLD-CCNC: 107 U/L
ALP BLD-CCNC: 139 U/L
ALPHA1 GLOB MFR SERPL ELPH: 6 %
ALPHA1 GLOB SERPL ELPH-MCNC: 0.4 G/DL
ALPHA2 GLOB MFR SERPL ELPH: 10.5 %
ALPHA2 GLOB SERPL ELPH-MCNC: 0.7 G/DL
ALT SERPL-CCNC: 13 U/L
ALT SERPL-CCNC: 13 U/L
ANION GAP SERPL CALC-SCNC: 13 MMOL/L
ANION GAP SERPL CALC-SCNC: 13 MMOL/L
AST SERPL-CCNC: 13 U/L
AST SERPL-CCNC: 16 U/L
B-GLOBULIN MFR SERPL ELPH: 8.4 %
B-GLOBULIN SERPL ELPH-MCNC: 0.6 G/DL
BILIRUB SERPL-MCNC: 0.2 MG/DL
BILIRUB SERPL-MCNC: 0.3 MG/DL
BUN SERPL-MCNC: 38 MG/DL
BUN SERPL-MCNC: 40 MG/DL
CALCIUM SERPL-MCNC: 9 MG/DL
CALCIUM SERPL-MCNC: 9.3 MG/DL
CHLORIDE SERPL-SCNC: 107 MMOL/L
CHLORIDE SERPL-SCNC: 108 MMOL/L
CO2 SERPL-SCNC: 22 MMOL/L
CO2 SERPL-SCNC: 23 MMOL/L
CREAT SERPL-MCNC: 2.79 MG/DL
CREAT SERPL-MCNC: 3.32 MG/DL
DEPRECATED KAPPA LC FREE/LAMBDA SER: 0.01 RATIO
DEPRECATED KAPPA LC FREE/LAMBDA SER: 0.01 RATIO
GAMMA GLOB FLD ELPH-MCNC: 1.4 G/DL
GAMMA GLOB MFR SERPL ELPH: 19.1 %
GLUCOSE SERPL-MCNC: 110 MG/DL
GLUCOSE SERPL-MCNC: 128 MG/DL
IGA SER QL IEP: 25 MG/DL
IGG SER QL IEP: 482 MG/DL
IGM SER QL IEP: 1208 MG/DL
INTERPRETATION SERPL IEP-IMP: NORMAL
KAPPA LC CSF-MCNC: 157 MG/DL
KAPPA LC CSF-MCNC: 157 MG/DL
KAPPA LC SERPL-MCNC: 0.93 MG/DL
KAPPA LC SERPL-MCNC: 0.93 MG/DL
LDH SERPL-CCNC: 126 U/L
LDH SERPL-CCNC: 127 U/L
M PROTEIN MFR SERPL ELPH: 14.5 %
MONOCLON BAND OBS SERPL: 1 G/DL
POTASSIUM SERPL-SCNC: 4.3 MMOL/L
POTASSIUM SERPL-SCNC: 4.6 MMOL/L
PROT SERPL-MCNC: 6.4 G/DL
PROT SERPL-MCNC: 7.1 G/DL
SODIUM SERPL-SCNC: 143 MMOL/L
SODIUM SERPL-SCNC: 143 MMOL/L
VISCOSITY, SERUM: 1.8

## 2019-08-13 ENCOUNTER — NON-APPOINTMENT (OUTPATIENT)
Age: 74
End: 2019-08-13

## 2019-08-13 ENCOUNTER — APPOINTMENT (OUTPATIENT)
Dept: ELECTROPHYSIOLOGY | Facility: CLINIC | Age: 74
End: 2019-08-13
Payer: MEDICARE

## 2019-08-13 ENCOUNTER — APPOINTMENT (OUTPATIENT)
Dept: CARDIOLOGY | Facility: CLINIC | Age: 74
End: 2019-08-13
Payer: MEDICARE

## 2019-08-13 VITALS
BODY MASS INDEX: 28.75 KG/M2 | HEIGHT: 74 IN | HEART RATE: 72 BPM | SYSTOLIC BLOOD PRESSURE: 125 MMHG | WEIGHT: 224 LBS | DIASTOLIC BLOOD PRESSURE: 77 MMHG | OXYGEN SATURATION: 95 %

## 2019-08-13 PROCEDURE — 93000 ELECTROCARDIOGRAM COMPLETE: CPT

## 2019-08-13 PROCEDURE — 93280 PM DEVICE PROGR EVAL DUAL: CPT

## 2019-08-13 PROCEDURE — 99215 OFFICE O/P EST HI 40 MIN: CPT

## 2019-08-13 NOTE — HISTORY OF PRESENT ILLNESS
[FreeTextEntry1] : Anson is healing from pelvic fracture slowly. Denies any chest pain, palpitations, dizziness or shortness of breath.

## 2019-08-13 NOTE — PHYSICAL EXAM
[General Appearance - Well Developed] : well developed [Normal Appearance] : normal appearance [Well Groomed] : well groomed [General Appearance - Well Nourished] : well nourished [No Deformities] : no deformities [General Appearance - In No Acute Distress] : no acute distress [Normal Conjunctiva] : the conjunctiva exhibited no abnormalities [Eyelids - No Xanthelasma] : the eyelids demonstrated no xanthelasmas [Normal Oral Mucosa] : normal oral mucosa [No Oral Pallor] : no oral pallor [No Oral Cyanosis] : no oral cyanosis [Normal Jugular Venous V Waves Present] : normal jugular venous V waves present [Normal Jugular Venous A Waves Present] : normal jugular venous A waves present [No Jugular Venous Rodriguez A Waves] : no jugular venous rodriguez A waves [Respiration, Rhythm And Depth] : normal respiratory rhythm and effort [Exaggerated Use Of Accessory Muscles For Inspiration] : no accessory muscle use [Auscultation Breath Sounds / Voice Sounds] : lungs were clear to auscultation bilaterally [Normal] : the heart rate was normal [Heart Sounds] : normal S1 and S2 [Systolic grade ___/6] : A grade [unfilled]/6 systolic murmur was heard. [Regular] : the rhythm was regular [Abdomen Soft] : soft [Abdomen Tenderness] : non-tender [Abdomen Mass (___ Cm)] : no abdominal mass palpated [Abnormal Walk] : normal gait [Gait - Sufficient For Exercise Testing] : the gait was sufficient for exercise testing [Nail Clubbing] : no clubbing of the fingernails [Cyanosis, Localized] : no localized cyanosis [Petechial Hemorrhages (___cm)] : no petechial hemorrhages [Skin Color & Pigmentation] : normal skin color and pigmentation [] : no rash [Skin Lesions] : no skin lesions [No Venous Stasis] : no venous stasis [No Skin Ulcers] : no skin ulcer [No Xanthoma] : no  xanthoma was observed [Oriented To Time, Place, And Person] : oriented to person, place, and time [Mood] : the mood was normal [Affect] : the affect was normal [No Anxiety] : not feeling anxious

## 2019-08-13 NOTE — DISCUSSION/SUMMARY
[___ Month(s)] : [unfilled] month(s) [FreeTextEntry1] : The patient is a 74-year-old gentleman history mild dementia, paroxysmal  atrial fibrillation, lower extremity edema , CLL, CKD stage IV, Waldenstrom's macroglobulinemia, hypothyroidism, PPM , chronic diastolic heart failure s/p sigmoid resection for diverticulitis, oral chemo for his CLL recovering from pelvic fracture. Maintaining sinus on amiodarone. [employer] pacemaker check good battery life, nondependent and good sensing and pacing thresholds. INR has been therapeutic.

## 2019-08-13 NOTE — REASON FOR VISIT
[Pacemaker Evaluation] : pacemaker ~T evaluation ~C was performed [Follow-Up - Clinic] : a clinic follow-up of

## 2019-09-04 ENCOUNTER — OUTPATIENT (OUTPATIENT)
Dept: OUTPATIENT SERVICES | Facility: HOSPITAL | Age: 74
LOS: 1 days | Discharge: HOME | End: 2019-09-04

## 2019-09-04 DIAGNOSIS — Z95.0 PRESENCE OF CARDIAC PACEMAKER: Chronic | ICD-10-CM

## 2019-09-04 DIAGNOSIS — Z98.89 OTHER SPECIFIED POSTPROCEDURAL STATES: Chronic | ICD-10-CM

## 2019-09-04 DIAGNOSIS — Z98.41 CATARACT EXTRACTION STATUS, RIGHT EYE: Chronic | ICD-10-CM

## 2019-09-04 DIAGNOSIS — Z98.890 OTHER SPECIFIED POSTPROCEDURAL STATES: Chronic | ICD-10-CM

## 2019-09-04 DIAGNOSIS — C91.11 CHRONIC LYMPHOCYTIC LEUKEMIA OF B-CELL TYPE IN REMISSION: ICD-10-CM

## 2019-09-06 ENCOUNTER — RESULT REVIEW (OUTPATIENT)
Age: 74
End: 2019-09-06

## 2019-09-06 ENCOUNTER — APPOINTMENT (OUTPATIENT)
Dept: HEMATOLOGY ONCOLOGY | Facility: CLINIC | Age: 74
End: 2019-09-06
Payer: MEDICARE

## 2019-09-06 VITALS
SYSTOLIC BLOOD PRESSURE: 124 MMHG | TEMPERATURE: 98.3 F | HEART RATE: 78 BPM | WEIGHT: 228 LBS | DIASTOLIC BLOOD PRESSURE: 83 MMHG | OXYGEN SATURATION: 96 % | BODY MASS INDEX: 29.27 KG/M2 | RESPIRATION RATE: 16 BRPM

## 2019-09-06 LAB
BASOPHILS # BLD AUTO: 0 K/UL — SIGNIFICANT CHANGE UP (ref 0–0.2)
BASOPHILS NFR BLD AUTO: 0.6 % — SIGNIFICANT CHANGE UP (ref 0–2)
EOSINOPHIL # BLD AUTO: 0.2 K/UL — SIGNIFICANT CHANGE UP (ref 0–0.5)
EOSINOPHIL NFR BLD AUTO: 2.7 % — SIGNIFICANT CHANGE UP (ref 0–6)
HCT VFR BLD CALC: 39.7 % — SIGNIFICANT CHANGE UP (ref 39–50)
HGB BLD-MCNC: 12.9 G/DL — LOW (ref 13–17)
LYMPHOCYTES # BLD AUTO: 1.8 K/UL — SIGNIFICANT CHANGE UP (ref 1–3.3)
LYMPHOCYTES # BLD AUTO: 23.7 % — SIGNIFICANT CHANGE UP (ref 13–44)
MCHC RBC-ENTMCNC: 32.6 G/DL — SIGNIFICANT CHANGE UP (ref 32–36)
MCHC RBC-ENTMCNC: 32.7 PG — SIGNIFICANT CHANGE UP (ref 27–34)
MCV RBC AUTO: 100 FL — SIGNIFICANT CHANGE UP (ref 80–100)
MONOCYTES # BLD AUTO: 0.7 K/UL — SIGNIFICANT CHANGE UP (ref 0–0.9)
MONOCYTES NFR BLD AUTO: 8.6 % — SIGNIFICANT CHANGE UP (ref 2–14)
NEUTROPHILS # BLD AUTO: 4.9 K/UL — SIGNIFICANT CHANGE UP (ref 1.8–7.4)
NEUTROPHILS NFR BLD AUTO: 64.3 % — SIGNIFICANT CHANGE UP (ref 43–77)
PLATELET # BLD AUTO: 161 K/UL — SIGNIFICANT CHANGE UP (ref 150–400)
RBC # BLD: 3.96 M/UL — LOW (ref 4.2–5.8)
RBC # FLD: 13.4 % — SIGNIFICANT CHANGE UP (ref 10.3–14.5)
WBC # BLD: 7.6 K/UL — SIGNIFICANT CHANGE UP (ref 3.8–10.5)
WBC # FLD AUTO: 7.6 K/UL — SIGNIFICANT CHANGE UP (ref 3.8–10.5)

## 2019-09-06 PROCEDURE — 99214 OFFICE O/P EST MOD 30 MIN: CPT

## 2019-09-06 NOTE — REASON FOR VISIT
[CLL] : chronic lymphocytic leukemia [Follow-Up Visit] : a follow-up visit for [Monoclonal Gammopathy] : monoclonal gammopathy [Lymphoma] : lymphoma [Spouse] : spouse

## 2019-09-06 NOTE — PHYSICAL EXAM
[Restricted in physically strenuous activity but ambulatory and able to carry out work of a light or sedentary nature] : Status 1- Restricted in physically strenuous activity but ambulatory and able to carry out work of a light or sedentary nature, e.g., light house work, office work [Normal] : affect appropriate [de-identified] :  Pacemaker left anterior chest wall. RRR. S1S2 normal. Gr 2/6 TESS RUSB to LLSB. No gallops. [de-identified] : 5 x 5 cm lipoma left lower back. Left toes bruised, but FROM.

## 2019-09-06 NOTE — HISTORY OF PRESENT ILLNESS
[Disease:__________________________] : Disease: [unfilled] [Rosales Stage: ___] : Rosales Stage: [unfilled] [Cycle: ___] : Cycle: [unfilled] [Day: ___] : Day: [unfilled] [de-identified] : PAF\par 10/2015 Waldenstrom's macroglobulinemia ; + dim lambda, CD 19, 23, FMC-7; negative CD 5, 10, 20\par Renal biopsy: lymphocytic infiltrate of renal cortex\par 10/17 IgGk and lambda BGUS\par 7/2018: Nephrotic syndrome with lambda light chains\par  [de-identified] : +lambda, CD5, CD 20; CD 38 --; FISH del 13q14.3\par SPEP gamma paraprotein M 0.4\par SPIEP IgM lambda; 10/17 IgGk, lambda [de-identified] : Feels well. Bruised his left toes a week ago. Hit a cabin door while vacationing [facility]. Denies pain. Has appointment for tooth extraction Monday. No other complaints. No recent episodes of A-fib. Pacemaker check was few weeks ago. Has normal bowel movements. Ambulating well without a cane. Psoriasis stable. Continues to have stable chronic back pain. Neuropathy is stable. He notes no fevers, night sweats, swollen glands, chest pain, palpitations, abdominal pain, SOB, bleeding, bruising, headaches, visual problems, melena, BRBPR. His weight is stable. \par \par \par  [FreeTextEntry1] : 10/2015 - 1/2017  Rituxan/Velcade/Decadron; 8/18 - 1/19 Ixazomib/Decadron/Rituxan x 6 cycles to maintenance

## 2019-09-06 NOTE — ADDENDUM
[FreeTextEntry1] : Documented by Patrick Mckinney acting as a scribe for Dr. Cesar Simons on 09/06/2019 \par \par All medical record entries made by the Scribe were at my, Dr. Cesar Simons's, direction and personally dictated by me on 09/06/2019. I have reviewed the chart and agree that the record accurately reflects my personal performance of the history, physical exam, results, assessment and plan. I have also personally directed, reviewed, and agree with the discharge instructions.

## 2019-09-06 NOTE — RESULTS/DATA
[FreeTextEntry1] : WBC 7,600, Hgb 12.9, Hct 39.7, , Plts 161,000, Diff normal , ANC 4,900.\par  \par 07/02/19\par CMP: Glu 128, BUN 40, Creatinine 3.32, Globulin 2.8, ALK Phos 139, eGFR 17\par \par SPEP: M-spike 1.0; One Gamma migrating and One Weak Gamma migrating paraprotein identified.\par SFLC: Kappa 0.93, Lambda 157\par IgG 482, A 25, M 1208 \par Viscosity 1.8

## 2019-09-06 NOTE — ASSESSMENT
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 He had progressive anemia with rising creatinine and relapse of his Waldenstrom's. He was not a candidate for Ibrutinib due to anticoagulation with Warfarin. He achieved a partial remission with marked improvement in his hemoglobin, creatinine and IgM wit IRd, but had not tolerated Rituxan well and is loathe to receive it again. He previously tolerated  the Ninlaro well and has now initiated maintenance therapy with Ninlaro and Dexamethasone.\par \par Plan:\par Ninlaro/Dexamethasone weekly x 3, then 5 weeks off\par CMP, LDH, SPEP, Quantitative immunoglobulins, SFLC, viscosity, SPIEP\par 24 hour urine total protein and BJP\par Acyclovir\par Omeprazole\par Renal f/u\par Warfarin per Cardiology -- INR to be lowered for dental extraction\par RTC 2 months

## 2019-09-06 NOTE — REVIEW OF SYSTEMS
[Joint Pain] : joint pain [Easy Bruising] : a tendency for easy bruising [Fatigue] : no fatigue [Diarrhea] : no diarrhea [Skin Rash] : no skin rash [Negative] : Integumentary [de-identified] : Neuropathy [FreeTextEntry9] : stable back pain

## 2019-09-09 ENCOUNTER — LABORATORY RESULT (OUTPATIENT)
Age: 74
End: 2019-09-09

## 2019-09-11 LAB
ALBUMIN MFR SERPL ELPH: 58 %
ALBUMIN SERPL ELPH-MCNC: 4.5 G/DL
ALBUMIN SERPL-MCNC: 4.1 G/DL
ALBUMIN/GLOB SERPL: 1.4 RATIO
ALP BLD-CCNC: 138 U/L
ALPHA1 GLOB MFR SERPL ELPH: 5.6 %
ALPHA1 GLOB SERPL ELPH-MCNC: 0.4 G/DL
ALPHA2 GLOB MFR SERPL ELPH: 11.1 %
ALPHA2 GLOB SERPL ELPH-MCNC: 0.8 G/DL
ALT SERPL-CCNC: 18 U/L
ANION GAP SERPL CALC-SCNC: 20 MMOL/L
AST SERPL-CCNC: 20 U/L
B-GLOBULIN MFR SERPL ELPH: 9 %
B-GLOBULIN SERPL ELPH-MCNC: 0.6 G/DL
BILIRUB SERPL-MCNC: 0.3 MG/DL
BUN SERPL-MCNC: 32 MG/DL
CALCIUM SERPL-MCNC: 9.6 MG/DL
CHLORIDE SERPL-SCNC: 108 MMOL/L
CO2 SERPL-SCNC: 17 MMOL/L
CREAT SERPL-MCNC: 3.19 MG/DL
DEPRECATED KAPPA LC FREE/LAMBDA SER: 0.01 RATIO
DEPRECATED KAPPA LC FREE/LAMBDA SER: 0.01 RATIO
GAMMA GLOB FLD ELPH-MCNC: 1.1 G/DL
GAMMA GLOB MFR SERPL ELPH: 16.3 %
GLUCOSE SERPL-MCNC: 117 MG/DL
IGA SER QL IEP: 37 MG/DL
IGG SER QL IEP: 436 MG/DL
IGM SER QL IEP: 1201 MG/DL
INTERPRETATION SERPL IEP-IMP: NORMAL
KAPPA LC CSF-MCNC: 131.06 MG/DL
KAPPA LC CSF-MCNC: 131.06 MG/DL
KAPPA LC SERPL-MCNC: 0.93 MG/DL
KAPPA LC SERPL-MCNC: 0.93 MG/DL
LDH SERPL-CCNC: 142 U/L
M PROTEIN MFR SERPL ELPH: 12.4 %
M PROTEIN SPEC IFE-MCNC: NORMAL
MONOCLON BAND OBS SERPL: 0.9 G/DL
POTASSIUM SERPL-SCNC: 4.8 MMOL/L
PROT SERPL-MCNC: 7 G/DL
PROT SERPL-MCNC: 7 G/DL
PROT SERPL-MCNC: 7.1 G/DL
SODIUM SERPL-SCNC: 145 MMOL/L
VISCOSITY, SERUM: 1.9

## 2019-09-26 ENCOUNTER — CLINICAL ADVICE (OUTPATIENT)
Age: 74
End: 2019-09-26

## 2019-10-24 ENCOUNTER — OUTPATIENT (OUTPATIENT)
Dept: OUTPATIENT SERVICES | Facility: HOSPITAL | Age: 74
LOS: 1 days | Discharge: HOME | End: 2019-10-24

## 2019-10-24 DIAGNOSIS — Z95.0 PRESENCE OF CARDIAC PACEMAKER: Chronic | ICD-10-CM

## 2019-10-24 DIAGNOSIS — C91.11 CHRONIC LYMPHOCYTIC LEUKEMIA OF B-CELL TYPE IN REMISSION: ICD-10-CM

## 2019-10-24 DIAGNOSIS — Z98.89 OTHER SPECIFIED POSTPROCEDURAL STATES: Chronic | ICD-10-CM

## 2019-10-24 DIAGNOSIS — Z98.890 OTHER SPECIFIED POSTPROCEDURAL STATES: Chronic | ICD-10-CM

## 2019-10-24 DIAGNOSIS — Z98.41 CATARACT EXTRACTION STATUS, RIGHT EYE: Chronic | ICD-10-CM

## 2019-11-08 ENCOUNTER — APPOINTMENT (OUTPATIENT)
Dept: HEMATOLOGY ONCOLOGY | Facility: CLINIC | Age: 74
End: 2019-11-08

## 2019-11-12 ENCOUNTER — APPOINTMENT (OUTPATIENT)
Dept: HEMATOLOGY ONCOLOGY | Facility: CLINIC | Age: 74
End: 2019-11-12
Payer: MEDICARE

## 2019-11-12 ENCOUNTER — RESULT REVIEW (OUTPATIENT)
Age: 74
End: 2019-11-12

## 2019-11-12 VITALS
SYSTOLIC BLOOD PRESSURE: 132 MMHG | OXYGEN SATURATION: 97 % | TEMPERATURE: 98 F | HEART RATE: 87 BPM | BODY MASS INDEX: 29.53 KG/M2 | DIASTOLIC BLOOD PRESSURE: 73 MMHG | WEIGHT: 229.99 LBS | RESPIRATION RATE: 18 BRPM

## 2019-11-12 LAB
BASOPHILS # BLD AUTO: 0 K/UL — SIGNIFICANT CHANGE UP (ref 0–0.2)
BASOPHILS NFR BLD AUTO: 0.4 % — SIGNIFICANT CHANGE UP (ref 0–2)
EOSINOPHIL # BLD AUTO: 0.2 K/UL — SIGNIFICANT CHANGE UP (ref 0–0.5)
EOSINOPHIL NFR BLD AUTO: 2.1 % — SIGNIFICANT CHANGE UP (ref 0–6)
HCT VFR BLD CALC: 39 % — SIGNIFICANT CHANGE UP (ref 39–50)
HGB BLD-MCNC: 13.5 G/DL — SIGNIFICANT CHANGE UP (ref 13–17)
LYMPHOCYTES # BLD AUTO: 2.5 K/UL — SIGNIFICANT CHANGE UP (ref 1–3.3)
LYMPHOCYTES # BLD AUTO: 30.2 % — SIGNIFICANT CHANGE UP (ref 13–44)
MCHC RBC-ENTMCNC: 34.6 G/DL — SIGNIFICANT CHANGE UP (ref 32–36)
MCHC RBC-ENTMCNC: 34.6 PG — HIGH (ref 27–34)
MCV RBC AUTO: 100 FL — SIGNIFICANT CHANGE UP (ref 80–100)
MONOCYTES # BLD AUTO: 0.6 K/UL — SIGNIFICANT CHANGE UP (ref 0–0.9)
MONOCYTES NFR BLD AUTO: 7.1 % — SIGNIFICANT CHANGE UP (ref 2–14)
NEUTROPHILS # BLD AUTO: 4.9 K/UL — SIGNIFICANT CHANGE UP (ref 1.8–7.4)
NEUTROPHILS NFR BLD AUTO: 60.1 % — SIGNIFICANT CHANGE UP (ref 43–77)
PLATELET # BLD AUTO: 137 K/UL — LOW (ref 150–400)
RBC # BLD: 3.9 M/UL — LOW (ref 4.2–5.8)
RBC # FLD: 12.7 % — SIGNIFICANT CHANGE UP (ref 10.3–14.5)
WBC # BLD: 8.1 K/UL — SIGNIFICANT CHANGE UP (ref 3.8–10.5)
WBC # FLD AUTO: 8.1 K/UL — SIGNIFICANT CHANGE UP (ref 3.8–10.5)

## 2019-11-12 PROCEDURE — 99214 OFFICE O/P EST MOD 30 MIN: CPT

## 2019-11-12 RX ORDER — HYDROCORTISONE 25 MG/G
2.5 OINTMENT TOPICAL
Refills: 0 | Status: DISCONTINUED | COMMUNITY
Start: 2018-12-10 | End: 2019-11-12

## 2019-11-12 NOTE — ADDENDUM
[FreeTextEntry1] : Documented by Candy Howard acting as a scribe for Dr. Cesar Simons on 11/12/2019 \par \par All medical record entries made by the Scribe were at my, Dr. Cesar Simons's, direction and personally dictated by me on 11/12/2019. I have reviewed the chart and agree that the record accurately reflects my personal performance of the history, physical exam, results, assessment and plan. I have also personally directed, reviewed, and agree with the discharge instructions.

## 2019-11-12 NOTE — REVIEW OF SYSTEMS
[Easy Bruising] : a tendency for easy bruising [Joint Pain] : joint pain [Negative] : Allergic/Immunologic [Fatigue] : no fatigue [Vision Problems] : vision problems [Diarrhea] : no diarrhea [Skin Rash] : no skin rash [FreeTextEntry9] : stable back pain [de-identified] : Neuropathy

## 2019-11-12 NOTE — ASSESSMENT
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 He had progressive anemia with rising creatinine and relapse of his Waldenstrom's. He was not a candidate for Ibrutinib due to anticoagulation with Warfarin. He achieved a partial remission with marked improvement in his hemoglobin, creatinine and IgM wit IRd, but had not tolerated Rituxan well and is loathe to receive it again. He previously tolerated  the Ninlaro well and has now initiated maintenance therapy with Ninlaro and Dexamethasone. Nephrotic syndrome is improving.\par \par Plan:\par Ninlaro/Dexamethasone weekly x 3, then 5 weeks off\par CMP, LDH, SPEP, Quantitative immunoglobulins, SFLC, viscosity, SPIEP\par Acyclovir\par Omeprazole\par Renal f/u\par Warfarin per Cardiology\par RTC 2 months

## 2019-11-12 NOTE — PHYSICAL EXAM
[Restricted in physically strenuous activity but ambulatory and able to carry out work of a light or sedentary nature] : Status 1- Restricted in physically strenuous activity but ambulatory and able to carry out work of a light or sedentary nature, e.g., light house work, office work [Normal] : normal appearance, no rash, nodules, vesicles, ulcers, erythema [de-identified] :  Pacemaker left anterior chest wall. RRR. S1S2 normal. Gr 2/6 TESS RUSB to LLSB. No gallops. [de-identified] : 5 x 5 cm lipoma left lower back.

## 2019-11-12 NOTE — REASON FOR VISIT
[Follow-Up Visit] : a follow-up visit for [CLL] : chronic lymphocytic leukemia [Lymphoma] : lymphoma [Spouse] : spouse [Monoclonal Gammopathy] : monoclonal gammopathy

## 2019-11-12 NOTE — CONSULT LETTER
[Dear  ___] : Dear  [unfilled], [Please see my note below.] : Please see my note below. [Courtesy Letter:] : I had the pleasure of seeing your patient, [unfilled], in my office today. [Sincerely,] : Sincerely, [DrIbeth  ___] : Dr. DENG [DrIbeth ___] : Dr. DENG [___] : [unfilled] [FreeTextEntry2] : Dr. Simpson [FreeTextEntry3] : Cesar Simons MD

## 2019-11-12 NOTE — HISTORY OF PRESENT ILLNESS
[Disease:__________________________] : Disease: [unfilled] [Rosales Stage: ___] : Rosales Stage: [unfilled] [Cycle: ___] : Cycle: [unfilled] [de-identified] : PAF\par 10/2015 Waldenstrom's macroglobulinemia ; + dim lambda, CD 19, 23, FMC-7; negative CD 5, 10, 20\par Renal biopsy: lymphocytic infiltrate of renal cortex\par 10/17 IgGk and lambda BGUS\par 7/2018: Nephrotic syndrome with lambda light chains\par  [de-identified] : +lambda, CD5, CD 20; CD 38 --; FISH del 13q14.3\par SPEP gamma paraprotein M 0.4\par SPIEP IgM lambda; 10/17 IgGk, lambda [FreeTextEntry1] : 10/2015 - 1/2017  Rituxan/Velcade/Decadron; 8/18 - 1/19 Ixazomib/Decadron/Rituxan x 6 cycles to maintenance [de-identified] : He feels well. He has stopped taking probiotic. Episodes of diarrhea have subsequently decreased in frequency. He notes visual problems in right eye from a cataract.. No recent episodes of A-fib. Ambulating well without a cane. Continues to have stable chronic back pain. Neuropathy is stable. He notes no fevers, night sweats, swollen glands, chest pain, palpitations, abdominal pain, SOB, bleeding, bruising, headaches, melena, BRBPR. His weight is stable. He received flu vaccine 2019. \par \par \par

## 2019-12-17 ENCOUNTER — APPOINTMENT (OUTPATIENT)
Dept: CARDIOLOGY | Facility: CLINIC | Age: 74
End: 2019-12-17
Payer: MEDICARE

## 2019-12-17 ENCOUNTER — NON-APPOINTMENT (OUTPATIENT)
Age: 74
End: 2019-12-17

## 2019-12-17 VITALS
SYSTOLIC BLOOD PRESSURE: 128 MMHG | HEART RATE: 65 BPM | OXYGEN SATURATION: 96 % | HEIGHT: 74 IN | WEIGHT: 232 LBS | DIASTOLIC BLOOD PRESSURE: 73 MMHG | BODY MASS INDEX: 29.77 KG/M2

## 2019-12-17 PROCEDURE — 93000 ELECTROCARDIOGRAM COMPLETE: CPT

## 2019-12-17 PROCEDURE — 99214 OFFICE O/P EST MOD 30 MIN: CPT

## 2019-12-17 NOTE — DISCUSSION/SUMMARY
[___ Month(s)] : [unfilled] month(s) [FreeTextEntry1] : The patient is a 74-year-old gentleman history mild dementia, paroxysmal  atrial fibrillation, lower extremity edema , CLL, CKD stage IV, Waldenstrom's macroglobulinemia, hypothyroidism, PPM , chronic diastolic heart failure s/p sigmoid resection for diverticulitis, oral chemo for his CLL recovering from pelvic fracture. Maintaining sinus on amiodarone. Last TSH therapeutic. Waleska Kimengi pacemaker check next visit. No remote reads. INR has been therapeutic.

## 2019-12-17 NOTE — REVIEW OF SYSTEMS
[Negative] : Heme/Lymph [Shortness Of Breath] : no shortness of breath [Dyspnea on exertion] : not dyspnea during exertion [Lower Ext Edema] : no extremity edema [Chest Pain] : no chest pain [Palpitations] : no palpitations

## 2019-12-17 NOTE — PHYSICAL EXAM
[General Appearance - Well Developed] : well developed [Normal Appearance] : normal appearance [Well Groomed] : well groomed [General Appearance - Well Nourished] : well nourished [No Deformities] : no deformities [General Appearance - In No Acute Distress] : no acute distress [Normal Conjunctiva] : the conjunctiva exhibited no abnormalities [Eyelids - No Xanthelasma] : the eyelids demonstrated no xanthelasmas [No Oral Pallor] : no oral pallor [Normal Oral Mucosa] : normal oral mucosa [No Oral Cyanosis] : no oral cyanosis [Normal Jugular Venous A Waves Present] : normal jugular venous A waves present [No Jugular Venous Rodriguez A Waves] : no jugular venous rodriguez A waves [Normal Jugular Venous V Waves Present] : normal jugular venous V waves present [Respiration, Rhythm And Depth] : normal respiratory rhythm and effort [Exaggerated Use Of Accessory Muscles For Inspiration] : no accessory muscle use [Auscultation Breath Sounds / Voice Sounds] : lungs were clear to auscultation bilaterally [Heart Sounds] : normal S1 and S2 [Normal] : the heart rate was normal [Systolic grade ___/6] : A grade [unfilled]/6 systolic murmur was heard. [Regular] : the rhythm was regular [Abdomen Soft] : soft [Abdomen Tenderness] : non-tender [Abdomen Mass (___ Cm)] : no abdominal mass palpated [Abnormal Walk] : normal gait [Gait - Sufficient For Exercise Testing] : the gait was sufficient for exercise testing [Petechial Hemorrhages (___cm)] : no petechial hemorrhages [Nail Clubbing] : no clubbing of the fingernails [Cyanosis, Localized] : no localized cyanosis [] : no rash [Skin Color & Pigmentation] : normal skin color and pigmentation [No Venous Stasis] : no venous stasis [Skin Lesions] : no skin lesions [No Skin Ulcers] : no skin ulcer [Oriented To Time, Place, And Person] : oriented to person, place, and time [Affect] : the affect was normal [No Xanthoma] : no  xanthoma was observed [Mood] : the mood was normal [No Anxiety] : not feeling anxious

## 2020-01-17 NOTE — H&P ADULT - NSHPPOAPRESSUREULCER_GEN_ALL_CORE
CC :   Chief Complaint   Patient presents with   • Follow-up       SUBJECTIVE  Dell Mcpherson is a 65 year old  male who presents today for a follow up on:   Here for follow-up on bilateral leg edema, he saw Dr Taylor.   Is going to start lymphedema treatment.   Taking diuretic, cannot get enough fluid off without metolazone.   Was off metolazole swelling came back. Taking diuretic BID.   ECHO is scheduled for February.     The patient's prior medical history is significant for:  Patient Active Problem List    Diagnosis Date Noted   • Lymphedema 01/17/2020     Priority: Low   • Osteoarthritis of ankle and foot 01/17/2020     Priority: Low   • Lymphedema of both lower extremities 01/17/2020     Priority: Low   • Type 2 diabetes mellitus without complication, with long-term current use of insulin (CMS/[facility]) 04/05/2019     Priority: Low   • Seasonal allergic rhinitis due to pollen 04/05/2019     Priority: Low   • Chronic midline thoracic back pain 04/05/2019     Priority: Low   • Iron deficiency anemia due to chronic blood loss 09/12/2018     Priority: Low   • Dupuytren's contracture of both hands 08/17/2017     Priority: Low   • Anxiety 04/14/2017     Priority: Low   • Ulnar nerve compression 04/03/2017     Priority: Low   • ROXANA (obstructive sleep apnea) 01/03/2017     Priority: Low   • Bilateral carpal tunnel syndrome 10/06/2016     Priority: Low   • Steatohepatitis, alcoholic 08/16/2016     Priority: Low   • H/O total knee replacement 05/06/2016     Priority: Low   • Gait difficulty 04/06/2016     Priority: Low   • Knee stiffness 04/06/2016     Priority: Low   • Left knee pain 04/06/2016     Priority: Low   • Osteoarthritis of knee 03/30/2016     Priority: Low   • Pedal edema 01/22/2016     Priority: Low   • Elevated PSA 03/25/2013     Priority: Low   • Aneurysm of aorta (CMS/[facility]) 07/08/2011     Priority: Low   • Lipoma of other skin and subcutaneous tissue 03/25/2005     Priority: Low   • Dyspepsia 12/15/2003      Priority: Low   • Dyslipidemia 03/11/2003     Priority: Low   • Impotence of organic origin 06/25/2002     Priority: Low   • Primary localized osteoarthrosis, lower leg 06/15/2000     Priority: Low   • Gout 07/19/1997     Priority: Low       ALLERGIES:  ALLERGIES: no known allergies.    Current Outpatient Medications   Medication Sig   • HYDROcodone-acetaminophen (NORCO)  MG per tablet Take 1 tablet by mouth every 6 hours as needed for Pain.   • meloxicam (MOBIC) 15 MG tablet TAKE 1 TABLET BY MOUTH ONCE DAILY   • metOLazone (ZAROXOLYN) 2.5 MG tablet Take 1 tablet by mouth 3 days a week.   • potassium chloride (KLOR-CON, K-TAB) 10 MEQ ER tablet Take four tabs daily for 3 days.   • allopurinol (ZYLOPRIM) 300 MG tablet Take 1 tablet by mouth daily.   • naLOXone (NARCAN) 4 MG/0.1ML nasal spray Call 911. [place] the content of 1 device into 1 nostril. May repeat with 2nd device in alternate nostril if no response in 2-3 minutes.   • azithromycin (ZITHROMAX) 250 MG tablet take two tabs day one and one tab daily x 4 days.   • benzonatate (TESSALON PERLES) 100 MG capsule Take 1 capsule by mouth 3 times daily as needed for Cough.   • furosemide (LASIX) 40 MG tablet Take 1 tablet by mouth 2 times daily.   • fluticasone (FLONASE) 50 MCG/ACT nasal spray Spray 1 spray in each nostril daily.   • busPIRone (BUSPAR) 15 MG tablet TAKE 1 TABLET BY MOUTH TWICE DAILY   • potassium chloride (KLOR-CON 10) 10 MEQ ER tablet Take 2 tablets by mouth daily.   • ACCU-CHEK FASTCLIX LANCETS Misc Check glucose before meals and at bedtime QID   • glimepiride (AMARYL) 2 MG tablet Take 1 mg by mouth daily (before breakfast).    • nystatin (MYCOSTATIN) 000853 UNIT/ML suspension 5 ml by mouth 4 times daily until clear   • ACCU-CHEK GUIDE test strip TEST BLOOD SUGAR 4 TIMES DAILY AS DIRECTED   • levocetirizine (XYZAL) 5 MG tablet Take 1 tablet by mouth daily.   • clotrimazole-betamethasone (LOTRISONE) 1-0.05 % cream Apply topically 2 times daily.    • blood glucose (ACCU-CHEK GUIDE) test strip Test blood sugar 4 times daily as directed. Diagnosis: E11.9. Meter: Accu- Check Guide   • pantoprazole (PROTONIX) 40 MG tablet Take 1 tablet by mouth 2 times daily.   • metFORMIN (GLUCOPHAGE) 500 MG tablet Take 1 tablet by mouth 2 times daily (with meals).   • Insulin Pen Needle (B-D U/F PEN NEEDLE) 31G X 5 MM Misc Use to inject insulin 1 times daily. Remove needle cover(s) to expose needle before injecting.   • alfuzosin (UROXATRAL) 10 MG 24 hr tablet TAKE 1 TABLET BY MOUTH ONCE DAILY   • polyethylene glycol (MIRALAX) powder Take 34 g by mouth daily.   • Blood Glucose Monitoring Suppl (ACCU-CHEK GUIDE) w/Device Kit 1 each 4 times daily (before meals and nightly).   • hydroCORTisone (CORTIZONE) 2.5 % cream Apply to rash twice daily as needed   • meclizine HCl (ANTIVERT) 25 MG tablet Take 25 mg by mouth 3 times daily as needed.   • lovastatin (MEVACOR) 20 MG tablet Take 1 tablet by mouth nightly.         ROS  General: denies fever, night sweats, weight changes, appetite changes and sleep problems  Heent: Pt denies problems with head, eyes, ears, nose, mouth, throat and neck  Respiratory: No coughing, wheezing, changes in voice,  nor shortness of breath  Cardiovascular:No chest pain, palpitations or other cardiac complaints noted  Gastrointestinal: No diarrhea, constipation, abdominal pain or other complaints noted  All other systems reviewed are negative     OBJECTIVE:     Visit Vitals  /68   Pulse 86   Temp 97.3 °F (36.3 °C) (Temporal)   Ht 5' 6\" (1.676 m)   Wt 117.9 kg (260 lb)   SpO2 95%   BMI 41.97 kg/m²     Physical exam    GENERAL: pleasant and comfortable, no apparent distress  SKIN - normal color, normal texture, normal turgor  LUNGS -  CTA throughout without crackles, rhonchi, or wheezes.  Normal respiratory effort  HEART -  RRR without S3, S4, or murmurs.   EXT -trace edema bilaterally.     ASSESSMENT/PLAN  Bilateral foot pain  (primary encounter  diagnosis)  Plan: SERVICE TO PODIATRY    Diabetes  Check A1c in February  Continue glimepiride    Acute bronchitis  Vs. Chronic COPD  Check Xray  Start Abx  Start pro air inhaler PRN  Check PFTs.     Bilateral leg edema (lymphedema)   Continue lasix. 40mg PO BID  BNP normal last month, also EKG is normal.   Continue lasix  Restart  metolazone  Check 2d ECHO.  Further management per Podiatry    Multiple joint pain   Plan: SHEILA KAMLA, CYCLIC CITRULLINATED PEPTIDE (CCP) AB Negative, ESR/ CRP elevated.   Hx of RA?  Keep appt with rheumatology      Instructed to call if the problem worsens or does not improve   Schedule follow-up: 1 month.     Pamela Aldana,  1/17/2020      no

## 2020-02-11 ENCOUNTER — OUTPATIENT (OUTPATIENT)
Dept: OUTPATIENT SERVICES | Facility: HOSPITAL | Age: 75
LOS: 1 days | Discharge: HOME | End: 2020-02-11

## 2020-02-11 DIAGNOSIS — Z98.890 OTHER SPECIFIED POSTPROCEDURAL STATES: Chronic | ICD-10-CM

## 2020-02-11 DIAGNOSIS — Z98.41 CATARACT EXTRACTION STATUS, RIGHT EYE: Chronic | ICD-10-CM

## 2020-02-11 DIAGNOSIS — Z95.0 PRESENCE OF CARDIAC PACEMAKER: Chronic | ICD-10-CM

## 2020-02-11 DIAGNOSIS — Z98.89 OTHER SPECIFIED POSTPROCEDURAL STATES: Chronic | ICD-10-CM

## 2020-02-11 DIAGNOSIS — C91.11 CHRONIC LYMPHOCYTIC LEUKEMIA OF B-CELL TYPE IN REMISSION: ICD-10-CM

## 2020-02-12 ENCOUNTER — APPOINTMENT (OUTPATIENT)
Dept: HEMATOLOGY ONCOLOGY | Facility: CLINIC | Age: 75
End: 2020-02-12
Payer: MEDICARE

## 2020-02-12 ENCOUNTER — RESULT REVIEW (OUTPATIENT)
Age: 75
End: 2020-02-12

## 2020-02-12 VITALS
TEMPERATURE: 98.24 F | DIASTOLIC BLOOD PRESSURE: 74 MMHG | SYSTOLIC BLOOD PRESSURE: 139 MMHG | WEIGHT: 235.67 LBS | BODY MASS INDEX: 30.26 KG/M2 | RESPIRATION RATE: 18 BRPM | HEART RATE: 62 BPM | OXYGEN SATURATION: 96 %

## 2020-02-12 LAB
BASOPHILS # BLD AUTO: 0.1 K/UL — SIGNIFICANT CHANGE UP (ref 0–0.2)
BASOPHILS NFR BLD AUTO: 1.1 % — SIGNIFICANT CHANGE UP (ref 0–2)
EOSINOPHIL # BLD AUTO: 0.2 K/UL — SIGNIFICANT CHANGE UP (ref 0–0.5)
EOSINOPHIL NFR BLD AUTO: 3.6 % — SIGNIFICANT CHANGE UP (ref 0–6)
HCT VFR BLD CALC: 37.3 % — LOW (ref 39–50)
HGB BLD-MCNC: 12.4 G/DL — LOW (ref 13–17)
LYMPHOCYTES # BLD AUTO: 2 K/UL — SIGNIFICANT CHANGE UP (ref 1–3.3)
LYMPHOCYTES # BLD AUTO: 36.2 % — SIGNIFICANT CHANGE UP (ref 13–44)
MCHC RBC-ENTMCNC: 33.2 G/DL — SIGNIFICANT CHANGE UP (ref 32–36)
MCHC RBC-ENTMCNC: 33.3 PG — SIGNIFICANT CHANGE UP (ref 27–34)
MCV RBC AUTO: 100 FL — SIGNIFICANT CHANGE UP (ref 80–100)
MONOCYTES # BLD AUTO: 0.5 K/UL — SIGNIFICANT CHANGE UP (ref 0–0.9)
MONOCYTES NFR BLD AUTO: 9.7 % — SIGNIFICANT CHANGE UP (ref 2–14)
NEUTROPHILS # BLD AUTO: 2.8 K/UL — SIGNIFICANT CHANGE UP (ref 1.8–7.4)
NEUTROPHILS NFR BLD AUTO: 49.4 % — SIGNIFICANT CHANGE UP (ref 43–77)
PLATELET # BLD AUTO: 159 K/UL — SIGNIFICANT CHANGE UP (ref 150–400)
RBC # BLD: 3.72 M/UL — LOW (ref 4.2–5.8)
RBC # FLD: 12.6 % — SIGNIFICANT CHANGE UP (ref 10.3–14.5)
WBC # BLD: 5.6 K/UL — SIGNIFICANT CHANGE UP (ref 3.8–10.5)
WBC # FLD AUTO: 5.6 K/UL — SIGNIFICANT CHANGE UP (ref 3.8–10.5)

## 2020-02-12 PROCEDURE — 99214 OFFICE O/P EST MOD 30 MIN: CPT

## 2020-02-12 NOTE — PHYSICAL EXAM
[Normal] : affect appropriate [Ambulatory and capable of all self care but unable to carry out any work activities] : Status 2- Ambulatory and capable of all self care but unable to carry out any work activities. Up and about more than 50% of waking hours [de-identified] :  Pacemaker left anterior chest wall. RRR. S1S2 normal. Gr 2/6 TESS RUSB to LLSB. No gallops. [de-identified] : 5 x 5 cm lipoma left lower back.

## 2020-02-12 NOTE — HISTORY OF PRESENT ILLNESS
[Disease:__________________________] : Disease: [unfilled] [Rosales Stage: ___] : Rosales Stage: [unfilled] [Cycle: ___] : Cycle: [unfilled] [de-identified] : PAF\par 10/2015 Waldenstrom's macroglobulinemia ; + dim lambda, CD 19, 23, FMC-7; negative CD 5, 10, 20\par Renal biopsy: lymphocytic infiltrate of renal cortex\par 10/17 IgGk and lambda BGUS\par 7/2018: Nephrotic syndrome with lambda light chains\par  [de-identified] : +lambda, CD5, CD 20; CD 38 --; FISH del 13q14.3\par SPEP gamma paraprotein M 0.4\par SPIEP IgM lambda; 10/17 IgGk, lambda [FreeTextEntry1] : 10/2015 - 1/2017  Rituxan/Velcade/Decadron; 8/18 - 1/19 Ixazomib/Decadron/Rituxan x 6 cycles to maintenance [de-identified] : He feels tired. Episodes of diarrhea after meals. Most pronounced following ingestion of  dairy products. Left eye cataract removed - vision stable after surgery. No recent episodes of atrial-fib. Ambulating well without a cane. Chronic back pain stable. Neuropathy is stable. Reports dyspnea on exertion with most activities. No other complaints. He notes no fevers, night sweats, swollen glands, chest pain, palpitations, abdominal pain, shortness of breath, bleeding, bruising, headaches, melena, BRBPR. Reports some weight gain.\par \par

## 2020-02-12 NOTE — CONSULT LETTER
[Dear  ___] : Dear  [unfilled], [Courtesy Letter:] : I had the pleasure of seeing your patient, [unfilled], in my office today. [Please see my note below.] : Please see my note below. [Sincerely,] : Sincerely, [DrIbeth  ___] : Dr. DENG [DrIbeth ___] : Dr. DNEG [___] : [unfilled] [FreeTextEntry3] : Cesar Simons MD [FreeTextEntry2] : Dr. Simpson

## 2020-02-12 NOTE — ADDENDUM
[FreeTextEntry1] : Documented by Patrick Mckinney acting as a scribe for Dr. Cesar Simons on 02/12/2020 \par \par All medical record entries made by the Scribe were at my, Dr. Cesar Simons's, direction and personally dictated by me on 02/12/2020. I have reviewed the chart and agree that the record accurately reflects my personal performance of the history, physical exam, results, assessment and plan. I have also personally directed, reviewed, and agree with the discharge instructions.

## 2020-02-12 NOTE — RESULTS/DATA
[FreeTextEntry1] : WBC 5,600, Hgb 12.4, Hct 37.3, .0, Plts 159,000, Diff normal, ANC 2,800\par  \par 11/12/19\par CMP: Glu 113, BUN 34, Creatinine 3.01, Globulin 2.6, ALK phos 136, eGFR 20\par \par SPEP: M-spike 0.8, two gamma migrating paraprotein identified. One IgM Lambda and One Weak Lambda Light Chains Identified and No IgD or IgE Bands Previously Identified\par SFLC: Kappa 0.85, Lambda 145.94\par IgG 417, A 32, M 1285\par Viscosity 1.8

## 2020-02-12 NOTE — REASON FOR VISIT
[Lymphoma] : lymphoma [CLL] : chronic lymphocytic leukemia [Follow-Up Visit] : a follow-up visit for [Monoclonal Gammopathy] : monoclonal gammopathy [Spouse] : spouse

## 2020-02-12 NOTE — REVIEW OF SYSTEMS
[Vision Problems] : vision problems [Joint Pain] : joint pain [Easy Bruising] : a tendency for easy bruising [Negative] : Allergic/Immunologic [Fatigue] : no fatigue [Diarrhea] : no diarrhea [Skin Rash] : no skin rash [de-identified] : Neuropathy [FreeTextEntry9] : stable back pain

## 2020-02-12 NOTE — ASSESSMENT
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 He had progressive anemia with rising creatinine and relapse of his Waldenstrom's. He was not a candidate for Ibrutinib due to anticoagulation with Warfarin. He achieved a partial remission with marked improvement in his hemoglobin, creatinine and IgM with IRd, but had not tolerated Rituxan well and is loathe to receive it again. He previously tolerated the Ninlaro well and has now initiated maintenance therapy with Ninlaro and Dexamethasone. Nephrotic syndrome is improving.\par \par Plan:\par Ninlaro/Dexamethasone weekly x 3, then 5 weeks off\par CMP, LDH, SPEP, Quantitative immunoglobulins, SFLC, viscosity, SPIEP\par Acyclovir\par Omeprazole\par Renal f/u\par Warfarin per Cardiology\par RTC 2 months Patent

## 2020-03-16 LAB
ALBUMIN MFR SERPL ELPH: 52.3 %
ALBUMIN MFR SERPL ELPH: 58.4 %
ALBUMIN SERPL ELPH-MCNC: 4.2 G/DL
ALBUMIN SERPL ELPH-MCNC: 4.4 G/DL
ALBUMIN SERPL-MCNC: 3.4 G/DL
ALBUMIN SERPL-MCNC: 4.1 G/DL
ALBUMIN/GLOB SERPL: 1.1 RATIO
ALBUMIN/GLOB SERPL: 1.4 RATIO
ALP BLD-CCNC: 119 U/L
ALP BLD-CCNC: 136 U/L
ALPHA1 GLOB MFR SERPL ELPH: 5.5 %
ALPHA1 GLOB MFR SERPL ELPH: 5.5 %
ALPHA1 GLOB SERPL ELPH-MCNC: 0.4 G/DL
ALPHA1 GLOB SERPL ELPH-MCNC: 0.4 G/DL
ALPHA2 GLOB MFR SERPL ELPH: 10.7 %
ALPHA2 GLOB MFR SERPL ELPH: 11.4 %
ALPHA2 GLOB SERPL ELPH-MCNC: 0.7 G/DL
ALPHA2 GLOB SERPL ELPH-MCNC: 0.7 G/DL
ALT SERPL-CCNC: 13 U/L
ALT SERPL-CCNC: 16 U/L
ANION GAP SERPL CALC-SCNC: 12 MMOL/L
ANION GAP SERPL CALC-SCNC: 13 MMOL/L
AST SERPL-CCNC: 15 U/L
AST SERPL-CCNC: 16 U/L
B-GLOBULIN MFR SERPL ELPH: 8.9 %
B-GLOBULIN MFR SERPL ELPH: 9.2 %
B-GLOBULIN SERPL ELPH-MCNC: 0.6 G/DL
B-GLOBULIN SERPL ELPH-MCNC: 0.6 G/DL
BILIRUB SERPL-MCNC: 0.3 MG/DL
BILIRUB SERPL-MCNC: 0.4 MG/DL
BUN SERPL-MCNC: 28 MG/DL
BUN SERPL-MCNC: 34 MG/DL
CALCIUM SERPL-MCNC: 9.2 MG/DL
CALCIUM SERPL-MCNC: 9.5 MG/DL
CHLORIDE SERPL-SCNC: 108 MMOL/L
CHLORIDE SERPL-SCNC: 108 MMOL/L
CO2 SERPL-SCNC: 22 MMOL/L
CO2 SERPL-SCNC: 24 MMOL/L
CREAT 24H UR-MCNC: 1.8 G/24 H
CREAT ?TM UR-MCNC: 90 MG/DL
CREAT SERPL-MCNC: 3.01 MG/DL
CREAT SERPL-MCNC: 3.38 MG/DL
DEPRECATED KAPPA LC FREE/LAMBDA SER: 0 RATIO
DEPRECATED KAPPA LC FREE/LAMBDA SER: 0 RATIO
DEPRECATED KAPPA LC FREE/LAMBDA SER: 0.01 RATIO
DEPRECATED KAPPA LC FREE/LAMBDA SER: 0.01 RATIO
GAMMA GLOB FLD ELPH-MCNC: 1.2 G/DL
GAMMA GLOB FLD ELPH-MCNC: 1.4 G/DL
GAMMA GLOB MFR SERPL ELPH: 16.5 %
GAMMA GLOB MFR SERPL ELPH: 21.6 %
GLUCOSE SERPL-MCNC: 113 MG/DL
GLUCOSE SERPL-MCNC: 117 MG/DL
IGA SER QL IEP: 19 MG/DL
IGA SER QL IEP: 32 MG/DL
IGG SER QL IEP: 386 MG/DL
IGG SER QL IEP: 417 MG/DL
IGM SER QL IEP: 1285 MG/DL
IGM SER QL IEP: 1404 MG/DL
INTERPRETATION SERPL IEP-IMP: NORMAL
INTERPRETATION SERPL IEP-IMP: NORMAL
KAPPA LC CSF-MCNC: 145.94 MG/DL
KAPPA LC CSF-MCNC: 145.94 MG/DL
KAPPA LC CSF-MCNC: 210.32 MG/DL
KAPPA LC CSF-MCNC: 210.32 MG/DL
KAPPA LC SERPL-MCNC: 0.85 MG/DL
KAPPA LC SERPL-MCNC: 0.85 MG/DL
KAPPA LC SERPL-MCNC: 0.9 MG/DL
KAPPA LC SERPL-MCNC: 0.9 MG/DL
LDH SERPL-CCNC: 146 U/L
LDH SERPL-CCNC: 153 U/L
M PROTEIN MFR SERPL ELPH: 12 %
M PROTEIN MFR SERPL ELPH: 16.1 %
M PROTEIN SPEC IFE-MCNC: NORMAL
MONOCLON BAND OBS SERPL: 0.8 G/DL
MONOCLON BAND OBS SERPL: 1 G/DL
POTASSIUM SERPL-SCNC: 4.5 MMOL/L
POTASSIUM SERPL-SCNC: 5.2 MMOL/L
PROT 24H UR-MRATE: 87 MG/DL
PROT ?TM UR-MCNC: 24 HR
PROT SERPL-MCNC: 6.5 G/DL
PROT SERPL-MCNC: 6.5 G/DL
PROT SERPL-MCNC: 7 G/DL
PROT UR-MCNC: 1696 MG/24 H
SODIUM SERPL-SCNC: 142 MMOL/L
SODIUM SERPL-SCNC: 144 MMOL/L
SPECIMEN VOL 24H UR: 1950 ML
VISCOSITY, SERUM: 1.8
VISCOSITY, SERUM: 1.8

## 2020-04-03 NOTE — PATIENT PROFILE ADULT. - TEACHING/LEARNING FACTORS INFLUENCE READINESS TO LEARN
Patient is 14w4d pregnant and would like work note.  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  Left message to call back to discuss.  
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  Patient feels uncomfortable doing that being pregnant but feels she is safer in the OR. She is not asking to be off of work but asking for a letter saying she can stay working in the OR and not to travel to other areas of the hospital.  Per Dr. Nava this ok and she will  today.  
none

## 2020-04-21 ENCOUNTER — APPOINTMENT (OUTPATIENT)
Dept: CARDIOLOGY | Facility: CLINIC | Age: 75
End: 2020-04-21

## 2020-04-21 ENCOUNTER — APPOINTMENT (OUTPATIENT)
Dept: ELECTROPHYSIOLOGY | Facility: CLINIC | Age: 75
End: 2020-04-21

## 2020-04-21 ENCOUNTER — APPOINTMENT (OUTPATIENT)
Dept: ELECTROPHYSIOLOGY | Facility: CLINIC | Age: 75
End: 2020-04-21
Payer: MEDICARE

## 2020-04-21 PROCEDURE — 93296 REM INTERROG EVL PM/IDS: CPT

## 2020-04-21 PROCEDURE — 93294 REM INTERROG EVL PM/LDLS PM: CPT

## 2020-05-01 ENCOUNTER — APPOINTMENT (OUTPATIENT)
Dept: HEMATOLOGY ONCOLOGY | Facility: CLINIC | Age: 75
End: 2020-05-01

## 2020-05-04 ENCOUNTER — LABORATORY RESULT (OUTPATIENT)
Age: 75
End: 2020-05-04

## 2020-05-11 ENCOUNTER — RX RENEWAL (OUTPATIENT)
Age: 75
End: 2020-05-11

## 2020-07-09 ENCOUNTER — APPOINTMENT (OUTPATIENT)
Dept: ELECTROPHYSIOLOGY | Facility: CLINIC | Age: 75
End: 2020-07-09

## 2020-07-22 ENCOUNTER — APPOINTMENT (OUTPATIENT)
Dept: ELECTROPHYSIOLOGY | Facility: CLINIC | Age: 75
End: 2020-07-22
Payer: MEDICARE

## 2020-07-22 PROCEDURE — 93296 REM INTERROG EVL PM/IDS: CPT

## 2020-07-22 PROCEDURE — 93294 REM INTERROG EVL PM/LDLS PM: CPT

## 2020-07-29 ENCOUNTER — LABORATORY RESULT (OUTPATIENT)
Age: 75
End: 2020-07-29

## 2020-08-13 ENCOUNTER — OUTPATIENT (OUTPATIENT)
Dept: OUTPATIENT SERVICES | Facility: HOSPITAL | Age: 75
LOS: 1 days | Discharge: HOME | End: 2020-08-13

## 2020-08-13 DIAGNOSIS — Z98.890 OTHER SPECIFIED POSTPROCEDURAL STATES: Chronic | ICD-10-CM

## 2020-08-13 DIAGNOSIS — C91.11 CHRONIC LYMPHOCYTIC LEUKEMIA OF B-CELL TYPE IN REMISSION: ICD-10-CM

## 2020-08-13 DIAGNOSIS — Z98.89 OTHER SPECIFIED POSTPROCEDURAL STATES: Chronic | ICD-10-CM

## 2020-08-13 DIAGNOSIS — Z95.0 PRESENCE OF CARDIAC PACEMAKER: Chronic | ICD-10-CM

## 2020-08-13 DIAGNOSIS — Z98.41 CATARACT EXTRACTION STATUS, RIGHT EYE: Chronic | ICD-10-CM

## 2020-08-18 ENCOUNTER — RESULT REVIEW (OUTPATIENT)
Age: 75
End: 2020-08-18

## 2020-08-18 ENCOUNTER — APPOINTMENT (OUTPATIENT)
Dept: HEMATOLOGY ONCOLOGY | Facility: CLINIC | Age: 75
End: 2020-08-18
Payer: MEDICARE

## 2020-08-18 VITALS
DIASTOLIC BLOOD PRESSURE: 65 MMHG | SYSTOLIC BLOOD PRESSURE: 127 MMHG | BODY MASS INDEX: 29.52 KG/M2 | HEIGHT: 74 IN | OXYGEN SATURATION: 99 % | HEART RATE: 54 BPM | WEIGHT: 230 LBS | RESPIRATION RATE: 17 BRPM | TEMPERATURE: 98.3 F

## 2020-08-18 LAB
BASOPHILS # BLD AUTO: 0.06 K/UL — SIGNIFICANT CHANGE UP (ref 0–0.2)
BASOPHILS NFR BLD AUTO: 0.9 % — SIGNIFICANT CHANGE UP (ref 0–2)
EOSINOPHIL # BLD AUTO: 0.2 K/UL — SIGNIFICANT CHANGE UP (ref 0–0.5)
EOSINOPHIL NFR BLD AUTO: 3.1 % — SIGNIFICANT CHANGE UP (ref 0–6)
HCT VFR BLD CALC: 33.3 % — LOW (ref 39–50)
HGB BLD-MCNC: 10.7 G/DL — LOW (ref 13–17)
IMM GRANULOCYTES NFR BLD AUTO: 1.4 % — SIGNIFICANT CHANGE UP (ref 0–1.5)
LYMPHOCYTES # BLD AUTO: 1.59 K/UL — SIGNIFICANT CHANGE UP (ref 1–3.3)
LYMPHOCYTES # BLD AUTO: 24.5 % — SIGNIFICANT CHANGE UP (ref 13–44)
MCHC RBC-ENTMCNC: 32.1 GM/DL — SIGNIFICANT CHANGE UP (ref 32–36)
MCHC RBC-ENTMCNC: 32.6 PG — SIGNIFICANT CHANGE UP (ref 27–34)
MCV RBC AUTO: 101.5 FL — HIGH (ref 80–100)
MONOCYTES # BLD AUTO: 0.59 K/UL — SIGNIFICANT CHANGE UP (ref 0–0.9)
MONOCYTES NFR BLD AUTO: 9.1 % — SIGNIFICANT CHANGE UP (ref 2–14)
NEUTROPHILS # BLD AUTO: 3.96 K/UL — SIGNIFICANT CHANGE UP (ref 1.8–7.4)
NEUTROPHILS NFR BLD AUTO: 61 % — SIGNIFICANT CHANGE UP (ref 43–77)
NRBC # BLD: 0 /100 WBCS — SIGNIFICANT CHANGE UP (ref 0–0)
PLATELET # BLD AUTO: 152 K/UL — SIGNIFICANT CHANGE UP (ref 150–400)
RBC # BLD: 3.28 M/UL — LOW (ref 4.2–5.8)
RBC # FLD: 14.4 % — SIGNIFICANT CHANGE UP (ref 10.3–14.5)
RETICS #: 54.9 K/UL — SIGNIFICANT CHANGE UP (ref 25–125)
RETICS/RBC NFR: 1.6 % — SIGNIFICANT CHANGE UP (ref 0.5–2.5)
WBC # BLD: 6.49 K/UL — SIGNIFICANT CHANGE UP (ref 3.8–10.5)
WBC # FLD AUTO: 6.49 K/UL — SIGNIFICANT CHANGE UP (ref 3.8–10.5)

## 2020-08-18 PROCEDURE — 99214 OFFICE O/P EST MOD 30 MIN: CPT

## 2020-08-18 RX ORDER — ACYCLOVIR 400 MG/1
400 TABLET ORAL
Qty: 180 | Refills: 1 | Status: DISCONTINUED | COMMUNITY
Start: 2020-05-11 | End: 2020-08-18

## 2020-08-18 RX ORDER — ZOLPIDEM TARTRATE 3.5 MG/1
3.5 TABLET SUBLINGUAL
Refills: 0 | Status: DISCONTINUED | COMMUNITY
Start: 2019-11-12 | End: 2020-08-18

## 2020-08-18 NOTE — REASON FOR VISIT
[Follow-Up Visit] : a follow-up visit for [CLL] : chronic lymphocytic leukemia [Lymphoma] : lymphoma

## 2020-08-18 NOTE — RESULTS/DATA
[FreeTextEntry1] : WBC 6500 Hgb 10.7 Hct 33.5 .5 Platelets 152,000 Diff normal\par \par 7/29/20\par CMP K 5.5., creat 4.33, eGFR 13\par \par SPEP M 1.0, IgMlambda and lambda\par Quant IgG 442, A 23, M 1133\par SFLC k 0.95, lambda 163.21\par Viscosity 1.8

## 2020-08-18 NOTE — ASSESSMENT
[Palliative Care Plan] : not applicable at this time [FreeTextEntry1] : 75 year old male with CLL evolved to Waldenstrom's macroglobulinemia complicated by CRF, nephrotic syndrome and PAF. He had progressive anemia with rising creatinine and relapse of his Waldenstrom's. He was not a candidate for Ibrutinib due to anticoagulation with Warfarin. He achieved a partial remission with marked improvement in his hemoglobin, creatinine and IgM with IRd, but had not tolerated Rituxan well and is loathe to receive it again. He previously tolerated the Ninlaro well and has now initiated maintenance therapy with Ninlaro and Dexamethasone. Renal function has again deteriorated and anemia worsened. Waldenstrom's is stable.\par \par Plan:\par Hold Ninlaro/Dexamethasone \par CMP, LDH, U/A, retics\par Stool guiaiac x 3\par CBC in 1 week\par Acyclovir\par Omeprazole\par Renal f/u\par Warfarin per Cardiology\par RTC to be arranged\par

## 2020-08-18 NOTE — REVIEW OF SYSTEMS
[Fatigue] : fatigue [SOB on Exertion] : shortness of breath during exertion [Joint Pain] : joint pain [Negative] : Heme/Lymph [de-identified] : Neuropathy

## 2020-08-18 NOTE — CONSULT LETTER
[Dear  ___] : Dear  [unfilled], [Courtesy Letter:] : I had the pleasure of seeing your patient, [unfilled], in my office today. [Please see my note below.] : Please see my note below. [FreeTextEntry2] : Dr. Simpson [Sincerely,] : Sincerely, [DrIbeth  ___] : Dr. DENG [FreeTextEntry3] : Cesar Simons MD [DrIbeth ___] : Dr. DENG [___] : [unfilled]

## 2020-08-18 NOTE — HISTORY OF PRESENT ILLNESS
[Disease:__________________________] : Disease: [unfilled] [Rosales Stage: ___] : Rosales Stage: [unfilled] [Cycle: ___] : Cycle: [unfilled] [Day: ___] : Day: [unfilled] [de-identified] : PAF\par 10/2015 Waldenstrom's macroglobulinemia ; + dim lambda, CD 19, 23, FMC-7; negative CD 5, 10, 20\par Renal biopsy: lymphocytic infiltrate of renal cortex\par 10/17 IgGk and lambda BGUS\par 7/2018: Nephrotic syndrome with lambda light chains\par  [de-identified] : +lambda, CD5, CD 20; CD 38 --; FISH del 13q14.3\par SPEP gamma paraprotein M 0.4\par SPIEP IgM lambda; 10/17 IgGk, lambda [FreeTextEntry1] : 10/2015 - 1/2017  Rituxan/Velcade/Decadron; 8/18 - 1/19 Ixazomib/Decadron/Rituxan x 6 cycles to maintenance [de-identified] : He feels tired.  No recent episodes of atrial-fib. Ambulating well without a cane. Chronic back pain stable. Neuropathy is stable. Reports dyspnea on exertion with most activities. No other complaints. He notes no fevers, night sweats, swollen glands, chest pain, palpitations, abdominal pain, shortness of breath, bleeding, bruising, headaches, visual problems, melena, BRBPR. Weight stable.\par \par

## 2020-08-18 NOTE — PHYSICAL EXAM
[Ambulatory and capable of all self care but unable to carry out any work activities] : Status 2- Ambulatory and capable of all self care but unable to carry out any work activities. Up and about more than 50% of waking hours [Normal] : affect appropriate [de-identified] :  Pacemaker left anterior chest wall. RRR. S1S2 normal. Gr 2/6 TESS RUSB to LLSB. No gallops. [de-identified] : 5 x 5 cm lipoma left lower back.

## 2020-08-20 LAB
ALBUMIN SERPL ELPH-MCNC: 4.4 G/DL
ALP BLD-CCNC: 106 U/L
ALT SERPL-CCNC: 14 U/L
ANION GAP SERPL CALC-SCNC: 10 MMOL/L
APPEARANCE: CLEAR
AST SERPL-CCNC: 14 U/L
BACTERIA: NEGATIVE
BILIRUB SERPL-MCNC: 0.3 MG/DL
BILIRUBIN URINE: NEGATIVE
BLOOD URINE: ABNORMAL
BUN SERPL-MCNC: 50 MG/DL
CALCIUM SERPL-MCNC: 8.7 MG/DL
CHLORIDE SERPL-SCNC: 111 MMOL/L
CO2 SERPL-SCNC: 20 MMOL/L
COLOR: YELLOW
CREAT SERPL-MCNC: 4.08 MG/DL
GLUCOSE QUALITATIVE U: NEGATIVE
GLUCOSE SERPL-MCNC: 99 MG/DL
HYALINE CASTS: 0 /LPF
KETONES URINE: NEGATIVE
LDH SERPL-CCNC: 143 U/L
LEUKOCYTE ESTERASE URINE: ABNORMAL
MICROSCOPIC-UA: NORMAL
NITRITE URINE: NEGATIVE
PH URINE: 5.5
POTASSIUM SERPL-SCNC: 5.2 MMOL/L
PROT SERPL-MCNC: 6.7 G/DL
PROTEIN URINE: ABNORMAL
RED BLOOD CELLS URINE: 140 /HPF
SODIUM SERPL-SCNC: 142 MMOL/L
SPECIFIC GRAVITY URINE: 1.02
SQUAMOUS EPITHELIAL CELLS: 2 /HPF
UROBILINOGEN URINE: NORMAL
WHITE BLOOD CELLS URINE: 19 /HPF

## 2020-10-06 ENCOUNTER — NON-APPOINTMENT (OUTPATIENT)
Age: 75
End: 2020-10-06

## 2020-10-06 ENCOUNTER — APPOINTMENT (OUTPATIENT)
Dept: ELECTROPHYSIOLOGY | Facility: CLINIC | Age: 75
End: 2020-10-06
Payer: MEDICARE

## 2020-10-06 ENCOUNTER — APPOINTMENT (OUTPATIENT)
Dept: CARDIOLOGY | Facility: CLINIC | Age: 75
End: 2020-10-06
Payer: MEDICARE

## 2020-10-06 VITALS
BODY MASS INDEX: 29.53 KG/M2 | SYSTOLIC BLOOD PRESSURE: 127 MMHG | DIASTOLIC BLOOD PRESSURE: 72 MMHG | HEART RATE: 58 BPM | OXYGEN SATURATION: 99 % | WEIGHT: 230 LBS

## 2020-10-06 PROCEDURE — 99214 OFFICE O/P EST MOD 30 MIN: CPT

## 2020-10-06 PROCEDURE — 93000 ELECTROCARDIOGRAM COMPLETE: CPT

## 2020-10-06 PROCEDURE — 93280 PM DEVICE PROGR EVAL DUAL: CPT

## 2020-10-06 NOTE — HISTORY OF PRESENT ILLNESS
[FreeTextEntry1] : Anson is in good spirits today. He has been staying safe. Last week had an episode of dizziness that lasted a few seconds when getting up from the chair. Denies any chest pain, palpitations or shortness of breath.

## 2020-10-06 NOTE — DISCUSSION/SUMMARY
[___ Month(s)] : [unfilled] month(s) [FreeTextEntry1] : The patient is a 75-year-old gentleman history mild dementia, paroxysmal  atrial fibrillation, lower extremity edema , CLL, CKD stage IV, Waldenstrom's macroglobulinemia, hypothyroidism, PPM , chronic diastolic heart failure s/p sigmoid resection for diverticulitis, oral chemo for his CLL recovering from pelvic fracture. Maintaining sinus on amiodarone. Last TSH therapeutic with PCP. [place] [employer] pacemaker check today for dizziness. His remote reads have been negative. INR has been therapeutic.

## 2020-11-06 ENCOUNTER — OUTPATIENT (OUTPATIENT)
Dept: OUTPATIENT SERVICES | Facility: HOSPITAL | Age: 75
LOS: 1 days | Discharge: HOME | End: 2020-11-06

## 2020-11-06 DIAGNOSIS — Z98.41 CATARACT EXTRACTION STATUS, RIGHT EYE: Chronic | ICD-10-CM

## 2020-11-06 DIAGNOSIS — Z98.890 OTHER SPECIFIED POSTPROCEDURAL STATES: Chronic | ICD-10-CM

## 2020-11-06 DIAGNOSIS — Z95.0 PRESENCE OF CARDIAC PACEMAKER: Chronic | ICD-10-CM

## 2020-11-06 DIAGNOSIS — Z98.89 OTHER SPECIFIED POSTPROCEDURAL STATES: Chronic | ICD-10-CM

## 2020-11-06 DIAGNOSIS — C91.11 CHRONIC LYMPHOCYTIC LEUKEMIA OF B-CELL TYPE IN REMISSION: ICD-10-CM

## 2020-11-10 NOTE — H&P PST ADULT - NS HEP C RISK YEAR OPTION
Addended by: CELINE CRAWFORD on: 11/10/2020 09:31 AM     Modules accepted: Orders    
Patient Refused

## 2020-11-13 ENCOUNTER — RESULT REVIEW (OUTPATIENT)
Age: 75
End: 2020-11-13

## 2020-11-13 ENCOUNTER — LABORATORY RESULT (OUTPATIENT)
Age: 75
End: 2020-11-13

## 2020-11-13 ENCOUNTER — APPOINTMENT (OUTPATIENT)
Dept: HEMATOLOGY ONCOLOGY | Facility: CLINIC | Age: 75
End: 2020-11-13
Payer: MEDICARE

## 2020-11-13 VITALS
WEIGHT: 232 LBS | SYSTOLIC BLOOD PRESSURE: 114 MMHG | OXYGEN SATURATION: 97 % | HEIGHT: 73.5 IN | TEMPERATURE: 97.1 F | RESPIRATION RATE: 16 BRPM | DIASTOLIC BLOOD PRESSURE: 64 MMHG | BODY MASS INDEX: 30.09 KG/M2 | HEART RATE: 64 BPM

## 2020-11-13 LAB
BASOPHILS # BLD AUTO: 0.04 K/UL — SIGNIFICANT CHANGE UP (ref 0–0.2)
BASOPHILS NFR BLD AUTO: 0.7 % — SIGNIFICANT CHANGE UP (ref 0–2)
EOSINOPHIL # BLD AUTO: 0.19 K/UL — SIGNIFICANT CHANGE UP (ref 0–0.5)
EOSINOPHIL NFR BLD AUTO: 3.3 % — SIGNIFICANT CHANGE UP (ref 0–6)
HCT VFR BLD CALC: 35.4 % — LOW (ref 39–50)
HGB BLD-MCNC: 11 G/DL — LOW (ref 13–17)
IMM GRANULOCYTES NFR BLD AUTO: 0.7 % — SIGNIFICANT CHANGE UP (ref 0–1.5)
LYMPHOCYTES # BLD AUTO: 1.57 K/UL — SIGNIFICANT CHANGE UP (ref 1–3.3)
LYMPHOCYTES # BLD AUTO: 27.4 % — SIGNIFICANT CHANGE UP (ref 13–44)
MCHC RBC-ENTMCNC: 31.1 G/DL — LOW (ref 32–36)
MCHC RBC-ENTMCNC: 31.9 PG — SIGNIFICANT CHANGE UP (ref 27–34)
MCV RBC AUTO: 102.6 FL — HIGH (ref 80–100)
MONOCYTES # BLD AUTO: 0.67 K/UL — SIGNIFICANT CHANGE UP (ref 0–0.9)
MONOCYTES NFR BLD AUTO: 11.7 % — SIGNIFICANT CHANGE UP (ref 2–14)
NEUTROPHILS # BLD AUTO: 3.21 K/UL — SIGNIFICANT CHANGE UP (ref 1.8–7.4)
NEUTROPHILS NFR BLD AUTO: 56.2 % — SIGNIFICANT CHANGE UP (ref 43–77)
NRBC # BLD: 0 /100 WBCS — SIGNIFICANT CHANGE UP (ref 0–0)
PLATELET # BLD AUTO: 140 K/UL — LOW (ref 150–400)
RBC # BLD: 3.45 M/UL — LOW (ref 4.2–5.8)
RBC # FLD: 13 % — SIGNIFICANT CHANGE UP (ref 10.3–14.5)
WBC # BLD: 5.72 K/UL — SIGNIFICANT CHANGE UP (ref 3.8–10.5)
WBC # FLD AUTO: 5.72 K/UL — SIGNIFICANT CHANGE UP (ref 3.8–10.5)

## 2020-11-13 PROCEDURE — 99072 ADDL SUPL MATRL&STAF TM PHE: CPT

## 2020-11-13 PROCEDURE — 99214 OFFICE O/P EST MOD 30 MIN: CPT

## 2020-11-13 RX ORDER — FEBUXOSTAT 40 MG/1
40 TABLET ORAL DAILY
Refills: 0 | Status: ACTIVE | COMMUNITY
Start: 2020-11-13

## 2020-11-13 NOTE — REVIEW OF SYSTEMS
[Fatigue] : fatigue [SOB on Exertion] : shortness of breath during exertion [Joint Pain] : joint pain [Negative] : Allergic/Immunologic [de-identified] : Neuropathy

## 2020-11-13 NOTE — HISTORY OF PRESENT ILLNESS
[Disease:__________________________] : Disease: [unfilled] [Rosales Stage: ___] : Rosales Stage: [unfilled] [de-identified] : PAF\par 10/2015 Waldenstrom's macroglobulinemia ; + dim lambda, CD 19, 23, FMC-7; negative CD 5, 10, 20\par Renal biopsy: lymphocytic infiltrate of renal cortex\par 10/17 IgGk and lambda BGUS\par 7/2018: Nephrotic syndrome with lambda light chains\par  [de-identified] : +lambda, CD5, CD 20; CD 38 --; FISH del 13q14.3\par SPEP gamma paraprotein M 0.4\par SPIEP IgM lambda; 10/17 IgGk, lambda [FreeTextEntry1] : 10/2015 - 1/2017  Rituxan/Velcade/Decadron; 8/18 - 1/19 Ixazomib/Decadron/Rituxan x 6 cycles to maintenance [de-identified] : He feels tired, but well.  No recent episodes of atrial-fib. Ambulating well without a cane. Chronic back pain stable. Neuropathy is stable. Reports dyspnea on exertion after 1 block. He notes no fevers, night sweats, swollen glands, chest pain, palpitations, abdominal pain, bleeding, bruising, headaches, visual problems, melena, BRBPR. Weight stable.Had flu vaccine.\par \par

## 2020-11-13 NOTE — ASSESSMENT
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 He had progressive anemia with rising creatinine and relapse of his Waldenstrom's. He was not a candidate for Ibrutinib due to anticoagulation with Warfarin. He achieved a partial remission with marked improvement in his hemoglobin, creatinine and IgM with IRd, but had not tolerated Rituxan well and is loathe to receive it again. He previously tolerated the Ninlaro well and has now initiated maintenance therapy with Ninlaro and Dexamethasone. Renal function has again deteriorated and anemia worsened. Waldenstrom's is stable.\par \par Plan:\par Hold Ninlaro/Dexamethasone pending review of lab work\par CMP, LDH, SPEP, Quant Ig, SFLC\par Acyclovir\par Omeprazole\par Renal f/u\par Warfarin per Cardiology\par RTC to be arranged\par  [Palliative Care Plan] : not applicable at this time

## 2020-11-13 NOTE — PHYSICAL EXAM
[Ambulatory and capable of all self care but unable to carry out any work activities] : Status 2- Ambulatory and capable of all self care but unable to carry out any work activities. Up and about more than 50% of waking hours [Normal] : affect appropriate [de-identified] :  Pacemaker left anterior chest wall. RRR. S1S2 normal. Gr 2/6 TESS RUSB to LLSB. No gallops. [de-identified] : 5 x 5 cm lipoma left lower back.

## 2020-11-19 LAB
ALBUMIN MFR SERPL ELPH: 54.8 %
ALBUMIN SERPL ELPH-MCNC: 4 G/DL
ALBUMIN SERPL-MCNC: 3.5 G/DL
ALBUMIN/GLOB SERPL: 1.2 RATIO
ALP BLD-CCNC: 108 U/L
ALPHA1 GLOB MFR SERPL ELPH: 5.6 %
ALPHA1 GLOB SERPL ELPH-MCNC: 0.4 G/DL
ALPHA2 GLOB MFR SERPL ELPH: 11.4 %
ALPHA2 GLOB SERPL ELPH-MCNC: 0.7 G/DL
ALT SERPL-CCNC: 13 U/L
ANION GAP SERPL CALC-SCNC: 12 MMOL/L
AST SERPL-CCNC: 15 U/L
B-GLOBULIN MFR SERPL ELPH: 8.9 %
B-GLOBULIN SERPL ELPH-MCNC: 0.6 G/DL
BILIRUB SERPL-MCNC: 0.3 MG/DL
BUN SERPL-MCNC: 29 MG/DL
CALCIUM SERPL-MCNC: 9.2 MG/DL
CHLORIDE SERPL-SCNC: 109 MMOL/L
CO2 SERPL-SCNC: 23 MMOL/L
CREAT SERPL-MCNC: 3.22 MG/DL
DEPRECATED KAPPA LC FREE/LAMBDA SER: 0 RATIO
DEPRECATED KAPPA LC FREE/LAMBDA SER: 0 RATIO
GAMMA GLOB FLD ELPH-MCNC: 1.2 G/DL
GAMMA GLOB MFR SERPL ELPH: 19.3 %
GLUCOSE SERPL-MCNC: 120 MG/DL
IGA SER QL IEP: 49 MG/DL
IGG SER QL IEP: 396 MG/DL
IGM SER QL IEP: 1246 MG/DL
INTERPRETATION SERPL IEP-IMP: NORMAL
KAPPA LC CSF-MCNC: 223.16 MG/DL
KAPPA LC CSF-MCNC: 223.16 MG/DL
KAPPA LC SERPL-MCNC: 1.01 MG/DL
KAPPA LC SERPL-MCNC: 1.01 MG/DL
LDH SERPL-CCNC: 130 U/L
M PROTEIN MFR SERPL ELPH: NORMAL
MONOCLON BAND OBS SERPL: NORMAL
POTASSIUM SERPL-SCNC: 4.5 MMOL/L
PROT SERPL-MCNC: 6.4 G/DL
SODIUM SERPL-SCNC: 144 MMOL/L

## 2020-12-01 ENCOUNTER — APPOINTMENT (OUTPATIENT)
Dept: HEMATOLOGY ONCOLOGY | Facility: CLINIC | Age: 75
End: 2020-12-01

## 2020-12-02 NOTE — H&P PST ADULT - FALL HARM RISK
patient/guardian displays adequate decision making... coagulation(Bleeding disorder R/T clinical cond/anti-coags)

## 2020-12-09 ENCOUNTER — OUTPATIENT (OUTPATIENT)
Dept: OUTPATIENT SERVICES | Facility: HOSPITAL | Age: 75
LOS: 1 days | Discharge: HOME | End: 2020-12-09

## 2020-12-09 DIAGNOSIS — Z98.89 OTHER SPECIFIED POSTPROCEDURAL STATES: Chronic | ICD-10-CM

## 2020-12-09 DIAGNOSIS — Z98.41 CATARACT EXTRACTION STATUS, RIGHT EYE: Chronic | ICD-10-CM

## 2020-12-09 DIAGNOSIS — C91.11 CHRONIC LYMPHOCYTIC LEUKEMIA OF B-CELL TYPE IN REMISSION: ICD-10-CM

## 2020-12-09 DIAGNOSIS — Z98.890 OTHER SPECIFIED POSTPROCEDURAL STATES: Chronic | ICD-10-CM

## 2020-12-09 DIAGNOSIS — Z95.0 PRESENCE OF CARDIAC PACEMAKER: Chronic | ICD-10-CM

## 2020-12-14 ENCOUNTER — RESULT REVIEW (OUTPATIENT)
Age: 75
End: 2020-12-14

## 2020-12-14 ENCOUNTER — APPOINTMENT (OUTPATIENT)
Dept: INFUSION THERAPY | Facility: HOSPITAL | Age: 75
End: 2020-12-14

## 2020-12-14 ENCOUNTER — APPOINTMENT (OUTPATIENT)
Dept: HEMATOLOGY ONCOLOGY | Facility: CLINIC | Age: 75
End: 2020-12-14

## 2020-12-14 ENCOUNTER — LABORATORY RESULT (OUTPATIENT)
Age: 75
End: 2020-12-14

## 2020-12-14 LAB
BASOPHILS # BLD AUTO: 0.05 K/UL — SIGNIFICANT CHANGE UP (ref 0–0.2)
BASOPHILS NFR BLD AUTO: 0.6 % — SIGNIFICANT CHANGE UP (ref 0–2)
EOSINOPHIL # BLD AUTO: 0.14 K/UL — SIGNIFICANT CHANGE UP (ref 0–0.5)
EOSINOPHIL NFR BLD AUTO: 1.7 % — SIGNIFICANT CHANGE UP (ref 0–6)
HCT VFR BLD CALC: 38.5 % — LOW (ref 39–50)
HGB BLD-MCNC: 12 G/DL — LOW (ref 13–17)
IMM GRANULOCYTES NFR BLD AUTO: 0.4 % — SIGNIFICANT CHANGE UP (ref 0–1.5)
LYMPHOCYTES # BLD AUTO: 1.65 K/UL — SIGNIFICANT CHANGE UP (ref 1–3.3)
LYMPHOCYTES # BLD AUTO: 20.3 % — SIGNIFICANT CHANGE UP (ref 13–44)
MCHC RBC-ENTMCNC: 31.2 G/DL — LOW (ref 32–36)
MCHC RBC-ENTMCNC: 31.7 PG — SIGNIFICANT CHANGE UP (ref 27–34)
MCV RBC AUTO: 101.6 FL — HIGH (ref 80–100)
MONOCYTES # BLD AUTO: 0.65 K/UL — SIGNIFICANT CHANGE UP (ref 0–0.9)
MONOCYTES NFR BLD AUTO: 8 % — SIGNIFICANT CHANGE UP (ref 2–14)
NEUTROPHILS # BLD AUTO: 5.61 K/UL — SIGNIFICANT CHANGE UP (ref 1.8–7.4)
NEUTROPHILS NFR BLD AUTO: 69 % — SIGNIFICANT CHANGE UP (ref 43–77)
NRBC # BLD: 0 /100 WBCS — SIGNIFICANT CHANGE UP (ref 0–0)
PLATELET # BLD AUTO: 147 K/UL — LOW (ref 150–400)
RBC # BLD: 3.79 M/UL — LOW (ref 4.2–5.8)
RBC # FLD: 13.2 % — SIGNIFICANT CHANGE UP (ref 10.3–14.5)
WBC # BLD: 8.13 K/UL — SIGNIFICANT CHANGE UP (ref 3.8–10.5)
WBC # FLD AUTO: 8.13 K/UL — SIGNIFICANT CHANGE UP (ref 3.8–10.5)

## 2020-12-15 ENCOUNTER — NON-APPOINTMENT (OUTPATIENT)
Age: 75
End: 2020-12-15

## 2020-12-16 LAB
ALBUMIN SERPL ELPH-MCNC: 4.3 G/DL
ALP BLD-CCNC: 106 U/L
ALT SERPL-CCNC: 11 U/L
ANION GAP SERPL CALC-SCNC: 12 MMOL/L
AST SERPL-CCNC: 13 U/L
BILIRUB SERPL-MCNC: 0.4 MG/DL
BUN SERPL-MCNC: 29 MG/DL
CALCIUM SERPL-MCNC: 9.5 MG/DL
CHLORIDE SERPL-SCNC: 109 MMOL/L
CO2 SERPL-SCNC: 24 MMOL/L
CREAT SERPL-MCNC: 3.24 MG/DL
GLUCOSE SERPL-MCNC: 116 MG/DL
LDH SERPL-CCNC: 120 U/L
POTASSIUM SERPL-SCNC: 5.2 MMOL/L
PROT SERPL-MCNC: 6.5 G/DL
SODIUM SERPL-SCNC: 145 MMOL/L

## 2020-12-17 ENCOUNTER — RX RENEWAL (OUTPATIENT)
Age: 75
End: 2020-12-17

## 2021-01-20 ENCOUNTER — APPOINTMENT (OUTPATIENT)
Dept: ELECTROPHYSIOLOGY | Facility: CLINIC | Age: 76
End: 2021-01-20
Payer: MEDICARE

## 2021-01-20 PROCEDURE — 93294 REM INTERROG EVL PM/LDLS PM: CPT

## 2021-01-20 PROCEDURE — 93296 REM INTERROG EVL PM/IDS: CPT

## 2021-01-21 ENCOUNTER — OUTPATIENT (OUTPATIENT)
Dept: OUTPATIENT SERVICES | Facility: HOSPITAL | Age: 76
LOS: 1 days | Discharge: HOME | End: 2021-01-21

## 2021-01-21 DIAGNOSIS — Z98.89 OTHER SPECIFIED POSTPROCEDURAL STATES: Chronic | ICD-10-CM

## 2021-01-21 DIAGNOSIS — Z98.41 CATARACT EXTRACTION STATUS, RIGHT EYE: Chronic | ICD-10-CM

## 2021-01-21 DIAGNOSIS — Z98.890 OTHER SPECIFIED POSTPROCEDURAL STATES: Chronic | ICD-10-CM

## 2021-01-21 DIAGNOSIS — Z95.0 PRESENCE OF CARDIAC PACEMAKER: Chronic | ICD-10-CM

## 2021-01-21 DIAGNOSIS — C91.11 CHRONIC LYMPHOCYTIC LEUKEMIA OF B-CELL TYPE IN REMISSION: ICD-10-CM

## 2021-01-26 ENCOUNTER — RESULT REVIEW (OUTPATIENT)
Age: 76
End: 2021-01-26

## 2021-01-26 ENCOUNTER — APPOINTMENT (OUTPATIENT)
Dept: HEMATOLOGY ONCOLOGY | Facility: CLINIC | Age: 76
End: 2021-01-26
Payer: MEDICARE

## 2021-01-26 VITALS
DIASTOLIC BLOOD PRESSURE: 80 MMHG | HEART RATE: 74 BPM | OXYGEN SATURATION: 99 % | HEIGHT: 74.02 IN | RESPIRATION RATE: 18 BRPM | WEIGHT: 236.53 LBS | BODY MASS INDEX: 30.36 KG/M2 | TEMPERATURE: 98.1 F | SYSTOLIC BLOOD PRESSURE: 144 MMHG

## 2021-01-26 LAB
BASOPHILS # BLD AUTO: 0.05 K/UL — SIGNIFICANT CHANGE UP (ref 0–0.2)
BASOPHILS NFR BLD AUTO: 0.8 % — SIGNIFICANT CHANGE UP (ref 0–2)
EOSINOPHIL # BLD AUTO: 0.12 K/UL — SIGNIFICANT CHANGE UP (ref 0–0.5)
EOSINOPHIL NFR BLD AUTO: 2 % — SIGNIFICANT CHANGE UP (ref 0–6)
HCT VFR BLD CALC: 35.2 % — LOW (ref 39–50)
HGB BLD-MCNC: 11.5 G/DL — LOW (ref 13–17)
IMM GRANULOCYTES NFR BLD AUTO: 0.8 % — SIGNIFICANT CHANGE UP (ref 0–1.5)
LYMPHOCYTES # BLD AUTO: 1.8 K/UL — SIGNIFICANT CHANGE UP (ref 1–3.3)
LYMPHOCYTES # BLD AUTO: 30.3 % — SIGNIFICANT CHANGE UP (ref 13–44)
MCHC RBC-ENTMCNC: 31.8 PG — SIGNIFICANT CHANGE UP (ref 27–34)
MCHC RBC-ENTMCNC: 32.7 G/DL — SIGNIFICANT CHANGE UP (ref 32–36)
MCV RBC AUTO: 97.2 FL — SIGNIFICANT CHANGE UP (ref 80–100)
MONOCYTES # BLD AUTO: 0.68 K/UL — SIGNIFICANT CHANGE UP (ref 0–0.9)
MONOCYTES NFR BLD AUTO: 11.4 % — SIGNIFICANT CHANGE UP (ref 2–14)
NEUTROPHILS # BLD AUTO: 3.24 K/UL — SIGNIFICANT CHANGE UP (ref 1.8–7.4)
NEUTROPHILS NFR BLD AUTO: 54.7 % — SIGNIFICANT CHANGE UP (ref 43–77)
NRBC # BLD: 0 /100 WBCS — SIGNIFICANT CHANGE UP (ref 0–0)
PLATELET # BLD AUTO: 169 K/UL — SIGNIFICANT CHANGE UP (ref 150–400)
RBC # BLD: 3.62 M/UL — LOW (ref 4.2–5.8)
RBC # FLD: 13.2 % — SIGNIFICANT CHANGE UP (ref 10.3–14.5)
WBC # BLD: 5.94 K/UL — SIGNIFICANT CHANGE UP (ref 3.8–10.5)
WBC # FLD AUTO: 5.94 K/UL — SIGNIFICANT CHANGE UP (ref 3.8–10.5)

## 2021-01-26 PROCEDURE — 99215 OFFICE O/P EST HI 40 MIN: CPT

## 2021-01-26 PROCEDURE — 99072 ADDL SUPL MATRL&STAF TM PHE: CPT

## 2021-01-26 RX ORDER — CLOTRIMAZOLE AND BETAMETHASONE DIPROPIONATE 10; .5 MG/G; MG/G
1-0.05 CREAM TOPICAL
Qty: 45 | Refills: 0 | Status: DISCONTINUED | COMMUNITY
Start: 2017-09-07 | End: 2021-01-26

## 2021-01-26 NOTE — REVIEW OF SYSTEMS
[Fatigue] : fatigue [SOB on Exertion] : shortness of breath during exertion [Joint Pain] : joint pain [Negative] : Allergic/Immunologic [de-identified] : Neuropathy

## 2021-01-26 NOTE — REASON FOR VISIT
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

## 2021-01-26 NOTE — ASSESSMENT
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 He had progressive anemia with rising creatinine and relapse of his Waldenstrom's. He was not a candidate for Ibrutinib due to anticoagulation with Warfarin. He achieved a partial remission with marked improvement in his hemoglobin, creatinine and IgM with IRd, but had not tolerated Rituxan well and is loathe to receive it again. He previously tolerated the Ninlaro well and has now initiated maintenance therapy with Ninlaro and Dexamethasone. Waldenstrom's is stable.\par \par Plan:\par  Ninlaro/Dexamethasone 1 week after dental work\par CMP, LDH, SPEP, Quant Ig, SFLC, uric acid\par Acyclovir\par Omeprazole\par Urology consult re: ED\par Warfarin per Cardiology\par RTC 3 months\par  [Palliative Care Plan] : not applicable at this time

## 2021-01-26 NOTE — PHYSICAL EXAM
[Restricted in physically strenuous activity but ambulatory and able to carry out work of a light or sedentary nature] : Status 1- Restricted in physically strenuous activity but ambulatory and able to carry out work of a light or sedentary nature, e.g., light house work, office work [Normal] : affect appropriate [de-identified] :  Pacemaker left anterior chest wall. RRR. S1S2 normal. Gr 2/6 TESS RUSB to LLSB. No gallops. [de-identified] : 5 x 5 cm lipoma left lower back.

## 2021-01-26 NOTE — HISTORY OF PRESENT ILLNESS
[Disease:__________________________] : Disease: [unfilled] [Rosales Stage: ___] : Rosales Stage: [unfilled] [de-identified] : PAF\par 10/2015 Waldenstrom's macroglobulinemia ; + dim lambda, CD 19, 23, FMC-7; negative CD 5, 10, 20\par Renal biopsy: lymphocytic infiltrate of renal cortex\par 10/17 IgGk and lambda BGUS\par 7/2018: Nephrotic syndrome with lambda light chains\par  [de-identified] : +lambda, CD5, CD 20; CD 38 --; FISH del 13q14.3\par SPEP gamma paraprotein M 0.4\par SPIEP IgM lambda; 10/17 IgGk, lambda [FreeTextEntry1] : 10/2015 - 1/2017  Rituxan/Velcade/Decadron; 8/18 - 1/19 Ixazomib/Decadron/Rituxan x 6 cycles to maintenance [de-identified] : He feels tired, but well.  Having gout in right elbow. Taking colchicine. Having dental implants in 3 days. Top teeth being extracted. Dentist aware of coumadin. Receiving prophylactic amoxicillin. No recent episodes of atrial-fib. Ambulating well without a cane. Chronic back pain stable. Neuropathy is stable. Reports dyspnea on exertion after 1 block. He notes no fevers, night sweats, swollen glands, chest pain, palpitations, abdominal pain, bleeding, bruising, headaches, visual problems, melena, BRBPR. Weight stable.Had flu vaccine.\par \par

## 2021-01-27 LAB
ALBUMIN SERPL ELPH-MCNC: 4.2 G/DL
ALP BLD-CCNC: 133 U/L
ALT SERPL-CCNC: 16 U/L
ANION GAP SERPL CALC-SCNC: 11 MMOL/L
AST SERPL-CCNC: 21 U/L
BILIRUB SERPL-MCNC: 0.3 MG/DL
BUN SERPL-MCNC: 31 MG/DL
CALCIUM SERPL-MCNC: 8.9 MG/DL
CHLORIDE SERPL-SCNC: 109 MMOL/L
CO2 SERPL-SCNC: 20 MMOL/L
CREAT SERPL-MCNC: 3.46 MG/DL
DEPRECATED KAPPA LC FREE/LAMBDA SER: 0.01 RATIO
GLUCOSE SERPL-MCNC: 103 MG/DL
KAPPA LC CSF-MCNC: 198.5 MG/DL
KAPPA LC SERPL-MCNC: 1.15 MG/DL
LDH SERPL-CCNC: 159 U/L
POTASSIUM SERPL-SCNC: 4.9 MMOL/L
PROT SERPL-MCNC: 7 G/DL
SODIUM SERPL-SCNC: 141 MMOL/L
URATE SERPL-MCNC: 4.6 MG/DL

## 2021-01-28 LAB
ALBUMIN MFR SERPL ELPH: 54.1 %
ALBUMIN SERPL-MCNC: 3.8 G/DL
ALBUMIN/GLOB SERPL: 1.2 RATIO
ALPHA1 GLOB MFR SERPL ELPH: 5.4 %
ALPHA1 GLOB SERPL ELPH-MCNC: 0.4 G/DL
ALPHA2 GLOB MFR SERPL ELPH: 11.7 %
ALPHA2 GLOB SERPL ELPH-MCNC: 0.8 G/DL
B-GLOBULIN MFR SERPL ELPH: 9.2 %
B-GLOBULIN SERPL ELPH-MCNC: 0.6 G/DL
DEPRECATED KAPPA LC FREE/LAMBDA SER: 0.01 RATIO
GAMMA GLOB FLD ELPH-MCNC: 1.4 G/DL
GAMMA GLOB MFR SERPL ELPH: 19.6 %
IGA SER QL IEP: 22 MG/DL
IGG SER QL IEP: 444 MG/DL
IGM SER QL IEP: 1226 MG/DL
INTERPRETATION SERPL IEP-IMP: NORMAL
KAPPA LC CSF-MCNC: 198.5 MG/DL
KAPPA LC SERPL-MCNC: 1.15 MG/DL
M PROTEIN MFR SERPL ELPH: NORMAL
M PROTEIN SPEC IFE-MCNC: NORMAL
MONOCLON BAND OBS SERPL: NORMAL
PROT SERPL-MCNC: 7 G/DL
PROT SERPL-MCNC: 7 G/DL

## 2021-01-29 LAB — VISCOSITY, SERUM: 1.7

## 2021-02-02 LAB
TESTOST BND SERPL-MCNC: 3.5 PG/ML
TESTOST SERPL-MCNC: 299 NG/DL

## 2021-02-05 NOTE — PROGRESS NOTE ADULT - SUBJECTIVE AND OBJECTIVE BOX
SUBJECTIVE / OVERNIGHT EVENTS: No nausea, vomiting or diarrhea, no fever or chills, no dizziness or chest pain, no dysuria or hematuria .    Vital Signs Last 24 Hrs  T(C): 36.8 (07-11-17 @ 05:13), Max: 37.2 (07-10-17 @ 16:07)  HR: 60 (07-11-17 @ 05:13) (55 - 93)  BP: 122/74 (07-11-17 @ 05:13) (112/68 - 134/77)  RR: 18 (07-11-17 @ 05:13) (18 - 18)  SpO2: 96% (07-11-17 @ 05:13) (93% - 98%)  CAPILLARY BLOOD GLUCOSE        I&O's Summary    10 Jul 2017 07:01  -  11 Jul 2017 07:00  --------------------------------------------------------  IN: 360 mL / OUT: 1950 mL / NET: -1590 mL        PHYSICAL EXAM:  HEAD:  Atraumatic, Normocephalic  EYES: EOMI, PERRLA, conjunctiva and sclera clear  NECK: Supple, No JVD  CHEST/LUNG: Clear to auscultation bilaterally; No wheeze  HEART: Regular rate and rhythm; No murmurs, rubs, or gallops  ABDOMEN: Soft, Nontender, Nondistended; Bowel sounds present  EXTREMITIES:  2+ Peripheral Pulses, No clubbing, cyanosis, or edema  PSYCH: AAOx3  NEUROLOGY: non-focal  SKIN: No rashes or lesions    MEDICATIONS  (STANDING):  amiodarone    Tablet 200 milliGRAM(s) Oral daily  allopurinol 100 milliGRAM(s) Oral daily  sodium bicarbonate 650 milliGRAM(s) Oral two times a day  levothyroxine 125 MICROGram(s) Oral daily  tamsulosin 0.4 milliGRAM(s) Oral at bedtime  donepezil 5 milliGRAM(s) Oral at bedtime  mirtazapine 15 milliGRAM(s) Oral at bedtime  furosemide    Tablet 40 milliGRAM(s) Oral daily  memantine 10 milliGRAM(s) Oral two times a day  clonazePAM Tablet 1 milliGRAM(s) Oral at bedtime  predniSONE   Tablet   Oral   predniSONE   Tablet 25 milliGRAM(s) Oral daily    MEDICATIONS  (PRN):      LABS:    07-09    141  |  105  |  56<H>  ----------------------------<  76  4.4   |  22  |  2.83<H>    Ca    9.4      09 Jul 2017 08:55      PT/INR - ( 10 Jul 2017 08:30 )   PT: 30.8 sec;   INR: 2.67 ratio                             Cultures:    EKG:    Radiological Studies:    Consultant(s) Notes Reviewed:      Care Discussed with Consultants/Other Providers: yes

## 2021-02-10 ENCOUNTER — APPOINTMENT (OUTPATIENT)
Dept: UROLOGY | Facility: CLINIC | Age: 76
End: 2021-02-10

## 2021-02-23 ENCOUNTER — RX RENEWAL (OUTPATIENT)
Age: 76
End: 2021-02-23

## 2021-03-04 NOTE — H&P PST ADULT - PROBLEM SELECTOR PLAN 4
carvedilol 12.5 mg oral tablet: 1 tab(s) orally 2 times a day  enalapril 10 mg oral tablet: 1 tab(s) orally once a day  Entresto 24 mg-26 mg oral tablet: 1 tab(s) orally 2 times a day  furosemide 20 mg oral tablet: 1 dose(s) orally 3 times a day  gabapentin 300 mg oral tablet: 1 tab(s) orally once a day  HumaLOG: subcutaneous once a day (at bedtime)  0 Unit(s) if Glucose 0 - 250  1 Unit(s) if Glucose 251 - 300  2 Unit(s) if Glucose 301 - 350  3 Unit(s) if Glucose 351 - 400  4 Unit(s) if Glucose Greater Than 400, and contact MD  HumaLOG: subcutaneous 3 times a day (before meals)  1 Unit(s) if Glucose 151 - 200  2 Unit(s) if Glucose 201 - 250  3 Unit(s) if Glucose 251 - 300  4 Unit(s) if Glucose 301 - 350  5 Unit(s) if Glucose 351 - 400  6 Unit(s) if Glucose Greater Than 400, and contact MD  insulin glargine: 10 unit(s) subcutaneous once a day (at bedtime)  isosorbide mononitrate 60 mg oral tablet, extended release: 1 tab(s) orally once a day (in the morning)  rosuvastatin 10 mg oral tablet: 1 tab(s) orally once a day  sodium bicarbonate 650 mg oral tablet: 2 tab(s) orally 3 times a day  tamsulosin 0.4 mg oral capsule: 1 cap(s) orally once a day (at bedtime)   continue medications

## 2021-03-14 ENCOUNTER — EMERGENCY (EMERGENCY)
Facility: HOSPITAL | Age: 76
LOS: 1 days | Discharge: HOME | End: 2021-03-14
Attending: STUDENT IN AN ORGANIZED HEALTH CARE EDUCATION/TRAINING PROGRAM
Payer: MEDICARE

## 2021-03-14 ENCOUNTER — TRANSCRIPTION ENCOUNTER (OUTPATIENT)
Age: 76
End: 2021-03-14

## 2021-03-14 VITALS
OXYGEN SATURATION: 99 % | SYSTOLIC BLOOD PRESSURE: 112 MMHG | RESPIRATION RATE: 22 BRPM | TEMPERATURE: 99 F | DIASTOLIC BLOOD PRESSURE: 69 MMHG | HEIGHT: 74 IN | HEART RATE: 76 BPM | WEIGHT: 231.93 LBS

## 2021-03-14 VITALS
RESPIRATION RATE: 20 BRPM | SYSTOLIC BLOOD PRESSURE: 118 MMHG | TEMPERATURE: 102 F | OXYGEN SATURATION: 96 % | HEART RATE: 59 BPM | DIASTOLIC BLOOD PRESSURE: 54 MMHG

## 2021-03-14 DIAGNOSIS — Z98.89 OTHER SPECIFIED POSTPROCEDURAL STATES: Chronic | ICD-10-CM

## 2021-03-14 DIAGNOSIS — Z98.41 CATARACT EXTRACTION STATUS, RIGHT EYE: Chronic | ICD-10-CM

## 2021-03-14 DIAGNOSIS — Z98.890 OTHER SPECIFIED POSTPROCEDURAL STATES: Chronic | ICD-10-CM

## 2021-03-14 DIAGNOSIS — Z95.0 PRESENCE OF CARDIAC PACEMAKER: Chronic | ICD-10-CM

## 2021-03-14 LAB
ALBUMIN SERPL ELPH-MCNC: 3.7 G/DL — SIGNIFICANT CHANGE UP (ref 3.3–5)
ALP SERPL-CCNC: 80 U/L — SIGNIFICANT CHANGE UP (ref 40–120)
ALT FLD-CCNC: 28 U/L — SIGNIFICANT CHANGE UP (ref 10–45)
ANION GAP SERPL CALC-SCNC: 13 MMOL/L — SIGNIFICANT CHANGE UP (ref 5–17)
ANISOCYTOSIS BLD QL: SLIGHT — SIGNIFICANT CHANGE UP
APPEARANCE UR: CLEAR — SIGNIFICANT CHANGE UP
APTT BLD: 38.8 SEC — HIGH (ref 27.5–35.5)
AST SERPL-CCNC: 29 U/L — SIGNIFICANT CHANGE UP (ref 10–40)
BACTERIA # UR AUTO: NEGATIVE — SIGNIFICANT CHANGE UP
BASE EXCESS BLDV CALC-SCNC: -4.9 MMOL/L — LOW (ref -2–2)
BASOPHILS # BLD AUTO: 0.01 K/UL — SIGNIFICANT CHANGE UP (ref 0–0.2)
BASOPHILS NFR BLD AUTO: 0.3 % — SIGNIFICANT CHANGE UP (ref 0–2)
BILIRUB SERPL-MCNC: 0.3 MG/DL — SIGNIFICANT CHANGE UP (ref 0.2–1.2)
BILIRUB UR-MCNC: NEGATIVE — SIGNIFICANT CHANGE UP
BUN SERPL-MCNC: 42 MG/DL — HIGH (ref 7–23)
CA-I SERPL-SCNC: 1.24 MMOL/L — SIGNIFICANT CHANGE UP (ref 1.12–1.3)
CALCIUM SERPL-MCNC: 9.1 MG/DL — SIGNIFICANT CHANGE UP (ref 8.4–10.5)
CHLORIDE BLDV-SCNC: 115 MMOL/L — HIGH (ref 96–108)
CHLORIDE SERPL-SCNC: 112 MMOL/L — HIGH (ref 96–108)
CO2 BLDV-SCNC: 22 MMOL/L — SIGNIFICANT CHANGE UP (ref 22–30)
CO2 SERPL-SCNC: 19 MMOL/L — LOW (ref 22–31)
COLOR SPEC: SIGNIFICANT CHANGE UP
CREAT SERPL-MCNC: 3.86 MG/DL — HIGH (ref 0.5–1.3)
DACRYOCYTES BLD QL SMEAR: SLIGHT — SIGNIFICANT CHANGE UP
DIFF PNL FLD: ABNORMAL
ELLIPTOCYTES BLD QL SMEAR: SLIGHT — SIGNIFICANT CHANGE UP
EOSINOPHIL # BLD AUTO: 0.01 K/UL — SIGNIFICANT CHANGE UP (ref 0–0.5)
EOSINOPHIL NFR BLD AUTO: 0.3 % — SIGNIFICANT CHANGE UP (ref 0–6)
EPI CELLS # UR: 1 /HPF — SIGNIFICANT CHANGE UP
GAS PNL BLDV: 143 MMOL/L — SIGNIFICANT CHANGE UP (ref 135–145)
GAS PNL BLDV: SIGNIFICANT CHANGE UP
GAS PNL BLDV: SIGNIFICANT CHANGE UP
GLUCOSE BLDV-MCNC: 126 MG/DL — HIGH (ref 70–99)
GLUCOSE SERPL-MCNC: 127 MG/DL — HIGH (ref 70–99)
GLUCOSE UR QL: NEGATIVE — SIGNIFICANT CHANGE UP
HCO3 BLDV-SCNC: 21 MMOL/L — SIGNIFICANT CHANGE UP (ref 21–29)
HCT VFR BLD CALC: 29.9 % — LOW (ref 39–50)
HCT VFR BLDA CALC: 29 % — LOW (ref 39–50)
HGB BLD CALC-MCNC: 9.5 G/DL — LOW (ref 13–17)
HGB BLD-MCNC: 9.1 G/DL — LOW (ref 13–17)
HYALINE CASTS # UR AUTO: 3 /LPF — HIGH (ref 0–2)
IMM GRANULOCYTES NFR BLD AUTO: 0.3 % — SIGNIFICANT CHANGE UP (ref 0–1.5)
INR BLD: 2.07 RATIO — HIGH (ref 0.88–1.16)
KETONES UR-MCNC: NEGATIVE — SIGNIFICANT CHANGE UP
LACTATE BLDV-MCNC: 1.3 MMOL/L — SIGNIFICANT CHANGE UP (ref 0.7–2)
LEUKOCYTE ESTERASE UR-ACNC: NEGATIVE — SIGNIFICANT CHANGE UP
LYMPHOCYTES # BLD AUTO: 0.9 K/UL — LOW (ref 1–3.3)
LYMPHOCYTES # BLD AUTO: 29.4 % — SIGNIFICANT CHANGE UP (ref 13–44)
MACROCYTES BLD QL: SLIGHT — SIGNIFICANT CHANGE UP
MANUAL SMEAR VERIFICATION: SIGNIFICANT CHANGE UP
MCHC RBC-ENTMCNC: 30.4 GM/DL — LOW (ref 32–36)
MCHC RBC-ENTMCNC: 30.6 PG — SIGNIFICANT CHANGE UP (ref 27–34)
MCV RBC AUTO: 100.7 FL — HIGH (ref 80–100)
MONOCYTES # BLD AUTO: 0.47 K/UL — SIGNIFICANT CHANGE UP (ref 0–0.9)
MONOCYTES NFR BLD AUTO: 15.4 % — HIGH (ref 2–14)
NEUTROPHILS # BLD AUTO: 1.66 K/UL — LOW (ref 1.8–7.4)
NEUTROPHILS NFR BLD AUTO: 54.3 % — SIGNIFICANT CHANGE UP (ref 43–77)
NITRITE UR-MCNC: NEGATIVE — SIGNIFICANT CHANGE UP
NRBC # BLD: 0 /100 WBCS — SIGNIFICANT CHANGE UP (ref 0–0)
OTHER CELLS CSF MANUAL: 7 ML/DL — LOW (ref 18–22)
OVALOCYTES BLD QL SMEAR: SLIGHT — SIGNIFICANT CHANGE UP
PCO2 BLDV: 45 MMHG — SIGNIFICANT CHANGE UP (ref 35–50)
PH BLDV: 7.29 — LOW (ref 7.35–7.45)
PH UR: 6 — SIGNIFICANT CHANGE UP (ref 5–8)
PLAT MORPH BLD: NORMAL — SIGNIFICANT CHANGE UP
PLATELET # BLD AUTO: 90 K/UL — LOW (ref 150–400)
PO2 BLDV: 30 MMHG — SIGNIFICANT CHANGE UP (ref 25–45)
POIKILOCYTOSIS BLD QL AUTO: SLIGHT — SIGNIFICANT CHANGE UP
POLYCHROMASIA BLD QL SMEAR: SLIGHT — SIGNIFICANT CHANGE UP
POTASSIUM BLDV-SCNC: 4.1 MMOL/L — SIGNIFICANT CHANGE UP (ref 3.5–5.3)
POTASSIUM SERPL-MCNC: 4.3 MMOL/L — SIGNIFICANT CHANGE UP (ref 3.5–5.3)
POTASSIUM SERPL-SCNC: 4.3 MMOL/L — SIGNIFICANT CHANGE UP (ref 3.5–5.3)
PROT SERPL-MCNC: 6.4 G/DL — SIGNIFICANT CHANGE UP (ref 6–8.3)
PROT UR-MCNC: 100 — SIGNIFICANT CHANGE UP
PROTHROM AB SERPL-ACNC: 24 SEC — HIGH (ref 10.6–13.6)
RBC # BLD: 2.97 M/UL — LOW (ref 4.2–5.8)
RBC # FLD: 14 % — SIGNIFICANT CHANGE UP (ref 10.3–14.5)
RBC BLD AUTO: ABNORMAL
RBC CASTS # UR COMP ASSIST: 72 /HPF — HIGH (ref 0–4)
SAO2 % BLDV: 50 % — LOW (ref 67–88)
SARS-COV-2 RNA SPEC QL NAA+PROBE: DETECTED
SODIUM SERPL-SCNC: 144 MMOL/L — SIGNIFICANT CHANGE UP (ref 135–145)
SP GR SPEC: 1.02 — SIGNIFICANT CHANGE UP (ref 1.01–1.02)
UROBILINOGEN FLD QL: NEGATIVE — SIGNIFICANT CHANGE UP
WBC # BLD: 3.06 K/UL — LOW (ref 3.8–10.5)
WBC # FLD AUTO: 3.06 K/UL — LOW (ref 3.8–10.5)
WBC UR QL: 4 /HPF — SIGNIFICANT CHANGE UP (ref 0–5)

## 2021-03-14 PROCEDURE — U0005: CPT

## 2021-03-14 PROCEDURE — 82565 ASSAY OF CREATININE: CPT

## 2021-03-14 PROCEDURE — 84295 ASSAY OF SERUM SODIUM: CPT

## 2021-03-14 PROCEDURE — 85014 HEMATOCRIT: CPT

## 2021-03-14 PROCEDURE — 84132 ASSAY OF SERUM POTASSIUM: CPT

## 2021-03-14 PROCEDURE — 71045 X-RAY EXAM CHEST 1 VIEW: CPT | Mod: 26

## 2021-03-14 PROCEDURE — 93005 ELECTROCARDIOGRAM TRACING: CPT

## 2021-03-14 PROCEDURE — 99285 EMERGENCY DEPT VISIT HI MDM: CPT

## 2021-03-14 PROCEDURE — 80053 COMPREHEN METABOLIC PANEL: CPT

## 2021-03-14 PROCEDURE — 85610 PROTHROMBIN TIME: CPT

## 2021-03-14 PROCEDURE — 81001 URINALYSIS AUTO W/SCOPE: CPT

## 2021-03-14 PROCEDURE — 82947 ASSAY GLUCOSE BLOOD QUANT: CPT

## 2021-03-14 PROCEDURE — 82330 ASSAY OF CALCIUM: CPT

## 2021-03-14 PROCEDURE — 93010 ELECTROCARDIOGRAM REPORT: CPT

## 2021-03-14 PROCEDURE — 71045 X-RAY EXAM CHEST 1 VIEW: CPT

## 2021-03-14 PROCEDURE — 83605 ASSAY OF LACTIC ACID: CPT

## 2021-03-14 PROCEDURE — 82435 ASSAY OF BLOOD CHLORIDE: CPT

## 2021-03-14 PROCEDURE — 36000 PLACE NEEDLE IN VEIN: CPT

## 2021-03-14 PROCEDURE — 85730 THROMBOPLASTIN TIME PARTIAL: CPT

## 2021-03-14 PROCEDURE — 85018 HEMOGLOBIN: CPT

## 2021-03-14 PROCEDURE — 82803 BLOOD GASES ANY COMBINATION: CPT

## 2021-03-14 PROCEDURE — U0003: CPT

## 2021-03-14 PROCEDURE — 85025 COMPLETE CBC W/AUTO DIFF WBC: CPT

## 2021-03-14 PROCEDURE — 99285 EMERGENCY DEPT VISIT HI MDM: CPT | Mod: 25

## 2021-03-14 RX ORDER — ACETAMINOPHEN 500 MG
975 TABLET ORAL ONCE
Refills: 0 | Status: COMPLETED | OUTPATIENT
Start: 2021-03-14 | End: 2021-03-14

## 2021-03-14 RX ADMIN — Medication 975 MILLIGRAM(S): at 16:58

## 2021-03-14 RX ADMIN — Medication 975 MILLIGRAM(S): at 14:52

## 2021-03-14 NOTE — ED ADULT NURSE REASSESSMENT NOTE - NS ED NURSE REASSESS COMMENT FT1
Pt febrile, tylenol administered. Pt assisted to standing position to urinate- UA sent to lab. Pt awaiting results. Pt aware of plan of care. Will continue to monitor.

## 2021-03-14 NOTE — ED PROVIDER NOTE - PROGRESS NOTE DETAILS
delilah: spoke w/ hemeonc on call, pt to be dc after they spoke w/ his oncologist, will f/u with clinic closely. PT w/ likely pantera on ckd, tolerating PO intake and drinking water, will likely have labs redrawn at home, will register for infusion, dc. CXR clear, urine neg, blood cx to be f/u.

## 2021-03-14 NOTE — ED ADULT NURSE NOTE - OBJECTIVE STATEMENT
75 year old male Hx of CLL presents to the ED complaining of SOB, fever, reduced appetite. Pt states he had is covid vaccine a week ago and started having covid Sx on Thursday evening. Pt last chemo was 4 weeks ago, Pt goes [id number]. Pt is A&O x 4, VSS, afebrile, ambulating independently. IV placed, labs drawn, bed in low position, safety measures in place. 20G IV placed in left AC. BLE edematous.

## 2021-03-14 NOTE — ED PROVIDER NOTE - CLINICAL SUMMARY MEDICAL DECISION MAKING FREE TEXT BOX
75M w/ waldrudyom, currently covid+ p/w x4 days SOB, on exam non-hypoxic & otherwise well appearing. given current medical condition will send labs to screen for immunocompromised fever, cxr, ua, bcx, screen for superimposed infection, contact onc to touch base, reassess. SHERRY Roland 75M w/ roc, currently covid+ p/w x4 days SOB, on exam non-hypoxic & otherwise well appearing. given current medical condition will send labs to screen for immunocompromised fever, cxr, ua, bcx, screen for superimposed infection, contact onc to touch base, reassess.

## 2021-03-14 NOTE — ED CLERICAL - NS ED CLERK NOTE PRE-ARRIVAL INFORMATION; ADDITIONAL PRE-ARRIVAL INFORMATION
CC/Reason For referral: patient followed for anemia at [facility], tested covid + at an outside center. Please set patient up for antibody infusion if he does not require admission  Preferred Consultant(if applicable): call heme  fellow  Who admits for you (if needed): hospitalist  Do you have documents you would like to fax over? no  Would you still like to speak to an ED attending? call heme fellow

## 2021-03-14 NOTE — ED ADULT NURSE REASSESSMENT NOTE - NS ED NURSE REASSESS COMMENT FT1
Rita TAYLOR break coverage [id number]: Pt seen by Rita TAYLOR. IV placed and bloodwork drawn at that time. Awaiting order from MD.

## 2021-03-14 NOTE — ED PROVIDER NOTE - NS ED ROS FT
CONST: no fevers, no chills, no lightheadedness  HEENT: no vision change, no sore throat  CV: no chest pain, no palpitations  RESP: no cough, + shortness of breath  ABD: no abdominal pain, no nausea/vomiting, no diarrhea  : no dysuria, no hematuria  NEURO: no headache, no focal weakness, no loss of sensation  SKIN:  no rash

## 2021-03-14 NOTE — ED PROVIDER NOTE - PMH
Anxiety disorder    Atrial fibrillation    BPH (benign prostatic hyperplasia)    Bradycardia, drug induced    CKD (chronic kidney disease)    CLL (chronic lymphocytic leukemia)  in remission  Diverticulitis    GERD (gastroesophageal reflux disease)    Gout    [place] (hard of hearing)    Hypothyroid    Memory deficit    Nephrolithiasis    Waldenstrom macroglobulinemia

## 2021-03-14 NOTE — ED ADULT NURSE NOTE - PMH
Anxiety disorder    Atrial fibrillation    BPH (benign prostatic hyperplasia)    Bradycardia, drug induced    CKD (chronic kidney disease)    CLL (chronic lymphocytic leukemia)  in remission  Diverticulitis    GERD (gastroesophageal reflux disease)    Gout    [place] (hard of hearing)    Hypothyroid    Memory deficit    Nephrolithiasis    Waldenstrom macroglobulinemia

## 2021-03-14 NOTE — ED PROVIDER NOTE - PHYSICAL EXAMINATION
Gen: WDWN, NAD, sats 99% RA, 98% ambulatory RA, low grade fever  HEENT: EOMI, no nasal discharge, mucous membranes moist  CV: RRR, no M/R/G  Resp: CTAB, no W/R/R  GI: Abdomen soft non-distended, NTTP, no masses  MSK: No open wounds, no bruising, no LE edema  Neuro: A&Ox4, following commands, moving all four extremities spontaneously  Psych: appropriate mood, affect

## 2021-03-14 NOTE — ED ADULT NURSE REASSESSMENT NOTE - NS ED NURSE REASSESS COMMENT FT1
Covid swab performed (for monoclonal antibody infusion tomorrow) and IV removed by ED RN. Stacey APPLE aware of continued fever s/p tylenol and states pt cleared for discharge and may take tylenol at home for fever q 6 hours. Pt discharged home by Domenico APPLE.

## 2021-03-14 NOTE — ED PROVIDER NOTE - OBJECTIVE STATEMENT
75M hx waldenstrom's,BPH, hypothyroid, afib, GERD, CLL in remission, hx cardiac pacemaker, p/w covid +. P/w SOB since thursday, tested cov + today, denies other symptoms. Was told by oncologist to come to ED for evaluation, infusion, sepsis w/u. + fever, no n/v/d/c, chest / abd pain, no cough, + sob, no dizziness, no dysuria/hematuria.

## 2021-03-14 NOTE — ED PROVIDER NOTE - PATIENT PORTAL LINK FT
You can access the FollowMyHealth Patient Portal offered by [facility] by registering at the following website: [email or web address]. By joining [employer]’s FollowMyHealth portal, you will also be able to view your health information using other applications (apps) compatible with our system.

## 2021-03-16 ENCOUNTER — APPOINTMENT (OUTPATIENT)
Dept: DISASTER EMERGENCY | Facility: HOSPITAL | Age: 76
End: 2021-03-16

## 2021-03-16 ENCOUNTER — INPATIENT (INPATIENT)
Facility: HOSPITAL | Age: 76
LOS: 14 days | Discharge: NURSING, REHAB OR LONG-TERM CARE | DRG: 177 | End: 2021-03-31
Attending: INTERNAL MEDICINE | Admitting: STUDENT IN AN ORGANIZED HEALTH CARE EDUCATION/TRAINING PROGRAM
Payer: MEDICARE

## 2021-03-16 ENCOUNTER — OUTPATIENT (OUTPATIENT)
Dept: OUTPATIENT SERVICES | Facility: HOSPITAL | Age: 76
LOS: 1 days | End: 2021-03-16
Payer: OTHER GOVERNMENT

## 2021-03-16 VITALS
RESPIRATION RATE: 24 BRPM | WEIGHT: 231.93 LBS | TEMPERATURE: 101 F | DIASTOLIC BLOOD PRESSURE: 66 MMHG | SYSTOLIC BLOOD PRESSURE: 178 MMHG | HEART RATE: 89 BPM | OXYGEN SATURATION: 94 % | HEIGHT: 74 IN

## 2021-03-16 VITALS
TEMPERATURE: 99 F | OXYGEN SATURATION: 98 % | DIASTOLIC BLOOD PRESSURE: 68 MMHG | SYSTOLIC BLOOD PRESSURE: 178 MMHG | RESPIRATION RATE: 18 BRPM | HEART RATE: 75 BPM

## 2021-03-16 VITALS
TEMPERATURE: 99 F | SYSTOLIC BLOOD PRESSURE: 116 MMHG | RESPIRATION RATE: 20 BRPM | DIASTOLIC BLOOD PRESSURE: 68 MMHG | OXYGEN SATURATION: 96 % | HEART RATE: 65 BPM

## 2021-03-16 DIAGNOSIS — Z98.890 OTHER SPECIFIED POSTPROCEDURAL STATES: Chronic | ICD-10-CM

## 2021-03-16 DIAGNOSIS — D61.818 OTHER PANCYTOPENIA: ICD-10-CM

## 2021-03-16 DIAGNOSIS — R19.7 DIARRHEA, UNSPECIFIED: ICD-10-CM

## 2021-03-16 DIAGNOSIS — R31.9 HEMATURIA, UNSPECIFIED: ICD-10-CM

## 2021-03-16 DIAGNOSIS — Z98.89 OTHER SPECIFIED POSTPROCEDURAL STATES: Chronic | ICD-10-CM

## 2021-03-16 DIAGNOSIS — U07.1 COVID-19: ICD-10-CM

## 2021-03-16 DIAGNOSIS — C88.0 WALDENSTROM MACROGLOBULINEMIA: ICD-10-CM

## 2021-03-16 DIAGNOSIS — N18.4 CHRONIC KIDNEY DISEASE, STAGE 4 (SEVERE): ICD-10-CM

## 2021-03-16 DIAGNOSIS — I48.91 UNSPECIFIED ATRIAL FIBRILLATION: ICD-10-CM

## 2021-03-16 DIAGNOSIS — Z95.0 PRESENCE OF CARDIAC PACEMAKER: Chronic | ICD-10-CM

## 2021-03-16 DIAGNOSIS — F03.90 UNSPECIFIED DEMENTIA WITHOUT BEHAVIORAL DISTURBANCE: ICD-10-CM

## 2021-03-16 DIAGNOSIS — Z98.41 CATARACT EXTRACTION STATUS, RIGHT EYE: Chronic | ICD-10-CM

## 2021-03-16 DIAGNOSIS — Z29.9 ENCOUNTER FOR PROPHYLACTIC MEASURES, UNSPECIFIED: ICD-10-CM

## 2021-03-16 DIAGNOSIS — E03.9 HYPOTHYROIDISM, UNSPECIFIED: ICD-10-CM

## 2021-03-16 DIAGNOSIS — R50.9 FEVER, UNSPECIFIED: ICD-10-CM

## 2021-03-16 LAB
ALBUMIN SERPL ELPH-MCNC: 3.6 G/DL — SIGNIFICANT CHANGE UP (ref 3.3–5)
ALP SERPL-CCNC: 72 U/L — SIGNIFICANT CHANGE UP (ref 40–120)
ALT FLD-CCNC: 28 U/L — SIGNIFICANT CHANGE UP (ref 10–45)
ANION GAP SERPL CALC-SCNC: 16 MMOL/L — SIGNIFICANT CHANGE UP (ref 5–17)
APPEARANCE UR: ABNORMAL
APTT BLD: 25.4 SEC — LOW (ref 27.5–35.5)
AST SERPL-CCNC: 44 U/L — HIGH (ref 10–40)
BASOPHILS # BLD AUTO: 0 K/UL — SIGNIFICANT CHANGE UP (ref 0–0.2)
BASOPHILS NFR BLD AUTO: 0 % — SIGNIFICANT CHANGE UP (ref 0–2)
BILIRUB SERPL-MCNC: 0.3 MG/DL — SIGNIFICANT CHANGE UP (ref 0.2–1.2)
BILIRUB UR-MCNC: NEGATIVE — SIGNIFICANT CHANGE UP
BUN SERPL-MCNC: 41 MG/DL — HIGH (ref 7–23)
CALCIUM SERPL-MCNC: 9.1 MG/DL — SIGNIFICANT CHANGE UP (ref 8.4–10.5)
CHLORIDE SERPL-SCNC: 111 MMOL/L — HIGH (ref 96–108)
CO2 SERPL-SCNC: 16 MMOL/L — LOW (ref 22–31)
COLOR SPEC: YELLOW — SIGNIFICANT CHANGE UP
CREAT SERPL-MCNC: 4.34 MG/DL — HIGH (ref 0.5–1.3)
DIFF PNL FLD: ABNORMAL
EOSINOPHIL # BLD AUTO: 0 K/UL — SIGNIFICANT CHANGE UP (ref 0–0.5)
EOSINOPHIL NFR BLD AUTO: 0 % — SIGNIFICANT CHANGE UP (ref 0–6)
GAS PNL BLDV: SIGNIFICANT CHANGE UP
GLUCOSE SERPL-MCNC: 112 MG/DL — HIGH (ref 70–99)
GLUCOSE UR QL: NEGATIVE — SIGNIFICANT CHANGE UP
HCT VFR BLD CALC: 31.4 % — LOW (ref 39–50)
HGB BLD-MCNC: 9.4 G/DL — LOW (ref 13–17)
IMM GRANULOCYTES NFR BLD AUTO: 0.4 % — SIGNIFICANT CHANGE UP (ref 0–1.5)
INR BLD: 2.43 RATIO — HIGH (ref 0.88–1.16)
KETONES UR-MCNC: NEGATIVE — SIGNIFICANT CHANGE UP
LEUKOCYTE ESTERASE UR-ACNC: NEGATIVE — SIGNIFICANT CHANGE UP
LYMPHOCYTES # BLD AUTO: 0.77 K/UL — LOW (ref 1–3.3)
LYMPHOCYTES # BLD AUTO: 34.4 % — SIGNIFICANT CHANGE UP (ref 13–44)
MCHC RBC-ENTMCNC: 29.9 GM/DL — LOW (ref 32–36)
MCHC RBC-ENTMCNC: 30.5 PG — SIGNIFICANT CHANGE UP (ref 27–34)
MCV RBC AUTO: 101.9 FL — HIGH (ref 80–100)
MONOCYTES # BLD AUTO: 0.19 K/UL — SIGNIFICANT CHANGE UP (ref 0–0.9)
MONOCYTES NFR BLD AUTO: 8.5 % — SIGNIFICANT CHANGE UP (ref 2–14)
NEUTROPHILS # BLD AUTO: 1.27 K/UL — LOW (ref 1.8–7.4)
NEUTROPHILS NFR BLD AUTO: 56.7 % — SIGNIFICANT CHANGE UP (ref 43–77)
NITRITE UR-MCNC: NEGATIVE — SIGNIFICANT CHANGE UP
NRBC # BLD: 0 /100 WBCS — SIGNIFICANT CHANGE UP (ref 0–0)
PH UR: 6 — SIGNIFICANT CHANGE UP (ref 5–8)
PLATELET # BLD AUTO: 71 K/UL — LOW (ref 150–400)
POTASSIUM SERPL-MCNC: 5 MMOL/L — SIGNIFICANT CHANGE UP (ref 3.5–5.3)
POTASSIUM SERPL-SCNC: 5 MMOL/L — SIGNIFICANT CHANGE UP (ref 3.5–5.3)
PROT SERPL-MCNC: 6.6 G/DL — SIGNIFICANT CHANGE UP (ref 6–8.3)
PROT UR-MCNC: ABNORMAL
PROTHROM AB SERPL-ACNC: 28 SEC — HIGH (ref 10.6–13.6)
RAPID RVP RESULT: DETECTED
RBC # BLD: 3.08 M/UL — LOW (ref 4.2–5.8)
RBC # FLD: 14.2 % — SIGNIFICANT CHANGE UP (ref 10.3–14.5)
SARS-COV-2 RNA SPEC QL NAA+PROBE: DETECTED
SODIUM SERPL-SCNC: 143 MMOL/L — SIGNIFICANT CHANGE UP (ref 135–145)
SP GR SPEC: 1.02 — SIGNIFICANT CHANGE UP (ref 1.01–1.02)
UROBILINOGEN FLD QL: NEGATIVE — SIGNIFICANT CHANGE UP
WBC # BLD: 2.24 K/UL — LOW (ref 3.8–10.5)
WBC # FLD AUTO: 2.24 K/UL — LOW (ref 3.8–10.5)

## 2021-03-16 PROCEDURE — 71250 CT THORAX DX C-: CPT | Mod: 26

## 2021-03-16 PROCEDURE — 99285 EMERGENCY DEPT VISIT HI MDM: CPT

## 2021-03-16 PROCEDURE — 93010 ELECTROCARDIOGRAM REPORT: CPT

## 2021-03-16 PROCEDURE — 99223 1ST HOSP IP/OBS HIGH 75: CPT | Mod: GC

## 2021-03-16 PROCEDURE — 99223 1ST HOSP IP/OBS HIGH 75: CPT

## 2021-03-16 PROCEDURE — 74176 CT ABD & PELVIS W/O CONTRAST: CPT | Mod: 26

## 2021-03-16 PROCEDURE — M0243: CPT

## 2021-03-16 PROCEDURE — 71045 X-RAY EXAM CHEST 1 VIEW: CPT | Mod: 26

## 2021-03-16 PROCEDURE — 70450 CT HEAD/BRAIN W/O DYE: CPT | Mod: 26

## 2021-03-16 RX ORDER — LANOLIN ALCOHOL/MO/W.PET/CERES
5 CREAM (GRAM) TOPICAL AT BEDTIME
Refills: 0 | Status: DISCONTINUED | OUTPATIENT
Start: 2021-03-16 | End: 2021-03-31

## 2021-03-16 RX ORDER — SODIUM CHLORIDE 9 MG/ML
500 INJECTION INTRAMUSCULAR; INTRAVENOUS; SUBCUTANEOUS ONCE
Refills: 0 | Status: COMPLETED | OUTPATIENT
Start: 2021-03-16 | End: 2021-03-16

## 2021-03-16 RX ORDER — ACETAMINOPHEN 500 MG
975 TABLET ORAL ONCE
Refills: 0 | Status: COMPLETED | OUTPATIENT
Start: 2021-03-16 | End: 2021-03-16

## 2021-03-16 RX ORDER — DEXAMETHASONE 0.5 MG/5ML
6 ELIXIR ORAL ONCE
Refills: 0 | Status: COMPLETED | OUTPATIENT
Start: 2021-03-16 | End: 2021-03-16

## 2021-03-16 RX ORDER — SODIUM CHLORIDE 9 MG/ML
250 INJECTION INTRAMUSCULAR; INTRAVENOUS; SUBCUTANEOUS
Refills: 0 | Status: COMPLETED | OUTPATIENT
Start: 2021-03-16 | End: 2021-03-16

## 2021-03-16 RX ORDER — CEFEPIME 1 G/1
2000 INJECTION, POWDER, FOR SOLUTION INTRAMUSCULAR; INTRAVENOUS ONCE
Refills: 0 | Status: COMPLETED | OUTPATIENT
Start: 2021-03-16 | End: 2021-03-16

## 2021-03-16 RX ORDER — TAMSULOSIN HYDROCHLORIDE 0.4 MG/1
0.4 CAPSULE ORAL AT BEDTIME
Refills: 0 | Status: DISCONTINUED | OUTPATIENT
Start: 2021-03-16 | End: 2021-03-31

## 2021-03-16 RX ORDER — VANCOMYCIN HCL 1 G
1000 VIAL (EA) INTRAVENOUS ONCE
Refills: 0 | Status: COMPLETED | OUTPATIENT
Start: 2021-03-16 | End: 2021-03-16

## 2021-03-16 RX ORDER — MEMANTINE HYDROCHLORIDE 10 MG/1
10 TABLET ORAL EVERY 12 HOURS
Refills: 0 | Status: DISCONTINUED | OUTPATIENT
Start: 2021-03-16 | End: 2021-03-31

## 2021-03-16 RX ORDER — IPRATROPIUM/ALBUTEROL SULFATE 18-103MCG
3 AEROSOL WITH ADAPTER (GRAM) INHALATION EVERY 4 HOURS
Refills: 0 | Status: DISCONTINUED | OUTPATIENT
Start: 2021-03-16 | End: 2021-03-17

## 2021-03-16 RX ORDER — CEFEPIME 1 G/1
1000 INJECTION, POWDER, FOR SOLUTION INTRAMUSCULAR; INTRAVENOUS EVERY 12 HOURS
Refills: 0 | Status: DISCONTINUED | OUTPATIENT
Start: 2021-03-17 | End: 2021-03-17

## 2021-03-16 RX ORDER — FOLIC ACID 0.8 MG
1 TABLET ORAL DAILY
Refills: 0 | Status: DISCONTINUED | OUTPATIENT
Start: 2021-03-16 | End: 2021-03-31

## 2021-03-16 RX ORDER — LACTOBACILLUS ACIDOPHILUS 100MM CELL
1 CAPSULE ORAL DAILY
Refills: 0 | Status: DISCONTINUED | OUTPATIENT
Start: 2021-03-16 | End: 2021-03-31

## 2021-03-16 RX ORDER — PANTOPRAZOLE SODIUM 20 MG/1
40 TABLET, DELAYED RELEASE ORAL DAILY
Refills: 0 | Status: DISCONTINUED | OUTPATIENT
Start: 2021-03-16 | End: 2021-03-19

## 2021-03-16 RX ORDER — MIRTAZAPINE 45 MG/1
7.5 TABLET, ORALLY DISINTEGRATING ORAL AT BEDTIME
Refills: 0 | Status: DISCONTINUED | OUTPATIENT
Start: 2021-03-16 | End: 2021-03-31

## 2021-03-16 RX ORDER — DONEPEZIL HYDROCHLORIDE 10 MG/1
10 TABLET, FILM COATED ORAL AT BEDTIME
Refills: 0 | Status: DISCONTINUED | OUTPATIENT
Start: 2021-03-16 | End: 2021-03-31

## 2021-03-16 RX ORDER — LEVOTHYROXINE SODIUM 125 MCG
125 TABLET ORAL DAILY
Refills: 0 | Status: DISCONTINUED | OUTPATIENT
Start: 2021-03-16 | End: 2021-03-31

## 2021-03-16 RX ORDER — AMIODARONE HYDROCHLORIDE 400 MG/1
200 TABLET ORAL DAILY
Refills: 0 | Status: DISCONTINUED | OUTPATIENT
Start: 2021-03-16 | End: 2021-03-31

## 2021-03-16 RX ADMIN — Medication 40 MILLIGRAM(S): at 22:37

## 2021-03-16 RX ADMIN — SODIUM CHLORIDE 500 MILLILITER(S): 9 INJECTION INTRAMUSCULAR; INTRAVENOUS; SUBCUTANEOUS at 16:42

## 2021-03-16 RX ADMIN — Medication 5 MILLIGRAM(S): at 22:37

## 2021-03-16 RX ADMIN — Medication 975 MILLIGRAM(S): at 15:55

## 2021-03-16 RX ADMIN — Medication 6 MILLIGRAM(S): at 16:04

## 2021-03-16 RX ADMIN — Medication 250 MILLIGRAM(S): at 15:56

## 2021-03-16 RX ADMIN — TAMSULOSIN HYDROCHLORIDE 0.4 MILLIGRAM(S): 0.4 CAPSULE ORAL at 23:28

## 2021-03-16 RX ADMIN — CEFEPIME 100 MILLIGRAM(S): 1 INJECTION, POWDER, FOR SOLUTION INTRAMUSCULAR; INTRAVENOUS at 16:43

## 2021-03-16 RX ADMIN — Medication 975 MILLIGRAM(S): at 16:42

## 2021-03-16 RX ADMIN — MIRTAZAPINE 7.5 MILLIGRAM(S): 45 TABLET, ORALLY DISINTEGRATING ORAL at 23:28

## 2021-03-16 RX ADMIN — SODIUM CHLORIDE 25 MILLILITER(S): 9 INJECTION INTRAMUSCULAR; INTRAVENOUS; SUBCUTANEOUS at 12:45

## 2021-03-16 RX ADMIN — Medication 1000 MILLIGRAM(S): at 16:42

## 2021-03-16 RX ADMIN — SODIUM CHLORIDE 500 MILLILITER(S): 9 INJECTION INTRAMUSCULAR; INTRAVENOUS; SUBCUTANEOUS at 15:56

## 2021-03-16 RX ADMIN — DONEPEZIL HYDROCHLORIDE 10 MILLIGRAM(S): 10 TABLET, FILM COATED ORAL at 22:37

## 2021-03-16 NOTE — H&P ADULT - ASSESSMENT
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

## 2021-03-16 NOTE — H&P ADULT - NSICDXPASTSURGICALHX_GEN_ALL_CORE_FT
PAST SURGICAL HISTORY:  History of appendectomy     History of cardiac pacemaker in situ     History of cataract surgery, right IOL    History of laparoscopic cholecystectomy 4/2014    Meniscus tear s/p removal of Meniscus 8 months ago    S/P hernia repair x2

## 2021-03-16 NOTE — H&P ADULT - NSHPLABSRESULTS_GEN_ALL_CORE
Enid Emerson called asking if she would be able to schedule a virtual appointment to follow up with . 9.4    2.24  )-----------( 71       ( 16 Mar 2021 15:48 )             31.4   03-16    143  |  111<H>  |  41<H>  ----------------------------<  112<H>  5.0   |  16<L>  |  4.34<H>    Ca    9.1      16 Mar 2021 15:48    TPro  6.6  /  Alb  3.6  /  TBili  0.3  /  DBili  x   /  AST  44<H>  /  ALT  28  /  AlkPhos  72  03-16    Urinalysis (03.16.21 @ 16:22)   Glucose Qualitative, Urine: Negative   Blood, Urine: Large   pH Urine: 6.0   Color: Yellow   Urine Appearance: Slightly Turbid   Bilirubin: Negative   Ketone - Urine: Negative   Specific Gravity: 1.021   Protein, Urine: 100 mg/dL   Urobilinogen: Negative   Nitrite: Negative   Leukocyte Esterase Concentration: Negative Lactate Dehydrogenase, Serum: 281: Mild hemolysis results may be falsely elevated U/L (03.16.21 @ 15:48) < from: Xray Chest 1 View- PORTABLE-Urgent (03.16.21 @ 16:59) >      FINDINGS:  Left-sided AICD in situ.  The cardiomediastinal silhouette is within normal limits.  New reticular-type and hazy opacities bilaterally (left lung more than right) -lead due to infectious process including atypical pneumonia due to Covid 19.  No pleural effusion or pneumothorax.    IMPRESSION:  New bilateral opacities likely due to infectious process.    < end of copied text >

## 2021-03-16 NOTE — H&P ADULT - PROBLEM SELECTOR PLAN 4
-patient with hematuria on UA, in setting of anemia. Unclear etiology (will probe if walker placed at OSH)  -monitor Is&Os  -f/u CT A/P

## 2021-03-16 NOTE — H&P ADULT - NSHPREVIEWOFSYSTEMS_GEN_ALL_CORE
REVIEW OF SYSTEMS:    CONSTITUTIONAL: No weakness, fevers or chills  EYES/ENT: No visual changes;  No vertigo or throat pain   NECK: No pain or stiffness  RESPIRATORY: No cough, wheezing, hemoptysis; No shortness of breath  CARDIOVASCULAR: No chest pain or palpitations  GASTROINTESTINAL: No abdominal or epigastric pain. No nausea, vomiting, or hematemesis; No diarrhea or constipation. No melena or hematochezia.  BACK: No CVA tenderness  GENITOURINARY: No dysuria, frequency or hematuria  NEUROLOGICAL: No numbness or weakness  SKIN: No itching, rashes REVIEW OF SYSTEMS:    CONSTITUTIONAL: no weakness, +fevers, +chills  EYES/ENT: No visual changes;  No vertigo or throat pain   NECK: No pain or stiffness  RESPIRATORY: No cough, wheezing, hemoptysis; No shortness of breath  CARDIOVASCULAR: No chest pain or palpitations  GASTROINTESTINAL: No abdominal or epigastric pain. No nausea, vomiting, or hematemesis; +diarrhea, +constipation. No melena or hematochezia.  BACK: No CVA tenderness  GENITOURINARY: No dysuria, frequency or hematuria  NEUROLOGICAL: No numbness or weakness  SKIN: No itching, rashes

## 2021-03-16 NOTE — ED PROVIDER NOTE - PROGRESS NOTE DETAILS
Resident Kavita: spoke with Hospitalist (MD Zelda) who recommends trial for load balancing, however ED ATTG (MD Tung) believe patient is not a candidate given functional neutropenic status and suspicion for underlying sepsis. Additionally patient's symptoms may be secondary to transfusion reaction from today's MAB infusion. Case ultimately endorsed to staff hospitalist (MD Zelda). At presents is in on 2L NC, s/p BCx, Abx, Fluids. Does not require BiPAP at present.

## 2021-03-16 NOTE — CONSULT NOTE ADULT - SUBJECTIVE AND OBJECTIVE BOX
Patient seen and evaluated at bedside    Chief Complaint: fever chills     HPI:  75 year old man, with history CLL transformed to waldenstrom macroglobulinemia on ninlaro/dexamethasone, Afib (on amiodarone, metoprolol, warfarin), PPM, mild dementia, hypothyroidism, BPH, GERD, present w/ fevers and chills worsening after monoclonal antibody Infusion for COVID today. Patient was in usual state of health until Tuesday of last week (3/9); patient endorsed fevers, generalized weakness, fatigue. He went to urgent care and tested positive for COVID on 3/11. Today he was receiving monoclonal antibody transfusion at [facility], and quickly experienced severe rigors, chills, and fevers transferred to [facility].     On ROS, patient endorses fevers, chills, denies headaches, visual changes, chest pain, SOB, palpitations, cough, abdominal pain, n/v, melena, endorses alternating constipation/diarrhea (nonbloody, watery of last 3-4 days), denies dysuria, endorses LE edema.     In the ED, patient Tmax 103, HR wnl, -170/53-92, RR 20-30, saturating % on 2L N/C. Labs notable for pancytopenia, LOUISE on CKD4, COVID+,  UA+ RBCs, CXR w/ b/l opacities. Patient received vanc/cef, 500cc NS bolus, 6mg IV decadron. Patient admitted to medicine for continued management.  (16 Mar 2021 18:04)      PMHx:   Memory deficit    BPH (benign prostatic hyperplasia)    [place] (hard of hearing)    Gout    CKD (chronic kidney disease)    Nephrolithiasis    Hypothyroid    Dementia    Bradycardia, drug induced    Waldenstrom macroglobulinemia    Diverticulitis    Atrial fibrillation    GERD (gastroesophageal reflux disease)    Anxiety disorder    CLL (chronic lymphocytic leukemia)        PSHx:   History of cardiac pacemaker in situ    History of appendectomy    History of cataract surgery, right    History of laparoscopic cholecystectomy    S/P hernia repair    Meniscus tear        Allergies:  rituximab (Angioedema)      Home Meds:    Current Medications:   albuterol/ipratropium for Nebulization 3 milliLiter(s) Nebulizer every 4 hours  aMIOdarone    Tablet 200 milliGRAM(s) Oral daily  donepezil 10 milliGRAM(s) Oral at bedtime  folic acid 1 milliGRAM(s) Oral daily  lactobacillus acidophilus 1 Tablet(s) Oral daily  levothyroxine 125 MICROGram(s) Oral daily  melatonin 5 milliGRAM(s) Oral at bedtime  memantine 10 milliGRAM(s) Oral every 12 hours  methylPREDNISolone sodium succinate Injectable 40 milliGRAM(s) IV Push every 8 hours  mirtazapine 7.5 milliGRAM(s) Oral at bedtime  multivitamin 1 Tablet(s) Oral daily  pantoprazole  Injectable 40 milliGRAM(s) IV Push daily  tamsulosin 0.4 milliGRAM(s) Oral at bedtime      FAMILY HISTORY:  No pertinent family history in first degree relatives        Social History: Personally reviewed   No tobacco, EtOH or IVDU     REVIEW OF SYSTEMS:  Constitutional:     [x ] negative [ ] fevers [ ] chills [ ] weight loss [ ] weight gain  HEENT:                  [x ] negative [ ] dry eyes [ ] eye irritation [ ] postnasal drip [ ] nasal congestion  CV:                         [ x] negative  [ ] chest pain [ ] orthopnea [ ] palpitations [ ] murmur  Resp:                     [x ] negative [ ] cough [ ] shortness of breath [ ] dyspnea [ ] wheezing [ ] sputum [ ]hemoptysis  GI:                          [ x] negative [ ] nausea [ ] vomiting [ ] diarrhea [ ] constipation [ ] abd pain [ ] dysphagia   :                        [ x] negative [ ] dysuria [ ] nocturia [ ] hematuria [ ] increased urinary frequency  Musculoskeletal: [x ] negative [ ] back pain [ ] myalgias [ ] arthralgias [ ] fracture  Skin:                       [ x] negative [ ] rash [ ] itch  Neurological:        [ x] negative [ ] headache [ ] dizziness [ ] syncope [ ] weakness [ ] numbness  Psychiatric:           [ x] negative [ ] anxiety [ ] depression  Endocrine:            [ x] negative [ ] diabetes [ ] thyroid problem  Heme/Lymph:      [ x] negative [ ] anemia [ ] bleeding problem  Allergic/Immune: [ x] negative [ ] itchy eyes [ ] nasal discharge [ ] hives [ ] angioedema    [ x] All other systems negative  [ ] Unable to assess ROS due to      Physical Exam:  T(F): 98.8 (03-16), Max: 103 (03-16)  HR: 69 (03-16) (63 - 97)  BP: 128/65 (03-16) (100/59 - 178/68)  RR: 21 (03-16)  SpO2: 99% (03-16)  GENERAL: No acute distress, well-developed  HEAD:  Atraumatic, Normocephalic  ENT: EOMI, PERRLA, conjunctiva and sclera clear, Neck supple, No JVD, moist mucosa  CHEST/LUNG: Clear to auscultation bilaterally; No wheeze, equal breath sounds bilaterally   BACK: No spinal tenderness  HEART: Regular rate and rhythm; No murmurs, rubs, or gallops  ABDOMEN: Soft, Nontender, Nondistended; Bowel sounds present  EXTREMITIES:  No clubbing, cyanosis, or edema  PSYCH: Nl behavior, nl affect  NEUROLOGY: AAOx3, non-focal, cranial nerves intact  SKIN: Normal color, No rashes or lesions  LINES:    Cardiovascular Diagnostic Testing:    ECG: Personally reviewed    Echo: Personally reviewed    Stress Testing: Personally reviewed     Angiogram: Personally reviewed     Imaging:    CXR: Personally reviewed    Labs: Personally reviewed                        9.4    2.24  )-----------( 71       ( 16 Mar 2021 15:48 )             31.4     03-16    143  |  111<H>  |  41<H>  ----------------------------<  112<H>  5.0   |  16<L>  |  4.34<H>    Ca    9.1      16 Mar 2021 15:48    TPro  6.6  /  Alb  3.6  /  TBili  0.3  /  DBili  x   /  AST  44<H>  /  ALT  28  /  AlkPhos  72  03-16    PT/INR - ( 16 Mar 2021 15:48 )   PT: 28.0 sec;   INR: 2.43 ratio         PTT - ( 16 Mar 2021 15:48 )  PTT:25.4 sec         Patient seen and evaluated at bedside    Chief Complaint: fever chills     HPI:  75 year old man, with history CLL transformed to waldenstrom macroglobulinemia on ninlaro/dexamethasone, Afib (on amiodarone, metoprolol, warfarin), PPM, mild dementia, hypothyroidism, BPH, GERD, present w/ fevers and chills worsening after monoclonal antibody Infusion for COVID today. Patient was in usual state of health until Tuesday of last week (3/9); patient endorsed fevers, generalized weakness, fatigue. He went to urgent care and tested positive for COVID on 3/11. Today he was receiving monoclonal antibody transfusion at [facility], and quickly experienced severe rigors, chills, and fevers transferred to [facility].     On ROS, patient endorses fevers, chills, denies headaches, visual changes, chest pain, SOB, palpitations, cough, abdominal pain, n/v, melena, endorses alternating constipation/diarrhea (nonbloody, watery of last 3-4 days), denies dysuria, endorses LE edema.     In the ED, patient Tmax 103, HR wnl, -170/53-92, RR 20-30, saturating % on 2L N/C. Labs notable for pancytopenia, LOUISE on CKD4, COVID+,  UA+ RBCs, CXR w/ b/l opacities. Patient received vanc/cef, 500cc NS bolus, 6mg IV decadron. Patient admitted to medicine for continued management.  (16 Mar 2021 18:04)      PMHx:   Memory deficit    BPH (benign prostatic hyperplasia)    [place] (hard of hearing)    Gout    CKD (chronic kidney disease)    Nephrolithiasis    Hypothyroid    Dementia    Bradycardia, drug induced    Waldenstrom macroglobulinemia    Diverticulitis    Atrial fibrillation    GERD (gastroesophageal reflux disease)    Anxiety disorder    CLL (chronic lymphocytic leukemia)        PSHx:   History of cardiac pacemaker in situ    History of appendectomy    History of cataract surgery, right    History of laparoscopic cholecystectomy    S/P hernia repair    Meniscus tear        Allergies:  rituximab (Angioedema)      Home Meds:    Current Medications:   albuterol/ipratropium for Nebulization 3 milliLiter(s) Nebulizer every 4 hours  aMIOdarone    Tablet 200 milliGRAM(s) Oral daily  donepezil 10 milliGRAM(s) Oral at bedtime  folic acid 1 milliGRAM(s) Oral daily  lactobacillus acidophilus 1 Tablet(s) Oral daily  levothyroxine 125 MICROGram(s) Oral daily  melatonin 5 milliGRAM(s) Oral at bedtime  memantine 10 milliGRAM(s) Oral every 12 hours  methylPREDNISolone sodium succinate Injectable 40 milliGRAM(s) IV Push every 8 hours  mirtazapine 7.5 milliGRAM(s) Oral at bedtime  multivitamin 1 Tablet(s) Oral daily  pantoprazole  Injectable 40 milliGRAM(s) IV Push daily  tamsulosin 0.4 milliGRAM(s) Oral at bedtime      FAMILY HISTORY:  No pertinent family history in first degree relatives        Social History: Personally reviewed   No tobacco, EtOH or IVDU     REVIEW OF SYSTEMS:  Constitutional:     [x ] negative [ ] fevers [ ] chills [ ] weight loss [ ] weight gain  HEENT:                  [x ] negative [ ] dry eyes [ ] eye irritation [ ] postnasal drip [ ] nasal congestion  CV:                         [ x] negative  [ ] chest pain [ ] orthopnea [ ] palpitations [ ] murmur  Resp:                     [x ] negative [ ] cough [ ] shortness of breath [ ] dyspnea [ ] wheezing [ ] sputum [ ]hemoptysis  GI:                          [ x] negative [ ] nausea [ ] vomiting [ ] diarrhea [ ] constipation [ ] abd pain [ ] dysphagia   :                        [ x] negative [ ] dysuria [ ] nocturia [ ] hematuria [ ] increased urinary frequency  Musculoskeletal: [x ] negative [ ] back pain [ ] myalgias [ ] arthralgias [ ] fracture  Skin:                       [ x] negative [ ] rash [ ] itch  Neurological:        [ x] negative [ ] headache [ ] dizziness [ ] syncope [ ] weakness [ ] numbness  Psychiatric:           [ x] negative [ ] anxiety [ ] depression  Endocrine:            [ x] negative [ ] diabetes [ ] thyroid problem  Heme/Lymph:      [ x] negative [ ] anemia [ ] bleeding problem  Allergic/Immune: [ x] negative [ ] itchy eyes [ ] nasal discharge [ ] hives [ ] angioedema    [ x] All other systems negative  [ ] Unable to assess ROS due to      Physical Exam:  T(F): 98.8 (03-16), Max: 103 (03-16)  HR: 69 (03-16) (63 - 97)  BP: 128/65 (03-16) (100/59 - 178/68)  RR: 21 (03-16)  SpO2: 99% (03-16)    Deferred due to COVID precaution. Please see primary team PE     Cardiovascular Diagnostic Testing:    ECG: Personally reviewed  3/14 NSR at 63     Echo: Personally reviewed  2019  Conclusions:  1. Mild mitral regurgitation.  2. Peak transaortic valve gradient equals 27 mm Hg, mean  transaortic valve gradient equals 12 mm Hg, estimated  aortic valve area equals 1.6 sqcm (by continuity equation),  aortic valve velocity time integral equals 57 cm,  consistent with mild aortic stenosis. Mild to moderate  aortic regurgitation.  3. Normal left ventricular internal dimensions and wall  thicknesses.  4. Normal left ventricular systolic function. No segmental  wall motion abnormalities.  5. Mild diastolic dysfunction (Stage I).  6. Normal right ventricular size and function. A device  wire is noted in the right heart.  7. Mild tricuspid regurgitation.  8. Estimated pulmonary artery systolic pressure equals 46  mm Hg, assuming right atrial pressure equals 8 mm Hg,  consistent with mild pulmonary pressures.  9. No obvious evidence of endocarditis. Consider KT for  further evaluation if clinically indicated.      Imaging:    CXR: Personally reviewed    Labs: Personally reviewed                        9.4    2.24  )-----------( 71       ( 16 Mar 2021 15:48 )             31.4     03-16    143  |  111<H>  |  41<H>  ----------------------------<  112<H>  5.0   |  16<L>  |  4.34<H>    Ca    9.1      16 Mar 2021 15:48    TPro  6.6  /  Alb  3.6  /  TBili  0.3  /  DBili  x   /  AST  44<H>  /  ALT  28  /  AlkPhos  72  03-16    PT/INR - ( 16 Mar 2021 15:48 )   PT: 28.0 sec;   INR: 2.43 ratio         PTT - ( 16 Mar 2021 15:48 )  PTT:25.4 sec

## 2021-03-16 NOTE — H&P ADULT - PROBLEM SELECTOR PLAN 3
-patient with 3-5 days of diarrhea, watery, nonbloody. May be 2/2 COVID PNA. Patient has history of diverticulitis s/p sigmoid resection.   -f/u GI PCR, stool cultures  -f/u CT A/P

## 2021-03-16 NOTE — H&P ADULT - HISTORY OF PRESENT ILLNESS
75M hx Waldenstrom's, BPH, hypothyroid, Afib, GERD, CLL in remission, history of cardiac pacemaker, presents s/p monoclonal antibody Infusion for COVID today. At end of the infusion, pt started to develop severe rigors and shaking. Presents to the ED Febrile (101F), with associated nonproductive cough, SOB, and diarrhea (NB for the last 3-4 days, multiple loose stools). Denies any CP, palpitations, abdominal pain, nausea, vomiting, constipation, bloody stools, dysuric symptoms. Pt is s/p remission of CLL - last treatment years prior. Chronically immunosuppressed with steroids and is undergoing active treatment for Waldenstroms.    In the ED, patient febrile Tmax 103, HR 71-85, -178/53-92, RR 24-26, saturating % on 2L NC. Labs notable for leukopenia of 3, hemoglobin 9.4, , platelets 91. BUN/creatinine 42/4.34, . COVID+. New bilateral opacities on CXR.  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 Patient was in usual state of health until Tuesday of last week (3/9); patient endorsed fevers, generalized weakness, fatigue. He went to urgent care and tested positive for COVID on 3/11. Today he was receiving monoclonal antibody transfusion at [facility], and quickly experienced severe rigors, chills, and fevers transferred to [facility].     On ROS, patient endorses fevers, chills, denies headaches, visual changes, chest pain, SOB, palpitations, cough, abdominal pain, n/v, melena, endorses alternating constipation/diarrhea (nonbloody, watery of last 3-4 days), denies dysuria, endorses LE edema.     In the ED, patient Tmax 103, HR wnl, -170/53-92, RR 20-30, saturating % on 2L N/C. Labs notable for pancytopenia, LOUISE on CKD4, COVID+,  UA+ RBCs, CXR w/ b/l opacities. Patient received vanc/cef, 500cc NS bolus, 6mg IV decadron. Patient admitted to medicine for continued management.

## 2021-03-16 NOTE — H&P ADULT - NSHPPHYSICALEXAM_GEN_ALL_CORE
Vital Signs Last 24 Hrs  T(C): 37.3 (16 Mar 2021 19:00), Max: 39.4 (16 Mar 2021 16:42)  T(F): 99.2 (16 Mar 2021 19:00), Max: 103 (16 Mar 2021 16:42)  HR: 72 (16 Mar 2021 19:00) (63 - 97)  BP: 100/59 (16 Mar 2021 19:00) (100/59 - 178/68)  BP(mean): 70 (16 Mar 2021 19:00) (70 - 70)  RR: 22 (16 Mar 2021 19:00) (16 - 27)  SpO2: 98% (16 Mar 2021 19:00) (90% - 100%)    PHYSICAL EXAM:  GENERAL: No acute distress, well-developed  HEAD:  Atraumatic, Normocephalic  EYES: EOMI, PERRLA, conjunctiva and sclera clear  NECK: Supple, no lymphadenopathy, no JVD  CHEST/LUNG: CTAB; No wheezes, rales, or rhonchi  HEART: Regular rate and rhythm; No murmurs, rubs, or gallops  ABDOMEN: Soft, non-tender, non-distended; normal bowel sounds, no organomegaly  EXTREMITIES:  2+ peripheral pulses b/l, No clubbing, cyanosis, or edema  NEUROLOGY: A&O x 3, no focal deficits  SKIN: No rashes or lesions Vital Signs Last 24 Hrs  T(C): 37.3 (16 Mar 2021 19:00), Max: 39.4 (16 Mar 2021 16:42)  T(F): 99.2 (16 Mar 2021 19:00), Max: 103 (16 Mar 2021 16:42)  HR: 72 (16 Mar 2021 19:00) (63 - 97)  BP: 100/59 (16 Mar 2021 19:00) (100/59 - 178/68)  BP(mean): 70 (16 Mar 2021 19:00) (70 - 70)  RR: 22 (16 Mar 2021 19:00) (16 - 27)  SpO2: 98% (16 Mar 2021 19:00) (90% - 100%)    PHYSICAL EXAM:  GENERAL: Appears stated age, resting comfortably in bed  HEAD:  Atraumatic, Normocephalic  EYES: EOMI, PERRLA, conjunctiva and sclera clear  NECK: Supple, no lymphadenopathy, no JVD  CHEST/LUNG: no increased WOB, no accessory muscle use, no wheezes   HEART: Regular rate and rhythm; No murmurs, rubs, or gallops  ABDOMEN: Soft, non-tender, non-distended; normal bowel sounds, no organomegaly  EXTREMITIES:  2+ peripheral pulses b/l, No clubbing, cyanosis, , 1+ b/l pitting edema  NEUROLOGY: A&O x 3, no focal deficits  SKIN: No rashes or lesions

## 2021-03-16 NOTE — H&P ADULT - PROBLEM SELECTOR PLAN 1
Tested positive 3/11, initial symptoms (fevers, fatigue) started 3/9, patient received monoclonal antibody treatment at [facility] and acutely experienced worsening of fevers and chills, may be superimposed transfusion reaction on top of COVID PNA.   -c/w dexamethasone 6mg IV daily for 10 days (will do one day of solumedrol q8 to treat both COVID PNA and possible transfusion reaction)  -control fevers with acetaminophen  -will hold remdesevir in setting of LOUISE on CKD, patient weaned off supplemental O2, saturating well on room air  -fevers: f/u BCx, UCx, CT chest, abdomen pelvis. Continue with empiric antibiotics, dose by level in setting of LOUISE on CKD.

## 2021-03-16 NOTE — ED PROVIDER NOTE - PHYSICAL EXAMINATION
GEN - NAD; non-toxic; A+Ox3  HENT - NC/AT, No visible Ecchymosis, No Abrasions, No Lac/Tears, MMM, no discharge  EYES - EOMI, no conjunctival pallor, no scleral icterus  PULM - Hypoxemic to 90% on RA< Tachypneic to 30's, Coarse B/L,  symmetric breath sounds  CV -  Tachy, S1 S2, no murmurs 2+ Pulses B/L UE  GI - NT/ND, soft, no guarding, no rebound, no masses    MSK/EXT- 1+ symmetric pitting edema, no gross deformity, warm and well perfused, no calf tenderness/swelling/erythema   SKIN - no rash or bruising  NEUROLOGIC - alert, moving all 4 ext with 5/5 Strength

## 2021-03-16 NOTE — H&P ADULT - PROBLEM SELECTOR PLAN 2
-likely chronic in setting of hematologic process, immunosuppression; leukopenia may be worsened in setting of COVID pna, anemia in setting of hematuria.   -f/u anemia workup (iron studies, B12, folate, etc)  -hematology consulted, patient sees Dr. Valdo Simons outpatient; f/u recs

## 2021-03-16 NOTE — CHART NOTE - NSCHARTNOTEFT_GEN_A_CORE
CC: Monoclonal Antibody Infusion/COVID 19 Positive  75yMale with PMHx Waldenstrom's Macroglobulinemia, BPH, Hypothyroid, afib, PPM, GERD, CLL and symptoms SOB, cough and fever since 3/10, tested covid 19 pos on 3/14.    exam/findings:  T(C): 37.4 (03-16-21 @ 11:13), Max: 37.4 (03-16-21 @ 11:13)  HR: 65 (03-16-21 @ 11:13) (65 - 65)  BP: 116/68 (03-16-21 @ 11:13) (116/68 - 116/68)  RR: 20 (03-16-21 @ 11:13) (20 - 20)  SpO2: 96% (03-16-21 @ 11:13) (96% - 96%)      PE: sitting in chair  Appearance: NAD	  HEENT:   Normal oral mucosa,   Lymphatic: No lymphadenopathy  Cardiovascular: Normal S1 S2, No JVD, No murmurs, No edema  Respiratory: Lungs clear to auscultation	  Gastrointestinal:  Soft, Non-tender  Skin: warm and dry  Neurologic: Non-focal  Extremities: Normal range of motion, no calf tenderness, B/L LE edema to mid calf    ASSESSMENT:  Pt is a 76 yo male,   Covid 19 Positive,   referred by Dr. Prosper Robin  who presents to infusion center for Monoclonal antibody infusion (Casirivimab/Imdevimab)  Symptoms/ Criteria: Waldenstrom's Macroglobulinemia, BPH, Hypothyroid, afib, PPM, GERD, CLL and symptoms SOB, cough and fever since 3/10  Risk Profile includes: age >65    PLAN:  - infusion procedure explained to patient   -Consent for monoclonal antibody infusion obtained   - Risk & benifits discussed/all questions answered  -infuse  Casirivimab/Imdevimab 1200mg/1200mg IV over one hour, monitor for fluid overload given LE Edema and symptoms of SOB  -observe patient for one hour post infusion        I have reviewed the Casirivimab/Imdevimab Emergency Use Authorization (EAU) and I have provided the patient or patient's caregiver with the following information:  1. FDA has authorized emergency use of Casirivimab/Imdevimab, which is not FDA-approved biologic product.  2. The patient or patient's caregiver has the option to accept or refuse administration of Casirivimab/Imdevimab  3. The significant known and benefits are unknown.  4. Information on available alternative treatments and risks and benefits of those alternatives.    Discharge:  Patient tolerated infusion well denies complaints of chest pain/SOB/dizziness/ palps  Vital signs --- for discharge home ---  D/C instructions given/ fact sheet included.  Patient to follow-up with PCP as needed. CC: Monoclonal Antibody Infusion/COVID 19 Positive  75yMale with PMHx Waldenstrom's Macroglobulinemia, BPH, Hypothyroid, afib, PPM, GERD, CLL and symptoms SOB, cough and fever since 3/10, tested covid 19 pos on 3/14.    exam/findings:  T(C): 37.4 (03-16-21 @ 11:13), Max: 37.4 (03-16-21 @ 11:13)  HR: 65 (03-16-21 @ 11:13) (65 - 65)  BP: 116/68 (03-16-21 @ 11:13) (116/68 - 116/68)  RR: 20 (03-16-21 @ 11:13) (20 - 20)  SpO2: 96% (03-16-21 @ 11:13) (96% - 96%)      PE: sitting in chair  Appearance: NAD	  HEENT:   Normal oral mucosa,   Lymphatic: No lymphadenopathy  Cardiovascular: Normal S1 S2, No JVD, No murmurs, No edema  Respiratory: Lungs clear to auscultation	  Gastrointestinal:  Soft, Non-tender  Skin: warm and dry  Neurologic: Non-focal  Extremities: Normal range of motion, no calf tenderness, B/L LE edema to mid calf    ASSESSMENT:  Pt is a 76 yo male,   Covid 19 Positive,   referred by Dr. Prosper Robin  who presents to infusion center for Monoclonal antibody infusion (Casirivimab/Imdevimab)  Symptoms/ Criteria: Waldenstrom's Macroglobulinemia, BPH, Hypothyroid, afib, PPM, GERD, CLL and symptoms SOB, cough and fever since 3/10  Risk Profile includes: age >65    PLAN:  - infusion procedure explained to patient   -Consent for monoclonal antibody infusion obtained   - Risk & benifits discussed/all questions answered  -infuse  Casirivimab/Imdevimab 1200mg/1200mg IV over one hour, monitor for fluid overload given LE Edema and symptoms of SOB  -observe patient for one hour post infusion        I have reviewed the Casirivimab/Imdevimab Emergency Use Authorization (EAU) and I have provided the patient or patient's caregiver with the following information:  1. FDA has authorized emergency use of Casirivimab/Imdevimab, which is not FDA-approved biologic product.  2. The patient or patient's caregiver has the option to accept or refuse administration of Casirivimab/Imdevimab  3. The significant known and benefits are unknown.  4. Information on available alternative treatments and risks and benefits of those alternatives.    Discharge:  At the end of monitoring session after IV infusion, pt started shaking uncontrollably, Temp was orally 99.4 at the highest, pt otherwise alert and oriented x 4, neurologically grossly intact, no evidence of seizures, denies subjective feelings of cold or hot or any other new c/o.  I spoke to ER Attending at [facility] and they will accept pt for further evaluation.  EMS will be called for transport to [facility] ER, continue to monitor.  Vital Signs Last 24 Hrs  T(C): 37.3 (16 Mar 2021 13:33), Max: 37.4 (16 Mar 2021 11:13)  T(F): 99.1 (16 Mar 2021 13:33), Max: 99.4 (16 Mar 2021 11:13)  HR: 75 (16 Mar 2021 13:33) (63 - 75)  BP: 178/68 (16 Mar 2021 13:33) (116/68 - 178/68)  RR: 18 (16 Mar 2021 13:33) (16 - 20)  SpO2: 98% (16 Mar 2021 13:33) (95% - 98%)

## 2021-03-16 NOTE — CONSULT NOTE ADULT - ASSESSMENT
75 M with PMH of pAFib on amiodarone/BB and AC with warfarin, tachy-sabina s/p PPM, HFpEF, CLL transformed to Waldenstrom macroglobulinemia and other chronic comorbidities presenting with inflammatory symptoms s/p monoclonal ab for COVID19 infection      Continue amiodarone and metoprolol   INR 2.43, AC held due to hematuria, would resume when able   TTE ordered by the primary team   Routine PPM interrogation intervals   COVID19 management per primary team     Thank you,   Hector Arzate MD  Cardiology Fellow, [facility]  All Cardiology service information can be found 24/7 on [email or web address], password: [email or web address]

## 2021-03-16 NOTE — ED PROVIDER NOTE - ATTENDING CONTRIBUTION TO CARE
I saw and evaluated patient with resident. I discussed H+P and MDM with resident. I agree with the statements made by the resident unless otherwise noted.    The care of this patient was in support of the [facility] countermeasures to Covid-19.

## 2021-03-16 NOTE — H&P ADULT - NSHPSOCIALHISTORY_GEN_ALL_CORE
, lives alone with wife  Retired, formerly worked selling [employer]   Denies smoking,alcohol, or illicit substance use , lives alone with wife  Retired, formerly worked selling [employer]   Denies smoking, alcohol, or illicit substance use

## 2021-03-16 NOTE — ED PROVIDER NOTE - OBJECTIVE STATEMENT
75M hx waldenstrom's, BPH, hypothyroid, afib, GERD, CLL in remission, hx cardiac pacemaker, presents s/p Monoclonal AB Infusion for COVID today. At end of the infusion, pt started to develop severe rigors and shaking. Presents to the ED Febrile (101F), with associated nonproductive cough, SOB, and diarrhea (NB for the last 3-4 days, multiple loose stools). Denies any CP, palpitations, abdominal pain, nausea, vomiting, constipation, bloody stools, dysuric symptoms. Pt is s/p remission of CLL - last treatment years prior. Chronically immunosuppressed with steroids and is undergoing active treatment for Waldenstroms. Heme/Onc: Cesar Simons MD [facility].

## 2021-03-16 NOTE — ED PROVIDER NOTE - CLINICAL SUMMARY MEDICAL DECISION MAKING FREE TEXT BOX
75M hx waldenstrom's, BPH, hypothyroid, afib, GERD, CLL in remission, hx cardiac pacemaker, presents s/p Monoclonal AB Infusion for COVID today with fevers and rigors. Exam, presentation, and history consistent with likely acute hypoxemic respiratory failure in the setting of COVID-19 (Hypoxemic to 90, Tachypneic to 30's; Febrile 101F) vs. Transfusion Reaction vs. Bacterial Infection (immunosuppressed w/chronic steroid use). Plan: CBC, CMP, EKG, CXR, UA, UCx, COVID Test, Tylenol, IV Fluids, Broad Empiric Abx. TBA. VSS, HD Stable at present. Minimal concern for PE, ACS, Dissection, PTX at present. 75M hx waldenstrom's, BPH, hypothyroid, afib, GERD, CLL in remission, hx cardiac pacemaker, presents s/p Monoclonal AB Infusion for COVID today with fevers and rigors. Exam, presentation, and history consistent with likely acute hypoxemic respiratory failure in the setting of COVID-19 (Hypoxemic to 90, Tachypneic to 30's; Febrile 101F) vs. Transfusion Reaction vs. Bacterial Infection (immunosuppressed w/chronic steroid use). Plan: CBC, CMP, EKG, CXR, UA, UCx, COVID Test, Tylenol, IV Fluids, Broad Empiric Abx. TBA. VSS, HD Stable at present. Minimal concern for PE, ACS, Dissection, PTX at present.    CONCEPCION Ruby MD: Pt is a 75M hx waldenstrom's, BPH, hypothyroid, afib, GERD, CLL in remission, hx cardiac pacemaker who presents from [facility] infusion center where he was receiving Monoclonal AB Infusion for COVID, for fever. Per pt and per infusion note, pt developed a low-grade temperature and shaking at the end of the observation period post-infusion. Pt denies difficulty breathing, flank pain, CP, swelling, bleeding. Ddx includes, however, is not limited to: non-hemolytic post transfusion rxn, hemolytic post transfusion rxn, TRALI, sepsis, covid, other. Plan: basic labs, blood cultures, lactate, cxr, u/a, ucx, RVP, IVF, tylenol, hemolysis labs, empiric abx, TBA

## 2021-03-16 NOTE — H&P ADULT - NSICDXPASTMEDICALHX_GEN_ALL_CORE_FT
PAST MEDICAL HISTORY:  Anxiety disorder     Atrial fibrillation     BPH (benign prostatic hyperplasia)     Bradycardia, drug induced     CKD (chronic kidney disease)     CLL (chronic lymphocytic leukemia) in remission    Diverticulitis     GERD (gastroesophageal reflux disease)     Gout     [place] (hard of hearing)     Hypothyroid     Memory deficit     Nephrolithiasis     Waldenstrom macroglobulinemia

## 2021-03-16 NOTE — H&P ADULT - PROBLEM SELECTOR PLAN 5
-patient on amiodarone, metoprolol succinate, warfarin  -hold AC in setting of pancytopenia  -monitor on bedside tele  -patient's cardiologist notified of admission, f/u recs

## 2021-03-16 NOTE — ED ADULT NURSE NOTE - OBJECTIVE STATEMENT
75yr old male with a PMH of CKD, CLL, tested + for COVID on Sunday, was here in the ED at [facility], but was discharged from the ED. Pt was up the hill at the tent getting the monoclonal antibody infusion, when he became febrile to 101.8 and having rigors after infusion was complete. Pt was BIBEMS from the tent for Sepsis workup. Pt febrile to 100.8, rigor ing, hypoxic and lethargic upon arrival. Pt denies any nausea, vomiting, CP or difficulty urinating at current time. PT states that he has been having diarrhea, cough and SOB. Pt is slightly tachycardic, febrile, and hypoxic to 90% on RA. Sepsis w/u in place. Pt placed on 2L NC- 02>94%. PIV placed, BC's and sepsis labs sent. PO Tylenol given for fever. IV fluids hung, IV abx administered after BC's sent. PT placed on tele and cont 02 monitoring. Pt with dry cough noted and b/l lung sounds diminished. Pt A&Ox3. SKin intact and normal for race. +2 palpable peripheral pusles. Abdomen soft to touch with normoactive bowel sounds in all four quadrants. Airbrone/ contact precautions maintained. Call bell at bedside. Safety maintained. Will continue to monitor.

## 2021-03-16 NOTE — ED PROVIDER NOTE - NS ED ROS FT
Constitution: (+) Fever or chills, No Weight Loss,   Eyes: No visual changes  HEENT: (+) cough, No Discharge, No Rhinorrhea, No URI symptoms  Cardio: No Chest pain, No Palpitations, (+) Dyspnea  Resp: (+) SOB, No Wheezing  GI: No abdominal pain, No Nausea, No Vomiting, No Constipation, (+) Diarrhea  : No burning upon urination, trouble urinating, no foul odor from urine  MSK: No Back pain, No Numbness, No Tingling, No Weakness  Neuro: No Headache  Skin: No rashes, No Bruising, No Swelling

## 2021-03-16 NOTE — ED ADULT NURSE REASSESSMENT NOTE - NS ED NURSE REASSESS COMMENT FT1
Report received from Randi Fox RN. Pt remains AAOx4, NAD, resp nonlabored, skin warm/dry, resting comfortably in bed with call bell at bedside. Pt denies headache, dizziness, chest pain, palpitations, abd pain, n/v/d, urinary symptoms, weakness at this time. Pt awaiting bed. Safety maintained.

## 2021-03-16 NOTE — ED ADULT TRIAGE NOTE - CHIEF COMPLAINT QUOTE
fever/shivering after monoclonal transfusion fever/shivering after monoclonal transfusion/covid positive

## 2021-03-16 NOTE — H&P ADULT - ATTENDING COMMENTS
fever   bilateral COVID pneumonia  had fever prior to monoclonal ab infusion as well  s/p dexamethasone and empiric abx in ER  feels a lot better since initial arrival  continue dexamethasone for COVID pneumonia  f/up pulm recs  pulse ox monitoring  not a candidate for Remdesivir due to low GFR  f/up ID recs re: empiric abx  check vanco level in AM  f/up blood and urine cultures  pancytopenia - f/up heme recs  hold AC tonight as INR therapeutic for a.fib and patient has hematuria and lower PLT.  daily CBC with diff  f/up CT chest/abd/pelvis w/o contrast to assess severity of pneumonia, r/o other fever source such as colitis and evaluate  system for hematuria  urology consult if hematuria persists  renal consult for LOUISE on CKD V  avoid nephrotoxics. monitor intake and output.   cards eval and repeat echo would be beneficial  COMPLEX case with multiple complex medical issues and poor baseline.  Prognosis guarded.  Attempted unable to reach wife currently; will call in AM. fever   bilateral COVID pneumonia  had fever prior to monoclonal ab infusion as well  s/p dexamethasone and empiric abx in ER  feels a lot better since initial arrival  comfortable  no rash or stridor  abdomen soft  breathing comfortably with b/l air entry  1+ leg edema  continue dexamethasone for COVID pneumonia  f/up pulm recs  pulse ox monitoring  not a candidate for Remdesivir due to low GFR  f/up ID recs re: empiric abx  check vanco level in AM  f/up blood and urine cultures  pancytopenia - f/up heme recs  hold AC tonight as INR therapeutic for a.fib and patient has hematuria and lower PLT.  daily CBC with diff  f/up CT chest/abd/pelvis w/o contrast to assess severity of pneumonia, r/o other fever source such as colitis and evaluate  system for hematuria  urology consult if hematuria persists  renal consult for LOUISE on CKD V  avoid nephrotoxics. monitor intake and output.   cards eval and repeat echo would be beneficial  COMPLEX case with multiple complex medical issues and poor baseline.  Prognosis guarded.  Attempted unable to reach wife currently; will call in AM.

## 2021-03-17 ENCOUNTER — TRANSCRIPTION ENCOUNTER (OUTPATIENT)
Age: 76
End: 2021-03-17

## 2021-03-17 LAB
A1C WITH ESTIMATED AVERAGE GLUCOSE RESULT: 5.8 % — HIGH (ref 4–5.6)
ALBUMIN SERPL ELPH-MCNC: 3 G/DL — LOW (ref 3.3–5)
ALBUMIN SERPL ELPH-MCNC: 3 G/DL — LOW (ref 3.3–5)
ALP SERPL-CCNC: 53 U/L — SIGNIFICANT CHANGE UP (ref 40–120)
ALP SERPL-CCNC: 55 U/L — SIGNIFICANT CHANGE UP (ref 40–120)
ALT FLD-CCNC: 24 U/L — SIGNIFICANT CHANGE UP (ref 10–45)
ALT FLD-CCNC: 26 U/L — SIGNIFICANT CHANGE UP (ref 10–45)
ANION GAP SERPL CALC-SCNC: 11 MMOL/L — SIGNIFICANT CHANGE UP (ref 5–17)
ANION GAP SERPL CALC-SCNC: 11 MMOL/L — SIGNIFICANT CHANGE UP (ref 5–17)
APTT BLD: 40.9 SEC — HIGH (ref 27.5–35.5)
AST SERPL-CCNC: 32 U/L — SIGNIFICANT CHANGE UP (ref 10–40)
AST SERPL-CCNC: 34 U/L — SIGNIFICANT CHANGE UP (ref 10–40)
BASOPHILS # BLD AUTO: 0 K/UL — SIGNIFICANT CHANGE UP (ref 0–0.2)
BASOPHILS NFR BLD AUTO: 0 % — SIGNIFICANT CHANGE UP (ref 0–2)
BILIRUB SERPL-MCNC: 0.1 MG/DL — LOW (ref 0.2–1.2)
BILIRUB SERPL-MCNC: 0.2 MG/DL — SIGNIFICANT CHANGE UP (ref 0.2–1.2)
BUN SERPL-MCNC: 47 MG/DL — HIGH (ref 7–23)
BUN SERPL-MCNC: 56 MG/DL — HIGH (ref 7–23)
CALCIUM SERPL-MCNC: 8.3 MG/DL — LOW (ref 8.4–10.5)
CALCIUM SERPL-MCNC: 8.5 MG/DL — SIGNIFICANT CHANGE UP (ref 8.4–10.5)
CHLORIDE SERPL-SCNC: 112 MMOL/L — HIGH (ref 96–108)
CHLORIDE SERPL-SCNC: 112 MMOL/L — HIGH (ref 96–108)
CO2 SERPL-SCNC: 17 MMOL/L — LOW (ref 22–31)
CO2 SERPL-SCNC: 18 MMOL/L — LOW (ref 22–31)
CREAT SERPL-MCNC: 4.58 MG/DL — HIGH (ref 0.5–1.3)
CREAT SERPL-MCNC: 4.63 MG/DL — HIGH (ref 0.5–1.3)
CRP SERPL-MCNC: 11.72 MG/DL — HIGH (ref 0–0.4)
CULTURE RESULTS: SIGNIFICANT CHANGE UP
D DIMER BLD IA.RAPID-MCNC: 170 NG/ML DDU — SIGNIFICANT CHANGE UP
EOSINOPHIL # BLD AUTO: 0 K/UL — SIGNIFICANT CHANGE UP (ref 0–0.5)
EOSINOPHIL NFR BLD AUTO: 0 % — SIGNIFICANT CHANGE UP (ref 0–6)
ESTIMATED AVERAGE GLUCOSE: 120 MG/DL — HIGH (ref 68–114)
FERRITIN SERPL-MCNC: 474 NG/ML — HIGH (ref 30–400)
FOLATE SERPL-MCNC: 16.5 NG/ML — SIGNIFICANT CHANGE UP
GLUCOSE SERPL-MCNC: 173 MG/DL — HIGH (ref 70–99)
GLUCOSE SERPL-MCNC: 178 MG/DL — HIGH (ref 70–99)
HCT VFR BLD CALC: 26.3 % — LOW (ref 39–50)
HCT VFR BLD CALC: 29.2 % — LOW (ref 39–50)
HCV AB S/CO SERPL IA: 0.04 S/CO — SIGNIFICANT CHANGE UP (ref 0–0.99)
HCV AB SERPL-IMP: SIGNIFICANT CHANGE UP
HGB BLD-MCNC: 8 G/DL — LOW (ref 13–17)
HGB BLD-MCNC: 8.9 G/DL — LOW (ref 13–17)
INR BLD: 3.11 RATIO — HIGH (ref 0.88–1.16)
IRON SATN MFR SERPL: 11 % — LOW (ref 16–55)
IRON SATN MFR SERPL: 8 UG/DL — LOW (ref 45–165)
LYMPHOCYTES # BLD AUTO: 0.33 K/UL — LOW (ref 1–3.3)
LYMPHOCYTES # BLD AUTO: 10.6 % — LOW (ref 13–44)
MAGNESIUM SERPL-MCNC: 1.7 MG/DL — SIGNIFICANT CHANGE UP (ref 1.6–2.6)
MAGNESIUM SERPL-MCNC: 1.8 MG/DL — SIGNIFICANT CHANGE UP (ref 1.6–2.6)
MANUAL SMEAR VERIFICATION: SIGNIFICANT CHANGE UP
MCHC RBC-ENTMCNC: 30.4 GM/DL — LOW (ref 32–36)
MCHC RBC-ENTMCNC: 30.5 GM/DL — LOW (ref 32–36)
MCHC RBC-ENTMCNC: 30.7 PG — SIGNIFICANT CHANGE UP (ref 27–34)
MCHC RBC-ENTMCNC: 30.8 PG — SIGNIFICANT CHANGE UP (ref 27–34)
MCV RBC AUTO: 100.7 FL — HIGH (ref 80–100)
MCV RBC AUTO: 101.2 FL — HIGH (ref 80–100)
MONOCYTES # BLD AUTO: 0.08 K/UL — SIGNIFICANT CHANGE UP (ref 0–0.9)
MONOCYTES NFR BLD AUTO: 2.7 % — SIGNIFICANT CHANGE UP (ref 2–14)
NEUTROPHILS # BLD AUTO: 2.67 K/UL — SIGNIFICANT CHANGE UP (ref 1.8–7.4)
NEUTROPHILS NFR BLD AUTO: 81.4 % — HIGH (ref 43–77)
NEUTS BAND # BLD: 5.3 % — SIGNIFICANT CHANGE UP (ref 0–8)
NRBC # BLD: 0 /100 WBCS — SIGNIFICANT CHANGE UP (ref 0–0)
NT-PROBNP SERPL-SCNC: 1945 PG/ML — HIGH (ref 0–300)
PHOSPHATE SERPL-MCNC: 3.9 MG/DL — SIGNIFICANT CHANGE UP (ref 2.5–4.5)
PHOSPHATE SERPL-MCNC: 4.2 MG/DL — SIGNIFICANT CHANGE UP (ref 2.5–4.5)
PLAT MORPH BLD: NORMAL — SIGNIFICANT CHANGE UP
PLATELET # BLD AUTO: 74 K/UL — LOW (ref 150–400)
PLATELET # BLD AUTO: 78 K/UL — LOW (ref 150–400)
POTASSIUM SERPL-MCNC: 5.3 MMOL/L — SIGNIFICANT CHANGE UP (ref 3.5–5.3)
POTASSIUM SERPL-MCNC: 5.4 MMOL/L — HIGH (ref 3.5–5.3)
POTASSIUM SERPL-SCNC: 5.3 MMOL/L — SIGNIFICANT CHANGE UP (ref 3.5–5.3)
POTASSIUM SERPL-SCNC: 5.4 MMOL/L — HIGH (ref 3.5–5.3)
PROCALCITONIN SERPL-MCNC: 5.25 NG/ML — HIGH (ref 0.02–0.1)
PROT SERPL-MCNC: 5.5 G/DL — LOW (ref 6–8.3)
PROT SERPL-MCNC: 5.6 G/DL — LOW (ref 6–8.3)
PROTHROM AB SERPL-ACNC: 35.4 SEC — HIGH (ref 10.6–13.6)
RBC # BLD: 2.6 M/UL — LOW (ref 4.2–5.8)
RBC # BLD: 2.9 M/UL — LOW (ref 4.2–5.8)
RBC # FLD: 13.9 % — SIGNIFICANT CHANGE UP (ref 10.3–14.5)
RBC # FLD: 13.9 % — SIGNIFICANT CHANGE UP (ref 10.3–14.5)
RBC BLD AUTO: SIGNIFICANT CHANGE UP
RH IG SCN BLD-IMP: POSITIVE — SIGNIFICANT CHANGE UP
SODIUM SERPL-SCNC: 140 MMOL/L — SIGNIFICANT CHANGE UP (ref 135–145)
SODIUM SERPL-SCNC: 141 MMOL/L — SIGNIFICANT CHANGE UP (ref 135–145)
SPECIMEN SOURCE: SIGNIFICANT CHANGE UP
T4 AB SER-ACNC: 6.8 UG/DL — SIGNIFICANT CHANGE UP (ref 4.6–12)
TIBC SERPL-MCNC: 74 UG/DL — LOW (ref 220–430)
TSH SERPL-MCNC: 1.47 UIU/ML — SIGNIFICANT CHANGE UP (ref 0.27–4.2)
UIBC SERPL-MCNC: 66 UG/DL — LOW (ref 110–370)
VANCOMYCIN FLD-MCNC: 7 UG/ML — SIGNIFICANT CHANGE UP
VIT B12 SERPL-MCNC: 832 PG/ML — SIGNIFICANT CHANGE UP (ref 232–1245)
WBC # BLD: 3.08 K/UL — LOW (ref 3.8–10.5)
WBC # BLD: 3.39 K/UL — LOW (ref 3.8–10.5)
WBC # FLD AUTO: 3.08 K/UL — LOW (ref 3.8–10.5)
WBC # FLD AUTO: 3.39 K/UL — LOW (ref 3.8–10.5)

## 2021-03-17 PROCEDURE — 99222 1ST HOSP IP/OBS MODERATE 55: CPT

## 2021-03-17 PROCEDURE — 99233 SBSQ HOSP IP/OBS HIGH 50: CPT

## 2021-03-17 PROCEDURE — 99223 1ST HOSP IP/OBS HIGH 75: CPT

## 2021-03-17 PROCEDURE — 93306 TTE W/DOPPLER COMPLETE: CPT | Mod: 26

## 2021-03-17 PROCEDURE — 99233 SBSQ HOSP IP/OBS HIGH 50: CPT | Mod: GC

## 2021-03-17 PROCEDURE — 99223 1ST HOSP IP/OBS HIGH 75: CPT | Mod: GC

## 2021-03-17 RX ORDER — DEXAMETHASONE 0.5 MG/5ML
6 ELIXIR ORAL DAILY
Refills: 0 | Status: COMPLETED | OUTPATIENT
Start: 2021-03-17 | End: 2021-03-25

## 2021-03-17 RX ORDER — SODIUM CHLORIDE 9 MG/ML
1000 INJECTION, SOLUTION INTRAVENOUS
Refills: 0 | Status: DISCONTINUED | OUTPATIENT
Start: 2021-03-17 | End: 2021-03-18

## 2021-03-17 RX ORDER — SODIUM ZIRCONIUM CYCLOSILICATE 10 G/10G
10 POWDER, FOR SUSPENSION ORAL ONCE
Refills: 0 | Status: COMPLETED | OUTPATIENT
Start: 2021-03-17 | End: 2021-03-17

## 2021-03-17 RX ADMIN — SODIUM ZIRCONIUM CYCLOSILICATE 10 GRAM(S): 10 POWDER, FOR SUSPENSION ORAL at 23:54

## 2021-03-17 RX ADMIN — PANTOPRAZOLE SODIUM 40 MILLIGRAM(S): 20 TABLET, DELAYED RELEASE ORAL at 11:36

## 2021-03-17 RX ADMIN — MIRTAZAPINE 7.5 MILLIGRAM(S): 45 TABLET, ORALLY DISINTEGRATING ORAL at 22:16

## 2021-03-17 RX ADMIN — Medication 1 MILLIGRAM(S): at 11:36

## 2021-03-17 RX ADMIN — Medication 3 MILLILITER(S): at 11:36

## 2021-03-17 RX ADMIN — Medication 40 MILLIGRAM(S): at 05:07

## 2021-03-17 RX ADMIN — CEFEPIME 100 MILLIGRAM(S): 1 INJECTION, POWDER, FOR SOLUTION INTRAMUSCULAR; INTRAVENOUS at 05:07

## 2021-03-17 RX ADMIN — MEMANTINE HYDROCHLORIDE 10 MILLIGRAM(S): 10 TABLET ORAL at 17:14

## 2021-03-17 RX ADMIN — SODIUM CHLORIDE 75 MILLILITER(S): 9 INJECTION, SOLUTION INTRAVENOUS at 11:37

## 2021-03-17 RX ADMIN — Medication 125 MICROGRAM(S): at 05:07

## 2021-03-17 RX ADMIN — MEMANTINE HYDROCHLORIDE 10 MILLIGRAM(S): 10 TABLET ORAL at 05:07

## 2021-03-17 RX ADMIN — DONEPEZIL HYDROCHLORIDE 10 MILLIGRAM(S): 10 TABLET, FILM COATED ORAL at 22:16

## 2021-03-17 RX ADMIN — Medication 5 MILLIGRAM(S): at 22:16

## 2021-03-17 RX ADMIN — TAMSULOSIN HYDROCHLORIDE 0.4 MILLIGRAM(S): 0.4 CAPSULE ORAL at 22:16

## 2021-03-17 RX ADMIN — Medication 6 MILLIGRAM(S): at 12:08

## 2021-03-17 RX ADMIN — Medication 1 TABLET(S): at 11:36

## 2021-03-17 RX ADMIN — AMIODARONE HYDROCHLORIDE 200 MILLIGRAM(S): 400 TABLET ORAL at 05:07

## 2021-03-17 NOTE — PROGRESS NOTE ADULT - PROBLEM SELECTOR PLAN 1
Tested positive 3/11, initial symptoms (fevers, fatigue) started 3/9, patient received monoclonal antibody treatment at [facility] and acutely experienced worsening of fevers and chills, may be superimposed transfusion reaction on top of COVID PNA.   -c/w dexamethasone 6mg IV daily for 10 days (will do one day of solumedrol q8 to treat both COVID PNA and possible transfusion reaction)  -control fevers with acetaminophen  -will hold remdesevir in setting of LOUISE on CKD, patient weaned off supplemental O2, saturating well on room air  -fevers: f/u BCx, UCx, CT chest, abdomen pelvis. Continue with empiric antibiotics, dose by level in setting of LOUISE on CKD. Tested positive 3/11, initial symptoms (fevers, fatigue) started 3/9, patient received monoclonal antibody treatment at [facility] and acutely experienced worsening of fevers and chills, may be superimposed transfusion reaction on top of COVID PNA.   -c/w dexamethasone 6mg IV daily for 10 days (will do one day of solumedrol q8 to treat both COVID PNA and possible transfusion reaction)  -control fevers with acetaminophen  -will hold remdesevir in setting of LOUISE on CKD, patient weaned off supplemental O2, saturating well on room air  -fevers: f/u BCx, UCx, CT chest, abdomen pelvis. Hold abx per ID recs, no SOI besides COVID identified.

## 2021-03-17 NOTE — CONSULT NOTE ADULT - SUBJECTIVE AND OBJECTIVE BOX
Chief Complaint:  Patient is a 75y old  Male who presents with a chief complaint of Fevers & chills (16 Mar 2021 22:53)      HPI: Pt is a 74yo M, PMH CLL transformed to waldenstrom macroglobulinemia on ninlaro/dexamethasone, Afib s/p PPM, mild dementia, hypothyroidism, BPH, GERD, present with fevers and chills worsening after monoclonal antibody Infusion for COVID. GI consulted for diarrhea. He states at baseline he has hard stools with intermittent soft stools in occasion. Currently having watery stools for 3-5 days since just prior to COVID diagnosis. He has had colonoscopies prior in setting of prior diverticulitis. States currently diarrhea improving/resolving. Denies weight loss, n/v, chest pain.        Allergies:  rituximab (Angioedema)      Home Medications:    Hospital Medications:  albuterol/ipratropium for Nebulization 3 milliLiter(s) Nebulizer every 4 hours  aMIOdarone    Tablet 200 milliGRAM(s) Oral daily  cefepime   IVPB 1000 milliGRAM(s) IV Intermittent every 12 hours  donepezil 10 milliGRAM(s) Oral at bedtime  folic acid 1 milliGRAM(s) Oral daily  lactobacillus acidophilus 1 Tablet(s) Oral daily  levothyroxine 125 MICROGram(s) Oral daily  melatonin 5 milliGRAM(s) Oral at bedtime  memantine 10 milliGRAM(s) Oral every 12 hours  methylPREDNISolone sodium succinate Injectable 40 milliGRAM(s) IV Push every 8 hours  mirtazapine 7.5 milliGRAM(s) Oral at bedtime  multivitamin 1 Tablet(s) Oral daily  pantoprazole  Injectable 40 milliGRAM(s) IV Push daily  tamsulosin 0.4 milliGRAM(s) Oral at bedtime      PMHX/PSHX:  Memory deficit    BPH (benign prostatic hyperplasia)    [place] (hard of hearing)    Gout    CKD (chronic kidney disease)    Nephrolithiasis    Hypothyroid    Dementia    Bradycardia, drug induced    Waldenstrom macroglobulinemia    Diverticulitis    Atrial fibrillation    GERD (gastroesophageal reflux disease)    Anxiety disorder    CLL (chronic lymphocytic leukemia)    History of cardiac pacemaker in situ    History of appendectomy    History of cataract surgery, right    History of laparoscopic cholecystectomy    S/P hernia repair    Meniscus tear        Family history:  No pertinent family history in first degree relatives        Social History:     ROS: As per HPI, 14-point ROS negative otherwise.    General:  No wt loss, fevers, chills, night sweats, fatigue,   Eyes:  Good vision, no reported pain  ENT:  No sore throat, pain, runny nose, dysphagia  CV:  No pain, palpitations, hypo/hypertension  Resp:  No dyspnea, cough, tachypnea, wheezing  GI:  See HPI  :  No pain, bleeding, incontinence, nocturia  Muscle:  No pain, weakness  Neuro:  No weakness, tingling, memory problems  Psych:  No fatigue, insomnia, mood problems, depression  Endocrine:  No polyuria, polydipsia, cold/heat intolerance  Heme:  No petechiae, ecchymosis, easy bruisability  Skin:  No rash, edema      PHYSICAL EXAM:     Vital Signs:  Vital Signs Last 24 Hrs  T(C): 36.6 (17 Mar 2021 07:35), Max: 39.4 (16 Mar 2021 16:42)  T(F): 97.8 (17 Mar 2021 07:35), Max: 103 (16 Mar 2021 16:42)  HR: 56 (17 Mar 2021 07:35) (56 - 97)  BP: 100/58 (17 Mar 2021 07:35) (100/58 - 178/68)  BP(mean): 70 (16 Mar 2021 19:00) (70 - 70)  RR: 20 (17 Mar 2021 07:35) (16 - 27)  SpO2: 97% (17 Mar 2021 07:35) (90% - 100%)  Daily Height in cm: 187.96 (16 Mar 2021 14:57)    Daily     GENERAL:  Appears stated age, well-groomed, well-nourished, no distress  HEENT:  NC/AT,  conjunctivae clear and pink  CHEST:  Full & symmetric excursion, no increased effort, on NC  HEART:  Regular rhythm, no JVD  ABDOMEN:  Soft, non-tender, non-distended, normoactive bowel sounds,  no masses , no hepatosplenomegaly  EXTREMITIES:  no cyanosis, clubbing or edema  SKIN:  No rash/erythema/ecchymoses/petechiae/wounds/abscess/warm/dry  NEURO:  Alert, oriented, nonfocal    LABS:                        8.0    3.08  )-----------( 74       ( 17 Mar 2021 07:33 )             26.3     03-17    140  |  112<H>  |  47<H>  ----------------------------<  178<H>  5.3   |  17<L>  |  4.58<H>    Ca    8.5      17 Mar 2021 06:50  Phos  4.2     03-  Mg     1.7         TPro  5.6<L>  /  Alb  3.0<L>  /  TBili  0.2  /  DBili  x   /  AST  34  /  ALT  26  /  AlkPhos  55  03-    LIVER FUNCTIONS - ( 17 Mar 2021 06:50 )  Alb: 3.0 g/dL / Pro: 5.6 g/dL / ALK PHOS: 55 U/L / ALT: 26 U/L / AST: 34 U/L / GGT: x           PT/INR - ( 17 Mar 2021 06:51 )   PT: 35.4 sec;   INR: 3.11 ratio         PTT - ( 17 Mar 2021 06:51 )  PTT:40.9 sec  Urinalysis Basic - ( 16 Mar 2021 16:22 )    Color: Yellow / Appearance: Slightly Turbid / S.021 / pH: x  Gluc: x / Ketone: Negative  / Bili: Negative / Urobili: Negative   Blood: x / Protein: 100 mg/dL / Nitrite: Negative   Leuk Esterase: Negative / RBC: 222 /hpf / WBC 4 /HPF   Sq Epi: x / Non Sq Epi: 1 /hpf / Bacteria: Negative          Imaging:

## 2021-03-17 NOTE — CONSULT NOTE ADULT - SUBJECTIVE AND OBJECTIVE BOX
HPI: Mr. Olvera is a 75 year-old man with waldenstrom mild dementia, macroglobulinemia on ninlaro/dexamethasone, atrial fibrillation, and stage 4 chronic kidney disease; he is well-known to me. He was in his usual state of health until 3/9 when he developed fever, fatigue, and generalized weakness. He got tested for COVID 3/11/21, and turned out positive. He presented yesterday to [facility] for monoclonal antibody therapy. He was transferred yesterday to [facility] after developed rigors and chills at [facility].    In the ER, Mr. Olvera received IV Vancomycin and Cefepime, as well as a 500cc NS bolus. He was placed on IV Decadron. Since admission, Mr. Olvera's creatinine has trended up from 3.86 to 4.58mg/dL. In light of the LOUISE on CKD, a renal consultation was requested.       PAST MEDICAL & SURGICAL HISTORY:  Mild dementia  BPH (benign prostatic hyperplasia)  [place] (hard of hearing)  Gout  CKD (chronic kidney disease)  Nephrolithiasis  Hypothyroid  Waldenstrom macroglobulinemia  Diverticulitis  Atrial fibrillation  GERD (gastroesophageal reflux disease)  Anxiety disorder  CLL (chronic lymphocytic leukemia) -in remission  History of cardiac pacemaker in situ  History of appendectomy  History of cataract surgery, right IOL  History of laparoscopic cholecystectomy 2014  S/P hernia repair x2  Meniscus tear -s/p removal of Meniscus 8 months ago    Allergies  rituximab (Angioedema)    SOCIAL HISTORY:  Denies ETOh,Smoking,     FAMILY HISTORY:  No pertinent family history in first degree relatives    REVIEW OF SYSTEMS:  CONSTITUTIONAL: (+)weakness, (+)fatigue, (+)fever, (+)chills  EYES/ENT: No visual changes;  No vertigo or throat pain   NECK: No pain or stiffness  RESPIRATORY: No cough, wheezing, hemoptysis; No shortness of breath  CARDIOVASCULAR: No chest pain or palpitations  GASTROINTESTINAL: (+)diarrhea, (+)constipation  GENITOURINARY: No dysuria, frequency or hematuria  NEUROLOGICAL: No numbness or weakness  SKIN: No itching, burning, rashes, or lesions   All other review of systems is negative unless indicated above.    VITAL:  T(C): , Max: 39.4 (21 @ 16:42)  T(F): , Max: 103 (21 @ 16:42)  HR: 56 (21 @ 07:35)  BP: 100/58 (21 @ 07:35)  BP(mean): 70 (21 @ 19:00)  RR: 20 (21 @ 07:35)  SpO2: 97% (21 @ 07:35)    PHYSICAL EXAM:  Constitutional: NAD, Alert  HEENT: NCAT, DMM  Neck: Supple, No JVD  Respiratory: CTA-b/l  Cardiovascular: RRR s1s2, no m/r/g  Gastrointestinal: BS+, soft, NT/ND  Extremities: No peripheral edema b/l  Neurological: no focal deficits; strength grossly intact  Back: no CVAT b/l  Skin: No rashes, no nevi    LABS:                        8.0    3.08  )-----------( 75       ( 17 Mar 2021 07:33 )             26.3     Na(140)/K(5.3)/Cl(112)/HCO3(17)/BUN(47)/Cr(4.58)Glu(178)/Ca(8.5)/Mg(1.7)/PO4(4.2)     @ 06:50  Na(143)/K(5.0)/Cl(111)/HCO3(16)/BUN(41)/Cr(4.34)Glu(112)/Ca(9.1)/Mg(--)/PO4(--)     @ 15:48  Na(144)/K(4.3)/Cl(112)/HCO3(19)/BUN(42)/Cr(3.86)Glu(127)/Ca(9.1)/Mg(--)/PO4(--)     @ 14:49    Urinalysis Basic - ( 16 Mar 2021 16:22 )  Color: Yellow / Appearance: Slightly Turbid / S.021 / pH: x  Gluc: x / Ketone: Negative  / Bili: Negative / Urobili: Negative   Blood: x / Protein: 100 mg/dL / Nitrite: Negative   Leuk Esterase: Negative / RBC: 222 /hpf / WBC 4 /HPF   Sq Epi: x / Non Sq Epi: 1 /hpf / Bacteria: Negative    IMAGING:  < from: CT Abdomen and Pelvis No Cont (21 @ 20:51) >  Covid pneumonia.  Extensive lymphadenopathy including mediastinal, axillary, para-aortic, retrocrural, precaval, and numerous prominent mesenteric lymph nodes. Mesenteric adenopathy and infiltration is increased from an exam dated 2018.    ASSESSMENT:  (1)CKD - stage 4, with GFR 15-20ml/min at baseline - monoclonal gammopathy-associated (biopsy proven, years ago).    (2)LOUISE - likely hemodynamically mediated - no hydronephrosis based on CT - no urgent need for dialysis today, but may very well require short-term dialysis over the next couple of days, if the numbers continue to worsen    (3)Hyperkalemia - renally mediated - on renal diet    (4)Metabolic acidosis - renally mediated        RECOMMEND:  (1)1/2NS+75meq/L NaHCO3, 75cc/h for now    (2)BMP +Mg+PO4 at least q12h for now; lokelma 10gm po prn K of 5.5 or higher; insulin/d50/albuterol/calcium prn K of 6 or higher    (3)Urine; lytes, creatinine    (4)Strict I/Os    (5)Dose new meds for GFR <15ml/min (present dosing is acceptable)    (6)HBV/HCV studies (in anticipation of potential need for HD soon)    (7)No HD just yet        Thank you for involving [facility] in this patient's care.    With warm regards,    Norman Ascencio MD   [facility]  Office: [phone]  Cell: [phone]               HPI: Mr. Olvera is a 75 year-old man with waldenstrom mild dementia, macroglobulinemia on ninlaro/dexamethasone, atrial fibrillation, and stage 4 chronic kidney disease; he is well-known to me. He was in his usual state of health until 3/9 when he developed fever, fatigue, and generalized weakness. He got tested for COVID 3/11/21, and turned out positive. He presented yesterday to [facility] for monoclonal antibody therapy. He was transferred yesterday to [facility] after developed rigors and chills at [facility].    In the ER, Mr. Olvera received IV Vancomycin and Cefepime, as well as a 500cc NS bolus. He was placed on IV Decadron. Since admission, Mr. Olvera's creatinine has trended up from 3.86 to 4.58mg/dL. In light of the LOUISE on CKD, a renal consultation was requested.     Mr. Olvera generally feels well at present. No pain, nausea, vomiting, or shortness of breath. Voiding well.    PAST MEDICAL & SURGICAL HISTORY:  Mild dementia  BPH (benign prostatic hyperplasia)  [place] (hard of hearing)  Gout  CKD (chronic kidney disease)  Nephrolithiasis  Hypothyroid  Waldenstrom macroglobulinemia  Diverticulitis  Atrial fibrillation  GERD (gastroesophageal reflux disease)  Anxiety disorder  CLL (chronic lymphocytic leukemia) -in remission  History of cardiac pacemaker in situ  History of appendectomy  History of cataract surgery, right IOL  History of laparoscopic cholecystectomy 2014  S/P hernia repair x2  Meniscus tear -s/p removal of Meniscus 8 months ago    Allergies  rituximab (Angioedema)    SOCIAL HISTORY:  Denies ETOh,Smoking,     FAMILY HISTORY:  No pertinent family history in first degree relatives    REVIEW OF SYSTEMS:  CONSTITUTIONAL: (+)weakness, (+)fatigue, (+)fever, (+)chills  EYES/ENT: No visual changes;  No vertigo or throat pain   NECK: No pain or stiffness  RESPIRATORY: No cough, wheezing, hemoptysis; No shortness of breath  CARDIOVASCULAR: No chest pain or palpitations  GASTROINTESTINAL: (+)diarrhea, (+)constipation  GENITOURINARY: No dysuria, frequency or hematuria  NEUROLOGICAL: No numbness or weakness  SKIN: No itching, burning, rashes, or lesions   All other review of systems is negative unless indicated above.    VITAL:  T(C): , Max: 39.4 (21 @ 16:42)  T(F): , Max: 103 (21 @ 16:42)  HR: 56 (21 @ 07:35)  BP: 100/58 (21 @ 07:35)  BP(mean): 70 (21 @ 19:00)  RR: 20 (21 @ 07:35)  SpO2: 97% (21 @ 07:35)    PHYSICAL EXAM:  Constitutional: NAD, Alert  HEENT: NCAT, DMM  Neck: Supple, No JVD  Respiratory: CTA-b/l  Cardiovascular: reg sabina s1s2  Gastrointestinal: BS+, soft, NT/ND  Extremities: No peripheral edema b/l  Neurological: no focal deficits; strength grossly intact  Back: no CVAT b/l  Skin: (+)left frontal ecchymosis    LABS:                        8.0    3.08  )-----------( 75       ( 17 Mar 2021 07:33 )             26.3     Na(140)/K(5.3)/Cl(112)/HCO3(17)/BUN(47)/Cr(4.58)Glu(178)/Ca(8.5)/Mg(1.7)/PO4(4.2)     @ 06:50  Na(143)/K(5.0)/Cl(111)/HCO3(16)/BUN(41)/Cr(4.34)Glu(112)/Ca(9.1)/Mg(--)/PO4(--)     @ 15:48  Na(144)/K(4.3)/Cl(112)/HCO3(19)/BUN(42)/Cr(3.86)Glu(127)/Ca(9.1)/Mg(--)/PO4(--)     @ 14:49    Urinalysis Basic - ( 16 Mar 2021 16:22 )  Color: Yellow / Appearance: Slightly Turbid / S.021 / pH: x  Gluc: x / Ketone: Negative  / Bili: Negative / Urobili: Negative   Blood: x / Protein: 100 mg/dL / Nitrite: Negative   Leuk Esterase: Negative / RBC: 222 /hpf / WBC 4 /HPF   Sq Epi: x / Non Sq Epi: 1 /hpf / Bacteria: Negative    IMAGING:  < from: CT Abdomen and Pelvis No Cont (21 @ 20:51) >  Covid pneumonia.  Extensive lymphadenopathy including mediastinal, axillary, para-aortic, retrocrural, precaval, and numerous prominent mesenteric lymph nodes. Mesenteric adenopathy and infiltration is increased from an exam dated 2018.    ASSESSMENT:  (1)CKD - stage 4, with GFR 15-20ml/min at baseline - monoclonal gammopathy-associated (biopsy proven, years ago).    (2)LOUISE - likely hemodynamically mediated - no hydronephrosis based on CT - no urgent need for dialysis today, but may very well require short-term dialysis over the next couple of days, if the numbers continue to worsen    (3)Hyperkalemia - renally mediated - on renal diet    (4)Metabolic acidosis - renally mediated        RECOMMEND:  (1)1/2NS+75meq/L NaHCO3, 75cc/h for now    (2)BMP +Mg+PO4 at least q12h for now; lokelma 10gm po prn K of 5.5 or higher; insulin/d50/albuterol/calcium prn K of 6 or higher    (3)Urine; lytes, creatinine    (4)Strict I/Os    (5)Dose new meds for GFR <15ml/min (present dosing is acceptable)    (6)HBV/HCV studies (in anticipation of potential need for HD soon)    (7)No HD just yet        Thank you for involving [facility] in this patient's care.    With warm regards,    Norman Ascencio MD   [facility]  Office: [phone]  Cell: [phone]

## 2021-03-17 NOTE — CONSULT NOTE ADULT - SUBJECTIVE AND OBJECTIVE BOX
HPI:      Allergies    rituximab (Angioedema)    Intolerances        MEDICATIONS  (STANDING):    MEDICATIONS  (PRN):      PAST MEDICAL & SURGICAL HISTORY:  Memory deficit    BPH (benign prostatic hyperplasia)    [place] (hard of hearing)    Gout    CKD (chronic kidney disease)    Nephrolithiasis    Hypothyroid    Bradycardia, drug induced    Waldenstrom macroglobulinemia    Diverticulitis    Atrial fibrillation    GERD (gastroesophageal reflux disease)    Anxiety disorder    CLL (chronic lymphocytic leukemia)  in remission    History of cardiac pacemaker in situ    History of appendectomy    History of cataract surgery, right  IOL    History of laparoscopic cholecystectomy  4/2014    S/P hernia repair  x2    Meniscus tear  s/p removal of Meniscus 8 months ago        FAMILY HISTORY:  No pertinent family history in first degree relatives        SOCIAL HISTORY: No EtOH, no tobacco    REVIEW OF SYSTEMS:    CONSTITUTIONAL: No weakness, fevers or chills  EYES/ENT: No visual changes;  No vertigo or throat pain   NECK: No pain or stiffness  RESPIRATORY: No cough, wheezing, hemoptysis; No shortness of breath  CARDIOVASCULAR: No chest pain or palpitations  GASTROINTESTINAL: No abdominal or epigastric pain. No nausea, vomiting, or hematemesis; No diarrhea or constipation. No melena or hematochezia.  GENITOURINARY: No dysuria, frequency or hematuria  NEUROLOGICAL: No numbness or weakness  SKIN: No itching, burning, rashes, or lesions   All other review of systems is negative unless indicated above.    Height (cm): 188 (03-16 @ 14:57)  Weight (kg): 105.2 (03-16 @ 14:57)  BMI (kg/m2): 29.8 (03-16 @ 14:57)  BSA (m2): 2.31 (03-16 @ 14:57)    T(F): 97.8 (03-17-21 @ 07:35), Max: 103 (03-16-21 @ 16:42)  HR: 56 (03-17-21 @ 07:35)  BP: 100/58 (03-17-21 @ 07:35)  RR: 20 (03-17-21 @ 07:35)  SpO2: 97% (03-17-21 @ 07:35)  Wt(kg): --    GENERAL: NAD, well-developed  HEAD:  Atraumatic, Normocephalic  EYES: EOMI, PERRLA, conjunctiva and sclera clear  NECK: Supple, No JVD  CHEST/LUNG: Clear to auscultation bilaterally; No wheeze  HEART: Regular rate and rhythm; No murmurs, rubs, or gallops  ABDOMEN: Soft, Nontender, Nondistended; Bowel sounds present  EXTREMITIES:  2+ Peripheral Pulses, No clubbing, cyanosis, or edema  NEUROLOGY: non-focal  SKIN: No rashes or lesions                          8.0    3.08  )-----------( 75       ( 17 Mar 2021 07:33 )             26.3       03-17    140  |  112<H>  |  47<H>  ----------------------------<  178<H>  5.3   |  17<L>  |  4.58<H>    Ca    8.5      17 Mar 2021 06:50  Phos  4.2     03-17  Mg     1.7     03-17    TPro  5.6<L>  /  Alb  3.0<L>  /  TBili  0.2  /  DBili  x   /  AST  34  /  ALT  26  /  AlkPhos  55  03-17      Magnesium, Serum: 1.7 mg/dL (03-17 @ 06:50)  Phosphorus Level, Serum: 4.2 mg/dL (03-17 @ 06:50)  Lactate Dehydrogenase, Serum: 281 U/L (03-16 @ 15:48)       HPI:  75 year old male with CLL evolved to Waldenstrom's macroglobulinemia complicated by CRF, nephrotic syndrome and PAF. He had progressive anemia with rising creatinine and relapse of his Waldenstrom's. He was not a candidate for Ibrutinib due to anticoagulation with Warfarin. He achieved a partial remission with marked improvement in his hemoglobin, creatinine and IgM with IRd, but had not tolerated Rituxan well and is loathe to receive it again. He previously tolerated the Ninlaro well and has now initiated maintenance therapy with Ninlaro and Dexamethasone. Waldenstrom's is stable.       Allergies    rituximab (Angioedema)    Intolerances        MEDICATIONS  (STANDING):    MEDICATIONS  (PRN):      PAST MEDICAL & SURGICAL HISTORY:  Memory deficit    BPH (benign prostatic hyperplasia)    [place] (hard of hearing)    Gout    CKD (chronic kidney disease)    Nephrolithiasis    Hypothyroid    Bradycardia, drug induced    Waldenstrom macroglobulinemia    Diverticulitis    Atrial fibrillation    GERD (gastroesophageal reflux disease)    Anxiety disorder    CLL (chronic lymphocytic leukemia)  in remission    History of cardiac pacemaker in situ    History of appendectomy    History of cataract surgery, right  IOL    History of laparoscopic cholecystectomy  4/2014    S/P hernia repair  x2    Meniscus tear  s/p removal of Meniscus 8 months ago        FAMILY HISTORY:  No pertinent family history in first degree relatives        SOCIAL HISTORY: No EtOH, no tobacco    REVIEW OF SYSTEMS:    CONSTITUTIONAL: No weakness, fevers or chills  EYES/ENT: No visual changes;  No vertigo or throat pain   NECK: No pain or stiffness  RESPIRATORY: No cough, wheezing, hemoptysis; No shortness of breath  CARDIOVASCULAR: No chest pain or palpitations  GASTROINTESTINAL: No abdominal or epigastric pain. No nausea, vomiting, or hematemesis; No diarrhea or constipation. No melena or hematochezia.  GENITOURINARY: No dysuria, frequency or hematuria  NEUROLOGICAL: No numbness or weakness  SKIN: No itching, burning, rashes, or lesions   All other review of systems is negative unless indicated above.    Height (cm): 188 (03-16 @ 14:57)  Weight (kg): 105.2 (03-16 @ 14:57)  BMI (kg/m2): 29.8 (03-16 @ 14:57)  BSA (m2): 2.31 (03-16 @ 14:57)    T(F): 97.8 (03-17-21 @ 07:35), Max: 103 (03-16-21 @ 16:42)  HR: 56 (03-17-21 @ 07:35)  BP: 100/58 (03-17-21 @ 07:35)  RR: 20 (03-17-21 @ 07:35)  SpO2: 97% (03-17-21 @ 07:35)  Wt(kg): --    GENERAL: NAD, well-developed  HEAD:  Atraumatic, Normocephalic  EYES: EOMI, PERRLA, conjunctiva and sclera clear  NECK: Supple, No JVD  CHEST/LUNG: Clear to auscultation bilaterally; No wheeze  HEART: Regular rate and rhythm; No murmurs, rubs, or gallops  ABDOMEN: Soft, Nontender, Nondistended; Bowel sounds present  EXTREMITIES:  2+ Peripheral Pulses, No clubbing, cyanosis, or edema  NEUROLOGY: non-focal  SKIN: No rashes or lesions                          8.0    3.08  )-----------( 75       ( 17 Mar 2021 07:33 )             26.3       03-17    140  |  112<H>  |  47<H>  ----------------------------<  178<H>  5.3   |  17<L>  |  4.58<H>    Ca    8.5      17 Mar 2021 06:50  Phos  4.2     03-17  Mg     1.7     03-17    TPro  5.6<L>  /  Alb  3.0<L>  /  TBili  0.2  /  DBili  x   /  AST  34  /  ALT  26  /  AlkPhos  55  03-17      Magnesium, Serum: 1.7 mg/dL (03-17 @ 06:50)  Phosphorus Level, Serum: 4.2 mg/dL (03-17 @ 06:50)  Lactate Dehydrogenase, Serum: 281 U/L (03-16 @ 15:48)       HPI:  Patient is a 75 year old male with CLL evolved to Waldenstrom's macroglobulinemia who was recently found to have COVID-19 infection and presents with shortness of breath.  Patient was initially admitted with CLL and evolved into Waldenstrom's, which was complicated by CRF, nephrotic syndrome and PAF. Patient was initially treated with  Rituxan/Velcade/Decadron from 10/2015 - 1/2017, followed by Ixazomib/Decadron/Rituxan x 6 cycles to maintenance from 8/18 - 1/19. Most recently, the patient was treated with Ninlaro and Dexamethasone which he is currently taking.     Allergies    rituximab (Angioedema)    Intolerances        MEDICATIONS  (STANDING):    MEDICATIONS  (PRN):      PAST MEDICAL & SURGICAL HISTORY:  Memory deficit    BPH (benign prostatic hyperplasia)    [place] (hard of hearing)    Gout    CKD (chronic kidney disease)    Nephrolithiasis    Hypothyroid    Bradycardia, drug induced    Waldenstrom macroglobulinemia    Diverticulitis    Atrial fibrillation    GERD (gastroesophageal reflux disease)    Anxiety disorder    CLL (chronic lymphocytic leukemia)  in remission    History of cardiac pacemaker in situ    History of appendectomy    History of cataract surgery, right  IOL    History of laparoscopic cholecystectomy  4/2014    S/P hernia repair  x2    Meniscus tear  s/p removal of Meniscus 8 months ago        FAMILY HISTORY:  No pertinent family history in first degree relatives        SOCIAL HISTORY: No EtOH, no tobacco    REVIEW OF SYSTEMS:    CONSTITUTIONAL: No weakness, fevers or chills  EYES/ENT: No visual changes;  No vertigo or throat pain   NECK: No pain or stiffness  RESPIRATORY: No cough, wheezing, hemoptysis; No shortness of breath  CARDIOVASCULAR: No chest pain or palpitations  GASTROINTESTINAL: No abdominal or epigastric pain. No nausea, vomiting, or hematemesis; No diarrhea or constipation. No melena or hematochezia.  GENITOURINARY: No dysuria, frequency or hematuria  NEUROLOGICAL: No numbness or weakness  SKIN: No itching, burning, rashes, or lesions   All other review of systems is negative unless indicated above.    Height (cm): 188 (03-16 @ 14:57)  Weight (kg): 105.2 (03-16 @ 14:57)  BMI (kg/m2): 29.8 (03-16 @ 14:57)  BSA (m2): 2.31 (03-16 @ 14:57)    T(F): 97.8 (03-17-21 @ 07:35), Max: 103 (03-16-21 @ 16:42)  HR: 56 (03-17-21 @ 07:35)  BP: 100/58 (03-17-21 @ 07:35)  RR: 20 (03-17-21 @ 07:35)  SpO2: 97% (03-17-21 @ 07:35)  Wt(kg): --    GENERAL: NAD, well-developed  HEAD:  Atraumatic, Normocephalic  EYES: EOMI, PERRLA, conjunctiva and sclera clear  NECK: Supple, No JVD  CHEST/LUNG: Clear to auscultation bilaterally; No wheeze  HEART: Regular rate and rhythm; No murmurs, rubs, or gallops  ABDOMEN: Soft, Nontender, Nondistended; Bowel sounds present  EXTREMITIES:  2+ Peripheral Pulses, No clubbing, cyanosis, or edema  NEUROLOGY: non-focal  SKIN: No rashes or lesions                          8.0    3.08  )-----------( 75       ( 17 Mar 2021 07:33 )             26.3       03-17    140  |  112<H>  |  47<H>  ----------------------------<  178<H>  5.3   |  17<L>  |  4.58<H>    Ca    8.5      17 Mar 2021 06:50  Phos  4.2     03-17  Mg     1.7     03-17    TPro  5.6<L>  /  Alb  3.0<L>  /  TBili  0.2  /  DBili  x   /  AST  34  /  ALT  26  /  AlkPhos  55  03-17      Magnesium, Serum: 1.7 mg/dL (03-17 @ 06:50)  Phosphorus Level, Serum: 4.2 mg/dL (03-17 @ 06:50)  Lactate Dehydrogenase, Serum: 281 U/L (03-16 @ 15:48)

## 2021-03-17 NOTE — CONSULT NOTE ADULT - SUBJECTIVE AND OBJECTIVE BOX
Patient is a 75y old  Male who presents with a chief complaint of Fevers & chills (17 Mar 2021 09:09)    HPI:  74 yo M PMHx CLL now transformed to waldenstrom macroglobulinemia on ninlaro/dexamethasone, afib PPM presents w/ fevers and chills after monoclonal antibody Infusion for COVID. Pt began experiencing fevers, weakness, fatigue, SOB on _______. He was tested for COVID on 3/11 and received his positive result on 3/14. His oncologist sent him to [facility] ED on 3/14 to be evaluated and for monoclonal antibody infusion. Decision was made between ED and pt's oncologist for him to be discharged and scheduled for an infusion outpatient. Pt received his monoclonal antibody infusion on 3/16 at [facility] and was noted to have rigors, chills, and fevers while he was receiving his infusion and was transferred to [facility].     In the ED, patient Tmax 103, HR wnl, -170/53-92, RR 20-30, saturating % on 2L N/C. Labs notable for pancytopenia, LOUISE on CKD4, COVID+,  UA+ RBCs, CXR w/ b/l opacities. Patient received vanc/cef, 500cc NS bolus, 6mg IV decadron.    Hospital Course: Pt currently receiving cefepime 1g q12h and methylprednisolone 40mg q8h. Pt has been afebrile and continues to sat in high 90s on 2L NC.      prior hospital charts reviewed [ x ]  primary team notes reviewed [ x ]  other consultant notes reviewed [x  ]    PAST MEDICAL & SURGICAL HISTORY:  Memory deficit  BPH (benign prostatic hyperplasia)  [place] (hard of hearing)  Gout  CKD (chronic kidney disease)  Nephrolithiasis  Hypothyroid  Bradycardia, drug induced  Waldenstrom macroglobulinemia  Diverticulitis  Atrial fibrillation  GERD (gastroesophageal reflux disease)  Anxiety disorder  CLL (chronic lymphocytic leukemia)  in remission  History of cardiac pacemaker in situ  History of appendectomy  History of cataract surgery, right  IOL  History of laparoscopic cholecystectomy  2014  S/P hernia repair  x2  Meniscus tear  s/p removal of Meniscus 8 months ago    Allergies  rituximab (Angioedema)    ANTIMICROBIALS (past 90 days)  MEDICATIONS  (STANDING):    cefepime   IVPB   100 mL/Hr IV Intermittent (21 @ 16:43)    cefepime   IVPB   100 mL/Hr IV Intermittent (21 @ 05:07)    vancomycin  IVPB   250 mL/Hr IV Intermittent (21 @ 15:56)    cefepime   IVPB 1000 every 12 hours    OTHER MEDS: MEDICATIONS  (STANDING):  albuterol/ipratropium for Nebulization 3 every 4 hours  aMIOdarone    Tablet 200 daily  donepezil 10 at bedtime  levothyroxine 125 daily  melatonin 5 at bedtime  memantine 10 every 12 hours  methylPREDNISolone sodium succinate Injectable 40 every 8 hours  mirtazapine 7.5 at bedtime  pantoprazole  Injectable 40 daily  tamsulosin 0.4 at bedtime    SOCIAL HISTORY:       FAMILY HISTORY:  No pertinent family history in first degree relatives      REVIEW OF SYSTEMS  [  ] ROS unobtainable because:    [  ] All other systems negative except as noted below:	    Constitutional:  [ ] fever [ ] chills  [ ] weight loss  [ ] weakness  Skin:  [ ] rash [ ] phlebitis	  Eyes: [ ] icterus [ ] pain  [ ] discharge	  ENMT: [ ] sore throat  [ ] thrush [ ] ulcers [ ] exudates  Respiratory: [ ] dyspnea [ ] hemoptysis [ ] cough [ ] sputum	  Cardiovascular:  [ ] chest pain [ ] palpitations [ ] edema	  Gastrointestinal:  [ ] nausea [ ] vomiting [ ] diarrhea [ ] constipation [ ] pain	  Genitourinary:  [ ] dysuria [ ] frequency [ ] hematuria [ ] discharge [ ] flank pain  [ ] incontinence  Musculoskeletal:  [ ] myalgias [ ] arthralgias [ ] arthritis  [ ] back pain  Neurological:  [ ] headache [ ] seizures  [ ] confusion/altered mental status  Psychiatric:  [ ] anxiety [ ] depression	  Hematology/Lymphatics:  [ ] lymphadenopathy  Endocrine:  [ ] adrenal [ ] thyroid  Allergic/Immunologic:	 [ ] transplant [ ] seasonal    Vital Signs Last 24 Hrs  T(F): 97.8 (21 @ 07:35), Max: 103 (21 @ 16:42)  Vital Signs Last 24 Hrs  HR: 56 (21 @ 07:35) (56 - 97)  BP: 100/58 (21 @ 07:35) (100/58 - 178/68)  RR: 20 (21 @ 07:35)  SpO2: 97% on 2L NC (21 @ 07:35) (90% - 100%)    PHYSICAL EXAM:  Constitutional: non-toxic, no distress  HEAD/EYES: anicteric, no conjunctival injection  ENT:  supple, no thrush  Cardiovascular:   normal S1, S2, no murmur, no edema  Respiratory:  clear BS bilaterally, no wheezes, no rales  GI:  soft, non-tender, normal bowel sounds  :  no walker, no CVA tenderness  Musculoskeletal:  no synovitis, normal ROM  Neurologic: awake and alert, normal strength, no focal findings  Skin:  no rash, no erythema, no phlebitis  Heme/Onc: no lymphadenopathy   Psychiatric:  awake, alert, appropriate mood                          8.0    3.08  )-----------( 75       ( 17 Mar 2021 07:33 )             26.3   -    140  |  112<H>  |  47<H>  ----------------------------<  178<H>  5.3   |  17<L>  |  4.58<H>    Ca    8.5      17 Mar 2021 06:50  Phos  4.2       Mg     1.7         TPro  5.6<L>  /  Alb  3.0<L>  /  TBili  0.2  /  DBili  x   /  AST  34  /  ALT  26  /  AlkPhos  55      Urinalysis Basic - ( 16 Mar 2021 16:22 )    Color: Yellow / Appearance: Slightly Turbid / S.021 / pH: x  Gluc: x / Ketone: Negative  / Bili: Negative / Urobili: Negative   Blood: x / Protein: 100 mg/dL / Nitrite: Negative   Leuk Esterase: Negative / RBC: 222 /hpf / WBC 4 /HPF   Sq Epi: x / Non Sq Epi: 1 /hpf / Bacteria: Negative    MICROBIOLOGY:Vancomycin Level, Random: 7.0 ( @ 06:50)      Rapid RVP Result: Detected ( @ 15:47)      RADIOLOGY:  < from: CT Chest No Cont (21 @ 20:51) >  FINDINGS:  CHEST:  LUNGS AND LARGE AIRWAYS: Patent central airways. Bilateral patchy groundglass opacity, in keeping with the clinical history of Covid pneumonia.  PLEURA: Trace bilateral pleural effusions. No pneumothorax.  VESSELS: Atherosclerotic calcifications.  HEART: Heart size is normal. No pericardial effusion.  MEDIASTINUM AND DUSTY: Mediastinal and hilar adenopathy. For reference, AP window lymph node measures 5.1 x 2.6 cm.  CHEST WALL AND LOWER NECK: Lower cervical or axillary lymphadenopathy. Pacemaker.    ABDOMEN AND PELVIS:  LIVER: Within normal limits.  BILE DUCTS: Normal caliber.  GALLBLADDER: Cholecystectomy.  SPLEEN: Borderline enlarged spleen, which measures 13.1 cm in craniocaudal diameter.  PANCREAS: Within normal limits.  ADRENALS: Within normal limits.  KIDNEYS/URETERS: Bilateral renal cysts and nonobstructing stones.    BLADDER: Within normal limits.  REPRODUCTIVE ORGANS: Prostate within normal limits.    BOWEL: Small hiatal hernia. No bowel obstruction. Small lipoma in the jejunum measuring 1.0 cm. (2:107) Patent rectal colonic anastomosis. Fluid distends the distal colon, which is without evidence of wall thickening. Mild diverticulosis. Appendix is not visualized. Mesenteric adenopathy and stranding.  PERITONEUM: No ascites.  VESSELS: Within normal limits.  RETROPERITONEUM/LYMPH NODES: Retroperitoneal and pelvic lymphadenopathy. For reference:  *  Left periaortic lymph node measures 2.2 x 2.3 cm. (2:110)  *  Enlarged right external iliac lymph node measures 2.6 x 1.4 cm (2:162)  ABDOMINAL WALL: Bilateral fat-containing inguinal hernias.  BONES: Degenerative changes.    IMPRESSION:  *  Bilateral renal cysts and nonobstructing stones. No hydronephrosis.  *  Lower cervical, axillary, mediastinal, hilar retroperitoneal, mesenteric and pelvic lymphadenopathy consistent with known underlying lymphoproliferative disorder.  *  Borderline enlarged spleen.  *  No bowel obstruction or abnormal bowel wall thickening.  *  Fluid in the distal colon, in keeping with the clinical history of diarrhea.  *  COVID pneumonia.    < end of copied text >   Patient is a 75y old  Male who presents with a chief complaint of Fevers & chills (17 Mar 2021 09:09)    HPI:  74 yo M PMHx CLL now transformed to waldenstrom macroglobulinemia on ninlaro/dexamethasone, afib, PPM presents w/ fevers and chills after monoclonal antibody Infusion for COVID. Pt began experiencing fatigue and mild SOB on 3/10 and 3/11. [facility] to get [facility] COVID test on 3/14, which came  back positive and [facility] ED. Per chart on 3/14, decision was made between ED and pt's oncologist for pt to be discharged and scheduled for monoclonal antibody infusion outpatient. Pt received his infusion on 3/16 at [facility] and was noted to have rigors, chills, and fevers in observation after receiving his infusion. He was transferred to [facility].     In the ED, patient Tmax 103, HR wnl, -170/53-92, RR 20-30, saturating % on 2L N/C. Labs notable for pancytopenia, LOUISE on CKD4, COVID+,  UA+ RBCs, CXR w/ b/l opacities. Patient received vanc/cef, 500cc NS bolus, 6mg IV decadron.    Hospital Course: Pt currently receiving cefepime 1g q12h and methylprednisolone 40mg q8h. Pt has been afebrile and continues to sat in 97-99% on 2L NC. Pt seen at bedside. Had just completed PT and states he feels pretty good. Reports SOB has improved since admission. Fatigue persists, but otherwise denies fevers, chills, myalgias, CP, consistent cough, abdominal pain, N/V, dysuria, or pain at IV site. Endorses mild diarrhea that has also improved since admission.     prior hospital charts reviewed [ x ]  primary team notes reviewed [ x ]  other consultant notes reviewed [x  ]    PAST MEDICAL & SURGICAL HISTORY:  Memory deficit  BPH (benign prostatic hyperplasia)  [place]hard of hearing)  Gout  CKD (chronic kidney disease)  Nephrolithiasis  Hypothyroid  Bradycardia, drug induced  Waldenstrom macroglobulinemia  Diverticulitis  Atrial fibrillation  GERD (gastroesophageal reflux disease)  Anxiety disorder  CLL (chronic lymphocytic leukemia)  in remission  History of cardiac pacemaker in situ  History of appendectomy  History of cataract surgery, right  IOL  History of laparoscopic cholecystectomy  2014  S/P hernia repair  x2  Meniscus tear  s/p removal of Meniscus 8 months ago    Allergies  rituximab (Angioedema)    ANTIMICROBIALS (past 90 days)  MEDICATIONS  (STANDING):    cefepime   IVPB   100 mL/Hr IV Intermittent (21 @ 16:43)    cefepime   IVPB   100 mL/Hr IV Intermittent (21 @ 05:07)    vancomycin  IVPB   250 mL/Hr IV Intermittent (21 @ 15:56)    cefepime   IVPB 1000 every 12 hours    OTHER MEDS: MEDICATIONS  (STANDING):  albuterol/ipratropium for Nebulization 3 every 4 hours  aMIOdarone    Tablet 200 daily  donepezil 10 at bedtime  levothyroxine 125 daily  melatonin 5 at bedtime  memantine 10 every 12 hours  methylPREDNISolone sodium succinate Injectable 40 every 8 hours  mirtazapine 7.5 at bedtime  pantoprazole  Injectable 40 daily  tamsulosin 0.4 at bedtime    SOCIAL HISTORY:       FAMILY HISTORY:  No pertinent family history in first degree relatives      REVIEW OF SYSTEMS  [  ] ROS unobtainable because:    [ x ] All other systems negative except as noted below:	    Constitutional:  [ ] fever [ ] chills  [ ] weight loss  [ ] weakness  Skin:  [ ] rash [ ] phlebitis	  Eyes: [ ] icterus [ ] pain  [ ] discharge	  ENMT: [ ] sore throat  [ ] thrush [ ] ulcers [ ] exudates  Respiratory: [ x ] dyspnea [ ] hemoptysis [ ] cough [ ] sputum	  Cardiovascular:  [ ] chest pain [ ] palpitations [ ] edema	  Gastrointestinal:  [ ] nausea [ ] vomiting [x] diarrhea [ ] constipation [ ] pain	  Genitourinary:  [ ] dysuria [ ] frequency [ ] hematuria [ ] discharge [ ] flank pain  [ ] incontinence  Musculoskeletal:  [ ] myalgias [ ] arthralgias [ ] arthritis  [ ] back pain  Neurological:  [ ] headache [ ] seizures  [ ] confusion/altered mental status  Psychiatric:  [ ] anxiety [ ] depression	  Hematology/Lymphatics:  [ ] lymphadenopathy  Endocrine:  [ ] adrenal [ ] thyroid  Allergic/Immunologic:	 [ ] transplant [ ] seasonal    Vital Signs Last 24 Hrs  T(F): 97.8 (21 @ 07:35), Max: 103 (21 @ 16:42)  Vital Signs Last 24 Hrs  HR: 56 (21 @ 07:35) (56 - 97)  BP: 100/58 (21 @ 07:35) (100/58 - 178/68)  RR: 20 (21 @ 07:35)  SpO2: 97% on 2L NC (21 @ 07:35) (90% - 100%)    PHYSICAL EXAM:  Constitutional: Awake, alert, interactive, non-toxic, no acute distress, sitting in chair  HEAD/EYES: Anicteric, no conjunctival injections   Cardiovascular: Regular rate and rhythm. S1, S2. No murmurs  Respiratory: Wheezes b/l bases. Good respiratory effort. No accessory muscle use.   GI:  Soft, non-tender, non-distended  :  No walker, no CVA tenderness  Neurologic: A&O x4.   Skin:  no rash, no erythema, no phlebitis. PIV x1 in RUE                           8.0    3.08  )-----------( 75       ( 17 Mar 2021 07:33 )             26.3       140  |  112<H>  |  47<H>  ----------------------------<  178<H>  5.3   |  17<L>  |  4.58<H>    Ca    8.5      17 Mar 2021 06:50  Phos  4.2       Mg     1.7         TPro  5.6<L>  /  Alb  3.0<L>  /  TBili  0.2  /  DBili  x   /  AST  34  /  ALT  26  /  AlkPhos  55      Urinalysis Basic - ( 16 Mar 2021 16:22 )    Color: Yellow / Appearance: Slightly Turbid / S.021 / pH: x  Gluc: x / Ketone: Negative  / Bili: Negative / Urobili: Negative   Blood: x / Protein: 100 mg/dL / Nitrite: Negative   Leuk Esterase: Negative / RBC: 222 /hpf / WBC 4 /HPF   Sq Epi: x / Non Sq Epi: 1 /hpf / Bacteria: Negative    MICROBIOLOGY:Vancomycin Level, Random: 7.0 ( @ 06:50)      Rapid RVP Result: Detected ( @ 15:47)      RADIOLOGY:  < from: CT Chest No Cont (21 @ 20:51) >  FINDINGS:  CHEST:  LUNGS AND LARGE AIRWAYS: Patent central airways. Bilateral patchy groundglass opacity, in keeping with the clinical history of Covid pneumonia.  PLEURA: Trace bilateral pleural effusions. No pneumothorax.  VESSELS: Atherosclerotic calcifications.  HEART: Heart size is normal. No pericardial effusion.  MEDIASTINUM AND DUSTY: Mediastinal and hilar adenopathy. For reference, AP window lymph node measures 5.1 x 2.6 cm.  CHEST WALL AND LOWER NECK: Lower cervical or axillary lymphadenopathy. Pacemaker.    ABDOMEN AND PELVIS:  LIVER: Within normal limits.  BILE DUCTS: Normal caliber.  GALLBLADDER: Cholecystectomy.  SPLEEN: Borderline enlarged spleen, which measures 13.1 cm in craniocaudal diameter.  PANCREAS: Within normal limits.  ADRENALS: Within normal limits.  KIDNEYS/URETERS: Bilateral renal cysts and nonobstructing stones.    BLADDER: Within normal limits.  REPRODUCTIVE ORGANS: Prostate within normal limits.    BOWEL: Small hiatal hernia. No bowel obstruction. Small lipoma in the jejunum measuring 1.0 cm. (2:107) Patent rectal colonic anastomosis. Fluid distends the distal colon, which is without evidence of wall thickening. Mild diverticulosis. Appendix is not visualized. Mesenteric adenopathy and stranding.  PERITONEUM: No ascites.  VESSELS: Within normal limits.  RETROPERITONEUM/LYMPH NODES: Retroperitoneal and pelvic lymphadenopathy. For reference:  *  Left periaortic lymph node measures 2.2 x 2.3 cm. (2:110)  *  Enlarged right external iliac lymph node measures 2.6 x 1.4 cm (2:162)  ABDOMINAL WALL: Bilateral fat-containing inguinal hernias.  BONES: Degenerative changes.    IMPRESSION:  *  Bilateral renal cysts and nonobstructing stones. No hydronephrosis.  *  Lower cervical, axillary, mediastinal, hilar retroperitoneal, mesenteric and pelvic lymphadenopathy consistent with known underlying lymphoproliferative disorder.  *  Borderline enlarged spleen.  *  No bowel obstruction or abnormal bowel wall thickening.  *  Fluid in the distal colon, in keeping with the clinical history of diarrhea.  *  COVID pneumonia.    < end of copied text >   Patient is a 75y old  Male who presents with a chief complaint of Fevers & chills (17 Mar 2021 09:09)    HPI:  74 yo M PMHx CLL now transformed to waldenstrom macroglobulinemia on ninlaro/dexamethasone, afib, PPM presents w/ fevers and chills after monoclonal antibody Infusion for COVID. Pt began experiencing fatigue and mild SOB on 3/10 and 3/11. [facility] on 3/14, which came  back positive and [facility] ED. Per chart on 3/14, decision was made between ED and pt's oncologist for pt to be discharged and scheduled for monoclonal antibody infusion outpatient. Pt received his infusion on 3/16 at [facility] and was noted to have rigors, chills, and fevers in observation after receiving his infusion. He was transferred to [facility].     In the ED, patient Tmax 103, HR wnl, -170/53-92, RR 20-30, saturating % on 2L N/C. Labs notable for pancytopenia, LOUISE on CKD4, COVID+,  UA+ RBCs, CXR w/ b/l opacities. Patient received vanc/cef, 500cc NS bolus, 6mg IV decadron.    Hospital Course: Pt currently receiving cefepime 1g q12h and methylprednisolone 40mg q8h. Pt has been afebrile and continues to sat in 97-99% on 2L NC. Pt seen at bedside. Had just completed PT and states he feels pretty good. Reports SOB has improved since admission. Fatigue persists, but otherwise denies fevers, chills, myalgias, CP, consistent cough, abdominal pain, N/V, dysuria, or pain at IV site. Endorses mild diarrhea that has also improved since admission.     prior hospital charts reviewed [ x ]  primary team notes reviewed [ x ]  other consultant notes reviewed [x  ]    PAST MEDICAL & SURGICAL HISTORY:  Memory deficit  BPH (benign prostatic hyperplasia)  [place] (hard of hearing)  Gout  CKD (chronic kidney disease)  Nephrolithiasis  Hypothyroid  Bradycardia, drug induced  Waldenstrom macroglobulinemia  Diverticulitis  Atrial fibrillation  GERD (gastroesophageal reflux disease)  Anxiety disorder  CLL (chronic lymphocytic leukemia)  in remission  History of cardiac pacemaker in situ  History of appendectomy  History of cataract surgery, right  IOL  History of laparoscopic cholecystectomy  2014  S/P hernia repair  x2  Meniscus tear  s/p removal of Meniscus 8 months ago    Allergies  rituximab (Angioedema)    ANTIMICROBIALS (past 90 days)  MEDICATIONS  (STANDING):    cefepime   IVPB   100 mL/Hr IV Intermittent (21 @ 16:43)    cefepime   IVPB   100 mL/Hr IV Intermittent (21 @ 05:07)    vancomycin  IVPB   250 mL/Hr IV Intermittent (21 @ 15:56)    cefepime   IVPB 1000 every 12 hours    OTHER MEDS: MEDICATIONS  (STANDING):  albuterol/ipratropium for Nebulization 3 every 4 hours  aMIOdarone    Tablet 200 daily  donepezil 10 at bedtime  levothyroxine 125 daily  melatonin 5 at bedtime  memantine 10 every 12 hours  methylPREDNISolone sodium succinate Injectable 40 every 8 hours  mirtazapine 7.5 at bedtime  pantoprazole  Injectable 40 daily  tamsulosin 0.4 at bedtime    SOCIAL HISTORY:       FAMILY HISTORY:  No pertinent family history in first degree relatives      REVIEW OF SYSTEMS  [  ] ROS unobtainable because:    [ x ] All other systems negative except as noted below:	    Constitutional:  [ ] fever [ ] chills  [ ] weight loss  [ ] weakness  Skin:  [ ] rash [ ] phlebitis	  Eyes: [ ] icterus [ ] pain  [ ] discharge	  ENMT: [ ] sore throat  [ ] thrush [ ] ulcers [ ] exudates  Respiratory: [ x ] dyspnea [ ] hemoptysis [ ] cough [ ] sputum	  Cardiovascular:  [ ] chest pain [ ] palpitations [ ] edema	  Gastrointestinal:  [ ] nausea [ ] vomiting [x] diarrhea [ ] constipation [ ] pain	  Genitourinary:  [ ] dysuria [ ] frequency [ ] hematuria [ ] discharge [ ] flank pain  [ ] incontinence  Musculoskeletal:  [ ] myalgias [ ] arthralgias [ ] arthritis  [ ] back pain  Neurological:  [ ] headache [ ] seizures  [ ] confusion/altered mental status  Psychiatric:  [ ] anxiety [ ] depression	  Hematology/Lymphatics:  [ ] lymphadenopathy  Endocrine:  [ ] adrenal [ ] thyroid  Allergic/Immunologic:	 [ ] transplant [ ] seasonal    Vital Signs Last 24 Hrs  T(F): 97.8 (21 @ 07:35), Max: 103 (21 @ 16:42)  Vital Signs Last 24 Hrs  HR: 56 (21 @ 07:35) (56 - 97)  BP: 100/58 (21 @ 07:35) (100/58 - 178/68)  RR: 20 (21 @ 07:35)  SpO2: 97% on 2L NC (21 @ 07:35) (90% - 100%)    PHYSICAL EXAM:  Constitutional: Awake, alert, interactive, non-toxic, no acute distress, sitting in chair  HEAD/EYES: Anicteric, no conjunctival injections   Cardiovascular: Regular rate and rhythm. S1, S2. No murmurs  Respiratory: Wheezes b/l bases. Good respiratory effort. No accessory muscle use.   GI:  Soft, non-tender, non-distended  :  No walker, no CVA tenderness  Neurologic: A&O x4.   Skin:  no rash, no erythema, no phlebitis. PIV x1 in RUE. Skin graft on posterior L ankle. Abrasion on L side of forehead                          8.0    3.08  )-----------( 75       ( 17 Mar 2021 07:33 )             26.3       140  |  112<H>  |  47<H>  ----------------------------<  178<H>  5.3   |  17<L>  |  4.58<H>    Ca    8.5      17 Mar 2021 06:50  Phos  4.2       Mg     1.7         TPro  5.6<L>  /  Alb  3.0<L>  /  TBili  0.2  /  DBili  x   /  AST  34  /  ALT  26  /  AlkPhos  55      Urinalysis Basic - ( 16 Mar 2021 16:22 )    Color: Yellow / Appearance: Slightly Turbid / S.021 / pH: x  Gluc: x / Ketone: Negative  / Bili: Negative / Urobili: Negative   Blood: x / Protein: 100 mg/dL / Nitrite: Negative   Leuk Esterase: Negative / RBC: 222 /hpf / WBC 4 /HPF   Sq Epi: x / Non Sq Epi: 1 /hpf / Bacteria: Negative    MICROBIOLOGY:Vancomycin Level, Random: 7.0 ( @ 06:50)      Rapid RVP Result: Detected ( @ 15:47)      RADIOLOGY:  < from: CT Chest No Cont (21 @ 20:51) >  FINDINGS:  CHEST:  LUNGS AND LARGE AIRWAYS: Patent central airways. Bilateral patchy groundglass opacity, in keeping with the clinical history of Covid pneumonia.  PLEURA: Trace bilateral pleural effusions. No pneumothorax.  VESSELS: Atherosclerotic calcifications.  HEART: Heart size is normal. No pericardial effusion.  MEDIASTINUM AND DUSTY: Mediastinal and hilar adenopathy. For reference, AP window lymph node measures 5.1 x 2.6 cm.  CHEST WALL AND LOWER NECK: Lower cervical or axillary lymphadenopathy. Pacemaker.    ABDOMEN AND PELVIS:  LIVER: Within normal limits.  BILE DUCTS: Normal caliber.  GALLBLADDER: Cholecystectomy.  SPLEEN: Borderline enlarged spleen, which measures 13.1 cm in craniocaudal diameter.  PANCREAS: Within normal limits.  ADRENALS: Within normal limits.  KIDNEYS/URETERS: Bilateral renal cysts and nonobstructing stones.    BLADDER: Within normal limits.  REPRODUCTIVE ORGANS: Prostate within normal limits.    BOWEL: Small hiatal hernia. No bowel obstruction. Small lipoma in the jejunum measuring 1.0 cm. (2:107) Patent rectal colonic anastomosis. Fluid distends the distal colon, which is without evidence of wall thickening. Mild diverticulosis. Appendix is not visualized. Mesenteric adenopathy and stranding.  PERITONEUM: No ascites.  VESSELS: Within normal limits.  RETROPERITONEUM/LYMPH NODES: Retroperitoneal and pelvic lymphadenopathy. For reference:  *  Left periaortic lymph node measures 2.2 x 2.3 cm. (2:110)  *  Enlarged right external iliac lymph node measures 2.6 x 1.4 cm (2:162)  ABDOMINAL WALL: Bilateral fat-containing inguinal hernias.  BONES: Degenerative changes.    IMPRESSION:  *  Bilateral renal cysts and nonobstructing stones. No hydronephrosis.  *  Lower cervical, axillary, mediastinal, hilar retroperitoneal, mesenteric and pelvic lymphadenopathy consistent with known underlying lymphoproliferative disorder.  *  Borderline enlarged spleen.  *  No bowel obstruction or abnormal bowel wall thickening.  *  Fluid in the distal colon, in keeping with the clinical history of diarrhea.  *  COVID pneumonia.    < end of copied text >

## 2021-03-17 NOTE — CONSULT NOTE ADULT - CONSULT REQUESTED DATE/TIME
17-Mar-2021 10:45
16-Mar-2021 22:53
17-Mar-2021 08:26
17-Mar-2021
17-Mar-2021 08:36
17-Mar-2021 14:29

## 2021-03-17 NOTE — CONSULT NOTE ADULT - ASSESSMENT
Patient is a 75 year old male with CLL evolved to Waldenstrom's macroglobulinemia who was recently found to have COVID-19 infection and presents with shortness of breath.    # COVID-19  - diagosed as outpatient, received monoclonal ab prior to admission  - lymphopenia, tthrombocytopenia and worsening anemia likely 2/2 COVID   - seen by ID: recommend to c/w steroids, no role for remdesivir or tociluzumab    # Waldentrom's  - Recently started on Ninlaro and Dexamethasone   - pancytopenia likely from infection rather than Waldenstrom's but can check: retic count, LDH, haptoglobin  - transfuse for hgb goal > 7, plt >10k if afebrile    Coral Olazagasti  Hematology-Oncology PGY-6  [phone]

## 2021-03-17 NOTE — PROGRESS NOTE ADULT - PROBLEM SELECTOR PLAN 3
-patient with 3-5 days of diarrhea, watery, nonbloody. May be 2/2 COVID PNA. Patient has history of diverticulitis s/p sigmoid resection.   -f/u GI PCR, stool cultures  -f/u CT A/P -patient with 3-5 days of diarrhea, watery, nonbloody. May be 2/2 COVID PNA. Patient has history of diverticulitis s/p sigmoid resection.   -f/u GI PCR, stool cultures  -f/u CT A/P- chronic LAD, no acute changes

## 2021-03-17 NOTE — CONSULT NOTE ADULT - ATTENDING COMMENTS
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 Called for further recommendations    COVID Pneumonia- Patient received monoclonoal antibodies and appears to have had a reaction to the infusion. Currently stable. On 2L of oxygen and did not desaturate while with PT. Unsure if he actually requires oxygen or not. Would take off oxygen and see how he does. You can continue dexamethasone at this time. Not on Rendisivir and not a candidate fot Toci. Agree with d/c antibiotics at this time.     Hematology: appreciate heme/oncology input for macroglobulinemia    Atrial fib: cont amiodarone and Coumadin    If patient is able to be weaned off oxygen, would not have any contraindication for his discharge as it appears the patient had a bad reaction to monoclonal antibodies and not necessarily worsening COVID.     Thank you for your consult. Will continue to follow with you.
Patient seen and examined. Agree with above. 74 yo COVID positive with diarrhea that is now resolving. Diarrhea most likely covid related. However rec stool GI PCR to rule out other infectious cause of diarrhea. Supportive care.
75-year-old man admitted with COVID consulted for h/o CLL and WM. Patient is on treatment for WM (Nilaro and Dex). Would suggest to hold treatment in the setting of COVID. Management per covid protocol. Supportive. Agree with fellow's documentation.
Agree with plan as outlined above.
patient with moderate COVID.  Rigors w infusion recently of Monoclonal Ab.  Now w min hypoxia.  CKD relative contraindication for RDV.  Role of decadron is equivocal and no value now in toci.    would see how he does with O2 taper.  DC planning.

## 2021-03-17 NOTE — PHYSICAL THERAPY INITIAL EVALUATION ADULT - ADDITIONAL COMMENTS
CHEST XRAY 3/16:New bilateral opacities likely due to infectious process. HEAD CT 3/16: No acute hemorrhage, mass effect or extra-axial collections. CT CHEST, ABDOMEN, PELIVS 3/16:  Bilateral renal cysts and nonobstructing stones. No hydronephrosis. Lower cervical, axillary, mediastinal, hilar retroperitoneal, mesenteric and pelvic lymphadenopathy consistent with known underlying lymphoproliferative disorder. Borderline enlarged spleen. No bowel obstruction or abnormal bowel wall thickening. Fluid in the distal colon, in keeping with the clinical history of diarrhea. COVID pneumonia. Pt states he lives in the 2nd floor of a walk up apartment, ramp to enter, total of 28 steps to the 2nd floor.    CHEST XRAY 3/16:New bilateral opacities likely due to infectious process. HEAD CT 3/16: No acute hemorrhage, mass effect or extra-axial collections. CT CHEST, ABDOMEN, PELIVS 3/16:  Bilateral renal cysts and nonobstructing stones. No hydronephrosis. Lower cervical, axillary, mediastinal, hilar retroperitoneal, mesenteric and pelvic lymphadenopathy consistent with known underlying lymphoproliferative disorder. Borderline enlarged spleen. No bowel obstruction or abnormal bowel wall thickening. Fluid in the distal colon, in keeping with the clinical history of diarrhea. COVID pneumonia.

## 2021-03-17 NOTE — CONSULT NOTE ADULT - ASSESSMENT
Impression:  # Diarrhea: resolving. Likely secondary to COVID infection as this is a known symptoms of COVID. DDx does include other infectious etiologies or cdiff.  # COVID  # Pancytopenia secondary to waldenstroms    Recommendation:  - please check GI PCR, stool culture and cdiff to rule out other causes of infection  - hydration and diet as tolerated  - no plans for endoscopy  - can f/u with outpatient GI doctor if symptoms persist chronically  - supportive care    Kit Mcginnis  [phone]  [phone]  Please call/page on call fellow on weekends and weekdays after 5pm

## 2021-03-17 NOTE — PROGRESS NOTE ADULT - SUBJECTIVE AND OBJECTIVE BOX
Authored by Mary Reyes MD, PGY1  PATIENT:  NICOLAS CIFUENTES  [id number]    CHIEF COMPLAINT:  Patient is a 75y old  Male who presents with a chief complaint of Fevers & chills (17 Mar 2021 08:35)      INTERVAL HISTORY OVERNIGHT EVENTS:      REVIEW OF SYSTEMS:    Constitutional:     [ ] negative [ ] fevers [ ] chills [ ] weight loss [ ] weight gain  HEENT:                  [ ] negative [ ] dry eyes [ ] eye irritation [ ] postnasal drip [ ] nasal congestion  CV:                         [ ] negative  [ ] chest pain [ ] orthopnea [ ] palpitations [ ] murmur  Resp:                     [ ] negative [ ] cough [ ] shortness of breath [ ] dyspnea [ ] wheezing [ ] sputum [ ] hemoptysis  GI:                          [ ] negative [ ] nausea [ ] vomiting [ ] diarrhea [ ] constipation [ ] abd pain [ ] dysphagia   :                        [ ] negative [ ] dysuria [ ] nocturia [ ] hematuria [ ] increased urinary frequency  Musculoskeletal: [ ] negative [ ] back pain [ ] myalgias [ ] arthralgias [ ] fracture  Skin:                       [ ] negative [ ] rash [ ] itch  Neurological:        [ ] negative [ ] headache [ ] dizziness [ ] syncope [ ] weakness [ ] numbness  Psychiatric:           [ ] negative [ ] anxiety [ ] depression  Endocrine:            [ ] negative [ ] diabetes [ ] thyroid problem  Heme/Lymph:      [ ] negative [ ] anemia [ ] bleeding problem  Allergic/Immune: [ ] negative [ ] itchy eyes [ ] nasal discharge [ ] hives [ ] angioedema    [ ] All other systems negative  [ ] Unable to assess ROS because ________.    MEDICATIONS:  MEDICATIONS  (STANDING):  albuterol/ipratropium for Nebulization 3 milliLiter(s) Nebulizer every 4 hours  aMIOdarone    Tablet 200 milliGRAM(s) Oral daily  cefepime   IVPB 1000 milliGRAM(s) IV Intermittent every 12 hours  donepezil 10 milliGRAM(s) Oral at bedtime  folic acid 1 milliGRAM(s) Oral daily  lactobacillus acidophilus 1 Tablet(s) Oral daily  levothyroxine 125 MICROGram(s) Oral daily  melatonin 5 milliGRAM(s) Oral at bedtime  memantine 10 milliGRAM(s) Oral every 12 hours  methylPREDNISolone sodium succinate Injectable 40 milliGRAM(s) IV Push every 8 hours  mirtazapine 7.5 milliGRAM(s) Oral at bedtime  multivitamin 1 Tablet(s) Oral daily  pantoprazole  Injectable 40 milliGRAM(s) IV Push daily  sodium chloride 0.45% 1000 milliLiter(s) (75 mL/Hr) IV Continuous <Continuous>  tamsulosin 0.4 milliGRAM(s) Oral at bedtime    MEDICATIONS  (PRN):      ALLERGIES:  Allergies    rituximab (Angioedema)    Intolerances        OBJECTIVE:  ICU Vital Signs Last 24 Hrs  T(C): 36.6 (17 Mar 2021 07:35), Max: 39.4 (16 Mar 2021 16:42)  T(F): 97.8 (17 Mar 2021 07:35), Max: 103 (16 Mar 2021 16:42)  HR: 56 (17 Mar 2021 07:35) (56 - 97)  BP: 100/58 (17 Mar 2021 07:35) (100/58 - 178/68)  BP(mean): 70 (16 Mar 2021 19:00) (70 - 70)  ABP: --  ABP(mean): --  RR: 20 (17 Mar 2021 07:35) (16 - 27)  SpO2: 97% (17 Mar 2021 07:35) (90% - 100%)      Adult Advanced Hemodynamics Last 24 Hrs  CVP(mm Hg): --  CVP(cm H2O): --  CO: --  CI: --  PA: --  PA(mean): --  PCWP: --  SVR: --  SVRI: --  PVR: --  PVRI: --  CAPILLARY BLOOD GLUCOSE        CAPILLARY BLOOD GLUCOSE        I&O's Summary    16 Mar 2021 07:01  -  17 Mar 2021 07:00  --------------------------------------------------------  IN: 240 mL / OUT: 250 mL / NET: -10 mL      Daily Height in cm: 187.96 (16 Mar 2021 14:57)    Daily     PHYSICAL EXAMINATION:  General: WN/WD NAD  HEENT: PERRLA, EOMI, moist mucous membranes  Neurology: A&Ox3, nonfocal, CARRIZALES x 4  Respiratory: CTA B/L, normal respiratory effort, no wheezes, crackles, rales  CV: RRR, S1S2, no murmurs, rubs or gallops  Abdominal: Soft, NT, ND +BS, Last BM  Extremities: No edema, + peripheral pulses  Incisions:   Tubes:    LABS:                          8.0    3.08  )-----------( 75       ( 17 Mar 2021 07:33 )             26.3     03-17    140  |  112<H>  |  47<H>  ----------------------------<  178<H>  5.3   |  17<L>  |  4.58<H>    Ca    8.5      17 Mar 2021 06:50  Phos  4.2     03-17  Mg     1.7     -17    TPro  5.6<L>  /  Alb  3.0<L>  /  TBili  0.2  /  DBili  x   /  AST  34  /  ALT  26  /  AlkPhos  55  03-17    LIVER FUNCTIONS - ( 17 Mar 2021 06:50 )  Alb: 3.0 g/dL / Pro: 5.6 g/dL / ALK PHOS: 55 U/L / ALT: 26 U/L / AST: 34 U/L / GGT: x           PT/INR - ( 17 Mar 2021 06:51 )   PT: 35.4 sec;   INR: 3.11 ratio         PTT - ( 17 Mar 2021 06:51 )  PTT:40.9 sec        Urinalysis Basic - ( 16 Mar 2021 16:22 )    Color: Yellow / Appearance: Slightly Turbid / S.021 / pH: x  Gluc: x / Ketone: Negative  / Bili: Negative / Urobili: Negative   Blood: x / Protein: 100 mg/dL / Nitrite: Negative   Leuk Esterase: Negative / RBC: 222 /hpf / WBC 4 /HPF   Sq Epi: x / Non Sq Epi: 1 /hpf / Bacteria: Negative        TELEMETRY:     EKG:     IMAGING:       Authored by Mary Reyes MD, PGY1  PATIENT:  NICOLAS CIFUENTES  [id number]    CHIEF COMPLAINT:  Patient is a 75y old  Male who presents with a chief complaint of Fevers & chills (17 Mar 2021 08:35)      INTERVAL HISTORY OVERNIGHT EVENTS: SWEETIE overnight. Patient reports increased tremors after duonebs. Denies fevers, chills, SOB, abdominal pain           MEDICATIONS:  MEDICATIONS  (STANDING):  albuterol/ipratropium for Nebulization 3 milliLiter(s) Nebulizer every 4 hours  aMIOdarone    Tablet 200 milliGRAM(s) Oral daily  cefepime   IVPB 1000 milliGRAM(s) IV Intermittent every 12 hours  donepezil 10 milliGRAM(s) Oral at bedtime  folic acid 1 milliGRAM(s) Oral daily  lactobacillus acidophilus 1 Tablet(s) Oral daily  levothyroxine 125 MICROGram(s) Oral daily  melatonin 5 milliGRAM(s) Oral at bedtime  memantine 10 milliGRAM(s) Oral every 12 hours  methylPREDNISolone sodium succinate Injectable 40 milliGRAM(s) IV Push every 8 hours  mirtazapine 7.5 milliGRAM(s) Oral at bedtime  multivitamin 1 Tablet(s) Oral daily  pantoprazole  Injectable 40 milliGRAM(s) IV Push daily  sodium chloride 0.45% 1000 milliLiter(s) (75 mL/Hr) IV Continuous <Continuous>  tamsulosin 0.4 milliGRAM(s) Oral at bedtime    MEDICATIONS  (PRN):      ALLERGIES:  Allergies    rituximab (Angioedema)    Intolerances        OBJECTIVE:  ICU Vital Signs Last 24 Hrs  T(C): 36.6 (17 Mar 2021 07:35), Max: 39.4 (16 Mar 2021 16:42)  T(F): 97.8 (17 Mar 2021 07:35), Max: 103 (16 Mar 2021 16:42)  HR: 56 (17 Mar 2021 07:35) (56 - 97)  BP: 100/58 (17 Mar 2021 07:35) (100/58 - 178/68)  BP(mean): 70 (16 Mar 2021 19:00) (70 - 70)  ABP: --  ABP(mean): --  RR: 20 (17 Mar 2021 07:35) (16 - 27)  SpO2: 97% (17 Mar 2021 07:35) (90% - 100%)      Adult Advanced Hemodynamics Last 24 Hrs  CVP(mm Hg): --  CVP(cm H2O): --  CO: --  CI: --  PA: --  PA(mean): --  PCWP: --  SVR: --  SVRI: --  PVR: --  PVRI: --  CAPILLARY BLOOD GLUCOSE        CAPILLARY BLOOD GLUCOSE        I&O's Summary    16 Mar 2021 07:01  -  17 Mar 2021 07:00  --------------------------------------------------------  IN: 240 mL / OUT: 250 mL / NET: -10 mL      Daily Height in cm: 187.96 (16 Mar 2021 14:57)    Daily     PHYSICAL EXAMINATION:  General: WN/WD NAD  HEENT: PERRLA, EOMI, moist mucous membranes  Neurology: A&Ox3, nonfocal, CARRIZALES x 4  Respiratory: CTA B/L, normal respiratory effort, no wheezes, crackles, rales  CV: RRR, S1S2, no murmurs, rubs or gallops  Abdominal: Soft, NT, ND +BS, Last BM  Extremities: 1+ pitting edema, + peripheral pulses    LABS:                          8.0    3.08  )-----------( 75       ( 17 Mar 2021 07:33 )             26.3     03-    140  |  112<H>  |  47<H>  ----------------------------<  178<H>  5.3   |  17<L>  |  4.58<H>    Ca    8.5      17 Mar 2021 06:50  Phos  4.2     03-  Mg     1.7     -    TPro  5.6<L>  /  Alb  3.0<L>  /  TBili  0.2  /  DBili  x   /  AST  34  /  ALT  26  /  AlkPhos  55  03-17    LIVER FUNCTIONS - ( 17 Mar 2021 06:50 )  Alb: 3.0 g/dL / Pro: 5.6 g/dL / ALK PHOS: 55 U/L / ALT: 26 U/L / AST: 34 U/L / GGT: x           PT/INR - ( 17 Mar 2021 06:51 )   PT: 35.4 sec;   INR: 3.11 ratio         PTT - ( 17 Mar 2021 06:51 )  PTT:40.9 sec        Urinalysis Basic - ( 16 Mar 2021 16:22 )    Color: Yellow / Appearance: Slightly Turbid / S.021 / pH: x  Gluc: x / Ketone: Negative  / Bili: Negative / Urobili: Negative   Blood: x / Protein: 100 mg/dL / Nitrite: Negative   Leuk Esterase: Negative / RBC: 222 /hpf / WBC 4 /HPF   Sq Epi: x / Non Sq Epi: 1 /hpf / Bacteria: Negative        TELEMETRY:     EKG:     IMAGING:

## 2021-03-17 NOTE — CONSULT NOTE ADULT - ASSESSMENT
74 yo M PMHx CLL now transformed to waldenstrom macroglobulinemia on ninlaro/dexamethasone, afib, PPM presents w/ fevers and chills after monoclonal antibody Infusion for COVID.     #COVID-19   76 yo M PMHx CLL now transformed to waldenstrom macroglobulinemia on ninlaro/dexamethasone, afib, PPM presents w/ fevers and chills after monoclonal antibody Infusion for COVID. Pt has been satting well on 2L NC and tolerated PT this morning without increased O2 requirement.     #COVID-19   76 yo M PMHx CLL now transformed to Waldenstrom macroglobulinemia on ninlaro/dexamethasone, afib, PPM presents w/ fevers and chills after monoclonal antibody Infusion for COVID. CT chest shows evidence of lower airway disease, consistent with moderate COVID. Pt appears to be improving - was satting well on 2L NC, and has been afebrile during this hospital course.     #Moderate COVID-19  - d/c cefepime given CKD and low suspicion for superimposed bacterial PNA  - Taper supplemental O2 as tolerated; would trial pt on RA today  - c/w steroids  - Remdesivir and tocilizumab not recommended    Infectious Disease will continue to follow. Plan discussed with primary team.

## 2021-03-17 NOTE — CONSULT NOTE ADULT - SUBJECTIVE AND OBJECTIVE BOX
[facility] DIVISION OF PULMONARY, CRITICAL CARE AND SLEEP MEDICINE  PULMONARY CONSULTATION NOTE  CONSULT NUMBER: [phone] (Monday-Friday 9am-5pm)  OFFICE NUMBER: [phone] (Afterhours and Weekends)    NAME: NICOLAS CIFUENTES  MEDICAL RECORD NUMBER: [id number]    CHIEF COMPLAINT: Patient is a 75y old  Male who presents with a chief complaint of Fevers & chills (17 Mar 2021 14:19)      HISTORY OF PRESENT ILLNESS: 76 yo M PMHx CLL now transformed to waldenstrom macroglobulinemia on ninlaro/dexamethasone, afib, PPM presents w/ fevers and chills after monoclonal antibody Infusion for COVID. Pt began experiencing fatigue and mild SOB on 3/10 and 3/11. He decided to go to [facility] to get a COVID test on 3/14, which came  back positive and he was sent from UrgentCare to [facility] ED. Per chart on 3/14, decision was made between ED and pt's oncologist for pt to be discharged and scheduled for monoclonal antibody infusion outpatient. Pt received his infusion on 3/16 at [facility] and was noted to have rigors, chills, and fevers in observation after receiving his infusion. He was transferred to [facility].     Hospital Course: Pt currently receiving cefepime 1g q12h and methylprednisolone 40mg q8h. Pt has been afebrile and continues to sat in 97-99% on 2L NC. Pt seen at bedside. Had just completed PT and states he feels pretty good. Reports SOB has improved since admission. Fatigue persists, but otherwise denies fevers, chills, myalgias, CP, consistent cough, abdominal pain, N/V, dysuria, or pain at IV site. Endorses mild diarrhea that has also improved since admission.       ====================BACKGROUND INFORMATION============================    FAMILY HISTORY: FAMILY HISTORY:  No pertinent family history in first degree relatives        PAST MEDICAL AND SURGICAL HISTORY: PAST MEDICAL & SURGICAL HISTORY:  Memory deficit    BPH (benign prostatic hyperplasia)    Jamul (hard of hearing)    Gout    CKD (chronic kidney disease)    Nephrolithiasis    Hypothyroid    Bradycardia, drug induced    Waldenstrom macroglobulinemia    Diverticulitis    Atrial fibrillation    GERD (gastroesophageal reflux disease)    Anxiety disorder    CLL (chronic lymphocytic leukemia)  in remission    History of cardiac pacemaker in situ    History of appendectomy    History of cataract surgery, right  IOL    History of laparoscopic cholecystectomy  4/2014    S/P hernia repair  x2    Meniscus tear  s/p removal of Meniscus 8 months ago        ALLERGIES:Allergies    rituximab (Angioedema)    Intolerances        HOME MEDICATIONS: as per HPI    OUTPATIENT PULMONARY DOCTOR:  none    SOCIAL HISTORY:   TOBACCO USE: Denies  ALCOHOL: Socially  DRUGS: Denies  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)  EXPOSURES: Denies  RECENT TRAVELS: Denies  CODE STATUS: Full Code    ====================REVIEW OF SYSTEMS=====================================  CONSTITUTIONAL: Denies fevers/chills/myalgias  CARDIOVASCULAR: Denies chest pain or heart palpitations  PULMONARY: Minimal dyspnea at this time  GASTROINTESTINAL: mild diarrhea  [x] ALL OTHER REVIEW OF SYSTEMS ARE NEGATIVE   [] UNABLE TO OBTAIN REVIEW OF SYSTEMS DUE TO ______________      ====================PHYSICAL EXAM=========================================    VITALS: ICU Vital Signs Last 24 Hrs  T(C): 36.7 (17 Mar 2021 12:37), Max: 39.4 (16 Mar 2021 16:42)  T(F): 98 (17 Mar 2021 12:37), Max: 103 (16 Mar 2021 16:42)  HR: 65 (17 Mar 2021 12:37) (56 - 97)  BP: 116/62 (17 Mar 2021 12:37) (100/58 - 178/66)  BP(mean): 70 (16 Mar 2021 19:00) (70 - 70)  ABP: --  ABP(mean): --  RR: 20 (17 Mar 2021 12:37) (18 - 27)  SpO2: 96% (17 Mar 2021 12:37) (90% - 100%)      INTAKE and OUTPUT: I&O's Summary    16 Mar 2021 07:01  -  17 Mar 2021 07:00  --------------------------------------------------------  IN: 240 mL / OUT: 250 mL / NET: -10 mL    17 Mar 2021 07:01  -  17 Mar 2021 14:30  --------------------------------------------------------  IN: 480 mL / OUT: 200 mL / NET: 280 mL        WEIGHT: Daily Height in cm: 187.96 (16 Mar 2021 14:57)    Daily     GLUCOSE: CAPILLARY BLOOD GLUCOSE    GENERAL: Sitting in his chair UNAD  HEENT: PERRLA  NECK: Supple  CARDIOVASCULAR: mild systolic murmur noted. RRR, Normal S1 and S2  PULMONARY: Mild crackles noted throughout lung  ABDOMEN: SNT, No R/G/R  EXTREMITIES: 2+ edema noted with L>R  NEUROLOGIC: Appears nonfocal (patient states he has LE neuropathy but did not test)  PSYCHIATRIC: AAOx3    ====================MEDICATIONS===========================================  MEDICATIONS  (STANDING):  aMIOdarone    Tablet 200 milliGRAM(s) Oral daily  dexAMETHasone     Tablet 6 milliGRAM(s) Oral daily  donepezil 10 milliGRAM(s) Oral at bedtime  folic acid 1 milliGRAM(s) Oral daily  lactobacillus acidophilus 1 Tablet(s) Oral daily  levothyroxine 125 MICROGram(s) Oral daily  melatonin 5 milliGRAM(s) Oral at bedtime  memantine 10 milliGRAM(s) Oral every 12 hours  mirtazapine 7.5 milliGRAM(s) Oral at bedtime  multivitamin 1 Tablet(s) Oral daily  pantoprazole  Injectable 40 milliGRAM(s) IV Push daily  sodium chloride 0.45% 1000 milliLiter(s) (75 mL/Hr) IV Continuous <Continuous>  tamsulosin 0.4 milliGRAM(s) Oral at bedtime      MEDICATIONS  (PRN):      ====================LABORATORY RESULTS====================================  CBC Full  -  ( 17 Mar 2021 07:33 )  WBC Count : 3.08 K/uL  RBC Count : 2.60 M/uL  Hemoglobin : 8.0 g/dL  Hematocrit : 26.3 %  Platelet Count - Automated : 74 K/uL  Mean Cell Volume : 101.2 fl  Mean Cell Hemoglobin : 30.8 pg  Mean Cell Hemoglobin Concentration : 30.4 gm/dL  Auto Neutrophil # : 2.67 K/uL  Auto Lymphocyte # : 0.33 K/uL  Auto Monocyte # : 0.08 K/uL  Auto Eosinophil # : 0.00 K/uL  Auto Basophil # : 0.00 K/uL  Auto Neutrophil % : 81.4 %  Auto Lymphocyte % : 10.6 %  Auto Monocyte % : 2.7 %  Auto Eosinophil % : 0.0 %  Auto Basophil % : 0.0 %                                    03-17    140  |  112<H>  |  47<H>  ----------------------------<  178<H>  5.3   |  17<L>  |  4.58<H>    Ca    8.5      17 Mar 2021 06:50  Phos  4.2     03-17  Mg     1.7     03-17    TPro  5.6<L>  /  Alb  3.0<L>  /  TBili  0.2  /  DBili  x   /  AST  34  /  ALT  26  /  AlkPhos  55  03-17        PT/INR - ( 17 Mar 2021 06:51 )   PT: 35.4 sec;   INR: 3.11 ratio         PTT - ( 17 Mar 2021 06:51 )  PTT:40.9 sec    Creatinine Trend: 4.58<--, 4.34<--, 3.86<--      ====================RADIOLOGY and ECHOCARDIOGRAPHY=======================    CXR:    CT CHEST: `< from: CT Chest No Cont (03.16.21 @ 20:51) >  FINDINGS:  CHEST:  LUNGS AND LARGE AIRWAYS: Patent central airways. Bilateral patchy groundglass opacity, in keeping with the clinical history of Covid pneumonia.  PLEURA: Trace bilateral pleural effusions. No pneumothorax.  VESSELS: Atherosclerotic calcifications.  HEART: Heart size is normal. No pericardial effusion.  MEDIASTINUM AND DUSTY: Mediastinal and hilar adenopathy. For reference, AP window lymph node measures 5.1 x 2.6 cm.  CHEST WALL AND LOWER NECK: Lower cervical or axillary lymphadenopathy. Pacemaker.    ABDOMEN AND PELVIS:  LIVER: Within normal limits.  BILE DUCTS: Normal caliber.  GALLBLADDER: Cholecystectomy.  SPLEEN: Borderline enlarged spleen, which measures 13.1 cm in craniocaudal diameter.  PANCREAS: Within normal limits.  ADRENALS: Within normal limits.  KIDNEYS/URETERS: Bilateral renal cysts and nonobstructing stones.    BLADDER: Within normal limits.  REPRODUCTIVE ORGANS: Prostate within normal limits.    BOWEL: Small hiatal hernia. No bowel obstruction. Small lipoma in the jejunum measuring 1.0 cm. (2:107) Patent rectal colonic anastomosis. Fluid distends the distal colon, which is without evidence of wall thickening. Mild diverticulosis. Appendix is not visualized. Mesenteric adenopathy and stranding.  PERITONEUM: No ascites.  VESSELS: Within normal limits.  RETROPERITONEUM/LYMPH NODES: Retroperitoneal and pelvic lymphadenopathy. For reference:  *  Left periaortic lymph node measures 2.2 x 2.3 cm. (2:110)  *  Enlarged right external iliac lymph node measures 2.6 x 1.4 cm (2:162)  ABDOMINAL WALL: Bilateral fat-containing inguinal hernias.  BONES: Degenerative changes.    IMPRESSION:  *  Bilateral renal cysts and nonobstructing stones. No hydronephrosis.  *  Lower cervical, axillary, mediastinal, hilar retroperitoneal, mesenteric and pelvic lymphadenopathy consistent with known underlying lymphoproliferative disorder.  *  Borderline enlarged spleen.  *  No bowel obstruction or abnormal bowel wall thickening.  *  Fluid in the distal colon, in keeping with the clinical history of diarrhea.  *  COVID pneumonia.      ECHOCARDIOGRAPHY: Technically difficult study.  Normal left ventricular systolic function. No segmental  wall motion abnormalities.  Mild-moderate pulmonary hypertension.

## 2021-03-17 NOTE — CONSULT NOTE ADULT - SUBJECTIVE AND OBJECTIVE BOX
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  Patient was initially admitted with CLL and evolved into Waldenstrom's, which was complicated by CRF, nephrotic syndrome and PAF. Patient was initially treated with  Rituxan/Velcade/Decadron from 10/2015 - 1/2017, followed by Ixazomib/Decadron/Rituxan x 6 cycles to maintenance from 8/18 - 1/19. Most recently, the patient was treated with Ninlaro and Dexamethasone which he is currently taking.  Patient now admitted for shortness of breath in the setting of COVID-19. He received monoclonal antibodies as an outpatient for COVID however symptoms continued to worsened.     Allergies    rituximab (Angioedema)    Intolerances        MEDICATIONS  (STANDING):  aMIOdarone    Tablet 200 milliGRAM(s) Oral daily  dexAMETHasone     Tablet 6 milliGRAM(s) Oral daily  donepezil 10 milliGRAM(s) Oral at bedtime  folic acid 1 milliGRAM(s) Oral daily  lactobacillus acidophilus 1 Tablet(s) Oral daily  levothyroxine 125 MICROGram(s) Oral daily  melatonin 5 milliGRAM(s) Oral at bedtime  memantine 10 milliGRAM(s) Oral every 12 hours  mirtazapine 7.5 milliGRAM(s) Oral at bedtime  multivitamin 1 Tablet(s) Oral daily  pantoprazole  Injectable 40 milliGRAM(s) IV Push daily  sodium chloride 0.45% 1000 milliLiter(s) (75 mL/Hr) IV Continuous <Continuous>  tamsulosin 0.4 milliGRAM(s) Oral at bedtime    MEDICATIONS  (PRN):      PAST MEDICAL & SURGICAL HISTORY:  Memory deficit    BPH (benign prostatic hyperplasia)    [place] (hard of hearing)    Gout    CKD (chronic kidney disease)    Nephrolithiasis    Hypothyroid    Bradycardia, drug induced    Waldenstrom macroglobulinemia    Diverticulitis    Atrial fibrillation    GERD (gastroesophageal reflux disease)    Anxiety disorder    CLL (chronic lymphocytic leukemia)  in remission    History of cardiac pacemaker in situ    History of appendectomy    History of cataract surgery, right  IOL    History of laparoscopic cholecystectomy  4/2014    S/P hernia repair  x2    Meniscus tear  s/p removal of Meniscus 8 months ago        FAMILY HISTORY:  No pertinent family history in first degree relatives        SOCIAL HISTORY: No EtOH, no tobacco    REVIEW OF SYSTEMS: per H&P    Height (cm): 188 (03-16 @ 14:57)  Weight (kg): 105.2 (03-16 @ 14:57)  BMI (kg/m2): 29.8 (03-16 @ 14:57)  BSA (m2): 2.31 (03-16 @ 14:57)    T(F): 98 (03-17-21 @ 12:37), Max: 103 (03-16-21 @ 16:42)  HR: 65 (03-17-21 @ 12:37)  BP: 116/62 (03-17-21 @ 12:37)  RR: 20 (03-17-21 @ 12:37)  SpO2: 96% (03-17-21 @ 12:37)  Wt(kg): --                            8.0    3.08  )-----------( 75       ( 17 Mar 2021 07:33 )             26.3       03-17    140  |  112<H>  |  47<H>  ----------------------------<  178<H>  5.3   |  17<L>  |  4.58<H>    Ca    8.5      17 Mar 2021 06:50  Phos  4.2     03-17  Mg     1.7     03-17    TPro  5.6<L>  /  Alb  3.0<L>  /  TBili  0.2  /  DBili  x   /  AST  34  /  ALT  26  /  AlkPhos  55  03-17      Magnesium, Serum: 1.7 mg/dL (03-17 @ 06:50)  Phosphorus Level, Serum: 4.2 mg/dL (03-17 @ 06:50)  Lactate Dehydrogenase, Serum: 281 U/L (03-16 @ 15:48)

## 2021-03-17 NOTE — PHYSICAL THERAPY INITIAL EVALUATION ADULT - PERTINENT HX OF CURRENT PROBLEM, REHAB EVAL
75M PMHx CLL transformed to waldenstrom macroglobulinemia on ninlaro/dexamethasone, Afib (on amiodarone, metoprolol, warfarin), PPM, mild dementia, hypothyroidism, BPH, GERD, p/w fevers & chills worsening after monoclonal antibody Infusion for COVID. Pt tested positive for COVID on 3/11. While he was receiving monoclonal antibody transfusion at [facility], pt quickly experienced severe rigors, chills, fevers then transferred to [facility].

## 2021-03-17 NOTE — PROGRESS NOTE ADULT - SUBJECTIVE AND OBJECTIVE BOX
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 Patient was in usual state of health until Tuesday of last week (3/9); patient endorsed fevers, generalized weakness, fatigue. He went to urgent care and tested positive for COVID on 3/11. Today he was receiving monoclonal antibody transfusion at [facility], and quickly experienced severe rigors, chills, and fevers transferred to [facility].     On ROS, patient endorses fevers, chills, denies headaches, visual changes, chest pain, SOB, palpitations, cough, abdominal pain, n/v, melena, endorses alternating constipation/diarrhea (nonbloody, watery of last 3-4 days), denies dysuria, endorses LE edema.     In the ED, patient Tmax 103, HR wnl, -170/53-92, RR 20-30, saturating % on 2L N/C. Labs notable for pancytopenia, LOUISE on CKD4, COVID+,  UA+ RBCs, CXR w/ b/l opacities. Patient received vanc/cef, 500cc NS bolus, 6mg IV decadron. Patient admitted to medicine for continued management.  (16 Mar 2021 18:04)    ===========================  Interval Events    - No reported worsening CP/Palps/SOB\  ===========================        PMHx:   Memory deficit    BPH (benign prostatic hyperplasia)    [place] (hard of hearing)    Gout    CKD (chronic kidney disease)    Nephrolithiasis    Hypothyroid    Dementia    Bradycardia, drug induced    Waldenstrom macroglobulinemia    Diverticulitis    Atrial fibrillation    GERD (gastroesophageal reflux disease)    Anxiety disorder    CLL (chronic lymphocytic leukemia)        PSHx:   History of cardiac pacemaker in situ    History of appendectomy    History of cataract surgery, right    History of laparoscopic cholecystectomy    S/P hernia repair    Meniscus tear        Allergies:  rituximab (Angioedema)      Home Meds:    Current Medications:   albuterol/ipratropium for Nebulization 3 milliLiter(s) Nebulizer every 4 hours  aMIOdarone    Tablet 200 milliGRAM(s) Oral daily  donepezil 10 milliGRAM(s) Oral at bedtime  folic acid 1 milliGRAM(s) Oral daily  lactobacillus acidophilus 1 Tablet(s) Oral daily  levothyroxine 125 MICROGram(s) Oral daily  melatonin 5 milliGRAM(s) Oral at bedtime  memantine 10 milliGRAM(s) Oral every 12 hours  methylPREDNISolone sodium succinate Injectable 40 milliGRAM(s) IV Push every 8 hours  mirtazapine 7.5 milliGRAM(s) Oral at bedtime  multivitamin 1 Tablet(s) Oral daily  pantoprazole  Injectable 40 milliGRAM(s) IV Push daily  tamsulosin 0.4 milliGRAM(s) Oral at bedtime      FAMILY HISTORY:  No pertinent family history in first degree relatives        Social History: Personally reviewed   No tobacco, EtOH or IVDU     REVIEW OF SYSTEMS:  Constitutional:     [x ] negative [ ] fevers [ ] chills [ ] weight loss [ ] weight gain  HEENT:                  [x ] negative [ ] dry eyes [ ] eye irritation [ ] postnasal drip [ ] nasal congestion  CV:                         [ x] negative  [ ] chest pain [ ] orthopnea [ ] palpitations [ ] murmur  Resp:                     [x ] negative [ ] cough [ ] shortness of breath [ ] dyspnea [ ] wheezing [ ] sputum [ ]hemoptysis  GI:                          [ x] negative [ ] nausea [ ] vomiting [ ] diarrhea [ ] constipation [ ] abd pain [ ] dysphagia   :                        [ x] negative [ ] dysuria [ ] nocturia [ ] hematuria [ ] increased urinary frequency  Musculoskeletal: [x ] negative [ ] back pain [ ] myalgias [ ] arthralgias [ ] fracture  Skin:                       [ x] negative [ ] rash [ ] itch  Neurological:        [ x] negative [ ] headache [ ] dizziness [ ] syncope [ ] weakness [ ] numbness  Psychiatric:           [ x] negative [ ] anxiety [ ] depression  Endocrine:            [ x] negative [ ] diabetes [ ] thyroid problem  Heme/Lymph:      [ x] negative [ ] anemia [ ] bleeding problem  Allergic/Immune: [ x] negative [ ] itchy eyes [ ] nasal discharge [ ] hives [ ] angioedema    [ x] All other systems negative  [ ] Unable to assess ROS due to      Physical Exam:  T(F): 98.8 (03-16), Max: 103 (03-16)  HR: 69 (03-16) (63 - 97)  BP: 128/65 (03-16) (100/59 - 178/68)  RR: 21 (03-16)  SpO2: 99% (03-16)    Deferred due to COVID precaution. Please see primary team PE     Cardiovascular Diagnostic Testing:    ECG: Personally reviewed  3/14 NSR at 63     Echo: Personally reviewed  2019  Conclusions:  1. Mild mitral regurgitation.  2. Peak transaortic valve gradient equals 27 mm Hg, mean  transaortic valve gradient equals 12 mm Hg, estimated  aortic valve area equals 1.6 sqcm (by continuity equation),  aortic valve velocity time integral equals 57 cm,  consistent with mild aortic stenosis. Mild to moderate  aortic regurgitation.  3. Normal left ventricular internal dimensions and wall  thicknesses.  4. Normal left ventricular systolic function. No segmental  wall motion abnormalities.  5. Mild diastolic dysfunction (Stage I).  6. Normal right ventricular size and function. A device  wire is noted in the right heart.  7. Mild tricuspid regurgitation.  8. Estimated pulmonary artery systolic pressure equals 46  mm Hg, assuming right atrial pressure equals 8 mm Hg,  consistent with mild pulmonary pressures.  9. No obvious evidence of endocarditis. Consider KT for  further evaluation if clinically indicated.      Imaging:    CXR: Personally reviewed    Labs: Personally reviewed                        9.4    2.24  )-----------( 71       ( 16 Mar 2021 15:48 )             31.4     03-16    143  |  111<H>  |  41<H>  ----------------------------<  112<H>  5.0   |  16<L>  |  4.34<H>    Ca    9.1      16 Mar 2021 15:48    TPro  6.6  /  Alb  3.6  /  TBili  0.3  /  DBili  x   /  AST  44<H>  /  ALT  28  /  AlkPhos  72  03-16    PT/INR - ( 16 Mar 2021 15:48 )   PT: 28.0 sec;   INR: 2.43 ratio         PTT - ( 16 Mar 2021 15:48 )  PTT:25.4 sec            Assessment and Recommendation:   · Assessment	  75 M with PMH of pAFib on amiodarone/BB and AC with warfarin, tachy-sabina s/p PPM, HFpEF, 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

## 2021-03-18 DIAGNOSIS — N17.9 ACUTE KIDNEY FAILURE, UNSPECIFIED: ICD-10-CM

## 2021-03-18 LAB
ALBUMIN SERPL ELPH-MCNC: 3 G/DL — LOW (ref 3.3–5)
ALP SERPL-CCNC: 50 U/L — SIGNIFICANT CHANGE UP (ref 40–120)
ALT FLD-CCNC: 21 U/L — SIGNIFICANT CHANGE UP (ref 10–45)
ANION GAP SERPL CALC-SCNC: 10 MMOL/L — SIGNIFICANT CHANGE UP (ref 5–17)
ANION GAP SERPL CALC-SCNC: 12 MMOL/L — SIGNIFICANT CHANGE UP (ref 5–17)
APPEARANCE UR: CLEAR — SIGNIFICANT CHANGE UP
APTT BLD: 40.5 SEC — HIGH (ref 27.5–35.5)
AST SERPL-CCNC: 30 U/L — SIGNIFICANT CHANGE UP (ref 10–40)
BACTERIA # UR AUTO: NEGATIVE — SIGNIFICANT CHANGE UP
BASOPHILS # BLD AUTO: 0 K/UL — SIGNIFICANT CHANGE UP (ref 0–0.2)
BASOPHILS # BLD AUTO: 0 K/UL — SIGNIFICANT CHANGE UP (ref 0–0.2)
BASOPHILS NFR BLD AUTO: 0 % — SIGNIFICANT CHANGE UP (ref 0–2)
BASOPHILS NFR BLD AUTO: 0 % — SIGNIFICANT CHANGE UP (ref 0–2)
BILIRUB SERPL-MCNC: 0.2 MG/DL — SIGNIFICANT CHANGE UP (ref 0.2–1.2)
BILIRUB UR-MCNC: NEGATIVE — SIGNIFICANT CHANGE UP
BUN SERPL-MCNC: 61 MG/DL — HIGH (ref 7–23)
BUN SERPL-MCNC: 65 MG/DL — HIGH (ref 7–23)
CALCIUM SERPL-MCNC: 7.9 MG/DL — LOW (ref 8.4–10.5)
CALCIUM SERPL-MCNC: 8.3 MG/DL — LOW (ref 8.4–10.5)
CHLORIDE SERPL-SCNC: 110 MMOL/L — HIGH (ref 96–108)
CHLORIDE SERPL-SCNC: 111 MMOL/L — HIGH (ref 96–108)
CHLORIDE UR-SCNC: 44 MMOL/L — SIGNIFICANT CHANGE UP
CO2 SERPL-SCNC: 18 MMOL/L — LOW (ref 22–31)
CO2 SERPL-SCNC: 20 MMOL/L — LOW (ref 22–31)
COLOR SPEC: YELLOW — SIGNIFICANT CHANGE UP
CREAT ?TM UR-MCNC: 102 MG/DL — SIGNIFICANT CHANGE UP
CREAT SERPL-MCNC: 4.14 MG/DL — HIGH (ref 0.5–1.3)
CREAT SERPL-MCNC: 4.52 MG/DL — HIGH (ref 0.5–1.3)
DIFF PNL FLD: ABNORMAL
EOSINOPHIL # BLD AUTO: 0 K/UL — SIGNIFICANT CHANGE UP (ref 0–0.5)
EOSINOPHIL # BLD AUTO: 0 K/UL — SIGNIFICANT CHANGE UP (ref 0–0.5)
EOSINOPHIL NFR BLD AUTO: 0 % — SIGNIFICANT CHANGE UP (ref 0–6)
EOSINOPHIL NFR BLD AUTO: 0 % — SIGNIFICANT CHANGE UP (ref 0–6)
EPI CELLS # UR: 1 /HPF — SIGNIFICANT CHANGE UP
FERRITIN SERPL-MCNC: 701 NG/ML — HIGH (ref 30–400)
GIANT PLATELETS BLD QL SMEAR: PRESENT — SIGNIFICANT CHANGE UP
GLUCOSE SERPL-MCNC: 161 MG/DL — HIGH (ref 70–99)
GLUCOSE SERPL-MCNC: 162 MG/DL — HIGH (ref 70–99)
GLUCOSE UR QL: NEGATIVE — SIGNIFICANT CHANGE UP
HAPTOGLOB SERPL-MCNC: 349 MG/DL — HIGH (ref 34–200)
HAV IGM SER-ACNC: SIGNIFICANT CHANGE UP
HBV CORE IGM SER-ACNC: SIGNIFICANT CHANGE UP
HBV SURFACE AG SER-ACNC: SIGNIFICANT CHANGE UP
HCT VFR BLD CALC: 25.7 % — LOW (ref 39–50)
HCT VFR BLD CALC: 28 % — LOW (ref 39–50)
HCV AB S/CO SERPL IA: 0.04 S/CO — SIGNIFICANT CHANGE UP (ref 0–0.99)
HCV AB SERPL-IMP: SIGNIFICANT CHANGE UP
HGB BLD-MCNC: 7.8 G/DL — LOW (ref 13–17)
HGB BLD-MCNC: 8.6 G/DL — LOW (ref 13–17)
HYALINE CASTS # UR AUTO: 2 /LPF — SIGNIFICANT CHANGE UP (ref 0–2)
IMM GRANULOCYTES NFR BLD AUTO: 0.3 % — SIGNIFICANT CHANGE UP (ref 0–1.5)
INR BLD: 3.43 RATIO — HIGH (ref 0.88–1.16)
IRON SATN MFR SERPL: 22 % — SIGNIFICANT CHANGE UP (ref 16–55)
IRON SATN MFR SERPL: 41 UG/DL — LOW (ref 45–165)
KETONES UR-MCNC: NEGATIVE — SIGNIFICANT CHANGE UP
LDH SERPL L TO P-CCNC: 269 U/L — HIGH (ref 50–242)
LEUKOCYTE ESTERASE UR-ACNC: NEGATIVE — SIGNIFICANT CHANGE UP
LYMPHOCYTES # BLD AUTO: 0.33 K/UL — LOW (ref 1–3.3)
LYMPHOCYTES # BLD AUTO: 0.47 K/UL — LOW (ref 1–3.3)
LYMPHOCYTES # BLD AUTO: 12.5 % — LOW (ref 13–44)
LYMPHOCYTES # BLD AUTO: 13.5 % — SIGNIFICANT CHANGE UP (ref 13–44)
MAGNESIUM SERPL-MCNC: 2 MG/DL — SIGNIFICANT CHANGE UP (ref 1.6–2.6)
MANUAL SMEAR VERIFICATION: SIGNIFICANT CHANGE UP
MCHC RBC-ENTMCNC: 30.3 PG — SIGNIFICANT CHANGE UP (ref 27–34)
MCHC RBC-ENTMCNC: 30.4 GM/DL — LOW (ref 32–36)
MCHC RBC-ENTMCNC: 30.4 PG — SIGNIFICANT CHANGE UP (ref 27–34)
MCHC RBC-ENTMCNC: 30.7 GM/DL — LOW (ref 32–36)
MCV RBC AUTO: 100 FL — SIGNIFICANT CHANGE UP (ref 80–100)
MCV RBC AUTO: 98.6 FL — SIGNIFICANT CHANGE UP (ref 80–100)
MONOCYTES # BLD AUTO: 0.1 K/UL — SIGNIFICANT CHANGE UP (ref 0–0.9)
MONOCYTES # BLD AUTO: 0.13 K/UL — SIGNIFICANT CHANGE UP (ref 0–0.9)
MONOCYTES NFR BLD AUTO: 3.6 % — SIGNIFICANT CHANGE UP (ref 2–14)
MONOCYTES NFR BLD AUTO: 3.7 % — SIGNIFICANT CHANGE UP (ref 2–14)
NEUTROPHILS # BLD AUTO: 2.24 K/UL — SIGNIFICANT CHANGE UP (ref 1.8–7.4)
NEUTROPHILS # BLD AUTO: 2.87 K/UL — SIGNIFICANT CHANGE UP (ref 1.8–7.4)
NEUTROPHILS NFR BLD AUTO: 82.5 % — HIGH (ref 43–77)
NEUTROPHILS NFR BLD AUTO: 83.9 % — HIGH (ref 43–77)
NITRITE UR-MCNC: NEGATIVE — SIGNIFICANT CHANGE UP
NRBC # BLD: 0 /100 WBCS — SIGNIFICANT CHANGE UP (ref 0–0)
NT-PROBNP SERPL-SCNC: 3245 PG/ML — HIGH (ref 0–300)
NT-PROBNP SERPL-SCNC: 3916 PG/ML — HIGH (ref 0–300)
OSMOLALITY UR: 492 MOS/KG — SIGNIFICANT CHANGE UP (ref 300–900)
PH UR: 6 — SIGNIFICANT CHANGE UP (ref 5–8)
PHOSPHATE SERPL-MCNC: 4.8 MG/DL — HIGH (ref 2.5–4.5)
PLAT MORPH BLD: NORMAL — SIGNIFICANT CHANGE UP
PLATELET # BLD AUTO: 109 K/UL — LOW (ref 150–400)
PLATELET # BLD AUTO: 92 K/UL — LOW (ref 150–400)
POTASSIUM SERPL-MCNC: 5 MMOL/L — SIGNIFICANT CHANGE UP (ref 3.5–5.3)
POTASSIUM SERPL-MCNC: 5.4 MMOL/L — HIGH (ref 3.5–5.3)
POTASSIUM SERPL-SCNC: 5 MMOL/L — SIGNIFICANT CHANGE UP (ref 3.5–5.3)
POTASSIUM SERPL-SCNC: 5.4 MMOL/L — HIGH (ref 3.5–5.3)
POTASSIUM UR-SCNC: 24 MMOL/L — SIGNIFICANT CHANGE UP
PROT SERPL-MCNC: 5.5 G/DL — LOW (ref 6–8.3)
PROT UR-MCNC: 100 — SIGNIFICANT CHANGE UP
PROTHROM AB SERPL-ACNC: 38.8 SEC — HIGH (ref 10.6–13.6)
RBC # BLD: 2.57 M/UL — LOW (ref 4.2–5.8)
RBC # BLD: 2.57 M/UL — LOW (ref 4.2–5.8)
RBC # BLD: 2.84 M/UL — LOW (ref 4.2–5.8)
RBC # FLD: 13.9 % — SIGNIFICANT CHANGE UP (ref 10.3–14.5)
RBC # FLD: 13.9 % — SIGNIFICANT CHANGE UP (ref 10.3–14.5)
RBC BLD AUTO: SIGNIFICANT CHANGE UP
RBC CASTS # UR COMP ASSIST: 133 /HPF — HIGH (ref 0–4)
RETICS #: 6.7 K/UL — LOW (ref 25–125)
RETICS/RBC NFR: 0.3 % — LOW (ref 0.5–2.5)
SODIUM SERPL-SCNC: 140 MMOL/L — SIGNIFICANT CHANGE UP (ref 135–145)
SODIUM SERPL-SCNC: 141 MMOL/L — SIGNIFICANT CHANGE UP (ref 135–145)
SODIUM UR-SCNC: 61 MMOL/L — SIGNIFICANT CHANGE UP
SP GR SPEC: 1.02 — SIGNIFICANT CHANGE UP (ref 1.01–1.02)
TIBC SERPL-MCNC: 186 UG/DL — LOW (ref 220–430)
UIBC SERPL-MCNC: 144 UG/DL — SIGNIFICANT CHANGE UP (ref 110–370)
UROBILINOGEN FLD QL: NEGATIVE — SIGNIFICANT CHANGE UP
UUN UR-MCNC: 778 MG/DL — SIGNIFICANT CHANGE UP
WBC # BLD: 2.67 K/UL — LOW (ref 3.8–10.5)
WBC # BLD: 3.48 K/UL — LOW (ref 3.8–10.5)
WBC # FLD AUTO: 2.67 K/UL — LOW (ref 3.8–10.5)
WBC # FLD AUTO: 3.48 K/UL — LOW (ref 3.8–10.5)
WBC UR QL: 3 /HPF — SIGNIFICANT CHANGE UP (ref 0–5)

## 2021-03-18 PROCEDURE — 93010 ELECTROCARDIOGRAM REPORT: CPT

## 2021-03-18 PROCEDURE — 99232 SBSQ HOSP IP/OBS MODERATE 35: CPT | Mod: GC

## 2021-03-18 PROCEDURE — 99233 SBSQ HOSP IP/OBS HIGH 50: CPT | Mod: GC

## 2021-03-18 PROCEDURE — 99233 SBSQ HOSP IP/OBS HIGH 50: CPT

## 2021-03-18 PROCEDURE — 71045 X-RAY EXAM CHEST 1 VIEW: CPT | Mod: 26

## 2021-03-18 PROCEDURE — 76770 US EXAM ABDO BACK WALL COMP: CPT | Mod: 26

## 2021-03-18 RX ORDER — SODIUM CHLORIDE 9 MG/ML
1000 INJECTION, SOLUTION INTRAVENOUS
Refills: 0 | Status: DISCONTINUED | OUTPATIENT
Start: 2021-03-18 | End: 2021-03-18

## 2021-03-18 RX ORDER — FUROSEMIDE 40 MG
40 TABLET ORAL ONCE
Refills: 0 | Status: COMPLETED | OUTPATIENT
Start: 2021-03-18 | End: 2021-03-18

## 2021-03-18 RX ORDER — SODIUM ZIRCONIUM CYCLOSILICATE 10 G/10G
10 POWDER, FOR SUSPENSION ORAL ONCE
Refills: 0 | Status: COMPLETED | OUTPATIENT
Start: 2021-03-18 | End: 2021-03-18

## 2021-03-18 RX ORDER — FUROSEMIDE 40 MG
40 TABLET ORAL DAILY
Refills: 0 | Status: DISCONTINUED | OUTPATIENT
Start: 2021-03-19 | End: 2021-03-22

## 2021-03-18 RX ORDER — KETOCONAZOLE 20 MG/G
1 AEROSOL, FOAM TOPICAL
Refills: 0 | Status: DISCONTINUED | OUTPATIENT
Start: 2021-03-18 | End: 2021-03-31

## 2021-03-18 RX ORDER — CLONAZEPAM 1 MG
0.25 TABLET ORAL ONCE
Refills: 0 | Status: DISCONTINUED | OUTPATIENT
Start: 2021-03-18 | End: 2021-03-18

## 2021-03-18 RX ADMIN — MEMANTINE HYDROCHLORIDE 10 MILLIGRAM(S): 10 TABLET ORAL at 17:28

## 2021-03-18 RX ADMIN — DONEPEZIL HYDROCHLORIDE 10 MILLIGRAM(S): 10 TABLET, FILM COATED ORAL at 21:40

## 2021-03-18 RX ADMIN — Medication 40 MILLIGRAM(S): at 14:07

## 2021-03-18 RX ADMIN — Medication 6 MILLIGRAM(S): at 06:28

## 2021-03-18 RX ADMIN — Medication 1 MILLIGRAM(S): at 14:10

## 2021-03-18 RX ADMIN — Medication 0.25 MILLIGRAM(S): at 23:23

## 2021-03-18 RX ADMIN — Medication 125 MICROGRAM(S): at 06:28

## 2021-03-18 RX ADMIN — KETOCONAZOLE 1 APPLICATION(S): 20 AEROSOL, FOAM TOPICAL at 21:40

## 2021-03-18 RX ADMIN — MIRTAZAPINE 7.5 MILLIGRAM(S): 45 TABLET, ORALLY DISINTEGRATING ORAL at 21:40

## 2021-03-18 RX ADMIN — AMIODARONE HYDROCHLORIDE 200 MILLIGRAM(S): 400 TABLET ORAL at 06:28

## 2021-03-18 RX ADMIN — MEMANTINE HYDROCHLORIDE 10 MILLIGRAM(S): 10 TABLET ORAL at 06:28

## 2021-03-18 RX ADMIN — Medication 5 MILLIGRAM(S): at 21:40

## 2021-03-18 RX ADMIN — PANTOPRAZOLE SODIUM 40 MILLIGRAM(S): 20 TABLET, DELAYED RELEASE ORAL at 12:05

## 2021-03-18 RX ADMIN — TAMSULOSIN HYDROCHLORIDE 0.4 MILLIGRAM(S): 0.4 CAPSULE ORAL at 21:40

## 2021-03-18 RX ADMIN — SODIUM ZIRCONIUM CYCLOSILICATE 10 GRAM(S): 10 POWDER, FOR SUSPENSION ORAL at 11:58

## 2021-03-18 RX ADMIN — Medication 1 TABLET(S): at 14:10

## 2021-03-18 NOTE — CHART NOTE - NSCHARTNOTEFT_GEN_A_CORE
UROLOGY EVENT NOTE    Called for patient with microscopic hematuria and anemia. Thorough chart review performed, UA x2 showed yellow urine with only microscopic hematuria (up to 222 RBCs on UA). This is not likely a cause for patients anemia. CT shows b/l non obstructing stones which may be a cause for hematuria but would not require any urgent intervention as they are not obstructing. Pt should follow up outpatient with the [facility] after discharge for further work up.

## 2021-03-18 NOTE — PROGRESS NOTE ADULT - PROBLEM SELECTOR PLAN 4
-patient with hematuria on UA, in setting of anemia. Unclear etiology (will probe if walker placed at OSH)  -monitor Is&Os  -f/u CT A/P - patient with 3-5 days of diarrhea, watery, nonbloody. May be 2/2 COVID PNA. Patient has history of diverticulitis s/p sigmoid resection.   - no current diarrhea - send samples if able  - f/u GI PCR, stool cultures  - f/u CT A/P - chronic LAD, no acute changes

## 2021-03-18 NOTE — PROGRESS NOTE ADULT - PROBLEM SELECTOR PLAN 6
-on ninlaro/dexamethasone, follows with Dr. Valdo Simons  -f/u hematology recs - patient on amiodarone, metoprolol succinate, warfarin  - supratherapy on warfarin - suspect vit k deficiency 2/2 nutrition - monitor INR and s/s bleeding  - monitor on bedside tele  - appreciate cardiology recs  - amio 200 mg - rate controlled currently  - resume Toprol when BP tolerates

## 2021-03-18 NOTE — PROGRESS NOTE ADULT - PROBLEM SELECTOR PLAN 3
-patient with 3-5 days of diarrhea, watery, nonbloody. May be 2/2 COVID PNA. Patient has history of diverticulitis s/p sigmoid resection.   -f/u GI PCR, stool cultures  -f/u CT A/P- chronic LAD, no acute changes Tested positive 3/11, initial symptoms (fevers, fatigue) started 3/9, patient received monoclonal antibody treatment at [facility] and acutely experienced worsening of fevers and chills, may be superimposed transfusion reaction on top of COVID PNA.   - c/w dexamethasone 6mg IV daily for 10 days (will do one day of solumedrol q8 to treat both COVID PNA and possible transfusion reaction)  - control fevers with acetaminophen  - will hold remdesevir in setting of LOUISE on CKD, patient weaned off supplemental O2, saturating well on room air  - fevers: f/u BCx, UCx, CT chest, abdomen pelvis. Hold abx per ID recs, no SOI besides COVID identified.

## 2021-03-18 NOTE — PROGRESS NOTE ADULT - SUBJECTIVE AND OBJECTIVE BOX
INFECTIOUS DISEASES FOLLOW UP--Jagdeep Pierce MD  Pager [phone]    This is a follow up note for this  75y Male with  COVID  CLL/Waldenstorms.    Further ROS:  CONSTITUTIONAL:  good appetite  CARDIOVASCULAR:  No chest pain or palpitations  RESPIRATORY:  + dyspnea  GASTROINTESTINAL:  No nausea, vomiting, diarrhea, or abdominal pain  GENITOURINARY:  No dysuria  NEUROLOGIC:  No headache,     Allergies  rituximab (Angioedema)    ANTIBIOTICS/RELEVANT:  antimicrobials off abx    OTHER:  aMIOdarone    Tablet 200 milliGRAM(s) Oral daily  dexAMETHasone     Tablet 6 milliGRAM(s) Oral daily  donepezil 10 milliGRAM(s) Oral at bedtime  folic acid 1 milliGRAM(s) Oral daily  lactobacillus acidophilus 1 Tablet(s) Oral daily  levothyroxine 125 MICROGram(s) Oral daily  melatonin 5 milliGRAM(s) Oral at bedtime  memantine 10 milliGRAM(s) Oral every 12 hours  mirtazapine 7.5 milliGRAM(s) Oral at bedtime  multivitamin 1 Tablet(s) Oral daily  pantoprazole  Injectable 40 milliGRAM(s) IV Push daily  sodium chloride 0.45% 1000 milliLiter(s) IV Continuous <Continuous>  sodium zirconium cyclosilicate 10 Gram(s) Oral once  tamsulosin 0.4 milliGRAM(s) Oral at bedtime    Objective:  Vital Signs Last 24 Hrs  T(C): 36.4 (18 Mar 2021 08:53), Max: 36.8 (17 Mar 2021 21:36)  T(F): 97.6 (18 Mar 2021 08:53), Max: 98.3 (17 Mar 2021 21:36)  HR: 58 (18 Mar 2021 08:53) (53 - 65)  BP: 121/68 (18 Mar 2021 08:53) (103/58 - 121/68)  BP(mean): --  RR: 18 (18 Mar 2021 09:02) (18 - 20)  SpO2: 92% (18 Mar 2021 09:02) (92% - 98%)    PHYSICAL EXAM:  Constitutional:no acute distress  Eyes:SONIDO, EOMI  Ear/Nose/Throat: no oral lesions, 	  Respiratory: clear BL  Cardiovascular: S1S2  Gastrointestinal:soft, (+) BS, no tenderness  Extremities:no e/e/c  No Lymphadenopathy  IV sites not inflammed.    LABS:                        7.8    2.67  )-----------( 92       ( 18 Mar 2021 07:02 )             25.7     03-18    140  |  110<H>  |  61<H>  ----------------------------<  161<H>  5.4<H>   |  20<L>  |  4.52<H>    Ca    8.3<L>      18 Mar 2021 07:41  Phos  4.8     03-18  Mg     2.0     03-18    TPro  5.5<L>  /  Alb  3.0<L>  /  TBili  0.2  /  DBili  x   /  AST  30  /  ALT  21  /  AlkPhos  50  03-18    PT/INR - ( 18 Mar 2021 07:05 )   PT: 38.8 sec;   INR: 3.43 ratio         PTT - ( 18 Mar 2021 07:05 )  PTT:40.5 sec  Urinalysis Basic - ( 18 Mar 2021 00:49 )    Color: Yellow / Appearance: Clear / S.020 / pH: x  Gluc: x / Ketone: Negative  / Bili: Negative / Urobili: Negative   Blood: x / Protein: 100 / Nitrite: Negative   Leuk Esterase: Negative / RBC: 133 /hpf / WBC 3 /HPF   Sq Epi: x / Non Sq Epi: 1 /hpf / Bacteria: Negative    imp/rx:  COVID Infection--moderate/severe  CKD  on decadron.    would continue total of 10 days decadron while in house.  consider dc planning.  no indication for antibacterials.  d/w Dr. Martinez

## 2021-03-18 NOTE — PROGRESS NOTE ADULT - SUBJECTIVE AND OBJECTIVE BOX
Donnie Petty MD  Internal Medicine Resident  [phone]    PATIENT:  NICOLAS CIFUENTES  [id number]    CHIEF COMPLAINT:  Patient is a 75y old  Male who presents with a chief complaint of Fevers & chills (17 Mar 2021 16:34)      INTERVAL HISTORY/OVERNIGHT EVENTS:      REVIEW OF SYSTEMS:    Constitutional:     [ ] negative [ ] fevers [ ] chills [ ] weight loss [ ] weight gain  HEENT:                  [ ] negative [ ] dry eyes [ ] eye irritation [ ] postnasal drip [ ] nasal congestion  CV:                         [ ] negative  [ ] chest pain [ ] orthopnea [ ] palpitations [ ] murmur  Resp:                     [ ] negative [ ] cough [ ] shortness of breath [ ] dyspnea [ ] wheezing [ ] sputum [ ] hemoptysis  GI:                          [ ] negative [ ] nausea [ ] vomiting [ ] diarrhea [ ] constipation [ ] abd pain [ ] dysphagia   :                        [ ] negative [ ] dysuria [ ] nocturia [ ] hematuria [ ] increased urinary frequency  Musculoskeletal: [ ] negative [ ] back pain [ ] myalgias [ ] arthralgias [ ] fracture  Skin:                       [ ] negative [ ] rash [ ] itch  Neurological:        [ ] negative [ ] headache [ ] dizziness [ ] syncope [ ] weakness [ ] numbness  Psychiatric:           [ ] negative [ ] anxiety [ ] depression  Endocrine:            [ ] negative [ ] diabetes [ ] thyroid problem  Heme/Lymph:      [ ] negative [ ] anemia [ ] bleeding problem  Allergic/Immune: [ ] negative [ ] itchy eyes [ ] nasal discharge [ ] hives [ ] angioedema    [ ] All other systems negative  [ ] Unable to assess ROS because ________.    MEDICATIONS:  MEDICATIONS  (STANDING):  aMIOdarone    Tablet 200 milliGRAM(s) Oral daily  dexAMETHasone     Tablet 6 milliGRAM(s) Oral daily  donepezil 10 milliGRAM(s) Oral at bedtime  folic acid 1 milliGRAM(s) Oral daily  lactobacillus acidophilus 1 Tablet(s) Oral daily  levothyroxine 125 MICROGram(s) Oral daily  melatonin 5 milliGRAM(s) Oral at bedtime  memantine 10 milliGRAM(s) Oral every 12 hours  mirtazapine 7.5 milliGRAM(s) Oral at bedtime  multivitamin 1 Tablet(s) Oral daily  pantoprazole  Injectable 40 milliGRAM(s) IV Push daily  sodium chloride 0.45% 1000 milliLiter(s) (50 mL/Hr) IV Continuous <Continuous>  tamsulosin 0.4 milliGRAM(s) Oral at bedtime    MEDICATIONS  (PRN):      ALLERGIES:  Allergies    rituximab (Angioedema)    Intolerances        OBJECTIVE:  ICU Vital Signs Last 24 Hrs  T(C): 36.5 (18 Mar 2021 06:18), Max: 36.8 (17 Mar 2021 21:36)  T(F): 97.7 (18 Mar 2021 06:18), Max: 98.3 (17 Mar 2021 21:36)  HR: 53 (18 Mar 2021 06:18) (53 - 65)  BP: 103/58 (18 Mar 2021 06:18) (103/58 - 121/70)  BP(mean): --  ABP: --  ABP(mean): --  RR: 18 (18 Mar 2021 06:18) (18 - 20)  SpO2: 96% (18 Mar 2021 06:18) (95% - 98%)      Adult Advanced Hemodynamics Last 24 Hrs  CVP(mm Hg): --  CVP(cm H2O): --  CO: --  CI: --  PA: --  PA(mean): --  PCWP: --  SVR: --  SVRI: --  PVR: --  PVRI: --  CAPILLARY BLOOD GLUCOSE        CAPILLARY BLOOD GLUCOSE        I&O's Summary    17 Mar 2021 07:01  -  18 Mar 2021 07:00  --------------------------------------------------------  IN: 1430 mL / OUT: 1150 mL / NET: 280 mL      Daily     Daily     PHYSICAL EXAMINATION:  General: WN/WD NAD  HEENT: PERRLA, EOMI, moist mucous membranes  Neurology: A&Ox3, nonfocal, CARRIZALES x 4  Respiratory: CTA B/L, normal respiratory effort, no wheezes, crackles, rales  CV: RRR, S1S2, no murmurs, rubs or gallops  Abdominal: Soft, NT, ND +BS, Last BM  Extremities: No edema, + peripheral pulses  Incisions:   Tubes:    LABS:                          7.8    2.67  )-----------( 92       ( 18 Mar 2021 07:02 )             25.7     03-17    141  |  112<H>  |  56<H>  ----------------------------<  173<H>  5.4<H>   |  18<L>  |  4.63<H>    Ca    8.3<L>      17 Mar 2021 19:12  Phos  3.9       Mg     1.8         TPro  5.5<L>  /  Alb  3.0<L>  /  TBili  0.1<L>  /  DBili  x   /  AST  32  /  ALT  24  /  AlkPhos  53  03-    LIVER FUNCTIONS - ( 17 Mar 2021 19:12 )  Alb: 3.0 g/dL / Pro: 5.5 g/dL / ALK PHOS: 53 U/L / ALT: 24 U/L / AST: 32 U/L / GGT: x           PT/INR - ( 17 Mar 2021 06:51 )   PT: 35.4 sec;   INR: 3.11 ratio         PTT - ( 17 Mar 2021 06:51 )  PTT:40.9 sec        Urinalysis Basic - ( 18 Mar 2021 00:49 )    Color: Yellow / Appearance: Clear / S.020 / pH: x  Gluc: x / Ketone: Negative  / Bili: Negative / Urobili: Negative   Blood: x / Protein: 100 / Nitrite: Negative   Leuk Esterase: Negative / RBC: 133 /hpf / WBC 3 /HPF   Sq Epi: x / Non Sq Epi: 1 /hpf / Bacteria: Negative        TELEMETRY:     EKG:     IMAGING:       Donnie Petty MD  Internal Medicine Resident  [phone]    PATIENT:  NICOLAS CIFUENTES  [id number]    CHIEF COMPLAINT:  Patient is a 75y old  Male who presents with a chief complaint of Fevers & chills (17 Mar 2021 16:34)      INTERVAL HISTORY/OVERNIGHT EVENTS:  - overnight feels ok  - on room air this morning, eating breakfast, states urinating more now  - mild hyperkalemia overnight given Lokelma  - otherwise able to lie down flat  - breathing easier now he states    MEDICATIONS:  MEDICATIONS  (STANDING):  aMIOdarone    Tablet 200 milliGRAM(s) Oral daily  dexAMETHasone     Tablet 6 milliGRAM(s) Oral daily  donepezil 10 milliGRAM(s) Oral at bedtime  folic acid 1 milliGRAM(s) Oral daily  lactobacillus acidophilus 1 Tablet(s) Oral daily  levothyroxine 125 MICROGram(s) Oral daily  melatonin 5 milliGRAM(s) Oral at bedtime  memantine 10 milliGRAM(s) Oral every 12 hours  mirtazapine 7.5 milliGRAM(s) Oral at bedtime  multivitamin 1 Tablet(s) Oral daily  pantoprazole  Injectable 40 milliGRAM(s) IV Push daily  sodium chloride 0.45% 1000 milliLiter(s) (50 mL/Hr) IV Continuous <Continuous>  tamsulosin 0.4 milliGRAM(s) Oral at bedtime    MEDICATIONS  (PRN):    ALLERGIES:  Allergies  rituximab (Angioedema)    OBJECTIVE:  T(C): 36.5 (18 Mar 2021 06:18), Max: 36.8 (17 Mar 2021 21:36)  T(F): 97.7 (18 Mar 2021 06:18), Max: 98.3 (17 Mar 2021 21:36)  HR: 53 (18 Mar 2021 06:18) (53 - 65)  BP: 103/58 (18 Mar 2021 06:18) (103/58 - 121/70)  RR: 18 (18 Mar 2021 06:18) (18 - 20)  SpO2: 96% (18 Mar 2021 06:18) (95% - 98%)    I&O's Summary    17 Mar 2021 07:01  -  18 Mar 2021 07:00  --------------------------------------------------------  IN: 1430 mL / OUT: 1150 mL / NET: 280 mL    PHYSICAL EXAMINATION:  General: WN/WD NAD  HEENT: PERRL, EOMI, moist mucous membranes  Neurology: A&Ox3, nonfocal, CARRIZALES x 4  Respiratory: normal respiratory effort, mild coarse breath sounds  CV: RRR, S1S2, no murmurs, rubs or gallops  Abdominal: Soft, NT, ND +BS, Last BM 3/18  Extremities: 1+ pitting edema      LABS:                          7.8    2.67  )-----------( 92       ( 18 Mar 2021 07:02 )             25.7     03-    141  |  112<H>  |  56<H>  ----------------------------<  173<H>  5.4<H>   |  18<L>  |  4.63<H>    Ca    8.3<L>      17 Mar 2021 19:12  Phos  3.9     -  Mg     1.8         TPro  5.5<L>  /  Alb  3.0<L>  /  TBili  0.1<L>  /  DBili  x   /  AST  32  /  ALT  24  /  AlkPhos  53      LIVER FUNCTIONS - ( 17 Mar 2021 19:12 )  Alb: 3.0 g/dL / Pro: 5.5 g/dL / ALK PHOS: 53 U/L / ALT: 24 U/L / AST: 32 U/L / GGT: x           PT/INR - ( 17 Mar 2021 06:51 )   PT: 35.4 sec;   INR: 3.11 ratio    PTT - ( 17 Mar 2021 06:51 )  PTT:40.9 sec        Urinalysis Basic - ( 18 Mar 2021 00:49 )    Color: Yellow / Appearance: Clear / S.020 / pH: x  Gluc: x / Ketone: Negative  / Bili: Negative / Urobili: Negative   Blood: x / Protein: 100 / Nitrite: Negative   Leuk Esterase: Negative / RBC: 133 /hpf / WBC 3 /HPF   Sq Epi: x / Non Sq Epi: 1 /hpf / Bacteria: Negative    TELEMETRY: occasional bigeminy

## 2021-03-18 NOTE — PROGRESS NOTE ADULT - PROBLEM SELECTOR PLAN 5
-patient on amiodarone, metoprolol succinate, warfarin  -hold AC in setting of pancytopenia  -monitor on bedside tele  -patient's cardiologist notified of admission, f/u recs - patient with hematuria on UA, in setting of anemia  - per urology  - monitor Is&Os  - f/u CT A/P with nonobstructive renal stones  - per urology no inpatient workup

## 2021-03-18 NOTE — PROGRESS NOTE ADULT - SUBJECTIVE AND OBJECTIVE BOX
Overnight events noted      VITAL:  T(C): , Max: 36.8 (21 @ 21:36)  T(F): , Max: 98.3 (21 @ 21:36)  HR: 53 (21 @ 06:18)  BP: 103/58 (21 @ 06:18)  BP(mean): --  RR: 18 (21 @ 06:18)  SpO2: 96% (21 @ 06:18)      PHYSICAL EXAM:  Constitutional: NAD, Alert  HEENT: NCAT, DMM  Neck: Supple, No JVD  Respiratory: CTA-b/l  Cardiovascular: reg sabina s1s2  Gastrointestinal: BS+, soft, NT/ND  Extremities: No peripheral edema b/l  Neurological: no focal deficits; strength grossly intact  Back: no CVAT b/l  Skin: (+)left frontal ecchymosis    LABS:                        7.8    2.67  )-----------( 92       ( 18 Mar 2021 07:02 )             25.7     Na(141)/K(5.4)/Cl(112)/HCO3(18)/BUN(56)/Cr(4.63)Glu(173)/Ca(8.3)/Mg(1.8)/PO4(3.9)     @ 19:12  Na(140)/K(5.3)/Cl(112)/HCO3(17)/BUN(47)/Cr(4.58)Glu(178)/Ca(8.5)/Mg(1.7)/PO4(4.2)     @ 06:50  Na(143)/K(5.0)/Cl(111)/HCO3(16)/BUN(41)/Cr(4.34)Glu(112)/Ca(9.1)/Mg(--)/PO4(--)     @ 15:48    Urinalysis Basic - ( 18 Mar 2021 00:49 )  Color: Yellow / Appearance: Clear / S.020 / pH: x  Gluc: x / Ketone: Negative  / Bili: Negative / Urobili: Negative   Blood: x / Protein: 100 / Nitrite: Negative   Leuk Esterase: Negative / RBC: 133 /hpf / WBC 3 /HPF   Sq Epi: x / Non Sq Epi: 1 /hpf / Bacteria: Negative      IMPRESSION: 75N w/ Waldenstroms, mild dementia, AFib, and CKD4, 3/16/21 a/w COVID PNA    (1)Renal - 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 Cesar Simons as outpatient)    (5)No HD for now      Norman Ascencio MD  [facility]  Office: [phone]  Cell: [phone]           Overnight events noted   VITAL: T(C): , Max: 36.8 (21 @ 21:36) T(F): , Max: 98.3 (21 @ 21:36) HR: 53 (21 @ 06:18) BP: 103/58 (21 @ 06:18) BP(mean): -- RR: 18 (21 @ 06:18) SpO2: 96% (21 @ 06:18)   PHYSICAL EXAM: Constitutional: NAD, Alert HEENT: NCAT, DMM Neck: Supple, No JVD Respiratory: CTA-b/l Cardiovascular: reg sabina s1s2 Gastrointestinal: BS+, soft, NT/ND Extremities: No peripheral edema b/l Neurological: no focal deficits; strength grossly intact Back: no CVAT b/l Skin: (+)left frontal ecchymosis  LABS:                      7.8   2.67  )-----------( 92       ( 18 Mar 2021 07:02 )            25.7   Na(141)/K(5.4)/Cl(112)/HCO3(18)/BUN(56)/Cr(4.63)Glu(173)/Ca(8.3)/Mg(1.8)/PO4(3.9)     @ 19:12 Na(140)/K(5.3)/Cl(112)/HCO3(17)/BUN(47)/Cr(4.58)Glu(178)/Ca(8.5)/Mg(1.7)/PO4(4.2)     @ 06:50 Na(143)/K(5.0)/Cl(111)/HCO3(16)/BUN(41)/Cr(4.34)Glu(112)/Ca(9.1)/Mg(--)/PO4(--)     @ 15:48  Urinalysis Basic - ( 18 Mar 2021 00:49 ) Color: Yellow / Appearance: Clear / S.020 / pH: x Gluc: x / Ketone: Negative  / Bili: Negative / Urobili: Negative  Blood: x / Protein: 100 / Nitrite: Negative  Leuk Esterase: Negative / RBC: 133 /hpf / WBC 3 /HPF  Sq Epi: x / Non Sq Epi: 1 /hpf / Bacteria: Negative   IMPRESSION: 75N w/ Waldenstroms, mild dementia, AFib, and CKD4, 3/16/21 a/w COVID PNA  (1)Renal - 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 - base creat mid-3s (2)LOUISE - hemodynamic - plateauing creatinine in mid 4s, s/p placement on gentle IVF (3)Hyperkalemia - mild - largely renally mediated (4)Metabolic acidosis - largely renally mediated (5)Pancytopenia - Heme on board (6)Pulm/ID - COVID PNA - Pulm+ID on board - on Decadron  RECOMMEND: (1)Can repeat 1/2NS+75meq/L NaHCO3, 75cc/h x 12 hours today  (2)BMP +Mg+PO4 qd; lokelma 10gm po prn K of 5.5 or higher; insulin/d50/albuterol/calcium prn K of 6 or higher  (3)Dose new meds for GFR 10-15ml/min (present dosing is acceptable)  (4)Management of anemia per primary team/Heme  (5)Treatment of COVID19 per primary team/Pulm/ID  (6)No HD for now    Norman Ascencio MD [facility] Office: [phone] Cell: [phone]        No pain, no sob   VITAL: T(C): , Max: 36.8 (21 @ 21:36) T(F): , Max: 98.3 (21 @ 21:36) HR: 53 (21 @ 06:18) BP: 103/58 (21 @ 06:18) BP(mean): -- RR: 18 (21 @ 06:18) SpO2: 96% (21 @ 06:18)   PHYSICAL EXAM: Constitutional: NAD, Alert HEENT: NCAT, DMM Neck: Supple, No JVD Respiratory: CTA-b/l Cardiovascular: reg sabina irreg Gastrointestinal: BS+, soft, NT/ND Extremities: No peripheral edema b/l Neurological: no focal deficits; strength grossly intact Back: no CVAT b/l Skin: (+)left frontal ecchymosis  LABS:                      7.8   2.67  )-----------( 92       ( 18 Mar 2021 07:02 )            25.7   Na(141)/K(5.4)/Cl(112)/HCO3(18)/BUN(56)/Cr(4.63)Glu(173)/Ca(8.3)/Mg(1.8)/PO4(3.9)     @ 19:12 Na(140)/K(5.3)/Cl(112)/HCO3(17)/BUN(47)/Cr(4.58)Glu(178)/Ca(8.5)/Mg(1.7)/PO4(4.2)     @ 06:50 Na(143)/K(5.0)/Cl(111)/HCO3(16)/BUN(41)/Cr(4.34)Glu(112)/Ca(9.1)/Mg(--)/PO4(--)     @ 15:48  Urinalysis Basic - ( 18 Mar 2021 00:49 ) Color: Yellow / Appearance: Clear / S.020 / pH: x Gluc: x / Ketone: Negative  / Bili: Negative / Urobili: Negative  Blood: x / Protein: 100 / Nitrite: Negative  Leuk Esterase: Negative / RBC: 133 /hpf / WBC 3 /HPF  Sq Epi: x / Non Sq Epi: 1 /hpf / Bacteria: Negative   IMPRESSION: 75N w/ Waldenstroms, mild dementia, AFib, and CKD4, 3/16/21 a/w COVID PNA  (1)Renal - 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 - base creat mid-3s (2)LOUISE - hemodynamic - plateauing creatinine in mid 4s, s/p placement on gentle IVF (3)Hyperkalemia - mild - largely renally mediated (4)Metabolic acidosis - largely renally mediated (5)Pancytopenia - Heme on board (6)Pulm/ID - COVID PNA - Pulm+ID on board - on Decadron  RECOMMEND: (1)Can repeat 1/2NS+75meq/L NaHCO3, 75cc/h x 12 hours today  (2)BMP +Mg+PO4 qd; lokelma 10gm po prn K of 5.5 or higher; insulin/d50/albuterol/calcium prn K of 6 or higher  (3)Dose new meds for GFR 10-15ml/min (present dosing is acceptable)  (4)Management of anemia per primary team/Heme  (5)Treatment of COVID19 per primary team/Pulm/ID  (6)No HD for now    Norman Ascencio MD [facility] Office: [phone] Cell: [phone]

## 2021-03-18 NOTE — PROGRESS NOTE ADULT - PROBLEM SELECTOR PLAN 10
-DVT PPx- hold AC in setting of bleed, c/w SCDs  -GI PPx- c/w protonix, history of GERD -DVT PPx- hold AC in setting of bleed, c/w SCDs  -GI PPx - c/w protonix, history of GERD

## 2021-03-18 NOTE — PROGRESS NOTE ADULT - PROBLEM SELECTOR PLAN 2
-likely chronic in setting of hematologic process, immunosuppression; leukopenia may be worsened in setting of COVID pna, anemia in setting of hematuria.   -f/u anemia workup (iron studies, B12, folate, etc)  -hematology consulted, patient sees Dr. Valdo Simons outpatient; f/u recs - likely chronic in setting of hematologic process, immunosuppression; leukopenia may be worsened in setting of COVID pna, anemia in setting of hematuria.   - has low retic proliferation and iron def.  - hematology consulted, patient sees Dr. Valdo Simons outpatient  - transfusion per guidelines, no melena or other evidence of bleed  - reversal of INR as indicated if bleeding suspected

## 2021-03-18 NOTE — PROGRESS NOTE ADULT - PROBLEM SELECTOR PLAN 7
LOUISE on CKD  -dose abx by level  -f/u nephro recs  -monitor Is&Os - on ninlaro/dexamethasone, follows with Dr. Valdo Simons  - per heme, no currently inpatient workup or treatment

## 2021-03-18 NOTE — PROGRESS NOTE ADULT - PROBLEM SELECTOR PLAN 1
Tested positive 3/11, initial symptoms (fevers, fatigue) started 3/9, patient received monoclonal antibody treatment at [facility] and acutely experienced worsening of fevers and chills, may be superimposed transfusion reaction on top of COVID PNA.   -c/w dexamethasone 6mg IV daily for 10 days (will do one day of solumedrol q8 to treat both COVID PNA and possible transfusion reaction)  -control fevers with acetaminophen  -will hold remdesevir in setting of LOUISE on CKD, patient weaned off supplemental O2, saturating well on room air  -fevers: f/u BCx, UCx, CT chest, abdomen pelvis. Hold abx per ID recs, no SOI besides COVID identified. - LOUISE on CKD, baseline Cr 3.7  - 4.5 on admission  - trend uop  - appreciate nephrology consult - pending clinical improvement  - monitor lytes, renally dose medications

## 2021-03-18 NOTE — PROGRESS NOTE ADULT - SUBJECTIVE AND OBJECTIVE BOX
[facility] DIVISION OF PULMONARY, CRITICAL CARE and SLEEP MEDICINE  PULMONARY PROGRESS NOTE  OFFICE NUMBER: [phone] (Afterhours and Weekends)  CONSULT NUMBER: [phone] (Monday - Friday: 9am-5pm)    PATIENT INFORMATION:  NAME: NICOLAS CIFUENTES:  MRN: [id number]    CHIEF COMPLAINT: Patient is a 75y old  Male who presents with a chief complaint of Fevers & chills (18 Mar 2021 11:57)      [x] INITIAL CONSULT, H&P, FAMILY HISTORY and PAST MEDICAL AND SURGICAL HISTORY REVIEWED    OVERNIGHT EVENTS or CHANGES TO HPI: Patient states he is doing well off oxygen.     ========================REVIEW OF SYSTEMS========================  CONSTITUTIONAL: Denies fevers/chills/myalgias  CARDIOVASCULAR: Denies chest pain, and heart palpitations  PULMONARY: Denies dyspnea  [x] REMAINING REVIEW OF SYSTEMS NEGATIVE  [] UNABLE TO OBTAIN REVIEW OF SYSTEMS DUE TO _______________    ========================MEDICATIONS=============================  MEDICATIONS  (STANDING):  aMIOdarone    Tablet 200 milliGRAM(s) Oral daily  dexAMETHasone     Tablet 6 milliGRAM(s) Oral daily  donepezil 10 milliGRAM(s) Oral at bedtime  folic acid 1 milliGRAM(s) Oral daily  lactobacillus acidophilus 1 Tablet(s) Oral daily  levothyroxine 125 MICROGram(s) Oral daily  melatonin 5 milliGRAM(s) Oral at bedtime  memantine 10 milliGRAM(s) Oral every 12 hours  mirtazapine 7.5 milliGRAM(s) Oral at bedtime  multivitamin 1 Tablet(s) Oral daily  pantoprazole  Injectable 40 milliGRAM(s) IV Push daily  tamsulosin 0.4 milliGRAM(s) Oral at bedtime      MEDICATIONS  (PRN):      ========================PHYSICAL EXAM============================    VITALS: ICU Vital Signs Last 24 Hrs  T(C): 36.6 (18 Mar 2021 12:39), Max: 36.8 (17 Mar 2021 21:36)  T(F): 97.9 (18 Mar 2021 12:39), Max: 98.3 (17 Mar 2021 21:36)  HR: 60 (18 Mar 2021 12:39) (53 - 60)  BP: 125/66 (18 Mar 2021 12:39) (103/58 - 125/66)  BP(mean): --  ABP: --  ABP(mean): --  RR: 18 (18 Mar 2021 12:39) (18 - 18)  SpO2: 94% (18 Mar 2021 12:39) (92% - 98%)      INTAKE and OUTPUT: I&O's Summary    17 Mar 2021 07:01  -  18 Mar 2021 07:00  --------------------------------------------------------  IN: 1430 mL / OUT: 1150 mL / NET: 280 mL    18 Mar 2021 07:01  -  18 Mar 2021 14:52  --------------------------------------------------------  IN: 480 mL / OUT: 800 mL / NET: -320 mL        VENTILATOR SETTINGS:  Room Air    GENERAL: Laying in bed UNAD  CARDIOVASCULAR: RRR, Normal S1 and S2  RESPIRATORY: Crackles at the bases bilaterally   EXTREMITIES: 2+ Edema in LE bilaterally  NEUROLOGIC: Nonfocal  PSYCHIATRIC: AAOx3    ========================LABORATORY RESULTS AND IMAGING=============                        7.8    2.67  )-----------( 92       ( 18 Mar 2021 07:02 )             25.7                                                    03-18    140  |  110<H>  |  61<H>  ----------------------------<  161<H>  5.4<H>   |  20<L>  |  4.52<H>    Ca    8.3<L>      18 Mar 2021 07:41  Phos  4.8     03-18  Mg     2.0     03-18    TPro  5.5<L>  /  Alb  3.0<L>  /  TBili  0.2  /  DBili  x   /  AST  30  /  ALT  21  /  AlkPhos  50  03-18          Creatinine Trend: 4.52<--, 4.63<--, 4.58<--, 4.34<--, 3.86<--    NO NEW FILMS TO REVIEW    THANK YOU FOR ALLOWING US TO PARTICIPATE IN THE CARE OF THIS PATIENT

## 2021-03-19 LAB
ALBUMIN SERPL ELPH-MCNC: 2.7 G/DL — LOW (ref 3.3–5)
ALBUMIN SERPL ELPH-MCNC: 3.6 G/DL — SIGNIFICANT CHANGE UP (ref 3.3–5)
ALP SERPL-CCNC: 48 U/L — SIGNIFICANT CHANGE UP (ref 40–120)
ALP SERPL-CCNC: 60 U/L — SIGNIFICANT CHANGE UP (ref 40–120)
ALT FLD-CCNC: 24 U/L — SIGNIFICANT CHANGE UP (ref 10–45)
ALT FLD-CCNC: 31 U/L — SIGNIFICANT CHANGE UP (ref 10–45)
ANION GAP SERPL CALC-SCNC: 14 MMOL/L — SIGNIFICANT CHANGE UP (ref 5–17)
ANION GAP SERPL CALC-SCNC: 16 MMOL/L — SIGNIFICANT CHANGE UP (ref 5–17)
APTT BLD: 35.8 SEC — HIGH (ref 27.5–35.5)
AST SERPL-CCNC: 31 U/L — SIGNIFICANT CHANGE UP (ref 10–40)
AST SERPL-CCNC: 33 U/L — SIGNIFICANT CHANGE UP (ref 10–40)
BASOPHILS # BLD AUTO: 0 K/UL — SIGNIFICANT CHANGE UP (ref 0–0.2)
BASOPHILS # BLD AUTO: 0.03 K/UL — SIGNIFICANT CHANGE UP (ref 0–0.2)
BASOPHILS NFR BLD AUTO: 0 % — SIGNIFICANT CHANGE UP (ref 0–2)
BASOPHILS NFR BLD AUTO: 0.8 % — SIGNIFICANT CHANGE UP (ref 0–2)
BILIRUB SERPL-MCNC: 0.2 MG/DL — SIGNIFICANT CHANGE UP (ref 0.2–1.2)
BILIRUB SERPL-MCNC: 0.2 MG/DL — SIGNIFICANT CHANGE UP (ref 0.2–1.2)
BLD GP AB SCN SERPL QL: NEGATIVE — SIGNIFICANT CHANGE UP
BUN SERPL-MCNC: 74 MG/DL — HIGH (ref 7–23)
BUN SERPL-MCNC: 80 MG/DL — HIGH (ref 7–23)
CALCIUM SERPL-MCNC: 7.9 MG/DL — LOW (ref 8.4–10.5)
CALCIUM SERPL-MCNC: 8.7 MG/DL — SIGNIFICANT CHANGE UP (ref 8.4–10.5)
CHLORIDE SERPL-SCNC: 103 MMOL/L — SIGNIFICANT CHANGE UP (ref 96–108)
CHLORIDE SERPL-SCNC: 110 MMOL/L — HIGH (ref 96–108)
CO2 SERPL-SCNC: 15 MMOL/L — LOW (ref 22–31)
CO2 SERPL-SCNC: 20 MMOL/L — LOW (ref 22–31)
CREAT SERPL-MCNC: 4.29 MG/DL — HIGH (ref 0.5–1.3)
CREAT SERPL-MCNC: 4.52 MG/DL — HIGH (ref 0.5–1.3)
CULTURE RESULTS: SIGNIFICANT CHANGE UP
EOSINOPHIL # BLD AUTO: 0 K/UL — SIGNIFICANT CHANGE UP (ref 0–0.5)
EOSINOPHIL # BLD AUTO: 0 K/UL — SIGNIFICANT CHANGE UP (ref 0–0.5)
EOSINOPHIL NFR BLD AUTO: 0 % — SIGNIFICANT CHANGE UP (ref 0–6)
EOSINOPHIL NFR BLD AUTO: 0 % — SIGNIFICANT CHANGE UP (ref 0–6)
GLUCOSE SERPL-MCNC: 150 MG/DL — HIGH (ref 70–99)
GLUCOSE SERPL-MCNC: 160 MG/DL — HIGH (ref 70–99)
HCT VFR BLD CALC: 30.7 % — LOW (ref 39–50)
HCT VFR BLD CALC: 30.9 % — LOW (ref 39–50)
HGB BLD-MCNC: 9.3 G/DL — LOW (ref 13–17)
HGB BLD-MCNC: 9.4 G/DL — LOW (ref 13–17)
IMM GRANULOCYTES NFR BLD AUTO: 0.6 % — SIGNIFICANT CHANGE UP (ref 0–1.5)
IMM GRANULOCYTES NFR BLD AUTO: 4 % — HIGH (ref 0–1.5)
INR BLD: 4.12 RATIO — HIGH (ref 0.88–1.16)
IRON SATN MFR SERPL: 43 UG/DL — LOW (ref 45–165)
IRON SATN MFR SERPL: SIGNIFICANT CHANGE UP % (ref 16–55)
LYMPHOCYTES # BLD AUTO: 0.42 K/UL — LOW (ref 1–3.3)
LYMPHOCYTES # BLD AUTO: 0.69 K/UL — LOW (ref 1–3.3)
LYMPHOCYTES # BLD AUTO: 17.4 % — SIGNIFICANT CHANGE UP (ref 13–44)
LYMPHOCYTES # BLD AUTO: 8.2 % — LOW (ref 13–44)
MAGNESIUM SERPL-MCNC: 1.9 MG/DL — SIGNIFICANT CHANGE UP (ref 1.6–2.6)
MAGNESIUM SERPL-MCNC: 2.1 MG/DL — SIGNIFICANT CHANGE UP (ref 1.6–2.6)
MANUAL SMEAR VERIFICATION: SIGNIFICANT CHANGE UP
MCHC RBC-ENTMCNC: 29.9 PG — SIGNIFICANT CHANGE UP (ref 27–34)
MCHC RBC-ENTMCNC: 30.3 GM/DL — LOW (ref 32–36)
MCHC RBC-ENTMCNC: 30.4 GM/DL — LOW (ref 32–36)
MCHC RBC-ENTMCNC: 31 PG — SIGNIFICANT CHANGE UP (ref 27–34)
MCV RBC AUTO: 102.3 FL — HIGH (ref 80–100)
MCV RBC AUTO: 98.4 FL — SIGNIFICANT CHANGE UP (ref 80–100)
MONOCYTES # BLD AUTO: 0.23 K/UL — SIGNIFICANT CHANGE UP (ref 0–0.9)
MONOCYTES # BLD AUTO: 0.27 K/UL — SIGNIFICANT CHANGE UP (ref 0–0.9)
MONOCYTES NFR BLD AUTO: 5.3 % — SIGNIFICANT CHANGE UP (ref 2–14)
MONOCYTES NFR BLD AUTO: 5.8 % — SIGNIFICANT CHANGE UP (ref 2–14)
NEUTROPHILS # BLD AUTO: 2.86 K/UL — SIGNIFICANT CHANGE UP (ref 1.8–7.4)
NEUTROPHILS # BLD AUTO: 4.39 K/UL — SIGNIFICANT CHANGE UP (ref 1.8–7.4)
NEUTROPHILS NFR BLD AUTO: 72 % — SIGNIFICANT CHANGE UP (ref 43–77)
NEUTROPHILS NFR BLD AUTO: 85.9 % — HIGH (ref 43–77)
NRBC # BLD: 0 /100 WBCS — SIGNIFICANT CHANGE UP (ref 0–0)
NRBC # BLD: 0 /100 WBCS — SIGNIFICANT CHANGE UP (ref 0–0)
NT-PROBNP SERPL-SCNC: 4375 PG/ML — HIGH (ref 0–300)
PHOSPHATE 24H UR-MCNC: 74.3 MG/DL — SIGNIFICANT CHANGE UP
PHOSPHATE SERPL-MCNC: 4 MG/DL — SIGNIFICANT CHANGE UP (ref 2.5–4.5)
PHOSPHATE SERPL-MCNC: 4.4 MG/DL — SIGNIFICANT CHANGE UP (ref 2.5–4.5)
PLAT MORPH BLD: NORMAL — SIGNIFICANT CHANGE UP
PLATELET # BLD AUTO: 103 K/UL — LOW (ref 150–400)
PLATELET # BLD AUTO: 147 K/UL — LOW (ref 150–400)
POTASSIUM SERPL-MCNC: 4.3 MMOL/L — SIGNIFICANT CHANGE UP (ref 3.5–5.3)
POTASSIUM SERPL-MCNC: 5.4 MMOL/L — HIGH (ref 3.5–5.3)
POTASSIUM SERPL-SCNC: 4.3 MMOL/L — SIGNIFICANT CHANGE UP (ref 3.5–5.3)
POTASSIUM SERPL-SCNC: 5.4 MMOL/L — HIGH (ref 3.5–5.3)
PROT SERPL-MCNC: 5.5 G/DL — LOW (ref 6–8.3)
PROT SERPL-MCNC: 6.5 G/DL — SIGNIFICANT CHANGE UP (ref 6–8.3)
PROTHROM AB SERPL-ACNC: 46.2 SEC — HIGH (ref 10.6–13.6)
RBC # BLD: 3 M/UL — LOW (ref 4.2–5.8)
RBC # BLD: 3.14 M/UL — LOW (ref 4.2–5.8)
RBC # FLD: 14 % — SIGNIFICANT CHANGE UP (ref 10.3–14.5)
RBC # FLD: 14.2 % — SIGNIFICANT CHANGE UP (ref 10.3–14.5)
RBC BLD AUTO: SIGNIFICANT CHANGE UP
RH IG SCN BLD-IMP: POSITIVE — SIGNIFICANT CHANGE UP
SODIUM SERPL-SCNC: 139 MMOL/L — SIGNIFICANT CHANGE UP (ref 135–145)
SODIUM SERPL-SCNC: 139 MMOL/L — SIGNIFICANT CHANGE UP (ref 135–145)
SPECIMEN SOURCE: SIGNIFICANT CHANGE UP
TIBC SERPL-MCNC: SIGNIFICANT CHANGE UP UG/DL (ref 220–430)
UIBC SERPL-MCNC: <20 UG/DL — LOW (ref 110–370)
WBC # BLD: 3.97 K/UL — SIGNIFICANT CHANGE UP (ref 3.8–10.5)
WBC # BLD: 5.11 K/UL — SIGNIFICANT CHANGE UP (ref 3.8–10.5)
WBC # FLD AUTO: 3.97 K/UL — SIGNIFICANT CHANGE UP (ref 3.8–10.5)
WBC # FLD AUTO: 5.11 K/UL — SIGNIFICANT CHANGE UP (ref 3.8–10.5)

## 2021-03-19 PROCEDURE — 99233 SBSQ HOSP IP/OBS HIGH 50: CPT | Mod: GC

## 2021-03-19 PROCEDURE — 99233 SBSQ HOSP IP/OBS HIGH 50: CPT

## 2021-03-19 PROCEDURE — 99232 SBSQ HOSP IP/OBS MODERATE 35: CPT

## 2021-03-19 PROCEDURE — 71045 X-RAY EXAM CHEST 1 VIEW: CPT | Mod: 26

## 2021-03-19 RX ORDER — PANTOPRAZOLE SODIUM 20 MG/1
40 TABLET, DELAYED RELEASE ORAL
Refills: 0 | Status: DISCONTINUED | OUTPATIENT
Start: 2021-03-19 | End: 2021-03-31

## 2021-03-19 RX ORDER — SODIUM ZIRCONIUM CYCLOSILICATE 10 G/10G
10 POWDER, FOR SUSPENSION ORAL ONCE
Refills: 0 | Status: COMPLETED | OUTPATIENT
Start: 2021-03-19 | End: 2021-03-19

## 2021-03-19 RX ORDER — FUROSEMIDE 40 MG
40 TABLET ORAL ONCE
Refills: 0 | Status: DISCONTINUED | OUTPATIENT
Start: 2021-03-19 | End: 2021-03-19

## 2021-03-19 RX ORDER — SODIUM BICARBONATE 1 MEQ/ML
650 SYRINGE (ML) INTRAVENOUS
Refills: 0 | Status: DISCONTINUED | OUTPATIENT
Start: 2021-03-19 | End: 2021-03-24

## 2021-03-19 RX ORDER — CLONAZEPAM 1 MG
0.25 TABLET ORAL ONCE
Refills: 0 | Status: DISCONTINUED | OUTPATIENT
Start: 2021-03-19 | End: 2021-03-19

## 2021-03-19 RX ADMIN — Medication 1 TABLET(S): at 13:50

## 2021-03-19 RX ADMIN — MIRTAZAPINE 7.5 MILLIGRAM(S): 45 TABLET, ORALLY DISINTEGRATING ORAL at 21:56

## 2021-03-19 RX ADMIN — DONEPEZIL HYDROCHLORIDE 10 MILLIGRAM(S): 10 TABLET, FILM COATED ORAL at 21:56

## 2021-03-19 RX ADMIN — MEMANTINE HYDROCHLORIDE 10 MILLIGRAM(S): 10 TABLET ORAL at 17:28

## 2021-03-19 RX ADMIN — AMIODARONE HYDROCHLORIDE 200 MILLIGRAM(S): 400 TABLET ORAL at 05:08

## 2021-03-19 RX ADMIN — Medication 6 MILLIGRAM(S): at 05:08

## 2021-03-19 RX ADMIN — Medication 5 MILLIGRAM(S): at 21:56

## 2021-03-19 RX ADMIN — Medication 650 MILLIGRAM(S): at 17:27

## 2021-03-19 RX ADMIN — Medication 40 MILLIGRAM(S): at 05:08

## 2021-03-19 RX ADMIN — SODIUM ZIRCONIUM CYCLOSILICATE 10 GRAM(S): 10 POWDER, FOR SUSPENSION ORAL at 12:07

## 2021-03-19 RX ADMIN — MEMANTINE HYDROCHLORIDE 10 MILLIGRAM(S): 10 TABLET ORAL at 05:08

## 2021-03-19 RX ADMIN — PANTOPRAZOLE SODIUM 40 MILLIGRAM(S): 20 TABLET, DELAYED RELEASE ORAL at 13:50

## 2021-03-19 RX ADMIN — Medication 125 MICROGRAM(S): at 05:08

## 2021-03-19 RX ADMIN — Medication 0.25 MILLIGRAM(S): at 21:55

## 2021-03-19 RX ADMIN — Medication 1 MILLIGRAM(S): at 13:49

## 2021-03-19 RX ADMIN — TAMSULOSIN HYDROCHLORIDE 0.4 MILLIGRAM(S): 0.4 CAPSULE ORAL at 21:56

## 2021-03-19 NOTE — PROGRESS NOTE ADULT - SUBJECTIVE AND OBJECTIVE BOX
Authored by Mary Reyes MD, PGY1  PATIENT:  NICOLAS CIFUENTES  [id number]    CHIEF COMPLAINT:  Patient is a 75y old  Male who presents with a chief complaint of Fevers & chills (18 Mar 2021 14:52)      INTERVAL HISTORY OVERNIGHT EVENTS: SWEETIE overnight. Patient received clonazepam 0.25mg overnight.         MEDICATIONS:  MEDICATIONS  (STANDING):  aMIOdarone    Tablet 200 milliGRAM(s) Oral daily  dexAMETHasone     Tablet 6 milliGRAM(s) Oral daily  donepezil 10 milliGRAM(s) Oral at bedtime  folic acid 1 milliGRAM(s) Oral daily  furosemide   Injectable 40 milliGRAM(s) IV Push daily  ketoconazole 2% Shampoo 1 Application(s) Topical <User Schedule>  lactobacillus acidophilus 1 Tablet(s) Oral daily  levothyroxine 125 MICROGram(s) Oral daily  melatonin 5 milliGRAM(s) Oral at bedtime  memantine 10 milliGRAM(s) Oral every 12 hours  mirtazapine 7.5 milliGRAM(s) Oral at bedtime  multivitamin 1 Tablet(s) Oral daily  pantoprazole  Injectable 40 milliGRAM(s) IV Push daily  tamsulosin 0.4 milliGRAM(s) Oral at bedtime    MEDICATIONS  (PRN):      ALLERGIES:  Allergies    rituximab (Angioedema)    Intolerances        OBJECTIVE:  ICU Vital Signs Last 24 Hrs  T(C): 36.6 (19 Mar 2021 05:01), Max: 36.6 (18 Mar 2021 12:39)  T(F): 97.8 (19 Mar 2021 05:01), Max: 97.9 (18 Mar 2021 12:39)  HR: 52 (19 Mar 2021 05:01) (52 - 103)  BP: 110/72 (19 Mar 2021 05:01) (105/53 - 125/66)  BP(mean): --  ABP: --  ABP(mean): --  RR: 18 (19 Mar 2021 05:01) (18 - 18)  SpO2: 98% (19 Mar 2021 05:01) (88% - 98%)          I&O's Summary    18 Mar 2021 07:01  -  19 Mar 2021 07:00  --------------------------------------------------------  IN: 1080 mL / OUT: 2000 mL / NET: -920 mL      PHYSICAL EXAMINATION:  General: WN/WD NAD  HEENT: PERRL, EOMI, moist mucous membranes  Neurology: A&Ox3, nonfocal, CARRIZALES x 4  Respiratory: normal respiratory effort, mild coarse breath sounds  CV: RRR, S1S2, no murmurs, rubs or gallops  Abdominal: Soft, NT, ND +BS, Last BM 3/18  Extremities: 1+ pitting edema      LABS:                          8.6    3.48  )-----------( 109      ( 18 Mar 2021 15:21 )             28.0     03-18    141  |  111<H>  |  65<H>  ----------------------------<  162<H>  5.0   |  18<L>  |  4.14<H>    Ca    7.9<L>      18 Mar 2021 15:21  Phos  4.8     03-18  Mg     2.0     03-18    TPro  5.5<L>  /  Alb  3.0<L>  /  TBili  0.2  /  DBili  x   /  AST  30  /  ALT  21  /  AlkPhos  50  03-18    LIVER FUNCTIONS - ( 18 Mar 2021 07:41 )  Alb: 3.0 g/dL / Pro: 5.5 g/dL / ALK PHOS: 50 U/L / ALT: 21 U/L / AST: 30 U/L / GGT: x           PT/INR - ( 18 Mar 2021 07:05 )   PT: 38.8 sec;   INR: 3.43 ratio         PTT - ( 18 Mar 2021 07:05 )  PTT:40.5 sec        Urinalysis Basic - ( 18 Mar 2021 00:49 )    Color: Yellow / Appearance: Clear / S.020 / pH: x  Gluc: x / Ketone: Negative  / Bili: Negative / Urobili: Negative   Blood: x / Protein: 100 / Nitrite: Negative   Leuk Esterase: Negative / RBC: 133 /hpf / WBC 3 /HPF   Sq Epi: x / Non Sq Epi: 1 /hpf / Bacteria: Negative        TELEMETRY:     EKG:     IMAGING:    < from: Xray Chest 1 View- PORTABLE-Urgent (Xray Chest 1 View- PORTABLE-Urgent .) (21 @ 12:14) >  IMPRESSION:  Progression of infiltrates.    Thank you for the courtesy of this referral.    < end of copied text >     Authored by Mary Reyes MD, PGY1  PATIENT:  NICOLAS CIFUENTES  [id number]    CHIEF COMPLAINT:  Patient is a 75y old  Male who presents with a chief complaint of Fevers & chills (18 Mar 2021 14:52)      INTERVAL HISTORY OVERNIGHT EVENTS: SWEETIE overnight. Patient received clonazepam 0.25mg overnight. This AM, patient endorses persistent orthopnea, making 2L urine on 40lasix. Patient denies cough, denies chest pain, abdominal pain, endorses good appetite.         MEDICATIONS:  MEDICATIONS  (STANDING):  aMIOdarone    Tablet 200 milliGRAM(s) Oral daily  dexAMETHasone     Tablet 6 milliGRAM(s) Oral daily  donepezil 10 milliGRAM(s) Oral at bedtime  folic acid 1 milliGRAM(s) Oral daily  furosemide   Injectable 40 milliGRAM(s) IV Push daily  ketoconazole 2% Shampoo 1 Application(s) Topical <User Schedule>  lactobacillus acidophilus 1 Tablet(s) Oral daily  levothyroxine 125 MICROGram(s) Oral daily  melatonin 5 milliGRAM(s) Oral at bedtime  memantine 10 milliGRAM(s) Oral every 12 hours  mirtazapine 7.5 milliGRAM(s) Oral at bedtime  multivitamin 1 Tablet(s) Oral daily  pantoprazole  Injectable 40 milliGRAM(s) IV Push daily  tamsulosin 0.4 milliGRAM(s) Oral at bedtime    MEDICATIONS  (PRN):      ALLERGIES:  Allergies    rituximab (Angioedema)    Intolerances        OBJECTIVE:  ICU Vital Signs Last 24 Hrs  T(C): 36.6 (19 Mar 2021 05:01), Max: 36.6 (18 Mar 2021 12:39)  T(F): 97.8 (19 Mar 2021 05:01), Max: 97.9 (18 Mar 2021 12:39)  HR: 52 (19 Mar 2021 05:01) (52 - 103)  BP: 110/72 (19 Mar 2021 05:01) (105/53 - 125/66)  BP(mean): --  ABP: --  ABP(mean): --  RR: 18 (19 Mar 2021 05:01) (18 - 18)  SpO2: 98% (19 Mar 2021 05:01) (88% - 98%)          I&O's Summary    18 Mar 2021 07:01  -  19 Mar 2021 07:00  --------------------------------------------------------  IN: 1080 mL / OUT: 2000 mL / NET: -920 mL      PHYSICAL EXAMINATION:  General: WN/WD NAD  HEENT: PERRL, EOMI, moist mucous membranes  Neurology: A&Ox3, nonfocal, CARRIZALES x 4  Respiratory: normal respiratory effort, mild coarse breath sounds  CV: RRR, S1S2, no murmurs, rubs or gallops  Abdominal: Soft, NT, ND +BS, Last BM 3/18  Extremities: 1+ pitting edema      LABS:                          8.6    3.48  )-----------( 109      ( 18 Mar 2021 15:21 )             28.0     03-18    141  |  111<H>  |  65<H>  ----------------------------<  162<H>  5.0   |  18<L>  |  4.14<H>    Ca    7.9<L>      18 Mar 2021 15:21  Phos  4.8     03-18  Mg     2.0     03-18    TPro  5.5<L>  /  Alb  3.0<L>  /  TBili  0.2  /  DBili  x   /  AST  30  /  ALT  21  /  AlkPhos  50  03-18    LIVER FUNCTIONS - ( 18 Mar 2021 07:41 )  Alb: 3.0 g/dL / Pro: 5.5 g/dL / ALK PHOS: 50 U/L / ALT: 21 U/L / AST: 30 U/L / GGT: x           PT/INR - ( 18 Mar 2021 07:05 )   PT: 38.8 sec;   INR: 3.43 ratio         PTT - ( 18 Mar 2021 07:05 )  PTT:40.5 sec        Urinalysis Basic - ( 18 Mar 2021 00:49 )    Color: Yellow / Appearance: Clear / S.020 / pH: x  Gluc: x / Ketone: Negative  / Bili: Negative / Urobili: Negative   Blood: x / Protein: 100 / Nitrite: Negative   Leuk Esterase: Negative / RBC: 133 /hpf / WBC 3 /HPF   Sq Epi: x / Non Sq Epi: 1 /hpf / Bacteria: Negative        TELEMETRY:     EKG:     IMAGING:    < from: Xray Chest 1 View- PORTABLE-Urgent (Xray Chest 1 View- PORTABLE-Urgent .) (21 @ 12:14) >  IMPRESSION:  Progression of infiltrates.    Thank you for the courtesy of this referral.    < end of copied text >

## 2021-03-19 NOTE — PROGRESS NOTE ADULT - PROBLEM SELECTOR PLAN 3
Tested positive 3/11, initial symptoms (fevers, fatigue) started 3/9, patient received monoclonal antibody treatment at [facility] and acutely experienced worsening of fevers and chills, may be superimposed transfusion reaction on top of COVID PNA.   - c/w dexamethasone 6mg IV daily for 10 days (will do one day of solumedrol q8 to treat both COVID PNA and possible transfusion reaction)  - control fevers with acetaminophen  - will hold remdesevir in setting of LOUISE on CKD, patient weaned off supplemental O2, saturating well on room air  - fevers: f/u BCx, UCx, CT chest, abdomen pelvis. Hold abx per ID recs, no SOI besides COVID identified.

## 2021-03-19 NOTE — PROGRESS NOTE ADULT - SUBJECTIVE AND OBJECTIVE BOX
INFECTIOUS DISEASES FOLLOW UP--Jagdeep Pierce MD  Pager [phone]    This is a follow up note for this  75y Male with  COVID.  feels a bit dyspneic but not fevers and o2 sats ok    Further ROS:  CONSTITUTIONAL:  No fever, good appetite  CARDIOVASCULAR:  No chest pain or palpitations  GASTROINTESTINAL:  No nausea, vomiting, diarrhea, or abdominal pain  GENITOURINARY:  No dysuria  NEUROLOGIC:  No headache,     Allergies  rituximab (Angioedema)    ANTIBIOTICS/RELEVANT:  antimicrobials off    OTHER:  aMIOdarone    Tablet 200 milliGRAM(s) Oral daily  dexAMETHasone     Tablet 6 milliGRAM(s) Oral daily  donepezil 10 milliGRAM(s) Oral at bedtime  folic acid 1 milliGRAM(s) Oral daily  furosemide   Injectable 40 milliGRAM(s) IV Push daily  ketoconazole 2% Shampoo 1 Application(s) Topical <User Schedule>  lactobacillus acidophilus 1 Tablet(s) Oral daily  levothyroxine 125 MICROGram(s) Oral daily  melatonin 5 milliGRAM(s) Oral at bedtime  memantine 10 milliGRAM(s) Oral every 12 hours  mirtazapine 7.5 milliGRAM(s) Oral at bedtime  multivitamin 1 Tablet(s) Oral daily  pantoprazole    Tablet 40 milliGRAM(s) Oral before breakfast  sodium zirconium cyclosilicate 10 Gram(s) Oral once  tamsulosin 0.4 milliGRAM(s) Oral at bedtime    Objective:  Vital Signs Last 24 Hrs  T(C): 36.3 (19 Mar 2021 09:24), Max: 36.6 (18 Mar 2021 12:39)  T(F): 97.4 (19 Mar 2021 09:24), Max: 97.9 (18 Mar 2021 12:39)  HR: 63 (19 Mar 2021 09:24) (52 - 103)  BP: 126/72 (19 Mar 2021 09:24) (105/53 - 126/72)  BP(mean): --  RR: 18 (19 Mar 2021 09:24) (18 - 18)  SpO2: 94% (19 Mar 2021 09:24) (88% - 98%)    PHYSICAL EXAM:  Constitutional:no acute distress  Eyes:SONIDO, EOMI  Ear/Nose/Throat: no oral lesions, 	  Respiratory: clear BL  Cardiovascular: S1S2  Gastrointestinal:soft, (+) BS, no tenderness  Extremities:no e/e/c  No Lymphadenopathy  IV sites not inflammed.    LABS:                        9.3    3.97  )-----------( 103      ( 19 Mar 2021 07:22 )             30.7     -    139  |  110<H>  |  74<H>  ----------------------------<  150<H>  5.4<H>   |  15<L>  |  4.29<H>    Ca    7.9<L>      19 Mar 2021 07:22  Phos  4.4       Mg     2.1         TPro  5.5<L>  /  Alb  2.7<L>  /  TBili  0.2  /  DBili  x   /  AST  31  /  ALT  24  /  AlkPhos  48  -19  PT/INR - ( 19 Mar 2021 07:22 )   PT: 46.2 sec;   INR: 4.12 ratio    PTT - ( 19 Mar 2021 07:22 )  PTT:35.8 sec  Urinalysis Basic - ( 18 Mar 2021 00:49 )    Color: Yellow / Appearance: Clear / S.020 / pH: x  Gluc: x / Ketone: Negative  / Bili: Negative / Urobili: Negative   Blood: x / Protein: 100 / Nitrite: Negative   Leuk Esterase: Negative / RBC: 133 /hpf / WBC 3 /HPF   Sq Epi: x / Non Sq Epi: 1 /hpf / Bacteria: Negative    MICROBIOLOGY: no new data    RADIOLOGY & ADDITIONAL STUDIES:  < from: Xray Chest 1 View- PORTABLE-Urgent (Xray Chest 1 View- PORTABLE-Urgent .) (21 @ 12:14) >  IMPRESSION:  Progression of infiltrates.    < end of copied text >

## 2021-03-19 NOTE — PROGRESS NOTE ADULT - PROBLEM SELECTOR PLAN 1
- LOUISE on CKD, baseline Cr 3.7  - 4.5 on admission  - trend uop  - appreciate nephrology consult - pending clinical improvement  - monitor lytes, renally dose medications

## 2021-03-19 NOTE — PROGRESS NOTE ADULT - PROBLEM SELECTOR PLAN 7
- on ninlaro/dexamethasone, follows with Dr. Valdo Simons  - per heme, no currently inpatient workup or treatment

## 2021-03-19 NOTE — PROGRESS NOTE ADULT - SUBJECTIVE AND OBJECTIVE BOX
[facility] DIVISION OF PULMONARY, CRITICAL CARE and SLEEP MEDICINE  PULMONARY PROGRESS NOTE  OFFICE NUMBER: [phone] (Afterhours and Weekends)  CONSULT NUMBER: [phone] (Monday - Friday: 9am-5pm)    PATIENT INFORMATION:  NAME: NICOLAS CIFUENTES:  MRN: [id number]    CHIEF COMPLAINT: Patient is a 75y old  Male who presents with a chief complaint of Fevers & chills (19 Mar 2021 12:45)      [x] INITIAL CONSULT, H&P, FAMILY HISTORY and PAST MEDICAL AND SURGICAL HISTORY REVIEWED    OVERNIGHT EVENTS or CHANGES TO HPI: Patient initially doing well yesterday off oxygen but was placed back on 2L late yesterday. States he is feeling better on oxygen.     ========================REVIEW OF SYSTEMS========================  CONSTITUTIONAL: Denies chills/fevers/myalgias  CARDIOVASCULAR: Denies chest pain, heart palpitations  PULMONARY: Denies dyspnea while on oxygen  [x] REMAINING REVIEW OF SYSTEMS NEGATIVE  [] UNABLE TO OBTAIN REVIEW OF SYSTEMS DUE TO _______________    ========================MEDICATIONS=============================  MEDICATIONS  (STANDING):  aMIOdarone    Tablet 200 milliGRAM(s) Oral daily  dexAMETHasone     Tablet 6 milliGRAM(s) Oral daily  donepezil 10 milliGRAM(s) Oral at bedtime  folic acid 1 milliGRAM(s) Oral daily  furosemide   Injectable 40 milliGRAM(s) IV Push daily  ketoconazole 2% Shampoo 1 Application(s) Topical <User Schedule>  lactobacillus acidophilus 1 Tablet(s) Oral daily  levothyroxine 125 MICROGram(s) Oral daily  melatonin 5 milliGRAM(s) Oral at bedtime  memantine 10 milliGRAM(s) Oral every 12 hours  mirtazapine 7.5 milliGRAM(s) Oral at bedtime  multivitamin 1 Tablet(s) Oral daily  pantoprazole    Tablet 40 milliGRAM(s) Oral before breakfast  tamsulosin 0.4 milliGRAM(s) Oral at bedtime      MEDICATIONS  (PRN):      ========================PHYSICAL EXAM============================    VITALS: ICU Vital Signs Last 24 Hrs  T(C): 36.3 (19 Mar 2021 09:24), Max: 36.6 (18 Mar 2021 17:29)  T(F): 97.4 (19 Mar 2021 09:24), Max: 97.9 (18 Mar 2021 17:29)  HR: 63 (19 Mar 2021 09:24) (52 - 103)  BP: 126/72 (19 Mar 2021 09:24) (105/53 - 126/72)  BP(mean): --  ABP: --  ABP(mean): --  RR: 18 (19 Mar 2021 09:24) (18 - 18)  SpO2: 94% (19 Mar 2021 09:24) (88% - 98%)      INTAKE and OUTPUT: I&O's Summary    18 Mar 2021 07:01  -  19 Mar 2021 07:00  --------------------------------------------------------  IN: 1080 mL / OUT: 2000 mL / NET: -920 mL    19 Mar 2021 07:01  -  19 Mar 2021 12:59  --------------------------------------------------------  IN: 560 mL / OUT: 1300 mL / NET: -740 mL        VENTILATOR SETTINGS: 2L NC    GENERAL: Sitting in his chair UNAD  CARDIOVASCULAR: RRR, Normal S1 and S2  RESPIRATORY: CTA B/L  EXTREMITIES: 1+ edema in LE bilaterally  NEUROLOGIC: Nonfocal  PSYCHIATRIC: AAOx2     ========================LABORATORY RESULTS AND IMAGING=============                        9.3    3.97  )-----------( 103      ( 19 Mar 2021 07:22 )             30.7                                                    03-19    139  |  110<H>  |  74<H>  ----------------------------<  150<H>  5.4<H>   |  15<L>  |  4.29<H>    Ca    7.9<L>      19 Mar 2021 07:22  Phos  4.4     03-19  Mg     2.1     03-19    TPro  5.5<L>  /  Alb  2.7<L>  /  TBili  0.2  /  DBili  x   /  AST  31  /  ALT  24  /  AlkPhos  48  03-19          Creatinine Trend: 4.29<--, 4.14<--, 4.52<--, 4.63<--, 4.58<--, 4.34<--    CXR: Progression of infiltrates      THANK YOU FOR ALLOWING US TO PARTICIPATE IN THE CARE OF THIS PATIENT

## 2021-03-19 NOTE — PROGRESS NOTE ADULT - SUBJECTIVE AND OBJECTIVE BOX
Overnight events noted   VITAL: T(C): , Max: 36.6 (21 @ 17:29) T(F): , Max: 97.9 (21 @ 17:29) HR: 63 (21 @ 09:24) BP: 126/72 (21 @ 09:24) BP(mean): -- RR: 18 (21 @ 09:24) SpO2: 94% (21 @ 09:24)   PHYSICAL EXAM: Constitutional: NAD, Alert HEENT: NCAT, DMM Neck: Supple, No JVD Respiratory: CTA-b/l Cardiovascular: reg sabina irreg Gastrointestinal: BS+, soft, NT/ND Extremities: No peripheral edema b/l Neurological: no focal deficits; strength grossly intact Back: no CVAT b/l Skin: (+)left frontal ecchymosis  LABS:                      9.3   3.97  )-----------( 103      ( 19 Mar 2021 07:22 )            30.7   Na(139)/K(5.4)/Cl(110)/HCO3(15)/BUN(74)/Cr(4.29)Glu(150)/Ca(7.9)/Mg(2.1)/PO4(4.4)     @ 07:22 Na(141)/K(5.0)/Cl(111)/HCO3(18)/BUN(65)/Cr(4.14)Glu(162)/Ca(7.9)/Mg(--)/PO4(--)     @ 15:21 Na(140)/K(5.4)/Cl(110)/HCO3(20)/BUN(61)/Cr(4.52)Glu(161)/Ca(8.3)/Mg(2.0)/PO4(4.8)     @ 07:41 Na(141)/K(5.4)/Cl(112)/HCO3(18)/BUN(56)/Cr(4.63)Glu(173)/Ca(8.3)/Mg(1.8)/PO4(3.9)     @ 19:12 Na(140)/K(5.3)/Cl(112)/HCO3(17)/BUN(47)/Cr(4.58)Glu(178)/Ca(8.5)/Mg(1.7)/PO4(4.2)     @ 06:50 Na(143)/K(5.0)/Cl(111)/HCO3(16)/BUN(41)/Cr(4.34)Glu(112)/Ca(9.1)/Mg(--)/PO4(--)     @ 15:48  Urinalysis Basic - ( 18 Mar 2021 00:49 ) Color: Yellow / Appearance: Clear / S.020 / pH: x Gluc: x / Ketone: Negative  / Bili: Negative / Urobili: Negative  Blood: x / Protein: 100 / Nitrite: Negative  Leuk Esterase: Negative / RBC: 133 /hpf / WBC 3 /HPF  Sq Epi: x / Non Sq Epi: 1 /hpf / Bacteria: Negative Potassium, Random Urine: 24 mmol/L ( @ 14:01) Osmolality, Random Urine: 492 mos/kg ( @ 14:01) Sodium, Random Urine: 61 mmol/L ( @ 14:01) Creatinine, Random Urine: 102 mg/dL ( @ 14:01) Chloride, Random Urine: 44 mmol/L ( @ 14:01)   IMPRESSION: 75N w/ Waldenstroms, mild dementia, AFib, and CKD4, 3/16/21 a/w COVID PNA  (1)Renal - 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 - base creat mid-3s (2)LOUISE - likely a mild component of ischemic ATN here - starting to resolve (3)Hyperkalemia - mild - largely renally mediated (4)Metabolic acidosis - largely renally mediated - we can give PO NaHCO3 tabs, in addition to the IV Lasix (5)Pancytopenia - Heme on board (6)Pulm/ID - COVID PNA - Pulm+ID on board - on Decadron   RECOMMEND: (1)Add NaHCO3 650mg po bid for now (2)IV Lasix as ordered (3)Lokelma 10gm po prn K 5.5 or higher (4)Continue renal diet (5)Dose new meds for GFR 10-15ml/min  (6)No HD for now    Norman Ascencio MD [facility] Office: [phone] Cell: [phone]        No pain, no sob   VITAL: T(C): , Max: 36.6 (21 @ 17:29) T(F): , Max: 97.9 (21 @ 17:29) HR: 63 (21 @ 09:24) BP: 126/72 (21 @ 09:24) BP(mean): -- RR: 18 (21 @ 09:24) SpO2: 94% (21 @ 09:24)   PHYSICAL EXAM: Constitutional: NAD, Alert HEENT: NCAT, DMM Neck: Supple, No JVD Respiratory: CTA-b/l Cardiovascular: reg sabina irreg Gastrointestinal: BS+, soft, NT/ND Extremities: No peripheral edema b/l Neurological: no focal deficits; strength grossly intact Back: no CVAT b/l Skin: (+)left frontal ecchymosis  LABS:                      9.3   3.97  )-----------( 103      ( 19 Mar 2021 07:22 )            30.7   Na(139)/K(5.4)/Cl(110)/HCO3(15)/BUN(74)/Cr(4.29)Glu(150)/Ca(7.9)/Mg(2.1)/PO4(4.4)     @ 07:22 Na(141)/K(5.0)/Cl(111)/HCO3(18)/BUN(65)/Cr(4.14)Glu(162)/Ca(7.9)/Mg(--)/PO4(--)     @ 15:21 Na(140)/K(5.4)/Cl(110)/HCO3(20)/BUN(61)/Cr(4.52)Glu(161)/Ca(8.3)/Mg(2.0)/PO4(4.8)     @ 07:41 Na(141)/K(5.4)/Cl(112)/HCO3(18)/BUN(56)/Cr(4.63)Glu(173)/Ca(8.3)/Mg(1.8)/PO4(3.9)     @ 19:12 Na(140)/K(5.3)/Cl(112)/HCO3(17)/BUN(47)/Cr(4.58)Glu(178)/Ca(8.5)/Mg(1.7)/PO4(4.2)     @ 06:50 Na(143)/K(5.0)/Cl(111)/HCO3(16)/BUN(41)/Cr(4.34)Glu(112)/Ca(9.1)/Mg(--)/PO4(--)     @ 15:48  Urinalysis Basic - ( 18 Mar 2021 00:49 ) Color: Yellow / Appearance: Clear / S.020 / pH: x Gluc: x / Ketone: Negative  / Bili: Negative / Urobili: Negative  Blood: x / Protein: 100 / Nitrite: Negative  Leuk Esterase: Negative / RBC: 133 /hpf / WBC 3 /HPF  Sq Epi: x / Non Sq Epi: 1 /hpf / Bacteria: Negative Potassium, Random Urine: 24 mmol/L ( @ 14:01) Osmolality, Random Urine: 492 mos/kg ( @ 14:01) Sodium, Random Urine: 61 mmol/L ( @ 14:01) Creatinine, Random Urine: 102 mg/dL ( @ 14:01) Chloride, Random Urine: 44 mmol/L ( @ 14:01)   IMPRESSION: 75N w/ Waldenstroms, mild dementia, AFib, and CKD4, 3/16/21 a/w COVID PNA  (1)Renal - 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 - base creat mid-3s (2)LOUISE - likely a mild component of ischemic ATN here - starting to resolve (3)Hyperkalemia - mild - largely renally mediated (4)Metabolic acidosis - largely renally mediated - we can give PO NaHCO3 tabs, in addition to the IV Lasix (5)Pancytopenia - Heme on board (6)Pulm/ID - COVID PNA - Pulm+ID on board - on Decadron   RECOMMEND: (1)Add NaHCO3 650mg po bid for now (2)IV Lasix as ordered (3)Lokelma 10gm po prn K 5.5 or higher (4)Continue renal diet (5)Dose new meds for GFR 10-15ml/min  (6)No HD for now    Norman Ascencio MD [facility] Office: [phone] Cell: [phone]

## 2021-03-19 NOTE — PROGRESS NOTE ADULT - PROBLEM SELECTOR PLAN 5
- patient with hematuria on UA, in setting of anemia  - per urology  - monitor Is&Os  - f/u CT A/P with nonobstructive renal stones  - per urology no inpatient workup

## 2021-03-19 NOTE — PROGRESS NOTE ADULT - PROBLEM SELECTOR PLAN 2
- likely chronic in setting of hematologic process, immunosuppression; leukopenia may be worsened in setting of COVID pna, anemia in setting of hematuria.   - has low retic proliferation and iron def.  - hematology consulted, patient sees Dr. Valdo Simons outpatient  - transfusion per guidelines, no melena or other evidence of bleed  - reversal of INR as indicated if bleeding suspected

## 2021-03-19 NOTE — PROGRESS NOTE ADULT - PROBLEM SELECTOR PLAN 6
- patient on amiodarone, metoprolol succinate, warfarin  - supratherapy on warfarin - suspect vit k deficiency 2/2 nutrition - monitor INR and s/s bleeding  - monitor on bedside tele  - appreciate cardiology recs  - amio 200 mg - rate controlled currently  - resume Toprol when BP tolerates

## 2021-03-19 NOTE — PROGRESS NOTE ADULT - PROBLEM SELECTOR PLAN 4
- patient with 3-5 days of diarrhea, watery, nonbloody. May be 2/2 COVID PNA. Patient has history of diverticulitis s/p sigmoid resection.   - no current diarrhea - send samples if able  - f/u GI PCR, stool cultures  - f/u CT A/P - chronic LAD, no acute changes

## 2021-03-19 NOTE — PROGRESS NOTE ADULT - SUBJECTIVE AND OBJECTIVE BOX
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 Patient was in usual state of health until Tuesday of last week (3/9); patient endorsed fevers, generalized weakness, fatigue. He went to urgent care and tested positive for COVID on 3/11. Today he was receiving monoclonal antibody transfusion at [facility], and quickly experienced severe rigors, chills, and fevers transferred to [facility].     On ROS, patient endorses fevers, chills, denies headaches, visual changes, chest pain, SOB, palpitations, cough, abdominal pain, n/v, melena, endorses alternating constipation/diarrhea (nonbloody, watery of last 3-4 days), denies dysuria, endorses LE edema.     In the ED, patient Tmax 103, HR wnl, -170/53-92, RR 20-30, saturating % on 2L N/C. Labs notable for pancytopenia, LOUISE on CKD4, COVID+,  UA+ RBCs, CXR w/ b/l opacities. Patient received vanc/cef, 500cc NS bolus, 6mg IV decadron. Patient admitted to medicine for continued management.  (16 Mar 2021 18:04)    ===========================  Interval Events  - +More dyspneic   ===========================        PMHx:   Memory deficit    BPH (benign prostatic hyperplasia)    [place] (hard of hearing)    Gout    CKD (chronic kidney disease)    Nephrolithiasis    Hypothyroid    Dementia    Bradycardia, drug induced    Waldenstrom macroglobulinemia    Diverticulitis    Atrial fibrillation    GERD (gastroesophageal reflux disease)    Anxiety disorder    CLL (chronic lymphocytic leukemia)        PSHx:   History of cardiac pacemaker in situ    History of appendectomy    History of cataract surgery, right    History of laparoscopic cholecystectomy    S/P hernia repair    Meniscus tear        Allergies:  rituximab (Angioedema)      Home Meds:    Current Medications:   albuterol/ipratropium for Nebulization 3 milliLiter(s) Nebulizer every 4 hours  aMIOdarone    Tablet 200 milliGRAM(s) Oral daily  donepezil 10 milliGRAM(s) Oral at bedtime  folic acid 1 milliGRAM(s) Oral daily  lactobacillus acidophilus 1 Tablet(s) Oral daily  levothyroxine 125 MICROGram(s) Oral daily  melatonin 5 milliGRAM(s) Oral at bedtime  memantine 10 milliGRAM(s) Oral every 12 hours  methylPREDNISolone sodium succinate Injectable 40 milliGRAM(s) IV Push every 8 hours  mirtazapine 7.5 milliGRAM(s) Oral at bedtime  multivitamin 1 Tablet(s) Oral daily  pantoprazole  Injectable 40 milliGRAM(s) IV Push daily  tamsulosin 0.4 milliGRAM(s) Oral at bedtime      FAMILY HISTORY:  No pertinent family history in first degree relatives        Social History: Personally reviewed   No tobacco, EtOH or IVDU     REVIEW OF SYSTEMS:  Constitutional:     [x ] negative [ ] fevers [ ] chills [ ] weight loss [ ] weight gain  HEENT:                  [x ] negative [ ] dry eyes [ ] eye irritation [ ] postnasal drip [ ] nasal congestion  CV:                         [ x] negative  [ ] chest pain [ ] orthopnea [ ] palpitations [ ] murmur  Resp:                     [x ] negative [ ] cough [ ] shortness of breath [ ] dyspnea [ ] wheezing [ ] sputum [ ]hemoptysis  GI:                          [ x] negative [ ] nausea [ ] vomiting [ ] diarrhea [ ] constipation [ ] abd pain [ ] dysphagia   :                        [ x] negative [ ] dysuria [ ] nocturia [ ] hematuria [ ] increased urinary frequency  Musculoskeletal: [x ] negative [ ] back pain [ ] myalgias [ ] arthralgias [ ] fracture  Skin:                       [ x] negative [ ] rash [ ] itch  Neurological:        [ x] negative [ ] headache [ ] dizziness [ ] syncope [ ] weakness [ ] numbness  Psychiatric:           [ x] negative [ ] anxiety [ ] depression  Endocrine:            [ x] negative [ ] diabetes [ ] thyroid problem  Heme/Lymph:      [ x] negative [ ] anemia [ ] bleeding problem  Allergic/Immune: [ x] negative [ ] itchy eyes [ ] nasal discharge [ ] hives [ ] angioedema    [ x] All other systems negative  [ ] Unable to assess ROS due to      Physical Exam:  T(F): 98.8 (03-16), Max: 103 (03-16)  HR: 69 (03-16) (63 - 97)  BP: 128/65 (03-16) (100/59 - 178/68)  RR: 21 (03-16)  SpO2: 99% (03-16)    Deferred due to COVID precaution. Please see primary team PE     Cardiovascular Diagnostic Testing:    ECG: Personally reviewed  3/14 NSR at 63     Echo: Personally reviewed  2019  Conclusions:  1. Mild mitral regurgitation.  2. Peak transaortic valve gradient equals 27 mm Hg, mean  transaortic valve gradient equals 12 mm Hg, estimated  aortic valve area equals 1.6 sqcm (by continuity equation),  aortic valve velocity time integral equals 57 cm,  consistent with mild aortic stenosis. Mild to moderate  aortic regurgitation.  3. Normal left ventricular internal dimensions and wall  thicknesses.  4. Normal left ventricular systolic function. No segmental  wall motion abnormalities.  5. Mild diastolic dysfunction (Stage I).  6. Normal right ventricular size and function. A device  wire is noted in the right heart.  7. Mild tricuspid regurgitation.  8. Estimated pulmonary artery systolic pressure equals 46  mm Hg, assuming right atrial pressure equals 8 mm Hg,  consistent with mild pulmonary pressures.  9. No obvious evidence of endocarditis. Consider KT for  further evaluation if clinically indicated.      Imaging:    CXR: Personally reviewed    Labs: Personally reviewed                        9.4    2.24  )-----------( 71       ( 16 Mar 2021 15:48 )             31.4     03-16    143  |  111<H>  |  41<H>  ----------------------------<  112<H>  5.0   |  16<L>  |  4.34<H>    Ca    9.1      16 Mar 2021 15:48    TPro  6.6  /  Alb  3.6  /  TBili  0.3  /  DBili  x   /  AST  44<H>  /  ALT  28  /  AlkPhos  72  03-16    PT/INR - ( 16 Mar 2021 15:48 )   PT: 28.0 sec;   INR: 2.43 ratio         PTT - ( 16 Mar 2021 15:48 )  PTT:25.4 sec            Assessment and Recommendation:   · Assessment	  75 M with PMH of pAFib on amiodarone/BB and AC with warfarin, tachy-sabina s/p PPM, HFpEF, CLL transformed to Waldenstrom macroglobulinemia and other chronic comorbidities presenting with inflammatory symptoms s/p monoclonal ab for COVID19 infection      Change to LAsix IV 40 BID for time being; re-assess tomorrow   Continue amiodarone and metoprolol   INR 2.43, AC held due to hematuria, would resume when able   TTE ordered by the primary team - unremarkable  COVID19 management per primary team   Will be off service; attending service to follow over the weekend

## 2021-03-20 LAB
ALBUMIN SERPL ELPH-MCNC: 3.1 G/DL — LOW (ref 3.3–5)
ALP SERPL-CCNC: 52 U/L — SIGNIFICANT CHANGE UP (ref 40–120)
ALT FLD-CCNC: 26 U/L — SIGNIFICANT CHANGE UP (ref 10–45)
ANION GAP SERPL CALC-SCNC: 14 MMOL/L — SIGNIFICANT CHANGE UP (ref 5–17)
APTT BLD: 36 SEC — HIGH (ref 27.5–35.5)
AST SERPL-CCNC: 26 U/L — SIGNIFICANT CHANGE UP (ref 10–40)
BILIRUB SERPL-MCNC: 0.3 MG/DL — SIGNIFICANT CHANGE UP (ref 0.2–1.2)
BUN SERPL-MCNC: 83 MG/DL — HIGH (ref 7–23)
CALCIUM SERPL-MCNC: 8.2 MG/DL — LOW (ref 8.4–10.5)
CHLORIDE SERPL-SCNC: 108 MMOL/L — SIGNIFICANT CHANGE UP (ref 96–108)
CO2 SERPL-SCNC: 20 MMOL/L — LOW (ref 22–31)
CREAT SERPL-MCNC: 4.29 MG/DL — HIGH (ref 0.5–1.3)
GLUCOSE SERPL-MCNC: 153 MG/DL — HIGH (ref 70–99)
HCT VFR BLD CALC: 27.6 % — LOW (ref 39–50)
HGB BLD-MCNC: 8.6 G/DL — LOW (ref 13–17)
INR BLD: 4.47 RATIO — HIGH (ref 0.88–1.16)
MAGNESIUM SERPL-MCNC: 2 MG/DL — SIGNIFICANT CHANGE UP (ref 1.6–2.6)
MCHC RBC-ENTMCNC: 30.3 PG — SIGNIFICANT CHANGE UP (ref 27–34)
MCHC RBC-ENTMCNC: 31.2 GM/DL — LOW (ref 32–36)
MCV RBC AUTO: 97.2 FL — SIGNIFICANT CHANGE UP (ref 80–100)
NRBC # BLD: 0 /100 WBCS — SIGNIFICANT CHANGE UP (ref 0–0)
PHOSPHATE SERPL-MCNC: 3.6 MG/DL — SIGNIFICANT CHANGE UP (ref 2.5–4.5)
PLATELET # BLD AUTO: 137 K/UL — LOW (ref 150–400)
POTASSIUM SERPL-MCNC: 4.9 MMOL/L — SIGNIFICANT CHANGE UP (ref 3.5–5.3)
POTASSIUM SERPL-SCNC: 4.9 MMOL/L — SIGNIFICANT CHANGE UP (ref 3.5–5.3)
PROT SERPL-MCNC: 5.5 G/DL — LOW (ref 6–8.3)
PROTHROM AB SERPL-ACNC: 49.9 SEC — HIGH (ref 10.6–13.6)
RBC # BLD: 2.84 M/UL — LOW (ref 4.2–5.8)
RBC # FLD: 13.6 % — SIGNIFICANT CHANGE UP (ref 10.3–14.5)
SODIUM SERPL-SCNC: 142 MMOL/L — SIGNIFICANT CHANGE UP (ref 135–145)
WBC # BLD: 4.06 K/UL — SIGNIFICANT CHANGE UP (ref 3.8–10.5)
WBC # FLD AUTO: 4.06 K/UL — SIGNIFICANT CHANGE UP (ref 3.8–10.5)

## 2021-03-20 PROCEDURE — 99233 SBSQ HOSP IP/OBS HIGH 50: CPT | Mod: GC

## 2021-03-20 PROCEDURE — 99232 SBSQ HOSP IP/OBS MODERATE 35: CPT | Mod: GC

## 2021-03-20 RX ORDER — CLONAZEPAM 1 MG
0.25 TABLET ORAL ONCE
Refills: 0 | Status: DISCONTINUED | OUTPATIENT
Start: 2021-03-20 | End: 2021-03-20

## 2021-03-20 RX ORDER — CLONAZEPAM 1 MG
0.75 TABLET ORAL ONCE
Refills: 0 | Status: DISCONTINUED | OUTPATIENT
Start: 2021-03-20 | End: 2021-03-20

## 2021-03-20 RX ADMIN — DONEPEZIL HYDROCHLORIDE 10 MILLIGRAM(S): 10 TABLET, FILM COATED ORAL at 21:33

## 2021-03-20 RX ADMIN — Medication 1 TABLET(S): at 12:46

## 2021-03-20 RX ADMIN — MEMANTINE HYDROCHLORIDE 10 MILLIGRAM(S): 10 TABLET ORAL at 06:16

## 2021-03-20 RX ADMIN — PANTOPRAZOLE SODIUM 40 MILLIGRAM(S): 20 TABLET, DELAYED RELEASE ORAL at 06:16

## 2021-03-20 RX ADMIN — Medication 40 MILLIGRAM(S): at 06:17

## 2021-03-20 RX ADMIN — MIRTAZAPINE 7.5 MILLIGRAM(S): 45 TABLET, ORALLY DISINTEGRATING ORAL at 21:33

## 2021-03-20 RX ADMIN — Medication 5 MILLIGRAM(S): at 21:33

## 2021-03-20 RX ADMIN — Medication 125 MICROGRAM(S): at 06:16

## 2021-03-20 RX ADMIN — Medication 1 MILLIGRAM(S): at 12:46

## 2021-03-20 RX ADMIN — Medication 100 MILLIGRAM(S): at 22:59

## 2021-03-20 RX ADMIN — Medication 650 MILLIGRAM(S): at 06:16

## 2021-03-20 RX ADMIN — Medication 6 MILLIGRAM(S): at 06:16

## 2021-03-20 RX ADMIN — Medication 0.75 MILLIGRAM(S): at 22:59

## 2021-03-20 RX ADMIN — TAMSULOSIN HYDROCHLORIDE 0.4 MILLIGRAM(S): 0.4 CAPSULE ORAL at 21:32

## 2021-03-20 RX ADMIN — AMIODARONE HYDROCHLORIDE 200 MILLIGRAM(S): 400 TABLET ORAL at 06:16

## 2021-03-20 RX ADMIN — MEMANTINE HYDROCHLORIDE 10 MILLIGRAM(S): 10 TABLET ORAL at 17:37

## 2021-03-20 RX ADMIN — Medication 650 MILLIGRAM(S): at 17:37

## 2021-03-20 RX ADMIN — Medication 0.25 MILLIGRAM(S): at 21:32

## 2021-03-20 NOTE — PROGRESS NOTE ADULT - PROBLEM SELECTOR PLAN 5
- patient with hematuria on UA, in setting of anemia  - per urology  - monitor Is&Os  - f/u CT A/P with nonobstructive renal stones  - per urology no inpatient workup - patient with hematuria on UA, in setting of anemia  - per urology no interventions, likely 2/2 kidney stones, not clinically sig  - monitor Is&Os  - f/u CT A/P with nonobstructive renal stones  - per urology no inpatient workup

## 2021-03-20 NOTE — PROGRESS NOTE ADULT - SUBJECTIVE AND OBJECTIVE BOX
Mr. Olvera reports improvement in dyspnea since admission.    Vital Signs Last 24 Hrs  T(C): 37.1 (20 Mar 2021 14:37), Max: 37.1 (20 Mar 2021 14:37)  T(F): 98.8 (20 Mar 2021 14:37), Max: 98.8 (20 Mar 2021 14:37)  HR: 62 (20 Mar 2021 14:37) (53 - 62)  BP: 131/66 (20 Mar 2021 14:37) (131/62 - 149/70)  RR: 17 (20 Mar 2021 14:37) (17 - 18)  SpO2: 92% (20 Mar 2021 14:37) (92% - 97%)    Awake, alert, comfortable  RRR with ectopy  CTAB  S/NT  Trace CHRISTEL    03-20    142  |  108  |  83<H>  ----------------------------<  153<H>  4.9   |  20<L>  |  4.29<H>    Ca    8.2<L>      20 Mar 2021 06:36  Phos  3.6     03-20  Mg     2.0     03-20                          8.6    4.06  )-----------( 137      ( 20 Mar 2021 06:36 )             27.6       Tele  NSR 50-70 and A-pacing  PVC    MEDICATIONS  (STANDING):  aMIOdarone    Tablet 200 milliGRAM(s) Oral daily  dexAMETHasone     Tablet 6 milliGRAM(s) Oral daily  donepezil 10 milliGRAM(s) Oral at bedtime  folic acid 1 milliGRAM(s) Oral daily  furosemide   Injectable 40 milliGRAM(s) IV Push daily  ketoconazole 2% Shampoo 1 Application(s) Topical <User Schedule>  lactobacillus acidophilus 1 Tablet(s) Oral daily  levothyroxine 125 MICROGram(s) Oral daily  melatonin 5 milliGRAM(s) Oral at bedtime  memantine 10 milliGRAM(s) Oral every 12 hours  mirtazapine 7.5 milliGRAM(s) Oral at bedtime  multivitamin 1 Tablet(s) Oral daily  pantoprazole    Tablet 40 milliGRAM(s) Oral before breakfast  sodium bicarbonate 650 milliGRAM(s) Oral two times a day  tamsulosin 0.4 milliGRAM(s) Oral at bedtime    75M   pAF  Tachy-sabina s/p PPM  HFpEF  CLL transformed to Waldenstrom macroglobulinemia   CKD  COVID-19    Continue O>I with  close monitoring of renal function and electrolytes  Warfarin held due to supratherapeutic INR   Continue amiodarone to maintain SR  Restart beta-blocker

## 2021-03-20 NOTE — PROGRESS NOTE PEDS - ASSESSMENT
Denies  complaints  on room air.   Afebrile    aMIOdarone    Tablet 200 milliGRAM(s) Oral daily  dexAMETHasone     Tablet 6 milliGRAM(s) Oral daily  donepezil 10 milliGRAM(s) Oral at bedtime  folic acid 1 milliGRAM(s) Oral daily  furosemide   Injectable 40 milliGRAM(s) IV Push daily  ketoconazole 2% Shampoo 1 Application(s) Topical <User Schedule>  lactobacillus acidophilus 1 Tablet(s) Oral daily  levothyroxine 125 MICROGram(s) Oral daily  melatonin 5 milliGRAM(s) Oral at bedtime  memantine 10 milliGRAM(s) Oral every 12 hours  mirtazapine 7.5 milliGRAM(s) Oral at bedtime  multivitamin 1 Tablet(s) Oral daily  pantoprazole    Tablet 40 milliGRAM(s) Oral before breakfast  sodium bicarbonate 650 milliGRAM(s) Oral two times a day  tamsulosin 0.4 milliGRAM(s) Oral at bedtime      VITAL:  T(C): , Max: 37.1 (03-20-21 @ 14:37)  T(F): , Max: 98.8 (03-20-21 @ 14:37)  HR: 62 (03-20-21 @ 14:37)  BP: 131/66 (03-20-21 @ 14:37)  BP(mean): --  RR: 17 (03-20-21 @ 14:37)  SpO2: 92% (03-20-21 @ 14:37)  Wt(kg): --    03-19-21 @ 07:01  -  03-20-21 @ 07:00  --------------------------------------------------------  IN: 1000 mL / OUT: 3000 mL / NET: -2000 mL    03-20-21 @ 07:01  -  03-20-21 @ 14:43  --------------------------------------------------------  IN: 650 mL / OUT: 1325 mL / NET: -675 mL        PHYSICAL EXAM:  Constitutional: NAD, Alert  HEENT: NCAT, DMM  Neck: Supple, No JVD  Respiratory: CTA-b/l  Cardiovascular: reg sabina irreg  Gastrointestinal: BS+, soft, NT/ND  Extremities: No peripheral edema b/l  Neurological: no focal deficits; strength grossly intact  Back: no CVAT b/l  Skin: (+)left frontal ecchymosis    LABS:                          8.6    4.06  )-----------( 137      ( 20 Mar 2021 06:36 )             27.6     Na(142)/K(4.9)/Cl(108)/HCO3(20)/BUN(83)/Cr(4.29)Glu(153)/Ca(8.2)/Mg(2.0)/PO4(3.6)    03-20 @ 06:36  Na(139)/K(4.3)/Cl(103)/HCO3(20)/BUN(80)/Cr(4.52)Glu(160)/Ca(8.7)/Mg(1.9)/PO4(4.0)    03-19 @ 18:44  Na(139)/K(5.4)/Cl(110)/HCO3(15)/BUN(74)/Cr(4.29)Glu(150)/Ca(7.9)/Mg(2.1)/PO4(4.4)    03-19 @ 07:22  Na(141)/K(5.0)/Cl(111)/HCO3(18)/BUN(65)/Cr(4.14)Glu(162)/Ca(7.9)/Mg(--)/PO4(--)    03-18 @ 15:21  Na(140)/K(5.4)/Cl(110)/HCO3(20)/BUN(61)/Cr(4.52)Glu(161)/Ca(8.3)/Mg(2.0)/PO4(4.8)    03-18 @ 07:41  Na(141)/K(5.4)/Cl(112)/HCO3(18)/BUN(56)/Cr(4.63)Glu(173)/Ca(8.3)/Mg(1.8)/PO4(3.9)    03-17 @ 19:12            ASSESSMENT/PLAN  IMPRESSION: 75N w/ Waldenstroms, mild dementia, AFib, and CKD4, 3/16/21 a/w COVID PNA    (1)Renal - 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 - base creat mid-3s, Scr  still high but  improving  today relative  to yesterday  (2)LOUISE - likely a mild component of ischemic ATN here - starting to resolve  (3)Hyperkalemia - high normal  - largely renally mediated  (4)Metabolic acidosis - largely renally mediated - stable  (5)Pancytopenia - Heme on board  (6)Pulm/ID - COVID PNA - Pulm+ID on board - on Decadron  (7)Anemia- gb trending down relative to yesterday but acceptable for now  (8)CV- BP optimally controoled    RECOMMEND:  (1)c/w NaHCO3 650mg po bid for now  (2) c/w IV Lasix as ordered  (3)Lokelma 10gm po prn K 5.5 or higher  (4)Continue renal diet  (5)Dose new meds for GFR 10-15ml/min   (6)No HD for now      Winston Wilson, NP-BC  [facility]  [phone]

## 2021-03-20 NOTE — PROGRESS NOTE ADULT - PROBLEM SELECTOR PLAN 4
- patient with 3-5 days of diarrhea, watery, nonbloody. May be 2/2 COVID PNA. Patient has history of diverticulitis s/p sigmoid resection.   - no current diarrhea - send samples if able  - f/u GI PCR, stool cultures  - f/u CT A/P - chronic LAD, no acute changes - patient with 3-5 days of diarrhea, watery, nonbloody. May be 2/2 COVID PNA. Patient has history of diverticulitis s/p sigmoid resection.   - no current diarrhea - send samples if able  - GI PCR, stool cultures negative  - f/u CT A/P - chronic LAD, no acute changes

## 2021-03-20 NOTE — PROGRESS NOTE ADULT - SUBJECTIVE AND OBJECTIVE BOX
Authored by Mary Reyes MD, PGY1  PATIENT:  NICOLAS CIFUENTES  [id number]    CHIEF COMPLAINT:  Patient is a 75y old  Male who presents with a chief complaint of Fevers & chills (19 Mar 2021 14:43)      INTERVAL HISTORY OVERNIGHT EVENTS: SWEETIE overnight. Received clonazepam per patient request.       MEDICATIONS:  MEDICATIONS  (STANDING):  aMIOdarone    Tablet 200 milliGRAM(s) Oral daily  dexAMETHasone     Tablet 6 milliGRAM(s) Oral daily  donepezil 10 milliGRAM(s) Oral at bedtime  folic acid 1 milliGRAM(s) Oral daily  furosemide   Injectable 40 milliGRAM(s) IV Push daily  ketoconazole 2% Shampoo 1 Application(s) Topical <User Schedule>  lactobacillus acidophilus 1 Tablet(s) Oral daily  levothyroxine 125 MICROGram(s) Oral daily  melatonin 5 milliGRAM(s) Oral at bedtime  memantine 10 milliGRAM(s) Oral every 12 hours  mirtazapine 7.5 milliGRAM(s) Oral at bedtime  multivitamin 1 Tablet(s) Oral daily  pantoprazole    Tablet 40 milliGRAM(s) Oral before breakfast  sodium bicarbonate 650 milliGRAM(s) Oral two times a day  tamsulosin 0.4 milliGRAM(s) Oral at bedtime    MEDICATIONS  (PRN):      ALLERGIES:  Allergies    rituximab (Angioedema)    Intolerances        OBJECTIVE:  ICU Vital Signs Last 24 Hrs  T(C): 36.5 (20 Mar 2021 05:59), Max: 36.8 (19 Mar 2021 20:58)  T(F): 97.7 (20 Mar 2021 05:59), Max: 98.3 (19 Mar 2021 20:58)  HR: 54 (20 Mar 2021 05:59) (53 - 63)  BP: 149/70 (20 Mar 2021 05:59) (126/72 - 149/70)  BP(mean): --  ABP: --  ABP(mean): --  RR: 18 (20 Mar 2021 05:59) (18 - 18)  SpO2: 97% (20 Mar 2021 05:59) (94% - 97%)          I&O's Summary    19 Mar 2021 07:01  -  20 Mar 2021 07:00  --------------------------------------------------------  IN: 1000 mL / OUT: 3000 mL / NET: -2000 mL      Daily     Daily     PHYSICAL EXAMINATION:  General: WN/WD NAD  HEENT: PERRL, EOMI, moist mucous membranes  Neurology: A&Ox3, nonfocal, CARRIZALES x 4  Respiratory: normal respiratory effort, mild coarse breath sounds  CV: RRR, S1S2, no murmurs, rubs or gallops  Abdominal: Soft, NT, ND +BS, Last BM 3/18  Extremities: 1+ pitting edema    LABS:                          8.6    4.06  )-----------( 137      ( 20 Mar 2021 06:36 )             27.6     03-20    142  |  108  |  83<H>  ----------------------------<  153<H>  4.9   |  20<L>  |  4.29<H>    Ca    8.2<L>      20 Mar 2021 06:36  Phos  3.6     03-20  Mg     2.0     03-20    TPro  5.5<L>  /  Alb  3.1<L>  /  TBili  0.3  /  DBili  x   /  AST  26  /  ALT  26  /  AlkPhos  52  03-20    LIVER FUNCTIONS - ( 20 Mar 2021 06:36 )  Alb: 3.1 g/dL / Pro: 5.5 g/dL / ALK PHOS: 52 U/L / ALT: 26 U/L / AST: 26 U/L / GGT: x           PT/INR - ( 19 Mar 2021 07:22 )   PT: 46.2 sec;   INR: 4.12 ratio         PTT - ( 19 Mar 2021 07:22 )  PTT:35.8 sec            TELEMETRY:     EKG:     IMAGING:       Authored by Mary Reyes MD, PGY1  PATIENT:  NICOLAS CIFUENTES  [id number]    CHIEF COMPLAINT:  Patient is a 75y old  Male who presents with a chief complaint of Fevers & chills (19 Mar 2021 14:43)      INTERVAL HISTORY OVERNIGHT EVENTS: SWEETIE overnight. Received clonazepam per patient request. This AM, patient reports improved WOB, no other acute complaints.       MEDICATIONS:  MEDICATIONS  (STANDING):  aMIOdarone    Tablet 200 milliGRAM(s) Oral daily  dexAMETHasone     Tablet 6 milliGRAM(s) Oral daily  donepezil 10 milliGRAM(s) Oral at bedtime  folic acid 1 milliGRAM(s) Oral daily  furosemide   Injectable 40 milliGRAM(s) IV Push daily  ketoconazole 2% Shampoo 1 Application(s) Topical <User Schedule>  lactobacillus acidophilus 1 Tablet(s) Oral daily  levothyroxine 125 MICROGram(s) Oral daily  melatonin 5 milliGRAM(s) Oral at bedtime  memantine 10 milliGRAM(s) Oral every 12 hours  mirtazapine 7.5 milliGRAM(s) Oral at bedtime  multivitamin 1 Tablet(s) Oral daily  pantoprazole    Tablet 40 milliGRAM(s) Oral before breakfast  sodium bicarbonate 650 milliGRAM(s) Oral two times a day  tamsulosin 0.4 milliGRAM(s) Oral at bedtime    MEDICATIONS  (PRN):      ALLERGIES:  Allergies    rituximab (Angioedema)    Intolerances        OBJECTIVE:  ICU Vital Signs Last 24 Hrs  T(C): 36.5 (20 Mar 2021 05:59), Max: 36.8 (19 Mar 2021 20:58)  T(F): 97.7 (20 Mar 2021 05:59), Max: 98.3 (19 Mar 2021 20:58)  HR: 54 (20 Mar 2021 05:59) (53 - 63)  BP: 149/70 (20 Mar 2021 05:59) (126/72 - 149/70)  BP(mean): --  ABP: --  ABP(mean): --  RR: 18 (20 Mar 2021 05:59) (18 - 18)  SpO2: 97% (20 Mar 2021 05:59) (94% - 97%)          I&O's Summary    19 Mar 2021 07:01  -  20 Mar 2021 07:00  --------------------------------------------------------  IN: 1000 mL / OUT: 3000 mL / NET: -2000 mL      Daily     Daily     PHYSICAL EXAMINATION:  General: WN/WD NAD  HEENT: PERRL, EOMI, moist mucous membranes  Neurology: A&Ox3, nonfocal, CARRIZALES x 4  Respiratory: normal respiratory effort, mild coarse breath sounds  CV: RRR, S1S2, no murmurs, rubs or gallops  Abdominal: Soft, NT, ND +BS, Last BM 3/18  Extremities: 1+ pitting edema    LABS:                          8.6    4.06  )-----------( 137      ( 20 Mar 2021 06:36 )             27.6     03-20    142  |  108  |  83<H>  ----------------------------<  153<H>  4.9   |  20<L>  |  4.29<H>    Ca    8.2<L>      20 Mar 2021 06:36  Phos  3.6     03-20  Mg     2.0     03-20    TPro  5.5<L>  /  Alb  3.1<L>  /  TBili  0.3  /  DBili  x   /  AST  26  /  ALT  26  /  AlkPhos  52  03-20    LIVER FUNCTIONS - ( 20 Mar 2021 06:36 )  Alb: 3.1 g/dL / Pro: 5.5 g/dL / ALK PHOS: 52 U/L / ALT: 26 U/L / AST: 26 U/L / GGT: x           PT/INR - ( 19 Mar 2021 07:22 )   PT: 46.2 sec;   INR: 4.12 ratio         PTT - ( 19 Mar 2021 07:22 )  PTT:35.8 sec            TELEMETRY:     EKG:     IMAGING:

## 2021-03-20 NOTE — CHART NOTE - NSCHARTNOTEFT_GEN_A_CORE
ISTOP reference # [id number]    Patient Name: Anson Joe Date: 1945  Address: [address] [place] [id number]: Male  Rx Written	Rx Dispensed	Drug	Quantity	Days Supply	Prescriber Name	Payment Method	Dispenser  02/04/2021	02/17/2021	oxycodone-acetaminophen  mg tab	450	90	Jerod Simpson	Insurance	[facility]  01/11/2021	01/13/2021	acetaminophen-cod #3 tablet	16	4	Cho, Hankyu	Insurance	[facility]  12/22/2020	12/22/2020	acetaminophen-cod #3 tablet	16	4	Cho, Hankyu	Insurance	[facility] #[id number]  12/19/2020	12/22/2020	acetaminophen-cod #3 tablet	20	5	Cho, Hankyu	Insurance	[facility]  12/02/2020	12/06/2020	acetaminophen-cod #3 tablet	16	4	Cho, Hankyu	Insurance	[facility]  09/11/2020	11/25/2020	oxycodone-acetaminophen  mg tab	450	90	Jerod Simpson MD	Insurance	[facility]  09/10/2020	09/15/2020	oxycodone-acetaminophen  mg tab	450	90	Jerod Simpson MD	Insurance	[facility]  06/10/2020	06/16/2020	oxycodone-acetaminophen  mg tab	450	90	Jerod Simpson MD	Insurance	[facility]  05/05/2020	05/10/2020	clonazepam 1 mg tablet	180	90	Terrance Raygoza MD	Insurance	[facility]

## 2021-03-20 NOTE — PROGRESS NOTE ADULT - PROBLEM SELECTOR PLAN 3
Tested positive 3/11, initial symptoms (fevers, fatigue) started 3/9, patient received monoclonal antibody treatment at [facility] and acutely experienced worsening of fevers and chills, may be superimposed transfusion reaction on top of COVID PNA.   - c/w dexamethasone 6mg IV daily for 10 days (will do one day of solumedrol q8 to treat both COVID PNA and possible transfusion reaction)  - control fevers with acetaminophen  - will hold remdesevir in setting of LOUISE on CKD, patient weaned off supplemental O2, saturating well on room air  - fevers: f/u BCx, UCx, CT chest, abdomen pelvis. Hold abx per ID recs, no SOI besides COVID identified.

## 2021-03-20 NOTE — PROGRESS NOTE ADULT - PROBLEM SELECTOR PLAN 10
-DVT PPx- hold AC in setting of bleed, c/w SCDs  -GI PPx - c/w protonix, history of GERD -DVT PPx- hold AC in setting of bleed, c/w SCDs  -GI PPx - c/w protonix, history of GERD    3/20: Updated wife over the phone

## 2021-03-20 NOTE — PROGRESS NOTE ADULT - PROBLEM SELECTOR PLAN 1
- LOUISE on CKD, baseline Cr 3.7  - 4.5 on admission  - trend uop  - appreciate nephrology consult - pending clinical improvement  - monitor lytes, renally dose medications - LOUISE on CKD, baseline Cr 3.7  - 4.5 on admission, stable  - trend uop  - appreciate nephrology consult - pending clinical improvement, c/w NaHCO3 tid  - monitor lytes, renally dose medications

## 2021-03-21 LAB
ALBUMIN SERPL ELPH-MCNC: 3.4 G/DL — SIGNIFICANT CHANGE UP (ref 3.3–5)
ALP SERPL-CCNC: 57 U/L — SIGNIFICANT CHANGE UP (ref 40–120)
ALT FLD-CCNC: 28 U/L — SIGNIFICANT CHANGE UP (ref 10–45)
ANION GAP SERPL CALC-SCNC: 14 MMOL/L — SIGNIFICANT CHANGE UP (ref 5–17)
APTT BLD: 35.1 SEC — SIGNIFICANT CHANGE UP (ref 27.5–35.5)
AST SERPL-CCNC: 24 U/L — SIGNIFICANT CHANGE UP (ref 10–40)
BILIRUB SERPL-MCNC: 0.4 MG/DL — SIGNIFICANT CHANGE UP (ref 0.2–1.2)
BLD GP AB SCN SERPL QL: NEGATIVE — SIGNIFICANT CHANGE UP
BUN SERPL-MCNC: 85 MG/DL — HIGH (ref 7–23)
CALCIUM SERPL-MCNC: 8.8 MG/DL — SIGNIFICANT CHANGE UP (ref 8.4–10.5)
CHLORIDE SERPL-SCNC: 103 MMOL/L — SIGNIFICANT CHANGE UP (ref 96–108)
CO2 SERPL-SCNC: 23 MMOL/L — SIGNIFICANT CHANGE UP (ref 22–31)
CREAT SERPL-MCNC: 3.87 MG/DL — HIGH (ref 0.5–1.3)
CULTURE RESULTS: SIGNIFICANT CHANGE UP
GLUCOSE SERPL-MCNC: 133 MG/DL — HIGH (ref 70–99)
HCT VFR BLD CALC: 28.5 % — LOW (ref 39–50)
HGB BLD-MCNC: 9 G/DL — LOW (ref 13–17)
INR BLD: 4.04 RATIO — HIGH (ref 0.88–1.16)
MAGNESIUM SERPL-MCNC: 2 MG/DL — SIGNIFICANT CHANGE UP (ref 1.6–2.6)
MCHC RBC-ENTMCNC: 30.3 PG — SIGNIFICANT CHANGE UP (ref 27–34)
MCHC RBC-ENTMCNC: 31.6 GM/DL — LOW (ref 32–36)
MCV RBC AUTO: 96 FL — SIGNIFICANT CHANGE UP (ref 80–100)
NRBC # BLD: 0 /100 WBCS — SIGNIFICANT CHANGE UP (ref 0–0)
PHOSPHATE SERPL-MCNC: 3.6 MG/DL — SIGNIFICANT CHANGE UP (ref 2.5–4.5)
PLATELET # BLD AUTO: 172 K/UL — SIGNIFICANT CHANGE UP (ref 150–400)
POTASSIUM SERPL-MCNC: 4.5 MMOL/L — SIGNIFICANT CHANGE UP (ref 3.5–5.3)
POTASSIUM SERPL-SCNC: 4.5 MMOL/L — SIGNIFICANT CHANGE UP (ref 3.5–5.3)
PROT SERPL-MCNC: 6.1 G/DL — SIGNIFICANT CHANGE UP (ref 6–8.3)
PROTHROM AB SERPL-ACNC: 45.4 SEC — HIGH (ref 10.6–13.6)
RBC # BLD: 2.97 M/UL — LOW (ref 4.2–5.8)
RBC # FLD: 13.4 % — SIGNIFICANT CHANGE UP (ref 10.3–14.5)
RH IG SCN BLD-IMP: POSITIVE — SIGNIFICANT CHANGE UP
SODIUM SERPL-SCNC: 140 MMOL/L — SIGNIFICANT CHANGE UP (ref 135–145)
SPECIMEN SOURCE: SIGNIFICANT CHANGE UP
WBC # BLD: 4.88 K/UL — SIGNIFICANT CHANGE UP (ref 3.8–10.5)
WBC # FLD AUTO: 4.88 K/UL — SIGNIFICANT CHANGE UP (ref 3.8–10.5)

## 2021-03-21 PROCEDURE — 99233 SBSQ HOSP IP/OBS HIGH 50: CPT | Mod: GC

## 2021-03-21 PROCEDURE — 93010 ELECTROCARDIOGRAM REPORT: CPT

## 2021-03-21 RX ORDER — ALBUTEROL 90 UG/1
2.5 AEROSOL, METERED ORAL EVERY 6 HOURS
Refills: 0 | Status: DISCONTINUED | OUTPATIENT
Start: 2021-03-21 | End: 2021-03-21

## 2021-03-21 RX ORDER — ALBUTEROL 90 UG/1
1 AEROSOL, METERED ORAL EVERY 4 HOURS
Refills: 0 | Status: DISCONTINUED | OUTPATIENT
Start: 2021-03-21 | End: 2021-03-21

## 2021-03-21 RX ORDER — SODIUM CHLORIDE 0.65 %
1 AEROSOL, SPRAY (ML) NASAL
Refills: 0 | Status: DISCONTINUED | OUTPATIENT
Start: 2021-03-21 | End: 2021-03-31

## 2021-03-21 RX ORDER — CLONAZEPAM 1 MG
0.5 TABLET ORAL ONCE
Refills: 0 | Status: DISCONTINUED | OUTPATIENT
Start: 2021-03-21 | End: 2021-03-21

## 2021-03-21 RX ORDER — CLONAZEPAM 1 MG
0.5 TABLET ORAL AT BEDTIME
Refills: 0 | Status: DISCONTINUED | OUTPATIENT
Start: 2021-03-21 | End: 2021-03-23

## 2021-03-21 RX ORDER — CLONAZEPAM 1 MG
0.25 TABLET ORAL AT BEDTIME
Refills: 0 | Status: DISCONTINUED | OUTPATIENT
Start: 2021-03-21 | End: 2021-03-21

## 2021-03-21 RX ORDER — ALBUTEROL 90 UG/1
2 AEROSOL, METERED ORAL EVERY 6 HOURS
Refills: 0 | Status: DISCONTINUED | OUTPATIENT
Start: 2021-03-21 | End: 2021-03-31

## 2021-03-21 RX ADMIN — DONEPEZIL HYDROCHLORIDE 10 MILLIGRAM(S): 10 TABLET, FILM COATED ORAL at 21:45

## 2021-03-21 RX ADMIN — Medication 5 MILLIGRAM(S): at 21:44

## 2021-03-21 RX ADMIN — Medication 100 MILLIGRAM(S): at 05:27

## 2021-03-21 RX ADMIN — Medication 1 TABLET(S): at 13:11

## 2021-03-21 RX ADMIN — Medication 0.5 MILLIGRAM(S): at 21:45

## 2021-03-21 RX ADMIN — Medication 6 MILLIGRAM(S): at 05:26

## 2021-03-21 RX ADMIN — ALBUTEROL 2 PUFF(S): 90 AEROSOL, METERED ORAL at 19:43

## 2021-03-21 RX ADMIN — MIRTAZAPINE 7.5 MILLIGRAM(S): 45 TABLET, ORALLY DISINTEGRATING ORAL at 21:44

## 2021-03-21 RX ADMIN — Medication 100 MILLIGRAM(S): at 21:45

## 2021-03-21 RX ADMIN — AMIODARONE HYDROCHLORIDE 200 MILLIGRAM(S): 400 TABLET ORAL at 05:26

## 2021-03-21 RX ADMIN — TAMSULOSIN HYDROCHLORIDE 0.4 MILLIGRAM(S): 0.4 CAPSULE ORAL at 21:45

## 2021-03-21 RX ADMIN — PANTOPRAZOLE SODIUM 40 MILLIGRAM(S): 20 TABLET, DELAYED RELEASE ORAL at 05:27

## 2021-03-21 RX ADMIN — Medication 1 MILLIGRAM(S): at 13:11

## 2021-03-21 RX ADMIN — MEMANTINE HYDROCHLORIDE 10 MILLIGRAM(S): 10 TABLET ORAL at 17:25

## 2021-03-21 RX ADMIN — Medication 650 MILLIGRAM(S): at 17:25

## 2021-03-21 RX ADMIN — MEMANTINE HYDROCHLORIDE 10 MILLIGRAM(S): 10 TABLET ORAL at 05:26

## 2021-03-21 RX ADMIN — Medication 125 MICROGRAM(S): at 05:26

## 2021-03-21 RX ADMIN — Medication 40 MILLIGRAM(S): at 05:27

## 2021-03-21 RX ADMIN — Medication 650 MILLIGRAM(S): at 05:26

## 2021-03-21 NOTE — PROGRESS NOTE ADULT - PROBLEM SELECTOR PLAN 5
- patient with hematuria on UA, in setting of anemia  - per urology no interventions, likely 2/2 kidney stones, not clinically sig  - monitor Is&Os  - f/u CT A/P with nonobstructive renal stones  - per urology no inpatient workup

## 2021-03-21 NOTE — PROGRESS NOTE ADULT - PROBLEM SELECTOR PLAN 3
Tested positive 3/11, initial symptoms (fevers, fatigue) started 3/9, patient received monoclonal antibody treatment at [facility] and acutely experienced worsening of fevers and chills, may be superimposed transfusion reaction on top of COVID PNA.   - c/w dexamethasone 6mg IV daily for 10 days (will do one day of solumedrol q8 to treat both COVID PNA and possible transfusion reaction)  - control fevers with acetaminophen  - will hold remdesevir in setting of LOUISE on CKD, patient weaned off supplemental O2, saturating well on room air  - fevers: f/u BCx, UCx, CT chest, abdomen pelvis. Hold abx per ID recs, no SOI besides COVID identified.

## 2021-03-21 NOTE — PROGRESS NOTE ADULT - PROBLEM SELECTOR PLAN 1
- LOUISE on CKD, baseline Cr 3.7  - 4.5 on admission, stable  - trend uop  - appreciate nephrology consult - pending clinical improvement, c/w NaHCO3 tid  - monitor lytes, renally dose medications

## 2021-03-21 NOTE — PROGRESS NOTE ADULT - PROBLEM SELECTOR PLAN 4
- patient with 3-5 days of diarrhea, watery, nonbloody. May be 2/2 COVID PNA. Patient has history of diverticulitis s/p sigmoid resection.   - no current diarrhea - send samples if able  - GI PCR, stool cultures negative  - f/u CT A/P - chronic LAD, no acute changes

## 2021-03-21 NOTE — PROGRESS NOTE ADULT - SUBJECTIVE AND OBJECTIVE BOX
Authored by Mary Reyes MD, PGY1  PATIENT:  NICOLAS CIFUENTES  [id number]    CHIEF COMPLAINT:  Patient is a 75y old  Male who presents with a chief complaint of Fevers & chills (20 Mar 2021 18:14)      INTERVAL HISTORY OVERNIGHT EVENTS: Overnight, patient required robitussin for cough, albuterol for wheeze, patient received klonopin per request. This AM:         MEDICATIONS:  MEDICATIONS  (STANDING):  aMIOdarone    Tablet 200 milliGRAM(s) Oral daily  clonazePAM  Tablet 0.25 milliGRAM(s) Oral at bedtime  dexAMETHasone     Tablet 6 milliGRAM(s) Oral daily  donepezil 10 milliGRAM(s) Oral at bedtime  folic acid 1 milliGRAM(s) Oral daily  furosemide   Injectable 40 milliGRAM(s) IV Push daily  ketoconazole 2% Shampoo 1 Application(s) Topical <User Schedule>  lactobacillus acidophilus 1 Tablet(s) Oral daily  levothyroxine 125 MICROGram(s) Oral daily  melatonin 5 milliGRAM(s) Oral at bedtime  memantine 10 milliGRAM(s) Oral every 12 hours  mirtazapine 7.5 milliGRAM(s) Oral at bedtime  multivitamin 1 Tablet(s) Oral daily  pantoprazole    Tablet 40 milliGRAM(s) Oral before breakfast  sodium bicarbonate 650 milliGRAM(s) Oral two times a day  tamsulosin 0.4 milliGRAM(s) Oral at bedtime    MEDICATIONS  (PRN):  ALBUTerol    90 MICROgram(s) HFA Inhaler 2 Puff(s) Inhalation every 6 hours PRN Shortness of Breath and/or Wheezing  guaiFENesin   Syrup  (Sugar-Free) 100 milliGRAM(s) Oral every 6 hours PRN Cough      ALLERGIES:  Allergies    rituximab (Angioedema)    Intolerances        OBJECTIVE:  ICU Vital Signs Last 24 Hrs  T(C): 36.6 (21 Mar 2021 05:17), Max: 37.3 (20 Mar 2021 21:47)  T(F): 97.8 (21 Mar 2021 05:17), Max: 99.2 (20 Mar 2021 21:47)  HR: 54 (21 Mar 2021 05:17) (54 - 62)  BP: 124/65 (21 Mar 2021 05:17) (124/65 - 134/58)  BP(mean): --  ABP: --  ABP(mean): --  RR: 20 (21 Mar 2021 05:17) (17 - 20)  SpO2: 93% (21 Mar 2021 05:17) (86% - 95%)          I&O's Summary    20 Mar 2021 07:01  -  21 Mar 2021 07:00  --------------------------------------------------------  IN: 1380 mL / OUT: 2800 mL / NET: -1420 mL      Daily     Daily     PHYSICAL EXAMINATION:  General: WN/WD NAD  HEENT: PERRL, EOMI, moist mucous membranes  Neurology: A&Ox3, nonfocal, CARRIZALES x 4  Respiratory: normal respiratory effort, mild coarse breath sounds  CV: RRR, S1S2, no murmurs, rubs or gallops  Abdominal: Soft, NT, ND +BS, Last BM 3/18  Extremities: 1+ pitting edema    LABS:                          9.0    4.88  )-----------( 172      ( 21 Mar 2021 07:25 )             28.5     03-20    142  |  108  |  83<H>  ----------------------------<  153<H>  4.9   |  20<L>  |  4.29<H>    Ca    8.2<L>      20 Mar 2021 06:36  Phos  3.6     03-20  Mg     2.0     03-20    TPro  5.5<L>  /  Alb  3.1<L>  /  TBili  0.3  /  DBili  x   /  AST  26  /  ALT  26  /  AlkPhos  52  03-20    LIVER FUNCTIONS - ( 20 Mar 2021 06:36 )  Alb: 3.1 g/dL / Pro: 5.5 g/dL / ALK PHOS: 52 U/L / ALT: 26 U/L / AST: 26 U/L / GGT: x           PT/INR - ( 21 Mar 2021 07:25 )   PT: 45.4 sec;   INR: 4.04 ratio         PTT - ( 21 Mar 2021 07:25 )  PTT:35.1 sec            TELEMETRY:     EKG:     IMAGING:       Authored by Mary Reyes MD, PGY1  PATIENT:  NICOLAS CIFUENTES  [id number]    CHIEF COMPLAINT:  Patient is a 75y old  Male who presents with a chief complaint of Fevers & chills (20 Mar 2021 18:14)      INTERVAL HISTORY OVERNIGHT EVENTS: Overnight, patient required robitussin for cough, albuterol for wheeze, patient received klonopin per request. This AM, patient reports improved work of breathing, some pain on inspiration, denies chest pain, abdominal pain, LE edema.         MEDICATIONS:  MEDICATIONS  (STANDING):  aMIOdarone    Tablet 200 milliGRAM(s) Oral daily  clonazePAM  Tablet 0.25 milliGRAM(s) Oral at bedtime  dexAMETHasone     Tablet 6 milliGRAM(s) Oral daily  donepezil 10 milliGRAM(s) Oral at bedtime  folic acid 1 milliGRAM(s) Oral daily  furosemide   Injectable 40 milliGRAM(s) IV Push daily  ketoconazole 2% Shampoo 1 Application(s) Topical <User Schedule>  lactobacillus acidophilus 1 Tablet(s) Oral daily  levothyroxine 125 MICROGram(s) Oral daily  melatonin 5 milliGRAM(s) Oral at bedtime  memantine 10 milliGRAM(s) Oral every 12 hours  mirtazapine 7.5 milliGRAM(s) Oral at bedtime  multivitamin 1 Tablet(s) Oral daily  pantoprazole    Tablet 40 milliGRAM(s) Oral before breakfast  sodium bicarbonate 650 milliGRAM(s) Oral two times a day  tamsulosin 0.4 milliGRAM(s) Oral at bedtime    MEDICATIONS  (PRN):  ALBUTerol    90 MICROgram(s) HFA Inhaler 2 Puff(s) Inhalation every 6 hours PRN Shortness of Breath and/or Wheezing  guaiFENesin   Syrup  (Sugar-Free) 100 milliGRAM(s) Oral every 6 hours PRN Cough      ALLERGIES:  Allergies    rituximab (Angioedema)    Intolerances        OBJECTIVE:  ICU Vital Signs Last 24 Hrs  T(C): 36.6 (21 Mar 2021 05:17), Max: 37.3 (20 Mar 2021 21:47)  T(F): 97.8 (21 Mar 2021 05:17), Max: 99.2 (20 Mar 2021 21:47)  HR: 54 (21 Mar 2021 05:17) (54 - 62)  BP: 124/65 (21 Mar 2021 05:17) (124/65 - 134/58)  BP(mean): --  ABP: --  ABP(mean): --  RR: 20 (21 Mar 2021 05:17) (17 - 20)  SpO2: 93% (21 Mar 2021 05:17) (86% - 95%)          I&O's Summary    20 Mar 2021 07:01  -  21 Mar 2021 07:00  --------------------------------------------------------  IN: 1380 mL / OUT: 2800 mL / NET: -1420 mL      Daily     Daily     PHYSICAL EXAMINATION:  General: WN/WD NAD  HEENT: PERRL, EOMI, moist mucous membranes  Neurology: A&Ox3, nonfocal, CARRIZALES x 4  Respiratory: normal respiratory effort, mild coarse breath sounds  CV: RRR, S1S2, no murmurs, rubs or gallops  Abdominal: Soft, NT, ND +BS, Last BM 3/18  Extremities: no edema    LABS:                          9.0    4.88  )-----------( 172      ( 21 Mar 2021 07:25 )             28.5     03-20    142  |  108  |  83<H>  ----------------------------<  153<H>  4.9   |  20<L>  |  4.29<H>    Ca    8.2<L>      20 Mar 2021 06:36  Phos  3.6     03-20  Mg     2.0     03-20    TPro  5.5<L>  /  Alb  3.1<L>  /  TBili  0.3  /  DBili  x   /  AST  26  /  ALT  26  /  AlkPhos  52  03-20    LIVER FUNCTIONS - ( 20 Mar 2021 06:36 )  Alb: 3.1 g/dL / Pro: 5.5 g/dL / ALK PHOS: 52 U/L / ALT: 26 U/L / AST: 26 U/L / GGT: x           PT/INR - ( 21 Mar 2021 07:25 )   PT: 45.4 sec;   INR: 4.04 ratio         PTT - ( 21 Mar 2021 07:25 )  PTT:35.1 sec            TELEMETRY:     EKG:     IMAGING:

## 2021-03-21 NOTE — PROGRESS NOTE ADULT - PROBLEM SELECTOR PLAN 10
-DVT PPx- hold AC in setting of bleed, c/w SCDs  -GI PPx - c/w protonix, history of GERD    3/20: Updated wife over the phone -DVT PPx- hold AC in setting of bleed, c/w SCDs  -GI PPx - c/w protonix, history of GERD    3/21: Called wife's number, left a message to call back for any questions.

## 2021-03-22 ENCOUNTER — RX RENEWAL (OUTPATIENT)
Age: 76
End: 2021-03-22

## 2021-03-22 LAB
ALBUMIN SERPL ELPH-MCNC: 3.4 G/DL — SIGNIFICANT CHANGE UP (ref 3.3–5)
ALP SERPL-CCNC: 57 U/L — SIGNIFICANT CHANGE UP (ref 40–120)
ALT FLD-CCNC: 33 U/L — SIGNIFICANT CHANGE UP (ref 10–45)
ANION GAP SERPL CALC-SCNC: 12 MMOL/L — SIGNIFICANT CHANGE UP (ref 5–17)
APTT BLD: 36 SEC — HIGH (ref 27.5–35.5)
AST SERPL-CCNC: 23 U/L — SIGNIFICANT CHANGE UP (ref 10–40)
BASOPHILS # BLD AUTO: 0 K/UL — SIGNIFICANT CHANGE UP (ref 0–0.2)
BASOPHILS NFR BLD AUTO: 0 % — SIGNIFICANT CHANGE UP (ref 0–2)
BILIRUB SERPL-MCNC: 0.6 MG/DL — SIGNIFICANT CHANGE UP (ref 0.2–1.2)
BUN SERPL-MCNC: 90 MG/DL — HIGH (ref 7–23)
CALCIUM SERPL-MCNC: 9 MG/DL — SIGNIFICANT CHANGE UP (ref 8.4–10.5)
CHLORIDE SERPL-SCNC: 104 MMOL/L — SIGNIFICANT CHANGE UP (ref 96–108)
CO2 SERPL-SCNC: 24 MMOL/L — SIGNIFICANT CHANGE UP (ref 22–31)
CREAT SERPL-MCNC: 3.63 MG/DL — HIGH (ref 0.5–1.3)
EOSINOPHIL # BLD AUTO: 0 K/UL — SIGNIFICANT CHANGE UP (ref 0–0.5)
EOSINOPHIL NFR BLD AUTO: 0 % — SIGNIFICANT CHANGE UP (ref 0–6)
GLUCOSE SERPL-MCNC: 126 MG/DL — HIGH (ref 70–99)
HCT VFR BLD CALC: 28.3 % — LOW (ref 39–50)
HGB BLD-MCNC: 9.1 G/DL — LOW (ref 13–17)
IMM GRANULOCYTES NFR BLD AUTO: 0.8 % — SIGNIFICANT CHANGE UP (ref 0–1.5)
INR BLD: 4.33 RATIO — HIGH (ref 0.88–1.16)
LYMPHOCYTES # BLD AUTO: 1.06 K/UL — SIGNIFICANT CHANGE UP (ref 1–3.3)
LYMPHOCYTES # BLD AUTO: 17.9 % — SIGNIFICANT CHANGE UP (ref 13–44)
MAGNESIUM SERPL-MCNC: 2.1 MG/DL — SIGNIFICANT CHANGE UP (ref 1.6–2.6)
MCHC RBC-ENTMCNC: 30.2 PG — SIGNIFICANT CHANGE UP (ref 27–34)
MCHC RBC-ENTMCNC: 32.2 GM/DL — SIGNIFICANT CHANGE UP (ref 32–36)
MCV RBC AUTO: 94 FL — SIGNIFICANT CHANGE UP (ref 80–100)
MONOCYTES # BLD AUTO: 0.64 K/UL — SIGNIFICANT CHANGE UP (ref 0–0.9)
MONOCYTES NFR BLD AUTO: 10.8 % — SIGNIFICANT CHANGE UP (ref 2–14)
NEUTROPHILS # BLD AUTO: 4.18 K/UL — SIGNIFICANT CHANGE UP (ref 1.8–7.4)
NEUTROPHILS NFR BLD AUTO: 70.5 % — SIGNIFICANT CHANGE UP (ref 43–77)
NRBC # BLD: 0 /100 WBCS — SIGNIFICANT CHANGE UP (ref 0–0)
PHOSPHATE SERPL-MCNC: 3.7 MG/DL — SIGNIFICANT CHANGE UP (ref 2.5–4.5)
PLATELET # BLD AUTO: 221 K/UL — SIGNIFICANT CHANGE UP (ref 150–400)
POTASSIUM SERPL-MCNC: 4.3 MMOL/L — SIGNIFICANT CHANGE UP (ref 3.5–5.3)
POTASSIUM SERPL-SCNC: 4.3 MMOL/L — SIGNIFICANT CHANGE UP (ref 3.5–5.3)
PROT SERPL-MCNC: 5.9 G/DL — LOW (ref 6–8.3)
PROTHROM AB SERPL-ACNC: 48.5 SEC — HIGH (ref 10.6–13.6)
RBC # BLD: 3.01 M/UL — LOW (ref 4.2–5.8)
RBC # FLD: 13.2 % — SIGNIFICANT CHANGE UP (ref 10.3–14.5)
SODIUM SERPL-SCNC: 140 MMOL/L — SIGNIFICANT CHANGE UP (ref 135–145)
WBC # BLD: 5.93 K/UL — SIGNIFICANT CHANGE UP (ref 3.8–10.5)
WBC # FLD AUTO: 5.93 K/UL — SIGNIFICANT CHANGE UP (ref 3.8–10.5)

## 2021-03-22 PROCEDURE — 99233 SBSQ HOSP IP/OBS HIGH 50: CPT

## 2021-03-22 PROCEDURE — 99233 SBSQ HOSP IP/OBS HIGH 50: CPT | Mod: GC

## 2021-03-22 RX ORDER — FUROSEMIDE 40 MG
40 TABLET ORAL DAILY
Refills: 0 | Status: DISCONTINUED | OUTPATIENT
Start: 2021-03-22 | End: 2021-03-23

## 2021-03-22 RX ORDER — CLONAZEPAM 1 MG
0.5 TABLET ORAL ONCE
Refills: 0 | Status: DISCONTINUED | OUTPATIENT
Start: 2021-03-22 | End: 2021-03-22

## 2021-03-22 RX ADMIN — Medication 0.5 MILLIGRAM(S): at 22:02

## 2021-03-22 RX ADMIN — Medication 6 MILLIGRAM(S): at 06:09

## 2021-03-22 RX ADMIN — ALBUTEROL 2 PUFF(S): 90 AEROSOL, METERED ORAL at 17:11

## 2021-03-22 RX ADMIN — MEMANTINE HYDROCHLORIDE 10 MILLIGRAM(S): 10 TABLET ORAL at 17:10

## 2021-03-22 RX ADMIN — PANTOPRAZOLE SODIUM 40 MILLIGRAM(S): 20 TABLET, DELAYED RELEASE ORAL at 06:09

## 2021-03-22 RX ADMIN — Medication 100 MILLIGRAM(S): at 06:13

## 2021-03-22 RX ADMIN — Medication 125 MICROGRAM(S): at 06:09

## 2021-03-22 RX ADMIN — MIRTAZAPINE 7.5 MILLIGRAM(S): 45 TABLET, ORALLY DISINTEGRATING ORAL at 22:02

## 2021-03-22 RX ADMIN — DONEPEZIL HYDROCHLORIDE 10 MILLIGRAM(S): 10 TABLET, FILM COATED ORAL at 22:02

## 2021-03-22 RX ADMIN — Medication 100 MILLIGRAM(S): at 22:02

## 2021-03-22 RX ADMIN — Medication 100 MILLIGRAM(S): at 09:43

## 2021-03-22 RX ADMIN — Medication 650 MILLIGRAM(S): at 06:09

## 2021-03-22 RX ADMIN — Medication 1 TABLET(S): at 09:45

## 2021-03-22 RX ADMIN — Medication 5 MILLIGRAM(S): at 22:02

## 2021-03-22 RX ADMIN — Medication 1 MILLIGRAM(S): at 09:45

## 2021-03-22 RX ADMIN — MEMANTINE HYDROCHLORIDE 10 MILLIGRAM(S): 10 TABLET ORAL at 06:09

## 2021-03-22 RX ADMIN — Medication 650 MILLIGRAM(S): at 17:10

## 2021-03-22 RX ADMIN — ALBUTEROL 2 PUFF(S): 90 AEROSOL, METERED ORAL at 09:44

## 2021-03-22 RX ADMIN — Medication 40 MILLIGRAM(S): at 06:09

## 2021-03-22 RX ADMIN — Medication 0.5 MILLIGRAM(S): at 22:01

## 2021-03-22 RX ADMIN — AMIODARONE HYDROCHLORIDE 200 MILLIGRAM(S): 400 TABLET ORAL at 06:09

## 2021-03-22 RX ADMIN — TAMSULOSIN HYDROCHLORIDE 0.4 MILLIGRAM(S): 0.4 CAPSULE ORAL at 22:02

## 2021-03-22 NOTE — PROGRESS NOTE ADULT - SUBJECTIVE AND OBJECTIVE BOX
Authored by Mary Reyes MD, PGY1  PATIENT:  NICOLAS CIFUENTES  [id number]    CHIEF COMPLAINT:  Patient is a 75y old  Male who presents with a chief complaint of Fevers & chills (21 Mar 2021 09:36)      INTERVAL HISTORY OVERNIGHT EVENTS: SWEETIE overnight.        MEDICATIONS:  MEDICATIONS  (STANDING):  aMIOdarone    Tablet 200 milliGRAM(s) Oral daily  clonazePAM  Tablet 0.5 milliGRAM(s) Oral at bedtime  dexAMETHasone     Tablet 6 milliGRAM(s) Oral daily  donepezil 10 milliGRAM(s) Oral at bedtime  folic acid 1 milliGRAM(s) Oral daily  furosemide   Injectable 40 milliGRAM(s) IV Push daily  ketoconazole 2% Shampoo 1 Application(s) Topical <User Schedule>  lactobacillus acidophilus 1 Tablet(s) Oral daily  levothyroxine 125 MICROGram(s) Oral daily  melatonin 5 milliGRAM(s) Oral at bedtime  memantine 10 milliGRAM(s) Oral every 12 hours  mirtazapine 7.5 milliGRAM(s) Oral at bedtime  multivitamin 1 Tablet(s) Oral daily  pantoprazole    Tablet 40 milliGRAM(s) Oral before breakfast  sodium bicarbonate 650 milliGRAM(s) Oral two times a day  tamsulosin 0.4 milliGRAM(s) Oral at bedtime    MEDICATIONS  (PRN):  ALBUTerol    90 MICROgram(s) HFA Inhaler 2 Puff(s) Inhalation every 6 hours PRN Shortness of Breath and/or Wheezing  benzonatate 100 milliGRAM(s) Oral every 8 hours PRN Cough  guaiFENesin   Syrup  (Sugar-Free) 100 milliGRAM(s) Oral every 6 hours PRN Cough  sodium chloride 0.65% Nasal 1 Spray(s) Both Nostrils two times a day PRN Nasal Congestion      ALLERGIES:  Allergies    rituximab (Angioedema)    Intolerances        OBJECTIVE:  ICU Vital Signs Last 24 Hrs  T(C): 36.4 (22 Mar 2021 06:07), Max: 36.7 (21 Mar 2021 21:20)  T(F): 97.6 (22 Mar 2021 06:07), Max: 98.1 (22 Mar 2021 01:03)  HR: 58 (22 Mar 2021 06:07) (55 - 61)  BP: 137/67 (22 Mar 2021 06:07) (135/74 - 157/71)  BP(mean): --  ABP: --  ABP(mean): --  RR: 20 (22 Mar 2021 06:07) (20 - 20)  SpO2: 93% (22 Mar 2021 06:07) (91% - 93%)      CAPILLARY BLOOD GLUCOSE        CAPILLARY BLOOD GLUCOSE        I&O's Summary    21 Mar 2021 07:01  -  22 Mar 2021 07:00  --------------------------------------------------------  IN: 840 mL / OUT: 1000 mL / NET: -160 mL    22 Mar 2021 07:01  -  22 Mar 2021 08:46  --------------------------------------------------------  IN: 0 mL / OUT: 300 mL / NET: -300 mL      Daily     Daily     PHYSICAL EXAMINATION:  General: WN/WD NAD  HEENT: PERRL, EOMI, moist mucous membranes  Neurology: A&Ox3, nonfocal, CARRIZALES x 4  Respiratory: normal respiratory effort, mild coarse breath sounds  CV: RRR, S1S2, no murmurs, rubs or gallops  Abdominal: Soft, NT, ND +BS, Last BM 3/18  Extremities: no edema    LABS:                          9.1    5.93  )-----------( 221      ( 22 Mar 2021 07:20 )             28.3     03-22    140  |  104  |  90<H>  ----------------------------<  126<H>  4.3   |  24  |  3.63<H>    Ca    9.0      22 Mar 2021 07:20  Phos  3.7     03-22  Mg     2.1     03-22    TPro  5.9<L>  /  Alb  3.4  /  TBili  0.6  /  DBili  x   /  AST  23  /  ALT  33  /  AlkPhos  57  03-22    LIVER FUNCTIONS - ( 22 Mar 2021 07:20 )  Alb: 3.4 g/dL / Pro: 5.9 g/dL / ALK PHOS: 57 U/L / ALT: 33 U/L / AST: 23 U/L / GGT: x           PT/INR - ( 22 Mar 2021 07:20 )   PT: 48.5 sec;   INR: 4.33 ratio         PTT - ( 22 Mar 2021 07:20 )  PTT:36.0 sec            TELEMETRY:     EKG:     IMAGING:       Authored by Mary Reyes MD, PGY1  PATIENT:  NICOLAS CIFUENTES  [id number]    CHIEF COMPLAINT:  Patient is a 75y old  Male who presents with a chief complaint of Fevers & chills (21 Mar 2021 09:36)      INTERVAL HISTORY OVERNIGHT EVENTS: SWEETIE overnight. This AM, patient denies SOB, cough, fevers, chills.         MEDICATIONS:  MEDICATIONS  (STANDING):  aMIOdarone    Tablet 200 milliGRAM(s) Oral daily  clonazePAM  Tablet 0.5 milliGRAM(s) Oral at bedtime  dexAMETHasone     Tablet 6 milliGRAM(s) Oral daily  donepezil 10 milliGRAM(s) Oral at bedtime  folic acid 1 milliGRAM(s) Oral daily  furosemide   Injectable 40 milliGRAM(s) IV Push daily  ketoconazole 2% Shampoo 1 Application(s) Topical <User Schedule>  lactobacillus acidophilus 1 Tablet(s) Oral daily  levothyroxine 125 MICROGram(s) Oral daily  melatonin 5 milliGRAM(s) Oral at bedtime  memantine 10 milliGRAM(s) Oral every 12 hours  mirtazapine 7.5 milliGRAM(s) Oral at bedtime  multivitamin 1 Tablet(s) Oral daily  pantoprazole    Tablet 40 milliGRAM(s) Oral before breakfast  sodium bicarbonate 650 milliGRAM(s) Oral two times a day  tamsulosin 0.4 milliGRAM(s) Oral at bedtime    MEDICATIONS  (PRN):  ALBUTerol    90 MICROgram(s) HFA Inhaler 2 Puff(s) Inhalation every 6 hours PRN Shortness of Breath and/or Wheezing  benzonatate 100 milliGRAM(s) Oral every 8 hours PRN Cough  guaiFENesin   Syrup  (Sugar-Free) 100 milliGRAM(s) Oral every 6 hours PRN Cough  sodium chloride 0.65% Nasal 1 Spray(s) Both Nostrils two times a day PRN Nasal Congestion      ALLERGIES:  Allergies    rituximab (Angioedema)    Intolerances        OBJECTIVE:  ICU Vital Signs Last 24 Hrs  T(C): 36.4 (22 Mar 2021 06:07), Max: 36.7 (21 Mar 2021 21:20)  T(F): 97.6 (22 Mar 2021 06:07), Max: 98.1 (22 Mar 2021 01:03)  HR: 58 (22 Mar 2021 06:07) (55 - 61)  BP: 137/67 (22 Mar 2021 06:07) (135/74 - 157/71)  BP(mean): --  ABP: --  ABP(mean): --  RR: 20 (22 Mar 2021 06:07) (20 - 20)  SpO2: 93% (22 Mar 2021 06:07) (91% - 93%)      CAPILLARY BLOOD GLUCOSE        CAPILLARY BLOOD GLUCOSE        I&O's Summary    21 Mar 2021 07:01  -  22 Mar 2021 07:00  --------------------------------------------------------  IN: 840 mL / OUT: 1000 mL / NET: -160 mL    22 Mar 2021 07:01  -  22 Mar 2021 08:46  --------------------------------------------------------  IN: 0 mL / OUT: 300 mL / NET: -300 mL      Daily     Daily     PHYSICAL EXAMINATION:  General: WN/WD NAD  HEENT: PERRL, EOMI, moist mucous membranes  Neurology: A&Ox3, nonfocal, CARRIZALES x 4  Respiratory: normal respiratory effort, mild coarse breath sounds  CV: RRR, S1S2, no murmurs, rubs or gallops  Abdominal: Soft, NT, ND +BS, Last BM 3/18  Extremities: no edema    LABS:                          9.1    5.93  )-----------( 221      ( 22 Mar 2021 07:20 )             28.3     03-22    140  |  104  |  90<H>  ----------------------------<  126<H>  4.3   |  24  |  3.63<H>    Ca    9.0      22 Mar 2021 07:20  Phos  3.7     03-22  Mg     2.1     03-22    TPro  5.9<L>  /  Alb  3.4  /  TBili  0.6  /  DBili  x   /  AST  23  /  ALT  33  /  AlkPhos  57  03-22    LIVER FUNCTIONS - ( 22 Mar 2021 07:20 )  Alb: 3.4 g/dL / Pro: 5.9 g/dL / ALK PHOS: 57 U/L / ALT: 33 U/L / AST: 23 U/L / GGT: x           PT/INR - ( 22 Mar 2021 07:20 )   PT: 48.5 sec;   INR: 4.33 ratio         PTT - ( 22 Mar 2021 07:20 )  PTT:36.0 sec            TELEMETRY:     EKG:     IMAGING:

## 2021-03-22 NOTE — PROGRESS NOTE ADULT - PROBLEM SELECTOR PLAN 1
- LOUISE on CKD, baseline Cr 3.7  - 4.5 on admission, stable  - trend uop  - appreciate nephrology consult - pending clinical improvement, c/w NaHCO3 tid  - monitor lytes, renally dose medications - LOUISE on CKD, baseline Cr 3.7  - 4.5 on admission, stable  - trend uop  - nephrology following, c/w NaHCO3 tid, transitioned to PO diuresis   - monitor lytes, renally dose medications

## 2021-03-22 NOTE — PROGRESS NOTE ADULT - PROBLEM SELECTOR PLAN 3
Tested positive 3/11, initial symptoms (fevers, fatigue) started 3/9, patient received monoclonal antibody treatment at [facility] and acutely experienced worsening of fevers and chills, may be superimposed transfusion reaction on top of COVID PNA.   - c/w dexamethasone 6mg IV daily for 10 days (will do one day of solumedrol q8 to treat both COVID PNA and possible transfusion reaction)  - control fevers with acetaminophen  - will hold remdesevir in setting of LOUISE on CKD, patient weaned off supplemental O2, saturating well on room air  - fevers: f/u BCx, UCx, CT chest, abdomen pelvis. Hold abx per ID recs, no SOI besides COVID identified.

## 2021-03-22 NOTE — PROGRESS NOTE ADULT - PROBLEM SELECTOR PLAN 10
-DVT PPx- hold AC in setting of bleed, c/w SCDs  -GI PPx - c/w protonix, history of GERD    3/21: Called wife's number, left a message to call back for any questions. -DVT PPx- hold AC in setting of bleed, c/w SCDs  -GI PPx - c/w protonix, history of GERD    3/22: Updated wife on phone. She would like to wait until patient is not considered infectious before he is discharged.

## 2021-03-22 NOTE — PROGRESS NOTE ADULT - SUBJECTIVE AND OBJECTIVE BOX
[x] INITIAL CONSULT, H&P, FAMILY HISTORY and PAST MEDICAL AND SURGICAL HISTORY REVIEWED    OVERNIGHT EVENTS:   Patient remained on NC this AM  Sat on 3L was 91%     REVIEW OF SYSTEMS   CONSTITUTIONAL: Denies chills/fevers/myalgias  CARDIOVASCULAR: Denies chest pain, heart palpitations  PULMONARY: Denies dyspnea while on oxygen  [x] REMAINING REVIEW OF SYSTEMS NEGATIVE  [] UNABLE TO OBTAIN REVIEW OF SYSTEMS DUE TO _______________         MEDICATIONS  (STANDING):  aMIOdarone    Tablet 200 milliGRAM(s) Oral daily  clonazePAM  Tablet 0.5 milliGRAM(s) Oral at bedtime  dexAMETHasone     Tablet 6 milliGRAM(s) Oral daily  donepezil 10 milliGRAM(s) Oral at bedtime  folic acid 1 milliGRAM(s) Oral daily  furosemide    Tablet 40 milliGRAM(s) Oral daily  ketoconazole 2% Shampoo 1 Application(s) Topical <User Schedule>  lactobacillus acidophilus 1 Tablet(s) Oral daily  levothyroxine 125 MICROGram(s) Oral daily  melatonin 5 milliGRAM(s) Oral at bedtime  memantine 10 milliGRAM(s) Oral every 12 hours  mirtazapine 7.5 milliGRAM(s) Oral at bedtime  multivitamin 1 Tablet(s) Oral daily  pantoprazole    Tablet 40 milliGRAM(s) Oral before breakfast  sodium bicarbonate 650 milliGRAM(s) Oral two times a day  tamsulosin 0.4 milliGRAM(s) Oral at bedtime         PHYSICAL EXAM    Vital Signs Last 24 Hrs  T(C): 36.8 (22 Mar 2021 13:36), Max: 36.8 (22 Mar 2021 13:36)  T(F): 98.2 (22 Mar 2021 13:36), Max: 98.2 (22 Mar 2021 13:36)  HR: 66 (22 Mar 2021 13:36) (55 - 66)  BP: 126/67 (22 Mar 2021 13:36) (126/67 - 157/71)  BP(mean): --  RR: 20 (22 Mar 2021 13:36) (20 - 20)  SpO2: 93% (22 Mar 2021 13:36) (88% - 93%)      GENERAL: Sitting in his chair NAD  CARDIOVASCULAR: RRR, Normal S1 and S2  RESPIRATORY: CTA B/L  EXTREMITIES: 1+ edema in LE bilaterally  NEUROLOGIC: Nonfocal  PSYCHIATRIC: AAOx2      LABORATORY RESULTS AND IMAGING                               9.1    5.93  )-----------( 221      ( 22 Mar 2021 07:20 )             28.3     03-22    140  |  104  |  90<H>  ----------------------------<  126<H>  4.3   |  24  |  3.63<H>    Ca    9.0      22 Mar 2021 07:20  Phos  3.7     03-22  Mg     2.1     03-22    TPro  5.9<L>  /  Alb  3.4  /  TBili  0.6  /  DBili  x   /  AST  23  /  ALT  33  /  AlkPhos  57  03-22       < from: Xray Chest 1 View- PORTABLE-Routine (Xray Chest 1 View- PORTABLE-Routine in AM.) (03.19.21 @ 08:25) >  EXAM:  XR CHEST PORTABLE ROUTINE 1V                            PROCEDURE DATE:  03/19/2021            INTERPRETATION:  Chest one view    HISTORY: CHF    COMPARISON STUDY: 3/18/2021    Frontal expiratory view of the chest shows the heart to be similar in size. Left cardiac pacemaker is again noted.    The lungs show increased central congestion with progression of pulmonary infiltrates of the right upper lobe and lower left lung. There is no evidence of pneumothorax nor pleural effusion.    IMPRESSION:  Progression of infiltrates. No evidence of CHF.    Thank you for the courtesy of this referral.      JAKE TORRES MD; Attending Interventional Radiologist  This document has been electronically signed. Mar 19 2021 12:28PM    < end of copied text >

## 2021-03-22 NOTE — PROGRESS NOTE ADULT - SUBJECTIVE AND OBJECTIVE BOX
Overnight events noted   VITAL: T(C): , Max: 36.7 (03-21-21 @ 21:20) T(F): , Max: 98.1 (03-22-21 @ 01:03) HR: 58 (03-22-21 @ 06:07) BP: 137/67 (03-22-21 @ 06:07) BP(mean): -- RR: 20 (03-22-21 @ 06:07) SpO2: 93% (03-22-21 @ 06:07)   PHYSICAL EXAM: Constitutional: NAD, Alert HEENT: NCAT, DMM Neck: Supple, No JVD Respiratory: CTA-b/l Cardiovascular: reg sabina irreg Gastrointestinal: BS+, soft, NT/ND Extremities: No peripheral edema b/l Neurological: no focal deficits; strength grossly intact Back: no CVAT b/l Skin: (+)left frontal ecchymosis  LABS:                      9.1   5.93  )-----------( 221      ( 22 Mar 2021 07:20 )            28.3   Na(140)/K(4.3)/Cl(104)/HCO3(24)/BUN(90)/Cr(3.63)Glu(126)/Ca(9.0)/Mg(2.1)/PO4(3.7)    03-22 @ 07:20 Na(140)/K(4.5)/Cl(103)/HCO3(23)/BUN(85)/Cr(3.87)Glu(133)/Ca(8.8)/Mg(2.0)/PO4(3.6)    03-21 @ 07:25 Na(142)/K(4.9)/Cl(108)/HCO3(20)/BUN(83)/Cr(4.29)Glu(153)/Ca(8.2)/Mg(2.0)/PO4(3.6)    03-20 @ 06:36 Na(139)/K(4.3)/Cl(103)/HCO3(20)/BUN(80)/Cr(4.52)Glu(160)/Ca(8.7)/Mg(1.9)/PO4(4.0)    03-19 @ 18:44   IMPRESSION: 75N w/ Waldenstroms, mild dementia, AFib, and CKD4, 3/16/21 a/w COVID PNA  (1)Renal - 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 Feels no worse than 1-2 days ago   VITAL: T(C): , Max: 36.7 (03-21-21 @ 21:20) T(F): , Max: 98.1 (03-22-21 @ 01:03) HR: 58 (03-22-21 @ 06:07) BP: 137/67 (03-22-21 @ 06:07) BP(mean): -- RR: 20 (03-22-21 @ 06:07) SpO2: 93% (03-22-21 @ 06:07)   PHYSICAL EXAM: Constitutional: NAD, Alert HEENT: NCAT, DMM Neck: Supple, No JVD Respiratory: CTA-b/l Cardiovascular: reg sabina irreg Gastrointestinal: BS+, soft, NT/ND Extremities: No peripheral edema b/l Neurological: no focal deficits; strength grossly intact Back: no CVAT b/l Skin: (+)left frontal ecchymosis  LABS:                      9.1   5.93  )-----------( 221      ( 22 Mar 2021 07:20 )            28.3   Na(140)/K(4.3)/Cl(104)/HCO3(24)/BUN(90)/Cr(3.63)Glu(126)/Ca(9.0)/Mg(2.1)/PO4(3.7)    03-22 @ 07:20 Na(140)/K(4.5)/Cl(103)/HCO3(23)/BUN(85)/Cr(3.87)Glu(133)/Ca(8.8)/Mg(2.0)/PO4(3.6)    03-21 @ 07:25 Na(142)/K(4.9)/Cl(108)/HCO3(20)/BUN(83)/Cr(4.29)Glu(153)/Ca(8.2)/Mg(2.0)/PO4(3.6)    03-20 @ 06:36 Na(139)/K(4.3)/Cl(103)/HCO3(20)/BUN(80)/Cr(4.52)Glu(160)/Ca(8.7)/Mg(1.9)/PO4(4.0)    03-19 @ 18:44   IMPRESSION: 75N w/ Waldenstroms, mild dementia, AFib, and CKD4, 3/16/21 a/w COVID PNA  (1)Renal - 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

## 2021-03-23 ENCOUNTER — TRANSCRIPTION ENCOUNTER (OUTPATIENT)
Age: 76
End: 2021-03-23

## 2021-03-23 LAB
ALBUMIN SERPL ELPH-MCNC: 3.5 G/DL — SIGNIFICANT CHANGE UP (ref 3.3–5)
ALP SERPL-CCNC: 61 U/L — SIGNIFICANT CHANGE UP (ref 40–120)
ALT FLD-CCNC: 37 U/L — SIGNIFICANT CHANGE UP (ref 10–45)
ANION GAP SERPL CALC-SCNC: 16 MMOL/L — SIGNIFICANT CHANGE UP (ref 5–17)
APTT BLD: 33.5 SEC — SIGNIFICANT CHANGE UP (ref 27.5–35.5)
AST SERPL-CCNC: 23 U/L — SIGNIFICANT CHANGE UP (ref 10–40)
BASOPHILS # BLD AUTO: 0.01 K/UL — SIGNIFICANT CHANGE UP (ref 0–0.2)
BASOPHILS NFR BLD AUTO: 0.1 % — SIGNIFICANT CHANGE UP (ref 0–2)
BILIRUB SERPL-MCNC: 0.5 MG/DL — SIGNIFICANT CHANGE UP (ref 0.2–1.2)
BUN SERPL-MCNC: 88 MG/DL — HIGH (ref 7–23)
CALCIUM SERPL-MCNC: 9.2 MG/DL — SIGNIFICANT CHANGE UP (ref 8.4–10.5)
CHLORIDE SERPL-SCNC: 101 MMOL/L — SIGNIFICANT CHANGE UP (ref 96–108)
CO2 SERPL-SCNC: 24 MMOL/L — SIGNIFICANT CHANGE UP (ref 22–31)
CREAT SERPL-MCNC: 3.49 MG/DL — HIGH (ref 0.5–1.3)
EOSINOPHIL # BLD AUTO: 0.01 K/UL — SIGNIFICANT CHANGE UP (ref 0–0.5)
EOSINOPHIL NFR BLD AUTO: 0.1 % — SIGNIFICANT CHANGE UP (ref 0–6)
GLUCOSE SERPL-MCNC: 135 MG/DL — HIGH (ref 70–99)
HCT VFR BLD CALC: 29.8 % — LOW (ref 39–50)
HGB BLD-MCNC: 9.5 G/DL — LOW (ref 13–17)
IMM GRANULOCYTES NFR BLD AUTO: 1.3 % — SIGNIFICANT CHANGE UP (ref 0–1.5)
INR BLD: 3.61 RATIO — HIGH (ref 0.88–1.16)
LYMPHOCYTES # BLD AUTO: 1.11 K/UL — SIGNIFICANT CHANGE UP (ref 1–3.3)
LYMPHOCYTES # BLD AUTO: 13 % — SIGNIFICANT CHANGE UP (ref 13–44)
MAGNESIUM SERPL-MCNC: 2.1 MG/DL — SIGNIFICANT CHANGE UP (ref 1.6–2.6)
MCHC RBC-ENTMCNC: 30.3 PG — SIGNIFICANT CHANGE UP (ref 27–34)
MCHC RBC-ENTMCNC: 31.9 GM/DL — LOW (ref 32–36)
MCV RBC AUTO: 94.9 FL — SIGNIFICANT CHANGE UP (ref 80–100)
MONOCYTES # BLD AUTO: 0.71 K/UL — SIGNIFICANT CHANGE UP (ref 0–0.9)
MONOCYTES NFR BLD AUTO: 8.3 % — SIGNIFICANT CHANGE UP (ref 2–14)
NEUTROPHILS # BLD AUTO: 6.57 K/UL — SIGNIFICANT CHANGE UP (ref 1.8–7.4)
NEUTROPHILS NFR BLD AUTO: 77.2 % — HIGH (ref 43–77)
NRBC # BLD: 0 /100 WBCS — SIGNIFICANT CHANGE UP (ref 0–0)
PHOSPHATE SERPL-MCNC: 4.2 MG/DL — SIGNIFICANT CHANGE UP (ref 2.5–4.5)
PLATELET # BLD AUTO: 252 K/UL — SIGNIFICANT CHANGE UP (ref 150–400)
POTASSIUM SERPL-MCNC: 4.3 MMOL/L — SIGNIFICANT CHANGE UP (ref 3.5–5.3)
POTASSIUM SERPL-SCNC: 4.3 MMOL/L — SIGNIFICANT CHANGE UP (ref 3.5–5.3)
PROT SERPL-MCNC: 6.2 G/DL — SIGNIFICANT CHANGE UP (ref 6–8.3)
PROTHROM AB SERPL-ACNC: 40.8 SEC — HIGH (ref 10.6–13.6)
RBC # BLD: 3.14 M/UL — LOW (ref 4.2–5.8)
RBC # FLD: 13.4 % — SIGNIFICANT CHANGE UP (ref 10.3–14.5)
SODIUM SERPL-SCNC: 141 MMOL/L — SIGNIFICANT CHANGE UP (ref 135–145)
WBC # BLD: 8.52 K/UL — SIGNIFICANT CHANGE UP (ref 3.8–10.5)
WBC # FLD AUTO: 8.52 K/UL — SIGNIFICANT CHANGE UP (ref 3.8–10.5)

## 2021-03-23 PROCEDURE — 99233 SBSQ HOSP IP/OBS HIGH 50: CPT | Mod: GC

## 2021-03-23 PROCEDURE — 99232 SBSQ HOSP IP/OBS MODERATE 35: CPT

## 2021-03-23 RX ORDER — APIXABAN 2.5 MG/1
1 TABLET, FILM COATED ORAL
Qty: 60 | Refills: 0
Start: 2021-03-23 | End: 2021-04-21

## 2021-03-23 RX ORDER — APIXABAN 2.5 MG/1
5 TABLET, FILM COATED ORAL EVERY 12 HOURS
Refills: 0 | Status: DISCONTINUED | OUTPATIENT
Start: 2021-03-23 | End: 2021-03-23

## 2021-03-23 RX ORDER — APIXABAN 2.5 MG/1
1 TABLET, FILM COATED ORAL
Qty: 2 | Refills: 0
Start: 2021-03-23 | End: 2021-03-23

## 2021-03-23 RX ORDER — FUROSEMIDE 40 MG
40 TABLET ORAL ONCE
Refills: 0 | Status: COMPLETED | OUTPATIENT
Start: 2021-03-23 | End: 2021-03-23

## 2021-03-23 RX ORDER — CLONAZEPAM 1 MG
1 TABLET ORAL AT BEDTIME
Refills: 0 | Status: DISCONTINUED | OUTPATIENT
Start: 2021-03-23 | End: 2021-03-30

## 2021-03-23 RX ORDER — FUROSEMIDE 40 MG
80 TABLET ORAL DAILY
Refills: 0 | Status: DISCONTINUED | OUTPATIENT
Start: 2021-03-24 | End: 2021-03-25

## 2021-03-23 RX ADMIN — MIRTAZAPINE 7.5 MILLIGRAM(S): 45 TABLET, ORALLY DISINTEGRATING ORAL at 22:06

## 2021-03-23 RX ADMIN — Medication 40 MILLIGRAM(S): at 05:55

## 2021-03-23 RX ADMIN — ALBUTEROL 2 PUFF(S): 90 AEROSOL, METERED ORAL at 22:10

## 2021-03-23 RX ADMIN — Medication 125 MICROGRAM(S): at 05:55

## 2021-03-23 RX ADMIN — Medication 6 MILLIGRAM(S): at 05:53

## 2021-03-23 RX ADMIN — Medication 40 MILLIGRAM(S): at 15:17

## 2021-03-23 RX ADMIN — MEMANTINE HYDROCHLORIDE 10 MILLIGRAM(S): 10 TABLET ORAL at 17:20

## 2021-03-23 RX ADMIN — Medication 650 MILLIGRAM(S): at 05:55

## 2021-03-23 RX ADMIN — MEMANTINE HYDROCHLORIDE 10 MILLIGRAM(S): 10 TABLET ORAL at 05:55

## 2021-03-23 RX ADMIN — Medication 5 MILLIGRAM(S): at 22:06

## 2021-03-23 RX ADMIN — AMIODARONE HYDROCHLORIDE 200 MILLIGRAM(S): 400 TABLET ORAL at 05:55

## 2021-03-23 RX ADMIN — Medication 1 MILLIGRAM(S): at 22:06

## 2021-03-23 RX ADMIN — Medication 1 TABLET(S): at 11:33

## 2021-03-23 RX ADMIN — PANTOPRAZOLE SODIUM 40 MILLIGRAM(S): 20 TABLET, DELAYED RELEASE ORAL at 05:54

## 2021-03-23 RX ADMIN — DONEPEZIL HYDROCHLORIDE 10 MILLIGRAM(S): 10 TABLET, FILM COATED ORAL at 22:06

## 2021-03-23 RX ADMIN — Medication 1 MILLIGRAM(S): at 11:33

## 2021-03-23 RX ADMIN — ALBUTEROL 2 PUFF(S): 90 AEROSOL, METERED ORAL at 10:04

## 2021-03-23 RX ADMIN — Medication 100 MILLIGRAM(S): at 22:05

## 2021-03-23 RX ADMIN — TAMSULOSIN HYDROCHLORIDE 0.4 MILLIGRAM(S): 0.4 CAPSULE ORAL at 22:05

## 2021-03-23 RX ADMIN — Medication 650 MILLIGRAM(S): at 17:19

## 2021-03-23 RX ADMIN — Medication 100 MILLIGRAM(S): at 08:18

## 2021-03-23 NOTE — DISCHARGE NOTE PROVIDER - NSFOLLOWUPCLINICS_GEN_ALL_ED_FT
[facility] Pulmonolgy and Sleep Medicine  Pulmonology  [address] [place] [address], [place] [address]  Phone: [phone]  Fax:   Follow Up Time: 1 week

## 2021-03-23 NOTE — PROGRESS NOTE ADULT - PROBLEM SELECTOR PLAN 5
- patient with hematuria on UA, in setting of anemia  - per urology no interventions, likely 2/2 kidney stones, not clinically sig  - monitor Is&Os  - f/u CT A/P with nonobstructive renal stones  - per urology no inpatient workup - patient with hematuria on UA, in setting of anemia  - per urology no interventions, likely 2/2 kidney stones, not clinically sig  - monitor Is&Os  - f/u CT A/P with nonobstructive renal stones  - per urology no inpatient workup  - INR downtrending, consider changing to Eliquis, might be transitioned outpatient, can receive home lab draws

## 2021-03-23 NOTE — PROGRESS NOTE ADULT - SUBJECTIVE AND OBJECTIVE BOX
Overnight events noted   VITAL: T(C): , Max: 37 (03-22-21 @ 16:41) T(F): , Max: 98.6 (03-22-21 @ 16:41) HR: 58 (03-23-21 @ 08:25) BP: 142/69 (03-23-21 @ 08:25) RR: 20 (03-23-21 @ 08:25) SpO2: 91% (03-23-21 @ 08:25)   PHYSICAL EXAM: Constitutional: NAD, Alert HEENT: NCAT, DMM Neck: Supple, No JVD Respiratory: CTA-b/l Cardiovascular: sabina irreg Gastrointestinal: BS+, soft, NT/ND Extremities: No peripheral edema b/l Neurological: no focal deficits; strength grossly intact Back: no CVAT b/l Skin: (+)left frontal ecchymosis  LABS:                      9.5   8.52  )-----------( 252      ( 23 Mar 2021 06:45 )            29.8   Na(141)/K(4.3)/Cl(101)/HCO3(24)/BUN(88)/Cr(3.49)Glu(135)/Ca(9.2)/Mg(2.1)/PO4(4.2)    03-23 @ 06:45 Na(140)/K(4.3)/Cl(104)/HCO3(24)/BUN(90)/Cr(3.63)Glu(126)/Ca(9.0)/Mg(2.1)/PO4(3.7)    03-22 @ 07:20 Na(140)/K(4.5)/Cl(103)/HCO3(23)/BUN(85)/Cr(3.87)Glu(133)/Ca(8.8)/Mg(2.0)/PO4(3.6)    03-21 @ 07:25   IMPRESSION: 75N w/ Waldenstroms, mild dementia, AFib, and CKD4, 3/16/21 a/w COVID PNA  (1)Renal - 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 - base creat mid-3s  (2)LOUISE - resolved  (3)Lytes - highly acceptable at this point, on NaHCO3 tabs, Lasix, and low-potassium diet  (4)Pulm/ID - COVID PNA  - on Decadron   RECOMMEND: (1)Meds as ordered (2)Can f/u at my office 2-4 weeks after discharge     Norman Ascencio MD [facility] Office: [phone] Cell: [phone]        (+)fatigue/generalized weakness; (+)cough   VITAL: T(C): , Max: 37 (03-22-21 @ 16:41) T(F): , Max: 98.6 (03-22-21 @ 16:41) HR: 58 (03-23-21 @ 08:25) BP: 142/69 (03-23-21 @ 08:25) RR: 20 (03-23-21 @ 08:25) SpO2: 91% (03-23-21 @ 08:25)   PHYSICAL EXAM: Constitutional: NAD, Alert HEENT: NCAT, DMM Neck: Supple, No JVD Respiratory: CTA-b/l Cardiovascular: sabina irreg Gastrointestinal: BS+, soft, NT/ND Extremities: No peripheral edema b/l Neurological: no focal deficits; strength grossly intact Back: no CVAT b/l Skin: (+)left frontal ecchymosis  LABS:                      9.5   8.52  )-----------( 252      ( 23 Mar 2021 06:45 )            29.8   Na(141)/K(4.3)/Cl(101)/HCO3(24)/BUN(88)/Cr(3.49)Glu(135)/Ca(9.2)/Mg(2.1)/PO4(4.2)    03-23 @ 06:45 Na(140)/K(4.3)/Cl(104)/HCO3(24)/BUN(90)/Cr(3.63)Glu(126)/Ca(9.0)/Mg(2.1)/PO4(3.7)    03-22 @ 07:20 Na(140)/K(4.5)/Cl(103)/HCO3(23)/BUN(85)/Cr(3.87)Glu(133)/Ca(8.8)/Mg(2.0)/PO4(3.6)    03-21 @ 07:25   IMPRESSION: 75N w/ Waldenstroms, mild dementia, AFib, and CKD4, 3/16/21 a/w COVID PNA  (1)Renal - 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

## 2021-03-23 NOTE — DISCHARGE NOTE PROVIDER - CARE PROVIDER_API CALL
Cesar Simons)  Hematology; Internal Medicine; Medical Oncology  [address] [place] [address]  Phone: [phone]  Fax: [phone]  Established Patient  Follow Up Time:    Cesar Simons)  Hematology; Internal Medicine; Medical Oncology  [address] [place] [address]  Phone: [phone]  Fax: [phone]  Established Patient  Follow Up Time:     Gris Landry; SERAFIN; MPH)  Cardiovascular Disease; Internal Medicine  [address] [facility], [address] [place] [address]  Phone: [phone]  Fax: [phone]  Follow Up Time: 1 week    Norman Ascencio)  Internal Medicine; Nephrology  [address] [place] [address] [address]  [place] [address]  Phone: [phone]  Fax: [phone]  Follow Up Time: Routine    Gunner Saavedra)  Critical Care Medicine; Internal Medicine; Pulmonary Disease  [address] [place] [address]  Phone: [phone]  Fax: [phone]  Follow Up Time: 2 weeks

## 2021-03-23 NOTE — DISCHARGE NOTE PROVIDER - CARE PROVIDERS DIRECT ADDRESSES
,[email or web address]

## 2021-03-23 NOTE — DISCHARGE NOTE PROVIDER - NSRESEARCHGRANT_PROPHYLAXISRECOMFT_GEN_A_CORE
Rivaroxaban 10 mg oral tablet: 1 tab orally once a day for 30 days No post-discharge thromboprophylaxis indicated.

## 2021-03-23 NOTE — DISCHARGE NOTE PROVIDER - NSDCMRMEDTOKEN_GEN_ALL_CORE_FT
amiodarone 200 mg oral tablet: 1 tab(s) orally once a day  colchicine 0.6 mg oral tablet: 1 tab(s) orally once a day  dexamethasone 4 mg oral tablet: 5 tab(s) orally once a week.  DUE ON JAN 11TH.   ** PT TO TAKE HIS OWN  MEDICATION  FROM [facility].   docusate sodium 100 mg oral capsule: 1 cap(s) orally 3 times a day  donepezil 10 mg oral tablet: 1 tab(s) orally once a day (at bedtime)  Eliquis Starter Pack for Treatment of DVT and PE 5 mg oral tablet: 1 tab(s) orally 2 times a day  5 mg two times per day for days 1 - 14, then 2.5 mg per day for days 15 - 30.  INR MONITORING: INR MONITORING DAILY - TO MAINTAIN INR BETWEEN 2-3.  ** PT DOES NOT NEED TO BE BRIDGED TO COUMADIN.   levothyroxine 125 mcg (0.125 mg) oral tablet: 1 tab(s) orally once a day  levothyroxine 150 mcg (0.15 mg) oral tablet: 1 tab(s) orally once a day  memantine 10 mg oral tablet: 1 tab(s) orally every 12 hours  Metoprolol Succinate ER 25 mg oral tablet, extended release: 1 tab(s) orally once a day  mirtazapine 30 mg oral tablet: 1 tab(s) orally once a day (at bedtime)  mirtazapine 30 mg oral tablet: 1 tab(s) orally once a day (at bedtime)  Ninlaro 2.3 mg oral capsule: 1 cap(s) orally every 7 days.  DUE ON JAN 11TH.   ** PT TO TAKE HIS OWN  MEDICATION  FROM [facility].   omeprazole 20 mg oral delayed release capsule: 1 cap(s) orally once a day  pantoprazole 40 mg oral delayed release tablet: 1 tab(s) orally once a day (before a meal)  Percocet 10/325 oral tablet: 1 tab(s) orally every 6 hours, As Needed  tamsulosin 0.4 mg oral capsule: 1 cap(s) orally once a day  tamsulosin 0.4 mg oral capsule: 1 cap(s) orally once a day (at bedtime)  warfarin 5 mg oral tablet: 1 tab(s) orally once a day   amiodarone 200 mg oral tablet: 1 tab(s) orally once a day  clonazePAM 1 mg oral tablet: 1 tab(s) orally once a day (at bedtime)  colchicine 0.6 mg oral tablet: 1 tab(s) orally once a day  dexamethasone 4 mg oral tablet: 5 tab(s) orally once a week.  DUE ON JAN 11TH.   ** PT TO TAKE HIS OWN  MEDICATION  FROM [facility].   docusate sodium 100 mg oral capsule: 1 cap(s) orally 3 times a day  donepezil 10 mg oral tablet: 1 tab(s) orally once a day (at bedtime)  Eliquis Starter Pack for Treatment of DVT and PE 5 mg oral tablet: 1 tab(s) orally 2 times a day  5 mg two times per day for days 1 - 14, then 2.5 mg per day for days 15 - 30.  folic acid 1 mg oral tablet: 1 tab(s) orally once a day  furosemide 80 mg oral tablet: 1 tab(s) orally once a day  levothyroxine 125 mcg (0.125 mg) oral tablet: 1 tab(s) orally once a day  memantine 10 mg oral tablet: 1 tab(s) orally every 12 hours  Metoprolol Succinate ER 25 mg oral tablet, extended release: 1 tab(s) orally once a day  mirtazapine 30 mg oral tablet: 1 tab(s) orally once a day (at bedtime)  Multiple Vitamins oral tablet: 1 tab(s) orally once a day  Ninlaro 2.3 mg oral capsule: 1 cap(s) orally every 7 days.  DUE ON JAN 11TH.   ** PT TO TAKE HIS OWN  MEDICATION  FROM [facility].   omeprazole 20 mg oral delayed release capsule: 1 cap(s) orally once a day  Percocet 10/325 oral tablet: 1 tab(s) orally every 6 hours, As Needed  sodium bicarbonate 650 mg oral tablet: 1 tab(s) orally once a day  tamsulosin 0.4 mg oral capsule: 1 cap(s) orally once a day (at bedtime)   amiodarone 200 mg oral tablet: 1 tab(s) orally once a day  clonazePAM 1 mg oral tablet: 1 tab(s) orally once a day (at bedtime)  colchicine 0.6 mg oral tablet: 1 tab(s) orally once a day  dexamethasone 4 mg oral tablet: 5 tab(s) orally once a week.  DUE ON JAN 11TH.   ** PT TO TAKE HIS OWN  MEDICATION  FROM [facility].   dexamethasone 6 mg oral tablet: 1 tab(s) orally once a day  docusate sodium 100 mg oral capsule: 1 cap(s) orally 3 times a day  donepezil 10 mg oral tablet: 1 tab(s) orally once a day (at bedtime)  Eliquis Starter Pack for Treatment of DVT and PE 5 mg oral tablet: 1 tab(s) orally 2 times a day  5 mg two times per day for days 1 - 14, then 2.5 mg per day for days 15 - 30.  folic acid 1 mg oral tablet: 1 tab(s) orally once a day  furosemide 80 mg oral tablet: 1 tab(s) orally once a day  levothyroxine 125 mcg (0.125 mg) oral tablet: 1 tab(s) orally once a day  memantine 10 mg oral tablet: 1 tab(s) orally every 12 hours  Metoprolol Succinate ER 25 mg oral tablet, extended release: 1 tab(s) orally once a day  mirtazapine 30 mg oral tablet: 1 tab(s) orally once a day (at bedtime)  Multiple Vitamins oral tablet: 1 tab(s) orally once a day  Ninlaro 2.3 mg oral capsule: 1 cap(s) orally every 7 days.  DUE ON JAN 11TH.   ** PT TO TAKE HIS OWN  MEDICATION  FROM [facility].   omeprazole 20 mg oral delayed release capsule: 1 cap(s) orally once a day  Percocet 10/325 oral tablet: 1 tab(s) orally every 6 hours, As Needed  sodium bicarbonate 650 mg oral tablet: 1 tab(s) orally once a day  tamsulosin 0.4 mg oral capsule: 1 cap(s) orally once a day (at bedtime)   amiodarone 200 mg oral tablet: 1 tab(s) orally once a day  clonazePAM 1 mg oral tablet: 1 tab(s) orally once a day (at bedtime)  colchicine 0.6 mg oral tablet: 1 tab(s) orally once a day  dexamethasone 6 mg oral tablet: 1 tab(s) orally once a day  docusate sodium 100 mg oral capsule: 1 cap(s) orally 3 times a day  donepezil 10 mg oral tablet: 1 tab(s) orally once a day (at bedtime)  Eliquis Starter Pack for Treatment of DVT and PE 5 mg oral tablet: 1 tab(s) orally 2 times a day  5 mg two times per day for days 1 - 14, then 2.5 mg per day for days 15 - 30.  folic acid 1 mg oral tablet: 1 tab(s) orally once a day  furosemide 80 mg oral tablet: 1 tab(s) orally once a day  levothyroxine 125 mcg (0.125 mg) oral tablet: 1 tab(s) orally once a day  memantine 10 mg oral tablet: 1 tab(s) orally every 12 hours  Metoprolol Succinate ER 25 mg oral tablet, extended release: 1 tab(s) orally once a day  mirtazapine 30 mg oral tablet: 1 tab(s) orally once a day (at bedtime)  Multiple Vitamins oral tablet: 1 tab(s) orally once a day  omeprazole 20 mg oral delayed release capsule: 1 cap(s) orally once a day  sodium bicarbonate 650 mg oral tablet: 1 tab(s) orally once a day  tamsulosin 0.4 mg oral capsule: 1 cap(s) orally once a day (at bedtime)   amiodarone 200 mg oral tablet: 1 tab(s) orally once a day  apixaban 2.5 mg oral tablet: 1 tab(s) orally 2 times a day   clonazePAM 1 mg oral tablet: 1 tab(s) orally once a day (at bedtime)  colchicine 0.6 mg oral tablet: 1 tab(s) orally once a day  dexamethasone 6 mg oral tablet: 1 tab(s) orally once a day  docusate sodium 100 mg oral capsule: 1 cap(s) orally 3 times a day  donepezil 10 mg oral tablet: 1 tab(s) orally once a day (at bedtime)  folic acid 1 mg oral tablet: 1 tab(s) orally once a day  furosemide 80 mg oral tablet: 1 tab(s) orally once a day  levothyroxine 125 mcg (0.125 mg) oral tablet: 1 tab(s) orally once a day  memantine 10 mg oral tablet: 1 tab(s) orally every 12 hours  Metoprolol Succinate ER 25 mg oral tablet, extended release: 1 tab(s) orally once a day  mirtazapine 30 mg oral tablet: 1 tab(s) orally once a day (at bedtime)  Multiple Vitamins oral tablet: 1 tab(s) orally once a day  omeprazole 20 mg oral delayed release capsule: 1 cap(s) orally once a day  sodium bicarbonate 650 mg oral tablet: 1 tab(s) orally once a day  tamsulosin 0.4 mg oral capsule: 1 cap(s) orally once a day (at bedtime)   acetaminophen 325 mg oral tablet: 2 tab(s) orally every 6 hours, As needed, Temp greater or equal to 38C (100.4F), Mild Pain (1 - 3)  amiodarone 200 mg oral tablet: 1 tab(s) orally once a day  apixaban 2.5 mg oral tablet: 1 tab(s) orally 2 times a day   clonazePAM 1 mg oral tablet: 1 tab(s) orally once a day (at bedtime)  colchicine 0.6 mg oral tablet: 1 tab(s) orally once a day  dexamethasone 6 mg oral tablet: 1 tab(s) orally once a day  docusate sodium 100 mg oral capsule: 1 cap(s) orally 3 times a day  donepezil 10 mg oral tablet: 1 tab(s) orally once a day (at bedtime)  folic acid 1 mg oral tablet: 1 tab(s) orally once a day  furosemide 40 mg oral tablet: 1 tab(s) orally once a day  levothyroxine 125 mcg (0.125 mg) oral tablet: 1 tab(s) orally once a day  memantine 10 mg oral tablet: 1 tab(s) orally every 12 hours  Metoprolol Succinate ER 25 mg oral tablet, extended release: 1 tab(s) orally once a day  mirtazapine 30 mg oral tablet: 1 tab(s) orally once a day (at bedtime)  Multiple Vitamins oral tablet: 1 tab(s) orally once a day  omeprazole 20 mg oral delayed release capsule: 1 cap(s) orally once a day  sodium bicarbonate 650 mg oral tablet: 1 tab(s) orally once a day  tamsulosin 0.4 mg oral capsule: 1 cap(s) orally once a day (at bedtime)   amiodarone 200 mg oral tablet: 1 tab(s) orally once a day  apixaban 2.5 mg oral tablet: 1 tab(s) orally 2 times a day   clonazePAM 1 mg oral tablet: 1 tab(s) orally once a day (at bedtime)  colchicine 0.6 mg oral tablet: 1 tab(s) orally once a day  docusate sodium 100 mg oral capsule: 1 cap(s) orally 3 times a day  donepezil 10 mg oral tablet: 1 tab(s) orally once a day (at bedtime)  folic acid 1 mg oral tablet: 1 tab(s) orally once a day  levothyroxine 125 mcg (0.125 mg) oral tablet: 1 tab(s) orally once a day  memantine 10 mg oral tablet: 1 tab(s) orally every 12 hours  Metoprolol Succinate ER 25 mg oral tablet, extended release: 1 tab(s) orally once a day  mirtazapine 30 mg oral tablet: 1 tab(s) orally once a day (at bedtime)  Multiple Vitamins oral tablet: 1 tab(s) orally once a day  omeprazole 20 mg oral delayed release capsule: 1 cap(s) orally once a day  sodium bicarbonate 650 mg oral tablet: 1 tab(s) orally once a day  tamsulosin 0.4 mg oral capsule: 1 cap(s) orally once a day (at bedtime)

## 2021-03-23 NOTE — DISCHARGE NOTE PROVIDER - HOSPITAL COURSE
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 Patient was in usual state of health until Tuesday of last week (3/9); patient endorsed fevers, generalized weakness, fatigue. He went to urgent care and tested positive for COVID on 3/11. Today he was receiving monoclonal antibody transfusion at [facility], and quickly experienced severe rigors, chills, and fevers transferred to [facility].     On ROS, patient endorses fevers, chills, denies headaches, visual changes, chest pain, SOB, palpitations, cough, abdominal pain, n/v, melena, endorses alternating constipation/diarrhea (nonbloody, watery of last 3-4 days), denies dysuria, endorses LE edema.     In the ED, patient Tmax 103, HR wnl, -170/53-92, RR 20-30, saturating % on 2L N/C. Labs notable for pancytopenia, LOUISE on CKD4, COVID+,  UA+ RBCs, CXR w/ b/l opacities. Patient received vanc/cef, 500cc NS bolus, 6mg IV decadron. Patient admitted to medicine for continued management.     Patient started on dexamethasone for COVID treatment, remdesivir held in setting of LOUISE on CKD. He intermittently required 2L N/C, hypoxic to 88% on ambulation on room air, generally 91-96% on 2L N/C at rest. Patient to be discharged on supplemental oxygen, with close follow up with pulmonologist.     Hematology consulted given patient's history of Waldenstrom's, pancytopenia on presentation. Hematology had no further inpatient recommendations, holding ninlaro/dexamethasone in setting of COVID. Nephrology consulted for LOUISE on CKD, patient supplemented with sodium bicarbonate, electrolytes improved. 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 Patient was in usual state of health until Tuesday of last week (3/9); patient endorsed fevers, generalized weakness, fatigue. He went to urgent care and tested positive for COVID on 3/11. Today he was receiving monoclonal antibody transfusion at [facility], and quickly experienced severe rigors, chills, and fevers transferred to [facility].     On ROS, patient endorses fevers, chills, denies headaches, visual changes, chest pain, SOB, palpitations, cough, abdominal pain, n/v, melena, endorses alternating constipation/diarrhea (nonbloody, watery of last 3-4 days), denies dysuria, endorses LE edema.     In the ED, patient Tmax 103, HR wnl, -170/53-92, RR 20-30, saturating % on 2L N/C. Labs notable for pancytopenia, LOUISE on CKD4, COVID+,  UA+ RBCs, CXR w/ b/l opacities. Patient received vanc/cef, 500cc NS bolus, 6mg IV decadron. Patient admitted to medicine for continued management.     Patient started on dexamethasone for COVID treatment, remdesivir held in setting of LOUISE on CKD. He intermittently required 2L N/C, hypoxic to 88% on ambulation on room air, generally 91-96% on 2L N/C at rest. Patient to be discharged on supplemental oxygen, with close follow up with pulmonologist.     Hematology consulted given patient's history of Waldenstrom's, pancytopenia on presentation. Hematology had no further inpatient recommendations, holding ninlaro/dexamethasone in setting of COVID. Nephrology consulted for LOUISE on CKD, patient supplemented with sodium bicarbonate, electrolytes improved. During hospital course, patietn appeared fluid overloaded, CXR c/w pulmonary edema, patient responded well to IV lasix, will be discharged on 80PO lasix.   INR noted to be supratherapeutic, warfarin held, slowly downtrended. Nephrology, hematology, cardiology agree to transition to [facility] outpatient. Patient to have home lab draws, can start Eliquis outpatient under supervision of PCP, cardiologist, etc.     Patient evaluated by PT, recommend d/c home with home PT, patient refusing services however. 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 Patient was in usual state of health until Tuesday of last week (3/9); patient endorsed fevers, generalized weakness, fatigue. He went to urgent care and tested positive for COVID on 3/11. Today he was receiving monoclonal antibody transfusion at [facility], and quickly experienced severe rigors, chills, and fevers transferred to [facility].     On ROS, patient endorses fevers, chills, denies headaches, visual changes, chest pain, SOB, palpitations, cough, abdominal pain, n/v, melena, endorses alternating constipation/diarrhea (nonbloody, watery of last 3-4 days), denies dysuria, endorses LE edema.     In the ED, patient Tmax 103, HR wnl, -170/53-92, RR 20-30, saturating % on 2L N/C. Labs notable for pancytopenia, LOUISE on CKD4, COVID+,  UA+ RBCs, CXR w/ b/l opacities. Patient received vanc/cef, 500cc NS bolus, 6mg IV decadron. Patient admitted to medicine for continued management.     Patient started on dexamethasone for COVID treatment, remdesivir held in setting of LOUISE on CKD. He intermittently required 2L N/C, hypoxic to 88% on ambulation on room air, generally 91-96% on 2L N/C at rest. Patient to be discharged on supplemental oxygen, with close follow up with pulmonologist.     Hematology consulted given patient's history of Waldenstrom's, pancytopenia on presentation. Hematology had no further inpatient recommendations, holding ninlaro/dexamethasone in setting of COVID. Nephrology consulted for LOUISE on CKD, patient supplemented with sodium bicarbonate, electrolytes improved. During hospital course, patietn appeared fluid overloaded, CXR c/w pulmonary edema, patient responded well to IV lasix, will be discharged on 80PO lasix.   INR noted to be supratherapeutic, warfarin held, slowly downtrended. Nephrology, hematology, cardiology agree to transition to [facility] outpatient. Patient to have home lab draws, can start Eliquis outpatient under supervision of PCP, cardiologist, etc.     Patient evaluated by PT, recommend d/c home with home PT, patient refusing services however.     50 minutes spent discharging the patient. 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 Patient was in usual state of health until Tuesday of last week (3/9); patient endorsed fevers, generalized weakness, fatigue. He went to urgent care and tested positive for COVID on 3/11. Today he was receiving monoclonal antibody transfusion at [facility], and quickly experienced severe rigors, chills, and fevers transferred to [facility].     On ROS, patient endorses fevers, chills, denies headaches, visual changes, chest pain, SOB, palpitations, cough, abdominal pain, n/v, melena, endorses alternating constipation/diarrhea (nonbloody, watery of last 3-4 days), denies dysuria, endorses LE edema.     In the ED, patient Tmax 103, HR wnl, -170/53-92, RR 20-30, saturating % on 2L N/C. Labs notable for pancytopenia, LOUISE on CKD4, COVID+,  UA+ RBCs, CXR w/ b/l opacities. Patient received vanc/cef, 500cc NS bolus, 6mg IV decadron. Patient admitted to medicine for continued management.     Patient started on dexamethasone for COVID treatment, remdesivir held in setting of LOUISE on CKD. He intermittently required 2L N/C, hypoxic to 88% on ambulation on room air, generally 91-96% on 2L N/C at rest. Patient to be discharged on supplemental oxygen, with close follow up with pulmonologist.     Urology was consulted for patient with microscopic hematuria and anemia. Thorough chart review performed, UA x2 showed yellow urine with only microscopic hematuria (up to 222 RBCs on UA). This is not likely a cause for patients anemia. CT shows b/l non obstructing stones which may be a cause for hematuria but would not require any urgent intervention as they are not obstructing. Pt should follow up outpatient with the [facility] after discharge for further work up.    Gastroenterology was consulted for patient with diarrhea. Recommending hydration and diet as tolerated and can follow up with outpatient GI doctor if symptoms persist chronically    Hematology consulted given patient's history of Waldenstrom's, pancytopenia on presentation. Hematology had no further inpatient recommendations, holding ninlaro/dexamethasone in setting of COVID. Nephrology consulted for LOUISE on CKD, patient supplemented with sodium bicarbonate, electrolytes improved. During hospital course, patient appeared fluid overloaded, CXR c/w pulmonary edema, patient responded well to IV lasix, will be discharged on 80PO lasix upon discharge.   INR noted to be supratherapeutic, warfarin held, slowly downtrended. Nephrology, hematology, cardiology agree to transition to Eliquis upon discharge and follow up with PCP, cardiologist, etc.     Patient evaluated by PT, recommend d/c home with home PT, patient refusing services however.     Discharge/Dispo/Med rec discussed with attending. Patient medically cleared for discharge home with outpatient follow up with PCP, Heme/Onc, Pulmonology, cardiology and nephrology. 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 Patient was in usual state of health until Tuesday of last week (3/9); patient endorsed fevers, generalized weakness, fatigue. He went to urgent care and tested positive for COVID on 3/11. Today he was receiving monoclonal antibody transfusion at [facility], and quickly experienced severe rigors, chills, and fevers transferred to [facility].     On ROS, patient endorses fevers, chills, denies headaches, visual changes, chest pain, SOB, palpitations, cough, abdominal pain, n/v, melena, endorses alternating constipation/diarrhea (nonbloody, watery of last 3-4 days), denies dysuria, endorses LE edema.     In the ED, patient Tmax 103, HR wnl, -170/53-92, RR 20-30, saturating % on 2L N/C. Labs notable for pancytopenia, LOUISE on CKD4, COVID+,  UA+ RBCs, CXR w/ b/l opacities. Patient received vanc/cef, 500cc NS bolus, 6mg IV decadron. Patient admitted to medicine for continued management.     Patient started on dexamethasone for COVID treatment, remdesivir held in setting of LOUISE on CKD. He intermittently required 2L N/C, hypoxic to 88% on ambulation on room air, generally 91-96% on 2L N/C at rest. Patient to be discharged on supplemental oxygen, with close follow up with pulmonologist.     Urology was consulted for patient with microscopic hematuria and anemia. Thorough chart review performed, UA x2 showed yellow urine with only microscopic hematuria (up to 222 RBCs on UA). This is not likely a cause for patients anemia. CT shows b/l non obstructing stones which may be a cause for hematuria but would not require any urgent intervention as they are not obstructing. Pt should follow up outpatient with the [facility] after discharge for further work up.    Gastroenterology was consulted for patient with diarrhea. Recommending hydration and diet as tolerated and can follow up with outpatient GI doctor if symptoms persist chronically    Hematology consulted given patient's history of Waldenstrom's, pancytopenia on presentation. Hematology had no further inpatient recommendations, holding ninlaro/dexamethasone in setting of COVID. Nephrology consulted for LOUISE on CKD, patient supplemented with sodium bicarbonate, electrolytes improved. During hospital course, patient appeared fluid overloaded, CXR c/w pulmonary edema, patient responded well to IV lasix, will be discharged on 80PO lasix upon discharge.   INR noted to be supratherapeutic, warfarin held, slowly downtrended. Nephrology, hematology, cardiology agree to transition to Eliquis upon discharge and follow up with PCP, cardiologist, etc.     Patient evaluated by PT, recommend d/c home with home PT, patient refusing services however.     Discharge/Dispo/Med rec discussed with attending. Patient medically cleared for discharge home with outpatient follow up with PCP, Heme/Onc, Pulmonology, cardiology and nephrology.    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 Patient was in usual state of health until Tuesday of last week (3/9); patient endorsed fevers, generalized weakness, fatigue. He went to urgent care and tested positive for COVID on 3/11. Today he was receiving monoclonal antibody transfusion at [facility], and quickly experienced severe rigors, chills, and fevers transferred to [facility].     On ROS, patient endorses fevers, chills, denies headaches, visual changes, chest pain, SOB, palpitations, cough, abdominal pain, n/v, melena, endorses alternating constipation/diarrhea (nonbloody, watery of last 3-4 days), denies dysuria, endorses LE edema.     In the ED, patient Tmax 103, HR wnl, -170/53-92, RR 20-30, saturating % on 2L N/C. Labs notable for pancytopenia, LOUISE on CKD4, COVID+,  UA+ RBCs, CXR w/ b/l opacities. Patient received vanc/cef, 500cc NS bolus, 6mg IV decadron. Patient admitted to medicine for continued management.     Patient started on dexamethasone for COVID treatment, remdesivir held in setting of LOUISE on CKD. He intermittently required 2L N/C, hypoxic to 88% on ambulation on room air, generally 91-96% on 2L N/C at rest. Patient to be discharged on supplemental oxygen, with close follow up with pulmonologist.     Urology was consulted for patient with microscopic hematuria and anemia. Thorough chart review performed, UA x2 showed yellow urine with only microscopic hematuria (up to 222 RBCs on UA). This is not likely a cause for patients anemia. CT shows b/l non obstructing stones which may be a cause for hematuria but would not require any urgent intervention as they are not obstructing. Pt should follow up outpatient with the [facility] after discharge for further work up.    Gastroenterology was consulted for patient with diarrhea. Recommending hydration and diet as tolerated and can follow up with outpatient GI doctor if symptoms persist chronically    Hematology consulted given patient's history of Waldenstrom's, pancytopenia on presentation. Hematology had no further inpatient recommendations, holding ninlaro/dexamethasone in setting of COVID. Nephrology consulted for LOUISE on CKD, patient supplemented with sodium bicarbonate, electrolytes improved. During hospital course, patient appeared fluid overloaded, CXR c/w pulmonary edema, patient responded well to IV lasix, will be discharged on 80PO lasix upon discharge.   INR noted to be supratherapeutic, warfarin held, slowly downtrended. Nephrology, hematology, cardiology agree to transition to Eliquis upon discharge and follow up with PCP, cardiologist, etc.     Patient evaluated by PT, recommend d/c home with home PT, patient refusing services however.     Discharge/Dispo/Med rec discussed with attending. Patient medically cleared for discharge home with outpatient follow up with PCP, Heme/Onc, Pulmonology, cardiology and nephrology.    60 minutes spent discharging the patient.  Today (3/26/21) pt is ok for discharge home per Attending.

## 2021-03-23 NOTE — PROGRESS NOTE ADULT - PROBLEM SELECTOR PLAN 10
-DVT PPx- hold AC in setting of bleed, c/w SCDs  -GI PPx - c/w protonix, history of GERD    3/22: Updated wife on phone. She would like to wait until patient is not considered infectious before he is discharged. -DVT PPx- hold AC in setting of bleed, c/w SCDs  -GI PPx - c/w protonix, history of GERD    3/23: Updated wife on phone.

## 2021-03-23 NOTE — DISCHARGE NOTE PROVIDER - NSDCCPCAREPLAN_GEN_ALL_CORE_FT
PRINCIPAL DISCHARGE DIAGNOSIS  Diagnosis: COVID-19  Assessment and Plan of Treatment:       SECONDARY DISCHARGE DIAGNOSES  Diagnosis: Atrial fibrillation  Assessment and Plan of Treatment:      PRINCIPAL DISCHARGE DIAGNOSIS  Diagnosis: COVID-19  Assessment and Plan of Treatment: Please take medication as directed and follow up with PCP and pulmonologist within 1 week from discharge  You tested positive for COVID 19.  You no longer require hospitalization.  Call ahead before visiting your doctor.  Wear a facemask when you are around other people. Cover your cough and sneezes.  Clean your hands often.  Avoid sharing personal household items.  Clean all frequently touched surfaces daily.        SECONDARY DISCHARGE DIAGNOSES  Diagnosis: Waldenstrom macroglobulinemia  Assessment and Plan of Treatment: Please follow up with your heme/onc Dr. Valdo Simons within 1 week from discharge    Diagnosis: Hematuria  Assessment and Plan of Treatment: Please follow up outpatient with the [facility] after discharge for further work up.      Diagnosis: CKD (chronic kidney disease), stage IV  Assessment and Plan of Treatment: Avoid taking (NSAIDs) - (ex: Ibuprofen, Advil, Celebrex, Naprosyn)  Avoid taking any nephrotoxic agents (can harm kidneys) - Intravenous contrast for diagnostic testing, combination cold medications.  Have all medications adjusted for your renal function by your Health Care Provider.  Blood pressure control is important.  Take all medication as prescribed.      Diagnosis: Diarrhea  Assessment and Plan of Treatment: Gastroenterology was consulted. Recommending hydration and diet as tolerated and can follow up with outpatient GI doctor if symptoms persist chronically    Diagnosis: LOUISE (acute kidney injury)  Assessment and Plan of Treatment: Nephrology consulted for LOUISE on CKD, patient supplemented with sodium bicarbonate, electrolytes improved. During hospital course, patient appeared fluid overloaded, CXR c/w pulmonary edema, patient responded well to IV lasix, will be discharged on Lasix 80 orally upon discharge.    Diagnosis: Dementia  Assessment and Plan of Treatment: Continue taking memantine and donepezil as prescribed    Diagnosis: Atrial fibrillation  Assessment and Plan of Treatment: Please take Eliquis as directed and follow up with PCP and cardiologist.  Atrial fibrillation is a common heart rhythm problem which increases the risk of stroke and heat attack.  It helps if you control your blood pressure, avoid alcohol, cut down on caffeine, get treatment for your thyroid if it is overactive, and perform moderate exercise in consultation with your Primary Care Provider.  Call your doctor if you experience chest tightness/pain, lightheadedness, loss of consciousness, shortness of breath (especially with exercise), feel your heart racing or beating unusually, frequent or abnormal bleeding.  It is important to take all your heart medications as prescribed.

## 2021-03-23 NOTE — PROGRESS NOTE ADULT - PROBLEM SELECTOR PLAN 1
- LOUISE on CKD, baseline Cr 3.7  - 4.5 on admission, stable  - trend uop  - nephrology following, c/w NaHCO3 tid, transitioned to PO diuresis   - monitor lytes, renally dose medications

## 2021-03-23 NOTE — DISCHARGE NOTE PROVIDER - NSDCFUADDAPPT_GEN_ALL_CORE_FT
Please follow up outpatient with the [facility] after within 1 week of discharge.  Located at [address] [place] [address]  Phone #: [phone]    Please follow up with your PCP Dr. Tatiana Birmingham within 1 week from discharge

## 2021-03-23 NOTE — PROGRESS NOTE ADULT - ATTENDING COMMENTS
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 Called for further recommendations    COVID Pneumonia- Patient was doing well off oxygen but had to go back to 2L overnight. Would ambulate to ensure he does not need home O2. Continue decadron for a total of 10 days. Continue to monitor off antibiotics.     Hematology: appreciate heme/oncology input for macroglobulinemia    Atrial fib: cont amiodarone and Coumadin    Please call over the weekend if any acute issues arise. Otherwise, pulmonary will see on Monday if patient is still in the hospital.
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 Called for further recommendations    COVID Pneumonia- Patient doing well off oxygen. Would ambulate to ensure he does not need home O2. Continue decadron from a total of 5 days. Continue to monitor off antibiotics.     Hematology: appreciate heme/oncology input for macroglobulinemia    Atrial fib: cont amiodarone and Coumadin    No pulmonary contraindication for discharge since he is off oxygen at rest.
Acute hypoxic respiratory failure secondary to COVID19 PNA  Continue dexamethasone   Will require home O2  Start renally dosed Eliquis for AC given presentation with supratherapeutic INR  D/C home today- 45 minutes spent discharging the patient
appears comfortable  scattered rales  abdomen soft  facetimed with wife at bedside  monitor intake and output on lasix   Lokelma for hyperkalemia  appreciate consultant recs  dexamethasone for b/l COVID pneumonia  multi-organ disease  patient with multiple co-morbid conditions; higher risk for future complications despite optimal medical therapy
stable clinical status  appreciate consultant input  continue dexamethasone  daily CBC  monitor off abx as per ID  gentle hydration  multi-organ disease
Acute hypoxic respiratory failure secondary to COVID19 PNA  Continue dexamethasone   Check ambulatory O2 sat off supp oxygen  Cont to hold coumadin for supratherapeutic INR likely nutritional   Anticipate possible d/c home tomorrow 3/23
75M w/ waldenstroms, CKD4, afib on warfarin, hypothyroid, dementia p/w acute hypoxic respiratory failure due to COVID19 PNA  c/w dex  off o2  renal input appreciated. Creatinine improving  monitor INR    rest as above    Feliberto Campa MD  Division of Hospital Medicine  Pager: [phone]  Office: [phone]
75M w/ waldenstroms, CKD4, afib on warfarin, hypothyroid, dementia p/w acute hypoxic respiratory failure due to COVID19 PNA  c/w dex  wean o2  renal input appreciated. Creatinine seems to be plateauing  monitor INR    rest as above    Feliberto Campa MD  Division of Hospital Medicine  Pager: [phone]  Office: [phone]
mild SOB  IVF stopped  CXR worse  pro-BNP higher  lasix 40mg iv stat  d/w Dr. Ascencio  trend labs q12  [facility] for hyperkalemia  f/up heme/pulm recs  patient with multiple co-morbid conditions; higher risk for future complications despite optimal medical therapy

## 2021-03-23 NOTE — PROGRESS NOTE ADULT - PROBLEM SELECTOR PLAN 3
Tested positive 3/11, initial symptoms (fevers, fatigue) started 3/9, patient received monoclonal antibody treatment at [facility] and acutely experienced worsening of fevers and chills, may be superimposed transfusion reaction on top of COVID PNA.   - c/w dexamethasone 6mg IV daily for 10 days (will do one day of solumedrol q8 to treat both COVID PNA and possible transfusion reaction)  - control fevers with acetaminophen  - will hold remdesevir in setting of LOUISE on CKD, patient weaned off supplemental O2, saturating well on room air  - fevers: f/u BCx, UCx, CT chest, abdomen pelvis. Hold abx per ID recs, no SOI besides COVID identified.

## 2021-03-23 NOTE — PROGRESS NOTE ADULT - SUBJECTIVE AND OBJECTIVE BOX
Authored by Mary Reyes MD, PGY1  PATIENT:  NICOLAS CIFUENTES  [id number]    CHIEF COMPLAINT:  Patient is a 75y old  Male who presents with a chief complaint of Fevers & chills (22 Mar 2021 16:09)      INTERVAL HISTORY OVERNIGHT EVENTS: SWEETIE overnight.       MEDICATIONS:  MEDICATIONS  (STANDING):  aMIOdarone    Tablet 200 milliGRAM(s) Oral daily  clonazePAM  Tablet 0.5 milliGRAM(s) Oral at bedtime  dexAMETHasone     Tablet 6 milliGRAM(s) Oral daily  donepezil 10 milliGRAM(s) Oral at bedtime  folic acid 1 milliGRAM(s) Oral daily  furosemide    Tablet 40 milliGRAM(s) Oral daily  ketoconazole 2% Shampoo 1 Application(s) Topical <User Schedule>  lactobacillus acidophilus 1 Tablet(s) Oral daily  levothyroxine 125 MICROGram(s) Oral daily  melatonin 5 milliGRAM(s) Oral at bedtime  memantine 10 milliGRAM(s) Oral every 12 hours  mirtazapine 7.5 milliGRAM(s) Oral at bedtime  multivitamin 1 Tablet(s) Oral daily  pantoprazole    Tablet 40 milliGRAM(s) Oral before breakfast  sodium bicarbonate 650 milliGRAM(s) Oral two times a day  tamsulosin 0.4 milliGRAM(s) Oral at bedtime    MEDICATIONS  (PRN):  ALBUTerol    90 MICROgram(s) HFA Inhaler 2 Puff(s) Inhalation every 6 hours PRN Shortness of Breath and/or Wheezing  benzonatate 100 milliGRAM(s) Oral every 8 hours PRN Cough  guaiFENesin   Syrup  (Sugar-Free) 100 milliGRAM(s) Oral every 6 hours PRN Cough  sodium chloride 0.65% Nasal 1 Spray(s) Both Nostrils two times a day PRN Nasal Congestion      ALLERGIES:  Allergies    rituximab (Angioedema)    Intolerances        OBJECTIVE:  ICU Vital Signs Last 24 Hrs  T(C): 36.4 (23 Mar 2021 06:05), Max: 37 (22 Mar 2021 16:41)  T(F): 97.6 (23 Mar 2021 06:05), Max: 98.6 (22 Mar 2021 16:41)  HR: 55 (23 Mar 2021 06:05) (55 - 93)  BP: 129/69 (23 Mar 2021 06:05) (112/62 - 132/67)  BP(mean): --  ABP: --  ABP(mean): --  RR: 20 (23 Mar 2021 06:05) (20 - 22)  SpO2: 91% (23 Mar 2021 06:05) (87% - 93%)      CAPILLARY BLOOD GLUCOSE        CAPILLARY BLOOD GLUCOSE        I&O's Summary    22 Mar 2021 07:01  -  23 Mar 2021 07:00  --------------------------------------------------------  IN: 1440 mL / OUT: 1975 mL / NET: -535 mL      Daily     Daily     PHYSICAL EXAMINATION:  General: WN/WD NAD  HEENT: PERRL, EOMI, moist mucous membranes  Neurology: A&Ox3, nonfocal, CARRIZALES x 4  Respiratory: normal respiratory effort, mild coarse breath sounds  CV: RRR, S1S2, no murmurs, rubs or gallops  Abdominal: Soft, NT, ND +BS, Last BM 3/18  Extremities: no edema      LABS:                          9.5    8.52  )-----------( 252      ( 23 Mar 2021 06:45 )             29.8     03-23    141  |  101  |  88<H>  ----------------------------<  135<H>  4.3   |  24  |  3.49<H>    Ca    9.2      23 Mar 2021 06:45  Phos  4.2     03-23  Mg     2.1     03-23    TPro  6.2  /  Alb  3.5  /  TBili  0.5  /  DBili  x   /  AST  23  /  ALT  37  /  AlkPhos  61  03-23    LIVER FUNCTIONS - ( 23 Mar 2021 06:45 )  Alb: 3.5 g/dL / Pro: 6.2 g/dL / ALK PHOS: 61 U/L / ALT: 37 U/L / AST: 23 U/L / GGT: x           PT/INR - ( 23 Mar 2021 06:45 )   PT: 40.8 sec;   INR: 3.61 ratio         PTT - ( 23 Mar 2021 06:45 )  PTT:33.5 sec            TELEMETRY:     EKG:     IMAGING:       Authored by Mary Reyes MD, PGY1  PATIENT:  NICOLAS CIFUENTES  [id number]    CHIEF COMPLAINT:  Patient is a 75y old  Male who presents with a chief complaint of Fevers & chills (22 Mar 2021 16:09)      INTERVAL HISTORY OVERNIGHT EVENTS: SWEETIE overnight. This AM, patient c/o cough, chills, generalized malaise. Denies SOB.       MEDICATIONS:  MEDICATIONS  (STANDING):  aMIOdarone    Tablet 200 milliGRAM(s) Oral daily  clonazePAM  Tablet 0.5 milliGRAM(s) Oral at bedtime  dexAMETHasone     Tablet 6 milliGRAM(s) Oral daily  donepezil 10 milliGRAM(s) Oral at bedtime  folic acid 1 milliGRAM(s) Oral daily  furosemide    Tablet 40 milliGRAM(s) Oral daily  ketoconazole 2% Shampoo 1 Application(s) Topical <User Schedule>  lactobacillus acidophilus 1 Tablet(s) Oral daily  levothyroxine 125 MICROGram(s) Oral daily  melatonin 5 milliGRAM(s) Oral at bedtime  memantine 10 milliGRAM(s) Oral every 12 hours  mirtazapine 7.5 milliGRAM(s) Oral at bedtime  multivitamin 1 Tablet(s) Oral daily  pantoprazole    Tablet 40 milliGRAM(s) Oral before breakfast  sodium bicarbonate 650 milliGRAM(s) Oral two times a day  tamsulosin 0.4 milliGRAM(s) Oral at bedtime    MEDICATIONS  (PRN):  ALBUTerol    90 MICROgram(s) HFA Inhaler 2 Puff(s) Inhalation every 6 hours PRN Shortness of Breath and/or Wheezing  benzonatate 100 milliGRAM(s) Oral every 8 hours PRN Cough  guaiFENesin   Syrup  (Sugar-Free) 100 milliGRAM(s) Oral every 6 hours PRN Cough  sodium chloride 0.65% Nasal 1 Spray(s) Both Nostrils two times a day PRN Nasal Congestion      ALLERGIES:  Allergies    rituximab (Angioedema)    Intolerances        OBJECTIVE:  ICU Vital Signs Last 24 Hrs  T(C): 36.4 (23 Mar 2021 06:05), Max: 37 (22 Mar 2021 16:41)  T(F): 97.6 (23 Mar 2021 06:05), Max: 98.6 (22 Mar 2021 16:41)  HR: 55 (23 Mar 2021 06:05) (55 - 93)  BP: 129/69 (23 Mar 2021 06:05) (112/62 - 132/67)  BP(mean): --  ABP: --  ABP(mean): --  RR: 20 (23 Mar 2021 06:05) (20 - 22)  SpO2: 91% (23 Mar 2021 06:05) (87% - 93%)      CAPILLARY BLOOD GLUCOSE        CAPILLARY BLOOD GLUCOSE        I&O's Summary    22 Mar 2021 07:01  -  23 Mar 2021 07:00  --------------------------------------------------------  IN: 1440 mL / OUT: 1975 mL / NET: -535 mL      Daily     Daily     PHYSICAL EXAMINATION:  General: WN/WD NAD  HEENT: PERRL, EOMI, moist mucous membranes  Neurology: A&Ox3, nonfocal, CARRIZALES x 4  Respiratory: normal respiratory effort, mild coarse breath sounds  CV: RRR, S1S2, no murmurs, rubs or gallops  Abdominal: Soft, NT, ND +BS, Last BM 3/18  Extremities: no edema      LABS:                          9.5    8.52  )-----------( 252      ( 23 Mar 2021 06:45 )             29.8     03-23    141  |  101  |  88<H>  ----------------------------<  135<H>  4.3   |  24  |  3.49<H>    Ca    9.2      23 Mar 2021 06:45  Phos  4.2     03-23  Mg     2.1     03-23    TPro  6.2  /  Alb  3.5  /  TBili  0.5  /  DBili  x   /  AST  23  /  ALT  37  /  AlkPhos  61  03-23    LIVER FUNCTIONS - ( 23 Mar 2021 06:45 )  Alb: 3.5 g/dL / Pro: 6.2 g/dL / ALK PHOS: 61 U/L / ALT: 37 U/L / AST: 23 U/L / GGT: x           PT/INR - ( 23 Mar 2021 06:45 )   PT: 40.8 sec;   INR: 3.61 ratio         PTT - ( 23 Mar 2021 06:45 )  PTT:33.5 sec            TELEMETRY:     EKG:     IMAGING:

## 2021-03-23 NOTE — DISCHARGE NOTE PROVIDER - PROVIDER TOKENS
PROVIDER:[TOKEN:[[id number]],ESTABLISHEDPATIENT:[T]] PROVIDER:[TOKEN:[[id number]],ESTABLISHEDPATIENT:[T]],PROVIDER:[TOKEN:[[id number]],FOLLOWUP:[1 week]],PROVIDER:[TOKEN:[[id number]],FOLLOWUP:[Routine]],PROVIDER:[TOKEN:[[id number]],FOLLOWUP:[2 weeks]]

## 2021-03-23 NOTE — PROGRESS NOTE ADULT - SUBJECTIVE AND OBJECTIVE BOX
SUBJ:    Home Medications:  amiodarone 200 mg oral tablet: 1 tab(s) orally once a day (16 Mar 2021 20:09)  colchicine 0.6 mg oral tablet: 1 tab(s) orally once a day (16 Mar 2021 20:09)  dexamethasone 4 mg oral tablet: 5 tab(s) orally once a week.  DUE ON JAN 11TH.   ** PT TO TAKE HIS OWN  MEDICATION  FROM [facility].  (16 Mar 2021 20:07)  docusate sodium 100 mg oral capsule: 1 cap(s) orally 3 times a day (16 Mar 2021 20:07)  donepezil 10 mg oral tablet: 1 tab(s) orally once a day (at bedtime) (16 Mar 2021 20:07)  INR MONITORING: INR MONITORING DAILY - TO MAINTAIN INR BETWEEN 2-3.  ** PT DOES NOT NEED TO BE BRIDGED TO COUMADIN.  (16 Mar 2021 20:07)  levothyroxine 125 mcg (0.125 mg) oral tablet: 1 tab(s) orally once a day (16 Mar 2021 20:09)  levothyroxine 150 mcg (0.15 mg) oral tablet: 1 tab(s) orally once a day (16 Mar 2021 20:09)  memantine 10 mg oral tablet: 1 tab(s) orally every 12 hours (16 Mar 2021 20:09)  Metoprolol Succinate ER 25 mg oral tablet, extended release: 1 tab(s) orally once a day (16 Mar 2021 20:09)  mirtazapine 30 mg oral tablet: 1 tab(s) orally once a day (at bedtime) (16 Mar 2021 20:09)  mirtazapine 30 mg oral tablet: 1 tab(s) orally once a day (at bedtime) (16 Mar 2021 20:09)  Ninlaro 2.3 mg oral capsule: 1 cap(s) orally every 7 days.  DUE ON JAN 11TH.   ** PT TO TAKE HIS OWN  MEDICATION  FROM [facility].  (16 Mar 2021 20:07)  omeprazole 20 mg oral delayed release capsule: 1 cap(s) orally once a day (16 Mar 2021 20:09)  pantoprazole 40 mg oral delayed release tablet: 1 tab(s) orally once a day (before a meal) (16 Mar 2021 20:09)  Percocet 10/325 oral tablet: 1 tab(s) orally every 6 hours, As Needed (16 Mar 2021 20:09)  tamsulosin 0.4 mg oral capsule: 1 cap(s) orally once a day (16 Mar 2021 20:09)  tamsulosin 0.4 mg oral capsule: 1 cap(s) orally once a day (at bedtime) (16 Mar 2021 20:09)  warfarin 5 mg oral tablet: 1 tab(s) orally once a day (16 Mar 2021 20:07)      MEDICATIONS  (STANDING):  aMIOdarone    Tablet 200 milliGRAM(s) Oral daily  clonazePAM  Tablet 1 milliGRAM(s) Oral at bedtime  dexAMETHasone     Tablet 6 milliGRAM(s) Oral daily  donepezil 10 milliGRAM(s) Oral at bedtime  folic acid 1 milliGRAM(s) Oral daily  ketoconazole 2% Shampoo 1 Application(s) Topical <User Schedule>  lactobacillus acidophilus 1 Tablet(s) Oral daily  levothyroxine 125 MICROGram(s) Oral daily  melatonin 5 milliGRAM(s) Oral at bedtime  memantine 10 milliGRAM(s) Oral every 12 hours  mirtazapine 7.5 milliGRAM(s) Oral at bedtime  multivitamin 1 Tablet(s) Oral daily  pantoprazole    Tablet 40 milliGRAM(s) Oral before breakfast  sodium bicarbonate 650 milliGRAM(s) Oral two times a day  tamsulosin 0.4 milliGRAM(s) Oral at bedtime    MEDICATIONS  (PRN):  ALBUTerol    90 MICROgram(s) HFA Inhaler 2 Puff(s) Inhalation every 6 hours PRN Shortness of Breath and/or Wheezing  benzonatate 100 milliGRAM(s) Oral every 8 hours PRN Cough  guaiFENesin   Syrup  (Sugar-Free) 100 milliGRAM(s) Oral every 6 hours PRN Cough  sodium chloride 0.65% Nasal 1 Spray(s) Both Nostrils two times a day PRN Nasal Congestion      Vital Signs Last 24 Hrs  T(C): 36.9 (23 Mar 2021 18:34), Max: 36.9 (23 Mar 2021 18:34)  T(F): 98.4 (23 Mar 2021 18:34), Max: 98.4 (23 Mar 2021 18:34)  HR: 66 (23 Mar 2021 18:34) (55 - 68)  BP: 128/70 (23 Mar 2021 18:34) (123/69 - 142/69)  BP(mean): --  RR: 18 (23 Mar 2021 18:34) (18 - 20)  SpO2: 93% (23 Mar 2021 18:34) (91% - 93%)    REVIEW OF SYSTEMS:  As per HPI, otherwise unremarkable.     PHYSICAL EXAM:  · CONSTITUTIONAL: Well-developed, well nourished      · EYES: EOMI; PERRL; no drainage or redness  · NECK: No bruits; no thyromegaly or nodules  ·RESPIRATORY:   airway patent; breath sounds equal; good air movement; respirations non-labored; clear to auscultation bilaterally; no chest wall tenderness; no intercostal retractions; no rales,rhonchi or wheeze  · CARDIOVASCULAR: regular rate and rhythm  no rub  no murmur  normal PMI  . GASTROINTESTINAL:  no distention; no masses palpable; bowel sounds normal  · EXTREMITIES: No cyanosis, clubbing or edema  · VASCULAR:  Equal and normal pulses (radial, femoral)  ·NEUROLOGICAL:  Alert and oriented x 3; sensation intact; deep reflexes intact; cranial nerves intact; no spontaneous movement; superficial reflexes intact; normal strength  · SKIN: No lesions; no rash  . LYMPH NODES: No lymphadedenopathy  · MUSCULOSKELETAL:  No calf tenderness  no joint swelling	    TELEMETRY:    ECG:    TTE:    LABS:  CBC Full  -  ( 23 Mar 2021 06:45 )  WBC Count : 8.52 K/uL  RBC Count : 3.14 M/uL  Hemoglobin : 9.5 g/dL  Hematocrit : 29.8 %  Platelet Count - Automated : 252 K/uL  Mean Cell Volume : 94.9 fl  Mean Cell Hemoglobin : 30.3 pg  Mean Cell Hemoglobin Concentration : 31.9 gm/dL  Auto Neutrophil # : 6.57 K/uL  Auto Lymphocyte # : 1.11 K/uL  Auto Monocyte # : 0.71 K/uL  Auto Eosinophil # : 0.01 K/uL  Auto Basophil # : 0.01 K/uL  Auto Neutrophil % : 77.2 %  Auto Lymphocyte % : 13.0 %  Auto Monocyte % : 8.3 %  Auto Eosinophil % : 0.1 %  Auto Basophil % : 0.1 %    03-23    141  |  101  |  88<H>  ----------------------------<  135<H>  4.3   |  24  |  3.49<H>    Ca    9.2      23 Mar 2021 06:45  Phos  4.2     03-23  Mg     2.1     03-23    TPro  6.2  /  Alb  3.5  /  TBili  0.5  /  DBili  x   /  AST  23  /  ALT  37  /  AlkPhos  61  03-23          RADIOLOGY & ADDITIONAL STUDIES:    IMPRESSION AND PLAN:        Gris Landry MD, MPH, SERAFIN, RPVI, FACC  Cardiovascular Specialist   [facility] Dolores [facility] ([facility])  [facility]  c: [phone] (text preferred and/or call)  e: [email or web address]  For all [facility] Cardiology and Cardiovascular Surgery on-service contact/call information, go to [email or web address] and use "[email or web address]" to login.  For outpatient Cardiology appointments, call  [phone] to arrange with a colleague; I do not have outpatient Cardiology clinic.   SUBJ:  No acute events overnight. Dyspnea has improved although he still requires 2L oxygen to maintain saturations above 90% in the setting of acute COVID-19 infection. Pending discharge.     Home Medications:  amiodarone 200 mg oral tablet: 1 tab(s) orally once a day (16 Mar 2021 20:09)  colchicine 0.6 mg oral tablet: 1 tab(s) orally once a day (16 Mar 2021 20:09)  dexamethasone 4 mg oral tablet: 5 tab(s) orally once a week.  DUE ON JAN 11TH.   ** PT TO TAKE HIS OWN  MEDICATION  FROM [facility].  (16 Mar 2021 20:07)  docusate sodium 100 mg oral capsule: 1 cap(s) orally 3 times a day (16 Mar 2021 20:07)  donepezil 10 mg oral tablet: 1 tab(s) orally once a day (at bedtime) (16 Mar 2021 20:07)  INR MONITORING: INR MONITORING DAILY - TO MAINTAIN INR BETWEEN 2-3.  ** PT DOES NOT NEED TO BE BRIDGED TO COUMADIN.  (16 Mar 2021 20:07)  levothyroxine 125 mcg (0.125 mg) oral tablet: 1 tab(s) orally once a day (16 Mar 2021 20:09)  levothyroxine 150 mcg (0.15 mg) oral tablet: 1 tab(s) orally once a day (16 Mar 2021 20:09)  memantine 10 mg oral tablet: 1 tab(s) orally every 12 hours (16 Mar 2021 20:09)  Metoprolol Succinate ER 25 mg oral tablet, extended release: 1 tab(s) orally once a day (16 Mar 2021 20:09)  mirtazapine 30 mg oral tablet: 1 tab(s) orally once a day (at bedtime) (16 Mar 2021 20:09)  mirtazapine 30 mg oral tablet: 1 tab(s) orally once a day (at bedtime) (16 Mar 2021 20:09)  Ninlaro 2.3 mg oral capsule: 1 cap(s) orally every 7 days.  DUE ON JAN 11TH.   ** PT TO TAKE HIS OWN  MEDICATION  FROM [facility].  (16 Mar 2021 20:07)  omeprazole 20 mg oral delayed release capsule: 1 cap(s) orally once a day (16 Mar 2021 20:09)  pantoprazole 40 mg oral delayed release tablet: 1 tab(s) orally once a day (before a meal) (16 Mar 2021 20:09)  Percocet 10/325 oral tablet: 1 tab(s) orally every 6 hours, As Needed (16 Mar 2021 20:09)  tamsulosin 0.4 mg oral capsule: 1 cap(s) orally once a day (16 Mar 2021 20:09)  tamsulosin 0.4 mg oral capsule: 1 cap(s) orally once a day (at bedtime) (16 Mar 2021 20:09)  warfarin 5 mg oral tablet: 1 tab(s) orally once a day (16 Mar 2021 20:07)    MEDICATIONS  (STANDING):  aMIOdarone    Tablet 200 milliGRAM(s) Oral daily  clonazePAM  Tablet 1 milliGRAM(s) Oral at bedtime  dexAMETHasone     Tablet 6 milliGRAM(s) Oral daily  donepezil 10 milliGRAM(s) Oral at bedtime  folic acid 1 milliGRAM(s) Oral daily  ketoconazole 2% Shampoo 1 Application(s) Topical <User Schedule>  lactobacillus acidophilus 1 Tablet(s) Oral daily  levothyroxine 125 MICROGram(s) Oral daily  melatonin 5 milliGRAM(s) Oral at bedtime  memantine 10 milliGRAM(s) Oral every 12 hours  mirtazapine 7.5 milliGRAM(s) Oral at bedtime  multivitamin 1 Tablet(s) Oral daily  pantoprazole    Tablet 40 milliGRAM(s) Oral before breakfast  sodium bicarbonate 650 milliGRAM(s) Oral two times a day  tamsulosin 0.4 milliGRAM(s) Oral at bedtime    MEDICATIONS  (PRN):  ALBUTerol    90 MICROgram(s) HFA Inhaler 2 Puff(s) Inhalation every 6 hours PRN Shortness of Breath and/or Wheezing  benzonatate 100 milliGRAM(s) Oral every 8 hours PRN Cough  guaiFENesin   Syrup  (Sugar-Free) 100 milliGRAM(s) Oral every 6 hours PRN Cough  sodium chloride 0.65% Nasal 1 Spray(s) Both Nostrils two times a day PRN Nasal Congestion    Vital Signs Last 24 Hrs  T(C): 36.9 (23 Mar 2021 18:34), Max: 36.9 (23 Mar 2021 18:34)  T(F): 98.4 (23 Mar 2021 18:34), Max: 98.4 (23 Mar 2021 18:34)  HR: 66 (23 Mar 2021 18:34) (55 - 68)  BP: 128/70 (23 Mar 2021 18:34) (123/69 - 142/69)  RR: 18 (23 Mar 2021 18:34) (18 - 20)  SpO2: 93% (23 Mar 2021 18:34) (91% - 93%)    REVIEW OF SYSTEMS:  As per HPI, otherwise unremarkable.     PHYSICAL EXAM:  · CONSTITUTIONAL: Well-developed, well nourished      · EYES: no drainage or redness  · NECK: supple  ·RESPIRATORY: respirations non-labored; clear to auscultation bilaterally; no rales,rhonchi or wheeze  · CARDIOVASCULAR: regular rate and rhythm   . GASTROINTESTINAL:  no distention; no masses palpable  · EXTREMITIES: No cyanosis, clubbing or edema  · VASCULAR:  Equal and normal pulses (radial  ·NEUROLOGICAL:  Alert and oriented x 3  · SKIN: No lesions; no rash  . LYMPH NODES: No lymphadedenopathy  · MUSCULOSKELETAL:  no joint swelling	    TTE: 3/17/2021  Conclusions:  Technically difficult study.  Normal left ventricular systolic function. No segmental  wall motion abnormalities.  Mild-moderate pulmonary hypertension.    LABS:  CBC Full  -  ( 23 Mar 2021 06:45 )  WBC Count : 8.52 K/uL  RBC Count : 3.14 M/uL  Hemoglobin : 9.5 g/dL  Hematocrit : 29.8 %  Platelet Count - Automated : 252 K/uL  Mean Cell Volume : 94.9 fl  Mean Cell Hemoglobin : 30.3 pg  Mean Cell Hemoglobin Concentration : 31.9 gm/dL  Auto Neutrophil # : 6.57 K/uL  Auto Lymphocyte # : 1.11 K/uL  Auto Monocyte # : 0.71 K/uL  Auto Eosinophil # : 0.01 K/uL  Auto Basophil # : 0.01 K/uL  Auto Neutrophil % : 77.2 %  Auto Lymphocyte % : 13.0 %  Auto Monocyte % : 8.3 %  Auto Eosinophil % : 0.1 %  Auto Basophil % : 0.1 %    03-23    141  |  101  |  88<H>  ----------------------------<  135<H>  4.3   |  24  |  3.49<H>    Ca    9.2      23 Mar 2021 06:45  Phos  4.2     03-23  Mg     2.1     03-23    TPro  6.2  /  Alb  3.5  /  TBili  0.5  /  DBili  x   /  AST  23  /  ALT  37  /  AlkPhos  61  03-23    IMPRESSION AND PLAN:  75 M with PMH of pAFib on amiodarone/BB and AC with warfarin, tachy-sabina s/p PPM, HFpEF, CLL transformed to Waldenstrom macroglobulinemia and other chronic comorbidities presenting with inflammatory symptoms s/p monoclonal ab for COVID19 infection.     1) HFpEF  TTE preserved EF  Setting of COVID-19 infection requiring NC oxygen     ·	COVID19 management per primary team   ·	Transition to PO furosemide 80 mg daily for discharge.     2) AF  NSR   s/p PPM  Elevated CHARDS2-Vasc  Renal dysfunction     ·	Continue amiodarone 200 mg daily and metoprolol succinate 25 mg daily   ·	A/C with coumadin but option to transition to apixaban available. Retrospective studies suggest similar efficacy and no increased bleeding risk but safety and efficiency are pending more comprehensive clinical trials for widespread use.    Gris Landry MD, MPH, SERAFIN, RPVI, FACC  Cardiovascular Specialist   [facility] ([facility]  [facility]  c: [phone] (text preferred and/or call)  e: [email or web address]  For all [facility] Cardiology and Cardiovascular Surgery on-service contact/call information, go to [email or web address] and use "[email or web address]" to login.  For outpatient Cardiology appointments, call  [phone] to arrange with a colleague; I do not have outpatient Cardiology clinic.

## 2021-03-24 DIAGNOSIS — Z02.9 ENCOUNTER FOR ADMINISTRATIVE EXAMINATIONS, UNSPECIFIED: ICD-10-CM

## 2021-03-24 LAB
ALBUMIN SERPL ELPH-MCNC: 3.6 G/DL — SIGNIFICANT CHANGE UP (ref 3.3–5)
ALP SERPL-CCNC: 61 U/L — SIGNIFICANT CHANGE UP (ref 40–120)
ALT FLD-CCNC: 34 U/L — SIGNIFICANT CHANGE UP (ref 10–45)
ANION GAP SERPL CALC-SCNC: 14 MMOL/L — SIGNIFICANT CHANGE UP (ref 5–17)
APTT BLD: 30.1 SEC — SIGNIFICANT CHANGE UP (ref 27.5–35.5)
AST SERPL-CCNC: 20 U/L — SIGNIFICANT CHANGE UP (ref 10–40)
BASOPHILS # BLD AUTO: 0.01 K/UL — SIGNIFICANT CHANGE UP (ref 0–0.2)
BASOPHILS NFR BLD AUTO: 0.1 % — SIGNIFICANT CHANGE UP (ref 0–2)
BILIRUB SERPL-MCNC: 0.6 MG/DL — SIGNIFICANT CHANGE UP (ref 0.2–1.2)
BUN SERPL-MCNC: 91 MG/DL — HIGH (ref 7–23)
CALCIUM SERPL-MCNC: 9.5 MG/DL — SIGNIFICANT CHANGE UP (ref 8.4–10.5)
CHLORIDE SERPL-SCNC: 99 MMOL/L — SIGNIFICANT CHANGE UP (ref 96–108)
CO2 SERPL-SCNC: 26 MMOL/L — SIGNIFICANT CHANGE UP (ref 22–31)
CREAT SERPL-MCNC: 3.6 MG/DL — HIGH (ref 0.5–1.3)
EOSINOPHIL # BLD AUTO: 0.03 K/UL — SIGNIFICANT CHANGE UP (ref 0–0.5)
EOSINOPHIL NFR BLD AUTO: 0.3 % — SIGNIFICANT CHANGE UP (ref 0–6)
GLUCOSE SERPL-MCNC: 120 MG/DL — HIGH (ref 70–99)
HCT VFR BLD CALC: 31.2 % — LOW (ref 39–50)
HGB BLD-MCNC: 9.9 G/DL — LOW (ref 13–17)
IMM GRANULOCYTES NFR BLD AUTO: 1.1 % — SIGNIFICANT CHANGE UP (ref 0–1.5)
INR BLD: 3.02 RATIO — HIGH (ref 0.88–1.16)
LYMPHOCYTES # BLD AUTO: 1.39 K/UL — SIGNIFICANT CHANGE UP (ref 1–3.3)
LYMPHOCYTES # BLD AUTO: 12.2 % — LOW (ref 13–44)
MAGNESIUM SERPL-MCNC: 2 MG/DL — SIGNIFICANT CHANGE UP (ref 1.6–2.6)
MCHC RBC-ENTMCNC: 29.8 PG — SIGNIFICANT CHANGE UP (ref 27–34)
MCHC RBC-ENTMCNC: 31.7 GM/DL — LOW (ref 32–36)
MCV RBC AUTO: 94 FL — SIGNIFICANT CHANGE UP (ref 80–100)
MONOCYTES # BLD AUTO: 0.93 K/UL — HIGH (ref 0–0.9)
MONOCYTES NFR BLD AUTO: 8.2 % — SIGNIFICANT CHANGE UP (ref 2–14)
NEUTROPHILS # BLD AUTO: 8.9 K/UL — HIGH (ref 1.8–7.4)
NEUTROPHILS NFR BLD AUTO: 78.1 % — HIGH (ref 43–77)
NRBC # BLD: 0 /100 WBCS — SIGNIFICANT CHANGE UP (ref 0–0)
PHOSPHATE SERPL-MCNC: 4.6 MG/DL — HIGH (ref 2.5–4.5)
PLATELET # BLD AUTO: 303 K/UL — SIGNIFICANT CHANGE UP (ref 150–400)
POTASSIUM SERPL-MCNC: 4.4 MMOL/L — SIGNIFICANT CHANGE UP (ref 3.5–5.3)
POTASSIUM SERPL-SCNC: 4.4 MMOL/L — SIGNIFICANT CHANGE UP (ref 3.5–5.3)
PROT SERPL-MCNC: 6.3 G/DL — SIGNIFICANT CHANGE UP (ref 6–8.3)
PROTHROM AB SERPL-ACNC: 34.4 SEC — HIGH (ref 10.6–13.6)
RBC # BLD: 3.32 M/UL — LOW (ref 4.2–5.8)
RBC # FLD: 13.5 % — SIGNIFICANT CHANGE UP (ref 10.3–14.5)
SODIUM SERPL-SCNC: 139 MMOL/L — SIGNIFICANT CHANGE UP (ref 135–145)
WBC # BLD: 11.39 K/UL — HIGH (ref 3.8–10.5)
WBC # FLD AUTO: 11.39 K/UL — HIGH (ref 3.8–10.5)

## 2021-03-24 PROCEDURE — 99233 SBSQ HOSP IP/OBS HIGH 50: CPT

## 2021-03-24 RX ORDER — CLONAZEPAM 1 MG
1 TABLET ORAL
Qty: 0 | Refills: 0 | DISCHARGE
Start: 2021-03-24

## 2021-03-24 RX ORDER — FUROSEMIDE 40 MG
1 TABLET ORAL
Qty: 30 | Refills: 0
Start: 2021-03-24

## 2021-03-24 RX ORDER — MIRTAZAPINE 45 MG/1
1 TABLET, ORALLY DISINTEGRATING ORAL
Qty: 0 | Refills: 0 | DISCHARGE

## 2021-03-24 RX ORDER — DEXAMETHASONE 0.5 MG/5ML
5 ELIXIR ORAL
Qty: 0 | Refills: 0 | DISCHARGE

## 2021-03-24 RX ORDER — SODIUM BICARBONATE 1 MEQ/ML
650 SYRINGE (ML) INTRAVENOUS DAILY
Refills: 0 | Status: DISCONTINUED | OUTPATIENT
Start: 2021-03-24 | End: 2021-03-30

## 2021-03-24 RX ORDER — APIXABAN 2.5 MG/1
1 TABLET, FILM COATED ORAL
Qty: 60 | Refills: 0
Start: 2021-03-24 | End: 2021-04-22

## 2021-03-24 RX ORDER — DEXAMETHASONE 0.5 MG/5ML
1 ELIXIR ORAL
Qty: 1 | Refills: 0
Start: 2021-03-24

## 2021-03-24 RX ORDER — SODIUM BICARBONATE 1 MEQ/ML
1 SYRINGE (ML) INTRAVENOUS
Qty: 0 | Refills: 0 | DISCHARGE
Start: 2021-03-24

## 2021-03-24 RX ORDER — FOLIC ACID 0.8 MG
1 TABLET ORAL
Qty: 90 | Refills: 0
Start: 2021-03-24

## 2021-03-24 RX ORDER — LEVOTHYROXINE SODIUM 125 MCG
1 TABLET ORAL
Qty: 0 | Refills: 0 | DISCHARGE

## 2021-03-24 RX ORDER — IXAZOMIB 3 MG/1
1 CAPSULE ORAL
Qty: 0 | Refills: 0 | DISCHARGE

## 2021-03-24 RX ORDER — TAMSULOSIN HYDROCHLORIDE 0.4 MG/1
1 CAPSULE ORAL
Qty: 0 | Refills: 0 | DISCHARGE

## 2021-03-24 RX ORDER — APIXABAN 2.5 MG/1
2.5 TABLET, FILM COATED ORAL
Refills: 0 | Status: DISCONTINUED | OUTPATIENT
Start: 2021-03-25 | End: 2021-03-31

## 2021-03-24 RX ORDER — WARFARIN SODIUM 2.5 MG/1
1 TABLET ORAL
Qty: 0 | Refills: 0 | DISCHARGE

## 2021-03-24 RX ADMIN — Medication 1 MILLIGRAM(S): at 10:59

## 2021-03-24 RX ADMIN — Medication 1 TABLET(S): at 10:59

## 2021-03-24 RX ADMIN — DONEPEZIL HYDROCHLORIDE 10 MILLIGRAM(S): 10 TABLET, FILM COATED ORAL at 21:42

## 2021-03-24 RX ADMIN — Medication 650 MILLIGRAM(S): at 06:24

## 2021-03-24 RX ADMIN — MIRTAZAPINE 7.5 MILLIGRAM(S): 45 TABLET, ORALLY DISINTEGRATING ORAL at 21:43

## 2021-03-24 RX ADMIN — MEMANTINE HYDROCHLORIDE 10 MILLIGRAM(S): 10 TABLET ORAL at 17:01

## 2021-03-24 RX ADMIN — Medication 125 MICROGRAM(S): at 06:24

## 2021-03-24 RX ADMIN — AMIODARONE HYDROCHLORIDE 200 MILLIGRAM(S): 400 TABLET ORAL at 06:24

## 2021-03-24 RX ADMIN — PANTOPRAZOLE SODIUM 40 MILLIGRAM(S): 20 TABLET, DELAYED RELEASE ORAL at 06:24

## 2021-03-24 RX ADMIN — MEMANTINE HYDROCHLORIDE 10 MILLIGRAM(S): 10 TABLET ORAL at 06:23

## 2021-03-24 RX ADMIN — Medication 100 MILLIGRAM(S): at 21:42

## 2021-03-24 RX ADMIN — TAMSULOSIN HYDROCHLORIDE 0.4 MILLIGRAM(S): 0.4 CAPSULE ORAL at 21:42

## 2021-03-24 RX ADMIN — Medication 80 MILLIGRAM(S): at 06:24

## 2021-03-24 RX ADMIN — Medication 6 MILLIGRAM(S): at 06:23

## 2021-03-24 RX ADMIN — Medication 100 MILLIGRAM(S): at 06:24

## 2021-03-24 RX ADMIN — Medication 1 MILLIGRAM(S): at 21:43

## 2021-03-24 RX ADMIN — ALBUTEROL 2 PUFF(S): 90 AEROSOL, METERED ORAL at 21:42

## 2021-03-24 RX ADMIN — Medication 5 MILLIGRAM(S): at 21:43

## 2021-03-24 RX ADMIN — Medication 100 MILLIGRAM(S): at 22:07

## 2021-03-24 NOTE — PROGRESS NOTE ADULT - SUBJECTIVE AND OBJECTIVE BOX
Mckinley Lopez MD  Pager [phone]  Spectra [phone]  Cell Phone [phone]    Patient is a 75y old  Male who presents with a chief complaint of Fevers & chills (24 Mar 2021 08:39)        SUBJECTIVE / OVERNIGHT EVENTS: Patient feels fatigued but otherwise no new complaints. Still with poor appetite      MEDICATIONS  (STANDING):  aMIOdarone    Tablet 200 milliGRAM(s) Oral daily  clonazePAM  Tablet 1 milliGRAM(s) Oral at bedtime  dexAMETHasone     Tablet 6 milliGRAM(s) Oral daily  donepezil 10 milliGRAM(s) Oral at bedtime  folic acid 1 milliGRAM(s) Oral daily  furosemide    Tablet 80 milliGRAM(s) Oral daily  ketoconazole 2% Shampoo 1 Application(s) Topical <User Schedule>  lactobacillus acidophilus 1 Tablet(s) Oral daily  levothyroxine 125 MICROGram(s) Oral daily  melatonin 5 milliGRAM(s) Oral at bedtime  memantine 10 milliGRAM(s) Oral every 12 hours  mirtazapine 7.5 milliGRAM(s) Oral at bedtime  multivitamin 1 Tablet(s) Oral daily  pantoprazole    Tablet 40 milliGRAM(s) Oral before breakfast  sodium bicarbonate 650 milliGRAM(s) Oral daily  tamsulosin 0.4 milliGRAM(s) Oral at bedtime    MEDICATIONS  (PRN):  ALBUTerol    90 MICROgram(s) HFA Inhaler 2 Puff(s) Inhalation every 6 hours PRN Shortness of Breath and/or Wheezing  benzonatate 100 milliGRAM(s) Oral every 8 hours PRN Cough  guaiFENesin   Syrup  (Sugar-Free) 100 milliGRAM(s) Oral every 6 hours PRN Cough  sodium chloride 0.65% Nasal 1 Spray(s) Both Nostrils two times a day PRN Nasal Congestion      Vital Signs Last 24 Hrs  T(C): 36.8 (24 Mar 2021 13:00), Max: 36.9 (23 Mar 2021 18:34)  T(F): 98.3 (24 Mar 2021 13:00), Max: 98.4 (23 Mar 2021 18:34)  HR: 65 (24 Mar 2021 13:00) (54 - 66)  BP: 121/65 (24 Mar 2021 13:00) (118/63 - 144/74)  BP(mean): --  RR: 18 (24 Mar 2021 13:00) (18 - 18)  SpO2: 96% (24 Mar 2021 13:00) (92% - 96%)  CAPILLARY BLOOD GLUCOSE        I&O's Summary    23 Mar 2021 07:01  -  24 Mar 2021 07:00  --------------------------------------------------------  IN: 1420 mL / OUT: 1850 mL / NET: -430 mL    24 Mar 2021 07:01  -  24 Mar 2021 16:30  --------------------------------------------------------  IN: 480 mL / OUT: 1325 mL / NET: -845 mL          PHYSICAL EXAM  GENERAL: NAD, well-developed  HEAD:  Atraumatic, Normocephalic  EYES: EOMI, PERRLA, conjunctiva and sclera clear  CHEST/LUNG: Coarse BS bilaterally   HEART: +S1+S2  ABDOMEN: Soft, Nontender, Nondistended; Bowel sounds present  EXTREMITIES:  2+ Peripheral Pulses, No clubbing, cyanosis, or edema  PSYCH: AAOx3      LABS:                        9.9    11.39 )-----------( 303      ( 24 Mar 2021 07:05 )             31.2     03-24    139  |  99  |  91<H>  ----------------------------<  120<H>  4.4   |  26  |  3.60<H>    Ca    9.5      24 Mar 2021 07:05  Phos  4.6     03-24  Mg     2.0     03-24    TPro  6.3  /  Alb  3.6  /  TBili  0.6  /  DBili  x   /  AST  20  /  ALT  34  /  AlkPhos  61  03-24    PT/INR - ( 24 Mar 2021 07:05 )   PT: 34.4 sec;   INR: 3.02 ratio         PTT - ( 24 Mar 2021 07:05 )  PTT:30.1 sec            RADIOLOGY & ADDITIONAL TESTS:    Imaging Personally Reviewed:  Consultant(s) Notes Reviewed:    Care Discussed with Consultants/Other Providers:

## 2021-03-24 NOTE — PROGRESS NOTE ADULT - PROBLEM SELECTOR PLAN 3
Cont amiodarone, metoprolol succinate  Supratherapeutic INR on warfarin - suspect vit k deficiency 2/2 nutrition - monitor INR and s/s bleeding  Will start Eliquis 2.5 mg PO BID at discharge for AC given difficulty in regulating INR

## 2021-03-24 NOTE — PROGRESS NOTE ADULT - PROBLEM SELECTOR PLAN 7
Discharge home today- 60 minutes spent discharging the patient  Refusing home PT/home services.   Refused [facility] previously

## 2021-03-24 NOTE — PROGRESS NOTE ADULT - SUBJECTIVE AND OBJECTIVE BOX
Overnight events noted   VITAL: T(C): , Max: 36.9 (03-23-21 @ 18:34) T(F): , Max: 98.4 (03-23-21 @ 18:34) HR: 54 (03-24-21 @ 06:22) BP: 144/74 (03-24-21 @ 06:22) BP(mean): -- RR: 18 (03-24-21 @ 06:22) SpO2: 93% (03-24-21 @ 06:22)   PHYSICAL EXAM: Constitutional: NAD, Alert HEENT: NCAT, DMM Neck: Supple, No JVD Respiratory: CTA-b/l Cardiovascular: sabina irreg Gastrointestinal: BS+, soft, NT/ND Extremities: No peripheral edema b/l Neurological: no focal deficits; strength grossly intact Back: no CVAT b/l Skin: (+)left frontal ecchymosis   LABS:                      9.9   11.39 )-----------( 303      ( 24 Mar 2021 07:05 )            31.2   Na(139)/K(4.4)/Cl(99)/HCO3(26)/BUN(91)/Cr(3.60)Glu(120)/Ca(9.5)/Mg(2.0)/PO4(4.6)    03-24 @ 07:05 Na(141)/K(4.3)/Cl(101)/HCO3(24)/BUN(88)/Cr(3.49)Glu(135)/Ca(9.2)/Mg(2.1)/PO4(4.2)    03-23 @ 06:45 Na(140)/K(4.3)/Cl(104)/HCO3(24)/BUN(90)/Cr(3.63)Glu(126)/Ca(9.0)/Mg(2.1)/PO4(3.7)    03-22 @ 07:20   IMPRESSION: 75N w/ Waldenstroms, mild dementia, AFib, and CKD4, 3/16/21 a/w COVID PNA  (1)Renal - 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 We could cut back on the NaHCO3.  (4)Pulm/ID - COVID PNA  - on PO Decadron   RECOMMEND: (1)Reduce NaHCO3 to 650qd (2)Can f/u at my office 2-4 weeks after discharge      Norman Ascencio MD [facility] Office: [phone] Cell: [phone]        No pain, no sob   VITAL: T(C): , Max: 36.9 (03-23-21 @ 18:34) T(F): , Max: 98.4 (03-23-21 @ 18:34) HR: 54 (03-24-21 @ 06:22) BP: 144/74 (03-24-21 @ 06:22) BP(mean): -- RR: 18 (03-24-21 @ 06:22) SpO2: 93% (03-24-21 @ 06:22)   PHYSICAL EXAM: Constitutional: NAD, Alert HEENT: NCAT, DMM Neck: Supple, No JVD Respiratory: CTA-b/l Cardiovascular: sabina irreg Gastrointestinal: BS+, soft, NT/ND Extremities: No peripheral edema b/l Neurological: no focal deficits; strength grossly intact Back: no CVAT b/l Skin: (+)left frontal ecchymosis   LABS:                      9.9   11.39 )-----------( 303      ( 24 Mar 2021 07:05 )            31.2   Na(139)/K(4.4)/Cl(99)/HCO3(26)/BUN(91)/Cr(3.60)Glu(120)/Ca(9.5)/Mg(2.0)/PO4(4.6)    03-24 @ 07:05 Na(141)/K(4.3)/Cl(101)/HCO3(24)/BUN(88)/Cr(3.49)Glu(135)/Ca(9.2)/Mg(2.1)/PO4(4.2)    03-23 @ 06:45 Na(140)/K(4.3)/Cl(104)/HCO3(24)/BUN(90)/Cr(3.63)Glu(126)/Ca(9.0)/Mg(2.1)/PO4(3.7)    03-22 @ 07:20   IMPRESSION: 75N w/ Waldenstroms, mild dementia, AFib, and CKD4, 3/16/21 a/w COVID PNA  (1)Renal - 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 We could cut back on the NaHCO3.  (4)Pulm/ID - COVID PNA  - on PO Decadron   RECOMMEND: (1)Reduce NaHCO3 to 650qd (2)Can f/u at my office 2-4 weeks after discharge      Norman Ascencio MD [facility] Office: [phone] Cell: [phone]

## 2021-03-25 ENCOUNTER — TRANSCRIPTION ENCOUNTER (OUTPATIENT)
Age: 76
End: 2021-03-25

## 2021-03-25 LAB — TROPONIN T, HIGH SENSITIVITY RESULT: 62 NG/L — HIGH (ref 0–51)

## 2021-03-25 PROCEDURE — 93010 ELECTROCARDIOGRAM REPORT: CPT

## 2021-03-25 PROCEDURE — 93010 ELECTROCARDIOGRAM REPORT: CPT | Mod: 77

## 2021-03-25 PROCEDURE — 99233 SBSQ HOSP IP/OBS HIGH 50: CPT

## 2021-03-25 RX ORDER — ACETAMINOPHEN 500 MG
650 TABLET ORAL EVERY 6 HOURS
Refills: 0 | Status: DISCONTINUED | OUTPATIENT
Start: 2021-03-25 | End: 2021-03-31

## 2021-03-25 RX ORDER — FUROSEMIDE 40 MG
40 TABLET ORAL DAILY
Refills: 0 | Status: DISCONTINUED | OUTPATIENT
Start: 2021-03-25 | End: 2021-03-26

## 2021-03-25 RX ORDER — METOPROLOL TARTRATE 50 MG
25 TABLET ORAL DAILY
Refills: 0 | Status: DISCONTINUED | OUTPATIENT
Start: 2021-03-25 | End: 2021-03-31

## 2021-03-25 RX ADMIN — Medication 1 MILLIGRAM(S): at 21:30

## 2021-03-25 RX ADMIN — Medication 1 MILLIGRAM(S): at 11:12

## 2021-03-25 RX ADMIN — AMIODARONE HYDROCHLORIDE 200 MILLIGRAM(S): 400 TABLET ORAL at 05:38

## 2021-03-25 RX ADMIN — Medication 125 MICROGRAM(S): at 05:37

## 2021-03-25 RX ADMIN — TAMSULOSIN HYDROCHLORIDE 0.4 MILLIGRAM(S): 0.4 CAPSULE ORAL at 21:30

## 2021-03-25 RX ADMIN — Medication 100 MILLIGRAM(S): at 21:30

## 2021-03-25 RX ADMIN — Medication 1 TABLET(S): at 11:12

## 2021-03-25 RX ADMIN — APIXABAN 2.5 MILLIGRAM(S): 2.5 TABLET, FILM COATED ORAL at 05:38

## 2021-03-25 RX ADMIN — Medication 25 MILLIGRAM(S): at 08:51

## 2021-03-25 RX ADMIN — MEMANTINE HYDROCHLORIDE 10 MILLIGRAM(S): 10 TABLET ORAL at 17:17

## 2021-03-25 RX ADMIN — PANTOPRAZOLE SODIUM 40 MILLIGRAM(S): 20 TABLET, DELAYED RELEASE ORAL at 05:38

## 2021-03-25 RX ADMIN — Medication 650 MILLIGRAM(S): at 11:12

## 2021-03-25 RX ADMIN — Medication 6 MILLIGRAM(S): at 05:37

## 2021-03-25 RX ADMIN — MIRTAZAPINE 7.5 MILLIGRAM(S): 45 TABLET, ORALLY DISINTEGRATING ORAL at 21:30

## 2021-03-25 RX ADMIN — Medication 80 MILLIGRAM(S): at 05:37

## 2021-03-25 RX ADMIN — MEMANTINE HYDROCHLORIDE 10 MILLIGRAM(S): 10 TABLET ORAL at 05:37

## 2021-03-25 RX ADMIN — Medication 650 MILLIGRAM(S): at 17:17

## 2021-03-25 RX ADMIN — Medication 650 MILLIGRAM(S): at 17:47

## 2021-03-25 RX ADMIN — DONEPEZIL HYDROCHLORIDE 10 MILLIGRAM(S): 10 TABLET, FILM COATED ORAL at 21:30

## 2021-03-25 RX ADMIN — APIXABAN 2.5 MILLIGRAM(S): 2.5 TABLET, FILM COATED ORAL at 17:17

## 2021-03-25 RX ADMIN — Medication 5 MILLIGRAM(S): at 21:30

## 2021-03-25 NOTE — PROGRESS NOTE ADULT - SUBJECTIVE AND OBJECTIVE BOX
Overnight events noted   VITAL: T(C): , Max: 36.9 (03-24-21 @ 18:16) T(F): , Max: 98.5 (03-24-21 @ 21:32) HR: 100 (03-25-21 @ 05:20) BP: 108/60 (03-25-21 @ 05:20) BP(mean): -- RR: 18 (03-25-21 @ 05:20) SpO2: 93% (03-25-21 @ 05:20)   PHYSICAL EXAM: Constitutional: NAD, Alert HEENT: NCAT, DMM Neck: Supple, No JVD Respiratory: CTA-b/l Cardiovascular: sabina irreg Gastrointestinal: BS+, soft, NT/ND Extremities: No peripheral edema b/l Neurological: no focal deficits; strength grossly intact Back: no CVAT b/l Skin: (+)left frontal ecchymosis   LABS:                      9.9   11.39 )-----------( 303      ( 24 Mar 2021 07:05 )            31.2   Na(139)/K(4.4)/Cl(99)/HCO3(26)/BUN(91)/Cr(3.60)Glu(120)/Ca(9.5)/Mg(2.0)/PO4(4.6)    03-24 @ 07:05 Na(141)/K(4.3)/Cl(101)/HCO3(24)/BUN(88)/Cr(3.49)Glu(135)/Ca(9.2)/Mg(2.1)/PO4(4.2)    03-23 @ 06:45   IMPRESSION: 75N w/ Waldenstroms, mild dementia, AFib, and CKD4, 3/16/21 a/w COVID PNA  (1)Renal - 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 - base creat mid-3s; resolved LOUISE from earlier this admission  (2)Lytes - acceptable  (3)Pulm/ID - COVID PNA  - on PO Decadron   RECOMMEND: (1)No objection to discharge, with f/u at my office 2-4 weeks after discharge      Norman Ascencio MD [facility] Office: [phone] Cell: [phone]        complains of "fibulating" (palpitations)   VITAL: T(C): , Max: 36.9 (03-24-21 @ 18:16) T(F): , Max: 98.5 (03-24-21 @ 21:32) HR: 110 BP: 108/60 (03-25-21 @ 05:20) BP(mean): -- RR: 18 (03-25-21 @ 05:20) SpO2: 93% (03-25-21 @ 05:20)   PHYSICAL EXAM: Constitutional: NAD, Alert HEENT: NCAT, DMM Neck: Supple, No JVD Respiratory: CTA-b/l Cardiovascular: tachy irreg Gastrointestinal: BS+, soft, NT/ND Extremities: No peripheral edema b/l Neurological: no focal deficits; strength grossly intact Back: no CVAT b/l   LABS:                      9.9   11.39 )-----------( 303      ( 24 Mar 2021 07:05 )            31.2   Na(139)/K(4.4)/Cl(99)/HCO3(26)/BUN(91)/Cr(3.60)Glu(120)/Ca(9.5)/Mg(2.0)/PO4(4.6)    03-24 @ 07:05 Na(141)/K(4.3)/Cl(101)/HCO3(24)/BUN(88)/Cr(3.49)Glu(135)/Ca(9.2)/Mg(2.1)/PO4(4.2)    03-23 @ 06:45   IMPRESSION: 75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

## 2021-03-25 NOTE — PROGRESS NOTE ADULT - SUBJECTIVE AND OBJECTIVE BOX
Mckinley Lopez MD  Pager [phone]  Spectra [phone]  Cell Phone [phone]    Patient is a 75y old  Male who presents with a chief complaint of Fevers & chills (25 Mar 2021 08:04)        SUBJECTIVE / OVERNIGHT EVENTS: Patient went into AF overnight. Rate 100-130's. Still feels fatigued. Denies CP/SOB  TELEMETRY: -130's      MEDICATIONS  (STANDING):  aMIOdarone    Tablet 200 milliGRAM(s) Oral daily  apixaban 2.5 milliGRAM(s) Oral two times a day  clonazePAM  Tablet 1 milliGRAM(s) Oral at bedtime  donepezil 10 milliGRAM(s) Oral at bedtime  folic acid 1 milliGRAM(s) Oral daily  furosemide    Tablet 80 milliGRAM(s) Oral daily  ketoconazole 2% Shampoo 1 Application(s) Topical <User Schedule>  lactobacillus acidophilus 1 Tablet(s) Oral daily  levothyroxine 125 MICROGram(s) Oral daily  melatonin 5 milliGRAM(s) Oral at bedtime  memantine 10 milliGRAM(s) Oral every 12 hours  metoprolol succinate ER 25 milliGRAM(s) Oral daily  mirtazapine 7.5 milliGRAM(s) Oral at bedtime  multivitamin 1 Tablet(s) Oral daily  pantoprazole    Tablet 40 milliGRAM(s) Oral before breakfast  sodium bicarbonate 650 milliGRAM(s) Oral daily  tamsulosin 0.4 milliGRAM(s) Oral at bedtime    MEDICATIONS  (PRN):  ALBUTerol    90 MICROgram(s) HFA Inhaler 2 Puff(s) Inhalation every 6 hours PRN Shortness of Breath and/or Wheezing  benzonatate 100 milliGRAM(s) Oral every 8 hours PRN Cough  guaiFENesin   Syrup  (Sugar-Free) 100 milliGRAM(s) Oral every 6 hours PRN Cough  sodium chloride 0.65% Nasal 1 Spray(s) Both Nostrils two times a day PRN Nasal Congestion      Vital Signs Last 24 Hrs  T(C): 36.4 (25 Mar 2021 05:20), Max: 36.9 (24 Mar 2021 18:16)  T(F): 97.6 (25 Mar 2021 05:20), Max: 98.5 (24 Mar 2021 21:32)  HR: 100 (25 Mar 2021 05:20) (55 - 100)  BP: 108/60 (25 Mar 2021 05:20) (108/60 - 128/64)  BP(mean): --  RR: 18 (25 Mar 2021 05:20) (18 - 18)  SpO2: 93% (25 Mar 2021 05:20) (92% - 97%)  CAPILLARY BLOOD GLUCOSE        I&O's Summary    24 Mar 2021 07:01  -  25 Mar 2021 07:00  --------------------------------------------------------  IN: 1200 mL / OUT: 2675 mL / NET: -1475 mL    25 Mar 2021 07:01  -  25 Mar 2021 09:12  --------------------------------------------------------  IN: 120 mL / OUT: 350 mL / NET: -230 mL          PHYSICAL EXAM  GENERAL: NAD, well-developed  HEAD:  Atraumatic, Normocephalic  EYES: EOMI, PERRLA, conjunctiva and sclera clear  CHEST/LUNG: Coarse BS bilaterally; No wheezes  HEART: Irregular, +S1+S2, tachy  ABDOMEN: Soft, Nontender, Nondistended; Bowel sounds present  EXTREMITIES:  2+ Peripheral Pulses, No clubbing, cyanosis, or edema  PSYCH: AAOx3      LABS:                        9.9    11.39 )-----------( 303      ( 24 Mar 2021 07:05 )             31.2     03-24    139  |  99  |  91<H>  ----------------------------<  120<H>  4.4   |  26  |  3.60<H>    Ca    9.5      24 Mar 2021 07:05  Phos  4.6     03-24  Mg     2.0     03-24    TPro  6.3  /  Alb  3.6  /  TBili  0.6  /  DBili  x   /  AST  20  /  ALT  34  /  AlkPhos  61  03-24    PT/INR - ( 24 Mar 2021 07:05 )   PT: 34.4 sec;   INR: 3.02 ratio         PTT - ( 24 Mar 2021 07:05 )  PTT:30.1 sec            RADIOLOGY & ADDITIONAL TESTS:    Imaging Personally Reviewed:  Consultant(s) Notes Reviewed:    Care Discussed with Consultants/Other Providers:

## 2021-03-25 NOTE — DIETITIAN INITIAL EVALUATION ADULT. - ADD RECOMMEND
Obtain subjective information as able. Monitor PO intake, diet tolerance, weight trends, labs, GI function, and skin integrity. Recommend liberalizing diet to NCK to optamize intake. Obtain subjective information as able. Monitor PO intake, diet tolerance, weight trends, labs, GI function, and skin integrity.

## 2021-03-25 NOTE — PROGRESS NOTE ADULT - PROBLEM SELECTOR PLAN 3
Cont amiodarone  Toprol held previously for borderline BP- resume this am for better rate control  Supratherapeutic INR on warfarin - suspect vit k deficiency 2/2 nutrition - monitor INR and s/s bleeding  Started Eliquis 2.5 mg PO BID given difficulty in regulating INR

## 2021-03-25 NOTE — DIETITIAN INITIAL EVALUATION ADULT. - PERTINENT MEDS FT
MEDICATIONS  (STANDING):  aMIOdarone    Tablet 200 milliGRAM(s) Oral daily  apixaban 2.5 milliGRAM(s) Oral two times a day  clonazePAM  Tablet 1 milliGRAM(s) Oral at bedtime  donepezil 10 milliGRAM(s) Oral at bedtime  folic acid 1 milliGRAM(s) Oral daily  furosemide    Tablet 40 milliGRAM(s) Oral daily  ketoconazole 2% Shampoo 1 Application(s) Topical <User Schedule>  lactobacillus acidophilus 1 Tablet(s) Oral daily  levothyroxine 125 MICROGram(s) Oral daily  melatonin 5 milliGRAM(s) Oral at bedtime  memantine 10 milliGRAM(s) Oral every 12 hours  metoprolol succinate ER 25 milliGRAM(s) Oral daily  mirtazapine 7.5 milliGRAM(s) Oral at bedtime  multivitamin 1 Tablet(s) Oral daily  pantoprazole    Tablet 40 milliGRAM(s) Oral before breakfast  sodium bicarbonate 650 milliGRAM(s) Oral daily  tamsulosin 0.4 milliGRAM(s) Oral at bedtime    MEDICATIONS  (PRN):  ALBUTerol    90 MICROgram(s) HFA Inhaler 2 Puff(s) Inhalation every 6 hours PRN Shortness of Breath and/or Wheezing  benzonatate 100 milliGRAM(s) Oral every 8 hours PRN Cough  guaiFENesin   Syrup  (Sugar-Free) 100 milliGRAM(s) Oral every 6 hours PRN Cough  sodium chloride 0.65% Nasal 1 Spray(s) Both Nostrils two times a day PRN Nasal Congestion

## 2021-03-25 NOTE — DIETITIAN INITIAL EVALUATION ADULT. - PERTINENT LABORATORY DATA
03-24 Na 139 mmol/L Glu 120 mg/dL<H> K+ 4.4 mmol/L Cr  3.60 mg/dL<H> BUN 91 mg/dL<H> Phos 4.6 mg/dL<H> Alb 3.6 g/dL PAB n/a   Hgb 9.9 g/dL<L> Hct 31.2 %<L>

## 2021-03-25 NOTE — DIETITIAN INITIAL EVALUATION ADULT. - ORAL INTAKE PTA/DIET HISTORY
Per RN, pt not appropriate for interview at this time as he went into A-fib overnight. Pt tired and lethargic this morning. Limited information available PTA.

## 2021-03-25 NOTE — DIETITIAN INITIAL EVALUATION ADULT. - PROBLEM SELECTOR PLAN 1
Tested positive 3/11, initial symptoms (fevers, fatigue) started 3/9, patient received monoclonal antibody treatment at [facility] and acutely experienced worsening of fevers and chills, may be superimposed transfusion reaction on top of COVID PNA.   -c/w dexamethasone 6mg IV daily for 10 days (will do one day of solumedrol q8 to treat both COVID PNA and possible transfusion reaction)  -control fevers with acetaminophen  -will hold remdesevir in setting of LOUISE on CKD, patient weaned off supplemental O2, saturating well on room air  -fevers: f/u BCx, UCx, CT chest, abdomen pelvis. Continue with empiric antibiotics, dose by level in setting of LOUISE on CKD.

## 2021-03-25 NOTE — PROGRESS NOTE ADULT - PROBLEM SELECTOR PLAN 7
Anticipate d/c home 3/26 if HR better controlled  Refusing home PT/home services.   Refused [facility] previously

## 2021-03-25 NOTE — DIETITIAN INITIAL EVALUATION ADULT. - EDUCATION DIETARY MODIFICATIONS
Call out to Pt per DR Santo Pt is to see EP, and will probable cancel NP visit after arrangements made.  MAHSA Rich RN   (0) unable to meet; needs instruction

## 2021-03-25 NOTE — CHART NOTE - NSCHARTNOTEFT_GEN_A_CORE
3/25	pt c/o chest pain, EKG shows AFib with RVR, HST is 62, started with Metoprolol succ 25 mg, Tylenol prn , monitor on tele, no other intervention, spoke to Attending, home oxygen delivered home, possible d/c home tomorrow. Spoke to the wife and gave her updates, she tried to connect her brother but failed-- she will talk to her brother.   Kadie Tavarez (PA) [employer] # [id number]

## 2021-03-26 LAB
ANION GAP SERPL CALC-SCNC: 19 MMOL/L — HIGH (ref 5–17)
BUN SERPL-MCNC: 118 MG/DL — HIGH (ref 7–23)
CALCIUM SERPL-MCNC: 9.7 MG/DL — SIGNIFICANT CHANGE UP (ref 8.4–10.5)
CHLORIDE SERPL-SCNC: 93 MMOL/L — LOW (ref 96–108)
CO2 SERPL-SCNC: 22 MMOL/L — SIGNIFICANT CHANGE UP (ref 22–31)
CREAT SERPL-MCNC: 3.98 MG/DL — HIGH (ref 0.5–1.3)
GLUCOSE BLDC GLUCOMTR-MCNC: 166 MG/DL — HIGH (ref 70–99)
GLUCOSE SERPL-MCNC: 147 MG/DL — HIGH (ref 70–99)
HCT VFR BLD CALC: 34.6 % — LOW (ref 39–50)
HGB BLD-MCNC: 11.1 G/DL — LOW (ref 13–17)
INR BLD: 3.47 RATIO — HIGH (ref 0.88–1.16)
MAGNESIUM SERPL-MCNC: 2.2 MG/DL — SIGNIFICANT CHANGE UP (ref 1.6–2.6)
MCHC RBC-ENTMCNC: 30.2 PG — SIGNIFICANT CHANGE UP (ref 27–34)
MCHC RBC-ENTMCNC: 32.1 GM/DL — SIGNIFICANT CHANGE UP (ref 32–36)
MCV RBC AUTO: 94 FL — SIGNIFICANT CHANGE UP (ref 80–100)
NRBC # BLD: 0 /100 WBCS — SIGNIFICANT CHANGE UP (ref 0–0)
PHOSPHATE SERPL-MCNC: 5.5 MG/DL — HIGH (ref 2.5–4.5)
PLATELET # BLD AUTO: 345 K/UL — SIGNIFICANT CHANGE UP (ref 150–400)
POTASSIUM SERPL-MCNC: 4 MMOL/L — SIGNIFICANT CHANGE UP (ref 3.5–5.3)
POTASSIUM SERPL-SCNC: 4 MMOL/L — SIGNIFICANT CHANGE UP (ref 3.5–5.3)
PROTHROM AB SERPL-ACNC: 39.2 SEC — HIGH (ref 10.6–13.6)
RBC # BLD: 3.68 M/UL — LOW (ref 4.2–5.8)
RBC # FLD: 13.5 % — SIGNIFICANT CHANGE UP (ref 10.3–14.5)
SODIUM SERPL-SCNC: 134 MMOL/L — LOW (ref 135–145)
WBC # BLD: 12.66 K/UL — HIGH (ref 3.8–10.5)
WBC # FLD AUTO: 12.66 K/UL — HIGH (ref 3.8–10.5)

## 2021-03-26 PROCEDURE — 93010 ELECTROCARDIOGRAM REPORT: CPT

## 2021-03-26 PROCEDURE — 99233 SBSQ HOSP IP/OBS HIGH 50: CPT

## 2021-03-26 RX ORDER — ACETAMINOPHEN 500 MG
2 TABLET ORAL
Qty: 0 | Refills: 0 | DISCHARGE
Start: 2021-03-26

## 2021-03-26 RX ORDER — SODIUM CHLORIDE 9 MG/ML
500 INJECTION INTRAMUSCULAR; INTRAVENOUS; SUBCUTANEOUS
Refills: 0 | Status: DISCONTINUED | OUTPATIENT
Start: 2021-03-26 | End: 2021-03-29

## 2021-03-26 RX ORDER — LIDOCAINE 4 G/100G
1 CREAM TOPICAL ONCE
Refills: 0 | Status: COMPLETED | OUTPATIENT
Start: 2021-03-26 | End: 2021-03-26

## 2021-03-26 RX ORDER — PHYTONADIONE (VIT K1) 5 MG
5 TABLET ORAL ONCE
Refills: 0 | Status: COMPLETED | OUTPATIENT
Start: 2021-03-26 | End: 2021-03-26

## 2021-03-26 RX ORDER — FUROSEMIDE 40 MG
1 TABLET ORAL
Qty: 30 | Refills: 0
Start: 2021-03-26

## 2021-03-26 RX ADMIN — Medication 1 TABLET(S): at 11:02

## 2021-03-26 RX ADMIN — LIDOCAINE 1 PATCH: 4 CREAM TOPICAL at 22:21

## 2021-03-26 RX ADMIN — Medication 650 MILLIGRAM(S): at 11:02

## 2021-03-26 RX ADMIN — Medication 1 MILLIGRAM(S): at 11:02

## 2021-03-26 RX ADMIN — Medication 125 MICROGRAM(S): at 05:07

## 2021-03-26 RX ADMIN — MIRTAZAPINE 7.5 MILLIGRAM(S): 45 TABLET, ORALLY DISINTEGRATING ORAL at 22:13

## 2021-03-26 RX ADMIN — ALBUTEROL 2 PUFF(S): 90 AEROSOL, METERED ORAL at 22:17

## 2021-03-26 RX ADMIN — Medication 25 MILLIGRAM(S): at 05:07

## 2021-03-26 RX ADMIN — Medication 1 MILLIGRAM(S): at 22:13

## 2021-03-26 RX ADMIN — Medication 100 MILLIGRAM(S): at 22:13

## 2021-03-26 RX ADMIN — Medication 40 MILLIGRAM(S): at 05:07

## 2021-03-26 RX ADMIN — DONEPEZIL HYDROCHLORIDE 10 MILLIGRAM(S): 10 TABLET, FILM COATED ORAL at 22:13

## 2021-03-26 RX ADMIN — MEMANTINE HYDROCHLORIDE 10 MILLIGRAM(S): 10 TABLET ORAL at 05:07

## 2021-03-26 RX ADMIN — PANTOPRAZOLE SODIUM 40 MILLIGRAM(S): 20 TABLET, DELAYED RELEASE ORAL at 05:08

## 2021-03-26 RX ADMIN — Medication 100 MILLIGRAM(S): at 22:17

## 2021-03-26 RX ADMIN — Medication 5 MILLIGRAM(S): at 22:13

## 2021-03-26 RX ADMIN — Medication 5 MILLIGRAM(S): at 19:41

## 2021-03-26 RX ADMIN — MEMANTINE HYDROCHLORIDE 10 MILLIGRAM(S): 10 TABLET ORAL at 17:31

## 2021-03-26 RX ADMIN — SODIUM CHLORIDE 75 MILLILITER(S): 9 INJECTION INTRAMUSCULAR; INTRAVENOUS; SUBCUTANEOUS at 19:42

## 2021-03-26 RX ADMIN — AMIODARONE HYDROCHLORIDE 200 MILLIGRAM(S): 400 TABLET ORAL at 05:07

## 2021-03-26 RX ADMIN — TAMSULOSIN HYDROCHLORIDE 0.4 MILLIGRAM(S): 0.4 CAPSULE ORAL at 22:13

## 2021-03-26 RX ADMIN — APIXABAN 2.5 MILLIGRAM(S): 2.5 TABLET, FILM COATED ORAL at 05:07

## 2021-03-26 RX ADMIN — APIXABAN 2.5 MILLIGRAM(S): 2.5 TABLET, FILM COATED ORAL at 17:31

## 2021-03-26 NOTE — CHART NOTE - NSCHARTNOTEFT_GEN_A_CORE
Patient experienced vagal episode while on the commode. Reassessed patient when back in bed, A&Ox3. Patient was on telemetry during event, no abnormal arrythmia noted.  Suspect patient is pre-renal secondary to overdiuresis as well as deconditioned due to illness and prolonged hospitalization. Lasix reduced to 40mg this am- will hold moving forward  D/w patient and PT possible need for rehab prior to going home- PT to reassess, patient now agreeable.   Discharge cancelled for today.

## 2021-03-26 NOTE — PROVIDER CONTACT NOTE (OTHER) - REASON
Pt complaining of chest pain
Discontinuation of COVID Isolation
Pt with lump on back
Tele event
Pt stating he cannot swallow water

## 2021-03-26 NOTE — PROVIDER CONTACT NOTE (OTHER) - RECOMMENDATIONS
Patient has hx of COVID+ on 3/14/21. Patient no longer requires isolation at this time. Isolation discontinued as per hospital guidelines.
continue to monitor
continue to monitor, no thin liquids S&S consult ordered
NP to assess the patient.
notify provider

## 2021-03-26 NOTE — PROVIDER CONTACT NOTE (OTHER) - ASSESSMENT
Pt with movable lump on left lower back. Pt unaware of lump and reports no pain on palpation. Appears to be movable and soft to the touch.
Pt A&Ox4, denies chest pain, shortness of breath, headaches or dizziness. VSS; see flowsheet.
Pt states this is the worst he has ever felt, he feels like he has been in a-fib for way to long, his chest hurts and is not getting better, VSS on 2L NC, pt on tele monitor and CPOX, in a fib converted from sinus in AM, EKG done this AM
RRT called earlier in the day for vasovagal episode, pt states he cannot swallow water, when swallowing water coughing noted, pt able to swallow apple sauce, rice and thick water with no issues

## 2021-03-26 NOTE — PROGRESS NOTE ADULT - SUBJECTIVE AND OBJECTIVE BOX
Mckinley Lopez MD  Pager [phone]  Spectra [phone]  Cell Phone [phone]    Patient is a 75y old  Male who presents with a chief complaint of Fevers & chills (26 Mar 2021 08:32)        SUBJECTIVE / OVERNIGHT EVENTS: Continues to c/o fatigue but otherwise well. No CP/SOB this am  TELEMETRY: AF 70-80's      MEDICATIONS  (STANDING):  aMIOdarone    Tablet 200 milliGRAM(s) Oral daily  apixaban 2.5 milliGRAM(s) Oral two times a day  clonazePAM  Tablet 1 milliGRAM(s) Oral at bedtime  donepezil 10 milliGRAM(s) Oral at bedtime  folic acid 1 milliGRAM(s) Oral daily  furosemide    Tablet 40 milliGRAM(s) Oral daily  ketoconazole 2% Shampoo 1 Application(s) Topical <User Schedule>  lactobacillus acidophilus 1 Tablet(s) Oral daily  levothyroxine 125 MICROGram(s) Oral daily  melatonin 5 milliGRAM(s) Oral at bedtime  memantine 10 milliGRAM(s) Oral every 12 hours  metoprolol succinate ER 25 milliGRAM(s) Oral daily  mirtazapine 7.5 milliGRAM(s) Oral at bedtime  multivitamin 1 Tablet(s) Oral daily  pantoprazole    Tablet 40 milliGRAM(s) Oral before breakfast  sodium bicarbonate 650 milliGRAM(s) Oral daily  tamsulosin 0.4 milliGRAM(s) Oral at bedtime    MEDICATIONS  (PRN):  acetaminophen   Tablet .. 650 milliGRAM(s) Oral every 6 hours PRN Temp greater or equal to 38C (100.4F), Mild Pain (1 - 3)  ALBUTerol    90 MICROgram(s) HFA Inhaler 2 Puff(s) Inhalation every 6 hours PRN Shortness of Breath and/or Wheezing  benzonatate 100 milliGRAM(s) Oral every 8 hours PRN Cough  guaiFENesin   Syrup  (Sugar-Free) 100 milliGRAM(s) Oral every 6 hours PRN Cough  sodium chloride 0.65% Nasal 1 Spray(s) Both Nostrils two times a day PRN Nasal Congestion      Vital Signs Last 24 Hrs  T(C): 36.5 (26 Mar 2021 05:01), Max: 37.1 (25 Mar 2021 17:28)  T(F): 97.7 (26 Mar 2021 05:01), Max: 98.7 (25 Mar 2021 17:28)  HR: 78 (26 Mar 2021 05:01) (78 - 86)  BP: 106/68 (26 Mar 2021 05:01) (102/68 - 122/71)  BP(mean): --  RR: 19 (26 Mar 2021 05:01) (18 - 19)  SpO2: 91% (26 Mar 2021 05:01) (91% - 95%)  CAPILLARY BLOOD GLUCOSE        I&O's Summary    25 Mar 2021 07:01  -  26 Mar 2021 07:00  --------------------------------------------------------  IN: 1440 mL / OUT: 1975 mL / NET: -535 mL    26 Mar 2021 07:01  -  26 Mar 2021 09:59  --------------------------------------------------------  IN: 120 mL / OUT: 0 mL / NET: 120 mL          PHYSICAL EXAM  GENERAL: NAD, well-developed  HEAD:  Atraumatic, Normocephalic  EYES: EOMI, PERRLA, conjunctiva and sclera clear  CHEST/LUNG: Coarse BS bilaterally; No wheeze  HEART: Irregular, +S1+S2  ABDOMEN: Soft, Nontender, Nondistended; Bowel sounds present  EXTREMITIES:  2+ Peripheral Pulses, No clubbing, cyanosis, or edema  PSYCH: AAOx3      LABS:                        11.1   12.66 )-----------( 345      ( 26 Mar 2021 06:34 )             34.6     03-26    134<L>  |  93<L>  |  118<H>  ----------------------------<  147<H>  4.0   |  22  |  3.98<H>    Ca    9.7      26 Mar 2021 06:34  Phos  5.5     03-26  Mg     2.2     03-26      PT/INR - ( 26 Mar 2021 06:34 )   PT: 39.2 sec;   INR: 3.47 ratio                     RADIOLOGY & ADDITIONAL TESTS:    Imaging Personally Reviewed:  Consultant(s) Notes Reviewed:    Care Discussed with Consultants/Other Providers:

## 2021-03-26 NOTE — PROVIDER CONTACT NOTE (OTHER) - BACKGROUND
[FreeTextEntry1] : The patients diabetes is not well controlled\par Advised to exercise and follow the diet to lose weight\par Patient will follow the diet first and then if no improvement next visit will add Trulicity\par Will repeat the blood tests\par Advised again to see the Head and Neck surgeon Dr. Bhatt\par Will; repeat the US thyroid\par Will repeat the thyroid tests\par 
admitted with fever, +COVID
Pt admitted on 3/16 with fever, positive covid. Hx of CLL.
74 yo male w/ hx of a-fib, PPM admitted for fevers and shortness of breath, positive covid19
admitted +COVID, fever, chills

## 2021-03-26 NOTE — PROGRESS NOTE ADULT - PROBLEM SELECTOR PLAN 2
Stable  Cont bicarb to 650 mEq PO daily  Increasing BUN with c/o frequent urination on high Lasix dose- decrease Lasix to 40 mg PO daily

## 2021-03-26 NOTE — PROGRESS NOTE ADULT - PROBLEM SELECTOR PLAN 1
Complete course of dexamethasone  Mild leukocytosis in setting of recent steroids  Home supplemental O2 arranged for discharge

## 2021-03-26 NOTE — PROGRESS NOTE ADULT - SUBJECTIVE AND OBJECTIVE BOX
Overnight events noted   VITAL: T(C): , Max: 37.1 (03-25-21 @ 17:28) T(F): , Max: 98.7 (03-25-21 @ 17:28) HR: 63 (03-26-21 @ 10:30) BP: 101/70 (03-26-21 @ 10:30) BP(mean): -- RR: 18 (03-26-21 @ 10:30) SpO2: 95% (03-26-21 @ 10:30)   PHYSICAL EXAM: Constitutional: NAD, Alert HEENT: NCAT, DMM Neck: Supple, No JVD Respiratory: CTA-b/l Cardiovascular: tachy irreg Gastrointestinal: BS+, soft, NT/ND Extremities: No peripheral edema b/l Neurological: no focal deficits; strength grossly intact Back: no CVAT b/l  LABS:                      11.1  12.66 )-----------( 345      ( 26 Mar 2021 06:34 )            34.6   Na(134)/K(4.0)/Cl(93)/HCO3(22)/BUN(118)/Cr(3.98)Glu(147)/Ca(9.7)/Mg(2.2)/PO4(5.5)    03-26 @ 06:34 Na(139)/K(4.4)/Cl(99)/HCO3(26)/BUN(91)/Cr(3.60)Glu(120)/Ca(9.5)/Mg(2.0)/PO4(4.6)    03-24 @ 07:05   IMPRESSION: 75N w/ Waldenstroms, mild dementia, AFib, and CKD4, 3/16/21 a/w COVID PNA  (1)Renal - 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 - base creat mid-3s; resolved LOUISE from earlier this admission, but mild rise in creatinine as of today. Likely prerenal; Lasix dose reduced from 80 to 40mg qd as of today - this seems reasonable  (2)Lytes - acceptable  (3)Pulm/ID - COVID PNA  - on PO Decadron  (4)EP - rapid afib/palpitations   RECOMMEND: (1)Management of AFib/palpitations per primary team/Cardiology (2)No objection to decrease in Lasix today to 40mg po qd (3)If for d/c today, I would have him undergo repeat bloodwork in 1 week; could f/u at my office in 2-4 weeks.      Norman Ascencio MD [facility] Office: [phone] Cell: [phone]        No pain, no sob   VITAL: T(C): , Max: 37.1 (03-25-21 @ 17:28) T(F): , Max: 98.7 (03-25-21 @ 17:28) HR: 63 (03-26-21 @ 10:30) BP: 101/70 (03-26-21 @ 10:30) BP(mean): -- RR: 18 (03-26-21 @ 10:30) SpO2: 95% (03-26-21 @ 10:30)   PHYSICAL EXAM: Constitutional: NAD, Alert HEENT: NCAT, DMM Neck: Supple, No JVD Respiratory: CTA-b/l Cardiovascular: irreg s1s2 Gastrointestinal: BS+, soft, NT/ND Extremities: No peripheral edema b/l Neurological: no focal deficits; strength grossly intact Back: no CVAT b/l  LABS:                      11.1  12.66 )-----------( 345      ( 26 Mar 2021 06:34 )            34.6   Na(134)/K(4.0)/Cl(93)/HCO3(22)/BUN(118)/Cr(3.98)Glu(147)/Ca(9.7)/Mg(2.2)/PO4(5.5)    03-26 @ 06:34 Na(139)/K(4.4)/Cl(99)/HCO3(26)/BUN(91)/Cr(3.60)Glu(120)/Ca(9.5)/Mg(2.0)/PO4(4.6)    03-24 @ 07:05   IMPRESSION: 75N w/ Waldenstroms, mild dementia, AFib, and CKD4, 3/16/21 a/w COVID PNA  (1)Renal - 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 - base creat mid-3s; resolved LOUISE from earlier this admission, but mild rise in creatinine as of today. Likely prerenal; Lasix dose reduced from 80 to 40mg qd as of today - this seems reasonable  (2)Lytes - acceptable  (3)Pulm/ID - COVID PNA  - on PO Decadron  (4)EP - rapid afib/palpitations   RECOMMEND: (1)Management of AFib/palpitations per primary team/Cardiology (2)No objection to decrease in Lasix today to 40mg po qd (3)If for d/c today, I would have him undergo repeat bloodwork in 1 week; could f/u at my office in 2-4 weeks.      Norman Ascencio MD [facility] Office: [phone] Cell: [phone]        complains of fatigue/dizziness   VITAL: T(C): , Max: 37.1 (03-25-21 @ 17:28) T(F): , Max: 98.7 (03-25-21 @ 17:28) HR: 63 (03-26-21 @ 10:30) BP: 101/70 (03-26-21 @ 10:30) BP(mean): -- RR: 18 (03-26-21 @ 10:30) SpO2: 95% (03-26-21 @ 10:30)   PHYSICAL EXAM: Constitutional: NAD, Alert HEENT: NCAT, DMM Neck: Supple, No JVD Respiratory: CTA-b/l Cardiovascular: irreg s1s2 Gastrointestinal: BS+, soft, NT/ND Extremities: No peripheral edema b/l Neurological: no focal deficits; strength grossly intact Back: no CVAT b/l  LABS:                      11.1  12.66 )-----------( 345      ( 26 Mar 2021 06:34 )            34.6   Na(134)/K(4.0)/Cl(93)/HCO3(22)/BUN(118)/Cr(3.98)Glu(147)/Ca(9.7)/Mg(2.2)/PO4(5.5)    03-26 @ 06:34 Na(139)/K(4.4)/Cl(99)/HCO3(26)/BUN(91)/Cr(3.60)Glu(120)/Ca(9.5)/Mg(2.0)/PO4(4.6)    03-24 @ 07:05   IMPRESSION: 75N w/ Waldenstroms, mild dementia, AFib, and CKD4, 3/16/21 a/w COVID PNA  (1)Renal - 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 - base creat mid-3s; resolved LOUISE from earlier this admission, but mild rise in creatinine as of today. Likely prerenal; Lasix dose reduced from 80 to 40mg qd as of today - this seems reasonable  (2)Lytes - acceptable  (3)Pulm/ID - COVID PNA  - on PO Decadron  (4)EP - rapid afib/palpitations  (5)CV - appears dry at this point   RECOMMEND: (1)NS 500cc x 1 - d/w'd primary attending Dr. Lopez (2)Trend orthostatics (3)Agree with decrease in standing Lasix dosage to 40mg po qd (4)Management of AFib/palpitations per primary team/Cardiology    Norman Ascencio MD [facility] Office: [phone] Cell: [phone]

## 2021-03-26 NOTE — CHART NOTE - NSCHARTNOTEFT_GEN_A_CORE
Mr. Olvera’s INR has remained supratherapeutic during his hospitalization despite being off of Coumadin, likely from poor nutritional status. For this reason, he was switched to renally dosed eliquis of 2.5mg bid. Cardiology and renal are both aware.     Discussed with Dr. Simons who recommends giving the patient vitamin K due to high risk for bleeding in the setting of supratherapeutic INR. Can give vitamin k 5mg PO x 1 and recheck INR tomorrow    Jackie Fierro  Hematology-Oncology PGY-6  [phone]

## 2021-03-26 NOTE — RAPID RESPONSE TEAM SUMMARY - NSSITUATIONBACKGROUNDRRT_GEN_ALL_CORE
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 RRT called for AMS; patient passed out while on toilet. Witnessed by staff. Presentation consistent with vasovagal episode. Patient at baseline mental status during RRT. VS wnl. EKG with afib unchanged from previous, no events on tele. Recommended orthostatic BP once patient and room cleaned. 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 RRT called for AMS; patient passed out while on toilet. Witnessed by staff. Presentation consistent with vasovagal episode. Patient at baseline mental status during RRT. VS wnl. EKG with afib unchanged from previous, no events on tele. Recommended orthostatic BP once patient and room cleaned. Primary team present at bedside.

## 2021-03-26 NOTE — PROVIDER CONTACT NOTE (OTHER) - SITUATION
Pt complaining of chest pain
Pt stating he cannot swallow water
Pt with lump on back
RN notified by [employer] that patient converted from sinus bradycardia to controlled a-fib with HR up to 120s

## 2021-03-26 NOTE — CHART NOTE - NSCHARTNOTEFT_GEN_A_CORE
3/26	pt has home O2 delivered, d/c home today.  Addendum--around 10.20 am he was on the commode, and passed out--RRT was called, vitals--afebrile, HR-70, /70, U0xaw--77% ra, EKG unchanged. No intervention by the team, recomm to check orthostatics, Attending is aware, spoke to wife, he is stable now. D/C postponed.  Kadie Tavarez (PA) [employer] # [id number].

## 2021-03-26 NOTE — PROGRESS NOTE ADULT - SUBJECTIVE AND OBJECTIVE BOX
SUBJ:    Home Medications:  amiodarone 200 mg oral tablet: 1 tab(s) orally once a day (16 Mar 2021 20:09)  clonazePAM 1 mg oral tablet: 1 tab(s) orally once a day (at bedtime) (24 Mar 2021 12:20)  colchicine 0.6 mg oral tablet: 1 tab(s) orally once a day (16 Mar 2021 20:09)  docusate sodium 100 mg oral capsule: 1 cap(s) orally 3 times a day (16 Mar 2021 20:07)  donepezil 10 mg oral tablet: 1 tab(s) orally once a day (at bedtime) (16 Mar 2021 20:07)  levothyroxine 125 mcg (0.125 mg) oral tablet: 1 tab(s) orally once a day (16 Mar 2021 20:09)  memantine 10 mg oral tablet: 1 tab(s) orally every 12 hours (16 Mar 2021 20:09)  Metoprolol Succinate ER 25 mg oral tablet, extended release: 1 tab(s) orally once a day (16 Mar 2021 20:09)  mirtazapine 30 mg oral tablet: 1 tab(s) orally once a day (at bedtime) (16 Mar 2021 20:09)  omeprazole 20 mg oral delayed release capsule: 1 cap(s) orally once a day (16 Mar 2021 20:09)  sodium bicarbonate 650 mg oral tablet: 1 tab(s) orally once a day (24 Mar 2021 12:20)  tamsulosin 0.4 mg oral capsule: 1 cap(s) orally once a day (at bedtime) (16 Mar 2021 20:09)      MEDICATIONS  (STANDING):  aMIOdarone    Tablet 200 milliGRAM(s) Oral daily  apixaban 2.5 milliGRAM(s) Oral two times a day  clonazePAM  Tablet 1 milliGRAM(s) Oral at bedtime  donepezil 10 milliGRAM(s) Oral at bedtime  folic acid 1 milliGRAM(s) Oral daily  furosemide    Tablet 40 milliGRAM(s) Oral daily  ketoconazole 2% Shampoo 1 Application(s) Topical <User Schedule>  lactobacillus acidophilus 1 Tablet(s) Oral daily  levothyroxine 125 MICROGram(s) Oral daily  melatonin 5 milliGRAM(s) Oral at bedtime  memantine 10 milliGRAM(s) Oral every 12 hours  metoprolol succinate ER 25 milliGRAM(s) Oral daily  mirtazapine 7.5 milliGRAM(s) Oral at bedtime  multivitamin 1 Tablet(s) Oral daily  pantoprazole    Tablet 40 milliGRAM(s) Oral before breakfast  sodium bicarbonate 650 milliGRAM(s) Oral daily  tamsulosin 0.4 milliGRAM(s) Oral at bedtime    MEDICATIONS  (PRN):  acetaminophen   Tablet .. 650 milliGRAM(s) Oral every 6 hours PRN Temp greater or equal to 38C (100.4F), Mild Pain (1 - 3)  ALBUTerol    90 MICROgram(s) HFA Inhaler 2 Puff(s) Inhalation every 6 hours PRN Shortness of Breath and/or Wheezing  benzonatate 100 milliGRAM(s) Oral every 8 hours PRN Cough  guaiFENesin   Syrup  (Sugar-Free) 100 milliGRAM(s) Oral every 6 hours PRN Cough  sodium chloride 0.65% Nasal 1 Spray(s) Both Nostrils two times a day PRN Nasal Congestion      Vital Signs Last 24 Hrs  T(C): 36.5 (26 Mar 2021 05:01), Max: 37.1 (25 Mar 2021 17:28)  T(F): 97.7 (26 Mar 2021 05:01), Max: 98.7 (25 Mar 2021 17:28)  HR: 78 (26 Mar 2021 05:01) (57 - 86)  BP: 106/68 (26 Mar 2021 05:01) (96/54 - 122/71)  BP(mean): --  RR: 19 (26 Mar 2021 05:01) (18 - 19)  SpO2: 91% (26 Mar 2021 05:01) (91% - 95%)    REVIEW OF SYSTEMS:  As per HPI, otherwise unremarkable.     PHYSICAL EXAM:  · CONSTITUTIONAL: Well-developed, well nourished      · EYES: EOMI; PERRL; no drainage or redness  · NECK: No bruits; no thyromegaly or nodules  ·RESPIRATORY:   airway patent; breath sounds equal; good air movement; respirations non-labored; clear to auscultation bilaterally; no chest wall tenderness; no intercostal retractions; no rales,rhonchi or wheeze  · CARDIOVASCULAR: regular rate and rhythm  no rub  no murmur  normal PMI  . GASTROINTESTINAL:  no distention; no masses palpable; bowel sounds normal  · EXTREMITIES: No cyanosis, clubbing or edema  · VASCULAR:  Equal and normal pulses (radial, femoral)  ·NEUROLOGICAL:  Alert and oriented x 3; sensation intact; deep reflexes intact; cranial nerves intact; no spontaneous movement; superficial reflexes intact; normal strength  · SKIN: No lesions; no rash  . LYMPH NODES: No lymphadedenopathy  · MUSCULOSKELETAL:  No calf tenderness  no joint swelling	    TELEMETRY:    ECG:    TTE:    LABS:  CBC Full  -  ( 26 Mar 2021 06:34 )  WBC Count : 12.66 K/uL  RBC Count : 3.68 M/uL  Hemoglobin : 11.1 g/dL  Hematocrit : 34.6 %  Platelet Count - Automated : 345 K/uL  Mean Cell Volume : 94.0 fl  Mean Cell Hemoglobin : 30.2 pg  Mean Cell Hemoglobin Concentration : 32.1 gm/dL  Auto Neutrophil # : x  Auto Lymphocyte # : x  Auto Monocyte # : x  Auto Eosinophil # : x  Auto Basophil # : x  Auto Neutrophil % : x  Auto Lymphocyte % : x  Auto Monocyte % : x  Auto Eosinophil % : x  Auto Basophil % : x    03-26    134<L>  |  93<L>  |  118<H>  ----------------------------<  147<H>  4.0   |  22  |  3.98<H>    Ca    9.7      26 Mar 2021 06:34  Phos  5.5     03-26  Mg     2.2     03-26            RADIOLOGY & ADDITIONAL STUDIES:    IMPRESSION AND PLAN:        Gris Landry MD, MPH, SERAFIN, RPVI, FACC  Cardiovascular Specialist   [facility] ([facility])  [facility]  c: [phone] (text preferred and/or call)  e: [email or web address]  For all [facility] and Cardiovascular Surgery on-service contact/call information, go to [email or web address] and use "[email or web address]" to login.  For outpatient Cardiology appointments, call  [phone] to arrange with a colleague; I do not have outpatient Cardiology clinic.

## 2021-03-26 NOTE — PROVIDER CONTACT NOTE (OTHER) - ACTION/TREATMENT ORDERED:
continue to monitor
continue to monitor, no thin liquids S&S consult ordered
CHRISTIANO Morales made aware. Patient currently sleeping, will assess in the morning.
TONYA Dwyer aware; EKG

## 2021-03-26 NOTE — PROGRESS NOTE ADULT - PROBLEM SELECTOR PLAN 3
Cont amiodarone, Toprol  Supratherapeutic INR- d/c'd warfarin - suspect vit k deficiency 2/2 nutrition - monitor INR and s/s bleeding  Cont Eliquis 2.5 mg PO BID given difficulty in regulating INR

## 2021-03-26 NOTE — PROGRESS NOTE ADULT - PROBLEM SELECTOR PLAN 7
D/C home today- 60 minutes spent discharging the patient- outpatient f/u as per discharge document  Refusing home PT/home services.   Refused [facility] previously

## 2021-03-27 DIAGNOSIS — M54.6 PAIN IN THORACIC SPINE: ICD-10-CM

## 2021-03-27 LAB
ANION GAP SERPL CALC-SCNC: 18 MMOL/L — HIGH (ref 5–17)
BUN SERPL-MCNC: 118 MG/DL — HIGH (ref 7–23)
CALCIUM SERPL-MCNC: 9.3 MG/DL — SIGNIFICANT CHANGE UP (ref 8.4–10.5)
CHLORIDE SERPL-SCNC: 98 MMOL/L — SIGNIFICANT CHANGE UP (ref 96–108)
CO2 SERPL-SCNC: 22 MMOL/L — SIGNIFICANT CHANGE UP (ref 22–31)
CREAT SERPL-MCNC: 4.14 MG/DL — HIGH (ref 0.5–1.3)
GLUCOSE SERPL-MCNC: 120 MG/DL — HIGH (ref 70–99)
HCT VFR BLD CALC: 35.6 % — LOW (ref 39–50)
HGB BLD-MCNC: 11.4 G/DL — LOW (ref 13–17)
INR BLD: 2.46 RATIO — HIGH (ref 0.88–1.16)
MAGNESIUM SERPL-MCNC: 2.2 MG/DL — SIGNIFICANT CHANGE UP (ref 1.6–2.6)
MCHC RBC-ENTMCNC: 30.1 PG — SIGNIFICANT CHANGE UP (ref 27–34)
MCHC RBC-ENTMCNC: 32 GM/DL — SIGNIFICANT CHANGE UP (ref 32–36)
MCV RBC AUTO: 93.9 FL — SIGNIFICANT CHANGE UP (ref 80–100)
NRBC # BLD: 0 /100 WBCS — SIGNIFICANT CHANGE UP (ref 0–0)
PLATELET # BLD AUTO: 325 K/UL — SIGNIFICANT CHANGE UP (ref 150–400)
POTASSIUM SERPL-MCNC: 3.7 MMOL/L — SIGNIFICANT CHANGE UP (ref 3.5–5.3)
POTASSIUM SERPL-SCNC: 3.7 MMOL/L — SIGNIFICANT CHANGE UP (ref 3.5–5.3)
PROTHROM AB SERPL-ACNC: 28.3 SEC — HIGH (ref 10.6–13.6)
RBC # BLD: 3.79 M/UL — LOW (ref 4.2–5.8)
RBC # FLD: 13.6 % — SIGNIFICANT CHANGE UP (ref 10.3–14.5)
SODIUM SERPL-SCNC: 138 MMOL/L — SIGNIFICANT CHANGE UP (ref 135–145)
WBC # BLD: 11.66 K/UL — HIGH (ref 3.8–10.5)
WBC # FLD AUTO: 11.66 K/UL — HIGH (ref 3.8–10.5)

## 2021-03-27 PROCEDURE — 72100 X-RAY EXAM L-S SPINE 2/3 VWS: CPT | Mod: 26

## 2021-03-27 PROCEDURE — 72070 X-RAY EXAM THORAC SPINE 2VWS: CPT | Mod: 26

## 2021-03-27 PROCEDURE — 12345: CPT | Mod: NC

## 2021-03-27 RX ORDER — LIDOCAINE 4 G/100G
1 CREAM TOPICAL DAILY
Refills: 0 | Status: DISCONTINUED | OUTPATIENT
Start: 2021-03-27 | End: 2021-03-31

## 2021-03-27 RX ADMIN — MEMANTINE HYDROCHLORIDE 10 MILLIGRAM(S): 10 TABLET ORAL at 06:32

## 2021-03-27 RX ADMIN — Medication 100 MILLIGRAM(S): at 21:49

## 2021-03-27 RX ADMIN — Medication 1 MILLIGRAM(S): at 21:49

## 2021-03-27 RX ADMIN — LIDOCAINE 1 PATCH: 4 CREAM TOPICAL at 07:26

## 2021-03-27 RX ADMIN — Medication 125 MICROGRAM(S): at 06:32

## 2021-03-27 RX ADMIN — Medication 100 MILLIGRAM(S): at 06:31

## 2021-03-27 RX ADMIN — LIDOCAINE 1 PATCH: 4 CREAM TOPICAL at 10:00

## 2021-03-27 RX ADMIN — LIDOCAINE 1 PATCH: 4 CREAM TOPICAL at 19:26

## 2021-03-27 RX ADMIN — AMIODARONE HYDROCHLORIDE 200 MILLIGRAM(S): 400 TABLET ORAL at 06:32

## 2021-03-27 RX ADMIN — Medication 1 MILLIGRAM(S): at 11:23

## 2021-03-27 RX ADMIN — Medication 1 TABLET(S): at 11:24

## 2021-03-27 RX ADMIN — APIXABAN 2.5 MILLIGRAM(S): 2.5 TABLET, FILM COATED ORAL at 21:49

## 2021-03-27 RX ADMIN — Medication 25 MILLIGRAM(S): at 06:32

## 2021-03-27 RX ADMIN — Medication 1 TABLET(S): at 11:23

## 2021-03-27 RX ADMIN — APIXABAN 2.5 MILLIGRAM(S): 2.5 TABLET, FILM COATED ORAL at 09:53

## 2021-03-27 RX ADMIN — TAMSULOSIN HYDROCHLORIDE 0.4 MILLIGRAM(S): 0.4 CAPSULE ORAL at 21:48

## 2021-03-27 RX ADMIN — PANTOPRAZOLE SODIUM 40 MILLIGRAM(S): 20 TABLET, DELAYED RELEASE ORAL at 06:32

## 2021-03-27 RX ADMIN — DONEPEZIL HYDROCHLORIDE 10 MILLIGRAM(S): 10 TABLET, FILM COATED ORAL at 21:49

## 2021-03-27 RX ADMIN — LIDOCAINE 1 PATCH: 4 CREAM TOPICAL at 14:52

## 2021-03-27 RX ADMIN — Medication 650 MILLIGRAM(S): at 11:23

## 2021-03-27 RX ADMIN — MEMANTINE HYDROCHLORIDE 10 MILLIGRAM(S): 10 TABLET ORAL at 17:07

## 2021-03-27 RX ADMIN — Medication 100 MILLIGRAM(S): at 06:32

## 2021-03-27 RX ADMIN — Medication 5 MILLIGRAM(S): at 21:48

## 2021-03-27 RX ADMIN — MIRTAZAPINE 7.5 MILLIGRAM(S): 45 TABLET, ORALLY DISINTEGRATING ORAL at 21:49

## 2021-03-27 NOTE — PROGRESS NOTE ADULT - PROBLEM SELECTOR PLAN 8
Pt now in agreement to [facility] ; had previously refused.  Discharge planning in progress    Case discussed with PIA Tavarez

## 2021-03-27 NOTE — PROGRESS NOTE ADULT - SUBJECTIVE AND OBJECTIVE BOX
Patient seen and examined.  No new complaints.  NAd noted  on IVF at 75cc/h since vasovagal episode yesterday. Lasix was discontinued    REVIEW OF SYSTEMS:  As per HPI, otherwise 8 full 10 ROS were unremarkable    MEDICATIONS  (STANDING):  aMIOdarone    Tablet 200 milliGRAM(s) Oral daily  apixaban 2.5 milliGRAM(s) Oral two times a day  clonazePAM  Tablet 1 milliGRAM(s) Oral at bedtime  donepezil 10 milliGRAM(s) Oral at bedtime  folic acid 1 milliGRAM(s) Oral daily  ketoconazole 2% Shampoo 1 Application(s) Topical <User Schedule>  lactobacillus acidophilus 1 Tablet(s) Oral daily  levothyroxine 125 MICROGram(s) Oral daily  lidocaine   Patch 1 Patch Transdermal daily  melatonin 5 milliGRAM(s) Oral at bedtime  memantine 10 milliGRAM(s) Oral every 12 hours  metoprolol succinate ER 25 milliGRAM(s) Oral daily  mirtazapine 7.5 milliGRAM(s) Oral at bedtime  multivitamin 1 Tablet(s) Oral daily  pantoprazole    Tablet 40 milliGRAM(s) Oral before breakfast  sodium bicarbonate 650 milliGRAM(s) Oral daily  sodium chloride 0.9%. 500 milliLiter(s) (75 mL/Hr) IV Continuous <Continuous>  tamsulosin 0.4 milliGRAM(s) Oral at bedtime      VITAL:  T(C): , Max: 36.9 (03-27-21 @ 12:32)  T(F): , Max: 98.5 (03-27-21 @ 12:32)  HR: 81 (03-27-21 @ 12:32)  BP: 121/69 (03-27-21 @ 12:32)  BP(mean): --  RR: 18 (03-27-21 @ 12:32)  SpO2: 97% (03-27-21 @ 12:32)  Wt(kg): --    I and O's:    03-26 @ 07:01  -  03-27 @ 07:00  --------------------------------------------------------  IN: 2745 mL / OUT: 1100 mL / NET: 1645 mL    03-27 @ 07:01  -  03-27 @ 15:09  --------------------------------------------------------  IN: 240 mL / OUT: 350 mL / NET: -110 mL          PHYSICAL EXAM:    Constitutional: NAD  Neck: Supple, No JVD  Respiratory: CTA-b/l  Cardiovascular: irreg s1s2  Gastrointestinal: BS+, soft, NT/ND  Extremities: No peripheral edema b/l  Neurological: no focal deficits; strength grossly intact  Back: no CVAT b/l    LABS:                        11.4   11.66 )-----------( 325      ( 27 Mar 2021 08:59 )             35.6     03-27    138  |  98  |  118<H>  ----------------------------<  120<H>  3.7   |  22  |  4.14<H>    Ca    9.3      27 Mar 2021 08:59  Phos  5.5     03-26  Mg     2.2     03-27

## 2021-03-27 NOTE — CHART NOTE - NSCHARTNOTESELECT_GEN_ALL_CORE
Urology/Event Note
Back pain/Event Note
Event Note
Hematology Chart Note

## 2021-03-27 NOTE — PROGRESS NOTE ADULT - SUBJECTIVE AND OBJECTIVE BOX
Patient is a 75y old  Male who presents with a chief complaint of Fevers & chills (26 Mar 2021 12:05)      SUBJECTIVE / OVERNIGHT EVENTS:    MEDICATIONS  (STANDING):  aMIOdarone    Tablet 200 milliGRAM(s) Oral daily  apixaban 2.5 milliGRAM(s) Oral two times a day  clonazePAM  Tablet 1 milliGRAM(s) Oral at bedtime  donepezil 10 milliGRAM(s) Oral at bedtime  folic acid 1 milliGRAM(s) Oral daily  ketoconazole 2% Shampoo 1 Application(s) Topical <User Schedule>  lactobacillus acidophilus 1 Tablet(s) Oral daily  levothyroxine 125 MICROGram(s) Oral daily  melatonin 5 milliGRAM(s) Oral at bedtime  memantine 10 milliGRAM(s) Oral every 12 hours  metoprolol succinate ER 25 milliGRAM(s) Oral daily  mirtazapine 7.5 milliGRAM(s) Oral at bedtime  multivitamin 1 Tablet(s) Oral daily  pantoprazole    Tablet 40 milliGRAM(s) Oral before breakfast  sodium bicarbonate 650 milliGRAM(s) Oral daily  sodium chloride 0.9%. 500 milliLiter(s) (75 mL/Hr) IV Continuous <Continuous>  tamsulosin 0.4 milliGRAM(s) Oral at bedtime    MEDICATIONS  (PRN):  acetaminophen   Tablet .. 650 milliGRAM(s) Oral every 6 hours PRN Temp greater or equal to 38C (100.4F), Mild Pain (1 - 3)  ALBUTerol    90 MICROgram(s) HFA Inhaler 2 Puff(s) Inhalation every 6 hours PRN Shortness of Breath and/or Wheezing  benzonatate 100 milliGRAM(s) Oral every 8 hours PRN Cough  guaiFENesin   Syrup  (Sugar-Free) 100 milliGRAM(s) Oral every 6 hours PRN Cough  sodium chloride 0.65% Nasal 1 Spray(s) Both Nostrils two times a day PRN Nasal Congestion      Vital Signs Last 24 Hrs  T(C): 36.9 (27 Mar 2021 12:32), Max: 36.9 (27 Mar 2021 12:32)  T(F): 98.5 (27 Mar 2021 12:32), Max: 98.5 (27 Mar 2021 12:32)  HR: 81 (27 Mar 2021 12:32) (55 - 81)  BP: 121/69 (27 Mar 2021 12:32) (82/45 - 123/72)  BP(mean): --  RR: 18 (27 Mar 2021 12:32) (18 - 20)  SpO2: 97% (27 Mar 2021 12:32) (94% - 97%)  CAPILLARY BLOOD GLUCOSE        I&O's Summary    26 Mar 2021 07:01  -  27 Mar 2021 07:00  --------------------------------------------------------  IN: 2745 mL / OUT: 1100 mL / NET: 1645 mL    27 Mar 2021 07:01  -  27 Mar 2021 13:33  --------------------------------------------------------  IN: 240 mL / OUT: 350 mL / NET: -110 mL        PHYSICAL EXAM:  GENERAL: NAD, well-developed  HEAD:  Atraumatic, Normocephalic  EYES: EOMI, PERRLA, conjunctiva and sclera clear  NECK: Supple, No JVD  CHEST/LUNG: Clear to auscultation bilaterally; No wheeze  HEART: Regular rate and rhythm; No murmurs, rubs, or gallops  ABDOMEN: Soft, Nontender, Nondistended; Bowel sounds present  EXTREMITIES:  2+ Peripheral Pulses, No clubbing, cyanosis, or edema  PSYCH: AAOx3  NEUROLOGY: non-focal  SKIN: No rashes or lesions    LABS:                        11.4   11.66 )-----------( 325      ( 27 Mar 2021 08:59 )             35.6     03-27    138  |  98  |  118<H>  ----------------------------<  120<H>  3.7   |  22  |  4.14<H>    Ca    9.3      27 Mar 2021 08:59  Phos  5.5     03-26  Mg     2.2     03-27      PT/INR - ( 27 Mar 2021 09:03 )   PT: 28.3 sec;   INR: 2.46 ratio                   RADIOLOGY & ADDITIONAL TESTS:    Imaging Personally Reviewed:    Consultant(s) Notes Reviewed:      Care Discussed with Consultants/Other Providers:   Patient is a 75y old  Male who presents with a chief complaint of Fevers & chills (26 Mar 2021 12:05)      SUBJECTIVE / OVERNIGHT EVENTS:  Pt seen and examined. No acute events overnight. Denies cp, sob. Pt does c/o severe back pain. Sating 97% on 2L.    MEDICATIONS  (STANDING):  aMIOdarone    Tablet 200 milliGRAM(s) Oral daily  apixaban 2.5 milliGRAM(s) Oral two times a day  clonazePAM  Tablet 1 milliGRAM(s) Oral at bedtime  donepezil 10 milliGRAM(s) Oral at bedtime  folic acid 1 milliGRAM(s) Oral daily  ketoconazole 2% Shampoo 1 Application(s) Topical <User Schedule>  lactobacillus acidophilus 1 Tablet(s) Oral daily  levothyroxine 125 MICROGram(s) Oral daily  melatonin 5 milliGRAM(s) Oral at bedtime  memantine 10 milliGRAM(s) Oral every 12 hours  metoprolol succinate ER 25 milliGRAM(s) Oral daily  mirtazapine 7.5 milliGRAM(s) Oral at bedtime  multivitamin 1 Tablet(s) Oral daily  pantoprazole    Tablet 40 milliGRAM(s) Oral before breakfast  sodium bicarbonate 650 milliGRAM(s) Oral daily  sodium chloride 0.9%. 500 milliLiter(s) (75 mL/Hr) IV Continuous <Continuous>  tamsulosin 0.4 milliGRAM(s) Oral at bedtime    MEDICATIONS  (PRN):  acetaminophen   Tablet .. 650 milliGRAM(s) Oral every 6 hours PRN Temp greater or equal to 38C (100.4F), Mild Pain (1 - 3)  ALBUTerol    90 MICROgram(s) HFA Inhaler 2 Puff(s) Inhalation every 6 hours PRN Shortness of Breath and/or Wheezing  benzonatate 100 milliGRAM(s) Oral every 8 hours PRN Cough  guaiFENesin   Syrup  (Sugar-Free) 100 milliGRAM(s) Oral every 6 hours PRN Cough  sodium chloride 0.65% Nasal 1 Spray(s) Both Nostrils two times a day PRN Nasal Congestion      Vital Signs Last 24 Hrs  T(C): 36.9 (27 Mar 2021 12:32), Max: 36.9 (27 Mar 2021 12:32)  T(F): 98.5 (27 Mar 2021 12:32), Max: 98.5 (27 Mar 2021 12:32)  HR: 81 (27 Mar 2021 12:32) (55 - 81)  BP: 121/69 (27 Mar 2021 12:32) (82/45 - 123/72)  BP(mean): --  RR: 18 (27 Mar 2021 12:32) (18 - 20)  SpO2: 97% (27 Mar 2021 12:32) (94% - 97%)  CAPILLARY BLOOD GLUCOSE        I&O's Summary    26 Mar 2021 07:01  -  27 Mar 2021 07:00  --------------------------------------------------------  IN: 2745 mL / OUT: 1100 mL / NET: 1645 mL    27 Mar 2021 07:01  -  27 Mar 2021 13:33  --------------------------------------------------------  IN: 240 mL / OUT: 350 mL / NET: -110 mL        PHYSICAL EXAM:  GENERAL: NAD, anicteric, afebrile  HEAD:  Atraumatic, Normocephalic  EYES: EOMI, PERRLA, conjunctiva and sclera clear  CHEST/LUNG: Coarse BS bilaterally; No wheeze  HEART: Irregular, +S1+S2  ABDOMEN: Soft, Nontender, Nondistended; Bowel sounds present  EXTREMITIES:  2+ Peripheral Pulses, No clubbing, cyanosis, or edema  PSYCH: AAOx3        LABS:                        11.4   11.66 )-----------( 325      ( 27 Mar 2021 08:59 )             35.6     03-27    138  |  98  |  118<H>  ----------------------------<  120<H>  3.7   |  22  |  4.14<H>    Ca    9.3      27 Mar 2021 08:59  Phos  5.5     03-26  Mg     2.2     03-27      PT/INR - ( 27 Mar 2021 09:03 )   PT: 28.3 sec;   INR: 2.46 ratio

## 2021-03-27 NOTE — CHART NOTE - NSCHARTNOTEFT_GEN_A_CORE
Medicine Np note    CC: Back pain  Notified by RN that pt c/o back pain, unable to evaluate the pt as he was sleeping. evaluated the pt at 0700 am  patient lying in bed,  express relief from back pain pain with Lidoderm patch.  Noted mid back swelling, Patient denies hx of recent fall, motor weakness, back tenderness.    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       Mid back pain /Mid back swelling  Mid back with no c/o tenderness, patient reports that " lump at back is not new"  No spine tenderness noted, not c/o back pain now  Will order Xray thoracic spine if back pain persists/selling worsens  Will sign out to day team    Carmen vanegas [facility] [place]  [id number] Medicine Np note    CC: Back pain  Notified by RN that pt c/o back pain, unable to evaluate the pt as he was sleeping. evaluated the pt at 0700 am  patient lying in bed,  c/o mid back pain, express poor relief Lidoderm patch.  Noted mid back swelling/ mass Patient reports  hx of recent fall, Denies motor weakness, back tenderness.    Vital Signs Last 24 Hrs  T(C): 36.6 (27 Mar 2021 06:30), Max: 36.7 (26 Mar 2021 13:30)  T(F): 97.9 (27 Mar 2021 06:30), Max: 98.1 (26 Mar 2021 13:30)  HR: 75 (27 Mar 2021 06:30) (55 - 75)  BP: 113/72 (27 Mar 2021 06:30) (82/45 - 123/72)  BP(mean): --  RR: 18 (27 Mar 2021 06:30) (18 - 18)  SpO2: 94% (27 Mar 2021 06:30) (92% - 95%)    Physical exam  Neuro: A&ox2-3  MSK: Spine tenderness at thoracic area  Extremities: Moving all extremities    75M history CLL transformed to Waldenstrom's macroglobulinemia on ninlaro/dexamethasone, Afib (on amiodarone, metoprolol, warfarin), PPM, mild dementia, hypothyroidism, BPH, GERD, present w/ fevers and chills in setting of COVID PNA, worsening after monoclonal antibody Infusion for COVID.       Mid back pain /Mid back swelling/ ? mass  Mid back with c/o tenderness, patient reports that " lump at back is tender and painful"  Spine tenderness at thoracic area, No c/o low Back pain  Will order Xray thoracolumbar spine as back pain persists  Will sign out to day team    Carmen vanegas [place]  [phone]

## 2021-03-28 LAB — SARS-COV-2 RNA SPEC QL NAA+PROBE: DETECTED

## 2021-03-28 PROCEDURE — 99233 SBSQ HOSP IP/OBS HIGH 50: CPT

## 2021-03-28 RX ADMIN — TAMSULOSIN HYDROCHLORIDE 0.4 MILLIGRAM(S): 0.4 CAPSULE ORAL at 21:37

## 2021-03-28 RX ADMIN — PANTOPRAZOLE SODIUM 40 MILLIGRAM(S): 20 TABLET, DELAYED RELEASE ORAL at 05:20

## 2021-03-28 RX ADMIN — Medication 5 MILLIGRAM(S): at 21:37

## 2021-03-28 RX ADMIN — MEMANTINE HYDROCHLORIDE 10 MILLIGRAM(S): 10 TABLET ORAL at 05:19

## 2021-03-28 RX ADMIN — Medication 100 MILLIGRAM(S): at 05:20

## 2021-03-28 RX ADMIN — MEMANTINE HYDROCHLORIDE 10 MILLIGRAM(S): 10 TABLET ORAL at 17:01

## 2021-03-28 RX ADMIN — LIDOCAINE 1 PATCH: 4 CREAM TOPICAL at 23:30

## 2021-03-28 RX ADMIN — Medication 25 MILLIGRAM(S): at 05:19

## 2021-03-28 RX ADMIN — MIRTAZAPINE 7.5 MILLIGRAM(S): 45 TABLET, ORALLY DISINTEGRATING ORAL at 21:37

## 2021-03-28 RX ADMIN — Medication 1 TABLET(S): at 11:24

## 2021-03-28 RX ADMIN — APIXABAN 2.5 MILLIGRAM(S): 2.5 TABLET, FILM COATED ORAL at 08:52

## 2021-03-28 RX ADMIN — LIDOCAINE 1 PATCH: 4 CREAM TOPICAL at 11:24

## 2021-03-28 RX ADMIN — LIDOCAINE 1 PATCH: 4 CREAM TOPICAL at 19:23

## 2021-03-28 RX ADMIN — DONEPEZIL HYDROCHLORIDE 10 MILLIGRAM(S): 10 TABLET, FILM COATED ORAL at 21:36

## 2021-03-28 RX ADMIN — Medication 100 MILLIGRAM(S): at 21:36

## 2021-03-28 RX ADMIN — LIDOCAINE 1 PATCH: 4 CREAM TOPICAL at 02:30

## 2021-03-28 RX ADMIN — APIXABAN 2.5 MILLIGRAM(S): 2.5 TABLET, FILM COATED ORAL at 21:37

## 2021-03-28 RX ADMIN — Medication 1 MILLIGRAM(S): at 11:24

## 2021-03-28 RX ADMIN — Medication 1 MILLIGRAM(S): at 21:36

## 2021-03-28 RX ADMIN — AMIODARONE HYDROCHLORIDE 200 MILLIGRAM(S): 400 TABLET ORAL at 05:19

## 2021-03-28 RX ADMIN — KETOCONAZOLE 1 APPLICATION(S): 20 AEROSOL, FOAM TOPICAL at 18:01

## 2021-03-28 RX ADMIN — Medication 100 MILLIGRAM(S): at 11:31

## 2021-03-28 RX ADMIN — Medication 650 MILLIGRAM(S): at 11:24

## 2021-03-28 RX ADMIN — Medication 125 MICROGRAM(S): at 05:19

## 2021-03-28 NOTE — SWALLOW BEDSIDE ASSESSMENT ADULT - SLP PERTINENT HISTORY OF CURRENT PROBLEM
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 Patient was in usual state of health until Tuesday of last week (3/9); patient endorsed fevers, generalized weakness, fatigue. He went to urgent care and tested positive for COVID on 3/11. Today he was receiving monoclonal antibody transfusion at [facility], and quickly experienced severe rigors, chills, and fevers transferred to [facility]. In the ED, patient Tmax 103, HR wnl, -170/53-92, RR 20-30, saturating % on 2L N/C. Labs notable for pancytopenia, LOUISE on CKD4, COVID+,  UA+ RBCs, CXR w/ b/l opacities.

## 2021-03-28 NOTE — SWALLOW BEDSIDE ASSESSMENT ADULT - ASPIRATION PRECAUTIONS
Monitor for s/s aspiration/laryngeal penetration. If noted:  D/C p.o. intake, provide non-oral nutrition/hydration/meds, and contact this service @ [phone]

## 2021-03-28 NOTE — PROGRESS NOTE ADULT - PROBLEM SELECTOR PLAN 8
Pt now in agreement to [facility] ; had previously refused.  Discharge planning in progress  Will need negative Covid PCR prior to discharge ; PCR positive today 3/28    Case discussed with PIA Tavarez

## 2021-03-28 NOTE — PROGRESS NOTE ADULT - PROBLEM SELECTOR PLAN 1
s/p course of dexamethasone  Mild leukocytosis in setting of recent steroids  sating 94% on 1L ; wean as tolerated  supportive care ; antitussives , proventil HFA  Discharge planning to [facility] in progress

## 2021-03-28 NOTE — SWALLOW BEDSIDE ASSESSMENT ADULT - SWALLOW EVAL: DIAGNOSIS
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 Pt presents with a functional oropharyngeal swallow. Adequate oral prep/transfer observed across textures. No overt signs of laryngeal penetration/aspiration. Pt denies difficulty chewing/swallowing.

## 2021-03-28 NOTE — PROGRESS NOTE ADULT - PROBLEM SELECTOR PLAN 3
Cont amiodarone, Toprol  previously on Coumadin ; now switched to Eliquis  Cont Eliquis 2.5 mg PO BID

## 2021-03-28 NOTE — SWALLOW BEDSIDE ASSESSMENT ADULT - COMMENTS
Tested positive 3/11, initial symptoms (fevers, fatigue) started 3/9, patient received monoclonal antibody treatment at [facility] and acutely experienced worsening of fevers and chills, may be superimposed transfusion reaction on top of COVID PNA. c/w dexamethasone, will hold remdesevir in setting of LOUISE on CKD.  3/19: Patient received clonazepam 0.25mg overnight. This AM, patient endorses persistent orthopnea, making 2L urine on 40lasix. feels a bit dyspneic but not fevers and o2 sats ok  3/23: Plan to d/c home - will require home O2   3/25: Patient went into AF overnight. Rate 100-130's. Still feels fatigued. pt c/o chest pain, EKG shows AFib with RVR, HST is 62, started with Metoprolol succ 25 mg, Tylenol prn , monitor on tele, no other intervention  3/26:  RRT called for AMS; patient passed out while on toilet. Witnessed by staff. Presentation consistent with vasovagal episode. Patient at baseline mental status during RRT. VS wnl. EKG with afib unchanged from previous, no events on tele.Pt states he cannot swallow water, when swallowing water coughing noted, pt able to swallow apple sauce, rice and thick water with no issues  3/27: Pt now in agreement to [facility] ; had previously refused.

## 2021-03-28 NOTE — PROGRESS NOTE ADULT - PROBLEM SELECTOR PROBLEM 5
Per radiology chest x-ray --no pneumothorax   Holley Kuhn aware  Verbal order to discharge pt home now  Hypothyroid

## 2021-03-28 NOTE — PROGRESS NOTE ADULT - SUBJECTIVE AND OBJECTIVE BOX
Patient seen and examined. No acute events overnight noted   s/p off lasix due to vasovagal episode overnight of 3/26.  Nurse said he voids 200cc 2-3 h  On BVF      REVIEW OF SYSTEMS:  As per HPI, otherwise 8 full 10 ROS were unremarkable    MEDICATIONS  (STANDING):  aMIOdarone    Tablet 200 milliGRAM(s) Oral daily  apixaban 2.5 milliGRAM(s) Oral two times a day  clonazePAM  Tablet 1 milliGRAM(s) Oral at bedtime  donepezil 10 milliGRAM(s) Oral at bedtime  folic acid 1 milliGRAM(s) Oral daily  ketoconazole 2% Shampoo 1 Application(s) Topical <User Schedule>  lactobacillus acidophilus 1 Tablet(s) Oral daily  levothyroxine 125 MICROGram(s) Oral daily  lidocaine   Patch 1 Patch Transdermal daily  melatonin 5 milliGRAM(s) Oral at bedtime  memantine 10 milliGRAM(s) Oral every 12 hours  metoprolol succinate ER 25 milliGRAM(s) Oral daily  mirtazapine 7.5 milliGRAM(s) Oral at bedtime  multivitamin 1 Tablet(s) Oral daily  pantoprazole    Tablet 40 milliGRAM(s) Oral before breakfast  sodium bicarbonate 650 milliGRAM(s) Oral daily  sodium chloride 0.9%. 500 milliLiter(s) (75 mL/Hr) IV Continuous <Continuous>  tamsulosin 0.4 milliGRAM(s) Oral at bedtime      VITAL:  T(C): , Max: 37 (03-28-21 @ 11:39)  T(F): , Max: 98.6 (03-28-21 @ 11:39)  HR: 81 (03-28-21 @ 11:39)  BP: 113/77 (03-28-21 @ 11:39)  BP(mean): --  RR: 18 (03-28-21 @ 11:39)  SpO2: 94% (03-28-21 @ 11:39)  Wt(kg): --    I and O's:    03-27 @ 07:01  -  03-28 @ 07:00  --------------------------------------------------------  IN: 1840 mL / OUT: 1575 mL / NET: 265 mL    03-28 @ 07:01  -  03-28 @ 15:53  --------------------------------------------------------  IN: 360 mL / OUT: 400 mL / NET: -40 mL          PHYSICAL EXAM:    Constitutional: NAD  Neck: Supple, No JVD  Respiratory: CTA-b/l  Cardiovascular: irreg s1s2  Gastrointestinal: BS+, soft, NT/ND  Extremities: No peripheral edema b/l  Neurological: no focal deficits; strength grossly intact  Back: no CVAT b/l    LABS:                        11.4   11.66 )-----------( 325      ( 27 Mar 2021 08:59 )             35.6     03-27    138  |  98  |  118<H>  ----------------------------<  120<H>  3.7   |  22  |  4.14<H>    Ca    9.3      27 Mar 2021 08:59  Mg     2.2     03-27

## 2021-03-28 NOTE — PROGRESS NOTE ADULT - SUBJECTIVE AND OBJECTIVE BOX
Patient is a 75y old  Male who presents with a chief complaint of Fevers & chills (27 Mar 2021 15:09)      SUBJECTIVE / OVERNIGHT EVENTS:    MEDICATIONS  (STANDING):  aMIOdarone    Tablet 200 milliGRAM(s) Oral daily  apixaban 2.5 milliGRAM(s) Oral two times a day  clonazePAM  Tablet 1 milliGRAM(s) Oral at bedtime  donepezil 10 milliGRAM(s) Oral at bedtime  folic acid 1 milliGRAM(s) Oral daily  ketoconazole 2% Shampoo 1 Application(s) Topical <User Schedule>  lactobacillus acidophilus 1 Tablet(s) Oral daily  levothyroxine 125 MICROGram(s) Oral daily  lidocaine   Patch 1 Patch Transdermal daily  melatonin 5 milliGRAM(s) Oral at bedtime  memantine 10 milliGRAM(s) Oral every 12 hours  metoprolol succinate ER 25 milliGRAM(s) Oral daily  mirtazapine 7.5 milliGRAM(s) Oral at bedtime  multivitamin 1 Tablet(s) Oral daily  pantoprazole    Tablet 40 milliGRAM(s) Oral before breakfast  sodium bicarbonate 650 milliGRAM(s) Oral daily  sodium chloride 0.9%. 500 milliLiter(s) (75 mL/Hr) IV Continuous <Continuous>  tamsulosin 0.4 milliGRAM(s) Oral at bedtime    MEDICATIONS  (PRN):  acetaminophen   Tablet .. 650 milliGRAM(s) Oral every 6 hours PRN Temp greater or equal to 38C (100.4F), Mild Pain (1 - 3)  ALBUTerol    90 MICROgram(s) HFA Inhaler 2 Puff(s) Inhalation every 6 hours PRN Shortness of Breath and/or Wheezing  benzonatate 100 milliGRAM(s) Oral every 8 hours PRN Cough  guaiFENesin   Syrup  (Sugar-Free) 100 milliGRAM(s) Oral every 6 hours PRN Cough  sodium chloride 0.65% Nasal 1 Spray(s) Both Nostrils two times a day PRN Nasal Congestion      Vital Signs Last 24 Hrs  T(C): 37 (28 Mar 2021 11:39), Max: 37 (28 Mar 2021 11:39)  T(F): 98.6 (28 Mar 2021 11:39), Max: 98.6 (28 Mar 2021 11:39)  HR: 81 (28 Mar 2021 11:39) (71 - 82)  BP: 113/77 (28 Mar 2021 11:39) (102/61 - 114/76)  BP(mean): --  RR: 18 (28 Mar 2021 11:39) (17 - 18)  SpO2: 94% (28 Mar 2021 11:39) (94% - 97%)  CAPILLARY BLOOD GLUCOSE        I&O's Summary    27 Mar 2021 07:01  -  28 Mar 2021 07:00  --------------------------------------------------------  IN: 1840 mL / OUT: 1575 mL / NET: 265 mL    28 Mar 2021 07:01  -  28 Mar 2021 13:24  --------------------------------------------------------  IN: 360 mL / OUT: 400 mL / NET: -40 mL        PHYSICAL EXAM:  GENERAL: NAD, well-developed  HEAD:  Atraumatic, Normocephalic  EYES: EOMI, PERRLA, conjunctiva and sclera clear  NECK: Supple, No JVD  CHEST/LUNG: Clear to auscultation bilaterally; No wheeze  HEART: Regular rate and rhythm; No murmurs, rubs, or gallops  ABDOMEN: Soft, Nontender, Nondistended; Bowel sounds present  EXTREMITIES:  2+ Peripheral Pulses, No clubbing, cyanosis, or edema  PSYCH: AAOx3  NEUROLOGY: non-focal  SKIN: No rashes or lesions    LABS:                        11.4   11.66 )-----------( 325      ( 27 Mar 2021 08:59 )             35.6     03-27    138  |  98  |  118<H>  ----------------------------<  120<H>  3.7   |  22  |  4.14<H>    Ca    9.3      27 Mar 2021 08:59  Mg     2.2     03-27      PT/INR - ( 27 Mar 2021 09:03 )   PT: 28.3 sec;   INR: 2.46 ratio                   RADIOLOGY & ADDITIONAL TESTS:    Imaging Personally Reviewed:    Consultant(s) Notes Reviewed:      Care Discussed with Consultants/Other Providers:   Patient is a 75y old  Male who presents with a chief complaint of Fevers & chills (27 Mar 2021 15:09)      SUBJECTIVE / OVERNIGHT EVENTS:  Pt seen and examined. No acute events overnight. He reports SOB only when coughing. Denies chest pain. Currently sating 94% on 1L.    MEDICATIONS  (STANDING):  aMIOdarone    Tablet 200 milliGRAM(s) Oral daily  apixaban 2.5 milliGRAM(s) Oral two times a day  clonazePAM  Tablet 1 milliGRAM(s) Oral at bedtime  donepezil 10 milliGRAM(s) Oral at bedtime  folic acid 1 milliGRAM(s) Oral daily  ketoconazole 2% Shampoo 1 Application(s) Topical <User Schedule>  lactobacillus acidophilus 1 Tablet(s) Oral daily  levothyroxine 125 MICROGram(s) Oral daily  lidocaine   Patch 1 Patch Transdermal daily  melatonin 5 milliGRAM(s) Oral at bedtime  memantine 10 milliGRAM(s) Oral every 12 hours  metoprolol succinate ER 25 milliGRAM(s) Oral daily  mirtazapine 7.5 milliGRAM(s) Oral at bedtime  multivitamin 1 Tablet(s) Oral daily  pantoprazole    Tablet 40 milliGRAM(s) Oral before breakfast  sodium bicarbonate 650 milliGRAM(s) Oral daily  sodium chloride 0.9%. 500 milliLiter(s) (75 mL/Hr) IV Continuous <Continuous>  tamsulosin 0.4 milliGRAM(s) Oral at bedtime    MEDICATIONS  (PRN):  acetaminophen   Tablet .. 650 milliGRAM(s) Oral every 6 hours PRN Temp greater or equal to 38C (100.4F), Mild Pain (1 - 3)  ALBUTerol    90 MICROgram(s) HFA Inhaler 2 Puff(s) Inhalation every 6 hours PRN Shortness of Breath and/or Wheezing  benzonatate 100 milliGRAM(s) Oral every 8 hours PRN Cough  guaiFENesin   Syrup  (Sugar-Free) 100 milliGRAM(s) Oral every 6 hours PRN Cough  sodium chloride 0.65% Nasal 1 Spray(s) Both Nostrils two times a day PRN Nasal Congestion      Vital Signs Last 24 Hrs  T(C): 37 (28 Mar 2021 11:39), Max: 37 (28 Mar 2021 11:39)  T(F): 98.6 (28 Mar 2021 11:39), Max: 98.6 (28 Mar 2021 11:39)  HR: 81 (28 Mar 2021 11:39) (71 - 82)  BP: 113/77 (28 Mar 2021 11:39) (102/61 - 114/76)  BP(mean): --  RR: 18 (28 Mar 2021 11:39) (17 - 18)  SpO2: 94% (28 Mar 2021 11:39) (94% - 97%)  CAPILLARY BLOOD GLUCOSE        I&O's Summary    27 Mar 2021 07:01  -  28 Mar 2021 07:00  --------------------------------------------------------  IN: 1840 mL / OUT: 1575 mL / NET: 265 mL    28 Mar 2021 07:01  -  28 Mar 2021 13:24  --------------------------------------------------------  IN: 360 mL / OUT: 400 mL / NET: -40 mL        PHYSICAL EXAM:  GENERAL: NAD, anicteric, afebrile  HEAD:  Atraumatic, Normocephalic  EYES: EOMI, PERRLA, conjunctiva and sclera clear  CHEST/LUNG: Coarse BS bilaterally; No wheeze  HEART: Irregular, +S1+S2  ABDOMEN: Soft, Nontender, Nondistended; Bowel sounds present  EXTREMITIES:  2+ Peripheral Pulses, No clubbing, cyanosis, or edema  PSYCH: AAOx3  SKIN: No rashes or lesions    LABS:                        11.4   11.66 )-----------( 325      ( 27 Mar 2021 08:59 )             35.6     03-27    138  |  98  |  118<H>  ----------------------------<  120<H>  3.7   |  22  |  4.14<H>    Ca    9.3      27 Mar 2021 08:59  Mg     2.2     03-27      PT/INR - ( 27 Mar 2021 09:03 )   PT: 28.3 sec;   INR: 2.46 ratio                     Consultant(s) Notes Reviewed:  Nephrology

## 2021-03-28 NOTE — SWALLOW BEDSIDE ASSESSMENT ADULT - SLP GENERAL OBSERVATIONS
Pt encountered in bed, awake, on 1L NC. A&Ox3, follows commands for purpose of evaluation. Vocal quality WFL for age/gender. Intermittent dry cough at baseline.

## 2021-03-29 DIAGNOSIS — D68.9 COAGULATION DEFECT, UNSPECIFIED: ICD-10-CM

## 2021-03-29 LAB
ALBUMIN SERPL ELPH-MCNC: 3 G/DL — LOW (ref 3.3–5)
ALP SERPL-CCNC: 55 U/L — SIGNIFICANT CHANGE UP (ref 40–120)
ALT FLD-CCNC: 21 U/L — SIGNIFICANT CHANGE UP (ref 10–45)
ANION GAP SERPL CALC-SCNC: 13 MMOL/L — SIGNIFICANT CHANGE UP (ref 5–17)
AST SERPL-CCNC: 11 U/L — SIGNIFICANT CHANGE UP (ref 10–40)
BILIRUB SERPL-MCNC: 0.6 MG/DL — SIGNIFICANT CHANGE UP (ref 0.2–1.2)
BUN SERPL-MCNC: 88 MG/DL — HIGH (ref 7–23)
CALCIUM SERPL-MCNC: 8.5 MG/DL — SIGNIFICANT CHANGE UP (ref 8.4–10.5)
CHLORIDE SERPL-SCNC: 109 MMOL/L — HIGH (ref 96–108)
CO2 SERPL-SCNC: 20 MMOL/L — LOW (ref 22–31)
CREAT SERPL-MCNC: 3.22 MG/DL — HIGH (ref 0.5–1.3)
GLUCOSE SERPL-MCNC: 108 MG/DL — HIGH (ref 70–99)
HCT VFR BLD CALC: 31.2 % — LOW (ref 39–50)
HGB BLD-MCNC: 9.8 G/DL — LOW (ref 13–17)
MCHC RBC-ENTMCNC: 30.2 PG — SIGNIFICANT CHANGE UP (ref 27–34)
MCHC RBC-ENTMCNC: 31.4 GM/DL — LOW (ref 32–36)
MCV RBC AUTO: 96 FL — SIGNIFICANT CHANGE UP (ref 80–100)
NRBC # BLD: 0 /100 WBCS — SIGNIFICANT CHANGE UP (ref 0–0)
PLATELET # BLD AUTO: 213 K/UL — SIGNIFICANT CHANGE UP (ref 150–400)
POTASSIUM SERPL-MCNC: 3.7 MMOL/L — SIGNIFICANT CHANGE UP (ref 3.5–5.3)
POTASSIUM SERPL-SCNC: 3.7 MMOL/L — SIGNIFICANT CHANGE UP (ref 3.5–5.3)
PROT SERPL-MCNC: 5.4 G/DL — LOW (ref 6–8.3)
RBC # BLD: 3.25 M/UL — LOW (ref 4.2–5.8)
RBC # FLD: 13.7 % — SIGNIFICANT CHANGE UP (ref 10.3–14.5)
SODIUM SERPL-SCNC: 142 MMOL/L — SIGNIFICANT CHANGE UP (ref 135–145)
WBC # BLD: 11.06 K/UL — HIGH (ref 3.8–10.5)
WBC # FLD AUTO: 11.06 K/UL — HIGH (ref 3.8–10.5)

## 2021-03-29 PROCEDURE — 99232 SBSQ HOSP IP/OBS MODERATE 35: CPT

## 2021-03-29 RX ADMIN — Medication 1 MILLIGRAM(S): at 12:24

## 2021-03-29 RX ADMIN — APIXABAN 2.5 MILLIGRAM(S): 2.5 TABLET, FILM COATED ORAL at 21:31

## 2021-03-29 RX ADMIN — MIRTAZAPINE 7.5 MILLIGRAM(S): 45 TABLET, ORALLY DISINTEGRATING ORAL at 21:30

## 2021-03-29 RX ADMIN — Medication 100 MILLIGRAM(S): at 06:24

## 2021-03-29 RX ADMIN — TAMSULOSIN HYDROCHLORIDE 0.4 MILLIGRAM(S): 0.4 CAPSULE ORAL at 21:31

## 2021-03-29 RX ADMIN — DONEPEZIL HYDROCHLORIDE 10 MILLIGRAM(S): 10 TABLET, FILM COATED ORAL at 21:31

## 2021-03-29 RX ADMIN — Medication 125 MICROGRAM(S): at 06:25

## 2021-03-29 RX ADMIN — Medication 1 MILLIGRAM(S): at 21:31

## 2021-03-29 RX ADMIN — Medication 100 MILLIGRAM(S): at 06:25

## 2021-03-29 RX ADMIN — Medication 1 TABLET(S): at 12:23

## 2021-03-29 RX ADMIN — Medication 25 MILLIGRAM(S): at 06:25

## 2021-03-29 RX ADMIN — Medication 5 MILLIGRAM(S): at 21:30

## 2021-03-29 RX ADMIN — Medication 650 MILLIGRAM(S): at 12:23

## 2021-03-29 RX ADMIN — Medication 100 MILLIGRAM(S): at 21:30

## 2021-03-29 RX ADMIN — AMIODARONE HYDROCHLORIDE 200 MILLIGRAM(S): 400 TABLET ORAL at 06:25

## 2021-03-29 RX ADMIN — MEMANTINE HYDROCHLORIDE 10 MILLIGRAM(S): 10 TABLET ORAL at 06:25

## 2021-03-29 RX ADMIN — PANTOPRAZOLE SODIUM 40 MILLIGRAM(S): 20 TABLET, DELAYED RELEASE ORAL at 06:25

## 2021-03-29 RX ADMIN — MEMANTINE HYDROCHLORIDE 10 MILLIGRAM(S): 10 TABLET ORAL at 17:19

## 2021-03-29 RX ADMIN — APIXABAN 2.5 MILLIGRAM(S): 2.5 TABLET, FILM COATED ORAL at 08:19

## 2021-03-29 NOTE — PROGRESS NOTE ADULT - PROBLEM SELECTOR PLAN 1
s/p course of dexamethasone  Mild leukocytosis stable in setting of recent steroids  Intermittent supp O2 requirements, currently on RA  Discharge planning to [facility] in progress

## 2021-03-29 NOTE — PROGRESS NOTE ADULT - SUBJECTIVE AND OBJECTIVE BOX
On NS at 75 cc/hour   Comfortable, no acute complaints       VITAL:  T(C): , Max: 37 (03-28-21 @ 11:39)  T(F): , Max: 98.6 (03-28-21 @ 11:39)  HR: 77 (03-29-21 @ 06:14)  BP: 97/61 (03-29-21 @ 06:14)  BP(mean): --  RR: 18 (03-29-21 @ 06:14)  SpO2: 91% (03-29-21 @ 06:14)  Wt(kg): --      PHYSICAL EXAM:  General: Alerted, oriented  HEENT: MMM  Lungs:CTA-b/l  Heart: RRR S1S2  Abdomen: Soft, NTND  Ext: no pedal edema b/l  : No  walker      LABS:                        9.8    11.06 )-----------( 213      ( 29 Mar 2021 07:16 )             31.2     Na(142)/K(3.7)/Cl(109)/HCO3(20)/BUN(88)/Cr(3.22)Glu(108)/Ca(8.5)/Mg(--)/PO4(--)    03-29 @ 07:15  Na(138)/K(3.7)/Cl(98)/HCO3(22)/BUN(118)/Cr(4.14)Glu(120)/Ca(9.3)/Mg(2.2)/PO4(--)    03-27 @ 08:59        75N w/ Waldenstroms, mild dementia, AFib, and CKD4, 3/16/21 a/w COVID PNA    (1)Renal - 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             - Lasix was discontinued due to vasovagal episode 3/26/21. -110s        (2)EP - rapid afib/palpitations: controlled    (3)Pulm/ID - COVID PNA  - s/p PO Decadron    RECOMMEND:  (1) defer LAsix  (2)  DC IV fluids, encourage intake as able   (3) daily BMP and Phosphorus  4 Check orthostatics     Aminta Mckeon MD  [facility]  Office: [phone]

## 2021-03-29 NOTE — PROGRESS NOTE ADULT - PROBLEM SELECTOR PLAN 2
Stable  Cont bicarb 650 mEq PO daily  Lasix d/rakan given recent vasovagal episode  D/C IVF- was only ordered for 500ml on 3/26 but continued through the weekend

## 2021-03-29 NOTE — PROGRESS NOTE ADULT - PROBLEM SELECTOR PLAN 4
Coumadin discontinued due to coagulapathy  Received Vitamin K per hematology request/recommendations, last INR 2.46

## 2021-03-30 PROCEDURE — 99232 SBSQ HOSP IP/OBS MODERATE 35: CPT

## 2021-03-30 RX ORDER — CLONAZEPAM 1 MG
1 TABLET ORAL AT BEDTIME
Refills: 0 | Status: DISCONTINUED | OUTPATIENT
Start: 2021-03-30 | End: 2021-03-31

## 2021-03-30 RX ORDER — SODIUM BICARBONATE 1 MEQ/ML
650 SYRINGE (ML) INTRAVENOUS THREE TIMES A DAY
Refills: 0 | Status: DISCONTINUED | OUTPATIENT
Start: 2021-03-30 | End: 2021-03-31

## 2021-03-30 RX ADMIN — DONEPEZIL HYDROCHLORIDE 10 MILLIGRAM(S): 10 TABLET, FILM COATED ORAL at 22:03

## 2021-03-30 RX ADMIN — Medication 5 MILLIGRAM(S): at 22:03

## 2021-03-30 RX ADMIN — Medication 100 MILLIGRAM(S): at 22:02

## 2021-03-30 RX ADMIN — APIXABAN 2.5 MILLIGRAM(S): 2.5 TABLET, FILM COATED ORAL at 08:34

## 2021-03-30 RX ADMIN — LIDOCAINE 1 PATCH: 4 CREAM TOPICAL at 08:09

## 2021-03-30 RX ADMIN — PANTOPRAZOLE SODIUM 40 MILLIGRAM(S): 20 TABLET, DELAYED RELEASE ORAL at 05:48

## 2021-03-30 RX ADMIN — Medication 25 MILLIGRAM(S): at 05:48

## 2021-03-30 RX ADMIN — AMIODARONE HYDROCHLORIDE 200 MILLIGRAM(S): 400 TABLET ORAL at 05:48

## 2021-03-30 RX ADMIN — Medication 125 MICROGRAM(S): at 05:48

## 2021-03-30 RX ADMIN — LIDOCAINE 1 PATCH: 4 CREAM TOPICAL at 16:34

## 2021-03-30 RX ADMIN — Medication 100 MILLIGRAM(S): at 05:48

## 2021-03-30 RX ADMIN — Medication 100 MILLIGRAM(S): at 22:03

## 2021-03-30 RX ADMIN — LIDOCAINE 1 PATCH: 4 CREAM TOPICAL at 05:53

## 2021-03-30 RX ADMIN — MEMANTINE HYDROCHLORIDE 10 MILLIGRAM(S): 10 TABLET ORAL at 17:45

## 2021-03-30 RX ADMIN — APIXABAN 2.5 MILLIGRAM(S): 2.5 TABLET, FILM COATED ORAL at 19:34

## 2021-03-30 RX ADMIN — Medication 650 MILLIGRAM(S): at 22:03

## 2021-03-30 RX ADMIN — Medication 1 MILLIGRAM(S): at 22:03

## 2021-03-30 RX ADMIN — Medication 1 TABLET(S): at 11:13

## 2021-03-30 RX ADMIN — MEMANTINE HYDROCHLORIDE 10 MILLIGRAM(S): 10 TABLET ORAL at 05:48

## 2021-03-30 RX ADMIN — TAMSULOSIN HYDROCHLORIDE 0.4 MILLIGRAM(S): 0.4 CAPSULE ORAL at 22:03

## 2021-03-30 RX ADMIN — Medication 650 MILLIGRAM(S): at 14:42

## 2021-03-30 RX ADMIN — MIRTAZAPINE 7.5 MILLIGRAM(S): 45 TABLET, ORALLY DISINTEGRATING ORAL at 22:03

## 2021-03-30 RX ADMIN — Medication 1 MILLIGRAM(S): at 11:13

## 2021-03-30 NOTE — PROGRESS NOTE ADULT - SUBJECTIVE AND OBJECTIVE BOX
VITAL:  T(C): , Max: 37.2 (03-30-21 @ 01:17)  T(F): , Max: 98.9 (03-30-21 @ 01:17)  HR: 85 (03-30-21 @ 05:47)  BP: 126/69 (03-30-21 @ 05:47)  BP(mean): --  RR: 17 (03-30-21 @ 05:47)  SpO2: 93% (03-30-21 @ 05:47)  Wt(kg): --      PHYSICAL EXAM:  General: Alerted, oriented  HEENT: MMM  Lungs:CTA-b/l  Heart: RRR S1S2  Abdomen: Soft, NTND  Ext: no pedal edema b/l  : no walker      LABS:                        9.8    11.06 )-----------( 213      ( 29 Mar 2021 07:16 )             31.2     Na(142)/K(3.7)/Cl(109)/HCO3(20)/BUN(88)/Cr(3.22)Glu(108)/Ca(8.5)/Mg(--)/PO4(--)    03-29 @ 07:15  Na(138)/K(3.7)/Cl(98)/HCO3(22)/BUN(118)/Cr(4.14)Glu(120)/Ca(9.3)/Mg(2.2)/PO4(--)    03-27 @ 08:59        75N w/ Waldenstroms, mild dementia, AFib, and CKD4, 3/16/21 a/w COVID PNA    (1)Renal - 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             - Lasix was discontinued due to vasovagal episode 3/26/21. -110s        (2)EP - rapid afib/palpitations: controlled    (3)Pulm/ID - COVID PNA  - s/p PO Decadron    RECOMMEND:  (1) defer LAsix/fluids   (2)  encourage intake as able   (3) daily BMP and Phosphorus  4 Increase sodium bicarbonate 650 mg OD to TID       Aminta Mckeon MD  [facility]  Office: [phone]         States he occasionally has chest discomfort on deep breathing       VITAL:  T(C): , Max: 37.2 (03-30-21 @ 01:17)  T(F): , Max: 98.9 (03-30-21 @ 01:17)  HR: 85 (03-30-21 @ 05:47)  BP: 126/69 (03-30-21 @ 05:47)  BP(mean): --  RR: 17 (03-30-21 @ 05:47)  SpO2: 93% (03-30-21 @ 05:47)  Wt(kg): --      PHYSICAL EXAM:  General: Alerted, oriented  HEENT: MMM  Lungs:CTA-b/l  Heart: RRR S1S2  Abdomen: Soft, NTND  Ext: no pedal edema b/l  : no walker      LABS:                        9.8    11.06 )-----------( 213      ( 29 Mar 2021 07:16 )             31.2     Na(142)/K(3.7)/Cl(109)/HCO3(20)/BUN(88)/Cr(3.22)Glu(108)/Ca(8.5)/Mg(--)/PO4(--)    03-29 @ 07:15  Na(138)/K(3.7)/Cl(98)/HCO3(22)/BUN(118)/Cr(4.14)Glu(120)/Ca(9.3)/Mg(2.2)/PO4(--)    03-27 @ 08:59        75N w/ Waldenstroms, mild dementia, AFib, and CKD4, 3/16/21 a/w COVID PNA    (1)Renal - 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             - Lasix was discontinued due to vasovagal episode 3/26/21. -110s        (2)EP - rapid afib/palpitations:     (3)Pulm/ID - COVID PNA  - s/p PO Decadron    RECOMMEND:  (1) defer LAsix/fluids   (2)  encourage intake as able   (3) daily BMP and Phosphorus  4 Increase sodium bicarbonate 650 mg OD to TID       Aminta Mckeon MD  [facility] [place]  Office: [phone]

## 2021-03-30 NOTE — PROGRESS NOTE ADULT - SUBJECTIVE AND OBJECTIVE BOX
Mckinley Lopez MD  Pager [phone]  Spectra [phone]  Cell Phone [phone]    Patient is a 75y old  Male who presents with a chief complaint of Fevers & chills (30 Mar 2021 08:42)        SUBJECTIVE / OVERNIGHT EVENTS: No new events  TELEMETRY: AF 's      MEDICATIONS  (STANDING):  aMIOdarone    Tablet 200 milliGRAM(s) Oral daily  apixaban 2.5 milliGRAM(s) Oral two times a day  clonazePAM  Tablet 1 milliGRAM(s) Oral at bedtime  donepezil 10 milliGRAM(s) Oral at bedtime  folic acid 1 milliGRAM(s) Oral daily  ketoconazole 2% Shampoo 1 Application(s) Topical <User Schedule>  lactobacillus acidophilus 1 Tablet(s) Oral daily  levothyroxine 125 MICROGram(s) Oral daily  lidocaine   Patch 1 Patch Transdermal daily  melatonin 5 milliGRAM(s) Oral at bedtime  memantine 10 milliGRAM(s) Oral every 12 hours  metoprolol succinate ER 25 milliGRAM(s) Oral daily  mirtazapine 7.5 milliGRAM(s) Oral at bedtime  multivitamin 1 Tablet(s) Oral daily  pantoprazole    Tablet 40 milliGRAM(s) Oral before breakfast  sodium bicarbonate 650 milliGRAM(s) Oral three times a day  tamsulosin 0.4 milliGRAM(s) Oral at bedtime    MEDICATIONS  (PRN):  acetaminophen   Tablet .. 650 milliGRAM(s) Oral every 6 hours PRN Temp greater or equal to 38C (100.4F), Mild Pain (1 - 3)  ALBUTerol    90 MICROgram(s) HFA Inhaler 2 Puff(s) Inhalation every 6 hours PRN Shortness of Breath and/or Wheezing  benzonatate 100 milliGRAM(s) Oral every 8 hours PRN Cough  guaiFENesin   Syrup  (Sugar-Free) 100 milliGRAM(s) Oral every 6 hours PRN Cough  sodium chloride 0.65% Nasal 1 Spray(s) Both Nostrils two times a day PRN Nasal Congestion      Vital Signs Last 24 Hrs  T(C): 36.7 (30 Mar 2021 05:47), Max: 37.2 (30 Mar 2021 01:17)  T(F): 98 (30 Mar 2021 05:47), Max: 98.9 (30 Mar 2021 01:17)  HR: 85 (30 Mar 2021 05:47) (75 - 92)  BP: 126/69 (30 Mar 2021 05:47) (97/64 - 126/69)  BP(mean): --  RR: 17 (30 Mar 2021 05:47) (17 - 18)  SpO2: 93% (30 Mar 2021 05:47) (92% - 95%)  CAPILLARY BLOOD GLUCOSE        I&O's Summary    29 Mar 2021 07:01  -  30 Mar 2021 07:00  --------------------------------------------------------  IN: 1405 mL / OUT: 1395 mL / NET: 10 mL    30 Mar 2021 07:01  -  30 Mar 2021 12:38  --------------------------------------------------------  IN: 480 mL / OUT: 400 mL / NET: 80 mL          PHYSICAL EXAM  GENERAL: NAD, well-developed  HEAD:  Atraumatic, Normocephalic  EYES: EOMI, PERRLA, conjunctiva and sclera clear  CHEST/LUNG: Coarse BS b/l  HEART: Irregular, +S1+S2  ABDOMEN: Soft, Nontender, Nondistended; Bowel sounds present  EXTREMITIES:  2+ Peripheral Pulses, No clubbing, cyanosis; trace pedal edema  PSYCH: AAOx3      LABS:                        9.8    11.06 )-----------( 213      ( 29 Mar 2021 07:16 )             31.2     03-29    142  |  109<H>  |  88<H>  ----------------------------<  108<H>  3.7   |  20<L>  |  3.22<H>    Ca    8.5      29 Mar 2021 07:15    TPro  5.4<L>  /  Alb  3.0<L>  /  TBili  0.6  /  DBili  x   /  AST  11  /  ALT  21  /  AlkPhos  55  03-29                RADIOLOGY & ADDITIONAL TESTS:    Imaging Personally Reviewed:  Consultant(s) Notes Reviewed:    Care Discussed with Consultants/Other Providers:

## 2021-03-31 ENCOUNTER — TRANSCRIPTION ENCOUNTER (OUTPATIENT)
Age: 76
End: 2021-03-31

## 2021-03-31 VITALS
SYSTOLIC BLOOD PRESSURE: 125 MMHG | HEART RATE: 57 BPM | TEMPERATURE: 98 F | DIASTOLIC BLOOD PRESSURE: 63 MMHG | OXYGEN SATURATION: 97 % | RESPIRATION RATE: 18 BRPM

## 2021-03-31 PROCEDURE — 87046 STOOL CULTR AEROBIC BACT EA: CPT

## 2021-03-31 PROCEDURE — 0225U NFCT DS DNA&RNA 21 SARSCOV2: CPT

## 2021-03-31 PROCEDURE — 83540 ASSAY OF IRON: CPT

## 2021-03-31 PROCEDURE — 84436 ASSAY OF TOTAL THYROXINE: CPT

## 2021-03-31 PROCEDURE — 96375 TX/PRO/DX INJ NEW DRUG ADDON: CPT

## 2021-03-31 PROCEDURE — 84484 ASSAY OF TROPONIN QUANT: CPT

## 2021-03-31 PROCEDURE — 74176 CT ABD & PELVIS W/O CONTRAST: CPT

## 2021-03-31 PROCEDURE — 80048 BASIC METABOLIC PNL TOTAL CA: CPT

## 2021-03-31 PROCEDURE — 84443 ASSAY THYROID STIM HORMONE: CPT

## 2021-03-31 PROCEDURE — 97116 GAIT TRAINING THERAPY: CPT

## 2021-03-31 PROCEDURE — 82607 VITAMIN B-12: CPT

## 2021-03-31 PROCEDURE — 94640 AIRWAY INHALATION TREATMENT: CPT

## 2021-03-31 PROCEDURE — 84300 ASSAY OF URINE SODIUM: CPT

## 2021-03-31 PROCEDURE — 87045 FECES CULTURE AEROBIC BACT: CPT

## 2021-03-31 PROCEDURE — 84295 ASSAY OF SERUM SODIUM: CPT

## 2021-03-31 PROCEDURE — U0003: CPT

## 2021-03-31 PROCEDURE — 99285 EMERGENCY DEPT VISIT HI MDM: CPT

## 2021-03-31 PROCEDURE — 82436 ASSAY OF URINE CHLORIDE: CPT

## 2021-03-31 PROCEDURE — 83935 ASSAY OF URINE OSMOLALITY: CPT

## 2021-03-31 PROCEDURE — 86901 BLOOD TYPING SEROLOGIC RH(D): CPT

## 2021-03-31 PROCEDURE — 86803 HEPATITIS C AB TEST: CPT

## 2021-03-31 PROCEDURE — 85027 COMPLETE CBC AUTOMATED: CPT

## 2021-03-31 PROCEDURE — 85379 FIBRIN DEGRADATION QUANT: CPT

## 2021-03-31 PROCEDURE — 99239 HOSP IP/OBS DSCHRG MGMT >30: CPT

## 2021-03-31 PROCEDURE — 84145 PROCALCITONIN (PCT): CPT

## 2021-03-31 PROCEDURE — 82962 GLUCOSE BLOOD TEST: CPT

## 2021-03-31 PROCEDURE — 86140 C-REACTIVE PROTEIN: CPT

## 2021-03-31 PROCEDURE — 92610 EVALUATE SWALLOWING FUNCTION: CPT

## 2021-03-31 PROCEDURE — 84132 ASSAY OF SERUM POTASSIUM: CPT

## 2021-03-31 PROCEDURE — 97161 PT EVAL LOW COMPLEX 20 MIN: CPT

## 2021-03-31 PROCEDURE — 76770 US EXAM ABDO BACK WALL COMP: CPT

## 2021-03-31 PROCEDURE — 82728 ASSAY OF FERRITIN: CPT

## 2021-03-31 PROCEDURE — 82330 ASSAY OF CALCIUM: CPT

## 2021-03-31 PROCEDURE — 85018 HEMOGLOBIN: CPT

## 2021-03-31 PROCEDURE — 70450 CT HEAD/BRAIN W/O DYE: CPT

## 2021-03-31 PROCEDURE — 83880 ASSAY OF NATRIURETIC PEPTIDE: CPT

## 2021-03-31 PROCEDURE — 87086 URINE CULTURE/COLONY COUNT: CPT

## 2021-03-31 PROCEDURE — 86900 BLOOD TYPING SEROLOGIC ABO: CPT

## 2021-03-31 PROCEDURE — 85610 PROTHROMBIN TIME: CPT

## 2021-03-31 PROCEDURE — 82435 ASSAY OF BLOOD CHLORIDE: CPT

## 2021-03-31 PROCEDURE — 84100 ASSAY OF PHOSPHORUS: CPT

## 2021-03-31 PROCEDURE — 93005 ELECTROCARDIOGRAM TRACING: CPT

## 2021-03-31 PROCEDURE — 93306 TTE W/DOPPLER COMPLETE: CPT

## 2021-03-31 PROCEDURE — 81001 URINALYSIS AUTO W/SCOPE: CPT

## 2021-03-31 PROCEDURE — 72100 X-RAY EXAM L-S SPINE 2/3 VWS: CPT

## 2021-03-31 PROCEDURE — M0243: CPT

## 2021-03-31 PROCEDURE — 83605 ASSAY OF LACTIC ACID: CPT

## 2021-03-31 PROCEDURE — 97110 THERAPEUTIC EXERCISES: CPT

## 2021-03-31 PROCEDURE — 87077 CULTURE AEROBIC IDENTIFY: CPT

## 2021-03-31 PROCEDURE — 97530 THERAPEUTIC ACTIVITIES: CPT

## 2021-03-31 PROCEDURE — 83615 LACTATE (LD) (LDH) ENZYME: CPT

## 2021-03-31 PROCEDURE — 83550 IRON BINDING TEST: CPT

## 2021-03-31 PROCEDURE — 83010 ASSAY OF HAPTOGLOBIN QUANT: CPT

## 2021-03-31 PROCEDURE — 85025 COMPLETE CBC W/AUTO DIFF WBC: CPT

## 2021-03-31 PROCEDURE — 82746 ASSAY OF FOLIC ACID SERUM: CPT

## 2021-03-31 PROCEDURE — 80053 COMPREHEN METABOLIC PANEL: CPT

## 2021-03-31 PROCEDURE — 83735 ASSAY OF MAGNESIUM: CPT

## 2021-03-31 PROCEDURE — 87507 IADNA-DNA/RNA PROBE TQ 12-25: CPT

## 2021-03-31 PROCEDURE — 80202 ASSAY OF VANCOMYCIN: CPT

## 2021-03-31 PROCEDURE — 82570 ASSAY OF URINE CREATININE: CPT

## 2021-03-31 PROCEDURE — 84105 ASSAY OF URINE PHOSPHORUS: CPT

## 2021-03-31 PROCEDURE — 86850 RBC ANTIBODY SCREEN: CPT

## 2021-03-31 PROCEDURE — 96365 THER/PROPH/DIAG IV INF INIT: CPT

## 2021-03-31 PROCEDURE — 80074 ACUTE HEPATITIS PANEL: CPT

## 2021-03-31 PROCEDURE — 85014 HEMATOCRIT: CPT

## 2021-03-31 PROCEDURE — 84133 ASSAY OF URINE POTASSIUM: CPT

## 2021-03-31 PROCEDURE — 84540 ASSAY OF URINE/UREA-N: CPT

## 2021-03-31 PROCEDURE — 85045 AUTOMATED RETICULOCYTE COUNT: CPT

## 2021-03-31 PROCEDURE — 85730 THROMBOPLASTIN TIME PARTIAL: CPT

## 2021-03-31 PROCEDURE — 82947 ASSAY GLUCOSE BLOOD QUANT: CPT

## 2021-03-31 PROCEDURE — 82803 BLOOD GASES ANY COMBINATION: CPT

## 2021-03-31 PROCEDURE — 71250 CT THORAX DX C-: CPT

## 2021-03-31 PROCEDURE — 71045 X-RAY EXAM CHEST 1 VIEW: CPT

## 2021-03-31 PROCEDURE — 72070 X-RAY EXAM THORAC SPINE 2VWS: CPT

## 2021-03-31 PROCEDURE — U0005: CPT

## 2021-03-31 PROCEDURE — 87040 BLOOD CULTURE FOR BACTERIA: CPT

## 2021-03-31 PROCEDURE — 83036 HEMOGLOBIN GLYCOSYLATED A1C: CPT

## 2021-03-31 RX ADMIN — AMIODARONE HYDROCHLORIDE 200 MILLIGRAM(S): 400 TABLET ORAL at 06:41

## 2021-03-31 RX ADMIN — LIDOCAINE 1 PATCH: 4 CREAM TOPICAL at 06:42

## 2021-03-31 RX ADMIN — Medication 25 MILLIGRAM(S): at 06:40

## 2021-03-31 RX ADMIN — Medication 650 MILLIGRAM(S): at 12:25

## 2021-03-31 RX ADMIN — Medication 100 MILLIGRAM(S): at 06:41

## 2021-03-31 RX ADMIN — Medication 1 MILLIGRAM(S): at 12:25

## 2021-03-31 RX ADMIN — Medication 1 TABLET(S): at 12:25

## 2021-03-31 RX ADMIN — APIXABAN 2.5 MILLIGRAM(S): 2.5 TABLET, FILM COATED ORAL at 08:31

## 2021-03-31 RX ADMIN — Medication 125 MICROGRAM(S): at 06:41

## 2021-03-31 RX ADMIN — MEMANTINE HYDROCHLORIDE 10 MILLIGRAM(S): 10 TABLET ORAL at 06:41

## 2021-03-31 RX ADMIN — PANTOPRAZOLE SODIUM 40 MILLIGRAM(S): 20 TABLET, DELAYED RELEASE ORAL at 06:40

## 2021-03-31 RX ADMIN — Medication 650 MILLIGRAM(S): at 06:41

## 2021-03-31 RX ADMIN — LIDOCAINE 1 PATCH: 4 CREAM TOPICAL at 08:00

## 2021-03-31 NOTE — DISCHARGE NOTE NURSING/CASE MANAGEMENT/SOCIAL WORK - PATIENT PORTAL LINK FT
You can access the FollowMyHealth Patient Portal offered by [facility] by registering at the following website: [email or web address]. By joining [email or web address]’s FollowMyHealth portal, you will also be able to view your health information using other applications (apps) compatible with our system.

## 2021-03-31 NOTE — DISCHARGE NOTE NURSING/CASE MANAGEMENT/SOCIAL WORK - NSDCFUADDAPPT_GEN_ALL_CORE_FT
Please follow up outpatient with the [facility] after within 1 week of discharge.  Located at [address] [place] [address]  Phone #: [phone]    Please follow up with your PCP Dr. Tatiana Birmingham within 1 week from discharge

## 2021-03-31 NOTE — PROGRESS NOTE ADULT - SUBJECTIVE AND OBJECTIVE BOX
Comfortable, reports no acute events       VITAL:  T(C): , Max: 36.9 (03-31-21 @ 01:51)  T(F): , Max: 98.5 (03-31-21 @ 01:51)  HR: 63 (03-31-21 @ 05:27)  BP: 108/57 (03-31-21 @ 05:27)  BP(mean): --  RR: 18 (03-31-21 @ 05:27)  SpO2: 93% (03-31-21 @ 05:27)  Wt(kg): --      PHYSICAL EXAM:  General: Alerted, oriented  HEENT: MMM  Lungs:CTA-b/l  Heart: RRR S1S2  Abdomen: Soft, NTND  Ext: no pedal edema b/l  : no walker      LABS:    Na(142)/K(3.7)/Cl(109)/HCO3(20)/BUN(88)/Cr(3.22)Glu(108)/Ca(8.5)/Mg(--)/PO4(--)    03-29 @ 07:15      75N w/ Waldenstroms, mild dementia, AFib, and CKD4, 3/16/21 a/w COVID PNA    (1)Renal - 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             - Lasix was discontinued due to vasovagal episode 3/26/21. -110s        (2)EP - rapid afib/palpitations:     (3)Pulm/ID - COVID PNA  - s/p PO Decadron    RECOMMEND:  (1) defer LAsix/fluids   (2)  encourage intake as able   (3) daily BMP and Phosphorus  (4) sodium bicarbonate 650 mg TID         Aminta Mckeon MD  [facility]  Office: [phone]

## 2021-03-31 NOTE — PROGRESS NOTE ADULT - ASSESSMENT
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 
75M history CLL transformed to Waldenstrom's macroglobulinemia on ninlaro/dexamethasone, Afib (on amiodarone, metoprolol, warfarin), PPM, mild dementia, hypothyroidism, BPH, GERD, present w/ fevers and chills in setting of COVID PNA,  worsening after monoclonal antibody Infusion for COVID. 
75N w/ Waldenstroms, mild dementia, AFib, and CKD4, 3/16/21 a/w COVID PNA    (1)Renal - 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 - base creat mid-3s        - LOUISE resolved from earlier this admission, but now trend rising as of yesterday. Likely from volume depleted.  Lasix was discontinued due to vasovagal episode yesterday.  Now on IVF at 75cc/h.     (2)EP - rapid afib/palpitations    (3)Pulm/ID - COVID PNA  - s/p PO Decadron      RECOMMEND:  (1) a/w holding LAsix  (2) a/w giving IVF  (3) Management of AFib/palpitations per primary team/Cardiology  (4) daily BMP    
75N w/ Waldenstroms, mild dementia, AFib, and CKD4, 3/16/21 a/w COVID PNA    No BMP available today  (1)Renal - 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 - base creat mid-3s        - now Cr trend rising as of 3/26/214 once LOUISE resolved from earlier this admission (Cr 3.6), likely from volume depleted.             - Lasix was discontinued due to vasovagal episode 3/26/21. -110s            - on IVF at 75cc/h. UOP 1.6L/24h    (2)EP - rapid afib/palpitations: controlled    (3)Pulm/ID - COVID PNA  - s/p PO Decadron    RECOMMEND:  (1) defer LAsix  (2) continue IVF  (3) daily BMP and Phosphorus  
Imp/rx:  COVID in this immunosuppressed pt.  s/p monoclonals prior to admit.  CKD (not on RDV).  on steroids.  O2 Sats ok.    To continue same Rx.  Doubt benefit of RDV over risks.
Ubaldo n/v/d, cp. on room air  States overnight cough improved    ALBUTerol    90 MICROgram(s) HFA Inhaler 2 Puff(s) Inhalation every 6 hours PRN  aMIOdarone    Tablet 200 milliGRAM(s) Oral daily  clonazePAM  Tablet 0.25 milliGRAM(s) Oral at bedtime  dexAMETHasone     Tablet 6 milliGRAM(s) Oral daily  donepezil 10 milliGRAM(s) Oral at bedtime  folic acid 1 milliGRAM(s) Oral daily  furosemide   Injectable 40 milliGRAM(s) IV Push daily  guaiFENesin   Syrup  (Sugar-Free) 100 milliGRAM(s) Oral every 6 hours PRN  ketoconazole 2% Shampoo 1 Application(s) Topical <User Schedule>  lactobacillus acidophilus 1 Tablet(s) Oral daily  levothyroxine 125 MICROGram(s) Oral daily  melatonin 5 milliGRAM(s) Oral at bedtime  memantine 10 milliGRAM(s) Oral every 12 hours  mirtazapine 7.5 milliGRAM(s) Oral at bedtime  multivitamin 1 Tablet(s) Oral daily  pantoprazole    Tablet 40 milliGRAM(s) Oral before breakfast  sodium bicarbonate 650 milliGRAM(s) Oral two times a day  tamsulosin 0.4 milliGRAM(s) Oral at bedtime      VITAL:  T(C): , Max: 37.3 (03-20-21 @ 21:47)  T(F): , Max: 99.2 (03-20-21 @ 21:47)  HR: 54 (03-21-21 @ 05:17)  BP: 124/65 (03-21-21 @ 05:17)  BP(mean): --  RR: 20 (03-21-21 @ 05:17)  SpO2: 93% (03-21-21 @ 05:17)  Wt(kg): --    03-20-21 @ 07:01  -  03-21-21 @ 07:00  --------------------------------------------------------  IN: 1380 mL / OUT: 2800 mL / NET: -1420 mL        PHYSICAL EXAM:  Constitutional: NAD, Alert  HEENT: NCAT, DMM  Neck: Supple, No JVD  Respiratory: CTA-b/l  Cardiovascular: reg sabina irreg  Gastrointestinal: BS+, soft, NT/ND  Extremities: No peripheral edema b/l  Neurological: no focal deficits; strength grossly intact  Back: no CVAT b/l  Skin: (+)left frontal ecchymosis      LABS:                          9.0    4.88  )-----------( 172      ( 21 Mar 2021 07:25 )             28.5     Na(140)/K(4.5)/Cl(103)/HCO3(23)/BUN(85)/Cr(3.87)Glu(133)/Ca(8.8)/Mg(2.0)/PO4(3.6)    03-21 @ 07:25  Na(142)/K(4.9)/Cl(108)/HCO3(20)/BUN(83)/Cr(4.29)Glu(153)/Ca(8.2)/Mg(2.0)/PO4(3.6)    03-20 @ 06:36  Na(139)/K(4.3)/Cl(103)/HCO3(20)/BUN(80)/Cr(4.52)Glu(160)/Ca(8.7)/Mg(1.9)/PO4(4.0)    03-19 @ 18:44  Na(139)/K(5.4)/Cl(110)/HCO3(15)/BUN(74)/Cr(4.29)Glu(150)/Ca(7.9)/Mg(2.1)/PO4(4.4)    03-19 @ 07:22  Na(141)/K(5.0)/Cl(111)/HCO3(18)/BUN(65)/Cr(4.14)Glu(162)/Ca(7.9)/Mg(--)/PO4(--)    03-18 @ 15:21            ASSESSMENT/PLAN    IMPRESSION: 75N w/ Waldenstroms, mild dementia, AFib, and CKD4, 3/16/21 a/w COVID PNA    (1)Renal - 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 - base creat mid-3s, Scr  trending towards baseline relative to yesterday, however BUN is slightly up  (2)LOUISE - likely a mild component of ischemic ATN here - starting to resolve  (3)Lytes- controlled  (4)Metabolic acidosis - resolving/ resolved  (5)Pancytopenia - Heme on board  (6)Pulm/ID - COVID PNA - Pulm+ID on board - on Decadron  (7)Anemia- hgb improving  (8)CV- BP controlled    RECOMMEND:  (1)c/w NaHCO3 650mg po bid for now    (2) can switch to PO  Lasix   (3)Lokelma 10gm po prn K 5.5 or higher  (4)Continue renal diet  (5)Dose new meds for GFR 10-15ml/min   (6)No HD for now      Winston Wilson, NP-BC  [employer]  [phone]  
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  Pt overall improving and now on minimal o2 support via NC    - given sat 91% on 3 L NC pt may likely need o2 upon discharge as it may take days- weeks for him to fully come off  - can be discharged w/ NC if other medical issues stabilized  - complete dexamethasone for 10 days total  - dvt and post covid coagulopathy - pt already on full dose a/c    - can followup as outpt in pulm clinic to ensure recovering well
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

## 2021-03-31 NOTE — PROGRESS NOTE ADULT - REASON FOR ADMISSION
Fevers & chills

## 2021-03-31 NOTE — PROGRESS NOTE ADULT - PROVIDER SPECIALTY LIST ADULT
Hospitalist
Nephrology
Pulmonology
Cardiology
Hospitalist
Nephrology
Pulmonology
Cardiology
Infectious Disease
Infectious Disease
Nephrology
Nephrology
Pulmonology
Internal Medicine

## 2021-03-31 NOTE — PROGRESS NOTE ADULT - SUBJECTIVE AND OBJECTIVE BOX
Mckinley Lopez MD  Pager [phone]  [facility]ra [phone]  Cell Phone [phone]    Patient is a 75y old  Male who presents with a chief complaint of Fevers & chills (31 Mar 2021 10:26)        SUBJECTIVE / OVERNIGHT EVENTS: Converted to SR overnight  TELEMETRY: SR 50-90      MEDICATIONS  (STANDING):  aMIOdarone    Tablet 200 milliGRAM(s) Oral daily  apixaban 2.5 milliGRAM(s) Oral two times a day  clonazePAM  Tablet 1 milliGRAM(s) Oral at bedtime  donepezil 10 milliGRAM(s) Oral at bedtime  folic acid 1 milliGRAM(s) Oral daily  ketoconazole 2% Shampoo 1 Application(s) Topical <User Schedule>  lactobacillus acidophilus 1 Tablet(s) Oral daily  levothyroxine 125 MICROGram(s) Oral daily  lidocaine   Patch 1 Patch Transdermal daily  melatonin 5 milliGRAM(s) Oral at bedtime  memantine 10 milliGRAM(s) Oral every 12 hours  metoprolol succinate ER 25 milliGRAM(s) Oral daily  mirtazapine 7.5 milliGRAM(s) Oral at bedtime  multivitamin 1 Tablet(s) Oral daily  pantoprazole    Tablet 40 milliGRAM(s) Oral before breakfast  sodium bicarbonate 650 milliGRAM(s) Oral three times a day  tamsulosin 0.4 milliGRAM(s) Oral at bedtime    MEDICATIONS  (PRN):  acetaminophen   Tablet .. 650 milliGRAM(s) Oral every 6 hours PRN Temp greater or equal to 38C (100.4F), Mild Pain (1 - 3)  ALBUTerol    90 MICROgram(s) HFA Inhaler 2 Puff(s) Inhalation every 6 hours PRN Shortness of Breath and/or Wheezing  benzonatate 100 milliGRAM(s) Oral every 8 hours PRN Cough  guaiFENesin   Syrup  (Sugar-Free) 100 milliGRAM(s) Oral every 6 hours PRN Cough  sodium chloride 0.65% Nasal 1 Spray(s) Both Nostrils two times a day PRN Nasal Congestion      Vital Signs Last 24 Hrs  T(C): 36.7 (31 Mar 2021 10:16), Max: 36.9 (31 Mar 2021 01:51)  T(F): 98.1 (31 Mar 2021 10:16), Max: 98.5 (31 Mar 2021 01:51)  HR: 61 (31 Mar 2021 10:16) (61 - 88)  BP: 123/66 (31 Mar 2021 10:16) (102/63 - 131/72)  BP(mean): --  RR: 18 (31 Mar 2021 10:16) (17 - 18)  SpO2: 95% (31 Mar 2021 10:16) (93% - 96%)  CAPILLARY BLOOD GLUCOSE        I&O's Summary    30 Mar 2021 07:01  -  31 Mar 2021 07:00  --------------------------------------------------------  IN: 2040 mL / OUT: 2000 mL / NET: 40 mL    31 Mar 2021 07:01  -  31 Mar 2021 11:13  --------------------------------------------------------  IN: 150 mL / OUT: 200 mL / NET: -50 mL          PHYSICAL EXAM  GENERAL: NAD, well-developed  HEAD:  Atraumatic, Normocephalic  EYES: EOMI, PERRLA, conjunctiva and sclera clear  CHEST/LUNG: Coarse BS b/l; No wheezes  HEART: Regular rate and rhythm; No murmurs, rubs, or gallops  ABDOMEN: Soft, Nontender, Nondistended; Bowel sounds present  EXTREMITIES:  2+ Peripheral Pulses, No clubbing, cyanosis; trace pedal edema  PSYCH: AAOx3      LABS:                      RADIOLOGY & ADDITIONAL TESTS:    Imaging Personally Reviewed:  Consultant(s) Notes Reviewed:    Care Discussed with Consultants/Other Providers:

## 2021-03-31 NOTE — PROGRESS NOTE ADULT - PROBLEM SELECTOR PLAN 1
s/p course of dexamethasone  Intermittent supp O2 requirements, currently on RA  Discharge to [facility] today

## 2021-03-31 NOTE — PROGRESS NOTE ADULT - PROBLEM SELECTOR PROBLEM 8
Hypothyroid
Discharge planning issues
Hypothyroid
Discharge planning issues
Discharge planning issues
Hypothyroid
Discharge planning issues
Discharge planning issues
Hypothyroid

## 2021-03-31 NOTE — PROGRESS NOTE ADULT - PROBLEM SELECTOR PROBLEM 2
Pancytopenia
CKD (chronic kidney disease), stage IV
Pancytopenia
CKD (chronic kidney disease), stage IV
Pancytopenia
CKD (chronic kidney disease), stage IV
Pancytopenia
Pancytopenia

## 2021-04-22 ENCOUNTER — OUTPATIENT (OUTPATIENT)
Dept: OUTPATIENT SERVICES | Facility: HOSPITAL | Age: 76
LOS: 1 days | Discharge: HOME | End: 2021-04-22

## 2021-04-22 DIAGNOSIS — Z98.89 OTHER SPECIFIED POSTPROCEDURAL STATES: Chronic | ICD-10-CM

## 2021-04-22 DIAGNOSIS — Z95.0 PRESENCE OF CARDIAC PACEMAKER: Chronic | ICD-10-CM

## 2021-04-22 DIAGNOSIS — Z98.41 CATARACT EXTRACTION STATUS, RIGHT EYE: Chronic | ICD-10-CM

## 2021-04-22 DIAGNOSIS — Z98.890 OTHER SPECIFIED POSTPROCEDURAL STATES: Chronic | ICD-10-CM

## 2021-04-22 DIAGNOSIS — C91.11 CHRONIC LYMPHOCYTIC LEUKEMIA OF B-CELL TYPE IN REMISSION: ICD-10-CM

## 2021-04-23 ENCOUNTER — RESULT REVIEW (OUTPATIENT)
Age: 76
End: 2021-04-23

## 2021-04-23 ENCOUNTER — APPOINTMENT (OUTPATIENT)
Dept: HEMATOLOGY ONCOLOGY | Facility: CLINIC | Age: 76
End: 2021-04-23
Payer: MEDICARE

## 2021-04-23 VITALS
OXYGEN SATURATION: 95 % | BODY MASS INDEX: 27.89 KG/M2 | HEART RATE: 65 BPM | DIASTOLIC BLOOD PRESSURE: 74 MMHG | SYSTOLIC BLOOD PRESSURE: 129 MMHG | TEMPERATURE: 98.2 F | WEIGHT: 217.35 LBS | RESPIRATION RATE: 16 BRPM | HEIGHT: 74 IN

## 2021-04-23 LAB
BASOPHILS # BLD AUTO: 0.05 K/UL — SIGNIFICANT CHANGE UP (ref 0–0.2)
BASOPHILS NFR BLD AUTO: 0.9 % — SIGNIFICANT CHANGE UP (ref 0–2)
EOSINOPHIL # BLD AUTO: 0.08 K/UL — SIGNIFICANT CHANGE UP (ref 0–0.5)
EOSINOPHIL NFR BLD AUTO: 1.4 % — SIGNIFICANT CHANGE UP (ref 0–6)
HCT VFR BLD CALC: 31.9 % — LOW (ref 39–50)
HGB BLD-MCNC: 9.9 G/DL — LOW (ref 13–17)
IMM GRANULOCYTES NFR BLD AUTO: 1.9 % — HIGH (ref 0–1.5)
LYMPHOCYTES # BLD AUTO: 1.48 K/UL — SIGNIFICANT CHANGE UP (ref 1–3.3)
LYMPHOCYTES # BLD AUTO: 25.2 % — SIGNIFICANT CHANGE UP (ref 13–44)
MCHC RBC-ENTMCNC: 31 G/DL — LOW (ref 32–36)
MCHC RBC-ENTMCNC: 31 PG — SIGNIFICANT CHANGE UP (ref 27–34)
MCV RBC AUTO: 100 FL — SIGNIFICANT CHANGE UP (ref 80–100)
MONOCYTES # BLD AUTO: 0.57 K/UL — SIGNIFICANT CHANGE UP (ref 0–0.9)
MONOCYTES NFR BLD AUTO: 9.7 % — SIGNIFICANT CHANGE UP (ref 2–14)
NEUTROPHILS # BLD AUTO: 3.58 K/UL — SIGNIFICANT CHANGE UP (ref 1.8–7.4)
NEUTROPHILS NFR BLD AUTO: 60.9 % — SIGNIFICANT CHANGE UP (ref 43–77)
NRBC # BLD: 0 /100 WBCS — SIGNIFICANT CHANGE UP (ref 0–0)
PLATELET # BLD AUTO: 150 K/UL — SIGNIFICANT CHANGE UP (ref 150–400)
RBC # BLD: 3.19 M/UL — LOW (ref 4.2–5.8)
RBC # FLD: 14.7 % — HIGH (ref 10.3–14.5)
WBC # BLD: 5.87 K/UL — SIGNIFICANT CHANGE UP (ref 3.8–10.5)
WBC # FLD AUTO: 5.87 K/UL — SIGNIFICANT CHANGE UP (ref 3.8–10.5)

## 2021-04-23 PROCEDURE — 99072 ADDL SUPL MATRL&STAF TM PHE: CPT

## 2021-04-23 PROCEDURE — 99215 OFFICE O/P EST HI 40 MIN: CPT

## 2021-04-23 RX ORDER — WARFARIN 2 MG/1
2 TABLET ORAL DAILY
Qty: 90 | Refills: 3 | Status: DISCONTINUED | COMMUNITY
Start: 2017-02-28 | End: 2021-04-23

## 2021-04-23 RX ORDER — IXAZOMIB 2.3 MG/1
2.3 CAPSULE ORAL
Qty: 1 | Refills: 5 | Status: DISCONTINUED | COMMUNITY
Start: 2019-04-11 | End: 2021-04-23

## 2021-04-23 NOTE — HISTORY OF PRESENT ILLNESS
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 He initially presented to the hospital with 101.8 fever, but was sent home. Following a monoclonal antibody infusion two days later, he presented with 102.8 fever and was admitted for 17 days. He lost 27 pounds. He has re-gained 10 pounds since. Warfarin was switched to Eliquis. He had constant atrial fibrillation. Following discharge, he went to Rehab and reports a poor overall experience. Notes dyspnea on exertion after 50 feet. His heart rate increases and oxygen level drops to 68-70 %, despite being on 3-4 L oxygen. Not seeing Pulmonologist. Appetite improving. Ambulating with cane. Neuropathy is stable in his feet, but worsening in his fingers. He notes no chest pain, fever, headache, visual problems, abdominal pain, swollen glands, bleeding, bruising, ankle/leg swelling, palpitations. Had COVID vaccine x 1 prior to the recent infection.\par

## 2021-04-23 NOTE — PHYSICAL EXAM
[Normal] : affect appropriate [Ambulatory and capable of all self care but unable to carry out any work activities] : Status 2- Ambulatory and capable of all self care but unable to carry out any work activities. Up and about more than 50% of waking hours [de-identified] :  Pacemaker left anterior chest wall. RRR. S1S2 normal. Gr 2/6 TESS RUSB to LLSB. No gallops. [de-identified] : 5 x 5 cm lipoma left lower back.

## 2021-04-23 NOTE — ASSESSMENT
[Palliative Care Plan] : not applicable at this time [FreeTextEntry1] : 75 year old male with CLL evolved to Waldenstrom's macroglobulinemia complicated by CRF, nephrotic syndrome and PAF. He had progressive anemia with rising creatinine and relapse of his Waldenstrom's. He was not a candidate for Ibrutinib due to anticoagulation with Warfarin. He achieved a partial remission with marked improvement in his hemoglobin, creatinine and IgM with IRd, but had not tolerated Rituxan well and is loathe to receive it again. He previously tolerated the Ninlaro well and is now undergoing maintenance therapy with Ninlaro and Dexamethasone. Waldenstrom's is stable. He appears to have developed anemia, but that might be due to recent hospitalization for COVID-19. \par \par Plan:\par Ninlaro/Dexamethasone if labs stable \par CMP, LDH, SPEP, Quant Ig, SFLC, uric acid\par Acyclovir\par Omeprazole\par Eliquis per Cardiology\par COVID vaccine second dose in 3 months\par Consult Pulmonology\par Supplemental oxygen\par Call me on Monday \par

## 2021-04-23 NOTE — ADDENDUM
[FreeTextEntry1] : I, Rachel Laurie, acted solely as a scribe for Dr. Cesar Simons on 04/23/2021. All medical entries made by the Scribe were at my, Dr. Cesar Simons's, direction and personally dictated by me on 04/23/2021. I have reviewed the chart and agree that the record accurately reflects my personal performance of the history, physical exam, assessment and plan. I have also personally directed, reviewed, and agreed with the chart.

## 2021-04-23 NOTE — RESULTS/DATA
[FreeTextEntry1] : 4/23/21\par WBC 5,870, Hgb 9.9, Hct 31.9%, Platelets 150,000 Diff 61P 25L 10M, Imm Gran 2%\par \par 1/26/21\par CMP: Chloride 109, CO2 20, Glu 103, BUN 31, Creatinine 3.46, ALK Phos 133, eGFR 16\par \par SPEP: M-spike 1 15% (1.0) IgM Lambda. M-spike 2 free lambda. One gamma-migrating paraprotein and one weak gamma-migrating  paraprotein. One IgM lambda Band and Weak Free Lambda Light Chains identified. Previously no IgD or IgE bands identified. \par IgG 444, A 22, M 1226\par SFLC Kappa 1.15, Lambda 198.50, Ratio 0.01\par Uric Acid 4.6\par Viscosity 1.7\par Testosterone 3.5

## 2021-04-23 NOTE — CONSULT LETTER
[Dear  ___] : Dear  [unfilled], [Courtesy Letter:] : I had the pleasure of seeing your patient, [unfilled], in my office today. [Please see my note below.] : Please see my note below. [Sincerely,] : Sincerely, [DrIbeth  ___] : Dr. DENG [DrIbeth ___] : Dr. DENG [___] : [unfilled] [FreeTextEntry2] : Dr. Simpson [FreeTextEntry3] : Cesar Simons M.D., FACP\par Professor of Medicine\par [employer]\par Associate Chief, Division of Hematology\par [facility]\par [facility]\par [address] [place] [address]\[place] [address]\par [phone]

## 2021-04-23 NOTE — REVIEW OF SYSTEMS
[SOB on Exertion] : shortness of breath during exertion [Recent Change In Weight] : ~T recent weight change [Negative] : Psychiatric [Fever] : no fever [de-identified] : Neuropathy

## 2021-04-26 LAB
ALBUMIN SERPL ELPH-MCNC: 3.6 G/DL
ALP BLD-CCNC: 128 U/L
ALT SERPL-CCNC: 8 U/L
ANION GAP SERPL CALC-SCNC: 12 MMOL/L
AST SERPL-CCNC: 10 U/L
BILIRUB SERPL-MCNC: 0.2 MG/DL
BUN SERPL-MCNC: 28 MG/DL
CALCIUM SERPL-MCNC: 8.8 MG/DL
CHLORIDE SERPL-SCNC: 112 MMOL/L
CO2 SERPL-SCNC: 21 MMOL/L
CREAT SERPL-MCNC: 2.78 MG/DL
DEPRECATED KAPPA LC FREE/LAMBDA SER: 0.01 RATIO
GLUCOSE SERPL-MCNC: 104 MG/DL
KAPPA LC CSF-MCNC: 172.04 MG/DL
KAPPA LC SERPL-MCNC: 1.09 MG/DL
LDH SERPL-CCNC: 147 U/L
POTASSIUM SERPL-SCNC: 4.3 MMOL/L
PROT SERPL-MCNC: 5.9 G/DL
SODIUM SERPL-SCNC: 144 MMOL/L

## 2021-04-27 ENCOUNTER — APPOINTMENT (OUTPATIENT)
Dept: CARDIOLOGY | Facility: CLINIC | Age: 76
End: 2021-04-27
Payer: MEDICARE

## 2021-04-27 VITALS
HEIGHT: 74 IN | BODY MASS INDEX: 28.49 KG/M2 | SYSTOLIC BLOOD PRESSURE: 122 MMHG | DIASTOLIC BLOOD PRESSURE: 72 MMHG | HEART RATE: 66 BPM | OXYGEN SATURATION: 96 % | WEIGHT: 222 LBS

## 2021-04-27 PROCEDURE — 99214 OFFICE O/P EST MOD 30 MIN: CPT

## 2021-04-27 PROCEDURE — 99072 ADDL SUPL MATRL&STAF TM PHE: CPT

## 2021-04-28 ENCOUNTER — NON-APPOINTMENT (OUTPATIENT)
Age: 76
End: 2021-04-28

## 2021-04-28 ENCOUNTER — APPOINTMENT (OUTPATIENT)
Dept: ELECTROPHYSIOLOGY | Facility: CLINIC | Age: 76
End: 2021-04-28
Payer: MEDICARE

## 2021-04-28 PROCEDURE — 93294 REM INTERROG EVL PM/LDLS PM: CPT

## 2021-04-28 PROCEDURE — 93296 REM INTERROG EVL PM/IDS: CPT

## 2021-04-28 NOTE — DISCUSSION/SUMMARY
[FreeTextEntry1] : The patient is a 75-year-old gentleman history mild dementia, paroxysmal  atrial fibrillation, lower extremity edema , CLL, CKD stage IV, Waldenstrom's macroglobulinemia, hypothyroidism, PPM , chronic diastolic heart failure s/p sigmoid resection for diverticulitis, oral chemo for his CLL , pelvic fracture now recovering from COVID pneumonia.\par #1 CV- Maintaining sinus on amiodarone.\par #2 PPM- [employer] pacemaker interrogation no events\par #3 Pulm- COVID pneumonia on oxygen, increase ambulation and hydration\par #4 Heme- f/u clinic\par #5 Hypothyroid- continue levothyroxine

## 2021-04-28 NOTE — HISTORY OF PRESENT ILLNESS
[FreeTextEntry1] : Anson had COVID pneumonia and was hospitalized for three weeks in March 2021 then to rehab. Now home and tires quickly. Heart rate increases easily and pulse ox decreases. He carries oxygen. His oxygen goes to the 60s if walks without oxygen and heart rate jumps easily

## 2021-04-28 NOTE — REVIEW OF SYSTEMS
[SOB] : shortness of breath [Dyspnea on exertion] : dyspnea during exertion [Chest Discomfort] : no chest discomfort [Lower Ext Edema] : no extremity edema [Leg Claudication] : no intermittent leg claudication [Palpitations] : no palpitations [Orthopnea] : no orthopnea [PND] : no PND [Syncope] : no syncope [Negative] : Heme/Lymph

## 2021-04-29 ENCOUNTER — APPOINTMENT (OUTPATIENT)
Dept: PULMONOLOGY | Facility: CLINIC | Age: 76
End: 2021-04-29
Payer: MEDICARE

## 2021-04-29 VITALS
OXYGEN SATURATION: 94 % | RESPIRATION RATE: 16 BRPM | DIASTOLIC BLOOD PRESSURE: 71 MMHG | SYSTOLIC BLOOD PRESSURE: 130 MMHG | HEART RATE: 82 BPM | WEIGHT: 222 LBS | TEMPERATURE: 98 F | BODY MASS INDEX: 28.49 KG/M2 | HEIGHT: 74 IN

## 2021-04-29 PROCEDURE — 99072 ADDL SUPL MATRL&STAF TM PHE: CPT

## 2021-04-29 PROCEDURE — 99203 OFFICE O/P NEW LOW 30 MIN: CPT | Mod: GC

## 2021-04-29 NOTE — PHYSICAL EXAM
[No Acute Distress] : no acute distress [Normal Oropharynx] : normal oropharynx [Normal Appearance] : normal appearance [No Neck Mass] : no neck mass [Normal Rate/Rhythm] : normal rate/rhythm [Normal S1, S2] : normal s1, s2 [Clear to Auscultation Bilaterally] : clear to auscultation bilaterally [No Resp Distress] : no resp distress [No Abnormalities] : no abnormalities [Benign] : benign [Normal Gait] : normal gait [TextBox_54] : 2/6 systolic murmur in aortic area

## 2021-04-29 NOTE — HISTORY OF PRESENT ILLNESS
[TextBox_4] : 75M with CLL/Waldenstrom macroglobulinemia presents for follow up after covid infection in March 2021. \par He was hospitalized for 3 weeks and was in [facility] for 1 week. \par \par Now he has dyspnea with exertion. Has home oxygen now - portable concentrator and sent his home concentrator back to the company due to not using it. \par He measures O2 at home and notices that he has desaturation down to 70s-80s with exertion. \par \par Recently has sleep disturbance with nocturnal awakening around 2AM. No nocturia. Also then awakes areound 4-5AM\par Usually slept from 11-8 without issues. \par

## 2021-04-29 NOTE — ASSESSMENT
[FreeTextEntry1] : 75M with CLL, transformed waldenstrom now with hypoxemia and dyspnea recovering from COVID. \par Still with hypoxemia but he hadnt been using xoygen as much as he could. \par Recommend he continue using oxygen with any activity and also while asleep. \par Suspect his nocturnal awakenings are related to hypoxemia given his resting SpO2 is 93%. on room air\par . After walking 160 feet, his oxygen saturations fell to 87% On room air\par oxygen saturation on 2 L per minute O2 is 96% at rest\par Oxygen saturation on 2 L per minute O2 is 92% on walking\par \par \par Will have him increase oxygen use and then return for follow up in 1 month. I suggested that he uses oxygen at night using his portable system and a 3 L pulse rate\par No indication for repeat imaging now. Plan in about 3 months depending on clinical course.

## 2021-04-29 NOTE — REVIEW OF SYSTEMS
[Fatigue] : fatigue [Dyspnea] : dyspnea [SOB on Exertion] : sob on exertion [Anemia] : anemia [Negative] : Dermatologic [Cough] : no cough [Hemoptysis] : no hemoptysis [Sputum] : no sputum

## 2021-05-03 LAB
ALBUMIN MFR SERPL ELPH: 54.3 %
ALBUMIN SERPL-MCNC: 3.2 G/DL
ALBUMIN/GLOB SERPL: 1.2 RATIO
ALPHA1 GLOB MFR SERPL ELPH: 6.8 %
ALPHA1 GLOB SERPL ELPH-MCNC: 0.4 G/DL
ALPHA2 GLOB MFR SERPL ELPH: 13.1 %
ALPHA2 GLOB SERPL ELPH-MCNC: 0.8 G/DL
B-GLOBULIN MFR SERPL ELPH: 9 %
B-GLOBULIN SERPL ELPH-MCNC: 0.5 G/DL
DEPRECATED KAPPA LC FREE/LAMBDA SER: 0.01 RATIO
GAMMA GLOB FLD ELPH-MCNC: 1 G/DL
GAMMA GLOB MFR SERPL ELPH: 16.8 %
IGA SER QL IEP: 23 MG/DL
IGG SER QL IEP: 466 MG/DL
IGM SER QL IEP: 906 MG/DL
INTERPRETATION SERPL IEP-IMP: NORMAL
KAPPA LC CSF-MCNC: 172.04 MG/DL
KAPPA LC SERPL-MCNC: 1.09 MG/DL
M PROTEIN MFR SERPL ELPH: NORMAL
M PROTEIN SPEC IFE-MCNC: NORMAL
MONOCLON BAND OBS SERPL: NORMAL
PROT SERPL-MCNC: 5.9 G/DL
PROT SERPL-MCNC: 5.9 G/DL

## 2021-05-12 ENCOUNTER — APPOINTMENT (OUTPATIENT)
Dept: HEMATOLOGY ONCOLOGY | Facility: CLINIC | Age: 76
End: 2021-05-12

## 2021-05-12 ENCOUNTER — RESULT REVIEW (OUTPATIENT)
Age: 76
End: 2021-05-12

## 2021-05-12 LAB
BASOPHILS # BLD AUTO: 0.06 K/UL — SIGNIFICANT CHANGE UP (ref 0–0.2)
BASOPHILS NFR BLD AUTO: 0.7 % — SIGNIFICANT CHANGE UP (ref 0–2)
EOSINOPHIL # BLD AUTO: 0.24 K/UL — SIGNIFICANT CHANGE UP (ref 0–0.5)
EOSINOPHIL NFR BLD AUTO: 2.7 % — SIGNIFICANT CHANGE UP (ref 0–6)
HCT VFR BLD CALC: 32.4 % — LOW (ref 39–50)
HGB BLD-MCNC: 10.5 G/DL — LOW (ref 13–17)
IMM GRANULOCYTES NFR BLD AUTO: 1 % — SIGNIFICANT CHANGE UP (ref 0–1.5)
LYMPHOCYTES # BLD AUTO: 1.76 K/UL — SIGNIFICANT CHANGE UP (ref 1–3.3)
LYMPHOCYTES # BLD AUTO: 20.1 % — SIGNIFICANT CHANGE UP (ref 13–44)
MCHC RBC-ENTMCNC: 31.1 PG — SIGNIFICANT CHANGE UP (ref 27–34)
MCHC RBC-ENTMCNC: 32.4 G/DL — SIGNIFICANT CHANGE UP (ref 32–36)
MCV RBC AUTO: 95.9 FL — SIGNIFICANT CHANGE UP (ref 80–100)
MONOCYTES # BLD AUTO: 0.89 K/UL — SIGNIFICANT CHANGE UP (ref 0–0.9)
MONOCYTES NFR BLD AUTO: 10.1 % — SIGNIFICANT CHANGE UP (ref 2–14)
NEUTROPHILS # BLD AUTO: 5.73 K/UL — SIGNIFICANT CHANGE UP (ref 1.8–7.4)
NEUTROPHILS NFR BLD AUTO: 65.4 % — SIGNIFICANT CHANGE UP (ref 43–77)
NRBC # BLD: 0 /100 WBCS — SIGNIFICANT CHANGE UP (ref 0–0)
PLATELET # BLD AUTO: 172 K/UL — SIGNIFICANT CHANGE UP (ref 150–400)
RBC # BLD: 3.38 M/UL — LOW (ref 4.2–5.8)
RBC # FLD: 14.6 % — HIGH (ref 10.3–14.5)
WBC # BLD: 8.77 K/UL — SIGNIFICANT CHANGE UP (ref 3.8–10.5)
WBC # FLD AUTO: 8.77 K/UL — SIGNIFICANT CHANGE UP (ref 3.8–10.5)

## 2021-05-13 LAB
ALBUMIN MFR SERPL ELPH: 54.6 %
ALBUMIN SERPL-MCNC: 3.5 G/DL
ALBUMIN/GLOB SERPL: 1.2 RATIO
ALPHA1 GLOB MFR SERPL ELPH: 5.9 %
ALPHA1 GLOB SERPL ELPH-MCNC: 0.4 G/DL
ALPHA2 GLOB MFR SERPL ELPH: 11.4 %
ALPHA2 GLOB SERPL ELPH-MCNC: 0.7 G/DL
B-GLOBULIN MFR SERPL ELPH: 9.1 %
B-GLOBULIN SERPL ELPH-MCNC: 0.6 G/DL
DEPRECATED KAPPA LC FREE/LAMBDA SER: 0.01 RATIO
GAMMA GLOB FLD ELPH-MCNC: 1.2 G/DL
GAMMA GLOB MFR SERPL ELPH: 19 %
IGA SER QL IEP: 20 MG/DL
IGG SER QL IEP: 477 MG/DL
IGM SER QL IEP: 1361 MG/DL
INTERPRETATION SERPL IEP-IMP: NORMAL
KAPPA LC CSF-MCNC: 171.81 MG/DL
KAPPA LC SERPL-MCNC: 1.06 MG/DL
LDH SERPL-CCNC: 159 U/L
M PROTEIN MFR SERPL ELPH: 12.8 %
MONOCLON BAND OBS SERPL: 0.8 G/DL
PROT SERPL-MCNC: 6.5 G/DL
PROT SERPL-MCNC: 6.5 G/DL
URATE SERPL-MCNC: 3.8 MG/DL

## 2021-05-13 NOTE — PATIENT PROFILE ADULT - NSPROGENPREVTRANSF_GEN_A_NUR
"   Quick Note      Patient Name: Michael Ponce   Patient ID: [id number]   Sex: Male   YOB: 1965    Primary Care Provider: Kymberly Otto MD   Referring Provider: Kymberly Otto MD    Visit Date: Ofelia 3, 2020    Provider: SHE Quiroz   Location: [facility]   Location Address: [address] [place] [address], [address]  [place], [place]  [id number]   Location Phone: [phone]          History Of Present Illness  TELEHEALTH TELEPHONE VISIT  Chief Complaint: Weight loss & Anemia   Michael Ponce is a 54 year old /White male who is presenting for evaluation via telehealth telephone visit. Verbal consent obtained before beginning visit.   Provider spent 12 minutes with the patient during the telehealth visit.   The following staff were present during this visit: Kayla Antonio- Gwen Rosen   Past Medical History/ Overview of Patient Symptoms     Patient states that he has lost 15lbs in the last month unintentionally. Appetite has significantly decreased.   Admits to nausea and vomiting multiple times a week. Nothing makes it better or worse. Heartburn controlled with famotidine 20mg BID. Drinks around 4-5 cans of Mt. dew daily.  Takes a baby aspirin daily.    Bowel movement only once weekly, formed stool. Denies hematochezia, melena, or family hx of colon cancer.  Patient has never had a colonoscopy and is very adamant that he never will.    Smokes 2 packs of cigarettes daily      CBC 6/1/2020: WBC 8.9, hemoglobin 10.5, hematocrit 31.7, platelets 317.  CMP 6/20/2020: Creatinine 3.59, total bilirubin 0.16, AST 20, ALT 10, alkaline phosphatase 107.  TSH 6/1/2020: 1.04.  Chest x-ray 6/1/2020: Normal examination of the chest.       Vitals  Date Time BP Position Site L\R Cuff Size HR RR TEMP (F) WT  HT  BMI kg/m2 BSA m2 O2 Sat HC       06/03/2020 09:37 AM         172lbs 0oz 5'  11\" 23.99 1.98               Assessment  · Anemia     285.9/D64.9  · Weight " loss     783.21/R63.4  · Nausea and vomiting     787.01/R11.2      Plan  · Orders  o Physician Telephone Evaluation, 11-20 minutes (31781) - - 06/03/2020  o Consent for Esophagogastroduodenoscopy (EGD) - Possible risks/complications, benefits, and alternatives to surgical or invasive procedure have been explained to patient and/or legal guardian. - Patient has been evaluated and can tolerate anesthesia and/or sedation. Risks, benefits, and alternatives to anesthesia and sedation have been explained to patient and/or legal guardian. ([id number]) - - 06/03/2020  · Medications  o Medications have been Reconciled  · Instructions  o Plan Of Care:   o Chronic conditions reviewed and taken into consideration for today's treatment plan.  o Patient instructed to seek medical attention urgently for new or worsening symptoms.  o Patient counseled to stop smoking.  o Call the office with any concerns or questions.  o PLAN: Proceed with procedure. Patient understands risks and benefits and is willing to proceed. Understands the risks include, but are not limited to, bleeding and/or perforation.   o Educated patient that we also need to do a colonoscopy in order to further evaluate anemia and rule out any diagnoses such as cancer or other harmful etiology. Patient is very adamant and states that he will not have a colonoscopy under no circumstances.  o Encourage patient to add 1 capful of MiraLAX daily to drink of choice to help facilitate bowel movements.   o Electronically Identified Patient Education Materials Provided Electronically  · Disposition  o Follow up after procedure            Electronically Signed by: SHE Quiroz -Author on Ofelia 3, 2020 10:05:32 AM   no

## 2021-05-27 ENCOUNTER — APPOINTMENT (OUTPATIENT)
Dept: PULMONOLOGY | Facility: CLINIC | Age: 76
End: 2021-05-27
Payer: MEDICARE

## 2021-05-27 VITALS
SYSTOLIC BLOOD PRESSURE: 130 MMHG | HEIGHT: 74 IN | DIASTOLIC BLOOD PRESSURE: 63 MMHG | WEIGHT: 225 LBS | HEART RATE: 78 BPM | RESPIRATION RATE: 15 BRPM | BODY MASS INDEX: 28.88 KG/M2 | TEMPERATURE: 97 F | OXYGEN SATURATION: 96 %

## 2021-05-27 PROCEDURE — 99072 ADDL SUPL MATRL&STAF TM PHE: CPT

## 2021-05-27 PROCEDURE — 99213 OFFICE O/P EST LOW 20 MIN: CPT

## 2021-05-27 NOTE — END OF VISIT
[FreeTextEntry3] : I obtained the history and examined the patient and developed a plan of care  Mervin Nevarez MD\par

## 2021-05-27 NOTE — ASSESSMENT
[FreeTextEntry1] : 75M with CLL/Waldenstrom macroglobulinemia presents for follow up after covid infection in March 2021. \par He was hospitalized for 3 weeks and was in [facility] for 1 week. Pt continues to have dyspnea and fatigue on exertion.  His oxygen saturations have improved over this past month.  Would continue with O2 as needed and will need a follow up CT scan in the new couple of months. \par the patient's baseline oxygen saturation was 94-95% and after walking 320 feet, his oxygen saturations were at 92%. I encouraged him to continue to exercise and we will followup with him in about 4 weeks with an x-ray. Subsequent to that we will CAT scan and obtain lung functions.

## 2021-05-27 NOTE — HISTORY OF PRESENT ILLNESS
[TextBox_4] : 75M with CLL/Waldenstrom macroglobulinemia presents for follow up after covid infection in March 2021. \par He was hospitalized for 3 weeks and was in [facility] for 1 week. \par \par Now he has dyspnea with exertion. Has home oxygen now.  His oxygen saturations have improved since his previous visit.  He will be around 92% on room air when he is exerting himself.  Today, at his  exam he was 96% on room air after having walked from the parking lot to the exam room.  He does continue to feel fatigued and short of breath.  \par  \par

## 2021-05-27 NOTE — PHYSICAL EXAM
[No Acute Distress] : no acute distress [Normal Rate/Rhythm] : normal rate/rhythm [Normal S1, S2] : normal s1, s2 [No Resp Distress] : no resp distress [Clear to Auscultation Bilaterally] : clear to auscultation bilaterally [No Focal Deficits] : no focal deficits [Oriented x3] : oriented x3 [Normal Affect] : normal affect [Murmur ___ / 6] : murmur [unfilled] / 6 [No Clubbing] : no clubbing [No Edema] : no edema [TextBox_54] : 2/6 systolic ejection murmur aortic area [TextBox_80] : pacemaker

## 2021-07-18 ENCOUNTER — NON-APPOINTMENT (OUTPATIENT)
Age: 76
End: 2021-07-18

## 2021-07-27 ENCOUNTER — APPOINTMENT (OUTPATIENT)
Dept: CARDIOLOGY | Facility: CLINIC | Age: 76
End: 2021-07-27
Payer: MEDICARE

## 2021-07-27 ENCOUNTER — NON-APPOINTMENT (OUTPATIENT)
Age: 76
End: 2021-07-27

## 2021-07-27 VITALS
OXYGEN SATURATION: 98 % | WEIGHT: 228 LBS | BODY MASS INDEX: 29.26 KG/M2 | HEIGHT: 74 IN | DIASTOLIC BLOOD PRESSURE: 68 MMHG | SYSTOLIC BLOOD PRESSURE: 110 MMHG | HEART RATE: 59 BPM

## 2021-07-27 DIAGNOSIS — U07.1 COVID-19: ICD-10-CM

## 2021-07-27 PROCEDURE — 99214 OFFICE O/P EST MOD 30 MIN: CPT

## 2021-07-27 PROCEDURE — 93000 ELECTROCARDIOGRAM COMPLETE: CPT

## 2021-07-27 PROCEDURE — 99072 ADDL SUPL MATRL&STAF TM PHE: CPT

## 2021-07-28 ENCOUNTER — APPOINTMENT (OUTPATIENT)
Dept: ELECTROPHYSIOLOGY | Facility: CLINIC | Age: 76
End: 2021-07-28
Payer: MEDICARE

## 2021-07-28 ENCOUNTER — NON-APPOINTMENT (OUTPATIENT)
Age: 76
End: 2021-07-28

## 2021-07-28 PROCEDURE — 93296 REM INTERROG EVL PM/IDS: CPT

## 2021-07-28 PROCEDURE — 93294 REM INTERROG EVL PM/LDLS PM: CPT

## 2021-07-28 NOTE — HISTORY OF PRESENT ILLNESS
[FreeTextEntry1] : Anson had reaction to second Moderna vaccine with high fevers. Now back to himself. Forgetful .

## 2021-07-28 NOTE — DISCUSSION/SUMMARY
[___ Month(s)] : in [unfilled] month(s) [FreeTextEntry1] : The patient is a 76-year-old gentleman history mild dementia, paroxysmal  atrial fibrillation, lower extremity edema , CLL, CKD stage IV, Waldenstrom's macroglobulinemia, hypothyroidism, PPM , chronic diastolic heart failure s/p sigmoid resection for diverticulitis, oral chemo for his CLL , pelvic fracture, COVID with reaction to second Moderna vaccine. \par #1 CV- Maintaining sinus , continue amiodarone.\par #2 PPM- [employer] pacemaker interrogation no events\par #3 Pulm- COVID pneumonia off oxygen, increase ambulation and hydration\par #4 Heme- f/u clinic\par #5 Hypothyroid- continue levothyroxine

## 2021-07-28 NOTE — REVIEW OF SYSTEMS
95 [SOB] : shortness of breath [Dyspnea on exertion] : dyspnea during exertion [Negative] : Heme/Lymph [Chest Discomfort] : no chest discomfort [Lower Ext Edema] : no extremity edema [Leg Claudication] : no intermittent leg claudication [Palpitations] : no palpitations [Orthopnea] : no orthopnea [PND] : no PND [Syncope] : no syncope

## 2021-07-29 ENCOUNTER — OUTPATIENT (OUTPATIENT)
Dept: OUTPATIENT SERVICES | Facility: HOSPITAL | Age: 76
LOS: 1 days | Discharge: HOME | End: 2021-07-29

## 2021-07-29 DIAGNOSIS — Z95.0 PRESENCE OF CARDIAC PACEMAKER: Chronic | ICD-10-CM

## 2021-07-29 DIAGNOSIS — C91.11 CHRONIC LYMPHOCYTIC LEUKEMIA OF B-CELL TYPE IN REMISSION: ICD-10-CM

## 2021-07-29 DIAGNOSIS — Z98.41 CATARACT EXTRACTION STATUS, RIGHT EYE: Chronic | ICD-10-CM

## 2021-07-29 DIAGNOSIS — Z98.89 OTHER SPECIFIED POSTPROCEDURAL STATES: Chronic | ICD-10-CM

## 2021-07-29 DIAGNOSIS — Z98.890 OTHER SPECIFIED POSTPROCEDURAL STATES: Chronic | ICD-10-CM

## 2021-07-30 ENCOUNTER — RESULT REVIEW (OUTPATIENT)
Age: 76
End: 2021-07-30

## 2021-07-30 ENCOUNTER — APPOINTMENT (OUTPATIENT)
Dept: HEMATOLOGY ONCOLOGY | Facility: CLINIC | Age: 76
End: 2021-07-30
Payer: MEDICARE

## 2021-07-30 VITALS
WEIGHT: 229.06 LBS | DIASTOLIC BLOOD PRESSURE: 64 MMHG | SYSTOLIC BLOOD PRESSURE: 108 MMHG | TEMPERATURE: 97.6 F | OXYGEN SATURATION: 96 % | HEART RATE: 57 BPM | RESPIRATION RATE: 16 BRPM | BODY MASS INDEX: 29.4 KG/M2 | HEIGHT: 74 IN

## 2021-07-30 LAB
BASOPHILS # BLD AUTO: 0.07 K/UL — SIGNIFICANT CHANGE UP (ref 0–0.2)
BASOPHILS NFR BLD AUTO: 1 % — SIGNIFICANT CHANGE UP (ref 0–2)
EOSINOPHIL # BLD AUTO: 0.21 K/UL — SIGNIFICANT CHANGE UP (ref 0–0.5)
EOSINOPHIL NFR BLD AUTO: 3.1 % — SIGNIFICANT CHANGE UP (ref 0–6)
HCT VFR BLD CALC: 32.4 % — LOW (ref 39–50)
HGB BLD-MCNC: 10.4 G/DL — LOW (ref 13–17)
IMM GRANULOCYTES NFR BLD AUTO: 0.9 % — SIGNIFICANT CHANGE UP (ref 0–1.5)
LYMPHOCYTES # BLD AUTO: 1.76 K/UL — SIGNIFICANT CHANGE UP (ref 1–3.3)
LYMPHOCYTES # BLD AUTO: 25.7 % — SIGNIFICANT CHANGE UP (ref 13–44)
MCHC RBC-ENTMCNC: 29.9 PG — SIGNIFICANT CHANGE UP (ref 27–34)
MCHC RBC-ENTMCNC: 32.1 G/DL — SIGNIFICANT CHANGE UP (ref 32–36)
MCV RBC AUTO: 93.1 FL — SIGNIFICANT CHANGE UP (ref 80–100)
MONOCYTES # BLD AUTO: 0.61 K/UL — SIGNIFICANT CHANGE UP (ref 0–0.9)
MONOCYTES NFR BLD AUTO: 8.9 % — SIGNIFICANT CHANGE UP (ref 2–14)
NEUTROPHILS # BLD AUTO: 4.15 K/UL — SIGNIFICANT CHANGE UP (ref 1.8–7.4)
NEUTROPHILS NFR BLD AUTO: 60.4 % — SIGNIFICANT CHANGE UP (ref 43–77)
NRBC # BLD: 0 /100 WBCS — SIGNIFICANT CHANGE UP (ref 0–0)
PLATELET # BLD AUTO: 147 K/UL — LOW (ref 150–400)
RBC # BLD: 3.48 M/UL — LOW (ref 4.2–5.8)
RBC # FLD: 13 % — SIGNIFICANT CHANGE UP (ref 10.3–14.5)
WBC # BLD: 6.86 K/UL — SIGNIFICANT CHANGE UP (ref 3.8–10.5)
WBC # FLD AUTO: 6.86 K/UL — SIGNIFICANT CHANGE UP (ref 3.8–10.5)

## 2021-07-30 PROCEDURE — 99214 OFFICE O/P EST MOD 30 MIN: CPT

## 2021-07-30 RX ORDER — COLCHICINE 0.6 MG/1
0.6 CAPSULE ORAL
Refills: 0 | Status: DISCONTINUED | COMMUNITY
Start: 2021-01-26 | End: 2021-07-30

## 2021-07-30 NOTE — RESULTS/DATA
[FreeTextEntry1] : 7/30/21\par WBC 6860, Hgb 10.4, Hct 32.4, Platelets 147,000 Diff normal \par \par 5/12/21\par \par SPEP: M-spike 0.8. Gamma-migrating paraprotein identified. \par IgG 477, A 20, M 1361\par SFLC kappa 1.06, lambda 171.81, ratio 0.01\par Urate 3.8\par \par 4/23/21\par CMP Cl 112, CO2 21, Glu 104, BUN 28, Creatinine 2.78, Total protein 5.9, ALT 8, ALK Phos 128, eGFR 21\par

## 2021-07-30 NOTE — CONSULT LETTER
[Dear  ___] : Dear  [unfilled], [Courtesy Letter:] : I had the pleasure of seeing your patient, [unfilled], in my office today. [Please see my note below.] : Please see my note below. [Sincerely,] : Sincerely, [DrIbeth  ___] : Dr. DENG [DrIbeth ___] : Dr. DENG [___] : [unfilled] [FreeTextEntry2] : Dr. Simpson [FreeTextEntry3] : Cesar Simons M.D., FACP\par Professor of Medicine\par [facility] [employer]\par Associate Chief, Division of Hematology\par [facility]\par [facility]\par [address] [place] [address]\[place] [address]\par [phone]

## 2021-07-30 NOTE — REVIEW OF SYSTEMS
[Fatigue] : fatigue [Joint Pain] : joint pain [Negative] : Respiratory [Fever] : no fever [FreeTextEntry9] : back pain [de-identified] : Neuropathy

## 2021-07-30 NOTE — ADDENDUM
[FreeTextEntry1] : I, Arpapo Sullivan, acted solely as a scribe for Dr. Cesar Simons on 07/30/2021. All medical entries made by the Scribe were at my, Dr. Cesar Simons's, direction and personally dictated by me on 07/30/2021. I have reviewed the chart and agree that the record accurately reflects my personal performance of the history, physical exam, assessment and plan. I have also personally directed, reviewed, and agreed with the chart.

## 2021-07-30 NOTE — PHYSICAL EXAM
[Normal] : affect appropriate [Ambulatory and capable of all self care but unable to carry out any work activities] : Status 2- Ambulatory and capable of all self care but unable to carry out any work activities. Up and about more than 50% of waking hours [de-identified] :  Pacemaker left anterior chest wall. RRR. S1S2 normal. Gr 2/6 TESS RUSB to LLSB. No gallops. [de-identified] : 5 x 5 cm lipoma left lower back.

## 2021-07-30 NOTE — HISTORY OF PRESENT ILLNESS
[Disease:__________________________] : Disease: [unfilled] [Rosales Stage: ___] : Rosales Stage: [unfilled] [de-identified] : PAF\par 10/2015 Waldenstrom's macroglobulinemia ; + dim lambda, CD 19, 23, FMC-7; negative CD 5, 10, 20\par Renal biopsy: lymphocytic infiltrate of renal cortex\par 10/17 IgGk and lambda BGUS\par 7/2018: Nephrotic syndrome with lambda light chains\par  [de-identified] : +lambda, CD5, CD 20; CD 38 --; FISH del 13q14.3\par SPEP gamma paraprotein M 0.4\par SPIEP IgM lambda; 10/17 IgGk, lambda [FreeTextEntry1] : 10/2015 - 1/2017  Rituxan/Velcade/Decadron; 8/18 - 1/19 Ixazomib/Decadron/Rituxan x 6 cycles to maintenance [de-identified] : He feels fair. Fatigued and reports low stamina and energy. Neuropathy is stable in his feet and fingers. Has chronic back pain. Atrial fibrillation is stable. He notes no chest pain, fever, headache, visual problems, SOB, abdominal pain, swollen glands, bleeding, bruising, ankle/leg swelling. Had COVID vaccine x 2. Following his second dose, he had a fever and some other adverse symptoms, but they have all resolved now. \par \par

## 2021-07-30 NOTE — ASSESSMENT
[Palliative Care Plan] : not applicable at this time [FreeTextEntry1] : 76 year old male with CLL evolved to Waldenstrom's macroglobulinemia complicated by CRF, nephrotic syndrome and PAF. He had progressive anemia with rising creatinine and relapse of his Waldenstrom's. He was not a candidate for Ibrutinib due to anticoagulation with Warfarin. He achieved a partial remission with marked improvement in his hemoglobin, creatinine and IgM with IRd, but had not tolerated Rituxan well and is loathe to receive it again. He previously tolerated the Ninlaro well and is now undergoing maintenance therapy with Ninlaro and Dexamethasone. Waldenstrom's is stable. He appears to have developed anemia, but that might be due to recent hospitalization for COVID-19. \par \par Plan:\par Ninlaro/Dexamethasone \par CMP, LDH, SPEP, Quant Ig, SFLC, uric acid, viscosity\par UPEP, Bence Kunz protein \par Acyclovir\par Omeprazole\par Eliquis per Cardiology\par RTC 3 months \par

## 2021-07-30 NOTE — REASON FOR VISIT
[Follow-Up Visit] : a follow-up visit for [Lymphoma] : lymphoma [Monoclonal Gammopathy] : monoclonal gammopathy [Spouse] : spouse

## 2021-08-01 LAB
ALBUMIN SERPL ELPH-MCNC: 4.3 G/DL
ALP BLD-CCNC: 123 U/L
ALT SERPL-CCNC: 12 U/L
ANION GAP SERPL CALC-SCNC: 16 MMOL/L
AST SERPL-CCNC: 12 U/L
BILIRUB SERPL-MCNC: 0.2 MG/DL
BUN SERPL-MCNC: 40 MG/DL
CALCIUM SERPL-MCNC: 9 MG/DL
CHLORIDE SERPL-SCNC: 109 MMOL/L
CO2 SERPL-SCNC: 19 MMOL/L
CREAT SERPL-MCNC: 3.39 MG/DL
GLUCOSE SERPL-MCNC: 179 MG/DL
LDH SERPL-CCNC: 137 U/L
POTASSIUM SERPL-SCNC: 5 MMOL/L
PROT SERPL-MCNC: 6.7 G/DL
SODIUM SERPL-SCNC: 144 MMOL/L

## 2021-08-02 ENCOUNTER — LABORATORY RESULT (OUTPATIENT)
Age: 76
End: 2021-08-02

## 2021-08-02 LAB
DEPRECATED KAPPA LC FREE/LAMBDA SER: 0 RATIO
KAPPA LC CSF-MCNC: 192.98 MG/DL
KAPPA LC SERPL-MCNC: 0.79 MG/DL

## 2021-08-05 LAB — VISCOSITY, SERUM: 1.8

## 2021-08-06 ENCOUNTER — NON-APPOINTMENT (OUTPATIENT)
Age: 76
End: 2021-08-06

## 2021-08-27 LAB
ALBUMIN MFR SERPL ELPH: 55.1 %
ALBUMIN SERPL-MCNC: 3.7 G/DL
ALBUMIN/GLOB SERPL: 1.2 RATIO
ALBUPE: 12.8 %
ALPHA1 GLOB MFR SERPL ELPH: 6 %
ALPHA1 GLOB SERPL ELPH-MCNC: 0.4 G/DL
ALPHA1UPE: 10.8 %
ALPHA2 GLOB MFR SERPL ELPH: 11.9 %
ALPHA2 GLOB SERPL ELPH-MCNC: 0.8 G/DL
ALPHA2UPE: 5.3 %
B-GLOBULIN MFR SERPL ELPH: 9.2 %
B-GLOBULIN SERPL ELPH-MCNC: 0.6 G/DL
BETAUPE: 12.6 %
DEPRECATED KAPPA LC FREE/LAMBDA SER: 0 RATIO
GAMMA GLOB FLD ELPH-MCNC: 1.2 G/DL
GAMMA GLOB MFR SERPL ELPH: 17.8 %
GAMMAUPE: 58.5 %
IGA 24H UR QL IFE: NORMAL
IGA SER QL IEP: 25 MG/DL
IGG SER QL IEP: 483 MG/DL
IGM SER QL IEP: 1065 MG/DL
INTERPRETATION SERPL IEP-IMP: NORMAL
KAPPA LC 24H UR QL: PRESENT
KAPPA LC CSF-MCNC: 192.98 MG/DL
KAPPA LC SERPL-MCNC: 0.79 MG/DL
M PROTEIN MFR SERPL ELPH: 13.4 %
M PROTEIN SPEC IFE-MCNC: NORMAL
M SPIKE RU: 54.8 %
MONOCLON BAND OBS SERPL: 0.9 G/DL
PROT PATTERN 24H UR ELPH-IMP: NORMAL
PROT SERPL-MCNC: 6.7 G/DL
PROT SERPL-MCNC: 6.7 G/DL
PROT UR-MCNC: 248 MG/DL
PROT UR-MCNC: 248 MG/DL

## 2021-10-07 NOTE — ED ADULT TRIAGE NOTE - MODE OF ARRIVAL
Hematuria: Possible Causes     Many things can lead to blood in the urine (hematuria). The blood may be seen with the eye (macroscopic or gross hematuria). Or it may only be seen when the urine is looked at under a microscope (microscopic hematuria).  Oft professional's instructions. EMS

## 2021-10-09 ENCOUNTER — OUTPATIENT (OUTPATIENT)
Dept: OUTPATIENT SERVICES | Facility: HOSPITAL | Age: 76
LOS: 1 days | Discharge: HOME | End: 2021-10-09

## 2021-10-09 DIAGNOSIS — Z98.41 CATARACT EXTRACTION STATUS, RIGHT EYE: Chronic | ICD-10-CM

## 2021-10-09 DIAGNOSIS — Z98.89 OTHER SPECIFIED POSTPROCEDURAL STATES: Chronic | ICD-10-CM

## 2021-10-09 DIAGNOSIS — Z95.0 PRESENCE OF CARDIAC PACEMAKER: Chronic | ICD-10-CM

## 2021-10-09 DIAGNOSIS — Z98.890 OTHER SPECIFIED POSTPROCEDURAL STATES: Chronic | ICD-10-CM

## 2021-10-09 DIAGNOSIS — C91.11 CHRONIC LYMPHOCYTIC LEUKEMIA OF B-CELL TYPE IN REMISSION: ICD-10-CM

## 2021-10-12 ENCOUNTER — RESULT REVIEW (OUTPATIENT)
Age: 76
End: 2021-10-12

## 2021-10-12 ENCOUNTER — APPOINTMENT (OUTPATIENT)
Dept: HEMATOLOGY ONCOLOGY | Facility: CLINIC | Age: 76
End: 2021-10-12
Payer: MEDICARE

## 2021-10-12 ENCOUNTER — RX RENEWAL (OUTPATIENT)
Age: 76
End: 2021-10-12

## 2021-10-12 VITALS
HEART RATE: 60 BPM | HEIGHT: 74 IN | SYSTOLIC BLOOD PRESSURE: 112 MMHG | BODY MASS INDEX: 29.77 KG/M2 | WEIGHT: 232 LBS | RESPIRATION RATE: 16 BRPM | TEMPERATURE: 97.7 F | DIASTOLIC BLOOD PRESSURE: 67 MMHG | OXYGEN SATURATION: 98 %

## 2021-10-12 DIAGNOSIS — H35.30 UNSPECIFIED MACULAR DEGENERATION: ICD-10-CM

## 2021-10-12 LAB
BASOPHILS # BLD AUTO: 0.04 K/UL — SIGNIFICANT CHANGE UP (ref 0–0.2)
BASOPHILS NFR BLD AUTO: 0.7 % — SIGNIFICANT CHANGE UP (ref 0–2)
EOSINOPHIL # BLD AUTO: 0.12 K/UL — SIGNIFICANT CHANGE UP (ref 0–0.5)
EOSINOPHIL NFR BLD AUTO: 2.1 % — SIGNIFICANT CHANGE UP (ref 0–6)
HCT VFR BLD CALC: 35.3 % — LOW (ref 39–50)
HGB BLD-MCNC: 10.7 G/DL — LOW (ref 13–17)
IMM GRANULOCYTES NFR BLD AUTO: 0.9 % — SIGNIFICANT CHANGE UP (ref 0–1.5)
LYMPHOCYTES # BLD AUTO: 1.38 K/UL — SIGNIFICANT CHANGE UP (ref 1–3.3)
LYMPHOCYTES # BLD AUTO: 24 % — SIGNIFICANT CHANGE UP (ref 13–44)
MCHC RBC-ENTMCNC: 29.4 PG — SIGNIFICANT CHANGE UP (ref 27–34)
MCHC RBC-ENTMCNC: 30.3 G/DL — LOW (ref 32–36)
MCV RBC AUTO: 97 FL — SIGNIFICANT CHANGE UP (ref 80–100)
MONOCYTES # BLD AUTO: 0.38 K/UL — SIGNIFICANT CHANGE UP (ref 0–0.9)
MONOCYTES NFR BLD AUTO: 6.6 % — SIGNIFICANT CHANGE UP (ref 2–14)
NEUTROPHILS # BLD AUTO: 3.78 K/UL — SIGNIFICANT CHANGE UP (ref 1.8–7.4)
NEUTROPHILS NFR BLD AUTO: 65.7 % — SIGNIFICANT CHANGE UP (ref 43–77)
NRBC # BLD: 0 /100 WBCS — SIGNIFICANT CHANGE UP (ref 0–0)
PLATELET # BLD AUTO: 141 K/UL — LOW (ref 150–400)
RBC # BLD: 3.64 M/UL — LOW (ref 4.2–5.8)
RBC # FLD: 14.6 % — HIGH (ref 10.3–14.5)
WBC # BLD: 5.75 K/UL — SIGNIFICANT CHANGE UP (ref 3.8–10.5)
WBC # FLD AUTO: 5.75 K/UL — SIGNIFICANT CHANGE UP (ref 3.8–10.5)

## 2021-10-12 PROCEDURE — 99213 OFFICE O/P EST LOW 20 MIN: CPT

## 2021-10-13 LAB
ALBUMIN SERPL ELPH-MCNC: 4 G/DL
ALP BLD-CCNC: 126 U/L
ALT SERPL-CCNC: <5 U/L
ANION GAP SERPL CALC-SCNC: 13 MMOL/L
AST SERPL-CCNC: 12 U/L
BILIRUB SERPL-MCNC: 0.2 MG/DL
BUN SERPL-MCNC: 42 MG/DL
CALCIUM SERPL-MCNC: 8.9 MG/DL
CHLORIDE SERPL-SCNC: 113 MMOL/L
CO2 SERPL-SCNC: 17 MMOL/L
CREAT SERPL-MCNC: 3.25 MG/DL
DEPRECATED KAPPA LC FREE/LAMBDA SER: 0 RATIO
GLUCOSE SERPL-MCNC: 195 MG/DL
KAPPA LC CSF-MCNC: 189.75 MG/DL
KAPPA LC SERPL-MCNC: 0.8 MG/DL
LDH SERPL-CCNC: 121 U/L
POTASSIUM SERPL-SCNC: 4.7 MMOL/L
PROT SERPL-MCNC: 6.5 G/DL
SODIUM SERPL-SCNC: 143 MMOL/L

## 2021-10-20 LAB
ALBUMIN MFR SERPL ELPH: 55.9 %
ALBUMIN SERPL-MCNC: 3.6 G/DL
ALBUMIN/GLOB SERPL: 1.2 RATIO
ALPHA1 GLOB MFR SERPL ELPH: 5.8 %
ALPHA1 GLOB SERPL ELPH-MCNC: 0.4 G/DL
ALPHA2 GLOB MFR SERPL ELPH: 10.8 %
ALPHA2 GLOB SERPL ELPH-MCNC: 0.7 G/DL
B-GLOBULIN MFR SERPL ELPH: 8.9 %
B-GLOBULIN SERPL ELPH-MCNC: 0.6 G/DL
DEPRECATED KAPPA LC FREE/LAMBDA SER: 0 RATIO
GAMMA GLOB FLD ELPH-MCNC: 1.2 G/DL
GAMMA GLOB MFR SERPL ELPH: 18.6 %
IGA SER QL IEP: 21 MG/DL
IGG SER QL IEP: 489 MG/DL
IGM SER QL IEP: 1175 MG/DL
INTERPRETATION SERPL IEP-IMP: NORMAL
KAPPA LC CSF-MCNC: 189.75 MG/DL
KAPPA LC SERPL-MCNC: 0.8 MG/DL
M PROTEIN MFR SERPL ELPH: 13.7 %
M PROTEIN SPEC IFE-MCNC: NORMAL
MONOCLON BAND OBS SERPL: 0.9 G/DL
PROT SERPL-MCNC: 6.5 G/DL
PROT SERPL-MCNC: 6.5 G/DL

## 2021-10-20 NOTE — CONSULT LETTER
[Dear  ___] : Dear  [unfilled], [Courtesy Letter:] : I had the pleasure of seeing your patient, [unfilled], in my office today. [Please see my note below.] : Please see my note below. [Sincerely,] : Sincerely, [DrIbeth  ___] : Dr. DENG [DrIbeth ___] : Dr. DENG [___] : [unfilled] [FreeTextEntry2] : Dr. Simpson [FreeTextEntry3] : Cesar Simons M.D., FACP\par Professor of Medicine\par [facility] [employer]\par Associate Chief, Division of Hematology\par [facility]\par [facility]\par [address] [place] [address]\[place] [address]\par [phone]

## 2021-10-20 NOTE — HISTORY OF PRESENT ILLNESS
[Disease:__________________________] : Disease: [unfilled] [Rosales Stage: ___] : Rosales Stage: [unfilled] [de-identified] : PAF\par 10/2015 Waldenstrom's macroglobulinemia ; + dim lambda, CD 19, 23, FMC-7; negative CD 5, 10, 20\par Renal biopsy: lymphocytic infiltrate of renal cortex\par 10/17 IgGk and lambda BGUS\par 7/2018: Nephrotic syndrome with lambda light chains\par  [de-identified] : +lambda, CD5, CD 20; CD 38 --; FISH del 13q14.3\par SPEP gamma paraprotein M 0.4\par SPIEP IgM lambda; 10/17 IgGk, lambda [FreeTextEntry1] : 10/2015 - 1/2017  Rituxan/Velcade/Decadron; 8/18 - 1/19 Ixazomib/Decadron/Rituxan x 6 cycles to maintenance [de-identified] : He feels fair. Fatigued and reports low stamina and energy. Neuropathy is stable in his feet and fingers. Has chronic back pain. Atrial fibrillation is stable. He notes no chest pain, fever, headache, visual problems, SOB, abdominal pain, swollen glands, bleeding, bruising, ankle/leg swelling. Had COVID vaccine x 2. Had  COVID in 4/21, has no adverse sequelae.   \par \par

## 2021-10-20 NOTE — ASSESSMENT
[Palliative Care Plan] : not applicable at this time [FreeTextEntry1] : 76 year old male with CLL evolved to Waldenstrom's macroglobulinemia complicated by CRF, nephrotic syndrome and PAF. He had progressive anemia with rising creatinine and relapse of his Waldenstrom's. He was not a candidate for Ibrutinib due to anticoagulation with Warfarin. He achieved a partial remission with marked improvement in his hemoglobin, creatinine and IgM with IRd, but had not tolerated Rituxan well and is loathe to receive it again. He previously tolerated the Ninlaro well and is now undergoing maintenance therapy with Ninlaro and Dexamethasone. Waldenstrom's is stable. He appears to have developed anemia, but that might be due to recent hospitalization for COVID-19. \par \par Plan:\par Ninlaro/Dexamethasone \par CMP, LDH, SPEP, SFLC's.\par Acyclovir\par Omeprazole\par Eliquis per Cardiology\par RTC 3 months \par

## 2021-10-20 NOTE — REVIEW OF SYSTEMS
[Fatigue] : fatigue [Joint Pain] : joint pain [Negative] : Allergic/Immunologic [Fever] : no fever [FreeTextEntry9] : back pain [de-identified] : Neuropathy in hands and feet

## 2021-10-27 ENCOUNTER — NON-APPOINTMENT (OUTPATIENT)
Age: 76
End: 2021-10-27

## 2021-10-27 ENCOUNTER — APPOINTMENT (OUTPATIENT)
Dept: ELECTROPHYSIOLOGY | Facility: CLINIC | Age: 76
End: 2021-10-27
Payer: MEDICARE

## 2021-10-27 PROCEDURE — 93294 REM INTERROG EVL PM/LDLS PM: CPT

## 2021-10-27 PROCEDURE — 93296 REM INTERROG EVL PM/IDS: CPT | Mod: NC

## 2021-11-01 ENCOUNTER — TRANSCRIPTION ENCOUNTER (OUTPATIENT)
Age: 76
End: 2021-11-01

## 2021-11-02 ENCOUNTER — TRANSCRIPTION ENCOUNTER (OUTPATIENT)
Age: 76
End: 2021-11-02

## 2021-11-02 RX ORDER — METOPROLOL SUCCINATE 25 MG/1
25 TABLET, EXTENDED RELEASE ORAL DAILY
Qty: 90 | Refills: 3 | Status: DISCONTINUED | COMMUNITY
Start: 2021-02-19 | End: 2021-11-02

## 2021-11-03 ENCOUNTER — TRANSCRIPTION ENCOUNTER (OUTPATIENT)
Age: 76
End: 2021-11-03

## 2021-11-05 ENCOUNTER — NON-APPOINTMENT (OUTPATIENT)
Age: 76
End: 2021-11-05

## 2021-11-05 ENCOUNTER — TRANSCRIPTION ENCOUNTER (OUTPATIENT)
Age: 76
End: 2021-11-05

## 2021-11-07 ENCOUNTER — TRANSCRIPTION ENCOUNTER (OUTPATIENT)
Age: 76
End: 2021-11-07

## 2021-11-08 ENCOUNTER — NON-APPOINTMENT (OUTPATIENT)
Age: 76
End: 2021-11-08

## 2021-11-09 ENCOUNTER — NON-APPOINTMENT (OUTPATIENT)
Age: 76
End: 2021-11-09

## 2021-11-22 ENCOUNTER — TRANSCRIPTION ENCOUNTER (OUTPATIENT)
Age: 76
End: 2021-11-22

## 2021-11-22 ENCOUNTER — NON-APPOINTMENT (OUTPATIENT)
Age: 76
End: 2021-11-22

## 2021-11-30 ENCOUNTER — APPOINTMENT (OUTPATIENT)
Dept: ELECTROPHYSIOLOGY | Facility: CLINIC | Age: 76
End: 2021-11-30
Payer: MEDICARE

## 2021-11-30 ENCOUNTER — LABORATORY RESULT (OUTPATIENT)
Age: 76
End: 2021-11-30

## 2021-11-30 ENCOUNTER — APPOINTMENT (OUTPATIENT)
Dept: CARDIOLOGY | Facility: CLINIC | Age: 76
End: 2021-11-30
Payer: MEDICARE

## 2021-11-30 ENCOUNTER — NON-APPOINTMENT (OUTPATIENT)
Age: 76
End: 2021-11-30

## 2021-11-30 VITALS — DIASTOLIC BLOOD PRESSURE: 62 MMHG | SYSTOLIC BLOOD PRESSURE: 111 MMHG | BODY MASS INDEX: 29.79 KG/M2 | WEIGHT: 232 LBS

## 2021-11-30 DIAGNOSIS — E03.9 HYPOTHYROIDISM, UNSPECIFIED: ICD-10-CM

## 2021-11-30 PROCEDURE — 99214 OFFICE O/P EST MOD 30 MIN: CPT

## 2021-11-30 PROCEDURE — 93000 ELECTROCARDIOGRAM COMPLETE: CPT

## 2021-11-30 PROCEDURE — 93280 PM DEVICE PROGR EVAL DUAL: CPT

## 2021-11-30 NOTE — HISTORY OF PRESENT ILLNESS
[FreeTextEntry1] : Anson has been compliant with amiodarone and monitored. However, had long episode of afib 11/22 and toprol was increased to 50mg. Now short episode on interrogation today.

## 2021-11-30 NOTE — DISCUSSION/SUMMARY
[FreeTextEntry1] : The patient is a 76-year-old gentleman history mild dementia, paroxysmal  atrial fibrillation, lower extremity edema , CLL, CKD stage IV, Waldenstrom's macroglobulinemia, hypothyroidism, PPM , chronic diastolic heart failure s/p sigmoid resection for diverticulitis, oral chemo for his CLL , pelvic fracture, COVID with afib episodes.\par #1 CV-  continue amiodarone but increase to bid for one week and continue toprol 25mg bid\par #2 PPM- [employer] pacemaker interrogation short episode this am, remote transmission in two weeks\par #3 Pulm- COVID pneumonia off oxygen\par #4 Heme- f/u clinic\par #5 Hypothyroid- continue levothyroxine, check levels

## 2021-12-01 LAB
25(OH)D3 SERPL-MCNC: 44.1 NG/ML
ALBUMIN SERPL ELPH-MCNC: 4.2 G/DL
ALP BLD-CCNC: 109 U/L
ALT SERPL-CCNC: 12 U/L
ANION GAP SERPL CALC-SCNC: 12 MMOL/L
AST SERPL-CCNC: 9 U/L
BASOPHILS # BLD AUTO: 0.05 K/UL
BASOPHILS NFR BLD AUTO: 0.8 %
BILIRUB SERPL-MCNC: 0.2 MG/DL
BUN SERPL-MCNC: 43 MG/DL
CALCIUM SERPL-MCNC: 8.7 MG/DL
CHLORIDE SERPL-SCNC: 113 MMOL/L
CHOLEST SERPL-MCNC: 142 MG/DL
CO2 SERPL-SCNC: 20 MMOL/L
CREAT SERPL-MCNC: 4.21 MG/DL
EOSINOPHIL # BLD AUTO: 0.14 K/UL
EOSINOPHIL NFR BLD AUTO: 2.3 %
ESTIMATED AVERAGE GLUCOSE: 114 MG/DL
GLUCOSE SERPL-MCNC: 155 MG/DL
HBA1C MFR BLD HPLC: 5.6 %
HCT VFR BLD CALC: 34.6 %
HDLC SERPL-MCNC: 26 MG/DL
HGB BLD-MCNC: 10.5 G/DL
IMM GRANULOCYTES NFR BLD AUTO: 1 %
LDLC SERPL CALC-MCNC: 99 MG/DL
LYMPHOCYTES # BLD AUTO: 1.36 K/UL
LYMPHOCYTES NFR BLD AUTO: 21.9 %
MAN DIFF?: NORMAL
MCHC RBC-ENTMCNC: 30.3 GM/DL
MCHC RBC-ENTMCNC: 30.3 PG
MCV RBC AUTO: 100 FL
MONOCYTES # BLD AUTO: 0.69 K/UL
MONOCYTES NFR BLD AUTO: 11.1 %
NEUTROPHILS # BLD AUTO: 3.92 K/UL
NEUTROPHILS NFR BLD AUTO: 62.9 %
NONHDLC SERPL-MCNC: 116 MG/DL
PLATELET # BLD AUTO: 138 K/UL
POTASSIUM SERPL-SCNC: 5 MMOL/L
PROT SERPL-MCNC: 6.1 G/DL
RBC # BLD: 3.46 M/UL
RBC # FLD: 14.5 %
SODIUM SERPL-SCNC: 145 MMOL/L
TRIGL SERPL-MCNC: 85 MG/DL
TSH SERPL-ACNC: 6.95 UIU/ML
VIT B12 SERPL-MCNC: 532 PG/ML
WBC # FLD AUTO: 6.22 K/UL

## 2021-12-02 ENCOUNTER — NON-APPOINTMENT (OUTPATIENT)
Age: 76
End: 2021-12-02

## 2021-12-03 ENCOUNTER — TRANSCRIPTION ENCOUNTER (OUTPATIENT)
Age: 76
End: 2021-12-03

## 2021-12-04 ENCOUNTER — TRANSCRIPTION ENCOUNTER (OUTPATIENT)
Age: 76
End: 2021-12-04

## 2021-12-07 ENCOUNTER — APPOINTMENT (OUTPATIENT)
Dept: ELECTROPHYSIOLOGY | Facility: CLINIC | Age: 76
End: 2021-12-07

## 2022-01-10 NOTE — PHARMACOTHERAPY INTERVENTION NOTE - INTERVENTION CATEGORIES
Attempted to reach pt at primary number, one ring and then message that voicemail box is not set up. Left vm for ER contact daughter Laura, requesting a return call to confirm we have correct number for pt. Please see notes below for call reason.     Letter mailed to pt as well.   Patient Education

## 2022-01-12 ENCOUNTER — NON-APPOINTMENT (OUTPATIENT)
Age: 77
End: 2022-01-12

## 2022-01-19 ENCOUNTER — OUTPATIENT (OUTPATIENT)
Dept: OUTPATIENT SERVICES | Facility: HOSPITAL | Age: 77
LOS: 1 days | Discharge: HOME | End: 2022-01-19

## 2022-01-19 DIAGNOSIS — Z98.41 CATARACT EXTRACTION STATUS, RIGHT EYE: Chronic | ICD-10-CM

## 2022-01-19 DIAGNOSIS — C91.11 CHRONIC LYMPHOCYTIC LEUKEMIA OF B-CELL TYPE IN REMISSION: ICD-10-CM

## 2022-01-19 DIAGNOSIS — Z98.89 OTHER SPECIFIED POSTPROCEDURAL STATES: Chronic | ICD-10-CM

## 2022-01-19 DIAGNOSIS — Z95.0 PRESENCE OF CARDIAC PACEMAKER: Chronic | ICD-10-CM

## 2022-01-19 DIAGNOSIS — Z98.890 OTHER SPECIFIED POSTPROCEDURAL STATES: Chronic | ICD-10-CM

## 2022-01-21 ENCOUNTER — APPOINTMENT (OUTPATIENT)
Dept: HEMATOLOGY ONCOLOGY | Facility: CLINIC | Age: 77
End: 2022-01-21
Payer: COMMERCIAL

## 2022-01-21 ENCOUNTER — RESULT REVIEW (OUTPATIENT)
Age: 77
End: 2022-01-21

## 2022-01-21 ENCOUNTER — LABORATORY RESULT (OUTPATIENT)
Age: 77
End: 2022-01-21

## 2022-01-21 VITALS
DIASTOLIC BLOOD PRESSURE: 71 MMHG | TEMPERATURE: 96.7 F | OXYGEN SATURATION: 96 % | HEART RATE: 108 BPM | WEIGHT: 232 LBS | SYSTOLIC BLOOD PRESSURE: 111 MMHG | RESPIRATION RATE: 18 BRPM | HEIGHT: 74 IN | BODY MASS INDEX: 29.77 KG/M2

## 2022-01-21 LAB
BASOPHILS # BLD AUTO: 0.05 K/UL — SIGNIFICANT CHANGE UP (ref 0–0.2)
BASOPHILS NFR BLD AUTO: 0.8 % — SIGNIFICANT CHANGE UP (ref 0–2)
EOSINOPHIL # BLD AUTO: 0.14 K/UL — SIGNIFICANT CHANGE UP (ref 0–0.5)
EOSINOPHIL NFR BLD AUTO: 2.3 % — SIGNIFICANT CHANGE UP (ref 0–6)
HCT VFR BLD CALC: 36.1 % — LOW (ref 39–50)
HGB BLD-MCNC: 11.1 G/DL — LOW (ref 13–17)
IMM GRANULOCYTES NFR BLD AUTO: 0.5 % — SIGNIFICANT CHANGE UP (ref 0–1.5)
LYMPHOCYTES # BLD AUTO: 1.42 K/UL — SIGNIFICANT CHANGE UP (ref 1–3.3)
LYMPHOCYTES # BLD AUTO: 23.4 % — SIGNIFICANT CHANGE UP (ref 13–44)
MCHC RBC-ENTMCNC: 30.2 PG — SIGNIFICANT CHANGE UP (ref 27–34)
MCHC RBC-ENTMCNC: 30.7 G/DL — LOW (ref 32–36)
MCV RBC AUTO: 98.1 FL — SIGNIFICANT CHANGE UP (ref 80–100)
MONOCYTES # BLD AUTO: 0.64 K/UL — SIGNIFICANT CHANGE UP (ref 0–0.9)
MONOCYTES NFR BLD AUTO: 10.5 % — SIGNIFICANT CHANGE UP (ref 2–14)
NEUTROPHILS # BLD AUTO: 3.8 K/UL — SIGNIFICANT CHANGE UP (ref 1.8–7.4)
NEUTROPHILS NFR BLD AUTO: 62.5 % — SIGNIFICANT CHANGE UP (ref 43–77)
NRBC # BLD: 0 /100 WBCS — SIGNIFICANT CHANGE UP (ref 0–0)
PLATELET # BLD AUTO: 147 K/UL — LOW (ref 150–400)
RBC # BLD: 3.68 M/UL — LOW (ref 4.2–5.8)
RBC # FLD: 13.5 % — SIGNIFICANT CHANGE UP (ref 10.3–14.5)
WBC # BLD: 6.08 K/UL — SIGNIFICANT CHANGE UP (ref 3.8–10.5)
WBC # FLD AUTO: 6.08 K/UL — SIGNIFICANT CHANGE UP (ref 3.8–10.5)

## 2022-01-21 PROCEDURE — 99214 OFFICE O/P EST MOD 30 MIN: CPT

## 2022-01-21 RX ORDER — BISMUTH SUBSALICYLATE 262 MG
262 TABLET,CHEWABLE ORAL
Refills: 0 | Status: DISCONTINUED | COMMUNITY
Start: 2020-08-18 | End: 2022-01-21

## 2022-01-21 NOTE — ADDENDUM
[FreeTextEntry1] : I, Rachel Laurie, acted solely as a scribe for Dr. Cesar Simons on 01/21/2022. All medical entries made by the Scribe were at my, Dr. Cesar Simons's, direction and personally dictated by me on 01/21/2022. I have reviewed the chart and agree that the record accurately reflects my personal performance of the history, physical exam, assessment and plan. I have also personally directed, reviewed, and agreed with the chart.

## 2022-01-21 NOTE — RESULTS/DATA
[FreeTextEntry1] : WBC 6080, Hgb 11.1, Hct 36.1, Platelets 147,000 Diff normal \par \par 11/30/21\par CMP Cl 113, CO2 20, Glu 155, BUN 43, Creatinine 4.21, AST 9, eGFR 13\par \par 10/12/21\par \par SPEP: M-spike 0.9. Two gamma-migrating paraproteins identified. \par SPIEP: One IgM lambda band identified and one lambda light chain identified and previously, No IgD or IgE bands identified. \par IgG 489, A 21, M 1175\par SFLC kappa 0.80, lambda 189.75, ratio 0.00\par \par 8/2/21\par T Protein urine 248\par \par 7/30/21\par Viscosity 1.8

## 2022-01-21 NOTE — PHYSICAL EXAM
[Ambulatory and capable of all self care but unable to carry out any work activities] : Status 2- Ambulatory and capable of all self care but unable to carry out any work activities. Up and about more than 50% of waking hours [Normal] : affect appropriate [de-identified] :  Pacemaker left anterior chest wall. RRR. S1S2 normal. Gr 2/6 TESS RUSB to LLSB. No gallops. [de-identified] : 5 x 5 cm lipoma left lower back.

## 2022-01-21 NOTE — CONSULT LETTER
[Dear  ___] : Dear  [unfilled], [Courtesy Letter:] : I had the pleasure of seeing your patient, [unfilled], in my office today. [Please see my note below.] : Please see my note below. [Sincerely,] : Sincerely, [DrIbeth  ___] : Dr. DENG [DrIbeth ___] : Dr. DENG [___] : [unfilled] [FreeTextEntry2] : Dr. Simpson [FreeTextEntry3] : Cesar Simons M.D., FACP\par Professor of Medicine\par [facility] [employer]\par Associate Chief, Division of Hematology\par [facility]\par [facility]\par [address] [place] [address]\[place] [address]\par [phone]

## 2022-01-21 NOTE — ASSESSMENT
[Palliative Care Plan] : not applicable at this time [FreeTextEntry1] : 76 year old male with CLL evolved to Waldenstrom's macroglobulinemia complicated by CRF, nephrotic syndrome and PAF. He had progressive anemia with rising creatinine and relapse of his Waldenstrom's. He was not a candidate for Ibrutinib due to anticoagulation with Warfarin. He achieved a partial remission with marked improvement in his hemoglobin, creatinine and IgM with IRd, but had not tolerated Rituxan well and is loathe to receive it again. He previously tolerated the Ninlaro well and is now undergoing maintenance therapy with Ninlaro and Dexamethasone. Waldenstrom's is stable. Anemia is improving. Discussed possible Evusheld prophylaxis for COVID-19. He agrees to participate.\par \par Plan:\par Ninlaro/Dexamethasone if labs acceptable\par CMP, LDH, SPEP, Quant Ig, SFLC\par Acyclovir\par Omeprazole\par Eliquis per Cardiology\par COVID booster vaccine \par Arrange Evusheld prophylaxis \par RTC 3 months \par \par

## 2022-01-21 NOTE — REASON FOR VISIT
[Follow-Up Visit] : a follow-up visit for [Lymphoma] : lymphoma [Monoclonal Gammopathy] : monoclonal gammopathy [CLL] : chronic lymphocytic leukemia

## 2022-01-21 NOTE — HISTORY OF PRESENT ILLNESS
[Disease:__________________________] : Disease: [unfilled] [Rosales Stage: ___] : Rosales Stage: [unfilled] [de-identified] : PAF\par 10/2015 Waldenstrom's macroglobulinemia ; + dim lambda, CD 19, 23, FMC-7; negative CD 5, 10, 20\par Renal biopsy: lymphocytic infiltrate of renal cortex\par 10/17 IgGk and lambda BGUS\par 7/2018: Nephrotic syndrome with lambda light chains\par  [de-identified] : +lambda, CD5, CD 20; CD 38 --; FISH del 13q14.3\par SPEP gamma paraprotein M 0.4\par SPIEP IgM lambda; 10/17 IgGk, lambda [FreeTextEntry1] : 10/2015 - 1/2017  Rituxan/Velcade/Decadron; 8/18 - 1/19 Ixazomib/Decadron/Rituxan x 6 cycles to maintenance [de-identified] : He feels well. Fatigue better. Neuropathy is stable in his feet and fingers. Has chronic back pain. His right leg is minimally chronically swollen. Atrial fibrillation is stable. He notes no chest pain, fever, night sweats, headache, visual problems, SOB, abdominal pain, swollen glands, bleeding, bruising, ankle swelling. \par

## 2022-01-21 NOTE — REVIEW OF SYSTEMS
[Fatigue] : fatigue [Joint Pain] : joint pain [Negative] : Allergic/Immunologic [Lower Ext Edema] : lower extremity edema [Fever] : no fever [FreeTextEntry9] : back pain [de-identified] : Neuropathy

## 2022-01-22 LAB
ALBUMIN SERPL ELPH-MCNC: 4.3 G/DL
ALP BLD-CCNC: 140 U/L
ALT SERPL-CCNC: 10 U/L
ANION GAP SERPL CALC-SCNC: 12 MMOL/L
AST SERPL-CCNC: 12 U/L
BILIRUB SERPL-MCNC: 0.3 MG/DL
BUN SERPL-MCNC: 42 MG/DL
CALCIUM SERPL-MCNC: 9.2 MG/DL
CHLORIDE SERPL-SCNC: 109 MMOL/L
CO2 SERPL-SCNC: 20 MMOL/L
CREAT SERPL-MCNC: 3.78 MG/DL
DEPRECATED KAPPA LC FREE/LAMBDA SER: 0 RATIO
DEPRECATED KAPPA LC FREE/LAMBDA SER: 0 RATIO
GLUCOSE SERPL-MCNC: 104 MG/DL
IGA SER QL IEP: 22 MG/DL
IGG SER QL IEP: 577 MG/DL
IGM SER QL IEP: 1171 MG/DL
KAPPA LC CSF-MCNC: 226.04 MG/DL
KAPPA LC CSF-MCNC: 226.04 MG/DL
KAPPA LC SERPL-MCNC: 1.07 MG/DL
KAPPA LC SERPL-MCNC: 1.07 MG/DL
LDH SERPL-CCNC: 139 U/L
POTASSIUM SERPL-SCNC: 5.4 MMOL/L
PROT SERPL-MCNC: 6.6 G/DL
SODIUM SERPL-SCNC: 142 MMOL/L

## 2022-01-27 LAB
ALBUMIN MFR SERPL ELPH: 55 %
ALBUMIN SERPL-MCNC: 3.6 G/DL
ALBUMIN/GLOB SERPL: 1.2 RATIO
ALPHA1 GLOB MFR SERPL ELPH: 5.5 %
ALPHA1 GLOB SERPL ELPH-MCNC: 0.4 G/DL
ALPHA2 GLOB MFR SERPL ELPH: 11.1 %
ALPHA2 GLOB SERPL ELPH-MCNC: 0.7 G/DL
B-GLOBULIN MFR SERPL ELPH: 9.3 %
B-GLOBULIN SERPL ELPH-MCNC: 0.6 G/DL
GAMMA GLOB FLD ELPH-MCNC: 1.3 G/DL
GAMMA GLOB MFR SERPL ELPH: 19.1 %
INTERPRETATION SERPL IEP-IMP: NORMAL
M PROTEIN MFR SERPL ELPH: 14 %
MONOCLON BAND OBS SERPL: 0.9 G/DL
PROT SERPL-MCNC: 6.6 G/DL
PROT SERPL-MCNC: 6.6 G/DL

## 2022-03-04 ENCOUNTER — RX RENEWAL (OUTPATIENT)
Age: 77
End: 2022-03-04

## 2022-03-11 ENCOUNTER — APPOINTMENT (OUTPATIENT)
Dept: ELECTROPHYSIOLOGY | Facility: CLINIC | Age: 77
End: 2022-03-11
Payer: MEDICARE

## 2022-03-11 ENCOUNTER — NON-APPOINTMENT (OUTPATIENT)
Age: 77
End: 2022-03-11

## 2022-03-11 PROCEDURE — 93296 REM INTERROG EVL PM/IDS: CPT

## 2022-03-11 PROCEDURE — 93294 REM INTERROG EVL PM/LDLS PM: CPT

## 2022-04-06 ENCOUNTER — OUTPATIENT (OUTPATIENT)
Dept: OUTPATIENT SERVICES | Facility: HOSPITAL | Age: 77
LOS: 1 days | Discharge: HOME | End: 2022-04-06

## 2022-04-06 DIAGNOSIS — Z95.0 PRESENCE OF CARDIAC PACEMAKER: Chronic | ICD-10-CM

## 2022-04-06 DIAGNOSIS — C91.11 CHRONIC LYMPHOCYTIC LEUKEMIA OF B-CELL TYPE IN REMISSION: ICD-10-CM

## 2022-04-06 DIAGNOSIS — Z98.41 CATARACT EXTRACTION STATUS, RIGHT EYE: Chronic | ICD-10-CM

## 2022-04-06 DIAGNOSIS — Z98.890 OTHER SPECIFIED POSTPROCEDURAL STATES: Chronic | ICD-10-CM

## 2022-04-06 DIAGNOSIS — Z98.89 OTHER SPECIFIED POSTPROCEDURAL STATES: Chronic | ICD-10-CM

## 2022-04-12 ENCOUNTER — RESULT REVIEW (OUTPATIENT)
Age: 77
End: 2022-04-12

## 2022-04-12 ENCOUNTER — LABORATORY RESULT (OUTPATIENT)
Age: 77
End: 2022-04-12

## 2022-04-12 ENCOUNTER — APPOINTMENT (OUTPATIENT)
Dept: HEMATOLOGY ONCOLOGY | Facility: CLINIC | Age: 77
End: 2022-04-12
Payer: MEDICARE

## 2022-04-12 VITALS
HEART RATE: 51 BPM | OXYGEN SATURATION: 96 % | BODY MASS INDEX: 29.79 KG/M2 | RESPIRATION RATE: 14 BRPM | DIASTOLIC BLOOD PRESSURE: 68 MMHG | SYSTOLIC BLOOD PRESSURE: 104 MMHG | TEMPERATURE: 96.8 F | WEIGHT: 232 LBS

## 2022-04-12 LAB
BASOPHILS # BLD AUTO: 0.04 K/UL — SIGNIFICANT CHANGE UP (ref 0–0.2)
BASOPHILS NFR BLD AUTO: 0.7 % — SIGNIFICANT CHANGE UP (ref 0–2)
EOSINOPHIL # BLD AUTO: 0.2 K/UL — SIGNIFICANT CHANGE UP (ref 0–0.5)
EOSINOPHIL NFR BLD AUTO: 3.6 % — SIGNIFICANT CHANGE UP (ref 0–6)
HCT VFR BLD CALC: 32.4 % — LOW (ref 39–50)
HGB BLD-MCNC: 10.1 G/DL — LOW (ref 13–17)
IMM GRANULOCYTES NFR BLD AUTO: 0.4 % — SIGNIFICANT CHANGE UP (ref 0–1.5)
LYMPHOCYTES # BLD AUTO: 1.65 K/UL — SIGNIFICANT CHANGE UP (ref 1–3.3)
LYMPHOCYTES # BLD AUTO: 29.6 % — SIGNIFICANT CHANGE UP (ref 13–44)
MCHC RBC-ENTMCNC: 30.2 PG — SIGNIFICANT CHANGE UP (ref 27–34)
MCHC RBC-ENTMCNC: 31.2 G/DL — LOW (ref 32–36)
MCV RBC AUTO: 97 FL — SIGNIFICANT CHANGE UP (ref 80–100)
MONOCYTES # BLD AUTO: 0.56 K/UL — SIGNIFICANT CHANGE UP (ref 0–0.9)
MONOCYTES NFR BLD AUTO: 10.1 % — SIGNIFICANT CHANGE UP (ref 2–14)
NEUTROPHILS # BLD AUTO: 3.1 K/UL — SIGNIFICANT CHANGE UP (ref 1.8–7.4)
NEUTROPHILS NFR BLD AUTO: 55.6 % — SIGNIFICANT CHANGE UP (ref 43–77)
NRBC # BLD: 0 /100 WBCS — SIGNIFICANT CHANGE UP (ref 0–0)
PLATELET # BLD AUTO: 126 K/UL — LOW (ref 150–400)
RBC # BLD: 3.34 M/UL — LOW (ref 4.2–5.8)
RBC # FLD: 13.8 % — SIGNIFICANT CHANGE UP (ref 10.3–14.5)
WBC # BLD: 5.57 K/UL — SIGNIFICANT CHANGE UP (ref 3.8–10.5)
WBC # FLD AUTO: 5.57 K/UL — SIGNIFICANT CHANGE UP (ref 3.8–10.5)

## 2022-04-12 PROCEDURE — 99214 OFFICE O/P EST MOD 30 MIN: CPT

## 2022-04-12 RX ORDER — PNV NO.95/FERROUS FUM/FOLIC AC 28MG-0.8MG
TABLET ORAL
Refills: 0 | Status: DISCONTINUED | COMMUNITY
End: 2022-04-12

## 2022-04-12 RX ORDER — AMIODARONE HYDROCHLORIDE 200 MG/1
200 TABLET ORAL
Qty: 90 | Refills: 2 | Status: DISCONTINUED | COMMUNITY
Start: 2022-03-04 | End: 2022-04-12

## 2022-04-12 NOTE — REVIEW OF SYSTEMS
[Fatigue] : fatigue [Lower Ext Edema] : lower extremity edema [Joint Pain] : joint pain [Negative] : Allergic/Immunologic [Fever] : no fever [FreeTextEntry9] : back pain, hip pain [de-identified] : Neuropathy

## 2022-04-12 NOTE — CONSULT LETTER
[Dear  ___] : Dear  [unfilled], [Courtesy Letter:] : I had the pleasure of seeing your patient, [unfilled], in my office today. [Please see my note below.] : Please see my note below. [Sincerely,] : Sincerely, [DrIbeth  ___] : Dr. DENG [DrIbeth ___] : Dr. DENG [___] : [unfilled] [FreeTextEntry2] : Dr. Simpson [FreeTextEntry3] : Cesar Simons M.D., FACP\par Professor of Medicine\par [facility] [employer]\par Associate Chief, Division of Hematology\par [facility]\par [facility]\par [address] [place] [address]\[place] [address]\par [phone]

## 2022-04-12 NOTE — HISTORY OF PRESENT ILLNESS
[Disease:__________________________] : Disease: [unfilled] [Rosales Stage: ___] : Rosales Stage: [unfilled] [de-identified] : PAF\par 10/2015 Waldenstrom's macroglobulinemia ; + dim lambda, CD 19, 23, FMC-7; negative CD 5, 10, 20\par Renal biopsy: lymphocytic infiltrate of renal cortex\par 10/17 IgGk and lambda BGUS\par 7/2018: Nephrotic syndrome with lambda light chains\par  [de-identified] : +lambda, CD5, CD 20; CD 38 --; FISH del 13q14.3\par SPEP gamma paraprotein M 0.4\par SPIEP IgM lambda; 10/17 IgGk, lambda [FreeTextEntry1] : 10/2015 - 1/2017  Rituxan/Velcade/Decadron; 8/18 - 1/19 Ixazomib/Decadron/Rituxan x 6 cycles to maintenance [de-identified] : He feels well. Three weeks ago, he had severe diarrhea. Went to ER and was given 2 L saline. Was not found to be an infection. He thinks it may have been food poisoning. Diarrhea has resolved now. Fatigue persists. Neuropathy is stable in his feet and fingers. Has chronic back pain. Has developed constant right hip pain. Will see Orthopedist. His right leg is minimally chronically swollen, but better. No recent atrial fibrillation. He notes no chest pain, fever, night sweats, headache, visual problems, SOB, abdominal pain, swollen glands, bleeding, bruising, ankle swelling. Had COVID vaccine x 3. \par \par

## 2022-04-12 NOTE — ASSESSMENT
[Palliative Care Plan] : not applicable at this time [FreeTextEntry1] : 76 year old male with CLL evolved to Waldenstrom's macroglobulinemia complicated by CRF, nephrotic syndrome and PAF. He had progressive anemia with rising creatinine and relapse of his Waldenstrom's. He was not a candidate for Ibrutinib due to anticoagulation with Warfarin. He achieved a partial remission with marked improvement in his hemoglobin, creatinine and IgM with IRd, but had not tolerated Rituxan well and is loathe to receive it again. He previously tolerated the Ninlaro well and is now undergoing maintenance therapy with Ninlaro and Dexamethasone. Waldenstrom's is stable. Discussed possible Evusheld prophylaxis for COVID-19. He agrees to participate.\par \par Plan:\par Ninlaro/Dexamethasone if labs acceptable\par CMP, LDH, SPEP, Quant Ig, SFLC\par Acyclovir\par Omeprazole\par Eliquis per Cardiology\par Arrange Evusheld prophylaxis \par RTC 3 months \par \par

## 2022-04-12 NOTE — REASON FOR VISIT
[Follow-Up Visit] : a follow-up visit for [CLL] : chronic lymphocytic leukemia [Lymphoma] : lymphoma [Monoclonal Gammopathy] : monoclonal gammopathy [Spouse] : spouse

## 2022-04-12 NOTE — ADDENDUM
[FreeTextEntry1] : I, Rachel Laurie, acted solely as a scribe for Dr. Cesar Simons on 04/12/2022. All medical entries made by the Scribe were at my, Dr. Cesar Simons's, direction and personally dictated by me on 04/12/2022. I have reviewed the chart and agree that the record accurately reflects my personal performance of the history, physical exam, assessment and plan. I have also personally directed, reviewed, and agreed with the chart.

## 2022-04-12 NOTE — PHYSICAL EXAM
[Ambulatory and capable of all self care but unable to carry out any work activities] : Status 2- Ambulatory and capable of all self care but unable to carry out any work activities. Up and about more than 50% of waking hours [Normal] : affect appropriate [de-identified] :  Pacemaker left anterior chest wall. RRR. S1S2 normal. Gr 2/6 TESS RUSB to LLSB. No gallops. [de-identified] : 5 x 5 cm lipoma left lower back.

## 2022-04-12 NOTE — RESULTS/DATA
[FreeTextEntry1] : WBC 5570, Hgb 10.1, Hct 32.4, Platelets 126,000 Diff normal \par \par 1/21/22\par CMP K 5.4, Cl 109, CO2 20, Glu 104, BUN 42, Creatinine 3.78, ALK Phos 140, eGFR 15\par \par SPEP: M-spike 0.9. Two gamma-migrating paraproteins identified. \par SPIEP: One IgM lambda band identified and one lambda light chain identified and previously, no IgD or IgE bands identified.\par IgG 577, A 22, M 1171\par SFLC kappa 1.07, lambda 226.04, ratio 0

## 2022-04-14 LAB
ALBUMIN SERPL ELPH-MCNC: 3.8 G/DL
ALP BLD-CCNC: 112 U/L
ALT SERPL-CCNC: 9 U/L
ANION GAP SERPL CALC-SCNC: 12 MMOL/L
AST SERPL-CCNC: 13 U/L
BILIRUB SERPL-MCNC: 0.2 MG/DL
BUN SERPL-MCNC: 44 MG/DL
CALCIUM SERPL-MCNC: 8.4 MG/DL
CHLORIDE SERPL-SCNC: 113 MMOL/L
CO2 SERPL-SCNC: 18 MMOL/L
CREAT SERPL-MCNC: 3.87 MG/DL
DEPRECATED KAPPA LC FREE/LAMBDA SER: 0.01 RATIO
DEPRECATED KAPPA LC FREE/LAMBDA SER: 0.01 RATIO
EGFR: 15 ML/MIN/1.73M2
GLUCOSE SERPL-MCNC: 117 MG/DL
IGA SER QL IEP: 22 MG/DL
IGG SER QL IEP: 523 MG/DL
IGM SER QL IEP: 902 MG/DL
KAPPA LC CSF-MCNC: 235.79 MG/DL
KAPPA LC CSF-MCNC: 235.79 MG/DL
KAPPA LC SERPL-MCNC: 1.47 MG/DL
KAPPA LC SERPL-MCNC: 1.47 MG/DL
LDH SERPL-CCNC: 131 U/L
POTASSIUM SERPL-SCNC: 5 MMOL/L
PROT SERPL-MCNC: 5.9 G/DL
SODIUM SERPL-SCNC: 143 MMOL/L

## 2022-04-21 LAB
ALBUMIN MFR SERPL ELPH: 54.8 %
ALBUMIN SERPL-MCNC: 3.2 G/DL
ALBUMIN/GLOB SERPL: 1.2 RATIO
ALPHA1 GLOB MFR SERPL ELPH: 6.1 %
ALPHA1 GLOB SERPL ELPH-MCNC: 0.4 G/DL
ALPHA2 GLOB MFR SERPL ELPH: 12 %
ALPHA2 GLOB SERPL ELPH-MCNC: 0.7 G/DL
B-GLOBULIN MFR SERPL ELPH: 8.6 %
B-GLOBULIN SERPL ELPH-MCNC: 0.5 G/DL
GAMMA GLOB FLD ELPH-MCNC: 1.1 G/DL
GAMMA GLOB MFR SERPL ELPH: 18.5 %
INTERPRETATION SERPL IEP-IMP: NORMAL
M PROTEIN MFR SERPL ELPH: NORMAL
MONOCLON BAND OBS SERPL: NORMAL
PROT SERPL-MCNC: 5.9 G/DL
PROT SERPL-MCNC: 5.9 G/DL

## 2022-05-10 ENCOUNTER — APPOINTMENT (OUTPATIENT)
Dept: ELECTROPHYSIOLOGY | Facility: CLINIC | Age: 77
End: 2022-05-10
Payer: MEDICARE

## 2022-05-10 ENCOUNTER — APPOINTMENT (OUTPATIENT)
Dept: CARDIOLOGY | Facility: CLINIC | Age: 77
End: 2022-05-10
Payer: MEDICARE

## 2022-05-10 VITALS
HEART RATE: 64 BPM | OXYGEN SATURATION: 99 % | WEIGHT: 232 LBS | DIASTOLIC BLOOD PRESSURE: 68 MMHG | BODY MASS INDEX: 29.77 KG/M2 | SYSTOLIC BLOOD PRESSURE: 113 MMHG | HEIGHT: 74 IN

## 2022-05-10 PROCEDURE — 93280 PM DEVICE PROGR EVAL DUAL: CPT

## 2022-05-10 PROCEDURE — 93000 ELECTROCARDIOGRAM COMPLETE: CPT

## 2022-05-10 PROCEDURE — 99214 OFFICE O/P EST MOD 30 MIN: CPT

## 2022-05-10 RX ORDER — APIXABAN 2.5 MG/1
2.5 TABLET, FILM COATED ORAL
Qty: 60 | Refills: 5 | Status: DISCONTINUED | COMMUNITY
Start: 2021-04-23 | End: 2022-05-10

## 2022-05-10 RX ORDER — METOPROLOL SUCCINATE 50 MG/1
50 TABLET, EXTENDED RELEASE ORAL
Qty: 90 | Refills: 3 | Status: DISCONTINUED | COMMUNITY
Start: 1900-01-01 | End: 2022-05-10

## 2022-05-13 ENCOUNTER — TRANSCRIPTION ENCOUNTER (OUTPATIENT)
Age: 77
End: 2022-05-13

## 2022-05-13 NOTE — HISTORY OF PRESENT ILLNESS
[FreeTextEntry1] : Anson is here alone today. No CP, palpitations or SOB. Compliant with medications.

## 2022-05-13 NOTE — DISCUSSION/SUMMARY
[FreeTextEntry1] : The patient is a 76-year-old gentleman history mild dementia, paroxysmal  atrial fibrillation, lower extremity edema , CLL, CKD stage IV, Waldenstrom's macroglobulinemia, hypothyroidism, PPM , chronic diastolic heart failure s/p sigmoid resection for diverticulitis, oral chemo for his CLL , pelvic fracture, COVID with afib episodes.\par #1 CV- stop amiodarone, c/w metoprolol\par #2 PPM- [employer] pacemaker interrogation long episodes of afib, discussed stopping amio and increase eliquis to 5mg, watch for repeat bleeding.\par #3 Pulm- COVID pneumonia off oxygen\par #4 Heme- f/u clinic\par #5 Hypothyroid- continue levothyroxine

## 2022-06-13 ENCOUNTER — APPOINTMENT (OUTPATIENT)
Dept: ELECTROPHYSIOLOGY | Facility: CLINIC | Age: 77
End: 2022-06-13

## 2022-06-15 NOTE — ED PROVIDER NOTE - CCCP TRG CHIEF CMPLNT
Discharge instructions provided to patient , patient states understanding of discharge instructions.  
covid pos sob
17-Oct-2019 04:59:40

## 2022-06-16 ENCOUNTER — NON-APPOINTMENT (OUTPATIENT)
Age: 77
End: 2022-06-16

## 2022-07-14 ENCOUNTER — OUTPATIENT (OUTPATIENT)
Dept: OUTPATIENT SERVICES | Facility: HOSPITAL | Age: 77
LOS: 1 days | Discharge: HOME | End: 2022-07-14

## 2022-07-14 DIAGNOSIS — Z98.89 OTHER SPECIFIED POSTPROCEDURAL STATES: Chronic | ICD-10-CM

## 2022-07-14 DIAGNOSIS — Z95.0 PRESENCE OF CARDIAC PACEMAKER: Chronic | ICD-10-CM

## 2022-07-14 DIAGNOSIS — Z98.41 CATARACT EXTRACTION STATUS, RIGHT EYE: Chronic | ICD-10-CM

## 2022-07-14 DIAGNOSIS — Z98.890 OTHER SPECIFIED POSTPROCEDURAL STATES: Chronic | ICD-10-CM

## 2022-07-14 DIAGNOSIS — C91.11 CHRONIC LYMPHOCYTIC LEUKEMIA OF B-CELL TYPE IN REMISSION: ICD-10-CM

## 2022-07-26 ENCOUNTER — RESULT REVIEW (OUTPATIENT)
Age: 77
End: 2022-07-26

## 2022-07-26 ENCOUNTER — APPOINTMENT (OUTPATIENT)
Dept: HEMATOLOGY ONCOLOGY | Facility: CLINIC | Age: 77
End: 2022-07-26

## 2022-07-26 VITALS
RESPIRATION RATE: 16 BRPM | TEMPERATURE: 97 F | SYSTOLIC BLOOD PRESSURE: 114 MMHG | BODY MASS INDEX: 29.77 KG/M2 | WEIGHT: 231.99 LBS | DIASTOLIC BLOOD PRESSURE: 65 MMHG | OXYGEN SATURATION: 98 % | HEIGHT: 74 IN | HEART RATE: 52 BPM

## 2022-07-26 LAB
BASOPHILS # BLD AUTO: 0.05 K/UL — SIGNIFICANT CHANGE UP (ref 0–0.2)
BASOPHILS NFR BLD AUTO: 0.7 % — SIGNIFICANT CHANGE UP (ref 0–2)
EOSINOPHIL # BLD AUTO: 0.22 K/UL — SIGNIFICANT CHANGE UP (ref 0–0.5)
EOSINOPHIL NFR BLD AUTO: 3.2 % — SIGNIFICANT CHANGE UP (ref 0–6)
HCT VFR BLD CALC: 34.7 % — LOW (ref 39–50)
HGB BLD-MCNC: 10.8 G/DL — LOW (ref 13–17)
IMM GRANULOCYTES NFR BLD AUTO: 0.4 % — SIGNIFICANT CHANGE UP (ref 0–1.5)
LYMPHOCYTES # BLD AUTO: 1.62 K/UL — SIGNIFICANT CHANGE UP (ref 1–3.3)
LYMPHOCYTES # BLD AUTO: 23.3 % — SIGNIFICANT CHANGE UP (ref 13–44)
MCHC RBC-ENTMCNC: 30.3 PG — SIGNIFICANT CHANGE UP (ref 27–34)
MCHC RBC-ENTMCNC: 31.1 G/DL — LOW (ref 32–36)
MCV RBC AUTO: 97.5 FL — SIGNIFICANT CHANGE UP (ref 80–100)
MONOCYTES # BLD AUTO: 0.55 K/UL — SIGNIFICANT CHANGE UP (ref 0–0.9)
MONOCYTES NFR BLD AUTO: 7.9 % — SIGNIFICANT CHANGE UP (ref 2–14)
NEUTROPHILS # BLD AUTO: 4.47 K/UL — SIGNIFICANT CHANGE UP (ref 1.8–7.4)
NEUTROPHILS NFR BLD AUTO: 64.5 % — SIGNIFICANT CHANGE UP (ref 43–77)
NRBC # BLD: 0 /100 WBCS — SIGNIFICANT CHANGE UP (ref 0–0)
PLATELET # BLD AUTO: 111 K/UL — LOW (ref 150–400)
RBC # BLD: 3.56 M/UL — LOW (ref 4.2–5.8)
RBC # FLD: 13.2 % — SIGNIFICANT CHANGE UP (ref 10.3–14.5)
WBC # BLD: 6.94 K/UL — SIGNIFICANT CHANGE UP (ref 3.8–10.5)
WBC # FLD AUTO: 6.94 K/UL — SIGNIFICANT CHANGE UP (ref 3.8–10.5)

## 2022-07-26 PROCEDURE — 99214 OFFICE O/P EST MOD 30 MIN: CPT

## 2022-07-26 NOTE — ASSESSMENT
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 He had progressive anemia with rising creatinine and relapse of his Waldenstrom's. He was not a candidate for Ibrutinib due to anticoagulation with Warfarin. He achieved a partial remission with marked improvement in his hemoglobin, creatinine and IgM with IRd, but had not tolerated Rituxan well and is loathe to receive it again. He previously tolerated the Ninlaro well and is now undergoing maintenance therapy with Ninlaro and Dexamethasone. Waldenstrom's is stable. Discussed possible Evusheld prophylaxis for COVID-19. He agrees to participate.\par \par Plan:\par Ninlaro/Dexamethasone if labs acceptable\par CMP, LDH, SPEP, Quant Ig, SFLC\par Acyclovir\par Omeprazole\par Eliquis per Cardiology\par RTC 3 months \par \par

## 2022-07-26 NOTE — REASON FOR VISIT
[Follow-Up Visit] : a follow-up visit for [CLL] : chronic lymphocytic leukemia [Lymphoma] : lymphoma [Monoclonal Gammopathy] : monoclonal gammopathy

## 2022-07-26 NOTE — CONSULT LETTER
[Dear  ___] : Dear  [unfilled], [Courtesy Letter:] : I had the pleasure of seeing your patient, [unfilled], in my office today. [Please see my note below.] : Please see my note below. [Sincerely,] : Sincerely, [DrIbeth  ___] : Dr. DENG [DrIbeth ___] : Dr. DENG [___] : [unfilled] [FreeTextEntry2] : Dr. Simpson [FreeTextEntry3] : Cesar Simons M.D., FACP\par Professor of Medicine\par [employer]\par Associate Chief, Division of Hematology\par [facility]\par [facility]\par [address] [place] [address]\[place] [address]\par [phone]

## 2022-07-26 NOTE — REVIEW OF SYSTEMS
[Fatigue] : fatigue [Lower Ext Edema] : lower extremity edema [Joint Pain] : joint pain [Negative] : Allergic/Immunologic [Fever] : no fever [Chills] : no chills [Night Sweats] : no night sweats [Recent Change In Weight] : ~T no recent weight change [Vision Problems] : no vision problems [Dysphagia] : no dysphagia [Nosebleeds] : no nosebleeds [Odynophagia] : no odynophagia [Chest Pain] : no chest pain [Palpitations] : no palpitations [Joint Stiffness] : joint stiffness [Confused] : no confusion [Dizziness] : no dizziness [Fainting] : no fainting [Difficulty Walking] : difficulty walking [Anxiety] : no anxiety [Hot Flashes] : no hot flashes [FreeTextEntry9] : chronic back pain, hip pain [de-identified] : Neuropathy

## 2022-07-26 NOTE — PHYSICAL EXAM
[Ambulatory and capable of all self care but unable to carry out any work activities] : Status 2- Ambulatory and capable of all self care but unable to carry out any work activities. Up and about more than 50% of waking hours [Normal] : clear to auscultation bilaterally, no dullness, no wheezing [de-identified] : supple [de-identified] :  Pacemaker left anterior chest wall. RRR. (+) S1S2  [de-identified] : trace-+1 B/L LE edema [de-identified] : 5 x 5 cm lipoma left lower back.

## 2022-07-26 NOTE — HISTORY OF PRESENT ILLNESS
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 He states that he has been in his usual state of health with the exception that he had an episode of gout a few weeks ago when he stopped taking his Uloric. After restarting his symptoms improved and he has been taking this consistently. His ICD was interrogated and adjustments were made for A-fib and cardiology stopped his Amiodarone and Eliquis was increased to 5mg. Denies any CP, palpitations, SOB. Neuropathy is stable in his feet and fingers. LE with chronic edema no more so than usual. Denies fever, night sweats, headache, visual problems, abdominal pain, swollen glands, bleeding, or bruising.\par \par

## 2022-08-01 ENCOUNTER — RESULT REVIEW (OUTPATIENT)
Age: 77
End: 2022-08-01

## 2022-08-01 ENCOUNTER — TRANSCRIPTION ENCOUNTER (OUTPATIENT)
Age: 77
End: 2022-08-01

## 2022-08-01 ENCOUNTER — APPOINTMENT (OUTPATIENT)
Dept: HEMATOLOGY ONCOLOGY | Facility: CLINIC | Age: 77
End: 2022-08-01

## 2022-08-01 LAB
ALBUMIN SERPL ELPH-MCNC: 3.8 G/DL
ALP BLD-CCNC: 126 U/L
ALT SERPL-CCNC: 6 U/L
ANION GAP SERPL CALC-SCNC: 11 MMOL/L
AST SERPL-CCNC: 12 U/L
BASOPHILS # BLD AUTO: 0.05 K/UL — SIGNIFICANT CHANGE UP (ref 0–0.2)
BASOPHILS NFR BLD AUTO: 0.9 % — SIGNIFICANT CHANGE UP (ref 0–2)
BILIRUB SERPL-MCNC: 0.3 MG/DL
BUN SERPL-MCNC: 54 MG/DL
CALCIUM SERPL-MCNC: 8.8 MG/DL
CHLORIDE SERPL-SCNC: 113 MMOL/L
CO2 SERPL-SCNC: 18 MMOL/L
CREAT SERPL-MCNC: 4.26 MG/DL
EGFR: 14 ML/MIN/1.73M2
EOSINOPHIL # BLD AUTO: 0.16 K/UL — SIGNIFICANT CHANGE UP (ref 0–0.5)
EOSINOPHIL NFR BLD AUTO: 2.9 % — SIGNIFICANT CHANGE UP (ref 0–6)
GLUCOSE SERPL-MCNC: 118 MG/DL
HCT VFR BLD CALC: 30.9 % — LOW (ref 39–50)
HGB BLD-MCNC: 9.6 G/DL — LOW (ref 13–17)
IMM GRANULOCYTES NFR BLD AUTO: 0.4 % — SIGNIFICANT CHANGE UP (ref 0–1.5)
LDH SERPL-CCNC: 142 U/L
LYMPHOCYTES # BLD AUTO: 1.9 K/UL — SIGNIFICANT CHANGE UP (ref 1–3.3)
LYMPHOCYTES # BLD AUTO: 34.9 % — SIGNIFICANT CHANGE UP (ref 13–44)
MCHC RBC-ENTMCNC: 30.1 PG — SIGNIFICANT CHANGE UP (ref 27–34)
MCHC RBC-ENTMCNC: 31.1 G/DL — LOW (ref 32–36)
MCV RBC AUTO: 96.9 FL — SIGNIFICANT CHANGE UP (ref 80–100)
MONOCYTES # BLD AUTO: 0.58 K/UL — SIGNIFICANT CHANGE UP (ref 0–0.9)
MONOCYTES NFR BLD AUTO: 10.6 % — SIGNIFICANT CHANGE UP (ref 2–14)
NEUTROPHILS # BLD AUTO: 2.74 K/UL — SIGNIFICANT CHANGE UP (ref 1.8–7.4)
NEUTROPHILS NFR BLD AUTO: 50.3 % — SIGNIFICANT CHANGE UP (ref 43–77)
NRBC # BLD: 0 /100 WBCS — SIGNIFICANT CHANGE UP (ref 0–0)
PLATELET # BLD AUTO: 102 K/UL — LOW (ref 150–400)
POTASSIUM SERPL-SCNC: 4.7 MMOL/L
PROT SERPL-MCNC: 6.2 G/DL
RBC # BLD: 3.19 M/UL — LOW (ref 4.2–5.8)
RBC # FLD: 13.2 % — SIGNIFICANT CHANGE UP (ref 10.3–14.5)
SODIUM SERPL-SCNC: 143 MMOL/L
WBC # BLD: 5.45 K/UL — SIGNIFICANT CHANGE UP (ref 3.8–10.5)
WBC # FLD AUTO: 5.45 K/UL — SIGNIFICANT CHANGE UP (ref 3.8–10.5)

## 2022-08-04 ENCOUNTER — NON-APPOINTMENT (OUTPATIENT)
Age: 77
End: 2022-08-04

## 2022-08-04 ENCOUNTER — APPOINTMENT (OUTPATIENT)
Dept: ELECTROPHYSIOLOGY | Facility: CLINIC | Age: 77
End: 2022-08-04

## 2022-08-04 LAB
DEPRECATED KAPPA LC FREE/LAMBDA SER: 0.01 RATIO
DEPRECATED KAPPA LC FREE/LAMBDA SER: 0.01 RATIO
IGA SER QL IEP: 15 MG/DL
IGG SER QL IEP: 497 MG/DL
IGM SER QL IEP: 1120 MG/DL
KAPPA LC CSF-MCNC: 213.54 MG/DL
KAPPA LC CSF-MCNC: 213.54 MG/DL
KAPPA LC SERPL-MCNC: 1.4 MG/DL
KAPPA LC SERPL-MCNC: 1.4 MG/DL

## 2022-08-04 PROCEDURE — 93296 REM INTERROG EVL PM/IDS: CPT

## 2022-08-04 PROCEDURE — 93294 REM INTERROG EVL PM/LDLS PM: CPT

## 2022-08-07 ENCOUNTER — TRANSCRIPTION ENCOUNTER (OUTPATIENT)
Age: 77
End: 2022-08-07

## 2022-08-08 ENCOUNTER — NON-APPOINTMENT (OUTPATIENT)
Age: 77
End: 2022-08-08

## 2022-08-08 LAB
ALBUMIN MFR SERPL ELPH: 55.3 %
ALBUMIN SERPL-MCNC: 3.4 G/DL
ALBUMIN/GLOB SERPL: 1.2 RATIO
ALPHA1 GLOB MFR SERPL ELPH: 6 %
ALPHA1 GLOB SERPL ELPH-MCNC: 0.4 G/DL
ALPHA2 GLOB MFR SERPL ELPH: 10.8 %
ALPHA2 GLOB SERPL ELPH-MCNC: 0.7 G/DL
B-GLOBULIN MFR SERPL ELPH: 8.3 %
B-GLOBULIN SERPL ELPH-MCNC: 0.5 G/DL
GAMMA GLOB FLD ELPH-MCNC: 1.2 G/DL
GAMMA GLOB MFR SERPL ELPH: 19.6 %
INTERPRETATION SERPL IEP-IMP: NORMAL
M PROTEIN MFR SERPL ELPH: NORMAL
M PROTEIN SPEC IFE-MCNC: NORMAL
MONOCLON BAND OBS SERPL: NORMAL
PROT SERPL-MCNC: 6.2 G/DL
PROT SERPL-MCNC: 6.2 G/DL

## 2022-08-16 ENCOUNTER — NON-APPOINTMENT (OUTPATIENT)
Age: 77
End: 2022-08-16

## 2022-08-16 ENCOUNTER — APPOINTMENT (OUTPATIENT)
Dept: ELECTROPHYSIOLOGY | Facility: CLINIC | Age: 77
End: 2022-08-16

## 2022-08-16 PROCEDURE — 93280 PM DEVICE PROGR EVAL DUAL: CPT

## 2022-08-16 PROCEDURE — 99213 OFFICE O/P EST LOW 20 MIN: CPT | Mod: 25

## 2022-08-17 ENCOUNTER — NON-APPOINTMENT (OUTPATIENT)
Age: 77
End: 2022-08-17

## 2022-08-20 NOTE — PROCEDURE
[Pacemaker] : pacemaker [No] : not [DDDR] : DDDR [Lead Imp:  ___ohms] : lead impedance was [unfilled] ohms [Sensing Amplitude ___mv] : sensing amplitude was [unfilled] mv [___V @] : [unfilled] V [___ ms] : [unfilled] ms [de-identified] : Afib  [de-identified] : [facility] [de-identified] : Accolade MRI EL [de-identified] : [id number] [de-identified] : 9/1/2016 [de-identified] : 51 [de-identified] : 9 yrs [de-identified] : Apaced - 41%, V paced 8%

## 2022-08-20 NOTE — HISTORY OF PRESENT ILLNESS
[de-identified] : 77 year old man with a [employer] Accolade dual chamber pacemaker for sinus node dysfunction in 2016. Comorbidity includes CLL and CKD (stage IV).  Amio was stopped 5/2022.  PM interrogations have demonstrated an increase burden of AF.  \par \par The patient reports that he is feeling quite fatigued recently and has very low energy. His palpitations have resolved given the metoprolol was recently increased. Otherwise he denies any c/o chest pain, SOB, lightheadedness, dizziness or syncope. \par

## 2022-08-20 NOTE — END OF VISIT
[FreeTextEntry3] : seen, examined, and interrogated with NP. I agree with H and P, A and P.  Will plan for ablation.

## 2022-08-20 NOTE — DISCUSSION/SUMMARY
[FreeTextEntry1] : He has had breakthroughs on amiodarone, which was discontinued in May. His rates are controlled on metoprolol, which was recently increased. However, his AF burden has increased to 21% today (in May his burden was 12%). In the past, we were conservative towards ablative approach given his CLL, and CKD Stage IV. However, given his symptoms and breakthroughs on amiodarone, we discussed proceeding with ablation. After all his questions were answered to his satisfaction, he would like to proceed with ablation. The procedure will be arranged.

## 2022-09-03 ENCOUNTER — TRANSCRIPTION ENCOUNTER (OUTPATIENT)
Age: 77
End: 2022-09-03

## 2022-09-09 ENCOUNTER — NON-APPOINTMENT (OUTPATIENT)
Age: 77
End: 2022-09-09

## 2022-09-12 ENCOUNTER — INPATIENT (INPATIENT)
Facility: HOSPITAL | Age: 77
LOS: 8 days | Discharge: HOME | DRG: 274 | End: 2022-09-21
Attending: INTERNAL MEDICINE | Admitting: INTERNAL MEDICINE
Payer: MEDICARE

## 2022-09-12 VITALS
RESPIRATION RATE: 16 BRPM | HEART RATE: 51 BPM | HEIGHT: 74 IN | SYSTOLIC BLOOD PRESSURE: 127 MMHG | WEIGHT: 231.93 LBS | OXYGEN SATURATION: 100 % | DIASTOLIC BLOOD PRESSURE: 62 MMHG | TEMPERATURE: 98 F

## 2022-09-12 DIAGNOSIS — I48.0 PAROXYSMAL ATRIAL FIBRILLATION: ICD-10-CM

## 2022-09-12 DIAGNOSIS — Z95.0 PRESENCE OF CARDIAC PACEMAKER: Chronic | ICD-10-CM

## 2022-09-12 DIAGNOSIS — Z98.890 OTHER SPECIFIED POSTPROCEDURAL STATES: Chronic | ICD-10-CM

## 2022-09-12 DIAGNOSIS — Z98.41 CATARACT EXTRACTION STATUS, RIGHT EYE: Chronic | ICD-10-CM

## 2022-09-12 DIAGNOSIS — Z98.89 OTHER SPECIFIED POSTPROCEDURAL STATES: Chronic | ICD-10-CM

## 2022-09-12 LAB
ANION GAP SERPL CALC-SCNC: 10 MMOL/L — SIGNIFICANT CHANGE UP (ref 5–17)
ANION GAP SERPL CALC-SCNC: 11 MMOL/L — SIGNIFICANT CHANGE UP (ref 5–17)
ANION GAP SERPL CALC-SCNC: 12 MMOL/L — SIGNIFICANT CHANGE UP (ref 5–17)
APPEARANCE UR: CLEAR — SIGNIFICANT CHANGE UP
BACTERIA # UR AUTO: NEGATIVE — SIGNIFICANT CHANGE UP
BILIRUB UR-MCNC: NEGATIVE — SIGNIFICANT CHANGE UP
BLD GP AB SCN SERPL QL: NEGATIVE — SIGNIFICANT CHANGE UP
BUN SERPL-MCNC: 66 MG/DL — HIGH (ref 7–23)
BUN SERPL-MCNC: 69 MG/DL — HIGH (ref 7–23)
BUN SERPL-MCNC: 71 MG/DL — HIGH (ref 7–23)
CALCIUM SERPL-MCNC: 8.1 MG/DL — LOW (ref 8.4–10.5)
CALCIUM SERPL-MCNC: 8.4 MG/DL — SIGNIFICANT CHANGE UP (ref 8.4–10.5)
CALCIUM SERPL-MCNC: 8.5 MG/DL — SIGNIFICANT CHANGE UP (ref 8.4–10.5)
CHLORIDE SERPL-SCNC: 110 MMOL/L — HIGH (ref 96–108)
CHLORIDE SERPL-SCNC: 111 MMOL/L — HIGH (ref 96–108)
CHLORIDE SERPL-SCNC: 114 MMOL/L — HIGH (ref 96–108)
CHLORIDE UR-SCNC: 64 MMOL/L — SIGNIFICANT CHANGE UP
CO2 SERPL-SCNC: 16 MMOL/L — LOW (ref 22–31)
CO2 SERPL-SCNC: 17 MMOL/L — LOW (ref 22–31)
CO2 SERPL-SCNC: 18 MMOL/L — LOW (ref 22–31)
COLOR SPEC: SIGNIFICANT CHANGE UP
CREAT ?TM UR-MCNC: 135 MG/DL — SIGNIFICANT CHANGE UP
CREAT SERPL-MCNC: 6.22 MG/DL — HIGH (ref 0.5–1.3)
CREAT SERPL-MCNC: 6.23 MG/DL — HIGH (ref 0.5–1.3)
CREAT SERPL-MCNC: 6.26 MG/DL — HIGH (ref 0.5–1.3)
DIFF PNL FLD: ABNORMAL
EGFR: 9 ML/MIN/1.73M2 — LOW
EPI CELLS # UR: 1 /HPF — SIGNIFICANT CHANGE UP
GLUCOSE SERPL-MCNC: 139 MG/DL — HIGH (ref 70–99)
GLUCOSE SERPL-MCNC: 93 MG/DL — SIGNIFICANT CHANGE UP (ref 70–99)
GLUCOSE SERPL-MCNC: 98 MG/DL — SIGNIFICANT CHANGE UP (ref 70–99)
GLUCOSE UR QL: NEGATIVE — SIGNIFICANT CHANGE UP
HCT VFR BLD CALC: 29.6 % — LOW (ref 39–50)
HGB BLD-MCNC: 9.1 G/DL — LOW (ref 13–17)
HYALINE CASTS # UR AUTO: 2 /LPF — SIGNIFICANT CHANGE UP (ref 0–2)
INR BLD: 2.69 RATIO — HIGH (ref 0.88–1.16)
KETONES UR-MCNC: NEGATIVE — SIGNIFICANT CHANGE UP
LEUKOCYTE ESTERASE UR-ACNC: ABNORMAL
MAGNESIUM SERPL-MCNC: 1.5 MG/DL — LOW (ref 1.6–2.6)
MAGNESIUM SERPL-MCNC: 1.8 MG/DL — SIGNIFICANT CHANGE UP (ref 1.6–2.6)
MCHC RBC-ENTMCNC: 29.4 PG — SIGNIFICANT CHANGE UP (ref 27–34)
MCHC RBC-ENTMCNC: 30.7 GM/DL — LOW (ref 32–36)
MCV RBC AUTO: 95.5 FL — SIGNIFICANT CHANGE UP (ref 80–100)
NITRITE UR-MCNC: NEGATIVE — SIGNIFICANT CHANGE UP
NRBC # BLD: 0 /100 WBCS — SIGNIFICANT CHANGE UP (ref 0–0)
PH UR: 6 — SIGNIFICANT CHANGE UP (ref 5–8)
PHOSPHATE SERPL-MCNC: 3.5 MG/DL — SIGNIFICANT CHANGE UP (ref 2.5–4.5)
PHOSPHATE SERPL-MCNC: 4.1 MG/DL — SIGNIFICANT CHANGE UP (ref 2.5–4.5)
PLATELET # BLD AUTO: 90 K/UL — LOW (ref 150–400)
POTASSIUM SERPL-MCNC: 5.2 MMOL/L — SIGNIFICANT CHANGE UP (ref 3.5–5.3)
POTASSIUM SERPL-MCNC: 5.7 MMOL/L — HIGH (ref 3.5–5.3)
POTASSIUM SERPL-MCNC: 6.3 MMOL/L — CRITICAL HIGH (ref 3.5–5.3)
POTASSIUM SERPL-SCNC: 5.2 MMOL/L — SIGNIFICANT CHANGE UP (ref 3.5–5.3)
POTASSIUM SERPL-SCNC: 5.7 MMOL/L — HIGH (ref 3.5–5.3)
POTASSIUM SERPL-SCNC: 6.3 MMOL/L — CRITICAL HIGH (ref 3.5–5.3)
POTASSIUM UR-SCNC: 37 MMOL/L — SIGNIFICANT CHANGE UP
PROT UR-MCNC: ABNORMAL
PROTHROM AB SERPL-ACNC: 31.3 SEC — HIGH (ref 10.5–13.4)
RBC # BLD: 3.1 M/UL — LOW (ref 4.2–5.8)
RBC # FLD: 13.3 % — SIGNIFICANT CHANGE UP (ref 10.3–14.5)
RBC CASTS # UR COMP ASSIST: 7 /HPF — HIGH (ref 0–4)
RH IG SCN BLD-IMP: POSITIVE — SIGNIFICANT CHANGE UP
SODIUM SERPL-SCNC: 138 MMOL/L — SIGNIFICANT CHANGE UP (ref 135–145)
SODIUM SERPL-SCNC: 139 MMOL/L — SIGNIFICANT CHANGE UP (ref 135–145)
SODIUM SERPL-SCNC: 142 MMOL/L — SIGNIFICANT CHANGE UP (ref 135–145)
SODIUM UR-SCNC: 75 MMOL/L — SIGNIFICANT CHANGE UP
SP GR SPEC: 1.02 — SIGNIFICANT CHANGE UP (ref 1.01–1.02)
URATE SERPL-MCNC: 9.2 MG/DL — HIGH (ref 3.4–8.8)
URATE SERPL-MCNC: 9.4 MG/DL — HIGH (ref 3.4–8.8)
UROBILINOGEN FLD QL: NEGATIVE — SIGNIFICANT CHANGE UP
WBC # BLD: 6.44 K/UL — SIGNIFICANT CHANGE UP (ref 3.8–10.5)
WBC # FLD AUTO: 6.44 K/UL — SIGNIFICANT CHANGE UP (ref 3.8–10.5)
WBC UR QL: 6 /HPF — HIGH (ref 0–5)

## 2022-09-12 PROCEDURE — 74176 CT ABD & PELVIS W/O CONTRAST: CPT | Mod: 26

## 2022-09-12 PROCEDURE — 93010 ELECTROCARDIOGRAM REPORT: CPT

## 2022-09-12 RX ORDER — CLONAZEPAM 1 MG
1 TABLET ORAL AT BEDTIME
Refills: 0 | Status: DISCONTINUED | OUTPATIENT
Start: 2022-09-12 | End: 2022-09-14

## 2022-09-12 RX ORDER — TAMSULOSIN HYDROCHLORIDE 0.4 MG/1
0.4 CAPSULE ORAL AT BEDTIME
Refills: 0 | Status: DISCONTINUED | OUTPATIENT
Start: 2022-09-12 | End: 2022-09-14

## 2022-09-12 RX ORDER — PANTOPRAZOLE SODIUM 20 MG/1
40 TABLET, DELAYED RELEASE ORAL
Refills: 0 | Status: DISCONTINUED | OUTPATIENT
Start: 2022-09-12 | End: 2022-09-14

## 2022-09-12 RX ORDER — METOPROLOL TARTRATE 50 MG
1 TABLET ORAL
Qty: 0 | Refills: 0 | DISCHARGE

## 2022-09-12 RX ORDER — LANOLIN ALCOHOL/MO/W.PET/CERES
5 CREAM (GRAM) TOPICAL AT BEDTIME
Refills: 0 | Status: DISCONTINUED | OUTPATIENT
Start: 2022-09-12 | End: 2022-09-14

## 2022-09-12 RX ORDER — METOPROLOL TARTRATE 50 MG
50 TABLET ORAL DAILY
Refills: 0 | Status: DISCONTINUED | OUTPATIENT
Start: 2022-09-12 | End: 2022-09-12

## 2022-09-12 RX ORDER — METOPROLOL TARTRATE 50 MG
150 TABLET ORAL DAILY
Refills: 0 | Status: DISCONTINUED | OUTPATIENT
Start: 2022-09-12 | End: 2022-09-12

## 2022-09-12 RX ORDER — COLCHICINE 0.6 MG
1 TABLET ORAL
Qty: 0 | Refills: 0 | DISCHARGE

## 2022-09-12 RX ORDER — SODIUM BICARBONATE 1 MEQ/ML
650 SYRINGE (ML) INTRAVENOUS
Refills: 0 | Status: DISCONTINUED | OUTPATIENT
Start: 2022-09-12 | End: 2022-09-14

## 2022-09-12 RX ORDER — DONEPEZIL HYDROCHLORIDE 10 MG/1
10 TABLET, FILM COATED ORAL AT BEDTIME
Refills: 0 | Status: DISCONTINUED | OUTPATIENT
Start: 2022-09-12 | End: 2022-09-14

## 2022-09-12 RX ORDER — ACYCLOVIR SODIUM 500 MG
400 VIAL (EA) INTRAVENOUS
Refills: 0 | Status: DISCONTINUED | OUTPATIENT
Start: 2022-09-12 | End: 2022-09-14

## 2022-09-12 RX ORDER — OXYCODONE AND ACETAMINOPHEN 5; 325 MG/1; MG/1
2 TABLET ORAL EVERY 4 HOURS
Refills: 0 | Status: DISCONTINUED | OUTPATIENT
Start: 2022-09-12 | End: 2022-09-14

## 2022-09-12 RX ORDER — APIXABAN 2.5 MG/1
5 TABLET, FILM COATED ORAL
Refills: 0 | Status: DISCONTINUED | OUTPATIENT
Start: 2022-09-12 | End: 2022-09-14

## 2022-09-12 RX ORDER — MEMANTINE HYDROCHLORIDE 10 MG/1
10 TABLET ORAL EVERY 12 HOURS
Refills: 0 | Status: DISCONTINUED | OUTPATIENT
Start: 2022-09-12 | End: 2022-09-14

## 2022-09-12 RX ORDER — METOPROLOL TARTRATE 50 MG
50 TABLET ORAL
Refills: 0 | Status: DISCONTINUED | OUTPATIENT
Start: 2022-09-12 | End: 2022-09-14

## 2022-09-12 RX ORDER — SODIUM BICARBONATE 1 MEQ/ML
0.07 SYRINGE (ML) INTRAVENOUS
Qty: 75 | Refills: 0 | Status: DISCONTINUED | OUTPATIENT
Start: 2022-09-12 | End: 2022-09-14

## 2022-09-12 RX ORDER — MIRTAZAPINE 45 MG/1
30 TABLET, ORALLY DISINTEGRATING ORAL AT BEDTIME
Refills: 0 | Status: DISCONTINUED | OUTPATIENT
Start: 2022-09-12 | End: 2022-09-14

## 2022-09-12 RX ORDER — PREGABALIN 225 MG/1
1000 CAPSULE ORAL DAILY
Refills: 0 | Status: DISCONTINUED | OUTPATIENT
Start: 2022-09-12 | End: 2022-09-14

## 2022-09-12 RX ORDER — LEVOTHYROXINE SODIUM 125 MCG
150 TABLET ORAL DAILY
Refills: 0 | Status: DISCONTINUED | OUTPATIENT
Start: 2022-09-12 | End: 2022-09-14

## 2022-09-12 RX ORDER — SODIUM ZIRCONIUM CYCLOSILICATE 10 G/10G
10 POWDER, FOR SUSPENSION ORAL ONCE
Refills: 0 | Status: COMPLETED | OUTPATIENT
Start: 2022-09-12 | End: 2022-09-12

## 2022-09-12 RX ORDER — CHOLECALCIFEROL (VITAMIN D3) 125 MCG
2000 CAPSULE ORAL DAILY
Refills: 0 | Status: DISCONTINUED | OUTPATIENT
Start: 2022-09-12 | End: 2022-09-14

## 2022-09-12 RX ADMIN — DONEPEZIL HYDROCHLORIDE 10 MILLIGRAM(S): 10 TABLET, FILM COATED ORAL at 21:14

## 2022-09-12 RX ADMIN — PREGABALIN 1000 MICROGRAM(S): 225 CAPSULE ORAL at 10:47

## 2022-09-12 RX ADMIN — APIXABAN 5 MILLIGRAM(S): 2.5 TABLET, FILM COATED ORAL at 17:09

## 2022-09-12 RX ADMIN — Medication 2000 UNIT(S): at 10:46

## 2022-09-12 RX ADMIN — Medication 100 MEQ/KG/HR: at 21:17

## 2022-09-12 RX ADMIN — Medication 1 MILLIGRAM(S): at 21:14

## 2022-09-12 RX ADMIN — TAMSULOSIN HYDROCHLORIDE 0.4 MILLIGRAM(S): 0.4 CAPSULE ORAL at 21:14

## 2022-09-12 RX ADMIN — MEMANTINE HYDROCHLORIDE 10 MILLIGRAM(S): 10 TABLET ORAL at 17:09

## 2022-09-12 RX ADMIN — Medication 650 MILLIGRAM(S): at 17:09

## 2022-09-12 RX ADMIN — Medication 100 MEQ/KG/HR: at 10:47

## 2022-09-12 RX ADMIN — MIRTAZAPINE 30 MILLIGRAM(S): 45 TABLET, ORALLY DISINTEGRATING ORAL at 21:14

## 2022-09-12 RX ADMIN — Medication 5 MILLIGRAM(S): at 21:14

## 2022-09-12 RX ADMIN — SODIUM ZIRCONIUM CYCLOSILICATE 10 GRAM(S): 10 POWDER, FOR SUSPENSION ORAL at 11:20

## 2022-09-12 RX ADMIN — Medication 400 MILLIGRAM(S): at 17:09

## 2022-09-12 RX ADMIN — Medication 50 MILLIGRAM(S): at 15:45

## 2022-09-12 NOTE — PROGRESS NOTE ADULT - ASSESSMENT
78 yo male with extensive pmhx including h/o afib on eliquis s/p ppm, ckd, cll, being admitted for pantera    pantera with hyperkalema  on ckd  renal eval noted  check CT  ivf as per renal  lokelma  serial bmp    afib s/p ppm on eliquis  was planned for ablation today  now postponed due to pantera  ep f/u  tele  cont AC with eliquis  cont toprol    CLL  stable  outpt heme/onc f/u    cont other current meds    d/w pt, pts wife at bedside    Advanced care planning was discussed with patient and family.  Advanced care planning forms were reviewed and discussed as appropriate.  Differential diagnosis and plan of care discussed with patient after the evaluation.   Pain assessed and judicious use of narcotics when appropriate was discussed.  Importance of Fall prevention discussed.  Counseling on Smoking and Alcohol cessation was offered when appropriate.  Counseling on Diet, exercise, and medication compliance was done.   Approx 30 minutes spent.

## 2022-09-12 NOTE — H&P CARDIOLOGY - HISTORY OF PRESENT ILLNESS
77 year old  male, h/o [employer] PPM (Accolade MRI EL Serial # [id number]), HFpEF, pAfib (on Eliquis and Toprol), CKD G4/A3, CLL, IgG monoclonal gammopathy,  GERD, hypothyroidism who c/o increased fatigue and very little energy. His afib burden has increased from 12% in May to 21 % in August and therefore the decision to proceed with ablation was made. Pt presents today for afib ablation.                                                                                        Cards: Dr Clemente 77 year old  male, h/o [employer] PPM (Accolade MRI EL Serial # [id number]), HFpEF, pAfib (on Eliquis and Toprol), CKD G4/A3, CLL, IgG monoclonal gammopathy, GERD, hypothyroidism, dementia who c/o increased fatigue and very little energy. His afib burden has increased from 12% in May to 21 % in August and therefore the decision to proceed with ablation was made. Pt presents today for afib ablation.                                                                                        Cards: Dr Clemente

## 2022-09-12 NOTE — CONSULT NOTE ADULT - SUBJECTIVE AND OBJECTIVE BOX
HPI: Mr. Olvera is a 77 year-old man with history of multiple medical issues including mild dementia, atrial fibrillation, congestive heart failure with preserved EF, nephrolithiasis, and stage 4-5 chronic kidney disease primarily due to renal infiltration from CLL/Waldenstrom's macroglobulinemia. He presented today for scheduled AFib ablation, in setting of worsening AFib burden/associated fatigue. He is well-known to me. Of note, he has been suffering from worsening back pain of late. He underwent a renal ultrasound last week; results pending. I last saw him at my office on 8/31/22.                                                                                 PAST MEDICAL & SURGICAL HISTORY:  AFib  PPM  HFpEF  CKD4-5  Nephrolithiasis  CLL/Waldenstroms - on Ninlaro  Anxiety   GERD   Diverticulitis  Hypothyroid  Gout  BPH (benign prostatic hyperplasia)  Mild dementia  Macular degeneration  Cataract surgery  Meniscus tear-removal of Meniscus   Hernia repairx2  Laparoscopic cholecystectomy 4/2014  Appendectomy    Allergies  rituximab (Angioedema)    SOCIAL HISTORY:  Denies ETOh,Smoking,     FAMILY HISTORY:  FH: atrial fibrillation (Sibling)  FH: type 2 diabetes (Father)  FH: CAD (coronary artery disease) (Father)    REVIEW OF SYSTEMS:  CONSTITUTIONAL: (+)fatigue  EYES/ENT: No visual changes;  No vertigo or throat pain   NECK: No pain or stiffness  RESPIRATORY: No cough, wheezing, hemoptysis; No shortness of breath  CARDIOVASCULAR: No chest pain or palpitations  GASTROINTESTINAL: No abdominal or epigastric pain. No nausea, vomiting, or hematemesis; No diarrhea or constipation. No melena or hematochezia.  GENITOURINARY: No dysuria, frequency or hematuria  NEUROLOGICAL: (+)back pain  SKIN: No itching, burning, rashes, or lesions   All other review of systems is negative unless indicated above.    VITAL:  T(C): , Max: 36.6 (09-12-22 @ 06:50)  T(F): , Max: 97.8 (09-12-22 @ 06:50)  HR: 53 (09-12-22 @ 07:45)  BP: 127/62 (09-12-22 @ 07:45)  BP(mean): 83 (09-12-22 @ 06:50)  RR: 18 (09-12-22 @ 07:45)  SpO2: 100% (09-12-22 @ 07:45)    PHYSICAL EXAM:  Constitutional: NAD, Alert  HEENT: NCAT, MMM  Neck: Supple, No JVD  Respiratory: CTA-b/l  Cardiovascular: sabina s1s2  Gastrointestinal: BS+, soft, NT/ND  Extremities: No peripheral edema b/l  Neurological: no focal deficits; strength grossly intact  Back: no CVAT b/l  Skin: No rashes, no nevi    LABS:                        9.1    6.44  )-----------( x        ( 12 Sep 2022 07:35 )             29.6       (8/31/22) - BUN 54, Cr 4.3, K 4.8, HCO3 21, U 12.3; UA (+)large blood and (+)protein  (7/5/22) - BUN 43, Cr 3.78, K 5.0, HCO3 13, Alb 3.6, Hb 10.1, TSat 13; UA - trace blood, 1+ prot    IMAGING:  < from: Xray Thoracic Spine 1 View (03.27.21 @ 12:53) >  Leads of a cardiac device are noted. Lateral radiograph is essentially nondiagnostic due to overlying soft tissues. There is dextroscoliosis of the thoracic spine. No severe thoracic spine compression deformity is demonstrated. Multilevel marginal osteophyte formation is noted. There is diffuse patchy airspace opacity throughout both lungs.      (12/2021) - Renal US - no stones, no mass, no hydronephrosis          ASSESSMENT:  (1)Renal - CKD4-5 - primarily due to renal infiltration by CLL/Waldenstrom's; also with components from nephrolithiasis and from microvascular disease. GFR baseline ~15ml/min.    (2)Back pain - likely not from nephrolithiasis; recent US results pending    (3)Fatigue - at least in part from AFib. Anemia may also be playing a role; would defer to Heme-Onc Dr. Cesar Simons in anemia management    (4)Metabolic acidosis - renally mediated - on standing NaHCO3 at home    (5)AFib - potentially for ablation today      RECOMMEND:  (1)Will attempt to obtain results today from recent renal US  (2)Anemia management per Heme-Onc Dr. Cesar Simons  (3)PO NaHCO3 as taken at home  (4)Dose new meds for GFR 15ml/min  (5)No renal objection to AFib ablation today    Thank you for involving [facility] in this patient's care.    With warm regards,    Norman Ascencio MD   [facility]  Office: [phone]  Cell: [phone]             HPI: Mr. Olvera is a 77 year-old man with history of multiple medical issues including mild dementia, atrial fibrillation, congestive heart failure with preserved EF, nephrolithiasis, and stage 4-5 chronic kidney disease primarily due to renal infiltration from CLL/Waldenstrom's macroglobulinemia. He presented today for scheduled AFib ablation, in setting of worsening AFib burden/associated fatigue. He is well-known to me. Of note, he has been suffering from worsening back pain of late. He underwent a renal ultrasound last week in workup for his back pain. I last saw him at my office on 8/31/22.                                                                                 PAST MEDICAL & SURGICAL HISTORY:  AFib  PPM  HFpEF  CKD4-5  Nephrolithiasis  CLL/Waldenstroms - on Ninlaro  Anxiety   GERD   Diverticulitis  Hypothyroid  Gout  BPH (benign prostatic hyperplasia)  Mild dementia  Macular degeneration  Cataract surgery  Meniscus tear-removal of Meniscus   Hernia repairx2  Laparoscopic cholecystectomy 4/2014  Appendectomy    Allergies  rituximab (Angioedema)    SOCIAL HISTORY:  Denies ETOh,Smoking,     FAMILY HISTORY:  FH: atrial fibrillation (Sibling)  FH: type 2 diabetes (Father)  FH: CAD (coronary artery disease) (Father)    REVIEW OF SYSTEMS:  CONSTITUTIONAL: (+)fatigue  EYES/ENT: No visual changes;  No vertigo or throat pain   NECK: No pain or stiffness  RESPIRATORY: No cough, wheezing, hemoptysis; No shortness of breath  CARDIOVASCULAR: No chest pain or palpitations  GASTROINTESTINAL: No abdominal or epigastric pain. No nausea, vomiting, or hematemesis; No diarrhea or constipation. No melena or hematochezia.  GENITOURINARY: No dysuria, frequency or hematuria  NEUROLOGICAL: (+)back pain  SKIN: No itching, burning, rashes, or lesions   All other review of systems is negative unless indicated above.    VITAL:  T(C): , Max: 36.6 (09-12-22 @ 06:50)  T(F): , Max: 97.8 (09-12-22 @ 06:50)  HR: 53 (09-12-22 @ 07:45)  BP: 127/62 (09-12-22 @ 07:45)  BP(mean): 83 (09-12-22 @ 06:50)  RR: 18 (09-12-22 @ 07:45)  SpO2: 100% (09-12-22 @ 07:45)    PHYSICAL EXAM:  Constitutional: NAD, Alert  HEENT: NCAT, MMM  Neck: Supple, No JVD  Respiratory: CTA-b/l  Cardiovascular: sabina s1s2  Gastrointestinal: BS+, soft, NT/ND  Extremities: No peripheral edema b/l  Neurological: no focal deficits; strength grossly intact  Back: no CVAT b/l  Skin: No rashes, no nevi    LABS:                        9.1    6.44  )-----------( x        ( 12 Sep 2022 07:35 )             29.6       (8/31/22) - BUN 54, Cr 4.3, K 4.8, HCO3 21, U 12.3; UA (+)large blood and (+)protein  (7/5/22) - BUN 43, Cr 3.78, K 5.0, HCO3 13, Alb 3.6, Hb 10.1, TSat 13; UA - trace blood, 1+ prot    IMAGING:  < from: Xray Thoracic Spine 1 View (03.27.21 @ 12:53) >  Leads of a cardiac device are noted. Lateral radiograph is essentially nondiagnostic due to overlying soft tissues. There is dextroscoliosis of the thoracic spine. No severe thoracic spine compression deformity is demonstrated. Multilevel marginal osteophyte formation is noted. There is diffuse patchy airspace opacity throughout both lungs.      (12/2021) - Renal US - no stones, no mass, no hydronephrosis    (9/12/22) - R 9.9cm, L 12.0cm; multiple bilateral cysts; no solid lesion, calculus, or hydronephrosis, b/l.      ASSESSMENT:  (1)Renal - CKD4-5 - primarily due to renal infiltration by CLL/Waldenstrom's; also with components from nephrolithiasis and from microvascular disease. GFR baseline ~15ml/min.    (2)Back pain - not from renal colic, as evidenced by renal US 9/12/22.\    (3)Fatigue - at least in part from AFib. Anemia may also be playing a role; would defer to Heme-Onc Dr. Cesar Simons in anemia management    (4)Metabolic acidosis - renally mediated - on standing NaHCO3 at home    (5)AFib - potentially for ablation today      RECOMMEND:  (1)Anemia management per Heme-Onc Dr. Cesar Simons  (2)Back pain management per primary team; no NSAIDs  (3)PO NaHCO3 as taken at home  (4)Dose new meds for GFR 15ml/min  (5)No renal objection to AFib ablation today    Thank you for involving [facility] in this patient's care.    With warm regards,    Norman Ascencio MD   [facility]  Office: [phone]  Cell: [phone]             HPI: Mr. Olvera is a 77 year-old man with history of multiple medical issues including mild dementia, atrial fibrillation, congestive heart failure with preserved EF, nephrolithiasis, and stage 4-5 chronic kidney disease primarily due to renal infiltration from CLL/Waldenstrom's macroglobulinemia. He presented today for scheduled AFib ablation, in setting of worsening AFib burden/associated fatigue. He is well-known to me. Of note, he has been suffering from worsening back pain of late. He underwent a renal ultrasound last week in workup for his back pain. I last saw him at my office on 8/31/22.                                                                               Mr. Olvera attests to a reduced urinary stream over the past couple days. Admits to occasional bloodiness of the urine. No recent NSAID use for back pain; alludes to using prn Hydrocodone.    PAST MEDICAL & SURGICAL HISTORY:  AFib  PPM  HFpEF  CKD4-5  Nephrolithiasis  CLL/Waldenstroms - on Ninlaro  Anxiety   GERD   Diverticulitis  Hypothyroid  Gout  BPH (benign prostatic hyperplasia)  Mild dementia  Macular degeneration  Cataract surgery  Meniscus tear-removal of Meniscus   Hernia repairx2  Laparoscopic cholecystectomy 4/2014  Appendectomy    Allergies  rituximab (Angioedema)    SOCIAL HISTORY:  Denies ETOh,Smoking,     FAMILY HISTORY:  FH: atrial fibrillation (Sibling)  FH: type 2 diabetes (Father)  FH: CAD (coronary artery disease) (Father)    REVIEW OF SYSTEMS:  CONSTITUTIONAL: (+)fatigue  EYES/ENT: No visual changes;  No vertigo or throat pain   NECK: No pain or stiffness  RESPIRATORY: No cough, wheezing, hemoptysis; No shortness of breath  CARDIOVASCULAR: No chest pain or palpitations  GASTROINTESTINAL: No abdominal or epigastric pain. No nausea, vomiting, or hematemesis; No diarrhea or constipation. No melena or hematochezia.  GENITOURINARY: (+)reduced urinary stream  NEUROLOGICAL: (+)back pain  SKIN: No itching, burning, rashes, or lesions   All other review of systems is negative unless indicated above.    VITAL:  T(C): , Max: 36.6 (09-12-22 @ 06:50)  T(F): , Max: 97.8 (09-12-22 @ 06:50)  HR: 53 (09-12-22 @ 07:45)  BP: 127/62 (09-12-22 @ 07:45)  BP(mean): 83 (09-12-22 @ 06:50)  RR: 18 (09-12-22 @ 07:45)  SpO2: 100% (09-12-22 @ 07:45)    PHYSICAL EXAM:  Constitutional: NAD, Alert  HEENT: NCAT, DMM  Neck: Supple, No JVD  Respiratory: CTA-b/l  Cardiovascular: sabina reg s1s2  Gastrointestinal: BS+, soft, NT/ND  Extremities: No peripheral edema b/l  Neurological: no focal deficits; strength grossly intact  Back: no CVAT b/l  Skin: No rashes, no nevi    LABS:                        9.1    6.44  )-----------( x        ( 12 Sep 2022 07:35 )             29.6       (8/31/22) - BUN 54, Cr 4.3, K 4.8, HCO3 21, U 12.3; UA (+)large blood and (+)protein  (7/5/22) - BUN 43, Cr 3.78, K 5.0, HCO3 13, Alb 3.6, Hb 10.1, TSat 13; UA - trace blood, 1+ prot    IMAGING:  < from: Xray Thoracic Spine 1 View (03.27.21 @ 12:53) >  Leads of a cardiac device are noted. Lateral radiograph is essentially nondiagnostic due to overlying soft tissues. There is dextroscoliosis of the thoracic spine. No severe thoracic spine compression deformity is demonstrated. Multilevel marginal osteophyte formation is noted. There is diffuse patchy airspace opacity throughout both lungs.      (12/2021) - Renal US - no stones, no mass, no hydronephrosis    (9/12/22) - R 9.9cm, L 12.0cm; multiple bilateral cysts; no solid lesion, calculus, or hydronephrosis, b/l.      ASSESSMENT:  (1)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 GFR baseline ~15ml/min.    (2)LOUISE - most likely hemodynamic/prerenal. Obstruction seems unlikely given the US last week...but bloodwork from 1-2 weeks ago demonstrated his renal function to be at baseline back then as well. He has history of nephrolithiasis and his uric acid level has been quite high as well; at risk for uric acid stones. Additionally, uric acid nephropathy itself could have developed...this tends not to occur unless uric acid level goes above 15...it was 12, 1-2 weeks ago.    (3)Back pain - is it from renal colic? His US last week was negative for stones/hydro...but his bloodwork from back then also did not demonstrate LOUISE. Favor non-contrast CT over US at this point, given the LOUISE/higher suspicion of stones/associated obstruction at this point    (4)Hyperkalemia - renally mediated    (5)Metabolic acidosis - renally mediated - on standing NaHCO3 at home    (6)AFib - ablation postponed due to LOUISE/hyperkalemia      RECOMMEND:  (1)Noncontrast CT abdomen/pelvis  (2)Urine: lytes, creatinine, UA  (3)1/2NS+75meq/L NaHCO3, 100cc/h for now  (4)Serum: repeat BMP; check Mag, Phos, and Uric acid as well; trend BMP at least q12h for now  (5)Lokelma 10gm po x 1 this a.m.; repeat prn K 5.5 or higher  (6)D50/Insulin this a.m.; repeat prn K 6.0 or higher  (7)No MVI for now (contains K+)  (8)Low-K diet  (9)Meds for GFR<10  (10)No HD just yet     Thank you for involving [facility] in this patient's care.    With warm regards,    Norman Ascencio MD   [facility]  Office: [phone]  Cell: [phone]

## 2022-09-12 NOTE — H&P CARDIOLOGY - NSICDXPASTMEDICALHX_GEN_ALL_CORE_FT
PAST MEDICAL HISTORY:  Anxiety disorder     Atrial fibrillation     BPH (benign prostatic hyperplasia)     Bradycardia, drug induced     Chronic heart failure with preserved ejection fraction     CKD (chronic kidney disease)     CLL (chronic lymphocytic leukemia) in remission    Diverticulitis     GERD (gastroesophageal reflux disease)     Gout     History of macular degeneration     [place] (hard of hearing)     Hypothyroid     IgG monoclonal gammopathy     Memory deficit     Nephrolithiasis     Waldenstrom macroglobulinemia

## 2022-09-12 NOTE — PRE-ANESTHESIA EVALUATION ADULT - NSANTHPMHFT_GEN_ALL_CORE
77 year old  male, h/o [place] Sci PPM ([employer] MRI EL Serial # [id number]), HFpEF, pAfib (on Eliquis and Toprol), CKD G4/A3, CLL, IgG monoclonal gammopathy, GERD, hypothyroidism, dementia.

## 2022-09-12 NOTE — PROGRESS NOTE ADULT - SUBJECTIVE AND OBJECTIVE BOX
NICOLAS CIFUENTES  77y Male  MRN:[id number]    Patient is a 77y old  Male who presents with a chief complaint of pantera    HPI:  77 year old  male, h/o [place] Sci PPM ([employer] MRI EL Serial # [id number]), HFpEF, pAfib (on Eliquis and Toprol), CKD G4/A3, CLL, IgG monoclonal gammopathy, GERD, hypothyroidism, dementia who c/o increased fatigue and very little energy. His afib burden has increased from 12% in May to 21 % in August and therefore the decision to proceed with ablation was made. Pt presents today for afib ablation.             on w/u pt found to have pantera. therefore ablation was cancelled. now being admitted for pantera                                                                           Cards: Dr Clemente (12 Sep 2022 06:50)      Patient seen and evaluated at bedside. No acute events overnight except as noted.    Interval HPI: no acute c/o    PAST MEDICAL & SURGICAL HISTORY:  CLL (chronic lymphocytic leukemia)  in remission      Anxiety disorder      GERD (gastroesophageal reflux disease)      Atrial fibrillation      Diverticulitis      Waldenstrom macroglobulinemia      Bradycardia, drug induced      Hypothyroid      Nephrolithiasis      CKD (chronic kidney disease)      Gout      [place] (hard of hearing)      BPH (benign prostatic hyperplasia)      Memory deficit      IgG monoclonal gammopathy      History of macular degeneration      Chronic heart failure with preserved ejection fraction      Meniscus tear  s/p removal of Meniscus 8 months ago      S/P hernia repair  x2      History of laparoscopic cholecystectomy  4/2014      History of cataract surgery, right  IOL      History of appendectomy      History of cardiac pacemaker in situ          REVIEW OF SYSTEMS:  as per hpi     VITALS:  Vital Signs Last 24 Hrs  T(C): 36.6 (12 Sep 2022 07:45), Max: 36.6 (12 Sep 2022 06:50)  T(F): 97.8 (12 Sep 2022 06:52), Max: 97.8 (12 Sep 2022 06:50)  HR: 53 (12 Sep 2022 07:45) (51 - 53)  BP: 127/62 (12 Sep 2022 07:45) (127/62 - 127/62)  BP(mean): 83 (12 Sep 2022 06:50) (83 - 83)  RR: 18 (12 Sep 2022 07:45) (16 - 18)  SpO2: 100% (12 Sep 2022 07:45) (100% - 100%)    Parameters below as of 12 Sep 2022 06:52  Patient On (Oxygen Delivery Method): room air      CAPILLARY BLOOD GLUCOSE        I&O's Summary    12 Sep 2022 07:01  -  12 Sep 2022 11:58  --------------------------------------------------------  IN: 0 mL / OUT: 0 mL / NET: 0 mL        PHYSICAL EXAM:  GENERAL: NAD, well-developed  HEAD:  Atraumatic, Normocephalic  EYES: EOMI, PERRLA, conjunctiva and sclera clear  NECK: Supple, No JVD  CHEST/LUNG: Clear to auscultation bilaterally; No wheeze  HEART: S1, S2; No murmurs, rubs, or gallops  ABDOMEN: Soft, Nontender, Nondistended; Bowel sounds present  EXTREMITIES:  2+ Peripheral Pulses, No clubbing, cyanosis, or edema  PSYCH: Normal affect  NEUROLOGY: AAOX3; non-focal  SKIN: No rashes or lesions    Consultant(s) Notes Reviewed:  [x ] YES  [ ] NO  Care Discussed with Consultants/Other Providers [ x] YES  [ ] NO    MEDS:  MEDICATIONS  (STANDING):  acyclovir   Oral Tab/Cap 400 milliGRAM(s) Oral two times a day  apixaban 5 milliGRAM(s) Oral two times a day  cholecalciferol 2000 Unit(s) Oral daily  clonazePAM  Tablet 1 milliGRAM(s) Oral at bedtime  cyanocobalamin 1000 MICROGram(s) Oral daily  donepezil 10 milliGRAM(s) Oral at bedtime  levothyroxine 150 MICROGram(s) Oral daily  melatonin 5 milliGRAM(s) Oral at bedtime  memantine 10 milliGRAM(s) Oral every 12 hours  metoprolol succinate  milliGRAM(s) Oral daily  mirtazapine 30 milliGRAM(s) Oral at bedtime  pantoprazole    Tablet 40 milliGRAM(s) Oral before breakfast  sodium bicarbonate 650 milliGRAM(s) Oral two times a day  sodium bicarbonate  Infusion 0.071 mEq/kG/Hr (100 mL/Hr) IV Continuous <Continuous>  tamsulosin 0.4 milliGRAM(s) Oral at bedtime    MEDICATIONS  (PRN):  oxycodone    5 mG/acetaminophen 325 mG 2 Tablet(s) Oral every 4 hours PRN Moderate Pain (4 - 6)    ALLERGIES:  rituximab (Angioedema)      LABS:                        9.1    6.44  )-----------( 90       ( 12 Sep 2022 07:35 )             29.6     09-12    142  |  114<H>  |  66<H>  ----------------------------<  93  5.7<H>   |  18<L>  |  6.26<H>    Ca    8.4      12 Sep 2022 09:48  Phos  4.1     09-12  Mg     1.8     09-12      PT/INR - ( 12 Sep 2022 07:35 )   PT: 31.3 sec;   INR: 2.69 ratio

## 2022-09-12 NOTE — H&P CARDIOLOGY - NSICDXFAMILYHX_GEN_ALL_CORE_FT
FAMILY HISTORY:  No pertinent family history in first degree relatives     FAMILY HISTORY:  Father  Still living? No  FH: CAD (coronary artery disease), Age at diagnosis: Age Unknown  FH: type 2 diabetes, Age at diagnosis: Age Unknown    Sibling  Still living? Yes, Estimated age: Age Unknown  FH: atrial fibrillation, Age at diagnosis: Age Unknown

## 2022-09-12 NOTE — PATIENT PROFILE ADULT - FALL HARM RISK - HARM RISK INTERVENTIONS

## 2022-09-13 ENCOUNTER — TRANSCRIPTION ENCOUNTER (OUTPATIENT)
Age: 77
End: 2022-09-13

## 2022-09-13 LAB
ANION GAP SERPL CALC-SCNC: 10 MMOL/L — SIGNIFICANT CHANGE UP (ref 5–17)
BUN SERPL-MCNC: 67 MG/DL — HIGH (ref 7–23)
CALCIUM SERPL-MCNC: 8.1 MG/DL — LOW (ref 8.4–10.5)
CHLORIDE SERPL-SCNC: 110 MMOL/L — HIGH (ref 96–108)
CO2 SERPL-SCNC: 20 MMOL/L — LOW (ref 22–31)
CREAT SERPL-MCNC: 6.1 MG/DL — HIGH (ref 0.5–1.3)
EGFR: 9 ML/MIN/1.73M2 — LOW
GLUCOSE SERPL-MCNC: 104 MG/DL — HIGH (ref 70–99)
MAGNESIUM SERPL-MCNC: 1.6 MG/DL — SIGNIFICANT CHANGE UP (ref 1.6–2.6)
PHOSPHATE SERPL-MCNC: 3.6 MG/DL — SIGNIFICANT CHANGE UP (ref 2.5–4.5)
POTASSIUM SERPL-MCNC: 4.9 MMOL/L — SIGNIFICANT CHANGE UP (ref 3.5–5.3)
POTASSIUM SERPL-SCNC: 4.9 MMOL/L — SIGNIFICANT CHANGE UP (ref 3.5–5.3)
SARS-COV-2 RNA SPEC QL NAA+PROBE: SIGNIFICANT CHANGE UP
SODIUM SERPL-SCNC: 140 MMOL/L — SIGNIFICANT CHANGE UP (ref 135–145)
URATE SERPL-MCNC: 9.6 MG/DL — HIGH (ref 3.4–8.8)

## 2022-09-13 PROCEDURE — 99221 1ST HOSP IP/OBS SF/LOW 40: CPT

## 2022-09-13 RX ADMIN — Medication 150 MICROGRAM(S): at 05:13

## 2022-09-13 RX ADMIN — TAMSULOSIN HYDROCHLORIDE 0.4 MILLIGRAM(S): 0.4 CAPSULE ORAL at 22:12

## 2022-09-13 RX ADMIN — PANTOPRAZOLE SODIUM 40 MILLIGRAM(S): 20 TABLET, DELAYED RELEASE ORAL at 05:12

## 2022-09-13 RX ADMIN — Medication 100 MEQ/KG/HR: at 17:50

## 2022-09-13 RX ADMIN — OXYCODONE AND ACETAMINOPHEN 2 TABLET(S): 5; 325 TABLET ORAL at 22:30

## 2022-09-13 RX ADMIN — Medication 1 MILLIGRAM(S): at 22:11

## 2022-09-13 RX ADMIN — Medication 650 MILLIGRAM(S): at 17:54

## 2022-09-13 RX ADMIN — APIXABAN 5 MILLIGRAM(S): 2.5 TABLET, FILM COATED ORAL at 17:09

## 2022-09-13 RX ADMIN — Medication 400 MILLIGRAM(S): at 05:13

## 2022-09-13 RX ADMIN — MIRTAZAPINE 30 MILLIGRAM(S): 45 TABLET, ORALLY DISINTEGRATING ORAL at 22:12

## 2022-09-13 RX ADMIN — MEMANTINE HYDROCHLORIDE 10 MILLIGRAM(S): 10 TABLET ORAL at 05:12

## 2022-09-13 RX ADMIN — APIXABAN 5 MILLIGRAM(S): 2.5 TABLET, FILM COATED ORAL at 05:13

## 2022-09-13 RX ADMIN — Medication 5 MILLIGRAM(S): at 22:11

## 2022-09-13 RX ADMIN — Medication 50 MILLIGRAM(S): at 22:13

## 2022-09-13 RX ADMIN — Medication 50 MILLIGRAM(S): at 13:15

## 2022-09-13 RX ADMIN — Medication 50 MILLIGRAM(S): at 05:12

## 2022-09-13 RX ADMIN — Medication 650 MILLIGRAM(S): at 05:13

## 2022-09-13 RX ADMIN — Medication 400 MILLIGRAM(S): at 17:09

## 2022-09-13 RX ADMIN — OXYCODONE AND ACETAMINOPHEN 2 TABLET(S): 5; 325 TABLET ORAL at 21:57

## 2022-09-13 RX ADMIN — DONEPEZIL HYDROCHLORIDE 10 MILLIGRAM(S): 10 TABLET, FILM COATED ORAL at 22:11

## 2022-09-13 RX ADMIN — MEMANTINE HYDROCHLORIDE 10 MILLIGRAM(S): 10 TABLET ORAL at 17:09

## 2022-09-13 NOTE — PROGRESS NOTE ADULT - ASSESSMENT
77 year old  male, h/o [place] Sci PPM ([employer] MRI EL Serial # [id number]), HFpEF, pAfib (on Eliquis and Toprol), CKD G4/A3, CLL, IgG monoclonal gammopathy, GERD, hypothyroidism, dementia who c/o increased fatigue and very little energy. His afib burden has increased from 12% in May to 21 % in August and therefore the decision to proceed with ablation was made. Pt presents 9/12 for afib ablation however postponed due to LOUISE on CKD found to be obstructive in etiology    - Agree with ongoing workup of obstructive LOUISE  - Continue Eliquis 5 bid  - Continue Toprol XL 50 mg 77 year old  male, h/o [place] Sci PPM ([employer] MRI EL Serial # [id number]), HFpEF, pAfib (on Eliquis and Toprol), CKD G4/A3, CLL, IgG monoclonal gammopathy, GERD, hypothyroidism, dementia who c/o increased fatigue and very little energy. His afib burden has increased from 12% in May to 21 % in August and therefore the decision to proceed with ablation was made. Pt presents 9/12 for afib ablation however postponed due to LOUISE on CKD found to be obstructive in etiology.     - Agree with ongoing workup of obstructive LOUISE  - Continue Eliquis 5 bid  - Continue Toprol  mg qd 77 year old  male, h/o [place] Sci PPM ([employer] MRI EL Serial # [id number]), HFpEF, pAfib (on Eliquis and Toprol), CKD G4/A3, CLL, IgG monoclonal gammopathy, GERD, hypothyroidism, dementia who c/o increased fatigue and very little energy. His afib burden has increased from 12% in May to 21 % in August and therefore the decision to proceed with ablation was made. Pt presents 9/12 for afib ablation however postponed due to LOUISE on CKD found to be obstructive in etiology.     - Agree with ongoing workup of obstructive LOUISE  - Continue Eliquis 5 bid  - Continue Toprol  mg qd  - Will postpone elective ablation procedure until Cr back to baseline  - Discussed with attending Dr. Sam

## 2022-09-13 NOTE — CONSULT NOTE ADULT - SUBJECTIVE AND OBJECTIVE BOX
HPI:  Patient is a 78 yo male with CKD, CLL, Waldenstrom's macroglobulinemia, Afib (on Eliquis and Toprol), HFpEF, PPM placed, and hypothyroidism who presented for an elective ablation for Afib burden/associated fatigue. His ablation was postponed due to LOUISE on CKD. There was no improvement of LOUISE despite IV fluids, and a CT abd/pelvis was subsequently obtained, which showed a 4 mm obstructive calculus in the distal right ureter and moderate right hydroureteronephrosis. Creatinine during this admission have been in the 6; two months ago was ~4. Patient endorses right low back pain in the recent weeks. Denies fever, chills, nausea, vomiting, flank pain, or gross hematuria.    Has been afebrile, bradycardic in the 50s, normotensive, satting 100% on RA. WBC is WNL. Urinalysis shows large blood, small leukocyte esterase, negative nitrite and bacteria.    PAST MEDICAL & SURGICAL HISTORY:  CLL (chronic lymphocytic leukemia)  in remission      Anxiety disorder      GERD (gastroesophageal reflux disease)      Atrial fibrillation      Diverticulitis      Waldenstrom macroglobulinemia      Bradycardia, drug induced      Hypothyroid      Nephrolithiasis      CKD (chronic kidney disease)      Gout      [place] (hard of hearing)      BPH (benign prostatic hyperplasia)      Memory deficit      IgG monoclonal gammopathy      History of macular degeneration      Chronic heart failure with preserved ejection fraction      Meniscus tear  s/p removal of Meniscus 8 months ago      S/P hernia repair  x2      History of laparoscopic cholecystectomy  2014      History of cataract surgery, right  IOL      History of appendectomy      History of cardiac pacemaker in situ        FAMILY HISTORY:  FH: atrial fibrillation (Sibling)    FH: type 2 diabetes (Father)    FH: CAD (coronary artery disease) (Father)     No known  malignancy   SOCIAL HISTORY: Retired   Tobacco hx: smoker/nonsmoker   MEDICATIONS  (STANDING):  acyclovir   Oral Tab/Cap 400 milliGRAM(s) Oral two times a day  apixaban 5 milliGRAM(s) Oral two times a day  cholecalciferol 2000 Unit(s) Oral daily  clonazePAM  Tablet 1 milliGRAM(s) Oral at bedtime  cyanocobalamin 1000 MICROGram(s) Oral daily  donepezil 10 milliGRAM(s) Oral at bedtime  levothyroxine 150 MICROGram(s) Oral daily  melatonin 5 milliGRAM(s) Oral at bedtime  memantine 10 milliGRAM(s) Oral every 12 hours  metoprolol succinate ER 50 milliGRAM(s) Oral <User Schedule>  mirtazapine 30 milliGRAM(s) Oral at bedtime  pantoprazole    Tablet 40 milliGRAM(s) Oral before breakfast  sodium bicarbonate 650 milliGRAM(s) Oral two times a day  sodium bicarbonate  Infusion 0.071 mEq/kG/Hr (100 mL/Hr) IV Continuous <Continuous>  tamsulosin 0.4 milliGRAM(s) Oral at bedtime    MEDICATIONS  (PRN):  oxycodone    5 mG/acetaminophen 325 mG 2 Tablet(s) Oral every 4 hours PRN Moderate Pain (4 - 6)    Allergies    rituximab (Angioedema)    Intolerances        REVIEW OF SYSTEMS: Pertinent positives and negatives as stated in HPI, otherwise negative    Vital signs  T(C): 36.6 (22 @ 04:41), Max: 36.6 (22 @ 04:41)  HR: 74 (22 @ 04:41)  BP: 101/59 (22 @ 04:41)  SpO2: 97% (22 @ 04:41)  Wt(kg): --    Physical Exam  Gen: NAD. Resting comfortably in bed.   HEENT: normocephalic, atraumatic, no scleral icterus  Pulm: No respiratory distress, no subcostal retractions, no accessory muscle use   CV: No JVD  Abd: Soft, NT, ND, no rebound tenderness or guarding  : No suprapubic tenderness.  MSK:  No CVAT, Moving all extremities, full ROM in all extremities.  NEURO: A&Ox3, no focal neurological deficits, CN 2-12 grossly intact  SKIN: warm, dry, no rash.    LABS:         @ 09:22    WBC --    / Hct --    / SCr 6.10      @ 21:20    WBC --    / Hct --    / SCr 6.23         140  |  110<H>  |  67<H>  ----------------------------<  104<H>  4.9   |  20<L>  |  6.10<H>    Ca    8.1<L>      13 Sep 2022 09:22  Phos  3.6       Mg     1.6           PT/INR - ( 12 Sep 2022 07:35 )   PT: 31.3 sec;   INR: 2.69 ratio           Urinalysis Basic - ( 12 Sep 2022 12:26 )    Color: Light Yellow / Appearance: Clear / S.017 / pH: x  Gluc: x / Ketone: Negative  / Bili: Negative / Urobili: Negative   Blood: x / Protein: 30 mg/dL / Nitrite: Negative   Leuk Esterase: Small / RBC: 7 /hpf / WBC 6 /HPF   Sq Epi: x / Non Sq Epi: 1 /hpf / Bacteria: Negative        Urine Cx: Urinalysis Basic - ( 12 Sep 2022 12:26 )    Color: Light Yellow / Appearance: Clear / S.017 / pH: x  Gluc: x / Ketone: Negative  / Bili: Negative / Urobili: Negative   Blood: x / Protein: 30 mg/dL / Nitrite: Negative   Leuk Esterase: Small / RBC: 7 /hpf / WBC 6 /HPF   Sq Epi: x / Non Sq Epi: 1 /hpf / Bacteria: Negative      Radiology:  INTERPRETATION:  CLINICAL INFORMATION: History of CLL. Acute on chronic   renal insufficiency, hematuria.    COMPARISON: CT chest abdomen pelvis dated 3/16/2021.    CONTRAST/COMPLICATIONS:  IV Contrast: NONE  Oral Contrast: NONE  Complications: None reported at time of study completion    PROCEDURE:  CT of the Abdomen and Pelvis was performed.  Sagittal and coronal reformats were performed.    FINDINGS:  LOWER CHEST: Transvenous pacemaker. Small left pleural effusion.    LIVER: Within normal limits.  BILE DUCTS: Normal caliber.  GALLBLADDER: Surgically absent.  SPLEEN: Within normal limits.  PANCREAS: Within normal limits.  ADRENALS: Within normal limits.  KIDNEYS/URETERS: Bilateral renal cortical thinning. Multiple bilateral   renal cortical cysts, some of which demonstrate mural or septal   calcification. 6 mm nonobstructive calculus upper pole right kidney.   Subcentimeter calculiin both renal pelves. Mild left and moderate right   hydronephrosis, new since prior study. The right ureter is mildly dilated   and there is a 4 mm obstructive calculus in the distal right ureter (3,   122).    BLADDER: Within normal limits.  REPRODUCTIVE ORGANS: Prostate within normal limits.    BOWEL: No bowel obstruction. Segmental resection rectosigmoid colon.   Appendix is not visualized. No evidence of inflammation in the pericecal   region.  PERITONEUM: No ascites. Soft tissue infiltration central small bowel   mesentery slightly increased from prior exam.  VESSELS: Atheromatous calcifications.  RETROPERITONEUM/LYMPH NODES: Persistent retrocaval, retroperitoneal,   mesenteric and pelvic lymphadenopathy with reference nodes as follows:  Left para-aortic lymph node (3, 63) measures 2.4 x 2.1 cm, previously 2.3   x 2.2 cm  Right external iliac lymph node (3, 131) measures 3.5 x 1.4 cm,   previously 2.6 x 1.4 cm.  ABDOMINAL WALL: Within normal limits.  BONES: Healed fractures of rightsuperior and inferior pubic rami. Disc   degeneration lumbosacral junction.    IMPRESSION:  Bilateral urolithiasis with 4 mm obstructive calculus in the distal right   ureter and moderate right hydroureteronephrosis.    Abdominal lymphadenopathy without significant interval change, consistent   with known lymphoproliferative disorder..        --- End of Report ---     HPI:  Patient is a 78 yo male with CKD, CLL, Waldenstrom's macroglobulinemia, Afib (on Eliquis and Toprol), HFpEF, PPM placed, and hypothyroidism who presented for an elective ablation for Afib burden/associated fatigue. His ablation was postponed due to LOUISE on CKD. There was no improvement of LOUISE despite IV fluids, and a CT abd/pelvis was subsequently obtained, which showed a 4 mm obstructive calculus in the distal right ureter and moderate right hydroureteronephrosis. Creatinine during this admission have been in the 6; two months ago was ~4. Patient endorses right low back pain in the recent weeks. Denies fever, chills, nausea, vomiting, flank pain, or gross hematuria.    Has been afebrile, bradycardic in the 50s, normotensive, satting 100% on RA. WBC is WNL. Urinalysis shows large blood, small leukocyte esterase, negative nitrite and bacteria.    PAST MEDICAL & SURGICAL HISTORY:  CLL (chronic lymphocytic leukemia)  in remission      Anxiety disorder      GERD (gastroesophageal reflux disease)      Atrial fibrillation      Diverticulitis      Waldenstrom macroglobulinemia      Bradycardia, drug induced      Hypothyroid      Nephrolithiasis      CKD (chronic kidney disease)      Gout      [place] (hard of hearing)      BPH (benign prostatic hyperplasia)      Memory deficit      IgG monoclonal gammopathy      History of macular degeneration      Chronic heart failure with preserved ejection fraction      Meniscus tear  s/p removal of Meniscus 8 months ago      S/P hernia repair  x2      History of laparoscopic cholecystectomy  2014      History of cataract surgery, right  IOL      History of appendectomy      History of cardiac pacemaker in situ        FAMILY HISTORY:  FH: atrial fibrillation (Sibling)    FH: type 2 diabetes (Father)    FH: CAD (coronary artery disease) (Father)     No known  malignancy   SOCIAL HISTORY: Retired   Tobacco hx: smoker/nonsmoker   MEDICATIONS  (STANDING):  acyclovir   Oral Tab/Cap 400 milliGRAM(s) Oral two times a day  apixaban 5 milliGRAM(s) Oral two times a day  cholecalciferol 2000 Unit(s) Oral daily  clonazePAM  Tablet 1 milliGRAM(s) Oral at bedtime  cyanocobalamin 1000 MICROGram(s) Oral daily  donepezil 10 milliGRAM(s) Oral at bedtime  levothyroxine 150 MICROGram(s) Oral daily  melatonin 5 milliGRAM(s) Oral at bedtime  memantine 10 milliGRAM(s) Oral every 12 hours  metoprolol succinate ER 50 milliGRAM(s) Oral <User Schedule>  mirtazapine 30 milliGRAM(s) Oral at bedtime  pantoprazole    Tablet 40 milliGRAM(s) Oral before breakfast  sodium bicarbonate 650 milliGRAM(s) Oral two times a day  sodium bicarbonate  Infusion 0.071 mEq/kG/Hr (100 mL/Hr) IV Continuous <Continuous>  tamsulosin 0.4 milliGRAM(s) Oral at bedtime    MEDICATIONS  (PRN):  oxycodone    5 mG/acetaminophen 325 mG 2 Tablet(s) Oral every 4 hours PRN Moderate Pain (4 - 6)    Allergies    rituximab (Angioedema)    Intolerances        REVIEW OF SYSTEMS: Pertinent positives and negatives as stated in HPI, otherwise negative    Vital signs  T(C): 36.6 (22 @ 04:41), Max: 36.6 (22 @ 04:41)  HR: 74 (22 @ 04:41)  BP: 101/59 (22 @ 04:41)  SpO2: 97% (22 @ 04:41)  Wt(kg): --    Physical Exam  Gen: NAD. Resting comfortably in bed.   HEENT: normocephalic, atraumatic, no scleral icterus  Pulm: No respiratory distress, no subcostal retractions, no accessory muscle use   CV: No JVD  Abd: Soft, NT, ND, no rebound tenderness or guarding  : No suprapubic tenderness.  MSK:  No CVAT, Moving all extremities, full ROM in all extremities.  NEURO: A&Ox3, no focal neurological deficits, CN 2-12 grossly intact  SKIN: warm, dry, no rash.    LABS:         @ 09:22    WBC --    / Hct --    / SCr 6.10      @ 21:20    WBC --    / Hct --    / SCr 6.23         140  |  110<H>  |  67<H>  ----------------------------<  104<H>  4.9   |  20<L>  |  6.10<H>    Ca    8.1<L>      13 Sep 2022 09:22  Phos  3.6       Mg     1.6           PT/INR - ( 12 Sep 2022 07:35 )   PT: 31.3 sec;   INR: 2.69 ratio           Urinalysis Basic - ( 12 Sep 2022 12:26 )    Color: Light Yellow / Appearance: Clear / S.017 / pH: x  Gluc: x / Ketone: Negative  / Bili: Negative / Urobili: Negative   Blood: x / Protein: 30 mg/dL / Nitrite: Negative   Leuk Esterase: Small / RBC: 7 /hpf / WBC 6 /HPF   Sq Epi: x / Non Sq Epi: 1 /hpf / Bacteria: Negative        Urine Cx: Urinalysis Basic - ( 12 Sep 2022 12:26 )    Color: Light Yellow / Appearance: Clear / S.017 / pH: x  Gluc: x / Ketone: Negative  / Bili: Negative / Urobili: Negative   Blood: x / Protein: 30 mg/dL / Nitrite: Negative   Leuk Esterase: Small / RBC: 7 /hpf / WBC 6 /HPF   Sq Epi: x / Non Sq Epi: 1 /hpf / Bacteria: Negative      Radiology:  INTERPRETATION:  CLINICAL INFORMATION: History of CLL. Acute on chronic   renal insufficiency, hematuria.    COMPARISON: CT chest abdomen pelvis dated 3/16/2021.    CONTRAST/COMPLICATIONS:  IV Contrast: NONE  Oral Contrast: NONE  Complications: None reported at time of study completion    PROCEDURE:  CT of the Abdomen and Pelvis was performed.  Sagittal and coronal reformats were performed.    FINDINGS:  LOWER CHEST: Transvenous pacemaker. Small left pleural effusion.    LIVER: Within normal limits.  BILE DUCTS: Normal caliber.  GALLBLADDER: Surgically absent.  SPLEEN: Within normal limits.  PANCREAS: Within normal limits.  ADRENALS: Within normal limits.  KIDNEYS/URETERS: Bilateral renal cortical thinning. Multiple bilateral   renal cortical cysts, some of which demonstrate mural or septal   calcification. 6 mm nonobstructive calculus upper pole right kidney.   Subcentimeter calculiin both renal pelves. Mild left and moderate right   hydronephrosis, new since prior study. The right ureter is mildly dilated   and there is a 4 mm obstructive calculus in the distal right ureter (3,   122).    BLADDER: Within normal limits.  REPRODUCTIVE ORGANS: Prostate within normal limits.    BOWEL: No bowel obstruction. Segmental resection rectosigmoid colon.   Appendix is not visualized. No evidence of inflammation in the pericecal   region.  PERITONEUM: No ascites. Soft tissue infiltration central small bowel   mesentery slightly increased from prior exam.  VESSELS: Atheromatous calcifications.  RETROPERITONEUM/LYMPH NODES: Persistent retrocaval, retroperitoneal,   mesenteric and pelvic lymphadenopathy with reference nodes as follows:  Left para-aortic lymph node (3, 63) measures 2.4 x 2.1 cm, previously 2.3   x 2.2 cm  Right external iliac lymph node (3, 131) measures 3.5 x 1.4 cm,   previously 2.6 x 1.4 cm.  ABDOMINAL WALL: Within normal limits.  BONES: Healed fractures of rightsuperior and inferior pubic rami. Disc   degeneration lumbosacral junction.    IMPRESSION:  Bilateral urolithiasis with 4 mm obstructive calculus in the distal right   ureter and moderate right hydroureteronephrosis.    Abdominal lymphadenopathy without significant interval change, consistent   with known lymphoproliferative disorder..        --- End of Report ---

## 2022-09-13 NOTE — CONSULT NOTE ADULT - ASSESSMENT
76 yo male with CKD, CLL, Waldenstrom's macroglobulinemia, Afib (on Eliquis and Toprol), HFpEF, PPM placed, and hypothyroidism who presented for an elective ablation for Afib burden/associated fatigue, which was postponed due to non-improving LOUISE and worsened Cr compared to baseline found to have a 4mm obstructive calculus in the distal right ureter.    Obstructive ureteral stone  --4mm in distal right ureter with moderate right hydroureteronephrosis  --Patient will likely need ureteral stent placed urgently  --Keep NPO 78 yo male with CKD, CLL, Waldenstrom's macroglobulinemia, Afib (on Eliquis and Toprol), HFpEF, PPM placed, and hypothyroidism who presented for an elective ablation for Afib burden/associated fatigue, which was postponed due to non-improving LOUISE and worsened Cr compared to baseline found to have a 4mm obstructive calculus in the distal right ureter.    - Patient will likely need ureteral stent for 4 mm R distal ureteral stone with moderate hydronephrosis  - Urology will book for cystoscopy, R ureteral stent placement tomorrow  - Please document clearance given his cardiac comorbidities  - NPO after midnight    Case discussed with Dr. Sultana 78 yo male with CKD, CLL, Waldenstrom's macroglobulinemia, Afib (on Eliquis and Toprol), HFpEF, PPM placed, and hypothyroidism who presented for an elective ablation for Afib burden/associated fatigue, which was postponed due to non-improving LOUISE and worsened Cr compared to baseline found to have a 4mm obstructive calculus in the distal right ureter.    - Patient will likely need ureteral stent for 4 mm R distal ureteral stone with moderate hydronephrosis  - Urology will book for cystoscopy, R ureteral stent placement tomorrow  - Please document clearance given his cardiac comorbidities  - Please order urine culture  - NPO after midnight    Case discussed with Dr. Sultana

## 2022-09-13 NOTE — PROGRESS NOTE ADULT - SUBJECTIVE AND OBJECTIVE BOX
Patient seen and examined at bedside.    Overnight Events: No acute events overnight. Denies chest pain or SOB      REVIEW OF SYSTEMS:  [x ] All other systems negative    Current Meds:  acyclovir   Oral Tab/Cap 400 milliGRAM(s) Oral two times a day  apixaban 5 milliGRAM(s) Oral two times a day  cholecalciferol 2000 Unit(s) Oral daily  clonazePAM  Tablet 1 milliGRAM(s) Oral at bedtime  cyanocobalamin 1000 MICROGram(s) Oral daily  donepezil 10 milliGRAM(s) Oral at bedtime  levothyroxine 150 MICROGram(s) Oral daily  melatonin 5 milliGRAM(s) Oral at bedtime  memantine 10 milliGRAM(s) Oral every 12 hours  metoprolol succinate ER 50 milliGRAM(s) Oral <User Schedule>  mirtazapine 30 milliGRAM(s) Oral at bedtime  oxycodone    5 mG/acetaminophen 325 mG 2 Tablet(s) Oral every 4 hours PRN  pantoprazole    Tablet 40 milliGRAM(s) Oral before breakfast  sodium bicarbonate 650 milliGRAM(s) Oral two times a day  sodium bicarbonate  Infusion 0.071 mEq/kG/Hr IV Continuous <Continuous>  tamsulosin 0.4 milliGRAM(s) Oral at bedtime      PAST MEDICAL & SURGICAL HISTORY:  CLL (chronic lymphocytic leukemia)  in remission      Anxiety disorder      GERD (gastroesophageal reflux disease)      Atrial fibrillation      Diverticulitis      Waldenstrom macroglobulinemia      Bradycardia, drug induced      Hypothyroid      Nephrolithiasis      CKD (chronic kidney disease)      Gout      [place] (hard of hearing)      BPH (benign prostatic hyperplasia)      Memory deficit      IgG monoclonal gammopathy      History of macular degeneration      Chronic heart failure with preserved ejection fraction      Meniscus tear  s/p removal of Meniscus 8 months ago      S/P hernia repair  x2      History of laparoscopic cholecystectomy  4/2014      History of cataract surgery, right  IOL      History of appendectomy      History of cardiac pacemaker in situ          Vitals:  T(F): 97.9 (09-13), Max: 97.9 (09-13)  HR: 74 (09-13) (74 - 105)  BP: 101/59 (09-13) (101/59 - 120/69)  RR: 18 (09-13)  SpO2: 97% (09-13)  I&O's Summary    12 Sep 2022 07:01  -  13 Sep 2022 07:00  --------------------------------------------------------  IN: 1400 mL / OUT: 1475 mL / NET: -75 mL    13 Sep 2022 07:01  -  13 Sep 2022 09:31  --------------------------------------------------------  IN: 240 mL / OUT: 400 mL / NET: -160 mL        Physical Exam:  Appearance: No acute distress; well appearing  Eyes: PERRL, EOMI, pink conjunctiva  HENT: Normal oral mucosa  Cardiovascular: RRR, S1, S2, no murmurs, rubs, or gallops; no edema; no JVD  Respiratory: Clear to auscultation bilaterally  Gastrointestinal: soft, non-tender, non-distended with normal bowel sounds  Musculoskeletal: No clubbing; no joint deformity   Neurologic: Non-focal  Lymphatic: No lymphadenopathy  Psychiatry: AAOx3, mood & affect appropriate  Skin: No rashes, ecchymoses, or cyanosis                          9.1    6.44  )-----------( 90       ( 12 Sep 2022 07:35 )             29.6     09-12    138  |  110<H>  |  71<H>  ----------------------------<  139<H>  5.2   |  17<L>  |  6.23<H>    Ca    8.1<L>      12 Sep 2022 21:20  Phos  3.5     09-12  Mg     1.5     09-12      PT/INR - ( 12 Sep 2022 07:35 )   PT: 31.3 sec;   INR: 2.69 ratio                          Patient seen and examined at bedside.    Overnight Events: No acute events overnight. Denies chest pain or SOB. Telemetry shows a-paced rhythm 50-70s.      REVIEW OF SYSTEMS:  [x ] All other systems negative    Current Meds:  acyclovir   Oral Tab/Cap 400 milliGRAM(s) Oral two times a day  apixaban 5 milliGRAM(s) Oral two times a day  cholecalciferol 2000 Unit(s) Oral daily  clonazePAM  Tablet 1 milliGRAM(s) Oral at bedtime  cyanocobalamin 1000 MICROGram(s) Oral daily  donepezil 10 milliGRAM(s) Oral at bedtime  levothyroxine 150 MICROGram(s) Oral daily  melatonin 5 milliGRAM(s) Oral at bedtime  memantine 10 milliGRAM(s) Oral every 12 hours  metoprolol succinate ER 50 milliGRAM(s) Oral <User Schedule>  mirtazapine 30 milliGRAM(s) Oral at bedtime  oxycodone    5 mG/acetaminophen 325 mG 2 Tablet(s) Oral every 4 hours PRN  pantoprazole    Tablet 40 milliGRAM(s) Oral before breakfast  sodium bicarbonate 650 milliGRAM(s) Oral two times a day  sodium bicarbonate  Infusion 0.071 mEq/kG/Hr IV Continuous <Continuous>  tamsulosin 0.4 milliGRAM(s) Oral at bedtime      PAST MEDICAL & SURGICAL HISTORY:  CLL (chronic lymphocytic leukemia)  in remission      Anxiety disorder      GERD (gastroesophageal reflux disease)      Atrial fibrillation      Diverticulitis      Waldenstrom macroglobulinemia      Bradycardia, drug induced      Hypothyroid      Nephrolithiasis      CKD (chronic kidney disease)      Gout      [place] (hard of hearing)      BPH (benign prostatic hyperplasia)      Memory deficit      IgG monoclonal gammopathy      History of macular degeneration      Chronic heart failure with preserved ejection fraction      Meniscus tear  s/p removal of Meniscus 8 months ago      S/P hernia repair  x2      History of laparoscopic cholecystectomy  4/2014      History of cataract surgery, right  IOL      History of appendectomy      History of cardiac pacemaker in situ          Vitals:  T(F): 97.9 (09-13), Max: 97.9 (09-13)  HR: 74 (09-13) (74 - 105)  BP: 101/59 (09-13) (101/59 - 120/69)  RR: 18 (09-13)  SpO2: 97% (09-13)  I&O's Summary    12 Sep 2022 07:01  -  13 Sep 2022 07:00  --------------------------------------------------------  IN: 1400 mL / OUT: 1475 mL / NET: -75 mL    13 Sep 2022 07:01  -  13 Sep 2022 09:31  --------------------------------------------------------  IN: 240 mL / OUT: 400 mL / NET: -160 mL        Physical Exam:  Appearance: No acute distress; well appearing  Eyes: PERRL, EOMI, pink conjunctiva  HENT: Normal oral mucosa  Cardiovascular: RRR, S1, S2, no murmurs, rubs, or gallops; no edema; no JVD  Respiratory: Clear to auscultation bilaterally  Gastrointestinal: soft, non-tender, non-distended with normal bowel sounds  Musculoskeletal: No clubbing; no joint deformity   Neurologic: Non-focal  Lymphatic: No lymphadenopathy  Psychiatry: AAOx3, mood & affect appropriate  Skin: No rashes, ecchymoses, or cyanosis                          9.1    6.44  )-----------( 90       ( 12 Sep 2022 07:35 )             29.6     09-12    138  |  110<H>  |  71<H>  ----------------------------<  139<H>  5.2   |  17<L>  |  6.23<H>    Ca    8.1<L>      12 Sep 2022 21:20  Phos  3.5     09-12  Mg     1.5     09-12      PT/INR - ( 12 Sep 2022 07:35 )   PT: 31.3 sec;   INR: 2.69 ratio

## 2022-09-13 NOTE — PROGRESS NOTE ADULT - ASSESSMENT
78 yo male with extensive pmhx including h/o afib on eliquis s/p ppm, ckd, cll, being admitted for pantera    pantera on ckd  renal eval noted  CT scan noted with right hydro and renal stone   consulted  ivf as per renal  lokelma for hyperKalemia  serial bmp    afib s/p ppm on eliquis  was planned for ablation   now postponed due to pantera  ep f/u  tele  cont AC with eliquis  cont toprol    CLL  stable  outpt heme/onc f/u    cont other current meds    d/w renal and cards/ep    Advanced care planning was discussed with patient and family.  Advanced care planning forms were reviewed and discussed as appropriate.  Differential diagnosis and plan of care discussed with patient after the evaluation.   Pain assessed and judicious use of narcotics when appropriate was discussed.  Importance of Fall prevention discussed.  Counseling on Smoking and Alcohol cessation was offered when appropriate.  Counseling on Diet, exercise, and medication compliance was done.   Approx 30 minutes spent.

## 2022-09-13 NOTE — PROGRESS NOTE ADULT - SUBJECTIVE AND OBJECTIVE BOX
Overnight events noted      VITAL:  T(C): , Max: 36.6 (22 @ 04:41)  T(F): , Max: 97.9 (22 @ 04:41)  HR: 74 (22 @ 04:41)  BP: 101/59 (22 @ 04:41)  BP(mean): 73 (22 @ 04:41)  RR: 18 (22 @ 04:41)  SpO2: 97% (22 @ 04:41)      PHYSICAL EXAM:  Constitutional: NAD, Alert  HEENT: NCAT, DMM  Neck: Supple, No JVD  Respiratory: CTA-b/l  Cardiovascular: sabina reg s1s2  Gastrointestinal: BS+, soft, NT/ND  Extremities: No peripheral edema b/l  Neurological: no focal deficits; strength grossly intact  Back: no CVAT b/l  Skin: No rashes, no nevi    LABS:                        9.1    6.44  )-----------( 90       ( 12 Sep 2022 07:35 )             29.6     Na(138)/K(5.2)/Cl(110)/HCO3(17)/BUN(71)/Cr(6.23)Glu(139)/Ca(8.1)/Mg(1.5)/PO4(3.5)     @ 21:20  Na(142)/K(5.7)/Cl(114)/HCO3(18)/BUN(66)/Cr(6.26)Glu(93)/Ca(8.4)/Mg(1.8)/PO4(4.1)     @ 09:48  Na(139)/K(6.3)/Cl(111)/HCO3(16)/BUN(69)/Cr(6.22)Glu(98)/Ca(8.5)/Mg(--)/PO4(--)     @ 07:35    Urinalysis Basic - ( 12 Sep 2022 12:26 )    Color: Light Yellow / Appearance: Clear / S.017 / pH: x  Gluc: x / Ketone: Negative  / Bili: Negative / Urobili: Negative   Blood: x / Protein: 30 mg/dL / Nitrite: Negative   Leuk Esterase: Small / RBC: 7 /hpf / WBC 6 /HPF   Sq Epi: x / Non Sq Epi: 1 /hpf / Bacteria: Negative  Potassium, Random Urine: 37 mmol/L ( @ 12:28)  Sodium, Random Urine: 75 mmol/L ( @ 12:28)  Creatinine, Random Urine: 135 mg/dL ( @ 12:28)  Chloride, Random Urine: 64 mmol/L ( @ 12:28)    Serum uric acid - 9.4        IMPRESSION: 77M w/ mild dementia, AFib, HFpEF, nephrolithiasis, CLL/Waldenstrom's on N[place]o, and CKD4-5; 22 presented for scheduled AFib ablation; procedure postponed due to LOUISE    (1)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 GFR baseline ~15ml/min.    (2)LOUISE - persistent LOUISE despite IVF- CT demonstrating obstructing R renal stone - appears that the LOUISE is due to obstruction    (3)Hyperkalemia - renally mediated - improved as of today, on IVF, on low-K diet, and s/p Lokelma    (4)Metabolic acidosis - renally mediated - HCO3 low but stable, on IVF with HCO3    (5)AFib - for eventual AFib ablation       RECOMMEND:  (1) evaluation  (2)IVF as ordered  (3)Low-K diet as ordered  (4)Meds for GFR<15  (5)No HD for now  (6)No objection to moving forward with AFib ablation        Norman Ascencio MD  [facility]  Office: [phone]  Cell: [phone]       No pain, no sob      VITAL:  T(C): , Max: 36.6 (22 @ 04:41)  T(F): , Max: 97.9 (22 @ 04:41)  HR: 74 (22 @ 04:41)  BP: 101/59 (22 @ 04:41)  BP(mean): 73 (22 @ 04:41)  RR: 18 (22 @ 04:41)  SpO2: 97% (22 @ 04:41)      PHYSICAL EXAM:  Constitutional: NAD, Alert  HEENT: NCAT, DMM  Neck: Supple, No JVD  Respiratory: CTA-b/l  Cardiovascular: reg s1s2  Gastrointestinal: BS+, soft, NT/ND  Extremities: No peripheral edema b/l  Neurological: no focal deficits; strength grossly intact  Back: no CVAT b/l  Skin: No rashes, no nevi    LABS:                        9.1    6.44  )-----------( 90       ( 12 Sep 2022 07:35 )             29.6     Na(138)/K(5.2)/Cl(110)/HCO3(17)/BUN(71)/Cr(6.23)Glu(139)/Ca(8.1)/Mg(1.5)/PO4(3.5)     @ 21:20  Na(142)/K(5.7)/Cl(114)/HCO3(18)/BUN(66)/Cr(6.26)Glu(93)/Ca(8.4)/Mg(1.8)/PO4(4.1)     @ 09:48  Na(139)/K(6.3)/Cl(111)/HCO3(16)/BUN(69)/Cr(6.22)Glu(98)/Ca(8.5)/Mg(--)/PO4(--)     @ 07:35    Urinalysis Basic - ( 12 Sep 2022 12:26 )    Color: Light Yellow / Appearance: Clear / S.017 / pH: x  Gluc: x / Ketone: Negative  / Bili: Negative / Urobili: Negative   Blood: x / Protein: 30 mg/dL / Nitrite: Negative   Leuk Esterase: Small / RBC: 7 /hpf / WBC 6 /HPF   Sq Epi: x / Non Sq Epi: 1 /hpf / Bacteria: Negative  Potassium, Random Urine: 37 mmol/L ( @ 12:28)  Sodium, Random Urine: 75 mmol/L ( @ 12:28)  Creatinine, Random Urine: 135 mg/dL ( @ 12:28)  Chloride, Random Urine: 64 mmol/L ( @ 12:28)    Serum uric acid - 9.4        IMPRESSION: 77M w/ mild dementia, AFib, HFpEF, nephrolithiasis, CLL/Waldenstrom's on Ninlaro, and CKD4-5; 22 presented for scheduled AFib ablation; procedure postponed due to LOUISE    (1)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 GFR baseline ~15ml/min.    (2)LOUISE - persistent LOUISE despite IVF- CT demonstrating obstructing R renal stone - appears that the LOUISE is due to obstruction    (3)Hyperkalemia - renally mediated - improved as of today, on IVF, on low-K diet, and s/p Lokelma    (4)Metabolic acidosis - renally mediated - HCO3 low but stable, on IVF with HCO3    (5)AFib - for eventual AFib ablation       RECOMMEND:  (1) evaluation  (2)IVF as ordered  (3)Low-K diet as ordered  (4)Meds for GFR<15  (5)No HD for now  (6)No objection to moving forward with AFib ablation        Norman Ascencio MD  [facility]  Office: [phone]  Cell: [phone]

## 2022-09-13 NOTE — PROGRESS NOTE ADULT - SUBJECTIVE AND OBJECTIVE BOX
NICOLAS CIFUENTES  77y Male  MRN:[id number]    Patient is a 77y old  Male who presents with a chief complaint of pantera    HPI:  77 year old  male, h/o [place] Sci PPM ([employer] MRI EL Serial # [id number]), HFpEF, pAfib (on Eliquis and Toprol), CKD G4/A3, CLL, IgG monoclonal gammopathy, GERD, hypothyroidism, dementia who c/o increased fatigue and very little energy. His afib burden has increased from 12% in May to 21 % in August and therefore the decision to proceed with ablation was made. Pt presents today for afib ablation.             on w/u pt found to have pantera. therefore ablation was cancelled. now being admitted for pantera                                                                           Cards: Dr Clemente (12 Sep 2022 06:50)      Patient seen and evaluated at bedside. No acute events overnight except as noted.    Interval HPI: no acute c/o    PAST MEDICAL & SURGICAL HISTORY:  CLL (chronic lymphocytic leukemia)  in remission      Anxiety disorder      GERD (gastroesophageal reflux disease)      Atrial fibrillation      Diverticulitis      Waldenstrom macroglobulinemia      Bradycardia, drug induced      Hypothyroid      Nephrolithiasis      CKD (chronic kidney disease)      Gout      [place] (hard of hearing)      BPH (benign prostatic hyperplasia)      Memory deficit      IgG monoclonal gammopathy      History of macular degeneration      Chronic heart failure with preserved ejection fraction      Meniscus tear  s/p removal of Meniscus 8 months ago      S/P hernia repair  x2      History of laparoscopic cholecystectomy  4/2014      History of cataract surgery, right  IOL      History of appendectomy      History of cardiac pacemaker in situ          REVIEW OF SYSTEMS:  as per hpi     VITALS:   Vital Signs Last 24 Hrs  T(C): 36.6 (13 Sep 2022 04:41), Max: 36.6 (13 Sep 2022 04:41)  T(F): 97.9 (13 Sep 2022 04:41), Max: 97.9 (13 Sep 2022 04:41)  HR: 74 (13 Sep 2022 04:41) (74 - 105)  BP: 101/59 (13 Sep 2022 04:41) (101/59 - 120/69)  BP(mean): 73 (13 Sep 2022 04:41) (73 - 84)  RR: 18 (13 Sep 2022 04:41) (18 - 18)  SpO2: 97% (13 Sep 2022 04:41) (97% - 98%)    Parameters below as of 13 Sep 2022 04:41  Patient On (Oxygen Delivery Method): room air             PHYSICAL EXAM:  GENERAL: NAD, well-developed  HEAD:  Atraumatic, Normocephalic  EYES: EOMI, PERRLA, conjunctiva and sclera clear  NECK: Supple, No JVD  CHEST/LUNG: Clear to auscultation bilaterally; No wheeze  HEART: S1, S2; No murmurs, rubs, or gallops  ABDOMEN: Soft, Nontender, Nondistended; Bowel sounds present  EXTREMITIES:  2+ Peripheral Pulses, No clubbing, cyanosis, or edema  PSYCH: Normal affect  NEUROLOGY: AAOX3; non-focal  SKIN: No rashes or lesions    Consultant(s) Notes Reviewed:  [x ] YES  [ ] NO  Care Discussed with Consultants/Other Providers [ x] YES  [ ] NO    MEDS:   MEDICATIONS  (STANDING):  acyclovir   Oral Tab/Cap 400 milliGRAM(s) Oral two times a day  apixaban 5 milliGRAM(s) Oral two times a day  cholecalciferol 2000 Unit(s) Oral daily  clonazePAM  Tablet 1 milliGRAM(s) Oral at bedtime  cyanocobalamin 1000 MICROGram(s) Oral daily  donepezil 10 milliGRAM(s) Oral at bedtime  levothyroxine 150 MICROGram(s) Oral daily  melatonin 5 milliGRAM(s) Oral at bedtime  memantine 10 milliGRAM(s) Oral every 12 hours  metoprolol succinate ER 50 milliGRAM(s) Oral <User Schedule>  mirtazapine 30 milliGRAM(s) Oral at bedtime  pantoprazole    Tablet 40 milliGRAM(s) Oral before breakfast  sodium bicarbonate 650 milliGRAM(s) Oral two times a day  sodium bicarbonate  Infusion 0.071 mEq/kG/Hr (100 mL/Hr) IV Continuous <Continuous>  tamsulosin 0.4 milliGRAM(s) Oral at bedtime    MEDICATIONS  (PRN):  oxycodone    5 mG/acetaminophen 325 mG 2 Tablet(s) Oral every 4 hours PRN Moderate Pain (4 - 6)      ALLERGIES:  rituximab (Angioedema)      LABS:                                               9.1    6.44  )-----------( 90       ( 12 Sep 2022 07:35 )             29.6   09-13    140  |  110<H>  |  67<H>  ----------------------------<  104<H>  4.9   |  20<L>  |  6.10<H>    Ca    8.1<L>      13 Sep 2022 09:22  Phos  3.6     09-13  Mg     1.6     09-13       < from: CT Abdomen and Pelvis No Cont (09.12.22 @ 18:12) >  IMPRESSION:  Bilateral urolithiasis with 4 mm obstructive calculus in the distal right   ureter and moderate right hydroureteronephrosis.    Abdominal lymphadenopathy without significant interval change, consistent   with known lymphoproliferative disorder..    < end of copied text >

## 2022-09-14 ENCOUNTER — TRANSCRIPTION ENCOUNTER (OUTPATIENT)
Age: 77
End: 2022-09-14

## 2022-09-14 ENCOUNTER — RESULT REVIEW (OUTPATIENT)
Age: 77
End: 2022-09-14

## 2022-09-14 DIAGNOSIS — Z01.818 ENCOUNTER FOR OTHER PREPROCEDURAL EXAMINATION: ICD-10-CM

## 2022-09-14 LAB
ANION GAP SERPL CALC-SCNC: 10 MMOL/L — SIGNIFICANT CHANGE UP (ref 5–17)
BUN SERPL-MCNC: 63 MG/DL — HIGH (ref 7–23)
CALCIUM SERPL-MCNC: 8.2 MG/DL — LOW (ref 8.4–10.5)
CHLORIDE SERPL-SCNC: 108 MMOL/L — SIGNIFICANT CHANGE UP (ref 96–108)
CO2 SERPL-SCNC: 23 MMOL/L — SIGNIFICANT CHANGE UP (ref 22–31)
CREAT SERPL-MCNC: 5.82 MG/DL — HIGH (ref 0.5–1.3)
CULTURE RESULTS: SIGNIFICANT CHANGE UP
EGFR: 9 ML/MIN/1.73M2 — LOW
GLUCOSE SERPL-MCNC: 85 MG/DL — SIGNIFICANT CHANGE UP (ref 70–99)
HCT VFR BLD CALC: 28.5 % — LOW (ref 39–50)
HGB BLD-MCNC: 8.9 G/DL — LOW (ref 13–17)
MAGNESIUM SERPL-MCNC: 1.6 MG/DL — SIGNIFICANT CHANGE UP (ref 1.6–2.6)
MCHC RBC-ENTMCNC: 29.5 PG — SIGNIFICANT CHANGE UP (ref 27–34)
MCHC RBC-ENTMCNC: 31.2 GM/DL — LOW (ref 32–36)
MCV RBC AUTO: 94.4 FL — SIGNIFICANT CHANGE UP (ref 80–100)
NRBC # BLD: 0 /100 WBCS — SIGNIFICANT CHANGE UP (ref 0–0)
PHOSPHATE SERPL-MCNC: 3.4 MG/DL — SIGNIFICANT CHANGE UP (ref 2.5–4.5)
PLATELET # BLD AUTO: 88 K/UL — LOW (ref 150–400)
POTASSIUM SERPL-MCNC: 4.9 MMOL/L — SIGNIFICANT CHANGE UP (ref 3.5–5.3)
POTASSIUM SERPL-SCNC: 4.9 MMOL/L — SIGNIFICANT CHANGE UP (ref 3.5–5.3)
RBC # BLD: 3.02 M/UL — LOW (ref 4.2–5.8)
RBC # FLD: 13.3 % — SIGNIFICANT CHANGE UP (ref 10.3–14.5)
SODIUM SERPL-SCNC: 141 MMOL/L — SIGNIFICANT CHANGE UP (ref 135–145)
SPECIMEN SOURCE: SIGNIFICANT CHANGE UP
WBC # BLD: 5.8 K/UL — SIGNIFICANT CHANGE UP (ref 3.8–10.5)
WBC # FLD AUTO: 5.8 K/UL — SIGNIFICANT CHANGE UP (ref 3.8–10.5)

## 2022-09-14 PROCEDURE — 88300 SURGICAL PATH GROSS: CPT | Mod: 26

## 2022-09-14 PROCEDURE — 52332 CYSTOSCOPY AND TREATMENT: CPT | Mod: 50,RT

## 2022-09-14 PROCEDURE — 99221 1ST HOSP IP/OBS SF/LOW 40: CPT

## 2022-09-14 DEVICE — URETERAL CATH OPEN END 5FR 70CM: Type: IMPLANTABLE DEVICE | Site: RIGHT | Status: FUNCTIONAL

## 2022-09-14 DEVICE — GUIDEWIRE SENSOR DUAL-FLEX NITINOL STRAIGHT .035" X 150CM: Type: IMPLANTABLE DEVICE | Site: RIGHT | Status: FUNCTIONAL

## 2022-09-14 DEVICE — URETERAL STENT CONTOUR 6FR 26CM: Type: IMPLANTABLE DEVICE | Site: RIGHT | Status: FUNCTIONAL

## 2022-09-14 RX ORDER — FENTANYL CITRATE 50 UG/ML
25 INJECTION INTRAVENOUS
Refills: 0 | Status: DISCONTINUED | OUTPATIENT
Start: 2022-09-14 | End: 2022-09-14

## 2022-09-14 RX ORDER — FENTANYL CITRATE 50 UG/ML
50 INJECTION INTRAVENOUS ONCE
Refills: 0 | Status: DISCONTINUED | OUTPATIENT
Start: 2022-09-14 | End: 2022-09-14

## 2022-09-14 RX ORDER — MIRTAZAPINE 45 MG/1
30 TABLET, ORALLY DISINTEGRATING ORAL AT BEDTIME
Refills: 0 | Status: DISCONTINUED | OUTPATIENT
Start: 2022-09-14 | End: 2022-09-21

## 2022-09-14 RX ORDER — SODIUM BICARBONATE 1 MEQ/ML
650 SYRINGE (ML) INTRAVENOUS
Refills: 0 | Status: DISCONTINUED | OUTPATIENT
Start: 2022-09-14 | End: 2022-09-21

## 2022-09-14 RX ORDER — METOPROLOL TARTRATE 50 MG
5 TABLET ORAL ONCE
Refills: 0 | Status: COMPLETED | OUTPATIENT
Start: 2022-09-14 | End: 2022-09-14

## 2022-09-14 RX ORDER — PREGABALIN 225 MG/1
1000 CAPSULE ORAL DAILY
Refills: 0 | Status: DISCONTINUED | OUTPATIENT
Start: 2022-09-14 | End: 2022-09-21

## 2022-09-14 RX ORDER — MEMANTINE HYDROCHLORIDE 10 MG/1
10 TABLET ORAL EVERY 12 HOURS
Refills: 0 | Status: DISCONTINUED | OUTPATIENT
Start: 2022-09-14 | End: 2022-09-21

## 2022-09-14 RX ORDER — TAMSULOSIN HYDROCHLORIDE 0.4 MG/1
0.4 CAPSULE ORAL AT BEDTIME
Refills: 0 | Status: DISCONTINUED | OUTPATIENT
Start: 2022-09-14 | End: 2022-09-21

## 2022-09-14 RX ORDER — CLONAZEPAM 1 MG
1 TABLET ORAL AT BEDTIME
Refills: 0 | Status: COMPLETED | OUTPATIENT
Start: 2022-09-14 | End: 2022-09-21

## 2022-09-14 RX ORDER — ONDANSETRON 8 MG/1
4 TABLET, FILM COATED ORAL ONCE
Refills: 0 | Status: DISCONTINUED | OUTPATIENT
Start: 2022-09-14 | End: 2022-09-14

## 2022-09-14 RX ORDER — METOPROLOL TARTRATE 50 MG
50 TABLET ORAL
Refills: 0 | Status: DISCONTINUED | OUTPATIENT
Start: 2022-09-14 | End: 2022-09-19

## 2022-09-14 RX ORDER — ACYCLOVIR SODIUM 500 MG
400 VIAL (EA) INTRAVENOUS
Refills: 0 | Status: DISCONTINUED | OUTPATIENT
Start: 2022-09-14 | End: 2022-09-21

## 2022-09-14 RX ORDER — CHOLECALCIFEROL (VITAMIN D3) 125 MCG
2000 CAPSULE ORAL DAILY
Refills: 0 | Status: DISCONTINUED | OUTPATIENT
Start: 2022-09-14 | End: 2022-09-21

## 2022-09-14 RX ORDER — PANTOPRAZOLE SODIUM 20 MG/1
40 TABLET, DELAYED RELEASE ORAL
Refills: 0 | Status: DISCONTINUED | OUTPATIENT
Start: 2022-09-14 | End: 2022-09-19

## 2022-09-14 RX ORDER — DONEPEZIL HYDROCHLORIDE 10 MG/1
10 TABLET, FILM COATED ORAL AT BEDTIME
Refills: 0 | Status: DISCONTINUED | OUTPATIENT
Start: 2022-09-14 | End: 2022-09-21

## 2022-09-14 RX ORDER — ONDANSETRON 8 MG/1
4 TABLET, FILM COATED ORAL ONCE
Refills: 0 | Status: DISCONTINUED | OUTPATIENT
Start: 2022-09-14 | End: 2022-09-21

## 2022-09-14 RX ORDER — LANOLIN ALCOHOL/MO/W.PET/CERES
5 CREAM (GRAM) TOPICAL AT BEDTIME
Refills: 0 | Status: DISCONTINUED | OUTPATIENT
Start: 2022-09-14 | End: 2022-09-21

## 2022-09-14 RX ORDER — OXYCODONE AND ACETAMINOPHEN 5; 325 MG/1; MG/1
2 TABLET ORAL EVERY 4 HOURS
Refills: 0 | Status: DISCONTINUED | OUTPATIENT
Start: 2022-09-14 | End: 2022-09-14

## 2022-09-14 RX ORDER — CHLORHEXIDINE GLUCONATE 213 G/1000ML
1 SOLUTION TOPICAL
Refills: 0 | Status: DISCONTINUED | OUTPATIENT
Start: 2022-09-14 | End: 2022-09-14

## 2022-09-14 RX ORDER — LEVOTHYROXINE SODIUM 125 MCG
150 TABLET ORAL DAILY
Refills: 0 | Status: DISCONTINUED | OUTPATIENT
Start: 2022-09-14 | End: 2022-09-21

## 2022-09-14 RX ADMIN — PANTOPRAZOLE SODIUM 40 MILLIGRAM(S): 20 TABLET, DELAYED RELEASE ORAL at 22:53

## 2022-09-14 RX ADMIN — Medication 400 MILLIGRAM(S): at 22:53

## 2022-09-14 RX ADMIN — Medication 50 MILLIGRAM(S): at 05:09

## 2022-09-14 RX ADMIN — Medication 1 MILLIGRAM(S): at 22:52

## 2022-09-14 RX ADMIN — APIXABAN 5 MILLIGRAM(S): 2.5 TABLET, FILM COATED ORAL at 05:09

## 2022-09-14 RX ADMIN — Medication 150 MICROGRAM(S): at 05:09

## 2022-09-14 RX ADMIN — MEMANTINE HYDROCHLORIDE 10 MILLIGRAM(S): 10 TABLET ORAL at 05:09

## 2022-09-14 RX ADMIN — MEMANTINE HYDROCHLORIDE 10 MILLIGRAM(S): 10 TABLET ORAL at 22:53

## 2022-09-14 RX ADMIN — Medication 2000 UNIT(S): at 22:54

## 2022-09-14 RX ADMIN — TAMSULOSIN HYDROCHLORIDE 0.4 MILLIGRAM(S): 0.4 CAPSULE ORAL at 22:53

## 2022-09-14 RX ADMIN — Medication 5 MILLIGRAM(S): at 22:53

## 2022-09-14 RX ADMIN — PREGABALIN 1000 MICROGRAM(S): 225 CAPSULE ORAL at 22:51

## 2022-09-14 RX ADMIN — Medication 650 MILLIGRAM(S): at 22:54

## 2022-09-14 RX ADMIN — Medication 400 MILLIGRAM(S): at 05:09

## 2022-09-14 RX ADMIN — Medication 5 MILLIGRAM(S): at 17:00

## 2022-09-14 RX ADMIN — Medication 650 MILLIGRAM(S): at 05:09

## 2022-09-14 RX ADMIN — Medication 100 MEQ/KG/HR: at 05:06

## 2022-09-14 RX ADMIN — PANTOPRAZOLE SODIUM 40 MILLIGRAM(S): 20 TABLET, DELAYED RELEASE ORAL at 05:09

## 2022-09-14 RX ADMIN — Medication 50 MILLIGRAM(S): at 22:52

## 2022-09-14 RX ADMIN — DONEPEZIL HYDROCHLORIDE 10 MILLIGRAM(S): 10 TABLET, FILM COATED ORAL at 22:52

## 2022-09-14 NOTE — CONSULT NOTE ADULT - ASSESSMENT
77 year old  male, h/o [place] Sci PPM ([employer] MRI EL Serial # [id number]), HFpEF, pAfib (on Eliquis and Toprol), CKD G4/A3, CLL, IgG monoclonal gammopathy, GERD, hypothyroidism, dementia who c/o increased fatigue and very little energy. His afib burden has increased from 12% in May to 21 % in August. Afib ablation was scheduled but delayed due to LOUISE, likely caused by 4 mm obstructive calculus in the distal right ureter and moderate right hydroureteronephrosis. Pt is pending R ureteral stent placement, cardiology consulted for cardiac optimization prior to procedure.        77 year old  male, h/o [place] Sci PPM ([employer] MRI EL Serial # [id number]), HFpEF, pAfib (on Eliquis and Toprol), CKD G4/A3, CLL, IgG monoclonal gammopathy, GERD, hypothyroidism, dementia who c/o increased fatigue and very little energy. His afib burden has increased from 12% in May to 21 % in August. Afib ablation was scheduled but delayed due to LOUISE, likely caused by 4 mm obstructive calculus in the distal right ureter and moderate right hydroureteronephrosis. Pt is pending R ureteral stent placement, cardiology consulted for cardiac optimization prior to procedure.     #preoperative cardiac optimization  - ureteral stent placement later today  - Tele with AF rates ~70-80s, pt has been in AF since brought to [facility]. Asymptomatic  - Continue AC with Eliquis 5mg BID.   - Continue metoprolol  mg qd  - denies chest pain or SOB  - RCI: Class II risk (Cr: 6.10)  - Exercise tolerance: 4 METs     77 year old  male, h/o [place] Sci PPM ([employer] MRI EL Serial # [id number]), HFpEF, pAfib (on Eliquis and Toprol), CKD G4/A3, CLL, IgG monoclonal gammopathy, GERD, hypothyroidism, dementia who c/o increased fatigue and very little energy. His afib burden has increased from 12% in May to 21 % in August. Afib ablation was scheduled but delayed due to LOUISE, likely caused by 4 mm obstructive calculus in the distal right ureter and moderate right hydroureteronephrosis. Pt is pending R ureteral stent placement, cardiology consulted for cardiac optimization prior to procedure.     #preoperative cardiac optimization  - ureteral stent placement later today  - Tele with AF rates ~70-80s, pt has been in AF since brought to [facility]. Asymptomatic  - Continue AC with Eliquis 5mg BID.   - Continue metoprolol  mg qd  - RCI: Class II risk (Cr: 6.10)  - Exercise tolerance > 4 METs, no recent changes in exercise tolerance/activity levels  - denies chest pain or SOB  - EKG 9/12/22 showed Atrial-paced rhythm with prolonged AV conduction, ABNORMAL ECG WHEN COMPARED WITH ECG OF 26-MAR-2021   - Echo 3/21, EF 60% with normal LV systolic function and no segmental wall motion abnormalities  - no further cardiac evaluation needed prior to R. ureteral stent placement      77 year old  male, h/o [place] Sci PPM ([employer] MRI EL Serial # [id number]), HFpEF, pAfib (on Eliquis and Toprol), CKD G4/A3, CLL, IgG monoclonal gammopathy, GERD, hypothyroidism, dementia who c/o increased fatigue and very little energy. His afib burden has increased from 12% in May to 21 % in August. Afib ablation was scheduled but delayed due to LOUISE, likely caused by 4 mm obstructive calculus in the distal right ureter and moderate right hydroureteronephrosis. Pt is pending R ureteral stent placement, cardiology consulted for cardiac optimization prior to procedure.     #preoperative cardiac optimization  - ureteral stent placement later today  - Tele with AF rates ~70-80s, pt has been in AF since brought to [facility]. Asymptomatic  - Continue AC with Eliquis 5mg BID.   - Continue metoprolol  mg qd  - RCI: Class II risk (Cr: 6.10)  - Exercise tolerance > 4 METs, no recent changes in exercise tolerance/activity levels  - denies chest pain or SOB  - EKG 9/12/22 showed Atrial-paced rhythm with prolonged AV conduction, ABNORMAL ECG WHEN COMPARED WITH ECG OF 26-MAR-2021   - Echo 3/21, EF 60% with normal LV systolic function and no segmental wall motion abnormalities  - given preserved EF along with no changes in baseline exercise tolerance and asymptomatic for chest pain/SOB,  no further cardiac evaluation needed prior to R. ureteral stent placement      77 year old  male, h/o [place] Sci PPM ([employer] MRI EL Serial # [id number]), HFpEF, pAfib (on Eliquis and Toprol), CKD G4/A3, CLL, IgG monoclonal gammopathy, GERD, hypothyroidism, dementia who c/o increased fatigue and very little energy. His afib burden has increased from 12% in May to 21 % in August. Afib ablation was scheduled but delayed due to LOUISE, likely caused by 4 mm obstructive calculus in the distal right ureter and moderate right hydroureteronephrosis. Pt is pending R ureteral stent placement, cardiology consulted for cardiac optimization prior to procedure.     #preoperative cardiac optimization  - ureteral stent placement later today  - Tele with AF rates ~70-80s, pt has been in AF since brought to [facility]. Asymptomatic  - Continue AC with Eliquis 5mg BID.   - Continue metoprolol  mg qd  - RCI: Class II risk (Cr: 6.10)  - Exercise tolerance > 4 METs, no recent changes in exercise tolerance/activity levels  - denies chest pain or SOB  - EKG 9/12/22 showed Atrial-paced rhythm with prolonged AV conduction, ABNORMAL ECG WHEN COMPARED WITH ECG OF 26-MAR-2021   - Echo 3/21, EF 60% with normal LV systolic function and no segmental wall motion abnormalities  - given preserved EF along with no changes in baseline exercise tolerance and asymptomatic for chest pain/SOB,  no further cardiac evaluation needed prior to R. ureteral stent placement     This patient was seen and examined personally by me and the plan was discussed with the fellow and/or resident above. Amendments were made as necessary to the above. Agree with the excellent note and plan above. 68M w HFpEF, AF s/p PPM, CKD, CLL here w fatigue and increasing AF burden. Now pre-op prior to R ureteral stent. No further cardiac testing needed.    Sudheer Martinez MD, MPhil, [facility]  Cardiologist, [facility]  ; [employer] and [facility]  Email: [email or web address] Cardi[facility] on-service contact/call information, go to [email or web address] and use [email or web address]" to login.  Outpatient Cardiology appointments, call [phone] to arrange with a colleague; I do not have outpatient Cardiology clinic.

## 2022-09-14 NOTE — PROGRESS NOTE ADULT - ASSESSMENT
76 yo male with extensive pmhx including h/o afib on eliquis s/p ppm, ckd, cll, being admitted for pantera    pantera on ckd  renal eval noted  CT scan noted with right hydro and renal stone   consulted  ivf as per renal  lokelma for hyperKalemia  serial bmp  plan for  stent placemnt today in OR    afib s/p ppm on eliquis  was planned for ablation   now postponed due to pantera  ep f/u  tele  cont AC with eliquis  cont toprol    CLL  stable  outpt heme/onc f/u    cont other current meds    d/w renal and cards/ep    Advanced care planning was discussed with patient and family.  Advanced care planning forms were reviewed and discussed as appropriate.  Differential diagnosis and plan of care discussed with patient after the evaluation.   Pain assessed and judicious use of narcotics when appropriate was discussed.  Importance of Fall prevention discussed.  Counseling on Smoking and Alcohol cessation was offered when appropriate.  Counseling on Diet, exercise, and medication compliance was done.   Approx 30 minutes spent.

## 2022-09-14 NOTE — PROGRESS NOTE ADULT - SUBJECTIVE AND OBJECTIVE BOX
NICOLAS CIFUENTES  77y Male  MRN:[id number]    Patient is a 77y old  Male who presents with a chief complaint of pantera    HPI:  77 year old  male, h/o [place] Sci PPM ([employer] MRI EL Serial # [id number]), HFpEF, pAfib (on Eliquis and Toprol), CKD G4/A3, CLL, IgG monoclonal gammopathy, GERD, hypothyroidism, dementia who c/o increased fatigue and very little energy. His afib burden has increased from 12% in May to 21 % in August and therefore the decision to proceed with ablation was made. Pt presents today for afib ablation.             on w/u pt found to have pantera. therefore ablation was cancelled. now being admitted for pantera                                                                           Cards: Dr Clemente (12 Sep 2022 06:50)      Patient seen and evaluated at bedside. No acute events overnight except as noted.    Interval HPI: no acute c/o    PAST MEDICAL & SURGICAL HISTORY:  CLL (chronic lymphocytic leukemia)  in remission      Anxiety disorder      GERD (gastroesophageal reflux disease)      Atrial fibrillation      Diverticulitis      Waldenstrom macroglobulinemia      Bradycardia, drug induced      Hypothyroid      Nephrolithiasis      CKD (chronic kidney disease)      Gout      [place] (hard of hearing)      BPH (benign prostatic hyperplasia)      Memory deficit      IgG monoclonal gammopathy      History of macular degeneration      Chronic heart failure with preserved ejection fraction      Meniscus tear  s/p removal of Meniscus 8 months ago      S/P hernia repair  x2      History of laparoscopic cholecystectomy  4/2014      History of cataract surgery, right  IOL      History of appendectomy      History of cardiac pacemaker in situ          REVIEW OF SYSTEMS:  as per hpi     VITALS:   Vital Signs Last 24 Hrs  T(C): 36.6 (14 Sep 2022 10:43), Max: 37.1 (13 Sep 2022 20:38)  T(F): 97.8 (14 Sep 2022 10:43), Max: 98.7 (13 Sep 2022 20:38)  HR: 75 (14 Sep 2022 10:43) (72 - 89)  BP: 116/65 (14 Sep 2022 10:43) (101/62 - 116/65)  BP(mean): --  RR: 18 (14 Sep 2022 10:43) (18 - 18)  SpO2: 97% (14 Sep 2022 10:43) (96% - 99%)    Parameters below as of 14 Sep 2022 10:43  Patient On (Oxygen Delivery Method): room air          PHYSICAL EXAM:  GENERAL: NAD, well-developed  HEAD:  Atraumatic, Normocephalic  EYES: EOMI, PERRLA, conjunctiva and sclera clear  NECK: Supple, No JVD  CHEST/LUNG: Clear to auscultation bilaterally; No wheeze  HEART: S1, S2; No murmurs, rubs, or gallops  ABDOMEN: Soft, Nontender, Nondistended; Bowel sounds present  EXTREMITIES:  2+ Peripheral Pulses, No clubbing, cyanosis, or edema  PSYCH: Normal affect  NEUROLOGY: AAOX3; non-focal  SKIN: No rashes or lesions    Consultant(s) Notes Reviewed:  [x ] YES  [ ] NO  Care Discussed with Consultants/Other Providers [ x] YES  [ ] NO    MEDS:   MEDICATIONS  (STANDING):  acyclovir   Oral Tab/Cap 400 milliGRAM(s) Oral two times a day  apixaban 5 milliGRAM(s) Oral two times a day  cholecalciferol 2000 Unit(s) Oral daily  clonazePAM  Tablet 1 milliGRAM(s) Oral at bedtime  cyanocobalamin 1000 MICROGram(s) Oral daily  donepezil 10 milliGRAM(s) Oral at bedtime  levothyroxine 150 MICROGram(s) Oral daily  melatonin 5 milliGRAM(s) Oral at bedtime  memantine 10 milliGRAM(s) Oral every 12 hours  metoprolol succinate ER 50 milliGRAM(s) Oral <User Schedule>  mirtazapine 30 milliGRAM(s) Oral at bedtime  pantoprazole    Tablet 40 milliGRAM(s) Oral before breakfast  sodium bicarbonate 650 milliGRAM(s) Oral two times a day  sodium bicarbonate  Infusion 0.071 mEq/kG/Hr (100 mL/Hr) IV Continuous <Continuous>  tamsulosin 0.4 milliGRAM(s) Oral at bedtime    MEDICATIONS  (PRN):  oxycodone    5 mG/acetaminophen 325 mG 2 Tablet(s) Oral every 4 hours PRN Moderate Pain (4 - 6)      ALLERGIES:  rituximab (Angioedema)      LABS:                                              09-13    140  |  110<H>  |  67<H>  ----------------------------<  104<H>  4.9   |  20<L>  |  6.10<H>    Ca    8.1<L>      13 Sep 2022 09:22  Phos  3.6     09-13  Mg     1.6     09-13           < from: CT Abdomen and Pelvis No Cont (09.12.22 @ 18:12) >  IMPRESSION:  Bilateral urolithiasis with 4 mm obstructive calculus in the distal right   ureter and moderate right hydroureteronephrosis.    Abdominal lymphadenopathy without significant interval change, consistent   with known lymphoproliferative disorder..    < end of copied text >

## 2022-09-14 NOTE — PROGRESS NOTE ADULT - SUBJECTIVE AND OBJECTIVE BOX
Overnight events noted      VITAL:  T(C): , Max: 37.1 (22 @ 20:38)  T(F): , Max: 98.7 (22 @ 20:38)  HR: 89 (22 @ 04:42)  BP: 101/62 (22 @ 04:42)  BP(mean): --  RR: 18 (22 @ 04:42)  SpO2: 97% (22 @ 04:42)      PHYSICAL EXAM:  Constitutional: NAD, Alert  HEENT: NCAT, DMM  Neck: Supple, No JVD  Respiratory: CTA-b/l  Cardiovascular: reg s1s2  Gastrointestinal: BS+, soft, NT/ND  Extremities: No peripheral edema b/l  Neurological: no focal deficits; strength grossly intact  Back: no CVAT b/l  Skin: No rashes, no nevi    LABS:    Na(140)/K(4.9)/Cl(110)/HCO3(20)/BUN(67)/Cr(6.10)Glu(104)/Ca(8.1)/Mg(1.6)/PO4(3.6)     @ 09:22  Na(138)/K(5.2)/Cl(110)/HCO3(17)/BUN(71)/Cr(6.23)Glu(139)/Ca(8.1)/Mg(1.5)/PO4(3.5)     @ 21:20  Na(142)/K(5.7)/Cl(114)/HCO3(18)/BUN(66)/Cr(6.26)Glu(93)/Ca(8.4)/Mg(1.8)/PO4(4.1)     @ 09:48  Na(139)/K(6.3)/Cl(111)/HCO3(16)/BUN(69)/Cr(6.22)Glu(98)/Ca(8.5)/Mg(--)/PO4(--)     @ 07:35    Urinalysis Basic - ( 12 Sep 2022 12:26 )  Color: Light Yellow / Appearance: Clear / S.017 / pH: x  Gluc: x / Ketone: Negative  / Bili: Negative / Urobili: Negative   Blood: x / Protein: 30 mg/dL / Nitrite: Negative   Leuk Esterase: Small / RBC: 7 /hpf / WBC 6 /HPF   Sq Epi: x / Non Sq Epi: 1 /hpf / Bacteria: Negative  Potassium, Random Urine: 37 mmol/L ( @ 12:28)  Sodium, Random Urine: 75 mmol/L ( @ 12:28)  Creatinine, Random Urine: 135 mg/dL ( @ 12:28)  Chloride, Random Urine: 64 mmol/L ( @ 12:28)        IMPRESSION: 77M w/ mild dementia, AFib, HFpEF, nephrolithiasis, CLL/Waldenstrom's on [place], and CKD4-5; 22 presented for scheduled AFib ablation; procedure postponed due to LOUISE    (1)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 GFR baseline ~15ml/min.    (2)LOUISE - obstructive nephropathy due to 4mm left-sided stone.  input appreciated - potentially for cystoscopy/L ureteral stent placement today    (3)Lytes - controlled, on low K diet, and on IVF with HCO3    (4)AFib - for eventual AFib ablation       RECOMMEND:  (1)IVF as ordered  (2)Meds for GFR<15  (3)No HD for now  (4)No renal objection to OR today          Norman Ascencio MD  [facility]  Office: [phone]  Cell: [phone]       Overnight events noted      VITAL:  T(C): , Max: 37.1 (22 @ 20:38)  T(F): , Max: 98.7 (22 @ 20:38)  HR: 89 (22 @ 04:42)  BP: 101/62 (22 @ 04:42)  BP(mean): --  RR: 18 (22 @ 04:42)  SpO2: 97% (22 @ 04:42)      PHYSICAL EXAM:  Constitutional: NAD, Alert  HEENT: NCAT, DMM  Neck: Supple, No JVD  Respiratory: CTA-b/l  Cardiovascular: reg s1s2  Gastrointestinal: BS+, soft, NT/ND  Extremities: No peripheral edema b/l  Neurological: no focal deficits; strength grossly intact  Back: no CVAT b/l  Skin: No rashes, no nevi    LABS:    Na(140)/K(4.9)/Cl(110)/HCO3(20)/BUN(67)/Cr(6.10)Glu(104)/Ca(8.1)/Mg(1.6)/PO4(3.6)     @ 09:22  Na(138)/K(5.2)/Cl(110)/HCO3(17)/BUN(71)/Cr(6.23)Glu(139)/Ca(8.1)/Mg(1.5)/PO4(3.5)     @ 21:20  Na(142)/K(5.7)/Cl(114)/HCO3(18)/BUN(66)/Cr(6.26)Glu(93)/Ca(8.4)/Mg(1.8)/PO4(4.1)     @ 09:48  Na(139)/K(6.3)/Cl(111)/HCO3(16)/BUN(69)/Cr(6.22)Glu(98)/Ca(8.5)/Mg(--)/PO4(--)     @ 07:35    Urinalysis Basic - ( 12 Sep 2022 12:26 )  Color: Light Yellow / Appearance: Clear / S.017 / pH: x  Gluc: x / Ketone: Negative  / Bili: Negative / Urobili: Negative   Blood: x / Protein: 30 mg/dL / Nitrite: Negative   Leuk Esterase: Small / RBC: 7 /hpf / WBC 6 /HPF   Sq Epi: x / Non Sq Epi: 1 /hpf / Bacteria: Negative  Potassium, Random Urine: 37 mmol/L ( @ 12:28)  Sodium, Random Urine: 75 mmol/L ( @ 12:28)  Creatinine, Random Urine: 135 mg/dL ( @ 12:28)  Chloride, Random Urine: 64 mmol/L ( @ 12:28)        IMPRESSION: 77M w/ mild dementia, AFib, HFpEF, nephrolithiasis, CLL/Waldenstrom's on N[place]o, and CKD4-5; 22 presented for scheduled AFib ablation; procedure postponed due to LOUISE    (1)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 GFR baseline ~15ml/min.    (2)LOUISE - obstructive nephropathy due to 4mm left-sided stone.  input appreciated - potentially for cystoscopy/L ureteral stent placement today    (3)Lytes - controlled, on low K diet, PO NaHCO3, and on IVF with HCO3    (4)AFib - for eventual AFib ablation       RECOMMEND:  (1)IVF as ordered  (2)Meds for GFR<15  (3)No HD for now  (4)No renal objection to OR today          Norman Ascencio MD  [facility]  Office: [phone]  Cell: [phone]       Overnight events noted      VITAL:  T(C): , Max: 37.1 (22 @ 20:38)  T(F): , Max: 98.7 (22 @ 20:38)  HR: 89 (22 @ 04:42)  BP: 101/62 (22 @ 04:42)  BP(mean): --  RR: 18 (22 @ 04:42)  SpO2: 97% (22 @ 04:42)      PHYSICAL EXAM:  Constitutional: NAD, Alert  HEENT: NCAT, DMM  Neck: Supple, No JVD  Respiratory: CTA-b/l  Cardiovascular: reg s1s2  Gastrointestinal: BS+, soft, NT/ND  Extremities: No peripheral edema b/l  Neurological: no focal deficits; strength grossly intact  Back: no CVAT b/l  Skin: No rashes, no nevi    LABS:    Na(140)/K(4.9)/Cl(110)/HCO3(20)/BUN(67)/Cr(6.10)Glu(104)/Ca(8.1)/Mg(1.6)/PO4(3.6)     @ 09:22  Na(138)/K(5.2)/Cl(110)/HCO3(17)/BUN(71)/Cr(6.23)Glu(139)/Ca(8.1)/Mg(1.5)/PO4(3.5)     @ 21:20  Na(142)/K(5.7)/Cl(114)/HCO3(18)/BUN(66)/Cr(6.26)Glu(93)/Ca(8.4)/Mg(1.8)/PO4(4.1)     @ 09:48  Na(139)/K(6.3)/Cl(111)/HCO3(16)/BUN(69)/Cr(6.22)Glu(98)/Ca(8.5)/Mg(--)/PO4(--)     @ 07:35    Urinalysis Basic - ( 12 Sep 2022 12:26 )  Color: Light Yellow / Appearance: Clear / S.017 / pH: x  Gluc: x / Ketone: Negative  / Bili: Negative / Urobili: Negative   Blood: x / Protein: 30 mg/dL / Nitrite: Negative   Leuk Esterase: Small / RBC: 7 /hpf / WBC 6 /HPF   Sq Epi: x / Non Sq Epi: 1 /hpf / Bacteria: Negative  Potassium, Random Urine: 37 mmol/L ( @ 12:28)  Sodium, Random Urine: 75 mmol/L ( @ 12:28)  Creatinine, Random Urine: 135 mg/dL ( @ 12:28)  Chloride, Random Urine: 64 mmol/L ( @ 12:28)        IMPRESSION: 77M w/ mild dementia, AFib, HFpEF, nephrolithiasis, CLL/Waldenstrom's on N[place]o, and CKD4-5; 22 presented for scheduled AFib ablation; procedure postponed due to LOUISE    (1)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 GFR baseline ~15ml/min.    (2)LOUISE - obstructive nephropathy due to 4mm right-sided stone.  input appreciated - potentially for cystoscopy/R ureteral stent placement today    (3)Lytes - controlled, on low K diet, PO NaHCO3, and on IVF with HCO3    (4)AFib - for eventual AFib ablation       RECOMMEND:  (1)IVF as ordered  (2)Meds for GFR<15  (3)No HD for now  (4)No renal objection to OR today          Norman Ascencio MD  [facility]  Office: [phone]  Cell: [phone]       No pain, no sob      VITAL:  T(C): , Max: 37.1 (22 @ 20:38)  T(F): , Max: 98.7 (22 @ 20:38)  HR: 89 (22 @ 04:42)  BP: 101/62 (22 @ 04:42)  BP(mean): --  RR: 18 (22 @ 04:42)  SpO2: 97% (22 @ 04:42)      PHYSICAL EXAM:  Constitutional: NAD, Alert  HEENT: NCAT, DMM  Neck: Supple, No JVD  Respiratory: CTA-b/l  Cardiovascular: reg s1s2  Gastrointestinal: BS+, soft, NT/ND  Extremities: No peripheral edema b/l  Neurological: no focal deficits; strength grossly intact  Back: no CVAT b/l  Skin: No rashes, no nevi    LABS:    Na(140)/K(4.9)/Cl(110)/HCO3(20)/BUN(67)/Cr(6.10)Glu(104)/Ca(8.1)/Mg(1.6)/PO4(3.6)     @ 09:22  Na(138)/K(5.2)/Cl(110)/HCO3(17)/BUN(71)/Cr(6.23)Glu(139)/Ca(8.1)/Mg(1.5)/PO4(3.5)     @ 21:20  Na(142)/K(5.7)/Cl(114)/HCO3(18)/BUN(66)/Cr(6.26)Glu(93)/Ca(8.4)/Mg(1.8)/PO4(4.1)     @ 09:48  Na(139)/K(6.3)/Cl(111)/HCO3(16)/BUN(69)/Cr(6.22)Glu(98)/Ca(8.5)/Mg(--)/PO4(--)     @ 07:35    Urinalysis Basic - ( 12 Sep 2022 12:26 )  Color: Light Yellow / Appearance: Clear / S.017 / pH: x  Gluc: x / Ketone: Negative  / Bili: Negative / Urobili: Negative   Blood: x / Protein: 30 mg/dL / Nitrite: Negative   Leuk Esterase: Small / RBC: 7 /hpf / WBC 6 /HPF   Sq Epi: x / Non Sq Epi: 1 /hpf / Bacteria: Negative  Potassium, Random Urine: 37 mmol/L ( @ 12:28)  Sodium, Random Urine: 75 mmol/L ( @ 12:28)  Creatinine, Random Urine: 135 mg/dL ( @ 12:28)  Chloride, Random Urine: 64 mmol/L ( @ 12:28)        IMPRESSION: 77M w/ mild dementia, AFib, HFpEF, nephrolithiasis, CLL/Waldenstrom's on [place]o, and CKD4-5; 22 presented for scheduled AFib ablation; procedure postponed due to LOUISE    (1)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 GFR baseline ~15ml/min.    (2)LOUISE - obstructive nephropathy due to 4mm right-sided stone.  input appreciated - potentially for cystoscopy/R ureteral stent placement today    (3)Lytes - controlled, on low K diet, PO NaHCO3, and on IVF with HCO3    (4)AFib - for eventual AFib ablation       RECOMMEND:  (1)IVF as ordered  (2)Meds for GFR<15  (3)No HD for now  (4)No renal objection to OR today          Norman Ascencio MD  [facility]  Office: [phone]  Cell: [phone]

## 2022-09-14 NOTE — PROGRESS NOTE ADULT - ASSESSMENT
76 yo male with CKD, CLL, Waldenstrom's macroglobulinemia, Afib (on Eliquis and Toprol), HFpEF, PPM placed, and hypothyroidism who presented for an elective ablation for Afib burden/associated fatigue, which was postponed due to non-improving LOUISE and worsened Cr compared to baseline found to have a 4mm obstructive calculus in the distal right ureter.    - Patient will likely need ureteral stent for 4 mm R distal ureteral stone with moderate hydronephrosis  - Added on for right stent today   - Please document clearance given his cardiac comorbidities  - f/up urine clx  - consent in chart   - keep NPO  - PVR? walker     Case discussed with Dr. Sultana

## 2022-09-14 NOTE — PROGRESS NOTE ADULT - ASSESSMENT
77 year old  male, h/o [place] Sci PPM ([employer] MRI EL Serial # [id number]), HFpEF, pAfib (on Eliquis and Toprol), CKD G4/A3, CLL, IgG monoclonal gammopathy, GERD, hypothyroidism, dementia who c/o increased fatigue and very little energy. His afib burden has increased from 12% in May to 21 % in August and therefore the decision to proceed with ablation was made. Pt presents 9/12 for afib ablation however postponed due to LOUISE on CKD found to be obstructive in etiology.     - CT with 4mm obstructive calculus in right ureter. Pt NPO for ureteral stent today.   - Tele with AF rates ~70-80s, pt has been in AF since brought to [facility]Mon. Asymptomatic  - Continue AC with Eliquis 5mg BID.   - Continue Toprol  mg qd  - Will postpone elective ablation procedure until Cr back to baseline. 9/13 Crt 6.10, GFR 9 (baseline GFR ~15). Nephrology following, appreciate recs  - Keep on tele. Keep K>4, Mg>2.   - Discussed with EP attending and primary team.

## 2022-09-14 NOTE — CONSULT NOTE ADULT - SUBJECTIVE AND OBJECTIVE BOX
Patient seen and evaluated at bedside    Chief Complaint:    HPI:  77 year old  male, h/o [place] Sci PPM ([employer] EL Serial # [id number]), HFpEF, pAfib (on Eliquis and Toprol), CKD G4/A3, CLL, IgG monoclonal gammopathy, GERD, hypothyroidism, dementia who c/o increased fatigue and very little energy. His afib burden has increased from 12% in May to 21 % in August and therefore the decision to proceed with ablation was made. Pt presents today for afib ablation.                                                                                        Cards: Dr Clemente (12 Sep 2022 06:50)      PMHx:   CLL (chronic lymphocytic leukemia)    Anxiety disorder    GERD (gastroesophageal reflux disease)    Atrial fibrillation    Diverticulitis    Waldenstrom macroglobulinemia    Bradycardia, drug induced    Dementia    Hypothyroid    Nephrolithiasis    CKD (chronic kidney disease)    Gout    [place] (hard of hearing)    BPH (benign prostatic hyperplasia)    Memory deficit    IgG monoclonal gammopathy    History of macular degeneration    Chronic heart failure with preserved ejection fraction        PSHx:   Meniscus tear    S/P hernia repair    History of laparoscopic cholecystectomy    History of cataract surgery, right    History of appendectomy    History of cardiac pacemaker in situ        Allergies:  rituximab (Angioedema)      Home Meds:    Current Medications:   acyclovir   Oral Tab/Cap 400 milliGRAM(s) Oral two times a day  apixaban 5 milliGRAM(s) Oral two times a day  cholecalciferol 2000 Unit(s) Oral daily  clonazePAM  Tablet 1 milliGRAM(s) Oral at bedtime  cyanocobalamin 1000 MICROGram(s) Oral daily  donepezil 10 milliGRAM(s) Oral at bedtime  levothyroxine 150 MICROGram(s) Oral daily  melatonin 5 milliGRAM(s) Oral at bedtime  memantine 10 milliGRAM(s) Oral every 12 hours  metoprolol succinate ER 50 milliGRAM(s) Oral <User Schedule>  mirtazapine 30 milliGRAM(s) Oral at bedtime  oxycodone    5 mG/acetaminophen 325 mG 2 Tablet(s) Oral every 4 hours PRN  pantoprazole    Tablet 40 milliGRAM(s) Oral before breakfast  sodium bicarbonate 650 milliGRAM(s) Oral two times a day  sodium bicarbonate  Infusion 0.071 mEq/kG/Hr IV Continuous <Continuous>  tamsulosin 0.4 milliGRAM(s) Oral at bedtime      FAMILY HISTORY:  FH: atrial fibrillation (Sibling)    FH: type 2 diabetes (Father)    FH: CAD (coronary artery disease) (Father)        Social History:  Smoking History:  Alcohol Use:  Drug Use:    REVIEW OF SYSTEMS:  Constitutional:     [ ] negative [ ] fevers [ ] chills [ ] weight loss [ ] weight gain  HEENT:                  [ ] negative [ ] dry eyes [ ] eye irritation [ ] postnasal drip [ ] nasal congestion  CV:                         [ ] negative  [ ] chest pain [ ] orthopnea [ ] palpitations [ ] murmur  Resp:                     [ ] negative [ ] cough [ ] shortness of breath [ ] dyspnea [ ] wheezing [ ] sputum [ ]hemoptysis  GI:                          [ ] negative [ ] nausea [ ] vomiting [ ] diarrhea [ ] constipation [ ] abd pain [ ] dysphagia   :                        [ ] negative [ ] dysuria [ ] nocturia [ ] hematuria [ ] increased urinary frequency  Musculoskeletal: [ ] negative [ ] back pain [ ] myalgias [ ] arthralgias [ ] fracture  Skin:                       [ ] negative [ ] rash [ ] itch  Neurological:        [ ] negative [ ] headache [ ] dizziness [ ] syncope [ ] weakness [ ] numbness  Psychiatric:           [ ] negative [ ] anxiety [ ] depression  Endocrine:            [ ] negative [ ] diabetes [ ] thyroid problem  Heme/Lymph:      [ ] negative [ ] anemia [ ] bleeding problem  Allergic/Immune: [ ] negative [ ] itchy eyes [ ] nasal discharge [ ] hives [ ] angioedema    [ ] All other systems negative  [ ] Unable to assess ROS due to      Physical Exam:  T(F): 98.3 (09-14), Max: 98.7 (09-13)  HR: 89 (09-14) (72 - 89)  BP: 101/62 (09-14) (101/62 - 115/75)  RR: 18 (09-14)  SpO2: 97% (09-14)  GENERAL: No acute distress, well-developed  HEAD:  Atraumatic, Normocephalic  ENT: EOMI, PERRLA, conjunctiva and sclera clear, Neck supple, No JVD, moist mucosa  CHEST/LUNG: Clear to auscultation bilaterally; No wheeze, equal breath sounds bilaterally   BACK: No spinal tenderness  HEART: Regular rate and rhythm; No murmurs, rubs, or gallops  ABDOMEN: Soft, Nontender, Nondistended; Bowel sounds present  EXTREMITIES:  No clubbing, cyanosis, or edema  PSYCH: Nl behavior, nl affect  NEUROLOGY: AAOx3, non-focal, cranial nerves intact  SKIN: Normal color, No rashes or lesions  LINES:    Cardiovascular Diagnostic Testing:    ECG: Personally reviewed:    Echo: Personally reviewed:    Stress Testing:    Cath:    Imaging:    CXR: Personally reviewed    Labs: Personally reviewed    09-13    140  |  110<H>  |  67<H>  ----------------------------<  104<H>  4.9   |  20<L>  |  6.10<H>    Ca    8.1<L>      13 Sep 2022 09:22  Phos  3.6     09-13  Mg     1.6     09-13               Patient seen and evaluated at bedside    Chief Complaint:    HPI:  77 year old  male, h/o [place] Sci PPM ([employer] EL Serial # [id number]), HFpEF, pAfib (on Eliquis and Toprol), CKD G4/A3, CLL, IgG monoclonal gammopathy, GERD, hypothyroidism, dementia who c/o increased fatigue and very little energy. His afib burden has increased from 12% in May to 21 % in August and therefore the decision to proceed with ablation was made. Pt presents for afib ablation but was postponed due to LOUISE. CT A/P revealed 4 mm R distal ureteral stone with moderate hydronephrosis for which urology will place a stent. Cardiology was consulted for cardiac optimization prior to procedure.                                                                                         Cards: Dr Clemente (12 Sep 2022 06:50)      PMHx:   CLL (chronic lymphocytic leukemia)    Anxiety disorder    GERD (gastroesophageal reflux disease)    Atrial fibrillation    Diverticulitis    Waldenstrom macroglobulinemia    Bradycardia, drug induced    Dementia    Hypothyroid    Nephrolithiasis    CKD (chronic kidney disease)    Gout    [place] (hard of hearing)    BPH (benign prostatic hyperplasia)    Memory deficit    IgG monoclonal gammopathy    History of macular degeneration    Chronic heart failure with preserved ejection fraction        PSHx:   Meniscus tear    S/P hernia repair    History of laparoscopic cholecystectomy    History of cataract surgery, right    History of appendectomy    History of cardiac pacemaker in situ        Allergies:  rituximab (Angioedema)      Home Meds:    Current Medications:   acyclovir   Oral Tab/Cap 400 milliGRAM(s) Oral two times a day  apixaban 5 milliGRAM(s) Oral two times a day  cholecalciferol 2000 Unit(s) Oral daily  clonazePAM  Tablet 1 milliGRAM(s) Oral at bedtime  cyanocobalamin 1000 MICROGram(s) Oral daily  donepezil 10 milliGRAM(s) Oral at bedtime  levothyroxine 150 MICROGram(s) Oral daily  melatonin 5 milliGRAM(s) Oral at bedtime  memantine 10 milliGRAM(s) Oral every 12 hours  metoprolol succinate ER 50 milliGRAM(s) Oral <User Schedule>  mirtazapine 30 milliGRAM(s) Oral at bedtime  oxycodone    5 mG/acetaminophen 325 mG 2 Tablet(s) Oral every 4 hours PRN  pantoprazole    Tablet 40 milliGRAM(s) Oral before breakfast  sodium bicarbonate 650 milliGRAM(s) Oral two times a day  sodium bicarbonate  Infusion 0.071 mEq/kG/Hr IV Continuous <Continuous>  tamsulosin 0.4 milliGRAM(s) Oral at bedtime      FAMILY HISTORY:  FH: atrial fibrillation (Sibling)    FH: type 2 diabetes (Father)    FH: CAD (coronary artery disease) (Father)        Social History:  Smoking History:  Alcohol Use:  Drug Use:    REVIEW OF SYSTEMS:  Constitutional:     [ ] negative [ ] fevers [ ] chills [ ] weight loss [ ] weight gain  HEENT:                  [ ] negative [ ] dry eyes [ ] eye irritation [ ] postnasal drip [ ] nasal congestion  CV:                         [ ] negative  [ ] chest pain [ ] orthopnea [ ] palpitations [ ] murmur  Resp:                     [ ] negative [ ] cough [ ] shortness of breath [ ] dyspnea [ ] wheezing [ ] sputum [ ]hemoptysis  GI:                          [ ] negative [ ] nausea [ ] vomiting [ ] diarrhea [ ] constipation [ ] abd pain [ ] dysphagia   :                        [ ] negative [ ] dysuria [ ] nocturia [ ] hematuria [ ] increased urinary frequency  Musculoskeletal: [ ] negative [ ] back pain [ ] myalgias [ ] arthralgias [ ] fracture  Skin:                       [ ] negative [ ] rash [ ] itch  Neurological:        [ ] negative [ ] headache [ ] dizziness [ ] syncope [ ] weakness [ ] numbness  Psychiatric:           [ ] negative [ ] anxiety [ ] depression  Endocrine:            [ ] negative [ ] diabetes [ ] thyroid problem  Heme/Lymph:      [ ] negative [ ] anemia [ ] bleeding problem  Allergic/Immune: [ ] negative [ ] itchy eyes [ ] nasal discharge [ ] hives [ ] angioedema    [ ] All other systems negative  [ ] Unable to assess ROS due to      Physical Exam:  T(F): 98.3 (), Max: 98.7 ()  HR: 89 () (72 - 89)  BP: 101/62 () (101/62 - 115/75)  RR: 18 ()  SpO2: 97% ()  GENERAL: No acute distress, well-developed  HEAD:  Atraumatic, Normocephalic  ENT: EOMI, PERRLA, conjunctiva and sclera clear, Neck supple, No JVD, moist mucosa  CHEST/LUNG: Clear to auscultation bilaterally; No wheeze, equal breath sounds bilaterally   BACK: No spinal tenderness  HEART: Regular rate and rhythm; No murmurs, rubs, or gallops  ABDOMEN: Soft, Nontender, Nondistended; Bowel sounds present  EXTREMITIES:  No clubbing, cyanosis, or edema  PSYCH: Nl behavior, nl affect  NEUROLOGY: AAOx3, non-focal, cranial nerves intact  SKIN: Normal color, No rashes or lesions  LINES:    EC22  Atrial-paced rhythm with prolonged AV conduction  ABNORMAL ECG WHEN COMPARED WITH ECG OF 26-MAR-2021 10:38,  RHYTHM NOW PACED    Echo: 3/17/21  Normal left ventricular systolic function. No segmental  wall motion abnormalities.  Mild-moderate pulmonary hypertension.  EF: 60%    Imaging:  CT A/P noncontrast 22  Bilateral urolithiasis with 4 mm obstructive calculus in the distal right   ureter and moderate right hydroureteronephrosis.      Labs:         140  |  110<H>  |  67<H>  ----------------------------<  104<H>  4.9   |  20<L>  |  6.10<H>    Ca    8.1<L>      13 Sep 2022 09:22  Phos  3.6       Mg     1.6                    Patient seen and evaluated at bedside. Pt denies chest pain or shortness of breath.    Chief Complaint:    HPI:  77 year old  male, h/o [place] Sci PPM ([employer] EL Serial # [id number]), HFpEF, pAfib (on Eliquis and Toprol), CKD G4/A3, CLL, IgG monoclonal gammopathy, GERD, hypothyroidism, dementia who c/o increased fatigue and very little energy. His afib burden has increased from 12% in May to 21 % in August and therefore the decision to proceed with ablation was made. Pt presents for afib ablation but was postponed due to LOUISE. CT A/P revealed 4 mm R distal ureteral stone with moderate hydronephrosis for which urology will place a stent. Cardiology was consulted for cardiac optimization prior to stent placement.                                                                                         Cards: Dr Clemente (12 Sep 2022 06:50)      PMHx:   CLL (chronic lymphocytic leukemia)    Anxiety disorder    GERD (gastroesophageal reflux disease)    Atrial fibrillation    Diverticulitis    Waldenstrom macroglobulinemia    Bradycardia, drug induced    Dementia    Hypothyroid    Nephrolithiasis    CKD (chronic kidney disease)    Gout    [place] (hard of hearing)    BPH (benign prostatic hyperplasia)    Memory deficit    IgG monoclonal gammopathy    History of macular degeneration    Chronic heart failure with preserved ejection fraction        PSHx:   Meniscus tear    S/P hernia repair    History of laparoscopic cholecystectomy    History of cataract surgery, right    History of appendectomy    History of cardiac pacemaker in situ        Allergies:  rituximab (Angioedema)      Home Meds:    Current Medications:   acyclovir   Oral Tab/Cap 400 milliGRAM(s) Oral two times a day  apixaban 5 milliGRAM(s) Oral two times a day  cholecalciferol 2000 Unit(s) Oral daily  clonazePAM  Tablet 1 milliGRAM(s) Oral at bedtime  cyanocobalamin 1000 MICROGram(s) Oral daily  donepezil 10 milliGRAM(s) Oral at bedtime  levothyroxine 150 MICROGram(s) Oral daily  melatonin 5 milliGRAM(s) Oral at bedtime  memantine 10 milliGRAM(s) Oral every 12 hours  metoprolol succinate ER 50 milliGRAM(s) Oral <User Schedule>  mirtazapine 30 milliGRAM(s) Oral at bedtime  oxycodone    5 mG/acetaminophen 325 mG 2 Tablet(s) Oral every 4 hours PRN  pantoprazole    Tablet 40 milliGRAM(s) Oral before breakfast  sodium bicarbonate 650 milliGRAM(s) Oral two times a day  sodium bicarbonate  Infusion 0.071 mEq/kG/Hr IV Continuous <Continuous>  tamsulosin 0.4 milliGRAM(s) Oral at bedtime      FAMILY HISTORY:  FH: atrial fibrillation (Sibling)    FH: type 2 diabetes (Father)    FH: CAD (coronary artery disease) (Father)        Social History:  Smoking History:  Alcohol Use:  Drug Use:    REVIEW OF SYSTEMS:  Constitutional:     [ ] negative [ ] fevers [ ] chills [ ] weight loss [ ] weight gain  HEENT:                  [ ] negative [ ] dry eyes [ ] eye irritation [ ] postnasal drip [ ] nasal congestion  CV:                         [ ] negative  [ ] chest pain [ ] orthopnea [ ] palpitations [ ] murmur  Resp:                     [ ] negative [ ] cough [ ] shortness of breath [ ] dyspnea [ ] wheezing [ ] sputum [ ]hemoptysis  GI:                          [ ] negative [ ] nausea [ ] vomiting [ ] diarrhea [ ] constipation [ ] abd pain [ ] dysphagia   :                        [ ] negative [ ] dysuria [ ] nocturia [ ] hematuria [ ] increased urinary frequency  Musculoskeletal: [ ] negative [ ] back pain [ ] myalgias [ ] arthralgias [ ] fracture  Skin:                       [ ] negative [ ] rash [ ] itch  Neurological:        [ ] negative [ ] headache [ ] dizziness [ ] syncope [ ] weakness [ ] numbness  Psychiatric:           [ ] negative [ ] anxiety [ ] depression  Endocrine:            [ ] negative [ ] diabetes [ ] thyroid problem  Heme/Lymph:      [ ] negative [ ] anemia [ ] bleeding problem  Allergic/Immune: [ ] negative [ ] itchy eyes [ ] nasal discharge [ ] hives [ ] angioedema    [ ] All other systems negative  [ ] Unable to assess ROS due to      Physical Exam:  T(F): 98.3 (), Max: 98.7 ()  HR: 89 () (72 - 89)  BP: 101/62 () (101/62 - 115/75)  RR: 18 ()  SpO2: 97% ()  GENERAL: No acute distress, well-developed  HEAD:  Atraumatic, Normocephalic  ENT: EOMI, PERRLA, conjunctiva and sclera clear, Neck supple, No JVD, moist mucosa  CHEST/LUNG: Clear to auscultation bilaterally; No wheeze, equal breath sounds bilaterally   BACK: No spinal tenderness  HEART: Regular rate and rhythm; No murmurs, rubs, or gallops  ABDOMEN: Soft, Nontender, Nondistended; Bowel sounds present  EXTREMITIES:  No clubbing, cyanosis, or edema  PSYCH: Nl behavior, nl affect  NEUROLOGY: AAOx3, non-focal, cranial nerves intact  SKIN: Normal color, No rashes or lesions  LINES:    EC22  Atrial-paced rhythm with prolonged AV conduction  ABNORMAL ECG WHEN COMPARED WITH ECG OF 26-MAR-2021 10:38,  RHYTHM NOW PACED    Echo: 3/17/21  Normal left ventricular systolic function. No segmental  wall motion abnormalities.  Mild-moderate pulmonary hypertension.  EF: 60%    Imaging:  CT A/P noncontrast 22  Bilateral urolithiasis with 4 mm obstructive calculus in the distal right   ureter and moderate right hydroureteronephrosis.      Labs:         140  |  110<H>  |  67<H>  ----------------------------<  104<H>  4.9   |  20<L>  |  6.10<H>    Ca    8.1<L>      13 Sep 2022 09:22  Phos  3.6       Mg     1.6

## 2022-09-14 NOTE — PROGRESS NOTE ADULT - SUBJECTIVE AND OBJECTIVE BOX
Interval events:   no acute events        OBJECTIVE:  Vital Signs Last 24 Hrs  T(C): 36.8 (14 Sep 2022 04:42), Max: 37.1 (13 Sep 2022 20:38)  T(F): 98.3 (14 Sep 2022 04:42), Max: 98.7 (13 Sep 2022 20:38)  HR: 89 (14 Sep 2022 04:42) (72 - 89)  BP: 101/62 (14 Sep 2022 04:42) (101/62 - 115/75)  BP(mean): --  RR: 18 (14 Sep 2022 04:42) (18 - 18)  SpO2: 97% (14 Sep 2022 04:42) (96% - 99%)    Parameters below as of 14 Sep 2022 04:42  Patient On (Oxygen Delivery Method): room air        Physical Examination:  GEN: NAD, resting quietly  ABD: soft      LABS:                        9.1    6.44  )-----------( 90       ( 12 Sep 2022 07:35 )             29.6       09-13    140  |  110<H>  |  67<H>  ----------------------------<  104<H>  4.9   |  20<L>  |  6.10<H>    Ca    8.1<L>      13 Sep 2022 09:22  Phos  3.6     09-13  Mg     1.6     09-13

## 2022-09-14 NOTE — PROGRESS NOTE ADULT - SUBJECTIVE AND OBJECTIVE BOX
24H hour events: Tele with AF rates ~70-80s, pt was in AF when brought to 4Mon. He denies chest pain, palpitations, SOB. Pending ureteral stent today.      MEDICATIONS:  apixaban 5 milliGRAM(s) Oral two times a day  metoprolol succinate ER 50 milliGRAM(s) Oral <User Schedule>  tamsulosin 0.4 milliGRAM(s) Oral at bedtime  acyclovir   Oral Tab/Cap 400 milliGRAM(s) Oral two times a day  clonazePAM  Tablet 1 milliGRAM(s) Oral at bedtime  donepezil 10 milliGRAM(s) Oral at bedtime  melatonin 5 milliGRAM(s) Oral at bedtime  memantine 10 milliGRAM(s) Oral every 12 hours  mirtazapine 30 milliGRAM(s) Oral at bedtime  oxycodone    5 mG/acetaminophen 325 mG 2 Tablet(s) Oral every 4 hours PRN  pantoprazole    Tablet 40 milliGRAM(s) Oral before breakfast  levothyroxine 150 MICROGram(s) Oral daily  cholecalciferol 2000 Unit(s) Oral daily  cyanocobalamin 1000 MICROGram(s) Oral daily  sodium bicarbonate 650 milliGRAM(s) Oral two times a day  sodium bicarbonate  Infusion 0.071 mEq/kG/Hr IV Continuous <Continuous>    REVIEW OF SYSTEMS:  Complete 10point ROS negative.    PHYSICAL EXAM:  T(C): 36.8 (09-14-22 @ 04:42), Max: 37.1 (09-13-22 @ 20:38)  HR: 89 (09-14-22 @ 04:42) (72 - 89)  BP: 101/62 (09-14-22 @ 04:42) (101/62 - 115/75)  RR: 18 (09-14-22 @ 04:42) (18 - 18)  SpO2: 97% (09-14-22 @ 04:42) (96% - 99%)  Wt(kg): --  I&O's Summary    13 Sep 2022 07:01  -  14 Sep 2022 07:00  --------------------------------------------------------  IN: 1080 mL / OUT: 1550 mL / NET: -470 mL        Appearance: Normal	  HEENT: NC/AT  Cardiovascular: irregularly irregular  Respiratory: Lungs clear to auscultation	  Psychiatry: A & O x 3, Mood & affect appropriate  Neurologic: Non-focal  Extremities: No BLE edema      LABS:	 	      09-13    140  |  110<H>  |  67<H>  ----------------------------<  104<H>  4.9   |  20<L>  |  6.10<H>  09-12    138  |  110<H>  |  71<H>  ----------------------------<  139<H>  5.2   |  17<L>  |  6.23<H>    Ca    8.1<L>      13 Sep 2022 09:22  Ca    8.1<L>      12 Sep 2022 21:20  Phos  3.6     09-13  Phos  3.5     09-12  Mg     1.6     09-13  Mg     1.5     09-12    TSH: Thyroid Stimulating Hormone, Serum in AM (03.17.21 @ 12:48)    Thyroid Stimulating Hormone, Serum: 1.47 uIU/mL      CARDIAC MARKERS: Troponin T, High Sensitivity (03.25.21 @ 14:59)    Troponin T, High Sensitivity Result: 62: Specimen not hemolyzed  *  *  Rapid upward or downward changes in high-sensitivity troponin levels  suggest acute myocardial injury. Renal impairment may cause sustained  troponin elevations.  Normal: <6 - 14 ng/L  Indeterminate: 15-51 ng/L  Elevated: > 51 ng/L  See http://labs/test/[email or web address] on the [facility] intranet for more  information ng/L    RADIOLOGY: < from: Xray Chest 1 View- PORTABLE-Routine (Xray Chest 1 View- PORTABLE-Routine in AM.) (03.19.21 @ 08:25) >    IMPRESSION:  Progression of infiltrates. No evidence of CHF.    Thank you for the courtesy of this referral.    < end of copied text >    < from: CT Abdomen and Pelvis No Cont (09.12.22 @ 18:12) >  IMPRESSION:  Bilateral urolithiasis with 4 mm obstructive calculus in the distal right   ureter and moderate right hydroureteronephrosis.    Abdominal lymphadenopathy without significant interval change, consistent   with known lymphoproliferative disorder..    < end of copied text >    PREVIOUS DIAGNOSTIC TESTING:    [ ] Echocardiogram: < from: Transthoracic Echocardiogram (03.17.21 @ 10:10) >  Dimensions:    Normal Values:  LA:     5.0    2.0 - 4.0 cm  Ao:            2.0 - 3.8 cm  SEPTUM: 1.1    0.6 - 1.2 cm  PWT:    0.9    0.6 - 1.1 cm  LVIDd:  5.9    3.0 - 5.6 cm  LVIDs:  4.1    1.8 - 4.0 cm  Derived variables:  LVMI: 104 g/m2  RWT: 0.30  EF (Visual Estimate): 60 %  Doppler Peak Velocity (m/sec): AoV=2.5 TV=3.1  ------------------------------------------------------------------------  Observations:  Mitral Valve: Normal mitral valve.  Aortic Valve/Aorta: Fibrocalcific aortic valve without  stenosis.  Normal aortic root size.  Left Atrium: Mildly dilated left atrium.  Left Ventricle: Normal left ventricular internal dimensions  and wall thicknesses.  Normal left ventricular systolic function. No segmental  wall motion abnormalities.  Normal diastolic function.  Right Heart: Normal right atrium. Normal right ventricular  size and function.  Normal tricuspid valve. Mild tricuspid regurgitation.  Normal pulmonic valve.  Pericardium/Pleura: Normal pericardium with no pericardial  effusion.  Hemodynamic: Estimated right atrial pressure is mildly  elevated.  Mild-moderate pulmonary hypertension. Estimated PASP 45  mmHg.  ------------------------------------------------------------------------  Conclusions:  Technically difficult study.  Normal left ventricular systolic function. No segmental  wall motion abnormalities.  Mild-moderate pulmonary hypertension.  ------------------------------------------------------------------------    < end of copied text >

## 2022-09-15 LAB
ANION GAP SERPL CALC-SCNC: 10 MMOL/L — SIGNIFICANT CHANGE UP (ref 5–17)
ANION GAP SERPL CALC-SCNC: 11 MMOL/L — SIGNIFICANT CHANGE UP (ref 5–17)
BUN SERPL-MCNC: 60 MG/DL — HIGH (ref 7–23)
BUN SERPL-MCNC: 63 MG/DL — HIGH (ref 7–23)
CALCIUM SERPL-MCNC: 7.8 MG/DL — LOW (ref 8.4–10.5)
CALCIUM SERPL-MCNC: 8.3 MG/DL — LOW (ref 8.4–10.5)
CHLORIDE SERPL-SCNC: 106 MMOL/L — SIGNIFICANT CHANGE UP (ref 96–108)
CHLORIDE SERPL-SCNC: 110 MMOL/L — HIGH (ref 96–108)
CO2 SERPL-SCNC: 21 MMOL/L — LOW (ref 22–31)
CO2 SERPL-SCNC: 23 MMOL/L — SIGNIFICANT CHANGE UP (ref 22–31)
CREAT SERPL-MCNC: 5.76 MG/DL — HIGH (ref 0.5–1.3)
CREAT SERPL-MCNC: 5.9 MG/DL — HIGH (ref 0.5–1.3)
EGFR: 10 ML/MIN/1.73M2 — LOW
EGFR: 9 ML/MIN/1.73M2 — LOW
GLUCOSE SERPL-MCNC: 128 MG/DL — HIGH (ref 70–99)
GLUCOSE SERPL-MCNC: 161 MG/DL — HIGH (ref 70–99)
HCT VFR BLD CALC: 27.9 % — LOW (ref 39–50)
HGB BLD-MCNC: 8.8 G/DL — LOW (ref 13–17)
MCHC RBC-ENTMCNC: 30.2 PG — SIGNIFICANT CHANGE UP (ref 27–34)
MCHC RBC-ENTMCNC: 31.5 GM/DL — LOW (ref 32–36)
MCV RBC AUTO: 95.9 FL — SIGNIFICANT CHANGE UP (ref 80–100)
MRSA PCR RESULT.: SIGNIFICANT CHANGE UP
NRBC # BLD: 0 /100 WBCS — SIGNIFICANT CHANGE UP (ref 0–0)
PLATELET # BLD AUTO: 92 K/UL — LOW (ref 150–400)
POTASSIUM SERPL-MCNC: 4.6 MMOL/L — SIGNIFICANT CHANGE UP (ref 3.5–5.3)
POTASSIUM SERPL-MCNC: 5.5 MMOL/L — HIGH (ref 3.5–5.3)
POTASSIUM SERPL-SCNC: 4.6 MMOL/L — SIGNIFICANT CHANGE UP (ref 3.5–5.3)
POTASSIUM SERPL-SCNC: 5.5 MMOL/L — HIGH (ref 3.5–5.3)
RBC # BLD: 2.91 M/UL — LOW (ref 4.2–5.8)
RBC # FLD: 13.2 % — SIGNIFICANT CHANGE UP (ref 10.3–14.5)
S AUREUS DNA NOSE QL NAA+PROBE: SIGNIFICANT CHANGE UP
SODIUM SERPL-SCNC: 138 MMOL/L — SIGNIFICANT CHANGE UP (ref 135–145)
SODIUM SERPL-SCNC: 143 MMOL/L — SIGNIFICANT CHANGE UP (ref 135–145)
WBC # BLD: 4.81 K/UL — SIGNIFICANT CHANGE UP (ref 3.8–10.5)
WBC # FLD AUTO: 4.81 K/UL — SIGNIFICANT CHANGE UP (ref 3.8–10.5)

## 2022-09-15 PROCEDURE — 99223 1ST HOSP IP/OBS HIGH 75: CPT

## 2022-09-15 RX ORDER — SODIUM CHLORIDE 9 MG/ML
1000 INJECTION INTRAMUSCULAR; INTRAVENOUS; SUBCUTANEOUS
Refills: 0 | Status: COMPLETED | OUTPATIENT
Start: 2022-09-15 | End: 2022-09-16

## 2022-09-15 RX ORDER — CHLORHEXIDINE GLUCONATE 213 G/1000ML
1 SOLUTION TOPICAL
Refills: 0 | Status: DISCONTINUED | OUTPATIENT
Start: 2022-09-15 | End: 2022-09-21

## 2022-09-15 RX ORDER — APIXABAN 2.5 MG/1
5 TABLET, FILM COATED ORAL EVERY 12 HOURS
Refills: 0 | Status: DISCONTINUED | OUTPATIENT
Start: 2022-09-15 | End: 2022-09-18

## 2022-09-15 RX ORDER — SODIUM ZIRCONIUM CYCLOSILICATE 10 G/10G
10 POWDER, FOR SUSPENSION ORAL ONCE
Refills: 0 | Status: COMPLETED | OUTPATIENT
Start: 2022-09-15 | End: 2022-09-15

## 2022-09-15 RX ADMIN — Medication 400 MILLIGRAM(S): at 16:57

## 2022-09-15 RX ADMIN — APIXABAN 5 MILLIGRAM(S): 2.5 TABLET, FILM COATED ORAL at 21:36

## 2022-09-15 RX ADMIN — MIRTAZAPINE 30 MILLIGRAM(S): 45 TABLET, ORALLY DISINTEGRATING ORAL at 21:37

## 2022-09-15 RX ADMIN — DONEPEZIL HYDROCHLORIDE 10 MILLIGRAM(S): 10 TABLET, FILM COATED ORAL at 21:37

## 2022-09-15 RX ADMIN — TAMSULOSIN HYDROCHLORIDE 0.4 MILLIGRAM(S): 0.4 CAPSULE ORAL at 21:36

## 2022-09-15 RX ADMIN — MEMANTINE HYDROCHLORIDE 10 MILLIGRAM(S): 10 TABLET ORAL at 16:57

## 2022-09-15 RX ADMIN — Medication 650 MILLIGRAM(S): at 16:56

## 2022-09-15 RX ADMIN — PREGABALIN 1000 MICROGRAM(S): 225 CAPSULE ORAL at 11:56

## 2022-09-15 RX ADMIN — Medication 2000 UNIT(S): at 11:56

## 2022-09-15 RX ADMIN — Medication 650 MILLIGRAM(S): at 05:04

## 2022-09-15 RX ADMIN — MEMANTINE HYDROCHLORIDE 10 MILLIGRAM(S): 10 TABLET ORAL at 05:04

## 2022-09-15 RX ADMIN — Medication 1 MILLIGRAM(S): at 21:37

## 2022-09-15 RX ADMIN — SODIUM CHLORIDE 100 MILLILITER(S): 9 INJECTION INTRAMUSCULAR; INTRAVENOUS; SUBCUTANEOUS at 09:50

## 2022-09-15 RX ADMIN — PANTOPRAZOLE SODIUM 40 MILLIGRAM(S): 20 TABLET, DELAYED RELEASE ORAL at 07:33

## 2022-09-15 RX ADMIN — SODIUM ZIRCONIUM CYCLOSILICATE 10 GRAM(S): 10 POWDER, FOR SUSPENSION ORAL at 09:00

## 2022-09-15 RX ADMIN — Medication 50 MILLIGRAM(S): at 05:04

## 2022-09-15 RX ADMIN — CHLORHEXIDINE GLUCONATE 1 APPLICATION(S): 213 SOLUTION TOPICAL at 11:57

## 2022-09-15 RX ADMIN — Medication 50 MILLIGRAM(S): at 13:15

## 2022-09-15 RX ADMIN — Medication 5 MILLIGRAM(S): at 21:37

## 2022-09-15 RX ADMIN — Medication 50 MILLIGRAM(S): at 21:37

## 2022-09-15 RX ADMIN — Medication 150 MICROGRAM(S): at 05:05

## 2022-09-15 RX ADMIN — Medication 400 MILLIGRAM(S): at 05:05

## 2022-09-15 NOTE — PROGRESS NOTE ADULT - SUBJECTIVE AND OBJECTIVE BOX
NICOLAS CIFUENTES  77y Male  MRN:[id number]    Patient is a 77y old  Male who presents with a chief complaint of pantera    HPI:  77 year old  male, h/o [place] Sci PPM ([employer] MRI EL Serial # [id number]), HFpEF, pAfib (on Eliquis and Toprol), CKD G4/A3, CLL, IgG monoclonal gammopathy, GERD, hypothyroidism, dementia who c/o increased fatigue and very little energy. His afib burden has increased from 12% in May to 21 % in August and therefore the decision to proceed with ablation was made. Pt presents today for afib ablation.             on w/u pt found to have pantera. therefore ablation was cancelled. now being admitted for pantera                                                                           Cards: Dr Clemente (12 Sep 2022 06:50)      Patient seen and evaluated at bedside. No acute events overnight except as noted.    Interval HPI: s/p right ureteral stent insertion by     PAST MEDICAL & SURGICAL HISTORY:  CLL (chronic lymphocytic leukemia)  in remission      Anxiety disorder      GERD (gastroesophageal reflux disease)      Atrial fibrillation      Diverticulitis      Waldenstrom macroglobulinemia      Bradycardia, drug induced      Hypothyroid      Nephrolithiasis      CKD (chronic kidney disease)      Gout      [place] (hard of hearing)      BPH (benign prostatic hyperplasia)      Memory deficit      IgG monoclonal gammopathy      History of macular degeneration      Chronic heart failure with preserved ejection fraction      Meniscus tear  s/p removal of Meniscus 8 months ago      S/P hernia repair  x2      History of laparoscopic cholecystectomy  4/2014      History of cataract surgery, right  IOL      History of appendectomy      History of cardiac pacemaker in situ          REVIEW OF SYSTEMS:  as per hpi     VITALS:   Vital Signs Last 24 Hrs  T(C): 36.6 (15 Sep 2022 05:10), Max: 36.7 (14 Sep 2022 14:52)  T(F): 97.9 (15 Sep 2022 05:10), Max: 98.1 (14 Sep 2022 14:52)  HR: 64 (15 Sep 2022 05:10) (50 - 113)  BP: 109/56 (15 Sep 2022 05:10) (109/56 - 134/85)  BP(mean): 82 (14 Sep 2022 18:00) (82 - 98)  RR: 18 (15 Sep 2022 05:10) (15 - 19)  SpO2: 93% (15 Sep 2022 05:10) (93% - 100%)    Parameters below as of 15 Sep 2022 05:10  Patient On (Oxygen Delivery Method): room air          PHYSICAL EXAM:  GENERAL: NAD, well-developed  HEAD:  Atraumatic, Normocephalic  EYES: EOMI, PERRLA, conjunctiva and sclera clear  NECK: Supple, No JVD  CHEST/LUNG: Clear to auscultation bilaterally; No wheeze  HEART: S1, S2; No murmurs, rubs, or gallops  ABDOMEN: Soft, Nontender, Nondistended; Bowel sounds present  EXTREMITIES:  2+ Peripheral Pulses, No clubbing, cyanosis, or edema  PSYCH: Normal affect  NEUROLOGY: AAOX3; non-focal  SKIN: No rashes or lesions    Consultant(s) Notes Reviewed:  [x ] YES  [ ] NO  Care Discussed with Consultants/Other Providers [ x] YES  [ ] NO    MEDS:   MEDICATIONS  (STANDING):  acyclovir   Oral Tab/Cap 400 milliGRAM(s) Oral two times a day  cholecalciferol 2000 Unit(s) Oral daily  clonazePAM  Tablet 1 milliGRAM(s) Oral at bedtime  cyanocobalamin 1000 MICROGram(s) Oral daily  donepezil 10 milliGRAM(s) Oral at bedtime  levothyroxine 150 MICROGram(s) Oral daily  melatonin 5 milliGRAM(s) Oral at bedtime  memantine 10 milliGRAM(s) Oral every 12 hours  metoprolol succinate ER 50 milliGRAM(s) Oral <User Schedule>  mirtazapine 30 milliGRAM(s) Oral at bedtime  pantoprazole    Tablet 40 milliGRAM(s) Oral before breakfast  sodium bicarbonate 650 milliGRAM(s) Oral two times a day  sodium chloride 0.9%. 1000 milliLiter(s) (100 mL/Hr) IV Continuous <Continuous>  tamsulosin 0.4 milliGRAM(s) Oral at bedtime    MEDICATIONS  (PRN):  ondansetron Injectable 4 milliGRAM(s) IV Push once PRN Nausea and/or Vomiting  oxycodone    5 mG/acetaminophen 325 mG 2 Tablet(s) Oral every 4 hours PRN Moderate Pain (4 - 6)      ALLERGIES:  rituximab (Angioedema)      LABS:                           8.8    4.81  )-----------( 92       ( 15 Sep 2022 06:23 )             27.9   09-15    138  |  106  |  63<H>  ----------------------------<  161<H>  5.5<H>   |  21<L>  |  5.76<H>    Ca    8.3<L>      15 Sep 2022 06:22  Phos  3.4     09-14  Mg     1.6     09-14             < from: CT Abdomen and Pelvis No Cont (09.12.22 @ 18:12) >  IMPRESSION:  Bilateral urolithiasis with 4 mm obstructive calculus in the distal right   ureter and moderate right hydroureteronephrosis.    Abdominal lymphadenopathy without significant interval change, consistent   with known lymphoproliferative disorder..    < end of copied text >

## 2022-09-15 NOTE — PROGRESS NOTE ADULT - ASSESSMENT
78 yo male with extensive pmhx including h/o afib on eliquis s/p ppm, ckd, cll, being admitted for pantera    pantera on ckd  renal eval noted  CT scan noted with right hydro and renal stone   consulted  ivf as per renal  lokelma for hyperKalemia  serial bmp  s/p right ureteral stent placement by  on 9/14  monitor creat   gu f/u    afib s/p ppm on eliquis  was planned for ablation   now postponed due to pantera  ep f/u  tele  cont AC with eliquis  cont toprol    CLL  stable  outpt heme/onc f/u    c/o diarrhea  gi f/u  imodium prn    cont other current meds    d/w renal and cards/ep    Advanced care planning was discussed with patient and family.  Advanced care planning forms were reviewed and discussed as appropriate.  Differential diagnosis and plan of care discussed with patient after the evaluation.   Pain assessed and judicious use of narcotics when appropriate was discussed.  Importance of Fall prevention discussed.  Counseling on Smoking and Alcohol cessation was offered when appropriate.  Counseling on Diet, exercise, and medication compliance was done.   Approx 30 minutes spent.

## 2022-09-15 NOTE — CONSULT NOTE ADULT - NS ATTEND AMEND GEN_ALL_CORE FT
Agree with watchful follow up, since pt has no loose BMs now.  stool for GI PCR if needed  Probiotics and diet as tolerated  fiber suppl

## 2022-09-15 NOTE — CONSULT NOTE ADULT - ASSESSMENT
77 year old  male, h/o [place] Sci PPM ([employer] MRI EL Serial # [id number]), HFpEF, pAfib (on Eliquis and Toprol), CKD G4/A3, CLL, IgG monoclonal gammopathy, GERD, hypothyroidism, dementia who c/o increased fatigue and very little energy. His afib burden has increased from 12% in May to 21 % in August and therefore the decision to proceed with ablation was made. Noted to have LOUISE and scheduled ablation on 9/12/22 was postponed 2/2 LOUISE on CKD  There was no improvement of LOUISE despite IV fluids, and a CT abd/pelvis was subsequently obtained, which showed a 4 mm obstructive calculus in the distal right ureter and moderate right hydroureteronephrosis.  s/p cystoscopy with ureteral stent placement 9/14/22    GI asked to evaluate for reports of loose stools: pt states intermittent "mushy" stools without blood ~2/day    Clinically without diarrhea and no colitis noted on CT 9/12 (though without contrast)  -monitor BMs; if develops loose/watery stools then can send GI PCR  -ok for PO diet as tolerated  -can consider addition of probiotic (Bacid) as pt was previously on this  -no plans/role for endoscopic evaluation    Further care per primary team  Discussed with pt, all questions answered    Abdullahi Goncalves PA-C    [facility]  [phone]  After hours and weekend coverage [phone]

## 2022-09-15 NOTE — PROGRESS NOTE ADULT - SUBJECTIVE AND OBJECTIVE BOX
Overnight events noted      VITAL:  T(C): , Max: 36.7 (09-14-22 @ 14:52)  T(F): , Max: 98.1 (09-14-22 @ 14:52)  HR: 64 (09-15-22 @ 05:10)  BP: 109/56 (09-15-22 @ 05:10)  BP(mean): 82 (09-14-22 @ 18:00)  RR: 18 (09-15-22 @ 05:10)  SpO2: 93% (09-15-22 @ 05:10)  urine output 1750cc/24h    PHYSICAL EXAM:  Constitutional: NAD, Alert  HEENT: NCAT, DMM  Neck: Supple, No JVD  Respiratory: CTA-b/l  Cardiovascular: reg s1s2  Gastrointestinal: BS+, soft, NT/ND  Extremities: No peripheral edema b/l  Neurological: no focal deficits; strength grossly intact  Back: no CVAT b/l  Skin: No rashes, no nevi    LABS:                        8.8    4.81  )-----------( 92       ( 15 Sep 2022 06:23 )             27.9     Na(138)/K(5.5)/Cl(106)/HCO3(21)/BUN(63)/Cr(5.76)Glu(161)/Ca(8.3)/Mg(--)/PO4(--)    09-15 @ 06:22  Na(141)/K(4.9)/Cl(108)/HCO3(23)/BUN(63)/Cr(5.82)Glu(85)/Ca(8.2)/Mg(1.6)/PO4(3.4)    09-14 @ 12:23  Na(140)/K(4.9)/Cl(110)/HCO3(20)/BUN(67)/Cr(6.10)Glu(104)/Ca(8.1)/Mg(1.6)/PO4(3.6)    09-13 @ 09:22  Na(138)/K(5.2)/Cl(110)/HCO3(17)/BUN(71)/Cr(6.23)Glu(139)/Ca(8.1)/Mg(1.5)/PO4(3.5)    09-12 @ 21:20  Na(142)/K(5.7)/Cl(114)/HCO3(18)/BUN(66)/Cr(6.26)Glu(93)/Ca(8.4)/Mg(1.8)/PO4(4.1)    09-12 @ 09:48      IMPRESSION: 77M w/ mild dementia, AFib, HFpEF, nephrolithiasis, CLL/Waldenstrom's on [place], and CKD4-5; 9/12/22 presented for scheduled AFib ablation; procedure postponed due to LOUISE    (1)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 GFR baseline ~15ml/min.    (2)LOUISE - obstructive nephropathy due to 4mm right-sided stone. Starting to improve as of yesterday; creatinine has plateaued again in the high-5s, despite ureteral stent placement. Dry?    (3)Hyperkalemia - renally mediated - warranting Lokelma    (4)AFib - for eventual AFib ablation     (5) - s/p R ureteral stent placement 9/14/22      RECOMMEND:  (1)NS 100cc/h x 2L  (2)Lokelma 10gm po x 1  (3)BMP at 6pm today; repeat Lokelma if K is 5.5 or higher  (4)Continue meds for GFR<15  (5)No HD for now      Norman Ascencio MD  [facility]  Office: [phone]  Cell: [phone]       complains of (chronic) diarrhea - takes kaopectate at home      VITAL:  T(C): , Max: 36.7 (09-14-22 @ 14:52)  T(F): , Max: 98.1 (09-14-22 @ 14:52)  HR: 64 (09-15-22 @ 05:10)  BP: 109/56 (09-15-22 @ 05:10)  BP(mean): 82 (09-14-22 @ 18:00)  RR: 18 (09-15-22 @ 05:10)  SpO2: 93% (09-15-22 @ 05:10)  urine output 1750cc/24h    PHYSICAL EXAM:  Constitutional: NAD, Alert  HEENT: NCAT, DMM  Neck: Supple, No JVD  Respiratory: CTA-b/l  Cardiovascular: reg s1s2  Gastrointestinal: BS+, soft, NT/ND  Extremities: No peripheral edema b/l  Neurological: no focal deficits; strength grossly intact  Back: no CVAT b/l  Skin: No rashes, no nevi    LABS:                        8.8    4.81  )-----------( 92       ( 15 Sep 2022 06:23 )             27.9     Na(138)/K(5.5)/Cl(106)/HCO3(21)/BUN(63)/Cr(5.76)Glu(161)/Ca(8.3)/Mg(--)/PO4(--)    09-15 @ 06:22  Na(141)/K(4.9)/Cl(108)/HCO3(23)/BUN(63)/Cr(5.82)Glu(85)/Ca(8.2)/Mg(1.6)/PO4(3.4)    09-14 @ 12:23  Na(140)/K(4.9)/Cl(110)/HCO3(20)/BUN(67)/Cr(6.10)Glu(104)/Ca(8.1)/Mg(1.6)/PO4(3.6)    09-13 @ 09:22  Na(138)/K(5.2)/Cl(110)/HCO3(17)/BUN(71)/Cr(6.23)Glu(139)/Ca(8.1)/Mg(1.5)/PO4(3.5)    09-12 @ 21:20  Na(142)/K(5.7)/Cl(114)/HCO3(18)/BUN(66)/Cr(6.26)Glu(93)/Ca(8.4)/Mg(1.8)/PO4(4.1)    09-12 @ 09:48      IMPRESSION: 77M w/ mild dementia, AFib, HFpEF, nephrolithiasis, CLL/Waldenstrom's on [place], and CKD4-5; 9/12/22 presented for scheduled AFib ablation; procedure postponed due to LOUISE    (1)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 GFR baseline ~15ml/min.    (2)LOUISE - obstructive nephropathy due to 4mm right-sided stone. Starting to improve as of yesterday; creatinine has plateaued again in the high-5s, despite ureteral stent placement. Dry?    (3)Hyperkalemia - renally mediated - warranting Lokelma    (4)AFib - for eventual AFib ablation     (5) - s/p R ureteral stent placement 9/14/22      RECOMMEND:  (1)NS 100cc/h x 2L  (2)Lokelma 10gm po x 1  (3)BMP at 6pm today; repeat Lokelma if K is 5.5 or higher  (4)Continue meds for GFR<15  (5)No HD for now  (6)Treatment of diarrhea per primary team    Norman Ascencio MD  [facility]  Office: [phone]  Cell: [phone]

## 2022-09-15 NOTE — PROGRESS NOTE ADULT - SUBJECTIVE AND OBJECTIVE BOX
Subjective  No acute events. Resting comfortably.    Objective    Vital signs  T(F): , Max: 98.1 (09-14-22 @ 14:52)  HR: 104 (09-15-22 @ 13:15)  BP: 132/82 (09-15-22 @ 13:15)  SpO2: 96% (09-15-22 @ 11:45)  Wt(kg): --    Output     OUT:    Voided (mL): 1750 mL  Total OUT: 1750 mL    Total NET: -1750 mL      OUT:    Voided (mL): 175 mL  Total OUT: 175 mL    Total NET: -175 mL          Gen: NAD  Abd: soft    Labs      09-15 @ 06:23    WBC 4.81  / Hct 27.9  / SCr --       09-15 @ 06:22    WBC --    / Hct --    / SCr 5.76         Culture - Urine (collected 09-13-22 @ 14:50)  Source: Clean Catch Clean Catch (Midstream)  Final Report (09-14-22 @ 20:44):    <10,000 CFU/mL Normal Urogenital Nyla

## 2022-09-15 NOTE — CONSULT NOTE ADULT - SUBJECTIVE AND OBJECTIVE BOX
Patient is a 77y old  Male who presents with a chief complaint of elective ablation for Afib (13 Sep 2022 10:53)      HPI:  77 year old  male, h/o [place] Sci PPM ([employer] MRI EL Serial # [id number]), HFpEF, pAfib (on Eliquis and Toprol), CKD G4/A3, CLL, IgG monoclonal gammopathy, GERD, hypothyroidism, dementia who c/o increased fatigue and very little energy. His afib burden has increased from 12% in May to 21 % in August and therefore the decision to proceed with ablation was made. Noted to have LOUISE and scheduled ablation on 9/12/22 was postponed 2/2 LOUISE on CKD  There was no improvement of LOUISE despite IV fluids, and a CT abd/pelvis was subsequently obtained, which showed a 4 mm obstructive calculus in the distal right ureter and moderate right hydroureteronephrosis.  s/p cystoscopy with ureteral stent placement 9/14/22    GI asked to evaluate for reports of loose stools    pt states intermittent "mushy" stools without blood ~2/day  no abdominal pain, nausea/ vomiting, fever, chills, rectal bleeding or melena  appetite good    Previous hx diverticulosis/itis s/p partial colon resection (recto-sigmoid)  no history of IBD or colon cancer on prior colonoscopies, no hx celiac/malabsorption syndromes    PAST MEDICAL & SURGICAL HISTORY:  CLL (chronic lymphocytic leukemia)  in remission    Anxiety disorder    GERD (gastroesophageal reflux disease)    Atrial fibrillation    Diverticulitis    Waldenstrom macroglobulinemia    Bradycardia, drug induced    Hypothyroid    Nephrolithiasis    CKD (chronic kidney disease)    Gout    [place] (hard of hearing)    BPH (benign prostatic hyperplasia)    Memory deficit    IgG monoclonal gammopathy    History of macular degeneration    Chronic heart failure with preserved ejection fraction    Meniscus tear  s/p removal of Meniscus 8 months ago    S/P hernia repair  x2    History of laparoscopic cholecystectomy  4/2014    History of cataract surgery, right  IOL    History of appendectomy    History of cardiac pacemaker in situ          Allergies  rituximab (Angioedema)    MEDICATIONS  (STANDING):  acyclovir   Oral Tab/Cap 400 milliGRAM(s) Oral two times a day  chlorhexidine 2% Cloths 1 Application(s) Topical <User Schedule>  cholecalciferol 2000 Unit(s) Oral daily  clonazePAM  Tablet 1 milliGRAM(s) Oral at bedtime  cyanocobalamin 1000 MICROGram(s) Oral daily  donepezil 10 milliGRAM(s) Oral at bedtime  levothyroxine 150 MICROGram(s) Oral daily  melatonin 5 milliGRAM(s) Oral at bedtime  memantine 10 milliGRAM(s) Oral every 12 hours  metoprolol succinate ER 50 milliGRAM(s) Oral <User Schedule>  mirtazapine 30 milliGRAM(s) Oral at bedtime  pantoprazole    Tablet 40 milliGRAM(s) Oral before breakfast  sodium bicarbonate 650 milliGRAM(s) Oral two times a day  sodium chloride 0.9%. 1000 milliLiter(s) (100 mL/Hr) IV Continuous <Continuous>  tamsulosin 0.4 milliGRAM(s) Oral at bedtime    MEDICATIONS  (PRN):  ondansetron Injectable 4 milliGRAM(s) IV Push once PRN Nausea and/or Vomiting  oxycodone    5 mG/acetaminophen 325 mG 2 Tablet(s) Oral every 4 hours PRN Moderate Pain (4 - 6)      Social History:  no ETOH   no tobacco      Family History   IBD (  ) Yes   (X  ) No  GI Malignancy (  )  Yes    ( X ) No      Advanced Directives: (  X   ) None    (      ) DNR    (     ) DNI    (     ) Health Care Proxy:     Review of Systems:  AS per HPI, remainder of 14 point ROS negative      Vital Signs Last 24 Hrs  T(C): 36.6 (15 Sep 2022 11:45), Max: 36.7 (14 Sep 2022 20:55)  T(F): 97.8 (15 Sep 2022 11:45), Max: 98.1 (14 Sep 2022 20:55)  HR: 104 (15 Sep 2022 13:15) (50 - 104)  BP: 132/82 (15 Sep 2022 13:15) (109/56 - 146/73)  BP(mean): 82 (14 Sep 2022 18:00) (82 - 86)  RR: 18 (15 Sep 2022 11:45) (18 - 19)  SpO2: 96% (15 Sep 2022 11:45) (93% - 99%)    Parameters below as of 15 Sep 2022 11:45  Patient On (Oxygen Delivery Method): room air        PHYSICAL EXAM:    Constitutional: NAD, well-developed sitting up in bed  [place]    Neck: No LAD, supple  Respiratory: PPM site clean, dry   grossly clear, no accessory muscle use  Cardiovascular: S1 and S2, tachy  Gastrointestinal: BS+, obese soft, NT/ND, WH surgical scar  Extremities: No peripheral edema, neg clubbing, cyanosis  Vascular: 2+ peripheral pulses  Neurological: A/O x 3, no focal deficits +[place]  Psychiatric: Normal mood, normal affect  Skin: No rashes, anicteric        LABS:                        8.8    4.81  )-----------( 92       ( 15 Sep 2022 06:23 )             27.9     09-15    138  |  106  |  63<H>  ----------------------------<  161<H>  5.5<H>   |  21<L>  |  5.76<H>    Ca    8.3<L>      15 Sep 2022 06:22  Phos  3.4     09-14  Mg     1.6     09-14        COVID-19 PCR . (09.13.22 @ 18:53)   COVID-19 PCR: NotDetec:      RADIOLOGY & ADDITIONAL TESTS:    ACC: [id number] EXAM:  CT ABDOMEN AND PELVIS                          PROCEDURE DATE:  09/12/2022          INTERPRETATION:  CLINICAL INFORMATION: History of CLL. Acute on chronic   renal insufficiency, hematuria.    COMPARISON: CT chest abdomen pelvis dated 3/16/2021.    CONTRAST/COMPLICATIONS:  IV Contrast: NONE  Oral Contrast: NONE  Complications: None reported at time of study completion    PROCEDURE:  CT of the Abdomen and Pelvis was performed.  Sagittal and coronal reformats were performed.    FINDINGS:  LOWER CHEST: Transvenous pacemaker. Small left pleural effusion.    LIVER: Within normal limits.  BILE DUCTS: Normal caliber.  GALLBLADDER: Surgically absent.  SPLEEN: Within normal limits.  PANCREAS: Within normal limits.  ADRENALS: Within normal limits.  KIDNEYS/URETERS: Bilateral renal cortical thinning. Multiple bilateral   renal cortical cysts, some of which demonstrate mural or septal   calcification. 6 mm nonobstructive calculus upper pole right kidney.   Subcentimeter calculiin both renal pelves. Mild left and moderate right   hydronephrosis, new since prior study. The right ureter is mildly dilated   and there is a 4 mm obstructive calculus in the distal right ureter (3,   122).    BLADDER: Within normal limits.  REPRODUCTIVE ORGANS: Prostate within normal limits.    BOWEL: No bowel obstruction. Segmental resection rectosigmoid colon.   Appendix is not visualized. No evidence of inflammation in the pericecal   region.  PERITONEUM: No ascites. Soft tissue infiltration central small bowel   mesentery slightly increased from prior exam.  VESSELS: Atheromatous calcifications.  RETROPERITONEUM/LYMPH NODES: Persistent retrocaval, retroperitoneal,   mesenteric and pelvic lymphadenopathy with reference nodes as follows:  Left para-aortic lymph node (3, 63) measures 2.4 x 2.1 cm, previously 2.3   x 2.2 cm  Right external iliac lymph node (3, 131) measures 3.5 x 1.4 cm,   previously 2.6 x 1.4 cm.  ABDOMINAL WALL: Within normal limits.  BONES: Healed fractures of rightsuperior and inferior pubic rami. Disc   degeneration lumbosacral junction.    IMPRESSION:  Bilateral urolithiasis with 4 mm obstructive calculus in the distal right   ureter and moderate right hydroureteronephrosis.    Abdominal lymphadenopathy without significant interval change, consistent   with known lymphoproliferative disorder..

## 2022-09-15 NOTE — PROGRESS NOTE ADULT - ASSESSMENT
76 yo male with CKD, CLL, Waldenstrom's macroglobulinemia, Afib (on Eliquis and Toprol), HFpEF, PPM placed, and hypothyroidism who presented for an elective ablation for Afib burden/associated fatigue, which was postponed due to non-improving LOUISE and worsened Cr compared to baseline found to have a 4mm obstructive calculus in the distal right ureter.    - AM labs reviewed - Cr downtrending  - Continue to trend Cr  - Patient will eventually need to follow up outpatient for ureteroscopy and stent removal    [facility]  [address] [place] [phone]

## 2022-09-16 LAB
ANION GAP SERPL CALC-SCNC: 10 MMOL/L — SIGNIFICANT CHANGE UP (ref 5–17)
BLD GP AB SCN SERPL QL: NEGATIVE — SIGNIFICANT CHANGE UP
BUN SERPL-MCNC: 57 MG/DL — HIGH (ref 7–23)
CALCIUM SERPL-MCNC: 7.9 MG/DL — LOW (ref 8.4–10.5)
CHLORIDE SERPL-SCNC: 112 MMOL/L — HIGH (ref 96–108)
CK MB CFR SERPL CALC: 1.8 NG/ML — SIGNIFICANT CHANGE UP (ref 0–6.7)
CK MB CFR SERPL CALC: 2 NG/ML — SIGNIFICANT CHANGE UP (ref 0–6.7)
CK SERPL-CCNC: 36 U/L — SIGNIFICANT CHANGE UP (ref 30–200)
CK SERPL-CCNC: 38 U/L — SIGNIFICANT CHANGE UP (ref 30–200)
CO2 SERPL-SCNC: 23 MMOL/L — SIGNIFICANT CHANGE UP (ref 22–31)
CREAT SERPL-MCNC: 5.64 MG/DL — HIGH (ref 0.5–1.3)
EGFR: 10 ML/MIN/1.73M2 — LOW
GLUCOSE SERPL-MCNC: 100 MG/DL — HIGH (ref 70–99)
HCT VFR BLD CALC: 24.8 % — LOW (ref 39–50)
HCT VFR BLD CALC: 27.9 % — LOW (ref 39–50)
HGB BLD-MCNC: 7.9 G/DL — LOW (ref 13–17)
HGB BLD-MCNC: 8.7 G/DL — LOW (ref 13–17)
MCHC RBC-ENTMCNC: 29.8 PG — SIGNIFICANT CHANGE UP (ref 27–34)
MCHC RBC-ENTMCNC: 30 PG — SIGNIFICANT CHANGE UP (ref 27–34)
MCHC RBC-ENTMCNC: 31.2 GM/DL — LOW (ref 32–36)
MCHC RBC-ENTMCNC: 31.9 GM/DL — LOW (ref 32–36)
MCV RBC AUTO: 94.3 FL — SIGNIFICANT CHANGE UP (ref 80–100)
MCV RBC AUTO: 95.5 FL — SIGNIFICANT CHANGE UP (ref 80–100)
NRBC # BLD: 0 /100 WBCS — SIGNIFICANT CHANGE UP (ref 0–0)
NRBC # BLD: 0 /100 WBCS — SIGNIFICANT CHANGE UP (ref 0–0)
PLATELET # BLD AUTO: 83 K/UL — LOW (ref 150–400)
PLATELET # BLD AUTO: 96 K/UL — LOW (ref 150–400)
POTASSIUM SERPL-MCNC: 4.5 MMOL/L — SIGNIFICANT CHANGE UP (ref 3.5–5.3)
POTASSIUM SERPL-SCNC: 4.5 MMOL/L — SIGNIFICANT CHANGE UP (ref 3.5–5.3)
RBC # BLD: 2.63 M/UL — LOW (ref 4.2–5.8)
RBC # BLD: 2.92 M/UL — LOW (ref 4.2–5.8)
RBC # FLD: 13.3 % — SIGNIFICANT CHANGE UP (ref 10.3–14.5)
RBC # FLD: 13.6 % — SIGNIFICANT CHANGE UP (ref 10.3–14.5)
RH IG SCN BLD-IMP: POSITIVE — SIGNIFICANT CHANGE UP
SODIUM SERPL-SCNC: 145 MMOL/L — SIGNIFICANT CHANGE UP (ref 135–145)
TROPONIN T, HIGH SENSITIVITY RESULT: 81 NG/L — HIGH (ref 0–51)
TROPONIN T, HIGH SENSITIVITY RESULT: 87 NG/L — HIGH (ref 0–51)
WBC # BLD: 5.25 K/UL — SIGNIFICANT CHANGE UP (ref 3.8–10.5)
WBC # BLD: 6.26 K/UL — SIGNIFICANT CHANGE UP (ref 3.8–10.5)
WBC # FLD AUTO: 5.25 K/UL — SIGNIFICANT CHANGE UP (ref 3.8–10.5)
WBC # FLD AUTO: 6.26 K/UL — SIGNIFICANT CHANGE UP (ref 3.8–10.5)

## 2022-09-16 PROCEDURE — 99231 SBSQ HOSP IP/OBS SF/LOW 25: CPT

## 2022-09-16 PROCEDURE — 93010 ELECTROCARDIOGRAM REPORT: CPT

## 2022-09-16 RX ADMIN — Medication 400 MILLIGRAM(S): at 18:28

## 2022-09-16 RX ADMIN — TAMSULOSIN HYDROCHLORIDE 0.4 MILLIGRAM(S): 0.4 CAPSULE ORAL at 22:04

## 2022-09-16 RX ADMIN — Medication 150 MICROGRAM(S): at 05:14

## 2022-09-16 RX ADMIN — Medication 5 MILLIGRAM(S): at 22:04

## 2022-09-16 RX ADMIN — MEMANTINE HYDROCHLORIDE 10 MILLIGRAM(S): 10 TABLET ORAL at 18:27

## 2022-09-16 RX ADMIN — CHLORHEXIDINE GLUCONATE 1 APPLICATION(S): 213 SOLUTION TOPICAL at 06:07

## 2022-09-16 RX ADMIN — DONEPEZIL HYDROCHLORIDE 10 MILLIGRAM(S): 10 TABLET, FILM COATED ORAL at 22:04

## 2022-09-16 RX ADMIN — MIRTAZAPINE 30 MILLIGRAM(S): 45 TABLET, ORALLY DISINTEGRATING ORAL at 22:04

## 2022-09-16 RX ADMIN — Medication 400 MILLIGRAM(S): at 05:13

## 2022-09-16 RX ADMIN — Medication 650 MILLIGRAM(S): at 05:14

## 2022-09-16 RX ADMIN — APIXABAN 5 MILLIGRAM(S): 2.5 TABLET, FILM COATED ORAL at 05:14

## 2022-09-16 RX ADMIN — APIXABAN 5 MILLIGRAM(S): 2.5 TABLET, FILM COATED ORAL at 18:27

## 2022-09-16 RX ADMIN — SODIUM CHLORIDE 100 MILLILITER(S): 9 INJECTION INTRAMUSCULAR; INTRAVENOUS; SUBCUTANEOUS at 06:26

## 2022-09-16 RX ADMIN — Medication 50 MILLIGRAM(S): at 13:24

## 2022-09-16 RX ADMIN — Medication 50 MILLIGRAM(S): at 05:14

## 2022-09-16 RX ADMIN — PREGABALIN 1000 MICROGRAM(S): 225 CAPSULE ORAL at 11:31

## 2022-09-16 RX ADMIN — MEMANTINE HYDROCHLORIDE 10 MILLIGRAM(S): 10 TABLET ORAL at 05:14

## 2022-09-16 RX ADMIN — Medication 2000 UNIT(S): at 11:31

## 2022-09-16 RX ADMIN — Medication 50 MILLIGRAM(S): at 22:03

## 2022-09-16 RX ADMIN — Medication 1 MILLIGRAM(S): at 22:04

## 2022-09-16 RX ADMIN — PANTOPRAZOLE SODIUM 40 MILLIGRAM(S): 20 TABLET, DELAYED RELEASE ORAL at 06:07

## 2022-09-16 RX ADMIN — Medication 650 MILLIGRAM(S): at 18:27

## 2022-09-16 NOTE — PROGRESS NOTE ADULT - SUBJECTIVE AND OBJECTIVE BOX
INTERVAL HPI/OVERNIGHT EVENTS:  no BM yesterday, last BM 9/14 (x1)  appetite good, tolerating PO  no abdominal pain    no CP or SOB    MEDICATIONS  (STANDING):  acyclovir   Oral Tab/Cap 400 milliGRAM(s) Oral two times a day  apixaban 5 milliGRAM(s) Oral every 12 hours  chlorhexidine 2% Cloths 1 Application(s) Topical <User Schedule>  cholecalciferol 2000 Unit(s) Oral daily  clonazePAM  Tablet 1 milliGRAM(s) Oral at bedtime  cyanocobalamin 1000 MICROGram(s) Oral daily  donepezil 10 milliGRAM(s) Oral at bedtime  levothyroxine 150 MICROGram(s) Oral daily  melatonin 5 milliGRAM(s) Oral at bedtime  memantine 10 milliGRAM(s) Oral every 12 hours  metoprolol succinate ER 50 milliGRAM(s) Oral <User Schedule>  mirtazapine 30 milliGRAM(s) Oral at bedtime  pantoprazole    Tablet 40 milliGRAM(s) Oral before breakfast  sodium bicarbonate 650 milliGRAM(s) Oral two times a day  tamsulosin 0.4 milliGRAM(s) Oral at bedtime    MEDICATIONS  (PRN):  ondansetron Injectable 4 milliGRAM(s) IV Push once PRN Nausea and/or Vomiting  oxycodone    5 mG/acetaminophen 325 mG 2 Tablet(s) Oral every 4 hours PRN Moderate Pain (4 - 6)      Allergies  rituximab (Angioedema)      Review of Systems:  As per HPI, remainder of 14 point ROS negative      Vital Signs Last 24 Hrs  T(C): 36.6 (16 Sep 2022 11:29), Max: 36.9 (15 Sep 2022 20:37)  T(F): 97.8 (16 Sep 2022 11:29), Max: 98.5 (15 Sep 2022 20:37)  HR: 78 (16 Sep 2022 11:29) (71 - 104)  BP: 121/67 (16 Sep 2022 11:29) (104/52 - 132/82)  BP(mean): --  RR: 18 (16 Sep 2022 11:29) (18 - 18)  SpO2: 96% (16 Sep 2022 11:29) (95% - 100%)    Parameters below as of 16 Sep 2022 11:29  Patient On (Oxygen Delivery Method): room air    PHYSICAL EXAM:  Constitutional: NAD, well-developed lying in bed  [place]    Neck: No LAD, supple  Respiratory: PPM site clean, dry   grossly clear, no accessory muscle use  Cardiovascular: S1 and S2, tachy  Gastrointestinal: BS+, obese soft, NT/ND, WH surgical scar  Extremities: No peripheral edema, neg clubbing, cyanosis  Vascular: 2+ peripheral pulses  Neurological: A/O x 3, no focal deficits +[place]  Psychiatric: Normal mood, normal affect  Skin: No rashes, anicteric        LABS:                        7.9    5.25  )-----------( 83       ( 16 Sep 2022 05:38 )             24.8     09-16    145  |  112<H>  |  57<H>  ----------------------------<  100<H>  4.5   |  23  |  5.64<H>    Ca    7.9<L>      16 Sep 2022 05:38  Phos  3.4     09-14  Mg     1.6     09-14      RADIOLOGY & ADDITIONAL TESTS:

## 2022-09-16 NOTE — PROGRESS NOTE ADULT - SUBJECTIVE AND OBJECTIVE BOX
Patient seen and examined at bedside.    Overnight Events: No acute events overnight, more tachycardic on telemetry with PVCs. Endorsed 3/10 CP this morning.       REVIEW OF SYSTEMS:      [x ] All other systems negative     Current Meds:  acyclovir   Oral Tab/Cap 400 milliGRAM(s) Oral two times a day  apixaban 5 milliGRAM(s) Oral every 12 hours  chlorhexidine 2% Cloths 1 Application(s) Topical <User Schedule>  cholecalciferol 2000 Unit(s) Oral daily  clonazePAM  Tablet 1 milliGRAM(s) Oral at bedtime  cyanocobalamin 1000 MICROGram(s) Oral daily  donepezil 10 milliGRAM(s) Oral at bedtime  levothyroxine 150 MICROGram(s) Oral daily  melatonin 5 milliGRAM(s) Oral at bedtime  memantine 10 milliGRAM(s) Oral every 12 hours  metoprolol succinate ER 50 milliGRAM(s) Oral <User Schedule>  mirtazapine 30 milliGRAM(s) Oral at bedtime  ondansetron Injectable 4 milliGRAM(s) IV Push once PRN  oxycodone    5 mG/acetaminophen 325 mG 2 Tablet(s) Oral every 4 hours PRN  pantoprazole    Tablet 40 milliGRAM(s) Oral before breakfast  sodium bicarbonate 650 milliGRAM(s) Oral two times a day  tamsulosin 0.4 milliGRAM(s) Oral at bedtime      PAST MEDICAL & SURGICAL HISTORY:  CLL (chronic lymphocytic leukemia)  in remission      Anxiety disorder      GERD (gastroesophageal reflux disease)      Atrial fibrillation      Diverticulitis      Waldenstrom macroglobulinemia      Bradycardia, drug induced      Hypothyroid      Nephrolithiasis      CKD (chronic kidney disease)      Gout      [place] (hard of hearing)      BPH (benign prostatic hyperplasia)      Memory deficit      IgG monoclonal gammopathy      History of macular degeneration      Chronic heart failure with preserved ejection fraction      Meniscus tear  s/p removal of Meniscus 8 months ago      S/P hernia repair  x2      History of laparoscopic cholecystectomy  4/2014      History of cataract surgery, right  IOL      History of appendectomy      History of cardiac pacemaker in situ          Vitals:  T(F): 97.8 (09-16), Max: 98.5 (09-15)  HR: 63 (09-16) (63 - 86)  BP: 121/67 (09-16) (104/52 - 123/69)  RR: 18 (09-16)  SpO2: 96% (09-16)  I&O's Summary    15 Sep 2022 07:01  -  16 Sep 2022 07:00  --------------------------------------------------------  IN: 240 mL / OUT: 2995 mL / NET: -2755 mL    16 Sep 2022 07:01  -  16 Sep 2022 13:59  --------------------------------------------------------  IN: 0 mL / OUT: 400 mL / NET: -400 mL        Physical Exam:  Appearance: No acute distress; well appearing  Eyes: PERRL, EOMI, pink conjunctiva  HENT: Normal oral mucosa  Cardiovascular: RRR, S1, S2, no murmurs, rubs, or gallops; no edema; no JVD  Respiratory: Clear to auscultation bilaterally  Gastrointestinal: soft, non-tender, non-distended with normal bowel sounds  Musculoskeletal: No clubbing; no joint deformity   Neurologic: Non-focal  Lymphatic: No lymphadenopathy  Psychiatry: AAOx3, mood & affect appropriate  Skin: No rashes, ecchymoses, or cyanosis                          8.7    6.26  )-----------( 96       ( 16 Sep 2022 12:26 )             27.9     09-16    145  |  112<H>  |  57<H>  ----------------------------<  100<H>  4.5   |  23  |  5.64<H>    Ca    7.9<L>      16 Sep 2022 05:38      PREVIOUS DIAGNOSTIC TESTING:    [ ] Echocardiogram: < from: Transthoracic Echocardiogram (03.17.21 @ 10:10) >  Dimensions:    Normal Values:  LA:     5.0    2.0 - 4.0 cm  Ao:            2.0 - 3.8 cm  SEPTUM: 1.1    0.6 - 1.2 cm  PWT:    0.9    0.6 - 1.1 cm  LVIDd:  5.9    3.0 - 5.6 cm  LVIDs:  4.1    1.8 - 4.0 cm  Derived variables:  LVMI: 104 g/m2  RWT: 0.30  EF (Visual Estimate): 60 %  Doppler Peak Velocity (m/sec): AoV=2.5 TV=3.1  ------------------------------------------------------------------------  Observations:  Mitral Valve: Normal mitral valve.  Aortic Valve/Aorta: Fibrocalcific aortic valve without  stenosis.  Normal aortic root size.  Left Atrium: Mildly dilated left atrium.  Left Ventricle: Normal left ventricular internal dimensions  and wall thicknesses.  Normal left ventricular systolic function. No segmental  wall motion abnormalities.  Normal diastolic function.  Right Heart: Normal right atrium. Normal right ventricular  size and function.  Normal tricuspid valve. Mild tricuspid regurgitation.  Normal pulmonic valve.  Pericardium/Pleura: Normal pericardium with no pericardial  effusion.  Hemodynamic: Estimated right atrial pressure is mildly  elevated.  Mild-moderate pulmonary hypertension. Estimated PASP 45  mmHg.  ------------------------------------------------------------------------  Conclusions:  Technically difficult study.  Normal left ventricular systolic function. No segmental  wall motion abnormalities.  Mild-moderate pulmonary hypertension.  ------------------------------------------------------------------------    < end of copied text >                             Patient seen and examined at bedside.    Overnight Events: No acute events overnight, more tachycardic on telemetry with PVCs. Endorsed 3/10 CP this morning, VSS and EKG obtained shows no acute ischemic changes with occasional V-pacing. Telemetry reviewed at that time and corroborates this findings. On interview, pt without any chest pain currently and believes it was related to gas.       REVIEW OF SYSTEMS:      [x ] All other systems negative     Current Meds:  acyclovir   Oral Tab/Cap 400 milliGRAM(s) Oral two times a day  apixaban 5 milliGRAM(s) Oral every 12 hours  chlorhexidine 2% Cloths 1 Application(s) Topical <User Schedule>  cholecalciferol 2000 Unit(s) Oral daily  clonazePAM  Tablet 1 milliGRAM(s) Oral at bedtime  cyanocobalamin 1000 MICROGram(s) Oral daily  donepezil 10 milliGRAM(s) Oral at bedtime  levothyroxine 150 MICROGram(s) Oral daily  melatonin 5 milliGRAM(s) Oral at bedtime  memantine 10 milliGRAM(s) Oral every 12 hours  metoprolol succinate ER 50 milliGRAM(s) Oral <User Schedule>  mirtazapine 30 milliGRAM(s) Oral at bedtime  ondansetron Injectable 4 milliGRAM(s) IV Push once PRN  oxycodone    5 mG/acetaminophen 325 mG 2 Tablet(s) Oral every 4 hours PRN  pantoprazole    Tablet 40 milliGRAM(s) Oral before breakfast  sodium bicarbonate 650 milliGRAM(s) Oral two times a day  tamsulosin 0.4 milliGRAM(s) Oral at bedtime      PAST MEDICAL & SURGICAL HISTORY:  CLL (chronic lymphocytic leukemia)  in remission      Anxiety disorder      GERD (gastroesophageal reflux disease)      Atrial fibrillation      Diverticulitis      Waldenstrom macroglobulinemia      Bradycardia, drug induced      Hypothyroid      Nephrolithiasis      CKD (chronic kidney disease)      Gout      [place] (hard of hearing)      BPH (benign prostatic hyperplasia)      Memory deficit      IgG monoclonal gammopathy      History of macular degeneration      Chronic heart failure with preserved ejection fraction      Meniscus tear  s/p removal of Meniscus 8 months ago      S/P hernia repair  x2      History of laparoscopic cholecystectomy  4/2014      History of cataract surgery, right  IOL      History of appendectomy      History of cardiac pacemaker in situ          Vitals:  T(F): 97.8 (09-16), Max: 98.5 (09-15)  HR: 63 (09-16) (63 - 86)  BP: 121/67 (09-16) (104/52 - 123/69)  RR: 18 (09-16)  SpO2: 96% (09-16)  I&O's Summary    15 Sep 2022 07:01  -  16 Sep 2022 07:00  --------------------------------------------------------  IN: 240 mL / OUT: 2995 mL / NET: -2755 mL    16 Sep 2022 07:01  -  16 Sep 2022 13:59  --------------------------------------------------------  IN: 0 mL / OUT: 400 mL / NET: -400 mL        Physical Exam:  Appearance: No acute distress; well appearing  Eyes: PERRL, EOMI, pink conjunctiva  HENT: Normal oral mucosa  Cardiovascular: RRR, S1, S2, no murmurs, rubs, or gallops; no edema; no JVD  Respiratory: Clear to auscultation bilaterally  Gastrointestinal: soft, non-tender, non-distended with normal bowel sounds  Musculoskeletal: No clubbing; no joint deformity   Neurologic: Non-focal  Lymphatic: No lymphadenopathy  Psychiatry: AAOx3, mood & affect appropriate  Skin: No rashes, ecchymoses, or cyanosis                          8.7    6.26  )-----------( 96       ( 16 Sep 2022 12:26 )             27.9     09-16    145  |  112<H>  |  57<H>  ----------------------------<  100<H>  4.5   |  23  |  5.64<H>    Ca    7.9<L>      16 Sep 2022 05:38      PREVIOUS DIAGNOSTIC TESTING:    [ ] Echocardiogram: < from: Transthoracic Echocardiogram (03.17.21 @ 10:10) >  Dimensions:    Normal Values:  LA:     5.0    2.0 - 4.0 cm  Ao:            2.0 - 3.8 cm  SEPTUM: 1.1    0.6 - 1.2 cm  PWT:    0.9    0.6 - 1.1 cm  LVIDd:  5.9    3.0 - 5.6 cm  LVIDs:  4.1    1.8 - 4.0 cm  Derived variables:  LVMI: 104 g/m2  RWT: 0.30  EF (Visual Estimate): 60 %  Doppler Peak Velocity (m/sec): AoV=2.5 TV=3.1  ------------------------------------------------------------------------  Observations:  Mitral Valve: Normal mitral valve.  Aortic Valve/Aorta: Fibrocalcific aortic valve without  stenosis.  Normal aortic root size.  Left Atrium: Mildly dilated left atrium.  Left Ventricle: Normal left ventricular internal dimensions  and wall thicknesses.  Normal left ventricular systolic function. No segmental  wall motion abnormalities.  Normal diastolic function.  Right Heart: Normal right atrium. Normal right ventricular  size and function.  Normal tricuspid valve. Mild tricuspid regurgitation.  Normal pulmonic valve.  Pericardium/Pleura: Normal pericardium with no pericardial  effusion.  Hemodynamic: Estimated right atrial pressure is mildly  elevated.  Mild-moderate pulmonary hypertension. Estimated PASP 45  mmHg.  ------------------------------------------------------------------------  Conclusions:  Technically difficult study.  Normal left ventricular systolic function. No segmental  wall motion abnormalities.  Mild-moderate pulmonary hypertension.  ------------------------------------------------------------------------    < end of copied text >

## 2022-09-16 NOTE — PHYSICAL THERAPY INITIAL EVALUATION ADULT - PLANNED THERAPY INTERVENTIONS, PT EVAL
GOAL: Pt will negotiate up/down 10 steps with right handrail ascending independently in 2 weeks/balance training/bed mobility training/gait training/strengthening/transfer training

## 2022-09-16 NOTE — PROGRESS NOTE ADULT - SUBJECTIVE AND OBJECTIVE BOX
NICOLAS CIFUENTES  77y Male  MRN:[id number]    Patient is a 77y old  Male who presents with a chief complaint of pantera    HPI:  77 year old  male, h/o [place] Sci PPM ([employer] MRI EL Serial # [id number]), HFpEF, pAfib (on Eliquis and Toprol), CKD G4/A3, CLL, IgG monoclonal gammopathy, GERD, hypothyroidism, dementia who c/o increased fatigue and very little energy. His afib burden has increased from 12% in May to 21 % in August and therefore the decision to proceed with ablation was made. Pt presents today for afib ablation.             on w/u pt found to have pantera. therefore ablation was cancelled. now being admitted for pantera                                                                           Cards: Dr Clemente (12 Sep 2022 06:50)      Patient seen and evaluated at bedside. No acute events overnight except as noted.    Interval HPI: no acute events o/n     PAST MEDICAL & SURGICAL HISTORY:  CLL (chronic lymphocytic leukemia)  in remission      Anxiety disorder      GERD (gastroesophageal reflux disease)      Atrial fibrillation      Diverticulitis      Waldenstrom macroglobulinemia      Bradycardia, drug induced      Hypothyroid      Nephrolithiasis      CKD (chronic kidney disease)      Gout      [place] (hard of hearing)      BPH (benign prostatic hyperplasia)      Memory deficit      IgG monoclonal gammopathy      History of macular degeneration      Chronic heart failure with preserved ejection fraction      Meniscus tear  s/p removal of Meniscus 8 months ago      S/P hernia repair  x2      History of laparoscopic cholecystectomy  4/2014      History of cataract surgery, right  IOL      History of appendectomy      History of cardiac pacemaker in situ          REVIEW OF SYSTEMS:  as per hpi     VITALS:   Vital Signs Last 24 Hrs  T(C): 36.7 (16 Sep 2022 03:52), Max: 36.9 (15 Sep 2022 20:37)  T(F): 98 (16 Sep 2022 03:52), Max: 98.5 (15 Sep 2022 20:37)  HR: 71 (16 Sep 2022 08:31) (50 - 104)  BP: 123/69 (16 Sep 2022 08:31) (104/52 - 146/73)  BP(mean): --  RR: 18 (16 Sep 2022 03:52) (18 - 18)  SpO2: 95% (16 Sep 2022 08:31) (95% - 100%)    Parameters below as of 16 Sep 2022 08:31  Patient On (Oxygen Delivery Method): room air          PHYSICAL EXAM:  GENERAL: NAD, well-developed  HEAD:  Atraumatic, Normocephalic  EYES: EOMI, PERRLA, conjunctiva and sclera clear  NECK: Supple, No JVD  CHEST/LUNG: Clear to auscultation bilaterally; No wheeze  HEART: S1, S2; No murmurs, rubs, or gallops  ABDOMEN: Soft, Nontender, Nondistended; Bowel sounds present  EXTREMITIES:  2+ Peripheral Pulses, No clubbing, cyanosis, or edema  PSYCH: Normal affect  NEUROLOGY: AAOX3; non-focal  SKIN: No rashes or lesions    Consultant(s) Notes Reviewed:  [x ] YES  [ ] NO  Care Discussed with Consultants/Other Providers [ x] YES  [ ] NO    MEDS:   MEDICATIONS  (STANDING):  acyclovir   Oral Tab/Cap 400 milliGRAM(s) Oral two times a day  apixaban 5 milliGRAM(s) Oral every 12 hours  chlorhexidine 2% Cloths 1 Application(s) Topical <User Schedule>  cholecalciferol 2000 Unit(s) Oral daily  clonazePAM  Tablet 1 milliGRAM(s) Oral at bedtime  cyanocobalamin 1000 MICROGram(s) Oral daily  donepezil 10 milliGRAM(s) Oral at bedtime  levothyroxine 150 MICROGram(s) Oral daily  melatonin 5 milliGRAM(s) Oral at bedtime  memantine 10 milliGRAM(s) Oral every 12 hours  metoprolol succinate ER 50 milliGRAM(s) Oral <User Schedule>  mirtazapine 30 milliGRAM(s) Oral at bedtime  pantoprazole    Tablet 40 milliGRAM(s) Oral before breakfast  sodium bicarbonate 650 milliGRAM(s) Oral two times a day  tamsulosin 0.4 milliGRAM(s) Oral at bedtime    MEDICATIONS  (PRN):  ondansetron Injectable 4 milliGRAM(s) IV Push once PRN Nausea and/or Vomiting  oxycodone    5 mG/acetaminophen 325 mG 2 Tablet(s) Oral every 4 hours PRN Moderate Pain (4 - 6)      ALLERGIES:  rituximab (Angioedema)      LABS:                                              7.9    5.25  )-----------( 83       ( 16 Sep 2022 05:38 )             24.8   09-16    145  |  112<H>  |  57<H>  ----------------------------<  100<H>  4.5   |  23  |  5.64<H>    Ca    7.9<L>      16 Sep 2022 05:38  Phos  3.4     09-14  Mg     1.6     09-14             < from: CT Abdomen and Pelvis No Cont (09.12.22 @ 18:12) >  IMPRESSION:  Bilateral urolithiasis with 4 mm obstructive calculus in the distal right   ureter and moderate right hydroureteronephrosis.    Abdominal lymphadenopathy without significant interval change, consistent   with known lymphoproliferative disorder..    < end of copied text >

## 2022-09-16 NOTE — PROGRESS NOTE ADULT - ASSESSMENT
76 yo male with CKD, CLL, Waldenstrom's macroglobulinemia, Afib (on Eliquis and Toprol), HFpEF, PPM placed, and hypothyroidism who presented for an elective ablation for Afib burden/associated fatigue, which was postponed due to non-improving LOUISE and worsened Cr compared to baseline found to have a 4mm obstructive calculus in the distal right ureter.    - AM labs reviewed - Cr continuing to downtrend  - Continue to trend Cr  - Urine culture normal  libby  - Follow up urine kidney culture  - No further intervention needed from urology at this time. Urology to sign off, but please page back with any questions.  - Patient will eventually need to follow up outpatient for ureteroscopy and stent removal    [facility]  [address] [place] [id number] [phone]

## 2022-09-16 NOTE — PROGRESS NOTE ADULT - SUBJECTIVE AND OBJECTIVE BOX
Overnight events noted      VITAL:  T(C): , Max: 36.9 (09-15-22 @ 20:37)  T(F): , Max: 98.5 (09-15-22 @ 20:37)  HR: 63 (09-16-22 @ 13:23)  BP: 121/67 (09-16-22 @ 11:29)  BP(mean): 130 (09-16-22 @ 13:23)  RR: 18 (09-16-22 @ 11:29)  SpO2: 96% (09-16-22 @ 11:29)      PHYSICAL EXAM:  Constitutional: NAD, Alert  HEENT: NCAT, DMM  Neck: Supple, No JVD  Respiratory: CTA-b/l  Cardiovascular: reg s1s2  Gastrointestinal: BS+, soft, NT/ND  Extremities: No peripheral edema b/l  Neurological: no focal deficits; strength grossly intact  Back: no CVAT b/l  Skin: No rashes, no nevi      LABS:                        8.7    6.26  )-----------( 96       ( 16 Sep 2022 12:26 )             27.9     Na(145)/K(4.5)/Cl(112)/HCO3(23)/BUN(57)/Cr(5.64)Glu(100)/Ca(7.9)/Mg(--)/PO4(--)    09-16 @ 05:38  Na(143)/K(4.6)/Cl(110)/HCO3(23)/BUN(60)/Cr(5.90)Glu(128)/Ca(7.8)/Mg(--)/PO4(--)    09-15 @ 22:02  Na(138)/K(5.5)/Cl(106)/HCO3(21)/BUN(63)/Cr(5.76)Glu(161)/Ca(8.3)/Mg(--)/PO4(--)    09-15 @ 06:22  Na(141)/K(4.9)/Cl(108)/HCO3(23)/BUN(63)/Cr(5.82)Glu(85)/Ca(8.2)/Mg(1.6)/PO4(3.4)    09-14 @ 12:23      IMPRESSION: 77M w/ mild dementia, AFib, HFpEF, nephrolithiasis, CLL/Waldenstrom's on [place], and CKD4-5; 9/12/22 presented for scheduled AFib ablation; procedure postponed due to LOUISE    (1)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 GFR baseline ~15ml/min.    (2)LOUISE - obstructive nephropathy due to 4mm right-sided stone. Now slowly improving, s/p ureteral stent placement by  and s/p IVF    (3)Hyperkalemia - improved, s/p Lokelma and s/p IVF    (4)Hypernatremia - mild    (5)AFib - for eventual AFib ablation     (6) - s/p R ureteral stent placement 9/14/22      RECOMMEND:  (1)PO NaHCO3 as ordered  (2)Low-K diet as ordered  (3)Encourage increased water intake by mouth  (4)BMP daily while admitted; Lokelma if K is 5.5 or higher  (5)Dose new meds for GFR <15ml/min      Norman Ascencio MD  [facility]  Office: [phone]  Cell: [phone]       No pain, no sob      VITAL:  T(C): , Max: 36.9 (09-15-22 @ 20:37)  T(F): , Max: 98.5 (09-15-22 @ 20:37)  HR: 63 (09-16-22 @ 13:23)  BP: 121/67 (09-16-22 @ 11:29)  BP(mean): 130 (09-16-22 @ 13:23)  RR: 18 (09-16-22 @ 11:29)  SpO2: 96% (09-16-22 @ 11:29)      PHYSICAL EXAM:  Constitutional: NAD, Alert  HEENT: NCAT, DMM  Neck: Supple, No JVD  Respiratory: CTA-b/l  Cardiovascular: reg s1s2  Gastrointestinal: BS+, soft, NT/ND  Extremities: No peripheral edema b/l  Neurological: no focal deficits; strength grossly intact  Back: no CVAT b/l  Skin: No rashes, no nevi      LABS:                        8.7    6.26  )-----------( 96       ( 16 Sep 2022 12:26 )             27.9     Na(145)/K(4.5)/Cl(112)/HCO3(23)/BUN(57)/Cr(5.64)Glu(100)/Ca(7.9)/Mg(--)/PO4(--)    09-16 @ 05:38  Na(143)/K(4.6)/Cl(110)/HCO3(23)/BUN(60)/Cr(5.90)Glu(128)/Ca(7.8)/Mg(--)/PO4(--)    09-15 @ 22:02  Na(138)/K(5.5)/Cl(106)/HCO3(21)/BUN(63)/Cr(5.76)Glu(161)/Ca(8.3)/Mg(--)/PO4(--)    09-15 @ 06:22  Na(141)/K(4.9)/Cl(108)/HCO3(23)/BUN(63)/Cr(5.82)Glu(85)/Ca(8.2)/Mg(1.6)/PO4(3.4)    09-14 @ 12:23      IMPRESSION: 77M w/ mild dementia, AFib, HFpEF, nephrolithiasis, CLL/Waldenstrom's on [place], and CKD4-5; 9/12/22 presented for scheduled AFib ablation; procedure postponed due to LOUISE    (1)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 GFR baseline ~15ml/min.    (2)LOUISE - obstructive nephropathy due to 4mm right-sided stone. Now slowly improving, s/p ureteral stent placement by  and s/p IVF    (3)Hyperkalemia - improved, s/p Lokelma and s/p IVF    (4)Hypernatremia - mild    (5) - s/p R ureteral stent placement 9/14/22    (6)AFib - for eventual AFib ablation - potentially Monday 9/19    RECOMMEND:  (1)PO NaHCO3 as ordered  (2)Low-K diet as ordered  (3)Encourage increased water intake by mouth - d/w'd patient/wife  (4)BMP daily while admitted; Lokelma if K is 5.5 or higher  (5)Dose new meds for GFR <15ml/min  (6)No objection to ablation Monday 9/19/22      Norman Ascencio MD  [facility]  Office: [phone]  Cell: [phone]

## 2022-09-16 NOTE — PROGRESS NOTE ADULT - ASSESSMENT
77 year old  male, h/o [place] Sci PPM ([employer] MRI EL Serial # [id number]), HFpEF, pAfib (on Eliquis and Toprol), CKD G4/A3, CLL, IgG monoclonal gammopathy, GERD, hypothyroidism, dementia who c/o increased fatigue and very little energy. His afib burden has increased from 12% in May to 21 % in August and therefore the decision to proceed with ablation was made. Pt presents 9/12 for afib ablation however postponed due to LOUISE on CKD found to be obstructive in etiology.     - CT with 4mm obstructive calculus in right ureter. Pt NPO for ureteral stent today.   - Tele with AF rates ~70-80s, pt has been in AF since brought to [facility]. Asymptomatic  - Continue AC with Eliquis 5mg BID.   - Continue Toprol  mg qd  - Will postpone elective ablation procedure until Cr back to baseline. 9/13 Crt 6.10, GFR 9 (baseline GFR ~15). Nephrology following, appreciate recs  - Keep on tele. Keep K>4, Mg>2.   - Discussed with EP attending Dr. Sam and primary team.  77 year old  male, h/o [place] Sci PPM ([employer] MRI EL Serial # [id number]), HFpEF, pAfib (on Eliquis and Toprol), CKD G4/A3, CLL, IgG monoclonal gammopathy, GERD, hypothyroidism, dementia who c/o increased fatigue and very little energy. His afib burden has increased from 12% in May to 21 % in August and therefore the decision to proceed with ablation was made. Pt presents 9/12 for afib ablation however postponed due to LOUISE on CKD found to be obstructive in etiology. CT with 4mm obstructive calculus in right ureter. S/p ureteral stent today.     - Tele with AF rates ~80-100s, pt has been in AF since brought to [facility]. Asymptomatic, had self-resolving CP this AM but likely GI-related  - Will postpone elective ablation procedure until Cr back to baseline. 9/13 Crt 6.10 --> improving to 9/16 5.64, GFR 10 (baseline GFR ~15). Nephrology following, appreciate recs  - Tentatively NPO MN Sun 9/18 for ablation Mon AM 9/19 if Cr continues to improve and closer to baseline  - Continue AC with Eliquis 5mg BID.   - Continue Toprol  mg qd  - Keep on tele. Keep K>4, Mg>2.   - Discussed with EP attending Dr. Sam and primary team.  77 year old  male, h/o [place] Sci PPM ([employer] MRI EL Serial # [id number]), HFpEF, pAfib (on Eliquis and Toprol), CKD G4/A3, CLL, IgG monoclonal gammopathy, GERD, hypothyroidism, dementia who c/o increased fatigue and very little energy. His afib burden has increased from 12% in May to 21 % in August and therefore the decision to proceed with ablation was made. Pt presents 9/12 for afib ablation however postponed due to LOUISE on CKD found to be obstructive in etiology. CT with 4mm obstructive calculus in right ureter. S/p ureteral stent today.     - Tele with AF rates ~80-100s, pt has been in AF since brought to [facility]. Asymptomatic, had self-resolving CP this AM but likely GI-related  - Will postpone elective ablation procedure until Cr back to baseline. 9/13 Crt 6.10 --> improving to 9/16 5.64, GFR 10 (baseline GFR ~15). Nephrology following, appreciate recs  - Tentatively NPO MN Sun 9/18 for ablation Mon AM 9/19 if Cr continues to improve and closer to baseline  - Continue AC with Eliquis 5mg BID. Please hold AM dose 9/19 prior to ablation  - Continue Toprol  mg qd  - Keep on tele. Keep K>4, Mg>2.   - Discussed with EP attending Dr. Sam and primary team.

## 2022-09-16 NOTE — PROGRESS NOTE ADULT - NS PANP COMMENT GEN_ALL_CORE FT
Agree with above note. Briefly, Pt. is a 77 year old  male, h/o [place] Sci PPM ([employer] MRI EL Serial # [id number]), HFpEF, pAfib (on Eliquis and Toprol), CKD G4/A3, CLL, IgG monoclonal gammopathy, GERD, hypothyroidism, dementia who c/o increased fatigue and very little energy.     GI asked to evaluate for reports of loose stools: pt states intermittent "mushy" stools without blood ~2/day - clinically improved.    Pascual Schneider MD  [facility]

## 2022-09-16 NOTE — PROGRESS NOTE ADULT - ASSESSMENT
76 yo male with extensive pmhx including h/o afib on eliquis s/p ppm, ckd, cll, being admitted for pantera    pantera on ckd  renal eval noted  CT scan noted with right hydro and renal stone   consulted  ivf as per renal  lokelma for hyperKalemia  serial bmp  s/p right ureteral stent placement by  on 9/14  monitor creat   gu f/u    afib s/p ppm on eliquis  was planned for ablation   now postponed due to pantera  ep f/u  tele  cont AC with eliquis  cont toprol    CLL  stable  outpt heme/onc f/u    c/o diarrhea  gi f/u  imodium prn    anemia  f/u repeat cbc  no signs of bleeding on exam    cont other current meds    d/w renal and cards/ep    Advanced care planning was discussed with patient and family.  Advanced care planning forms were reviewed and discussed as appropriate.  Differential diagnosis and plan of care discussed with patient after the evaluation.   Pain assessed and judicious use of narcotics when appropriate was discussed.  Importance of Fall prevention discussed.  Counseling on Smoking and Alcohol cessation was offered when appropriate.  Counseling on Diet, exercise, and medication compliance was done.   Approx 30 minutes spent.

## 2022-09-16 NOTE — PROGRESS NOTE ADULT - SUBJECTIVE AND OBJECTIVE BOX
Subjective  No acute events. Anticoagulation restarted by primary team.    Objective    Vital signs  T(F): , Max: 98.5 (09-15-22 @ 20:37)  HR: 86 (09-16-22 @ 03:52)  BP: 104/52 (09-16-22 @ 03:52)  SpO2: 100% (09-16-22 @ 03:52)  Wt(kg): --    Output     OUT:    Voided (mL): 2995 mL  Total OUT: 2995 mL    Total NET: -2995 mL          Gen: NAD  Abd: soft    Labs      09-16 @ 05:38    WBC 5.25  / Hct 24.8  / SCr 5.64     09-15 @ 22:02    WBC --    / Hct --    / SCr 5.90         Culture - Urine (collected 09-13-22 @ 14:50)  Source: Clean Catch Clean Catch (Midstream)  Final Report (09-14-22 @ 20:44):    <10,000 CFU/mL Normal Urogenital Nyla

## 2022-09-16 NOTE — PROGRESS NOTE ADULT - ASSESSMENT
77 year old  male, h/o [place] Sci PPM ([employer] MRI EL Serial # [id number]), HFpEF, pAfib (on Eliquis and Toprol), CKD G4/A3, CLL, IgG monoclonal gammopathy, GERD, hypothyroidism, dementia who c/o increased fatigue and very little energy. His afib burden has increased from 12% in May to 21 % in August and therefore the decision to proceed with ablation was made. Noted to have LOUISE and scheduled ablation on 9/12/22 was postponed 2/2 LOUISE on CKD  There was no improvement of LOUISE despite IV fluids, and a CT abd/pelvis was subsequently obtained, which showed a 4 mm obstructive calculus in the distal right ureter and moderate right hydroureteronephrosis.  s/p cystoscopy with ureteral stent placement 9/14/22    GI asked to evaluate for reports of loose stools: pt states intermittent "mushy" stools without blood ~2/day - clinically improved    Clinically without diarrhea and no colitis noted on CT 9/12 (though without contrast)  -monitor BMs; if develops loose/watery stools then can send GI PCR  -ok for PO diet as tolerated  -can consider addition of probiotic (Bacid) as pt was previously on this  -no plans/role for endoscopic evaluation    DCP per primary team; Please call over weekend prn with GI concerns if remains inpatient  GI service : [phone]    Discussed with Medicine attending  Discussed with pt, all questions answered    Abdullahi Goncalves PA-C    [facility]  [phone]  After hours and weekend coverage [phone]

## 2022-09-16 NOTE — PHYSICAL THERAPY INITIAL EVALUATION ADULT - PERTINENT HX OF CURRENT PROBLEM, REHAB EVAL
77M, h/o [place] Sci PPM ([employer] MRI EL Serial # [id number]), HFpEF, pAfib (on Eliquis and Toprol), CKD G4/A3, CLL, IgG monoclonal gammopathy, GERD, hypothyroidism, dementia who c/o increased fatigue and very little energy. His afib burden has increased from 12% in May to 21 % in August and therefore the decision to proceed with ablation was made. Noted to have LOUISE and scheduled ablation on 9/12/22 was postponed 2/2 LOUISE on CKD. There was no improvement of LOUISE despite IV fluids, and a CT abd/pelvis was subsequently obtained, which showed a 4 mm obstructive calculus in the distal right ureter and moderate right hydroureteronephrosis. Pt is now s/p cystoscopy with ureteral stent placement 9/14/22

## 2022-09-16 NOTE — PHYSICAL THERAPY INITIAL EVALUATION ADULT - ADDITIONAL COMMENTS
Pt lives with his wife in an apartment with 15 steps to enter. Pt was independent with all functional mobility prior to admission; he uses a straight cane for at home and short ambulation, but keeps a RW in his trunk for longer distances.

## 2022-09-17 LAB
ANION GAP SERPL CALC-SCNC: 10 MMOL/L — SIGNIFICANT CHANGE UP (ref 5–17)
BUN SERPL-MCNC: 53 MG/DL — HIGH (ref 7–23)
C DIFF GDH STL QL: SIGNIFICANT CHANGE UP
C DIFF GDH STL QL: SIGNIFICANT CHANGE UP
CALCIUM SERPL-MCNC: 8.2 MG/DL — LOW (ref 8.4–10.5)
CHLORIDE SERPL-SCNC: 111 MMOL/L — HIGH (ref 96–108)
CO2 SERPL-SCNC: 23 MMOL/L — SIGNIFICANT CHANGE UP (ref 22–31)
CREAT SERPL-MCNC: 5.12 MG/DL — HIGH (ref 0.5–1.3)
CULTURE RESULTS: SIGNIFICANT CHANGE UP
EGFR: 11 ML/MIN/1.73M2 — LOW
GLUCOSE SERPL-MCNC: 96 MG/DL — SIGNIFICANT CHANGE UP (ref 70–99)
HCT VFR BLD CALC: 26.6 % — LOW (ref 39–50)
HGB BLD-MCNC: 8.4 G/DL — LOW (ref 13–17)
MCHC RBC-ENTMCNC: 30 PG — SIGNIFICANT CHANGE UP (ref 27–34)
MCHC RBC-ENTMCNC: 31.6 GM/DL — LOW (ref 32–36)
MCV RBC AUTO: 95 FL — SIGNIFICANT CHANGE UP (ref 80–100)
NRBC # BLD: 0 /100 WBCS — SIGNIFICANT CHANGE UP (ref 0–0)
PLATELET # BLD AUTO: 91 K/UL — LOW (ref 150–400)
POTASSIUM SERPL-MCNC: 4.2 MMOL/L — SIGNIFICANT CHANGE UP (ref 3.5–5.3)
POTASSIUM SERPL-SCNC: 4.2 MMOL/L — SIGNIFICANT CHANGE UP (ref 3.5–5.3)
RBC # BLD: 2.8 M/UL — LOW (ref 4.2–5.8)
RBC # FLD: 13.4 % — SIGNIFICANT CHANGE UP (ref 10.3–14.5)
SODIUM SERPL-SCNC: 144 MMOL/L — SIGNIFICANT CHANGE UP (ref 135–145)
SPECIMEN SOURCE: SIGNIFICANT CHANGE UP
WBC # BLD: 5.95 K/UL — SIGNIFICANT CHANGE UP (ref 3.8–10.5)
WBC # FLD AUTO: 5.95 K/UL — SIGNIFICANT CHANGE UP (ref 3.8–10.5)

## 2022-09-17 RX ORDER — FEBUXOSTAT 40 MG/1
20 TABLET ORAL ONCE
Refills: 0 | Status: COMPLETED | OUTPATIENT
Start: 2022-09-17 | End: 2022-09-17

## 2022-09-17 RX ORDER — FEBUXOSTAT 40 MG/1
80 TABLET ORAL DAILY
Refills: 0 | Status: DISCONTINUED | OUTPATIENT
Start: 2022-09-18 | End: 2022-09-21

## 2022-09-17 RX ADMIN — Medication 650 MILLIGRAM(S): at 05:18

## 2022-09-17 RX ADMIN — PREGABALIN 1000 MICROGRAM(S): 225 CAPSULE ORAL at 12:36

## 2022-09-17 RX ADMIN — Medication 50 MILLIGRAM(S): at 05:18

## 2022-09-17 RX ADMIN — FEBUXOSTAT 20 MILLIGRAM(S): 40 TABLET ORAL at 05:19

## 2022-09-17 RX ADMIN — Medication 1 MILLIGRAM(S): at 21:15

## 2022-09-17 RX ADMIN — Medication 400 MILLIGRAM(S): at 05:17

## 2022-09-17 RX ADMIN — TAMSULOSIN HYDROCHLORIDE 0.4 MILLIGRAM(S): 0.4 CAPSULE ORAL at 21:15

## 2022-09-17 RX ADMIN — Medication 150 MICROGRAM(S): at 05:18

## 2022-09-17 RX ADMIN — MIRTAZAPINE 30 MILLIGRAM(S): 45 TABLET, ORALLY DISINTEGRATING ORAL at 21:17

## 2022-09-17 RX ADMIN — Medication 50 MILLIGRAM(S): at 13:59

## 2022-09-17 RX ADMIN — MEMANTINE HYDROCHLORIDE 10 MILLIGRAM(S): 10 TABLET ORAL at 05:18

## 2022-09-17 RX ADMIN — Medication 50 MILLIGRAM(S): at 21:20

## 2022-09-17 RX ADMIN — CHLORHEXIDINE GLUCONATE 1 APPLICATION(S): 213 SOLUTION TOPICAL at 05:18

## 2022-09-17 RX ADMIN — Medication 5 MILLIGRAM(S): at 21:14

## 2022-09-17 RX ADMIN — DONEPEZIL HYDROCHLORIDE 10 MILLIGRAM(S): 10 TABLET, FILM COATED ORAL at 21:15

## 2022-09-17 RX ADMIN — PANTOPRAZOLE SODIUM 40 MILLIGRAM(S): 20 TABLET, DELAYED RELEASE ORAL at 06:30

## 2022-09-17 RX ADMIN — Medication 2000 UNIT(S): at 12:36

## 2022-09-17 RX ADMIN — APIXABAN 5 MILLIGRAM(S): 2.5 TABLET, FILM COATED ORAL at 05:18

## 2022-09-17 NOTE — PROGRESS NOTE ADULT - ASSESSMENT
77 year old  male, h/o [place] Sci PPM ([employer] MRI EL Serial # [id number]), HFpEF, pAfib (on Eliquis and Toprol), CKD G4/A3, CLL, IgG monoclonal gammopathy, GERD, hypothyroidism, dementia who c/o increased fatigue and very little energy. His afib burden has increased from 12% in May to 21 % in August and therefore the decision to proceed with ablation was made. Pt presents 9/12 for afib ablation however postponed due to LOUISE on CKD found to be obstructive in etiology. CT with 4mm obstructive calculus in right ureter. S/p ureteral stent today.     1. AF  2. CKD with LOUISE    - AF rates 70-100s, pt has been in AF since brought to [facility]  - Elective ablation postponed until Cr back to baseline. 9/13 Crt 6.10 --> improving to 9/17 5.12, GFR 10, Nephrology following, appreciate recs  - Tentatively NPO MN Sun 9/18 for ablation Mon AM (9/19) if Cr continues to improve and closer to baseline  - Continue AC with Eliquis 5mg BID, hold AM dose 9/19 prior to ablation  - Maintain active COVID and T&S  - Continue Toprol  mg daily  - Close telemetry monitoring   - Keep K>4, Mg>2

## 2022-09-17 NOTE — PROGRESS NOTE ADULT - SUBJECTIVE AND OBJECTIVE BOX
24H hour events: No acute events    MEDICATIONS:  apixaban 5 milliGRAM(s) Oral every 12 hours  metoprolol succinate ER 50 milliGRAM(s) Oral <User Schedule>  tamsulosin 0.4 milliGRAM(s) Oral at bedtime   acyclovir   Oral Tab/Cap 400 milliGRAM(s) Oral two times a day  clonazePAM  Tablet 1 milliGRAM(s) Oral at bedtime  donepezil 10 milliGRAM(s) Oral at bedtime  melatonin 5 milliGRAM(s) Oral at bedtime  memantine 10 milliGRAM(s) Oral every 12 hours  mirtazapine 30 milliGRAM(s) Oral at bedtime  ondansetron Injectable 4 milliGRAM(s) IV Push once PRN  oxycodone    5 mG/acetaminophen 325 mG 2 Tablet(s) Oral every 4 hours PRN  pantoprazole    Tablet 40 milliGRAM(s) Oral before breakfast  levothyroxine 150 MICROGram(s) Oral daily  chlorhexidine 2% Cloths 1 Application(s) Topical <User Schedule>  cholecalciferol 2000 Unit(s) Oral daily  cyanocobalamin 1000 MICROGram(s) Oral daily  sodium bicarbonate 650 milliGRAM(s) Oral two times a day      REVIEW OF SYSTEMS:  Complete 12 point ROS negative.    PHYSICAL EXAM:  T(C): 36.5 (09-17-22 @ 04:57), Max: 36.8 (09-16-22 @ 20:26)  HR: 72 (09-17-22 @ 04:57) (63 - 78)  BP: 115/73 (09-17-22 @ 04:57) (115/73 - 138/56)  RR: 17 (09-17-22 @ 04:57) (17 - 18)  SpO2: 93% (09-17-22 @ 04:57) (93% - 96%)  Wt(kg): --  I&O's Summary    16 Sep 2022 07:01  -  17 Sep 2022 07:00  --------------------------------------------------------  IN: 240 mL / OUT: 2050 mL / NET: -1810 mL        Appearance: Normal	  HEENT:  PERRL, EOMI	  Cardiovascular: Normal S1 S2, irreg, No JVD, No murmurs, No edema  Respiratory: Lungs clear to auscultation	  Psychiatry: A & O x 3, Mood & affect appropriate  Gastrointestinal:  Soft, Non-tender, + BS	  Skin: No rashes, No ecchymoses, No cyanosis	  Neurologic: Non-focal  Extremities: No clubbing, cyanosis or edema  Vascular: Peripheral pulses palpable 2+ bilaterally        LABS:	 	    CBC Full  -  ( 17 Sep 2022 05:46 )  WBC Count : 5.95 K/uL  Hemoglobin : 8.4 g/dL  Hematocrit : 26.6 %  Platelet Count - Automated : 91 K/uL  Mean Cell Volume : 95.0 fl  Mean Cell Hemoglobin : 30.0 pg  Mean Cell Hemoglobin Concentration : 31.6 gm/dL  Auto Neutrophil # : x  Auto Lymphocyte # : x  Auto Monocyte # : x  Auto Eosinophil # : x  Auto Basophil # : x  Auto Neutrophil % : x  Auto Lymphocyte % : x  Auto Monocyte % : x  Auto Eosinophil % : x  Auto Basophil % : x    09-17    144  |  111<H>  |  53<H>  ----------------------------<  96  4.2   |  23  |  5.12<H>  09-16    145  |  112<H>  |  57<H>  ----------------------------<  100<H>  4.5   |  23  |  5.64<H>    Ca    8.2<L>      17 Sep 2022 05:46  Ca    7.9<L>      16 Sep 2022 05:38          Creatine Kinase, Serum: 38 U/L (09-16-22 @ 18:46)  Creatine Kinase, Serum: 36 U/L (09-16-22 @ 12:26)      TELEMETRY: AF 70-100s, occasional Vpacing	      < from: Transthoracic Echocardiogram (03.17.21 @ 10:10) >  PROCEDURE: Transthoracic echocardiogram with 2-D, M-Mode  and complete spectral and color flow Doppler.  INDICATION: Edema, unspecified (R60.9)  ------------------------------------------------------------------------  Dimensions:    Normal Values:  LA:     5.0    2.0 - 4.0 cm  Ao:            2.0 - 3.8 cm  SEPTUM: 1.1    0.6 - 1.2 cm  PWT:    0.9    0.6 - 1.1 cm  LVIDd:  5.9    3.0 - 5.6 cm  LVIDs:  4.1    1.8 - 4.0 cm  Derived variables:  LVMI: 104 g/m2  RWT: 0.30  EF (Visual Estimate): 60 %  Doppler Peak Velocity (m/sec): AoV=2.5 TV=3.1  ------------------------------------------------------------------------  Observations:  Mitral Valve: Normal mitral valve.  Aortic Valve/Aorta: Fibrocalcific aortic valve without  stenosis.  Normal aortic root size.  Left Atrium: Mildly dilated left atrium.  Left Ventricle: Normal left ventricular internal dimensions  and wall thicknesses.  Normal left ventricular systolic function. No segmental  wall motion abnormalities.  Normal diastolic function.  Right Heart: Normal right atrium. Normal right ventricular  size and function.  Normal tricuspid valve. Mild tricuspid regurgitation.  Normal pulmonic valve.  Pericardium/Pleura: Normal pericardium with no pericardial  effusion.  Hemodynamic: Estimated right atrial pressure is mildly  elevated.  Mild-moderate pulmonary hypertension. Estimated PASP 45  mmHg.  ------------------------------------------------------------------------  Conclusions:  Technically difficult study.  Normal left ventricular systolic function. No segmental  wall motion abnormalities.  Mild-moderate pulmonary hypertension.    < end of copied text >

## 2022-09-17 NOTE — PROGRESS NOTE ADULT - ASSESSMENT
78 yo male with extensive pmhx including h/o afib on eliquis s/p ppm, ckd, cll, being admitted for pantera    pantera on ckd  renal eval noted  CT scan noted with right hydro and renal stone   consulted  ivf as per renal  lokelma for hyperKalemia  serial bmp  s/p right ureteral stent placement by  on 9/14  monitor creat  - improving  gu f/u    afib s/p ppm on eliquis  was planned for ablation   now postponed due to pantera  ep f/u  tele  cont AC with eliquis  cont toprol  tentatively re-scheduled monday    CLL  stable  outpt heme/onc f/u    c/o diarrhea  gi f/u  imodium prn    anemia  f/u repeat cbc  no signs of bleeding on exam    cont other current meds    d/w renal and cards/ep    Advanced care planning was discussed with patient and family.  Advanced care planning forms were reviewed and discussed as appropriate.  Differential diagnosis and plan of care discussed with patient after the evaluation.   Pain assessed and judicious use of narcotics when appropriate was discussed.  Importance of Fall prevention discussed.  Counseling on Smoking and Alcohol cessation was offered when appropriate.  Counseling on Diet, exercise, and medication compliance was done.   Approx 30 minutes spent.

## 2022-09-17 NOTE — PROGRESS NOTE ADULT - SUBJECTIVE AND OBJECTIVE BOX
NICOLAS CIFUENTES  77y Male  MRN:[id number]    Patient is a 77y old  Male who presents with a chief complaint of pantera    HPI:  77 year old  male, h/o [place] Sci PPM ([employer] MRI EL Serial # [id number]), HFpEF, pAfib (on Eliquis and Toprol), CKD G4/A3, CLL, IgG monoclonal gammopathy, GERD, hypothyroidism, dementia who c/o increased fatigue and very little energy. His afib burden has increased from 12% in May to 21 % in August and therefore the decision to proceed with ablation was made. Pt presents today for afib ablation.             on w/u pt found to have pantera. therefore ablation was cancelled. now being admitted for pantera                                                                           Cards: Dr Clemente (12 Sep 2022 06:50)      Patient seen and evaluated at bedside. No acute events overnight except as noted.    Interval HPI: no acute events o/n     PAST MEDICAL & SURGICAL HISTORY:  CLL (chronic lymphocytic leukemia)  in remission      Anxiety disorder      GERD (gastroesophageal reflux disease)      Atrial fibrillation      Diverticulitis      Waldenstrom macroglobulinemia      Bradycardia, drug induced      Hypothyroid      Nephrolithiasis      CKD (chronic kidney disease)      Gout      [place] (hard of hearing)      BPH (benign prostatic hyperplasia)      Memory deficit      IgG monoclonal gammopathy      History of macular degeneration      Chronic heart failure with preserved ejection fraction      Meniscus tear  s/p removal of Meniscus 8 months ago      S/P hernia repair  x2      History of laparoscopic cholecystectomy  4/2014      History of cataract surgery, right  IOL      History of appendectomy      History of cardiac pacemaker in situ          REVIEW OF SYSTEMS:  as per hpi     VITALS:  Vital Signs Last 24 Hrs  T(C): 37.1 (17 Sep 2022 20:34), Max: 37.1 (17 Sep 2022 20:34)  T(F): 98.7 (17 Sep 2022 20:34), Max: 98.7 (17 Sep 2022 20:34)  HR: 90 (17 Sep 2022 20:34) (72 - 90)  BP: 119/70 (17 Sep 2022 20:34) (115/73 - 135/91)  BP(mean): --  RR: 18 (17 Sep 2022 20:34) (17 - 18)  SpO2: 96% (17 Sep 2022 20:34) (93% - 98%)    Parameters below as of 17 Sep 2022 20:34  Patient On (Oxygen Delivery Method): room air          PHYSICAL EXAM:  GENERAL: NAD, well-developed  HEAD:  Atraumatic, Normocephalic  EYES: EOMI, PERRLA, conjunctiva and sclera clear  NECK: Supple, No JVD  CHEST/LUNG: Clear to auscultation bilaterally; No wheeze  HEART: S1, S2; No murmurs, rubs, or gallops  ABDOMEN: Soft, Nontender, Nondistended; Bowel sounds present  EXTREMITIES:  2+ Peripheral Pulses, No clubbing, cyanosis, or edema  PSYCH: Normal affect  NEUROLOGY: AAOX3; non-focal  SKIN: No rashes or lesions    Consultant(s) Notes Reviewed:  [x ] YES  [ ] NO  Care Discussed with Consultants/Other Providers [ x] YES  [ ] NO    MEDS:  MEDICATIONS  (STANDING):  acyclovir   Oral Tab/Cap 400 milliGRAM(s) Oral two times a day  apixaban 5 milliGRAM(s) Oral every 12 hours  chlorhexidine 2% Cloths 1 Application(s) Topical <User Schedule>  cholecalciferol 2000 Unit(s) Oral daily  clonazePAM  Tablet 1 milliGRAM(s) Oral at bedtime  cyanocobalamin 1000 MICROGram(s) Oral daily  donepezil 10 milliGRAM(s) Oral at bedtime  levothyroxine 150 MICROGram(s) Oral daily  melatonin 5 milliGRAM(s) Oral at bedtime  memantine 10 milliGRAM(s) Oral every 12 hours  metoprolol succinate ER 50 milliGRAM(s) Oral <User Schedule>  mirtazapine 30 milliGRAM(s) Oral at bedtime  pantoprazole    Tablet 40 milliGRAM(s) Oral before breakfast  sodium bicarbonate 650 milliGRAM(s) Oral two times a day  tamsulosin 0.4 milliGRAM(s) Oral at bedtime    MEDICATIONS  (PRN):  ondansetron Injectable 4 milliGRAM(s) IV Push once PRN Nausea and/or Vomiting  oxycodone    5 mG/acetaminophen 325 mG 2 Tablet(s) Oral every 4 hours PRN Moderate Pain (4 - 6)      ALLERGIES:  rituximab (Angioedema)      LABS:                                                          8.4    5.95  )-----------( 91       ( 17 Sep 2022 05:46 )             26.6   09-17    144  |  111<H>  |  53<H>  ----------------------------<  96  4.2   |  23  |  5.12<H>    Ca    8.2<L>      17 Sep 2022 05:46             < from: CT Abdomen and Pelvis No Cont (09.12.22 @ 18:12) >  IMPRESSION:  Bilateral urolithiasis with 4 mm obstructive calculus in the distal right   ureter and moderate right hydroureteronephrosis.    Abdominal lymphadenopathy without significant interval change, consistent   with known lymphoproliferative disorder..    < end of copied text >

## 2022-09-18 LAB
ANION GAP SERPL CALC-SCNC: 16 MMOL/L — SIGNIFICANT CHANGE UP (ref 5–17)
BUN SERPL-MCNC: 47 MG/DL — HIGH (ref 7–23)
CALCIUM SERPL-MCNC: 8.4 MG/DL — SIGNIFICANT CHANGE UP (ref 8.4–10.5)
CHLORIDE SERPL-SCNC: 112 MMOL/L — HIGH (ref 96–108)
CO2 SERPL-SCNC: 18 MMOL/L — LOW (ref 22–31)
CREAT SERPL-MCNC: 4.56 MG/DL — HIGH (ref 0.5–1.3)
EGFR: 13 ML/MIN/1.73M2 — LOW
GI PCR PANEL: SIGNIFICANT CHANGE UP
GLUCOSE SERPL-MCNC: 104 MG/DL — HIGH (ref 70–99)
HCT VFR BLD CALC: 29.9 % — LOW (ref 39–50)
HGB BLD-MCNC: 9.1 G/DL — LOW (ref 13–17)
MCHC RBC-ENTMCNC: 29 PG — SIGNIFICANT CHANGE UP (ref 27–34)
MCHC RBC-ENTMCNC: 30.4 GM/DL — LOW (ref 32–36)
MCV RBC AUTO: 95.2 FL — SIGNIFICANT CHANGE UP (ref 80–100)
NRBC # BLD: 0 /100 WBCS — SIGNIFICANT CHANGE UP (ref 0–0)
PLATELET # BLD AUTO: 88 K/UL — LOW (ref 150–400)
POTASSIUM SERPL-MCNC: 4.2 MMOL/L — SIGNIFICANT CHANGE UP (ref 3.5–5.3)
POTASSIUM SERPL-SCNC: 4.2 MMOL/L — SIGNIFICANT CHANGE UP (ref 3.5–5.3)
RBC # BLD: 3.14 M/UL — LOW (ref 4.2–5.8)
RBC # FLD: 13.8 % — SIGNIFICANT CHANGE UP (ref 10.3–14.5)
SARS-COV-2 RNA SPEC QL NAA+PROBE: SIGNIFICANT CHANGE UP
SODIUM SERPL-SCNC: 146 MMOL/L — HIGH (ref 135–145)
WBC # BLD: 7.07 K/UL — SIGNIFICANT CHANGE UP (ref 3.8–10.5)
WBC # FLD AUTO: 7.07 K/UL — SIGNIFICANT CHANGE UP (ref 3.8–10.5)

## 2022-09-18 PROCEDURE — 99231 SBSQ HOSP IP/OBS SF/LOW 25: CPT

## 2022-09-18 RX ORDER — LOPERAMIDE HCL 2 MG
2 TABLET ORAL
Refills: 0 | Status: DISCONTINUED | OUTPATIENT
Start: 2022-09-18 | End: 2022-09-21

## 2022-09-18 RX ORDER — SODIUM CHLORIDE 9 MG/ML
1000 INJECTION INTRAMUSCULAR; INTRAVENOUS; SUBCUTANEOUS
Refills: 0 | Status: DISCONTINUED | OUTPATIENT
Start: 2022-09-18 | End: 2022-09-21

## 2022-09-18 RX ADMIN — Medication 2000 UNIT(S): at 11:33

## 2022-09-18 RX ADMIN — MEMANTINE HYDROCHLORIDE 10 MILLIGRAM(S): 10 TABLET ORAL at 18:07

## 2022-09-18 RX ADMIN — Medication 50 MILLIGRAM(S): at 14:05

## 2022-09-18 RX ADMIN — CHLORHEXIDINE GLUCONATE 1 APPLICATION(S): 213 SOLUTION TOPICAL at 06:48

## 2022-09-18 RX ADMIN — SODIUM CHLORIDE 100 MILLILITER(S): 9 INJECTION INTRAMUSCULAR; INTRAVENOUS; SUBCUTANEOUS at 15:51

## 2022-09-18 RX ADMIN — TAMSULOSIN HYDROCHLORIDE 0.4 MILLIGRAM(S): 0.4 CAPSULE ORAL at 21:17

## 2022-09-18 RX ADMIN — PREGABALIN 1000 MICROGRAM(S): 225 CAPSULE ORAL at 11:33

## 2022-09-18 RX ADMIN — APIXABAN 5 MILLIGRAM(S): 2.5 TABLET, FILM COATED ORAL at 18:08

## 2022-09-18 RX ADMIN — Medication 150 MICROGRAM(S): at 06:40

## 2022-09-18 RX ADMIN — Medication 1 MILLIGRAM(S): at 21:18

## 2022-09-18 RX ADMIN — Medication 400 MILLIGRAM(S): at 06:41

## 2022-09-18 RX ADMIN — APIXABAN 5 MILLIGRAM(S): 2.5 TABLET, FILM COATED ORAL at 06:41

## 2022-09-18 RX ADMIN — Medication 650 MILLIGRAM(S): at 18:08

## 2022-09-18 RX ADMIN — MEMANTINE HYDROCHLORIDE 10 MILLIGRAM(S): 10 TABLET ORAL at 06:41

## 2022-09-18 RX ADMIN — MIRTAZAPINE 30 MILLIGRAM(S): 45 TABLET, ORALLY DISINTEGRATING ORAL at 21:18

## 2022-09-18 RX ADMIN — PANTOPRAZOLE SODIUM 40 MILLIGRAM(S): 20 TABLET, DELAYED RELEASE ORAL at 06:40

## 2022-09-18 RX ADMIN — FEBUXOSTAT 80 MILLIGRAM(S): 40 TABLET ORAL at 11:33

## 2022-09-18 RX ADMIN — Medication 650 MILLIGRAM(S): at 06:40

## 2022-09-18 RX ADMIN — Medication 400 MILLIGRAM(S): at 18:08

## 2022-09-18 RX ADMIN — Medication 2 MILLIGRAM(S): at 15:51

## 2022-09-18 RX ADMIN — Medication 50 MILLIGRAM(S): at 21:18

## 2022-09-18 RX ADMIN — Medication 50 MILLIGRAM(S): at 06:46

## 2022-09-18 RX ADMIN — DONEPEZIL HYDROCHLORIDE 10 MILLIGRAM(S): 10 TABLET, FILM COATED ORAL at 21:18

## 2022-09-18 RX ADMIN — Medication 5 MILLIGRAM(S): at 21:18

## 2022-09-18 NOTE — PROGRESS NOTE ADULT - ASSESSMENT
76 yo male with extensive pmhx including h/o afib on eliquis s/p ppm, ckd, cll, being admitted for pantera    pantera on ckd  renal eval noted  CT scan noted with right hydro and renal stone   consulted  ivf as per renal  lokelma for hyperKalemia  serial bmp  s/p right ureteral stent placement by  on 9/14  monitor creat  - improving  gu f/u    afib s/p ppm on eliquis  was planned for ablation   now postponed due to pantera  ep f/u  tele  cont AC with eliquis  cont toprol  tentatively re-scheduled for tomorrow     CLL  stable  outpt heme/onc f/u    c/o diarrhea  gi f/u  imodium prn    anemia  f/u repeat cbc  no signs of bleeding on exam    cont other current meds    d/w renal and cards/ep    Advanced care planning was discussed with patient and family.  Advanced care planning forms were reviewed and discussed as appropriate.  Differential diagnosis and plan of care discussed with patient after the evaluation.   Pain assessed and judicious use of narcotics when appropriate was discussed.  Importance of Fall prevention discussed.  Counseling on Smoking and Alcohol cessation was offered when appropriate.  Counseling on Diet, exercise, and medication compliance was done.   Approx 30 minutes spent.

## 2022-09-18 NOTE — PROGRESS NOTE ADULT - SUBJECTIVE AND OBJECTIVE BOX
NICOLAS CIFUENTES  77y Male  MRN:[id number]    Patient is a 77y old  Male who presents with a chief complaint of pantera    HPI:  77 year old  male, h/o [place] Sci PPM ([employer] MRI EL Serial # [id number]), HFpEF, pAfib (on Eliquis and Toprol), CKD G4/A3, CLL, IgG monoclonal gammopathy, GERD, hypothyroidism, dementia who c/o increased fatigue and very little energy. His afib burden has increased from 12% in May to 21 % in August and therefore the decision to proceed with ablation was made. Pt presents today for afib ablation.             on w/u pt found to have pantera. therefore ablation was cancelled. now being admitted for pantera                                                                           Cards: Dr Clemente (12 Sep 2022 06:50)      Patient seen and evaluated at bedside. No acute events overnight except as noted.    Interval HPI: no acute events o/n     PAST MEDICAL & SURGICAL HISTORY:  CLL (chronic lymphocytic leukemia)  in remission      Anxiety disorder      GERD (gastroesophageal reflux disease)      Atrial fibrillation      Diverticulitis      Waldenstrom macroglobulinemia      Bradycardia, drug induced      Hypothyroid      Nephrolithiasis      CKD (chronic kidney disease)      Gout      [place] (hard of hearing)      BPH (benign prostatic hyperplasia)      Memory deficit      IgG monoclonal gammopathy      History of macular degeneration      Chronic heart failure with preserved ejection fraction      Meniscus tear  s/p removal of Meniscus 8 months ago      S/P hernia repair  x2      History of laparoscopic cholecystectomy  4/2014      History of cataract surgery, right  IOL      History of appendectomy      History of cardiac pacemaker in situ          REVIEW OF SYSTEMS:  as per hpi     VITALS:   Vital Signs Last 24 Hrs  T(C): 36.9 (18 Sep 2022 20:57), Max: 37.1 (18 Sep 2022 11:01)  T(F): 98.5 (18 Sep 2022 20:57), Max: 98.7 (18 Sep 2022 11:01)  HR: 95 (18 Sep 2022 20:57) (86 - 96)  BP: 109/67 (18 Sep 2022 20:57) (109/67 - 125/81)  BP(mean): --  RR: 18 (18 Sep 2022 20:57) (18 - 18)  SpO2: 95% (18 Sep 2022 20:57) (92% - 98%)    Parameters below as of 18 Sep 2022 20:57  Patient On (Oxygen Delivery Method): room air            PHYSICAL EXAM:  GENERAL: NAD, well-developed  HEAD:  Atraumatic, Normocephalic  EYES: EOMI, PERRLA, conjunctiva and sclera clear  NECK: Supple, No JVD  CHEST/LUNG: Clear to auscultation bilaterally; No wheeze  HEART: S1, S2; No murmurs, rubs, or gallops  ABDOMEN: Soft, Nontender, Nondistended; Bowel sounds present  EXTREMITIES:  2+ Peripheral Pulses, No clubbing, cyanosis, or edema  PSYCH: Normal affect  NEUROLOGY: AAOX3; non-focal  SKIN: No rashes or lesions    Consultant(s) Notes Reviewed:  [x ] YES  [ ] NO  Care Discussed with Consultants/Other Providers [ x] YES  [ ] NO    MEDS:   MEDICATIONS  (STANDING):  acyclovir   Oral Tab/Cap 400 milliGRAM(s) Oral two times a day  chlorhexidine 2% Cloths 1 Application(s) Topical <User Schedule>  cholecalciferol 2000 Unit(s) Oral daily  clonazePAM  Tablet 1 milliGRAM(s) Oral at bedtime  cyanocobalamin 1000 MICROGram(s) Oral daily  donepezil 10 milliGRAM(s) Oral at bedtime  febuxostat 80 milliGRAM(s) Oral daily  levothyroxine 150 MICROGram(s) Oral daily  melatonin 5 milliGRAM(s) Oral at bedtime  memantine 10 milliGRAM(s) Oral every 12 hours  metoprolol succinate ER 50 milliGRAM(s) Oral <User Schedule>  mirtazapine 30 milliGRAM(s) Oral at bedtime  pantoprazole    Tablet 40 milliGRAM(s) Oral before breakfast  sodium bicarbonate 650 milliGRAM(s) Oral two times a day  sodium chloride 0.9%. 1000 milliLiter(s) (100 mL/Hr) IV Continuous <Continuous>  tamsulosin 0.4 milliGRAM(s) Oral at bedtime    MEDICATIONS  (PRN):  loperamide 2 milliGRAM(s) Oral two times a day PRN Diarrhea  ondansetron Injectable 4 milliGRAM(s) IV Push once PRN Nausea and/or Vomiting  oxycodone    5 mG/acetaminophen 325 mG 2 Tablet(s) Oral every 4 hours PRN Moderate Pain (4 - 6)      ALLERGIES:  rituximab (Angioedema)      LABS:                                                                  9.1    7.07  )-----------( 88       ( 18 Sep 2022 05:54 )             29.9   09-18    146<H>  |  112<H>  |  47<H>  ----------------------------<  104<H>  4.2   |  18<L>  |  4.56<H>    Ca    8.4      18 Sep 2022 05:54           < from: CT Abdomen and Pelvis No Cont (09.12.22 @ 18:12) >  IMPRESSION:  Bilateral urolithiasis with 4 mm obstructive calculus in the distal right   ureter and moderate right hydroureteronephrosis.    Abdominal lymphadenopathy without significant interval change, consistent   with known lymphoproliferative disorder..    < end of copied text >

## 2022-09-18 NOTE — PROGRESS NOTE ADULT - ASSESSMENT
on room air  afebrile  denies n/v/d  tentatively  for  ablation on Monday 9/19 if  scr  close to baseline of ~3-3.5  per  EP    acyclovir   Oral Tab/Cap 400 milliGRAM(s) Oral two times a day  apixaban 5 milliGRAM(s) Oral every 12 hours  chlorhexidine 2% Cloths 1 Application(s) Topical <User Schedule>  cholecalciferol 2000 Unit(s) Oral daily  clonazePAM  Tablet 1 milliGRAM(s) Oral at bedtime  cyanocobalamin 1000 MICROGram(s) Oral daily  donepezil 10 milliGRAM(s) Oral at bedtime  febuxostat 80 milliGRAM(s) Oral daily  levothyroxine 150 MICROGram(s) Oral daily  melatonin 5 milliGRAM(s) Oral at bedtime  memantine 10 milliGRAM(s) Oral every 12 hours  metoprolol succinate ER 50 milliGRAM(s) Oral <User Schedule>  mirtazapine 30 milliGRAM(s) Oral at bedtime  ondansetron Injectable 4 milliGRAM(s) IV Push once PRN  oxycodone    5 mG/acetaminophen 325 mG 2 Tablet(s) Oral every 4 hours PRN  pantoprazole    Tablet 40 milliGRAM(s) Oral before breakfast  sodium bicarbonate 650 milliGRAM(s) Oral two times a day  tamsulosin 0.4 milliGRAM(s) Oral at bedtime      VITAL:  T(C): , Max: 37.1 (09-17-22 @ 20:34)  T(F): , Max: 98.7 (09-17-22 @ 20:34)  HR: 86 (09-18-22 @ 11:01)  BP: 125/81 (09-18-22 @ 11:01)  BP(mean): --  RR: 18 (09-18-22 @ 11:01)  SpO2: 92% (09-18-22 @ 11:01)  Wt(kg): --    09-17-22 @ 07:01  -  09-18-22 @ 07:00  --------------------------------------------------------  IN: 120 mL / OUT: 1750 mL / NET: -1630 mL    09-18-22 @ 07:01  -  09-18-22 @ 12:30  --------------------------------------------------------  IN: 240 mL / OUT: 0 mL / NET: 240 mL        PHYSICAL EXAM:  Constitutional: NAD, Alert  HEENT: NCAT, DMM  Neck: Supple, No JVD  Respiratory: CTA-b/l  Cardiovascular: reg s1s2  Gastrointestinal: BS+, soft, NT/ND  Extremities: No peripheral edema b/l  Neurological: no focal deficits; strength grossly intact  Back: no CVAT b/l  Skin: No rashes, no nevi        LABS:                          9.1    7.07  )-----------( 88       ( 18 Sep 2022 05:54 )             29.9     Na(146)/K(4.2)/Cl(112)/HCO3(18)/BUN(47)/Cr(4.56)Glu(104)/Ca(8.4)/Mg(--)/PO4(--)    09-18 @ 05:54  Na(144)/K(4.2)/Cl(111)/HCO3(23)/BUN(53)/Cr(5.12)Glu(96)/Ca(8.2)/Mg(--)/PO4(--)    09-17 @ 05:46  Na(145)/K(4.5)/Cl(112)/HCO3(23)/BUN(57)/Cr(5.64)Glu(100)/Ca(7.9)/Mg(--)/PO4(--)    09-16 @ 05:38  Na(143)/K(4.6)/Cl(110)/HCO3(23)/BUN(60)/Cr(5.90)Glu(128)/Ca(7.8)/Mg(--)/PO4(--)    09-15 @ 22:02            ASSESSMENT/PLAN  IMPRESSION: 77M w/ mild dementia, AFib, HFpEF, nephrolithiasis, CLL/Waldenstrom's on [place], and CKD4-5; 9/12/22 presented for scheduled AFib ablation; procedure postponed due to LOUISE    (1)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 GFR baseline ~15ml/min.    (2)LOUISE - obstructive nephropathy due to 4mm right-sided stone. renal fxn improving , s/p ureteral stent placement by  and s/p IVF    (3)Hyperkalemia - improved, s/p Lokelma and s/p IVF    (4)Hypernatremia - mild    (5)Anemia-hgb improving    (6) - s/p R ureteral stent placement 9/14/22    (7)AFib - for eventual AFib ablation - potentially Monday 9/19    RECOMMEND:  (1)NS 100cc/hr x 2Litres to try to achieve  scr ~ baseline 3-3.5  (2)c/w PO NaHCO3 as ordered  (3)Low-K diet as ordered  (4) continue encouraging  increased water intake by mouth   (5)BMP daily while admitted; Lokelma if K is 5.5 or higher  (6)Dose new meds for GFR <15ml/min  (6)No objection to ablation Monday 9/19/22      Winston Wilson NP-BC  [facility]  [phone]

## 2022-09-18 NOTE — PROGRESS NOTE ADULT - SUBJECTIVE AND OBJECTIVE BOX
24H hour events: patient stable    MEDICATIONS:  apixaban 5 milliGRAM(s) Oral every 12 hours  metoprolol succinate ER 50 milliGRAM(s) Oral <User Schedule>  tamsulosin 0.4 milliGRAM(s) Oral at bedtime  acyclovir   Oral Tab/Cap 400 milliGRAM(s) Oral two times a day  clonazePAM  Tablet 1 milliGRAM(s) Oral at bedtime  donepezil 10 milliGRAM(s) Oral at bedtime  melatonin 5 milliGRAM(s) Oral at bedtime  memantine 10 milliGRAM(s) Oral every 12 hours  mirtazapine 30 milliGRAM(s) Oral at bedtime  ondansetron Injectable 4 milliGRAM(s) IV Push once PRN  oxycodone    5 mG/acetaminophen 325 mG 2 Tablet(s) Oral every 4 hours PRN  pantoprazole    Tablet 40 milliGRAM(s) Oral before breakfast  febuxostat 80 milliGRAM(s) Oral daily  levothyroxine 150 MICROGram(s) Oral daily  chlorhexidine 2% Cloths 1 Application(s) Topical <User Schedule>  cholecalciferol 2000 Unit(s) Oral daily  cyanocobalamin 1000 MICROGram(s) Oral daily  sodium bicarbonate 650 milliGRAM(s) Oral two times a day    PHYSICAL EXAM:  T(C): 37.1 (09-18-22 @ 11:01), Max: 37.1 (09-17-22 @ 20:34)  HR: 86 (09-18-22 @ 11:01) (86 - 92)  BP: 125/81 (09-18-22 @ 11:01) (110/70 - 135/91)  RR: 18 (09-18-22 @ 11:01) (18 - 18)  SpO2: 92% (09-18-22 @ 11:01) (92% - 98%)  Wt(kg): --  I&O's Summary    17 Sep 2022 07:01  -  18 Sep 2022 07:00  --------------------------------------------------------  IN: 120 mL / OUT: 1750 mL / NET: -1630 mL    18 Sep 2022 07:01  -  18 Sep 2022 12:16  --------------------------------------------------------  IN: 240 mL / OUT: 0 mL / NET: 240 mL    LABS:	 	    CBC Full  -  ( 18 Sep 2022 05:54 )  WBC Count : 7.07 K/uL  Hemoglobin : 9.1 g/dL  Hematocrit : 29.9 %  Platelet Count - Automated : 88 K/uL  Mean Cell Volume : 95.2 fl  Mean Cell Hemoglobin : 29.0 pg  Mean Cell Hemoglobin Concentration : 30.4 gm/dL    09-18    146<H>  |  112<H>  |  47<H>  ----------------------------<  104<H>  4.2   |  18<L>  |  4.56<H>  09-17    144  |  111<H>  |  53<H>  ----------------------------<  96  4.2   |  23  |  5.12<H>    Ca    8.4      18 Sep 2022 05:54  Ca    8.2<L>      17 Sep 2022 05:46    TELEMETRY: AF  	    	  ASSESSMENT/PLAN:   Patient stable  Cr 4.5 today (baseline 3-3.5)  Please keep NPO, if CR close to baseline can consider ablation tomorrow

## 2022-09-19 LAB
ANION GAP SERPL CALC-SCNC: 10 MMOL/L — SIGNIFICANT CHANGE UP (ref 5–17)
BLD GP AB SCN SERPL QL: NEGATIVE — SIGNIFICANT CHANGE UP
BUN SERPL-MCNC: 47 MG/DL — HIGH (ref 7–23)
CALCIUM SERPL-MCNC: 8.2 MG/DL — LOW (ref 8.4–10.5)
CHLORIDE SERPL-SCNC: 109 MMOL/L — HIGH (ref 96–108)
CO2 SERPL-SCNC: 22 MMOL/L — SIGNIFICANT CHANGE UP (ref 22–31)
CREAT SERPL-MCNC: 4.63 MG/DL — HIGH (ref 0.5–1.3)
EGFR: 12 ML/MIN/1.73M2 — LOW
GLUCOSE SERPL-MCNC: 107 MG/DL — HIGH (ref 70–99)
HCT VFR BLD CALC: 27.6 % — LOW (ref 39–50)
HGB BLD-MCNC: 8.5 G/DL — LOW (ref 13–17)
INR BLD: 2.14 RATIO — HIGH (ref 0.88–1.16)
MCHC RBC-ENTMCNC: 29.7 PG — SIGNIFICANT CHANGE UP (ref 27–34)
MCHC RBC-ENTMCNC: 30.8 GM/DL — LOW (ref 32–36)
MCV RBC AUTO: 96.5 FL — SIGNIFICANT CHANGE UP (ref 80–100)
NIDUS STONE QN: SIGNIFICANT CHANGE UP
NRBC # BLD: 0 /100 WBCS — SIGNIFICANT CHANGE UP (ref 0–0)
PLATELET # BLD AUTO: 82 K/UL — LOW (ref 150–400)
POTASSIUM SERPL-MCNC: 4.1 MMOL/L — SIGNIFICANT CHANGE UP (ref 3.5–5.3)
POTASSIUM SERPL-SCNC: 4.1 MMOL/L — SIGNIFICANT CHANGE UP (ref 3.5–5.3)
PROTHROM AB SERPL-ACNC: 24.8 SEC — HIGH (ref 10.5–13.4)
RBC # BLD: 2.86 M/UL — LOW (ref 4.2–5.8)
RBC # FLD: 13.6 % — SIGNIFICANT CHANGE UP (ref 10.3–14.5)
RH IG SCN BLD-IMP: POSITIVE — SIGNIFICANT CHANGE UP
SODIUM SERPL-SCNC: 141 MMOL/L — SIGNIFICANT CHANGE UP (ref 135–145)
WBC # BLD: 6.13 K/UL — SIGNIFICANT CHANGE UP (ref 3.8–10.5)
WBC # FLD AUTO: 6.13 K/UL — SIGNIFICANT CHANGE UP (ref 3.8–10.5)

## 2022-09-19 PROCEDURE — 93657 TX L/R ATRIAL FIB ADDL: CPT

## 2022-09-19 PROCEDURE — 93656 COMPRE EP EVAL ABLTJ ATR FIB: CPT

## 2022-09-19 PROCEDURE — 99233 SBSQ HOSP IP/OBS HIGH 50: CPT

## 2022-09-19 PROCEDURE — 93623 PRGRMD STIMJ&PACG IV RX NFS: CPT | Mod: 26

## 2022-09-19 PROCEDURE — 93010 ELECTROCARDIOGRAM REPORT: CPT | Mod: 76

## 2022-09-19 PROCEDURE — 99232 SBSQ HOSP IP/OBS MODERATE 35: CPT | Mod: GC

## 2022-09-19 RX ORDER — BENZOCAINE AND MENTHOL 5; 1 G/100ML; G/100ML
1 LIQUID ORAL ONCE
Refills: 0 | Status: COMPLETED | OUTPATIENT
Start: 2022-09-19 | End: 2022-09-19

## 2022-09-19 RX ORDER — METOPROLOL TARTRATE 50 MG
50 TABLET ORAL EVERY 12 HOURS
Refills: 0 | Status: DISCONTINUED | OUTPATIENT
Start: 2022-09-19 | End: 2022-09-21

## 2022-09-19 RX ORDER — ACETAMINOPHEN 500 MG
650 TABLET ORAL EVERY 6 HOURS
Refills: 0 | Status: DISCONTINUED | OUTPATIENT
Start: 2022-09-19 | End: 2022-09-21

## 2022-09-19 RX ORDER — LACTOBACILLUS ACIDOPHILUS 100MM CELL
1 CAPSULE ORAL
Refills: 0 | Status: DISCONTINUED | OUTPATIENT
Start: 2022-09-19 | End: 2022-09-21

## 2022-09-19 RX ORDER — APIXABAN 2.5 MG/1
5 TABLET, FILM COATED ORAL EVERY 12 HOURS
Refills: 0 | Status: DISCONTINUED | OUTPATIENT
Start: 2022-09-19 | End: 2022-09-21

## 2022-09-19 RX ORDER — PANTOPRAZOLE SODIUM 20 MG/1
40 TABLET, DELAYED RELEASE ORAL EVERY 12 HOURS
Refills: 0 | Status: DISCONTINUED | OUTPATIENT
Start: 2022-09-19 | End: 2022-09-21

## 2022-09-19 RX ADMIN — TAMSULOSIN HYDROCHLORIDE 0.4 MILLIGRAM(S): 0.4 CAPSULE ORAL at 21:35

## 2022-09-19 RX ADMIN — Medication 400 MILLIGRAM(S): at 06:48

## 2022-09-19 RX ADMIN — DONEPEZIL HYDROCHLORIDE 10 MILLIGRAM(S): 10 TABLET, FILM COATED ORAL at 21:35

## 2022-09-19 RX ADMIN — Medication 5 MILLIGRAM(S): at 21:35

## 2022-09-19 RX ADMIN — Medication 1 MILLIGRAM(S): at 21:36

## 2022-09-19 RX ADMIN — Medication 50 MILLIGRAM(S): at 06:52

## 2022-09-19 RX ADMIN — Medication 1 TABLET(S): at 21:35

## 2022-09-19 RX ADMIN — Medication 50 MILLIGRAM(S): at 21:36

## 2022-09-19 RX ADMIN — MIRTAZAPINE 30 MILLIGRAM(S): 45 TABLET, ORALLY DISINTEGRATING ORAL at 21:36

## 2022-09-19 RX ADMIN — BENZOCAINE AND MENTHOL 1 LOZENGE: 5; 1 LIQUID ORAL at 21:34

## 2022-09-19 RX ADMIN — CHLORHEXIDINE GLUCONATE 1 APPLICATION(S): 213 SOLUTION TOPICAL at 06:49

## 2022-09-19 RX ADMIN — APIXABAN 5 MILLIGRAM(S): 2.5 TABLET, FILM COATED ORAL at 23:43

## 2022-09-19 RX ADMIN — MEMANTINE HYDROCHLORIDE 10 MILLIGRAM(S): 10 TABLET ORAL at 21:36

## 2022-09-19 RX ADMIN — Medication 150 MICROGRAM(S): at 06:47

## 2022-09-19 RX ADMIN — PANTOPRAZOLE SODIUM 40 MILLIGRAM(S): 20 TABLET, DELAYED RELEASE ORAL at 06:48

## 2022-09-19 RX ADMIN — MEMANTINE HYDROCHLORIDE 10 MILLIGRAM(S): 10 TABLET ORAL at 06:47

## 2022-09-19 RX ADMIN — Medication 400 MILLIGRAM(S): at 21:35

## 2022-09-19 RX ADMIN — Medication 650 MILLIGRAM(S): at 06:47

## 2022-09-19 RX ADMIN — Medication 650 MILLIGRAM(S): at 21:35

## 2022-09-19 RX ADMIN — PANTOPRAZOLE SODIUM 40 MILLIGRAM(S): 20 TABLET, DELAYED RELEASE ORAL at 21:36

## 2022-09-19 NOTE — PROGRESS NOTE ADULT - SUBJECTIVE AND OBJECTIVE BOX
INTERVAL HPI/OVERNIGHT EVENTS:  3 Bms 9/17, none yesterday  1 BM this AM - soft  no rectal bleeding  no abdominal pain  appetite has been good, NPO pending procedure today    planned ablation today    MEDICATIONS  (STANDING):  acyclovir   Oral Tab/Cap 400 milliGRAM(s) Oral two times a day  chlorhexidine 2% Cloths 1 Application(s) Topical <User Schedule>  cholecalciferol 2000 Unit(s) Oral daily  clonazePAM  Tablet 1 milliGRAM(s) Oral at bedtime  cyanocobalamin 1000 MICROGram(s) Oral daily  donepezil 10 milliGRAM(s) Oral at bedtime  febuxostat 80 milliGRAM(s) Oral daily  levothyroxine 150 MICROGram(s) Oral daily  melatonin 5 milliGRAM(s) Oral at bedtime  memantine 10 milliGRAM(s) Oral every 12 hours  metoprolol succinate ER 50 milliGRAM(s) Oral <User Schedule>  mirtazapine 30 milliGRAM(s) Oral at bedtime  pantoprazole    Tablet 40 milliGRAM(s) Oral before breakfast  sodium bicarbonate 650 milliGRAM(s) Oral two times a day  sodium chloride 0.9%. 1000 milliLiter(s) (100 mL/Hr) IV Continuous <Continuous>  tamsulosin 0.4 milliGRAM(s) Oral at bedtime    MEDICATIONS  (PRN):  loperamide 2 milliGRAM(s) Oral two times a day PRN Diarrhea  ondansetron Injectable 4 milliGRAM(s) IV Push once PRN Nausea and/or Vomiting  oxycodone    5 mG/acetaminophen 325 mG 2 Tablet(s) Oral every 4 hours PRN Moderate Pain (4 - 6)      Allergies  rituximab (Angioedema)      Review of Systems:  As per HPI, remainder of 14 point ROS negative      Vital Signs Last 24 Hrs  T(C): 36.7 (19 Sep 2022 03:50), Max: 36.9 (18 Sep 2022 20:57)  T(F): 98 (19 Sep 2022 03:50), Max: 98.5 (18 Sep 2022 20:57)  HR: 86 (19 Sep 2022 03:50) (86 - 96)  BP: 105/66 (19 Sep 2022 03:50) (105/66 - 114/60)  BP(mean): --  RR: 18 (19 Sep 2022 03:50) (18 - 18)  SpO2: 97% (19 Sep 2022 03:50) (95% - 97%)    Parameters below as of 19 Sep 2022 03:50  Patient On (Oxygen Delivery Method): room air    PHYSICAL EXAM:  Constitutional: NAD, well-developed lying in bed  [place]    Neck: No LAD, supple  Respiratory: PPM site clean, dry   grossly clear, no accessory muscle use  Cardiovascular: S1 and S2, tachy  Gastrointestinal: BS+, obese soft, NT/ND, WH surgical scar  Extremities: No peripheral edema, neg clubbing, cyanosis  Vascular: 2+ peripheral pulses  Neurological: A/O x 3, no focal deficits +[place]  Psychiatric: Normal mood, normal affect  Skin: No rashes, anicteric      LABS:                        8.5    6.13  )-----------( 82       ( 19 Sep 2022 04:07 )             27.6     09-19    141  |  109<H>  |  47<H>  ----------------------------<  107<H>  4.1   |  22  |  4.63<H>    Ca    8.2<L>      19 Sep 2022 04:07      PT/INR - ( 19 Sep 2022 04:07 )   PT: 24.8 sec;   INR: 2.14 ratio                 RADIOLOGY & ADDITIONAL TESTS:

## 2022-09-19 NOTE — PROGRESS NOTE ADULT - ASSESSMENT
78 yo male with extensive pmhx including h/o afib on eliquis s/p ppm, ckd, cll, being admitted for pantera    pantera on ckd  renal eval noted  CT scan noted with right hydro and renal stone   consulted  ivf as per renal  lokelma for hyperKalemia  serial bmp  s/p right ureteral stent placement by  on 9/14  monitor creat  - improving  gu f/u    afib s/p ppm on eliquis  was planned for ablation   now postponed due to pantera  ep f/u  tele  cont AC with eliquis  cont toprol  ablation rescheduled for today. EP f/u    CLL  stable  outpt heme/onc f/u    c/o diarrhea  gi f/u  imodium prn    anemia  f/u repeat cbc  no signs of bleeding on exam    cont other current meds    d/w renal and cards/ep    Advanced care planning was discussed with patient and family.  Advanced care planning forms were reviewed and discussed as appropriate.  Differential diagnosis and plan of care discussed with patient after the evaluation.   Pain assessed and judicious use of narcotics when appropriate was discussed.  Importance of Fall prevention discussed.  Counseling on Smoking and Alcohol cessation was offered when appropriate.  Counseling on Diet, exercise, and medication compliance was done.   Approx 30 minutes spent.

## 2022-09-19 NOTE — CHART NOTE - NSCHARTNOTEFT_GEN_A_CORE
Spoke with urology Dr. Baires - no urology objection to resume anticoagulation ( Eliquis )   As per Dr. Richter, continue with Eliquis   Eliquis  5mg BID restarted     Elli Alarcon NP-C  #[id number]
patient was seen at bedside for c/o chest pain   NAD, A & O X3, following commands   vital signs 97.8- 78- 18 /67 O2 at 96% on RA  Pt reports chest pressure like pain 3/10 in the mid chest started this am.   Denies dizziness, vision changes, SOB, nausea, sweats.   - 12 lead EKG showed BBB ( no BBB on 9/12 EKG)   - Will send Cardiac enzyme,   - Will monitor on tele   - House cardiology aware of new onset chest pain and EKG changes - will come to see pt     Elli CURTISC  #[id number]
s/p A Fib ablation  Tolerated procedure well- no pain or SOB  RFV access site -with vascade closure x 3   no hematoma or bleeding   +DP doppler     Plan per Dr Raza    [place] x 2 hours  Resume Eliquis in 6 hours (23:50 hours today)  decrease Toprol to 50mg BID  Increase Protonix 40mg BID x 6 weeks  No colchicine (due to CKD)    sign out to [facility]   stable upon transfer

## 2022-09-19 NOTE — PROGRESS NOTE ADULT - ASSESSMENT
7 year old  male, h/o [place] [employer] PPM ([employer] EL Serial # [id number]), HFpEF, pAfib (on Eliquis and Toprol), CKD G4/A3, CLL, IgG monoclonal gammopathy, GERD, hypothyroidism, dementia who c/o increased fatigue and very little energy. His afib burden has increased from 12% in May to 21 % in August and therefore the decision to proceed with ablation was made. Pt presents 9/12 for afib ablation however postponed due to LOUISE on CKD found to be obstructive in etiology. CT with 4mm obstructive calculus in right ureter. S/p ureteral stent. Cr improving and closer to baseline 9/19.    1. AF  2. CKD with LOUISE    - AF rates 70-100s, pt has been in AF since brought to [facility]  - Elective ablation postponed until Cr back to baseline. 9/13 Cr 6.10 --> improving to 9/17 4.63 GFR 12, Nephrology following, report Cr close to baseline and no objections to ablation  - Plan for ablation with Dr. Ayden schmidt, pt consented at bedside  - Continue AC with Eliquis 5mg BID, AM dose held 9/19 for procedure  - Continue Toprol  mg daily  - Close telemetry monitoring   - Keep K>4, Mg>2   77 year old  male, h/o [place] Sci PPM ([employer] MRI EL Serial # [id number]), HFpEF, pAfib (on Eliquis and Toprol), CKD G4/A3, CLL, IgG monoclonal gammopathy, GERD, hypothyroidism, dementia who c/o increased fatigue and very little energy. His afib burden has increased from 12% in May to 21 % in August and therefore the decision to proceed with ablation was made. Pt presents 9/12 for afib ablation however postponed due to LOUISE on CKD found to be obstructive in etiology. CT with 4mm obstructive calculus in right ureter. S/p ureteral stent. Cr improving and closer to baseline 9/19.    1. AF  2. CKD with LOUISE    - AF rates 70-100s, pt has been in AF since brought to [facility]  - Elective ablation postponed until Cr back to baseline. 9/13 Cr 6.10 --> improving to 9/17 4.63 GFR 12, Nephrology following, report Cr close to baseline and no objections to ablation  - Plan for ablation with Dr. Ayden schmidt, pt consented at bedside  - Continue AC with Eliquis 5mg BID, AM dose held 9/19 for procedure  - Continue Toprol  mg daily  - Close telemetry monitoring   - Keep K>4, Mg>2

## 2022-09-19 NOTE — PRE-ANESTHESIA EVALUATION ADULT - NS MD HP INPLANTS MED DEV
central line right chest wall inserted Jan 2018; PPM [employer] L331/Lens implant/Pacemaker
central line right chest wall inserted Jan 2018; PPM [employer] L331/Lens implant/Pacemaker
central line right chest wall inserted Jan 2018; PPM [employer] [employer] L331/Lens implant/Pacemaker

## 2022-09-19 NOTE — PROGRESS NOTE ADULT - ASSESSMENT
77 year old  male, h/o [place] Sci PPM ([employer] MRI EL Serial # [id number]), HFpEF, pAfib (on Eliquis and Toprol), CKD G4/A3, CLL, IgG monoclonal gammopathy, GERD, hypothyroidism, dementia who c/o increased fatigue and very little energy. His afib burden has increased from 12% in May to 21 % in August and therefore the decision to proceed with ablation was made. Noted to have LOUISE and scheduled ablation on 9/12/22 was postponed 2/2 LOUISE on CKD  There was no improvement of LOUISE despite IV fluids, and a CT abd/pelvis was subsequently obtained, which showed a 4 mm obstructive calculus in the distal right ureter and moderate right hydroureteronephrosis.  s/p cystoscopy with ureteral stent placement 9/14/22    GI asked to evaluate for reports of loose stools: pt states intermittent "mushy" stools without blood ~2/day - clinically improved    Clinically without diarrhea and no colitis noted on CT 9/12 (though without contrast)  -monitor BMs; if develops loose/watery stools then can send GI PCR  -ok for PO diet as tolerated (plans to resume diet post-procedure)  -will add probiotic (Bacid) as pt was previously on this  -no plans/role for endoscopic evaluation    Discussed with Medicine attending  Discussed with pt, all questions answered    Abdullahi Goncalves PA-C    [facility]  [phone]  After hours and weekend coverage [phone]

## 2022-09-19 NOTE — PROGRESS NOTE ADULT - SUBJECTIVE AND OBJECTIVE BOX
Overnight events noted      VITAL:  T(C): , Max: 37.1 (09-18-22 @ 11:01)  T(F): , Max: 98.7 (09-18-22 @ 11:01)  HR: 86 (09-19-22 @ 03:50)  BP: 105/66 (09-19-22 @ 03:50)  BP(mean): --  RR: 18 (09-19-22 @ 03:50)  SpO2: 97% (09-19-22 @ 03:50)      PHYSICAL EXAM:  Constitutional: NAD, Alert  HEENT: NCAT, DMM  Neck: Supple, No JVD  Respiratory: CTA-b/l  Cardiovascular: reg s1s2  Gastrointestinal: BS+, soft, NT/ND  Extremities: No peripheral edema b/l  Neurological: no focal deficits; strength grossly intact  Back: no CVAT b/l  Skin: No rashes, no nevi    LABS:                        8.5    6.13  )-----------( 82       ( 19 Sep 2022 04:07 )             27.6     Na(141)/K(4.1)/Cl(109)/HCO3(22)/BUN(47)/Cr(4.63)Glu(107)/Ca(8.2)/Mg(--)/PO4(--)    09-19 @ 04:07  Na(146)/K(4.2)/Cl(112)/HCO3(18)/BUN(47)/Cr(4.56)Glu(104)/Ca(8.4)/Mg(--)/PO4(--)    09-18 @ 05:54  Na(144)/K(4.2)/Cl(111)/HCO3(23)/BUN(53)/Cr(5.12)Glu(96)/Ca(8.2)/Mg(--)/PO4(--)    09-17 @ 05:46      IMPRESSION: 77M w/ mild dementia, AFib, HFpEF, nephrolithiasis, CLL/Walden[facility]'s [facility], and CKD4-5; 9/12/22 presented for scheduled AFib ablation; procedure postponed due to LOUISE    (1)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 GFR baseline ~10-15ml/min.    (2)LOUISE - obstructive nephropathy due to 4mm right-sided stone. Significantly improved from days ago, s/p ureteroscopy/R ureteral stent placement. Creatinine is quite close to baseline at this point.    (3)Lytes - acceptable    (4) - s/p R ureteral stent placement 9/14/22    (5)AFib - potentially for ablation today      RECOMMEND:  (1)PO NaHCO3 as ordered  (2)Low-K diet when taking PO  (3)Dose new meds for GFR <15ml/min  (4)No objection to ablation Monday 9/19/22            Norman Ascencio MD  [facility]  Office: [phone]  Cell: [phone]       No pain, no sob      VITAL:  T(C): , Max: 37.1 (09-18-22 @ 11:01)  T(F): , Max: 98.7 (09-18-22 @ 11:01)  HR: 86 (09-19-22 @ 03:50)  BP: 105/66 (09-19-22 @ 03:50)  BP(mean): --  RR: 18 (09-19-22 @ 03:50)  SpO2: 97% (09-19-22 @ 03:50)      PHYSICAL EXAM:  Constitutional: NAD, Alert  HEENT: NCAT, DMM  Neck: Supple, No JVD  Respiratory: CTA-b/l  Cardiovascular: reg s1s2  Gastrointestinal: BS+, soft, NT/ND  Extremities: 2+ b/l LE edema  Neurological: no focal deficits; strength grossly intact  Back: no CVAT b/l  Skin: No rashes, no nevi    LABS:                        8.5    6.13  )-----------( 82       ( 19 Sep 2022 04:07 )             27.6     Na(141)/K(4.1)/Cl(109)/HCO3(22)/BUN(47)/Cr(4.63)Glu(107)/Ca(8.2)/Mg(--)/PO4(--)    09-19 @ 04:07  Na(146)/K(4.2)/Cl(112)/HCO3(18)/BUN(47)/Cr(4.56)Glu(104)/Ca(8.4)/Mg(--)/PO4(--)    09-18 @ 05:54  Na(144)/K(4.2)/Cl(111)/HCO3(23)/BUN(53)/Cr(5.12)Glu(96)/Ca(8.2)/Mg(--)/PO4(--)    09-17 @ 05:46      IMPRESSION: 77M w/ mild dementia, AFib, HFpEF, nephrolithiasis, C[facility], and CKD4-5; 9/12/22 presented for scheduled AFib ablation; procedure postponed due to LOUISE    (1)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 GFR baseline ~10-15ml/min.    (2)LOUISE - obstructive nephropathy due to 4mm right-sided stone. Significantly improved from days ago, s/p ureteroscopy/R ureteral stent placement. Creatinine is quite close to baseline at this point.    (3)Lytes - acceptable    (4) - s/p R ureteral stent placement 9/14/22    (5)AFib - potentially for ablation today      RECOMMEND:  (1)PO NaHCO3 as ordered  (2)Low-K diet when taking PO  (3)Dose new meds for GFR <15ml/min  (4)No objection to ablation Monday 9/19/22            Norman Ascencio MD  [facility]  Office: [phone]  Cell: [phone]

## 2022-09-19 NOTE — PROGRESS NOTE ADULT - SUBJECTIVE AND OBJECTIVE BOX
NICOLAS CIFUENTES  77y Male  MRN:[id number]    Patient is a 77y old  Male who presents with a chief complaint of pantera    HPI:  77 year old  male, h/o [place] Sci PPM ([employer] MRI EL Serial # [id number]), HFpEF, pAfib (on Eliquis and Toprol), CKD G4/A3, CLL, IgG monoclonal gammopathy, GERD, hypothyroidism, dementia who c/o increased fatigue and very little energy. His afib burden has increased from 12% in May to 21 % in August and therefore the decision to proceed with ablation was made. Pt presents today for afib ablation.             on w/u pt found to have pantera. therefore ablation was cancelled. now being admitted for pantera                                                                           Cards: Dr Clemente (12 Sep 2022 06:50)      Patient seen and evaluated at bedside. No acute events overnight except as noted.    Interval HPI: no acute events o/n     PAST MEDICAL & SURGICAL HISTORY:  CLL (chronic lymphocytic leukemia)  in remission      Anxiety disorder      GERD (gastroesophageal reflux disease)      Atrial fibrillation      Diverticulitis      Waldenstrom macroglobulinemia      Bradycardia, drug induced      Hypothyroid      Nephrolithiasis      CKD (chronic kidney disease)      Gout      [place] (hard of hearing)      BPH (benign prostatic hyperplasia)      Memory deficit      IgG monoclonal gammopathy      History of macular degeneration      Chronic heart failure with preserved ejection fraction      Meniscus tear  s/p removal of Meniscus 8 months ago      S/P hernia repair  x2      History of laparoscopic cholecystectomy  4/2014      History of cataract surgery, right  IOL      History of appendectomy      History of cardiac pacemaker in situ          REVIEW OF SYSTEMS:  as per hpi     VITALS:   Vital Signs Last 24 Hrs  T(C): 36.7 (19 Sep 2022 03:50), Max: 36.9 (18 Sep 2022 20:57)  T(F): 98 (19 Sep 2022 03:50), Max: 98.5 (18 Sep 2022 20:57)  HR: 86 (19 Sep 2022 03:50) (86 - 96)  BP: 105/66 (19 Sep 2022 03:50) (105/66 - 114/60)  BP(mean): --  RR: 18 (19 Sep 2022 03:50) (18 - 18)  SpO2: 97% (19 Sep 2022 03:50) (95% - 97%)    Parameters below as of 19 Sep 2022 03:50  Patient On (Oxygen Delivery Method): room air            PHYSICAL EXAM:  GENERAL: NAD, well-developed  HEAD:  Atraumatic, Normocephalic  EYES: EOMI, PERRLA, conjunctiva and sclera clear  NECK: Supple, No JVD  CHEST/LUNG: Clear to auscultation bilaterally; No wheeze  HEART: S1, S2; No murmurs, rubs, or gallops  ABDOMEN: Soft, Nontender, Nondistended; Bowel sounds present  EXTREMITIES:  2+ Peripheral Pulses, No clubbing, cyanosis, or edema  PSYCH: Normal affect  NEUROLOGY: AAOX3; non-focal  SKIN: No rashes or lesions    Consultant(s) Notes Reviewed:  [x ] YES  [ ] NO  Care Discussed with Consultants/Other Providers [ x] YES  [ ] NO    MEDS:   MEDICATIONS  (STANDING):  acyclovir   Oral Tab/Cap 400 milliGRAM(s) Oral two times a day  chlorhexidine 2% Cloths 1 Application(s) Topical <User Schedule>  cholecalciferol 2000 Unit(s) Oral daily  clonazePAM  Tablet 1 milliGRAM(s) Oral at bedtime  cyanocobalamin 1000 MICROGram(s) Oral daily  donepezil 10 milliGRAM(s) Oral at bedtime  febuxostat 80 milliGRAM(s) Oral daily  levothyroxine 150 MICROGram(s) Oral daily  melatonin 5 milliGRAM(s) Oral at bedtime  memantine 10 milliGRAM(s) Oral every 12 hours  metoprolol succinate ER 50 milliGRAM(s) Oral <User Schedule>  mirtazapine 30 milliGRAM(s) Oral at bedtime  pantoprazole    Tablet 40 milliGRAM(s) Oral before breakfast  sodium bicarbonate 650 milliGRAM(s) Oral two times a day  sodium chloride 0.9%. 1000 milliLiter(s) (100 mL/Hr) IV Continuous <Continuous>  tamsulosin 0.4 milliGRAM(s) Oral at bedtime    MEDICATIONS  (PRN):  loperamide 2 milliGRAM(s) Oral two times a day PRN Diarrhea  ondansetron Injectable 4 milliGRAM(s) IV Push once PRN Nausea and/or Vomiting  oxycodone    5 mG/acetaminophen 325 mG 2 Tablet(s) Oral every 4 hours PRN Moderate Pain (4 - 6)      ALLERGIES:  rituximab (Angioedema)      LABS:                                                                    8.5    6.13  )-----------( 82       ( 19 Sep 2022 04:07 )             27.6   09-19    141  |  109<H>  |  47<H>  ----------------------------<  107<H>  4.1   |  22  |  4.63<H>    Ca    8.2<L>      19 Sep 2022 04:07               < from: CT Abdomen and Pelvis No Cont (09.12.22 @ 18:12) >  IMPRESSION:  Bilateral urolithiasis with 4 mm obstructive calculus in the distal right   ureter and moderate right hydroureteronephrosis.    Abdominal lymphadenopathy without significant interval change, consistent   with known lymphoproliferative disorder..    < end of copied text >

## 2022-09-19 NOTE — PROGRESS NOTE ADULT - SUBJECTIVE AND OBJECTIVE BOX
Patient seen and examined at bedside.    Overnight Events: No acute events overnight, telemetry with Afib 70s with rates up to 120, V-paced. Denies chest pain or SOB, is eager to have ablation. Cr at 4      REVIEW OF SYSTEMS:  Constitutional:     [ x] negative [ ] fevers [ ] chills [ ] weight loss [ ] weight gain  HEENT:                  [ x] negative [ ] dry eyes [ ] eye irritation [ ] postnasal drip [ ] nasal congestion  CV:                         [x ] negative  [ ] chest pain [ ] orthopnea [ ] palpitations [ ] murmur  Resp:                     [ x] negative [ ] cough [ ] shortness of breath [ ] dyspnea [ ] wheezing [ ] sputum [ ]hemoptysis  GI:                          [ x] negative [ ] nausea [ ] vomiting [ ] diarrhea [ ] constipation [ ] abd pain [ ] dysphagia        [x ] All other systems negative  [ ] Unable to assess ROS due to    Current Meds:  acyclovir   Oral Tab/Cap 400 milliGRAM(s) Oral two times a day  chlorhexidine 2% Cloths 1 Application(s) Topical <User Schedule>  cholecalciferol 2000 Unit(s) Oral daily  clonazePAM  Tablet 1 milliGRAM(s) Oral at bedtime  cyanocobalamin 1000 MICROGram(s) Oral daily  donepezil 10 milliGRAM(s) Oral at bedtime  febuxostat 80 milliGRAM(s) Oral daily  lactobacillus acidophilus 1 Tablet(s) Oral two times a day  levothyroxine 150 MICROGram(s) Oral daily  loperamide 2 milliGRAM(s) Oral two times a day PRN  melatonin 5 milliGRAM(s) Oral at bedtime  memantine 10 milliGRAM(s) Oral every 12 hours  metoprolol succinate ER 50 milliGRAM(s) Oral <User Schedule>  mirtazapine 30 milliGRAM(s) Oral at bedtime  ondansetron Injectable 4 milliGRAM(s) IV Push once PRN  oxycodone    5 mG/acetaminophen 325 mG 2 Tablet(s) Oral every 4 hours PRN  pantoprazole    Tablet 40 milliGRAM(s) Oral before breakfast  sodium bicarbonate 650 milliGRAM(s) Oral two times a day  sodium chloride 0.9%. 1000 milliLiter(s) IV Continuous <Continuous>  tamsulosin 0.4 milliGRAM(s) Oral at bedtime      PAST MEDICAL & SURGICAL HISTORY:  CLL (chronic lymphocytic leukemia)  in remission      Anxiety disorder      GERD (gastroesophageal reflux disease)      Atrial fibrillation      Diverticulitis      Waldenstrom macroglobulinemia      Bradycardia, drug induced      Hypothyroid      Nephrolithiasis      CKD (chronic kidney disease)      Gout      [place] (hard of hearing)      BPH (benign prostatic hyperplasia)      Memory deficit      IgG monoclonal gammopathy      History of macular degeneration      Chronic heart failure with preserved ejection fraction      Meniscus tear  s/p removal of Meniscus 8 months ago      S/P hernia repair  x2      History of laparoscopic cholecystectomy  4/2014      History of cataract surgery, right  IOL      History of appendectomy      History of cardiac pacemaker in situ          Vitals:  T(F): 98 (09-19), Max: 98.5 (09-18)  HR: 86 (09-19) (86 - 96)  BP: 105/66 (09-19) (105/66 - 114/60)  RR: 18 (09-19)  SpO2: 97% (09-19)  I&O's Summary    18 Sep 2022 07:01  -  19 Sep 2022 07:00  --------------------------------------------------------  IN: 240 mL / OUT: 1350 mL / NET: -1110 mL        Physical Exam:  Appearance: No acute distress; well appearing  Eyes: PERRL, EOMI, pink conjunctiva  HENT: Normal oral mucosa  Cardiovascular: irregular, S1, S2, no murmurs, rubs, or gallops; no edema; no JVD  Respiratory: Clear to auscultation bilaterally  Gastrointestinal: soft, non-tender, non-distended with normal bowel sounds  Musculoskeletal: No clubbing; no joint deformity   Neurologic: Non-focal  Lymphatic: No lymphadenopathy  Psychiatry: AAOx3, mood & affect appropriate  Skin: No rashes, ecchymoses, or cyanosis                          8.5    6.13  )-----------( 82       ( 19 Sep 2022 04:07 )             27.6     09-19    141  |  109<H>  |  47<H>  ----------------------------<  107<H>  4.1   |  22  |  4.63<H>    Ca    8.2<L>      19 Sep 2022 04:07      PT/INR - ( 19 Sep 2022 04:07 )   PT: 24.8 sec;   INR: 2.14 ratio          TTE (3/17/21)  Dimensions:    Normal Values:  LA:     5.0    2.0 - 4.0 cm  Ao:            2.0 - 3.8 cm  SEPTUM: 1.1    0.6 - 1.2 cm  PWT:    0.9    0.6 - 1.1 cm  LVIDd:  5.9    3.0 - 5.6 cm  LVIDs:  4.1    1.8 - 4.0 cm  Derived variables:  LVMI: 104 g/m2  RWT: 0.30  EF (Visual Estimate): 60 %  Doppler Peak Velocity (m/sec): AoV=2.5 TV=3.1  ------------------------------------------------------------------------  Observations:  Mitral Valve: Normal mitral valve.  Aortic Valve/Aorta: Fibrocalcific aortic valve without  stenosis.  Normal aortic root size.  Left Atrium: Mildly dilated left atrium.  Left Ventricle: Normal left ventricular internal dimensions  and wall thicknesses.  Normal left ventricular systolic function. No segmental  wall motion abnormalities.  Normal diastolic function.  Right Heart: Normal right atrium. Normal right ventricular  size and function.  Normal tricuspid valve. Mild tricuspid regurgitation.  Normal pulmonic valve.  Pericardium/Pleura: Normal pericardium with no pericardial  effusion.  Hemodynamic: Estimated right atrial pressure is mildly  elevated.  Mild-moderate pulmonary hypertension. Estimated PASP 45  mmHg.  ------------------------------------------------------------------------  Conclusions:  Technically difficult study.  Normal left ventricular systolic function. No segmental  wall motion abnormalities.  Mild-moderate pulmonary hypertension.               Patient seen and examined at bedside.    Overnight Events: No acute events overnight, telemetry with Afib 70s with rates up to 120, V-paced on demand. Denies chest pain or SOB, is eager to have ablation. Cr at 4    REVIEW OF SYSTEMS:  Constitutional:     [ x] negative [ ] fevers [ ] chills [ ] weight loss [ ] weight gain  HEENT:                  [ x] negative [ ] dry eyes [ ] eye irritation [ ] postnasal drip [ ] nasal congestion  CV:                         [x ] negative  [ ] chest pain [ ] orthopnea [ ] palpitations [ ] murmur  Resp:                     [ x] negative [ ] cough [ ] shortness of breath [ ] dyspnea [ ] wheezing [ ] sputum [ ]hemoptysis  GI:                          [ x] negative [ ] nausea [ ] vomiting [ ] diarrhea [ ] constipation [ ] abd pain [ ] dysphagia      [x ] All other systems negative  [ ] Unable to assess ROS due to    Current Meds:  acyclovir   Oral Tab/Cap 400 milliGRAM(s) Oral two times a day  chlorhexidine 2% Cloths 1 Application(s) Topical <User Schedule>  cholecalciferol 2000 Unit(s) Oral daily  clonazePAM  Tablet 1 milliGRAM(s) Oral at bedtime  cyanocobalamin 1000 MICROGram(s) Oral daily  donepezil 10 milliGRAM(s) Oral at bedtime  febuxostat 80 milliGRAM(s) Oral daily  lactobacillus acidophilus 1 Tablet(s) Oral two times a day  levothyroxine 150 MICROGram(s) Oral daily  loperamide 2 milliGRAM(s) Oral two times a day PRN  melatonin 5 milliGRAM(s) Oral at bedtime  memantine 10 milliGRAM(s) Oral every 12 hours  metoprolol succinate ER 50 milliGRAM(s) Oral <User Schedule>  mirtazapine 30 milliGRAM(s) Oral at bedtime  ondansetron Injectable 4 milliGRAM(s) IV Push once PRN  oxycodone    5 mG/acetaminophen 325 mG 2 Tablet(s) Oral every 4 hours PRN  pantoprazole    Tablet 40 milliGRAM(s) Oral before breakfast  sodium bicarbonate 650 milliGRAM(s) Oral two times a day  sodium chloride 0.9%. 1000 milliLiter(s) IV Continuous <Continuous>  tamsulosin 0.4 milliGRAM(s) Oral at bedtime    PAST MEDICAL & SURGICAL HISTORY:  CLL (chronic lymphocytic leukemia)  in remission  Anxiety disorder  GERD (gastroesophageal reflux disease)  Atrial fibrillation  Diverticulitis  Waldenstrom macroglobulinemia  Bradycardia, drug induced  Hypothyroid  Nephrolithiasis  CKD (chronic kidney disease)  Gout  [place] (hard of hearing)  BPH (benign prostatic hyperplasia)  Memory deficit  IgG monoclonal gammopathy  History of macular degeneration  Chronic heart failure with preserved ejection fraction  Meniscus tear  s/p removal of Meniscus 8 months ago  S/P hernia repair  x2  History of laparoscopic cholecystectomy  4/2014  History of cataract surgery, right  IOL  History of appendectomy  History of cardiac pacemaker in situ ([employer])    Vitals:  T(F): 98 (09-19), Max: 98.5 (09-18)  HR: 86 (09-19) (86 - 96)  BP: 105/66 (09-19) (105/66 - 114/60)  RR: 18 (09-19)  SpO2: 97% (09-19)    I&O's Summary    18 Sep 2022 07:01  -  19 Sep 2022 07:00  --------------------------------------------------------  IN: 240 mL / OUT: 1350 mL / NET: -1110 mL    Physical Exam:  Appearance: No acute distress; well appearing  Eyes: PERRL, EOMI, pink conjunctiva  HENT: Normal oral mucosa  Cardiovascular: irregular, S1, S2, no murmurs, rubs, or gallops; no edema; no JVD  Respiratory: Clear to auscultation bilaterally  Gastrointestinal: soft, non-tender, non-distended with normal bowel sounds  Musculoskeletal: No clubbing; no joint deformity   Neurologic: Non-focal  Lymphatic: No lymphadenopathy  Psychiatry: AAOx3, mood & affect appropriate  Skin: No rashes, ecchymoses, or cyanosis                          8.5    6.13  )-----------( 82       ( 19 Sep 2022 04:07 )             27.6     09-19    141  |  109<H>  |  47<H>  ----------------------------<  107<H>  4.1   |  22  |  4.63<H>    Ca    8.2<L>      19 Sep 2022 04:07    PT/INR - ( 19 Sep 2022 04:07 )   PT: 24.8 sec;   INR: 2.14 ratio      TTE (3/17/21)  Dimensions:    Normal Values:  LA:     5.0    2.0 - 4.0 cm  Ao:            2.0 - 3.8 cm  SEPTUM: 1.1    0.6 - 1.2 cm  PWT:    0.9    0.6 - 1.1 cm  LVIDd:  5.9    3.0 - 5.6 cm  LVIDs:  4.1    1.8 - 4.0 cm  Derived variables:  LVMI: 104 g/m2  RWT: 0.30  EF (Visual Estimate): 60 %  Doppler Peak Velocity (m/sec): AoV=2.5 TV=3.1  ------------------------------------------------------------------------  Observations: Mitral Valve: Normal mitral valve. Aortic Valve/Aorta: Fibrocalcific aortic valve without stenosis. Normal aortic root size. Left Atrium: Mildly dilated left atrium. Left Ventricle: Normal left ventricular internal dimensions and wall thicknesses. Normal left ventricular systolic function. No segmental wall motion abnormalities. Normal diastolic function. Right Heart: Normal right atrium. Normal right ventricular size and function. Normal tricuspid valve. Mild tricuspid regurgitation. Normal pulmonic valve. Pericardium/Pleura Conclusions: Technically difficult study. Normal left ventricular systolic function. No segmental wall motion abnormalities. Mild-moderate pulmonary hypertension.

## 2022-09-20 PROBLEM — I50.32 CHRONIC DIASTOLIC (CONGESTIVE) HEART FAILURE: Chronic | Status: ACTIVE | Noted: 2022-09-12

## 2022-09-20 PROBLEM — Z86.69 PERSONAL HISTORY OF OTHER DISEASES OF THE NERVOUS SYSTEM AND SENSE ORGANS: Chronic | Status: ACTIVE | Noted: 2022-09-12

## 2022-09-20 PROBLEM — D47.2 MONOCLONAL GAMMOPATHY: Chronic | Status: ACTIVE | Noted: 2022-09-12

## 2022-09-20 LAB
ANION GAP SERPL CALC-SCNC: 12 MMOL/L — SIGNIFICANT CHANGE UP (ref 5–17)
ANION GAP SERPL CALC-SCNC: 9 MMOL/L — SIGNIFICANT CHANGE UP (ref 5–17)
BUN SERPL-MCNC: 49 MG/DL — HIGH (ref 7–23)
BUN SERPL-MCNC: 53 MG/DL — HIGH (ref 7–23)
CALCIUM SERPL-MCNC: 7.7 MG/DL — LOW (ref 8.4–10.5)
CALCIUM SERPL-MCNC: 7.8 MG/DL — LOW (ref 8.4–10.5)
CHLORIDE SERPL-SCNC: 111 MMOL/L — HIGH (ref 96–108)
CHLORIDE SERPL-SCNC: 112 MMOL/L — HIGH (ref 96–108)
CO2 SERPL-SCNC: 18 MMOL/L — LOW (ref 22–31)
CO2 SERPL-SCNC: 21 MMOL/L — LOW (ref 22–31)
CREAT SERPL-MCNC: 4.25 MG/DL — HIGH (ref 0.5–1.3)
CREAT SERPL-MCNC: 4.35 MG/DL — HIGH (ref 0.5–1.3)
EGFR: 13 ML/MIN/1.73M2 — LOW
EGFR: 14 ML/MIN/1.73M2 — LOW
GLUCOSE SERPL-MCNC: 117 MG/DL — HIGH (ref 70–99)
GLUCOSE SERPL-MCNC: 198 MG/DL — HIGH (ref 70–99)
HCT VFR BLD CALC: 26.2 % — LOW (ref 39–50)
HCT VFR BLD CALC: 27.1 % — LOW (ref 39–50)
HGB BLD-MCNC: 7.9 G/DL — LOW (ref 13–17)
HGB BLD-MCNC: 8.1 G/DL — LOW (ref 13–17)
MAGNESIUM SERPL-MCNC: 1.3 MG/DL — LOW (ref 1.6–2.6)
MCHC RBC-ENTMCNC: 29.6 PG — SIGNIFICANT CHANGE UP (ref 27–34)
MCHC RBC-ENTMCNC: 29.8 PG — SIGNIFICANT CHANGE UP (ref 27–34)
MCHC RBC-ENTMCNC: 29.9 GM/DL — LOW (ref 32–36)
MCHC RBC-ENTMCNC: 30.2 GM/DL — LOW (ref 32–36)
MCV RBC AUTO: 98.9 FL — SIGNIFICANT CHANGE UP (ref 80–100)
MCV RBC AUTO: 98.9 FL — SIGNIFICANT CHANGE UP (ref 80–100)
NRBC # BLD: 0 /100 WBCS — SIGNIFICANT CHANGE UP (ref 0–0)
NRBC # BLD: 0 /100 WBCS — SIGNIFICANT CHANGE UP (ref 0–0)
PLATELET # BLD AUTO: 57 K/UL — LOW (ref 150–400)
PLATELET # BLD AUTO: 82 K/UL — LOW (ref 150–400)
POTASSIUM SERPL-MCNC: 5 MMOL/L — SIGNIFICANT CHANGE UP (ref 3.5–5.3)
POTASSIUM SERPL-MCNC: 5.2 MMOL/L — SIGNIFICANT CHANGE UP (ref 3.5–5.3)
POTASSIUM SERPL-SCNC: 5 MMOL/L — SIGNIFICANT CHANGE UP (ref 3.5–5.3)
POTASSIUM SERPL-SCNC: 5.2 MMOL/L — SIGNIFICANT CHANGE UP (ref 3.5–5.3)
RBC # BLD: 2.65 M/UL — LOW (ref 4.2–5.8)
RBC # BLD: 2.74 M/UL — LOW (ref 4.2–5.8)
RBC # FLD: 13.7 % — SIGNIFICANT CHANGE UP (ref 10.3–14.5)
RBC # FLD: 13.9 % — SIGNIFICANT CHANGE UP (ref 10.3–14.5)
SODIUM SERPL-SCNC: 141 MMOL/L — SIGNIFICANT CHANGE UP (ref 135–145)
SODIUM SERPL-SCNC: 142 MMOL/L — SIGNIFICANT CHANGE UP (ref 135–145)
WBC # BLD: 3.88 K/UL — SIGNIFICANT CHANGE UP (ref 3.8–10.5)
WBC # BLD: 5.29 K/UL — SIGNIFICANT CHANGE UP (ref 3.8–10.5)
WBC # FLD AUTO: 3.88 K/UL — SIGNIFICANT CHANGE UP (ref 3.8–10.5)
WBC # FLD AUTO: 5.29 K/UL — SIGNIFICANT CHANGE UP (ref 3.8–10.5)

## 2022-09-20 PROCEDURE — 99232 SBSQ HOSP IP/OBS MODERATE 35: CPT | Mod: GC

## 2022-09-20 RX ORDER — MAGNESIUM SULFATE 500 MG/ML
2 VIAL (ML) INJECTION ONCE
Refills: 0 | Status: COMPLETED | OUTPATIENT
Start: 2022-09-20 | End: 2022-09-20

## 2022-09-20 RX ADMIN — PANTOPRAZOLE SODIUM 40 MILLIGRAM(S): 20 TABLET, DELAYED RELEASE ORAL at 05:02

## 2022-09-20 RX ADMIN — APIXABAN 5 MILLIGRAM(S): 2.5 TABLET, FILM COATED ORAL at 23:27

## 2022-09-20 RX ADMIN — PANTOPRAZOLE SODIUM 40 MILLIGRAM(S): 20 TABLET, DELAYED RELEASE ORAL at 17:26

## 2022-09-20 RX ADMIN — PREGABALIN 1000 MICROGRAM(S): 225 CAPSULE ORAL at 11:03

## 2022-09-20 RX ADMIN — MEMANTINE HYDROCHLORIDE 10 MILLIGRAM(S): 10 TABLET ORAL at 22:27

## 2022-09-20 RX ADMIN — MEMANTINE HYDROCHLORIDE 10 MILLIGRAM(S): 10 TABLET ORAL at 11:04

## 2022-09-20 RX ADMIN — CHLORHEXIDINE GLUCONATE 1 APPLICATION(S): 213 SOLUTION TOPICAL at 04:46

## 2022-09-20 RX ADMIN — FEBUXOSTAT 80 MILLIGRAM(S): 40 TABLET ORAL at 11:03

## 2022-09-20 RX ADMIN — Medication 400 MILLIGRAM(S): at 22:25

## 2022-09-20 RX ADMIN — DONEPEZIL HYDROCHLORIDE 10 MILLIGRAM(S): 10 TABLET, FILM COATED ORAL at 22:25

## 2022-09-20 RX ADMIN — Medication 150 MICROGRAM(S): at 05:03

## 2022-09-20 RX ADMIN — Medication 2000 UNIT(S): at 11:06

## 2022-09-20 RX ADMIN — Medication 1 TABLET(S): at 22:26

## 2022-09-20 RX ADMIN — Medication 25 GRAM(S): at 10:24

## 2022-09-20 RX ADMIN — Medication 5 MILLIGRAM(S): at 22:26

## 2022-09-20 RX ADMIN — Medication 650 MILLIGRAM(S): at 22:27

## 2022-09-20 RX ADMIN — Medication 650 MILLIGRAM(S): at 11:03

## 2022-09-20 RX ADMIN — MIRTAZAPINE 30 MILLIGRAM(S): 45 TABLET, ORALLY DISINTEGRATING ORAL at 22:27

## 2022-09-20 RX ADMIN — APIXABAN 5 MILLIGRAM(S): 2.5 TABLET, FILM COATED ORAL at 11:05

## 2022-09-20 RX ADMIN — TAMSULOSIN HYDROCHLORIDE 0.4 MILLIGRAM(S): 0.4 CAPSULE ORAL at 22:27

## 2022-09-20 RX ADMIN — Medication 400 MILLIGRAM(S): at 11:04

## 2022-09-20 RX ADMIN — Medication 1 TABLET(S): at 11:03

## 2022-09-20 RX ADMIN — Medication 50 MILLIGRAM(S): at 11:04

## 2022-09-20 RX ADMIN — Medication 50 MILLIGRAM(S): at 22:27

## 2022-09-20 RX ADMIN — Medication 1 MILLIGRAM(S): at 22:25

## 2022-09-20 NOTE — PROGRESS NOTE ADULT - ASSESSMENT
77 year old  male, h/o [place] Sci PPM ([employer] EL Serial # [id number]), HFpEF, pAfib (on Eliquis and Toprol), CKD G4/A3, CLL, IgG monoclonal gammopathy, GERD, hypothyroidism, dementia who c/o increased fatigue and very little energy. His afib burden has increased from 12% in May to 21 % in August and therefore the decision to proceed with ablation was made. Pt presents 9/12 for afib ablation however postponed due to LOUISE on CKD found to be obstructive in etiology. CT with 4mm obstructive calculus in right ureter. S/p ureteral stent. Cr improving and closer to baseline 9/19, s/p AF ablation 9/19 and now in SR.    1. AF  2. CKD with LOUISE    - AF rates 70-100s, pt has been in AF since brought to 4Mon; now s/p successful Afib ablation with Dr Raza 9/19 and now in SR   - Continue AC with Eliquis 5mg BID   - Decrease Toprol  to 50 mg bid  - Close telemetry monitoring   - Keep K>4, Mg>2 77 year old  male, h/o SND (s/p 2-ch BSc PPM), chronic HFpEF, pAfib (on Eliquis and Toprol), CKD Stage IV-V, CLL, IgG monoclonal gammopathy, GERD, hypothyroidism and mild cognitive impairment who c/o increased fatigue and very little energy. His AF burden had increased from 12% in May to 21% in August. A decision was made to proceed with ablation. He presented on 9/12 for an AF ablation however the case was postponed due to LOUISE on CKD -  obstructive in etiology. CT with 4mm obstructive calculus in right ureter - s/p ureteral stent. His Cr was at baseline on 9/19 and as such he underwent a catheter ablation of AF. His AF organized into typical, CTI-dependent atrial flutter while ablating around the left PVs. He had a successful CTI and PVI.     1. Paroxysmal AF  2. CKD with LOUISE    - Continue AC with Eliquis 5mg BID (although Cr > 1.5, Age < 80 and weight > 60kg - 5mg is the correct dose for this patient)  - Continue with Toprol XL 50mg p.o. bid  - Continue with Protonix 40mg p.o. bid for 6 weeks  - Outpatient follow-up.

## 2022-09-20 NOTE — PROGRESS NOTE ADULT - SUBJECTIVE AND OBJECTIVE BOX
Overnight events noted      VITAL:  T(C): , Max: 36.7 (09-19-22 @ 18:00)  T(F): , Max: 98 (09-19-22 @ 18:00)  HR: 86 (09-20-22 @ 08:37)  BP: 119/70 (09-20-22 @ 08:37)  BP(mean): 71 (09-20-22 @ 04:43)  RR: 18 (09-20-22 @ 08:37)  SpO2: 97% (09-20-22 @ 08:37)      PHYSICAL EXAM:  Constitutional: NAD, Alert  HEENT: NCAT, DMM  Neck: Supple, No JVD  Respiratory: CTA-b/l  Cardiovascular: reg s1s2  Gastrointestinal: BS+, soft, NT/ND  Extremities: 2+ b/l LE edema  Neurological: no focal deficits; strength grossly intact  Back: no CVAT b/l  Skin: No rashes, no nevi      LABS:                        7.9    3.88  )-----------( 57       ( 20 Sep 2022 01:11 )             26.2     Na(142)/K(5.2)/Cl(112)/HCO3(18)/BUN(49)/Cr(4.25)Glu(198)/Ca(7.7)/Mg(1.3)/PO4(--)    09-20 @ 01:11  Na(141)/K(4.1)/Cl(109)/HCO3(22)/BUN(47)/Cr(4.63)Glu(107)/Ca(8.2)/Mg(--)/PO4(--)    09-19 @ 04:07  Na(146)/K(4.2)/Cl(112)/HCO3(18)/BUN(47)/Cr(4.56)Glu(104)/Ca(8.4)/Mg(--)/PO4(--)    09-18 @ 05:54      IMPRESSION: 77M w/ mild dementia, AFib, HFpEF, nephrolithiasis, [facility], and CKD4-5; 9/12/22 presented for scheduled AFib ablation; procedure postponed due to LOUISE    (1)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 GFR baseline ~10-15ml/min.    (2)LOUISE - obstructive nephropathy due to 4mm right-sided stone. Significantly improved from days ago, s/p ureteroscopy/R ureteral stent placement.     (3)Hypomagnesemia    (4)Metabolic acidosis - HCO3 mildly low but acceptable for now, on standing PO NaHCO3 tabs    (5)Hyperkalemia - borderline- acceptable for now, on low-K diet    (6) - s/p R ureteral stent placement 9/14/22    (7)AFib - s/p AFib ablation 9/19      RECOMMEND:  (1)MgSO4 2gm iv x 1  (2)PO NaHCO3 as ordered  (3)Low-K diet as ordered  (4)No renal objection to discharge, with f/u at my office Thursday 10/6/22                  Norman Ascencio MD  [facility]  Office: [phone]  Cell: [phone]       no pain, no sob      VITAL:  T(C): , Max: 36.7 (09-19-22 @ 18:00)  T(F): , Max: 98 (09-19-22 @ 18:00)  HR: 86 (09-20-22 @ 08:37)  BP: 119/70 (09-20-22 @ 08:37)  BP(mean): 71 (09-20-22 @ 04:43)  RR: 18 (09-20-22 @ 08:37)  SpO2: 97% (09-20-22 @ 08:37)      PHYSICAL EXAM:  Constitutional: NAD, Alert  HEENT: NCAT, DMM  Neck: Supple, No JVD  Respiratory: CTA-b/l  Cardiovascular: reg s1s2  Gastrointestinal: BS+, soft, NT/ND  Extremities: 2+ b/l LE edema  Neurological: no focal deficits; strength grossly intact  Back: no CVAT b/l  Skin: No rashes, no nevi      LABS:                        7.9    3.88  )-----------( 57       ( 20 Sep 2022 01:11 )             26.2     Na(142)/K(5.2)/Cl(112)/HCO3(18)/BUN(49)/Cr(4.25)Glu(198)/Ca(7.7)/Mg(1.3)/PO4(--)    09-20 @ 01:11  Na(141)/K(4.1)/Cl(109)/HCO3(22)/BUN(47)/Cr(4.63)Glu(107)/Ca(8.2)/Mg(--)/PO4(--)    09-19 @ 04:07  Na(146)/K(4.2)/Cl(112)/HCO3(18)/BUN(47)/Cr(4.56)Glu(104)/Ca(8.4)/Mg(--)/PO4(--)    09-18 @ 05:54      IMPRESSION: 77M w/ mild dementia, AFib, HFpEF, nephrolithiasis, CLL/Waldenstrom's on [place], and CKD4-5; 9/12/22 presented for scheduled AFib ablation; procedure postponed due to LOUISE    (1)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 GFR baseline ~10-15ml/min.    (2)LOUISE - obstructive nephropathy due to 4mm right-sided stone. Significantly improved from days ago, s/p ureteroscopy/R ureteral stent placement.     (3)Hypomagnesemia    (4)Metabolic acidosis - HCO3 mildly low but acceptable for now, on standing PO NaHCO3 tabs    (5)Hyperkalemia - borderline- acceptable for now, on low-K diet    (6) - s/p R ureteral stent placement 9/14/22    (7)AFib - s/p AFib ablation 9/19      RECOMMEND:  (1)MgSO4 2gm iv x 1  (2)PO NaHCO3 as ordered  (3)Low-K diet as ordered  (4)No renal objection to discharge, with f/u at my office Thursday 10/6/22        Norman Ascencio MD  [facility]  Office: [phone]  Cell: [phone]

## 2022-09-20 NOTE — PROGRESS NOTE ADULT - SUBJECTIVE AND OBJECTIVE BOX
INTERVAL HPI/OVERNIGHT EVENTS:  s/p ablation yesterday  1 BM yesterday - soft  no abdominal pain, nausea vomiting or diarrhea    no CP or SOB  no fever or chills    MEDICATIONS  (STANDING):  acyclovir   Oral Tab/Cap 400 milliGRAM(s) Oral two times a day  apixaban 5 milliGRAM(s) Oral every 12 hours  chlorhexidine 2% Cloths 1 Application(s) Topical <User Schedule>  cholecalciferol 2000 Unit(s) Oral daily  clonazePAM  Tablet 1 milliGRAM(s) Oral at bedtime  cyanocobalamin 1000 MICROGram(s) Oral daily  donepezil 10 milliGRAM(s) Oral at bedtime  febuxostat 80 milliGRAM(s) Oral daily  lactobacillus acidophilus 1 Tablet(s) Oral two times a day  levothyroxine 150 MICROGram(s) Oral daily  melatonin 5 milliGRAM(s) Oral at bedtime  memantine 10 milliGRAM(s) Oral every 12 hours  metoprolol succinate ER 50 milliGRAM(s) Oral every 12 hours  mirtazapine 30 milliGRAM(s) Oral at bedtime  pantoprazole    Tablet 40 milliGRAM(s) Oral every 12 hours  sodium bicarbonate 650 milliGRAM(s) Oral two times a day  sodium chloride 0.9%. 1000 milliLiter(s) (100 mL/Hr) IV Continuous <Continuous>  tamsulosin 0.4 milliGRAM(s) Oral at bedtime    MEDICATIONS  (PRN):  acetaminophen     Tablet .. 650 milliGRAM(s) Oral every 6 hours PRN Mild Pain (1 - 3)  loperamide 2 milliGRAM(s) Oral two times a day PRN Diarrhea  ondansetron Injectable 4 milliGRAM(s) IV Push once PRN Nausea and/or Vomiting  oxycodone    5 mG/acetaminophen 325 mG 2 Tablet(s) Oral every 4 hours PRN Moderate Pain (4 - 6)      Allergies  rituximab (Angioedema)      Review of Systems:  As per HPI, remainder of 14 point ROS negative      Vital Signs Last 24 Hrs  T(C): 36.6 (20 Sep 2022 10:45), Max: 36.7 (19 Sep 2022 18:00)  T(F): 97.9 (20 Sep 2022 10:45), Max: 98 (19 Sep 2022 18:00)  HR: 74 (20 Sep 2022 10:45) (56 - 90)  BP: 125/70 (20 Sep 2022 10:45) (103/55 - 134/63)  BP(mean): 71 (20 Sep 2022 04:43) (70 - 92)  RR: 18 (20 Sep 2022 10:45) (13 - 20)  SpO2: 97% (20 Sep 2022 10:45) (66% - 99%)    Parameters below as of 20 Sep 2022 10:45  Patient On (Oxygen Delivery Method): room air    PHYSICAL EXAM:  Constitutional: NAD, well-developed lying in bed  [place]    Neck: No LAD, supple  Respiratory: PPM site clean, dry   grossly clear, no accessory muscle use  Cardiovascular: S1 and S2, reg  Gastrointestinal: BS+, obese soft, NT/ND, WH surgical scar  Extremities: No peripheral edema, neg clubbing, cyanosis  Vascular: 2+ peripheral pulses  Neurological: A/O x 3, no focal deficits +[place]  Psychiatric: Normal mood, normal affect  Skin: No rashes, anicteric      LABS:                        7.9    3.88  )-----------( 57       ( 20 Sep 2022 01:11 )             26.2   Hemoglobin: 8.5 g/dL (09.19.22 @ 04:07)   Hemoglobin: 9.1 g/dL (09.18.22 @ 05:54)   Hemoglobin: 8.4 g/dL (09.17.22 @ 05:46)     09-20    142  |  112<H>  |  49<H>  ----------------------------<  198<H>  5.2   |  18<L>  |  4.25<H>    Ca    7.7<L>      20 Sep 2022 01:11  Mg     1.3     09-20      PT/INR - ( 19 Sep 2022 04:07 )   PT: 24.8 sec;   INR: 2.14 ratio                 RADIOLOGY & ADDITIONAL TESTS:

## 2022-09-20 NOTE — PROGRESS NOTE ADULT - SUBJECTIVE AND OBJECTIVE BOX
NICOLAS CIFUENTES  77y Male  MRN:[id number]    Patient is a 77y old  Male who presents with a chief complaint of pantera    HPI:  77 year old  male, h/o [place] Sci PPM ([employer] MRI EL Serial # [id number]), HFpEF, pAfib (on Eliquis and Toprol), CKD G4/A3, CLL, IgG monoclonal gammopathy, GERD, hypothyroidism, dementia who c/o increased fatigue and very little energy. His afib burden has increased from 12% in May to 21 % in August and therefore the decision to proceed with ablation was made. Pt presents today for afib ablation.             on w/u pt found to have pantera. therefore ablation was cancelled. now being admitted for pantera                                                                           Cards: Dr Clemente (12 Sep 2022 06:50)      Patient seen and evaluated at bedside. No acute events overnight except as noted.    Interval HPI: s/p ablation yesterday    PAST MEDICAL & SURGICAL HISTORY:  CLL (chronic lymphocytic leukemia)  in remission      Anxiety disorder      GERD (gastroesophageal reflux disease)      Atrial fibrillation      Diverticulitis      Waldenstrom macroglobulinemia      Bradycardia, drug induced      Hypothyroid      Nephrolithiasis      CKD (chronic kidney disease)      Gout      [place] (hard of hearing)      BPH (benign prostatic hyperplasia)      Memory deficit      IgG monoclonal gammopathy      History of macular degeneration      Chronic heart failure with preserved ejection fraction      Meniscus tear  s/p removal of Meniscus 8 months ago      S/P hernia repair  x2      History of laparoscopic cholecystectomy  4/2014      History of cataract surgery, right  IOL      History of appendectomy      History of cardiac pacemaker in situ          REVIEW OF SYSTEMS:  as per hpi     VITALS:   Vital Signs Last 24 Hrs  T(C): 36.6 (20 Sep 2022 10:45), Max: 36.7 (19 Sep 2022 18:00)  T(F): 97.9 (20 Sep 2022 10:45), Max: 98 (19 Sep 2022 18:00)  HR: 74 (20 Sep 2022 10:45) (56 - 90)  BP: 125/70 (20 Sep 2022 10:45) (103/55 - 134/63)  BP(mean): 71 (20 Sep 2022 04:43) (70 - 92)  RR: 18 (20 Sep 2022 10:45) (13 - 20)  SpO2: 97% (20 Sep 2022 10:45) (66% - 99%)    Parameters below as of 20 Sep 2022 10:45  Patient On (Oxygen Delivery Method): room air              PHYSICAL EXAM:  GENERAL: NAD, well-developed  HEAD:  Atraumatic, Normocephalic  EYES: EOMI, PERRLA, conjunctiva and sclera clear  NECK: Supple, No JVD  CHEST/LUNG: Clear to auscultation bilaterally; No wheeze  HEART: S1, S2; No murmurs, rubs, or gallops  ABDOMEN: Soft, Nontender, Nondistended; Bowel sounds present  EXTREMITIES:  2+ Peripheral Pulses, No clubbing, cyanosis, or edema  PSYCH: Normal affect  NEUROLOGY: AAOX3; non-focal  SKIN: No rashes or lesions    Consultant(s) Notes Reviewed:  [x ] YES  [ ] NO  Care Discussed with Consultants/Other Providers [ x] YES  [ ] NO    MEDS:   MEDICATIONS  (STANDING):  acyclovir   Oral Tab/Cap 400 milliGRAM(s) Oral two times a day  apixaban 5 milliGRAM(s) Oral every 12 hours  chlorhexidine 2% Cloths 1 Application(s) Topical <User Schedule>  cholecalciferol 2000 Unit(s) Oral daily  clonazePAM  Tablet 1 milliGRAM(s) Oral at bedtime  cyanocobalamin 1000 MICROGram(s) Oral daily  donepezil 10 milliGRAM(s) Oral at bedtime  febuxostat 80 milliGRAM(s) Oral daily  lactobacillus acidophilus 1 Tablet(s) Oral two times a day  levothyroxine 150 MICROGram(s) Oral daily  melatonin 5 milliGRAM(s) Oral at bedtime  memantine 10 milliGRAM(s) Oral every 12 hours  metoprolol succinate ER 50 milliGRAM(s) Oral every 12 hours  mirtazapine 30 milliGRAM(s) Oral at bedtime  pantoprazole    Tablet 40 milliGRAM(s) Oral every 12 hours  sodium bicarbonate 650 milliGRAM(s) Oral two times a day  sodium chloride 0.9%. 1000 milliLiter(s) (100 mL/Hr) IV Continuous <Continuous>  tamsulosin 0.4 milliGRAM(s) Oral at bedtime    MEDICATIONS  (PRN):  acetaminophen     Tablet .. 650 milliGRAM(s) Oral every 6 hours PRN Mild Pain (1 - 3)  loperamide 2 milliGRAM(s) Oral two times a day PRN Diarrhea  ondansetron Injectable 4 milliGRAM(s) IV Push once PRN Nausea and/or Vomiting  oxycodone    5 mG/acetaminophen 325 mG 2 Tablet(s) Oral every 4 hours PRN Moderate Pain (4 - 6)      ALLERGIES:  rituximab (Angioedema)      LABS:                                       7.9    3.88  )-----------( 57       ( 20 Sep 2022 01:11 )             26.2   09-20    142  |  112<H>  |  49<H>  ----------------------------<  198<H>  5.2   |  18<L>  |  4.25<H>    Ca    7.7<L>      20 Sep 2022 01:11  Mg     1.3     09-20                 < from: CT Abdomen and Pelvis No Cont (09.12.22 @ 18:12) >  IMPRESSION:  Bilateral urolithiasis with 4 mm obstructive calculus in the distal right   ureter and moderate right hydroureteronephrosis.    Abdominal lymphadenopathy without significant interval change, consistent   with known lymphoproliferative disorder..    < end of copied text >

## 2022-09-20 NOTE — PROGRESS NOTE ADULT - ASSESSMENT
77 year old  male, h/o [place] Sci PPM ([employer] MRI EL Serial # [id number]), HFpEF, pAfib (on Eliquis and Toprol), CKD G4/A3, CLL, IgG monoclonal gammopathy, GERD, hypothyroidism, dementia who c/o increased fatigue and very little energy. His afib burden has increased from 12% in May to 21 % in August and therefore the decision to proceed with ablation was made. Noted to have LOUISE and scheduled ablation on 9/12/22 was postponed 2/2 LOUISE on CKD  There was no improvement of LOUISE despite IV fluids, and a CT abd/pelvis was subsequently obtained, which showed a 4 mm obstructive calculus in the distal right ureter and moderate right hydroureteronephrosis.  s/p cystoscopy with ureteral stent placement 9/14/22    GI asked to evaluate for reports of loose stools: pt states intermittent "mushy" stools without blood ~2/day - clinically improved    Clinically without diarrhea and no colitis noted on CT 9/12 (though without contrast)  -monitor BMs; if develops loose/watery stools then can send GI PCR  -ok for PO diet as tolerated   -probiotic (Bacid) BID  -no plans/role for endoscopic evaluation    #Anemia, noted drop in Hgb post-procedure (Ablation). without overt/brisk GI bleeding  -monitor CBC and BMs    Discussed with Medicine attending  Discussed with pt, all questions answered    Abdullahi Goncalves PA-C    [facility]  [phone]  After hours and weekend coverage [phone]

## 2022-09-20 NOTE — PROGRESS NOTE ADULT - ATTENDING COMMENTS
76 yo male with CKD, CLL, Waldenstrom's macroglobulinemia, Afib (on Eliquis and Toprol), HFpEF, PPM placed, and hypothyroidism who presented for an elective ablation for Afib burden/associated fatigue, which was postponed due to non-improving LOUISE and worsened Cr compared to baseline found to have a 4mm obstructive calculus in the distal right ureter.    - Patient will likely need ureteral stent for 4 mm R distal ureteral stone with moderate hydronephrosis  - Added on for right stent today   - Please document clearance given his cardiac comorbidities  - f/up urine clx  - consent in chart   - keep NPO  -  I have seen Mr Henson with my residents and agree with plan
Patient seen and examined on 4 Jovi this morning. His right common femoral access sites were C/D/I. He is maintaining sinus rhythm following a successful PVI and CTI on 9/19. No further workup from an EP perspective as an inpatient. Continue with Eliquis 5mg p.o. bid (this is the correct dose for this patient), Toprol XL 50mg p.o. bid and Protonix 40mg p.o. bid (needs bid dosing for Protonix for 6 weeks post-AF ablation).    SAMANTHA Raza
Patient seen at bedside on 4 Jovi. We discussed the risks / benefits / alternatives to a catheter ablation of atrial fibrillation (including but not limited to bleeding, infection, VTE, CVA, MI, systemic embolism, esophageal injury, phrenic nerve paralysis, PV stenosis, perforation). Consent signed and in the front of the chart.    SAMANTHA Raza

## 2022-09-20 NOTE — PROGRESS NOTE ADULT - SUBJECTIVE AND OBJECTIVE BOX
Patient seen and examined at bedside.    Overnight Events: No acute events overnight. Denies chest pain or SOB      REVIEW OF SYSTEMS:  Constitutional:     [ x] negative [ ] fevers [ ] chills [ ] weight loss [ ] weight gain  HEENT:                  [ x] negative [ ] dry eyes [ ] eye irritation [ ] postnasal drip [ ] nasal congestion  CV:                         [x ] negative  [ ] chest pain [ ] orthopnea [ ] palpitations [ ] murmur  Resp:                     [ x] negative [ ] cough [ ] shortness of breath [ ] dyspnea [ ] wheezing [ ] sputum [ ]hemoptysis  GI:                          [ x] negative [ ] nausea [ ] vomiting [ ] diarrhea [ ] constipation [ ] abd pain [ ] dysphagia   :                        [ x] negative [ ] dysuria [ ] nocturia [ ] hematuria [ ] increased urinary frequency  Musculoskeletal: [ x] negative [ ] back pain [ ] myalgias [ ] arthralgias [ ] fracture  Skin:                       [ x] negative [ ] rash [ ] itch  Neurological:        [ x] negative [ ] headache [ ] dizziness [ ] syncope [ ] weakness [ ] numbness  Psychiatric:           [ x] negative [ ] anxiety [ ] depression  Endocrine:            [ x] negative [ ] diabetes [ ] thyroid problem  Heme/Lymph:      [ x] negative [ ] anemia [ ] bleeding problem  Allergic/Immune: [x ] negative [ ] itchy eyes [ ] nasal discharge [ ] hives [ ] angioedema    [x ] All other systems negative  [ ] Unable to assess ROS due to    Current Meds:  acetaminophen     Tablet .. 650 milliGRAM(s) Oral every 6 hours PRN  acyclovir   Oral Tab/Cap 400 milliGRAM(s) Oral two times a day  apixaban 5 milliGRAM(s) Oral every 12 hours  chlorhexidine 2% Cloths 1 Application(s) Topical <User Schedule>  cholecalciferol 2000 Unit(s) Oral daily  clonazePAM  Tablet 1 milliGRAM(s) Oral at bedtime  cyanocobalamin 1000 MICROGram(s) Oral daily  donepezil 10 milliGRAM(s) Oral at bedtime  febuxostat 80 milliGRAM(s) Oral daily  lactobacillus acidophilus 1 Tablet(s) Oral two times a day  levothyroxine 150 MICROGram(s) Oral daily  loperamide 2 milliGRAM(s) Oral two times a day PRN  melatonin 5 milliGRAM(s) Oral at bedtime  memantine 10 milliGRAM(s) Oral every 12 hours  metoprolol succinate ER 50 milliGRAM(s) Oral every 12 hours  mirtazapine 30 milliGRAM(s) Oral at bedtime  ondansetron Injectable 4 milliGRAM(s) IV Push once PRN  oxycodone    5 mG/acetaminophen 325 mG 2 Tablet(s) Oral every 4 hours PRN  pantoprazole    Tablet 40 milliGRAM(s) Oral every 12 hours  sodium bicarbonate 650 milliGRAM(s) Oral two times a day  sodium chloride 0.9%. 1000 milliLiter(s) IV Continuous <Continuous>  tamsulosin 0.4 milliGRAM(s) Oral at bedtime      PAST MEDICAL & SURGICAL HISTORY:  CLL (chronic lymphocytic leukemia)  in remission      Anxiety disorder      GERD (gastroesophageal reflux disease)      Atrial fibrillation      Diverticulitis      Waldenstrom macroglobulinemia      Bradycardia, drug induced      Hypothyroid      Nephrolithiasis      CKD (chronic kidney disease)      Gout      [place] (hard of hearing)      BPH (benign prostatic hyperplasia)      Memory deficit      IgG monoclonal gammopathy      History of macular degeneration      Chronic heart failure with preserved ejection fraction      Meniscus tear  s/p removal of Meniscus 8 months ago      S/P hernia repair  x2      History of laparoscopic cholecystectomy  4/2014      History of cataract surgery, right  IOL      History of appendectomy      History of cardiac pacemaker in situ          Vitals:  T(F): 97.9 (09-20), Max: 98 (09-19)  HR: 74 (09-20) (56 - 90)  BP: 125/70 (09-20) (103/55 - 134/63)  RR: 18 (09-20)  SpO2: 97% (09-20)  I&O's Summary    19 Sep 2022 07:01  -  20 Sep 2022 07:00  --------------------------------------------------------  IN: 240 mL / OUT: 900 mL / NET: -660 mL        Physical Exam:  Appearance: No acute distress; well appearing  Eyes: PERRL, EOMI, pink conjunctiva  HENT: Normal oral mucosa  Cardiovascular: RRR, S1, S2, no murmurs, rubs, or gallops; no edema; no JVD  Respiratory: Clear to auscultation bilaterally  Gastrointestinal: soft, non-tender, non-distended with normal bowel sounds  Musculoskeletal: No clubbing; no joint deformity   Neurologic: Non-focal  Lymphatic: No lymphadenopathy  Psychiatry: AAOx3, mood & affect appropriate  Skin: No rashes, ecchymoses, or cyanosis                          7.9    3.88  )-----------( 57       ( 20 Sep 2022 01:11 )             26.2     09-20    142  |  112<H>  |  49<H>  ----------------------------<  198<H>  5.2   |  18<L>  |  4.25<H>    Ca    7.7<L>      20 Sep 2022 01:11  Mg     1.3     09-20      PT/INR - ( 19 Sep 2022 04:07 )   PT: 24.8 sec;   INR: 2.14 ratio      Physical Exam:  Appearance: No acute distress; well appearing  Eyes: PERRL, EOMI, pink conjunctiva  HENT: Normal oral mucosa  Cardiovascular: irregular, S1, S2, no murmurs, rubs, or gallops; no edema; no JVD  Respiratory: Clear to auscultation bilaterally  Gastrointestinal: soft, non-tender, non-distended with normal bowel sounds  Musculoskeletal: No clubbing; no joint deformity   Neurologic: Non-focal  Lymphatic: No lymphadenopathy  Psychiatry: AAOx3, mood & affect appropriate  Skin: No rashes, ecchymoses, or cyanosis                      Patient seen and examined at bedside this morning on 4 Jovi    Overnight Events: No acute events overnight. Denies chest pain or SOB    REVIEW OF SYSTEMS:  Constitutional:     [ x] negative [ ] fevers [ ] chills [ ] weight loss [ ] weight gain  HEENT:                  [ x] negative [ ] dry eyes [ ] eye irritation [ ] postnasal drip [ ] nasal congestion  CV:                         [x ] negative  [ ] chest pain [ ] orthopnea [ ] palpitations [ ] murmur  Resp:                     [ x] negative [ ] cough [ ] shortness of breath [ ] dyspnea [ ] wheezing [ ] sputum [ ]hemoptysis  GI:                          [ x] negative [ ] nausea [ ] vomiting [ ] diarrhea [ ] constipation [ ] abd pain [ ] dysphagia   :                        [ x] negative [ ] dysuria [ ] nocturia [ ] hematuria [ ] increased urinary frequency  Musculoskeletal: [ x] negative [ ] back pain [ ] myalgias [ ] arthralgias [ ] fracture  Skin:                       [ x] negative [ ] rash [ ] itch  Neurological:        [ x] negative [ ] headache [ ] dizziness [ ] syncope [ ] weakness [ ] numbness  Psychiatric:           [ x] negative [ ] anxiety [ ] depression  Endocrine:            [ x] negative [ ] diabetes [ ] thyroid problem  Heme/Lymph:      [ x] negative [ ] anemia [ ] bleeding problem  Allergic/Immune: [x ] negative [ ] itchy eyes [ ] nasal discharge [ ] hives [ ] angioedema    [x ] All other systems negative  [ ] Unable to assess ROS due to    Current Meds:  acetaminophen     Tablet .. 650 milliGRAM(s) Oral every 6 hours PRN  acyclovir   Oral Tab/Cap 400 milliGRAM(s) Oral two times a day  apixaban 5 milliGRAM(s) Oral every 12 hours  chlorhexidine 2% Cloths 1 Application(s) Topical <User Schedule>  cholecalciferol 2000 Unit(s) Oral daily  clonazePAM  Tablet 1 milliGRAM(s) Oral at bedtime  cyanocobalamin 1000 MICROGram(s) Oral daily  donepezil 10 milliGRAM(s) Oral at bedtime  febuxostat 80 milliGRAM(s) Oral daily  lactobacillus acidophilus 1 Tablet(s) Oral two times a day  levothyroxine 150 MICROGram(s) Oral daily  loperamide 2 milliGRAM(s) Oral two times a day PRN  melatonin 5 milliGRAM(s) Oral at bedtime  memantine 10 milliGRAM(s) Oral every 12 hours  metoprolol succinate ER 50 milliGRAM(s) Oral every 12 hours  mirtazapine 30 milliGRAM(s) Oral at bedtime  ondansetron Injectable 4 milliGRAM(s) IV Push once PRN  oxycodone    5 mG/acetaminophen 325 mG 2 Tablet(s) Oral every 4 hours PRN  pantoprazole    Tablet 40 milliGRAM(s) Oral every 12 hours  sodium bicarbonate 650 milliGRAM(s) Oral two times a day  sodium chloride 0.9%. 1000 milliLiter(s) IV Continuous <Continuous>  tamsulosin 0.4 milliGRAM(s) Oral at bedtime    PAST MEDICAL & SURGICAL HISTORY:  CLL (chronic lymphocytic leukemia)  in remission  Anxiety disorder  GERD (gastroesophageal reflux disease)  Atrial fibrillation  Diverticulitis  Waldenstrom macroglobulinemia  Bradycardia, drug induced  Hypothyroid  Nephrolithiasis  CKD (chronic kidney disease)  Gout  [place] (hard of hearing)  BPH (benign prostatic hyperplasia)  Memory deficit  IgG monoclonal gammopathy  History of macular degeneration  Chronic heart failure with preserved ejection fraction  Meniscus tear  S/P hernia repair  x2  History of laparoscopic cholecystectomy  4/2014  History of cataract surgery, right  IOL  History of appendectomy  History of cardiac pacemaker in situ    Vitals:  T(F): 97.9 (09-20), Max: 98 (09-19)  HR: 74 (09-20) (56 - 90)  BP: 125/70 (09-20) (103/55 - 134/63)  RR: 18 (09-20)  SpO2: 97% on RA    I&O's Summary    19 Sep 2022 07:01  -  20 Sep 2022 07:00  --------------------------------------------------------  IN: 240 mL / OUT: 900 mL / NET: -660 mL    Physical Exam:  Appearance: No acute distress; well appearing  Eyes: PERRL, EOMI, pink conjunctiva  HENT: Normal oral mucosa  Cardiovascular: RRR, S1, S2, no murmurs, rubs, or gallops; no edema; no JVD  Respiratory: Clear to auscultation bilaterally  Gastrointestinal: soft, non-tender, non-distended with normal bowel sounds  Musculoskeletal: No clubbing; no joint deformity   Neurologic: Non-focal  Lymphatic: No lymphadenopathy  Psychiatry: AAOx3, mood & affect appropriate  Skin: No rashes, ecchymoses, or cyanosis                          7.9    3.88  )-----------( 57       ( 20 Sep 2022 01:11 )             26.2     09-20    142  |  112<H>  |  49<H>  ----------------------------<  198<H>  5.2   |  18<L>  |  4.25<H>    Ca    7.7<L>      20 Sep 2022 01:11  Mg     1.3     09-20    PT/INR - ( 19 Sep 2022 04:07 )   PT: 24.8 sec;   INR: 2.14 ratio

## 2022-09-20 NOTE — PROGRESS NOTE ADULT - ASSESSMENT
76 yo male with extensive pmhx including h/o afib on eliquis s/p ppm, ckd, cll, being admitted for pantera    pantera on ckd  renal eval noted  CT scan noted with right hydro and renal stone   consulted  ivf as per renal  lokelma for hyperKalemia  serial bmp  s/p right ureteral stent placement by  on 9/14  monitor creat  - improving  gu and renal f/u apprec    afib s/p ppm on eliquis  ep f/u  tele  cont AC with eliquis  cont toprol  s/p ablation on 9/20  EP f/u  tele monitor    CLL  stable  outpt heme/onc f/u    c/o diarrhea  gi f/u  imodium prn    anemia  f/u repeat cbc  no signs of bleeding on exam    cont other current meds    d/w renal and cards/ep    Advanced care planning was discussed with patient and family.  Advanced care planning forms were reviewed and discussed as appropriate.  Differential diagnosis and plan of care discussed with patient after the evaluation.   Pain assessed and judicious use of narcotics when appropriate was discussed.  Importance of Fall prevention discussed.  Counseling on Smoking and Alcohol cessation was offered when appropriate.  Counseling on Diet, exercise, and medication compliance was done.   Approx 30 minutes spent.

## 2022-09-21 ENCOUNTER — TRANSCRIPTION ENCOUNTER (OUTPATIENT)
Age: 77
End: 2022-09-21

## 2022-09-21 VITALS
DIASTOLIC BLOOD PRESSURE: 74 MMHG | HEART RATE: 53 BPM | TEMPERATURE: 98 F | SYSTOLIC BLOOD PRESSURE: 146 MMHG | OXYGEN SATURATION: 96 % | RESPIRATION RATE: 18 BRPM

## 2022-09-21 PROCEDURE — U0003: CPT

## 2022-09-21 PROCEDURE — 85027 COMPLETE CBC AUTOMATED: CPT

## 2022-09-21 PROCEDURE — 81001 URINALYSIS AUTO W/SCOPE: CPT

## 2022-09-21 PROCEDURE — 71045 X-RAY EXAM CHEST 1 VIEW: CPT | Mod: 26

## 2022-09-21 PROCEDURE — C1894: CPT

## 2022-09-21 PROCEDURE — 76000 FLUOROSCOPY <1 HR PHYS/QHP: CPT

## 2022-09-21 PROCEDURE — 36415 COLL VENOUS BLD VENIPUNCTURE: CPT

## 2022-09-21 PROCEDURE — 85610 PROTHROMBIN TIME: CPT

## 2022-09-21 PROCEDURE — 87641 MR-STAPH DNA AMP PROBE: CPT

## 2022-09-21 PROCEDURE — 80048 BASIC METABOLIC PNL TOTAL CA: CPT

## 2022-09-21 PROCEDURE — 93656 COMPRE EP EVAL ABLTJ ATR FIB: CPT

## 2022-09-21 PROCEDURE — 84100 ASSAY OF PHOSPHORUS: CPT

## 2022-09-21 PROCEDURE — C1758: CPT

## 2022-09-21 PROCEDURE — 83735 ASSAY OF MAGNESIUM: CPT

## 2022-09-21 PROCEDURE — 99233 SBSQ HOSP IP/OBS HIGH 50: CPT

## 2022-09-21 PROCEDURE — C1759: CPT

## 2022-09-21 PROCEDURE — 82553 CREATINE MB FRACTION: CPT

## 2022-09-21 PROCEDURE — 87640 STAPH A DNA AMP PROBE: CPT

## 2022-09-21 PROCEDURE — 88300 SURGICAL PATH GROSS: CPT

## 2022-09-21 PROCEDURE — C1732: CPT

## 2022-09-21 PROCEDURE — C1769: CPT

## 2022-09-21 PROCEDURE — 82436 ASSAY OF URINE CHLORIDE: CPT

## 2022-09-21 PROCEDURE — 97161 PT EVAL LOW COMPLEX 20 MIN: CPT

## 2022-09-21 PROCEDURE — 93010 ELECTROCARDIOGRAM REPORT: CPT

## 2022-09-21 PROCEDURE — 71045 X-RAY EXAM CHEST 1 VIEW: CPT

## 2022-09-21 PROCEDURE — 82570 ASSAY OF URINE CREATININE: CPT

## 2022-09-21 PROCEDURE — 74176 CT ABD & PELVIS W/O CONTRAST: CPT

## 2022-09-21 PROCEDURE — 86901 BLOOD TYPING SEROLOGIC RH(D): CPT

## 2022-09-21 PROCEDURE — U0005: CPT

## 2022-09-21 PROCEDURE — 87324 CLOSTRIDIUM AG IA: CPT

## 2022-09-21 PROCEDURE — 93657 TX L/R ATRIAL FIB ADDL: CPT

## 2022-09-21 PROCEDURE — 82365 CALCULUS SPECTROSCOPY: CPT

## 2022-09-21 PROCEDURE — C1766: CPT

## 2022-09-21 PROCEDURE — 86900 BLOOD TYPING SEROLOGIC ABO: CPT

## 2022-09-21 PROCEDURE — 93005 ELECTROCARDIOGRAM TRACING: CPT

## 2022-09-21 PROCEDURE — 84300 ASSAY OF URINE SODIUM: CPT

## 2022-09-21 PROCEDURE — C2617: CPT

## 2022-09-21 PROCEDURE — 84550 ASSAY OF BLOOD/URIC ACID: CPT

## 2022-09-21 PROCEDURE — 84133 ASSAY OF URINE POTASSIUM: CPT

## 2022-09-21 PROCEDURE — 84484 ASSAY OF TROPONIN QUANT: CPT

## 2022-09-21 PROCEDURE — 87086 URINE CULTURE/COLONY COUNT: CPT

## 2022-09-21 PROCEDURE — 82550 ASSAY OF CK (CPK): CPT

## 2022-09-21 PROCEDURE — 87507 IADNA-DNA/RNA PROBE TQ 12-25: CPT

## 2022-09-21 PROCEDURE — 93623 PRGRMD STIMJ&PACG IV RX NFS: CPT

## 2022-09-21 PROCEDURE — 86850 RBC ANTIBODY SCREEN: CPT

## 2022-09-21 PROCEDURE — 87449 NOS EACH ORGANISM AG IA: CPT

## 2022-09-21 RX ORDER — FEBUXOSTAT 40 MG/1
1 TABLET ORAL
Qty: 0 | Refills: 0 | DISCHARGE

## 2022-09-21 RX ORDER — LACTOBACILLUS ACIDOPHILUS 100MM CELL
1 CAPSULE ORAL
Qty: 60 | Refills: 0
Start: 2022-09-21 | End: 2022-10-20

## 2022-09-21 RX ORDER — PANTOPRAZOLE SODIUM 20 MG/1
1 TABLET, DELAYED RELEASE ORAL
Qty: 90 | Refills: 0
Start: 2022-09-21 | End: 2022-11-04

## 2022-09-21 RX ORDER — METOPROLOL TARTRATE 50 MG
3 TABLET ORAL
Qty: 0 | Refills: 0 | DISCHARGE

## 2022-09-21 RX ORDER — METOPROLOL TARTRATE 50 MG
1 TABLET ORAL
Qty: 0 | Refills: 0 | DISCHARGE
Start: 2022-09-21

## 2022-09-21 RX ORDER — METOPROLOL TARTRATE 50 MG
2 TABLET ORAL
Qty: 0 | Refills: 0 | DISCHARGE
Start: 2022-09-21

## 2022-09-21 RX ORDER — OMEPRAZOLE 10 MG/1
1 CAPSULE, DELAYED RELEASE ORAL
Qty: 0 | Refills: 0 | DISCHARGE

## 2022-09-21 RX ORDER — FEBUXOSTAT 40 MG/1
1 TABLET ORAL
Qty: 30 | Refills: 0
Start: 2022-09-21 | End: 2022-10-20

## 2022-09-21 RX ADMIN — Medication 150 MICROGRAM(S): at 05:10

## 2022-09-21 RX ADMIN — FEBUXOSTAT 80 MILLIGRAM(S): 40 TABLET ORAL at 11:19

## 2022-09-21 RX ADMIN — Medication 2000 UNIT(S): at 11:20

## 2022-09-21 RX ADMIN — PANTOPRAZOLE SODIUM 40 MILLIGRAM(S): 20 TABLET, DELAYED RELEASE ORAL at 05:10

## 2022-09-21 RX ADMIN — Medication 400 MILLIGRAM(S): at 11:20

## 2022-09-21 RX ADMIN — Medication 2 MILLIGRAM(S): at 12:32

## 2022-09-21 RX ADMIN — PREGABALIN 1000 MICROGRAM(S): 225 CAPSULE ORAL at 11:19

## 2022-09-21 RX ADMIN — APIXABAN 5 MILLIGRAM(S): 2.5 TABLET, FILM COATED ORAL at 11:20

## 2022-09-21 RX ADMIN — MEMANTINE HYDROCHLORIDE 10 MILLIGRAM(S): 10 TABLET ORAL at 11:17

## 2022-09-21 RX ADMIN — Medication 1 TABLET(S): at 11:17

## 2022-09-21 RX ADMIN — CHLORHEXIDINE GLUCONATE 1 APPLICATION(S): 213 SOLUTION TOPICAL at 05:09

## 2022-09-21 RX ADMIN — Medication 50 MILLIGRAM(S): at 11:19

## 2022-09-21 RX ADMIN — Medication 650 MILLIGRAM(S): at 11:18

## 2022-09-21 NOTE — PROGRESS NOTE ADULT - PROVIDER SPECIALTY LIST ADULT
Electrophysiology
Gastroenterology
Internal Medicine
Nephrology
Electrophysiology
Gastroenterology
Internal Medicine
Nephrology
Urology
Urology
Electrophysiology
Electrophysiology
Gastroenterology
Gastroenterology
Internal Medicine
Urology

## 2022-09-21 NOTE — PROGRESS NOTE ADULT - SUBJECTIVE AND OBJECTIVE BOX
NICOLAS CIFUENTES  77y Male  MRN:[id number]    Patient is a 77y old  Male who presents with a chief complaint of pantera    HPI:  77 year old  male, h/o [place] Sci PPM ([employer] MRI EL Serial # [id number]), HFpEF, pAfib (on Eliquis and Toprol), CKD G4/A3, CLL, IgG monoclonal gammopathy, GERD, hypothyroidism, dementia who c/o increased fatigue and very little energy. His afib burden has increased from 12% in May to 21 % in August and therefore the decision to proceed with ablation was made. Pt presents today for afib ablation.             on w/u pt found to have pantera. therefore ablation was cancelled. now being admitted for pantera                                                                           Cards: Dr Clemente (12 Sep 2022 06:50)      Patient seen and evaluated at bedside. No acute events overnight except as noted.    Interval HPI: no acute events o/n     PAST MEDICAL & SURGICAL HISTORY:  CLL (chronic lymphocytic leukemia)  in remission      Anxiety disorder      GERD (gastroesophageal reflux disease)      Atrial fibrillation      Diverticulitis      Waldenstrom macroglobulinemia      Bradycardia, drug induced      Hypothyroid      Nephrolithiasis      CKD (chronic kidney disease)      Gout      [place] (hard of hearing)      BPH (benign prostatic hyperplasia)      Memory deficit      IgG monoclonal gammopathy      History of macular degeneration      Chronic heart failure with preserved ejection fraction      Meniscus tear  s/p removal of Meniscus 8 months ago      S/P hernia repair  x2      History of laparoscopic cholecystectomy  4/2014      History of cataract surgery, right  IOL      History of appendectomy      History of cardiac pacemaker in situ          REVIEW OF SYSTEMS:  as per hpi     VITALS:   Vital Signs Last 24 Hrs  T(C): 36.9 (21 Sep 2022 11:18), Max: 36.9 (21 Sep 2022 11:18)  T(F): 98.5 (21 Sep 2022 11:18), Max: 98.5 (21 Sep 2022 11:18)  HR: 59 (21 Sep 2022 11:18) (55 - 59)  BP: 115/63 (21 Sep 2022 11:18) (113/70 - 119/64)  BP(mean): --  RR: 18 (21 Sep 2022 11:18) (17 - 18)  SpO2: 94% (21 Sep 2022 11:18) (94% - 98%)    Parameters below as of 21 Sep 2022 11:18  Patient On (Oxygen Delivery Method): room air          PHYSICAL EXAM:  GENERAL: NAD, well-developed  HEAD:  Atraumatic, Normocephalic  EYES: EOMI, PERRLA, conjunctiva and sclera clear  NECK: Supple, No JVD  CHEST/LUNG: Clear to auscultation bilaterally; No wheeze  HEART: S1, S2; No murmurs, rubs, or gallops  ABDOMEN: Soft, Nontender, Nondistended; Bowel sounds present  EXTREMITIES:  2+ Peripheral Pulses, No clubbing, cyanosis, or edema  PSYCH: Normal affect  NEUROLOGY: AAOX3; non-focal  SKIN: No rashes or lesions    Consultant(s) Notes Reviewed:  [x ] YES  [ ] NO  Care Discussed with Consultants/Other Providers [ x] YES  [ ] NO    MEDS:   MEDICATIONS  (STANDING):  acyclovir   Oral Tab/Cap 400 milliGRAM(s) Oral two times a day  apixaban 5 milliGRAM(s) Oral every 12 hours  chlorhexidine 2% Cloths 1 Application(s) Topical <User Schedule>  cholecalciferol 2000 Unit(s) Oral daily  clonazePAM  Tablet 1 milliGRAM(s) Oral at bedtime  cyanocobalamin 1000 MICROGram(s) Oral daily  donepezil 10 milliGRAM(s) Oral at bedtime  febuxostat 80 milliGRAM(s) Oral daily  lactobacillus acidophilus 1 Tablet(s) Oral two times a day  levothyroxine 150 MICROGram(s) Oral daily  melatonin 5 milliGRAM(s) Oral at bedtime  memantine 10 milliGRAM(s) Oral every 12 hours  metoprolol succinate ER 50 milliGRAM(s) Oral every 12 hours  mirtazapine 30 milliGRAM(s) Oral at bedtime  pantoprazole    Tablet 40 milliGRAM(s) Oral every 12 hours  sodium bicarbonate 650 milliGRAM(s) Oral two times a day  sodium chloride 0.9%. 1000 milliLiter(s) (100 mL/Hr) IV Continuous <Continuous>  tamsulosin 0.4 milliGRAM(s) Oral at bedtime    MEDICATIONS  (PRN):  acetaminophen     Tablet .. 650 milliGRAM(s) Oral every 6 hours PRN Mild Pain (1 - 3)  loperamide 2 milliGRAM(s) Oral two times a day PRN Diarrhea  ondansetron Injectable 4 milliGRAM(s) IV Push once PRN Nausea and/or Vomiting  oxycodone    5 mG/acetaminophen 325 mG 2 Tablet(s) Oral every 4 hours PRN Moderate Pain (4 - 6)        ALLERGIES:  rituximab (Angioedema)      LABS:                                                   8.1    5.29  )-----------( 82       ( 20 Sep 2022 13:01 )             27.1   09-20    141  |  111<H>  |  53<H>  ----------------------------<  117<H>  5.0   |  21<L>  |  4.35<H>    Ca    7.8<L>      20 Sep 2022 13:01  Mg     1.3     09-20               < from: CT Abdomen and Pelvis No Cont (09.12.22 @ 18:12) >  IMPRESSION:  Bilateral urolithiasis with 4 mm obstructive calculus in the distal right   ureter and moderate right hydroureteronephrosis.    Abdominal lymphadenopathy without significant interval change, consistent   with known lymphoproliferative disorder..    < end of copied text >

## 2022-09-21 NOTE — DISCHARGE NOTE PROVIDER - CARE PROVIDERS DIRECT ADDRESSES
,[email or web address] ,[email or web address],DirectAddress_Unknown

## 2022-09-21 NOTE — PROGRESS NOTE ADULT - SUBJECTIVE AND OBJECTIVE BOX
Overnight events noted      VITAL:  T(C): , Max: 36.8 (09-21-22 @ 04:14)  T(F): , Max: 98.2 (09-21-22 @ 04:14)  HR: 55 (09-21-22 @ 04:14)  BP: 113/70 (09-21-22 @ 04:14)  BP(mean): --  RR: 18 (09-21-22 @ 04:14)  SpO2: 95% (09-21-22 @ 04:14)      PHYSICAL EXAM:  Constitutional: NAD, Alert  HEENT: NCAT, DMM  Neck: Supple, No JVD  Respiratory: CTA-b/l  Cardiovascular: reg s1s2  Gastrointestinal: BS+, soft, NT/ND  Extremities: 2+ b/l LE edema  Neurological: no focal deficits; strength grossly intact  Back: no CVAT b/l  Skin: No rashes, no nevi      LABS:                        8.1    5.29  )-----------( 82       ( 20 Sep 2022 13:01 )             27.1     Na(141)/K(5.0)/Cl(111)/HCO3(21)/BUN(53)/Cr(4.35)Glu(117)/Ca(7.8)/Mg(--)/PO4(--)    09-20 @ 13:01  Na(142)/K(5.2)/Cl(112)/HCO3(18)/BUN(49)/Cr(4.25)Glu(198)/Ca(7.7)/Mg(1.3)/PO4(--)    09-20 @ 01:11  Na(141)/K(4.1)/Cl(109)/HCO3(22)/BUN(47)/Cr(4.63)Glu(107)/Ca(8.2)/Mg(--)/PO4(--)    09-19 @ 04:07      IMPRESSION: 77M w/ mild dementia, AFib, HFpEF, nephrolithiasis, CLL/Waldenstrom's on [place], and CKD4-5; 9/12/22 presented for scheduled AFib ablation; procedure postponed due to LOUISE    (1)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 GFR baseline ~10-15ml/min.    (2)LOUISE - obstructive nephropathy due to 4mm right-sided stone. Now resolved, s/p ureteroscopy/R ureteral stent placement.     (3)Hypomagnesemia - s/p repletion yesterday    (4)Metabolic acidosis - acceptably controlled, on standing PO NaHCO3    (5)Hyperkalemia - borderline- acceptable for now, on low-K diet    (6) - s/p R ureteral stent placement 9/14/22    (7)AFib - s/p AFib ablation 9/19      RECOMMEND:  (1)No renal objection to discharge, with f/u at my office Thursday 10/6/22              Norman Ascencio MD  [facility]  Office: [phone]  Cell: [phone]       No pain, no sob      VITAL:  T(C): , Max: 36.8 (09-21-22 @ 04:14)  T(F): , Max: 98.2 (09-21-22 @ 04:14)  HR: 55 (09-21-22 @ 04:14)  BP: 113/70 (09-21-22 @ 04:14)  BP(mean): --  RR: 18 (09-21-22 @ 04:14)  SpO2: 95% (09-21-22 @ 04:14)      PHYSICAL EXAM:  Constitutional: NAD, Alert  HEENT: NCAT, DMM  Neck: Supple, No JVD  Respiratory: CTA-b/l  Cardiovascular: sabina reg s1s2  Gastrointestinal: BS+, soft, NT/ND  Extremities: 2+ b/l LE edema  Neurological: no focal deficits; strength grossly intact  Back: no CVAT b/l  Skin: No rashes, no nevi      LABS:                        8.1    5.29  )-----------( 82       ( 20 Sep 2022 13:01 )             27.1     Na(141)/K(5.0)/Cl(111)/HCO3(21)/BUN(53)/Cr(4.35)Glu(117)/Ca(7.8)/Mg(--)/PO4(--)    09-20 @ 13:01  Na(142)/K(5.2)/Cl(112)/HCO3(18)/BUN(49)/Cr(4.25)Glu(198)/Ca(7.7)/Mg(1.3)/PO4(--)    09-20 @ 01:11  Na(141)/K(4.1)/Cl(109)/HCO3(22)/BUN(47)/Cr(4.63)Glu(107)/Ca(8.2)/Mg(--)/PO4(--)    09-19 @ 04:07      IMPRESSION: 77M w/ mild dementia, AFib, HFpEF, nephrolithiasis, CLL/Waldenstrom's on Ninlaro, and CKD4-5; 9/12/22 presented for scheduled AFib ablation; procedure postponed due to LOUISE    (1)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 GFR baseline ~10-15ml/min.    (2)LOUISE - obstructive nephropathy due to 4mm right-sided stone. Now resolved, s/p ureteroscopy/R ureteral stent placement.     (3)Hypomagnesemia - s/p repletion yesterday    (4)Metabolic acidosis - acceptably controlled, on standing PO NaHCO3    (5)Hyperkalemia - borderline- acceptable for now, on low-K diet    (6) - s/p R ureteral stent placement 9/14/22    (7)AFib - s/p AFib ablation 9/19      RECOMMEND:  (1)No renal objection to discharge, with f/u at my office Thursday 10/6/22              Norman Ascencio MD  [facility]  Office: [phone]  Cell: [phone]

## 2022-09-21 NOTE — DISCHARGE NOTE PROVIDER - PROVIDER TOKENS
PROVIDER:[TOKEN:[[id number]],SCHEDULEDAPPT:[10/06/2022]] PROVIDER:[TOKEN:[[id number]],SCHEDULEDAPPT:[10/06/2022]],PROVIDER:[TOKEN:[[id number]],FOLLOWUP:[2 weeks]]

## 2022-09-21 NOTE — DISCHARGE NOTE PROVIDER - NSDCFUADDAPPT_GEN_ALL_CORE_FT
[facility]  [address] [place] [address]  [phone]  Oseas Trevino

## 2022-09-21 NOTE — PROGRESS NOTE ADULT - NS ATTEND AMEND GEN_ALL_CORE FT
Agree and made all changes.
Fely Serrato MD, FACP, FACG, AGAF  [facility]  [phone]     After hours and weekend coverage GI service : [phone]
I have reviewed, made changes, and agree.

## 2022-09-21 NOTE — DISCHARGE NOTE PROVIDER - NSDCFUSCHEDAPPT_GEN_ALL_CORE_FT
Audra Clemente  [facility]  CARDIOLOGY [address]  Scheduled Appointment: 09/27/2022    [facility]  ELECTROPH [address]  Scheduled Appointment: 11/04/2022    Cesar Simons  [facility]  [facility]  Scheduled Appointment: 11/04/2022    Cesar Simons  [facility]  [facility]  Scheduled Appointment: 11/04/2022    Mike Raza  [facility]  ELECTROPH [address]  Scheduled Appointment: 11/11/2022

## 2022-09-21 NOTE — PROGRESS NOTE ADULT - SUBJECTIVE AND OBJECTIVE BOX
INTERVAL HPI/OVERNIGHT EVENTS:  feels well  appetite good  no abdominal pain, nausea or vomiting    1 semi-soft stool yesterday - took 1 dose imodium  no further BMs thereafter    no CP or SOB  dc planning in progress    MEDICATIONS  (STANDING):  acyclovir   Oral Tab/Cap 400 milliGRAM(s) Oral two times a day  apixaban 5 milliGRAM(s) Oral every 12 hours  chlorhexidine 2% Cloths 1 Application(s) Topical <User Schedule>  cholecalciferol 2000 Unit(s) Oral daily  clonazePAM  Tablet 1 milliGRAM(s) Oral at bedtime  cyanocobalamin 1000 MICROGram(s) Oral daily  donepezil 10 milliGRAM(s) Oral at bedtime  febuxostat 80 milliGRAM(s) Oral daily  lactobacillus acidophilus 1 Tablet(s) Oral two times a day  levothyroxine 150 MICROGram(s) Oral daily  melatonin 5 milliGRAM(s) Oral at bedtime  memantine 10 milliGRAM(s) Oral every 12 hours  metoprolol succinate ER 50 milliGRAM(s) Oral every 12 hours  mirtazapine 30 milliGRAM(s) Oral at bedtime  pantoprazole    Tablet 40 milliGRAM(s) Oral every 12 hours  sodium bicarbonate 650 milliGRAM(s) Oral two times a day  sodium chloride 0.9%. 1000 milliLiter(s) (100 mL/Hr) IV Continuous <Continuous>  tamsulosin 0.4 milliGRAM(s) Oral at bedtime    MEDICATIONS  (PRN):  acetaminophen     Tablet .. 650 milliGRAM(s) Oral every 6 hours PRN Mild Pain (1 - 3)  loperamide 2 milliGRAM(s) Oral two times a day PRN Diarrhea  ondansetron Injectable 4 milliGRAM(s) IV Push once PRN Nausea and/or Vomiting  oxycodone    5 mG/acetaminophen 325 mG 2 Tablet(s) Oral every 4 hours PRN Moderate Pain (4 - 6)      Allergies  rituximab (Angioedema)      Review of Systems:  as per HPI; remainder 10 point ROS negative    Vital Signs Last 24 Hrs  T(C): 36.9 (21 Sep 2022 11:18), Max: 36.9 (21 Sep 2022 11:18)  T(F): 98.5 (21 Sep 2022 11:18), Max: 98.5 (21 Sep 2022 11:18)  HR: 59 (21 Sep 2022 11:18) (55 - 59)  BP: 115/63 (21 Sep 2022 11:18) (113/70 - 119/64)  BP(mean): --  RR: 18 (21 Sep 2022 11:18) (17 - 18)  SpO2: 94% (21 Sep 2022 11:18) (94% - 98%)    Parameters below as of 21 Sep 2022 11:18  Patient On (Oxygen Delivery Method): room air    PHYSICAL EXAM:  Constitutional: NAD, well-developed lying in bed  [place]    Neck: No LAD, supple  Respiratory: PPM site clean, dry   grossly clear, no accessory muscle use  Cardiovascular: S1 and S2, reg  Gastrointestinal: BS+, obese soft, NT/ND, WH surgical scar  Extremities: No peripheral edema, neg clubbing, cyanosis  Vascular: 2+ peripheral pulses  Neurological: A/O x 3, no focal deficits +[place]  Psychiatric: Normal mood, normal affect  Skin: No rashes, anicteric      LABS:                        8.1    5.29  )-----------( 82       ( 20 Sep 2022 13:01 )             27.1   Hemoglobin: 7.9 g/dL (09.20.22 @ 01:11)   Hemoglobin: 8.5 g/dL (09.19.22 @ 04:07)   Hemoglobin: 9.1 g/dL (09.18.22 @ 05:54)   Hemoglobin: 8.4 g/dL (09.17.22 @ 05:46)     09-20    141  |  111<H>  |  53<H>  ----------------------------<  117<H>  5.0   |  21<L>  |  4.35<H>    Ca    7.8<L>      20 Sep 2022 13:01  Mg     1.3     09-20        RADIOLOGY & ADDITIONAL TESTS:

## 2022-09-21 NOTE — DISCHARGE NOTE NURSING/CASE MANAGEMENT/SOCIAL WORK - PATIENT PORTAL LINK FT
You can access the FollowMyHealth Patient Portal offered by [facility] by registering at the following website: [email or web address]. By joining [employer]’s FollowMyHealth portal, you will also be able to view your health information using other applications (apps) compatible with our system.
You can access the FollowMyHealth Patient Portal offered by [facility] by registering at the following website: [email or web address]. By joining [employer]’s FollowMyHealth portal, you will also be able to view your health information using other applications (apps) compatible with our system.

## 2022-09-21 NOTE — PROGRESS NOTE ADULT - ASSESSMENT
77 year old  male, h/o [place] [employer] PPM ([employer] EL Serial # [id number]), HFpEF, pAfib (on Eliquis and Toprol), CKD G4/A3, CLL, IgG monoclonal gammopathy, GERD, hypothyroidism, dementia who c/o increased fatigue and very little energy. His afib burden has increased from 12% in May to 21 % in August and therefore the decision to proceed with ablation was made. Noted to have LOUISE and scheduled ablation on 9/12/22 was postponed 2/2 LOUISE on CKD  There was no improvement of LOUISE despite IV fluids, and a CT abd/pelvis was subsequently obtained, which showed a 4 mm obstructive calculus in the distal right ureter and moderate right hydroureteronephrosis.  s/p cystoscopy with ureteral stent placement 9/14/22    GI asked to evaluate for reports of loose stools: pt states intermittent "mushy" stools without blood ~2/day - clinically improved    Clinically without diarrhea and no colitis noted on CT 9/12 (though without contrast)  -PO diet as tolerated   -probiotic (Bacid) BID  -ok for PRN Imodium  -no plans/role for endoscopic evaluation    #Anemia, noted drop in Hgb post-procedure (Ablation). without overt/brisk GI bleeding  Hgb stable    Discussed with Medicine attending  Discussed with pt, all questions answered    No GI objection to discharge plans    Abdullahi Goncalves PA-C    [facility]  [phone]  After hours and weekend coverage [phone]

## 2022-09-21 NOTE — DISCHARGE NOTE PROVIDER - HOSPITAL COURSE
7 year old  male, h/o [place] [employer] PPM ([employer] MRI EL Serial # [id number]), HFpEF, pAfib (on Eliquis and Toprol), CKD G4/A3, CLL, IgG monoclonal gammopathy, GERD, hypothyroidism, dementia who c/o increased fatigue and very little energy. His afib burden has increased from 12% in May to 21 % in August and therefore the decision to proceed with ablation was made. Pt presents today for afib ablation.             on w/u pt found to have pantera. therefore ablation was cancelled. now being admitted for pantera

## 2022-09-21 NOTE — PROVIDER CONTACT NOTE (OTHER) - ASSESSMENT
Patient axox4,  Pt c/o 3/10 chest pain. Denies any sob, dizziness or neurological deficit. VSS
Patient is A&Ox4. Patient's vitals are stable. soreness is not radiating. Patient normal sinus rhythm.
Pt AAOx4. VSS; denies CP, SOB, no acute events noted.

## 2022-09-21 NOTE — DISCHARGE NOTE PROVIDER - NSDCCPCAREPLAN_GEN_ALL_CORE_FT
PRINCIPAL DISCHARGE DIAGNOSIS  Diagnosis: LOUISE (acute kidney injury)  Assessment and Plan of Treatment: CT scan with hydronephrosis and stone   S/p Ureteral stent placement 9/14  Renal function improved with hydration  Electrolytes corrected  Follow with Dr. Ascencio as planned      SECONDARY DISCHARGE DIAGNOSES  Diagnosis: Paroxysmal atrial fibrillation  Assessment and Plan of Treatment: S/p pacemaker   S/p Ablation 9/20  Telemetry monitoring without events  Continue with Eliquis  Follow with your Cardiologist in 2 weeks    Diagnosis: CLL (chronic lymphocytic leukemia)  Assessment and Plan of Treatment: Continue care with your Oncologist     PRINCIPAL DISCHARGE DIAGNOSIS  Diagnosis: LOUISE (acute kidney injury)  Assessment and Plan of Treatment: CT scan with hydronephrosis and stone   S/p Ureteral stent placement 9/14  Renal function improved with hydration  Electrolytes corrected  Follow with Dr. Ascencio as planned  Follow with Urologist DR. Thakkar for Ueteral stent care in 2 weeks      SECONDARY DISCHARGE DIAGNOSES  Diagnosis: Paroxysmal atrial fibrillation  Assessment and Plan of Treatment: S/p pacemaker   S/p Ablation 9/20  Telemetry monitoring without events  Continue with Eliquis  Follow with your Cardiologist in 2 weeks    Diagnosis: CLL (chronic lymphocytic leukemia)  Assessment and Plan of Treatment: Continue care with your Oncologist

## 2022-09-21 NOTE — PROVIDER CONTACT NOTE (OTHER) - SITUATION
patient complains of soreness on left upper chest
Pt c/o 3/10 non radiating, midsternal chest pain
Pt has order for Orthostatic and BP Q4, Pt orthostatic negative 9/20

## 2022-09-21 NOTE — PROVIDER CONTACT NOTE (OTHER) - ACTION/TREATMENT ORDERED:
Provider aware. Q4 orthostatics does not need to be done
Provider Elli Alarcon notified. EKG completed, cardiac enzymes ordered and completed. As per NP no further interventions at this time. Will continue to monitor.
Provider ordered to continue to discharge, no need for chest x-ray.

## 2022-09-21 NOTE — DISCHARGE NOTE NURSING/CASE MANAGEMENT/SOCIAL WORK - NSDCPEFALRISK_GEN_ALL_CORE
For information on Fall & Injury Prevention, visit: [email or web address] OR  [email or web address] OR  https://www.cdc.gov/steadi/patient.html
For information on Fall & Injury Prevention, visit: [email or web address] OR  [email or web address]-[email or web address] OR  https://www.cdc.gov/steadi/patient.html

## 2022-09-21 NOTE — PROGRESS NOTE ADULT - ASSESSMENT
76 yo male with extensive pmhx including h/o afib on eliquis s/p ppm, ckd, cll, being admitted for pantera    pantera on ckd  renal eval noted  CT scan noted with right hydro and renal stone   consulted  ivf as per renal  lokelma for hyperKalemia  serial bmp  s/p right ureteral stent placement by  on 9/14  monitor creat  - improving  gu and renal f/u apprec    afib s/p ppm on eliquis  ep f/u  tele  cont AC with eliquis  cont toprol  s/p ablation on 9/20  EP f/u  tele monitor    CLL  stable  outpt heme/onc f/u    c/o diarrhea  gi f/u  imodium prn    anemia  f/u repeat cbc  no signs of bleeding on exam    cont other current meds    d/w renal and cards/ep    dc planning  outpt renal, gu, ep, onc, f/u      Advanced care planning was discussed with patient and family.  Advanced care planning forms were reviewed and discussed as appropriate.  Differential diagnosis and plan of care discussed with patient after the evaluation.   Pain assessed and judicious use of narcotics when appropriate was discussed.  Importance of Fall prevention discussed.  Counseling on Smoking and Alcohol cessation was offered when appropriate.  Counseling on Diet, exercise, and medication compliance was done.   Approx 30 minutes spent.

## 2022-09-21 NOTE — PROVIDER CONTACT NOTE (OTHER) - BACKGROUND
LOUISE, metabolic acidosis, afib pending possible ablation
Pt admitted for Paroxysmal Atrial Fibrillation
Patient admitted for afib

## 2022-09-21 NOTE — PROVIDER CONTACT NOTE (OTHER) - REASON
Pt has order for Orthostatic and BP Q4, Pt orthostatic negative 9/20
Pt c/o 3/10 non radiating, midsternal chest pain
patient complains of soreness on left upper chest

## 2022-09-21 NOTE — DISCHARGE NOTE NURSING/CASE MANAGEMENT/SOCIAL WORK - NSDCFUADDAPPT_GEN_ALL_CORE_FT
[facility]  [address] [place] [address]  [phone]  Oseas Trevino

## 2022-09-21 NOTE — DISCHARGE NOTE PROVIDER - NSDCMRMEDTOKEN_GEN_ALL_CORE_FT
acyclovir 400 mg oral tablet: 1 tab(s) orally 2 times a day  apixaban 5 mg oral tablet: 1 tab(s) orally 2 times a day  clonazePAM 1 mg oral tablet: 1 tab(s) orally once a day (at bedtime)  donepezil 10 mg oral tablet: 1 tab(s) orally once a day  febuxostat 80 mg oral tablet: 1 tab(s) orally once a day  lactobacillus acidophilus oral capsule: 1 tab(s) orally 2 times a day   Melatonin 10 mg oral capsule: 1 cap(s) orally once a day (at bedtime)  memantine 10 mg oral tablet: 1 tab(s) orally every 12 hours  metoprolol succinate 50 mg oral tablet, extended release: 1 tab(s) orally every 12 hours  mirtazapine 30 mg oral tablet: 1 tab(s) orally once a day (at bedtime)  Multiple Vitamins oral tablet: 1 tab(s) orally once a day  pantoprazole 40 mg oral delayed release tablet: 1 tab(s) orally every 12 hours  Percocet 10/325 oral tablet: 1 tab(s) orally every 4 hours, As Needed  sodium bicarbonate 650 mg oral tablet: 1 tab(s) orally 2 times a day  Synthroid 150 mcg (0.15 mg) oral tablet: 1 tab(s) orally once a day  tamsulosin 0.4 mg oral capsule: 1 cap(s) orally once a day (at bedtime)  Vitamin B-12 100 mcg oral tablet: 1 tab(s) orally once a day  Vitamin D3 50 mcg (2000 intl units) oral capsule: 1 cap(s) orally once a day (at bedtime)

## 2022-09-21 NOTE — DISCHARGE NOTE PROVIDER - CARE PROVIDER_API CALL
Norman Ascencio)  Internal Medicine; Nephrology  [address], [address]  [place] [address]  Phone: [phone]  Fax: [phone]  Scheduled Appointment: 10/06/2022   Norman Ascencio)  Internal Medicine; Nephrology  [address], [address]  [place] [address]  Phone: [phone]  Fax: [phone]  Scheduled Appointment: 10/06/2022    THERESA LIM  Urology  Phone: ()-  Fax: ()-  Follow Up Time: 2 weeks

## 2022-09-22 ENCOUNTER — NON-APPOINTMENT (OUTPATIENT)
Age: 77
End: 2022-09-22

## 2022-09-27 ENCOUNTER — APPOINTMENT (OUTPATIENT)
Dept: ELECTROPHYSIOLOGY | Facility: CLINIC | Age: 77
End: 2022-09-27

## 2022-09-27 ENCOUNTER — NON-APPOINTMENT (OUTPATIENT)
Age: 77
End: 2022-09-27

## 2022-09-27 ENCOUNTER — APPOINTMENT (OUTPATIENT)
Dept: CARDIOLOGY | Facility: CLINIC | Age: 77
End: 2022-09-27

## 2022-09-27 VITALS
HEART RATE: 51 BPM | SYSTOLIC BLOOD PRESSURE: 143 MMHG | OXYGEN SATURATION: 99 % | BODY MASS INDEX: 29.27 KG/M2 | WEIGHT: 228 LBS | DIASTOLIC BLOOD PRESSURE: 71 MMHG

## 2022-09-27 PROCEDURE — 99214 OFFICE O/P EST MOD 30 MIN: CPT | Mod: 25

## 2022-09-27 PROCEDURE — 93000 ELECTROCARDIOGRAM COMPLETE: CPT

## 2022-09-27 RX ORDER — DEXAMETHASONE 4 MG/1
4 TABLET ORAL
Qty: 9 | Refills: 5 | Status: DISCONTINUED | COMMUNITY
Start: 2019-04-11 | End: 2022-09-27

## 2022-09-30 ENCOUNTER — APPOINTMENT (OUTPATIENT)
Dept: UROLOGY | Facility: CLINIC | Age: 77
End: 2022-09-30

## 2022-09-30 VITALS
HEART RATE: 59 BPM | HEIGHT: 74 IN | TEMPERATURE: 98 F | BODY MASS INDEX: 29.26 KG/M2 | DIASTOLIC BLOOD PRESSURE: 65 MMHG | SYSTOLIC BLOOD PRESSURE: 107 MMHG | WEIGHT: 228 LBS

## 2022-09-30 PROCEDURE — 99214 OFFICE O/P EST MOD 30 MIN: CPT

## 2022-10-02 LAB
APPEARANCE: ABNORMAL
BACTERIA UR CULT: NORMAL
BACTERIA: NEGATIVE
BILIRUBIN URINE: NEGATIVE
BLOOD URINE: ABNORMAL
COLOR: ABNORMAL
GLUCOSE QUALITATIVE U: NEGATIVE
HYALINE CASTS: 3 /LPF
KETONES URINE: NEGATIVE
LEUKOCYTE ESTERASE URINE: ABNORMAL
MICROSCOPIC-UA: NORMAL
NITRITE URINE: NEGATIVE
PH URINE: 6.5
PROTEIN URINE: ABNORMAL
RED BLOOD CELLS URINE: >720 /HPF
SPECIFIC GRAVITY URINE: 1.01
SQUAMOUS EPITHELIAL CELLS: 3 /HPF
UROBILINOGEN URINE: NORMAL
WHITE BLOOD CELLS URINE: 46 /HPF

## 2022-10-02 NOTE — HISTORY OF PRESENT ILLNESS
[FreeTextEntry1] : Anson Olvera presents to the office today.  He is a 77-year-old man who is here today for follow-up on kidney stones.  He had recently presented to the hospital and underwent acute intervention with stent insertion on the right side related to a right distal ureteral stone in the setting of a creatinine elevation.  Dr. Sultana inserted his ureteral stent.  The patient was then directed to follow-up back in the office.\par \par He is feeling well at this time without any acute pain or nausea or vomiting.  No fevers or chills.  There was no hematuria.  He does have some increase in urinary urgency frequency, likely stent related.

## 2022-10-02 NOTE — ASSESSMENT
[FreeTextEntry1] : I reviewed the CT scan findings.  The right ureteral stone mentioned appears to be potential stone debris in the ureter rather than a true discrete stone.  It is visible in the CT scan.  He also has a larger stone burden on the right side with a fairly sizable stone seen in the right renal pelvis.  It also appears to be of low density.\par \par I reviewed with the patient that there are multiple different options for managing kidney stones and I would suggest that he see my colleague who has more specialty expertise and stone management in this setting.  We discussed options of percutaneous nephrolithotomy and ureteroscopy as potential to address the stone burden.  He will be following up with Dr. Jeff Rosales for further discussion.  I have communicated with Dr. Rosales by email to help expedite the follow-up.

## 2022-10-06 ENCOUNTER — APPOINTMENT (OUTPATIENT)
Dept: UROLOGY | Facility: CLINIC | Age: 77
End: 2022-10-06

## 2022-10-06 VITALS
DIASTOLIC BLOOD PRESSURE: 68 MMHG | TEMPERATURE: 98.5 F | SYSTOLIC BLOOD PRESSURE: 118 MMHG | HEART RATE: 62 BPM | RESPIRATION RATE: 18 BRPM

## 2022-10-06 PROCEDURE — 99214 OFFICE O/P EST MOD 30 MIN: CPT

## 2022-10-06 NOTE — HISTORY OF PRESENT ILLNESS
[FreeTextEntry1] : : 1945 \par Referring Provider: none \par \par HPI: Mr. NICOLAS CIFUENTES is a 77 year yo M with a PMHx notable for kidney stones. He had recently presented to the hospital and underwent acute intervention with stent insertion on the right side related to a right distal ureteral stone in the setting of a creatinine elevation. Dr. Sultana inserted his ureteral stent. The patient was then directed to follow-up back in the office.\par \par He is feeling well at this time without any acute pain or nausea or vomiting. No fevers or chills. There was no hematuria. He does have some increase in urinary urgency frequency, likely stent related. \par \par He was referred to me for surgical consideration for CVAC with URS.\par  \par Anticoagulation: Eliquis BID\par All: NKDA\par Social: No smoking or drinking, , children\par PMHx: CLL, Waldenstrom's, PPM,  kidney stones\par FHx: None\par PSHx: prior ESWL\par \par Imaging: 2022 CT reviewed. The right ureteral stone mentioned appears to be potential stone debris in the ureter rather than a true discrete stone. He also has a larger stone burden on the right side with a fairly sizable stone seen in the right renal pelvis. It also appears to be of low density.\par  [Urinary Incontinence] : no urinary incontinence [Urinary Retention] : no urinary retention [Urinary Urgency] : no urinary urgency [Urinary Frequency] : no urinary frequency

## 2022-10-07 NOTE — DISCUSSION/SUMMARY
[EKG obtained to assist in diagnosis and management of assessed problem(s)] : EKG obtained to assist in diagnosis and management of assessed problem(s) [FreeTextEntry1] : The patient is a 77-year-old gentleman history mild dementia, paroxysmal  atrial fibrillation, lower extremity edema , CLL, CKD stage IV, Waldenstrom's macroglobulinemia, hypothyroidism, PPM , chronic diastolic heart failure s/p sigmoid resection for diverticulitis, oral chemo for his CLL , pelvic fracture, s/p afib ablation with ureteral stent placed for kidney stone who is euvolemic.. \par #1 CV- , c/w metoprolol\par #2 PPM- [employer] pacemaker interrogation long episodes of afib,c/w eliquis\par #3 Pulm- COVID pneumonia off oxygen\par #4 Heme- f/u clinic\par #5 Hypothyroid- continue levothyroxine\par #6 Neuro- on namenda and aricept for dementia, mirtazapine and clonazepam for anxiety\par 10/7/22 Addendum : There are no cardiac contraindications to uteroscopy and may hold eliquis two days prior if needed.

## 2022-10-09 LAB — BACTERIA UR CULT: NORMAL

## 2022-10-12 ENCOUNTER — OUTPATIENT (OUTPATIENT)
Dept: OUTPATIENT SERVICES | Facility: HOSPITAL | Age: 77
LOS: 1 days | End: 2022-10-12
Payer: MEDICARE

## 2022-10-12 VITALS
HEIGHT: 74 IN | SYSTOLIC BLOOD PRESSURE: 110 MMHG | WEIGHT: 227.96 LBS | OXYGEN SATURATION: 97 % | DIASTOLIC BLOOD PRESSURE: 69 MMHG | TEMPERATURE: 98 F | HEART RATE: 57 BPM | RESPIRATION RATE: 20 BRPM

## 2022-10-12 DIAGNOSIS — I48.0 PAROXYSMAL ATRIAL FIBRILLATION: ICD-10-CM

## 2022-10-12 DIAGNOSIS — N20.0 CALCULUS OF KIDNEY: ICD-10-CM

## 2022-10-12 DIAGNOSIS — Z98.41 CATARACT EXTRACTION STATUS, RIGHT EYE: Chronic | ICD-10-CM

## 2022-10-12 DIAGNOSIS — Z96.0 PRESENCE OF UROGENITAL IMPLANTS: Chronic | ICD-10-CM

## 2022-10-12 DIAGNOSIS — Z95.0 PRESENCE OF CARDIAC PACEMAKER: ICD-10-CM

## 2022-10-12 DIAGNOSIS — Z01.818 ENCOUNTER FOR OTHER PREPROCEDURAL EXAMINATION: ICD-10-CM

## 2022-10-12 DIAGNOSIS — Z98.89 OTHER SPECIFIED POSTPROCEDURAL STATES: Chronic | ICD-10-CM

## 2022-10-12 DIAGNOSIS — Z95.0 PRESENCE OF CARDIAC PACEMAKER: Chronic | ICD-10-CM

## 2022-10-12 DIAGNOSIS — Z98.890 OTHER SPECIFIED POSTPROCEDURAL STATES: Chronic | ICD-10-CM

## 2022-10-12 LAB
ANION GAP SERPL CALC-SCNC: 10 MMOL/L — SIGNIFICANT CHANGE UP (ref 5–17)
BUN SERPL-MCNC: 50 MG/DL — HIGH (ref 7–23)
CALCIUM SERPL-MCNC: 8.7 MG/DL — SIGNIFICANT CHANGE UP (ref 8.4–10.5)
CHLORIDE SERPL-SCNC: 108 MMOL/L — SIGNIFICANT CHANGE UP (ref 96–108)
CO2 SERPL-SCNC: 22 MMOL/L — SIGNIFICANT CHANGE UP (ref 22–31)
CREAT SERPL-MCNC: 3.68 MG/DL — HIGH (ref 0.5–1.3)
EGFR: 16 ML/MIN/1.73M2 — LOW
GLUCOSE SERPL-MCNC: 115 MG/DL — HIGH (ref 70–99)
HCT VFR BLD CALC: 29.7 % — LOW (ref 39–50)
HGB BLD-MCNC: 9.1 G/DL — LOW (ref 13–17)
MCHC RBC-ENTMCNC: 30 PG — SIGNIFICANT CHANGE UP (ref 27–34)
MCHC RBC-ENTMCNC: 30.6 GM/DL — LOW (ref 32–36)
MCV RBC AUTO: 98 FL — SIGNIFICANT CHANGE UP (ref 80–100)
NRBC # BLD: 0 /100 WBCS — SIGNIFICANT CHANGE UP (ref 0–0)
PLATELET # BLD AUTO: 119 K/UL — LOW (ref 150–400)
POTASSIUM SERPL-MCNC: 4.9 MMOL/L — SIGNIFICANT CHANGE UP (ref 3.5–5.3)
POTASSIUM SERPL-SCNC: 4.9 MMOL/L — SIGNIFICANT CHANGE UP (ref 3.5–5.3)
RBC # BLD: 3.03 M/UL — LOW (ref 4.2–5.8)
RBC # FLD: 13.9 % — SIGNIFICANT CHANGE UP (ref 10.3–14.5)
SODIUM SERPL-SCNC: 140 MMOL/L — SIGNIFICANT CHANGE UP (ref 135–145)
WBC # BLD: 5.95 K/UL — SIGNIFICANT CHANGE UP (ref 3.8–10.5)
WBC # FLD AUTO: 5.95 K/UL — SIGNIFICANT CHANGE UP (ref 3.8–10.5)

## 2022-10-12 PROCEDURE — G0463: CPT

## 2022-10-12 PROCEDURE — 85027 COMPLETE CBC AUTOMATED: CPT

## 2022-10-12 PROCEDURE — 80048 BASIC METABOLIC PNL TOTAL CA: CPT

## 2022-10-12 RX ORDER — APIXABAN 2.5 MG/1
0.5 TABLET, FILM COATED ORAL
Qty: 0 | Refills: 0 | DISCHARGE

## 2022-10-12 RX ORDER — CEFAZOLIN SODIUM 1 G
2000 VIAL (EA) INJECTION ONCE
Refills: 0 | Status: DISCONTINUED | OUTPATIENT
Start: 2022-10-19 | End: 2022-11-02

## 2022-10-12 RX ORDER — APIXABAN 2.5 MG/1
1 TABLET, FILM COATED ORAL
Qty: 0 | Refills: 0 | DISCHARGE

## 2022-10-12 NOTE — H&P PST ADULT - HISTORY OF PRESENT ILLNESS
76 yo male with PAF (on eliquis), sinus node dysfunction ([employer] PM, chronic HFpEF, ckd stage IV-V , CLL GERD, hypothyroidism, mild dementia, s/p cardiac ablation 9/22 , presented with LOUISE due to obstructive kidney stone.  S/p ureteral stent insertion.  Now feeling somewhat better, c/o hematuria, scheduled for cystoscopy, bilateral ureteroscopy, laser lithotripsy with stent placement on 10/19/22.     COVID + 2020  Covid swab 10/16 niyah    **will hold eliquis 2 days as per cardiology

## 2022-10-12 NOTE — H&P PST ADULT - NSICDXPASTMEDICALHX_GEN_ALL_CORE_FT
PAST MEDICAL HISTORY:  LOUISE (acute kidney injury) 9/2022    Anxiety disorder     Atrial fibrillation recent cardiac ablation 9/22    BPH (benign prostatic hyperplasia)     Bradycardia, drug induced     Cardiac pacemaker [employer] L331    Chronic heart failure with preserved ejection fraction     CKD (chronic kidney disease)     CLL (chronic lymphocytic leukemia) in remission    Diverticulitis     GERD (gastroesophageal reflux disease)     Gout     History of macular degeneration     [place] (hard of hearing)     Hypothyroid     IgG monoclonal gammopathy     Memory deficit     Nephrolithiasis     Pneumonia due to COVID-19 virus 2020    Waldenstrom macroglobulinemia

## 2022-10-12 NOTE — H&P PST ADULT - NSICDXPASTSURGICALHX_GEN_ALL_CORE_FT
PAST SURGICAL HISTORY:  History of appendectomy     History of cardiac pacemaker in situ     History of cataract surgery, right IOL    History of laparoscopic cholecystectomy 4/2014    Meniscus tear s/p removal of Meniscus 8 months ago    S/P hernia repair x2    S/P ureteral stent placement

## 2022-10-12 NOTE — H&P PST ADULT - NSICDXFAMILYHX_GEN_ALL_CORE_FT
FAMILY HISTORY:  Father  Still living? No  FH: CAD (coronary artery disease), Age at diagnosis: Age Unknown  FH: type 2 diabetes, Age at diagnosis: Age Unknown    Sibling  Still living? Yes, Estimated age: Age Unknown  FH: atrial fibrillation, Age at diagnosis: Age Unknown

## 2022-10-12 NOTE — H&P PST ADULT - FALL HARM RISK - RISK INTERVENTIONS

## 2022-10-16 ENCOUNTER — OUTPATIENT (OUTPATIENT)
Dept: OUTPATIENT SERVICES | Facility: HOSPITAL | Age: 77
LOS: 1 days | End: 2022-10-16
Payer: MEDICARE

## 2022-10-16 DIAGNOSIS — Z98.41 CATARACT EXTRACTION STATUS, RIGHT EYE: Chronic | ICD-10-CM

## 2022-10-16 DIAGNOSIS — Z11.52 ENCOUNTER FOR SCREENING FOR COVID-19: ICD-10-CM

## 2022-10-16 DIAGNOSIS — Z96.0 PRESENCE OF UROGENITAL IMPLANTS: Chronic | ICD-10-CM

## 2022-10-16 DIAGNOSIS — Z98.89 OTHER SPECIFIED POSTPROCEDURAL STATES: Chronic | ICD-10-CM

## 2022-10-16 DIAGNOSIS — Z98.890 OTHER SPECIFIED POSTPROCEDURAL STATES: Chronic | ICD-10-CM

## 2022-10-16 DIAGNOSIS — Z95.0 PRESENCE OF CARDIAC PACEMAKER: Chronic | ICD-10-CM

## 2022-10-16 LAB — SARS-COV-2 RNA SPEC QL NAA+PROBE: SIGNIFICANT CHANGE UP

## 2022-10-18 ENCOUNTER — TRANSCRIPTION ENCOUNTER (OUTPATIENT)
Age: 77
End: 2022-10-18

## 2022-10-19 ENCOUNTER — APPOINTMENT (OUTPATIENT)
Dept: UROLOGY | Facility: HOSPITAL | Age: 77
End: 2022-10-19

## 2022-10-19 ENCOUNTER — TRANSCRIPTION ENCOUNTER (OUTPATIENT)
Age: 77
End: 2022-10-19

## 2022-10-19 ENCOUNTER — OUTPATIENT (OUTPATIENT)
Dept: OUTPATIENT SERVICES | Facility: HOSPITAL | Age: 77
LOS: 1 days | End: 2022-10-19
Payer: MEDICARE

## 2022-10-19 ENCOUNTER — RESULT REVIEW (OUTPATIENT)
Age: 77
End: 2022-10-19

## 2022-10-19 VITALS
HEART RATE: 53 BPM | TEMPERATURE: 98 F | DIASTOLIC BLOOD PRESSURE: 65 MMHG | RESPIRATION RATE: 18 BRPM | SYSTOLIC BLOOD PRESSURE: 147 MMHG | OXYGEN SATURATION: 100 %

## 2022-10-19 VITALS
HEIGHT: 73.98 IN | RESPIRATION RATE: 18 BRPM | DIASTOLIC BLOOD PRESSURE: 66 MMHG | SYSTOLIC BLOOD PRESSURE: 105 MMHG | HEART RATE: 51 BPM | WEIGHT: 227.96 LBS | OXYGEN SATURATION: 99 % | TEMPERATURE: 98 F

## 2022-10-19 DIAGNOSIS — Z96.0 PRESENCE OF UROGENITAL IMPLANTS: Chronic | ICD-10-CM

## 2022-10-19 DIAGNOSIS — N20.0 CALCULUS OF KIDNEY: ICD-10-CM

## 2022-10-19 DIAGNOSIS — Z98.89 OTHER SPECIFIED POSTPROCEDURAL STATES: Chronic | ICD-10-CM

## 2022-10-19 DIAGNOSIS — Z98.41 CATARACT EXTRACTION STATUS, RIGHT EYE: Chronic | ICD-10-CM

## 2022-10-19 DIAGNOSIS — Z98.890 OTHER SPECIFIED POSTPROCEDURAL STATES: Chronic | ICD-10-CM

## 2022-10-19 DIAGNOSIS — Z95.0 PRESENCE OF CARDIAC PACEMAKER: Chronic | ICD-10-CM

## 2022-10-19 PROCEDURE — 52356 CYSTO/URETERO W/LITHOTRIPSY: CPT | Mod: 50

## 2022-10-19 PROCEDURE — 76000 FLUOROSCOPY <1 HR PHYS/QHP: CPT

## 2022-10-19 PROCEDURE — U0005: CPT

## 2022-10-19 PROCEDURE — 74420 UROGRAPHY RTRGR +-KUB: CPT | Mod: 26

## 2022-10-19 PROCEDURE — 88300 SURGICAL PATH GROSS: CPT

## 2022-10-19 PROCEDURE — C1889: CPT

## 2022-10-19 PROCEDURE — C2625: CPT

## 2022-10-19 PROCEDURE — 82365 CALCULUS SPECTROSCOPY: CPT

## 2022-10-19 PROCEDURE — 53899 UNLISTED PX URINARY SYSTEM: CPT

## 2022-10-19 PROCEDURE — C1758: CPT

## 2022-10-19 PROCEDURE — 88300 SURGICAL PATH GROSS: CPT | Mod: 26

## 2022-10-19 PROCEDURE — C1769: CPT

## 2022-10-19 PROCEDURE — C9803: CPT

## 2022-10-19 PROCEDURE — U0003: CPT

## 2022-10-19 DEVICE — STONE BASKET ZEROTIP NITINOL 4-WIRE 1.9FR 120CM X 12MM: Type: IMPLANTABLE DEVICE | Site: BILATERAL | Status: FUNCTIONAL

## 2022-10-19 DEVICE — IMPLANTABLE DEVICE: Type: IMPLANTABLE DEVICE | Site: BILATERAL | Status: FUNCTIONAL

## 2022-10-19 DEVICE — CATH STEERABLE IRR ASPIRATION 70CM: Type: IMPLANTABLE DEVICE | Site: BILATERAL | Status: FUNCTIONAL

## 2022-10-19 DEVICE — URETERAL CATH OPEN END 5FR 70CM: Type: IMPLANTABLE DEVICE | Site: BILATERAL | Status: FUNCTIONAL

## 2022-10-19 DEVICE — GUIDEWIRE SENSOR DUAL-FLEX NITINOL STRAIGHT .035" X 150CM: Type: IMPLANTABLE DEVICE | Site: BILATERAL | Status: FUNCTIONAL

## 2022-10-19 DEVICE — URETERAL SHEATH NAVIGATOR HD 12/14FR X 46CM: Type: IMPLANTABLE DEVICE | Site: BILATERAL | Status: FUNCTIONAL

## 2022-10-19 DEVICE — STENT URET 7FR 26CM: Type: IMPLANTABLE DEVICE | Site: BILATERAL | Status: FUNCTIONAL

## 2022-10-19 DEVICE — LASER FIBER SOLTIVE 200 BALL TIP: Type: IMPLANTABLE DEVICE | Site: BILATERAL | Status: FUNCTIONAL

## 2022-10-19 RX ORDER — PREGABALIN 225 MG/1
1 CAPSULE ORAL
Qty: 0 | Refills: 0 | DISCHARGE

## 2022-10-19 RX ORDER — LIDOCAINE HCL 20 MG/ML
0.2 VIAL (ML) INJECTION ONCE
Refills: 0 | Status: DISCONTINUED | OUTPATIENT
Start: 2022-10-19 | End: 2022-10-19

## 2022-10-19 RX ORDER — FENTANYL CITRATE 50 UG/ML
25 INJECTION INTRAVENOUS
Refills: 0 | Status: DISCONTINUED | OUTPATIENT
Start: 2022-10-19 | End: 2022-10-19

## 2022-10-19 RX ORDER — LANOLIN ALCOHOL/MO/W.PET/CERES
1 CREAM (GRAM) TOPICAL
Qty: 0 | Refills: 0 | DISCHARGE

## 2022-10-19 RX ORDER — CHOLECALCIFEROL (VITAMIN D3) 125 MCG
1 CAPSULE ORAL
Qty: 0 | Refills: 0 | DISCHARGE

## 2022-10-19 RX ORDER — ONDANSETRON 8 MG/1
4 TABLET, FILM COATED ORAL ONCE
Refills: 0 | Status: DISCONTINUED | OUTPATIENT
Start: 2022-10-19 | End: 2022-10-19

## 2022-10-19 RX ORDER — LEVOTHYROXINE SODIUM 125 MCG
1 TABLET ORAL
Qty: 0 | Refills: 0 | DISCHARGE

## 2022-10-19 RX ORDER — SODIUM CHLORIDE 9 MG/ML
3 INJECTION INTRAMUSCULAR; INTRAVENOUS; SUBCUTANEOUS EVERY 8 HOURS
Refills: 0 | Status: DISCONTINUED | OUTPATIENT
Start: 2022-10-19 | End: 2022-10-19

## 2022-10-19 RX ORDER — APIXABAN 2.5 MG/1
1 TABLET, FILM COATED ORAL
Qty: 0 | Refills: 0 | DISCHARGE

## 2022-10-19 RX ORDER — ACYCLOVIR SODIUM 500 MG
1 VIAL (EA) INTRAVENOUS
Qty: 0 | Refills: 0 | DISCHARGE

## 2022-10-19 NOTE — ASU DISCHARGE PLAN (ADULT/PEDIATRIC) - NS MD DC FALL RISK RISK
For information on Fall & Injury Prevention, visit: [email or web address] OR  [email or web address] OR  https://www.cdc.gov/steadi/patient.html

## 2022-10-19 NOTE — ASU DISCHARGE PLAN (ADULT/PEDIATRIC) - CARE PROVIDER_API CALL
Jeff Rosales)  Urology  [address] [place] [address]  Phone: [phone]  Fax: [phone]  Follow Up Time: 1 week

## 2022-10-19 NOTE — ASU PATIENT PROFILE, ADULT - FALL HARM RISK - RISK INTERVENTIONS

## 2022-10-19 NOTE — PRE-ANESTHESIA EVALUATION ADULT - NSRADCARDRESULTSFT_GEN_ALL_CORE
Patient name: NICOLAS CIFUENTES  YOB: 1945   Age: 75 (M)   MR#: [id number]  Study Date: 3/17/2021  Location: [facility]  Study quality: Technically difficult  Referring Physician: Yanna Ndiaye MD  Blood Pressure: 107/60 mmHg  Height: 188 cm  Weight: 105 kg  BSA: 2.3 m2  ------------------------------------------------------------------------  PROCEDURE: Transthoracic echocardiogram with 2-D, M-Mode  and complete spectral and color flow Doppler.  INDICATION: Edema, unspecified (R60.9)  ------------------------------------------------------------------------  Dimensions:    Normal Values:  LA:     5.0    2.0 - 4.0 cm  Ao:            2.0 - 3.8 cm  SEPTUM: 1.1    0.6 - 1.2 cm  PWT:    0.9    0.6 - 1.1 cm  LVIDd:  5.9    3.0 - 5.6 cm  LVIDs:  4.1    1.8 - 4.0 cm  Derived variables:  LVMI: 104 g/m2  RWT: 0.30  EF (Visual Estimate): 60 %  Doppler Peak Velocity (m/sec): AoV=2.5 TV=3.1  ------------------------------------------------------------------------  Observations:  Mitral Valve: Normal mitral valve.  Aortic Valve/Aorta: Fibrocalcific aortic valve without  stenosis.  Normal aortic root size.  Left Atrium: Mildly dilated left atrium.  Left Ventricle: Normal left ventricular internal dimensions  and wall thicknesses.  Normal left ventricular systolic function. No segmental  wall motion abnormalities.  Normal diastolic function.  Right Heart: Normal right atrium. Normal right ventricular  size and function.  Normal tricuspid valve. Mild tricuspid regurgitation.  Normal pulmonic valve.  Pericardium/Pleura: Normal pericardium with no pericardial  effusion.  Hemodynamic: Estimated right atrial pressure is mildly  elevated.  Mild-moderate pulmonary hypertension. Estimated PASP 45  mmHg.  ------------------------------------------------------------------------  Conclusions:  Technically difficult study.  Normal left ventricular systolic function. No segmental  wall motion abnormalities.  Mild-moderate pulmonary hypertension.

## 2022-10-19 NOTE — ASU DISCHARGE PLAN (ADULT/PEDIATRIC) - ASU DC SPECIAL INSTRUCTIONSFT
STENT: You may have an internal stent (a hollow tube that runs from the kidney to your bladder) after your procedure, which helps urine drain from the kidney to your bladder. Some patients experience urinary frequency, burning, or even back pain (especially with urination). These sensations will gradually get better. Increasing your fluid intake can also improve these symptoms. While the stent is in place, your urine may continue to be bloody. This stent is temporary and must be removed by your urologist as an outpatient with in 3 months unless otherwise specified. If your stent is on a string, it is secured to your leg or genitalia with an adhesive bandage. Do not pull on the string, do not remove the bandage, do not insert anything intravaginally/intraurethrally, and do not engage in sexual intercourse until after the stent is removed at your post-operative appointment.  GENERAL: It is common to have blood in your urine after your procedure. It may be pink or even red; inform your doctor if you have a significant amount of clot in the urine or if you are unable to void at all. The urine may clear and then become bloody again especially as you are more physically active.  BATHING: You may shower or bathe.  DIET: You may resume your regular diet and regular medication regimen.  PAIN: You may take Tylenol (acetaminophen) 650-975mg and/or Motrin (ibuprofen) 400-600mg, both available over the counter, for pain every 6 hours as needed. Do not exceed 4000mg of Tylenol (acetaminophen) daily. You may alternate these medications such that you take one or the other every 3 hours for around the clock pain coverage. If you have a stent, the following medications may have been sent to your pharmacy for stent related discomfort: Flomax (tamsulosin) 0.4mg at bedtime until stent removed, Ditropan (oxybutynin) 5mg every 8 hours as needed for bladder spasms, and Pyridium (phenasopyridine) 100mg every 8 hours as needed for kidney/bladder discomfort for max 3 days (Pyridium will make your urine orange).  ANTIBIOTICS: You may be given a prescription for an antibiotic, please take this medication as instructed and be sure to complete the entire course.  STOOL SOFTENERS: Do not allow yourself to become constipated as straining may cause bleeding. Take stool softeners or a laxative (ex. Miralax, Colace, Senokot, ExLax, etc), available over the counter, if needed.  ACTIVITY: No heavy lifting or strenuous exercise until you are evaluated at your post-operative appointment. Otherwise, you may return to your usual level of physical activity.  ANTICOAGULATION: If you are taking any blood thinning medications, please discuss with your urologist prior to restarting these medications unless otherwise specified.  FOLLOW-UP: If you did not already schedule your post-operative appointment, please call your urologist to schedule and follow-up appointment.  CALL YOUR UROLOGIST IF: You have any bleeding that does not stop, inability to void >8 hours, fever over 100.4 F, chills, persistent nausea/vomiting, changes in your incision concerning for infection, or if your pain is not controlled on your discharge pain medications. STENT: You may have an internal stent (a hollow tube that runs from the kidney to your bladder) after your procedure, which helps urine drain from the kidney to your bladder. Some patients experience urinary frequency, burning, or even back pain (especially with urination). These sensations will gradually get better. Increasing your fluid intake can also improve these symptoms. While the stent is in place, your urine may continue to be bloody. This stent is temporary and must be removed by your urologist as an outpatient with in 3 months unless otherwise specified.   GENERAL: It is common to have blood in your urine after your procedure. It may be pink or even red; inform your doctor if you have a significant amount of clot in the urine or if you are unable to void at all. The urine may clear and then become bloody again especially as you are more physically active.  BATHING: You may shower or bathe.  DIET: You may resume your regular diet and regular medication regimen.  PAIN: You may take Tylenol (acetaminophen) 650-975mg and/or Motrin (ibuprofen) 400-600mg, both available over the counter, for pain every 6 hours as needed. Do not exceed 4000mg of Tylenol (acetaminophen) daily. You may alternate these medications such that you take one or the other every 3 hours for around the clock pain coverage. ).  ANTIBIOTICS: You may be given a prescription for an antibiotic, please take this medication as instructed and be sure to complete the entire course. 5 days augmentin   STOOL SOFTENERS: Do not allow yourself to become constipated as straining may cause bleeding. Take stool softeners or a laxative (ex. Miralax, Colace, Senokot, ExLax, etc), available over the counter, if needed.  ACTIVITY: No heavy lifting or strenuous exercise until you are evaluated at your post-operative appointment. Otherwise, you may return to your usual level of physical activity.  ANTICOAGULATION: If you are taking any blood thinning medications, please discuss with your urologist prior to restarting these medications unless otherwise specified. restart tomorrow   FOLLOW-UP: If you did not already schedule your post-operative appointment, please call your urologist to schedule and follow-up appointment.  CALL YOUR UROLOGIST IF: You have any bleeding that does not stop, inability to void >8 hours, fever over 100.4 F, chills, persistent nausea/vomiting, changes in your incision concerning for infection, or if your pain is not controlled on your discharge pain medications. STENT: You may have an internal stent (a hollow tube that runs from the kidney to your bladder) after your procedure, which helps urine drain from the kidney to your bladder. Some patients experience urinary frequency, burning, or even back pain (especially with urination). These sensations will gradually get better. Increasing your fluid intake can also improve these symptoms. While the stent is in place, your urine may continue to be bloody. This stent is temporary and must be removed by your urologist as an outpatient with in 3 months unless otherwise specified.   GENERAL: It is common to have blood in your urine after your procedure. It may be pink or even red; inform your doctor if you have a significant amount of clot in the urine or if you are unable to void at all. The urine may clear and then become bloody again especially as you are more physically active.  BATHING: You may shower or bathe.  DIET: You may resume your regular diet and regular medication regimen.  PAIN: You may take Tylenol (acetaminophen) 650-975mg and/or Motrin (ibuprofen) 400-600mg, both available over the counter, for pain every 6 hours as needed. Do not exceed 4000mg of Tylenol (acetaminophen) daily. You may alternate these medications such that you take one or the other every 3 hours for around the clock pain coverage. ).  ANTIBIOTICS: You may be given a prescription for an antibiotic, please take this medication as instructed and be sure to complete the entire course. 5 days augmentin   STOOL SOFTENERS: Do not allow yourself to become constipated as straining may cause bleeding. Take stool softeners or a laxative (ex. Miralax, Colace, Senokot, ExLax, etc), available over the counter, if needed.  ACTIVITY: No heavy lifting or strenuous exercise until you are evaluated at your post-operative appointment. Otherwise, you may return to your usual level of physical activity.  ANTICOAGULATION: If you are taking any blood thinning medications, please discuss with your urologist prior to restarting these medications unless otherwise specified. RESTART IN 2 DAYS   FOLLOW-UP: If you did not already schedule your post-operative appointment, please call your urologist to schedule and follow-up appointment.  CALL YOUR UROLOGIST IF: You have any bleeding that does not stop, inability to void >8 hours, fever over 100.4 F, chills, persistent nausea/vomiting, changes in your incision concerning for infection, or if your pain is not controlled on your discharge pain medications.

## 2022-10-19 NOTE — ASU PATIENT PROFILE, ADULT - NSICDXPASTMEDICALHX_GEN_ALL_CORE_FT
PAST MEDICAL HISTORY:  LOUISE (acute kidney injury) 9/2022    Anxiety disorder     Atrial fibrillation recent cardiac ablation 9/22    BPH (benign prostatic hyperplasia)     Bradycardia, drug induced     Cardiac pacemaker [employer] L331    Chronic heart failure with preserved ejection fraction     CKD (chronic kidney disease)     CLL (chronic lymphocytic leukemia) in remission    Diverticulitis     GERD (gastroesophageal reflux disease)     Gout     History of macular degeneration     [place] (hard of hearing)     Hypothyroid     IgG monoclonal gammopathy     Memory deficit     Nephrolithiasis     Pneumonia due to COVID-19 virus 2020    Waldenstrom macroglobulinemia

## 2022-10-21 ENCOUNTER — NON-APPOINTMENT (OUTPATIENT)
Age: 77
End: 2022-10-21

## 2022-10-21 PROBLEM — U07.1 COVID-19: Chronic | Status: ACTIVE | Noted: 2022-10-12

## 2022-10-21 PROBLEM — Z95.0 PRESENCE OF CARDIAC PACEMAKER: Chronic | Status: ACTIVE | Noted: 2022-10-12

## 2022-10-21 PROBLEM — N17.9 ACUTE KIDNEY FAILURE, UNSPECIFIED: Chronic | Status: ACTIVE | Noted: 2022-10-12

## 2022-10-21 LAB — SURGICAL PATHOLOGY STUDY: SIGNIFICANT CHANGE UP

## 2022-10-26 LAB — NIDUS STONE QN: SIGNIFICANT CHANGE UP

## 2022-10-28 ENCOUNTER — OUTPATIENT (OUTPATIENT)
Dept: OUTPATIENT SERVICES | Facility: HOSPITAL | Age: 77
LOS: 1 days | Discharge: HOME | End: 2022-10-28

## 2022-10-28 DIAGNOSIS — Z98.89 OTHER SPECIFIED POSTPROCEDURAL STATES: Chronic | ICD-10-CM

## 2022-10-28 DIAGNOSIS — Z98.890 OTHER SPECIFIED POSTPROCEDURAL STATES: Chronic | ICD-10-CM

## 2022-10-28 DIAGNOSIS — Z96.0 PRESENCE OF UROGENITAL IMPLANTS: Chronic | ICD-10-CM

## 2022-10-28 DIAGNOSIS — Z95.0 PRESENCE OF CARDIAC PACEMAKER: Chronic | ICD-10-CM

## 2022-10-28 DIAGNOSIS — C91.11 CHRONIC LYMPHOCYTIC LEUKEMIA OF B-CELL TYPE IN REMISSION: ICD-10-CM

## 2022-10-28 DIAGNOSIS — Z98.41 CATARACT EXTRACTION STATUS, RIGHT EYE: Chronic | ICD-10-CM

## 2022-10-31 ENCOUNTER — OUTPATIENT (OUTPATIENT)
Dept: OUTPATIENT SERVICES | Facility: HOSPITAL | Age: 77
LOS: 1 days | End: 2022-10-31
Payer: MEDICARE

## 2022-10-31 ENCOUNTER — APPOINTMENT (OUTPATIENT)
Dept: UROLOGY | Facility: CLINIC | Age: 77
End: 2022-10-31

## 2022-10-31 DIAGNOSIS — Z98.41 CATARACT EXTRACTION STATUS, RIGHT EYE: Chronic | ICD-10-CM

## 2022-10-31 DIAGNOSIS — Z95.0 PRESENCE OF CARDIAC PACEMAKER: Chronic | ICD-10-CM

## 2022-10-31 DIAGNOSIS — Z96.0 PRESENCE OF UROGENITAL IMPLANTS: Chronic | ICD-10-CM

## 2022-10-31 DIAGNOSIS — R35.0 FREQUENCY OF MICTURITION: ICD-10-CM

## 2022-10-31 DIAGNOSIS — Z98.89 OTHER SPECIFIED POSTPROCEDURAL STATES: Chronic | ICD-10-CM

## 2022-10-31 DIAGNOSIS — Z98.890 OTHER SPECIFIED POSTPROCEDURAL STATES: Chronic | ICD-10-CM

## 2022-10-31 DIAGNOSIS — R82.6 ABNORMAL URINE LEVELS OF SUBSTANCES CHIEFLY NONMEDICINAL AS TO SOURCE: ICD-10-CM

## 2022-10-31 PROCEDURE — 52310 CYSTOSCOPY AND TREATMENT: CPT

## 2022-10-31 PROCEDURE — 99213 OFFICE O/P EST LOW 20 MIN: CPT | Mod: 25

## 2022-10-31 NOTE — HISTORY OF PRESENT ILLNESS
[FreeTextEntry1] : : 1945 \par Referring Provider: none \par \par HPI: Mr. NICOLAS CIFUENTES is a 77 year yo M with a PMHx notable for kidney stones. He had recently presented to the hospital and underwent acute intervention with stent insertion on the right side related to a right distal ureteral stone in the setting of a creatinine elevation. Dr. Sultana inserted his ureteral stent. The patient was then directed to follow-up back in the office.\par \par He is feeling well at this time without any acute pain or nausea or vomiting. No fevers or chills. There was no hematuria. He does have some increase in urinary urgency frequency, likely stent related. \par \par He was referred to me for surgical consideration for CVAC with URS.\par  \par Anticoagulation: Eliquis BID\par All: NKDA\par Social: No smoking or drinking, , children\par PMHx: CLL, Waldenstrom's, PPM,  kidney stones\par FHx: None\par PSHx: prior ESWL\par \par Imaging: 2022 CT reviewed. The right ureteral stone mentioned appears to be potential stone debris in the ureter rather than a true discrete stone. He also has a larger stone burden on the right side with a fairly sizable stone seen in the right renal pelvis. It also appears to be of low density.\par \par 10/31:\par Here today s/p L URS x 2 (duplicated) and R side with bilateral stents placed with CVAC. Diet modification reviewed at length- increasing fluids (primarily water), citrus is good, and decreasing/moderating salt, animal flesh protein, oxalate containing foods, and moderation of calcium intake (1000 mg/day is USRDA).\par \par I reviewed with the patient the risks of metabolic stone disease given their underlying risk parameters (all of which include large stones, multiple stones, bilateral stones, family history, and young age), and the indications for 24 hour urine metabolic assessment. We also discussed benefits of regular exercise and weight loss as independent risk reducers for stones.\par \par \par  [Urinary Incontinence] : no urinary incontinence [Urinary Retention] : no urinary retention [Urinary Urgency] : no urinary urgency [Urinary Frequency] : no urinary frequency

## 2022-10-31 NOTE — PHYSICAL EXAM
[General Appearance - Well Developed] : well developed [General Appearance - Well Nourished] : well nourished [Bowel Sounds] : normal bowel sounds [Abdomen Soft] : soft [] : no respiratory distress [Respiration, Rhythm And Depth] : normal respiratory rhythm and effort [Oriented To Time, Place, And Person] : oriented to person, place, and time [Not Anxious] : not anxious [Normal Station and Gait] : the gait and station were normal for the patient's age [No Focal Deficits] : no focal deficits

## 2022-11-02 DIAGNOSIS — N20.0 CALCULUS OF KIDNEY: ICD-10-CM

## 2022-11-04 ENCOUNTER — RESULT REVIEW (OUTPATIENT)
Age: 77
End: 2022-11-04

## 2022-11-04 ENCOUNTER — APPOINTMENT (OUTPATIENT)
Dept: HEMATOLOGY ONCOLOGY | Facility: CLINIC | Age: 77
End: 2022-11-04

## 2022-11-04 VITALS
WEIGHT: 228 LBS | RESPIRATION RATE: 18 BRPM | HEART RATE: 54 BPM | TEMPERATURE: 96.6 F | SYSTOLIC BLOOD PRESSURE: 103 MMHG | BODY MASS INDEX: 29.26 KG/M2 | OXYGEN SATURATION: 98 % | HEIGHT: 74 IN | DIASTOLIC BLOOD PRESSURE: 61 MMHG

## 2022-11-04 LAB
BASOPHILS # BLD AUTO: 0.05 K/UL — SIGNIFICANT CHANGE UP (ref 0–0.2)
BASOPHILS NFR BLD AUTO: 0.8 % — SIGNIFICANT CHANGE UP (ref 0–2)
EOSINOPHIL # BLD AUTO: 0.18 K/UL — SIGNIFICANT CHANGE UP (ref 0–0.5)
EOSINOPHIL NFR BLD AUTO: 2.9 % — SIGNIFICANT CHANGE UP (ref 0–6)
HCT VFR BLD CALC: 28 % — LOW (ref 39–50)
HGB BLD-MCNC: 8.9 G/DL — LOW (ref 13–17)
IMM GRANULOCYTES NFR BLD AUTO: 0.3 % — SIGNIFICANT CHANGE UP (ref 0–0.9)
LYMPHOCYTES # BLD AUTO: 1.57 K/UL — SIGNIFICANT CHANGE UP (ref 1–3.3)
LYMPHOCYTES # BLD AUTO: 25 % — SIGNIFICANT CHANGE UP (ref 13–44)
MCHC RBC-ENTMCNC: 30.6 PG — SIGNIFICANT CHANGE UP (ref 27–34)
MCHC RBC-ENTMCNC: 31.8 G/DL — LOW (ref 32–36)
MCV RBC AUTO: 96.2 FL — SIGNIFICANT CHANGE UP (ref 80–100)
MONOCYTES # BLD AUTO: 0.58 K/UL — SIGNIFICANT CHANGE UP (ref 0–0.9)
MONOCYTES NFR BLD AUTO: 9.2 % — SIGNIFICANT CHANGE UP (ref 2–14)
NEUTROPHILS # BLD AUTO: 3.89 K/UL — SIGNIFICANT CHANGE UP (ref 1.8–7.4)
NEUTROPHILS NFR BLD AUTO: 61.8 % — SIGNIFICANT CHANGE UP (ref 43–77)
NRBC # BLD: 0 /100 WBCS — SIGNIFICANT CHANGE UP (ref 0–0)
PLATELET # BLD AUTO: 107 K/UL — LOW (ref 150–400)
RBC # BLD: 2.91 M/UL — LOW (ref 4.2–5.8)
RBC # FLD: 13.8 % — SIGNIFICANT CHANGE UP (ref 10.3–14.5)
WBC # BLD: 6.29 K/UL — SIGNIFICANT CHANGE UP (ref 3.8–10.5)
WBC # FLD AUTO: 6.29 K/UL — SIGNIFICANT CHANGE UP (ref 3.8–10.5)

## 2022-11-04 PROCEDURE — 99213 OFFICE O/P EST LOW 20 MIN: CPT

## 2022-11-07 LAB
ALBUMIN SERPL ELPH-MCNC: 3.7 G/DL
ALP BLD-CCNC: 140 U/L
ALT SERPL-CCNC: 8 U/L
ANION GAP SERPL CALC-SCNC: 11 MMOL/L
AST SERPL-CCNC: 11 U/L
BILIRUB SERPL-MCNC: 0.2 MG/DL
BUN SERPL-MCNC: 47 MG/DL
CALCIUM SERPL-MCNC: 8.6 MG/DL
CHLORIDE SERPL-SCNC: 110 MMOL/L
CO2 SERPL-SCNC: 20 MMOL/L
CREAT SERPL-MCNC: 3.87 MG/DL
DEPRECATED KAPPA LC FREE/LAMBDA SER: 0 RATIO
DEPRECATED KAPPA LC FREE/LAMBDA SER: 0 RATIO
EGFR: 15 ML/MIN/1.73M2
GLUCOSE SERPL-MCNC: 107 MG/DL
IGA SER QL IEP: 14 MG/DL
IGG SER QL IEP: 444 MG/DL
IGM SER QL IEP: 1192 MG/DL
KAPPA LC CSF-MCNC: 261.87 MG/DL
KAPPA LC CSF-MCNC: 261.87 MG/DL
KAPPA LC SERPL-MCNC: 1.14 MG/DL
KAPPA LC SERPL-MCNC: 1.14 MG/DL
LDH SERPL-CCNC: 129 U/L
POTASSIUM SERPL-SCNC: 5.1 MMOL/L
PROT SERPL-MCNC: 6.2 G/DL
SODIUM SERPL-SCNC: 141 MMOL/L

## 2022-11-09 LAB
ALBUMIN MFR SERPL ELPH: 51.9 %
ALBUMIN SERPL-MCNC: 3.1 G/DL
ALBUMIN/GLOB SERPL: 1.1 RATIO
ALPHA1 GLOB MFR SERPL ELPH: 7.7 %
ALPHA1 GLOB SERPL ELPH-MCNC: 0.5 G/DL
ALPHA2 GLOB MFR SERPL ELPH: 12.7 %
ALPHA2 GLOB SERPL ELPH-MCNC: 0.8 G/DL
B-GLOBULIN MFR SERPL ELPH: 8.8 %
B-GLOBULIN SERPL ELPH-MCNC: 0.5 G/DL
GAMMA GLOB FLD ELPH-MCNC: 1.1 G/DL
GAMMA GLOB MFR SERPL ELPH: 18.9 %
INTERPRETATION SERPL IEP-IMP: NORMAL
M PROTEIN MFR SERPL ELPH: NORMAL
MONOCLON BAND OBS SERPL: NORMAL
PROT SERPL-MCNC: 6 G/DL
PROT SERPL-MCNC: 6 G/DL

## 2022-11-11 ENCOUNTER — APPOINTMENT (OUTPATIENT)
Dept: ELECTROPHYSIOLOGY | Facility: CLINIC | Age: 77
End: 2022-11-11

## 2022-11-11 ENCOUNTER — NON-APPOINTMENT (OUTPATIENT)
Age: 77
End: 2022-11-11

## 2022-11-11 VITALS
SYSTOLIC BLOOD PRESSURE: 122 MMHG | HEIGHT: 74 IN | DIASTOLIC BLOOD PRESSURE: 69 MMHG | HEART RATE: 56 BPM | BODY MASS INDEX: 29.26 KG/M2 | WEIGHT: 228 LBS | RESPIRATION RATE: 14 BRPM | OXYGEN SATURATION: 99 %

## 2022-11-11 PROCEDURE — 99214 OFFICE O/P EST MOD 30 MIN: CPT | Mod: 25

## 2022-11-11 PROCEDURE — 93000 ELECTROCARDIOGRAM COMPLETE: CPT

## 2022-11-11 RX ORDER — VIT A/VIT C/VIT E/ZINC/COPPER 4296-226
CAPSULE ORAL
Refills: 0 | Status: DISCONTINUED | COMMUNITY
Start: 2022-04-12 | End: 2022-11-11

## 2022-11-11 RX ORDER — NYSTATIN AND TRIAMCINOLONE ACETONIDE 100000; 1 MG/G; MG/G
100000-0.1 CREAM TOPICAL
Qty: 90 | Refills: 0 | Status: ACTIVE | COMMUNITY
Start: 2022-11-08

## 2022-11-11 NOTE — CARDIOLOGY SUMMARY
[de-identified] : ECG from 11/11/2022: Atrial pacing at 50 beats per minute with intrinsic AV conduction and native QRS (QRSd: 104ms)\par ECG from 9/12/2022: Atrial pacing with RI: 224, intrinsic QRS - QRSd: 100ms [de-identified] : TTE from 3/17/2022: LA: 5.0, mildly dilated, EF: 60%, normal diastolic function, normal RV size and function, mild TR, no pericardial effusion, mild-mod pHTN [de-identified] : Ablation 9/19/2022: PVI (first pass isolation of the right PVs with empiric carinal line, first pass of LPVs not achieved), AF organized into CTI flutter during PVI - CTI line completed with termination of AFl, bidirectional block achieved across CTI lesion set. Exit mapping used for LPVs - additional lesions required anterior LIPV and anterior forrest in order to isolate LPVs. No acute or dormant PV reconnections, no sustained inducible arrhythmias with PES. Small thrombus noted on RA lead on ICE

## 2022-11-11 NOTE — DISCUSSION/SUMMARY
[Paroxysmal Atrial Fibrillation] : paroxysmal atrial fibrillation [FreeTextEntry1] : Anson Olvera is a 77 year old man with pAF, SSS (s/p 2-ch BSc PPM), chronic HFpEF, CKD Stage IV-V, CLL, IgG monoclonal gammopathy, GERD, hypothyroidism and mild cognitive impairment. He underwent an AF ablation on 9/19/2022 (PVI and CTI) for symptomatic pAF with an AF burden of 21%. Since his ablation he has not had any symptoms. He had one episode of pAT lasting 1 minute and 30 seconds. \par \par CHADS2-VASC: 2 (Age: 3)\par \par Continue with Eliquis 5mg p.o. bid (Age < 80, weight > 60) and Metoprolol Succinate 50mg p.o. bid. Continue to monitor for AF recurrences using remote monitoring of his pacemaker. He will return to see me in 6 months or sooner if needed. [EKG obtained to assist in diagnosis and management of assessed problem(s)] : EKG obtained to assist in diagnosis and management of assessed problem(s)

## 2022-11-11 NOTE — PHYSICAL EXAM
[Well Developed] : well developed [Well Nourished] : well nourished [No Acute Distress] : no acute distress [Normal Venous Pressure] : normal venous pressure [Normal S1, S2] : normal S1, S2 [No Rub] : no rub [No Gallop] : no gallop [Clear Lung Fields] : clear lung fields [Good Air Entry] : good air entry [No Respiratory Distress] : no respiratory distress  [Soft] : abdomen soft [Non Tender] : non-tender [No Edema] : no edema [No Cyanosis] : no cyanosis [No Clubbing] : no clubbing [No Rash] : no rash [No Skin Lesions] : no skin lesions [Moves all extremities] : moves all extremities [No Focal Deficits] : no focal deficits [Normal Speech] : normal speech [Alert and Oriented] : alert and oriented [de-identified] : 3/6 AS murmur [de-identified] : Pt was in a wheelchair

## 2022-11-11 NOTE — HISTORY OF PRESENT ILLNESS
[FreeTextEntry1] : Anson Olvera is a 77 year old man with paroxysmal atrial fibrillation, sinus node dysfunction (status post a dual chamber [employer] pacemaker), chronic HFpEF, CKD Stage IV-V, CLL, IgG monoclonal gammopathy, GERD, hypothyroidism and mild cognitive impairment. He had symptomatic paroxysmal atrial fibrillation with increasing AF burden on pacemaker interrogations (12% in 5/2022 --> 21% in 8/2022). He presented on 9/12/2022 for an AF ablation and was found to have LOUISE on CKD (etiology obstructive - 4mm obstructive calculus in right ureter, s/p ureteral stent). Once his renal function returned to baseline he underwent a catheter ablation of atrial fibrillation on 9/19/2022 (PVI and CTI). He presents today for follow-up.\par \par Since the ablation he has been feeling well. He denies palpitations, chest pain, shortness of breath. He does endorse fatigue.\par \par Pacemaker interrogation from today: On 9/22/2022 there was a episode - 1 minute 30 seconds of pAT/AF. No other abnormalities noted\par \par CHADS2-VASC: 2 (Age: 3)

## 2022-11-15 ENCOUNTER — APPOINTMENT (OUTPATIENT)
Dept: ELECTROPHYSIOLOGY | Facility: CLINIC | Age: 77
End: 2022-11-15

## 2022-11-15 NOTE — HISTORY OF PRESENT ILLNESS
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  Ablation was not done.   Fatigue persists. Neuropathy is stable in his feet and fingers. Has chronic back pain. Has developed constant right hip pain. Will see Orthopedist. His right leg swlling persists, but is slightly better. He notes no chest pain, fever, night sweats, headache, visual problems, SOB, abdominal pain, swollen glands, bleeding, bruising, ankle swelling. Had COVID vaccine x 4  and flu vaccine.   \par

## 2022-11-15 NOTE — CONSULT LETTER
[FreeTextEntry2] : Dr. Simpson [FreeTextEntry3] : Cesar Simons M.D., FACP\par Professor of Medicine\par [employer]\par Associate Chief, Division of Hematology\par [facility]\par [facility]\par [address] [place] [address]\[place] [address]\par [phone]

## 2022-11-15 NOTE — ASSESSMENT
[FreeTextEntry1] : 76 year old male with CLL evolved to Waldenstrom's macroglobulinemia complicated by CRF, nephrotic syndrome and PAF. He had progressive anemia with rising creatinine and relapse of his Waldenstrom's. He was not a candidate for Ibrutinib due to anticoagulation with Warfarin. He achieved a partial remission with marked improvement in his hemoglobin, creatinine and IgM with IRd, but had not tolerated Rituxan well and is loathe to receive it again. He previously tolerated the Ninlaro well and is now undergoing maintenance therapy with Ninlaro and Dexamethasone. Waldenstrom's is stable. Discussed possible Evusheld prophylaxis for COVID-19. He agrees to participate.  Hgb has dropped to 8.9.  Will repeat in 2 weeks. May be due to placement of ureteral stent.  Care discussed with Dr Simons. \par \par Plan:\par Ninlaro/Dexamethasone if labs acceptable\par CMP, LDH, SPEP, Quant Ig, SFLC\par Acyclovir\par Omeprazole\par Eliquis per Cardiology\par Arrange Evusheld prophylaxis-he has not scheduled this yet.  \par BW in 2 weeks-script sent .   \par RTC 3 months \par \par

## 2022-11-15 NOTE — PHYSICAL EXAM
[de-identified] :  Pacemaker left anterior chest wall. RRR. S1S2 normal. Gr 2/6 TESS RUSB to LLSB. No gallops. [de-identified] : 5 x 5 cm lipoma left lower back.

## 2022-11-16 ENCOUNTER — APPOINTMENT (OUTPATIENT)
Dept: ELECTROPHYSIOLOGY | Facility: CLINIC | Age: 77
End: 2022-11-16

## 2022-11-16 ENCOUNTER — FORM ENCOUNTER (OUTPATIENT)
Age: 77
End: 2022-11-16

## 2022-11-28 LAB
BASOPHILS # BLD AUTO: 0.03 K/UL
BASOPHILS NFR BLD AUTO: 0.4 %
EOSINOPHIL # BLD AUTO: 0.12 K/UL
EOSINOPHIL NFR BLD AUTO: 1.6 %
HCT VFR BLD CALC: 30 %
HGB BLD-MCNC: 9.2 G/DL
IMM GRANULOCYTES NFR BLD AUTO: 0.5 %
LYMPHOCYTES # BLD AUTO: 2.41 K/UL
LYMPHOCYTES NFR BLD AUTO: 32 %
MAN DIFF?: NORMAL
MCHC RBC-ENTMCNC: 30.7 GM/DL
MCHC RBC-ENTMCNC: 30.9 PG
MCV RBC AUTO: 100.7 FL
MONOCYTES # BLD AUTO: 0.87 K/UL
MONOCYTES NFR BLD AUTO: 11.5 %
NEUTROPHILS # BLD AUTO: 4.07 K/UL
NEUTROPHILS NFR BLD AUTO: 54 %
PLATELET # BLD AUTO: 151 K/UL
RBC # BLD: 2.98 M/UL
RBC # FLD: 13.8 %
WBC # FLD AUTO: 7.54 K/UL

## 2022-12-13 ENCOUNTER — RESULT REVIEW (OUTPATIENT)
Age: 77
End: 2022-12-13

## 2022-12-13 ENCOUNTER — APPOINTMENT (OUTPATIENT)
Dept: UROLOGY | Facility: CLINIC | Age: 77
End: 2022-12-13

## 2022-12-13 ENCOUNTER — OUTPATIENT (OUTPATIENT)
Dept: OUTPATIENT SERVICES | Facility: HOSPITAL | Age: 77
LOS: 1 days | End: 2022-12-13
Payer: MEDICARE

## 2022-12-13 ENCOUNTER — APPOINTMENT (OUTPATIENT)
Dept: HEMATOLOGY ONCOLOGY | Facility: CLINIC | Age: 77
End: 2022-12-13

## 2022-12-13 DIAGNOSIS — R35.0 FREQUENCY OF MICTURITION: ICD-10-CM

## 2022-12-13 DIAGNOSIS — N20.0 CALCULUS OF KIDNEY: ICD-10-CM

## 2022-12-13 LAB
BASOPHILS # BLD AUTO: 0.03 K/UL — SIGNIFICANT CHANGE UP (ref 0–0.2)
BASOPHILS NFR BLD AUTO: 0.3 % — SIGNIFICANT CHANGE UP (ref 0–2)
EOSINOPHIL # BLD AUTO: 0.18 K/UL — SIGNIFICANT CHANGE UP (ref 0–0.5)
EOSINOPHIL NFR BLD AUTO: 2 % — SIGNIFICANT CHANGE UP (ref 0–6)
HCT VFR BLD CALC: 32 % — LOW (ref 39–50)
HGB BLD-MCNC: 10 G/DL — LOW (ref 13–17)
IMM GRANULOCYTES NFR BLD AUTO: 0.9 % — SIGNIFICANT CHANGE UP (ref 0–0.9)
LYMPHOCYTES # BLD AUTO: 2.31 K/UL — SIGNIFICANT CHANGE UP (ref 1–3.3)
LYMPHOCYTES # BLD AUTO: 25.2 % — SIGNIFICANT CHANGE UP (ref 13–44)
MCHC RBC-ENTMCNC: 30.4 PG — SIGNIFICANT CHANGE UP (ref 27–34)
MCHC RBC-ENTMCNC: 31.3 G/DL — LOW (ref 32–36)
MCV RBC AUTO: 97.3 FL — SIGNIFICANT CHANGE UP (ref 80–100)
MONOCYTES # BLD AUTO: 0.86 K/UL — SIGNIFICANT CHANGE UP (ref 0–0.9)
MONOCYTES NFR BLD AUTO: 9.4 % — SIGNIFICANT CHANGE UP (ref 2–14)
NEUTROPHILS # BLD AUTO: 5.71 K/UL — SIGNIFICANT CHANGE UP (ref 1.8–7.4)
NEUTROPHILS NFR BLD AUTO: 62.2 % — SIGNIFICANT CHANGE UP (ref 43–77)
NRBC # BLD: 0 /100 WBCS — SIGNIFICANT CHANGE UP (ref 0–0)
PLATELET # BLD AUTO: 92 K/UL — LOW (ref 150–400)
RBC # BLD: 3.29 M/UL — LOW (ref 4.2–5.8)
RBC # FLD: 13.9 % — SIGNIFICANT CHANGE UP (ref 10.3–14.5)
WBC # BLD: 9.17 K/UL — SIGNIFICANT CHANGE UP (ref 3.8–10.5)
WBC # FLD AUTO: 9.17 K/UL — SIGNIFICANT CHANGE UP (ref 3.8–10.5)

## 2022-12-13 PROCEDURE — 99212 OFFICE O/P EST SF 10 MIN: CPT

## 2022-12-13 PROCEDURE — 76775 US EXAM ABDO BACK WALL LIM: CPT | Mod: 26

## 2022-12-13 PROCEDURE — 76775 US EXAM ABDO BACK WALL LIM: CPT

## 2022-12-14 ENCOUNTER — RX RENEWAL (OUTPATIENT)
Age: 77
End: 2022-12-14

## 2022-12-14 DIAGNOSIS — N20.0 CALCULUS OF KIDNEY: ICD-10-CM

## 2022-12-15 ENCOUNTER — APPOINTMENT (OUTPATIENT)
Dept: CARDIOLOGY | Facility: CLINIC | Age: 77
End: 2022-12-15

## 2022-12-16 ENCOUNTER — APPOINTMENT (OUTPATIENT)
Dept: ELECTROPHYSIOLOGY | Facility: CLINIC | Age: 77
End: 2022-12-16

## 2022-12-16 ENCOUNTER — NON-APPOINTMENT (OUTPATIENT)
Age: 77
End: 2022-12-16

## 2022-12-16 PROCEDURE — 93294 REM INTERROG EVL PM/LDLS PM: CPT

## 2022-12-16 PROCEDURE — 93296 REM INTERROG EVL PM/IDS: CPT

## 2023-01-03 ENCOUNTER — APPOINTMENT (OUTPATIENT)
Dept: UROLOGY | Facility: CLINIC | Age: 78
End: 2023-01-03

## 2023-01-03 NOTE — PROVIDER CONTACT NOTE (OTHER) - NAME OF MD/NP/PA/DO NOTIFIED:
TONYA Galarza Taltz Pregnancy And Lactation Text: The risk during pregnancy and breastfeeding is uncertain with this medication.

## 2023-01-12 ENCOUNTER — LABORATORY RESULT (OUTPATIENT)
Age: 78
End: 2023-01-12

## 2023-01-16 LAB
BASOPHILS # BLD AUTO: 0.05 K/UL
BASOPHILS NFR BLD AUTO: 0.6 %
EOSINOPHIL # BLD AUTO: 0.22 K/UL
EOSINOPHIL NFR BLD AUTO: 2.8 %
HCT VFR BLD CALC: 33.2 %
HGB BLD-MCNC: 10.2 G/DL
IMM GRANULOCYTES NFR BLD AUTO: 0.5 %
LYMPHOCYTES # BLD AUTO: 2.64 K/UL
LYMPHOCYTES NFR BLD AUTO: 33.7 %
MAN DIFF?: NORMAL
MCHC RBC-ENTMCNC: 30.1 PG
MCHC RBC-ENTMCNC: 30.7 GM/DL
MCV RBC AUTO: 97.9 FL
MONOCYTES # BLD AUTO: 0.69 K/UL
MONOCYTES NFR BLD AUTO: 8.8 %
NEUTROPHILS # BLD AUTO: 4.2 K/UL
NEUTROPHILS NFR BLD AUTO: 53.6 %
PLATELET # BLD AUTO: 74 K/UL
RBC # BLD: 3.39 M/UL
RBC # FLD: 13.2 %
WBC # FLD AUTO: 7.84 K/UL

## 2023-01-17 ENCOUNTER — APPOINTMENT (OUTPATIENT)
Dept: CARDIOLOGY | Facility: CLINIC | Age: 78
End: 2023-01-17
Payer: MEDICARE

## 2023-01-17 ENCOUNTER — NON-APPOINTMENT (OUTPATIENT)
Age: 78
End: 2023-01-17

## 2023-01-17 VITALS
SYSTOLIC BLOOD PRESSURE: 128 MMHG | WEIGHT: 220 LBS | HEART RATE: 58 BPM | BODY MASS INDEX: 28.23 KG/M2 | OXYGEN SATURATION: 99 % | DIASTOLIC BLOOD PRESSURE: 67 MMHG | HEIGHT: 74 IN

## 2023-01-17 PROCEDURE — 99214 OFFICE O/P EST MOD 30 MIN: CPT | Mod: 25

## 2023-01-17 PROCEDURE — 93000 ELECTROCARDIOGRAM COMPLETE: CPT

## 2023-01-17 NOTE — DISCUSSION/SUMMARY
[FreeTextEntry1] : The patient is a 77-year-old gentleman history mild dementia, paroxysmal  atrial fibrillation, lower extremity edema , CLL, CKD stage IV, Waldenstrom's macroglobulinemia, hypothyroidism, PPM , chronic diastolic heart failure s/p sigmoid resection for diverticulitis, oral chemo for his CLL , pelvic fracture, s/p afib ablation now s/p COVID with weight loss. . \par #1 CV- , c/w metoprolol\par #2 PPM- [employer] pacemaker interrogation ljust APCs,c/w eliquis\par #3 Pulm- COVID pneumonia off oxygen\par #4 Heme- f/u clinic\par #5 Hypothyroid- continue levothyroxine\par #6 Neuro- on namenda and aricept for dementia, mirtazapine and clonazepam for anxiety\par  [EKG obtained to assist in diagnosis and management of assessed problem(s)] : EKG obtained to assist in diagnosis and management of assessed problem(s)

## 2023-01-17 NOTE — HISTORY OF PRESENT ILLNESS
[FreeTextEntry1] : Anson had fever and cough three weeks ago. He had COVID and treated with remdesivir at [facility]. Then had course of zithromax. Now just tired and weak.

## 2023-01-17 NOTE — REVIEW OF SYSTEMS
[SOB] : shortness of breath [Dyspnea on exertion] : dyspnea during exertion [Negative] : Heme/Lymph [Weight Loss (___ Lbs)] : [unfilled] ~Ulb weight loss [Chest Discomfort] : no chest discomfort [Lower Ext Edema] : no extremity edema [Leg Claudication] : no intermittent leg claudication [Palpitations] : no palpitations [Orthopnea] : no orthopnea [PND] : no PND [Syncope] : no syncope

## 2023-01-30 ENCOUNTER — OUTPATIENT (OUTPATIENT)
Dept: OUTPATIENT SERVICES | Facility: HOSPITAL | Age: 78
LOS: 1 days | Discharge: HOME | End: 2023-01-30

## 2023-01-30 DIAGNOSIS — C91.11 CHRONIC LYMPHOCYTIC LEUKEMIA OF B-CELL TYPE IN REMISSION: ICD-10-CM

## 2023-01-30 DIAGNOSIS — Z98.89 OTHER SPECIFIED POSTPROCEDURAL STATES: Chronic | ICD-10-CM

## 2023-01-30 DIAGNOSIS — Z96.0 PRESENCE OF UROGENITAL IMPLANTS: Chronic | ICD-10-CM

## 2023-01-30 DIAGNOSIS — Z98.890 OTHER SPECIFIED POSTPROCEDURAL STATES: Chronic | ICD-10-CM

## 2023-01-30 DIAGNOSIS — Z95.0 PRESENCE OF CARDIAC PACEMAKER: Chronic | ICD-10-CM

## 2023-01-30 DIAGNOSIS — Z98.41 CATARACT EXTRACTION STATUS, RIGHT EYE: Chronic | ICD-10-CM

## 2023-02-01 LAB
ALBUMIN SERPL ELPH-MCNC: 3.8 G/DL
ALP BLD-CCNC: 142 U/L
ALT SERPL-CCNC: 7 U/L
ANION GAP SERPL CALC-SCNC: 10 MMOL/L
AST SERPL-CCNC: 11 U/L
BASOPHILS # BLD AUTO: 0.06 K/UL
BASOPHILS NFR BLD AUTO: 1.1 %
BILIRUB SERPL-MCNC: 0.3 MG/DL
BUN SERPL-MCNC: 41 MG/DL
CALCIUM SERPL-MCNC: 9.1 MG/DL
CHLORIDE SERPL-SCNC: 112 MMOL/L
CO2 SERPL-SCNC: 20 MMOL/L
CREAT SERPL-MCNC: 3.55 MG/DL
EGFR: 17 ML/MIN/1.73M2
EOSINOPHIL # BLD AUTO: 0.14 K/UL
EOSINOPHIL NFR BLD AUTO: 2.5 %
GLUCOSE SERPL-MCNC: 108 MG/DL
HCT VFR BLD CALC: 32.6 %
HGB BLD-MCNC: 10.2 G/DL
IMM GRANULOCYTES NFR BLD AUTO: 0.4 %
LDH SERPL-CCNC: 136 U/L
LYMPHOCYTES # BLD AUTO: 1.98 K/UL
LYMPHOCYTES NFR BLD AUTO: 34.9 %
MAN DIFF?: NORMAL
MCHC RBC-ENTMCNC: 31.1 PG
MCHC RBC-ENTMCNC: 31.3 GM/DL
MCV RBC AUTO: 99.4 FL
MONOCYTES # BLD AUTO: 0.46 K/UL
MONOCYTES NFR BLD AUTO: 8.1 %
NEUTROPHILS # BLD AUTO: 3.01 K/UL
NEUTROPHILS NFR BLD AUTO: 53 %
PLATELET # BLD AUTO: 76 K/UL
POTASSIUM SERPL-SCNC: 4.8 MMOL/L
PROT SERPL-MCNC: 6.2 G/DL
RBC # BLD: 3.28 M/UL
RBC # FLD: 13.5 %
SODIUM SERPL-SCNC: 141 MMOL/L
WBC # FLD AUTO: 5.67 K/UL

## 2023-02-07 ENCOUNTER — LABORATORY RESULT (OUTPATIENT)
Age: 78
End: 2023-02-07

## 2023-02-07 ENCOUNTER — RESULT REVIEW (OUTPATIENT)
Age: 78
End: 2023-02-07

## 2023-02-07 ENCOUNTER — APPOINTMENT (OUTPATIENT)
Dept: HEMATOLOGY ONCOLOGY | Facility: CLINIC | Age: 78
End: 2023-02-07
Payer: MEDICARE

## 2023-02-07 VITALS
HEART RATE: 56 BPM | SYSTOLIC BLOOD PRESSURE: 122 MMHG | BODY MASS INDEX: 28.45 KG/M2 | OXYGEN SATURATION: 97 % | RESPIRATION RATE: 16 BRPM | TEMPERATURE: 96.4 F | WEIGHT: 221.56 LBS | DIASTOLIC BLOOD PRESSURE: 66 MMHG

## 2023-02-07 LAB
BASOPHILS # BLD AUTO: 0.06 K/UL — SIGNIFICANT CHANGE UP (ref 0–0.2)
BASOPHILS NFR BLD AUTO: 1 % — SIGNIFICANT CHANGE UP (ref 0–2)
EOSINOPHIL # BLD AUTO: 0.15 K/UL — SIGNIFICANT CHANGE UP (ref 0–0.5)
EOSINOPHIL NFR BLD AUTO: 2.4 % — SIGNIFICANT CHANGE UP (ref 0–6)
HCT VFR BLD CALC: 31.1 % — LOW (ref 39–50)
HGB BLD-MCNC: 9.8 G/DL — LOW (ref 13–17)
IMM GRANULOCYTES NFR BLD AUTO: 0.3 % — SIGNIFICANT CHANGE UP (ref 0–0.9)
LYMPHOCYTES # BLD AUTO: 2.16 K/UL — SIGNIFICANT CHANGE UP (ref 1–3.3)
LYMPHOCYTES # BLD AUTO: 34.4 % — SIGNIFICANT CHANGE UP (ref 13–44)
MCHC RBC-ENTMCNC: 30.2 PG — SIGNIFICANT CHANGE UP (ref 27–34)
MCHC RBC-ENTMCNC: 31.5 G/DL — LOW (ref 32–36)
MCV RBC AUTO: 96 FL — SIGNIFICANT CHANGE UP (ref 80–100)
MONOCYTES # BLD AUTO: 0.57 K/UL — SIGNIFICANT CHANGE UP (ref 0–0.9)
MONOCYTES NFR BLD AUTO: 9.1 % — SIGNIFICANT CHANGE UP (ref 2–14)
NEUTROPHILS # BLD AUTO: 3.31 K/UL — SIGNIFICANT CHANGE UP (ref 1.8–7.4)
NEUTROPHILS NFR BLD AUTO: 52.8 % — SIGNIFICANT CHANGE UP (ref 43–77)
NRBC # BLD: 0 /100 WBCS — SIGNIFICANT CHANGE UP (ref 0–0)
PLATELET # BLD AUTO: 77 K/UL — LOW (ref 150–400)
RBC # BLD: 3.24 M/UL — LOW (ref 4.2–5.8)
RBC # FLD: 13.4 % — SIGNIFICANT CHANGE UP (ref 10.3–14.5)
WBC # BLD: 6.27 K/UL — SIGNIFICANT CHANGE UP (ref 3.8–10.5)
WBC # FLD AUTO: 6.27 K/UL — SIGNIFICANT CHANGE UP (ref 3.8–10.5)

## 2023-02-07 PROCEDURE — 99215 OFFICE O/P EST HI 40 MIN: CPT

## 2023-02-07 RX ORDER — POTASSIUM CITRATE 10 MEQ/1
10 MEQ TABLET, EXTENDED RELEASE ORAL TWICE DAILY
Qty: 180 | Refills: 0 | Status: DISCONTINUED | COMMUNITY
Start: 2022-12-13 | End: 2023-02-07

## 2023-02-08 ENCOUNTER — RX RENEWAL (OUTPATIENT)
Age: 78
End: 2023-02-08

## 2023-02-08 LAB
ALBUMIN SERPL ELPH-MCNC: 3.8 G/DL
ALP BLD-CCNC: 136 U/L
ALT SERPL-CCNC: 6 U/L
ANION GAP SERPL CALC-SCNC: 13 MMOL/L
APTT BLD: 35 SEC
AST SERPL-CCNC: 11 U/L
BILIRUB SERPL-MCNC: 0.2 MG/DL
BUN SERPL-MCNC: 42 MG/DL
CALCIUM SERPL-MCNC: 9 MG/DL
CHLORIDE SERPL-SCNC: 113 MMOL/L
CO2 SERPL-SCNC: 18 MMOL/L
CREAT SERPL-MCNC: 3.52 MG/DL
DEPRECATED D DIMER PPP IA-ACNC: <150 NG/ML DDU
EGFR: 17 ML/MIN/1.73M2
GLUCOSE SERPL-MCNC: 96 MG/DL
INR PPP: 2.05 RATIO
LDH SERPL-CCNC: 147 U/L
POTASSIUM SERPL-SCNC: 5.3 MMOL/L
PROT SERPL-MCNC: 6 G/DL
PT BLD: 24.4 SEC
SODIUM SERPL-SCNC: 144 MMOL/L
TT CONT PPP: 24.8 SEC

## 2023-02-10 LAB
DEPRECATED KAPPA LC FREE/LAMBDA SER: 0.01 RATIO
IGA SER QL IEP: 14 MG/DL
IGG SER QL IEP: 477 MG/DL
IGM SER QL IEP: 1254 MG/DL
KAPPA LC CSF-MCNC: 227.37 MG/DL
KAPPA LC SERPL-MCNC: 1.24 MG/DL

## 2023-02-15 NOTE — H&P PST ADULT - DATE OF FIRST COVID-19 BOOSTER
[facility]  Operative Report    PATIENT NAME: Irvin Reed RECORD NO. [id number]  SURGEON: Tamara Mckenna MD   Primary Care Physician: Levy Sandoval, APRN - CNP  Date: 2/15/2023, 2:42 PM     PROCEDURE PERFORMED: Robotic assisted laparoscopic cholecystectomy  PREOPERATIVE DIAGNOSIS:   Active Hospital Problems    Diagnosis Date Noted    Gallstones [K80.20] 02/15/2023     Priority: Medium      POSTOPERATIVE DIAGNOSIS: Same, path pending  SURGEON:  Tamara Mckenna MD   ANESTHESIA:  General endotracheal anesthesia and local  ANESTHESIA:  30 ml  OF 0.5% Marcaine   ESTIMATED BLOOD LOSS:  20  ml  SPECIMEN: gallbladder  COMPLICATIONS:  None; patient tolerated the procedure well. DRAINS: none  DISPOSITION: Recovery Room  CONDITION: stable      Narrative: Indications: See history and physical examination. Patient presented with symptomatic cholelithiasis presents for cholecystectomy. Procedure: Patient was brought the operating suite placed supine on the operating table. He had pneumatic sequential pressure devices on the lower extremities. He was given Mefoxin and ICG green dye preoperatively. After induction of general anesthesia abdomen was prepped and draped. Incision was made above the umbilicus carried down to the fascia which was elevated and incised. 12 mm port was inserted and CO2 pneumoperitoneum was introduced followed by the camera. Patient did have an enlarged liver but there is no nodularity. Gallbladder was distended and somewhat intrahepatic. Additional robotic ports and a 5 mm subcostal port were placed in standard positioning. Patient was placed in reverse Trendelenburg with the left side down. X-ray robot was docked from the patient's right side and instruments inserted. I retired and console. My assistant grabbed the gallbladder by its fundus and retracted over the liver edge. Firefly was utilized throughout.   Omental adhesions to the gallbladder were taken down.  Gentle downward lateral traction were applied to the infundibulum. Cystic duct and artery were cleared from surrounding tissue. There is seen as the only 2 structures going into the gallbladder. Cystic duct was confirmed via firefly. Hemoclips were placed proximally distally and the duct and artery were divided. The gallbladder was dissected off the liver bed using cautery technique. At this point I scrubbed back into the patient's bedside there is no evidence of any bleeding. Gallbladder was placed in an Endo Catch type bag and removed from the abdominal cavity. Liver bed was reexamined there is no evidence of bleeding or bile leak. All ports were removed. CO2 pneumoperitoneum was manually compressed out of the abdominal cavity. Supraumbilical fascia was closed with interrupted 0 Ethibond suture. All skin incisions were closed subcuticular 4-0 Vicryl suture followed by application of skin glue. Patient was spontaneously breathing and transported to the recovery area in stable condition. 09-Jul-2022

## 2023-02-16 LAB
ALBUMIN MFR SERPL ELPH: 51.1 %
ALBUMIN SERPL-MCNC: 3.2 G/DL
ALBUMIN/GLOB SERPL: 1.1 RATIO
ALPHA1 GLOB MFR SERPL ELPH: 6.4 %
ALPHA1 GLOB SERPL ELPH-MCNC: 0.4 G/DL
ALPHA2 GLOB MFR SERPL ELPH: 12.7 %
ALPHA2 GLOB SERPL ELPH-MCNC: 0.8 G/DL
B-GLOBULIN MFR SERPL ELPH: 9.1 %
B-GLOBULIN SERPL ELPH-MCNC: 0.6 G/DL
GAMMA GLOB FLD ELPH-MCNC: 1.3 G/DL
GAMMA GLOB MFR SERPL ELPH: 20.7 %
INTERPRETATION SERPL IEP-IMP: NORMAL
M PROTEIN MFR SERPL ELPH: NORMAL
MONOCLON BAND OBS SERPL: NORMAL
PROT SERPL-MCNC: 6.2 G/DL
PROT SERPL-MCNC: 6.2 G/DL

## 2023-03-10 ENCOUNTER — RESULT REVIEW (OUTPATIENT)
Age: 78
End: 2023-03-10

## 2023-03-10 ENCOUNTER — LABORATORY RESULT (OUTPATIENT)
Age: 78
End: 2023-03-10

## 2023-03-10 ENCOUNTER — APPOINTMENT (OUTPATIENT)
Dept: HEMATOLOGY ONCOLOGY | Facility: CLINIC | Age: 78
End: 2023-03-10
Payer: MEDICARE

## 2023-03-10 VITALS
HEIGHT: 74 IN | TEMPERATURE: 97.2 F | RESPIRATION RATE: 16 BRPM | BODY MASS INDEX: 28.06 KG/M2 | WEIGHT: 218.68 LBS | DIASTOLIC BLOOD PRESSURE: 58 MMHG | SYSTOLIC BLOOD PRESSURE: 103 MMHG | OXYGEN SATURATION: 96 % | HEART RATE: 65 BPM

## 2023-03-10 VITALS — DIASTOLIC BLOOD PRESSURE: 51 MMHG | HEART RATE: 53 BPM | SYSTOLIC BLOOD PRESSURE: 98 MMHG

## 2023-03-10 DIAGNOSIS — G62.9 POLYNEUROPATHY, UNSPECIFIED: ICD-10-CM

## 2023-03-10 LAB
BASOPHILS # BLD AUTO: 0.04 K/UL — SIGNIFICANT CHANGE UP (ref 0–0.2)
BASOPHILS NFR BLD AUTO: 0.8 % — SIGNIFICANT CHANGE UP (ref 0–2)
EOSINOPHIL # BLD AUTO: 0.13 K/UL — SIGNIFICANT CHANGE UP (ref 0–0.5)
EOSINOPHIL NFR BLD AUTO: 2.5 % — SIGNIFICANT CHANGE UP (ref 0–6)
HCT VFR BLD CALC: 30.4 % — LOW (ref 39–50)
HGB BLD-MCNC: 9.4 G/DL — LOW (ref 13–17)
IMM GRANULOCYTES NFR BLD AUTO: 0.2 % — SIGNIFICANT CHANGE UP (ref 0–0.9)
LYMPHOCYTES # BLD AUTO: 1.81 K/UL — SIGNIFICANT CHANGE UP (ref 1–3.3)
LYMPHOCYTES # BLD AUTO: 35.4 % — SIGNIFICANT CHANGE UP (ref 13–44)
MCHC RBC-ENTMCNC: 30.4 PG — SIGNIFICANT CHANGE UP (ref 27–34)
MCHC RBC-ENTMCNC: 30.9 G/DL — LOW (ref 32–36)
MCV RBC AUTO: 98.4 FL — SIGNIFICANT CHANGE UP (ref 80–100)
MONOCYTES # BLD AUTO: 0.47 K/UL — SIGNIFICANT CHANGE UP (ref 0–0.9)
MONOCYTES NFR BLD AUTO: 9.2 % — SIGNIFICANT CHANGE UP (ref 2–14)
NEUTROPHILS # BLD AUTO: 2.65 K/UL — SIGNIFICANT CHANGE UP (ref 1.8–7.4)
NEUTROPHILS NFR BLD AUTO: 51.9 % — SIGNIFICANT CHANGE UP (ref 43–77)
NRBC # BLD: 0 /100 WBCS — SIGNIFICANT CHANGE UP (ref 0–0)
PLATELET # BLD AUTO: 81 K/UL — LOW (ref 150–400)
RBC # BLD: 3.09 M/UL — LOW (ref 4.2–5.8)
RBC # FLD: 13.3 % — SIGNIFICANT CHANGE UP (ref 10.3–14.5)
WBC # BLD: 5.11 K/UL — SIGNIFICANT CHANGE UP (ref 3.8–10.5)
WBC # FLD AUTO: 5.11 K/UL — SIGNIFICANT CHANGE UP (ref 3.8–10.5)

## 2023-03-10 PROCEDURE — 99215 OFFICE O/P EST HI 40 MIN: CPT

## 2023-03-10 NOTE — ASSESSMENT
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 He had progressive anemia with rising creatinine and relapse of his Waldenstrom's. He was not a candidate for Ibrutinib due to anticoagulation with Warfarin. He achieved a partial remission with marked improvement in his hemoglobin, creatinine and IgM with IRd, but had not tolerated Rituxan well and is loathe to receive it again. He previously tolerated the Ninlaro well and is now undergoing maintenance therapy with Ninlaro and Dexamethasone. Waldenstrom's is stable and renal function has improved, but thrombocytopenia and anemia are recently worse. \par \par Plan:\par Hold Ninlaro/Dexamethasone \par CMP, LDH, SPEP, Quant Ig, SFLC\par Bone Marrow aspirate/biopsy with Fe, flow, cytogenetics, FISH CLL/MDS/Waldenstroms, Foundation NGS, Congo red stain\par Acyclovir\par Omeprazole\par Eliquis per Cardiology - hold if platelets < 50,000\par Cardiology f/u re: BP\par RTC 1 month \par \par

## 2023-03-10 NOTE — PHYSICAL EXAM
[Capable of only limited self care, confined to bed or chair more than 50% of waking hours] : Status 3- Capable of only limited self care, confined to bed or chair more than 50% of waking hours [Normal] : affect appropriate [de-identified] :  Pacemaker left anterior chest wall. RRR. S1S2 normal. Gr 2/6 TESS RUSB to LLSB. No gallops. [de-identified] : 5 x 5 cm lipoma left lower back. 1 x 1 cm sebaceous cyst right scapula.

## 2023-03-10 NOTE — REVIEW OF SYSTEMS
[Fatigue] : fatigue [Lower Ext Edema] : lower extremity edema [Joint Pain] : joint pain [Negative] : Allergic/Immunologic [Recent Change In Weight] : ~T recent weight change [Fever] : no fever [FreeTextEntry9] : back pain, hip pain [de-identified] : Neuropathy

## 2023-03-10 NOTE — PHYSICAL EXAM
[Ambulatory and capable of all self care but unable to carry out any work activities] : Status 2- Ambulatory and capable of all self care but unable to carry out any work activities. Up and about more than 50% of waking hours [Normal] : affect appropriate [de-identified] :  Pacemaker left anterior chest wall. RRR. S1S2 normal. Gr 2/6 TESS RUSB to LLSB. No gallops. [de-identified] : 5 x 5 cm lipoma left lower back.

## 2023-03-10 NOTE — CONSULT LETTER
[Dear  ___] : Dear  [unfilled], [Courtesy Letter:] : I had the pleasure of seeing your patient, [unfilled], in my office today. [Sincerely,] : Sincerely, [Please see my note below.] : Please see my note below. [DrIbeth  ___] : Dr. DENG [DrIbeth ___] : Dr. DENG [___] : [unfilled] [FreeTextEntry3] : Cesar Simons M.D., FACP\par Professor of Medicine\par [employer]\par Associate Chief, Division of Hematology\par [facility] [place]\par [facility]\par [address] [place] [address]\[place] [address]\par [phone]  [FreeTextEntry2] : Dr. Simpson

## 2023-03-10 NOTE — RESULTS/DATA
[FreeTextEntry1] : WBC 5110, Hgb 9.4, Hct 30.4, Platelets 81,000 Diff normal \par \par 2/7/23\par CMP Chloride 113, CO2 18, BUN 42, Creatinine 3.52, ALT 6, ALK [id number], eGFR 17\par \par SPEP M-Toi 1.0 IgM Lambda, M-Toi 2 Weak Free Lambda \par SPIEP Co-Migrating IgM Lambda Band and Weak Lambda Light Chains Identified (No IgD or IgE Bands Identified)\par DTT 24.8\par APTT 35\par PT 24.4, INR 2.05 \par D-dimer negative\par \par

## 2023-03-10 NOTE — ADDENDUM
[FreeTextEntry1] : I, Raleigh Lebron, acted solely as a scribe for Dr. Cesar Simons on 02/07/2023. All medical entries made by the Scribe were at my, Dr. Cesar Simons's, direction and personally dictated by me on 02/07/2023. I have reviewed the chart and agree that the record accurately reflects my personal performance of the history, physical exam, assessment and plan. I have also personally directed, reviewed, and agreed with the chart.

## 2023-03-10 NOTE — REVIEW OF SYSTEMS
[Fatigue] : fatigue [Recent Change In Weight] : ~T recent weight change [Joint Pain] : joint pain [Negative] : Cardiovascular [Fever] : no fever [FreeTextEntry9] : back pain, hip pain [de-identified] : Neuropathy

## 2023-03-10 NOTE — ADDENDUM
[FreeTextEntry1] : I, Raleigh Lebron, acted solely as a scribe for Dr. Cesar Simons on 03/10/2023. All medical entries made by the Scribe were at my, Dr. Cesar Simons's, direction and personally dictated by me on 03/10/2023. I have reviewed the chart and agree that the record accurately reflects my personal performance of the history, physical exam, assessment and plan. I have also personally directed, reviewed, and agreed with the chart.

## 2023-03-10 NOTE — RESULTS/DATA
[FreeTextEntry1] : WBC 6270, Hgb 9.8, Hct 31.1, Platelets 77,000 Diff normal \par \par 1/31/23\par CMP Cl 112, CO2 20, Glu 108, BUN 41, Creatinine 3.55, ALT 7, ALK Phos 142, eGFR 17\par \par \par 11/4/22\par CMP Cl 110, CO2 20, Glu 107, BUN 47, Creatinine 3.87\par \par SPEP: M-spike 0.9 (co-migrating IgM Lambda, Free Lambda)\par IgG 444, A 14, M 1192\par SFLC kappa 1.14, lambda 261.87, ratio 0

## 2023-03-10 NOTE — ASSESSMENT
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 He had progressive anemia with rising creatinine and relapse of his Waldenstrom's. He was not a candidate for Ibrutinib due to anticoagulation with Warfarin. He achieved a partial remission with marked improvement in his hemoglobin, creatinine and IgM with IRd, but had not tolerated Rituxan well and is loathe to receive it again. He previously tolerated the Ninlaro well and is now undergoing maintenance therapy with Ninlaro and Dexamethasone. Waldenstrom's is stable and renal function has improved, but thrombocytopenia is recently worse. \par \par Plan:\par Hold Ninlaro/Dexamethasone \par CMP, LDH, SPEP, Quant Ig, SFLC\par DIC screen\par Review smear -- Decreased platelets. NC/NC. WBC normal.\par Acyclovir\par Omeprazole\par Eliquis per Cardiology - hold if platelets < 50,000\par RTC 1 month \par \par

## 2023-03-10 NOTE — HISTORY OF PRESENT ILLNESS
[Disease:__________________________] : Disease: [unfilled] [Rosales Stage: ___] : Rosales Stage: [unfilled] [de-identified] : PAF\par 10/2015 Waldenstrom's macroglobulinemia ; + dim lambda, CD 19, 23, FMC-7; negative CD 5, 10, 20\par Renal biopsy: lymphocytic infiltrate of renal cortex\par 10/17 IgGk and lambda BGUS\par 7/2018: Nephrotic syndrome with lambda light chains\par  [de-identified] : +lambda, CD5, CD 20; CD 38 --; FISH del 13q14.3\par SPEP gamma paraprotein M 0.4\par SPIEP IgM lambda; 10/17 IgGk, lambda [FreeTextEntry1] : 10/2015 - 1/2017  Rituxan/Velcade/Decadron; 8/18 - 1/19 Ixazomib/Decadron/Rituxan x 6 cycles to maintenance [de-identified] : He feels well. He recently had COVID in December and was treated with remdesivir. He feels fatigued since then. He has been having severe diarrhea after eating for a long time.No undigested food seen. Seeing GI tomorrow. Neuropathy is stable in his feet and fingers. Has chronic back pain. Constant right hip pain persists. His right leg is minimally chronically swollen. No recent atrial fibrillation since ablation. He notes no chest pain, fever, night sweats, headache, visual problems, SOB, abdominal pain, swollen glands, bleeding, bruising, ankle swelling. Lost 7 lbs since November 2022. Had COVID booster and Flu vaccine. \par \par \par \par

## 2023-03-10 NOTE — HISTORY OF PRESENT ILLNESS
[Disease:__________________________] : Disease: [unfilled] [Rosales Stage: ___] : Rosales Stage: [unfilled] [de-identified] : +lambda, CD5, CD 20; CD 38 --; FISH del 13q14.3\par SPEP gamma paraprotein M 0.4\par SPIEP IgM lambda; 10/17 IgGk, lambda [de-identified] : PAF\par 10/2015 Waldenstrom's macroglobulinemia ; + dim lambda, CD 19, 23, FMC-7; negative CD 5, 10, 20\par Renal biopsy: lymphocytic infiltrate of renal cortex\par 10/17 IgGk and lambda BGUS\par 7/2018: Nephrotic syndrome with lambda light chains\par  [FreeTextEntry1] : 10/2015 - 1/2017  Rituxan/Velcade/Decadron; 8/18 - 1/19 Ixazomib/Decadron/Rituxan x 6 cycles to maintenance [de-identified] : He feels more fatigued than usual. He still has diarrhea, but less now. He is eating and hydrates self well. No thirst, polyuria. Neuropathy is stable in his feet and fingers. Has chronic back pain. Constant right hip pain persists. His right leg swelling resolved. No atrial fibrillation since ablation. He notes no orthostasis, lightheadedness, chest pain, fever, night sweats, headache, visual problems, SOB, abdominal pain, swollen glands, bleeding, bruising, ankle swelling. Lost 3 lbs. \par \par \par \par  PROVIDER:[TOKEN:[[id number]]]

## 2023-03-13 LAB
ALBUMIN SERPL ELPH-MCNC: 3.7 G/DL
ALP BLD-CCNC: 131 U/L
ALT SERPL-CCNC: 7 U/L
ANION GAP SERPL CALC-SCNC: 12 MMOL/L
AST SERPL-CCNC: 11 U/L
BILIRUB SERPL-MCNC: 0.2 MG/DL
BUN SERPL-MCNC: 45 MG/DL
CALCIUM SERPL-MCNC: 8.9 MG/DL
CHLORIDE SERPL-SCNC: 113 MMOL/L
CO2 SERPL-SCNC: 16 MMOL/L
CREAT SERPL-MCNC: 3.89 MG/DL
DEPRECATED KAPPA LC FREE/LAMBDA SER: 0 RATIO
EGFR: 15 ML/MIN/1.73M2
GLUCOSE SERPL-MCNC: 158 MG/DL
IGA SER QL IEP: 14 MG/DL
IGG SER QL IEP: 466 MG/DL
IGM SER QL IEP: 1285 MG/DL
KAPPA LC CSF-MCNC: 244.62 MG/DL
KAPPA LC SERPL-MCNC: 1.19 MG/DL
LDH SERPL-CCNC: 128 U/L
POTASSIUM SERPL-SCNC: 4.6 MMOL/L
PROT SERPL-MCNC: 6 G/DL
SODIUM SERPL-SCNC: 141 MMOL/L

## 2023-03-16 LAB
ALBUMIN MFR SERPL ELPH: 52.4 %
ALBUMIN SERPL-MCNC: 3.1 G/DL
ALBUMIN/GLOB SERPL: 1.1 RATIO
ALPHA1 GLOB MFR SERPL ELPH: 6.4 %
ALPHA1 GLOB SERPL ELPH-MCNC: 0.4 G/DL
ALPHA2 GLOB MFR SERPL ELPH: 11.9 %
ALPHA2 GLOB SERPL ELPH-MCNC: 0.7 G/DL
B-GLOBULIN MFR SERPL ELPH: 8.9 %
B-GLOBULIN SERPL ELPH-MCNC: 0.5 G/DL
GAMMA GLOB FLD ELPH-MCNC: 1.2 G/DL
GAMMA GLOB MFR SERPL ELPH: 20.4 %
INTERPRETATION SERPL IEP-IMP: NORMAL
M PROTEIN MFR SERPL ELPH: NORMAL
MONOCLON BAND OBS SERPL: NORMAL
PROT SERPL-MCNC: 6 G/DL
PROT SERPL-MCNC: 6 G/DL

## 2023-03-17 ENCOUNTER — LABORATORY RESULT (OUTPATIENT)
Age: 78
End: 2023-03-17

## 2023-03-17 ENCOUNTER — APPOINTMENT (OUTPATIENT)
Dept: ELECTROPHYSIOLOGY | Facility: CLINIC | Age: 78
End: 2023-03-17
Payer: MEDICARE

## 2023-03-17 ENCOUNTER — APPOINTMENT (OUTPATIENT)
Dept: HEMATOLOGY ONCOLOGY | Facility: CLINIC | Age: 78
End: 2023-03-17
Payer: MEDICARE

## 2023-03-17 ENCOUNTER — NON-APPOINTMENT (OUTPATIENT)
Age: 78
End: 2023-03-17

## 2023-03-17 ENCOUNTER — RESULT REVIEW (OUTPATIENT)
Age: 78
End: 2023-03-17

## 2023-03-17 ENCOUNTER — TRANSCRIPTION ENCOUNTER (OUTPATIENT)
Age: 78
End: 2023-03-17

## 2023-03-17 VITALS
DIASTOLIC BLOOD PRESSURE: 55 MMHG | SYSTOLIC BLOOD PRESSURE: 101 MMHG | BODY MASS INDEX: 27.95 KG/M2 | OXYGEN SATURATION: 99 % | HEIGHT: 73.98 IN | RESPIRATION RATE: 20 BRPM | WEIGHT: 217.82 LBS | HEART RATE: 51 BPM | TEMPERATURE: 96.6 F

## 2023-03-17 LAB
BASOPHILS # BLD AUTO: 0.03 K/UL — SIGNIFICANT CHANGE UP (ref 0–0.2)
BASOPHILS NFR BLD AUTO: 0.5 % — SIGNIFICANT CHANGE UP (ref 0–2)
EOSINOPHIL # BLD AUTO: 0.17 K/UL — SIGNIFICANT CHANGE UP (ref 0–0.5)
EOSINOPHIL NFR BLD AUTO: 3 % — SIGNIFICANT CHANGE UP (ref 0–6)
HCT VFR BLD CALC: 29.9 % — LOW (ref 39–50)
HGB BLD-MCNC: 9.4 G/DL — LOW (ref 13–17)
IMM GRANULOCYTES NFR BLD AUTO: 0.4 % — SIGNIFICANT CHANGE UP (ref 0–0.9)
LYMPHOCYTES # BLD AUTO: 1.81 K/UL — SIGNIFICANT CHANGE UP (ref 1–3.3)
LYMPHOCYTES # BLD AUTO: 31.8 % — SIGNIFICANT CHANGE UP (ref 13–44)
MCHC RBC-ENTMCNC: 29.8 PG — SIGNIFICANT CHANGE UP (ref 27–34)
MCHC RBC-ENTMCNC: 31.4 G/DL — LOW (ref 32–36)
MCV RBC AUTO: 94.9 FL — SIGNIFICANT CHANGE UP (ref 80–100)
MONOCYTES # BLD AUTO: 0.66 K/UL — SIGNIFICANT CHANGE UP (ref 0–0.9)
MONOCYTES NFR BLD AUTO: 11.6 % — SIGNIFICANT CHANGE UP (ref 2–14)
NEUTROPHILS # BLD AUTO: 3.01 K/UL — SIGNIFICANT CHANGE UP (ref 1.8–7.4)
NEUTROPHILS NFR BLD AUTO: 52.7 % — SIGNIFICANT CHANGE UP (ref 43–77)
NRBC # BLD: 0 /100 WBCS — SIGNIFICANT CHANGE UP (ref 0–0)
PLATELET # BLD AUTO: 61 K/UL — LOW (ref 150–400)
RBC # BLD: 3.15 M/UL — LOW (ref 4.2–5.8)
RBC # FLD: 13.2 % — SIGNIFICANT CHANGE UP (ref 10.3–14.5)
WBC # BLD: 5.7 K/UL — SIGNIFICANT CHANGE UP (ref 3.8–10.5)
WBC # FLD AUTO: 5.7 K/UL — SIGNIFICANT CHANGE UP (ref 3.8–10.5)

## 2023-03-17 PROCEDURE — 38222 DX BONE MARROW BX & ASPIR: CPT

## 2023-03-17 PROCEDURE — 93296 REM INTERROG EVL PM/IDS: CPT

## 2023-03-17 PROCEDURE — 93294 REM INTERROG EVL PM/LDLS PM: CPT

## 2023-03-17 NOTE — PROCEDURE
[Bone Marrow Biopsy] : bone marrow biopsy [Bone Marrow Aspiration] : bone marrow aspiration  [Patient] : the patient [Procedure verified and consent obtained] : procedure verified and consent obtained [Laterality verified and correct site marked] : laterality verified and correct site marked [Right] : site: right [Correct positioning] : correct positioning [Prone] : prone [The right posterior iliac crest was prepped with betadine and draped, using sterile technique.] : The right posterior iliac crest was prepped with betadine and draped, using sterile technique. [Lidocaine was injected and into the periosteum overlying the site.] : Lidocaine was injected and into the periosteum overlying the site. [Aspirate] : aspirate [Cytogenetics] : cytogenetics [FISH] : FISH [Other ___] : [unfilled] [Biopsy] : biopsy [Flow Cytometry] : flow cytometry [] : The patient was instructed to remove the bandage the following AM. The patient may bathe. Acetaminophen may be taken for discomfort, as per package directions.If there are any other problems, the patient was instructed to call the office. The patient verbalized understanding, and is aware of the office contact numbers. [FreeTextEntry1] : Waldenstrom's, CLL, MDS  [FreeTextEntry2] : WBC 5.7\par Hgb 9.4\par Hct 29.9\par Plts 61\par Lidocaine 1 % 6 cc's given. \par BM biopsy and aspirate done.

## 2023-03-17 NOTE — REASON FOR VISIT
[Bone Marrow Biopsy] : bone marrow biopsy [Bone Marrow Aspiration] : bone marrow aspiration [FreeTextEntry2] : Waldenstrom's, CLL, MDS

## 2023-03-31 ENCOUNTER — RESULT REVIEW (OUTPATIENT)
Age: 78
End: 2023-03-31

## 2023-03-31 ENCOUNTER — APPOINTMENT (OUTPATIENT)
Dept: HEMATOLOGY ONCOLOGY | Facility: CLINIC | Age: 78
End: 2023-03-31
Payer: MEDICARE

## 2023-03-31 VITALS
BODY MASS INDEX: 27.46 KG/M2 | TEMPERATURE: 96.6 F | OXYGEN SATURATION: 95 % | WEIGHT: 214 LBS | SYSTOLIC BLOOD PRESSURE: 109 MMHG | HEART RATE: 56 BPM | DIASTOLIC BLOOD PRESSURE: 62 MMHG | HEIGHT: 74 IN | RESPIRATION RATE: 16 BRPM

## 2023-03-31 LAB
BASOPHILS # BLD AUTO: 0.06 K/UL — SIGNIFICANT CHANGE UP (ref 0–0.2)
BASOPHILS NFR BLD AUTO: 1.2 % — SIGNIFICANT CHANGE UP (ref 0–2)
EOSINOPHIL # BLD AUTO: 0.13 K/UL — SIGNIFICANT CHANGE UP (ref 0–0.5)
EOSINOPHIL NFR BLD AUTO: 2.6 % — SIGNIFICANT CHANGE UP (ref 0–6)
HCT VFR BLD CALC: 30.1 % — LOW (ref 39–50)
HGB BLD-MCNC: 9.6 G/DL — LOW (ref 13–17)
IMM GRANULOCYTES NFR BLD AUTO: 0.6 % — SIGNIFICANT CHANGE UP (ref 0–0.9)
LYMPHOCYTES # BLD AUTO: 1.78 K/UL — SIGNIFICANT CHANGE UP (ref 1–3.3)
LYMPHOCYTES # BLD AUTO: 35.1 % — SIGNIFICANT CHANGE UP (ref 13–44)
MCHC RBC-ENTMCNC: 30.2 PG — SIGNIFICANT CHANGE UP (ref 27–34)
MCHC RBC-ENTMCNC: 31.9 G/DL — LOW (ref 32–36)
MCV RBC AUTO: 94.7 FL — SIGNIFICANT CHANGE UP (ref 80–100)
MONOCYTES # BLD AUTO: 0.55 K/UL — SIGNIFICANT CHANGE UP (ref 0–0.9)
MONOCYTES NFR BLD AUTO: 10.8 % — SIGNIFICANT CHANGE UP (ref 2–14)
NEUTROPHILS # BLD AUTO: 2.52 K/UL — SIGNIFICANT CHANGE UP (ref 1.8–7.4)
NEUTROPHILS NFR BLD AUTO: 49.7 % — SIGNIFICANT CHANGE UP (ref 43–77)
NRBC # BLD: 0 /100 WBCS — SIGNIFICANT CHANGE UP (ref 0–0)
PLATELET # BLD AUTO: 64 K/UL — LOW (ref 150–400)
RBC # BLD: 3.18 M/UL — LOW (ref 4.2–5.8)
RBC # FLD: 13 % — SIGNIFICANT CHANGE UP (ref 10.3–14.5)
WBC # BLD: 5.07 K/UL — SIGNIFICANT CHANGE UP (ref 3.8–10.5)
WBC # FLD AUTO: 5.07 K/UL — SIGNIFICANT CHANGE UP (ref 3.8–10.5)

## 2023-03-31 PROCEDURE — 99215 OFFICE O/P EST HI 40 MIN: CPT

## 2023-03-31 RX ORDER — DEXAMETHASONE 4 MG/1
4 TABLET ORAL
Qty: 9 | Refills: 5 | Status: DISCONTINUED | COMMUNITY
Start: 2019-07-02 | End: 2023-03-31

## 2023-03-31 RX ORDER — IXAZOMIB 2.3 MG/1
2.3 CAPSULE ORAL
Qty: 3 | Refills: 5 | Status: DISCONTINUED | COMMUNITY
Start: 2021-04-19 | End: 2023-03-31

## 2023-03-31 NOTE — REASON FOR VISIT
[Follow-Up Visit] : a follow-up visit for [CLL] : chronic lymphocytic leukemia [Lymphoma] : lymphoma [Monoclonal Gammopathy] : monoclonal gammopathy [Spouse] : spouse [Blood Count Assessment] : blood count assessment

## 2023-03-31 NOTE — HISTORY OF PRESENT ILLNESS
[Disease:__________________________] : Disease: [unfilled] [Rosales Stage: ___] : Rosales Stage: [unfilled] [de-identified] : PAF\par 10/2015 Waldenstrom's macroglobulinemia ; + dim lambda, CD 19, 23, FMC-7; negative CD 5, 10, 20\par Renal biopsy: lymphocytic infiltrate of renal cortex\par 10/17 IgGk and lambda BGUS\par 7/2018: Nephrotic syndrome with lambda light chains\par  [de-identified] : +lambda, CD5, CD 20; CD 38 --; FISH del 13q14.3\par SPEP gamma paraprotein M 0.4\par SPIEP IgM lambda; 10/17 IgGk, lambda [FreeTextEntry1] : 10/2015 - 1/2017  Rituxan/Velcade/Decadron; 8/18 - 1/19 Ixazomib/Decadron/Rituxan x 6 cycles to maintenance until 3/23; [de-identified] : He still feels fatigued. He has stable diarrhea. He is eating and hydrates self well. Neuropathy is stable in his feet and fingers. Has chronic back pain. Constant right hip pain persists. No atrial fibrillation since ablation. He notes no orthostasis, lightheadedness, chest pain, SOB, fever, night sweats, headache, visual problems, abdominal pain, swollen glands, bleeding, bruising, ankle swelling, melena, BRBPR, hematuria. Lost 3 lbs. Stubbed right big toe with minor bleeding around base of nail.\par \par \par \par

## 2023-03-31 NOTE — CONSULT LETTER
[Dear  ___] : Dear  [unfilled], [Courtesy Letter:] : I had the pleasure of seeing your patient, [unfilled], in my office today. [Please see my note below.] : Please see my note below. [Sincerely,] : Sincerely, [DrIbeth  ___] : Dr. DENG [DrIbeth ___] : Dr. DENG [___] : [unfilled] [FreeTextEntry2] : Dr. Simpson [FreeTextEntry3] : Cesar Simons M.D., FACP\par Professor of Medicine\par [employer]\par Associate Chief, Division of Hematology\par [facility]\par [facility]\par [address] [place] [address]\[place] [address]\par [phone]

## 2023-03-31 NOTE — ASSESSMENT
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 He had progressive anemia with rising creatinine and relapse of his Waldenstrom's. He was not a candidate for Ibrutinib due to anticoagulation with Warfarin. He achieved a partial remission with marked improvement in his hemoglobin, creatinine and IgM with IRd, but had not tolerated Rituxan. Re-evaluation now due to anemia/thrombocytopenia reveals relapse of Waldenstrom's with iron deficiency and minimal CLL. Will replete iron and , if no hematologic response, consider alternative therapy for Waldenstrom's. Informed consent obtained for IV Fe after review of toxicity and answering all questions.\par \par Plan:\par IV Fe \par D/C Ninlaro/Dexamethasone \par CMP, LDH, Fe/TIBC, ferritin\par Acyclovir - D/C next visit\par Omeprazole\par Eliquis per Cardiology - hold if platelets < 50,000\par Obtain recent GI consult note\par D/C Fe supplement\par Topical triple antibiotic ointment to right big toe twice daily. To ER if becomes infected.\par Next visit: 24 hour urine TP/BJP\par RTC 1 month \par \par

## 2023-03-31 NOTE — ADDENDUM
[FreeTextEntry1] : I, Raleigh Lebron, acted solely as a scribe for Dr. Cesar Simons on 03/31/2023. All medical entries made by the Scribe were at my, Dr. Cesar Simons's, direction and personally dictated by me on 03/31/2023. I have reviewed the chart and agree that the record accurately reflects my personal performance of the history, physical exam, assessment and plan. I have also personally directed, reviewed, and agreed with the chart.

## 2023-03-31 NOTE — RESULTS/DATA
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 Absent Fe stores.\par Cytogenetics: Normal Karyotype\par FISH CLL normal\par OnkoSight Myeloid Abnormal. DETECTED GENOMIC ALTERATIONS:\par   Tier I: Variants of Strong Clinical Significance\par   [id number]\par \par 3/10/23\par CMP Chrloide 113, CO2 16, Glue 158, BUN 45, Creatinine 3.89, ALT 7, ALK Phosphatase 131, eGFR 15\par \par SPEP M-Toi 1, M-spike 2 Unable to quantitate= Free Lambda\par SPIEP Co-Migrating IgM Lambda Band and Weak Lambda Light Chains Identified (Previously, No IgD or IgE Bands Identified)\par Quant igG 466 IgA 14, IgM 1285\par SFLC kappa 1.19, lambda 244.62, ratio 0\par \par 2/7/23\par CMP Chloride 113, CO2 18, BUN 42, Creatinine 3.52, ALT 6, ALK [id number], eGFR 17\par \par SPEP M-Toi 1.0 IgM Lambda, M-Toi 2 Weak Free Lambda \par SPIEP Co-Migrating IgM Lambda Band and Weak Lambda Light Chains Identified (No IgD or IgE Bands Identified)\par DTT 24.8\par APTT 35\par PT 24.4, INR 2.05 \par D-dimer negative\par \par

## 2023-03-31 NOTE — REVIEW OF SYSTEMS
[Fatigue] : fatigue [Recent Change In Weight] : ~T recent weight change [Joint Pain] : joint pain [Negative] : Allergic/Immunologic [Fever] : no fever [FreeTextEntry9] : back pain, hip pain [de-identified] : Neuropathy

## 2023-03-31 NOTE — PHYSICAL EXAM
[Ambulatory and capable of all self care but unable to carry out any work activities] : Status 2- Ambulatory and capable of all self care but unable to carry out any work activities. Up and about more than 50% of waking hours [Normal] : well developed, well nourished, in no acute distress [de-identified] : Dried blood at base of right big toe. Nontender, no erythema, FROM

## 2023-04-03 LAB
ALBUMIN SERPL ELPH-MCNC: 3.8 G/DL
ALP BLD-CCNC: 136 U/L
ALT SERPL-CCNC: 6 U/L
ANION GAP SERPL CALC-SCNC: 10 MMOL/L
AST SERPL-CCNC: 12 U/L
BILIRUB SERPL-MCNC: 0.3 MG/DL
BUN SERPL-MCNC: 48 MG/DL
CALCIUM SERPL-MCNC: 8.9 MG/DL
CHLORIDE SERPL-SCNC: 113 MMOL/L
CO2 SERPL-SCNC: 18 MMOL/L
CREAT SERPL-MCNC: 3.67 MG/DL
EGFR: 16 ML/MIN/1.73M2
FERRITIN SERPL-MCNC: 91 NG/ML
GLUCOSE SERPL-MCNC: 102 MG/DL
IRON SATN MFR SERPL: 10 %
IRON SERPL-MCNC: 24 UG/DL
LDH SERPL-CCNC: 126 U/L
POTASSIUM SERPL-SCNC: 5.4 MMOL/L
PROT SERPL-MCNC: 6.5 G/DL
SODIUM SERPL-SCNC: 141 MMOL/L
TIBC SERPL-MCNC: 246 UG/DL
UIBC SERPL-MCNC: 222 UG/DL

## 2023-04-04 ENCOUNTER — OUTPATIENT (OUTPATIENT)
Dept: OUTPATIENT SERVICES | Facility: HOSPITAL | Age: 78
LOS: 1 days | Discharge: HOME | End: 2023-04-04

## 2023-04-04 DIAGNOSIS — C91.11 CHRONIC LYMPHOCYTIC LEUKEMIA OF B-CELL TYPE IN REMISSION: ICD-10-CM

## 2023-04-04 DIAGNOSIS — Z96.0 PRESENCE OF UROGENITAL IMPLANTS: Chronic | ICD-10-CM

## 2023-04-04 DIAGNOSIS — Z98.890 OTHER SPECIFIED POSTPROCEDURAL STATES: Chronic | ICD-10-CM

## 2023-04-04 DIAGNOSIS — Z98.41 CATARACT EXTRACTION STATUS, RIGHT EYE: Chronic | ICD-10-CM

## 2023-04-04 DIAGNOSIS — Z98.89 OTHER SPECIFIED POSTPROCEDURAL STATES: Chronic | ICD-10-CM

## 2023-04-06 ENCOUNTER — RX RENEWAL (OUTPATIENT)
Age: 78
End: 2023-04-06

## 2023-04-10 ENCOUNTER — APPOINTMENT (OUTPATIENT)
Dept: INFUSION THERAPY | Facility: HOSPITAL | Age: 78
End: 2023-04-10

## 2023-04-11 DIAGNOSIS — C88.0 WALDENSTROM MACROGLOBULINEMIA: ICD-10-CM

## 2023-04-11 DIAGNOSIS — D50.9 IRON DEFICIENCY ANEMIA, UNSPECIFIED: ICD-10-CM

## 2023-04-17 ENCOUNTER — APPOINTMENT (OUTPATIENT)
Dept: INFUSION THERAPY | Facility: HOSPITAL | Age: 78
End: 2023-04-17

## 2023-04-19 ENCOUNTER — NON-APPOINTMENT (OUTPATIENT)
Age: 78
End: 2023-04-19

## 2023-04-24 ENCOUNTER — APPOINTMENT (OUTPATIENT)
Dept: INFUSION THERAPY | Facility: HOSPITAL | Age: 78
End: 2023-04-24

## 2023-04-24 NOTE — DISCHARGE NOTE ADULT - KEEP YOUR INTAKE OF VITAMIN K REGULAR. THE HIGHEST AMOUNT OF VITAMIN K IS FOUND IN GREEN AND LEAFY VEGETABLES LIKE BROCCOLI, LETTUCES, CABBAGE, AND SPINACH. YOU CAN EAT THESE FOODS BUT KEEP THE PORTION
Maria M Mei presented for a  Medicare AWV and comprehensive Health Risk Assessment today. The following components were reviewed and updated:    Medical history  Family History  Social history  Allergies and Current Medications  Health Risk Assessment  Health Maintenance  Care Team         ** See Completed Assessments for Annual Wellness Visit within the encounter summary.**         The following assessments were completed:  Living Situation  CAGE  Depression Screening  Timed Get Up and Go  Whisper Test  Cognitive Function Screening      Nutrition Screening  ADL Screening  PAQ Screening      Review for Opioid Screening: Pt does not have Rx for Opioids      Review for Substance Use Disorders: Patient does not use substance        Current Outpatient Medications:     albuterol (VENTOLIN HFA) 90 mcg/actuation inhaler, Inhale 2 puffs into the lungs every 6 (six) hours as needed for Wheezing or Shortness of Breath.  Rescue, Disp: 18 g, Rfl: 0    ascorbic acid, vitamin C, (VITAMIN C) 250 MG tablet, Take 250 mg by mouth once daily., Disp: , Rfl:     aspirin 81 MG Chew, Chew and swallow 1 tablet (81 mg total) by mouth once daily., Disp: 30 tablet, Rfl: 0    atorvastatin (LIPITOR) 40 MG tablet, TAKE 1 TABLET EVERY DAY, Disp: 90 tablet, Rfl: 2    buPROPion (WELLBUTRIN XL) 150 MG TB24 tablet, Take 2 tablets (300 mg total) by mouth once daily., Disp: 180 tablet, Rfl: 3    cyclobenzaprine (FLEXERIL) 5 MG tablet, TAKE 1 TABLET (5 MG TOTAL) BY MOUTH 2 (TWO) TIMES DAILY AS NEEDED., Disp: 30 tablet, Rfl: 2    diclofenac (VOLTAREN) 75 MG EC tablet, Take 1 tablet (75 mg total) by mouth 2 (two) times daily as needed (pain)., Disp: 60 tablet, Rfl: 0    gabapentin (NEURONTIN) 100 MG capsule, Take 1 capsule (100 mg total) by mouth every evening., Disp: 30 capsule, Rfl: 11    hydrOXYzine pamoate (VISTARIL) 25 MG Cap, TAKE 1 CAPSULE NIGHTLY AS NEEDED (SLEEP)., Disp: 30 capsule, Rfl: 2    nicotine (NICODERM CQ) 21 mg/24 hr, Place 1  "patch onto the skin once daily., Disp: 28 patch, Rfl: 0    nicotine, polacrilex, (NICORETTE) 2 mg Gum, Take 1 each (2 mg total) by mouth as needed (as needed)., Disp: 100 each, Rfl: 0    pantoprazole (PROTONIX) 40 MG tablet, TAKE 1 TABLET ONE TIME DAILY, Disp: 90 tablet, Rfl: 3    vitamin D (VITAMIN D3) 1000 units Tab, Take 1,000 Units by mouth once daily., Disp: , Rfl:     zinc gluconate 50 mg tablet, Take 50 mg by mouth once daily., Disp: , Rfl:        Vitals:    04/24/23 0805 04/24/23 0828   BP: (!) 140/58 (!) 136/58   Pulse: 64    SpO2: 98%    Weight: 99.4 kg (219 lb 2.2 oz)    Height: 5' 7" (1.702 m)    PainSc: 0-No pain       Physical Exam  Vitals reviewed.   Constitutional:       General: She is not in acute distress.     Appearance: Normal appearance. She is well-developed and well-groomed. She is not ill-appearing or toxic-appearing.   Eyes:      Pupils: Pupils are equal, round, and reactive to light.   Neck:      Vascular: No carotid bruit.   Cardiovascular:      Rate and Rhythm: Normal rate and regular rhythm.      Pulses: Normal pulses.      Heart sounds: Normal heart sounds. No murmur heard.  Pulmonary:      Effort: Pulmonary effort is normal. No respiratory distress.   Abdominal:      General: Bowel sounds are normal. There is no distension.   Musculoskeletal:      Cervical back: Normal range of motion.      Right lower leg: No edema.      Left lower leg: No edema.      Comments: Ortho boot to LLE in place   Skin:     Capillary Refill: Capillary refill takes less than 2 seconds.   Neurological:      General: No focal deficit present.      Mental Status: She is alert and oriented to person, place, and time.   Psychiatric:         Attention and Perception: Attention normal.         Mood and Affect: Mood normal.         Speech: Speech normal.         Behavior: Behavior is cooperative.           Diagnoses and health risks identified today and associated recommendations/orders:    1. Encounter for " preventive health examination      2. Aortic atherosclerosis  Chronic; stable on medications. Followed by PCP.     3. Simple chronic bronchitis  Chronic; stable on medications. Followed by PCP.     4. Mixed hyperlipidemia  Chronic; stable on medications. Followed by PCP.     5. Embolic stroke involving left middle cerebral artery  Chronic; stable on medications. Followed by PCP.     6. Personal history of tobacco use  Chronic; stable, on nicotine patch and gum, now down to 3 cigarettes some days. Declines smoking cessation referral at this time. Followed by PCP.     7. Ankle weakness  Chronic; stable, LLE boot in place, PT twice per week. Followed by PCP.       Provided Maria M with a 5-10 year written screening schedule and personal prevention plan. Recommendations were developed using the USPSTF age appropriate recommendations. Education, counseling, and referrals were provided as needed. After Visit Summary printed and given to patient which includes a list of additional screenings\tests needed.    Follow up in about 1 year (around 4/24/2024).    Caty Fowler NP    Advance Care Planning   I offered to discuss advanced care planning, including how to pick a person who would make decisions for you if you were unable to make them for yourself, called a health care power of , and what kind of decisions you might make such as use of life sustaining treatments such as ventilators and tube feeding when faced with a life limiting illness recorded on a living will that they will need to know. (How you want to be cared for as you near the end of your natural life)     X Patient is interested in learning more about how to make advanced directives.  I provided them paperwork and offered to discuss this with them.   Statement Selected

## 2023-04-28 ENCOUNTER — RESULT REVIEW (OUTPATIENT)
Age: 78
End: 2023-04-28

## 2023-04-28 ENCOUNTER — LABORATORY RESULT (OUTPATIENT)
Age: 78
End: 2023-04-28

## 2023-04-28 ENCOUNTER — APPOINTMENT (OUTPATIENT)
Dept: HEMATOLOGY ONCOLOGY | Facility: CLINIC | Age: 78
End: 2023-04-28
Payer: MEDICARE

## 2023-04-28 VITALS
SYSTOLIC BLOOD PRESSURE: 128 MMHG | HEIGHT: 73.98 IN | BODY MASS INDEX: 27.76 KG/M2 | OXYGEN SATURATION: 98 % | DIASTOLIC BLOOD PRESSURE: 63 MMHG | HEART RATE: 51 BPM | TEMPERATURE: 97.2 F | WEIGHT: 216.27 LBS | RESPIRATION RATE: 20 BRPM

## 2023-04-28 LAB
BASOPHILS # BLD AUTO: 0.04 K/UL — SIGNIFICANT CHANGE UP (ref 0–0.2)
BASOPHILS NFR BLD AUTO: 1 % — SIGNIFICANT CHANGE UP (ref 0–2)
EOSINOPHIL # BLD AUTO: 0.11 K/UL — SIGNIFICANT CHANGE UP (ref 0–0.5)
EOSINOPHIL NFR BLD AUTO: 2.7 % — SIGNIFICANT CHANGE UP (ref 0–6)
HCT VFR BLD CALC: 31.1 % — LOW (ref 39–50)
HGB BLD-MCNC: 9.6 G/DL — LOW (ref 13–17)
IMM GRANULOCYTES NFR BLD AUTO: 0.2 % — SIGNIFICANT CHANGE UP (ref 0–0.9)
LYMPHOCYTES # BLD AUTO: 1.44 K/UL — SIGNIFICANT CHANGE UP (ref 1–3.3)
LYMPHOCYTES # BLD AUTO: 34.9 % — SIGNIFICANT CHANGE UP (ref 13–44)
MCHC RBC-ENTMCNC: 30.5 PG — SIGNIFICANT CHANGE UP (ref 27–34)
MCHC RBC-ENTMCNC: 30.9 G/DL — LOW (ref 32–36)
MCV RBC AUTO: 98.7 FL — SIGNIFICANT CHANGE UP (ref 80–100)
MONOCYTES # BLD AUTO: 0.49 K/UL — SIGNIFICANT CHANGE UP (ref 0–0.9)
MONOCYTES NFR BLD AUTO: 11.9 % — SIGNIFICANT CHANGE UP (ref 2–14)
NEUTROPHILS # BLD AUTO: 2.04 K/UL — SIGNIFICANT CHANGE UP (ref 1.8–7.4)
NEUTROPHILS NFR BLD AUTO: 49.3 % — SIGNIFICANT CHANGE UP (ref 43–77)
NRBC # BLD: 0 /100 WBCS — SIGNIFICANT CHANGE UP (ref 0–0)
PLATELET # BLD AUTO: 58 K/UL — LOW (ref 150–400)
RBC # BLD: 3.15 M/UL — LOW (ref 4.2–5.8)
RBC # FLD: 13.8 % — SIGNIFICANT CHANGE UP (ref 10.3–14.5)
WBC # BLD: 4.13 K/UL — SIGNIFICANT CHANGE UP (ref 3.8–10.5)
WBC # FLD AUTO: 4.13 K/UL — SIGNIFICANT CHANGE UP (ref 3.8–10.5)

## 2023-04-28 PROCEDURE — 99215 OFFICE O/P EST HI 40 MIN: CPT

## 2023-04-28 RX ORDER — CHLORHEXIDINE GLUCONATE 4 %
325 (65 FE) LIQUID (ML) TOPICAL
Qty: 100 | Refills: 1 | Status: DISCONTINUED | COMMUNITY
Start: 2022-11-30 | End: 2023-04-28

## 2023-04-28 NOTE — PHYSICAL EXAM
[Capable of only limited self care, confined to bed or chair more than 50% of waking hours] : Status 3- Capable of only limited self care, confined to bed or chair more than 50% of waking hours [Normal] : affect appropriate [de-identified] :  Pacemaker left anterior chest wall. RRR. S1S2 normal. Gr 2/6 TESS RUSB to LLSB. No gallops. [de-identified] : 3 x 2 cm right axillary node [de-identified] : 5 x 5 cm lipoma left lower back. 1 x 1 cm sebaceous cyst right scapula.

## 2023-04-28 NOTE — HISTORY OF PRESENT ILLNESS
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 Has ALVAREZ after 100 yards. He has stable diarrhea. He is eating and hydrates self well. Neuropathy is stable in his feet and fingers. Has chronic back pain. Constant right hip pain persists. No atrial fibrillation since ablation. He notes no orthostasis, lightheadedness, chest pain, SOB, fever, night sweats, headache, visual problems, abdominal pain, swollen glands, bleeding, bruising, ankle swelling, melena, BRBPR, hematuria. Weight stable. Last does of intravenous iron was 2 weeks ago. \par \par \par \par

## 2023-04-28 NOTE — CONSULT LETTER
[Dear  ___] : Dear  [unfilled], [Courtesy Letter:] : I had the pleasure of seeing your patient, [unfilled], in my office today. [Please see my note below.] : Please see my note below. [Sincerely,] : Sincerely, [DrIbeth  ___] : Dr. DENG [DrIbeth ___] : Dr. DENG [___] : [unfilled] [FreeTextEntry3] : Cesar Simons M.D., FACP\par Professor of Medicine\par [employer] [employer]\par Associate Chief, Division of Hematology\par [facility]\par [facility]\par [address] [place] [address]\[place] [address]\par [phone]  [FreeTextEntry2] : Dr. Simpson

## 2023-04-28 NOTE — ADDENDUM
[FreeTextEntry1] : I, Raleigh Lebron, acted solely as a scribe for Dr. Cesar Simons on 04/28/2023. All medical entries made by the Scribe were at my, Dr. Cesar Simons's, direction and personally dictated by me on 04/28/2023. I have reviewed the chart and agree that the record accurately reflects my personal performance of the history, physical exam, assessment and plan. I have also personally directed, reviewed, and agreed with the chart.

## 2023-04-28 NOTE — REASON FOR VISIT
[Follow-Up Visit] : a follow-up visit for [Blood Count Assessment] : blood count assessment [CLL] : chronic lymphocytic leukemia [Lymphoma] : lymphoma [Monoclonal Gammopathy] : monoclonal gammopathy [Spouse] : spouse

## 2023-04-28 NOTE — ASSESSMENT
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 He had progressive anemia with rising creatinine and relapse of his Waldenstrom's. He was not a candidate for Ibrutinib due to anticoagulation with Warfarin. He achieved a partial remission with marked improvement in his hemoglobin, creatinine and IgM with IRd, but had not tolerated Rituxan. Re-evaluation now due to anemia/thrombocytopenia reveals relapse of Waldenstrom's with iron deficiency and minimal CLL. There has been no hematologic response to IV Fe, so will consider alternative therapy for Waldenstrom's.\par \par Plan:\par Discuss use of BTK inhibitor with Cardiology given anticoagulation and H/O AF\par CMP, LDH, TFT's, Hepatitis B/C panel\par 24 hour Urine TP/ Bence Kunz protein\par CT/PET scan\par Acyclovir \par Omeprazole\par Eliquis per Cardiology - hold if platelets < 50,000\par Call me after CT/PET\par \par

## 2023-04-28 NOTE — REVIEW OF SYSTEMS
[Fatigue] : fatigue [Recent Change In Weight] : ~T recent weight change [Joint Pain] : joint pain [Negative] : Allergic/Immunologic [Fever] : no fever [FreeTextEntry9] : back pain, hip pain [de-identified] : Neuropathy

## 2023-04-28 NOTE — RESULTS/DATA
[FreeTextEntry1] : WBC 4130, Hgb 9.6, Hct 31.1, Platelets 58,000 Diff normal \par \par 3/31/23\par CMP K 5.4, Chloride 113, CO2, Glu 102, BUN 48, Creatinine 3.57, ALT 6, , eGFR 16\par \par Ferritin 91\par Fe/TIBC/Sat 24/246/10%\par \par

## 2023-05-01 ENCOUNTER — APPOINTMENT (OUTPATIENT)
Dept: INFUSION THERAPY | Facility: HOSPITAL | Age: 78
End: 2023-05-01

## 2023-05-01 LAB
ALBUMIN SERPL ELPH-MCNC: 3.8 G/DL
ALP BLD-CCNC: 131 U/L
ALT SERPL-CCNC: 5 U/L
ANION GAP SERPL CALC-SCNC: 11 MMOL/L
AST SERPL-CCNC: 12 U/L
BILIRUB SERPL-MCNC: 0.3 MG/DL
BUN SERPL-MCNC: 44 MG/DL
CALCIUM SERPL-MCNC: 8.4 MG/DL
CHLORIDE SERPL-SCNC: 111 MMOL/L
CO2 SERPL-SCNC: 18 MMOL/L
CREAT SERPL-MCNC: 3.68 MG/DL
EGFR: 16 ML/MIN/1.73M2
GLUCOSE SERPL-MCNC: 101 MG/DL
HBV CORE IGG+IGM SER QL: NONREACTIVE
HBV SURFACE AB SER QL: NONREACTIVE
HBV SURFACE AG SER QL: NONREACTIVE
HCV AB SER QL: NONREACTIVE
HCV S/CO RATIO: 0.06 S/CO
LDH SERPL-CCNC: 145 U/L
POTASSIUM SERPL-SCNC: 5.6 MMOL/L
PROT SERPL-MCNC: 6.3 G/DL
SODIUM SERPL-SCNC: 140 MMOL/L
T3RU NFR SERPL: 0.8 TBI
T4 SERPL-MCNC: 10.5 UG/DL
TSH SERPL-ACNC: 2.18 UIU/ML

## 2023-05-01 NOTE — H&P ADULT - MINUTES
55 Topical Retinoid counseling:  Patient advised to apply a pea-sized amount only at bedtime and wait 30 minutes after washing their face before applying.  If too drying, patient may add a non-comedogenic moisturizer. The patient verbalized understanding of the proper use and possible adverse effects of retinoids.  All of the patient's questions and concerns were addressed.

## 2023-05-02 ENCOUNTER — LABORATORY RESULT (OUTPATIENT)
Age: 78
End: 2023-05-02

## 2023-05-03 LAB
CREAT 24H UR-MCNC: 1.9 G/24 H
CREAT ?TM UR-MCNC: 150 MG/DL
PROT 24H UR-MRATE: 176 MG/DL
PROT ?TM UR-MCNC: 24 HR
PROT UR-MCNC: 2244 MG/24 H
SPECIMEN VOL 24H UR: 1275 ML

## 2023-05-08 ENCOUNTER — APPOINTMENT (OUTPATIENT)
Dept: INFUSION THERAPY | Facility: HOSPITAL | Age: 78
End: 2023-05-08

## 2023-05-09 LAB — KAPPA LC 24H UR QL: PRESENT

## 2023-05-12 ENCOUNTER — APPOINTMENT (OUTPATIENT)
Dept: ELECTROPHYSIOLOGY | Facility: CLINIC | Age: 78
End: 2023-05-12
Payer: MEDICARE

## 2023-05-12 ENCOUNTER — NON-APPOINTMENT (OUTPATIENT)
Age: 78
End: 2023-05-12

## 2023-05-12 VITALS
RESPIRATION RATE: 14 BRPM | WEIGHT: 212 LBS | HEART RATE: 56 BPM | HEIGHT: 74 IN | SYSTOLIC BLOOD PRESSURE: 117 MMHG | OXYGEN SATURATION: 97 % | DIASTOLIC BLOOD PRESSURE: 66 MMHG | BODY MASS INDEX: 27.21 KG/M2

## 2023-05-12 PROCEDURE — 99213 OFFICE O/P EST LOW 20 MIN: CPT | Mod: 25

## 2023-05-12 PROCEDURE — 93000 ELECTROCARDIOGRAM COMPLETE: CPT

## 2023-05-12 RX ORDER — METOPROLOL SUCCINATE 50 MG/1
50 TABLET, EXTENDED RELEASE ORAL
Qty: 180 | Refills: 3 | Status: DISCONTINUED | COMMUNITY
Start: 2022-05-10 | End: 2023-05-12

## 2023-05-12 NOTE — HISTORY OF PRESENT ILLNESS
[FreeTextEntry1] : Anson Olvera is a 77 year old man with paroxysmal atrial fibrillation (status post a catheter ablation on 9/19/2022), sinus node dysfunction (status post a dual chamber [employer] pacemaker by Dr. Onel Sam on 9/1/2016), chronic HFpEF, CKD Stage IV-V, CLL, IgG monoclonal gammopathy, GERD, hypothyroidism and mild cognitive impairment. He presents today for follow-up.\par \par To briefly summarize his history, he had symptomatic paroxysmal atrial fibrillation with increasing AF burden on pacemaker interrogations (12% in 5/2022 --> 21% in 8/2022). He presented on 9/12/2022 for an AF ablation and was found to have LOUISE on CKD (etiology obstructive - 4mm obstructive calculus in right ureter, s/p ureteral stent). Once his renal function returned to baseline he underwent a catheter ablation of atrial fibrillation on 9/19/2022 (PVI and CTI). \par \par Since his last visit with me on November 11th 2022 he was found to have a relapse of his Waldenstrom's. There is a plan to initiate a BTK inhibitor. He reports that he has been feeling fatigued and occasionally has swollen legs. He denies chest pain, shortness of breath or palpitations.\par \par His device interrogation from the office today reveals that he has not had any recurrences of his atrial fibrillation following the ablation.\par \par CHADS2-VASC: 2 (Age: 2)

## 2023-05-12 NOTE — DISCUSSION/SUMMARY
[Paroxysmal Atrial Fibrillation] : paroxysmal atrial fibrillation [FreeTextEntry1] : Anson Olvera is a 77 year old man with pAF, SSS (s/p 2-ch BSc PPM implanted by Dr. Onel Sam on 9/1/2016), chronic HFpEF, CKD Stage IV-V, CLL, IgG monoclonal gammopathy, GERD, hypothyroidism and mild cognitive impairment. He underwent an AF ablation on 9/19/2022 (PVI and CTI) for symptomatic pAF. Since his ablation he has not had any symptoms and has not had a recurrence of atrial fibrillation following the ablation. \par \par His CHADS2-VASC score is 2 (Age). We discussed the options of continuing anticoagulation versus stopping anticoagulation and using remote monitoring from his device to detect recurrences of atrial fibrillation. He intends to continue with Eliquis for the time being. \par \par He will continue to follow with Dr. Clemente and will see me again as needed. [EKG obtained to assist in diagnosis and management of assessed problem(s)] : EKG obtained to assist in diagnosis and management of assessed problem(s)

## 2023-05-12 NOTE — PHYSICAL EXAM
[Well Developed] : well developed [No Acute Distress] : no acute distress [Normal Venous Pressure] : normal venous pressure [Normal S1, S2] : normal S1, S2 [No Murmur] : no murmur [No Rub] : no rub [No Gallop] : no gallop [Clear Lung Fields] : clear lung fields [Good Air Entry] : good air entry [Soft] : abdomen soft [Non Tender] : non-tender [No Masses/organomegaly] : no masses/organomegaly [Normal Bowel Sounds] : normal bowel sounds [No Rash] : no rash [Moves all extremities] : moves all extremities [Normal Speech] : normal speech [Alert and Oriented] : alert and oriented [Normal memory] : normal memory [de-identified] : L-sided PPM site C/D/I [de-identified] : Trace b/l LE edema [de-identified] : Dry skin

## 2023-05-12 NOTE — CARDIOLOGY SUMMARY
[de-identified] : ECG from 5/12/2023: Sinus bradycardia at 53 beats per minute\par ECG from 11/11/2022: Atrial pacing at 50 beats per minute with intrinsic AV conduction and native QRS (QRSd: 104ms)\par ECG from 9/12/2022: Atrial pacing with MA: 224, intrinsic QRS - QRSd: 100ms [de-identified] : TTE from 3/17/2022: LA: 5.0, mildly dilated, EF: 60%, normal diastolic function, normal RV size and function, mild TR, no pericardial effusion, mild-mod pHTN [de-identified] : Ablation 9/19/2022: PVI (first pass isolation of the right PVs with empiric carinal line, first pass of LPVs not achieved), AF organized into CTI flutter during PVI - CTI line completed with termination of AFl, bidirectional block achieved across CTI lesion set. Exit mapping used for LPVs - additional lesions required anterior LIPV and anterior forrest in order to isolate LPVs. No acute or dormant PV reconnections, no sustained inducible arrhythmias with PES. Small thrombus noted on RA lead on ICE

## 2023-05-13 ENCOUNTER — APPOINTMENT (OUTPATIENT)
Dept: NUCLEAR MEDICINE | Facility: IMAGING CENTER | Age: 78
End: 2023-05-13
Payer: MEDICARE

## 2023-05-13 ENCOUNTER — OUTPATIENT (OUTPATIENT)
Dept: OUTPATIENT SERVICES | Facility: HOSPITAL | Age: 78
LOS: 1 days | End: 2023-05-13
Payer: MEDICARE

## 2023-05-13 DIAGNOSIS — Z96.0 PRESENCE OF UROGENITAL IMPLANTS: Chronic | ICD-10-CM

## 2023-05-13 DIAGNOSIS — Z98.890 OTHER SPECIFIED POSTPROCEDURAL STATES: Chronic | ICD-10-CM

## 2023-05-13 DIAGNOSIS — Z98.41 CATARACT EXTRACTION STATUS, RIGHT EYE: Chronic | ICD-10-CM

## 2023-05-13 DIAGNOSIS — Z00.8 ENCOUNTER FOR OTHER GENERAL EXAMINATION: ICD-10-CM

## 2023-05-13 DIAGNOSIS — C88.0 WALDENSTROM MACROGLOBULINEMIA: ICD-10-CM

## 2023-05-13 DIAGNOSIS — C91.10 CHRONIC LYMPHOCYTIC LEUKEMIA OF B-CELL TYPE NOT HAVING ACHIEVED REMISSION: ICD-10-CM

## 2023-05-13 DIAGNOSIS — Z98.89 OTHER SPECIFIED POSTPROCEDURAL STATES: Chronic | ICD-10-CM

## 2023-05-13 PROCEDURE — 78815 PET IMAGE W/CT SKULL-THIGH: CPT

## 2023-05-13 PROCEDURE — 78815 PET IMAGE W/CT SKULL-THIGH: CPT | Mod: 26,PI

## 2023-05-13 PROCEDURE — A9552: CPT

## 2023-05-14 PROBLEM — I49.5 SICK SINUS SYNDROME: Status: ACTIVE | Noted: 2018-08-21

## 2023-05-15 ENCOUNTER — APPOINTMENT (OUTPATIENT)
Dept: INFUSION THERAPY | Facility: HOSPITAL | Age: 78
End: 2023-05-15

## 2023-05-16 ENCOUNTER — APPOINTMENT (OUTPATIENT)
Dept: CARDIOLOGY | Facility: CLINIC | Age: 78
End: 2023-05-16

## 2023-05-16 DIAGNOSIS — I49.5 SICK SINUS SYNDROME: ICD-10-CM

## 2023-05-23 ENCOUNTER — APPOINTMENT (OUTPATIENT)
Dept: CARDIOLOGY | Facility: CLINIC | Age: 78
End: 2023-05-23

## 2023-05-23 DIAGNOSIS — R00.2 PALPITATIONS: ICD-10-CM

## 2023-05-25 ENCOUNTER — APPOINTMENT (OUTPATIENT)
Dept: UROLOGY | Facility: CLINIC | Age: 78
End: 2023-05-25

## 2023-06-02 ENCOUNTER — RESULT REVIEW (OUTPATIENT)
Age: 78
End: 2023-06-02

## 2023-06-02 ENCOUNTER — APPOINTMENT (OUTPATIENT)
Dept: HEMATOLOGY ONCOLOGY | Facility: CLINIC | Age: 78
End: 2023-06-02
Payer: MEDICARE

## 2023-06-02 ENCOUNTER — LABORATORY RESULT (OUTPATIENT)
Age: 78
End: 2023-06-02

## 2023-06-02 VITALS
OXYGEN SATURATION: 98 % | HEART RATE: 61 BPM | RESPIRATION RATE: 14 BRPM | TEMPERATURE: 98.3 F | DIASTOLIC BLOOD PRESSURE: 56 MMHG | BODY MASS INDEX: 27.54 KG/M2 | WEIGHT: 214.51 LBS | SYSTOLIC BLOOD PRESSURE: 104 MMHG

## 2023-06-02 LAB
ALBUMIN SERPL ELPH-MCNC: 3.6 G/DL
ALP BLD-CCNC: 114 U/L
ALT SERPL-CCNC: 10 U/L
ANION GAP SERPL CALC-SCNC: 11 MMOL/L
AST SERPL-CCNC: 12 U/L
BASOPHILS # BLD AUTO: 0.02 K/UL — SIGNIFICANT CHANGE UP (ref 0–0.2)
BASOPHILS NFR BLD AUTO: 0.5 % — SIGNIFICANT CHANGE UP (ref 0–2)
BILIRUB SERPL-MCNC: 0.2 MG/DL
BUN SERPL-MCNC: 59 MG/DL
CALCIUM SERPL-MCNC: 8.5 MG/DL
CHLORIDE SERPL-SCNC: 115 MMOL/L
CO2 SERPL-SCNC: 17 MMOL/L
CREAT SERPL-MCNC: 3.99 MG/DL
EGFR: 15 ML/MIN/1.73M2
ELLIPTOCYTES BLD QL SMEAR: SLIGHT — SIGNIFICANT CHANGE UP
EOSINOPHIL # BLD AUTO: 0.21 K/UL — SIGNIFICANT CHANGE UP (ref 0–0.5)
EOSINOPHIL NFR BLD AUTO: 4.5 % — SIGNIFICANT CHANGE UP (ref 0–6)
GLUCOSE SERPL-MCNC: 99 MG/DL
HCT VFR BLD CALC: 28 % — LOW (ref 39–50)
HGB BLD-MCNC: 9 G/DL — LOW (ref 13–17)
LDH SERPL-CCNC: 120 U/L
LYMPHOCYTES # BLD AUTO: 1.69 K/UL — SIGNIFICANT CHANGE UP (ref 1–3.3)
LYMPHOCYTES # BLD AUTO: 37 % — SIGNIFICANT CHANGE UP (ref 13–44)
MCHC RBC-ENTMCNC: 31.8 PG — SIGNIFICANT CHANGE UP (ref 27–34)
MCHC RBC-ENTMCNC: 32.1 G/DL — SIGNIFICANT CHANGE UP (ref 32–36)
MCV RBC AUTO: 98.9 FL — SIGNIFICANT CHANGE UP (ref 80–100)
MONOCYTES # BLD AUTO: 0.23 K/UL — SIGNIFICANT CHANGE UP (ref 0–0.9)
MONOCYTES NFR BLD AUTO: 5 % — SIGNIFICANT CHANGE UP (ref 2–14)
NEUTROPHILS # BLD AUTO: 2.42 K/UL — SIGNIFICANT CHANGE UP (ref 1.8–7.4)
NEUTROPHILS NFR BLD AUTO: 53 % — SIGNIFICANT CHANGE UP (ref 43–77)
NRBC # BLD: 0 /100 — SIGNIFICANT CHANGE UP (ref 0–0)
NRBC # BLD: SIGNIFICANT CHANGE UP /100 WBCS (ref 0–0)
PLAT MORPH BLD: NORMAL — SIGNIFICANT CHANGE UP
PLATELET # BLD AUTO: 71 K/UL — LOW (ref 150–400)
POIKILOCYTOSIS BLD QL AUTO: SLIGHT — SIGNIFICANT CHANGE UP
POTASSIUM SERPL-SCNC: 4.9 MMOL/L
PROT SERPL-MCNC: 5.8 G/DL
RBC # BLD: 2.83 M/UL — LOW (ref 4.2–5.8)
RBC # FLD: 13.8 % — SIGNIFICANT CHANGE UP (ref 10.3–14.5)
RBC BLD AUTO: ABNORMAL
SODIUM SERPL-SCNC: 143 MMOL/L
WBC # BLD: 4.56 K/UL — SIGNIFICANT CHANGE UP (ref 3.8–10.5)
WBC # FLD AUTO: 4.56 K/UL — SIGNIFICANT CHANGE UP (ref 3.8–10.5)

## 2023-06-02 PROCEDURE — 99215 OFFICE O/P EST HI 40 MIN: CPT

## 2023-06-02 RX ORDER — VITAMIN K2 90 MCG
125 MCG CAPSULE ORAL
Refills: 0 | Status: ACTIVE | COMMUNITY
Start: 2023-06-02

## 2023-06-02 NOTE — HISTORY OF PRESENT ILLNESS
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 Still has discomfort there. His fatigue has increased. Has ALVAREZ after 100 yards. He has stable diarrhea. He is eating and hydrates self well. Neuropathy is stable in his feet and fingers. Has chronic back pain. Constant right hip pain persists. No atrial fibrillation since ablation. He notes no orthostasis, lightheadedness, chest pain, SOB, fever, night sweats, headache, visual problems, abdominal pain, swollen glands, bleeding, bruising, ankle swelling, melena, BRBPR, hematuria. Weight stable. \par \par \par \par

## 2023-06-02 NOTE — CONSULT LETTER
[Dear  ___] : Dear  [unfilled], [Courtesy Letter:] : I had the pleasure of seeing your patient, [unfilled], in my office today. [Please see my note below.] : Please see my note below. [Sincerely,] : Sincerely, [DrIbeth  ___] : Dr. DENG [DrIbeth ___] : Dr. DENG [___] : [unfilled] [FreeTextEntry2] : Dr. Simpson [FreeTextEntry3] : Cesar Simons M.D., FACP\par Professor of Medicine\par [facility] [facility] [employer]\par Associate Chief, Division of Hematology\par [facility]\par [facility]\par [address] [place] [address]\[place] [address]\par [phone]

## 2023-06-02 NOTE — REVIEW OF SYSTEMS
[Fatigue] : fatigue [Joint Pain] : joint pain [Negative] : Allergic/Immunologic [Fever] : no fever [FreeTextEntry9] : back pain, hip pain [de-identified] : Neuropathy

## 2023-06-02 NOTE — DIETITIAN INITIAL EVALUATION ADULT. - PROBLEM SELECTOR PLAN 4
.
-patient with hematuria on UA, in setting of anemia. Unclear etiology (will probe if walker placed at OSH)  -monitor Is&Os  -f/u CT A/P

## 2023-06-02 NOTE — RESULTS/DATA
[FreeTextEntry1] : WBC 4560, Hgb 9, Hct 28, Platelets 71,000 Diff normal \par \par 5/2/23\par CMP Creatinine 3.68, , eGFR 16\par \par TFT normal\par Hepatits B and C negative\par 24 hour urine TP 2244 mg, BJP +  2 lambda bands\par \par 5/13/23\par CT/PET\par IMPRESSION: Compared to whole body FDG-PET/CT scan dated 7/19/2018:\par \par 1. Minimally FDG-avid and non-FDG-avid lymphadenopathy above and below the diaphragm is unchanged or mildly increased in size, and is unchanged and avidity, compatible with stated diagnosis of Waldenstrom's macroglobulinemia/CLL.\par \par 2. Resolution of nonspecific diffuse bone marrow hypermetabolism.\par \par 3. Minimal non-FDG-avid bilateral upper lobe and lower lobe peripheral groundglass pulmonary opacities, new since prior PET/CT, and decreased since 3/16/2021, may represent sequela of COVID pneumonia.\par \par \par \par \par

## 2023-06-02 NOTE — PHYSICAL EXAM
[Capable of only limited self care, confined to bed or chair more than 50% of waking hours] : Status 3- Capable of only limited self care, confined to bed or chair more than 50% of waking hours [Normal] : affect appropriate [de-identified] :  Pacemaker left anterior chest wall. RRR. S1S2 normal. Gr 2/6 TESS RUSB to LLSB. No gallops. [de-identified] : RLE minimally > LLE. No edema, cords, tenderness. (chronic) [de-identified] : 3 x 2 cm right axillary node [de-identified] : 5 x 5 cm lipoma left lower back. 1 x 1 cm sebaceous cyst right scapula.

## 2023-06-02 NOTE — ASSESSMENT
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 He had progressive anemia with rising creatinine and relapse of his Waldenstrom's. He was not a candidate for Ibrutinib due to anticoagulation with Warfarin. He achieved a partial remission with marked improvement in his hemoglobin, creatinine and IgM with IRd, but had not tolerated Rituxan. Re-evaluation now due to anemia/thrombocytopenia reveals relapse of Waldenstrom's with iron deficiency and minimal CLL. There has been no hematologic response to IV Fe, so will begin alternative therapy for Waldenstrom's utilizing Zanubrutinib. Reviewed toxicities in detail and all questions answered. They agree to proceed. They will contact us if bleeding, bruising, rash or AF develop.\par \par Plan:\par Zanubrutinib 160 mg twice dailly\par Mepron 1500 mg daily\par CMP, LDH, SPEP, Quant Ig, SFLC\par Acyclovir \par Omeprazole\par Decrease Eliquis to 2.5 mg twice daily per Cardiology - hold if platelets < 50,000. D/C if possible.\par COVID booster\par RTC weekly x 6\par \par

## 2023-06-05 LAB
DEPRECATED KAPPA LC FREE/LAMBDA SER: 0 RATIO
IGA SER QL IEP: 12 MG/DL
IGG SER QL IEP: 344 MG/DL
IGM SER QL IEP: 1112 MG/DL
KAPPA LC CSF-MCNC: 238.92 MG/DL
KAPPA LC SERPL-MCNC: 1.08 MG/DL

## 2023-06-12 LAB
ALBUMIN MFR SERPL ELPH: 51.8 %
ALBUMIN SERPL-MCNC: 3 G/DL
ALBUMIN/GLOB SERPL: 1.1 RATIO
ALPHA1 GLOB MFR SERPL ELPH: 6.8 %
ALPHA1 GLOB SERPL ELPH-MCNC: 0.4 G/DL
ALPHA2 GLOB MFR SERPL ELPH: 12.5 %
ALPHA2 GLOB SERPL ELPH-MCNC: 0.7 G/DL
B-GLOBULIN MFR SERPL ELPH: 8.4 %
B-GLOBULIN SERPL ELPH-MCNC: 0.5 G/DL
GAMMA GLOB FLD ELPH-MCNC: 1.2 G/DL
GAMMA GLOB MFR SERPL ELPH: 20.5 %
INTERPRETATION SERPL IEP-IMP: NORMAL
M PROTEIN MFR SERPL ELPH: NORMAL
MONOCLON BAND OBS SERPL: NORMAL
PROT SERPL-MCNC: 5.8 G/DL
PROT SERPL-MCNC: 5.8 G/DL

## 2023-06-14 ENCOUNTER — OUTPATIENT (OUTPATIENT)
Dept: OUTPATIENT SERVICES | Facility: HOSPITAL | Age: 78
LOS: 1 days | Discharge: HOME | End: 2023-06-14

## 2023-06-14 DIAGNOSIS — Z98.890 OTHER SPECIFIED POSTPROCEDURAL STATES: Chronic | ICD-10-CM

## 2023-06-14 DIAGNOSIS — Z96.0 PRESENCE OF UROGENITAL IMPLANTS: Chronic | ICD-10-CM

## 2023-06-14 DIAGNOSIS — C91.11 CHRONIC LYMPHOCYTIC LEUKEMIA OF B-CELL TYPE IN REMISSION: ICD-10-CM

## 2023-06-14 DIAGNOSIS — Z98.89 OTHER SPECIFIED POSTPROCEDURAL STATES: Chronic | ICD-10-CM

## 2023-06-14 DIAGNOSIS — Z95.0 PRESENCE OF CARDIAC PACEMAKER: Chronic | ICD-10-CM

## 2023-06-14 DIAGNOSIS — Z98.41 CATARACT EXTRACTION STATUS, RIGHT EYE: Chronic | ICD-10-CM

## 2023-06-16 ENCOUNTER — APPOINTMENT (OUTPATIENT)
Dept: HEMATOLOGY ONCOLOGY | Facility: CLINIC | Age: 78
End: 2023-06-16
Payer: MEDICARE

## 2023-06-16 ENCOUNTER — NON-APPOINTMENT (OUTPATIENT)
Age: 78
End: 2023-06-16

## 2023-06-16 ENCOUNTER — RESULT REVIEW (OUTPATIENT)
Age: 78
End: 2023-06-16

## 2023-06-16 VITALS
DIASTOLIC BLOOD PRESSURE: 58 MMHG | SYSTOLIC BLOOD PRESSURE: 106 MMHG | BODY MASS INDEX: 26.18 KG/M2 | OXYGEN SATURATION: 98 % | HEIGHT: 74 IN | RESPIRATION RATE: 16 BRPM | TEMPERATURE: 97 F | WEIGHT: 203.99 LBS | HEART RATE: 51 BPM

## 2023-06-16 LAB
BASOPHILS # BLD AUTO: 0.03 K/UL — SIGNIFICANT CHANGE UP (ref 0–0.2)
BASOPHILS NFR BLD AUTO: 0.7 % — SIGNIFICANT CHANGE UP (ref 0–2)
EOSINOPHIL # BLD AUTO: 0.08 K/UL — SIGNIFICANT CHANGE UP (ref 0–0.5)
EOSINOPHIL NFR BLD AUTO: 2 % — SIGNIFICANT CHANGE UP (ref 0–6)
HCT VFR BLD CALC: 28.4 % — LOW (ref 39–50)
HGB BLD-MCNC: 9.1 G/DL — LOW (ref 13–17)
IMM GRANULOCYTES NFR BLD AUTO: 0.2 % — SIGNIFICANT CHANGE UP (ref 0–0.9)
LYMPHOCYTES # BLD AUTO: 1.95 K/UL — SIGNIFICANT CHANGE UP (ref 1–3.3)
LYMPHOCYTES # BLD AUTO: 48.5 % — HIGH (ref 13–44)
MCHC RBC-ENTMCNC: 32 G/DL — SIGNIFICANT CHANGE UP (ref 32–36)
MCHC RBC-ENTMCNC: 32 PG — SIGNIFICANT CHANGE UP (ref 27–34)
MCV RBC AUTO: 100 FL — SIGNIFICANT CHANGE UP (ref 80–100)
MONOCYTES # BLD AUTO: 0.24 K/UL — SIGNIFICANT CHANGE UP (ref 0–0.9)
MONOCYTES NFR BLD AUTO: 6 % — SIGNIFICANT CHANGE UP (ref 2–14)
NEUTROPHILS # BLD AUTO: 1.71 K/UL — LOW (ref 1.8–7.4)
NEUTROPHILS NFR BLD AUTO: 42.6 % — LOW (ref 43–77)
NRBC # BLD: 0 /100 WBCS — SIGNIFICANT CHANGE UP (ref 0–0)
PLATELET # BLD AUTO: 83 K/UL — LOW (ref 150–400)
RBC # BLD: 2.84 M/UL — LOW (ref 4.2–5.8)
RBC # FLD: 13.3 % — SIGNIFICANT CHANGE UP (ref 10.3–14.5)
WBC # BLD: 4.02 K/UL — SIGNIFICANT CHANGE UP (ref 3.8–10.5)
WBC # FLD AUTO: 4.02 K/UL — SIGNIFICANT CHANGE UP (ref 3.8–10.5)

## 2023-06-16 PROCEDURE — 99213 OFFICE O/P EST LOW 20 MIN: CPT

## 2023-06-16 RX ORDER — METAPROTERENOL SULFATE 10 MG
500 TABLET ORAL
Qty: 30 | Refills: 0 | Status: ACTIVE | COMMUNITY
Start: 2023-06-16

## 2023-06-16 RX ORDER — LOPERAMIDE HYDROCHLORIDE 2 MG/1
2 TABLET ORAL
Qty: 30 | Refills: 0 | Status: ACTIVE | COMMUNITY
Start: 2023-06-16 | End: 1900-01-01

## 2023-06-16 NOTE — HISTORY OF PRESENT ILLNESS
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 pain has resolved. Patient states his fatigue has improved since starting Brukinsa. Has ALVAREZ after 100 yards. He has stable diarrhea. He is eating and hydrates self well, but endorses a 28 pound weight gain in 4 months. Neuropathy is stable in his feet and fingers. Has chronic back pain. Constant right hip pain persists. No atrial fibrillation since ablation. He notes no orthostasis, lightheadedness, chest pain, SOB, fever, night sweats, headache, visual problems, abdominal pain, swollen glands, bleeding, bruising, ankle swelling, melena, BRBPR, hematuria. \par \par \par \par

## 2023-06-16 NOTE — REVIEW OF SYSTEMS
[Recent Change In Weight] : ~T recent weight change [Lower Ext Edema] : lower extremity edema [SOB on Exertion] : shortness of breath during exertion [Joint Pain] : joint pain [Easy Bleeding] : a tendency for easy bleeding [Negative] : Allergic/Immunologic [Fever] : no fever [FreeTextEntry2] : improved energy; decrease of 28lbs four months [FreeTextEntry7] : diarrhea [FreeTextEntry9] : back pain, hip pain [de-identified] : Neuropathy [de-identified] : "I bleed every time I scratch my skin"

## 2023-06-16 NOTE — ASSESSMENT
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 He had progressive anemia with rising creatinine and relapse of his Waldenstrom's. He was not a candidate for Ibrutinib due to anticoagulation with Warfarin. He achieved a partial remission with marked improvement in his hemoglobin, creatinine and IgM with IRd, but had not tolerated Rituxan. Re-evaluation now due to anemia/thrombocytopenia reveals relapse of Waldenstrom's with iron deficiency and minimal CLL. There has been no hematologic response to IV Fe, so will begin alternative therapy for Waldenstrom's utilizing Zanubrutinib. Reviewed toxicities in detail and all questions answered. They agree to proceed. They will contact us if bleeding, bruising, rash or AF develop.\par \par Plan:\par Zanubrutinib 160 mg twice dailly\par Mepron 1500 mg daily\par Acyclovir \par Omeprazole\par Decrease Eliquis to 2.5 mg twice daily per Cardiology - hold if platelets < 50,000. D/C if possible.\par COVID booster\par RTC weekly x 6\par \par

## 2023-06-16 NOTE — RESULTS/DATA
[FreeTextEntry1] : WBC 4.02 , Hgb 9.1, Hct 28.4, Platelets 83,000 ANC 1.71\par \par 5/2/23\par CMP Creatinine 3.68, , eGFR 16\par \par TFT normal\par Hepatits B and C negative\par 24 hour urine TP 2244 mg, BJP +  2 lambda bands\par \par 5/13/23\par CT/PET\par IMPRESSION: \par 1. Minimally FDG-avid and non-FDG-avid lymphadenopathy above and below the diaphragm is unchanged or mildly increased in size, and is unchanged and avidity, compatible with stated diagnosis of Waldenstrom's macroglobulinemia/CLL.\par 2. Resolution of nonspecific diffuse bone marrow hypermetabolism.\par 3. Minimal non-FDG-avid bilateral upper lobe and lower lobe peripheral groundglass pulmonary opacities, new since prior PET/CT, and decreased since 3/16/2021, may represent sequela of COVID pneumonia.\par \par \par \par \par

## 2023-06-16 NOTE — CONSULT LETTER
[Dear  ___] : Dear  [unfilled], [Courtesy Letter:] : I had the pleasure of seeing your patient, [unfilled], in my office today. [Please see my note below.] : Please see my note below. [Sincerely,] : Sincerely, [DrIbeth  ___] : Dr. DENG [DrIbeth ___] : Dr. DENG [___] : [unfilled] [FreeTextEntry2] : Dr. Simpson [FreeTextEntry3] : Cesar Simons M.D., FACP\par Professor of Medicine\par [employer]\par Associate Chief, Division of Hematology\par [facility]\par [facility]\par [address] [place] [address]\[place] [address]\par [phone]

## 2023-06-16 NOTE — PHYSICAL EXAM
[Capable of only limited self care, confined to bed or chair more than 50% of waking hours] : Status 3- Capable of only limited self care, confined to bed or chair more than 50% of waking hours [Normal] : affect appropriate [de-identified] :  Pacemaker left anterior chest wall. RRR. S1S2 normal. Gr 2/6 TESS RUSB to LLSB. No gallops. [de-identified] : edema RLE minimally > LLE.  [de-identified] : 3 x 2 cm right axillary node [de-identified] : 5 x 5 cm lipoma left lower back. 1 x 1 cm sebaceous cyst right scapula.

## 2023-06-19 LAB
ALBUMIN SERPL ELPH-MCNC: 3.7 G/DL
ALP BLD-CCNC: 110 U/L
ALT SERPL-CCNC: 7 U/L
ANION GAP SERPL CALC-SCNC: 8 MMOL/L
AST SERPL-CCNC: 10 U/L
BILIRUB SERPL-MCNC: 0.3 MG/DL
BUN SERPL-MCNC: 49 MG/DL
CALCIUM SERPL-MCNC: 9 MG/DL
CHLORIDE SERPL-SCNC: 113 MMOL/L
CO2 SERPL-SCNC: 20 MMOL/L
CREAT SERPL-MCNC: 3.89 MG/DL
EGFR: 15 ML/MIN/1.73M2
GLUCOSE SERPL-MCNC: 140 MG/DL
LDH SERPL-CCNC: 129 U/L
PHOSPHATE SERPL-MCNC: 3 MG/DL
POTASSIUM SERPL-SCNC: 5 MMOL/L
PROT SERPL-MCNC: 5.9 G/DL
SODIUM SERPL-SCNC: 141 MMOL/L
URATE SERPL-MCNC: 4.6 MG/DL

## 2023-06-20 ENCOUNTER — NON-APPOINTMENT (OUTPATIENT)
Age: 78
End: 2023-06-20

## 2023-06-20 ENCOUNTER — APPOINTMENT (OUTPATIENT)
Dept: CARDIOLOGY | Facility: CLINIC | Age: 78
End: 2023-06-20
Payer: MEDICARE

## 2023-06-20 VITALS
OXYGEN SATURATION: 99 % | HEIGHT: 74 IN | SYSTOLIC BLOOD PRESSURE: 113 MMHG | BODY MASS INDEX: 26.18 KG/M2 | WEIGHT: 203.99 LBS | HEART RATE: 50 BPM | DIASTOLIC BLOOD PRESSURE: 66 MMHG

## 2023-06-20 PROCEDURE — 99214 OFFICE O/P EST MOD 30 MIN: CPT | Mod: 25

## 2023-06-20 PROCEDURE — 93000 ELECTROCARDIOGRAM COMPLETE: CPT

## 2023-06-21 NOTE — HISTORY OF PRESENT ILLNESS
[FreeTextEntry1] : Anson fell and had double breaks on two ribs. Fell when getting PET scan. Back pain. New medication for Waldenstroms and BP lower. He has imbalance. No dizziness. Losing weight.

## 2023-06-21 NOTE — DISCUSSION/SUMMARY
[FreeTextEntry1] : The patient is a 78-year-old gentleman  mild dementia, PAF, lower extremity edema , CLL, CKD stage IV, Waldenstrom's macroglobulinemia, hypothyroidism, PPM , HFpEF, s/p sigmoid resection for diverticulitis, oral chemo for his CLL , pelvic fracture, s/p afib ablation with reactivation Waldenstrom and low BP.\par #1 CV- c/w metoprolol 50 and 25, decrease to 25mg bid\par #2 PPM- [employer] pacemaker interrogation just APCs,c/w eliquis, office check next visit, remote in August\par #3 Pulm- no issues currently\par #4 Heme- c/w brukinsa, atovaquone, f/u clinic\par #5 Hypothyroid- continue levothyroxine\par #6 Neuro- on namenda and aricept for dementia, mirtazapine and clonazepam for anxiety, wife with him today, increase nutrition supplements to prevent further weight loss. \par  [EKG obtained to assist in diagnosis and management of assessed problem(s)] : EKG obtained to assist in diagnosis and management of assessed problem(s)

## 2023-06-22 ENCOUNTER — RESULT REVIEW (OUTPATIENT)
Age: 78
End: 2023-06-22

## 2023-06-22 ENCOUNTER — APPOINTMENT (OUTPATIENT)
Dept: HEMATOLOGY ONCOLOGY | Facility: CLINIC | Age: 78
End: 2023-06-22
Payer: MEDICARE

## 2023-06-22 VITALS
TEMPERATURE: 97 F | RESPIRATION RATE: 16 BRPM | BODY MASS INDEX: 26.19 KG/M2 | SYSTOLIC BLOOD PRESSURE: 105 MMHG | WEIGHT: 203.99 LBS | OXYGEN SATURATION: 98 % | HEART RATE: 55 BPM | DIASTOLIC BLOOD PRESSURE: 64 MMHG

## 2023-06-22 LAB
BASOPHILS # BLD AUTO: 0.05 K/UL — SIGNIFICANT CHANGE UP (ref 0–0.2)
BASOPHILS NFR BLD AUTO: 0.9 % — SIGNIFICANT CHANGE UP (ref 0–2)
EOSINOPHIL # BLD AUTO: 0.12 K/UL — SIGNIFICANT CHANGE UP (ref 0–0.5)
EOSINOPHIL NFR BLD AUTO: 2.2 % — SIGNIFICANT CHANGE UP (ref 0–6)
HCT VFR BLD CALC: 29.8 % — LOW (ref 39–50)
HGB BLD-MCNC: 9.4 G/DL — LOW (ref 13–17)
IMM GRANULOCYTES NFR BLD AUTO: 0.2 % — SIGNIFICANT CHANGE UP (ref 0–0.9)
LYMPHOCYTES # BLD AUTO: 2.2 K/UL — SIGNIFICANT CHANGE UP (ref 1–3.3)
LYMPHOCYTES # BLD AUTO: 39.9 % — SIGNIFICANT CHANGE UP (ref 13–44)
MCHC RBC-ENTMCNC: 31.5 G/DL — LOW (ref 32–36)
MCHC RBC-ENTMCNC: 31.8 PG — SIGNIFICANT CHANGE UP (ref 27–34)
MCV RBC AUTO: 100.7 FL — HIGH (ref 80–100)
MONOCYTES # BLD AUTO: 0.34 K/UL — SIGNIFICANT CHANGE UP (ref 0–0.9)
MONOCYTES NFR BLD AUTO: 6.2 % — SIGNIFICANT CHANGE UP (ref 2–14)
NEUTROPHILS # BLD AUTO: 2.8 K/UL — SIGNIFICANT CHANGE UP (ref 1.8–7.4)
NEUTROPHILS NFR BLD AUTO: 50.6 % — SIGNIFICANT CHANGE UP (ref 43–77)
NRBC # BLD: 0 /100 WBCS — SIGNIFICANT CHANGE UP (ref 0–0)
PLATELET # BLD AUTO: 104 K/UL — LOW (ref 150–400)
RBC # BLD: 2.96 M/UL — LOW (ref 4.2–5.8)
RBC # FLD: 13.2 % — SIGNIFICANT CHANGE UP (ref 10.3–14.5)
WBC # BLD: 5.52 K/UL — SIGNIFICANT CHANGE UP (ref 3.8–10.5)
WBC # FLD AUTO: 5.52 K/UL — SIGNIFICANT CHANGE UP (ref 3.8–10.5)

## 2023-06-22 PROCEDURE — 99213 OFFICE O/P EST LOW 20 MIN: CPT

## 2023-06-22 NOTE — HISTORY OF PRESENT ILLNESS
[Disease:__________________________] : Disease: [unfilled] [Rosales Stage: ___] : Rosales Stage: [unfilled] [de-identified] : PAF\par 10/2015 Waldenstrom's macroglobulinemia ; + dim lambda, CD 19, 23, FMC-7; negative CD 5, 10, 20\par Renal biopsy: lymphocytic infiltrate of renal cortex\par 10/17 IgGk and lambda BGUS\par 7/2018: Nephrotic syndrome with lambda light chains\par  [de-identified] : +lambda, CD5, CD 20; CD 38 --; FISH del 13q14.3\par SPEP gamma paraprotein M 0.4\par SPIEP IgM lambda; 10/17 IgGk, lambda [FreeTextEntry1] : 10/2015 - 1/2017  Rituxan/Velcade/Decadron; 8/18 - 1/19 Ixazomib/Decadron/Rituxan x 6 cycles to maintenance until 3/23;\par 6/2023 - Zanubrutinib [de-identified] : Fell and fractured 2 left ribs at time of CT/PET. pain has resolved. Patient continues to note improvement in his fatigue since starting Brukinsa. ALVAREZ has also improved.  He has stable diarrhea, takes Imodium with relief. He is eating and hydrates self well, but endorses a 28 pound weight gain in 4 months. Neuropathy is stable in his feet and fingers. Has chronic back pain. Constant right hip pain persists. No atrial fibrillation since ablation. He notes no orthostasis, lightheadedness, chest pain, SOB, fever, night sweats, headache, visual problems, abdominal pain, swollen glands, bleeding, bruising, ankle swelling, melena, BRBPR, hematuria. \par \par \par \par

## 2023-06-22 NOTE — PHYSICAL EXAM
[Capable of only limited self care, confined to bed or chair more than 50% of waking hours] : Status 3- Capable of only limited self care, confined to bed or chair more than 50% of waking hours [Normal] : affect appropriate [de-identified] :  Pacemaker left anterior chest wall. RRR. S1S2 normal. Gr 2/6 TESS RUSB to LLSB. No gallops. [de-identified] : edema RLE minimally > LLE.  [de-identified] : 3 x 2 cm right axillary node [de-identified] : 5 x 5 cm lipoma left lower back. 1 x 1 cm sebaceous cyst right scapula.

## 2023-06-22 NOTE — CONSULT LETTER
[Dear  ___] : Dear  [unfilled], [Courtesy Letter:] : I had the pleasure of seeing your patient, [unfilled], in my office today. [Please see my note below.] : Please see my note below. [Sincerely,] : Sincerely, [DrIbeth  ___] : Dr. DENG [DrIbeth ___] : Dr. DENG [___] : [unfilled] [FreeTextEntry2] : Dr. Simpson [FreeTextEntry3] : Cesar Simons M.D., FACP\par Professor of Medicine\par [facility] [employer]\par Associate Chief, Division of Hematology\par [facility]\par [facility]\par [address] [place] [address]\[place] [address]\par [phone]

## 2023-06-22 NOTE — ASSESSMENT
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 He had progressive anemia with rising creatinine and relapse of his Waldenstrom's. He was not a candidate for Ibrutinib due to anticoagulation with Warfarin. He achieved a partial remission with marked improvement in his hemoglobin, creatinine and IgM with IRd, but had not tolerated Rituxan. Re-evaluation now due to anemia/thrombocytopenia reveals relapse of Waldenstrom's with iron deficiency and minimal CLL. There has been no hematologic response to IV Fe, so will begin alternative therapy for Waldenstrom's utilizing Zanubrutinib. Reviewed toxicities in detail and all questions answered. They agree to proceed. They will contact us if bleeding, bruising, rash or AF develop.  Tolerating Zanibrutinib well with no compalints. \par \par Plan:\par Zanubrutinib 160 mg twice dailly\par Mepron 1500 mg daily\par Acyclovir \par Omeprazole\par Decrease Eliquis to 2.5 mg twice daily per Cardiology - hold if platelets < 50,000. D/C if possible.\par COVID booster\par RTC weekly x 6\par \par

## 2023-06-22 NOTE — REVIEW OF SYSTEMS
[Recent Change In Weight] : ~T recent weight change [Lower Ext Edema] : lower extremity edema [SOB on Exertion] : shortness of breath during exertion [Joint Pain] : joint pain [Easy Bleeding] : a tendency for easy bleeding [Negative] : Allergic/Immunologic [Fever] : no fever [FreeTextEntry2] : improved energy; decrease of 28lbs four months [FreeTextEntry7] : diarrhea [FreeTextEntry9] : back pain, hip pain [de-identified] : Neuropathy [de-identified] : "I bleed every time I scratch my skin"

## 2023-06-23 LAB
ALBUMIN SERPL ELPH-MCNC: 3.6 G/DL
ALP BLD-CCNC: 103 U/L
ALT SERPL-CCNC: 8 U/L
ANION GAP SERPL CALC-SCNC: 9 MMOL/L
AST SERPL-CCNC: 12 U/L
BILIRUB SERPL-MCNC: 0.2 MG/DL
BUN SERPL-MCNC: 58 MG/DL
CALCIUM SERPL-MCNC: 8.9 MG/DL
CHLORIDE SERPL-SCNC: 110 MMOL/L
CO2 SERPL-SCNC: 20 MMOL/L
CREAT SERPL-MCNC: 4.21 MG/DL
EGFR: 14 ML/MIN/1.73M2
GLUCOSE SERPL-MCNC: 164 MG/DL
LDH SERPL-CCNC: 132 U/L
PHOSPHATE SERPL-MCNC: 3.4 MG/DL
POTASSIUM SERPL-SCNC: 5 MMOL/L
PROT SERPL-MCNC: 5.7 G/DL
SODIUM SERPL-SCNC: 139 MMOL/L
URATE SERPL-MCNC: 3.7 MG/DL

## 2023-06-29 ENCOUNTER — LABORATORY RESULT (OUTPATIENT)
Age: 78
End: 2023-06-29

## 2023-06-30 LAB
ALBUMIN SERPL ELPH-MCNC: 3.9 G/DL
ALP BLD-CCNC: 103 U/L
ALT SERPL-CCNC: 6 U/L
ANION GAP SERPL CALC-SCNC: 11 MMOL/L
AST SERPL-CCNC: 11 U/L
BILIRUB SERPL-MCNC: 0.2 MG/DL
BUN SERPL-MCNC: 63 MG/DL
CALCIUM SERPL-MCNC: 9.1 MG/DL
CHLORIDE SERPL-SCNC: 114 MMOL/L
CO2 SERPL-SCNC: 21 MMOL/L
CREAT SERPL-MCNC: 4.04 MG/DL
EGFR: 15 ML/MIN/1.73M2
GLUCOSE SERPL-MCNC: 93 MG/DL
LDH SERPL-CCNC: 99 U/L
POTASSIUM SERPL-SCNC: 4.7 MMOL/L
PROT SERPL-MCNC: 5.8 G/DL
SODIUM SERPL-SCNC: 146 MMOL/L
URATE SERPL-MCNC: 3.8 MG/DL

## 2023-07-06 ENCOUNTER — RESULT REVIEW (OUTPATIENT)
Age: 78
End: 2023-07-06

## 2023-07-06 ENCOUNTER — APPOINTMENT (OUTPATIENT)
Dept: HEMATOLOGY ONCOLOGY | Facility: CLINIC | Age: 78
End: 2023-07-06
Payer: MEDICARE

## 2023-07-06 VITALS
DIASTOLIC BLOOD PRESSURE: 57 MMHG | OXYGEN SATURATION: 95 % | HEART RATE: 52 BPM | RESPIRATION RATE: 20 BRPM | WEIGHT: 198.64 LBS | SYSTOLIC BLOOD PRESSURE: 103 MMHG | TEMPERATURE: 97.3 F | BODY MASS INDEX: 25.5 KG/M2

## 2023-07-06 LAB
BASOPHILS # BLD AUTO: 0.04 K/UL — SIGNIFICANT CHANGE UP (ref 0–0.2)
BASOPHILS NFR BLD AUTO: 0.7 % — SIGNIFICANT CHANGE UP (ref 0–2)
EOSINOPHIL # BLD AUTO: 0.15 K/UL — SIGNIFICANT CHANGE UP (ref 0–0.5)
EOSINOPHIL NFR BLD AUTO: 2.5 % — SIGNIFICANT CHANGE UP (ref 0–6)
HCT VFR BLD CALC: 31.8 % — LOW (ref 39–50)
HGB BLD-MCNC: 10 G/DL — LOW (ref 13–17)
IMM GRANULOCYTES NFR BLD AUTO: 0.3 % — SIGNIFICANT CHANGE UP (ref 0–0.9)
LYMPHOCYTES # BLD AUTO: 2.2 K/UL — SIGNIFICANT CHANGE UP (ref 1–3.3)
LYMPHOCYTES # BLD AUTO: 36.7 % — SIGNIFICANT CHANGE UP (ref 13–44)
MCHC RBC-ENTMCNC: 31.4 G/DL — LOW (ref 32–36)
MCHC RBC-ENTMCNC: 31.5 PG — SIGNIFICANT CHANGE UP (ref 27–34)
MCV RBC AUTO: 100.3 FL — HIGH (ref 80–100)
MONOCYTES # BLD AUTO: 0.26 K/UL — SIGNIFICANT CHANGE UP (ref 0–0.9)
MONOCYTES NFR BLD AUTO: 4.3 % — SIGNIFICANT CHANGE UP (ref 2–14)
NEUTROPHILS # BLD AUTO: 3.33 K/UL — SIGNIFICANT CHANGE UP (ref 1.8–7.4)
NEUTROPHILS NFR BLD AUTO: 55.5 % — SIGNIFICANT CHANGE UP (ref 43–77)
NRBC # BLD: 0 /100 WBCS — SIGNIFICANT CHANGE UP (ref 0–0)
PLATELET # BLD AUTO: 81 K/UL — LOW (ref 150–400)
RBC # BLD: 3.17 M/UL — LOW (ref 4.2–5.8)
RBC # FLD: 12.6 % — SIGNIFICANT CHANGE UP (ref 10.3–14.5)
WBC # BLD: 6 K/UL — SIGNIFICANT CHANGE UP (ref 3.8–10.5)
WBC # FLD AUTO: 6 K/UL — SIGNIFICANT CHANGE UP (ref 3.8–10.5)

## 2023-07-06 PROCEDURE — 99213 OFFICE O/P EST LOW 20 MIN: CPT

## 2023-07-10 LAB
ALBUMIN SERPL ELPH-MCNC: 3.7 G/DL
ALP BLD-CCNC: 95 U/L
ALT SERPL-CCNC: 7 U/L
ANION GAP SERPL CALC-SCNC: 9 MMOL/L
AST SERPL-CCNC: 19 U/L
BILIRUB SERPL-MCNC: 0.3 MG/DL
BUN SERPL-MCNC: 55 MG/DL
CALCIUM SERPL-MCNC: 9.3 MG/DL
CHLORIDE SERPL-SCNC: 111 MMOL/L
CO2 SERPL-SCNC: 20 MMOL/L
CREAT SERPL-MCNC: 3.58 MG/DL
EGFR: 17 ML/MIN/1.73M2
GLUCOSE SERPL-MCNC: 119 MG/DL
LDH SERPL-CCNC: 110 U/L
POTASSIUM SERPL-SCNC: 5.1 MMOL/L
PROT SERPL-MCNC: 5.3 G/DL
SODIUM SERPL-SCNC: 139 MMOL/L
URATE SERPL-MCNC: 4.7 MG/DL

## 2023-07-11 NOTE — CONSULT LETTER
[Dear  ___] : Dear  [unfilled], [Courtesy Letter:] : I had the pleasure of seeing your patient, [unfilled], in my office today. [Please see my note below.] : Please see my note below. [Sincerely,] : Sincerely, [DrIbeth  ___] : Dr. DENG [DrIbeth ___] : Dr. DENG [___] : [unfilled] [FreeTextEntry2] : Dr. Simpson [FreeTextEntry3] : Cesar Simons M.D., FACP\par Professor of Medicine\par [facility] [employer]\par Associate Chief, Division of Hematology\par [facility]\par [facility]\par [address] [place] [address]\[place] [address]\par [phone]

## 2023-07-11 NOTE — REVIEW OF SYSTEMS
[Recent Change In Weight] : ~T recent weight change [Lower Ext Edema] : lower extremity edema [SOB on Exertion] : shortness of breath during exertion [Joint Pain] : joint pain [Easy Bleeding] : a tendency for easy bleeding [Negative] : Allergic/Immunologic [Fever] : no fever [FreeTextEntry2] : improved energy; 28 lb weight loss [FreeTextEntry7] : diarrhea [FreeTextEntry9] : back pain, hip pain [de-identified] : Neuropathy [de-identified] : "I bleed every time I scratch my skin"

## 2023-07-11 NOTE — PHYSICAL EXAM
[Capable of only limited self care, confined to bed or chair more than 50% of waking hours] : Status 3- Capable of only limited self care, confined to bed or chair more than 50% of waking hours [Normal] : affect appropriate [de-identified] :  Pacemaker left anterior chest wall. RRR. S1S2 normal. Gr 2/6 TESS RUSB to LLSB. No gallops. [de-identified] : edema RLE minimally > LLE.  [de-identified] : 3 x 2 cm right axillary node [de-identified] : 5 x 5 cm lipoma left lower back. 1 x 1 cm sebaceous cyst right scapula.

## 2023-07-11 NOTE — ASSESSMENT
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 He had progressive anemia with rising creatinine and relapse of his Waldenstrom's. He was not a candidate for Ibrutinib due to anticoagulation with Warfarin. He achieved a partial remission with marked improvement in his hemoglobin, creatinine and IgM with IRd, but had not tolerated Rituxan. Re-evaluation now due to anemia/thrombocytopenia reveals relapse of Waldenstrom's with iron deficiency and minimal CLL. There has been no hematologic response to IV Fe, so will begin alternative therapy for Waldenstrom's utilizing Zanubrutinib. Reviewed toxicities in detail and all questions answered. Tolerating Zanibrutinib well.  Hgb has increased to 10 today. \par \par Plan:\par Zanubrutinib 160 mg twice dailly\par Mepron 1500 mg daily\par Acyclovir \par Omeprazole\par Decrease Eliquis to 2.5 mg twice daily per Cardiology - hold if platelets < 50,000. D/C if possible.\par COVID booster\par RTC weekly x 6-has appts. \par \par

## 2023-07-11 NOTE — HISTORY OF PRESENT ILLNESS
[Disease:__________________________] : Disease: [unfilled] [Rosales Stage: ___] : Rosales Stage: [unfilled] [de-identified] : PAF\par 10/2015 Waldenstrom's macroglobulinemia ; + dim lambda, CD 19, 23, FMC-7; negative CD 5, 10, 20\par Renal biopsy: lymphocytic infiltrate of renal cortex\par 10/17 IgGk and lambda BGUS\par 7/2018: Nephrotic syndrome with lambda light chains\par  [de-identified] : +lambda, CD5, CD 20; CD 38 --; FISH del 13q14.3\par SPEP gamma paraprotein M 0.4\par SPIEP IgM lambda; 10/17 IgGk, lambda [FreeTextEntry1] : 10/2015 - 1/2017  Rituxan/Velcade/Decadron; 8/18 - 1/19 Ixazomib/Decadron/Rituxan x 6 cycles to maintenance until 3/23;\par 6/2023 - Zanubrutinib [de-identified] : Patient continues to note improvement in his fatigue since starting Brukinsa. ALVAREZ has also improved.  He has stable diarrhea, takes Imodium with relief. He is eating and hydrates self well, but endorses a 28 pound weight loss in 4 months. Neuropathy is stable in his feet and fingers. Has chronic back pain. Constant right hip pain persists. No further issues with atrial fibrillation since ablation. He notes no orthostasis, lightheadedness, chest pain, SOB, fever, night sweats, headache, visual problems, abdominal pain, swollen glands, bleeding, bruising, ankle swelling, melena, BRBPR, hematuria. \par \par \par \par

## 2023-07-14 ENCOUNTER — OUTPATIENT (OUTPATIENT)
Dept: OUTPATIENT SERVICES | Facility: HOSPITAL | Age: 78
LOS: 1 days | End: 2023-07-14
Payer: MEDICARE

## 2023-07-14 ENCOUNTER — RESULT REVIEW (OUTPATIENT)
Age: 78
End: 2023-07-14

## 2023-07-14 ENCOUNTER — NON-APPOINTMENT (OUTPATIENT)
Age: 78
End: 2023-07-14

## 2023-07-14 ENCOUNTER — APPOINTMENT (OUTPATIENT)
Dept: ULTRASOUND IMAGING | Facility: IMAGING CENTER | Age: 78
End: 2023-07-14
Payer: MEDICARE

## 2023-07-14 ENCOUNTER — APPOINTMENT (OUTPATIENT)
Dept: HEMATOLOGY ONCOLOGY | Facility: CLINIC | Age: 78
End: 2023-07-14

## 2023-07-14 DIAGNOSIS — N20.0 CALCULUS OF KIDNEY: ICD-10-CM

## 2023-07-14 DIAGNOSIS — Z95.0 PRESENCE OF CARDIAC PACEMAKER: Chronic | ICD-10-CM

## 2023-07-14 DIAGNOSIS — Z96.0 PRESENCE OF UROGENITAL IMPLANTS: Chronic | ICD-10-CM

## 2023-07-14 DIAGNOSIS — Z98.890 OTHER SPECIFIED POSTPROCEDURAL STATES: Chronic | ICD-10-CM

## 2023-07-14 DIAGNOSIS — Z98.89 OTHER SPECIFIED POSTPROCEDURAL STATES: Chronic | ICD-10-CM

## 2023-07-14 DIAGNOSIS — Z98.41 CATARACT EXTRACTION STATUS, RIGHT EYE: Chronic | ICD-10-CM

## 2023-07-14 LAB
ALBUMIN SERPL ELPH-MCNC: 3.6 G/DL
ALP BLD-CCNC: 90 U/L
ALT SERPL-CCNC: 7 U/L
ANION GAP SERPL CALC-SCNC: 11 MMOL/L
AST SERPL-CCNC: 18 U/L
BASOPHILS # BLD AUTO: 0.02 K/UL — SIGNIFICANT CHANGE UP (ref 0–0.2)
BASOPHILS NFR BLD AUTO: 0.3 % — SIGNIFICANT CHANGE UP (ref 0–2)
BILIRUB SERPL-MCNC: 0.2 MG/DL
BUN SERPL-MCNC: 54 MG/DL
CALCIUM SERPL-MCNC: 9 MG/DL
CHLORIDE SERPL-SCNC: 109 MMOL/L
CO2 SERPL-SCNC: 20 MMOL/L
CREAT SERPL-MCNC: 3.99 MG/DL
EGFR: 15 ML/MIN/1.73M2
EOSINOPHIL # BLD AUTO: 0.13 K/UL — SIGNIFICANT CHANGE UP (ref 0–0.5)
EOSINOPHIL NFR BLD AUTO: 2.1 % — SIGNIFICANT CHANGE UP (ref 0–6)
GLUCOSE SERPL-MCNC: 142 MG/DL
HCT VFR BLD CALC: 31.5 % — LOW (ref 39–50)
HGB BLD-MCNC: 9.7 G/DL — LOW (ref 13–17)
IMM GRANULOCYTES NFR BLD AUTO: 0.3 % — SIGNIFICANT CHANGE UP (ref 0–0.9)
LYMPHOCYTES # BLD AUTO: 1.96 K/UL — SIGNIFICANT CHANGE UP (ref 1–3.3)
LYMPHOCYTES # BLD AUTO: 31.5 % — SIGNIFICANT CHANGE UP (ref 13–44)
MCHC RBC-ENTMCNC: 30.8 G/DL — LOW (ref 32–36)
MCHC RBC-ENTMCNC: 31.2 PG — SIGNIFICANT CHANGE UP (ref 27–34)
MCV RBC AUTO: 101.3 FL — HIGH (ref 80–100)
MONOCYTES # BLD AUTO: 0.29 K/UL — SIGNIFICANT CHANGE UP (ref 0–0.9)
MONOCYTES NFR BLD AUTO: 4.7 % — SIGNIFICANT CHANGE UP (ref 2–14)
NEUTROPHILS # BLD AUTO: 3.8 K/UL — SIGNIFICANT CHANGE UP (ref 1.8–7.4)
NEUTROPHILS NFR BLD AUTO: 61.1 % — SIGNIFICANT CHANGE UP (ref 43–77)
NRBC # BLD: 0 /100 WBCS — SIGNIFICANT CHANGE UP (ref 0–0)
PLATELET # BLD AUTO: 101 K/UL — LOW (ref 150–400)
POTASSIUM SERPL-SCNC: 4.5 MMOL/L
PROT SERPL-MCNC: 5.1 G/DL
RBC # BLD: 3.11 M/UL — LOW (ref 4.2–5.8)
RBC # FLD: 12.5 % — SIGNIFICANT CHANGE UP (ref 10.3–14.5)
SODIUM SERPL-SCNC: 140 MMOL/L
WBC # BLD: 6.22 K/UL — SIGNIFICANT CHANGE UP (ref 3.8–10.5)
WBC # FLD AUTO: 6.22 K/UL — SIGNIFICANT CHANGE UP (ref 3.8–10.5)

## 2023-07-14 PROCEDURE — 76770 US EXAM ABDO BACK WALL COMP: CPT | Mod: 26

## 2023-07-14 PROCEDURE — 76770 US EXAM ABDO BACK WALL COMP: CPT

## 2023-07-17 LAB
APPEARANCE: CLEAR
BACTERIA: NEGATIVE /HPF
BILIRUBIN URINE: NEGATIVE
BLOOD URINE: ABNORMAL
CAST: 0 /LPF
COLOR: ABNORMAL
EPITHELIAL CELLS: 1 /HPF
GLUCOSE QUALITATIVE U: NEGATIVE MG/DL
KETONES URINE: NEGATIVE MG/DL
LEUKOCYTE ESTERASE URINE: ABNORMAL
MICROSCOPIC-UA: NORMAL
NITRITE URINE: NEGATIVE
PH URINE: 5.5
PROTEIN URINE: 100 MG/DL
RED BLOOD CELLS URINE: 10 /HPF
SPECIFIC GRAVITY URINE: 1.02
UROBILINOGEN URINE: 0.2 MG/DL
WHITE BLOOD CELLS URINE: 15 /HPF

## 2023-07-20 ENCOUNTER — NON-APPOINTMENT (OUTPATIENT)
Age: 78
End: 2023-07-20

## 2023-07-20 DIAGNOSIS — N39.0 URINARY TRACT INFECTION, SITE NOT SPECIFIED: ICD-10-CM

## 2023-07-20 LAB — BACTERIA UR CULT: ABNORMAL

## 2023-07-24 NOTE — HISTORY OF PRESENT ILLNESS
[FreeTextEntry1] : : 1945 \par Referring Provider: none \par \par HPI: Mr. NICOLAS CIFUENTES is a 77 year yo M with a PMHx notable for kidney stones. He had recently presented to the hospital and underwent acute intervention with stent insertion on the right side related to a right distal ureteral stone in the setting of a creatinine elevation. Dr. Sultana inserted his ureteral stent. The patient was then directed to follow-up back in the office.\par \par He is feeling well at this time without any acute pain or nausea or vomiting. No fevers or chills. There was no hematuria. He does have some increase in urinary urgency frequency, likely stent related. \par \par He was referred to me for surgical consideration for CVAC with URS.\par  \par Anticoagulation: Eliquis BID\par All: NKDA\par Social: No smoking or drinking, , children\par PMHx: CLL, Waldenstrom's, PPM,  kidney stones\par FHx: None\par PSHx: prior ESWL\par \par Imaging: 2022 CT reviewed. The right ureteral stone mentioned appears to be potential stone debris in the ureter rather than a true discrete stone. He also has a larger stone burden on the right side with a fairly sizable stone seen in the right renal pelvis. It also appears to be of low density.\par \par 10/31:\par Here today s/p L URS x 2 (duplicated) and R side with bilateral stents placed with CVAC. Diet modification reviewed at length- increasing fluids (primarily water), citrus is good, and decreasing/moderating salt, animal flesh protein, oxalate containing foods, and moderation of calcium intake (1000 mg/day is USRDA).\par \par I reviewed with the patient the risks of metabolic stone disease given their underlying risk parameters (all of which include large stones, multiple stones, bilateral stones, family history, and young age), and the indications for 24 hour urine metabolic assessment. We also discussed benefits of regular exercise and weight loss as independent risk reducers for stones.\par \par :\par Here today s/p URS and stones removed. ULS today without stones noted. Cr with significant CKD noted. LL today with low volume ~1L and marked hypocitraturia likely related to CKD. Doing well since stents removed. Discussed starting K cit 1 tab PO BID. Follows with nephrologist Norman Ascencio MD (non [facility]). \par \par  [Urinary Incontinence] : no urinary incontinence [Urinary Retention] : no urinary retention [Urinary Urgency] : no urinary urgency [Urinary Frequency] : no urinary frequency

## 2023-07-27 ENCOUNTER — LABORATORY RESULT (OUTPATIENT)
Age: 78
End: 2023-07-27

## 2023-07-27 ENCOUNTER — RESULT REVIEW (OUTPATIENT)
Age: 78
End: 2023-07-27

## 2023-07-27 ENCOUNTER — APPOINTMENT (OUTPATIENT)
Dept: HEMATOLOGY ONCOLOGY | Facility: CLINIC | Age: 78
End: 2023-07-27
Payer: MEDICARE

## 2023-07-27 VITALS
TEMPERATURE: 97.9 F | HEART RATE: 54 BPM | RESPIRATION RATE: 18 BRPM | DIASTOLIC BLOOD PRESSURE: 65 MMHG | BODY MASS INDEX: 26.19 KG/M2 | WEIGHT: 203.99 LBS | OXYGEN SATURATION: 98 % | SYSTOLIC BLOOD PRESSURE: 114 MMHG

## 2023-07-27 LAB
BASOPHILS # BLD AUTO: 0.03 K/UL — SIGNIFICANT CHANGE UP (ref 0–0.2)
BASOPHILS NFR BLD AUTO: 0.4 % — SIGNIFICANT CHANGE UP (ref 0–2)
EOSINOPHIL # BLD AUTO: 0.21 K/UL — SIGNIFICANT CHANGE UP (ref 0–0.5)
EOSINOPHIL NFR BLD AUTO: 3 % — SIGNIFICANT CHANGE UP (ref 0–6)
HCT VFR BLD CALC: 33.3 % — LOW (ref 39–50)
HGB BLD-MCNC: 10.3 G/DL — LOW (ref 13–17)
IMM GRANULOCYTES NFR BLD AUTO: 0.4 % — SIGNIFICANT CHANGE UP (ref 0–0.9)
LYMPHOCYTES # BLD AUTO: 1.96 K/UL — SIGNIFICANT CHANGE UP (ref 1–3.3)
LYMPHOCYTES # BLD AUTO: 28.2 % — SIGNIFICANT CHANGE UP (ref 13–44)
MCHC RBC-ENTMCNC: 30.9 G/DL — LOW (ref 32–36)
MCHC RBC-ENTMCNC: 31 PG — SIGNIFICANT CHANGE UP (ref 27–34)
MCV RBC AUTO: 100.3 FL — HIGH (ref 80–100)
MONOCYTES # BLD AUTO: 0.27 K/UL — SIGNIFICANT CHANGE UP (ref 0–0.9)
MONOCYTES NFR BLD AUTO: 3.9 % — SIGNIFICANT CHANGE UP (ref 2–14)
NEUTROPHILS # BLD AUTO: 4.45 K/UL — SIGNIFICANT CHANGE UP (ref 1.8–7.4)
NEUTROPHILS NFR BLD AUTO: 64.1 % — SIGNIFICANT CHANGE UP (ref 43–77)
NRBC # BLD: 0 /100 WBCS — SIGNIFICANT CHANGE UP (ref 0–0)
PLATELET # BLD AUTO: 109 K/UL — LOW (ref 150–400)
RBC # BLD: 3.32 M/UL — LOW (ref 4.2–5.8)
RBC # FLD: 12.3 % — SIGNIFICANT CHANGE UP (ref 10.3–14.5)
WBC # BLD: 6.95 K/UL — SIGNIFICANT CHANGE UP (ref 3.8–10.5)
WBC # FLD AUTO: 6.95 K/UL — SIGNIFICANT CHANGE UP (ref 3.8–10.5)

## 2023-07-27 PROCEDURE — 99213 OFFICE O/P EST LOW 20 MIN: CPT

## 2023-07-28 NOTE — REVIEW OF SYSTEMS
[Recent Change In Weight] : ~T recent weight change [Lower Ext Edema] : lower extremity edema [SOB on Exertion] : shortness of breath during exertion [Joint Pain] : joint pain [Easy Bleeding] : a tendency for easy bleeding [Negative] : Allergic/Immunologic [Fever] : no fever [FreeTextEntry2] : improved energy; 28 lb weight loss [FreeTextEntry7] : diarrhea [FreeTextEntry9] : back pain, hip pain [de-identified] : Neuropathy [de-identified] : "I bleed every time I scratch my skin"

## 2023-07-28 NOTE — CONSULT LETTER
[Dear  ___] : Dear  [unfilled], [Courtesy Letter:] : I had the pleasure of seeing your patient, [unfilled], in my office today. [Please see my note below.] : Please see my note below. [Sincerely,] : Sincerely, [DrIbeth  ___] : Dr. DENG [DrIbeth ___] : Dr. DENG [___] : [unfilled] [FreeTextEntry2] : Dr. Simpson [FreeTextEntry3] : Cesar Simons M.D., FACP\par Professor of Medicine\par [facility] [employer]\par Associate Chief, Division of Hematology\par [facility]\par [facility]\par [address] [place] [address]\[place] [address]\par [phone]

## 2023-07-28 NOTE — PHYSICAL EXAM
[Capable of only limited self care, confined to bed or chair more than 50% of waking hours] : Status 3- Capable of only limited self care, confined to bed or chair more than 50% of waking hours [Normal] : affect appropriate [de-identified] :  Pacemaker left anterior chest wall. RRR. S1S2 normal. Gr 2/6 TESS RUSB to LLSB. No gallops. [de-identified] : edema RLE minimally > LLE.  [de-identified] : 3 x 2 cm right axillary node [de-identified] : 5 x 5 cm lipoma left lower back. 1 x 1 cm sebaceous cyst right scapula.

## 2023-07-28 NOTE — DISCUSSION/SUMMARY
[FreeTextEntry1] : Pt reports having blood in his urine for the last 2 days.  Spoke to Dr Audra Clemente's office and since pt is on Eliquis, Dr Clemente agreed that pt can hold Eliquis until hematuria resolves. Left message with detailed instructions that he should hold the Eliquis.   Advised pt to call office with any further problems.

## 2023-07-28 NOTE — HISTORY OF PRESENT ILLNESS
[Disease:__________________________] : Disease: [unfilled] [Rosales Stage: ___] : Rosales Stage: [unfilled] [de-identified] : PAF\par 10/2015 Waldenstrom's macroglobulinemia ; + dim lambda, CD 19, 23, FMC-7; negative CD 5, 10, 20\par Renal biopsy: lymphocytic infiltrate of renal cortex\par 10/17 IgGk and lambda BGUS\par 7/2018: Nephrotic syndrome with lambda light chains\par  [de-identified] : +lambda, CD5, CD 20; CD 38 --; FISH del 13q14.3\par SPEP gamma paraprotein M 0.4\par SPIEP IgM lambda; 10/17 IgGk, lambda [FreeTextEntry1] : 10/2015 - 1/2017  Rituxan/Velcade/Decadron; 8/18 - 1/19 Ixazomib/Decadron/Rituxan x 6 cycles to maintenance until 3/23;\par 6/2023 - Zanubrutinib [de-identified] : Patient had some hematuria about 10 days ago.  Urine C & S showed  10,000-49, 000 CFU/ml E. Coli. Prescribed Cipro 500 mg daily for 5 days (renal dose)  Pt has bee holding Eliquis since then. Saw urology-Dr oRsales- and was found to have kidney stones.  Will have surgery (unsure of what kind) on 9/6/23.  Has had no further hematuria.  Patient reports his fatigue has improved overall since starting Brukinsa.  He has stable diarrhea, takes Imodium with relief. He is eating and hydrates self well, but endorses a 28 pound weight loss in 4 months; has regained about 4 lbs today.   Neuropathy is stable in his feet and fingers. Has chronic back pain. Constant right hip pain persists. No further issues with atrial fibrillation since ablation. He notes no orthostasis, lightheadedness, chest pain, SOB, fever, night sweats, headache, visual problems, abdominal pain, swollen glands, bleeding, bruising, ankle swelling, melena, BRBPR.  Bleeds easily when he scratches his skin.  \par \par \par

## 2023-07-28 NOTE — ASSESSMENT
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 He had progressive anemia with rising creatinine and relapse of his Waldenstrom's. He was not a candidate for Ibrutinib due to anticoagulation with Warfarin. He achieved a partial remission with marked improvement in his hemoglobin, creatinine and IgM with IRd, but had not tolerated Rituxan. Re-evaluation now due to anemia/thrombocytopenia reveals relapse of Waldenstrom's with iron deficiency and minimal CLL. There has been no hematologic response to IV Fe, so will begin alternative therapy for Waldenstrom's utilizing Zanubrutinib.  Tolerating Zanibrutinib well.  Hgb has increased to 10.3 today. \par \par Plan:\par Zanubrutinib 160 mg twice dailly-will plan on holding for 5 days prior to surgery in 9/23.   \par Mepron 1500 mg daily\par Acyclovir \par Omeprazole\par Ask Dr Clemente about restarting Eliquis. \par COVID booster\par RTC monthly. \par \par

## 2023-07-31 ENCOUNTER — OUTPATIENT (OUTPATIENT)
Dept: OUTPATIENT SERVICES | Facility: HOSPITAL | Age: 78
LOS: 1 days | End: 2023-07-31
Payer: MEDICARE

## 2023-07-31 ENCOUNTER — APPOINTMENT (OUTPATIENT)
Dept: CT IMAGING | Facility: IMAGING CENTER | Age: 78
End: 2023-07-31
Payer: MEDICARE

## 2023-07-31 DIAGNOSIS — Z98.89 OTHER SPECIFIED POSTPROCEDURAL STATES: Chronic | ICD-10-CM

## 2023-07-31 DIAGNOSIS — Z98.41 CATARACT EXTRACTION STATUS, RIGHT EYE: Chronic | ICD-10-CM

## 2023-07-31 DIAGNOSIS — Z95.0 PRESENCE OF CARDIAC PACEMAKER: Chronic | ICD-10-CM

## 2023-07-31 DIAGNOSIS — Z00.8 ENCOUNTER FOR OTHER GENERAL EXAMINATION: ICD-10-CM

## 2023-07-31 DIAGNOSIS — Z96.0 PRESENCE OF UROGENITAL IMPLANTS: Chronic | ICD-10-CM

## 2023-07-31 DIAGNOSIS — Z98.890 OTHER SPECIFIED POSTPROCEDURAL STATES: Chronic | ICD-10-CM

## 2023-07-31 DIAGNOSIS — N20.0 CALCULUS OF KIDNEY: ICD-10-CM

## 2023-07-31 PROCEDURE — 74176 CT ABD & PELVIS W/O CONTRAST: CPT

## 2023-07-31 PROCEDURE — 74176 CT ABD & PELVIS W/O CONTRAST: CPT | Mod: 26

## 2023-08-09 ENCOUNTER — OUTPATIENT (OUTPATIENT)
Dept: OUTPATIENT SERVICES | Facility: HOSPITAL | Age: 78
LOS: 1 days | Discharge: HOME | End: 2023-08-09

## 2023-08-09 DIAGNOSIS — Z98.41 CATARACT EXTRACTION STATUS, RIGHT EYE: Chronic | ICD-10-CM

## 2023-08-09 DIAGNOSIS — Z95.0 PRESENCE OF CARDIAC PACEMAKER: Chronic | ICD-10-CM

## 2023-08-09 DIAGNOSIS — Z96.0 PRESENCE OF UROGENITAL IMPLANTS: Chronic | ICD-10-CM

## 2023-08-09 DIAGNOSIS — Z98.89 OTHER SPECIFIED POSTPROCEDURAL STATES: Chronic | ICD-10-CM

## 2023-08-09 DIAGNOSIS — C91.11 CHRONIC LYMPHOCYTIC LEUKEMIA OF B-CELL TYPE IN REMISSION: ICD-10-CM

## 2023-08-09 DIAGNOSIS — Z98.890 OTHER SPECIFIED POSTPROCEDURAL STATES: Chronic | ICD-10-CM

## 2023-08-11 ENCOUNTER — APPOINTMENT (OUTPATIENT)
Dept: ELECTROPHYSIOLOGY | Facility: CLINIC | Age: 78
End: 2023-08-11
Payer: MEDICARE

## 2023-08-11 ENCOUNTER — NON-APPOINTMENT (OUTPATIENT)
Age: 78
End: 2023-08-11

## 2023-08-11 PROCEDURE — 93294 REM INTERROG EVL PM/LDLS PM: CPT

## 2023-08-11 PROCEDURE — 93296 REM INTERROG EVL PM/IDS: CPT

## 2023-08-17 ENCOUNTER — APPOINTMENT (OUTPATIENT)
Dept: HEMATOLOGY ONCOLOGY | Facility: CLINIC | Age: 78
End: 2023-08-17
Payer: MEDICARE

## 2023-08-17 ENCOUNTER — RESULT REVIEW (OUTPATIENT)
Age: 78
End: 2023-08-17

## 2023-08-17 VITALS
TEMPERATURE: 97.3 F | OXYGEN SATURATION: 98 % | HEIGHT: 74 IN | HEART RATE: 50 BPM | SYSTOLIC BLOOD PRESSURE: 99 MMHG | WEIGHT: 200 LBS | BODY MASS INDEX: 25.67 KG/M2 | DIASTOLIC BLOOD PRESSURE: 56 MMHG | RESPIRATION RATE: 18 BRPM

## 2023-08-17 LAB
BASOPHILS # BLD AUTO: 0.05 K/UL — SIGNIFICANT CHANGE UP (ref 0–0.2)
BASOPHILS NFR BLD AUTO: 0.7 % — SIGNIFICANT CHANGE UP (ref 0–2)
EOSINOPHIL # BLD AUTO: 0.18 K/UL — SIGNIFICANT CHANGE UP (ref 0–0.5)
EOSINOPHIL NFR BLD AUTO: 2.4 % — SIGNIFICANT CHANGE UP (ref 0–6)
HCT VFR BLD CALC: 34 % — LOW (ref 39–50)
HGB BLD-MCNC: 10.6 G/DL — LOW (ref 13–17)
IMM GRANULOCYTES NFR BLD AUTO: 0.4 % — SIGNIFICANT CHANGE UP (ref 0–0.9)
LYMPHOCYTES # BLD AUTO: 2.47 K/UL — SIGNIFICANT CHANGE UP (ref 1–3.3)
LYMPHOCYTES # BLD AUTO: 32.7 % — SIGNIFICANT CHANGE UP (ref 13–44)
MCHC RBC-ENTMCNC: 31.2 G/DL — LOW (ref 32–36)
MCHC RBC-ENTMCNC: 31.2 PG — SIGNIFICANT CHANGE UP (ref 27–34)
MCV RBC AUTO: 100 FL — SIGNIFICANT CHANGE UP (ref 80–100)
MONOCYTES # BLD AUTO: 0.34 K/UL — SIGNIFICANT CHANGE UP (ref 0–0.9)
MONOCYTES NFR BLD AUTO: 4.5 % — SIGNIFICANT CHANGE UP (ref 2–14)
NEUTROPHILS # BLD AUTO: 4.48 K/UL — SIGNIFICANT CHANGE UP (ref 1.8–7.4)
NEUTROPHILS NFR BLD AUTO: 59.3 % — SIGNIFICANT CHANGE UP (ref 43–77)
NRBC # BLD: 0 /100 WBCS — SIGNIFICANT CHANGE UP (ref 0–0)
PLATELET # BLD AUTO: 115 K/UL — LOW (ref 150–400)
RBC # BLD: 3.4 M/UL — LOW (ref 4.2–5.8)
RBC # FLD: 12.3 % — SIGNIFICANT CHANGE UP (ref 10.3–14.5)
WBC # BLD: 7.55 K/UL — SIGNIFICANT CHANGE UP (ref 3.8–10.5)
WBC # FLD AUTO: 7.55 K/UL — SIGNIFICANT CHANGE UP (ref 3.8–10.5)

## 2023-08-17 PROCEDURE — 99213 OFFICE O/P EST LOW 20 MIN: CPT

## 2023-08-18 NOTE — CONSULT LETTER
[Dear  ___] : Dear  [unfilled], [Courtesy Letter:] : I had the pleasure of seeing your patient, [unfilled], in my office today. [Please see my note below.] : Please see my note below. [Sincerely,] : Sincerely, [DrIbeth  ___] : Dr. DENG [DrIbeth ___] : Dr. DENG [___] : [unfilled] [FreeTextEntry2] : Dr. Simpson [FreeTextEntry3] : Cesar Simons M.D., FACP\par  Professor of Medicine\par  [facility] [employer]\par  Associate Chief, Division of Hematology\par  [facility]\par  [facility]\par  [address] [place] [address]\[place] [address]\par  [phone]

## 2023-08-18 NOTE — PHYSICAL EXAM
[Capable of only limited self care, confined to bed or chair more than 50% of waking hours] : Status 3- Capable of only limited self care, confined to bed or chair more than 50% of waking hours [Normal] : affect appropriate [de-identified] :  Pacemaker left anterior chest wall. RRR. S1S2 normal. Gr 2/6 TESS RUSB to LLSB. No gallops. [de-identified] : edema RLE minimally > LLE.  [de-identified] : 3 x 2 cm right axillary node [de-identified] : 5 x 5 cm lipoma left lower back. 1 x 1 cm sebaceous cyst right scapula.

## 2023-08-18 NOTE — ASSESSMENT
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 He had progressive anemia with rising creatinine and relapse of his Waldenstrom's. He was not a candidate for Ibrutinib due to anticoagulation with Warfarin. He achieved a partial remission with marked improvement in his hemoglobin, creatinine and IgM with IRd, but had not tolerated Rituxan. Re-evaluation now due to anemia/thrombocytopenia reveals relapse of Waldenstrom's with iron deficiency and minimal CLL. There has been no hematologic response to IV Fe, so will begin alternative therapy for Waldenstrom's utilizing Zanubrutinib.  Tolerating Zanibrutinib well.  Hgb has increased to 10.6 today, plts increased to 115K.   Will have cystoscopy on 8/22, pt advised to hold Brukinsa for 5 days before procedure and restart it when urology advises him to do so.  Also, as per Dr Tomlinson's office, advised pt to hold Eliquis for 2 days prior to procedure and follow up with them for further instructions.   Plan: Zanubrutinib 160 mg twice dailly-will plan on holding for 5 days prior to cystoscopy on 8/22.     Mepron 1500 mg daily Acyclovir  Omeprazole COVID booster RTC monthly.

## 2023-08-18 NOTE — REVIEW OF SYSTEMS
[Recent Change In Weight] : ~T recent weight change [Lower Ext Edema] : lower extremity edema [SOB on Exertion] : shortness of breath during exertion [Joint Pain] : joint pain [Easy Bleeding] : a tendency for easy bleeding [Negative] : Allergic/Immunologic [Fever] : no fever [FreeTextEntry2] : improved energy; 28 lb weight loss [FreeTextEntry7] : diarrhea [FreeTextEntry9] : back pain, hip pain [de-identified] : Neuropathy [de-identified] : "I bleed every time I scratch my skin"

## 2023-08-18 NOTE — HISTORY OF PRESENT ILLNESS
[Disease:__________________________] : Disease: [unfilled] [Rosales Stage: ___] : Rosales Stage: [unfilled] [de-identified] : PAF\par  10/2015 Waldenstrom's macroglobulinemia ; + dim lambda, CD 19, 23, FMC-7; negative CD 5, 10, 20\par  Renal biopsy: lymphocytic infiltrate of renal cortex\par  10/17 IgGk and lambda BGUS\par  7/2018: Nephrotic syndrome with lambda light chains\par   [de-identified] : +lambda, CD5, CD 20; CD 38 --; FISH del 13q14.3\par  SPEP gamma paraprotein M 0.4\par  SPIEP IgM lambda; 10/17 IgGk, lambda [FreeTextEntry1] : 10/2015 - 1/2017  Rituxan/Velcade/Decadron; 8/18 - 1/19 Ixazomib/Decadron/Rituxan x 6 cycles to maintenance until 3/23;\par  6/2023 - Zanubrutinib [de-identified] : Patient being followed by urology for episode of hematuria about one month ago-was found to have renal cyst; will have cystoscopy by Dr Rosales on 8/22. for further evaluation.    Has had no further hematuria.  Patient reports his fatigue has improved overall since starting Brukinsa.  He has stable diarrhea, takes Imodium with relief. He is eating and hydrates self well,   Endorses a 28 pound weight loss in 4 months; has regained about 4 lbs today.   Neuropathy is stable in his feet and fingers. Chronic back pain and right hip pain persists. No further issues with atrial fibrillation since ablation. He notes no orthostasis, lightheadedness, chest pain, SOB, fever, night sweats, headache, visual problems, abdominal pain, swollen glands, bleeding, bruising, ankle swelling, melena, BRBPR. Reports easy bleeding, no bruising.

## 2023-08-20 ENCOUNTER — EMERGENCY (EMERGENCY)
Facility: HOSPITAL | Age: 78
LOS: 1 days | Discharge: HOME | End: 2023-08-20
Attending: EMERGENCY MEDICINE
Payer: MEDICARE

## 2023-08-20 VITALS
SYSTOLIC BLOOD PRESSURE: 147 MMHG | HEART RATE: 55 BPM | OXYGEN SATURATION: 98 % | HEIGHT: 74 IN | WEIGHT: 199.96 LBS | RESPIRATION RATE: 20 BRPM | DIASTOLIC BLOOD PRESSURE: 77 MMHG | TEMPERATURE: 98 F

## 2023-08-20 DIAGNOSIS — Z98.890 OTHER SPECIFIED POSTPROCEDURAL STATES: Chronic | ICD-10-CM

## 2023-08-20 DIAGNOSIS — Z96.0 PRESENCE OF UROGENITAL IMPLANTS: Chronic | ICD-10-CM

## 2023-08-20 DIAGNOSIS — Z98.89 OTHER SPECIFIED POSTPROCEDURAL STATES: Chronic | ICD-10-CM

## 2023-08-20 DIAGNOSIS — Z95.0 PRESENCE OF CARDIAC PACEMAKER: Chronic | ICD-10-CM

## 2023-08-20 DIAGNOSIS — Z98.41 CATARACT EXTRACTION STATUS, RIGHT EYE: Chronic | ICD-10-CM

## 2023-08-20 LAB
ALBUMIN SERPL ELPH-MCNC: 3.4 G/DL — SIGNIFICANT CHANGE UP (ref 3.3–5)
ALP SERPL-CCNC: 94 U/L — SIGNIFICANT CHANGE UP (ref 40–120)
ALT FLD-CCNC: 9 U/L — LOW (ref 10–45)
ANION GAP SERPL CALC-SCNC: 10 MMOL/L — SIGNIFICANT CHANGE UP (ref 5–17)
ANION GAP SERPL CALC-SCNC: 8 MMOL/L — SIGNIFICANT CHANGE UP (ref 5–17)
APTT BLD: 31.6 SEC — SIGNIFICANT CHANGE UP (ref 24.5–35.6)
AST SERPL-CCNC: 18 U/L — SIGNIFICANT CHANGE UP (ref 10–40)
BASE EXCESS BLDV CALC-SCNC: -5.8 MMOL/L — LOW (ref -2–3)
BASOPHILS # BLD AUTO: 0.04 K/UL — SIGNIFICANT CHANGE UP (ref 0–0.2)
BASOPHILS NFR BLD AUTO: 0.4 % — SIGNIFICANT CHANGE UP (ref 0–2)
BILIRUB SERPL-MCNC: 0.2 MG/DL — SIGNIFICANT CHANGE UP (ref 0.2–1.2)
BLD GP AB SCN SERPL QL: NEGATIVE — SIGNIFICANT CHANGE UP
BUN SERPL-MCNC: 54 MG/DL — HIGH (ref 7–23)
BUN SERPL-MCNC: 56 MG/DL — HIGH (ref 7–23)
CA-I SERPL-SCNC: 1.33 MMOL/L — SIGNIFICANT CHANGE UP (ref 1.15–1.33)
CALCIUM SERPL-MCNC: 9 MG/DL — SIGNIFICANT CHANGE UP (ref 8.4–10.5)
CALCIUM SERPL-MCNC: 9.1 MG/DL — SIGNIFICANT CHANGE UP (ref 8.4–10.5)
CHLORIDE BLDV-SCNC: 112 MMOL/L — HIGH (ref 96–108)
CHLORIDE SERPL-SCNC: 113 MMOL/L — HIGH (ref 96–108)
CHLORIDE SERPL-SCNC: 114 MMOL/L — HIGH (ref 96–108)
CO2 BLDV-SCNC: 23 MMOL/L — SIGNIFICANT CHANGE UP (ref 22–26)
CO2 SERPL-SCNC: 17 MMOL/L — LOW (ref 22–31)
CO2 SERPL-SCNC: 20 MMOL/L — LOW (ref 22–31)
CREAT SERPL-MCNC: 3.34 MG/DL — HIGH (ref 0.5–1.3)
CREAT SERPL-MCNC: 3.44 MG/DL — HIGH (ref 0.5–1.3)
EGFR: 18 ML/MIN/1.73M2 — LOW
EGFR: 18 ML/MIN/1.73M2 — LOW
EOSINOPHIL # BLD AUTO: 0.18 K/UL — SIGNIFICANT CHANGE UP (ref 0–0.5)
EOSINOPHIL NFR BLD AUTO: 1.9 % — SIGNIFICANT CHANGE UP (ref 0–6)
GAS PNL BLDV: 137 MMOL/L — SIGNIFICANT CHANGE UP (ref 136–145)
GAS PNL BLDV: SIGNIFICANT CHANGE UP
GLUCOSE BLDV-MCNC: 81 MG/DL — SIGNIFICANT CHANGE UP (ref 70–99)
GLUCOSE SERPL-MCNC: 82 MG/DL — SIGNIFICANT CHANGE UP (ref 70–99)
GLUCOSE SERPL-MCNC: 92 MG/DL — SIGNIFICANT CHANGE UP (ref 70–99)
HCO3 BLDV-SCNC: 22 MMOL/L — SIGNIFICANT CHANGE UP (ref 22–29)
HCT VFR BLD CALC: 34.8 % — LOW (ref 39–50)
HCT VFR BLDA CALC: 34 % — LOW (ref 39–51)
HGB BLD CALC-MCNC: 11.4 G/DL — LOW (ref 12.6–17.4)
HGB BLD-MCNC: 10.9 G/DL — LOW (ref 13–17)
IMM GRANULOCYTES NFR BLD AUTO: 0.3 % — SIGNIFICANT CHANGE UP (ref 0–0.9)
INR BLD: 1.47 RATIO — HIGH (ref 0.85–1.18)
LACTATE BLDV-MCNC: 0.9 MMOL/L — SIGNIFICANT CHANGE UP (ref 0.5–2)
LYMPHOCYTES # BLD AUTO: 2.77 K/UL — SIGNIFICANT CHANGE UP (ref 1–3.3)
LYMPHOCYTES # BLD AUTO: 29.6 % — SIGNIFICANT CHANGE UP (ref 13–44)
MCHC RBC-ENTMCNC: 31.1 PG — SIGNIFICANT CHANGE UP (ref 27–34)
MCHC RBC-ENTMCNC: 31.3 GM/DL — LOW (ref 32–36)
MCV RBC AUTO: 99.1 FL — SIGNIFICANT CHANGE UP (ref 80–100)
MONOCYTES # BLD AUTO: 0.53 K/UL — SIGNIFICANT CHANGE UP (ref 0–0.9)
MONOCYTES NFR BLD AUTO: 5.7 % — SIGNIFICANT CHANGE UP (ref 2–14)
NEUTROPHILS # BLD AUTO: 5.81 K/UL — SIGNIFICANT CHANGE UP (ref 1.8–7.4)
NEUTROPHILS NFR BLD AUTO: 62.1 % — SIGNIFICANT CHANGE UP (ref 43–77)
NRBC # BLD: 0 /100 WBCS — SIGNIFICANT CHANGE UP (ref 0–0)
PCO2 BLDV: 49 MMHG — SIGNIFICANT CHANGE UP (ref 42–55)
PH BLDV: 7.25 — LOW (ref 7.32–7.43)
PLATELET # BLD AUTO: 107 K/UL — LOW (ref 150–400)
PO2 BLDV: 30 MMHG — SIGNIFICANT CHANGE UP (ref 25–45)
POTASSIUM BLDV-SCNC: 7.5 MMOL/L — CRITICAL HIGH (ref 3.5–5.1)
POTASSIUM SERPL-MCNC: 5.3 MMOL/L — SIGNIFICANT CHANGE UP (ref 3.5–5.3)
POTASSIUM SERPL-MCNC: 6.3 MMOL/L — CRITICAL HIGH (ref 3.5–5.3)
POTASSIUM SERPL-SCNC: 5.3 MMOL/L — SIGNIFICANT CHANGE UP (ref 3.5–5.3)
POTASSIUM SERPL-SCNC: 6.3 MMOL/L — CRITICAL HIGH (ref 3.5–5.3)
PROT SERPL-MCNC: 5.8 G/DL — LOW (ref 6–8.3)
PROTHROM AB SERPL-ACNC: 16 SEC — HIGH (ref 9.5–13)
RBC # BLD: 3.51 M/UL — LOW (ref 4.2–5.8)
RBC # FLD: 12.3 % — SIGNIFICANT CHANGE UP (ref 10.3–14.5)
RH IG SCN BLD-IMP: POSITIVE — SIGNIFICANT CHANGE UP
SAO2 % BLDV: 50.6 % — LOW (ref 67–88)
SODIUM SERPL-SCNC: 140 MMOL/L — SIGNIFICANT CHANGE UP (ref 135–145)
SODIUM SERPL-SCNC: 142 MMOL/L — SIGNIFICANT CHANGE UP (ref 135–145)
WBC # BLD: 9.36 K/UL — SIGNIFICANT CHANGE UP (ref 3.8–10.5)
WBC # FLD AUTO: 9.36 K/UL — SIGNIFICANT CHANGE UP (ref 3.8–10.5)

## 2023-08-20 PROCEDURE — 72131 CT LUMBAR SPINE W/O DYE: CPT | Mod: 26,MA

## 2023-08-20 PROCEDURE — 99285 EMERGENCY DEPT VISIT HI MDM: CPT

## 2023-08-20 PROCEDURE — 74176 CT ABD & PELVIS W/O CONTRAST: CPT | Mod: 26,MA

## 2023-08-20 RX ORDER — METHOCARBAMOL 500 MG/1
750 TABLET, FILM COATED ORAL ONCE
Refills: 0 | Status: COMPLETED | OUTPATIENT
Start: 2023-08-20 | End: 2023-08-20

## 2023-08-20 RX ORDER — ACETAMINOPHEN 500 MG
975 TABLET ORAL ONCE
Refills: 0 | Status: COMPLETED | OUTPATIENT
Start: 2023-08-20 | End: 2023-08-20

## 2023-08-20 RX ORDER — LIDOCAINE 4 G/100G
1 CREAM TOPICAL ONCE
Refills: 0 | Status: COMPLETED | OUTPATIENT
Start: 2023-08-20 | End: 2023-08-20

## 2023-08-20 RX ADMIN — METHOCARBAMOL 750 MILLIGRAM(S): 500 TABLET, FILM COATED ORAL at 22:10

## 2023-08-20 RX ADMIN — LIDOCAINE 1 PATCH: 4 CREAM TOPICAL at 20:48

## 2023-08-20 RX ADMIN — Medication 975 MILLIGRAM(S): at 20:48

## 2023-08-20 NOTE — ED PROVIDER NOTE - PROGRESS NOTE DETAILS
Savannah Benjamin MD; [facility] PGY4: Patient able to ambulate with   Some pain.  Will discharge with Robaxin, lidocaine patches and spine follow-up.  Patient pending nonemergent transport.  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

## 2023-08-20 NOTE — ED PROVIDER NOTE - CLINICAL SUMMARY MEDICAL DECISION MAKING FREE TEXT BOX
78 y.o male with PMH of Afib, CLL, CKD stage IV-V, GERD, hypothyroidism, diverticulitis and waldenstrom hypergammaglobulinemia presents with a 2 day history of non-radiating right sided low back pain. Reports relief with tylenol. Denies loss of perianal sensation or radicular pain.     Will work up with 78 y.o male with PMH of Afib, CLL, CKD stage IV-V, GERD, hypothyroidism, diverticulitis and waldenstrom hypergammaglobulinemia presents with a 2 day history of non-radiating right sided low back pain. Reports relief with tylenol. Denies loss of perianal sensation or radicular pain.     Will work up with CBC/CMP, Type and screen, coags, and CT scan to evaluate for retroperitoneal bleed.  Treat plan with acetaminophen and lidocaine patch.

## 2023-08-20 NOTE — ED ADULT NURSE NOTE - OBJECTIVE STATEMENT
78yM, A&Ox4, ambulatory at baseline with cane, PMH CLL and Waldenstrom disease- on brukinsa, presents to the ED c/o right lower back pain x2 days. Pt denies falls, fevers/chills, abd pain, urinary sx or incontinence. Pt states he is off his brukinsa to prepare for exploratory bladder scan with outpt MD. Pt took 1200mg tylenol 2 hours PTA and is endorsing a lot of relief. Pain initially 10/10, now 2/10. Gross neuro intact, no difficulty speaking in complete sentences, pulses x 4, moving all extremities, abdomen soft nontender nondistended.

## 2023-08-20 NOTE — ED PROVIDER NOTE - PHYSICAL EXAMINATION
General- NAD, A&Ox4  HEENT- Atraumatic, PERRL, EOMI  Respiratory- CTA b/l, no w/r/r  CV- Regular rate and rhythm  GI- Soft, nontender, nondistended. No guarding or rebound tenderness  MSK- Right sided low  to palpation. No spinal tenderness.   Neurological- Positive straight leg on the right  Skin- Back is non-erythematous, no rashes or lacerations

## 2023-08-20 NOTE — ED PROVIDER NOTE - PATIENT PORTAL LINK FT
You can access the FollowMyHealth Patient Portal offered by [facility] by registering at the following website: [email or web address]. By joining [facility]’s FollowMyHealth portal, you will also be able to view your health information using other applications (apps) compatible with our system.

## 2023-08-20 NOTE — ED PROVIDER NOTE - ATTENDING CONTRIBUTION TO CARE
Attending Statement (SELINA Tran MD):    HPI: 79y/o M with h/o AFib (Eliquis), CLL, CKD (st iv), GERD, Wadenstrom hypergamma, presenting with right sided back pain, non radiating, constant/worse with movement (improves with staying still or lying down; worse with trying to stand up straight). Denies fall or trauma. Has been off Eliquis for ~ 1week in preparation for a cystoscopy (being evaluated by urology); states has been urinating normally. No weakness in legs. Has chronic neuropathy in bottom of feet but no change and has no numbness in saddle region. No loss of control of bowels/bladder.     Review of Systems:  -General: no fever or chills  -ENT: no congestion, no difficulty swallowing  -Pulmonary: no cough, no shortness of breath  -Cardiac: no chest pain, no palpitations  -Gastrointestinal: no abdominal pain, no nausea, no vomiting, and no diarrhea.  -Genitourinary: no blood or pain with urination  -Musculoskeletal: +back pain (see hpi)  -Skin: no rashes  -Endocrine: No h/o diabetes   -Neurologic: No new weakness or numbness in extremities    All else negative unless otherwise specified elsewhere in this note.    PSH/PMH as noted above    On Physical Exam:  General: well appearing, in NAD, speaking clearly in full sentences and without difficulty; cooperative with exam  HEENT: anicteric sclera, airway patent  Neck: no JVD  Cardiac: normal s1, s2; RRR; no MGR  Lungs: CTABL  Abdomen: soft nontender/nondistended  Back: no CVa tenderness b/l, no midline t/l spine tenderness. no back tenderness throughout. no ecchymoses/abrasions/lacerations/rashes  : no bladder tenderness or distension  Skin: intact, no rash  Extremities: no peripheral edema, no gross deformities  Neuro: sensation to touch intact throughout b/l lower extemities. 5/5 str in hips/knees/ankles b/l. FROm of hips/knees/ankles. negative straight leg raise.    MDM:  79y/o M with h/o AFib (Eliquis), CLL, CKD (st iv), GERD, Wadenstrom hypergamma, presenting with right sided back pain, non radiating, constant/worse with movement (improves with staying still or lying down; atraumatic; suspect muscular back pain. No fall/trauma and off AC recently, low suspicion for acute traumatic injuries or bleeding. No signs/symptoms of cauda equina or acute cord compression and no fever or recent procedures to suggest epidural abscess. given age, will obtain CT a/p and CT LS spine to evaluate for pathologic fractures (given history) and offer pain medication; consider admission if unable to ambulate after medications or significant pathology on CT, otherwise anticipate discharge with close interval outpatient spine center f/u. Attending Statement (SELINA Tran MD):    HPI: 77y/o M with h/o AFib (Eliquis), CLL, CKD (st iv), GERD, Wadenstrom hypergammaglobinemia, presenting with right sided back pain, non radiating, constant/worse with movement (improves with staying still or lying down; worse with trying to stand up straight). Denies fall or trauma. Has been off Eliquis for ~ 1week in preparation for a cystoscopy (being evaluated by urology); states has been urinating normally. No weakness in legs. Has chronic neuropathy in bottom of feet but no change and has no numbness in saddle region. No loss of control of bowels/bladder.     Review of Systems:  -General: no fever or chills  -ENT: no congestion, no difficulty swallowing  -Pulmonary: no cough, no shortness of breath  -Cardiac: no chest pain, no palpitations  -Gastrointestinal: no abdominal pain, no nausea, no vomiting, and no diarrhea.  -Genitourinary: no blood or pain with urination  -Musculoskeletal: +back pain (see hpi)  -Skin: no rashes  -Endocrine: No h/o diabetes   -Neurologic: No new weakness or numbness in extremities    All else negative unless otherwise specified elsewhere in this note.    PSH/PMH as noted above    On Physical Exam:  General: well appearing, in NAD, speaking clearly in full sentences and without difficulty; cooperative with exam  HEENT: anicteric sclera, airway patent  Neck: no JVD  Cardiac: normal s1, s2; RRR; no MGR  Lungs: CTABL  Abdomen: soft nontender/nondistended  Back: no CVa tenderness b/l, no midline t/l spine tenderness. no back tenderness throughout. no ecchymoses/abrasions/lacerations/rashes  : no bladder tenderness or distension  Skin: intact, no rash  Extremities: no peripheral edema, no gross deformities  Neuro: sensation to touch intact throughout b/l lower extemities. 5/5 str in hips/knees/ankles b/l. FROm of hips/knees/ankles. negative straight leg raise.    MDM:  77y/o M with h/o AFib (Eliquis), CLL, CKD (st iv), GERD, Wadenstrom hypergamma, presenting with right sided back pain, non radiating, constant/worse with movement (improves with staying still or lying down; atraumatic; suspect muscular back pain. No fall/trauma and off AC recently, low suspicion for acute traumatic injuries or bleeding. No signs/symptoms of cauda equina or acute cord compression and no fever or recent procedures to suggest epidural abscess. given age, will obtain CT a/p and CT LS spine to evaluate for pathologic fractures (given history) and offer pain medication; consider admission if unable to ambulate after medications or significant pathology on CT, otherwise anticipate discharge with close interval outpatient spine center f/u.

## 2023-08-20 NOTE — ED ADULT TRIAGE NOTE - MEANS OF ARRIVAL
SBAR OUT Report: Mother    Verbal report given to JEANNE España (full name & credentials) on this patient, who is now being transferred to [facility](unit) for routine progression of care. The patient is not wearing a green \"Anesthesia-Duramorph\" band. Report consisted of patient's Situation, Background, Assessment and Recommendations (SBAR). [place] ID bands were compared with the identification form, and verified with the patient and receiving nurse. Information from the SBAR, Procedure Summary, Intake/Output, MAR and Recent Results and the Carol Report was reviewed with the receiving nurse; opportunity for questions and clarification provided. stretcher

## 2023-08-20 NOTE — ED PROVIDER NOTE - OBJECTIVE STATEMENT
78 y.o. male with PMH of Afib, CLL, CKD, GERD, hyperthyroidism 78 y.o. male with PMH of Afib, CLL, CKD stage IV-V, GERD, hypothyroidism, diverticulitis and waldenstrom hypergammaglobulinemia presents with a 2 day history of right sided low back pain. States that he did not fall or lift anything heavy. States that the pain is non-radiating and was 10/10 prior to taking 2 650mg tylenols at 2PM. Reports good relief with the tylenol. States that the pain is worse with movement. Denies any fevers, chills, dysuria, nausea, vomiting, abdominal pain, or loss of perianal sensation.

## 2023-08-21 VITALS
TEMPERATURE: 98 F | HEART RATE: 51 BPM | DIASTOLIC BLOOD PRESSURE: 76 MMHG | OXYGEN SATURATION: 97 % | RESPIRATION RATE: 15 BRPM | SYSTOLIC BLOOD PRESSURE: 154 MMHG

## 2023-08-21 PROCEDURE — 84295 ASSAY OF SERUM SODIUM: CPT

## 2023-08-21 PROCEDURE — 85014 HEMATOCRIT: CPT

## 2023-08-21 PROCEDURE — 85730 THROMBOPLASTIN TIME PARTIAL: CPT

## 2023-08-21 PROCEDURE — 82947 ASSAY GLUCOSE BLOOD QUANT: CPT

## 2023-08-21 PROCEDURE — 82803 BLOOD GASES ANY COMBINATION: CPT

## 2023-08-21 PROCEDURE — 84132 ASSAY OF SERUM POTASSIUM: CPT

## 2023-08-21 PROCEDURE — 86900 BLOOD TYPING SEROLOGIC ABO: CPT

## 2023-08-21 PROCEDURE — 86850 RBC ANTIBODY SCREEN: CPT

## 2023-08-21 PROCEDURE — 86901 BLOOD TYPING SEROLOGIC RH(D): CPT

## 2023-08-21 PROCEDURE — 93005 ELECTROCARDIOGRAM TRACING: CPT

## 2023-08-21 PROCEDURE — 80053 COMPREHEN METABOLIC PANEL: CPT

## 2023-08-21 PROCEDURE — 36415 COLL VENOUS BLD VENIPUNCTURE: CPT

## 2023-08-21 PROCEDURE — 74176 CT ABD & PELVIS W/O CONTRAST: CPT | Mod: MA

## 2023-08-21 PROCEDURE — 85018 HEMOGLOBIN: CPT

## 2023-08-21 PROCEDURE — 82330 ASSAY OF CALCIUM: CPT

## 2023-08-21 PROCEDURE — 99285 EMERGENCY DEPT VISIT HI MDM: CPT | Mod: 25

## 2023-08-21 PROCEDURE — 80048 BASIC METABOLIC PNL TOTAL CA: CPT

## 2023-08-21 PROCEDURE — 83605 ASSAY OF LACTIC ACID: CPT

## 2023-08-21 PROCEDURE — 85025 COMPLETE CBC W/AUTO DIFF WBC: CPT

## 2023-08-21 PROCEDURE — 85610 PROTHROMBIN TIME: CPT

## 2023-08-21 PROCEDURE — 82435 ASSAY OF BLOOD CHLORIDE: CPT

## 2023-08-21 RX ORDER — METHOCARBAMOL 500 MG/1
1 TABLET, FILM COATED ORAL
Qty: 10 | Refills: 0
Start: 2023-08-21 | End: 2023-08-25

## 2023-08-21 RX ORDER — LIDOCAINE 4 G/100G
1 CREAM TOPICAL
Qty: 2 | Refills: 0
Start: 2023-08-21 | End: 2023-08-25

## 2023-08-21 RX ORDER — ACETAMINOPHEN 500 MG
1000 TABLET ORAL ONCE
Refills: 0 | Status: COMPLETED | OUTPATIENT
Start: 2023-08-21 | End: 2023-08-21

## 2023-08-21 RX ADMIN — Medication 1000 MILLIGRAM(S): at 02:07

## 2023-08-21 NOTE — CONSULT NOTE ADULT - SUBJECTIVE AND OBJECTIVE BOX
COLORECTAL SURGERY CONSULT NOTE  --------------------------------------------------------------------------------------------  HPI:    77y/o male with PMHx of Afib, CLL, CKD stage IV-V, GERD, hypothyroidism, waldenstrom hypergammaglobulinemia, diverticulitis s/p lap sigmoid colectomy 2/2018 w/ Dr. Cabral presents with a 2 day history of right sided low back pain. Denies trauma. States that the pain is non-radiating and was 10/10 prior to taking 2 650mg tylenols at 2PM. Reports good relief with the tylenol. States that the pain is worse with movement. Denies fevers, chills, loss of perianal sensation.    In the ED, patient is afebrile, HDS. Labs WNL. CT w/ finding of short segment intussusception in the ascending colon without bowel obstruction or inflammation. Colorectal surgery consulted.    Patient denies abdominal pain except for short episode of mild pain 1 day ago. Denies nausea, vomiting, bloating. Passing flatus normally, last BM was 12hrs ago. Last colonoscopy 8/2018, one polyp removed.      PAST MEDICAL & SURGICAL HISTORY:  CLL (chronic lymphocytic leukemia)  in remission      Anxiety disorder      GERD (gastroesophageal reflux disease)      Atrial fibrillation  recent cardiac ablation 9/22      Diverticulitis      Waldenstrom macroglobulinemia      Bradycardia, drug induced      Hypothyroid      Nephrolithiasis      CKD (chronic kidney disease)      Gout      [place] IRA (hard of hearing)      BPH (benign prostatic hyperplasia)      Memory deficit      IgG monoclonal gammopathy      History of macular degeneration      Chronic heart failure with preserved ejection fraction      LOUISE (acute kidney injury)  9/2022      Pneumonia due to COVID-19 virus  2020      Cardiac pacemaker  [employer] [id number]      Meniscus tear  s/p removal of Meniscus 8 months ago      S/P hernia repair  x2      History of laparoscopic cholecystectomy  4/2014      History of cataract surgery, right  IOL      History of appendectomy      History of cardiac pacemaker in situ      S/P ureteral stent placement        FAMILY HISTORY:  FH: atrial fibrillation (Sibling)    FH: type 2 diabetes (Father)    FH: CAD (coronary artery disease) (Father)    [] Family history not pertinent as reviewed with the patient and family    SOCIAL HISTORY:     ALLERGIES: rituximab (Angioedema)      HOME MEDICATIONS:    CURRENT MEDICATIONS  MEDICATIONS (STANDING):   MEDICATIONS (PRN):  --------------------------------------------------------------------------------------------    Vitals:   T(C): 36.5 (08-20-23 @ 22:10), Max: 36.5 (08-20-23 @ 17:46)  HR: 54 (08-20-23 @ 22:10) (54 - 100)  BP: 144/73 (08-20-23 @ 22:10) (139/70 - 147/77)  RR: 16 (08-20-23 @ 22:10) (16 - 20)  SpO2: 100% (08-20-23 @ 22:10) (98% - 100%)  CAPILLARY BLOOD GLUCOSE          Height (cm): 188 (08-20 @ 17:46)  Weight (kg): 90.7 (08-20 @ 17:46)  BMI (kg/m2): 25.7 (08-20 @ 17:46)  BSA (m2): 2.17 (08-20 @ 17:46)      PHYSICAL EXAM:  General: NAD, Lying in bed comfortably  Neuro: Alert and answering questions appropriately   HEENT: Grossly normal, EOMI  Cardio: Regular rate  Resp: Good effort, no signs of respiratory distress  GI/Abd: Soft, nontender, nondistended, no rebound/guarding, no masses palpated  Vascular: All 4 extremities warm  Musculoskeletal: All 4 extremities moving spontaneously, no limitations  --------------------------------------------------------------------------------------------    LABS  CBC (08-20 @ 21:00)                              10.9<L>                         9.36    )----------------(  107<L>     62.1  % Neutrophils, 29.6  % Lymphocytes, ANC: 5.81                                34.8<L>    BMP (08-20 @ 22:38)             142     |  114<H>  |  56<H> 		Ca++ --      Ca 9.1                ---------------------------------( 92    		Mg --                 5.3     |  20<L>   |  3.34<H>			Ph --      BMP (08-20 @ 21:00)             140     |  113<H>  |  54<H> 		Ca++ --      Ca 9.0                ---------------------------------( 82    		Mg --                 6.3<HH>  |  17<L>   |  3.44<H>			Ph --        LFTs (08-20 @ 21:00)      TPro 5.8<L> / Alb 3.4 / TBili 0.2 / DBili -- / AST 18 / ALT 9<L> / AlkPhos 94    Coags (08-20 @ 21:00)  aPTT 31.6 / INR 1.47<H> / PT 16.0<H>        VBG (08-20 @ 20:43)     7.25<L> / 49 / 30 / 22 / -5.8<L> / 50.6<L>%     Lactate: 0.9    --------------------------------------------------------------------------------------------    MICROBIOLOGY  Urinalysis (08-20 @ 22:38):     Color:  / Appearance:  / SG:  / pH:  / Gluc: 92 / Ketones:  / Bili:  / Urobili:  / Protein : / Nitrites:  / Leuk.Est:  / RBC:  / WBC:  / Sq Epi:  / Non Sq Epi:  / Bacteria          --------------------------------------------------------------------------------------------    IMAGING  < from: CT Abdomen and Pelvis No Cont (08.20.23 @ 21:47) >  ACC: [id number] EXAM:  CT REFORM SPINE L   ORDERED BY: FELISA KELLY     ACC: [id number] EXAM:  CT ABDOMEN AND PELVIS   ORDERED BY: FELISA KELLY     PROCEDURE DATE:  08/20/2023          INTERPRETATION:  CLINICAL INFORMATION: Right lower back pain. No trauma.   History of CLL.    COMPARISON: CT abdomen and pelvis 7/31/2023. PET/CT from 5/13/2023.    CONTRAST/COMPLICATIONS:  IV Contrast: NONE  Evaluation of the visceral organs is limited without   intravenous contrast  Oral Contrast: NONE  Complications: None reported at time of study completion    PROCEDURE:  CT of the Abdomen and Pelvis was performed.  Sagittal and coronal reformats were performed.  Coned-down axial, sagittal and coronal reformats of the lumbar spine was   performed utilizing the source images from the CT of the abdomen and   pelvis.    FINDINGS:    ABDOMEN AND PELVIS:  LOWER CHEST: Bibasilar subsegmental atelectasis. Trace left pleural   effusion, intervally improved. Pacemaker leads in the right atrium and   right ventricle. Enlarged posterior mediastinal lymph nodes largest   measuring up to 3.0 x 2.0 cm, not significantly changed.    LIVER: Within normal limits.  BILE DUCTS: Normal caliber.  GALLBLADDER: Cholecystectomy.  SPLEEN: Within normal limits.  PANCREAS: Within normal limits.  ADRENALS: Within normal limits.  KIDNEYS/URETERS: No hydronephrosis. Redemonstrated bilateral renal cysts,   some of which demonstrate septations and calcifications and some of which   are high in attenuation which is due to a proteinaceous or hemorrhagic   cyst.    BLADDER: Within normal limits.  REPRODUCTIVE ORGANS: Prostate within normal limits.    BOWEL: No bowel obstruction or inflammation. Appendix is not visualized.   No evidence of inflammation in the pericecal region.Sigmoid anastomotic   sutures. Short segment intussusception in the ascending colon.  PERITONEUM: Mild mesenteric edema, not significantly changed.  VESSELS: Atherosclerotic changes.  RETROPERITONEUM/LYMPH NODES: Redemonstrated confluent periportal,   para-aortic and mesenteric lymphadenopathy, not significantly changed.   Enlarged bilateral pelvic sidewall lymph nodes, not significantly changed.  ABDOMINAL WALL: Small fat-containing left inguinal hernia. Anterior   abdominal wall mesh hernia repair. Generalized anasarca.  BONES: Old left rib fractures. Old fracture deformities of the right   superior and inferior pubic ramus..    LUMBAR SPINE:  VERTEBRAL BODIES: Preserved vertebral body heights. No acute fracture.   Marginal osteophyte formation throughout the visualized thoracic and   lumbar spine. L1 vertebral body hemangioma.  ALIGNMENT: No subluxation.  DISCS: Moderate disc space narrowing at L5-S1 with vacuum disc   phenomenon. Broad-based disc bulge at L4-L5 resulting in flatteningof   the ventral thecal sac and mild bilateral neural foraminal stenosis.   Posterior disc osteophyte complex at L5-S1 resulting in flattening of the   ventral thecal sac and moderate to severe bilateral neural foraminal   stenosis.    IMPRESSION:  1. No bowel obstruction or inflammation. Short segment intussusception in   the ascending colon.  2. Posterior mediastinal, periportal, para-aortic, mesenteric and pelvic   lymphadenopathy, not significantly changed.  3. No lumbar spine fracture or traumatic spondylolisthesis. Lumbar spine   degenerative disc disease worst at L5-S1 resulting in moderate to severe   bilateral neural foraminal stenosis.      < end of copied text >      --------------------------------------------------------------------------------------------

## 2023-08-21 NOTE — CONSULT NOTE ADULT - ASSESSMENT
ASSESSMENT: 79y/o male with PMHx of Afib, CLL, CKD stage IV-V, GERD, hypothyroidism, waldenstrom hypergammaglobulinemia, diverticulitis s/p lap sigmoid colectomy 2/2018 w/ Dr. Cabral presents with a 2 day history of right sided low back pain. CT w/ finding of short segment intussusception in the ascending colon without bowel obstruction or inflammation. Colorectal surgery consulted. Patient has no signs/symptoms of obstruction or bowel ischemia and thus this finding is likely incidental.    RECOMMENDATIONS:  - No acute surgical intervention  - Patient to follow up with PCP upon discharge      Patient discussed with chief resident.    Prisca Curiel, PGY-2  [facility]  [phone]

## 2023-08-22 ENCOUNTER — APPOINTMENT (OUTPATIENT)
Dept: UROLOGY | Facility: CLINIC | Age: 78
End: 2023-08-22
Payer: MEDICARE

## 2023-08-22 ENCOUNTER — OUTPATIENT (OUTPATIENT)
Dept: OUTPATIENT SERVICES | Facility: HOSPITAL | Age: 78
LOS: 1 days | End: 2023-08-22
Payer: MEDICARE

## 2023-08-22 VITALS
HEART RATE: 52 BPM | RESPIRATION RATE: 16 BRPM | SYSTOLIC BLOOD PRESSURE: 111 MMHG | OXYGEN SATURATION: 96 % | DIASTOLIC BLOOD PRESSURE: 62 MMHG

## 2023-08-22 DIAGNOSIS — R35.0 FREQUENCY OF MICTURITION: ICD-10-CM

## 2023-08-22 DIAGNOSIS — Z98.89 OTHER SPECIFIED POSTPROCEDURAL STATES: Chronic | ICD-10-CM

## 2023-08-22 DIAGNOSIS — Z98.41 CATARACT EXTRACTION STATUS, RIGHT EYE: Chronic | ICD-10-CM

## 2023-08-22 DIAGNOSIS — Z98.890 OTHER SPECIFIED POSTPROCEDURAL STATES: Chronic | ICD-10-CM

## 2023-08-22 DIAGNOSIS — Z96.0 PRESENCE OF UROGENITAL IMPLANTS: Chronic | ICD-10-CM

## 2023-08-22 DIAGNOSIS — R31.0 GROSS HEMATURIA: ICD-10-CM

## 2023-08-22 DIAGNOSIS — Z95.0 PRESENCE OF CARDIAC PACEMAKER: Chronic | ICD-10-CM

## 2023-08-22 PROCEDURE — 52000 CYSTOURETHROSCOPY: CPT

## 2023-08-23 ENCOUNTER — TRANSCRIPTION ENCOUNTER (OUTPATIENT)
Age: 78
End: 2023-08-23

## 2023-08-23 PROBLEM — R31.0 HEMATURIA, GROSS: Status: ACTIVE | Noted: 2023-08-23

## 2023-08-23 NOTE — HISTORY OF PRESENT ILLNESS
[FreeTextEntry1] : : 1945  Referring Provider: none   HPI: Mr. NICOLAS CIFUENTES is a 77 year yo M with a PMHx notable for kidney stones. He had recently presented to the hospital and underwent acute intervention with stent insertion on the right side related to a right distal ureteral stone in the setting of a creatinine elevation. Dr. Sultana inserted his ureteral stent. The patient was then directed to follow-up back in the office.  He is feeling well at this time without any acute pain or nausea or vomiting. No fevers or chills. There was no hematuria. He does have some increase in urinary urgency frequency, likely stent related.   He was referred to me for surgical consideration for CVAC with URS.   Anticoagulation: Eliquis BID All: NKDA Social: No smoking or drinking, , children PMHx: CLL, Waldenstrom's, PPM,  kidney stones FHx: None PSHx: prior ESWL  Imaging: 2022 CT reviewed. The right ureteral stone mentioned appears to be potential stone debris in the ureter rather than a true discrete stone. He also has a larger stone burden on the right side with a fairly sizable stone seen in the right renal pelvis. It also appears to be of low density.  10/31: Here today s/p L URS x 2 (duplicated) and R side with bilateral stents placed with CVAC. Diet modification reviewed at length- increasing fluids (primarily water), citrus is good, and decreasing/moderating salt, animal flesh protein, oxalate containing foods, and moderation of calcium intake (1000 mg/day is USRDA).  I reviewed with the patient the risks of metabolic stone disease given their underlying risk parameters (all of which include large stones, multiple stones, bilateral stones, family history, and young age), and the indications for 24 hour urine metabolic assessment. We also discussed benefits of regular exercise and weight loss as independent risk reducers for stones.  : Here today s/p URS and stones removed. ULS today without stones noted. Cr with significant CKD noted. LL today with low volume ~1L and marked hypocitraturia likely related to CKD. Doing well since stents removed. Discussed starting K cit 1 tab PO BID. Follows with nephrologist Norman Ascencio MD (non [facility]).   : Here today for cystoscopy without obvious lesions noted given gross hematuria. CKD present. Likely related to BPH.  CT scan reviewed without obvious lesions noted. Discussed follow up in 3 months. [Urinary Incontinence] : no urinary incontinence [Urinary Retention] : no urinary retention [Urinary Urgency] : no urinary urgency [Urinary Frequency] : no urinary frequency

## 2023-08-24 ENCOUNTER — APPOINTMENT (OUTPATIENT)
Dept: ORTHOPEDIC SURGERY | Facility: CLINIC | Age: 78
End: 2023-08-24
Payer: MEDICARE

## 2023-08-24 VITALS
HEART RATE: 50 BPM | SYSTOLIC BLOOD PRESSURE: 118 MMHG | WEIGHT: 200 LBS | OXYGEN SATURATION: 98 % | BODY MASS INDEX: 25.67 KG/M2 | HEIGHT: 74 IN | DIASTOLIC BLOOD PRESSURE: 67 MMHG

## 2023-08-24 DIAGNOSIS — R31.0 GROSS HEMATURIA: ICD-10-CM

## 2023-08-24 DIAGNOSIS — M51.36 OTHER INTERVERTEBRAL DISC DEGENERATION, LUMBAR REGION: ICD-10-CM

## 2023-08-24 DIAGNOSIS — N18.4 CHRONIC KIDNEY DISEASE, STAGE 4 (SEVERE): ICD-10-CM

## 2023-08-24 PROCEDURE — 99204 OFFICE O/P NEW MOD 45 MIN: CPT

## 2023-08-24 PROCEDURE — 72100 X-RAY EXAM L-S SPINE 2/3 VWS: CPT

## 2023-08-24 NOTE — DISCUSSION/SUMMARY
[de-identified] : L5-S1 disc degenerative disease. Feeling better. Kidney issues, no NSAIDs. Discussed all options. Continue methocarbamol. Referral for physical therapy.  If no better injection. All options discussed including rest, medicine, home exercise, acupuncture, Chiropractic care, Physical Therapy, Pain management, and last resort surgery. All questions were answered, all alternatives discussed, and the patient is in complete agreement with the treatment plan which the patient contributed to and discussed with me through the shared decision-making process. Follow-up appointment as instructed. Any issues and the patient will call or come in sooner.  Wife agrees with plan.

## 2023-08-24 NOTE — PHYSICAL EXAM
[Cane] : ambulates with cane [Cid's Sign] : negative Cid's sign [Pronator Drift] : negative pronator drift [SLR] : negative straight leg raise [de-identified] : 5 out of 5 motor strength, sensation is intact and symmetrical full range of motion flexion extension and rotation, no palpatory tenderness full range of motion of hips knees shoulders and elbows (all four extremities), no atrophy, negative straight leg raise, no pathological reflexes, no swelling, normal ambulation, no apparent distress skin is intact, no palpable lymph nodes, no upper or lower extremity instability, alert and oriented x3 and normal mood. Normal finger-to nose test.  [de-identified] : I reviewed, interpreted and clinically correlated the following outside imaging studies,  CT REFORM SPINE L ORDERED BY: FELISA KELLY  ACC: [id number] EXAM: CT ABDOMEN AND PELVIS ORDERED BY: FELISA KELLY  PROCEDURE DATE: 08/20/2023    INTERPRETATION: CLINICAL INFORMATION: Right lower back pain. No trauma. History of CLL.  COMPARISON: CT abdomen and pelvis 7/31/2023. PET/CT from 5/13/2023.  CONTRAST/COMPLICATIONS: IV Contrast: NONE Evaluation of the visceral organs is limited without intravenous contrast Oral Contrast: NONE Complications: None reported at time of study completion  PROCEDURE: CT of the Abdomen and Pelvis was performed. Sagittal and coronal reformats were performed. Coned-down axial, sagittal and coronal reformats of the lumbar spine was performed utilizing the source images from the CT of the abdomen and pelvis.  FINDINGS:  ABDOMEN AND PELVIS: LOWER CHEST: Bibasilar subsegmental atelectasis. Trace left pleural effusion, intervally improved. Pacemaker leads in the right atrium and right ventricle. Enlarged posterior mediastinal lymph nodes largest measuring up to 3.0 x 2.0 cm, not significantly changed.  LIVER: Within normal limits. BILE DUCTS: Normal caliber. GALLBLADDER: Cholecystectomy. SPLEEN: Within normal limits. PANCREAS: Within normal limits. ADRENALS: Within normal limits. KIDNEYS/URETERS: No hydronephrosis. Redemonstrated bilateral renal cysts, some of which demonstrate septations and calcifications and some of which are high in attenuation which is due to a proteinaceous or hemorrhagic cyst.  BLADDER: Within normal limits. REPRODUCTIVE ORGANS: Prostate within normal limits.  BOWEL: No bowel obstruction or inflammation. Appendix is not visualized. No evidence of inflammation in the pericecal region. Sigmoid anastomotic sutures. Short segment intussusception in the ascending colon. PERITONEUM: Mild mesenteric edema, not significantly changed. VESSELS: Atherosclerotic changes. RETROPERITONEUM/LYMPH NODES: Redemonstrated confluent periportal, para-aortic and mesenteric lymphadenopathy, not significantly changed. Enlarged bilateral pelvic sidewall lymph nodes, not significantly changed. ABDOMINAL WALL: Small fat-containing left inguinal hernia. Anterior abdominal wall mesh hernia repair. Generalized anasarca. BONES: Old left rib fractures. Old fracture deformities of the right superior and inferior pubic ramus..  LUMBAR SPINE: VERTEBRAL BODIES: Preserved vertebral body heights. No acute fracture. Marginal osteophyte formation throughout the visualized thoracic and lumbar spine. L1 vertebral body hemangioma. ALIGNMENT: No subluxation. DISCS: Moderate disc space narrowing at L5-S1 with vacuum disc phenomenon. Broad-based disc bulge at L4-L5 resulting in flattening of the ventral thecal sac and mild bilateral neural foraminal stenosis. Posterior disc osteophyte complex at L5-S1 resulting in flattening of the ventral thecal sac and moderate to severe bilateral neural foraminal stenosis.  IMPRESSION: 1. No bowel obstruction or inflammation. Short segment intussusception in the ascending colon. 2. Posterior mediastinal, periportal, para-aortic, mesenteric and pelvic lymphadenopathy, not significantly changed. 3. No lumbar spine fracture or traumatic spondylolisthesis. Lumbar spine degenerative disc disease worst at L5-S1 resulting in moderate to severe bilateral neural foraminal stenosis.    --- End of Report ---

## 2023-08-24 NOTE — HISTORY OF PRESENT ILLNESS
[Stable] : stable [de-identified] : 78 year old male presents for evaluation of chronic lower back pain, has worsened this past weekend. Pain is localized to the lower back. Denies injury. Denies numbness/tingling. Has chronic neuropathy of the feet. Laying flat on his back and standing aggravates the pain. Can not take NSAIDs due to kidney issues. Has been taking tylenol and methocarbamol. Has not tried PT or chiropractic care. Denies REJI. Ambulates with a cane.  No fever, chills, sweats, nausea/vomiting. No bowel or bladder dysfunction, no recent weight loss or gain. No night pain. This history is in addition to the intake form that I personally reviewed.

## 2023-08-24 NOTE — ADDENDUM
[FreeTextEntry1] : This note was written by Demario Mills on 08/24/2023 acting as scribe for Dr. Sudheer Mistry M.D.  I, Sudheer Mistry MD, have read and attest that all the information, medical decision making and discharge instructions within are true and accurate.

## 2023-09-05 LAB
ALBUMIN SERPL ELPH-MCNC: 3.5 G/DL
ALP BLD-CCNC: 103 U/L
ALT SERPL-CCNC: 5 U/L
ANION GAP SERPL CALC-SCNC: 12 MMOL/L
AST SERPL-CCNC: 8 U/L
BILIRUB SERPL-MCNC: 0.2 MG/DL
BUN SERPL-MCNC: 53 MG/DL
CALCIUM SERPL-MCNC: 9 MG/DL
CHLORIDE SERPL-SCNC: 112 MMOL/L
CO2 SERPL-SCNC: 19 MMOL/L
CREAT SERPL-MCNC: 3.7 MG/DL
EGFR: 16 ML/MIN/1.73M2
GLUCOSE SERPL-MCNC: 148 MG/DL
LDH SERPL-CCNC: 103 U/L
PHOSPHATE SERPL-MCNC: 3.7 MG/DL
POTASSIUM SERPL-SCNC: 4.5 MMOL/L
PROT SERPL-MCNC: 5 G/DL
SODIUM SERPL-SCNC: 143 MMOL/L
URATE SERPL-MCNC: 3.5 MG/DL

## 2023-09-06 ENCOUNTER — APPOINTMENT (OUTPATIENT)
Dept: UROLOGY | Facility: HOSPITAL | Age: 78
End: 2023-09-06

## 2023-09-06 NOTE — PHYSICAL THERAPY INITIAL EVALUATION ADULT - CRITERIA FOR SKILLED THERAPEUTIC INTERVENTIONS
impairments found/rehab potential/therapy frequency/predicted duration of therapy intervention/anticipated discharge recommendation Initiate Treatment: Fexofenadine Render In Strict Bullet Format?: No Detail Level: Zone Initiate Treatment: Doxycycline, mupricion Initiate Treatment: Ketoconazole shampoo, clobetasol

## 2023-09-20 ENCOUNTER — RESULT REVIEW (OUTPATIENT)
Age: 78
End: 2023-09-20

## 2023-09-20 ENCOUNTER — APPOINTMENT (OUTPATIENT)
Dept: HEMATOLOGY ONCOLOGY | Facility: CLINIC | Age: 78
End: 2023-09-20
Payer: MEDICARE

## 2023-09-20 ENCOUNTER — LABORATORY RESULT (OUTPATIENT)
Age: 78
End: 2023-09-20

## 2023-09-20 VITALS
DIASTOLIC BLOOD PRESSURE: 69 MMHG | HEART RATE: 54 BPM | OXYGEN SATURATION: 96 % | SYSTOLIC BLOOD PRESSURE: 113 MMHG | BODY MASS INDEX: 25.04 KG/M2 | RESPIRATION RATE: 16 BRPM | TEMPERATURE: 97.3 F | WEIGHT: 195 LBS

## 2023-09-20 LAB
BASOPHILS # BLD AUTO: 0.03 K/UL — SIGNIFICANT CHANGE UP (ref 0–0.2)
BASOPHILS NFR BLD AUTO: 0.5 % — SIGNIFICANT CHANGE UP (ref 0–2)
EOSINOPHIL # BLD AUTO: 0.16 K/UL — SIGNIFICANT CHANGE UP (ref 0–0.5)
EOSINOPHIL NFR BLD AUTO: 2.6 % — SIGNIFICANT CHANGE UP (ref 0–6)
HCT VFR BLD CALC: 32.2 % — LOW (ref 39–50)
HGB BLD-MCNC: 9.9 G/DL — LOW (ref 13–17)
IMM GRANULOCYTES NFR BLD AUTO: 0.2 % — SIGNIFICANT CHANGE UP (ref 0–0.9)
LYMPHOCYTES # BLD AUTO: 2.24 K/UL — SIGNIFICANT CHANGE UP (ref 1–3.3)
LYMPHOCYTES # BLD AUTO: 35.7 % — SIGNIFICANT CHANGE UP (ref 13–44)
MCHC RBC-ENTMCNC: 30.5 PG — SIGNIFICANT CHANGE UP (ref 27–34)
MCHC RBC-ENTMCNC: 30.7 G/DL — LOW (ref 32–36)
MCV RBC AUTO: 99.1 FL — SIGNIFICANT CHANGE UP (ref 80–100)
MONOCYTES # BLD AUTO: 0.27 K/UL — SIGNIFICANT CHANGE UP (ref 0–0.9)
MONOCYTES NFR BLD AUTO: 4.3 % — SIGNIFICANT CHANGE UP (ref 2–14)
NEUTROPHILS # BLD AUTO: 3.56 K/UL — SIGNIFICANT CHANGE UP (ref 1.8–7.4)
NEUTROPHILS NFR BLD AUTO: 56.7 % — SIGNIFICANT CHANGE UP (ref 43–77)
NRBC # BLD: 0 /100 WBCS — SIGNIFICANT CHANGE UP (ref 0–0)
PLATELET # BLD AUTO: 77 K/UL — LOW (ref 150–400)
RBC # BLD: 3.25 M/UL — LOW (ref 4.2–5.8)
RBC # FLD: 13.4 % — SIGNIFICANT CHANGE UP (ref 10.3–14.5)
WBC # BLD: 6.27 K/UL — SIGNIFICANT CHANGE UP (ref 3.8–10.5)
WBC # FLD AUTO: 6.27 K/UL — SIGNIFICANT CHANGE UP (ref 3.8–10.5)

## 2023-09-20 PROCEDURE — 99215 OFFICE O/P EST HI 40 MIN: CPT

## 2023-09-20 RX ORDER — CIPROFLOXACIN HYDROCHLORIDE 500 MG/1
500 TABLET, FILM COATED ORAL DAILY
Qty: 5 | Refills: 0 | Status: DISCONTINUED | COMMUNITY
Start: 2023-07-20 | End: 2023-09-20

## 2023-09-20 RX ORDER — TRIAMCINOLONE ACETONIDE 1 MG/G
0.1 CREAM TOPICAL
Qty: 80 | Refills: 0 | Status: DISCONTINUED | COMMUNITY
Start: 2022-09-29 | End: 2023-09-20

## 2023-09-21 LAB
ALBUMIN SERPL ELPH-MCNC: 3.5 G/DL
ALP BLD-CCNC: 105 U/L
ALT SERPL-CCNC: 9 U/L
ANION GAP SERPL CALC-SCNC: 9 MMOL/L
AST SERPL-CCNC: 9 U/L
BILIRUB SERPL-MCNC: 0.2 MG/DL
BUN SERPL-MCNC: 52 MG/DL
CALCIUM SERPL-MCNC: 9.2 MG/DL
CHLORIDE SERPL-SCNC: 113 MMOL/L
CO2 SERPL-SCNC: 22 MMOL/L
CREAT SERPL-MCNC: 3.28 MG/DL
EGFR: 19 ML/MIN/1.73M2
GLUCOSE SERPL-MCNC: 131 MG/DL
LDH SERPL-CCNC: 98 U/L
POTASSIUM SERPL-SCNC: 4.9 MMOL/L
PROT SERPL-MCNC: 5 G/DL
SODIUM SERPL-SCNC: 144 MMOL/L
URATE SERPL-MCNC: 3.8 MG/DL

## 2023-09-22 LAB
DEPRECATED KAPPA LC FREE/LAMBDA SER: 0.01 RATIO
FERRITIN SERPL-MCNC: 181 NG/ML
IGA SER QL IEP: 23 MG/DL
IGG SER QL IEP: 408 MG/DL
IGM SER QL IEP: 363 MG/DL
IRON SATN MFR SERPL: 36 %
IRON SERPL-MCNC: 58 UG/DL
KAPPA LC CSF-MCNC: 116.56 MG/DL
KAPPA LC SERPL-MCNC: 1.06 MG/DL
TIBC SERPL-MCNC: 160 UG/DL
UIBC SERPL-MCNC: 102 UG/DL

## 2023-09-28 LAB
ALBUMIN MFR SERPL ELPH: 62.6 %
ALBUMIN SERPL-MCNC: 3.1 G/DL
ALBUMIN/GLOB SERPL: 1.7 RATIO
ALPHA1 GLOB MFR SERPL ELPH: 6.7 %
ALPHA1 GLOB SERPL ELPH-MCNC: 0.3 G/DL
ALPHA2 GLOB MFR SERPL ELPH: 11.3 %
ALPHA2 GLOB SERPL ELPH-MCNC: 0.6 G/DL
B-GLOBULIN MFR SERPL ELPH: 8.1 %
B-GLOBULIN SERPL ELPH-MCNC: 0.4 G/DL
GAMMA GLOB FLD ELPH-MCNC: 0.6 G/DL
GAMMA GLOB MFR SERPL ELPH: 11.3 %
INTERPRETATION SERPL IEP-IMP: NORMAL
M PROTEIN MFR SERPL ELPH: NORMAL
MONOCLON BAND OBS SERPL: NORMAL
PROT SERPL-MCNC: 4.9 G/DL
PROT SERPL-MCNC: 4.9 G/DL

## 2023-10-02 ENCOUNTER — APPOINTMENT (OUTPATIENT)
Dept: GASTROENTEROLOGY | Facility: CLINIC | Age: 78
End: 2023-10-02
Payer: MEDICARE

## 2023-10-02 VITALS
HEIGHT: 74 IN | WEIGHT: 198 LBS | OXYGEN SATURATION: 98 % | HEART RATE: 65 BPM | RESPIRATION RATE: 18 BRPM | SYSTOLIC BLOOD PRESSURE: 107 MMHG | DIASTOLIC BLOOD PRESSURE: 63 MMHG | BODY MASS INDEX: 25.41 KG/M2

## 2023-10-02 DIAGNOSIS — R19.7 DIARRHEA, UNSPECIFIED: ICD-10-CM

## 2023-10-02 DIAGNOSIS — R63.4 ABNORMAL WEIGHT LOSS: ICD-10-CM

## 2023-10-02 PROCEDURE — 99214 OFFICE O/P EST MOD 30 MIN: CPT

## 2023-10-03 LAB
AMYLASE/CREAT SERPL: 118 U/L
LPL SERPL-CCNC: 106 U/L

## 2023-10-04 ENCOUNTER — RX RENEWAL (OUTPATIENT)
Age: 78
End: 2023-10-04

## 2023-10-04 LAB — CDIFF BY PCR: NOT DETECTED

## 2023-10-05 LAB — GI PCR PANEL: NOT DETECTED

## 2023-10-12 LAB — PANCREATIC ELASTASE, FECAL: 260 CD:794062645

## 2023-10-17 ENCOUNTER — OUTPATIENT (OUTPATIENT)
Dept: OUTPATIENT SERVICES | Facility: HOSPITAL | Age: 78
LOS: 1 days | Discharge: HOME | End: 2023-10-17

## 2023-10-17 DIAGNOSIS — Z95.0 PRESENCE OF CARDIAC PACEMAKER: Chronic | ICD-10-CM

## 2023-10-17 DIAGNOSIS — Z96.0 PRESENCE OF UROGENITAL IMPLANTS: Chronic | ICD-10-CM

## 2023-10-17 DIAGNOSIS — Z98.89 OTHER SPECIFIED POSTPROCEDURAL STATES: Chronic | ICD-10-CM

## 2023-10-17 DIAGNOSIS — Z98.890 OTHER SPECIFIED POSTPROCEDURAL STATES: Chronic | ICD-10-CM

## 2023-10-17 DIAGNOSIS — C91.11 CHRONIC LYMPHOCYTIC LEUKEMIA OF B-CELL TYPE IN REMISSION: ICD-10-CM

## 2023-10-17 DIAGNOSIS — Z98.41 CATARACT EXTRACTION STATUS, RIGHT EYE: Chronic | ICD-10-CM

## 2023-10-19 ENCOUNTER — LABORATORY RESULT (OUTPATIENT)
Age: 78
End: 2023-10-19

## 2023-10-19 ENCOUNTER — RESULT REVIEW (OUTPATIENT)
Age: 78
End: 2023-10-19

## 2023-10-19 ENCOUNTER — APPOINTMENT (OUTPATIENT)
Dept: HEMATOLOGY ONCOLOGY | Facility: CLINIC | Age: 78
End: 2023-10-19
Payer: MEDICARE

## 2023-10-19 VITALS
SYSTOLIC BLOOD PRESSURE: 115 MMHG | TEMPERATURE: 97.9 F | BODY MASS INDEX: 25.13 KG/M2 | DIASTOLIC BLOOD PRESSURE: 68 MMHG | RESPIRATION RATE: 18 BRPM | WEIGHT: 195.72 LBS | OXYGEN SATURATION: 99 % | HEART RATE: 60 BPM

## 2023-10-19 LAB
BASOPHILS # BLD AUTO: 0.05 K/UL — SIGNIFICANT CHANGE UP (ref 0–0.2)
BASOPHILS # BLD AUTO: 0.05 K/UL — SIGNIFICANT CHANGE UP (ref 0–0.2)
BASOPHILS NFR BLD AUTO: 0.7 % — SIGNIFICANT CHANGE UP (ref 0–2)
BASOPHILS NFR BLD AUTO: 0.7 % — SIGNIFICANT CHANGE UP (ref 0–2)
EOSINOPHIL # BLD AUTO: 0.16 K/UL — SIGNIFICANT CHANGE UP (ref 0–0.5)
EOSINOPHIL # BLD AUTO: 0.16 K/UL — SIGNIFICANT CHANGE UP (ref 0–0.5)
EOSINOPHIL NFR BLD AUTO: 2.1 % — SIGNIFICANT CHANGE UP (ref 0–6)
EOSINOPHIL NFR BLD AUTO: 2.1 % — SIGNIFICANT CHANGE UP (ref 0–6)
HCT VFR BLD CALC: 34.6 % — LOW (ref 39–50)
HCT VFR BLD CALC: 34.6 % — LOW (ref 39–50)
HGB BLD-MCNC: 11.1 G/DL — LOW (ref 13–17)
HGB BLD-MCNC: 11.1 G/DL — LOW (ref 13–17)
IMM GRANULOCYTES NFR BLD AUTO: 0.4 % — SIGNIFICANT CHANGE UP (ref 0–0.9)
IMM GRANULOCYTES NFR BLD AUTO: 0.4 % — SIGNIFICANT CHANGE UP (ref 0–0.9)
LYMPHOCYTES # BLD AUTO: 2.36 K/UL — SIGNIFICANT CHANGE UP (ref 1–3.3)
LYMPHOCYTES # BLD AUTO: 2.36 K/UL — SIGNIFICANT CHANGE UP (ref 1–3.3)
LYMPHOCYTES # BLD AUTO: 30.9 % — SIGNIFICANT CHANGE UP (ref 13–44)
LYMPHOCYTES # BLD AUTO: 30.9 % — SIGNIFICANT CHANGE UP (ref 13–44)
MCHC RBC-ENTMCNC: 31.4 PG — SIGNIFICANT CHANGE UP (ref 27–34)
MCHC RBC-ENTMCNC: 31.4 PG — SIGNIFICANT CHANGE UP (ref 27–34)
MCHC RBC-ENTMCNC: 32.1 G/DL — SIGNIFICANT CHANGE UP (ref 32–36)
MCHC RBC-ENTMCNC: 32.1 G/DL — SIGNIFICANT CHANGE UP (ref 32–36)
MCV RBC AUTO: 97.7 FL — SIGNIFICANT CHANGE UP (ref 80–100)
MCV RBC AUTO: 97.7 FL — SIGNIFICANT CHANGE UP (ref 80–100)
MONOCYTES # BLD AUTO: 0.36 K/UL — SIGNIFICANT CHANGE UP (ref 0–0.9)
MONOCYTES # BLD AUTO: 0.36 K/UL — SIGNIFICANT CHANGE UP (ref 0–0.9)
MONOCYTES NFR BLD AUTO: 4.7 % — SIGNIFICANT CHANGE UP (ref 2–14)
MONOCYTES NFR BLD AUTO: 4.7 % — SIGNIFICANT CHANGE UP (ref 2–14)
NEUTROPHILS # BLD AUTO: 4.67 K/UL — SIGNIFICANT CHANGE UP (ref 1.8–7.4)
NEUTROPHILS # BLD AUTO: 4.67 K/UL — SIGNIFICANT CHANGE UP (ref 1.8–7.4)
NEUTROPHILS NFR BLD AUTO: 61.2 % — SIGNIFICANT CHANGE UP (ref 43–77)
NEUTROPHILS NFR BLD AUTO: 61.2 % — SIGNIFICANT CHANGE UP (ref 43–77)
NRBC # BLD: 0 /100 WBCS — SIGNIFICANT CHANGE UP (ref 0–0)
NRBC # BLD: 0 /100 WBCS — SIGNIFICANT CHANGE UP (ref 0–0)
PLATELET # BLD AUTO: 98 K/UL — LOW (ref 150–400)
PLATELET # BLD AUTO: 98 K/UL — LOW (ref 150–400)
RBC # BLD: 3.54 M/UL — LOW (ref 4.2–5.8)
RBC # BLD: 3.54 M/UL — LOW (ref 4.2–5.8)
RBC # FLD: 13.6 % — SIGNIFICANT CHANGE UP (ref 10.3–14.5)
RBC # FLD: 13.6 % — SIGNIFICANT CHANGE UP (ref 10.3–14.5)
WBC # BLD: 7.63 K/UL — SIGNIFICANT CHANGE UP (ref 3.8–10.5)
WBC # BLD: 7.63 K/UL — SIGNIFICANT CHANGE UP (ref 3.8–10.5)
WBC # FLD AUTO: 7.63 K/UL — SIGNIFICANT CHANGE UP (ref 3.8–10.5)
WBC # FLD AUTO: 7.63 K/UL — SIGNIFICANT CHANGE UP (ref 3.8–10.5)

## 2023-10-19 PROCEDURE — 99213 OFFICE O/P EST LOW 20 MIN: CPT

## 2023-10-22 LAB
ALBUMIN MFR SERPL ELPH: 63 %
ALBUMIN SERPL ELPH-MCNC: 3.8 G/DL
ALBUMIN SERPL-MCNC: 3.5 G/DL
ALBUMIN/GLOB SERPL: 1.8 RATIO
ALP BLD-CCNC: 128 U/L
ALPHA1 GLOB MFR SERPL ELPH: 6.1 %
ALPHA1 GLOB SERPL ELPH-MCNC: 0.3 G/DL
ALPHA2 GLOB MFR SERPL ELPH: 11.5 %
ALPHA2 GLOB SERPL ELPH-MCNC: 0.6 G/DL
ALT SERPL-CCNC: 8 U/L
ANION GAP SERPL CALC-SCNC: 8 MMOL/L
AST SERPL-CCNC: 11 U/L
B-GLOBULIN MFR SERPL ELPH: 8.8 %
B-GLOBULIN SERPL ELPH-MCNC: 0.5 G/DL
BILIRUB SERPL-MCNC: 0.2 MG/DL
BUN SERPL-MCNC: 52 MG/DL
CALCIUM SERPL-MCNC: 9.6 MG/DL
CHLORIDE SERPL-SCNC: 111 MMOL/L
CO2 SERPL-SCNC: 23 MMOL/L
CREAT SERPL-MCNC: 3.62 MG/DL
DEPRECATED KAPPA LC FREE/LAMBDA SER: 0.01 RATIO
EGFR: 17 ML/MIN/1.73M2
GAMMA GLOB FLD ELPH-MCNC: 0.6 G/DL
GAMMA GLOB MFR SERPL ELPH: 10.6 %
GLUCOSE SERPL-MCNC: 169 MG/DL
IGA SER QL IEP: 13 MG/DL
IGG SER QL IEP: 376 MG/DL
IGM SER QL IEP: 313 MG/DL
INTERPRETATION SERPL IEP-IMP: NORMAL
KAPPA LC CSF-MCNC: 95.89 MG/DL
KAPPA LC SERPL-MCNC: 0.88 MG/DL
LDH SERPL-CCNC: 126 U/L
M PROTEIN MFR SERPL ELPH: 6 %
MONOCLON BAND OBS SERPL: 0.3 G/DL
POTASSIUM SERPL-SCNC: 5.3 MMOL/L
PROT SERPL-MCNC: 5.5 G/DL
SODIUM SERPL-SCNC: 142 MMOL/L

## 2023-10-31 ENCOUNTER — NON-APPOINTMENT (OUTPATIENT)
Age: 78
End: 2023-10-31

## 2023-10-31 ENCOUNTER — APPOINTMENT (OUTPATIENT)
Dept: CARDIOLOGY | Facility: CLINIC | Age: 78
End: 2023-10-31
Payer: MEDICARE

## 2023-10-31 ENCOUNTER — APPOINTMENT (OUTPATIENT)
Dept: ELECTROPHYSIOLOGY | Facility: CLINIC | Age: 78
End: 2023-10-31
Payer: MEDICARE

## 2023-10-31 VITALS
WEIGHT: 195 LBS | OXYGEN SATURATION: 99 % | HEIGHT: 74 IN | BODY MASS INDEX: 25.03 KG/M2 | HEART RATE: 52 BPM | DIASTOLIC BLOOD PRESSURE: 71 MMHG | SYSTOLIC BLOOD PRESSURE: 133 MMHG

## 2023-10-31 DIAGNOSIS — I49.1 ATRIAL PREMATURE DEPOLARIZATION: ICD-10-CM

## 2023-10-31 DIAGNOSIS — N18.4 CHRONIC KIDNEY DISEASE, STAGE 4 (SEVERE): ICD-10-CM

## 2023-10-31 PROCEDURE — 99214 OFFICE O/P EST MOD 30 MIN: CPT | Mod: 25

## 2023-10-31 PROCEDURE — 93000 ELECTROCARDIOGRAM COMPLETE: CPT

## 2023-10-31 PROCEDURE — 93280 PM DEVICE PROGR EVAL DUAL: CPT

## 2023-10-31 NOTE — PATIENT PROFILE ADULT - VISION (WITH CORRECTIVE LENSES IF THE PATIENT USUALLY WEARS THEM):
Normal vision: sees adequately in most situations; can see medication labels, newsprint Cheek Interpolation Flap Text: A decision was made to reconstruct the defect utilizing an interpolation axial flap and a staged reconstruction.  A telfa template was made of the defect.  This telfa template was then used to outline the Cheek Interpolation flap.  The donor area for the pedicle flap was then injected with anesthesia.  The flap was excised through the skin and subcutaneous tissue down to the layer of the underlying musculature.  The interpolation flap was carefully excised within this deep plane to maintain its blood supply.  The edges of the donor site were undermined.   The donor site was closed in a primary fashion.  The pedicle was then rotated into position and sutured.  Once the tube was sutured into place, adequate blood supply was confirmed with blanching and refill.  The pedicle was then wrapped with xeroform gauze and dressed appropriately with a telfa and gauze bandage to ensure continued blood supply and protect the attached pedicle.

## 2023-11-08 ENCOUNTER — RX RENEWAL (OUTPATIENT)
Age: 78
End: 2023-11-08

## 2023-11-22 ENCOUNTER — LABORATORY RESULT (OUTPATIENT)
Age: 78
End: 2023-11-22

## 2023-11-22 ENCOUNTER — APPOINTMENT (OUTPATIENT)
Dept: HEMATOLOGY ONCOLOGY | Facility: CLINIC | Age: 78
End: 2023-11-22
Payer: MEDICARE

## 2023-11-22 ENCOUNTER — RESULT REVIEW (OUTPATIENT)
Age: 78
End: 2023-11-22

## 2023-11-22 VITALS
RESPIRATION RATE: 16 BRPM | SYSTOLIC BLOOD PRESSURE: 105 MMHG | OXYGEN SATURATION: 99 % | BODY MASS INDEX: 25.05 KG/M2 | DIASTOLIC BLOOD PRESSURE: 58 MMHG | TEMPERATURE: 96.6 F | WEIGHT: 195.11 LBS | HEART RATE: 60 BPM

## 2023-11-22 LAB
BASOPHILS # BLD AUTO: 0.05 K/UL — SIGNIFICANT CHANGE UP (ref 0–0.2)
BASOPHILS # BLD AUTO: 0.05 K/UL — SIGNIFICANT CHANGE UP (ref 0–0.2)
BASOPHILS NFR BLD AUTO: 0.7 % — SIGNIFICANT CHANGE UP (ref 0–2)
BASOPHILS NFR BLD AUTO: 0.7 % — SIGNIFICANT CHANGE UP (ref 0–2)
EOSINOPHIL # BLD AUTO: 0.21 K/UL — SIGNIFICANT CHANGE UP (ref 0–0.5)
EOSINOPHIL # BLD AUTO: 0.21 K/UL — SIGNIFICANT CHANGE UP (ref 0–0.5)
EOSINOPHIL NFR BLD AUTO: 2.9 % — SIGNIFICANT CHANGE UP (ref 0–6)
EOSINOPHIL NFR BLD AUTO: 2.9 % — SIGNIFICANT CHANGE UP (ref 0–6)
HCT VFR BLD CALC: 31.9 % — LOW (ref 39–50)
HCT VFR BLD CALC: 31.9 % — LOW (ref 39–50)
HGB BLD-MCNC: 10.2 G/DL — LOW (ref 13–17)
HGB BLD-MCNC: 10.2 G/DL — LOW (ref 13–17)
IMM GRANULOCYTES NFR BLD AUTO: 0.4 % — SIGNIFICANT CHANGE UP (ref 0–0.9)
IMM GRANULOCYTES NFR BLD AUTO: 0.4 % — SIGNIFICANT CHANGE UP (ref 0–0.9)
LYMPHOCYTES # BLD AUTO: 2.25 K/UL — SIGNIFICANT CHANGE UP (ref 1–3.3)
LYMPHOCYTES # BLD AUTO: 2.25 K/UL — SIGNIFICANT CHANGE UP (ref 1–3.3)
LYMPHOCYTES # BLD AUTO: 31.6 % — SIGNIFICANT CHANGE UP (ref 13–44)
LYMPHOCYTES # BLD AUTO: 31.6 % — SIGNIFICANT CHANGE UP (ref 13–44)
MCHC RBC-ENTMCNC: 31.8 PG — SIGNIFICANT CHANGE UP (ref 27–34)
MCHC RBC-ENTMCNC: 31.8 PG — SIGNIFICANT CHANGE UP (ref 27–34)
MCHC RBC-ENTMCNC: 32 G/DL — SIGNIFICANT CHANGE UP (ref 32–36)
MCHC RBC-ENTMCNC: 32 G/DL — SIGNIFICANT CHANGE UP (ref 32–36)
MCV RBC AUTO: 99.4 FL — SIGNIFICANT CHANGE UP (ref 80–100)
MCV RBC AUTO: 99.4 FL — SIGNIFICANT CHANGE UP (ref 80–100)
MONOCYTES # BLD AUTO: 0.31 K/UL — SIGNIFICANT CHANGE UP (ref 0–0.9)
MONOCYTES # BLD AUTO: 0.31 K/UL — SIGNIFICANT CHANGE UP (ref 0–0.9)
MONOCYTES NFR BLD AUTO: 4.3 % — SIGNIFICANT CHANGE UP (ref 2–14)
MONOCYTES NFR BLD AUTO: 4.3 % — SIGNIFICANT CHANGE UP (ref 2–14)
NEUTROPHILS # BLD AUTO: 4.28 K/UL — SIGNIFICANT CHANGE UP (ref 1.8–7.4)
NEUTROPHILS # BLD AUTO: 4.28 K/UL — SIGNIFICANT CHANGE UP (ref 1.8–7.4)
NEUTROPHILS NFR BLD AUTO: 60.1 % — SIGNIFICANT CHANGE UP (ref 43–77)
NEUTROPHILS NFR BLD AUTO: 60.1 % — SIGNIFICANT CHANGE UP (ref 43–77)
NRBC # BLD: 0 /100 WBCS — SIGNIFICANT CHANGE UP (ref 0–0)
NRBC # BLD: 0 /100 WBCS — SIGNIFICANT CHANGE UP (ref 0–0)
PLATELET # BLD AUTO: 113 K/UL — LOW (ref 150–400)
PLATELET # BLD AUTO: 113 K/UL — LOW (ref 150–400)
RBC # BLD: 3.21 M/UL — LOW (ref 4.2–5.8)
RBC # BLD: 3.21 M/UL — LOW (ref 4.2–5.8)
RBC # FLD: 13.2 % — SIGNIFICANT CHANGE UP (ref 10.3–14.5)
RBC # FLD: 13.2 % — SIGNIFICANT CHANGE UP (ref 10.3–14.5)
WBC # BLD: 7.13 K/UL — SIGNIFICANT CHANGE UP (ref 3.8–10.5)
WBC # BLD: 7.13 K/UL — SIGNIFICANT CHANGE UP (ref 3.8–10.5)
WBC # FLD AUTO: 7.13 K/UL — SIGNIFICANT CHANGE UP (ref 3.8–10.5)
WBC # FLD AUTO: 7.13 K/UL — SIGNIFICANT CHANGE UP (ref 3.8–10.5)

## 2023-11-22 PROCEDURE — 99215 OFFICE O/P EST HI 40 MIN: CPT

## 2023-11-22 RX ORDER — APIXABAN 2.5 MG/1
2.5 TABLET, FILM COATED ORAL
Qty: 180 | Refills: 3 | Status: DISCONTINUED | COMMUNITY
Start: 2022-05-10 | End: 2023-11-22

## 2023-11-22 RX ORDER — METHOCARBAMOL 750 MG/1
750 TABLET, FILM COATED ORAL
Qty: 30 | Refills: 1 | Status: DISCONTINUED | COMMUNITY
Start: 2023-08-24 | End: 2023-11-22

## 2023-11-27 LAB
ALBUMIN MFR SERPL ELPH: 62.7 %
ALBUMIN SERPL ELPH-MCNC: 3.7 G/DL
ALBUMIN SERPL-MCNC: 3.3 G/DL
ALBUMIN/GLOB SERPL: 1.7 RATIO
ALP BLD-CCNC: 121 U/L
ALPHA1 GLOB MFR SERPL ELPH: 6.5 %
ALPHA1 GLOB SERPL ELPH-MCNC: 0.3 G/DL
ALPHA2 GLOB MFR SERPL ELPH: 11.3 %
ALPHA2 GLOB SERPL ELPH-MCNC: 0.6 G/DL
ALT SERPL-CCNC: 9 U/L
ANION GAP SERPL CALC-SCNC: 10 MMOL/L
AST SERPL-CCNC: 13 U/L
B-GLOBULIN MFR SERPL ELPH: 8.6 %
B-GLOBULIN SERPL ELPH-MCNC: 0.4 G/DL
BILIRUB SERPL-MCNC: 0.2 MG/DL
BUN SERPL-MCNC: 54 MG/DL
CALCIUM SERPL-MCNC: 9 MG/DL
CHLORIDE SERPL-SCNC: 114 MMOL/L
CO2 SERPL-SCNC: 22 MMOL/L
CREAT SERPL-MCNC: 3.52 MG/DL
DEPRECATED KAPPA LC FREE/LAMBDA SER: 0.01 RATIO
EGFR: 17 ML/MIN/1.73M2
GAMMA GLOB FLD ELPH-MCNC: 0.6 G/DL
GAMMA GLOB MFR SERPL ELPH: 10.9 %
GLUCOSE SERPL-MCNC: 105 MG/DL
IGA SER QL IEP: 23 MG/DL
IGG SER QL IEP: 380 MG/DL
IGM SER QL IEP: 333 MG/DL
INTERPRETATION SERPL IEP-IMP: NORMAL
KAPPA LC CSF-MCNC: 116.31 MG/DL
KAPPA LC SERPL-MCNC: 1.04 MG/DL
LDH SERPL-CCNC: 111 U/L
M PROTEIN MFR SERPL ELPH: 6.4 %
MONOCLON BAND OBS SERPL: 0.3 G/DL
POTASSIUM SERPL-SCNC: 4.7 MMOL/L
PROT SERPL-MCNC: 5.2 G/DL
SODIUM SERPL-SCNC: 145 MMOL/L

## 2023-11-28 ENCOUNTER — LABORATORY RESULT (OUTPATIENT)
Age: 78
End: 2023-11-28

## 2023-11-28 ENCOUNTER — APPOINTMENT (OUTPATIENT)
Dept: GASTROENTEROLOGY | Facility: CLINIC | Age: 78
End: 2023-11-28
Payer: MEDICARE

## 2023-11-28 PROCEDURE — 43239 EGD BIOPSY SINGLE/MULTIPLE: CPT

## 2023-11-28 PROCEDURE — 45380 COLONOSCOPY AND BIOPSY: CPT

## 2023-12-12 ENCOUNTER — OUTPATIENT (OUTPATIENT)
Dept: OUTPATIENT SERVICES | Facility: HOSPITAL | Age: 78
LOS: 1 days | Discharge: HOME | End: 2023-12-12

## 2023-12-12 DIAGNOSIS — Z98.89 OTHER SPECIFIED POSTPROCEDURAL STATES: Chronic | ICD-10-CM

## 2023-12-12 DIAGNOSIS — Z96.0 PRESENCE OF UROGENITAL IMPLANTS: Chronic | ICD-10-CM

## 2023-12-12 DIAGNOSIS — C91.11 CHRONIC LYMPHOCYTIC LEUKEMIA OF B-CELL TYPE IN REMISSION: ICD-10-CM

## 2023-12-12 DIAGNOSIS — Z98.41 CATARACT EXTRACTION STATUS, RIGHT EYE: Chronic | ICD-10-CM

## 2023-12-12 DIAGNOSIS — Z95.0 PRESENCE OF CARDIAC PACEMAKER: Chronic | ICD-10-CM

## 2023-12-12 DIAGNOSIS — Z98.890 OTHER SPECIFIED POSTPROCEDURAL STATES: Chronic | ICD-10-CM

## 2023-12-20 ENCOUNTER — RESULT REVIEW (OUTPATIENT)
Age: 78
End: 2023-12-20

## 2023-12-20 ENCOUNTER — APPOINTMENT (OUTPATIENT)
Dept: HEMATOLOGY ONCOLOGY | Facility: CLINIC | Age: 78
End: 2023-12-20
Payer: MEDICARE

## 2023-12-20 ENCOUNTER — LABORATORY RESULT (OUTPATIENT)
Age: 78
End: 2023-12-20

## 2023-12-20 VITALS
RESPIRATION RATE: 16 BRPM | BODY MASS INDEX: 25.94 KG/M2 | TEMPERATURE: 97.5 F | WEIGHT: 201.99 LBS | HEART RATE: 75 BPM | OXYGEN SATURATION: 98 % | SYSTOLIC BLOOD PRESSURE: 120 MMHG | DIASTOLIC BLOOD PRESSURE: 71 MMHG

## 2023-12-20 DIAGNOSIS — D64.9 ANEMIA, UNSPECIFIED: ICD-10-CM

## 2023-12-20 LAB
BASOPHILS # BLD AUTO: 0.05 K/UL — SIGNIFICANT CHANGE UP (ref 0–0.2)
BASOPHILS # BLD AUTO: 0.05 K/UL — SIGNIFICANT CHANGE UP (ref 0–0.2)
BASOPHILS NFR BLD AUTO: 1.1 % — SIGNIFICANT CHANGE UP (ref 0–2)
BASOPHILS NFR BLD AUTO: 1.1 % — SIGNIFICANT CHANGE UP (ref 0–2)
EOSINOPHIL # BLD AUTO: 0.18 K/UL — SIGNIFICANT CHANGE UP (ref 0–0.5)
EOSINOPHIL # BLD AUTO: 0.18 K/UL — SIGNIFICANT CHANGE UP (ref 0–0.5)
EOSINOPHIL NFR BLD AUTO: 3.8 % — SIGNIFICANT CHANGE UP (ref 0–6)
EOSINOPHIL NFR BLD AUTO: 3.8 % — SIGNIFICANT CHANGE UP (ref 0–6)
HCT VFR BLD CALC: 33.8 % — LOW (ref 39–50)
HCT VFR BLD CALC: 33.8 % — LOW (ref 39–50)
HGB BLD-MCNC: 10.7 G/DL — LOW (ref 13–17)
HGB BLD-MCNC: 10.7 G/DL — LOW (ref 13–17)
IMM GRANULOCYTES NFR BLD AUTO: 0.4 % — SIGNIFICANT CHANGE UP (ref 0–0.9)
IMM GRANULOCYTES NFR BLD AUTO: 0.4 % — SIGNIFICANT CHANGE UP (ref 0–0.9)
LYMPHOCYTES # BLD AUTO: 1.6 K/UL — SIGNIFICANT CHANGE UP (ref 1–3.3)
LYMPHOCYTES # BLD AUTO: 1.6 K/UL — SIGNIFICANT CHANGE UP (ref 1–3.3)
LYMPHOCYTES # BLD AUTO: 33.6 % — SIGNIFICANT CHANGE UP (ref 13–44)
LYMPHOCYTES # BLD AUTO: 33.6 % — SIGNIFICANT CHANGE UP (ref 13–44)
MCHC RBC-ENTMCNC: 31.7 G/DL — LOW (ref 32–36)
MCHC RBC-ENTMCNC: 31.7 G/DL — LOW (ref 32–36)
MCHC RBC-ENTMCNC: 32.3 PG — SIGNIFICANT CHANGE UP (ref 27–34)
MCHC RBC-ENTMCNC: 32.3 PG — SIGNIFICANT CHANGE UP (ref 27–34)
MCV RBC AUTO: 102.1 FL — HIGH (ref 80–100)
MCV RBC AUTO: 102.1 FL — HIGH (ref 80–100)
MONOCYTES # BLD AUTO: 0.45 K/UL — SIGNIFICANT CHANGE UP (ref 0–0.9)
MONOCYTES # BLD AUTO: 0.45 K/UL — SIGNIFICANT CHANGE UP (ref 0–0.9)
MONOCYTES NFR BLD AUTO: 9.5 % — SIGNIFICANT CHANGE UP (ref 2–14)
MONOCYTES NFR BLD AUTO: 9.5 % — SIGNIFICANT CHANGE UP (ref 2–14)
NEUTROPHILS # BLD AUTO: 2.46 K/UL — SIGNIFICANT CHANGE UP (ref 1.8–7.4)
NEUTROPHILS # BLD AUTO: 2.46 K/UL — SIGNIFICANT CHANGE UP (ref 1.8–7.4)
NEUTROPHILS NFR BLD AUTO: 51.6 % — SIGNIFICANT CHANGE UP (ref 43–77)
NEUTROPHILS NFR BLD AUTO: 51.6 % — SIGNIFICANT CHANGE UP (ref 43–77)
NRBC # BLD: 0 /100 WBCS — SIGNIFICANT CHANGE UP (ref 0–0)
NRBC # BLD: 0 /100 WBCS — SIGNIFICANT CHANGE UP (ref 0–0)
PLATELET # BLD AUTO: 92 K/UL — LOW (ref 150–400)
PLATELET # BLD AUTO: 92 K/UL — LOW (ref 150–400)
RBC # BLD: 3.31 M/UL — LOW (ref 4.2–5.8)
RBC # BLD: 3.31 M/UL — LOW (ref 4.2–5.8)
RBC # FLD: 13.5 % — SIGNIFICANT CHANGE UP (ref 10.3–14.5)
RBC # FLD: 13.5 % — SIGNIFICANT CHANGE UP (ref 10.3–14.5)
WBC # BLD: 4.76 K/UL — SIGNIFICANT CHANGE UP (ref 3.8–10.5)
WBC # BLD: 4.76 K/UL — SIGNIFICANT CHANGE UP (ref 3.8–10.5)
WBC # FLD AUTO: 4.76 K/UL — SIGNIFICANT CHANGE UP (ref 3.8–10.5)
WBC # FLD AUTO: 4.76 K/UL — SIGNIFICANT CHANGE UP (ref 3.8–10.5)

## 2023-12-20 PROCEDURE — 99215 OFFICE O/P EST HI 40 MIN: CPT

## 2023-12-20 NOTE — PHYSICAL EXAM
[Ambulatory and capable of all self care but unable to carry out any work activities] : Status 2- Ambulatory and capable of all self care but unable to carry out any work activities. Up and about more than 50% of waking hours [Normal] : affect appropriate [de-identified] :  Pacemaker left anterior chest wall. RRR. S1S2 normal. Gr 2/6 TESS RUSB to LLSB. No gallops. [de-identified] : 5 x 5 cm lipoma left lower back. 1 x 1 cm sebaceous cyst right scapula.

## 2023-12-20 NOTE — HISTORY OF PRESENT ILLNESS
[Disease:__________________________] : Disease: [unfilled] [Rosales Stage: ___] : Rosales Stage: [unfilled] [de-identified] : PAF 10/2015 Waldenstrom's macroglobulinemia ; + dim lambda, CD 19, 23, FMC-7; negative CD 5, 10, 20 Renal biopsy: lymphocytic infiltrate of renal cortex 10/17 IgGk and lambda BGUS 7/2018: Nephrotic syndrome with lambda light chains 11/2023 Amyloid - Sigmoid colon polyp  [de-identified] : +lambda, CD5, CD 20; CD 38 --; FISH del 13q14.3\par  SPEP gamma paraprotein M 0.4\par  SPIEP IgM lambda; 10/17 IgGk, lambda [FreeTextEntry1] : 10/2015 - 1/2017  Rituxan/Velcade/Decadron; 8/18 - 1/19 Ixazomib/Decadron/Rituxan x 6 cycles to maintenance until 3/23;6/2023 - Zanubrutinib [de-identified] : He had a panendoscopy on November 28 which showed a hiatal hernia, nonerosive gastritis and two colon polyps. Polyp in sigmoid colon showed amyloid. Issues with balancing persist, but he has not fallen down. Diarrhea has improved with Imodium. Low back pain and aches in hip joints persists. Reports bilateral foot swelling,  Left> Right.  No atrial fibrillation, fevers, SOB, chest pain, night sweat, abdominal pain, headaches, visual problems, lightheadedness, swollen glands, bleeding, bruising, BRBPR, melena, hematuria. He has gained 7 lbs since last visit. Declines COVID booster.

## 2023-12-20 NOTE — CONSULT LETTER
[Dear  ___] : Dear  [unfilled], [Courtesy Letter:] : I had the pleasure of seeing your patient, [unfilled], in my office today. [Please see my note below.] : Please see my note below. [Sincerely,] : Sincerely, [DrIbeth  ___] : Dr. DENG [DrIbeth ___] : Dr. DENG [___] : [unfilled] [FreeTextEntry2] : Dr. Simpson [FreeTextEntry3] : Cesar Simons M.D., FACP\par  Professor of Medicine\par  [employer]\par  Associate Chief, Division of Hematology\par  [facility]\par  [facility]\par  [address] [place] [address]\[place] [address]\par  [phone]

## 2023-12-20 NOTE — ADDENDUM
[FreeTextEntry1] : I, Juanito Jiménez, acted solely as a scribe for Dr. Cesar Simons on 12/20/2023. All medical entries made by the Scribe were at my, Dr. Cesar Simons's, direction and personally dictated by me on 12/20/2023. I have reviewed the chart and agree that the record accurately reflects my personal performance of the history, physical exam, assessment and plan. I have also personally directed, reviewed, and agreed with the chart.

## 2023-12-20 NOTE — END OF VISIT
Ms. Ju Yu is presenting to follow up     CC:  Abnormal Lab Results       HPI:      Reports in August had one day of fever  And pelvic pain   Leading to  ER visit   At that time had CT scan 08/2020  1. Indeterminate endometrial mass. Gynecologic oncologic consultation is  recommended for consideration of hysteroscopy. 2. Endometrial cavity distended by heterogeneous fluid (probably blood). 3. Right renal perfusion defects may represent ischemia or pyelonephritis. Correlation with symptoms, fever, leukocytosis, and urinalysis recommended. 4. Extensive right renal atrophy is new from 2008. 5. 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 This has  progressed in the quadriceps from 2008. ( This is a birth defect congenital  )        Patient then developed pronounced gyn bleeding - had D and C   Now resolved  Gyn is Dr Neal Hodgson will request records  Had anemia - needs repeat       Saw renal MD Dr Mayra Garcia -   1. Cystic lesions in the right kidney. Cortical thinning in the right kidney.   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 family history includes Coronary Artery Disease in her father; Diabetes in her mother; Hypertension in her mother.     Social History     Socioeconomic History    Marital status: SINGLE     Spouse name: Not on file    Number of children: Not on file    Years of education: Not on file    Highest education level: Not on file   Occupational History    Not on file   Social Needs    Financial resource strain: Not on file    Food insecurity     Worry: Not on file     Inability: Not on file    Transportation needs     Medical: Not on file     Non-medical: Not on file   Tobacco Use    Smoking status: Light Tobacco Smoker     Types: Cigars    Smokeless tobacco: Never Used    Tobacco comment: Rare   Substance and Sexual Activity    Alcohol use: Yes     Frequency: 4 or more times a week     Drinks per session: 1 or 2    Drug use: Never    Sexual activity: Not Currently   Lifestyle    Physical activity     Days per week: Not on file     Minutes per session: Not on file    Stress: Not on file   Relationships    Social connections     Talks on phone: Not on file     Gets together: Not on file     Attends [employer] service: Not on file     Active member of club or organization: Not on file     Attends meetings of clubs or organizations: Not on file     Relationship status: Not on file    Intimate partner violence     Fear of current or ex partner: Not on file     Emotionally abused: Not on file     Physically abused: Not on file     Forced sexual activity: Not on file   Other Topics Concern    Not on file   Social History Narrative    Not on file       Visit Vitals  /77 (BP 1 Location: Right upper arm, BP Patient Position: Sitting, BP Cuff Size: Small adult)   Pulse 77   Temp 96.8 °F (36 °C) (Axillary)   Resp 18   Ht 5' 2\" (1.575 m) Wt 141 lb (64 kg)   SpO2 99%   BMI 25.79 kg/m²     General:  Well appearing female no acute distress  HEENT:   PERRL,normal conjunctiva. External ear and canals normal, TMs normal.  Hearing normal to voice. Nose without edema or discharge, normal septum. Lips, teeth, gums normal.  Oropharynx: no erythema, no exudates, no lesions, normal tongue. Neck:  Supple. Thyroid normal size, nontender, without nodules. No carotid bruit. No masses or lymphadenopathy  Respiratory: no respiratory distress,  no wheezing, no rhonchi, no rales. No chest wall tenderness. Cardiovascular:  RRR, normal S1S2, no murmur. Gastrointestinal: normal bowel sounds, soft, nontender, without masses. No hepatosplenomegaly. Extremities +2 pulses, no edema, normal sensation   Musculoskeletal:  Normal gait. Normal digits and nails. Normal strength and tone, no atrophy, and no abnormal movement. Skin:  No rash, no lesions, no ulcers. Skin warm, normal turgor, without induration or nodules. Neuro:  A and OX4, fluent speech, cranial nerves normal 2-12.   Muscle atrophy on the right side   Psych:  Normal affect      Lab Results   Component Value Date/Time    WBC 10.9 (H) 09/30/2020 12:57 PM    HGB 9.9 (L) 09/30/2020 12:57 PM    HCT 31.7 (L) 09/30/2020 12:57 PM    PLATELET 598 (H) 78/11/5194 12:57 PM    MCV 85 09/30/2020 12:57 PM     Lab Results   Component Value Date/Time    Sodium 139 09/30/2020 12:57 PM    Potassium 4.5 09/30/2020 12:57 PM    Chloride 105 09/30/2020 12:57 PM    CO2 20 09/30/2020 12:57 PM    Anion gap NEG 1 08/31/2020 12:02 AM    Glucose 109 (H) 09/30/2020 12:57 PM    BUN 20 09/30/2020 12:57 PM    Creatinine 1.18 (H) 09/30/2020 12:57 PM    BUN/Creatinine ratio 17 09/30/2020 12:57 PM    GFR est AA 65 09/30/2020 12:57 PM    GFR est non-AA 57 (L) 09/30/2020 12:57 PM    Calcium 10.4 (H) 09/30/2020 12:57 PM     Lab Results   Component Value Date/Time    Cholesterol, total 199 09/30/2020 12:57 PM    HDL Cholesterol 61 09/30/2020 12:57 PM    LDL, calculated 107 (H) 09/30/2020 12:57 PM    LDL, calculated 139 (H) 03/21/2019 09:09 AM    VLDL, calculated 31 09/30/2020 12:57 PM    VLDL, calculated 29 03/21/2019 09:09 AM    Triglyceride 183 (H) 09/30/2020 12:57 PM     No results found for: TSH, TSH2, TSH3, TSHP, TSHEXT, TSHEXT  Lab Results   Component Value Date/Time    Hemoglobin A1c 5.4 09/30/2020 12:57 PM     Lab Results   Component Value Date/Time    VITAMIN D, 25-HYDROXY 33.8 07/11/2019 03:03 PM                   Assessment and Plan:     1. Stage 3a chronic kidney disease  - with abnormal imaging on CT scan of R kidney patient and cystic lesion on R kidney, patient saw both urologsit and renal specialist requested records  - BP at goal today  - avoid nephrotoxins  - CBC WITH AUTOMATED DIFF  - METABOLIC PANEL, COMPREHENSIVE    2. Essential hypertension  Well controlled on lisinopril   - CBC WITH AUTOMATED DIFF  - METABOLIC PANEL, COMPREHENSIVE    3. 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 Vitamin D deficiency  Stopped taking vitamin D   - VITAMIN D, 25 HYDROXY    6. Endometrial mass: removed - requesting records/ vaginal bleeding resolved    Follow-up and Dispositions    · Return in about 3 months (around 5/5/2021) for hypertension .    Follow-up and Disposition History           Kip Dexter MD [Time Spent: ___ minutes] : I have spent [unfilled] minutes of time on the encounter.

## 2023-12-20 NOTE — RESULTS/DATA
[FreeTextEntry1] : WBC 4760 Hgb 10.7, Hct 33.8 .1, Platelets 92,000 Diff normal   11/28/23 Biopsy Report: Duodenum, biopsy: - Duodenal mucosa with very minute focus eosinophilic material, see note. - Negative for histological features of celiac disease, parasites or granulomata. Note: The very minute focus of eosinophilic material is lost on levels with Congo Red stains. Amyloid cannot be completely excluded.  Gastric antrum, biopsy: -gastric antral mucosa with mild chronic inflammation. -no morphological evidence of Heliobacter microorganisms. -negative for intestinal metaplasia.   Gastric cardia, biopsy: - Gastric cardiac-fundic mucosa with mild chronic inflammation. - No morphological evidence of Helicobacter microorganisms. - Negative for intestinal metaplasia.  Ascending colon, polyp, biopsy: -negative for amyloid deposits (Congo red stain)  -colonic mucosa with lymphoid aggregate   Sigmoid colon, polyp, biopsy: -amyloid deposits identified -tubular adenoma. -submucosal eosinophilic hyaline material deposition.  -deeper levels (x5) examined   11/22/23 CMP Chloride 114 Glu 105 BUN 54 creatinine 3.52 total protein 5.2 ALT 9 ALK phos 121 eGFR 17  SPEP 0.3, Co-migrating gamma-migrating paraprotein identified.  SPIEP Co-migrating IgM lambda band and weak lambda light chains identified (previously, no IgD or IgE bands identified) IgG 380 IgA 23 IgM 333  SFLC [place] 1.04 Lambda 116.31 ratio 0.01

## 2023-12-20 NOTE — ASSESSMENT
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 He had progressive anemia with rising creatinine and relapse of his Waldenstrom's. He was not a candidate for Ibrutinib due to anticoagulation with Warfarin. He achieved a partial remission with marked improvement in his hemoglobin, creatinine and IgM with IRd, but had not tolerated Rituxan. Re-evaluation now due to anemia/thrombocytopenia reveals relapse of Waldenstrom's with iron deficiency and minimal CLL. There had been no hematologic response to IV Fe, so began alternative therapy for Waldenstrom's utilizing Zanubrutinib. Appears to be responding. Now found to have amyloid in a sigmoid colon biopsy.  Plan: Zanubrutinib 160 mg twice dailly Mepron 1500 mg daily Pathology slides to [facility] for amyloid typing - ?AL CMP, LDH, SPEP, Quant Ig, SFLC If AL amyloid : NTproBNP, troponin T, EKG, echocardiogram with strain, 24 hour urine for TP and BJP, Congo red stain on last marrow biopsy, ? abdominal fat pad biopsy Acyclovir Omeprazole COVID booster- he declines. RTC 6 weeks

## 2023-12-20 NOTE — REVIEW OF SYSTEMS
[Fatigue] : fatigue [Diarrhea: Grade 1 - Increase of <4 stools per day over baseline; mild increase in ostomy output compared to baseline] : Diarrhea: Grade 1 - Increase of <4 stools per day over baseline; mild increase in ostomy output compared to baseline [Joint Pain] : joint pain [Negative] : Allergic/Immunologic [FreeTextEntry9] : back pain, hip pain [de-identified] : Neuropathy

## 2023-12-21 LAB
ALBUMIN SERPL ELPH-MCNC: 3.6 G/DL
ALP BLD-CCNC: 133 U/L
ALT SERPL-CCNC: 15 U/L
ANION GAP SERPL CALC-SCNC: 11 MMOL/L
AST SERPL-CCNC: 15 U/L
BILIRUB SERPL-MCNC: 0.4 MG/DL
BUN SERPL-MCNC: 49 MG/DL
CALCIUM SERPL-MCNC: 8.9 MG/DL
CHLORIDE SERPL-SCNC: 111 MMOL/L
CO2 SERPL-SCNC: 22 MMOL/L
CREAT SERPL-MCNC: 3.74 MG/DL
DEPRECATED KAPPA LC FREE/LAMBDA SER: 0.01 RATIO
EGFR: 16 ML/MIN/1.73M2
GLUCOSE SERPL-MCNC: 148 MG/DL
IGA SER QL IEP: 15 MG/DL
IGG SER QL IEP: 370 MG/DL
IGM SER QL IEP: 569 MG/DL
KAPPA LC CSF-MCNC: 131.18 MG/DL
KAPPA LC SERPL-MCNC: 1.1 MG/DL
LDH SERPL-CCNC: 122 U/L
POTASSIUM SERPL-SCNC: 4.9 MMOL/L
PROT SERPL-MCNC: 5.6 G/DL
SODIUM SERPL-SCNC: 144 MMOL/L

## 2023-12-25 LAB
ALBUMIN MFR SERPL ELPH: 58.2 %
ALBUMIN SERPL-MCNC: 3.3 G/DL
ALBUMIN/GLOB SERPL: 1.4 RATIO
ALPHA1 GLOB MFR SERPL ELPH: 7 %
ALPHA1 GLOB SERPL ELPH-MCNC: 0.4 G/DL
ALPHA2 GLOB MFR SERPL ELPH: 12.9 %
ALPHA2 GLOB SERPL ELPH-MCNC: 0.7 G/DL
B-GLOBULIN MFR SERPL ELPH: 9.3 %
B-GLOBULIN SERPL ELPH-MCNC: 0.5 G/DL
GAMMA GLOB FLD ELPH-MCNC: 0.7 G/DL
GAMMA GLOB MFR SERPL ELPH: 12.6 %
INTERPRETATION SERPL IEP-IMP: NORMAL
M PROTEIN MFR SERPL ELPH: 7.9 %
MONOCLON BAND OBS SERPL: 0.4 G/DL
PROT SERPL-MCNC: 5.6 G/DL
PROT SERPL-MCNC: 5.6 G/DL

## 2024-01-30 ENCOUNTER — NON-APPOINTMENT (OUTPATIENT)
Age: 79
End: 2024-01-30

## 2024-01-30 ENCOUNTER — APPOINTMENT (OUTPATIENT)
Dept: ELECTROPHYSIOLOGY | Facility: CLINIC | Age: 79
End: 2024-01-30
Payer: MEDICARE

## 2024-01-30 PROCEDURE — 93294 REM INTERROG EVL PM/LDLS PM: CPT

## 2024-01-30 PROCEDURE — 93296 REM INTERROG EVL PM/IDS: CPT

## 2024-02-02 ENCOUNTER — NON-APPOINTMENT (OUTPATIENT)
Age: 79
End: 2024-02-02

## 2024-02-02 ENCOUNTER — APPOINTMENT (OUTPATIENT)
Dept: HEMATOLOGY ONCOLOGY | Facility: CLINIC | Age: 79
End: 2024-02-02
Payer: MEDICARE

## 2024-02-02 ENCOUNTER — RESULT CHARGE (OUTPATIENT)
Age: 79
End: 2024-02-02

## 2024-02-02 ENCOUNTER — APPOINTMENT (OUTPATIENT)
Dept: HEMATOLOGY ONCOLOGY | Facility: CLINIC | Age: 79
End: 2024-02-02

## 2024-02-02 ENCOUNTER — LABORATORY RESULT (OUTPATIENT)
Age: 79
End: 2024-02-02

## 2024-02-02 ENCOUNTER — RESULT REVIEW (OUTPATIENT)
Age: 79
End: 2024-02-02

## 2024-02-02 VITALS
HEART RATE: 62 BPM | SYSTOLIC BLOOD PRESSURE: 115 MMHG | TEMPERATURE: 96.8 F | OXYGEN SATURATION: 98 % | RESPIRATION RATE: 16 BRPM | BODY MASS INDEX: 26.13 KG/M2 | DIASTOLIC BLOOD PRESSURE: 71 MMHG | WEIGHT: 203.49 LBS

## 2024-02-02 DIAGNOSIS — N04.9 NEPHROTIC SYNDROME WITH UNSPECIFIED MORPHOLOGIC CHANGES: ICD-10-CM

## 2024-02-02 LAB
BASOPHILS # BLD AUTO: 0.06 K/UL — SIGNIFICANT CHANGE UP (ref 0–0.2)
BASOPHILS NFR BLD AUTO: 0.6 % — SIGNIFICANT CHANGE UP (ref 0–2)
EOSINOPHIL # BLD AUTO: 0.19 K/UL — SIGNIFICANT CHANGE UP (ref 0–0.5)
EOSINOPHIL NFR BLD AUTO: 2 % — SIGNIFICANT CHANGE UP (ref 0–6)
HCT VFR BLD CALC: 38.9 % — LOW (ref 39–50)
HGB BLD-MCNC: 12.4 G/DL — LOW (ref 13–17)
IMM GRANULOCYTES NFR BLD AUTO: 0.9 % — SIGNIFICANT CHANGE UP (ref 0–0.9)
LYMPHOCYTES # BLD AUTO: 3.23 K/UL — SIGNIFICANT CHANGE UP (ref 1–3.3)
LYMPHOCYTES # BLD AUTO: 34 % — SIGNIFICANT CHANGE UP (ref 13–44)
MCHC RBC-ENTMCNC: 31.9 G/DL — LOW (ref 32–36)
MCHC RBC-ENTMCNC: 33.2 PG — SIGNIFICANT CHANGE UP (ref 27–34)
MCV RBC AUTO: 104 FL — HIGH (ref 80–100)
MONOCYTES # BLD AUTO: 0.49 K/UL — SIGNIFICANT CHANGE UP (ref 0–0.9)
MONOCYTES NFR BLD AUTO: 5.2 % — SIGNIFICANT CHANGE UP (ref 2–14)
NEUTROPHILS # BLD AUTO: 5.44 K/UL — SIGNIFICANT CHANGE UP (ref 1.8–7.4)
NEUTROPHILS NFR BLD AUTO: 57.3 % — SIGNIFICANT CHANGE UP (ref 43–77)
NRBC # BLD: 0 /100 WBCS — SIGNIFICANT CHANGE UP (ref 0–0)
PLATELET # BLD AUTO: 136 K/UL — LOW (ref 150–400)
RBC # BLD: 3.74 M/UL — LOW (ref 4.2–5.8)
RBC # FLD: 14.4 % — SIGNIFICANT CHANGE UP (ref 10.3–14.5)
WBC # BLD: 9.5 K/UL — SIGNIFICANT CHANGE UP (ref 3.8–10.5)
WBC # FLD AUTO: 9.5 K/UL — SIGNIFICANT CHANGE UP (ref 3.8–10.5)

## 2024-02-02 PROCEDURE — 99214 OFFICE O/P EST MOD 30 MIN: CPT

## 2024-02-02 PROCEDURE — 93000 ELECTROCARDIOGRAM COMPLETE: CPT

## 2024-02-02 PROCEDURE — 93010 ELECTROCARDIOGRAM REPORT: CPT

## 2024-02-02 RX ORDER — ACYCLOVIR 400 MG/1
400 TABLET ORAL
Qty: 180 | Refills: 3 | Status: ACTIVE | COMMUNITY
Start: 2018-08-08 | End: 1900-01-01

## 2024-02-02 NOTE — HISTORY OF PRESENT ILLNESS
[Disease:__________________________] : Disease: [unfilled] [Rosales Stage: ___] : Rosales Stage: [unfilled] [de-identified] : PAF 10/2015 Waldenstrom's macroglobulinemia ; + dim lambda, CD 19, 23, FMC-7; negative CD 5, 10, 20 Renal biopsy: lymphocytic infiltrate of renal cortex 10/17 IgGk and lambda BGUS 7/2018: Nephrotic syndrome with lambda light chains 11/2023 Amyloid - Sigmoid colon polyp  [de-identified] : +lambda, CD5, CD 20; CD 38 --; FISH del 13q14.3\par  SPEP gamma paraprotein M 0.4\par  SPIEP IgM lambda; 10/17 IgGk, lambda [FreeTextEntry1] : 10/2015 - 1/2017  Rituxan/Velcade/Decadron; 8/18 - 1/19 Ixazomib/Decadron/Rituxan x 6 cycles to maintenance until 3/23;6/2023 - Zanubrutinib [de-identified] : He feels tired as he has to force himself to perform. Reports his right knuckle is tender and has been irritating him. Reports that it is uncomfortable to sit down as the skin around his coccyx is irritated. Also reports an occasional cough productive of clear phlegm. Issues with balancing persist, but he has not fallen down. Watery diarrhea persists. Has seen Gastroenterologist for this. Low back pain and aches in hips persist. Bilateral foot swelling is stable. No episodes of atrial fibrillation. He notes no fevers, SOB, chest pain, night sweats, abdominal pain, headaches, visual problems, lightheadedness, swollen glands, bleeding, bruising, BRBPR, melena, hematuria. Weight is stable. Had RSV vaccine and flu shot. Declines COVID booster.

## 2024-02-02 NOTE — RESULTS/DATA
[FreeTextEntry1] : WBC 9500 Hgb 12.4 Hct 38.9 .0 Platelets 136,000 Diff normal  12/20/23 CMP Chloride 111 Glu 148 BUN 49 creatinine 3.74 Total protein 5.6  eGFR 16  SPEP M-spike 0.4, Co-migrating gamma-migrating paraproteins identified.  SPIEP Co-Migrating IgM Lambda Band and Weak Lambda Light Chains Identified (Previously, No IgD or IgE Bands Identified) IgA 15 IgM 569 IgG 370 SFLC [place] 1.10 Lambda 131.18 Ratio 0.01

## 2024-02-02 NOTE — PHYSICAL EXAM
[Ambulatory and capable of all self care but unable to carry out any work activities] : Status 2- Ambulatory and capable of all self care but unable to carry out any work activities. Up and about more than 50% of waking hours [Normal] : affect appropriate [de-identified] :  Pacemaker left anterior chest wall. RRR. S1S2 normal. Gr 2/6 TESS RUSB to LLSB. No gallops. [de-identified] : 5 x 5 cm lipoma left lower back. 1 x 1 cm sebaceous cyst right scapula.

## 2024-02-02 NOTE — ADDENDUM
[FreeTextEntry1] : I, Juanito Jiménez, acted solely as a scribe for Dr. Cesar Simons on 2/02/2024. All medical entries made by the Scribe were at my, Dr. Cesar Simons's, direction and personally dictated by me on 2/02/2024. I have reviewed the chart and agree that the record accurately reflects my personal performance of the history, physical exam, assessment and plan. I have also personally directed, reviewed, and agreed with the chart.

## 2024-02-02 NOTE — ASSESSMENT
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 He had progressive anemia with rising creatinine and relapse of his Waldenstrom's. He was not a candidate for Ibrutinib due to anticoagulation with Warfarin. He achieved a partial remission with marked improvement in his hemoglobin, creatinine and IgM with IRd, but had not tolerated Rituxan. Re-evaluation now due to anemia/thrombocytopenia reveals relapse of Waldenstrom's with iron deficiency and minimal CLL. There had been no hematologic response to IV Fe, so began alternative therapy for Waldenstrom's utilizing Zanubrutinib. Appears to be responding. Now found to have amyloid in a sigmoid colon biopsy.  Plan: Zanubrutinib 160 mg twice dailly Mepron 1500 mg daily Pathology slides to [facility] for amyloid typing - amyloid proteins found, but unable to type CMP, LDH, NT proBNP, Troponin I 24hr Urine Total protein, Bence Kunz protein  4-10 ml lavendar and 1-7 ml red to CLL Tissue Bank EKG today  Consider echocardiogram with strain Congo red stain on last marrow biopsy ? abdominal fat pad biopsy Acyclovir Omeprazole COVID booster- he declines. RTC 2 months.

## 2024-02-02 NOTE — CONSULT LETTER
[Dear  ___] : Dear  [unfilled], [Courtesy Letter:] : I had the pleasure of seeing your patient, [unfilled], in my office today. [Please see my note below.] : Please see my note below. [Sincerely,] : Sincerely, [DrIbeth  ___] : Dr. DENG [DrIbeth ___] : Dr. DENG [___] : [unfilled] [FreeTextEntry2] : Dr. Simpson [FreeTextEntry3] : Cesar Simons M.D., FACP\par  Professor of Medicine\par  [employer]\par  Associate Chief, Division of Hematology\par  [facility]\par  [facility]\par  [address] [place] [address]\[place] [address]\par  [phone]

## 2024-02-02 NOTE — REVIEW OF SYSTEMS
[Fatigue] : fatigue [Diarrhea: Grade 1 - Increase of <4 stools per day over baseline; mild increase in ostomy output compared to baseline] : Diarrhea: Grade 1 - Increase of <4 stools per day over baseline; mild increase in ostomy output compared to baseline [Joint Pain] : joint pain [Negative] : Allergic/Immunologic [FreeTextEntry9] : back pain, hip pain [de-identified] : Neuropathy

## 2024-02-05 LAB
ALBUMIN SERPL ELPH-MCNC: 3.9 G/DL
ALP BLD-CCNC: 121 U/L
ALT SERPL-CCNC: 21 U/L
ANION GAP SERPL CALC-SCNC: 11 MMOL/L
AST SERPL-CCNC: 14 U/L
BILIRUB SERPL-MCNC: 0.2 MG/DL
BUN SERPL-MCNC: 67 MG/DL
CALCIUM SERPL-MCNC: 9.1 MG/DL
CHLORIDE SERPL-SCNC: 112 MMOL/L
CO2 SERPL-SCNC: 20 MMOL/L
CREAT SERPL-MCNC: 3.46 MG/DL
EGFR: 17 ML/MIN/1.73M2
GLUCOSE SERPL-MCNC: 98 MG/DL
LDH SERPL-CCNC: 114 U/L
NT-PROBNP SERPL-MCNC: 574 PG/ML
POTASSIUM SERPL-SCNC: 5.4 MMOL/L
PROT SERPL-MCNC: 5.6 G/DL
SODIUM SERPL-SCNC: 143 MMOL/L
TROPONIN I SERPL-MCNC: <0.01 NG/ML

## 2024-02-07 LAB
CREAT 24H UR-MCNC: 1.6 G/24 H
CREAT ?TM UR-MCNC: 95 MG/DL
PROT 24H UR-MRATE: 37 MG/DL
PROT ?TM UR-MCNC: 24 HR
PROT UR-MCNC: 638 MG/24 H
SPECIMEN VOL 24H UR: 1725 ML

## 2024-02-09 LAB — KAPPA LC 24H UR QL: PRESENT

## 2024-02-15 ENCOUNTER — APPOINTMENT (OUTPATIENT)
Dept: UROLOGY | Facility: CLINIC | Age: 79
End: 2024-02-15

## 2024-02-16 ENCOUNTER — APPOINTMENT (OUTPATIENT)
Dept: HEMATOLOGY ONCOLOGY | Facility: CLINIC | Age: 79
End: 2024-02-16

## 2024-02-23 RX ORDER — ATOVAQUONE 750 MG/5ML
750 SUSPENSION ORAL
Qty: 2 | Refills: 5 | Status: ACTIVE | COMMUNITY
Start: 2023-06-02 | End: 1900-01-01

## 2024-02-23 RX ORDER — ZANUBRUTINIB 80 MG/1
80 CAPSULE, GELATIN COATED ORAL
Qty: 120 | Refills: 5 | Status: ACTIVE | COMMUNITY
Start: 2023-06-02 | End: 1900-01-01

## 2024-03-03 NOTE — PHYSICAL EXAM
[Well Developed] : well developed [Well Nourished] : well nourished [No Acute Distress] : no acute distress [Normal Conjunctiva] : normal conjunctiva [Normal Venous Pressure] : normal venous pressure [Normal S1, S2] : normal S1, S2 [No Carotid Bruit] : no carotid bruit [No Murmur] : no murmur [No Rub] : no rub [Clear Lung Fields] : clear lung fields [No Gallop] : no gallop [Good Air Entry] : good air entry [Soft] : abdomen soft [No Respiratory Distress] : no respiratory distress  [Non Tender] : non-tender [No Masses/organomegaly] : no masses/organomegaly [Normal Bowel Sounds] : normal bowel sounds [Normal Gait] : normal gait [No Edema] : no edema [No Cyanosis] : no cyanosis [No Clubbing] : no clubbing [No Varicosities] : no varicosities [No Rash] : no rash [No Skin Lesions] : no skin lesions [Moves all extremities] : moves all extremities [No Focal Deficits] : no focal deficits [Normal Speech] : normal speech [Alert and Oriented] : alert and oriented [Normal memory] : normal memory

## 2024-03-05 ENCOUNTER — NON-APPOINTMENT (OUTPATIENT)
Age: 79
End: 2024-03-05

## 2024-03-05 ENCOUNTER — APPOINTMENT (OUTPATIENT)
Dept: ELECTROPHYSIOLOGY | Facility: CLINIC | Age: 79
End: 2024-03-05
Payer: MEDICARE

## 2024-03-05 ENCOUNTER — APPOINTMENT (OUTPATIENT)
Dept: CARDIOLOGY | Facility: CLINIC | Age: 79
End: 2024-03-05
Payer: MEDICARE

## 2024-03-05 VITALS
HEART RATE: 60 BPM | OXYGEN SATURATION: 98 % | SYSTOLIC BLOOD PRESSURE: 120 MMHG | DIASTOLIC BLOOD PRESSURE: 75 MMHG | WEIGHT: 212 LBS | BODY MASS INDEX: 27.22 KG/M2

## 2024-03-05 DIAGNOSIS — I50.33 ACUTE ON CHRONIC DIASTOLIC (CONGESTIVE) HEART FAILURE: ICD-10-CM

## 2024-03-05 PROCEDURE — 93000 ELECTROCARDIOGRAM COMPLETE: CPT

## 2024-03-05 PROCEDURE — 93280 PM DEVICE PROGR EVAL DUAL: CPT

## 2024-03-05 PROCEDURE — 99213 OFFICE O/P EST LOW 20 MIN: CPT | Mod: 25

## 2024-03-05 RX ORDER — APIXABAN 2.5 MG/1
2.5 TABLET, FILM COATED ORAL
Qty: 180 | Refills: 1 | Status: ACTIVE | COMMUNITY
Start: 1900-01-01 | End: 1900-01-01

## 2024-03-05 RX ORDER — METOPROLOL SUCCINATE 25 MG/1
25 TABLET, EXTENDED RELEASE ORAL
Qty: 180 | Refills: 3 | Status: ACTIVE | COMMUNITY
Start: 1900-01-01 | End: 1900-01-01

## 2024-03-05 NOTE — HISTORY OF PRESENT ILLNESS
[FreeTextEntry1] : Anson fell the other day and now comes in wheelchair. Otherwise feels well. No lightheadedness or dizziness.

## 2024-03-05 NOTE — DISCUSSION/SUMMARY
[FreeTextEntry1] : The patient is a 78-year-old gentleman mild dementia, PAF, lower extremity edema , CLL, CKD stage IV, Waldenstrom's macroglobulinemia, hypothyroidism, PPM , HFpEF, s/p sigmoid resection for diverticulitis, oral chemo for his CLL , pelvic fracture, s/p afib ablation who has been feeling stronger.  #1 CV- c/w metoprolol 25mg bid #2 PPM- [employer] pacemaker interrogation just APCs, rate increased to 60bpm helped his fatigue,,c/w eliquis, #3 Pulm- no issues currently #4 Heme- c/w brukinsa, atovaquone, f/u clinic #5 Hypothyroid- c/w levothyroxine #6 Neuro- on namenda and aricept for dementia, mirtazapine and clonazepam for anxiety, wife with him today, increase nutrition supplements to prevent further weight loss.   [EKG obtained to assist in diagnosis and management of assessed problem(s)] : EKG obtained to assist in diagnosis and management of assessed problem(s)

## 2024-03-14 NOTE — DISCHARGE NOTE ADULT - BLOOD LEVEL. WHEN WARFARIN/COUMADIN IS TAKEN WITH OTHER MEDICINES IT CAN CHANGE THE WAY OTHER MEDICINES WORK. OTHER MEDICINES CAN ALSO CHANGE THE WAY WARFARIN/COUMADIN WORKS. IT IS VERY
Be sure to take all medications, over the counter medications or prescription medications only as directed.    Be sure to follow up as directed in 1-2 days. All of the details of your follow up instructions are detailed in the follow up section of this packet.    If you are being discharged with any pains medications or muscle relaxers (norco, Vicodin, hydrocodone products, Percocet, oxycodone products, flexeril, cyclobenzaprine, robaxin, norflex, brand or generic, or any other pain controlling medications with the exception of Ibuprofen and regular Tylenol, do not drive or operate machinery, climb ladders or participate in any activity that could potentially put yourself or others at risk should you get dizzy, or be/feel impaired at all.    Return to emergency room without delay for ANY new or worsening pains or for any other symptoms or concerns. Return with worsening pains, nausea, vomiting, trouble breathing, palpitations, shortness of breath, inability to pass stool or urine, loss of control of stool or urine, any numbness or tingling (that is not normal for you), uncontrolled fevers, the passing of blood or other material in stool or urine, rashes, pains or for any other symptoms or concerns you may have. You are always welcome to return to the ER at any time for any reason or for any other concerns you may have.   Statement Selected

## 2024-03-27 ENCOUNTER — OUTPATIENT (OUTPATIENT)
Dept: OUTPATIENT SERVICES | Facility: HOSPITAL | Age: 79
LOS: 1 days | Discharge: HOME | End: 2024-03-27
Payer: MEDICARE

## 2024-03-27 DIAGNOSIS — C91.11 CHRONIC LYMPHOCYTIC LEUKEMIA OF B-CELL TYPE IN REMISSION: ICD-10-CM

## 2024-03-27 DIAGNOSIS — Z96.0 PRESENCE OF UROGENITAL IMPLANTS: Chronic | ICD-10-CM

## 2024-03-27 DIAGNOSIS — Z98.890 OTHER SPECIFIED POSTPROCEDURAL STATES: Chronic | ICD-10-CM

## 2024-03-27 DIAGNOSIS — Z98.89 OTHER SPECIFIED POSTPROCEDURAL STATES: Chronic | ICD-10-CM

## 2024-03-27 DIAGNOSIS — Z95.0 PRESENCE OF CARDIAC PACEMAKER: Chronic | ICD-10-CM

## 2024-03-27 DIAGNOSIS — Z98.41 CATARACT EXTRACTION STATUS, RIGHT EYE: Chronic | ICD-10-CM

## 2024-05-03 ENCOUNTER — LABORATORY RESULT (OUTPATIENT)
Age: 79
End: 2024-05-03

## 2024-05-03 ENCOUNTER — RESULT REVIEW (OUTPATIENT)
Age: 79
End: 2024-05-03

## 2024-05-03 ENCOUNTER — APPOINTMENT (OUTPATIENT)
Dept: HEMATOLOGY ONCOLOGY | Facility: CLINIC | Age: 79
End: 2024-05-03
Payer: MEDICARE

## 2024-05-03 ENCOUNTER — APPOINTMENT (OUTPATIENT)
Dept: HEMATOLOGY ONCOLOGY | Facility: CLINIC | Age: 79
End: 2024-05-03

## 2024-05-03 VITALS
HEART RATE: 64 BPM | OXYGEN SATURATION: 99 % | RESPIRATION RATE: 16 BRPM | TEMPERATURE: 97.9 F | WEIGHT: 219.36 LBS | DIASTOLIC BLOOD PRESSURE: 67 MMHG | SYSTOLIC BLOOD PRESSURE: 126 MMHG | BODY MASS INDEX: 28.16 KG/M2

## 2024-05-03 DIAGNOSIS — D50.9 IRON DEFICIENCY ANEMIA, UNSPECIFIED: ICD-10-CM

## 2024-05-03 DIAGNOSIS — D69.6 THROMBOCYTOPENIA, UNSPECIFIED: ICD-10-CM

## 2024-05-03 DIAGNOSIS — Z79.01 LONG TERM (CURRENT) USE OF ANTICOAGULANTS: ICD-10-CM

## 2024-05-03 DIAGNOSIS — R80.3 BENCE JONES PROTEINURIA: ICD-10-CM

## 2024-05-03 DIAGNOSIS — E85.9 AMYLOIDOSIS, UNSPECIFIED: ICD-10-CM

## 2024-05-03 DIAGNOSIS — C91.10 CHRONIC LYMPHOCYTIC LEUKEMIA OF B-CELL TYPE NOT HAVING ACHIEVED REMISSION: ICD-10-CM

## 2024-05-03 DIAGNOSIS — C88.0 WALDENSTROM MACROGLOBULINEMIA: ICD-10-CM

## 2024-05-03 DIAGNOSIS — I48.91 UNSPECIFIED ATRIAL FIBRILLATION: ICD-10-CM

## 2024-05-03 DIAGNOSIS — D47.2 MONOCLONAL GAMMOPATHY: ICD-10-CM

## 2024-05-03 LAB
ALBUMIN SERPL ELPH-MCNC: 3.8 G/DL
ALP BLD-CCNC: 163 U/L
ALT SERPL-CCNC: 8 U/L
ANION GAP SERPL CALC-SCNC: 13 MMOL/L
AST SERPL-CCNC: 11 U/L
BASOPHILS # BLD AUTO: 0.08 K/UL — SIGNIFICANT CHANGE UP (ref 0–0.2)
BASOPHILS NFR BLD AUTO: 0.9 % — SIGNIFICANT CHANGE UP (ref 0–2)
BILIRUB SERPL-MCNC: 0.2 MG/DL
BUN SERPL-MCNC: 48 MG/DL
CALCIUM SERPL-MCNC: 9.1 MG/DL
CHLORIDE SERPL-SCNC: 112 MMOL/L
CO2 SERPL-SCNC: 18 MMOL/L
CREAT SERPL-MCNC: 3.83 MG/DL
EGFR: 15 ML/MIN/1.73M2
EOSINOPHIL # BLD AUTO: 0.25 K/UL — SIGNIFICANT CHANGE UP (ref 0–0.5)
EOSINOPHIL NFR BLD AUTO: 2.8 % — SIGNIFICANT CHANGE UP (ref 0–6)
GLUCOSE SERPL-MCNC: 109 MG/DL
HCT VFR BLD CALC: 34.1 % — LOW (ref 39–50)
HGB BLD-MCNC: 11 G/DL — LOW (ref 13–17)
IMM GRANULOCYTES NFR BLD AUTO: 0.8 % — SIGNIFICANT CHANGE UP (ref 0–0.9)
LDH SERPL-CCNC: 139 U/L
LYMPHOCYTES # BLD AUTO: 2.92 K/UL — SIGNIFICANT CHANGE UP (ref 1–3.3)
LYMPHOCYTES # BLD AUTO: 32.4 % — SIGNIFICANT CHANGE UP (ref 13–44)
MCHC RBC-ENTMCNC: 32.3 G/DL — SIGNIFICANT CHANGE UP (ref 32–36)
MCHC RBC-ENTMCNC: 33.4 PG — SIGNIFICANT CHANGE UP (ref 27–34)
MCV RBC AUTO: 103.6 FL — HIGH (ref 80–100)
MONOCYTES # BLD AUTO: 0.43 K/UL — SIGNIFICANT CHANGE UP (ref 0–0.9)
MONOCYTES NFR BLD AUTO: 4.8 % — SIGNIFICANT CHANGE UP (ref 2–14)
NEUTROPHILS # BLD AUTO: 5.26 K/UL — SIGNIFICANT CHANGE UP (ref 1.8–7.4)
NEUTROPHILS NFR BLD AUTO: 58.3 % — SIGNIFICANT CHANGE UP (ref 43–77)
NRBC # BLD: 0 /100 WBCS — SIGNIFICANT CHANGE UP (ref 0–0)
PLATELET # BLD AUTO: 128 K/UL — LOW (ref 150–400)
POTASSIUM SERPL-SCNC: 4.9 MMOL/L
PROT SERPL-MCNC: 5.6 G/DL
RBC # BLD: 3.29 M/UL — LOW (ref 4.2–5.8)
RBC # FLD: 14 % — SIGNIFICANT CHANGE UP (ref 10.3–14.5)
SODIUM SERPL-SCNC: 143 MMOL/L
WBC # BLD: 9.01 K/UL — SIGNIFICANT CHANGE UP (ref 3.8–10.5)
WBC # FLD AUTO: 9.01 K/UL — SIGNIFICANT CHANGE UP (ref 3.8–10.5)

## 2024-05-03 PROCEDURE — G2211 COMPLEX E/M VISIT ADD ON: CPT

## 2024-05-03 PROCEDURE — 99214 OFFICE O/P EST MOD 30 MIN: CPT

## 2024-05-03 NOTE — ASSESSMENT
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 He had progressive anemia with rising creatinine and relapse of his Waldenstrom's. He was not a candidate for Ibrutinib due to anticoagulation with Warfarin. He achieved a partial remission with marked improvement in his hemoglobin, creatinine and IgM with IRd, but had not tolerated Rituxan. Re-evaluation now due to anemia/thrombocytopenia reveals relapse of Waldenstrom's with iron deficiency and minimal CLL. There had been no hematologic response to IV Fe, so began alternative therapy for Waldenstrom's utilizing Zanubrutinib. Appears to be responding. Found to have amyloid in a sigmoid colon biopsy. Neprotic syndrome has markedly improved.  Plan: Zanubrutinib 160 mg twice dailly Mepron 1500 mg daily CMP, LDH, SPEP, Quant Ig, SFLC , NTproBNP Consider echocardiogram with strain Congo red stain on last marrow biopsy ? abdominal fat pad biopsy Acyclovir Omeprazole RTC 3 months.

## 2024-05-03 NOTE — ADDENDUM
[FreeTextEntry1] : I, Juanito Jiménez, acted solely as a scribe for Dr. Cesar Simons on 5/03/2024. All medical entries made by the Scribe were at my, Dr. Cesar Simons's, direction and personally dictated by me on 5/03/2024. I have reviewed the chart and agree that the record accurately reflects my personal performance of the history, physical exam, assessment and plan. I have also personally directed, reviewed, and agreed with the chart.

## 2024-05-03 NOTE — HISTORY OF PRESENT ILLNESS
[Disease:__________________________] : Disease: [unfilled] [Rosales Stage: ___] : Rosales Stage: [unfilled] [de-identified] : PAF 10/2015 Waldenstrom's macroglobulinemia ; + dim lambda, CD 19, 23, FMC-7; negative CD 5, 10, 20 Renal biopsy: lymphocytic infiltrate of renal cortex 10/17 IgGk and lambda BGUS 7/2018: Nephrotic syndrome with lambda light chains 11/2023 Amyloid - Sigmoid colon polyp  [de-identified] : +lambda, CD5, CD 20; CD 38 --; FISH del 13q14.3\par  SPEP gamma paraprotein M 0.4\par  SPIEP IgM lambda; 10/17 IgGk, lambda [FreeTextEntry1] : 10/2015 - 1/2017  Rituxan/Velcade/Decadron; 8/18 - 1/19 Ixazomib/Decadron/Rituxan x 6 cycles to maintenance until 3/23;6/2023 - Zanubrutinib [de-identified] : He feels well. Less tired. Started PT for low back pain. Tries to be active. Issues with balancing persist. Watery diarrhea persists. Has seen Gastroenterology for this. Aches in hips persist. Bilateral foot swelling is stable. L>R. Received eye injection recently for macular degeneration. No new episodes of AF. He notes no fevers, SOB, chest pain, night sweats, abdominal pain, headaches, visual problems, lightheadedness, swollen glands, bleeding, bruising, BRBPR, melena, hematuria. He has gained 15 lbs in 3 months.

## 2024-05-03 NOTE — CONSULT LETTER
[Dear  ___] : Dear  [unfilled], [Courtesy Letter:] : I had the pleasure of seeing your patient, [unfilled], in my office today. [Please see my note below.] : Please see my note below. [Sincerely,] : Sincerely, [DrIbeth  ___] : Dr. DENG [DrIbeth ___] : Dr. DENG [___] : [unfilled] [FreeTextEntry2] : Dr. Simpson [FreeTextEntry3] : Cesar Simons M.D., FACP\par  Professor of Medicine\par  [facility] [employer]\par  Associate Chief, Division of Hematology\par  [facility]\par  [facility]\par  [address] [place] [address]\[place] [address]\par  [phone]

## 2024-05-03 NOTE — REASON FOR VISIT
[Follow-Up Visit] : a follow-up visit for [Blood Count Assessment] : blood count assessment [CLL] : chronic lymphocytic leukemia [Lymphoma] : lymphoma [Monoclonal Gammopathy] : monoclonal gammopathy

## 2024-05-03 NOTE — RESULTS/DATA
[FreeTextEntry1] : WBC 9,010 Hgb 11.0 Hct 34.1 Platelets 128,000 Diff pending   2/02/24 CMP Potassium 5.4 Chloride 112 CO2 20 BUN 67 creatinine 3.46 total protein 5.6 ALK phos 121 eGFR 17  NT proBNP 574 Troponin 1 <0.01 Total protein 24hr Urine 638 mg Urine Bence Kunz protein present, lambda type

## 2024-05-03 NOTE — REVIEW OF SYSTEMS
[Fatigue] : fatigue [Diarrhea: Grade 1 - Increase of <4 stools per day over baseline; mild increase in ostomy output compared to baseline] : Diarrhea: Grade 1 - Increase of <4 stools per day over baseline; mild increase in ostomy output compared to baseline [Joint Pain] : joint pain [Negative] : Allergic/Immunologic [Recent Change In Weight] : ~T recent weight change [Vision Problems] : vision problems [FreeTextEntry9] : back pain, hip pain [de-identified] : Neuropathy

## 2024-05-03 NOTE — PHYSICAL EXAM
[Ambulatory and capable of all self care but unable to carry out any work activities] : Status 2- Ambulatory and capable of all self care but unable to carry out any work activities. Up and about more than 50% of waking hours [Normal] : affect appropriate [de-identified] :  Pacemaker left anterior chest wall. RRR. S1S2 normal. Gr 2/6 TESS RUSB to LLSB. No gallops. [de-identified] : 5 x 5 cm lipoma left lower back. 1 x 1 cm sebaceous cyst right scapula.

## 2024-05-05 LAB
DEPRECATED KAPPA LC FREE/LAMBDA SER: 0.01 RATIO
IGA SER QL IEP: 14 MG/DL
IGG SER QL IEP: 333 MG/DL
IGM SER QL IEP: 383 MG/DL
KAPPA LC CSF-MCNC: 107 MG/DL
KAPPA LC SERPL-MCNC: 1.02 MG/DL
NT-PROBNP SERPL-MCNC: 582 PG/ML

## 2024-05-08 LAB
ALBUMIN MFR SERPL ELPH: 63.3 %
ALBUMIN SERPL-MCNC: 3.5 G/DL
ALBUMIN/GLOB SERPL: 1.7 RATIO
ALPHA1 GLOB MFR SERPL ELPH: 6.2 %
ALPHA1 GLOB SERPL ELPH-MCNC: 0.3 G/DL
ALPHA2 GLOB MFR SERPL ELPH: 11.1 %
ALPHA2 GLOB SERPL ELPH-MCNC: 0.6 G/DL
B-GLOBULIN MFR SERPL ELPH: 9.7 %
B-GLOBULIN SERPL ELPH-MCNC: 0.5 G/DL
GAMMA GLOB FLD ELPH-MCNC: 0.5 G/DL
GAMMA GLOB MFR SERPL ELPH: 9.7 %
INTERPRETATION SERPL IEP-IMP: NORMAL
M PROTEIN MFR SERPL ELPH: NORMAL
MONOCLON BAND OBS SERPL: NORMAL
PROT SERPL-MCNC: 5.6 G/DL
PROT SERPL-MCNC: 5.6 G/DL

## 2024-06-02 NOTE — DISCHARGE NOTE ADULT - PATIENT PORTAL LINK FT
“You can access the FollowHealth Patient Portal, offered by [facility] [place], by registering with the following website: [email or web address]” No

## 2024-06-04 ENCOUNTER — NON-APPOINTMENT (OUTPATIENT)
Age: 79
End: 2024-06-04

## 2024-06-04 ENCOUNTER — APPOINTMENT (OUTPATIENT)
Dept: ELECTROPHYSIOLOGY | Facility: CLINIC | Age: 79
End: 2024-06-04
Payer: MEDICARE

## 2024-06-04 PROCEDURE — 93296 REM INTERROG EVL PM/IDS: CPT

## 2024-06-04 PROCEDURE — 93294 REM INTERROG EVL PM/LDLS PM: CPT

## 2024-07-26 ENCOUNTER — OUTPATIENT (OUTPATIENT)
Dept: OUTPATIENT SERVICES | Facility: HOSPITAL | Age: 79
LOS: 1 days | Discharge: HOME | End: 2024-07-26

## 2024-07-26 DIAGNOSIS — Z98.890 OTHER SPECIFIED POSTPROCEDURAL STATES: Chronic | ICD-10-CM

## 2024-07-26 DIAGNOSIS — C91.11 CHRONIC LYMPHOCYTIC LEUKEMIA OF B-CELL TYPE IN REMISSION: ICD-10-CM

## 2024-07-26 DIAGNOSIS — Z96.0 PRESENCE OF UROGENITAL IMPLANTS: Chronic | ICD-10-CM

## 2024-07-26 DIAGNOSIS — Z95.0 PRESENCE OF CARDIAC PACEMAKER: Chronic | ICD-10-CM

## 2024-07-26 DIAGNOSIS — Z98.41 CATARACT EXTRACTION STATUS, RIGHT EYE: Chronic | ICD-10-CM

## 2024-07-26 DIAGNOSIS — Z98.89 OTHER SPECIFIED POSTPROCEDURAL STATES: Chronic | ICD-10-CM

## 2024-07-31 ENCOUNTER — APPOINTMENT (OUTPATIENT)
Dept: ORTHOPEDIC SURGERY | Facility: CLINIC | Age: 79
End: 2024-07-31
Payer: MEDICARE

## 2024-07-31 VITALS — BODY MASS INDEX: 27.72 KG/M2 | HEIGHT: 74 IN | WEIGHT: 216 LBS

## 2024-07-31 DIAGNOSIS — M25.552 PAIN IN LEFT HIP: ICD-10-CM

## 2024-07-31 DIAGNOSIS — M47.816 SPONDYLOSIS W/OUT MYELOPATHY OR RADICULOPATHY, LUMBAR REGION: ICD-10-CM

## 2024-07-31 PROCEDURE — 73502 X-RAY EXAM HIP UNI 2-3 VIEWS: CPT

## 2024-07-31 PROCEDURE — 99213 OFFICE O/P EST LOW 20 MIN: CPT

## 2024-07-31 NOTE — HISTORY OF PRESENT ILLNESS
[de-identified] : NICOLAS CIFUENTES is a 79 year M presenting to the office complaining of Bilateral hip pain, R> L. Patient presents ambulating independently. Patient reports pain intermittently for years.  Patient complains of chronic intermittent pain localized to the right and left buttock that occasionally radiates to the lateral aspect of the R> L hips. Denies any groin or thigh pain. Provocation of pain by bending over, walking, lifting something heavy. Denies any numbness or tingling of the lower extremities. He's been going to a chiropractor noting minimal relief.  Of note, PMHx of neuropathy both feet, A-Fib, is on Eliquis. Unable to take NSAIDs due to Kidney issues. Has seen Dr Mistry in 2023 for L5-S1 disc degenerative disease.

## 2024-07-31 NOTE — ADDENDUM
[FreeTextEntry1] : I, Noris Manning, acted solely as a scribe for Dr. Jeet Oconnell MD on this date  07/31/2024  All medical record entries made by the Scribe were at my, Dr. Jeet Oconnell MD , direction and personally dictated by me on 07/31/2024. I have reviewed the chart and agree that the record accurately reflects my personal performance of the history, physical exam, assessment and plan. I have also personally directed, reviewed, and agreed with the chart.

## 2024-07-31 NOTE — DISCUSSION/SUMMARY
[de-identified] : 79 year male old with right hip osteoarthritis and bilateral posteropelvic pain ? sacroiliac joint. We discussed at length the nature of the patient's condition. We discussed all options and conservative measures, such as ice, NSAIDs, physical therapy, exercise limitations, and injections.  I recommended a referral to [facility] for further evaluation. FU prn.  The patient understood and verbally agreed to the treatment plan. All of their questions were answered. The patient should call the office if they have any questions or experience worsening symptoms.

## 2024-07-31 NOTE — PHYSICAL EXAM
[de-identified] : Constitutional: Well nourished , well developed and in no acute distress Psychiatric: Alert and oriented to time place and person.Appropriate affect . Skin: Head, neck, arms and lower extremities:no lesions or discoloration HEENT: Normocephalic, EOM intact, Nasal septum midline, Respiratory: Unlabored respirations,no audible wheezing ,no tachypnea, no cyanosis Cardiovascular: No leg swelling no ankle edema no JVD, pulse regular Vascular: No calf or thigh tenderness, Peripheral pulses: intact Lymphatics: No groin adenopathy,no lymphedema lower or upper extremities   o Right Hip Exam: Inspection/Palpation : no tenderness, no swelling, no deformity Leg Lengths: equal Passive Range of Motion: flexion 90 degrees, internal -10 degrees with groin pain, abduction  25  degrees, adduction 30 degrees Strength: resisted hip flexion 5/5, resisted hip abduction 5/ Reflexes: Patella 2+ R=L, Achilles 2+ R=L Skin : no erythema, no ecchymosis Sensation : sensation to light touch intact Vascular Exam : no edema, no cyanosis, dorsalis pedis artery pulse 2+, posterior tibial artery pulse 2+   o Left Hip Exam: Inspection/Palpation : no tenderness, no swelling, no deformity Leg Lengths: equal Passive Range of Motion: flexion 90  degrees, internal 10 degrees, external 50  degrees, abduction  40  degrees, adduction 30 degrees, pain free Strength: resisted hip flexion 5/5, resisted hip abduction 5/5 Skin : no erythema, no ecchymosis Sensation : sensation to light touch intact Vascular Exam : no edema, no cyanosis, dorsalis pedis artery pulse 2+, posterior tibial artery pulse 2+ [de-identified] : o X-rays of the AP pelvis and AP, lateral of the BILATERAL hip obtained 07/31/2024 demonstrates right supralateral joint space narrowing subchondral sclerosis

## 2024-08-01 ENCOUNTER — NON-APPOINTMENT (OUTPATIENT)
Age: 79
End: 2024-08-01

## 2024-08-02 ENCOUNTER — APPOINTMENT (OUTPATIENT)
Dept: HEMATOLOGY ONCOLOGY | Facility: CLINIC | Age: 79
End: 2024-08-02
Payer: MEDICARE

## 2024-08-02 ENCOUNTER — RESULT REVIEW (OUTPATIENT)
Age: 79
End: 2024-08-02

## 2024-08-02 ENCOUNTER — LABORATORY RESULT (OUTPATIENT)
Age: 79
End: 2024-08-02

## 2024-08-02 VITALS
DIASTOLIC BLOOD PRESSURE: 76 MMHG | OXYGEN SATURATION: 98 % | TEMPERATURE: 97.7 F | HEART RATE: 60 BPM | SYSTOLIC BLOOD PRESSURE: 126 MMHG | BODY MASS INDEX: 27.99 KG/M2 | WEIGHT: 217.99 LBS | RESPIRATION RATE: 16 BRPM

## 2024-08-02 DIAGNOSIS — E85.9 AMYLOIDOSIS, UNSPECIFIED: ICD-10-CM

## 2024-08-02 DIAGNOSIS — C91.10 CHRONIC LYMPHOCYTIC LEUKEMIA OF B-CELL TYPE NOT HAVING ACHIEVED REMISSION: ICD-10-CM

## 2024-08-02 DIAGNOSIS — D50.9 IRON DEFICIENCY ANEMIA, UNSPECIFIED: ICD-10-CM

## 2024-08-02 DIAGNOSIS — C88.0 WALDENSTROM MACROGLOBULINEMIA: ICD-10-CM

## 2024-08-02 DIAGNOSIS — I48.91 UNSPECIFIED ATRIAL FIBRILLATION: ICD-10-CM

## 2024-08-02 DIAGNOSIS — Z79.01 LONG TERM (CURRENT) USE OF ANTICOAGULANTS: ICD-10-CM

## 2024-08-02 DIAGNOSIS — R80.3 BENCE JONES PROTEINURIA: ICD-10-CM

## 2024-08-02 DIAGNOSIS — D47.2 MONOCLONAL GAMMOPATHY: ICD-10-CM

## 2024-08-02 LAB
BASOPHILS # BLD AUTO: 0.08 K/UL — SIGNIFICANT CHANGE UP (ref 0–0.2)
BASOPHILS NFR BLD AUTO: 0.9 % — SIGNIFICANT CHANGE UP (ref 0–2)
EOSINOPHIL # BLD AUTO: 0.31 K/UL — SIGNIFICANT CHANGE UP (ref 0–0.5)
EOSINOPHIL NFR BLD AUTO: 3.6 % — SIGNIFICANT CHANGE UP (ref 0–6)
HCT VFR BLD CALC: 36.3 % — LOW (ref 39–50)
HGB BLD-MCNC: 11.6 G/DL — LOW (ref 13–17)
IMM GRANULOCYTES NFR BLD AUTO: 0.8 % — SIGNIFICANT CHANGE UP (ref 0–0.9)
LYMPHOCYTES # BLD AUTO: 2.57 K/UL — SIGNIFICANT CHANGE UP (ref 1–3.3)
LYMPHOCYTES # BLD AUTO: 29.8 % — SIGNIFICANT CHANGE UP (ref 13–44)
MCHC RBC-ENTMCNC: 32 G/DL — SIGNIFICANT CHANGE UP (ref 32–36)
MCHC RBC-ENTMCNC: 32.7 PG — SIGNIFICANT CHANGE UP (ref 27–34)
MCV RBC AUTO: 102.3 FL — HIGH (ref 80–100)
MONOCYTES # BLD AUTO: 0.49 K/UL — SIGNIFICANT CHANGE UP (ref 0–0.9)
MONOCYTES NFR BLD AUTO: 5.7 % — SIGNIFICANT CHANGE UP (ref 2–14)
NEUTROPHILS # BLD AUTO: 5.11 K/UL — SIGNIFICANT CHANGE UP (ref 1.8–7.4)
NEUTROPHILS NFR BLD AUTO: 59.2 % — SIGNIFICANT CHANGE UP (ref 43–77)
NRBC # BLD: 0 /100 WBCS — SIGNIFICANT CHANGE UP (ref 0–0)
PLATELET # BLD AUTO: 126 K/UL — LOW (ref 150–400)
RBC # BLD: 3.55 M/UL — LOW (ref 4.2–5.8)
RBC # FLD: 13.8 % — SIGNIFICANT CHANGE UP (ref 10.3–14.5)
WBC # BLD: 8.63 K/UL — SIGNIFICANT CHANGE UP (ref 3.8–10.5)
WBC # FLD AUTO: 8.63 K/UL — SIGNIFICANT CHANGE UP (ref 3.8–10.5)

## 2024-08-02 PROCEDURE — G2211 COMPLEX E/M VISIT ADD ON: CPT

## 2024-08-02 PROCEDURE — 99214 OFFICE O/P EST MOD 30 MIN: CPT

## 2024-08-02 NOTE — REVIEW OF SYSTEMS
[Fatigue] : fatigue [Vision Problems] : vision problems [Joint Pain] : joint pain [Negative] : Allergic/Immunologic [Diarrhea: Grade 0] : Diarrhea: Grade 0 [FreeTextEntry9] : back pain, hip pain [de-identified] : Neuropathy

## 2024-08-02 NOTE — HISTORY OF PRESENT ILLNESS
[Disease:__________________________] : Disease: [unfilled] [Rosales Stage: ___] : Rosales Stage: [unfilled] [de-identified] : PAF 10/2015 Waldenstrom's macroglobulinemia ; + dim lambda, CD 19, 23, FMC-7; negative CD 5, 10, 20 Renal biopsy: lymphocytic infiltrate of renal cortex 10/17 IgGk and lambda BGUS 7/2018: Nephrotic syndrome with lambda light chains 11/2023 Amyloid - Sigmoid colon polyp  [de-identified] : +lambda, CD5, CD 20; CD 38 --; FISH del 13q14.3\par  SPEP gamma paraprotein M 0.4\par  SPIEP IgM lambda; 10/17 IgGk, lambda [FreeTextEntry1] : 10/2015 - 1/2017  Rituxan/Velcade/Decadron; 8/18 - 1/19 Ixazomib/Decadron/Rituxan x 6 cycles to maintenance until 3/23;6/2023 - Zanubrutinib [de-identified] : He feels tired, but is able to function normally. Chronic low back pain persists.  Saw Ortho and injections for relief may be planned.  Aches in hips also persist. Soft stools persist. Followed by Gastroenterology for this. Bilateral foot swelling has resolved. Receives eye injections for macular degeneration. No new episodes of AF. Had an episode of fever for which he was prescribed antibiotics. It has resolved.  He notes no fevers, SOB, chest pain, night sweats, abdominal pain, headaches, other visual problems, lightheadedness, swollen glands, bleeding, bruising, BRBPR, melena, hematuria. He has gained 2 lbs since last visit.

## 2024-08-02 NOTE — CONSULT LETTER
[Dear  ___] : Dear  [unfilled], [Courtesy Letter:] : I had the pleasure of seeing your patient, [unfilled], in my office today. [Please see my note below.] : Please see my note below. [Sincerely,] : Sincerely, [DrIbeth  ___] : Dr. DENG [DrIbeth ___] : Dr. DENG [___] : [unfilled] [FreeTextEntry2] : Dr. Simpson [FreeTextEntry3] : Cesar Simons M.D., FACP\par  Professor of Medicine\par  [facility] [employer]\par  Associate Chief, Division of Hematology\par  [facility]\par  [facility]\par  [address] [place] [address]\[place] [address]\par  [phone]

## 2024-08-02 NOTE — RESULTS/DATA
[FreeTextEntry1] : WBC 8,630 Hgb 11.6 Hct 36.3 .3 Platelets 126,000 Diff normal  5/03/24 CMP Chloride 112 CO2 18 BUN 48 creatinine 3.83 total protein 5.6 ALK phos 163 ALT 8 eGFR 15  SPEP M-spike 1= 0.3 IgM lambda, M-spike 2= unable to quantitate free lambda, two gamma-migrating paraproteins identified SPIEP One IgM Lambda Band and One Weak Lambda Light Chains Identified and No IgD or IgE Bands Identifed IgG 333 IgA14 IgM 383  SFLC kappa 1.02 lambda 107.00 ratio 0.01 NT proBNP 582

## 2024-08-02 NOTE — PHYSICAL EXAM
[Capable of only limited self care, confined to bed or chair more than 50% of waking hours] : Status 3- Capable of only limited self care, confined to bed or chair more than 50% of waking hours [Normal] : affect appropriate [de-identified] :  Pacemaker left anterior chest wall. RRR. S1S2 normal. Gr 2/6 TESS RUSB to LLSB. No gallops. [de-identified] : 5 x 5 cm lipoma left lower back. 1 x 1 cm sebaceous cyst right scapula.

## 2024-08-02 NOTE — ADDENDUM
[FreeTextEntry1] : I, Juanito Jiménez, acted solely as a scribe for Dr. Cesar Simons on 8/02/2024. All medical entries made by the Scribe were at my, Dr. Cesar Simons's, direction and personally dictated by me on  8/02/2024. I have reviewed the chart and agree that the record accurately reflects my personal performance of the history, physical exam, assessment and plan. I have also personally directed, reviewed, and agreed with the chart.

## 2024-08-02 NOTE — ASSESSMENT
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 He had progressive anemia with rising creatinine and relapse of his Waldenstrom's. He was not a candidate for Ibrutinib due to anticoagulation with Warfarin. He achieved a partial remission with marked improvement in his hemoglobin, creatinine and IgM with IRd, but had not tolerated Rituxan. Re-evaluation now due to anemia/thrombocytopenia reveals relapse of Waldenstrom's with iron deficiency and minimal CLL. There had been no hematologic response to IV Fe, so began alternative therapy for Waldenstrom's utilizing Zanubrutinib. Appears to be responding. Found to have amyloid in a sigmoid colon biopsy. Neprotic syndrome has markedly improved.  Plan: Zanubrutinib 160 mg twice dailly Mepron 1500 mg daily Acyclovir Eliquis CMP, LDH, SPEP, Quant Ig, SFLC Consider echocardiogram with strain Congo red stain on last marrow biopsy ? abdominal fat pad biopsy Omeprazole RTC 3 months.

## 2024-08-05 LAB
ALBUMIN SERPL ELPH-MCNC: 4 G/DL
ALP BLD-CCNC: 163 U/L
ALT SERPL-CCNC: 11 U/L
ANION GAP SERPL CALC-SCNC: 11 MMOL/L
AST SERPL-CCNC: 14 U/L
BILIRUB SERPL-MCNC: 0.3 MG/DL
BUN SERPL-MCNC: 48 MG/DL
CALCIUM SERPL-MCNC: 9.4 MG/DL
CHLORIDE SERPL-SCNC: 113 MMOL/L
CO2 SERPL-SCNC: 20 MMOL/L
CREAT SERPL-MCNC: 3.67 MG/DL
EGFR: 16 ML/MIN/1.73M2
GLUCOSE SERPL-MCNC: 136 MG/DL
LDH SERPL-CCNC: 149 U/L
POTASSIUM SERPL-SCNC: 5 MMOL/L
PROT SERPL-MCNC: 5.6 G/DL
SODIUM SERPL-SCNC: 143 MMOL/L

## 2024-08-07 LAB
ALBUMIN MFR SERPL ELPH: 61.3 %
ALBUMIN SERPL-MCNC: 3.4 G/DL
ALBUMIN/GLOB SERPL: 1.6 RATIO
ALPHA1 GLOB MFR SERPL ELPH: 6.2 %
ALPHA1 GLOB SERPL ELPH-MCNC: 0.3 G/DL
ALPHA2 GLOB MFR SERPL ELPH: 11.9 %
ALPHA2 GLOB SERPL ELPH-MCNC: 0.7 G/DL
B-GLOBULIN MFR SERPL ELPH: 10 %
B-GLOBULIN SERPL ELPH-MCNC: 0.6 G/DL
DEPRECATED KAPPA LC FREE/LAMBDA SER: 0.01 RATIO
GAMMA GLOB FLD ELPH-MCNC: 0.6 G/DL
GAMMA GLOB MFR SERPL ELPH: 10.6 %
IGA SER QL IEP: 14 MG/DL
IGG SER QL IEP: 317 MG/DL
IGM SER QL IEP: 455 MG/DL
INTERPRETATION SERPL IEP-IMP: NORMAL
KAPPA LC CSF-MCNC: 93.01 MG/DL
KAPPA LC SERPL-MCNC: 0.79 MG/DL
M PROTEIN MFR SERPL ELPH: NORMAL
MONOCLON BAND OBS SERPL: NORMAL
PROT SERPL-MCNC: 5.5 G/DL
PROT SERPL-MCNC: 5.5 G/DL

## 2024-08-21 ENCOUNTER — RX RENEWAL (OUTPATIENT)
Age: 79
End: 2024-08-21

## 2024-09-03 ENCOUNTER — APPOINTMENT (OUTPATIENT)
Dept: CARDIOLOGY | Facility: CLINIC | Age: 79
End: 2024-09-03
Payer: MEDICARE

## 2024-09-03 ENCOUNTER — APPOINTMENT (OUTPATIENT)
Dept: ELECTROPHYSIOLOGY | Facility: CLINIC | Age: 79
End: 2024-09-03

## 2024-09-03 ENCOUNTER — NON-APPOINTMENT (OUTPATIENT)
Age: 79
End: 2024-09-03

## 2024-09-03 VITALS
SYSTOLIC BLOOD PRESSURE: 138 MMHG | HEIGHT: 74 IN | WEIGHT: 216 LBS | BODY MASS INDEX: 27.72 KG/M2 | DIASTOLIC BLOOD PRESSURE: 78 MMHG | HEART RATE: 60 BPM | OXYGEN SATURATION: 98 %

## 2024-09-03 DIAGNOSIS — C88.0 WALDENSTROM MACROGLOBULINEMIA: ICD-10-CM

## 2024-09-03 DIAGNOSIS — I48.91 UNSPECIFIED ATRIAL FIBRILLATION: ICD-10-CM

## 2024-09-03 DIAGNOSIS — I49.5 SICK SINUS SYNDROME: ICD-10-CM

## 2024-09-03 PROCEDURE — G2211 COMPLEX E/M VISIT ADD ON: CPT

## 2024-09-03 PROCEDURE — 99214 OFFICE O/P EST MOD 30 MIN: CPT

## 2024-09-03 PROCEDURE — 93280 PM DEVICE PROGR EVAL DUAL: CPT

## 2024-09-03 PROCEDURE — 93000 ELECTROCARDIOGRAM COMPLETE: CPT

## 2024-09-03 NOTE — REVIEW OF SYSTEMS
[Weight Loss (___ Lbs)] : [unfilled] ~Ulb weight loss [Dyspnea on exertion] : dyspnea during exertion [SOB] : shortness of breath [Negative] : Heme/Lymph [Chest Discomfort] : no chest discomfort [Lower Ext Edema] : no extremity edema [Leg Claudication] : no intermittent leg claudication [Palpitations] : no palpitations [Orthopnea] : no orthopnea [PND] : no PND [Syncope] : no syncope

## 2024-09-03 NOTE — DISCUSSION/SUMMARY
[FreeTextEntry1] : The patient is a 79-year-old gentleman mild dementia, PAF, LE edema, CLL, CKD stage IV, Waldenstrom's macroglobulinemia, hypothyroidism, PPM, HFpEF, s/p sigmoid resection for diverticulitis, oral chemo for his CLL, pelvic fracture, s/p afib ablation who is asymptomatic today. #1 CV- c/w metoprolol 25mg bid #2 PPM- [employer] pacemaker interrogation just APCs, c/w Eliquis, #3 Pulm- no issues currently #4 Heme- c/w brukinsa, atovaquone, f/u clinic #5 Hypothyroid- c/w levothyroxine #6 Neuro- on namenda and aricept for dementia, mirtazapine and clonazepam for anxiety, wife with him today, weight stable with nutrition supplements.   [EKG obtained to assist in diagnosis and management of assessed problem(s)] : EKG obtained to assist in diagnosis and management of assessed problem(s)

## 2024-09-03 NOTE — HISTORY OF PRESENT ILLNESS
[FreeTextEntry1] : Anson is feeling well and recalls all his medications. No new symptoms. Does not do much walking but no CP, palpitations, or dizziness.

## 2024-09-03 NOTE — DISCUSSION/SUMMARY
[FreeTextEntry1] : The patient is a 79-year-old gentleman mild dementia, PAF, LE edema, CLL, CKD stage IV, Waldenstrom's macroglobulinemia, hypothyroidism, PPM, HFpEF, s/p sigmoid resection for diverticulitis, oral chemo for his CLL, pelvic fracture, s/p afib ablation who is asymptomatic today. #1 CV- c/w metoprolol 25mg bid #2 PPM- [employer] pacemaker interrogation just APCs, c/w Eliquis, #3 Pulm- no issues currently #4 Heme- c/w brukinsa, atovaquone, f/u clinic #5 Hypothyroid- c/w levothyroxine #6 Neuro- on namenda and aricept for dementia, mirtazapine and clonazepam for anxiety, wife with him today, weight stable with nutrition supplements.   [EKG obtained to assist in diagnosis and management of assessed problem(s)] : EKG obtained to assist in diagnosis and management of assessed problem(s)

## 2024-09-07 ENCOUNTER — APPOINTMENT (OUTPATIENT)
Dept: MRI IMAGING | Facility: HOSPITAL | Age: 79
End: 2024-09-07

## 2024-09-07 ENCOUNTER — OUTPATIENT (OUTPATIENT)
Dept: OUTPATIENT SERVICES | Facility: HOSPITAL | Age: 79
LOS: 1 days | End: 2024-09-07
Payer: MEDICARE

## 2024-09-07 DIAGNOSIS — Z95.0 PRESENCE OF CARDIAC PACEMAKER: Chronic | ICD-10-CM

## 2024-09-07 DIAGNOSIS — Z98.41 CATARACT EXTRACTION STATUS, RIGHT EYE: Chronic | ICD-10-CM

## 2024-09-07 DIAGNOSIS — Z00.00 ENCOUNTER FOR GENERAL ADULT MEDICAL EXAMINATION WITHOUT ABNORMAL FINDINGS: ICD-10-CM

## 2024-09-07 DIAGNOSIS — Z98.890 OTHER SPECIFIED POSTPROCEDURAL STATES: Chronic | ICD-10-CM

## 2024-09-07 DIAGNOSIS — Z96.0 PRESENCE OF UROGENITAL IMPLANTS: Chronic | ICD-10-CM

## 2024-09-07 DIAGNOSIS — M54.50 LOW BACK PAIN, UNSPECIFIED: ICD-10-CM

## 2024-09-07 DIAGNOSIS — Z98.89 OTHER SPECIFIED POSTPROCEDURAL STATES: Chronic | ICD-10-CM

## 2024-09-07 PROCEDURE — 71046 X-RAY EXAM CHEST 2 VIEWS: CPT | Mod: 26

## 2024-09-07 PROCEDURE — 93280 PM DEVICE PROGR EVAL DUAL: CPT | Mod: 26,76

## 2024-09-07 PROCEDURE — 72148 MRI LUMBAR SPINE W/O DYE: CPT

## 2024-09-07 PROCEDURE — 71046 X-RAY EXAM CHEST 2 VIEWS: CPT

## 2024-09-07 PROCEDURE — 72148 MRI LUMBAR SPINE W/O DYE: CPT | Mod: 26

## 2024-09-07 NOTE — PROCEDURE NOTE - ADDITIONAL PROCEDURE DETAILS
Device interrogation in MRI department post MRI  normal sensing and pacing thresholds stable lead impedence   normal pacemaker function     MRI mode deactivated, pre MRI mode DDDR 60 bpm - 120 bpm .... and pacing parameters resumed    Sobiasa Barber ALVARADO  [phone]
Device interrogation in MRI department prior to MRI  normal sensing and pacing thresholds stable lead impedence   normal pacemaker function     MRI mode activated, pacing Mode changed to DOO 80 bpm ...    call post MRI to resume pre MRI pacing parameters    Sobia Blandon NP

## 2024-09-24 DIAGNOSIS — H34.9 UNSPECIFIED RETINAL VASCULAR OCCLUSION: ICD-10-CM

## 2024-09-27 ENCOUNTER — OUTPATIENT (OUTPATIENT)
Dept: OUTPATIENT SERVICES | Facility: HOSPITAL | Age: 79
LOS: 1 days | End: 2024-09-27
Payer: MEDICARE

## 2024-09-27 ENCOUNTER — APPOINTMENT (OUTPATIENT)
Dept: ULTRASOUND IMAGING | Facility: CLINIC | Age: 79
End: 2024-09-27
Payer: MEDICARE

## 2024-09-27 DIAGNOSIS — Z98.890 OTHER SPECIFIED POSTPROCEDURAL STATES: Chronic | ICD-10-CM

## 2024-09-27 DIAGNOSIS — Z98.41 CATARACT EXTRACTION STATUS, RIGHT EYE: Chronic | ICD-10-CM

## 2024-09-27 DIAGNOSIS — Z00.8 ENCOUNTER FOR OTHER GENERAL EXAMINATION: ICD-10-CM

## 2024-09-27 DIAGNOSIS — H34.9 UNSPECIFIED RETINAL VASCULAR OCCLUSION: ICD-10-CM

## 2024-09-27 DIAGNOSIS — Z95.0 PRESENCE OF CARDIAC PACEMAKER: Chronic | ICD-10-CM

## 2024-09-27 DIAGNOSIS — Z98.89 OTHER SPECIFIED POSTPROCEDURAL STATES: Chronic | ICD-10-CM

## 2024-09-27 DIAGNOSIS — Z96.0 PRESENCE OF UROGENITAL IMPLANTS: Chronic | ICD-10-CM

## 2024-09-27 PROCEDURE — 93880 EXTRACRANIAL BILAT STUDY: CPT

## 2024-09-27 PROCEDURE — 93880 EXTRACRANIAL BILAT STUDY: CPT | Mod: 26

## 2024-10-22 NOTE — PHYSICAL THERAPY INITIAL EVALUATION ADULT - ASR WT BEARING STATUS EVAL
What Is The Reason For Today's Visit?: History of Non-Melanoma Skin Cancer How Many Skin Cancers Have You Had?: more than one When Was Your Last Cancer Diagnosed?: 2017 no weight-bearing restrictions

## 2024-10-26 ENCOUNTER — OUTPATIENT (OUTPATIENT)
Dept: OUTPATIENT SERVICES | Facility: HOSPITAL | Age: 79
LOS: 1 days | Discharge: HOME | End: 2024-10-26

## 2024-10-26 DIAGNOSIS — Z96.0 PRESENCE OF UROGENITAL IMPLANTS: Chronic | ICD-10-CM

## 2024-10-26 DIAGNOSIS — Z98.89 OTHER SPECIFIED POSTPROCEDURAL STATES: Chronic | ICD-10-CM

## 2024-10-26 DIAGNOSIS — Z98.41 CATARACT EXTRACTION STATUS, RIGHT EYE: Chronic | ICD-10-CM

## 2024-10-26 DIAGNOSIS — Z95.0 PRESENCE OF CARDIAC PACEMAKER: Chronic | ICD-10-CM

## 2024-10-26 DIAGNOSIS — C91.11 CHRONIC LYMPHOCYTIC LEUKEMIA OF B-CELL TYPE IN REMISSION: ICD-10-CM

## 2024-10-26 DIAGNOSIS — Z98.890 OTHER SPECIFIED POSTPROCEDURAL STATES: Chronic | ICD-10-CM

## 2024-11-01 ENCOUNTER — RESULT REVIEW (OUTPATIENT)
Age: 79
End: 2024-11-01

## 2024-11-01 ENCOUNTER — LABORATORY RESULT (OUTPATIENT)
Age: 79
End: 2024-11-01

## 2024-11-01 ENCOUNTER — APPOINTMENT (OUTPATIENT)
Dept: HEMATOLOGY ONCOLOGY | Facility: CLINIC | Age: 79
End: 2024-11-01

## 2024-11-01 VITALS
TEMPERATURE: 97.3 F | HEART RATE: 60 BPM | OXYGEN SATURATION: 97 % | DIASTOLIC BLOOD PRESSURE: 63 MMHG | WEIGHT: 215.39 LBS | SYSTOLIC BLOOD PRESSURE: 103 MMHG | BODY MASS INDEX: 27.65 KG/M2 | RESPIRATION RATE: 16 BRPM

## 2024-11-01 DIAGNOSIS — E85.9 AMYLOIDOSIS, UNSPECIFIED: ICD-10-CM

## 2024-11-01 LAB
BASOPHILS # BLD AUTO: 0.09 K/UL — SIGNIFICANT CHANGE UP (ref 0–0.2)
BASOPHILS NFR BLD AUTO: 0.9 % — SIGNIFICANT CHANGE UP (ref 0–2)
EOSINOPHIL # BLD AUTO: 0.28 K/UL — SIGNIFICANT CHANGE UP (ref 0–0.5)
EOSINOPHIL NFR BLD AUTO: 2.8 % — SIGNIFICANT CHANGE UP (ref 0–6)
HCT VFR BLD CALC: 39.6 % — SIGNIFICANT CHANGE UP (ref 39–50)
HGB BLD-MCNC: 12.4 G/DL — LOW (ref 13–17)
IMM GRANULOCYTES NFR BLD AUTO: 0.8 % — SIGNIFICANT CHANGE UP (ref 0–0.9)
LYMPHOCYTES # BLD AUTO: 3.2 K/UL — SIGNIFICANT CHANGE UP (ref 1–3.3)
LYMPHOCYTES # BLD AUTO: 31.5 % — SIGNIFICANT CHANGE UP (ref 13–44)
MCHC RBC-ENTMCNC: 31.3 G/DL — LOW (ref 32–36)
MCHC RBC-ENTMCNC: 31.9 PG — SIGNIFICANT CHANGE UP (ref 27–34)
MCV RBC AUTO: 101.8 FL — HIGH (ref 80–100)
MONOCYTES # BLD AUTO: 0.4 K/UL — SIGNIFICANT CHANGE UP (ref 0–0.9)
MONOCYTES NFR BLD AUTO: 3.9 % — SIGNIFICANT CHANGE UP (ref 2–14)
NEUTROPHILS # BLD AUTO: 6.1 K/UL — SIGNIFICANT CHANGE UP (ref 1.8–7.4)
NEUTROPHILS NFR BLD AUTO: 60.1 % — SIGNIFICANT CHANGE UP (ref 43–77)
NRBC # BLD: 0 /100 WBCS — SIGNIFICANT CHANGE UP (ref 0–0)
NRBC BLD-RTO: 0 /100 WBCS — SIGNIFICANT CHANGE UP (ref 0–0)
PLATELET # BLD AUTO: 154 K/UL — SIGNIFICANT CHANGE UP (ref 150–400)
RBC # BLD: 3.89 M/UL — LOW (ref 4.2–5.8)
RBC # FLD: 14.4 % — SIGNIFICANT CHANGE UP (ref 10.3–14.5)
WBC # BLD: 10.15 K/UL — SIGNIFICANT CHANGE UP (ref 3.8–10.5)
WBC # FLD AUTO: 10.15 K/UL — SIGNIFICANT CHANGE UP (ref 3.8–10.5)

## 2024-11-01 PROCEDURE — 99213 OFFICE O/P EST LOW 20 MIN: CPT

## 2024-11-04 LAB
ALBUMIN SERPL ELPH-MCNC: 3.9 G/DL
ALP BLD-CCNC: 157 U/L
ALT SERPL-CCNC: 8 U/L
ANION GAP SERPL CALC-SCNC: 13 MMOL/L
AST SERPL-CCNC: 12 U/L
BILIRUB SERPL-MCNC: 0.3 MG/DL
BUN SERPL-MCNC: 43 MG/DL
CALCIUM SERPL-MCNC: 9 MG/DL
CHLORIDE SERPL-SCNC: 112 MMOL/L
CO2 SERPL-SCNC: 19 MMOL/L
CREAT SERPL-MCNC: 3.5 MG/DL
DEPRECATED KAPPA LC FREE/LAMBDA SER: 0.01 RATIO
EGFR: 17 ML/MIN/1.73M2
GLUCOSE SERPL-MCNC: 109 MG/DL
IGA SER QL IEP: 15 MG/DL
IGG SER QL IEP: 292 MG/DL
IGM SER QL IEP: 330 MG/DL
KAPPA LC CSF-MCNC: 69.44 MG/DL
KAPPA LC SERPL-MCNC: 0.87 MG/DL
LDH SERPL-CCNC: 170 U/L
POTASSIUM SERPL-SCNC: 5.6 MMOL/L
PROT SERPL-MCNC: 5.5 G/DL
SODIUM SERPL-SCNC: 145 MMOL/L

## 2024-11-07 LAB
ALBUMIN MFR SERPL ELPH: 64.5 %
ALBUMIN SERPL-MCNC: 3.7 G/DL
ALBUMIN/GLOB SERPL: 1.8 RATIO
ALPHA1 GLOB MFR SERPL ELPH: 6.2 %
ALPHA1 GLOB SERPL ELPH-MCNC: 0.4 G/DL
ALPHA2 GLOB MFR SERPL ELPH: 10.8 %
ALPHA2 GLOB SERPL ELPH-MCNC: 0.6 G/DL
B-GLOBULIN MFR SERPL ELPH: 9.7 %
B-GLOBULIN SERPL ELPH-MCNC: 0.6 G/DL
GAMMA GLOB FLD ELPH-MCNC: 0.5 G/DL
GAMMA GLOB MFR SERPL ELPH: 8.8 %
INTERPRETATION SERPL IEP-IMP: NORMAL
M PROTEIN MFR SERPL ELPH: NORMAL
MONOCLON BAND OBS SERPL: NORMAL
PROT SERPL-MCNC: 5.7 G/DL
PROT SERPL-MCNC: 5.7 G/DL

## 2024-12-03 ENCOUNTER — NON-APPOINTMENT (OUTPATIENT)
Age: 79
End: 2024-12-03

## 2024-12-03 ENCOUNTER — APPOINTMENT (OUTPATIENT)
Dept: ELECTROPHYSIOLOGY | Facility: CLINIC | Age: 79
End: 2024-12-03

## 2024-12-03 PROCEDURE — 93294 REM INTERROG EVL PM/LDLS PM: CPT

## 2024-12-03 PROCEDURE — 93296 REM INTERROG EVL PM/IDS: CPT

## 2025-01-08 ENCOUNTER — RX RENEWAL (OUTPATIENT)
Age: 80
End: 2025-01-08

## 2025-01-13 NOTE — ED ADULT NURSE NOTE - PRIMARY CARE PROVIDER
----- Message from ARELI Dwyer sent at 1/13/2025  9:43 AM CST -----  The pathology results demonstrated Tubular adenoma, precancerous polyp.  Recall colonoscopy 7 years.    
Results sent to zoe.    Jeannine Serrano repeat colonoscopy in 7 year(s).  Checklist:  Surgical history updated.  Colonoscopy tracking completed.  Episode resolved.   Reminder entered.     
unk

## 2025-01-25 ENCOUNTER — TRANSCRIPTION ENCOUNTER (OUTPATIENT)
Age: 80
End: 2025-01-25

## 2025-01-30 NOTE — PHYSICAL THERAPY INITIAL EVALUATION ADULT - PLANNED THERAPY INTERVENTIONS, PT EVAL
MD made changes to patients medication (gabapentin 100 MG Oral Cap) he commented in his last note take 2's a day vs 1 a day.  Patient needs a new script sent to the pharmacy. Her refill is due.  Thanks  
Refill Request    Medication request: gabapentin 100 MG Oral Cap. Take 1 capsule (100 mg total) by mouth nightly.     LOV:1/9/2025 Behar, Alex, MD   Due back to clinic per last office note: Per Dr. Behar: \"Follow up with me in about 2 months for the back.\"  NOV: 2/17/2025 Behar, Alex, [place]      [place] data not available    Urine drug screen (if applicable): n/a  Pain contract: n/a    LOV plan (if weaning or changing medications): Per Dr. Behar: \"increase gabapentin to 100 mg twice per day if you are not going out and keep at 100 mg at night time if you are doing things during the day.\"  
stairs: GOAL: Pt will negotiate 2 flights of stairs with or without appropriate assistive device and handrail via reciprocal/step-to technique indep in 2 weeks./balance training/bed mobility training/gait training/transfer training

## 2025-02-13 ENCOUNTER — OUTPATIENT (OUTPATIENT)
Dept: OUTPATIENT SERVICES | Facility: HOSPITAL | Age: 80
LOS: 1 days | Discharge: HOME | End: 2025-02-13

## 2025-02-13 DIAGNOSIS — Z98.890 OTHER SPECIFIED POSTPROCEDURAL STATES: Chronic | ICD-10-CM

## 2025-02-13 DIAGNOSIS — Z98.89 OTHER SPECIFIED POSTPROCEDURAL STATES: Chronic | ICD-10-CM

## 2025-02-13 DIAGNOSIS — C91.11 CHRONIC LYMPHOCYTIC LEUKEMIA OF B-CELL TYPE IN REMISSION: ICD-10-CM

## 2025-02-13 DIAGNOSIS — Z95.0 PRESENCE OF CARDIAC PACEMAKER: Chronic | ICD-10-CM

## 2025-02-13 DIAGNOSIS — Z96.0 PRESENCE OF UROGENITAL IMPLANTS: Chronic | ICD-10-CM

## 2025-02-13 DIAGNOSIS — Z98.41 CATARACT EXTRACTION STATUS, RIGHT EYE: Chronic | ICD-10-CM

## 2025-02-14 ENCOUNTER — APPOINTMENT (OUTPATIENT)
Dept: HEMATOLOGY ONCOLOGY | Facility: CLINIC | Age: 80
End: 2025-02-14

## 2025-02-14 ENCOUNTER — RESULT REVIEW (OUTPATIENT)
Age: 80
End: 2025-02-14

## 2025-02-14 ENCOUNTER — NON-APPOINTMENT (OUTPATIENT)
Age: 80
End: 2025-02-14

## 2025-02-14 ENCOUNTER — APPOINTMENT (OUTPATIENT)
Dept: HEMATOLOGY ONCOLOGY | Facility: CLINIC | Age: 80
End: 2025-02-14
Payer: MEDICARE

## 2025-02-14 ENCOUNTER — LABORATORY RESULT (OUTPATIENT)
Age: 80
End: 2025-02-14

## 2025-02-14 VITALS
HEART RATE: 60 BPM | TEMPERATURE: 97.3 F | RESPIRATION RATE: 16 BRPM | BODY MASS INDEX: 27.6 KG/M2 | SYSTOLIC BLOOD PRESSURE: 130 MMHG | DIASTOLIC BLOOD PRESSURE: 66 MMHG | WEIGHT: 214.95 LBS | OXYGEN SATURATION: 98 %

## 2025-02-14 DIAGNOSIS — E85.9 AMYLOIDOSIS, UNSPECIFIED: ICD-10-CM

## 2025-02-14 DIAGNOSIS — D69.6 THROMBOCYTOPENIA, UNSPECIFIED: ICD-10-CM

## 2025-02-14 DIAGNOSIS — Z79.01 LONG TERM (CURRENT) USE OF ANTICOAGULANTS: ICD-10-CM

## 2025-02-14 DIAGNOSIS — D50.9 IRON DEFICIENCY ANEMIA, UNSPECIFIED: ICD-10-CM

## 2025-02-14 DIAGNOSIS — C91.10 CHRONIC LYMPHOCYTIC LEUKEMIA OF B-CELL TYPE NOT HAVING ACHIEVED REMISSION: ICD-10-CM

## 2025-02-14 DIAGNOSIS — D47.2 MONOCLONAL GAMMOPATHY: ICD-10-CM

## 2025-02-14 DIAGNOSIS — H34.9 UNSPECIFIED RETINAL VASCULAR OCCLUSION: ICD-10-CM

## 2025-02-14 DIAGNOSIS — I48.91 UNSPECIFIED ATRIAL FIBRILLATION: ICD-10-CM

## 2025-02-14 DIAGNOSIS — D64.9 ANEMIA, UNSPECIFIED: ICD-10-CM

## 2025-02-14 DIAGNOSIS — C88.00 WALDENSTROM MACROGLOBULINEMIA NOT HAVING ACHIEVED REMISSION: ICD-10-CM

## 2025-02-14 LAB
ALBUMIN SERPL ELPH-MCNC: 4 G/DL
ALP BLD-CCNC: 155 U/L
ALT SERPL-CCNC: 9 U/L
ANION GAP SERPL CALC-SCNC: 12 MMOL/L
AST SERPL-CCNC: 12 U/L
BASOPHILS # BLD AUTO: 0.07 K/UL — SIGNIFICANT CHANGE UP (ref 0–0.2)
BASOPHILS NFR BLD AUTO: 0.6 % — SIGNIFICANT CHANGE UP (ref 0–2)
BILIRUB SERPL-MCNC: 0.3 MG/DL
BUN SERPL-MCNC: 52 MG/DL
CALCIUM SERPL-MCNC: 8.8 MG/DL
CHLORIDE SERPL-SCNC: 110 MMOL/L
CO2 SERPL-SCNC: 21 MMOL/L
CREAT SERPL-MCNC: 3.57 MG/DL
EGFR: 17 ML/MIN/1.73M2
EOSINOPHIL # BLD AUTO: 0.15 K/UL — SIGNIFICANT CHANGE UP (ref 0–0.5)
EOSINOPHIL NFR BLD AUTO: 1.2 % — SIGNIFICANT CHANGE UP (ref 0–6)
GLUCOSE SERPL-MCNC: 122 MG/DL
HCT VFR BLD CALC: 36 % — LOW (ref 39–50)
HGB BLD-MCNC: 11.3 G/DL — LOW (ref 13–17)
IMM GRANULOCYTES NFR BLD AUTO: 0.7 % — SIGNIFICANT CHANGE UP (ref 0–0.9)
LDH SERPL-CCNC: 130 U/L
LYMPHOCYTES # BLD AUTO: 2.63 K/UL — SIGNIFICANT CHANGE UP (ref 1–3.3)
LYMPHOCYTES # BLD AUTO: 21 % — SIGNIFICANT CHANGE UP (ref 13–44)
MCHC RBC-ENTMCNC: 31.4 G/DL — LOW (ref 32–36)
MCHC RBC-ENTMCNC: 31.8 PG — SIGNIFICANT CHANGE UP (ref 27–34)
MCV RBC AUTO: 101.4 FL — HIGH (ref 80–100)
MONOCYTES # BLD AUTO: 0.53 K/UL — SIGNIFICANT CHANGE UP (ref 0–0.9)
MONOCYTES NFR BLD AUTO: 4.2 % — SIGNIFICANT CHANGE UP (ref 2–14)
NEUTROPHILS # BLD AUTO: 9.06 K/UL — HIGH (ref 1.8–7.4)
NEUTROPHILS NFR BLD AUTO: 72.3 % — SIGNIFICANT CHANGE UP (ref 43–77)
NRBC BLD AUTO-RTO: 0 /100 WBCS — SIGNIFICANT CHANGE UP (ref 0–0)
PLATELET # BLD AUTO: 167 K/UL — SIGNIFICANT CHANGE UP (ref 150–400)
POTASSIUM SERPL-SCNC: 4.9 MMOL/L
PROT SERPL-MCNC: 5.7 G/DL
PROT SERPL-MCNC: 5.7 G/DL
RBC # BLD: 3.55 M/UL — LOW (ref 4.2–5.8)
RBC # FLD: 14.6 % — HIGH (ref 10.3–14.5)
SODIUM SERPL-SCNC: 143 MMOL/L
WBC # BLD: 12.53 K/UL — HIGH (ref 3.8–10.5)
WBC # FLD AUTO: 12.53 K/UL — HIGH (ref 3.8–10.5)

## 2025-02-14 PROCEDURE — 99214 OFFICE O/P EST MOD 30 MIN: CPT

## 2025-02-15 LAB
DEPRECATED KAPPA LC FREE/LAMBDA SER: 0.02 RATIO
IGA SER QL IEP: 15 MG/DL
IGG SER QL IEP: 332 MG/DL
IGM SER QL IEP: 375 MG/DL
KAPPA LC CSF-MCNC: 51.83 MG/DL
KAPPA LC SERPL-MCNC: 0.83 MG/DL

## 2025-03-13 ENCOUNTER — NON-APPOINTMENT (OUTPATIENT)
Age: 80
End: 2025-03-13

## 2025-03-13 ENCOUNTER — APPOINTMENT (OUTPATIENT)
Dept: ELECTROPHYSIOLOGY | Facility: CLINIC | Age: 80
End: 2025-03-13

## 2025-03-13 ENCOUNTER — APPOINTMENT (OUTPATIENT)
Dept: CARDIOLOGY | Facility: CLINIC | Age: 80
End: 2025-03-13
Payer: MEDICARE

## 2025-03-13 VITALS
DIASTOLIC BLOOD PRESSURE: 70 MMHG | BODY MASS INDEX: 27.72 KG/M2 | HEIGHT: 74 IN | WEIGHT: 216 LBS | HEART RATE: 68 BPM | OXYGEN SATURATION: 99 % | SYSTOLIC BLOOD PRESSURE: 123 MMHG

## 2025-03-13 DIAGNOSIS — N18.4 CHRONIC KIDNEY DISEASE, STAGE 4 (SEVERE): ICD-10-CM

## 2025-03-13 DIAGNOSIS — C91.10 CHRONIC LYMPHOCYTIC LEUKEMIA OF B-CELL TYPE NOT HAVING ACHIEVED REMISSION: ICD-10-CM

## 2025-03-13 DIAGNOSIS — I48.91 UNSPECIFIED ATRIAL FIBRILLATION: ICD-10-CM

## 2025-03-13 DIAGNOSIS — E85.9 AMYLOIDOSIS, UNSPECIFIED: ICD-10-CM

## 2025-03-13 PROCEDURE — 93000 ELECTROCARDIOGRAM COMPLETE: CPT

## 2025-03-13 PROCEDURE — G2211 COMPLEX E/M VISIT ADD ON: CPT

## 2025-03-13 PROCEDURE — 99214 OFFICE O/P EST MOD 30 MIN: CPT

## 2025-03-13 PROCEDURE — 93280 PM DEVICE PROGR EVAL DUAL: CPT

## 2025-03-14 NOTE — CONSULT NOTE ADULT - CONSULT REQUESTED BY NAME
Dr. Richter -Per vascular  -S/p b/l iliac stent placement on 3/3/25  -Podiatry f/u.  3/9: has cyanosed digits:  defer to vascular  /: vascular following  3/123: per vascular surgery  3/13: still with discolored digits:  defer to vascular  3/14; per primary  team

## 2025-03-25 ENCOUNTER — APPOINTMENT (OUTPATIENT)
Dept: OTOLARYNGOLOGY | Facility: CLINIC | Age: 80
End: 2025-03-25
Payer: MEDICARE

## 2025-03-25 VITALS
DIASTOLIC BLOOD PRESSURE: 72 MMHG | SYSTOLIC BLOOD PRESSURE: 119 MMHG | HEART RATE: 75 BPM | BODY MASS INDEX: 27.72 KG/M2 | HEIGHT: 74 IN | WEIGHT: 216 LBS

## 2025-03-25 DIAGNOSIS — H93.13 TINNITUS, BILATERAL: ICD-10-CM

## 2025-03-25 DIAGNOSIS — J34.2 DEVIATED NASAL SEPTUM: ICD-10-CM

## 2025-03-25 DIAGNOSIS — J31.0 CHRONIC RHINITIS: ICD-10-CM

## 2025-03-25 PROCEDURE — 92567 TYMPANOMETRY: CPT

## 2025-03-25 PROCEDURE — 31231 NASAL ENDOSCOPY DX: CPT

## 2025-03-25 PROCEDURE — 92557 COMPREHENSIVE HEARING TEST: CPT

## 2025-03-25 PROCEDURE — 99203 OFFICE O/P NEW LOW 30 MIN: CPT | Mod: 25

## 2025-03-25 RX ORDER — IPRATROPIUM BROMIDE 21 UG/1
0.03 SPRAY, METERED NASAL
Qty: 3 | Refills: 3 | Status: ACTIVE | COMMUNITY
Start: 2025-03-25 | End: 1900-01-01

## 2025-04-21 ENCOUNTER — OUTPATIENT (OUTPATIENT)
Dept: OUTPATIENT SERVICES | Facility: HOSPITAL | Age: 80
LOS: 1 days | End: 2025-04-21
Payer: MEDICARE

## 2025-04-21 ENCOUNTER — APPOINTMENT (OUTPATIENT)
Dept: RADIOLOGY | Facility: CLINIC | Age: 80
End: 2025-04-21
Payer: MEDICARE

## 2025-04-21 DIAGNOSIS — Z00.8 ENCOUNTER FOR OTHER GENERAL EXAMINATION: ICD-10-CM

## 2025-04-21 DIAGNOSIS — Z98.41 CATARACT EXTRACTION STATUS, RIGHT EYE: Chronic | ICD-10-CM

## 2025-04-21 DIAGNOSIS — Z96.0 PRESENCE OF UROGENITAL IMPLANTS: Chronic | ICD-10-CM

## 2025-04-21 DIAGNOSIS — Z98.89 OTHER SPECIFIED POSTPROCEDURAL STATES: Chronic | ICD-10-CM

## 2025-04-21 DIAGNOSIS — Z95.0 PRESENCE OF CARDIAC PACEMAKER: Chronic | ICD-10-CM

## 2025-04-21 DIAGNOSIS — Z98.890 OTHER SPECIFIED POSTPROCEDURAL STATES: Chronic | ICD-10-CM

## 2025-04-21 PROCEDURE — 73521 X-RAY EXAM HIPS BI 2 VIEWS: CPT | Mod: 26

## 2025-04-21 PROCEDURE — 73521 X-RAY EXAM HIPS BI 2 VIEWS: CPT

## 2025-05-08 ENCOUNTER — OUTPATIENT (OUTPATIENT)
Dept: OUTPATIENT SERVICES | Facility: HOSPITAL | Age: 80
LOS: 1 days | Discharge: HOME | End: 2025-05-08

## 2025-05-08 DIAGNOSIS — C91.11 CHRONIC LYMPHOCYTIC LEUKEMIA OF B-CELL TYPE IN REMISSION: ICD-10-CM

## 2025-05-08 DIAGNOSIS — Z98.41 CATARACT EXTRACTION STATUS, RIGHT EYE: Chronic | ICD-10-CM

## 2025-05-08 DIAGNOSIS — Z98.89 OTHER SPECIFIED POSTPROCEDURAL STATES: Chronic | ICD-10-CM

## 2025-05-08 DIAGNOSIS — Z98.890 OTHER SPECIFIED POSTPROCEDURAL STATES: Chronic | ICD-10-CM

## 2025-05-08 DIAGNOSIS — Z95.0 PRESENCE OF CARDIAC PACEMAKER: Chronic | ICD-10-CM

## 2025-05-08 DIAGNOSIS — Z96.0 PRESENCE OF UROGENITAL IMPLANTS: Chronic | ICD-10-CM

## 2025-05-09 ENCOUNTER — APPOINTMENT (OUTPATIENT)
Dept: HEMATOLOGY ONCOLOGY | Facility: CLINIC | Age: 80
End: 2025-05-09
Payer: MEDICARE

## 2025-05-09 ENCOUNTER — RESULT REVIEW (OUTPATIENT)
Age: 80
End: 2025-05-09

## 2025-05-09 ENCOUNTER — LABORATORY RESULT (OUTPATIENT)
Age: 80
End: 2025-05-09

## 2025-05-09 VITALS
WEIGHT: 215.99 LBS | OXYGEN SATURATION: 98 % | HEART RATE: 68 BPM | TEMPERATURE: 97.4 F | RESPIRATION RATE: 16 BRPM | SYSTOLIC BLOOD PRESSURE: 113 MMHG | DIASTOLIC BLOOD PRESSURE: 69 MMHG | BODY MASS INDEX: 27.73 KG/M2

## 2025-05-09 DIAGNOSIS — D47.2 MONOCLONAL GAMMOPATHY: ICD-10-CM

## 2025-05-09 DIAGNOSIS — I48.91 UNSPECIFIED ATRIAL FIBRILLATION: ICD-10-CM

## 2025-05-09 DIAGNOSIS — Z79.01 LONG TERM (CURRENT) USE OF ANTICOAGULANTS: ICD-10-CM

## 2025-05-09 DIAGNOSIS — C91.10 CHRONIC LYMPHOCYTIC LEUKEMIA OF B-CELL TYPE NOT HAVING ACHIEVED REMISSION: ICD-10-CM

## 2025-05-09 DIAGNOSIS — N63.41 UNSPECIFIED LUMP IN RIGHT BREAST, SUBAREOLAR: ICD-10-CM

## 2025-05-09 DIAGNOSIS — D50.9 IRON DEFICIENCY ANEMIA, UNSPECIFIED: ICD-10-CM

## 2025-05-09 DIAGNOSIS — D69.6 THROMBOCYTOPENIA, UNSPECIFIED: ICD-10-CM

## 2025-05-09 DIAGNOSIS — R80.3 BENCE JONES PROTEINURIA: ICD-10-CM

## 2025-05-09 DIAGNOSIS — E85.9 AMYLOIDOSIS, UNSPECIFIED: ICD-10-CM

## 2025-05-09 DIAGNOSIS — C88.00 WALDENSTROM MACROGLOBULINEMIA NOT HAVING ACHIEVED REMISSION: ICD-10-CM

## 2025-05-09 LAB
ALBUMIN SERPL ELPH-MCNC: 3.8 G/DL
ALP BLD-CCNC: 121 U/L
ALT SERPL-CCNC: 8 U/L
ANION GAP SERPL CALC-SCNC: 14 MMOL/L
AST SERPL-CCNC: 15 U/L
BASOPHILS # BLD AUTO: 0.08 K/UL — SIGNIFICANT CHANGE UP (ref 0–0.2)
BASOPHILS NFR BLD AUTO: 0.9 % — SIGNIFICANT CHANGE UP (ref 0–2)
BILIRUB SERPL-MCNC: 0.3 MG/DL
BUN SERPL-MCNC: 45 MG/DL
CALCIUM SERPL-MCNC: 9.3 MG/DL
CHLORIDE SERPL-SCNC: 107 MMOL/L
CO2 SERPL-SCNC: 23 MMOL/L
CREAT SERPL-MCNC: 3.68 MG/DL
EGFRCR SERPLBLD CKD-EPI 2021: 16 ML/MIN/1.73M2
EOSINOPHIL # BLD AUTO: 0.4 K/UL — SIGNIFICANT CHANGE UP (ref 0–0.5)
EOSINOPHIL NFR BLD AUTO: 4.3 % — SIGNIFICANT CHANGE UP (ref 0–6)
GLUCOSE SERPL-MCNC: 129 MG/DL
HCT VFR BLD CALC: 35.6 % — LOW (ref 39–50)
HGB BLD-MCNC: 11.5 G/DL — LOW (ref 13–17)
IMM GRANULOCYTES NFR BLD AUTO: 1.5 % — HIGH (ref 0–0.9)
LDH SERPL-CCNC: 163 U/L
LYMPHOCYTES # BLD AUTO: 3.17 K/UL — SIGNIFICANT CHANGE UP (ref 1–3.3)
LYMPHOCYTES # BLD AUTO: 33.8 % — SIGNIFICANT CHANGE UP (ref 13–44)
MCHC RBC-ENTMCNC: 32.3 G/DL — SIGNIFICANT CHANGE UP (ref 32–36)
MCHC RBC-ENTMCNC: 33.3 PG — SIGNIFICANT CHANGE UP (ref 27–34)
MCV RBC AUTO: 103.2 FL — HIGH (ref 80–100)
MONOCYTES # BLD AUTO: 0.48 K/UL — SIGNIFICANT CHANGE UP (ref 0–0.9)
MONOCYTES NFR BLD AUTO: 5.1 % — SIGNIFICANT CHANGE UP (ref 2–14)
NEUTROPHILS # BLD AUTO: 5.1 K/UL — SIGNIFICANT CHANGE UP (ref 1.8–7.4)
NEUTROPHILS NFR BLD AUTO: 54.4 % — SIGNIFICANT CHANGE UP (ref 43–77)
NRBC BLD AUTO-RTO: 0 /100 WBCS — SIGNIFICANT CHANGE UP (ref 0–0)
PLATELET # BLD AUTO: 146 K/UL — LOW (ref 150–400)
POTASSIUM SERPL-SCNC: 4.6 MMOL/L
PROT SERPL-MCNC: 5.3 G/DL
RBC # BLD: 3.45 M/UL — LOW (ref 4.2–5.8)
RBC # FLD: 14.4 % — SIGNIFICANT CHANGE UP (ref 10.3–14.5)
SODIUM SERPL-SCNC: 145 MMOL/L
WBC # BLD: 9.37 K/UL — SIGNIFICANT CHANGE UP (ref 3.8–10.5)
WBC # FLD AUTO: 9.37 K/UL — SIGNIFICANT CHANGE UP (ref 3.8–10.5)

## 2025-05-09 PROCEDURE — 99215 OFFICE O/P EST HI 40 MIN: CPT

## 2025-05-09 RX ORDER — SODIUM BICARBONATE 325 MG/1
325 TABLET ORAL
Refills: 0 | Status: ACTIVE | COMMUNITY
Start: 2025-05-09

## 2025-05-23 ENCOUNTER — APPOINTMENT (OUTPATIENT)
Dept: DERMATOLOGY | Facility: CLINIC | Age: 80
End: 2025-05-23

## 2025-05-30 ENCOUNTER — RESULT REVIEW (OUTPATIENT)
Age: 80
End: 2025-05-30

## 2025-05-30 ENCOUNTER — APPOINTMENT (OUTPATIENT)
Dept: HEMATOLOGY ONCOLOGY | Facility: CLINIC | Age: 80
End: 2025-05-30

## 2025-05-30 VITALS
SYSTOLIC BLOOD PRESSURE: 122 MMHG | OXYGEN SATURATION: 90 % | HEART RATE: 67 BPM | RESPIRATION RATE: 19 BRPM | TEMPERATURE: 97.5 F | BODY MASS INDEX: 27.59 KG/M2 | DIASTOLIC BLOOD PRESSURE: 78 MMHG | WEIGHT: 215 LBS | HEIGHT: 74 IN

## 2025-05-30 DIAGNOSIS — C88.00 WALDENSTROM MACROGLOBULINEMIA NOT HAVING ACHIEVED REMISSION: ICD-10-CM

## 2025-05-30 LAB
ALBUMIN SERPL ELPH-MCNC: 3.7 G/DL
ALP BLD-CCNC: 150 U/L
ALT SERPL-CCNC: 38 U/L
ANION GAP SERPL CALC-SCNC: 13 MMOL/L
AST SERPL-CCNC: 22 U/L
BASOPHILS # BLD AUTO: 0.03 K/UL — SIGNIFICANT CHANGE UP (ref 0–0.2)
BASOPHILS NFR BLD AUTO: 0.2 % — SIGNIFICANT CHANGE UP (ref 0–2)
BILIRUB SERPL-MCNC: 0.3 MG/DL
BUN SERPL-MCNC: 53 MG/DL
CALCIUM SERPL-MCNC: 8.7 MG/DL
CHLORIDE SERPL-SCNC: 109 MMOL/L
CO2 SERPL-SCNC: 22 MMOL/L
CREAT SERPL-MCNC: 3.75 MG/DL
EGFRCR SERPLBLD CKD-EPI 2021: 16 ML/MIN/1.73M2
EOSINOPHIL # BLD AUTO: 0.3 K/UL — SIGNIFICANT CHANGE UP (ref 0–0.5)
EOSINOPHIL NFR BLD AUTO: 2.4 % — SIGNIFICANT CHANGE UP (ref 0–6)
GLUCOSE SERPL-MCNC: 146 MG/DL
HCT VFR BLD CALC: 35.1 % — LOW (ref 39–50)
HGB BLD-MCNC: 11.3 G/DL — LOW (ref 13–17)
IMM GRANULOCYTES NFR BLD AUTO: 0.6 % — SIGNIFICANT CHANGE UP (ref 0–0.9)
LYMPHOCYTES # BLD AUTO: 1.79 K/UL — SIGNIFICANT CHANGE UP (ref 1–3.3)
LYMPHOCYTES # BLD AUTO: 14.4 % — SIGNIFICANT CHANGE UP (ref 13–44)
MCHC RBC-ENTMCNC: 32.2 G/DL — SIGNIFICANT CHANGE UP (ref 32–36)
MCHC RBC-ENTMCNC: 33.2 PG — SIGNIFICANT CHANGE UP (ref 27–34)
MCV RBC AUTO: 103.2 FL — HIGH (ref 80–100)
MONOCYTES # BLD AUTO: 0.49 K/UL — SIGNIFICANT CHANGE UP (ref 0–0.9)
MONOCYTES NFR BLD AUTO: 3.9 % — SIGNIFICANT CHANGE UP (ref 2–14)
NEUTROPHILS # BLD AUTO: 9.74 K/UL — HIGH (ref 1.8–7.4)
NEUTROPHILS NFR BLD AUTO: 78.5 % — HIGH (ref 43–77)
NRBC BLD AUTO-RTO: 0 /100 WBCS — SIGNIFICANT CHANGE UP (ref 0–0)
PLATELET # BLD AUTO: 190 K/UL — SIGNIFICANT CHANGE UP (ref 150–400)
POTASSIUM SERPL-SCNC: 5 MMOL/L
PROT SERPL-MCNC: 5.4 G/DL
RBC # BLD: 3.4 M/UL — LOW (ref 4.2–5.8)
RBC # FLD: 13.9 % — SIGNIFICANT CHANGE UP (ref 10.3–14.5)
SODIUM SERPL-SCNC: 144 MMOL/L
WBC # BLD: 12.43 K/UL — HIGH (ref 3.8–10.5)
WBC # FLD AUTO: 12.43 K/UL — HIGH (ref 3.8–10.5)

## 2025-05-30 PROCEDURE — 99213 OFFICE O/P EST LOW 20 MIN: CPT

## 2025-05-30 NOTE — ED PROVIDER NOTE - GENITOURINARY TESTICLE
Schedule Proced:   Please Schedule Routine (next available or patient preference)   Procedure: Colonoscopy (53293) with SuPrep  Diagnosis: History of Colon Polyps Z86.010 and positive cologuard R19.5  Is patient:    Diabetic? No  On GLP-1 Class? (Examples: Semaglutide (Wegovy/Ozempic/Rybelsus), Dulaglutide (Trulicity), Exenatide(Byetta/Bydureon Bcise), Liraglutide (Saxenda/Victoza/Xultophy), Tirzepatide(Mounjaro), Lixisenatide(Soliqua)) No  On Coumadin, heparin, lovenox? No   On ASA/NSAIDS? Platelet Modifying (examples - Plavix, aspirin, nsaids, Aggrenox)? Yes: Hold for Xarelto 2 days.   On Phentermine? No   On Viagra, Sildenafil, Verdenafil, Tadalafil? No  Latex allergy: no  Sleep apnea: no  Location: [facility]    Special Instructions:   MAC Anesthesia- pulmonary concerns   Hold ferrous sulfate 7 days    For MAC/GA patients if applicable, please verify to hold medications prior to procedure: Selegiline patches x 10 days  Sildenafil, Verdenafil, Tadalafil x 48 hours   Monoamine oxidase inhibitor (MAOIs), angiotensin receptor blockers, renin inhibitors, ACE inhibitors, diuretics (unless in combination with beta blocker) day of procedure      Physician: Dr. Vazquez      Routed to Providers Surgery Scheduling Pool.   normal bilaterally

## 2025-06-03 ENCOUNTER — RESULT REVIEW (OUTPATIENT)
Age: 80
End: 2025-06-03

## 2025-06-03 ENCOUNTER — APPOINTMENT (OUTPATIENT)
Dept: MAMMOGRAPHY | Facility: CLINIC | Age: 80
End: 2025-06-03
Payer: MEDICARE

## 2025-06-03 ENCOUNTER — APPOINTMENT (OUTPATIENT)
Dept: ULTRASOUND IMAGING | Facility: CLINIC | Age: 80
End: 2025-06-03
Payer: MEDICARE

## 2025-06-03 PROCEDURE — G0279: CPT

## 2025-06-03 PROCEDURE — 77066 DX MAMMO INCL CAD BI: CPT

## 2025-06-03 PROCEDURE — 76641 ULTRASOUND BREAST COMPLETE: CPT | Mod: 50

## 2025-06-09 ENCOUNTER — APPOINTMENT (OUTPATIENT)
Dept: DERMATOLOGY | Facility: CLINIC | Age: 80
End: 2025-06-09
Payer: MEDICARE

## 2025-06-09 PROBLEM — L82.1 SEBORRHEIC KERATOSES: Status: ACTIVE | Noted: 2025-06-09

## 2025-06-09 PROBLEM — L20.9 DERMATITIS, ATOPIC: Status: ACTIVE | Noted: 2025-06-09

## 2025-06-09 PROBLEM — L72.0 EPIDERMOID CYST: Status: ACTIVE | Noted: 2025-06-09

## 2025-06-09 PROBLEM — D48.5 NEOPLASM OF UNCERTAIN BEHAVIOR OF SKIN: Status: ACTIVE | Noted: 2025-06-09

## 2025-06-09 PROCEDURE — 11103 TANGNTL BX SKIN EA SEP/ADDL: CPT

## 2025-06-09 PROCEDURE — 11102 TANGNTL BX SKIN SINGLE LES: CPT

## 2025-06-09 PROCEDURE — 99204 OFFICE O/P NEW MOD 45 MIN: CPT | Mod: 25

## 2025-06-09 RX ORDER — HYDROCORTISONE 25 MG/G
2.5 OINTMENT TOPICAL
Qty: 60 | Refills: 2 | Status: ACTIVE | COMMUNITY
Start: 2025-06-09 | End: 1900-01-01

## 2025-06-12 ENCOUNTER — APPOINTMENT (OUTPATIENT)
Dept: ELECTROPHYSIOLOGY | Facility: CLINIC | Age: 80
End: 2025-06-12

## 2025-06-12 PROCEDURE — 93294 REM INTERROG EVL PM/LDLS PM: CPT

## 2025-06-12 PROCEDURE — 93296 REM INTERROG EVL PM/IDS: CPT

## 2025-06-17 LAB — DERMATOLOGY BIOPSY: NORMAL

## 2025-07-09 NOTE — END OF VISIT
[] : Fellow
PAST SURGICAL HISTORY:  History of arthroscopic knee surgery, right     History of dilatation and curettage

## 2025-08-08 ENCOUNTER — APPOINTMENT (OUTPATIENT)
Dept: HEMATOLOGY ONCOLOGY | Facility: CLINIC | Age: 80
End: 2025-08-08
Payer: MEDICARE

## 2025-08-08 ENCOUNTER — RESULT REVIEW (OUTPATIENT)
Age: 80
End: 2025-08-08

## 2025-08-08 ENCOUNTER — LABORATORY RESULT (OUTPATIENT)
Age: 80
End: 2025-08-08

## 2025-08-08 VITALS
TEMPERATURE: 97.2 F | HEART RATE: 59 BPM | DIASTOLIC BLOOD PRESSURE: 79 MMHG | WEIGHT: 211.64 LBS | SYSTOLIC BLOOD PRESSURE: 132 MMHG | RESPIRATION RATE: 16 BRPM | BODY MASS INDEX: 27.17 KG/M2 | OXYGEN SATURATION: 96 %

## 2025-08-08 DIAGNOSIS — C88.00 WALDENSTROM MACROGLOBULINEMIA NOT HAVING ACHIEVED REMISSION: ICD-10-CM

## 2025-08-08 DIAGNOSIS — E85.9 AMYLOIDOSIS, UNSPECIFIED: ICD-10-CM

## 2025-08-08 DIAGNOSIS — D50.9 IRON DEFICIENCY ANEMIA, UNSPECIFIED: ICD-10-CM

## 2025-08-08 DIAGNOSIS — I48.91 UNSPECIFIED ATRIAL FIBRILLATION: ICD-10-CM

## 2025-08-08 DIAGNOSIS — D64.9 ANEMIA, UNSPECIFIED: ICD-10-CM

## 2025-08-08 DIAGNOSIS — C91.10 CHRONIC LYMPHOCYTIC LEUKEMIA OF B-CELL TYPE NOT HAVING ACHIEVED REMISSION: ICD-10-CM

## 2025-08-08 DIAGNOSIS — D47.2 MONOCLONAL GAMMOPATHY: ICD-10-CM

## 2025-08-08 DIAGNOSIS — D69.6 THROMBOCYTOPENIA, UNSPECIFIED: ICD-10-CM

## 2025-08-08 DIAGNOSIS — Z79.01 LONG TERM (CURRENT) USE OF ANTICOAGULANTS: ICD-10-CM

## 2025-08-08 DIAGNOSIS — R80.3 BENCE JONES PROTEINURIA: ICD-10-CM

## 2025-08-08 PROCEDURE — 99215 OFFICE O/P EST HI 40 MIN: CPT

## 2025-08-11 LAB
ALBUMIN SERPL ELPH-MCNC: 3.8 G/DL
ALP BLD-CCNC: 133 U/L
ALT SERPL-CCNC: 16 U/L
ANION GAP SERPL CALC-SCNC: 14 MMOL/L
AST SERPL-CCNC: 16 U/L
BILIRUB SERPL-MCNC: 0.3 MG/DL
BUN SERPL-MCNC: 42 MG/DL
CALCIUM SERPL-MCNC: 9.4 MG/DL
CHLORIDE SERPL-SCNC: 106 MMOL/L
CO2 SERPL-SCNC: 24 MMOL/L
CREAT SERPL-MCNC: 3.57 MG/DL
DEPRECATED KAPPA LC FREE/LAMBDA SER: 0.01 RATIO
EGFRCR SERPLBLD CKD-EPI 2021: 17 ML/MIN/1.73M2
GLUCOSE SERPL-MCNC: 129 MG/DL
IGA SERPL-MCNC: 22 MG/DL
IGG SERPL-MCNC: 263 MG/DL
IGM SERPL-MCNC: 323 MG/DL
KAPPA LC CSF-MCNC: 64.1 MG/DL
KAPPA LC SERPL-MCNC: 0.84 MG/DL
LDH SERPL-CCNC: 148 U/L
POTASSIUM SERPL-SCNC: 5 MMOL/L
PROT SERPL-MCNC: 5.4 G/DL
SODIUM SERPL-SCNC: 144 MMOL/L
T3RU NFR SERPL: 0.8 TBI
T4 SERPL-MCNC: 9.5 UG/DL
TSH SERPL-ACNC: 0.2 UIU/ML

## 2025-08-14 LAB
ALBUMIN MFR SERPL ELPH: 61.3 %
ALBUMIN SERPL-MCNC: 3.3 G/DL
ALBUMIN/GLOB SERPL: 1.6 RATIO
ALPHA1 GLOB MFR SERPL ELPH: 7.5 %
ALPHA1 GLOB SERPL ELPH-MCNC: 0.4 G/DL
ALPHA2 GLOB MFR SERPL ELPH: 13.2 %
ALPHA2 GLOB SERPL ELPH-MCNC: 0.7 G/DL
B-GLOBULIN MFR SERPL ELPH: 9.7 %
B-GLOBULIN SERPL ELPH-MCNC: 0.5 G/DL
GAMMA GLOB FLD ELPH-MCNC: 0.4 G/DL
GAMMA GLOB MFR SERPL ELPH: 8.3 %
INTERPRETATION SERPL IEP-IMP: NORMAL
M PROTEIN MFR SERPL ELPH: NORMAL
MONOCLON BAND OBS SERPL: NORMAL
PROT SERPL-MCNC: 5.4 G/DL
PROT SERPL-MCNC: 5.4 G/DL

## 2025-08-22 ENCOUNTER — APPOINTMENT (OUTPATIENT)
Dept: CARDIOLOGY | Facility: CLINIC | Age: 80
End: 2025-08-22
Payer: MEDICARE

## 2025-08-22 VITALS
WEIGHT: 212 LBS | HEART RATE: 68 BPM | TEMPERATURE: 98.1 F | SYSTOLIC BLOOD PRESSURE: 130 MMHG | OXYGEN SATURATION: 96 % | DIASTOLIC BLOOD PRESSURE: 74 MMHG | HEIGHT: 74 IN | BODY MASS INDEX: 27.21 KG/M2

## 2025-08-22 DIAGNOSIS — C88.00 WALDENSTROM MACROGLOBULINEMIA NOT HAVING ACHIEVED REMISSION: ICD-10-CM

## 2025-08-22 DIAGNOSIS — I50.33 ACUTE ON CHRONIC DIASTOLIC (CONGESTIVE) HEART FAILURE: ICD-10-CM

## 2025-08-22 DIAGNOSIS — I48.91 UNSPECIFIED ATRIAL FIBRILLATION: ICD-10-CM

## 2025-08-22 DIAGNOSIS — Z87.440 PERSONAL HISTORY OF URINARY (TRACT) INFECTIONS: ICD-10-CM

## 2025-08-22 DIAGNOSIS — Z87.898 PERSONAL HISTORY OF OTHER SPECIFIED CONDITIONS: ICD-10-CM

## 2025-08-22 DIAGNOSIS — R60.9 EDEMA, UNSPECIFIED: ICD-10-CM

## 2025-08-22 DIAGNOSIS — R53.83 OTHER FATIGUE: ICD-10-CM

## 2025-08-22 DIAGNOSIS — C91.10 CHRONIC LYMPHOCYTIC LEUKEMIA OF B-CELL TYPE NOT HAVING ACHIEVED REMISSION: ICD-10-CM

## 2025-08-22 DIAGNOSIS — Z87.442 PERSONAL HISTORY OF URINARY CALCULI: ICD-10-CM

## 2025-08-22 DIAGNOSIS — Z87.19 PERSONAL HISTORY OF OTHER DISEASES OF THE DIGESTIVE SYSTEM: ICD-10-CM

## 2025-08-22 DIAGNOSIS — R23.2 FLUSHING: ICD-10-CM

## 2025-08-22 DIAGNOSIS — Z00.00 ENCOUNTER FOR GENERAL ADULT MEDICAL EXAMINATION W/OUT ABNORMAL FINDINGS: ICD-10-CM

## 2025-08-22 DIAGNOSIS — L20.9 ATOPIC DERMATITIS, UNSPECIFIED: ICD-10-CM

## 2025-08-22 DIAGNOSIS — F03.90 UNSPECIFIED DEMENTIA W/OUT BEHAVIORAL DISTURBANCE: ICD-10-CM

## 2025-08-22 DIAGNOSIS — N18.4 CHRONIC KIDNEY DISEASE, STAGE 4 (SEVERE): ICD-10-CM

## 2025-08-22 DIAGNOSIS — Z98.890 OTHER SPECIFIED POSTPROCEDURAL STATES: ICD-10-CM

## 2025-08-22 DIAGNOSIS — E85.9 AMYLOIDOSIS, UNSPECIFIED: ICD-10-CM

## 2025-08-22 DIAGNOSIS — U07.1 COVID-19: ICD-10-CM

## 2025-08-22 LAB
NT-PROBNP SERPL-MCNC: 769 PG/ML
TROPONIN-T, HIGH SENSITIVITY: 82 NG/L

## 2025-08-22 PROCEDURE — 93000 ELECTROCARDIOGRAM COMPLETE: CPT

## 2025-08-22 PROCEDURE — 99215 OFFICE O/P EST HI 40 MIN: CPT | Mod: 25

## 2025-08-22 RX ORDER — FUROSEMIDE 20 MG/1
20 TABLET ORAL DAILY
Refills: 0 | Status: ACTIVE | COMMUNITY
Start: 2025-08-22

## 2025-08-22 RX ORDER — MEMANTINE AND DONEPEZIL HYDROCHLORIDES 10; 21 MG/1; MG/1
21-10 CAPSULE, EXTENDED RELEASE ORAL
Refills: 0 | Status: ACTIVE | COMMUNITY
Start: 2025-08-22

## 2025-08-25 LAB
CHOLEST SERPL-MCNC: 187 MG/DL
HDLC SERPL-MCNC: 27 MG/DL
LDLC SERPL-MCNC: 136 MG/DL
NONHDLC SERPL-MCNC: 160 MG/DL
TRIGL SERPL-MCNC: 132 MG/DL
TROPONIN I SERPL HS-MCNC: 12 NG/L

## 2025-08-28 PROBLEM — Z87.898 HISTORY OF GROSS HEMATURIA: Status: RESOLVED | Noted: 2023-08-23 | Resolved: 2025-08-28

## 2025-08-28 PROBLEM — U07.1 COVID-19: Status: RESOLVED | Noted: 2021-03-26 | Resolved: 2025-08-28

## 2025-08-28 PROBLEM — Z87.440 HISTORY OF URINARY TRACT INFECTION: Status: RESOLVED | Noted: 2023-07-20 | Resolved: 2023-08-19

## 2025-08-28 PROBLEM — Z87.19 HISTORY OF CONSTIPATION: Status: RESOLVED | Noted: 2018-05-30 | Resolved: 2025-08-28

## 2025-08-28 PROBLEM — Z98.890 POSTOPERATIVE STATE: Status: RESOLVED | Noted: 2018-03-06 | Resolved: 2025-08-28

## 2025-09-08 ENCOUNTER — APPOINTMENT (OUTPATIENT)
Dept: CARDIOLOGY | Facility: CLINIC | Age: 80
End: 2025-09-08
Payer: MEDICARE

## 2025-09-08 ENCOUNTER — RX RENEWAL (OUTPATIENT)
Age: 80
End: 2025-09-08

## 2025-09-08 PROCEDURE — 93356 MYOCRD STRAIN IMG SPCKL TRCK: CPT

## 2025-09-08 PROCEDURE — 93306 TTE W/DOPPLER COMPLETE: CPT

## 2025-09-11 ENCOUNTER — APPOINTMENT (OUTPATIENT)
Dept: ELECTROPHYSIOLOGY | Facility: CLINIC | Age: 80
End: 2025-09-11
Payer: MEDICARE

## 2025-09-11 ENCOUNTER — NON-APPOINTMENT (OUTPATIENT)
Age: 80
End: 2025-09-11

## 2025-09-11 PROCEDURE — 93296 REM INTERROG EVL PM/IDS: CPT

## 2025-09-11 PROCEDURE — 93294 REM INTERROG EVL PM/LDLS PM: CPT

## 2025-09-16 RX ORDER — EMPAGLIFLOZIN 10 MG/1
10 TABLET, FILM COATED ORAL DAILY
Qty: 30 | Refills: 6 | Status: ACTIVE | COMMUNITY
Start: 2025-09-16 | End: 1900-01-01

## 2025-09-17 ENCOUNTER — RX RENEWAL (OUTPATIENT)
Age: 80
End: 2025-09-17

## 2025-09-24 LAB
ALBUMIN SERPL ELPH-MCNC: 4.1 G/DL
ALP BLD-CCNC: 154 U/L
ALT SERPL-CCNC: 14 U/L
ANION GAP SERPL CALC-SCNC: 16 MMOL/L
AST SERPL-CCNC: 14 U/L
BILIRUB SERPL-MCNC: 0.3 MG/DL
BUN SERPL-MCNC: 49 MG/DL
CALCIUM SERPL-MCNC: 9.9 MG/DL
CHLORIDE SERPL-SCNC: 105 MMOL/L
CO2 SERPL-SCNC: 20 MMOL/L
CREAT SERPL-MCNC: 4.21 MG/DL
CREAT SERPL-MCNC: 4.21 MG/DL
CYSTATIN C SERPL-MCNC: 4.27 MG/L
EGFRCR SERPLBLD CKD-EPI 2021: 14 ML/MIN/1.73M2
EGFRCR SERPLBLD CKD-EPI 2021: 14 ML/MIN/1.73M2
EGFRCR-CYS SERPLBLD CKD-EPI 2021: 12 ML/MIN/1.73M2
GFR/BSA.PRED SERPLBLD CYS-BASED-ARV: 10 ML/MIN/1.73M2
GLUCOSE SERPL-MCNC: 123 MG/DL
NT-PROBNP SERPL-MCNC: 746 PG/ML
POTASSIUM SERPL-SCNC: 4.7 MMOL/L
PROT SERPL-MCNC: 5.9 G/DL
SODIUM SERPL-SCNC: 141 MMOL/L

## 2025-09-25 LAB — TROPONIN-T, HIGH SENSITIVITY: 112 NG/L

## 2025-09-26 PROBLEM — R82.6 ABNORMAL URINE LEVELS OF SUBSTANCES CHIEFLY NONMEDICINAL AS TO SOURCE: Status: RESOLVED | Noted: 2022-10-31 | Resolved: 2025-09-26

## 2025-09-26 PROBLEM — Z87.898 HISTORY OF ABNORMAL WEIGHT LOSS: Status: RESOLVED | Noted: 2023-10-02 | Resolved: 2025-09-26

## 2025-09-26 PROBLEM — N53.19 EJACULATORY DISORDER: Status: RESOLVED | Noted: 2018-02-15 | Resolved: 2025-09-26

## 2025-09-26 LAB — TROPONIN I SERPL HS-MCNC: 23 NG/L

## 2025-09-28 PROBLEM — R06.09 DYSPNEA ON EXERTION: Status: ACTIVE | Noted: 2025-09-26

## (undated) DEVICE — SOL IRR POUR H2O 1500ML

## (undated) DEVICE — POSITIONER FOAM EGG CRATE ULNAR 2PCS (PINK)

## (undated) DEVICE — VENODYNE/SCD SLEEVE CALF LARGE

## (undated) DEVICE — ADAPTER CHECK FLO 9FR STERILE

## (undated) DEVICE — GLV 7 PROTEXIS (WHITE)

## (undated) DEVICE — ACMI SELF-SEALING SEAL UP TO 7FR

## (undated) DEVICE — GOWN TRIMAX LG

## (undated) DEVICE — FOLEY CATH 2-WAY 16FR 5CC LATEX LUBRICATH

## (undated) DEVICE — GLV 7.5 PROTEXIS (WHITE)

## (undated) DEVICE — DRAPE EQUIPMENT BANDED BAG 30 X 30" (SHOWER CAP)

## (undated) DEVICE — FOLEY HOLDER STATLOCK 2 WAY ADULT

## (undated) DEVICE — FOLEY CATH 2-WAY 18FR 5CC LATEX HYDROGEL

## (undated) DEVICE — POSITIONER FOAM HEADREST (PINK)

## (undated) DEVICE — PACK CYSTO

## (undated) DEVICE — GLV 8 PROTEXIS (CREAM) NEU-THERA

## (undated) DEVICE — TUBING RANGER FLUID IRRIGATION SET DISP

## (undated) DEVICE — FOLEY TRAY 16FR 5CC LTX UMETER CLOSED

## (undated) DEVICE — WARMING BLANKET UPPER ADULT

## (undated) DEVICE — SYR LUER LOK 10CC

## (undated) DEVICE — BAG URINE W METER 2L

## (undated) DEVICE — IRR BULB PATHFINDER + 10"

## (undated) DEVICE — SYR ASEPTO

## (undated) DEVICE — GLV 6.5 PROTEXIS (WHITE)

## (undated) DEVICE — SOL IRR BAG H2O 3000ML

## (undated) DEVICE — SOL IRR BAG NS 0.9% 3000ML

## (undated) DEVICE — BOSTON SCIENTIFC PUMPING SYSTEM SAPS SINGLE ACTION 10CC

## (undated) DEVICE — GLV 8 PROTEXIS (WHITE)